# Patient Record
Sex: MALE | Race: WHITE | NOT HISPANIC OR LATINO | Employment: FULL TIME | URBAN - METROPOLITAN AREA
[De-identification: names, ages, dates, MRNs, and addresses within clinical notes are randomized per-mention and may not be internally consistent; named-entity substitution may affect disease eponyms.]

---

## 2017-08-03 ENCOUNTER — TRANSFERRED RECORDS (OUTPATIENT)
Dept: HEALTH INFORMATION MANAGEMENT | Facility: CLINIC | Age: 37
End: 2017-08-03

## 2017-08-16 ENCOUNTER — TRANSFERRED RECORDS (OUTPATIENT)
Dept: HEALTH INFORMATION MANAGEMENT | Facility: CLINIC | Age: 37
End: 2017-08-16

## 2017-12-27 ENCOUNTER — TRANSFERRED RECORDS (OUTPATIENT)
Dept: HEALTH INFORMATION MANAGEMENT | Facility: CLINIC | Age: 37
End: 2017-12-27

## 2018-08-15 ENCOUNTER — TRANSFERRED RECORDS (OUTPATIENT)
Dept: HEALTH INFORMATION MANAGEMENT | Facility: CLINIC | Age: 38
End: 2018-08-15

## 2018-08-15 LAB — HBA1C MFR BLD: 8.6 % (ref 0–5.7)

## 2018-08-16 ENCOUNTER — TRANSFERRED RECORDS (OUTPATIENT)
Dept: HEALTH INFORMATION MANAGEMENT | Facility: CLINIC | Age: 38
End: 2018-08-16

## 2018-08-16 LAB
ALT SERPL-CCNC: 18 U/L (ref 16–61)
AST SERPL-CCNC: 10 U/L (ref 15–37)
CREAT SERPL-MCNC: 5.97 MG/DL (ref 0.7–1.3)
GFR SERPL CREATININE-BSD FRML MDRD: 11 ML/MIN/1.73M2
GLUCOSE SERPL-MCNC: 50 MG/DL (ref 70–99)
POTASSIUM SERPL-SCNC: 3.7 MMOL/L (ref 3.5–5.1)

## 2018-08-20 ENCOUNTER — TRANSFERRED RECORDS (OUTPATIENT)
Dept: HEALTH INFORMATION MANAGEMENT | Facility: CLINIC | Age: 38
End: 2018-08-20

## 2018-08-21 ENCOUNTER — TRANSFERRED RECORDS (OUTPATIENT)
Dept: HEALTH INFORMATION MANAGEMENT | Facility: CLINIC | Age: 38
End: 2018-08-21

## 2018-10-12 ENCOUNTER — TRANSFERRED RECORDS (OUTPATIENT)
Dept: HEALTH INFORMATION MANAGEMENT | Facility: CLINIC | Age: 38
End: 2018-10-12

## 2018-11-12 LAB — HEP C HIM: NORMAL

## 2018-11-14 ENCOUNTER — TRANSFERRED RECORDS (OUTPATIENT)
Dept: HEALTH INFORMATION MANAGEMENT | Facility: CLINIC | Age: 38
End: 2018-11-14

## 2018-11-14 ENCOUNTER — REFERRAL (OUTPATIENT)
Dept: TRANSPLANT | Facility: CLINIC | Age: 38
End: 2018-11-14

## 2018-11-14 VITALS — BODY MASS INDEX: 33.11 KG/M2 | WEIGHT: 206 LBS | HEIGHT: 66 IN

## 2018-11-14 DIAGNOSIS — N18.6 ESRD (END STAGE RENAL DISEASE) ON DIALYSIS (H): ICD-10-CM

## 2018-11-14 DIAGNOSIS — N18.6 END STAGE RENAL DISEASE (H): ICD-10-CM

## 2018-11-14 DIAGNOSIS — E10.9 TYPE 1 DIABETES MELLITUS (H): ICD-10-CM

## 2018-11-14 DIAGNOSIS — Z76.82 ORGAN TRANSPLANT CANDIDATE: ICD-10-CM

## 2018-11-14 DIAGNOSIS — I10 ESSENTIAL HYPERTENSION: ICD-10-CM

## 2018-11-14 DIAGNOSIS — Z99.2 ESRD (END STAGE RENAL DISEASE) ON DIALYSIS (H): ICD-10-CM

## 2018-11-14 DIAGNOSIS — I10 HYPERTENSION: ICD-10-CM

## 2018-11-14 NOTE — LETTER
Request for Records from Referring Providers Office for Patients Referred to [facility] Solid Organ Transplant Program    December 7, 2018    Re: Michael Amin   [address]   [facility] [address]   1980    Requested Records       Due Responsible    Abdominal CT      Abdominal MRI      Abdominal Ultrasound      Bilateral iliac artery ultrasound      Carotid duplex      Chest CT      Chest XR      Colonoscopy      Dental      EKG      Echo stress test      Echocardiogram      Gastric emptying study      Heart Cath      PFT      Peripheral duplex      Renal Ultrasound      Vaccinations up-to-date     In addition to the above records, please include all lab results from the last 12 months.        Requested imaging may be electronically sent to the [facility] system via [id number] DICOM connection. When unable to send imaging electronically, an exported DICOM CD may be sent. Please indicate when imaging has been sent electronically on your return cover sheet.    Requested pathology slides should be accompanied by the appropriate report from your institution.    When the patient is hand carrying requested records or the requested records are not at your facility, please indicate this information on a return cover sheet.    Please fax requested paper records to [phone].     Please send all scans/slides to:   [facility]  Solid Organ Transplant Office  [address] [facility],  [address] [address]  Please call our office at [phone] if you have any questions or concerns.

## 2018-11-14 NOTE — Clinical Note
See smart set. On PD. Needs to see KP surgeon. Wait until next week to call him as I do not have financial approval yet. He was missed in the change over with the new PFR.

## 2018-11-14 NOTE — TELEPHONE ENCOUNTER
Insurrance information:  [facility]   Policy zurita:  Self  Subscriber/policy/ID number:  [id number]  Group Number:  [id number]    Is patient in a group home/assisted living?No  Does patient have a guardian? No    Referral intake process completed.  Patient is aware that after financial approval is received, medical records will be requested.   Patient confirmed for a callback from transplant coordinator on 12/4/2018.  Tentative evaluation date 12/19/18.  Confirmed coordinator will discuss evaluation process in more detail at the time of their call.   Patient is aware of the need to arrange age appropriate cancer screening, vaccinations, and dental care.  Reminded patient to complete questionnaire, complete medical records release, and review packet prior to evaluation visit .  Assessed patient for special needs (ie--wheelchair, assistance, guardian, and ):  No   Patient instructed to call [phone] with questions.     KAREN Styles, LPN   Solid Organ Transplant

## 2018-11-14 NOTE — LETTER
12/14/18    Michael Amin  [address]  [facility] [address]      Dear Michael,    Thank you for your interest in the Transplant Center at [facility], [facility]. We look forward to being a part of your care team and assisting you through the transplant process.    As we discussed, your transplant coordinator is Lilibeth Carmen (Pancreas, Kidney).  You may call your coordinator at any time with questions or concerns call [phone].    Please complete the following.    1. Fill out and return the enclosed forms    Authorization for Electronic Communication    Authorization to Discuss Protected Health Information    Authorization for Release of Protected Health Information    2. Sign up for:    [facility], access to your electronic medical record (see enclosed pamphlet)    [email or web address], a transplant education website    You can use these tools to learn more about your transplant, communicate with your care team, and track your medical details      Sincerely,  Solid Organ Transplant  [facility], [facility]    cc: Meera Fu MD; Gopi Borden

## 2018-11-26 ENCOUNTER — TRANSFERRED RECORDS (OUTPATIENT)
Dept: HEALTH INFORMATION MANAGEMENT | Facility: CLINIC | Age: 38
End: 2018-11-26

## 2018-12-03 ENCOUNTER — TRANSFERRED RECORDS (OUTPATIENT)
Dept: HEALTH INFORMATION MANAGEMENT | Facility: CLINIC | Age: 38
End: 2018-12-03

## 2018-12-03 LAB
CREAT SERPL-MCNC: 5.48 MG/DL (ref 0.6–1.3)
POTASSIUM SERPL-SCNC: 4.4 MEQ/L (ref 3.5–5.1)

## 2018-12-04 NOTE — TELEPHONE ENCOUNTER
Left message on voice mail introducing myself and the purpose of my call. Provided my contact information and asked for a return call.

## 2018-12-05 NOTE — TELEPHONE ENCOUNTER
Left message on voice mail asking for return call so we can start his referral for transplant. Provided my contact information.

## 2018-12-06 NOTE — TELEPHONE ENCOUNTER
Left message on cell phone and work phone asking for a return call. Provided my contact information.

## 2018-12-06 NOTE — TELEPHONE ENCOUNTER
Michael returned my call.     Contacted patient and introduced myself as their Transplant Coordinator, also introduced the role of the Transplant Coordinator in the transplant process.  Explained the purpose of this call including reviewing next steps and answering questions.    Confirmed Referring Provider, Dialysis Center, and Primary Care Physician. Notified patient of the importance of continued communication with referring providers and primary care physicians.    Reviewed components of transplant evaluation process including necessary appointments, tests, and procedures.    Answered questions for patient regarding evaluation, provided my name and contact information and requested they call with any additional questions.    Determined that patient would like additional information regarding transplant by:     Drop Down choices: Mail, Email, MyChart, Phone Call   Encourage MyChart   Notified patients that they will hear from a Transplant  to schedule evaluation.       Reviewed medical records and interviewed the patient. ESRD on PD since 9/4/2018.DM1 since age 16 and is using an insulin pump. He is having problems controlling his blood sugars while on PD but does not want to switch to HD as he is working full time and is afraid it may conflict with his employment. He does have potential living donors. He also has HTN and anemia. Non smoker, rare etoh, no recreational drugs and never received blood. BMI 33.25. Only surgery is PD catheter and hair implants. He has no diabetic issues with his feet. He lives with his wife and is completely independent. He still makes urine. Dental appointment is scheduled for January. His PCP manages his diabetes. He is currently in the process of finding another PCP as his has cut down to one day per week. Records have not been requested yet by intake because financial approval is still pending. We did talk about pancreas as his referral was kidney alone and he is most  "certainly interested in getting a pancreas as well. We did discuss the various ways the organs could be transplanted, spk,kidney alone or renata and he understood. Will change referral to KP.    We talked about the potential 2 day evaluation. The second day is dependent on the first. He will arrive at 0700 and was encouraged to bring someone with him. Instructed to eat breakfast and take his morning medications before arriving. We talked about the online group presentation of MTP. Reviewed the list of providers he will see and their roles. Reviewed the overall evaluation/approval process. He was a bit confused about the approval process as he stated he has been \"told by multiple doctors that he is a good candidate\". Discussed how the evaluation is structured to make sure he is as healthy as possible before a transplant and it is a committee decision regarding candidacy and this is the same in all transplant centers. He stated he understood. He is aware his next contact will be from scheduling. Provided him with my contact information and asked him to call me with any questions.    Smart set orders placed in [employer] [employer] and routed to scheduling.  "

## 2018-12-10 ENCOUNTER — TRANSFERRED RECORDS (OUTPATIENT)
Dept: HEALTH INFORMATION MANAGEMENT | Facility: CLINIC | Age: 38
End: 2018-12-10

## 2018-12-18 NOTE — TELEPHONE ENCOUNTER
Finally got financial approval for this patient & I can call him.    Just called pt nabila he still has STD on Mon, Jan 7; I'm going to be scheduling him for this date.

## 2019-01-07 ENCOUNTER — OFFICE VISIT (OUTPATIENT)
Dept: TRANSPLANT | Facility: CLINIC | Age: 39
End: 2019-01-07
Attending: PHYSICIAN ASSISTANT
Payer: COMMERCIAL

## 2019-01-07 ENCOUNTER — ALLIED HEALTH/NURSE VISIT (OUTPATIENT)
Dept: RESEARCH | Facility: CLINIC | Age: 39
End: 2019-01-07

## 2019-01-07 ENCOUNTER — APPOINTMENT (OUTPATIENT)
Dept: GENERAL RADIOLOGY | Facility: CLINIC | Age: 39
End: 2019-01-07
Attending: PHYSICIAN ASSISTANT
Payer: COMMERCIAL

## 2019-01-07 ENCOUNTER — DOCUMENTATION ONLY (OUTPATIENT)
Dept: TRANSPLANT | Facility: CLINIC | Age: 39
End: 2019-01-07

## 2019-01-07 ENCOUNTER — ANCILLARY PROCEDURE (OUTPATIENT)
Dept: CARDIOLOGY | Facility: CLINIC | Age: 39
End: 2019-01-07
Attending: PHYSICIAN ASSISTANT
Payer: COMMERCIAL

## 2019-01-07 VITALS
HEIGHT: 66 IN | WEIGHT: 215.2 LBS | SYSTOLIC BLOOD PRESSURE: 161 MMHG | BODY MASS INDEX: 34.58 KG/M2 | OXYGEN SATURATION: 99 % | DIASTOLIC BLOOD PRESSURE: 90 MMHG | HEART RATE: 73 BPM | TEMPERATURE: 98.2 F

## 2019-01-07 DIAGNOSIS — Z00.6 EXAMINATION OF PARTICIPANT IN CLINICAL TRIAL: Primary | ICD-10-CM

## 2019-01-07 DIAGNOSIS — Z99.2 ANEMIA IN CHRONIC KIDNEY DISEASE, ON CHRONIC DIALYSIS (H): ICD-10-CM

## 2019-01-07 DIAGNOSIS — E10.9 TYPE 1 DIABETES MELLITUS (H): ICD-10-CM

## 2019-01-07 DIAGNOSIS — I10 ESSENTIAL HYPERTENSION: ICD-10-CM

## 2019-01-07 DIAGNOSIS — N18.6 TYPE 1 DIABETES MELLITUS WITH CHRONIC KIDNEY DISEASE ON CHRONIC DIALYSIS (H): ICD-10-CM

## 2019-01-07 DIAGNOSIS — N18.6 END STAGE RENAL DISEASE (H): ICD-10-CM

## 2019-01-07 DIAGNOSIS — Z76.82 ORGAN TRANSPLANT CANDIDATE: ICD-10-CM

## 2019-01-07 DIAGNOSIS — E78.5 DYSLIPIDEMIA: ICD-10-CM

## 2019-01-07 DIAGNOSIS — Z99.2 TYPE 1 DIABETES MELLITUS WITH CHRONIC KIDNEY DISEASE ON CHRONIC DIALYSIS (H): ICD-10-CM

## 2019-01-07 DIAGNOSIS — Z99.2 ESRD (END STAGE RENAL DISEASE) ON DIALYSIS (H): Primary | ICD-10-CM

## 2019-01-07 DIAGNOSIS — E10.22 TYPE 1 DIABETES MELLITUS WITH CHRONIC KIDNEY DISEASE ON CHRONIC DIALYSIS (H): ICD-10-CM

## 2019-01-07 DIAGNOSIS — Z76.82 ORGAN TRANSPLANT CANDIDATE: Primary | ICD-10-CM

## 2019-01-07 DIAGNOSIS — D63.1 ANEMIA IN CHRONIC KIDNEY DISEASE, ON CHRONIC DIALYSIS (H): ICD-10-CM

## 2019-01-07 DIAGNOSIS — N18.6 ESRD (END STAGE RENAL DISEASE) ON DIALYSIS (H): Primary | ICD-10-CM

## 2019-01-07 DIAGNOSIS — N25.81 SECONDARY HYPERPARATHYROIDISM (H): ICD-10-CM

## 2019-01-07 DIAGNOSIS — N18.6 ANEMIA IN CHRONIC KIDNEY DISEASE, ON CHRONIC DIALYSIS (H): ICD-10-CM

## 2019-01-07 LAB
ABO + RH BLD: NORMAL
ALBUMIN SERPL-MCNC: 3.5 G/DL (ref 3.4–5)
ALBUMIN UR-MCNC: 100 MG/DL
ALP SERPL-CCNC: 157 U/L (ref 40–150)
ALT SERPL W P-5'-P-CCNC: 46 U/L (ref 0–70)
AMORPH CRY #/AREA URNS HPF: ABNORMAL /HPF
ANION GAP SERPL CALCULATED.3IONS-SCNC: 11 MMOL/L (ref 3–14)
APPEARANCE UR: CLEAR
APTT PPP: 30 SEC (ref 22–37)
AST SERPL W P-5'-P-CCNC: 15 U/L (ref 0–45)
BASOPHILS # BLD AUTO: 0.1 10E9/L (ref 0–0.2)
BASOPHILS NFR BLD AUTO: 0.6 %
BILIRUB SERPL-MCNC: 0.2 MG/DL (ref 0.2–1.3)
BILIRUB UR QL STRIP: NEGATIVE
BUN SERPL-MCNC: 59 MG/DL (ref 7–30)
CALCIUM SERPL-MCNC: 8.2 MG/DL (ref 8.5–10.1)
CHLORIDE SERPL-SCNC: 104 MMOL/L (ref 94–109)
CO2 SERPL-SCNC: 21 MMOL/L (ref 20–32)
COLOR UR AUTO: YELLOW
CREAT SERPL-MCNC: 6.6 MG/DL (ref 0.66–1.25)
DIFFERENTIAL METHOD BLD: ABNORMAL
EOSINOPHIL # BLD AUTO: 0.3 10E9/L (ref 0–0.7)
EOSINOPHIL NFR BLD AUTO: 3.2 %
ERYTHROCYTE [DISTWIDTH] IN BLOOD BY AUTOMATED COUNT: 14.5 % (ref 10–15)
GFR SERPL CREATININE-BSD FRML MDRD: 10 ML/MIN/{1.73_M2}
GLUCOSE SERPL-MCNC: 151 MG/DL (ref 70–99)
GLUCOSE UR STRIP-MCNC: 50 MG/DL
HBA1C MFR BLD: 10.8 % (ref 0–5.6)
HBV CORE AB SERPL QL IA: NONREACTIVE
HBV SURFACE AB SERPL IA-ACNC: 166.45 M[IU]/ML
HBV SURFACE AG SERPL QL IA: NONREACTIVE
HCT VFR BLD AUTO: 39.8 % (ref 40–53)
HCV AB SERPL QL IA: NONREACTIVE
HGB BLD-MCNC: 12.7 G/DL (ref 13.3–17.7)
HGB UR QL STRIP: NEGATIVE
HIV 1+2 AB+HIV1 P24 AG SERPL QL IA: NONREACTIVE
IMM GRANULOCYTES # BLD: 0.1 10E9/L (ref 0–0.4)
IMM GRANULOCYTES NFR BLD: 0.6 %
INR PPP: 0.99 (ref 0.86–1.14)
KETONES UR STRIP-MCNC: NEGATIVE MG/DL
LEUKOCYTE ESTERASE UR QL STRIP: NEGATIVE
LYMPHOCYTES # BLD AUTO: 1.8 10E9/L (ref 0.8–5.3)
LYMPHOCYTES NFR BLD AUTO: 21.2 %
MCH RBC QN AUTO: 27.5 PG (ref 26.5–33)
MCHC RBC AUTO-ENTMCNC: 31.9 G/DL (ref 31.5–36.5)
MCV RBC AUTO: 86 FL (ref 78–100)
MONOCYTES # BLD AUTO: 0.5 10E9/L (ref 0–1.3)
MONOCYTES NFR BLD AUTO: 5.9 %
NEUTROPHILS # BLD AUTO: 5.9 10E9/L (ref 1.6–8.3)
NEUTROPHILS NFR BLD AUTO: 68.5 %
NITRATE UR QL: NEGATIVE
NRBC # BLD AUTO: 0 10*3/UL
NRBC BLD AUTO-RTO: 0 /100
PH UR STRIP: 5 PH (ref 5–7)
PLATELET # BLD AUTO: 183 10E9/L (ref 150–450)
POTASSIUM SERPL-SCNC: 4.7 MMOL/L (ref 3.4–5.3)
PROT SERPL-MCNC: 7.9 G/DL (ref 6.8–8.8)
RBC # BLD AUTO: 4.62 10E12/L (ref 4.4–5.9)
RBC #/AREA URNS AUTO: <1 /HPF (ref 0–2)
SODIUM SERPL-SCNC: 136 MMOL/L (ref 133–144)
SOURCE: ABNORMAL
SP GR UR STRIP: 1.01 (ref 1–1.03)
SPECIMEN EXP DATE BLD: NORMAL
SPECIMEN EXP DATE BLD: NORMAL
UROBILINOGEN UR STRIP-MCNC: 0 MG/DL (ref 0–2)
WBC # BLD AUTO: 8.6 10E9/L (ref 4–11)
WBC #/AREA URNS AUTO: 2 /HPF (ref 0–5)

## 2019-01-07 PROCEDURE — 85613 RUSSELL VIPER VENOM DILUTED: CPT | Performed by: PHYSICIAN ASSISTANT

## 2019-01-07 PROCEDURE — 83036 HEMOGLOBIN GLYCOSYLATED A1C: CPT | Performed by: PHYSICIAN ASSISTANT

## 2019-01-07 PROCEDURE — 86886 COOMBS TEST INDIRECT TITER: CPT | Performed by: PHYSICIAN ASSISTANT

## 2019-01-07 PROCEDURE — 81240 F2 GENE: CPT | Performed by: PHYSICIAN ASSISTANT

## 2019-01-07 PROCEDURE — 86706 HEP B SURFACE ANTIBODY: CPT | Performed by: PHYSICIAN ASSISTANT

## 2019-01-07 PROCEDURE — 86644 CMV ANTIBODY: CPT | Performed by: PHYSICIAN ASSISTANT

## 2019-01-07 PROCEDURE — 85610 PROTHROMBIN TIME: CPT | Performed by: PHYSICIAN ASSISTANT

## 2019-01-07 PROCEDURE — 86787 VARICELLA-ZOSTER ANTIBODY: CPT | Performed by: PHYSICIAN ASSISTANT

## 2019-01-07 PROCEDURE — 86780 TREPONEMA PALLIDUM: CPT | Performed by: PHYSICIAN ASSISTANT

## 2019-01-07 PROCEDURE — 81001 URINALYSIS AUTO W/SCOPE: CPT | Performed by: PHYSICIAN ASSISTANT

## 2019-01-07 PROCEDURE — 86905 BLOOD TYPING RBC ANTIGENS: CPT | Performed by: PHYSICIAN ASSISTANT

## 2019-01-07 PROCEDURE — 87340 HEPATITIS B SURFACE AG IA: CPT | Performed by: PHYSICIAN ASSISTANT

## 2019-01-07 PROCEDURE — 80053 COMPREHEN METABOLIC PANEL: CPT | Performed by: PHYSICIAN ASSISTANT

## 2019-01-07 PROCEDURE — 86665 EPSTEIN-BARR CAPSID VCA: CPT | Performed by: PHYSICIAN ASSISTANT

## 2019-01-07 PROCEDURE — 36415 COLL VENOUS BLD VENIPUNCTURE: CPT | Performed by: PHYSICIAN ASSISTANT

## 2019-01-07 PROCEDURE — 86147 CARDIOLIPIN ANTIBODY EA IG: CPT | Performed by: PHYSICIAN ASSISTANT

## 2019-01-07 PROCEDURE — 40000866 ZZHCL STATISTIC HIV 1/2 ANTIGEN/ANTIBODY PRETRANSPLANT ONLY: Performed by: PHYSICIAN ASSISTANT

## 2019-01-07 PROCEDURE — 84681 ASSAY OF C-PEPTIDE: CPT | Performed by: PHYSICIAN ASSISTANT

## 2019-01-07 PROCEDURE — G0463 HOSPITAL OUTPT CLINIC VISIT: HCPCS | Mod: ZF

## 2019-01-07 PROCEDURE — 86480 TB TEST CELL IMMUN MEASURE: CPT | Performed by: PHYSICIAN ASSISTANT

## 2019-01-07 PROCEDURE — 86803 HEPATITIS C AB TEST: CPT | Performed by: PHYSICIAN ASSISTANT

## 2019-01-07 PROCEDURE — 85670 THROMBIN TIME PLASMA: CPT | Performed by: PHYSICIAN ASSISTANT

## 2019-01-07 PROCEDURE — 85025 COMPLETE CBC W/AUTO DIFF WBC: CPT | Performed by: PHYSICIAN ASSISTANT

## 2019-01-07 PROCEDURE — 86704 HEP B CORE ANTIBODY TOTAL: CPT | Performed by: PHYSICIAN ASSISTANT

## 2019-01-07 PROCEDURE — 81241 F5 GENE: CPT | Performed by: PHYSICIAN ASSISTANT

## 2019-01-07 PROCEDURE — 85730 THROMBOPLASTIN TIME PARTIAL: CPT | Performed by: PHYSICIAN ASSISTANT

## 2019-01-07 PROCEDURE — 86900 BLOOD TYPING SEROLOGIC ABO: CPT | Performed by: PHYSICIAN ASSISTANT

## 2019-01-07 PROCEDURE — 86850 RBC ANTIBODY SCREEN: CPT | Performed by: PHYSICIAN ASSISTANT

## 2019-01-07 RX ORDER — METOPROLOL TARTRATE 50 MG
TABLET ORAL
Refills: 3 | Status: ON HOLD | COMMUNITY
Start: 2018-08-21 | End: 2019-10-02

## 2019-01-07 RX ORDER — NEPHROCAP 1 MG
1 CAP ORAL DAILY
Refills: 3 | COMMUNITY
Start: 2018-11-28 | End: 2019-10-07

## 2019-01-07 RX ORDER — ERGOCALCIFEROL 1.25 MG/1
50000 CAPSULE, LIQUID FILLED ORAL WEEKLY
Refills: 0 | COMMUNITY
Start: 2018-12-12 | End: 2019-10-07

## 2019-01-07 RX ORDER — LISINOPRIL 40 MG/1
40 TABLET ORAL DAILY
Refills: 3 | Status: ON HOLD | COMMUNITY
Start: 2018-12-12 | End: 2019-10-05

## 2019-01-07 RX ORDER — LANCETS
EACH MISCELLANEOUS
Refills: 1 | COMMUNITY
Start: 2018-08-17 | End: 2024-06-20

## 2019-01-07 RX ORDER — CALCITRIOL 0.25 UG/1
0.25 CAPSULE, LIQUID FILLED ORAL DAILY
Refills: 3 | COMMUNITY
Start: 2018-12-29 | End: 2019-11-13

## 2019-01-07 RX ORDER — GENTAMICIN SULFATE 1 MG/G
CREAM TOPICAL
Refills: 3 | COMMUNITY
Start: 2018-08-31 | End: 2020-02-07

## 2019-01-07 RX ORDER — ATORVASTATIN CALCIUM 20 MG/1
20 TABLET, FILM COATED ORAL EVERY EVENING
Status: ON HOLD | COMMUNITY
Start: 2018-08-16 | End: 2019-10-05

## 2019-01-07 ASSESSMENT — MIFFLIN-ST. JEOR: SCORE: 1836.27

## 2019-01-07 ASSESSMENT — PAIN SCALES - GENERAL: PAINLEVEL: NO PAIN (0)

## 2019-01-07 NOTE — PROGRESS NOTES
Outpatient MNT: Kidney Pancreas Transplant Evaluation    Current BMI: 34.6 (HT 66 in,  lbs/98 kg)  BMI is within criteria of <35 for KP transplant  Ideal BMI Goal for pancreas transplant <32 or <198 lbs (17 lb loss)     Time Spent: 15 minutes  Visit Type: Initial   Referring Physician: Dr Liu   Pt accompanied by: his wife, Michelle     History of previous txp: none   Frequency of BG checks: 2-3x/day   Dialysis: yes    Dialysis Modality: PD  Dialysis Start: 9/2018   Pt receives protein supplement with dialysis: N    Nutrition Assessment  Pt does majority of cooking at home w/o salt.     Appetite: baseline/normal     Vitamins, Supplements, Pertinent Meds: B complex, vit D  Herbal Medicines/Supplements: none     Diet Recall  Breakfast Granola bar, yogurt within 1 hour of waking    Lunch Leftovers or eats at school cafe 1-2x/week (taco salad)   Dinner 6 oz pork roast with baked potato (2x/week has potato) or lean meat + small amount of carb + veggie   Snacks Fruit, nuts, crackers, chips and queso; pt reports snacking out of habit after he gets home (before dinner) and also after dinner; he does not report feeling hungry for these snacks    Beverages Water, 12 oz cranberry juice/day mixed with laxative, diet soda majority of the time but will have a regular soda a few times/week if BG low (clear), occasional regular Gatorade   Alcohol None    Dining out A few times/week      Physical Activity  No routine activity  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 He reports skipping a PD session last night d/t travel. Otherwise no major weight changes.    Adj/dosing BW: 160 lbs/73 kg      Labs  No results for input(s): CHOL, HDL, LDL,  TRIG, CHOLHDLRATIO in the last 82946 hours.  No results found for: A1C  Potassium   Date Value Ref Range Status   01/07/2019 4.7 3.4 - 5.3 mmol/L Final     PHOSPHORUS: pt reports wnl     Malnutrition  % Intake: No decreased intake noted  % Weight Loss: None noted  Subcutaneous Fat Loss: None  Muscle Loss: None  Fluid Accumulation/Edema: Mild   Malnutrition Diagnosis: Patient does not meet two of the above criteria necessary for diagnosing malnutrition    Estimated Nutrition Needs  Energy  9748-1778     (20-25 kcal/kg dosing BW for desired wt loss)   Protein      (1.2-1.4 g/kg for HD/PD needs)         Fluid  1 ml/kcal or per MD   Micronutrient   Na+: <2000 mg/day  K+: 0346-1456 mg/day  Phos: 800-1000 mg/day            Nutrition Diagnosis  Food and nutrition related knowledge deficit r/t pre transplant eval AEB pt verbalized not hearing pre/post transplant diet guidelines.    Obesity r/t excessive energy intake and inadequate physical activity AEB BMI >30.    Nutrition Intervention  Nutrition education provided:  Discussed strategies for weight loss. Discussed snacking and trying to figure out a hobby to distract self after work from snacking, taking dog on walk with wife (wife's idea), etc. Also reviewed sugary beverage intake. Pt wondering if there are any meal plans he could follow. Emailed pt kidney friendly recipes and snack ideas, however, not all are geared toward weight loss. Encouraged pt/wife to keep it simple (protein + sm portion carb + veggie), which they are already doing, but to make a list of rotating options for all 3 meals + snacks to have on hand. Provided ideas on how to increase protein. Also provided list of kidney friendly protein bars and supplements.     Reviewed post txp diet guidelines in brief (will review in further detail post txp):  (1) Review of proper food safety measures d/t immunosuppressant therapy post-op and increased risk for food-borne illness    (2) Avoid the following  post txp d/t risk for rejection, unknown effects on the organs, and/or potential interactions with immunosuppressants:  - Herbal, Chinese, holistic, chiropractic, natural, alternative medicines and supplements  - Detoxes and cleanses  - Weight loss pills  - Protein powders or other products with extracts or herbs (ie green tea extract)    (3) Med regimen and possible side effects    Patient Understanding: Pt verbalized understanding of education provided.  Expected Compliance: Good  Follow-Up Plans: PRN     Nutrition Goals  1. Limit Na+ <2000mg/day  2. Pt to verbalize understanding of 3 aspects of post txp education provided  3. Weight loss to goal wt <217 lbs (BMI <35) for kidney txp or wt <198 lbs (BMI <32 for DM I or per MD) for pancreas txp      Provided pt with contact info.   Karissa Knowles RD, LD  [facility] [phone]

## 2019-01-07 NOTE — PROGRESS NOTES
Transplant Surgery Consult Note    Medical record number: [id number]  YOB: 1980,   Consult requested by Dr. Fu for evaluation of kidney and pancreas transplant candidacy.    Assessment and Recommendations: Mr. Amin is a good candidate for transplantation and has a good understanding of the risks and benefits of this approach to management of renal failure and diabetes. The following issues should be addressed prior to transplant:       37 yo with type 1 DM and ESRD on PD since Sep 2018  On insulin about 90 units/d  A1C 9.7  No hypo unawareness  BMI 34.65 with central obesity  No other significant surgical history  Potential live donor in brother  Blood type B reported    Will need to lose central weight to decrease operative risk of KP tx  Weight target of 190 lbs with loss of abdominal fat as part of it  If unable to achieve, then consider LD kidney followed by weight loss and IVETTE.  In which kidney transplant should be on left side    List for KP on hold and kidney active until weight loss achieved    Risks of the surgical procedure including but not limited to the rare risk of mortality discussed in detail. Patient verbalized good understanding and had several pertinent questions which were answered satisfactorily.   Immunosuppressive regimen, management and long term risks discussed in detail.     The majority of our visit was spent in counselling, discussing the medical and surgical risks of living or  donor kidney and pancreas transplantation, either in a simultaneous or sequential fashion. I contrasted approximate wait time for SPK vs living vs  donor kidneys from normal (0-85%) or higher (%) kidney donor profile index (KDPI) donors and their associated outcomes. I would not recommend this individual to consider kidneys from high KDPI donors. The reason for this decision is best summarized as: multiorgan candidate.  Access to transplant will be impacted by living donor  availability and overall candidacy for SPK, as well as the influence of blood type and degree of sensitization. We discussed advantages of preemptive transplant as well as living donor kidney transplant, and graft and patient survival outcomes associated with these options. Potential surgical complications of kidney and pancreas transplantation include bleeding, clotting, infection, wound complications, anastomotic failure and other issues such as cardiac complications, pneumonia, deep venous thrombosis, pulmonary embolism, post transplant diabetes and death. We discussed the need for protocol biopsy of the kidney and the possible need for a ureteral stent (and subsequent removal). We discussed benefits and risks associated with different approaches to exocrine drainage of pancreatic secretions. We also discussed differences in the average length of stay, recovery process, and posttransplant lab and monitoring protocol. We discussed the risk of graft rejection and recurrent diabetic nephropathy in the setting of poor glycemic control. I emphasized the need for strict immunosuppression adherence and the potential for complications of immunosuppression such as skin cancer or lymphoma, as well as a very low but not zero risk of donor-derived disease transmission risks (infection, cancer). Mr. Amin asked good questions and the patient's candidacy will be reviewed at our Multidisciplinary Selection Committee. Thank you for the opportunity to participate in Mr. Amin's care.  Total time: 45 minutes  Counselling time: 40 minutes    .  ---------------------------------------------------------------------------------------------------    HPI: Mr. Amin has Type 1 Diabetes. The patient has had diabetes for 22 years. Management is by Humalog per insulin pump. The patient usually checks his blood sugar per pump times/day. Hypoglyemic unawareness is not an issue.  Complications of diabetes include:    Retinopathy:   No  Neuropathy: No  Gastroparesis:  No    The patient is on dialysis.    Has potential kidney donors:  Yes .  Interested in participation in paired exchange if a donor is willing: Doesn't know     The patient has the following pertinent history:       No    Yes  Dialysis:    []      [] via:       Blood Transfusion                  []      []  Number of units:   Most recently:  Pregnancy:    []      [] Number:       Previous Transplant:  []      [] Details:    Cancer    []      [] Comment:   Kidney stones   []      [] Comment:      Recurrent infections  []      []  Type:                  Bladder dysfunction  []      [] Cause:    Claudication   []      [] Distance:    Previous Amputation  []      [] Cause:     Chronic anticoagulation  []      [] Indication:  Gnosticism  []      []     Past Medical History:   Diagnosis Date     Diabetes (H)     Type 1 DM, Insulin Pump      ESRD (end stage renal disease) on dialysis (H)      Hypertension     Takes Meteprolol Daily      Past Surgical History:   Procedure Laterality Date     ABDOMEN SURGERY      Dialysis Catheter Placed Aug 2018     HEAD & NECK SURGERY      Hair Implants 2007     No family history on file.  Social History     Socioeconomic History     Marital status:      Spouse name: Not on file     Number of children: Not on file     Years of education: Not on file     Highest education level: Not on file   Social Needs     Financial resource strain: Not on file     Food insecurity - worry: Not on file     Food insecurity - inability: Not on file     Transportation needs - medical: Not on file     Transportation needs - non-medical: Not on file   Occupational History     Not on file   Tobacco Use     Smoking status: Never Smoker     Smokeless tobacco: Never Used   Substance and Sexual Activity     Alcohol use: Yes     Comment: Rarely 1-2 Drinks a month      Drug use: No     Sexual activity: Not on file   Other Topics Concern     Parent/sibling w/ CABG, MI  or angioplasty before 65F 55M? Not Asked   Social History Narrative     Not on file       ROS:   CONSTITUTIONAL:  No fevers or chills  EYES: negative for icterus  ENT:  negative for hearing loss, tinnitus and sore throat  RESPIRATORY:  negative for cough, sputum, dyspnea  CARDIOVASCULAR:  negative for chest pain Fatigue  GASTROINTESTINAL:  negative for nausea, vomiting, diarrhea or constipation  GENITOURINARY:  negative for incontinence, dysuria, bladder emptying problems  HEME:  No easy bruising  INTEGUMENT:  negative for rash and pruritus  NEURO:  Negative for headache, seizure disorder    Allergies:   No Known Allergies    Medications:  Prescription Medications as of 1/7/2019       Rx Number Disp Refills Start End Last Dispensed Date Next Fill Date Owning Pharmacy    aspirin (ASA) 81 MG EC tablet            Sig: Take 81 mg by mouth    Class: Historical    Route: Oral    atorvastatin (LIPITOR) 20 MG tablet    8/16/2018        Sig: Take 20 mg by mouth    Class: Historical    Route: Oral    B Complex-C-Folic Acid (VIRT-CAPS) 1 MG CAPS   3 11/28/2018        Sig: TK ONE C PO  D    Class: Historical    blood glucose monitoring (ACCU-CHEK FASTCLIX) lancets   1 8/17/2018        Sig: U QID OR MORE OFTEN PRN    Class: Historical    calcitRIOL (ROCALTROL) 0.25 MCG capsule   3 12/29/2018        Sig: TK ONE C PO D    Class: Historical    gentamicin (GARAMYCIN) 0.1 % external cream   3 8/31/2018        Sig: ROX TO EXIT SIGHT ONCE D    Class: Historical    HUMALOG 100 UNIT/ML injection   3 9/19/2018        Sig: INJ UP  UNI SC D PER PUMP    Class: Historical    lisinopril (PRINIVIL/ZESTRIL) 20 MG tablet   3 12/12/2018        Sig: TK 1 T PO QD    Class: Historical    metoprolol tartrate (LOPRESSOR) 50 MG tablet   3 8/21/2018        Class: Historical    UNABLE TO FIND            Sig: insulin lispro 100 UNIT/ML patient supplied PUMP    Class: Historical    Route: Subcutaneous    vitamin D2 (ERGOCALCIFEROL) 21077 units (1250  mcg) capsule   0 12/12/2018        Sig: TK 1 C PO WEEKLY    Class: Historical          Exam:   Temp:  [98.2  F (36.8  C)] 98.2  F (36.8  C)  Pulse:  [73] 73  BP: (161)/(90) 161/90  SpO2:  [99 %] 99 %  Appearance: in no apparent distress.   Skin: normal  Head and Neck: Normal, no rashes or jaundice  Respiratory: easy respirations, no audible wheezing.  Cardiovascular: RRR  Abdomen: obese, Surgical scars consistent with history, PD cath in place       Diagnostics:   No results found for this or any previous visit (from the past 672 hour(s)).  No results found for: CPRA

## 2019-01-07 NOTE — LETTER
2019       RE: Michael Amin  [address]  [facility] [address]     Dear Colleague,    Thank you for referring your patient, Michael Amin, to the [facility] SOLID ORGAN TRANSPLANT at [facility]. Please see a copy of my visit note below.    Transplant Surgery Consult Note    Medical record number: [id number]  YOB: 1980,   Consult requested by Dr. Fu for evaluation of kidney and pancreas transplant candidacy.    Assessment and Recommendations: Mr. Amin is a good candidate for transplantation and has a good understanding of the risks and benefits of this approach to management of renal failure and diabetes. The following issues should be addressed prior to transplant:       39 yo with type 1 DM and ESRD on PD since Sep 2018  On insulin about 90 units/d  A1C 9.7  No hypo unawareness  BMI 34.65 with central obesity  No other significant surgical history  Potential live donor in brother  Blood type B reported    Will need to lose central weight to decrease operative risk of KP tx  Weight target of 190 lbs with loss of abdominal fat as part of it  If unable to achieve, then consider LD kidney followed by weight loss and IVETTE.  In which kidney transplant should be on left side    List for KP on hold and kidney active until weight loss achieved    Risks of the surgical procedure including but not limited to the rare risk of mortality discussed in detail. Patient verbalized good understanding and had several pertinent questions which were answered satisfactorily.   Immunosuppressive regimen, management and long term risks discussed in detail.     The majority of our visit was spent in counselling, discussing the medical and surgical risks of living or  donor kidney and pancreas transplantation, either in a simultaneous or sequential fashion. I contrasted approximate wait time for SPK vs living vs  donor kidneys from normal (0-85%) or higher  (%) kidney donor profile index (KDPI) donors and their associated outcomes. I would not recommend this individual to consider kidneys from high KDPI donors. The reason for this decision is best summarized as: multiorgan candidate.  Access to transplant will be impacted by living donor availability and overall candidacy for SPK, as well as the influence of blood type and degree of sensitization. We discussed advantages of preemptive transplant as well as living donor kidney transplant, and graft and patient survival outcomes associated with these options. Potential surgical complications of kidney and pancreas transplantation include bleeding, clotting, infection, wound complications, anastomotic failure and other issues such as cardiac complications, pneumonia, deep venous thrombosis, pulmonary embolism, post transplant diabetes and death. We discussed the need for protocol biopsy of the kidney and the possible need for a ureteral stent (and subsequent removal). We discussed benefits and risks associated with different approaches to exocrine drainage of pancreatic secretions. We also discussed differences in the average length of stay, recovery process, and posttransplant lab and monitoring protocol. We discussed the risk of graft rejection and recurrent diabetic nephropathy in the setting of poor glycemic control. I emphasized the need for strict immunosuppression adherence and the potential for complications of immunosuppression such as skin cancer or lymphoma, as well as a very low but not zero risk of donor-derived disease transmission risks (infection, cancer). Mr. Amin asked good questions and the patient's candidacy will be reviewed at our Multidisciplinary Selection Committee. Thank you for the opportunity to participate in Mr. Amin's care.  Total time: 45 minutes  Counselling time: 40  minutes    .  ---------------------------------------------------------------------------------------------------    HPI: Mr. Amin has Type 1 Diabetes. The patient has had diabetes for 22 years. Management is by Humalog per insulin pump. The patient usually checks his blood sugar per pump times/day. Hypoglyemic unawareness is not an issue.  Complications of diabetes include:    Retinopathy:  No  Neuropathy: No  Gastroparesis:  No    The patient is on dialysis.    Has potential kidney donors:  Yes .  Interested in participation in paired exchange if a donor is willing: Doesn't know     The patient has the following pertinent history:       No    Yes  Dialysis:    []      [] via:       Blood Transfusion                  []      []  Number of units:   Most recently:  Pregnancy:    []      [] Number:       Previous Transplant:  []      [] Details:    Cancer    []      [] Comment:   Kidney stones   []      [] Comment:      Recurrent infections  []      []  Type:                  Bladder dysfunction  []      [] Cause:    Claudication   []      [] Distance:    Previous Amputation  []      [] Cause:     Chronic anticoagulation  []      [] Indication:  Pent[employer]alism  []      []     Past Medical History:   Diagnosis Date     Diabetes (H)     Type 1 DM, Insulin Pump      ESRD (end stage renal disease) on dialysis (H)      Hypertension     Takes Meteprolol Daily      Past Surgical History:   Procedure Laterality Date     ABDOMEN SURGERY      Dialysis Catheter Placed Aug 2018     HEAD & NECK SURGERY      Hair Implants 2007     No family history on file.  Social History     Socioeconomic History     Marital status:      Spouse name: Not on file     Number of children: Not on file     Years of education: Not on file     Highest education level: Not on file   Social Needs     Financial resource strain: Not on file     Food insecurity - worry: Not on file     Food insecurity - inability: Not on file     Transportation  needs - medical: Not on file     Transportation needs - non-medical: Not on file   Occupational History     Not on file   Tobacco Use     Smoking status: Never Smoker     Smokeless tobacco: Never Used   Substance and Sexual Activity     Alcohol use: Yes     Comment: Rarely 1-2 Drinks a month      Drug use: No     Sexual activity: Not on file   Other Topics Concern     Parent/sibling w/ CABG, MI or angioplasty before 65F 55M? Not Asked   Social History Narrative     Not on file       ROS:   CONSTITUTIONAL:  No fevers or chills  EYES: negative for icterus  ENT:  negative for hearing loss, tinnitus and sore throat  RESPIRATORY:  negative for cough, sputum, dyspnea  CARDIOVASCULAR:  negative for chest pain Fatigue  GASTROINTESTINAL:  negative for nausea, vomiting, diarrhea or constipation  GENITOURINARY:  negative for incontinence, dysuria, bladder emptying problems  HEME:  No easy bruising  INTEGUMENT:  negative for rash and pruritus  NEURO:  Negative for headache, seizure disorder    Allergies:   No Known Allergies    Medications:  Prescription Medications as of 1/7/2019       Rx Number Disp Refills Start End Last Dispensed Date Next Fill Date Owning Pharmacy    aspirin (ASA) 81 MG EC tablet            Sig: Take 81 mg by mouth    Class: Historical    Route: Oral    atorvastatin (LIPITOR) 20 MG tablet    8/16/2018        Sig: Take 20 mg by mouth    Class: Historical    Route: Oral    B Complex-C-Folic Acid (VIRT-CAPS) 1 MG CAPS   3 11/28/2018        Sig: TK ONE C PO  D    Class: Historical    blood glucose monitoring (ACCU-CHEK FASTCLIX) lancets   1 8/17/2018        Sig: U QID OR MORE OFTEN PRN    Class: Historical    calcitRIOL (ROCALTROL) 0.25 MCG capsule   3 12/29/2018        Sig: TK ONE C PO D    Class: Historical    gentamicin (GARAMYCIN) 0.1 % external cream   3 8/31/2018        Sig: ROX TO EXIT SIGHT ONCE D    Class: Historical    HUMALOG 100 UNIT/ML injection   3 9/19/2018        Sig: INJ UP  UNI SC D PER  PUMP    Class: Historical    lisinopril (PRINIVIL/ZESTRIL) 20 MG tablet   3 12/12/2018        Sig: TK 1 T PO QD    Class: Historical    metoprolol tartrate (LOPRESSOR) 50 MG tablet   3 8/21/2018        Class: Historical    UNABLE TO FIND            Sig: insulin lispro 100 UNIT/ML patient supplied PUMP    Class: Historical    Route: Subcutaneous    vitamin D2 (ERGOCALCIFEROL) 60192 units (1250 mcg) capsule   0 12/12/2018        Sig: TK 1 C PO WEEKLY    Class: Historical          Exam:   Temp:  [98.2  F (36.8  C)] 98.2  F (36.8  C)  Pulse:  [73] 73  BP: (161)/(90) 161/90  SpO2:  [99 %] 99 %  Appearance: in no apparent distress.   Skin: normal  Head and Neck: Normal, no rashes or jaundice  Respiratory: easy respirations, no audible wheezing.  Cardiovascular: RRR  Abdomen: obese, Surgical scars consistent with history, PD cath in place       Diagnostics:   No results found for this or any previous visit (from the past 672 hour(s)).  No results found for: CPRA    Again, thank you for allowing me to participate in the care of your patient.      Sincerely,    HANANE

## 2019-01-07 NOTE — PROGRESS NOTES
Surgery Clinical Trials Office Notification of Study Randomization  Study Name: Randomized Controlled Trial to Evaluate the Effect of Lost Wage Reimbursement to Potential Kidney  Donors On Living Donation Rates (Donor Lost Wages Study) (IRB #[id number])    ICF Version Date / IRB Approval Date: 11-AUG-2018 / 21-AUG-2018    Date of Approach/Consent: 07 JAN 2019  Patient consented to participate in the Donor Lost Wages study and has been randomized to the Control arm of the study (donors ARE NOT eligible for wage reimbursement).  Patient has been informed of the results.

## 2019-01-07 NOTE — PROGRESS NOTES
"Kidney, Pancreas Transplant Referral - 11/14/2018  Michael Amin attended the pre-transplant patient education class today. The [facility] website pre-transplant modules were viewed; class participants were educated on using the site.     Content reviewed:    Living Donation and how to access that program    Paired exchange    Kidney Donor Profile Index (KDPI)    Waiting list issues (right to decline without penalty, high PHS risk donors, what to expect when called with an offer)    Hospital experience,  length of stay , need to stay locally post-discharge (2-4 weeks)    Surgical options (with pictures)                             Post-surgery lifting and driving restrictions    Post-transplant routines, frequency of lab work and clinic visits    Need to stay locally post-discharge (2-4 weeks)    Role of Transplant Coordinator    Participants were informed of the benefits of transplant as well as potential risks such as infection, cancer, and death.  The need for total adherence with immunosuppression medications and following transplant regimens was stressed.  The overall evaluation/approval/listing process was reviewed.        The patient was provided with the following documents:  What You Need to Know About a Kidney Transplant  Adult Kidney Transplant - A Guide for Patients  SRTR Data Sheet - Kidney  Brochure - Kidney Allocation  What You Need to Know About a Pancreas Transplant  Adult Pancreas Transplant-A Guide for Patients  SRTR Data Sheet - Pancreas  Brochure - Pancreas  SRTR Data Sheet - Kidney/Pancreas  Brochure - SPK  Brochure - Islet Allocation  Brochure - Multiple Listing and Waiting Time Transfer  What Every Patient Needs to Know (UNOS)  UNOS Facts and Figures  Finding a Donor  [facility] - Quick Start Guide  KDPI Consent  Receipt of Information form    Michael Amin signed the  Receipt of Information for Organ Transplant Recipient.\" He was provided Lilibeth Carmen's business card and " instructed to call with additional questions.      Summary    Team s concerns/comments:   1) BMI  2) Needs vascular evaluation  3) Needs Cardiac Clearance    Candidacy category: Green for Kidney only    Action/Plan: Patient to continue with scheduled appointments and tests. Patient may be listed for Kidney with Pancreas on hold, pending weight lose.  1) Encourage weigh loss. BMI OK for listing for Kidney only. Needs further weight loss to 198# for Pancreas.  2) Abd CT and Iliac doppler ordered-patient would like scheduled at same time of any other future appointments.  3) Cardiology consult ordered-to be scheduled. Patient aware.    Expected Selection Meeting Discussion: 1/16/19

## 2019-01-07 NOTE — NURSING NOTE
"Chief Complaint   Patient presents with     Transplant Evaluation     Pre kidney /Panc eval     Blood pressure 161/90, pulse 73, temperature 98.2  F (36.8  C), temperature source Oral, height 1.68 m (5' 6.15\"), weight 97.6 kg (215 lb 3.2 oz), SpO2 99 %.    CHUNG EDEN CMA    "

## 2019-01-07 NOTE — PROGRESS NOTES
Assessment and Plan:  # Kidney/pancreas transplant evaluation - patient is a good candidate overall. Benefits of a living donor transplant were discussed.  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 He reports that his brother is interested in donating.   # Type 1 diabetes - currently poorly controlled with about 90 units of insulin daily via pump. Complicated by triopathy and may benefit from a pancreas transplant.  # Cardiac risk - he will need to have risk assessment given longstanding diabetes and hypertension.  # BMI 34.65 - we discussed weight loss for overall general health and to help reduce postoperative complications.  # Health maintenance - he is due for dental.     Discussed the risks and benefits of a transplant, including the risk of surgery and immunosuppression medications.  Patient's overall evaluation will be discussed in the Transplant Program's regular meeting with a final recommendation on the patient's suitability for transplant to be made at that time.  Patient was seen in conjunction with Dr. Jabier Hernández as part of a shared visit.    Patient was seen by myself, Dr. Jabier Hernández, in conjunction with Natalia Noland PA-C as part of a shared visit.    I personally reviewed past medical and surgical history, vital signs, medications and labs.  Present and past medical history, along with significant physical exam findings were all reviewed with ROX.    My mackenzie findings:  Michael Amin is a 38 year old year old male with ESKD from diabetes mellitus type 1, who presents for kidney/pancreas transplant evaluation.  Patient reports feeling good overall with some medical complaints.    Key management decisions made by me and discussed with ROX:  1. Kidney/pancreas transplant evaluation - patient is a good candidate overall. Benefits of a living donor transplant were discussed.  2. ESKD from  diabetes mellitus type 1 - patient is doing okay on dialysis, but would benefit from a kidney transplant.  3. DM type 1 - not well controlled and end-organ damage and patient may benefit from a pancreas transplant.  4. Cardiac risk - patient will require Cardiology evaluation prior to transplant.  5. Overweight - patient's BMI is just over 34 and he just meets criteria for a kidney transplant alone, but would need to get to BMI of 32 or less for a pancreas transplant.  Recommend weight loss to decrease risk of wound complications and for overall health.    Evaluation:  Michael Amin was seen in consultation at the request of Dr. Jean Liu for evaluation as a potential kidney/pancreas transplant recipient.    Reason for Visit:  Michael Amin is a 38-year-old male with ESKD from diabetes mellitus type 1, who presents for kidney/pancreas transplant evaluation.    HPI:  Mr. Amin is a 38-year-old male with type 1 diabetes since age 16 that has been complicated by triopathy and has historically been poorly controlled. He recalls being told he had abnormal serum creatinine about 8-10 years ago, and had seen a local nephrologist a few times, but had generally had intermittent follow up. His creatinine was 2.5 mg/dl in August 2015. He continued to have a progressive decline until he initiated peritoneal dialysis in August 2018 (creatinine around 6 mg/dl), which he continues on currently. He denies any episodes of peritonitis. Renal imaging at the time showed echogenic kidneys. He has not had a kidney biopsy. For diabetes management, he currently uses about 90 units of humalog via pump daily. He checks blood sugars 2-4 times per day. He reports higher blood sugars associated with peritoneal dialysis. Last A1c was around 9.5%. He says sugars tend to be difficult to control and predict with typical sugars running . He denies hypoglycemic unawareness. He is currently being managed by his PCP while he searches for  a new endocrinologist. He has no known history of cardiac disease or events. His father has CAD and is s/p CABG in his late 50's/early 60's. Mr. Amin doesn't do anything in particular for exercise, but can walk an unlimited distance and climb stairs without chest pain, SOB, or claudication symptoms. 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 He is a non-smoker. Overall, he is doing ok, but does note fatigue and feeling cold. His appetite is stable. No nausea, vomiting, or diarrhea. No changes in urination. No dysuria, hematuria, or trouble emptying his bladder. No fevers, sweats, chills, or recent illness.         Kidney Disease Hx:        Kidney Disease Dx: T1DM       Biopsy Proven: No         On Dialysis: Yes, Date initiated: September 2018 and Dialysis Type: PD;       Primary Nephrologist: Dr. Fu         Medical Hx:       h/o HTN: Yes         h/o DM:  Yes        h/o Protein in Urine: Yes        h/o Blood in Urine:  No       h/o Kidney Stones:  No       h/o UTI: No       h/o Chronic NSAID Use: No         Previous Transplant Hx:        No         Transplant Sensitization Hx:       Previous Tx: No       Blood Transfusion: No             Uremic Symptoms:       Fatigue: Yes; Cold: Yes; Nausea: No; Poor Appetite: No; Metallic Taste: No; Edema: No;         Cardiovascular Hx:       h/o Cardiac Issues: No       Exercise Tolerance: no chest pain or shortness of breath with exertion.         Health Maintenance:       Dental: scheduled         Potential Donor(s): Yes; brother    ROS:  A comprehensive review of systems was obtained and negative, except as noted in the HPI or PMH.    PMH:   Medical record was reviewed and PMH was discussed with patient and noted below.  Past Medical History:   Diagnosis Date     Anemia in chronic kidney disease      Dyslipidemia      ESRD (end stage renal disease) on dialysis (H)      Hypertension      Secondary hyperparathyroidism (H)      Type 1  diabetes (H)        PSH:   Past Surgical History:   Procedure Laterality Date     hair implant  2007     INSERT CATHETER PERITONEAL DIALYSIS  08/2018     Personal or family history of bleeding or anesthesia problems: No    Family Hx:  Family History   Problem Relation Age of Onset     Diabetes Father      Coronary Artery Disease Father        Personal Hx:   Social History     Socioeconomic History     Marital status:      Spouse name: Not on file     Number of children: Not on file     Years of education: Not on file     Highest education level: Not on file   Social Needs     Financial resource strain: Not on file     Food insecurity - worry: Not on file     Food insecurity - inability: Not on file     Transportation needs - medical: Not on file     Transportation needs - non-medical: Not on file   Occupational History     Not on file   Tobacco Use     Smoking status: Never Smoker     Smokeless tobacco: Never Used   Substance and Sexual Activity     Alcohol use: Yes     Comment: Rarely 1-2 Drinks a month      Drug use: No     Sexual activity: Not on file   Other Topics Concern     Parent/sibling w/ CABG, MI or angioplasty before 65F 55M? Not Asked   Social History Narrative     Not on file       Allergies:  No Known Allergies    Medications:  Prior to Admission medications    Medication Sig Start Date End Date Taking? Authorizing Provider   aspirin (ASA) 81 MG EC tablet Take 81 mg by mouth   Yes Reported, Patient   atorvastatin (LIPITOR) 20 MG tablet Take 20 mg by mouth 8/16/18  Yes Reported, Patient   B Complex-C-Folic Acid (VIRT-CAPS) 1 MG CAPS TK ONE C PO  D 11/28/18  Yes Reported, Patient   blood glucose monitoring (ACCU-CHEK FASTCLIX) lancets U QID OR MORE OFTEN PRN 8/17/18  Yes Reported, Patient   calcitRIOL (ROCALTROL) 0.25 MCG capsule TK ONE C PO D 12/29/18  Yes Reported, Patient   gentamicin (GARAMYCIN) 0.1 % external cream ROX TO EXIT SIGHT ONCE D 8/31/18  Yes Reported, Patient   HUMALOG 100 UNIT/ML  "injection INJ UP  UNI SC D PER PUMP 9/19/18  Yes Reported, Patient   lisinopril (PRINIVIL/ZESTRIL) 20 MG tablet TK 1 T PO QD 12/12/18  Yes Reported, Patient   metoprolol tartrate (LOPRESSOR) 50 MG tablet  8/21/18  Yes Reported, Patient   UNABLE TO FIND insulin lispro 100 UNIT/ML patient supplied PUMP   Yes Reported, Patient   vitamin D2 (ERGOCALCIFEROL) 44032 units (1250 mcg) capsule TK 1 C PO WEEKLY 12/12/18  Yes Reported, Patient       Vitals:  /90   Pulse 73   Temp 98.2  F (36.8  C) (Oral)   Ht 1.68 m (5' 6.15\")   Wt 97.6 kg (215 lb 3.2 oz)   SpO2 99%   BMI 34.58 kg/m      Exam:  GENERAL APPEARANCE: alert and no distress  HENT: mouth without ulcers or lesions  LYMPHATICS: no cervical or supraclavicular nodes  RESP: lungs clear to auscultation - no rales, rhonchi or wheezes  CV: regular rhythm, normal rate, no rub, no murmur  EDEMA: no LE edema bilaterally  ABDOMEN: soft, nondistended, nontender, bowel sounds normal  MS: extremities normal - no gross deformities noted, no evidence of inflammation in joints, no muscle tenderness  SKIN: no rash    Results:   Recent Results (from the past 336 hour(s))   EKG 12-lead, tracing only [EKG1]    Collection Time: 01/07/19  1:16 PM   Result Value Ref Range    Interpretation ECG Click View Image link to view waveform and result          "

## 2019-01-07 NOTE — LETTER
1/7/2019      RE: Michael Amin  [address]  [facility] [address]       Assessment and Plan:  # Kidney/pancreas transplant evaluation - patient is a good candidate overall. Benefits of a living donor transplant were discussed.  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 He reports that his brother is interested in donating.   # Type 1 diabetes - currently poorly controlled with about 90 units of insulin daily via pump. Complicated by triopathy and may benefit from a pancreas transplant.  # Cardiac risk - he will need to have risk assessment given longstanding diabetes and hypertension.  # BMI 34.65 - we discussed weight loss for overall general health and to help reduce postoperative complications.  # Health maintenance - he is due for dental.     Discussed the risks and benefits of a transplant, including the risk of surgery and immunosuppression medications.  Patient's overall evaluation will be discussed in the Transplant Program's regular meeting with a final recommendation on the patient's suitability for transplant to be made at that time.  Patient was seen in conjunction with Dr. Jabier Hernández as part of a shared visit.    Patient was seen by myself, Dr. Jabier Hernández, in conjunction with Natalia Noland PA-C as part of a shared visit.    I personally reviewed past medical and surgical history, vital signs, medications and labs.  Present and past medical history, along with significant physical exam findings were all reviewed with ROX.    My mackenzie findings:  Michael Amin is a 38 year old year old male with ESKD from diabetes mellitus type 1, who presents for kidney/pancreas transplant evaluation.  Patient reports feeling good overall with some medical complaints.    Key management decisions made by me and discussed with ROX:  1. Kidney/pancreas transplant evaluation - patient is a good candidate  overall. Benefits of a living donor transplant were discussed.  2. ESKD from diabetes mellitus type 1 - patient is doing okay on dialysis, but would benefit from a kidney transplant.  3. DM type 1 - not well controlled and end-organ damage and patient may benefit from a pancreas transplant.  4. Cardiac risk - patient will require Cardiology evaluation prior to transplant.  5. Overweight - patient's BMI is just over 34 and he just meets criteria for a kidney transplant alone, but would need to get to BMI of 32 or less for a pancreas transplant.  Recommend weight loss to decrease risk of wound complications and for overall health.    Evaluation:  Michael Amin was seen in consultation at the request of Dr. Jean Liu for evaluation as a potential kidney/pancreas transplant recipient.    Reason for Visit:  Michael Amin is a 38-year-old male with ESKD from diabetes mellitus type 1, who presents for kidney/pancreas transplant evaluation.    HPI:  Mr. Amin is a 38-year-old male with type 1 diabetes since age 16 that has been complicated by triopathy and has historically been poorly controlled. He recalls being told he had abnormal serum creatinine about 8-10 years ago, and had seen a local nephrologist a few times, but had generally had intermittent follow up. His creatinine was 2.5 mg/dl in August 2015. He continued to have a progressive decline until he initiated peritoneal dialysis in August 2018 (creatinine around 6 mg/dl), which he continues on currently. He denies any episodes of peritonitis. Renal imaging at the time showed echogenic kidneys. He has not had a kidney biopsy. For diabetes management, he currently uses about 90 units of humalog via pump daily. He checks blood sugars 2-4 times per day. He reports higher blood sugars associated with peritoneal dialysis. Last A1c was around 9.5%. He says sugars tend to be difficult to control and predict with typical sugars running . He denies hypoglycemic  unawareness. He is currently being managed by his PCP while he searches for a new endocrinologist. He has no known history of cardiac disease or events. His father has CAD and is s/p CABG in his late 50's/early 60's. Mr. Amin doesn't do anything in particular for exercise, but can walk an unlimited distance and climb stairs without chest pain, SOB, or claudication symptoms. 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 He is a non-smoker. Overall, he is doing ok, but does note fatigue and feeling cold. His appetite is stable. No nausea, vomiting, or diarrhea. No changes in urination. No dysuria, hematuria, or trouble emptying his bladder. No fevers, sweats, chills, or recent illness.         Kidney Disease Hx:        Kidney Disease Dx: T1DM       Biopsy Proven: No         On Dialysis: Yes, Date initiated: September 2018 and Dialysis Type: PD;       Primary Nephrologist: Dr. Fu         Medical Hx:       h/o HTN: Yes         h/o DM:  Yes        h/o Protein in Urine: Yes        h/o Blood in Urine:  No       h/o Kidney Stones:  No       h/o UTI: No       h/o Chronic NSAID Use: No         Previous Transplant Hx:        No         Transplant Sensitization Hx:       Previous Tx: No       Blood Transfusion: No             Uremic Symptoms:       Fatigue: Yes; Cold: Yes; Nausea: No; Poor Appetite: No; Metallic Taste: No; Edema: No;         Cardiovascular Hx:       h/o Cardiac Issues: No       Exercise Tolerance: no chest pain or shortness of breath with exertion.         Health Maintenance:       Dental: scheduled         Potential Donor(s): Yes; brother    ROS:  A comprehensive review of systems was obtained and negative, except as noted in the HPI or PMH.    PMH:   Medical record was reviewed and PMH was discussed with patient and noted below.  Past Medical History:   Diagnosis Date     Anemia in chronic kidney disease      Dyslipidemia      ESRD (end stage renal disease) on  dialysis (H)      Hypertension      Secondary hyperparathyroidism (H)      Type 1 diabetes (H)        PSH:   Past Surgical History:   Procedure Laterality Date     hair implant  2007     INSERT CATHETER PERITONEAL DIALYSIS  08/2018     Personal or family history of bleeding or anesthesia problems: No    Family Hx:  Family History   Problem Relation Age of Onset     Diabetes Father      Coronary Artery Disease Father        Personal Hx:   Social History     Socioeconomic History     Marital status:      Spouse name: Not on file     Number of children: Not on file     Years of education: Not on file     Highest education level: Not on file   Social Needs     Financial resource strain: Not on file     Food insecurity - worry: Not on file     Food insecurity - inability: Not on file     Transportation needs - medical: Not on file     Transportation needs - non-medical: Not on file   Occupational History     Not on file   Tobacco Use     Smoking status: Never Smoker     Smokeless tobacco: Never Used   Substance and Sexual Activity     Alcohol use: Yes     Comment: Rarely 1-2 Drinks a month      Drug use: No     Sexual activity: Not on file   Other Topics Concern     Parent/sibling w/ CABG, MI or angioplasty before 65F 55M? Not Asked   Social History Narrative     Not on file       Allergies:  No Known Allergies    Medications:  Prior to Admission medications    Medication Sig Start Date End Date Taking? Authorizing Provider   aspirin (ASA) 81 MG EC tablet Take 81 mg by mouth   Yes Reported, Patient   atorvastatin (LIPITOR) 20 MG tablet Take 20 mg by mouth 8/16/18  Yes Reported, Patient   B Complex-C-Folic Acid (VIRT-CAPS) 1 MG CAPS TK ONE C PO  D 11/28/18  Yes Reported, Patient   blood glucose monitoring (ACCU-CHEK FASTCLIX) lancets U QID OR MORE OFTEN PRN 8/17/18  Yes Reported, Patient   calcitRIOL (ROCALTROL) 0.25 MCG capsule TK ONE C PO D 12/29/18  Yes Reported, Patient   gentamicin (GARAMYCIN) 0.1 % external  "cream ROX TO EXIT SIGHT ONCE D 8/31/18  Yes Reported, Patient   HUMALOG 100 UNIT/ML injection INJ UP  UNI SC D PER PUMP 9/19/18  Yes Reported, Patient   lisinopril (PRINIVIL/ZESTRIL) 20 MG tablet TK 1 T PO QD 12/12/18  Yes Reported, Patient   metoprolol tartrate (LOPRESSOR) 50 MG tablet  8/21/18  Yes Reported, Patient   UNABLE TO FIND insulin lispro 100 UNIT/ML patient supplied PUMP   Yes Reported, Patient   vitamin D2 (ERGOCALCIFEROL) 27564 units (1250 mcg) capsule TK 1 C PO WEEKLY 12/12/18  Yes Reported, Patient       Vitals:  /90   Pulse 73   Temp 98.2  F (36.8  C) (Oral)   Ht 1.68 m (5' 6.15\")   Wt 97.6 kg (215 lb 3.2 oz)   SpO2 99%   BMI 34.58 kg/m       Exam:  GENERAL APPEARANCE: alert and no distress  HENT: mouth without ulcers or lesions  LYMPHATICS: no cervical or supraclavicular nodes  RESP: lungs clear to auscultation - no rales, rhonchi or wheezes  CV: regular rhythm, normal rate, no rub, no murmur  EDEMA: no LE edema bilaterally  ABDOMEN: soft, nondistended, nontender, bowel sounds normal  MS: extremities normal - no gross deformities noted, no evidence of inflammation in joints, no muscle tenderness  SKIN: no rash    Results:   Recent Results (from the past 336 hour(s))   EKG 12-lead, tracing only [EKG1]    Collection Time: 01/07/19  1:16 PM   Result Value Ref Range    Interpretation ECG Click View Image link to view waveform and result            PKE      "

## 2019-01-07 NOTE — LETTER
1/7/2019       RE: Michael Amin  [address] [address] [place] [id number]     Dear Colleague,    Thank you for referring your patient, Michael Amin, to the [facility]ID [facility]GAN TRANSPL[facility] at [facility]. Please see a copy of my visit note below.    Assessment and Plan:  # Kidney/pancreas transplant evaluation - patient is a good candidate overall. Benefits of a living donor transplant were discussed.  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 He reports that his brother is interested in donating.   # Type 1 diabetes - currently poorly controlled with about 90 units of insulin daily via pump. Complicated by triopathy and may benefit from a pancreas transplant.  # Cardiac risk - he will need to have risk assessment given longstanding diabetes and hypertension.  # BMI 34.65 - we discussed weight loss for overall general health and to help reduce postoperative complications.  # Health maintenance - he is due for dental.     Discussed the risks and benefits of a transplant, including the risk of surgery and immunosuppression medications.  Patient's overall evaluation will be discussed in the Transplant Program's regular meeting with a final recommendation on the patient's suitability for transplant to be made at that time.  Patient was seen in conjunction with Dr. Jabier Hernández as part of a shared visit.    Patient was seen by myself, Dr. Jabier Hernández, in conjunction with Natalia Noland PA-C as part of a shared visit.    I personally reviewed past medical and surgical history, vital signs, medications and labs.  Present and past medical history, along with significant physical exam findings were all reviewed with ROX.    My mackenzie findings:  Michael Amin is a 38 year old year old male with ESKD from diabetes mellitus type 1, who presents for kidney/pancreas transplant  evaluation.  Patient reports feeling good overall with some medical complaints.    Key management decisions made by me and discussed with ROX:  1. Kidney/pancreas transplant evaluation - patient is a good candidate overall. Benefits of a living donor transplant were discussed.  2. ESKD from diabetes mellitus type 1 - patient is doing okay on dialysis, but would benefit from a kidney transplant.  3. DM type 1 - not well controlled and end-organ damage and patient may benefit from a pancreas transplant.  4. Cardiac risk - patient will require Cardiology evaluation prior to transplant.  5. Overweight - patient's BMI is just over 34 and he just meets criteria for a kidney transplant alone, but would need to get to BMI of 32 or less for a pancreas transplant.  Recommend weight loss to decrease risk of wound complications and for overall health.    Evaluation:  Michael Amin was seen in consultation at the request of Dr. Jean Liu for evaluation as a potential kidney/pancreas transplant recipient.    Reason for Visit:  Michael Amin is a 38-year-old male with ESKD from diabetes mellitus type 1, who presents for kidney/pancreas transplant evaluation.    HPI:  Mr. Amin is a 38-year-old male with type 1 diabetes since age 16 that has been complicated by triopathy and has historically been poorly controlled. He recalls being told he had abnormal serum creatinine about 8-10 years ago, and had seen a local nephrologist a few times, but had generally had intermittent follow up. His creatinine was 2.5 mg/dl in August 2015. He continued to have a progressive decline until he initiated peritoneal dialysis in August 2018 (creatinine around 6 mg/dl), which he continues on currently. He denies any episodes of peritonitis. Renal imaging at the time showed echogenic kidneys. He has not had a kidney biopsy. For diabetes management, he currently uses about 90 units of humalog via pump daily. He checks blood sugars 2-4 times per  day. He reports higher blood sugars associated with peritoneal dialysis. Last A1c was around 9.5%. He says sugars tend to be difficult to control and predict with typical sugars running . He denies hypoglycemic unawareness. He is currently being managed by his PCP while he searches for a new endocrinologist. He has no known history of cardiac disease or events. His father has CAD and is s/p CABG in his late 50's/early 60's. Mr. Amin doesn't do anything in particular for exercise, but can walk an unlimited distance and climb stairs without chest pain, SOB, or claudication symptoms. 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 He is a non-smoker. Overall, he is doing ok, but does note fatigue and feeling cold. His appetite is stable. No nausea, vomiting, or diarrhea. No changes in urination. No dysuria, hematuria, or trouble emptying his bladder. No fevers, sweats, chills, or recent illness.         Kidney Disease Hx:        Kidney Disease Dx: T1DM       Biopsy Proven: No         On Dialysis: Yes, Date initiated: September 2018 and Dialysis Type: PD;       Primary Nephrologist: Dr. Fu         Medical Hx:       h/o HTN: Yes         h/o DM:  Yes        h/o Protein in Urine: Yes        h/o Blood in Urine:  No       h/o Kidney Stones:  No       h/o UTI: No       h/o Chronic NSAID Use: No         Previous Transplant Hx:        No         Transplant Sensitization Hx:       Previous Tx: No       Blood Transfusion: No             Uremic Symptoms:       Fatigue: Yes; Cold: Yes; Nausea: No; Poor Appetite: No; Metallic Taste: No; Edema: No;         Cardiovascular Hx:       h/o Cardiac Issues: No       Exercise Tolerance: no chest pain or shortness of breath with exertion.         Health Maintenance:       Dental: scheduled         Potential Donor(s): Yes; brother    ROS:  A comprehensive review of systems was obtained and negative, except as noted in the HPI or PMH.    PMH:    Medical record was reviewed and PMH was discussed with patient and noted below.  Past Medical History:   Diagnosis Date     Anemia in chronic kidney disease      Dyslipidemia      ESRD (end stage renal disease) on dialysis (H)      Hypertension      Secondary hyperparathyroidism (H)      Type 1 diabetes (H)        PSH:   Past Surgical History:   Procedure Laterality Date     hair implant  2007     INSERT CATHETER PERITONEAL DIALYSIS  08/2018     Personal or family history of bleeding or anesthesia problems: No    Family Hx:  Family History   Problem Relation Age of Onset     Diabetes Father      Coronary Artery Disease Father        Personal Hx:   Social History     Socioeconomic History     Marital status:      Spouse name: Not on file     Number of children: Not on file     Years of education: Not on file     Highest education level: Not on file   Social Needs     Financial resource strain: Not on file     Food insecurity - worry: Not on file     Food insecurity - inability: Not on file     Transportation needs - medical: Not on file     Transportation needs - non-medical: Not on file   Occupational History     Not on file   Tobacco Use     Smoking status: Never Smoker     Smokeless tobacco: Never Used   Substance and Sexual Activity     Alcohol use: Yes     Comment: Rarely 1-2 Drinks a month      Drug use: No     Sexual activity: Not on file   Other Topics Concern     Parent/sibling w/ CABG, MI or angioplasty before 65F 55M? Not Asked   Social History Narrative     Not on file       Allergies:  No Known Allergies    Medications:  Prior to Admission medications    Medication Sig Start Date End Date Taking? Authorizing Provider   aspirin (ASA) 81 MG EC tablet Take 81 mg by mouth   Yes Reported, Patient   atorvastatin (LIPITOR) 20 MG tablet Take 20 mg by mouth 8/16/18  Yes Reported, Patient   B Complex-C-Folic Acid (VIRT-CAPS) 1 MG CAPS TK ONE C PO  D 11/28/18  Yes Reported, Patient   blood glucose  "monitoring (ACCU-CHEK FASTCLIX) lancets U QID OR MORE OFTEN PRN 8/17/18  Yes Reported, Patient   calcitRIOL (ROCALTROL) 0.25 MCG capsule TK ONE C PO D 12/29/18  Yes Reported, Patient   gentamicin (GARAMYCIN) 0.1 % external cream RXO TO EXIT SIGHT ONCE D 8/31/18  Yes Reported, Patient   HUMALOG 100 UNIT/ML injection INJ UP  UNI SC D PER PUMP 9/19/18  Yes Reported, Patient   lisinopril (PRINIVIL/ZESTRIL) 20 MG tablet TK 1 T PO QD 12/12/18  Yes Reported, Patient   metoprolol tartrate (LOPRESSOR) 50 MG tablet  8/21/18  Yes Reported, Patient   UNABLE TO FIND insulin lispro 100 UNIT/ML patient supplied PUMP   Yes Reported, Patient   vitamin D2 (ERGOCALCIFEROL) 05773 units (1250 mcg) capsule TK 1 C PO WEEKLY 12/12/18  Yes Reported, Patient       Vitals:  /90   Pulse 73   Temp 98.2  F (36.8  C) (Oral)   Ht 1.68 m (5' 6.15\")   Wt 97.6 kg (215 lb 3.2 oz)   SpO2 99%   BMI 34.58 kg/m       Exam:  GENERAL APPEARANCE: alert and no distress  HENT: mouth without ulcers or lesions  LYMPHATICS: no cervical or supraclavicular nodes  RESP: lungs clear to auscultation - no rales, rhonchi or wheezes  CV: regular rhythm, normal rate, no rub, no murmur  EDEMA: no LE edema bilaterally  ABDOMEN: soft, nondistended, nontender, bowel sounds normal  MS: extremities normal - no gross deformities noted, no evidence of inflammation in joints, no muscle tenderness  SKIN: no rash    Results:   Recent Results (from the past 336 hour(s))   EKG 12-lead, tracing only [EKG1]    Collection Time: 01/07/19  1:16 PM   Result Value Ref Range    Interpretation ECG Click View Image link to view waveform and result            Again, thank you for allowing me to participate in the care of your patient.      Sincerely,    Jabier Hernández MD       "

## 2019-01-07 NOTE — PROGRESS NOTES
Surgery Clinical Trials Office Notification of Study Eligibility  Study Name: Randomized Controlled Trial to Evaluate the Effect of Lost Wage Reimbursement to Potential Kidney  Donors On Living Donation Rates (Donor Lost Wages Study) (IRB #[id number])    ICF Version Date / IRB Approval Date: 11-AUG-2018/ 21-AUG-2018    Date of Approach/Consent: 07 JAN 2019    The subject was screened and meets all of the inclusion criteria.   The subject was told:  -that the study involves research  -the purpose of the research study  -the expected duration of the study and the approximate number of subject sought  -of any benefits to the subject or others that may be expected from the research  -how the confidentiality of records would be maintained  -who to contact if they have questions related to the research study or questions regarding research subjects' rights  -that participation is completely voluntary and that their decision to or not to participate will have no impact on their relationships with the N and the staff    The use of historical information (lab or assessments) used for the purpose of the study was approved by subject.  The subject was fully aware that we would be reviewing their medical record for the study.  The subject demonstrated an understanding of what the study involved.  Specifically, how this study differed from standard of care at our center and what was required of the subject as part of the study.  The subject reviewed the consent form and was given the opportunity to ask questions before signing.  Questions and concerns were answered by the study staff and/or study physician.  A copy of the signed informed consent document was provided to the subject.  The consent require the use of a :   [] Yes [x]  No     A 'short form' consent was used:     [] Yes [x] No         If yes, provide name of witness:  N/A  The subject required a legally-authorized representative (LAR) to sign on their  behalf:                                                   [] Yes  [x] No   If yes, please record name of LAR:  N/A    Questions to Evaluate Subject Comprehension of Study  Adult or Legally Authorized Representative (LAR) for a Dependent:  Question: Adequate Response? If No, explain what actions were taken   What is the purpose of this study and how long will it take? [x] Yes  [] No   Will you be required to do anything extra during the course of the study? [x] Yes  [] No   What risks are involved in participating in this study? [x] Yes  [] No

## 2019-01-07 NOTE — PROGRESS NOTES
Psychosocial Assessment  Patient Name/ Age: Michael Amin 38 year old   Medical Record #: [id number]  Duration of Interview:     40  min  Process:   Face-to-Face Interview                (counseling < 50%)   Present at Appointment: Rudi and his wife Michelle        :DELFINO Penaloza, [facility] Date:  January 7, 2019        Type of transplant: Kidney/Pancreas    Donor type:   Rudi indicated his brother has expressed interest in being a kidney donor.   Cadaver and brother   Prior Transplants:    No Status of Transplant:       Current Living Situation    Location:   [id number] [place] [address] [address]  [facility] [address]  With Whom: Michelle       Family/ Social Support:    Rudi's parents live in [place], [place] and he has one brother ([place], [place]).   Available, helpful   Committed relationship:  Rudi and Michelle have been  for two years.   Stable/supportive   Other supports:   Friends Available, helpful       Activities/ Functional Ability    Current level: Ambulatory and independent with ADL's     Transportation Drives self       Vocational/Employment/Financial     Employment  [employer]   Full time   Job Description  Teacher      Income  Michelle is employed full time.   Salary/wages   Insurance  [employer] through his employer and Medicare Parts A and B.  Rudi indicated he pays his own Part B premiums.    At this time, patient can afford medication costs:  Yes  Private insurance and Medicare       Medical Status    Current mode of treatment for ESRD Dialysis   Complications - Diabetes controlled with an insulin pump. Neuropathy       Behavioral    Tobacco Use No Chemical Dependency No    Rudi indicated he may drink once a year.     Psychiatric Impairment No    Reading ability Good  Education Level: Bachelor Degree Recent Legal History No    Coping Style/Strategies: Rudi indicated when under stress he will try to relax, do yard work or talk to friends.   Ability to Adhere to Complex  Medical Regime: Yes     Adherence History:  Rudi indicated he will usually follow his physician's recommendations.        Education  _X_ Medicare  _X_ Rehabilitation  _X_ Donor issues  _X_ Community resources  _X_ Post discharge housing  _X_ Financial resources  _X_ Medical insurance options  _X_ Psych adjustment  _X_ Family adjustment  _X_ Health Care Directive - Provided a copy and explained the purpose of the Health Care Directive. Psychosocial Risks of Transplant Reviewed and Discussed:  _X_ Increased stress related to emotional,            family, social, employment or financial           situation  _X_ Affect on work and/or disability benefits  _X_ Affect on future life insurance  _X_ Transplant outcome expectations may           not be met  _X_ Mental Health Risks: anxiety,           depression, PTSD, guilt, grief and           chronic fatigue     Notable Items:   None noted.       Final Evaluation/Assessment   Patient seemed to process information well. Appeared well informed, motivated and able to follow post transplant requirements. Behavior was appropriate during interview. Has adequate income and insurance coverage. Adequate social support. No major contraindications noted for transplant.  At this time patient appears to understand the risks and benefits of transplant.      Recommendation  Acceptable    Selection Criteria Met:  Plan for support Yes   Chemical Dependence Yes   Smoking Yes   Mental Health Yes   Adequate Finances Yes    Signature: DELFINO Penaloza, [facility]   Title: Clinical

## 2019-01-08 LAB
BLD GP AB SCN SERPL QL: NORMAL
BLOOD BANK CMNT PATIENT-IMP: NORMAL
C PEPTIDE SERPL-MCNC: <0.1 NG/ML (ref 0.9–6.9)
CARDIOLIPIN ANTIBODY IGG: <1.6 GPL-U/ML (ref 0–19.9)
CARDIOLIPIN ANTIBODY IGM: 2.2 MPL-U/ML (ref 0–19.9)
CMV IGG SERPL QL IA: >8 AI (ref 0–0.8)
EBV VCA IGG SER QL IA: >8 AI (ref 0–0.8)
INTERPRETATION ECG - MUSE: NORMAL
T PALLIDUM AB SER QL: NONREACTIVE
THROMBIN TIME: 15.8 SEC (ref 13–19)
VZV IGG SER QL IA: 2.6 AI (ref 0–0.8)

## 2019-01-09 ENCOUNTER — COMMITTEE REVIEW (OUTPATIENT)
Dept: TRANSPLANT | Facility: CLINIC | Age: 39
End: 2019-01-09

## 2019-01-09 LAB
A* LOCUS: NORMAL
A*: NORMAL
ABTEST METHOD: NORMAL
B* LOCUS: NORMAL
BLD GP AB SCN TITR SERPL: NORMAL {TITER}
BW-1: NORMAL
C* LOCUS: NORMAL
C*: NORMAL
DPA1* NMDP: NORMAL
DPA1*: NORMAL
DPB1* LOCUS NMDP: NORMAL
DPB1* NMDP: NORMAL
DPB1*: NORMAL
DPB1*LOCUS: NORMAL
DQA1*: NORMAL
DQA1*LOCUS: NORMAL
DQB1* LOCUS: NORMAL
DQB1*: NORMAL
DRB1* LOCUS: NORMAL
DRB1*: NORMAL
DRB4* LOCUS: NORMAL
DRSSO TEST METHOD: NORMAL
GAMMA INTERFERON BACKGROUND BLD IA-ACNC: 0.03 IU/ML
LA PPP-IMP: NEGATIVE
M TB IFN-G BLD-IMP: NEGATIVE
M TB IFN-G CD4+ BCKGRND COR BLD-ACNC: 9.95 IU/ML
MITOGEN IGNF BCKGRD COR BLD-ACNC: 0 IU/ML
MITOGEN IGNF BCKGRD COR BLD-ACNC: 0.01 IU/ML
PROTOCOL CUTOFF: NORMAL
SA1 CELL: NORMAL
SA1 COMMENTS: NORMAL
SA1 HI RISK ABY: NORMAL
SA1 MOD RISK ABY: NORMAL
SA1 TEST METHOD: NORMAL
SA2 CELL: NORMAL
SA2 COMMENTS: NORMAL
SA2 HI RISK ABY UA: NORMAL
SA2 MOD RISK ABY: NORMAL
SA2 TEST METHOD: NORMAL
UNACCEPTABLE ANTIGEN: NORMAL
UNOS CPRA: 0

## 2019-01-09 NOTE — COMMITTEE REVIEW
Abdominal Committee Review Note     Evaluation Date:   Committee Review Date: 1/9/2019    Organ being evaluated for: Kidney/Pancreas    Transplant Phase: Referral  Transplant Status: Active    Transplant Coordinator: Lilibeth Carmen  Transplant Surgeon:       Referring Physician: Meera Fu    Primary Diagnosis: Diabetes Mellitus - Type I (Pancreas)  Secondary Diagnosis:     Committee Review Members:  Nephrology Kash Hoffman MD, Michael Corrigan, APRN CNP, Raffi Landon MD, Viral Jose L Camacho MD   Nutrition Karissa Knowles,    Pharmacy Mg Briceño, [facility]    - Clinical Melissaángel Rogers, [facility]   Transplant Jean Liu MD       Transplant Eligibility: Insulin-dependent diabetes mellitus, Irreversible chronic kidney disease treated w/dialysis or expected need for dialysis, Significant diabetic complications    Committee Review Decision: Approved    Relative Contraindications: None    Absolute Contraindications: BMI >30    Committee Chair Jean Liu MD verbally attested to the committee's decision.    Committee Discussion Details: Reviewed medical records and evaluation results to date. Committee approves patient as an SPK and kidney alone candidate. He is on dialysis and will not be listed until his evaluation is complete. He will need to see cardiology, have an abdominal CT and iliacs. He is due for dental. Will need to find out if he had the pneumovax. He does meet BMI criteria for kidney but goal weight for pancreas is 190 lbs per Dr Liu.  Patient will be called and transplant summary letter will be sent.

## 2019-01-09 NOTE — LETTER
2019    Michael Letcher  [address] [address]  [facility] [address]       Dear Michael,    It was a pleasure to see you here recently for consideration of a kidney/pancreas transplant. Your pre-transplant evaluation began on 2019. Your evaluation results were discussed at our Multidisciplinary Selection Committee on 2019. The Committee has approved you as a transplant candidate for simultaneous kidney/pancreas and kidney alone pending the successful completion of your evaluation. We are asking you to complete the following items:    1. We are asking you to see cardiology for clearance. This is important to make sure your heart function is able to undergo the challenge of a transplant surgery. Our  will call you with this appointment.  2. We are asking you to have an abdominal CT scan to look at surgical landscape and the degree of calcification you may have. Our  will call you with this appointment.  3. We are asking you to have an iliac ultrasound to look at the blood flow to your legs. Our  will call you with this appointment.  4. You have a dental appointment scheduled for 2019. Please have any recommended work done.   5. At this point you meet the BMI (body mass index) criteria for weight for kidney transplant. Per Dr Liu the transplant surgeon, he gave you a goal weight of 190 pounds for a pancreas transplant. This is a 25 pound weight loss. Please keep me up to date as you lose weight.    Once the above has been successfully completed we will place you on the ACTIVE list meaning you will be eligible to receive organ offers. Your wait time will start with the beginning date of your dialysis so you will not be disadvantaged. You will be notified by our office when you are placed on ACTIVE status.    Please have any potential living donors register now online with our program to initiate their evaluations at  [email or web address] or call [phone]. You will be notified by our office in the event of an approved living donor.    Please feel free to call me with any questions at [phone].    Sincerely,       Maura Carmen, RN, BSN Transplant Coordinator  Solid Organ Transplant [address] [facility], [place] [address]  Phone [phone]  Fax [phone]  [email or web address]    CC: Gopi Borden, Meera Fu, [facility]

## 2019-01-10 LAB — COPATH REPORT: NORMAL

## 2019-01-11 ENCOUNTER — TELEPHONE (OUTPATIENT)
Dept: TRANSPLANT | Facility: CLINIC | Age: 39
End: 2019-01-11

## 2019-01-11 NOTE — TELEPHONE ENCOUNTER
Called Michael to discuss the outcome of the Selection Committee meeting 1/9/2019. He is approved for spk and kidney alone. He is on dialysis so will not be added to [facility] until his evaluation is complete. Michael understands his wait time is retroactive to the start date of dialysis.  He meets BMI criteria for kidney but his BMI is too high for pancreas. He was given a target weight of 190 lbs by Dr Liu. That is a 25 lb weight loss. He does have a brother interested in donating. Brother is leaving on vacation to [place] end of January and would like to start the evaluation when he returns. Michael tells me his brother and wife are pregnant and due in July. 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 I told him we can accommodate his time frame. Michael needs to see cardiology for clearance. He needs an abdominal CT and iliacs. Dental is scheduled for 1/24/2019.Received immunization records from dialysis and he is UTD. Transplant summary letter will be sent.

## 2019-01-11 NOTE — TELEPHONE ENCOUNTER
Provider Call: General      Reason for call: Hugo LEE Coordinator wnts to review pt    Call back needed? Yes    Return Call Needed  Same as documented in contacts section  When to return call?: Greater than one day: Route standard priority

## 2019-04-08 ENCOUNTER — TELEPHONE (OUTPATIENT)
Dept: TRANSPLANT | Facility: CLINIC | Age: 39
End: 2019-04-08

## 2019-04-08 NOTE — TELEPHONE ENCOUNTER
Called patient to schedule US, CT & CVC appts, he confirmed for Mon 4/22 for all appts and mailing schedule

## 2019-04-16 NOTE — TELEPHONE ENCOUNTER
RECORDS RECEIVED FROM: Internal and External   DATE RECEIVED: 4-22-19   NOTES STATUS DETAILS   OFFICE NOTE from referring provider  Internal 1.7.19 [facility]/Natalia Noland   OFFICE NOTE from other cardiologist  N/A    DISCHARGE SUMMARY from hospital  External 8.15.18 [facility], report in Epic   DISCHARGE REPORT from the ER N/A    OPERATIVE REPORT  N/A    MEDICATION LIST Internal    LABS     BMP N/A    CBC     Internal, Care Everywhere 1.7.19, 8.21.18, 8.16.18   CMP Internal, Care Everywhere 1.7.19, 8.21.18, 8.15.18   TSH     N/A    Lipids N/A    DIAGNOSTIC PROCEDURES     EKG Internal, External 1.7.19 internal, 8.15.18 [facility], records in Epic   Monitor Reports N/A     N/A    IMAGING (DISC & REPORT)      ECHO  Internal 1.7.19   Stress Tests N/A    Cath N/A    MRI/MRA N/A    CT/CTA N/A     N/A

## 2019-04-21 NOTE — PROGRESS NOTES
CARDIOLOGY CLINIC NOTE  04/22/2019    HPI:  The patient is a 38-year-old male with a past medical history significant for type 1 diabetes mellitus, hypertension, and ESRD on PD since September 2018 who presents for cardiac risk assessment prior to planned kidney and pancreas transplantation.    The patient states that he was diagnosed with type 1 diabetes at the age of 16 however likely had symptoms well before this age.  He eventually progressed to chronic kidney disease and required dialysis by September of this past year.  He reports that dialysis has improved his lower extremity edema and overall he feels well.  He is able to walk up and down steps without chest pain or chest pressure.  He has no significant dyspnea on exertion.  He maintains an active lifestyle as a .  He denies orthopnea or PND.    He does take his blood pressure at home, which typically ranges in the 140s over 80s.  He has been hypertensive in his last 2 clinic appointments.    He is a never smoker and a rare drinker.  He reports a family history notable for coronary artery bypass grafting in his father and his 60s.  No relevant history in his mother.    ROS:  A complete 10-point ROS was negative except as above.    PAST MEDICAL HISTORY:  Past Medical History:   Diagnosis Date     Anemia in chronic kidney disease      Dyslipidemia      ESRD (end stage renal disease) on dialysis (H)      Hypertension      Secondary hyperparathyroidism (H)      Type 1 diabetes (H)        PAST SURGICAL HISTORY:  Past Surgical History:   Procedure Laterality Date     hair implant  2007     INSERT CATHETER PERITONEAL DIALYSIS  08/2018       FAMILY HISTORY:  Family History   Problem Relation Age of Onset     Diabetes Father      Coronary Artery Disease Father        SOCIAL HISTORY:  Social History     Socioeconomic History     Marital status:      Spouse name: Not on file     Number of children: Not on file     Years of education: Not on file      Highest education level: Not on file   Occupational History     Not on file   Social Needs     Financial resource strain: Not on file     Food insecurity:     Worry: Not on file     Inability: Not on file     Transportation needs:     Medical: Not on file     Non-medical: Not on file   Tobacco Use     Smoking status: Never Smoker     Smokeless tobacco: Never Used   Substance and Sexual Activity     Alcohol use: Yes     Comment: Rarely 1-2 Drinks a month      Drug use: No     Sexual activity: Not on file   Lifestyle     Physical activity:     Days per week: Not on file     Minutes per session: Not on file     Stress: Not on file   Relationships     Social connections:     Talks on phone: Not on file     Gets together: Not on file     Attends Sikhism service: Not on file     Active member of club or organization: Not on file     Attends meetings of clubs or organizations: Not on file     Relationship status: Not on file     Intimate partner violence:     Fear of current or ex partner: Not on file     Emotionally abused: Not on file     Physically abused: Not on file     Forced sexual activity: Not on file   Other Topics Concern     Parent/sibling w/ CABG, MI or angioplasty before 65F 55M? Not Asked   Social History Narrative     Not on file       MEDICATIONS:  Current Outpatient Medications   Medication     aspirin (ASA) 81 MG EC tablet     atorvastatin (LIPITOR) 20 MG tablet     B Complex-C-Folic Acid (VIRT-CAPS) 1 MG CAPS     blood glucose monitoring (ACCU-CHEK FASTCLIX) lancets     calcitRIOL (ROCALTROL) 0.25 MCG capsule     gentamicin (GARAMYCIN) 0.1 % external cream     HUMALOG 100 UNIT/ML injection     lisinopril (PRINIVIL/ZESTRIL) 20 MG tablet     metoprolol tartrate (LOPRESSOR) 50 MG tablet     UNABLE TO FIND     vitamin D2 (ERGOCALCIFEROL) 81583 units (1250 mcg) capsule     No current facility-administered medications for this visit.        ALLERGIES:   No Known Allergies    PHYSICAL EXAM:  BP (!) 165/95  "(BP Location: Right arm, Patient Position: Chair, Cuff Size: Adult Regular)   Pulse 73   Ht 1.676 m (5' 6\")   Wt 94.8 kg (208 lb 14.4 oz)   SpO2 100%   BMI 33.72 kg/m    Gen: alert, interactive, NAD  HEENT: atraumatic, EOMI, MMM  Neck: supple, no JVD  CV: RRR, no m/r/g  Chest: CTAB, no wheezes or crackles  Abd: soft, NT, ND, +BS, PD catheter noted  Ext: no LE edema, 2+ peripheral pulses  Skin: warm and dry, no rashes on exposed surfaces  Neuro: alert and oriented, no focal deficits  Endocrine: no thyromegaly  Musculoskeletal: no joint swelling or tenderness  Psych: pleasant and conversant      LABS:  Chemistry panel:   Recent Labs   Lab Test 01/07/19  1338 12/03/18 08/16/18     --   --    POTASSIUM 4.7 4.4 3.7   CHLORIDE 104  --   --    CO2 21  --   --    ANIONGAP 11  --   --    *  --  50*   BUN 59*  --   --    CR 6.60* 5.48* 5.97*   APRIL 8.2*  --   --    GFRESTIMATED 10*  --  11*   AST 15  --  10*   ALT 46  --  18       CBC:   Recent Labs   Lab Test 01/07/19  1338   WBC 8.6   RBC 4.62   HGB 12.7*   HCT 39.8*   MCV 86   MCH 27.5   MCHC 31.9   RDW 14.5          Lipid Panel:  No results for input(s): CHOL, HDL, LDL, TRIG, CHOLHDLRATIO in the last 44483 hours.    Thyroid:   No results found for: TSH  No results found for: T4  Hemoglobin A1C   Date Value Ref Range Status   01/07/2019 10.8 (H) 0 - 5.6 % Final     Comment:     Normal <5.7% Prediabetes 5.7-6.4%  Diabetes 6.5% or higher - adopted from ADA   consensus guidelines.       INR   Date Value Ref Range Status   01/07/2019 0.99 0.86 - 1.14 Final         CARDIAC DATA:    ECG 4/22/19: NSR, normal ECG          TTE 1/7/19:  Global and regional left ventricular function is normal with an EF of 60-65%.  The right ventricle is normal in function and size.  No significant valvular abnormalities.  No pericardial effusion is present.     No prior echo available for comparison.      ASSESSMENT/PLAN:  In summary, this is a 38-year-old male with past " medical history significant for type 1 diabetes mellitus complicated by ESRD now on hemodialysis since 2018, hypertension, and anemia of chronic disease who is presenting for preoperative risk assessment prior to planned kidney and pancreas transplant.    1.  Preoperative risk assessment/ASCVD risk stratification: The patient is at low to moderate risk of having obstructive coronary artery disease.  He is having no symptoms, however has risk factors including hypertension, diabetes, and ESRD on HD. Recommend observation for one year. If remains on HD will pursue DSE at that time.     2.  Hypertension, essential: Uncontrolled at home in the clinic appointments.     3.  Hyperlipidemia: Currently on atorvastatin 20 mg.  Plan to recheck lipids today.    Plan:  -Dobutamine stress echocardiogram in one year if remains on HD  -Recommend switching to carvedilol 25 mg twice per day from metoprolol 50 mg twice per day    RTC in one year    Pt seen and discussed with Dr. Kate.    Bridger Infante MD  Cardiology Fellow, PGY-4  [phone]    ATTENDING ATTESTATION:  This patient has been seen and examined by me April 22, 2019 with Dr. Infante. I have reviewed the vitals, laboratory and imaging data relevant to this patient's care. I have edited this note to reflect our joint assessment and plan, and discussed the plan with the patient.    Michael Amin is a 38 year old year old male with     Normal biventricular function  Renal hypertension  Mixed dyslipidemia  Type I Diabetes   Renal insufficiency, Stage V    Patient reports no anginal or heart failure symptoms. His cardiac risk factors are being treated. He is euvolemic on exam. His blood pressure could be further optimized and he may benefit from both alpha and beta blockade, so switching from metoprolol to carvedilol. He has no obvious arrhythmias or underlying valvular heart disease. He has preserved biventricular function and no significant valvular disease or arrhythmias. He  is  in sinus rhythm today.  His ECG today shows normal sinus rhythm.  His echocardiogram from January 2019 was normal for normal biventricular function with no significant valvular disease.  Patient reports an exercise tolerance of greater than 4 METS, states he can climb several flights of stairs without any angina or shortness of breath.  On level ground he can walk several blocks.  He is a .  He manages his volume status well with daily PD.      Based on the lack of symptoms and recent initiation of PD and well managed volume status, he is at an acceptable risk to proceed with the remainder of the transplant work-up without undergoing any cardiac risk stratification at this point. He agrees to follow up with his primary nephrologist with regards to the hypertension management.     Kirsty Kate MD, MS  Staff Cardiologist, [facility]  Pager: [phone]

## 2019-04-22 ENCOUNTER — PRE VISIT (OUTPATIENT)
Dept: CARDIOLOGY | Facility: CLINIC | Age: 39
End: 2019-04-22

## 2019-04-22 ENCOUNTER — OFFICE VISIT (OUTPATIENT)
Dept: CARDIOLOGY | Facility: CLINIC | Age: 39
End: 2019-04-22
Attending: PHYSICIAN ASSISTANT
Payer: COMMERCIAL

## 2019-04-22 ENCOUNTER — ANCILLARY PROCEDURE (OUTPATIENT)
Dept: CT IMAGING | Facility: CLINIC | Age: 39
End: 2019-04-22
Attending: PHYSICIAN ASSISTANT
Payer: COMMERCIAL

## 2019-04-22 ENCOUNTER — ANCILLARY PROCEDURE (OUTPATIENT)
Dept: ULTRASOUND IMAGING | Facility: CLINIC | Age: 39
End: 2019-04-22
Attending: PHYSICIAN ASSISTANT
Payer: COMMERCIAL

## 2019-04-22 VITALS
WEIGHT: 208.9 LBS | SYSTOLIC BLOOD PRESSURE: 165 MMHG | OXYGEN SATURATION: 100 % | HEART RATE: 73 BPM | HEIGHT: 66 IN | DIASTOLIC BLOOD PRESSURE: 95 MMHG | BODY MASS INDEX: 33.57 KG/M2

## 2019-04-22 DIAGNOSIS — N18.6 END STAGE RENAL DISEASE (H): ICD-10-CM

## 2019-04-22 DIAGNOSIS — E10.22 TYPE 1 DIABETES MELLITUS WITH STAGE 5 CHRONIC KIDNEY DISEASE NOT ON CHRONIC DIALYSIS (H): ICD-10-CM

## 2019-04-22 DIAGNOSIS — Z99.2 TYPE 1 DIABETES MELLITUS WITH CHRONIC KIDNEY DISEASE ON CHRONIC DIALYSIS (H): ICD-10-CM

## 2019-04-22 DIAGNOSIS — Z76.82 ORGAN TRANSPLANT CANDIDATE: ICD-10-CM

## 2019-04-22 DIAGNOSIS — N18.5 TYPE 1 DIABETES MELLITUS WITH STAGE 5 CHRONIC KIDNEY DISEASE NOT ON CHRONIC DIALYSIS (H): ICD-10-CM

## 2019-04-22 DIAGNOSIS — E10.22 TYPE 1 DIABETES MELLITUS WITH CHRONIC KIDNEY DISEASE ON CHRONIC DIALYSIS (H): ICD-10-CM

## 2019-04-22 DIAGNOSIS — I15.0 RENOVASCULAR HYPERTENSION: Primary | ICD-10-CM

## 2019-04-22 DIAGNOSIS — N18.6 TYPE 1 DIABETES MELLITUS WITH CHRONIC KIDNEY DISEASE ON CHRONIC DIALYSIS (H): ICD-10-CM

## 2019-04-22 DIAGNOSIS — I10 ESSENTIAL HYPERTENSION: ICD-10-CM

## 2019-04-22 LAB — INTERPRETATION ECG - MUSE: NORMAL

## 2019-04-22 PROCEDURE — G0463 HOSPITAL OUTPT CLINIC VISIT: HCPCS | Mod: 25,ZF

## 2019-04-22 PROCEDURE — 93010 ELECTROCARDIOGRAM REPORT: CPT | Mod: ZP | Performed by: INTERNAL MEDICINE

## 2019-04-22 PROCEDURE — 93005 ELECTROCARDIOGRAM TRACING: CPT | Mod: ZF

## 2019-04-22 PROCEDURE — 99205 OFFICE O/P NEW HI 60 MIN: CPT | Mod: GC | Performed by: INTERNAL MEDICINE

## 2019-04-22 ASSESSMENT — ENCOUNTER SYMPTOMS
JOINT SWELLING: 0
RECTAL PAIN: 0
MYALGIAS: 0
HYPERTENSION: 1
BLOOD IN STOOL: 0
PALPITATIONS: 0
STIFFNESS: 1
MUSCLE CRAMPS: 0
BLOATING: 1
DYSURIA: 0
JAUNDICE: 0
DIFFICULTY URINATING: 0
FLANK PAIN: 1
BOWEL INCONTINENCE: 0
CONSTIPATION: 1
ARTHRALGIAS: 0
DIARRHEA: 1
BACK PAIN: 1
HYPOTENSION: 0
HEARTBURN: 0
MUSCLE WEAKNESS: 0
LIGHT-HEADEDNESS: 0
EXERCISE INTOLERANCE: 0
NAUSEA: 0
SLEEP DISTURBANCES DUE TO BREATHING: 0
SYNCOPE: 0
ORTHOPNEA: 0
ABDOMINAL PAIN: 0
LEG PAIN: 0
NECK PAIN: 0
HEMATURIA: 0
VOMITING: 0

## 2019-04-22 ASSESSMENT — PAIN SCALES - GENERAL: PAINLEVEL: NO PAIN (0)

## 2019-04-22 ASSESSMENT — MIFFLIN-ST. JEOR: SCORE: 1805.31

## 2019-04-22 NOTE — NURSING NOTE
Chief Complaint   Patient presents with     New Patient     risk stratification for kidney/pancreas transplant     Vitals were taken and medications were reconciled. EKG was performed.    Carol Resendiz CMA    8:23 AM

## 2019-04-22 NOTE — NURSING NOTE
Cardiac Testing: Patient given instructions regarding  stress echocardiogram . Discussed purpose, preparation, procedure and when to expect results reported back to the patient. Patient demonstrated understanding of this information and agreed to call with further questions or concerns.  Med Reconcile: Reviewed and verified all current medications with the patient. The updated medication list was printed and given to the patient.  Return Appointment: Patient given instructions regarding scheduling next clinic visit. Patient demonstrated understanding of this information and agreed to call with further questions or concerns.  Patient stated he understood all health information given and agreed to call with further questions or concerns.

## 2019-04-22 NOTE — PATIENT INSTRUCTIONS
Patient Instructions:  It was a pleasure to see you in the cardiology clinic today.      If you have any questions, call  Mendy Post RN, at [phone].  Press Option #1 for the [facility], and then press Option #3 for nursing.  We are encouraging the use of [employer] to communicate with your HealthCare Provider    Note the new medications: none  Stop the following medications: none    The results from today include: none  Please follow up with Dr. Kirsty Kate in one year if not transplanted with a dobutamine ECHO      If you have an urgent need after hours (8:00 am to 4:30 pm) please call [phone] and ask for the cardiology fellow on call.

## 2019-04-22 NOTE — LETTER
4/22/2019      RE: Michael Amin  [address]  [facility] [address]       Dear Colleague,    Thank you for the opportunity to participate in the care of your patient, Michael Amin, at the [facility] at [facility]. Please see a copy of my visit note below.    CARDIOLOGY CLINIC NOTE  04/22/2019    HPI:  The patient is a 38-year-old male with a past medical history significant for type 1 diabetes mellitus, hypertension, and ESRD on PD since September 2018 who presents for cardiac risk assessment prior to planned kidney and pancreas transplantation.    The patient states that he was diagnosed with type 1 diabetes at the age of 16 however likely had symptoms well before this age.  He eventually progressed to chronic kidney disease and required dialysis by September of this past year.  He reports that dialysis has improved his lower extremity edema and overall he feels well.  He is able to walk up and down steps without chest pain or chest pressure.  He has no significant dyspnea on exertion.  He maintains an active lifestyle as a .  He denies orthopnea or PND.    He does take his blood pressure at home, which typically ranges in the 140s over 80s.  He has been hypertensive in his last 2 clinic appointments.    He is a never smoker and a rare drinker.  He reports a family history notable for coronary artery bypass grafting in his father and his 60s.  No relevant history in his mother.    ROS:  A complete 10-point ROS was negative except as above.    PAST MEDICAL HISTORY:  Past Medical History:   Diagnosis Date     Anemia in chronic kidney disease      Dyslipidemia      ESRD (end stage renal disease) on dialysis (H)      Hypertension      Secondary hyperparathyroidism (H)      Type 1 diabetes (H)        PAST SURGICAL HISTORY:  Past Surgical History:   Procedure Laterality Date     hair implant  2007     INSERT CATHETER PERITONEAL DIALYSIS  08/2018        FAMILY HISTORY:  Family History   Problem Relation Age of Onset     Diabetes Father      Coronary Artery Disease Father        SOCIAL HISTORY:  Social History     Socioeconomic History     Marital status:      Spouse name: Not on file     Number of children: Not on file     Years of education: Not on file     Highest education level: Not on file   Occupational History     Not on file   Social Needs     Financial resource strain: Not on file     Food insecurity:     Worry: Not on file     Inability: Not on file     Transportation needs:     Medical: Not on file     Non-medical: Not on file   Tobacco Use     Smoking status: Never Smoker     Smokeless tobacco: Never Used   Substance and Sexual Activity     Alcohol use: Yes     Comment: Rarely 1-2 Drinks a month      Drug use: No     Sexual activity: Not on file   Lifestyle     Physical activity:     Days per week: Not on file     Minutes per session: Not on file     Stress: Not on file   Relationships     Social connections:     Talks on phone: Not on file     Gets together: Not on file     Attends Taoist service: Not on file     Active member of club or organization: Not on file     Attends meetings of clubs or organizations: Not on file     Relationship status: Not on file     Intimate partner violence:     Fear of current or ex partner: Not on file     Emotionally abused: Not on file     Physically abused: Not on file     Forced sexual activity: Not on file   Other Topics Concern     Parent/sibling w/ CABG, MI or angioplasty before 65F 55M? Not Asked   Social History Narrative     Not on file       MEDICATIONS:  Current Outpatient Medications   Medication     aspirin (ASA) 81 MG EC tablet     atorvastatin (LIPITOR) 20 MG tablet     B Complex-C-Folic Acid (VIRT-CAPS) 1 MG CAPS     blood glucose monitoring (ACCU-CHEK FASTCLIX) lancets     calcitRIOL (ROCALTROL) 0.25 MCG capsule     gentamicin (GARAMYCIN) 0.1 % external cream     HUMALOG 100 UNIT/ML  "injection     lisinopril (PRINIVIL/ZESTRIL) 20 MG tablet     metoprolol tartrate (LOPRESSOR) 50 MG tablet     UNABLE TO FIND     vitamin D2 (ERGOCALCIFEROL) 91848 units (1250 mcg) capsule     No current facility-administered medications for this visit.        ALLERGIES:   No Known Allergies    PHYSICAL EXAM:  BP (!) 165/95 (BP Location: Right arm, Patient Position: Chair, Cuff Size: Adult Regular)   Pulse 73   Ht 1.676 m (5' 6\")   Wt 94.8 kg (208 lb 14.4 oz)   SpO2 100%   BMI 33.72 kg/m     Gen: alert, interactive, NAD  HEENT: atraumatic, EOMI, MMM  Neck: supple, no JVD  CV: RRR, no m/r/g  Chest: CTAB, no wheezes or crackles  Abd: soft, NT, ND, +BS, PD catheter noted  Ext: no LE edema, 2+ peripheral pulses  Skin: warm and dry, no rashes on exposed surfaces  Neuro: alert and oriented, no focal deficits  Endocrine: no thyromegaly  Musculoskeletal: no joint swelling or tenderness  Psych: pleasant and conversant      LABS:  Chemistry panel:   Recent Labs   Lab Test 01/07/19  1338 12/03/18 08/16/18     --   --    POTASSIUM 4.7 4.4 3.7   CHLORIDE 104  --   --    CO2 21  --   --    ANIONGAP 11  --   --    *  --  50*   BUN 59*  --   --    CR 6.60* 5.48* 5.97*   APRIL 8.2*  --   --    GFRESTIMATED 10*  --  11*   AST 15  --  10*   ALT 46  --  18       CBC:   Recent Labs   Lab Test 01/07/19  1338   WBC 8.6   RBC 4.62   HGB 12.7*   HCT 39.8*   MCV 86   MCH 27.5   MCHC 31.9   RDW 14.5          Lipid Panel:  No results for input(s): CHOL, HDL, LDL, TRIG, CHOLHDLRATIO in the last 60980 hours.    Thyroid:   No results found for: TSH  No results found for: T4  Hemoglobin A1C   Date Value Ref Range Status   01/07/2019 10.8 (H) 0 - 5.6 % Final     Comment:     Normal <5.7% Prediabetes 5.7-6.4%  Diabetes 6.5% or higher - adopted from ADA   consensus guidelines.       INR   Date Value Ref Range Status   01/07/2019 0.99 0.86 - 1.14 Final         CARDIAC DATA:    ECG 4/22/19: NSR, normal ECG          TTE " 1/7/19:  Global and regional left ventricular function is normal with an EF of 60-65%.  The right ventricle is normal in function and size.  No significant valvular abnormalities.  No pericardial effusion is present.     No prior echo available for comparison.      ASSESSMENT/PLAN:  In summary, this is a 38-year-old male with past medical history significant for type 1 diabetes mellitus complicated by ESRD now on hemodialysis since 2018, hypertension, and anemia of chronic disease who is presenting for preoperative risk assessment prior to planned kidney and pancreas transplant.    1.  Preoperative risk assessment/ASCVD risk stratification: The patient is at low to moderate risk of having obstructive coronary artery disease.  He is having no symptoms, however has risk factors including hypertension, diabetes, and ESRD on HD. Recommend observation for one year. If remains on HD will pursue DSE at that time.     2.  Hypertension, essential: Uncontrolled at home in the clinic appointments.     3.  Hyperlipidemia: Currently on atorvastatin 20 mg.  Plan to recheck lipids today.    Plan:  -Dobutamine stress echocardiogram in one year if remains on HD  -Recommend switching to carvedilol 25 mg twice per day from metoprolol 50 mg twice per day    RTC in one year    Pt seen and discussed with Dr. Kate.    Bridger Infante MD  Cardiology Fellow, PGY-4  [phone]    ATTENDING ATTESTATION:  This patient has been seen and examined by me April 22, 2019 with Dr. Infante. I have reviewed the vitals, laboratory and imaging data relevant to this patient's care. I have edited this note to reflect our joint assessment and plan, and discussed the plan with the patient.    Michael Amin is a 38 year old year old male with     Normal biventricular function  Renal hypertension  Mixed dyslipidemia  Type I Diabetes   Renal insufficiency, Stage V    Patient reports no anginal or heart failure symptoms. His cardiac risk factors are being treated. He  is euvolemic on exam. His blood pressure could be further optimized and he may benefit from both alpha and beta blockade, so switching from metoprolol to carvedilol. He has no obvious arrhythmias or underlying valvular heart disease. He has preserved biventricular function and no significant valvular disease or arrhythmias. He  is in sinus rhythm today.  His ECG today shows normal sinus rhythm.  His echocardiogram from January 2019 was normal for normal biventricular function with no significant valvular disease.  Patient reports an exercise tolerance of greater than 4 METS, states he can climb several flights of stairs without any angina or shortness of breath.  On level ground he can walk several blocks.  He is a .  He manages his volume status well with daily PD.      Based on the lack of symptoms and recent initiation of PD and well managed volume status, he is at an acceptable risk to proceed with the remainder of the transplant work-up without undergoing any cardiac risk stratification at this point. He agrees to follow up with his primary nephrologist with regards to the hypertension management.     Kirsty Kate MD, MS  Staff Cardiologist, [facility]  Pager: [phone]

## 2019-06-06 ENCOUNTER — TELEPHONE (OUTPATIENT)
Dept: TRANSPLANT | Facility: CLINIC | Age: 39
End: 2019-06-06

## 2019-06-06 NOTE — TELEPHONE ENCOUNTER
Patient Call: General  Route to LPN    Reason for call:  PT was callling about his statues on the list please connect.     Call back needed? Yes    Return Call Needed  Same as documented in contacts section  When to return call?: Same day: Route High Priority

## 2019-06-07 ENCOUNTER — TELEPHONE (OUTPATIENT)
Dept: TRANSPLANT | Facility: CLINIC | Age: 39
End: 2019-06-07

## 2019-06-07 NOTE — TELEPHONE ENCOUNTER
Called Michael back to re-discuss his status. Currently he meets criteria for kidney alone but BMI is still too high for pancreas. When seen by surgery, Dr Liu would like him to achieve a weight of 190 lbs to safely consider pancreas. Michael would need to lose 18 lbs. At this point, Michael would like to move forward with kidney alone considering his mother is agreeable to the PEP. We did discuss weight management and he will explore those options locally with his dialysis unit and his nephrologist. He said his goal is kidney right now and understands that a pancreas could be done later and that does mean another surgery. Will discuss with Selection Committee on 6/12/2019

## 2019-06-07 NOTE — TELEPHONE ENCOUNTER
Returned call to Michael this morning. He is wondering what his status is. I explained to him now that I have all the items needed from his evaluation he will be re-discussed at our Selection Committee on 6/12/2019. I told him I will call him by the end of that week with the decision about placing him on the active list. He stated he understood.

## 2019-06-12 ENCOUNTER — COMMITTEE REVIEW (OUTPATIENT)
Dept: TRANSPLANT | Facility: CLINIC | Age: 39
End: 2019-06-12

## 2019-06-12 NOTE — LETTER
PHYSICIAN ORDER   ALA/PRA BLOOD    DATE & TIME ISSUED: 2019 10:57 AM  PATIENT NAME: Michael Amin   : 1980     [facility] MR#  [id number]     DIAGNOSIS/ICD-10 CODE: Awaiting Organ transplant [Z76.82}   EXPIRES: (1 YEAR AFTER DATE ISSUED)  EVERY 12 weeks / 3 months  Please take this order and the blood tubes to your dialysis unit and have them draw these labs and send in asap  1. Please draw 20ml of blood in red top (plain) tube for Antileukocyte Antibody (ALA or PRA).   2. Label tubes with the patient s name, complete lab slip.         3. Mailers, lab slips with instructions are sent to patient separately.      4. Call the Outreach Lab at [phone] to reorder mailers.       5. Mail blood to (this address is also on the mailers):    IMMUNOLOGY LABORATORY  [facility]  [address] [place] [address]    .

## 2019-06-12 NOTE — LETTER
June 13, 2019    Michael Farrischie  [address] [address] [address]  [facility] [address]      Dear Michael,    This letter is sent to confirm that you have completed your transplant work-up and you are a candidate in the kidney transplant program at the [facility].  You were placed on the kidney active waitlist on June 13, 2019. This means you are now eligible to receive kidney organ offers. You were also added onto the simultaneous kidney and pancreas transplant wait list as inactive on June 13, 2019 due to needing to lose weight to qualify for pancreas. You will need to be at 190 pounds to qualify for pancreas and be changed to active status.Please call your coordinator when you reach 190 lbs so you can be changed to active status for pancreas.    When you are active on the waitlist and an organ becomes available, a coordinator will need to speak to you immediately.  You could be contacted at any time during the day or night as an organ could become available at any time.  Please make certain our office always has your current telephone numbers and address. Please keep your phone on and charged at all times. Answer all incoming calls even if you do not recognize the incoming call. If you miss a call you should return it as soon as possible. The on call coordinator will not leave a message and you have one hour to return the missed call before the organ is offered to the next recipient. You will be given all the information that is known about the donor and you may decline an offer for any reason. There is no consequence or penalty if you decline an offer- the organ is simply offered to the next person in line. You will be told when to report to the hospital, when to stop eating and drinking and what medications you should take.    Items we will need from you:      We have received approval from your insurance company for the transplant procedure.  It is critical that you notify us if there is any  change in your insurance.  It is also important that you familiarize yourself with the details of your specific insurance policy.  Our patient  is available to assist you if you should have any questions regarding your coverage.      An ALA or PRA blood sample will  need to be sent here every 3 months to match you with  donors or any potential living donors. If you need this testing, special mailing boxes (called mailers) will be sent to you directly from the Outreach Department. You should take the physician order form and the  to your home laboratory when it is time to for this testing to be done.  Additional mailers can be obtained by calling the Transplant Office and asking to speak to a kidney . I am sending you the orders and blood tubes in the mail. Please take to your dialysis unit and have them draw the blood and send in as soon as possible.      During this waiting period, we may request additional periodic laboratory tests with your primary physician.  It will be your responsibility to remind your physician to forward your results to the Transplant Office.      We need to be kept informed of any changes in your medical condition such as:    o changes in your medications,   o significant changes in your health  o significant infections (such as pneumonia or abscesses)  o blood transfusions  o any condition which requires hospitalization  o any surgery      Remember to complete any routine cancer screening tests required before your transplant.  This includes colonoscopy; prostate screening for men, and mammogram and gynecologic testing for women, as well as dental work.  Your primary care clinic can assist you with arranging for these exams.  Remind your caregivers to forward copies of the records and final reports.    We want you to know that our program has physician and surgeon coverage 24 hours a day, 365 days a year. If this coverage  changes or there are substantial program changes, you will be notified in writing by letter. You will now have a new coordinator while on the wait list. Her name is Lea Johnson [phone] and her LPN is Annita Diane [phone]. It has been a pleasure working with you.     Please have any potential living donors register now online with our program to initiate their [facility] at [email or web address] or [facility] [phone].    Attached is a letter from the [employer] Organ [employer]aring ([employer]). It describes the services and information offered to patients by [employer] and the Organ Procurement and Transplantation Network.    We appreciate having had the opportunity to participate in your care.  If you have questions, please feel free to call the Transplant Office at [phone] or [phone].      Sincerely,      Solid Organ Transplant  [facility]    Enclosures: ALA/PRA Physician Order, Telephone Contact List, Travel Resources, [employer] Letter, Waitlist Information Update and While You Are Waiting  cc: Gopi Borden (PCP). Meera Fu MD, [facility] (ESRD)

## 2019-06-12 NOTE — COMMITTEE REVIEW
Abdominal Committee Review Note     Evaluation Date: 1/7/2019  Committee Review Date: 6/12/2019    Organ being evaluated for: Kidney/Pancreas    Transplant Phase: Evaluation  Transplant Status: Active    Transplant Coordinator: Lilibeth Carmen  Transplant Surgeon:       Referring Physician: Meera Fu    Primary Diagnosis: Diabetes Mellitus - Type I (Pancreas)  Secondary Diagnosis:     Committee Review Members:  Nephrology Kash Hoffman MD, Michael Corrigan, APRN CNP, Raffi Landon MD   Nutrition Karissa Knowles, KYLE    - Clinical Chantla Wolf, [facility], Melissa Rogers, [facility]   Transplant Natalia Jing Noland PA-C, Lea Johnson, RN, Mera Barnhart, RN, Mac Wolfe MD, Estela Woodard, GUILLAUME, Yennifer Low, RN, Scarlett Duong, RN, Jean Liu MD, Lilibeth Carmen, RN       Transplant Eligibility: Insulin-dependent diabetes mellitus, Irreversible chronic kidney disease treated w/dialysis or expected need for dialysis, Insulin-dependent diabetes mellitus w/hypoglycemic unawareness    Committee Review Decision: Approved    Relative Contraindications: None    Absolute Contraindications: BMI >30    Committee Chair Jean Liu MD verbally attested to the committee's decision.    Committee Discussion Details: Reviewed medical records and evaluation results to date. Patient is asking to move forward with kidney transplant alone at this point. His mother is interested in donation and is willing to enter PEP if needed. He does meet all criteria for kidney alone but BMI is too high to qualify for pancreas. His plan is to continue to work on weight loss and come back later for pancreas.His weight goal for pancreas is 190 lbs per Dr Liu.  Committee approves patient for kidney alone at this time.He should also be listed inactive for spk. Patient will be called and active listing letter, PRA orders and mailers will be sent.

## 2019-06-13 ENCOUNTER — TELEPHONE (OUTPATIENT)
Dept: TRANSPLANT | Facility: CLINIC | Age: 39
End: 2019-06-13

## 2019-06-13 ENCOUNTER — DOCUMENTATION ONLY (OUTPATIENT)
Dept: TRANSPLANT | Facility: CLINIC | Age: 39
End: 2019-06-13

## 2019-06-13 DIAGNOSIS — Z76.82 AWAITING ORGAN TRANSPLANT: Primary | ICD-10-CM

## 2019-06-13 NOTE — TELEPHONE ENCOUNTER
Called Michael and got the voice mail. Left message that I have some good news to share and asked him to return my call. Provided my contact information.

## 2019-06-13 NOTE — TELEPHONE ENCOUNTER
Michael called back and is happy to hear he is now active on the kidney alone list and inactive on spk due to BMI. He was reminded to call us as soon as he hits the nelson of 190 lbs so we can change him to active for spk. His mother is in evaluation for living donor and most likely will be in the PEP. We did discuss the possibility he could be called for a  donor kidney and he would have to decide if he wanted to go that route or wait for the PEP. Reminded him to keep him phone on and charged at all times. He should answer all incoming calls even if he does not recognize the incoming call. The on call coordinator will not leave a message and he has one hour to return the missed call before the organ is offered to the next person. He will be given as much information that is known about the donor and he may decline for any reason without consequence or penalty. He will be instructed when to arrive at the hospital, when to stop eating and drinking and what medications to take. If he is to arrive after  he will need to enter the hospital via the ED as the front door is locked. Reviewed the admission process, periop, pacu, intermediate care, transplant floor routines. He is aware he will be in the hospital 3-4 days and will need to stay here locally for up to 2 weeks for daily visits at [facility]. Emphasized the importance of taking his medications and getting his labs done. Michael stated he understood and had no further questions. He will now have a new coordinator team and Carly's contact information has been included in his listing letter. He will also get his PRA drawn as soon as he receives the mailers.

## 2019-06-13 NOTE — TELEPHONE ENCOUNTER
Patient was added onto the  donor spk wait list today 2019 as inactive due to BMI but he active on the kidney alone wait list. He has been on dialysis since 2018. Patient will be called, inactive and active listing letter and PRA orders and mailers will be sent.

## 2019-06-26 ENCOUNTER — TELEPHONE (OUTPATIENT)
Dept: TRANSPLANT | Facility: CLINIC | Age: 39
End: 2019-06-26

## 2019-06-26 NOTE — TELEPHONE ENCOUNTER
Emailed Michael consent and ALONSO information for KPD Program.    Samantha Fu RN, BSN, CCTN  Living Donor Coordinator  [phone]

## 2019-06-27 DIAGNOSIS — N18.6 ESRD (END STAGE RENAL DISEASE) (H): Primary | ICD-10-CM

## 2019-07-05 ENCOUNTER — RESULTS ONLY (OUTPATIENT)
Dept: OTHER | Facility: CLINIC | Age: 39
End: 2019-07-05

## 2019-07-05 ENCOUNTER — DOCUMENTATION ONLY (OUTPATIENT)
Dept: TRANSPLANT | Facility: CLINIC | Age: 39
End: 2019-07-05

## 2019-07-05 DIAGNOSIS — Z76.82 AWAITING ORGAN TRANSPLANT: ICD-10-CM

## 2019-07-05 DIAGNOSIS — N18.6 ESRD (END STAGE RENAL DISEASE) (H): ICD-10-CM

## 2019-07-05 NOTE — PROGRESS NOTES
I met with Michael and his donor Amira today to provide information on the Kidney Paired Donation program.      Michael and his mother Amira are ready for active listing pending the completion of their NKR listing and donor's cryo kit.  I answered questions regarding typical time waiting for a match offer in [facility].  I told him that once the data is entered into the systems I will be able to have more information on their match power.  However the waiting time is unknown and it could be 3 to 6 months.       I reviewed details pertaining to the Paired Exchange programs:                                1.National Kidney Registry                              2.[employer] KPD Program                              3. Internal Exchange                                I discussed with patient the matching procedure and logistics of each program.  Including that the pair can not choose their match.  I reviewed our program's policy regarding communication between recipient and their matched donor.  I stated that only if both parties are willing to exchange identities, we can facilitate that, but must require a waiver.  I reviewed that shipping of the kidney is a process that is monitored closely but is not without risk.  In the event that the kidney is damaged or lost that the programs managing the exchange try to repair broken chains but there is no guarantee that he would receive a kidney.   I reviewed with the patient that at any time he can withdraw from the KPD program or refuse a match offer.      Michael signed consent forms and ALONSO.    Michael knows how to get a hold of me in the meantime if he has any additional questions/concerns.    Samantha Fu RN, BSN, CCTN  Living Donor Coordinator  [phone]

## 2019-07-08 ENCOUNTER — TRANSFERRED RECORDS (OUTPATIENT)
Dept: HEALTH INFORMATION MANAGEMENT | Facility: CLINIC | Age: 39
End: 2019-07-08

## 2019-07-08 LAB — HEP C HIM: NORMAL

## 2019-07-09 LAB
PROTOCOL CUTOFF: NORMAL
SA1 CELL: NORMAL
SA1 COMMENTS: NORMAL
SA1 HI RISK ABY: NORMAL
SA1 MOD RISK ABY: NORMAL
SA1 TEST METHOD: NORMAL
SA2 CELL: NORMAL
SA2 COMMENTS: NORMAL
SA2 HI RISK ABY UA: NORMAL
SA2 MOD RISK ABY: NORMAL
SA2 TEST METHOD: NORMAL
UNACCEPTABLE ANTIGEN: NORMAL
UNOS CPRA: 0

## 2019-07-12 LAB
COMMENT VXMB1: NORMAL
COMMENT VXMT1: NORMAL
CROSSMATCHDATEVXM: NORMAL
DONOR VXM: NORMAL
DSA VXM B1: NORMAL
DSA VXMT1: NORMAL
RESULT VXM B1: NORMAL
RESULT VXM T1: NORMAL
SERUM DATE VXM B1: NORMAL
SERUM DATE VXM T1: NORMAL

## 2019-08-01 ENCOUNTER — ORGAN (OUTPATIENT)
Dept: TRANSPLANT | Facility: CLINIC | Age: 39
End: 2019-08-01

## 2019-08-12 ENCOUNTER — TRANSFERRED RECORDS (OUTPATIENT)
Dept: HEALTH INFORMATION MANAGEMENT | Facility: CLINIC | Age: 39
End: 2019-08-12

## 2019-08-20 ENCOUNTER — TELEPHONE (OUTPATIENT)
Dept: TRANSPLANT | Facility: CLINIC | Age: 39
End: 2019-08-20

## 2019-08-20 ENCOUNTER — ORGAN (OUTPATIENT)
Dept: TRANSPLANT | Facility: CLINIC | Age: 39
End: 2019-08-20
Payer: MEDICARE

## 2019-08-20 NOTE — TELEPHONE ENCOUNTER
Updated patient that there is a paired exchange kidney offer for him and that the surgery would be October 1st.  Provided patient donor age, sex, and left kidney, but noted this is a potential offer and we will have more information in a week as to whether we will be proceeding.  Patient reports he has completed the Hep B vaccines and pneumonia vaccine, but unsure of exact dates, so I will have the information requested from his dialysis unit.  Reviewed with the current paired exchange offer, patient should not have any additional immunizations as this could affect the crossmatch test with the donor.  Explained I will plan to contact the patient next week with an update and encouraged patient to contact me should he have questions/concerns.  Patient verbalizes agreement with this plan.

## 2019-08-21 ENCOUNTER — DOCUMENTATION ONLY (OUTPATIENT)
Dept: TRANSPLANT | Facility: CLINIC | Age: 39
End: 2019-08-21

## 2019-08-21 ENCOUNTER — RESULTS ONLY (OUTPATIENT)
Dept: OTHER | Facility: CLINIC | Age: 39
End: 2019-08-21

## 2019-08-21 DIAGNOSIS — N18.6 ESRD (END STAGE RENAL DISEASE) (H): Primary | ICD-10-CM

## 2019-08-21 DIAGNOSIS — N18.6 ESRD (END STAGE RENAL DISEASE) (H): ICD-10-CM

## 2019-08-26 LAB
CELL TYPE ALLO: NORMAL
CHANNELSHIFTALLOB1: -85
CHANNELSHIFTALLOB2: -89
CHANNELSHIFTALLOT1: 3
CHANNELSHIFTALLOT2: -54
COMMENT ALLOB2: NORMAL
CROSSMATCHDATEALLO: NORMAL
DONOR ALLO: NORMAL
DONORCELLDATE ALLO: NORMAL
POS CUT OFF ALLO B: >101
POS CUT OFF ALLO T: >67
RESULT ALLO B1: NORMAL
RESULT ALLO B2: NORMAL
RESULT ALLO T1: NORMAL
RESULT ALLO T2: NORMAL
SERUM DATE ALLO B1: NORMAL
SERUM DATE ALLO B2: NORMAL
SERUM DATE ALLO T1: NORMAL
SERUM DATE ALLO T2: NORMAL
TESTMETHODALLO: NORMAL
TREATMENT ALLO B1: NORMAL
TREATMENT ALLO B2: NORMAL
TREATMENT ALLO T1: NORMAL
TREATMENT ALLO T2: NORMAL

## 2019-08-29 ENCOUNTER — TELEPHONE (OUTPATIENT)
Dept: TRANSPLANT | Facility: CLINIC | Age: 39
End: 2019-08-29

## 2019-08-29 NOTE — TELEPHONE ENCOUNTER
Reviewed planned paired exchange kidney transplant for October 1st.  Explained the donor does meet criteria for high public health risk as related to social risk.  Explained all donors are tested for HIV, hepatitis, and it is felt this donor is low risk, the risk is not zero, so I am required to disclose the the information.  Per patient request, provided examples of social risk, but noted I don't have specific donor information.  Explained the surgeon scheduled to perform the transplant will also review this when patient comes for pre-operative testing/assessment.  Confirmed with patient that he is okay with moving forward with this organ offer.

## 2019-09-05 ENCOUNTER — TELEPHONE (OUTPATIENT)
Dept: TRANSPLANT | Facility: CLINIC | Age: 39
End: 2019-09-05

## 2019-09-05 ENCOUNTER — DOCUMENTATION ONLY (OUTPATIENT)
Dept: TRANSPLANT | Facility: CLINIC | Age: 39
End: 2019-09-05

## 2019-09-05 DIAGNOSIS — N18.6 ESRD (END STAGE RENAL DISEASE) (H): Primary | ICD-10-CM

## 2019-09-05 DIAGNOSIS — Z76.82 AWAITING ORGAN TRANSPLANT: ICD-10-CM

## 2019-09-05 NOTE — TELEPHONE ENCOUNTER
Left voice message for patient he will be receiving a letter with date of kidney transplant surgery as 10/01/19. Pre-operative outpatient tests and clinic appointments with transplant surgeon and transplant nephrologist are scheduled for Thursday, September 26,2019 at 10:00 AM.  Requested return call from patient so I may review all details.

## 2019-09-10 ENCOUNTER — TELEPHONE (OUTPATIENT)
Dept: TRANSPLANT | Facility: CLINIC | Age: 39
End: 2019-09-10

## 2019-09-10 NOTE — TELEPHONE ENCOUNTER
Left voice message requesting return call to review pre-op for living donor paired exchange kidney transplant information.

## 2019-09-11 ENCOUNTER — TELEPHONE (OUTPATIENT)
Dept: TRANSPLANT | Facility: CLINIC | Age: 39
End: 2019-09-11

## 2019-09-11 NOTE — TELEPHONE ENCOUNTER
FRANCOIS will send a message to Dr. Fu that patient is scheduled to undergo living donor (paired exchange) kidney transplant on October 1, 2019.

## 2019-09-11 NOTE — TELEPHONE ENCOUNTER
Reviewed patient's living donor paired exchange kidney transplant scheduled October 1, 2019, pre-operative clinic day is September 26, 2019, Also reviewed pre-op checklist information, lifting and driving restrictions.  Patient reports his brother lives in the [facility] and will be available to help patient after discharge from the hospital.  He also identifies his father as a support person.  Patient reports he hasn't yet received the surgery/pre-op letter, so noted if he doesn't receive by tomorrow, please call Annita Diane LPN on Friday and she can re-send.  Patient also reports he has [facility] paperwork to complete, so he will scan and e-mail to me tomorrow morning.

## 2019-09-19 ENCOUNTER — TELEPHONE (OUTPATIENT)
Dept: TRANSPLANT | Facility: CLINIC | Age: 39
End: 2019-09-19

## 2019-09-19 ENCOUNTER — DOCUMENTATION ONLY (OUTPATIENT)
Dept: CARE COORDINATION | Facility: CLINIC | Age: 39
End: 2019-09-19

## 2019-09-19 NOTE — TELEPHONE ENCOUNTER
"Confirmed with patient I did receive his e-mail with the [facility] paperwork and will get this completed as soon as possible.  Since patient reports he had a \"cold,\" but is \"getting over it.  I have a cough, but no fever.\"  Patient confirms he's not taking any antibiotics.  Noted this should be fine and patient should plan to come to pre-operative appointments scheduled 09/26/19.  Patient verbalizes understanding of all information.   "

## 2019-09-26 ENCOUNTER — APPOINTMENT (OUTPATIENT)
Dept: TRANSPLANT | Facility: CLINIC | Age: 39
End: 2019-09-26
Attending: TRANSPLANT SURGERY
Payer: COMMERCIAL

## 2019-09-26 ENCOUNTER — ANCILLARY PROCEDURE (OUTPATIENT)
Dept: GENERAL RADIOLOGY | Facility: CLINIC | Age: 39
End: 2019-09-26
Attending: TRANSPLANT SURGERY
Payer: COMMERCIAL

## 2019-09-26 ENCOUNTER — RESULTS ONLY (OUTPATIENT)
Dept: OTHER | Facility: CLINIC | Age: 39
End: 2019-09-26

## 2019-09-26 ENCOUNTER — TELEPHONE (OUTPATIENT)
Dept: NEPHROLOGY | Facility: CLINIC | Age: 39
End: 2019-09-26

## 2019-09-26 ENCOUNTER — OFFICE VISIT (OUTPATIENT)
Dept: NEPHROLOGY | Facility: CLINIC | Age: 39
End: 2019-09-26
Attending: TRANSPLANT SURGERY
Payer: COMMERCIAL

## 2019-09-26 VITALS
DIASTOLIC BLOOD PRESSURE: 76 MMHG | HEIGHT: 66 IN | SYSTOLIC BLOOD PRESSURE: 129 MMHG | OXYGEN SATURATION: 100 % | HEART RATE: 74 BPM | WEIGHT: 197.1 LBS | BODY MASS INDEX: 31.68 KG/M2

## 2019-09-26 VITALS
WEIGHT: 197.2 LBS | DIASTOLIC BLOOD PRESSURE: 76 MMHG | SYSTOLIC BLOOD PRESSURE: 129 MMHG | HEART RATE: 74 BPM | OXYGEN SATURATION: 100 % | BODY MASS INDEX: 31.83 KG/M2

## 2019-09-26 DIAGNOSIS — Z99.2 ESRD (END STAGE RENAL DISEASE) ON DIALYSIS (H): Primary | ICD-10-CM

## 2019-09-26 DIAGNOSIS — N18.6 ESRD (END STAGE RENAL DISEASE) (H): ICD-10-CM

## 2019-09-26 DIAGNOSIS — Z76.82 AWAITING ORGAN TRANSPLANT: ICD-10-CM

## 2019-09-26 DIAGNOSIS — N18.6 ESRD (END STAGE RENAL DISEASE) ON DIALYSIS (H): Primary | ICD-10-CM

## 2019-09-26 DIAGNOSIS — Z01.818 PRE-OP EXAM: ICD-10-CM

## 2019-09-26 DIAGNOSIS — N18.6 END STAGE RENAL DISEASE (H): ICD-10-CM

## 2019-09-26 DIAGNOSIS — N18.6 ESRD (END STAGE RENAL DISEASE) (H): Primary | ICD-10-CM

## 2019-09-26 DIAGNOSIS — I10 HYPERTENSION, UNSPECIFIED TYPE: ICD-10-CM

## 2019-09-26 DIAGNOSIS — Z76.82 KIDNEY TRANSPLANT CANDIDATE: ICD-10-CM

## 2019-09-26 LAB
ABO + RH BLD: NORMAL
ABO + RH BLD: NORMAL
ALBUMIN SERPL-MCNC: 3.5 G/DL (ref 3.4–5)
ALBUMIN UR-MCNC: NEGATIVE MG/DL
ALP SERPL-CCNC: 100 U/L (ref 40–150)
ALT SERPL W P-5'-P-CCNC: 42 U/L (ref 0–70)
ANION GAP SERPL CALCULATED.3IONS-SCNC: 10 MMOL/L (ref 3–14)
APPEARANCE UR: CLEAR
AST SERPL W P-5'-P-CCNC: 16 U/L (ref 0–45)
BILIRUB SERPL-MCNC: 0.2 MG/DL (ref 0.2–1.3)
BILIRUB UR QL STRIP: NEGATIVE
BLD GP AB SCN SERPL QL: NORMAL
BLOOD BANK CMNT PATIENT-IMP: NORMAL
BUN SERPL-MCNC: 67 MG/DL (ref 7–30)
CALCIUM SERPL-MCNC: 8.6 MG/DL (ref 8.5–10.1)
CHLORIDE SERPL-SCNC: 103 MMOL/L (ref 94–109)
CO2 SERPL-SCNC: 21 MMOL/L (ref 20–32)
COLOR UR AUTO: ABNORMAL
CREAT SERPL-MCNC: 9.18 MG/DL (ref 0.66–1.25)
DEPRECATED CALCIDIOL+CALCIFEROL SERPL-MC: 22 UG/L (ref 20–75)
ERYTHROCYTE [DISTWIDTH] IN BLOOD BY AUTOMATED COUNT: 13.3 % (ref 10–15)
FERRITIN SERPL-MCNC: 753 NG/ML (ref 26–388)
GFR SERPL CREATININE-BSD FRML MDRD: 6 ML/MIN/{1.73_M2}
GLUCOSE SERPL-MCNC: 119 MG/DL (ref 70–99)
GLUCOSE UR STRIP-MCNC: 150 MG/DL
HBA1C MFR BLD: 8.9 % (ref 0–5.6)
HBV CORE AB SERPL QL IA: NONREACTIVE
HBV SURFACE AB SERPL IA-ACNC: >1000 M[IU]/ML
HBV SURFACE AG SERPL QL IA: NONREACTIVE
HCT VFR BLD AUTO: 33.1 % (ref 40–53)
HCV AB SERPL QL IA: NONREACTIVE
HGB BLD-MCNC: 10.5 G/DL (ref 13.3–17.7)
HGB UR QL STRIP: NEGATIVE
HIV 1+2 AB+HIV1 P24 AG SERPL QL IA: NONREACTIVE
INR PPP: 1 (ref 0.86–1.14)
IRON SATN MFR SERPL: 28 % (ref 15–46)
IRON SERPL-MCNC: 70 UG/DL (ref 35–180)
KETONES UR STRIP-MCNC: NEGATIVE MG/DL
LEUKOCYTE ESTERASE UR QL STRIP: NEGATIVE
MCH RBC QN AUTO: 30 PG (ref 26.5–33)
MCHC RBC AUTO-ENTMCNC: 31.7 G/DL (ref 31.5–36.5)
MCV RBC AUTO: 95 FL (ref 78–100)
NITRATE UR QL: NEGATIVE
PH UR STRIP: 6 PH (ref 5–7)
PLATELET # BLD AUTO: 156 10E9/L (ref 150–450)
POTASSIUM SERPL-SCNC: 4.8 MMOL/L (ref 3.4–5.3)
PROT SERPL-MCNC: 7.5 G/DL (ref 6.8–8.8)
PTH-INTACT SERPL-MCNC: 356 PG/ML (ref 18–80)
RBC # BLD AUTO: 3.5 10E12/L (ref 4.4–5.9)
RBC #/AREA URNS AUTO: 0 /HPF (ref 0–2)
SODIUM SERPL-SCNC: 134 MMOL/L (ref 133–144)
SOURCE: ABNORMAL
SP GR UR STRIP: 1.01 (ref 1–1.03)
SPECIMEN EXP DATE BLD: NORMAL
TIBC SERPL-MCNC: 253 UG/DL (ref 240–430)
UROBILINOGEN UR STRIP-MCNC: 0 MG/DL (ref 0–2)
WBC # BLD AUTO: 8.2 10E9/L (ref 4–11)
WBC #/AREA URNS AUTO: 1 /HPF (ref 0–5)

## 2019-09-26 PROCEDURE — 86706 HEP B SURFACE ANTIBODY: CPT | Performed by: TRANSPLANT SURGERY

## 2019-09-26 PROCEDURE — G0463 HOSPITAL OUTPT CLINIC VISIT: HCPCS | Mod: 27

## 2019-09-26 PROCEDURE — 87389 HIV-1 AG W/HIV-1&-2 AB AG IA: CPT | Performed by: TRANSPLANT SURGERY

## 2019-09-26 PROCEDURE — 85027 COMPLETE CBC AUTOMATED: CPT | Performed by: TRANSPLANT SURGERY

## 2019-09-26 PROCEDURE — 86704 HEP B CORE ANTIBODY TOTAL: CPT | Performed by: TRANSPLANT SURGERY

## 2019-09-26 PROCEDURE — 86665 EPSTEIN-BARR CAPSID VCA: CPT | Mod: 91 | Performed by: TRANSPLANT SURGERY

## 2019-09-26 PROCEDURE — 83540 ASSAY OF IRON: CPT | Performed by: TRANSPLANT SURGERY

## 2019-09-26 PROCEDURE — 85610 PROTHROMBIN TIME: CPT | Performed by: TRANSPLANT SURGERY

## 2019-09-26 PROCEDURE — 86900 BLOOD TYPING SEROLOGIC ABO: CPT | Performed by: TRANSPLANT SURGERY

## 2019-09-26 PROCEDURE — 86803 HEPATITIS C AB TEST: CPT | Performed by: TRANSPLANT SURGERY

## 2019-09-26 PROCEDURE — 86644 CMV ANTIBODY: CPT | Performed by: TRANSPLANT SURGERY

## 2019-09-26 PROCEDURE — 86901 BLOOD TYPING SEROLOGIC RH(D): CPT | Performed by: TRANSPLANT SURGERY

## 2019-09-26 PROCEDURE — G0463 HOSPITAL OUTPT CLINIC VISIT: HCPCS | Mod: ZF

## 2019-09-26 PROCEDURE — 80053 COMPREHEN METABOLIC PANEL: CPT | Performed by: TRANSPLANT SURGERY

## 2019-09-26 PROCEDURE — 86665 EPSTEIN-BARR CAPSID VCA: CPT | Performed by: TRANSPLANT SURGERY

## 2019-09-26 PROCEDURE — 82306 VITAMIN D 25 HYDROXY: CPT | Performed by: TRANSPLANT SURGERY

## 2019-09-26 PROCEDURE — 86645 CMV ANTIBODY IGM: CPT | Performed by: TRANSPLANT SURGERY

## 2019-09-26 PROCEDURE — 83970 ASSAY OF PARATHORMONE: CPT | Performed by: TRANSPLANT SURGERY

## 2019-09-26 PROCEDURE — 87340 HEPATITIS B SURFACE AG IA: CPT | Performed by: TRANSPLANT SURGERY

## 2019-09-26 PROCEDURE — 36415 COLL VENOUS BLD VENIPUNCTURE: CPT | Performed by: TRANSPLANT SURGERY

## 2019-09-26 PROCEDURE — 83550 IRON BINDING TEST: CPT | Performed by: TRANSPLANT SURGERY

## 2019-09-26 PROCEDURE — 83036 HEMOGLOBIN GLYCOSYLATED A1C: CPT | Performed by: TRANSPLANT SURGERY

## 2019-09-26 PROCEDURE — 87086 URINE CULTURE/COLONY COUNT: CPT | Performed by: TRANSPLANT SURGERY

## 2019-09-26 PROCEDURE — 82728 ASSAY OF FERRITIN: CPT | Performed by: TRANSPLANT SURGERY

## 2019-09-26 PROCEDURE — 86850 RBC ANTIBODY SCREEN: CPT | Performed by: TRANSPLANT SURGERY

## 2019-09-26 PROCEDURE — 81001 URINALYSIS AUTO W/SCOPE: CPT | Performed by: TRANSPLANT SURGERY

## 2019-09-26 RX ORDER — FUROSEMIDE 80 MG
80 TABLET ORAL
Refills: 3 | Status: ON HOLD | COMMUNITY
Start: 2019-08-05 | End: 2019-10-05

## 2019-09-26 RX ORDER — CARVEDILOL 12.5 MG/1
12.5 TABLET ORAL
Refills: 3 | Status: ON HOLD | COMMUNITY
Start: 2019-09-11 | End: 2019-10-05

## 2019-09-26 ASSESSMENT — PAIN SCALES - GENERAL: PAINLEVEL: NO PAIN (0)

## 2019-09-26 ASSESSMENT — MIFFLIN-ST. JEOR: SCORE: 1746.79

## 2019-09-26 NOTE — LETTER
"9/26/2019      RE: Michael Amin  [address]  [facility] [address]       Chief Complaint   Patient presents with     Transplant     Day -5 recip     Blood pressure 129/76, pulse 74, height 1.676 m (5' 6\"), weight 89.4 kg (197 lb 1.6 oz), SpO2 100 %.    Stephanie Foreman CMA      Transplant Surgery H&P:                           HPI:      Mr. Amin is a 39 year old male who comes to clinic today for preop prior to planned living donor kidney transplantation. The patient was previously reviewed by the multidisciplinary selection committee and found to be medically and psychosocially appropriate for kidney transplantation. Mr. Amin has End stage renal failure due to diabetes mellitus type 1.     The patient is on dialysis.      If on dialysis, modality: PD, via PD Cath   Dialysis days: Monday, Tuesday, Wednesday, Thursday, Friday, Saturday, Sunday                 YES  NO   Chronic anticoagulation  []      [x] Indication:   Recurrent infections  []      [x]  Type:                  Bladder dysfunction  []      [x] Cause:  Claudication   []      [x] Distance:    Previous Amputation  []      [x] Cause:       Health events since transplant evaluation: None    Special considerations:  PONV: no   [facility]: No    MEDICAL HISTORY:      Patient Active Problem List    Diagnosis Date Noted     Type 1 diabetes (H)      Priority: Medium     Dyslipidemia      Priority: Medium     Anemia in chronic kidney disease      Priority: Medium     Secondary hyperparathyroidism (H)      Priority: Medium     ESRD (end stage renal disease) on dialysis (H)      Priority: Medium     Hypertension      Priority: Medium     Takes Meteprolol Daily         Past Medical History:   Diagnosis Date     Anemia in chronic kidney disease      Dyslipidemia      ESRD (end stage renal disease) on dialysis (H)      Hypertension      Secondary hyperparathyroidism (H)      Type 1 diabetes (H)      Past Surgical History:   Procedure Laterality " Date     hair implant  2007     INSERT CATHETER PERITONEAL DIALYSIS  08/2018     Current Outpatient Medications   Medication Sig Dispense Refill     atorvastatin (LIPITOR) 20 MG tablet Take 20 mg by mouth       B Complex-C-Folic Acid (VIRT-CAPS) 1 MG CAPS TK ONE C PO  D  3     blood glucose monitoring (ACCU-CHEK FASTCLIX) lancets U QID OR MORE OFTEN PRN  1     calcitRIOL (ROCALTROL) 0.25 MCG capsule TK ONE C PO D  3     carvedilol (COREG) 12.5 MG tablet Take 12.5 mg by mouth 2 times daily  3     furosemide (LASIX) 80 MG tablet Take 80 mg by mouth 2 times daily  3     gentamicin (GARAMYCIN) 0.1 % external cream ROX TO EXIT SIGHT ONCE D  3     HUMALOG 100 UNIT/ML injection INJ UP  UNI SC D PER PUMP  3     lisinopril (PRINIVIL/ZESTRIL) 20 MG tablet TK 1 T PO QD  3     UNABLE TO FIND insulin lispro 100 UNIT/ML patient supplied PUMP       vitamin D2 (ERGOCALCIFEROL) 27913 units (1250 mcg) capsule TK 1 C PO WEEKLY  0     aspirin (ASA) 81 MG EC tablet Take 81 mg by mouth       metoprolol tartrate (LOPRESSOR) 50 MG tablet   3     OTC products: None, except as noted above  No Known Allergies   Social History     Tobacco Use     Smoking status: Never Smoker     Smokeless tobacco: Never Used   Substance Use Topics     Alcohol use: Yes     Comment: Rarely 1-2 Drinks a month      OR  Social History     Socioeconomic History     Marital status:      Spouse name: Not on file     Number of children: Not on file     Years of education: Not on file     Highest education level: Not on file   Occupational History     Not on file   Social Needs     Financial resource strain: Not on file     Food insecurity:     Worry: Not on file     Inability: Not on file     Transportation needs:     Medical: Not on file     Non-medical: Not on file   Tobacco Use     Smoking status: Never Smoker     Smokeless tobacco: Never Used   Substance and Sexual Activity     Alcohol use: Yes     Comment: Rarely 1-2 Drinks a month      Drug use: No      Sexual activity: Not on file   Lifestyle     Physical activity:     Days per week: Not on file     Minutes per session: Not on file     Stress: Not on file   Relationships     Social connections:     Talks on phone: Not on file     Gets together: Not on file     Attends Anglican service: Not on file     Active member of club or organization: Not on file     Attends meetings of clubs or organizations: Not on file     Relationship status: Not on file     Intimate partner violence:     Fear of current or ex partner: Not on file     Emotionally abused: Not on file     Physically abused: Not on file     Forced sexual activity: Not on file   Other Topics Concern     Parent/sibling w/ CABG, MI or angioplasty before 65F 55M? Not Asked   Social History Narrative     Not on file     History   Drug Use No     Family History   Problem Relation Age of Onset     Diabetes Father      Coronary Artery Disease Father        REVIEW OF SYSTEMS:        CONSTITUTIONAL: NEGATIVE for fever, chills, change in weight  INTEGUMENTARY/SKIN: NEGATIVE for worrisome rashes, moles or lesions  EYES: NEGATIVE for vision changes or irritation  ENT/MOUTH: NEGATIVE for ear, mouth and throat problems  RESP: NEGATIVE for significant cough or SOB  BREAST: NEGATIVE for masses, tenderness or discharge  CV: NEGATIVE for chest pain, palpitations or peripheral edema  GI: NEGATIVE for nausea, abdominal pain, heartburn, or change in bowel habits  : NEGATIVE for frequency, dysuria, or hematuria  MUSCULOSKELETAL: NEGATIVE for significant arthralgias or myalgia  NEURO: NEGATIVE for weakness, dizziness or paresthesias  ENDOCRINE: NEGATIVE for temperature intolerance, skin/hair changes  HEME: NEGATIVE for bleeding problems  PSYCHIATRIC: NEGATIVE for changes in mood or affect    EXAM:                       Pulse:  [74] 74  BP: (129)/(76) 129/76  SpO2:  [100 %] 100 %    GENERAL APPEARANCE: healthy, alert and no distress     EYES: EOMI,  PERRL     HENT: ear canals  and TM's normal and nose and mouth without ulcers or lesions     NECK: no adenopathy, no asymmetry, masses, or scars and thyroid normal to palpation     RESP: lungs clear to auscultation - no rales, rhonchi or wheezes     CV: regular rates and rhythm, normal S1 S2, no S3 or S4 and no murmur, click or rub     ABDOMEN:  soft, nontender, no HSM or masses and bowel sounds normal     MS: extremities normal- no gross deformities noted, no evidence of inflammation in joints, FROM in all extremities.     SKIN: no suspicious lesions or rashes     NEURO: Normal strength and tone, sensory exam grossly normal, mentation intact and speech normal     PSYCH: mentation appears normal. and affect normal/bright     LYMPHATICS: No cervical adenopathy      DIAGNOSTICS:                EKG: appears normal, NSR, normal axis, normal intervals, no acute ST/T changes c/w ischemia, no LVH by voltage criteria, unchanged from previous tracings  Recent Labs   Lab Test 09/26/19  0945 01/07/19  1338   HGB 10.5* 12.7*    183   INR 1.00 0.99    136   POTASSIUM 4.8 4.7   CR 9.18* 6.60*   A1C 8.9* 10.8*     UNOS cPRA   Date Value Ref Range Status   07/05/2019 0  Final     B    ASSESSMENT:                 Mr. Amin is a 39 year old male who is appropriate for elective living donor kidney transplantation with the following pertinent issues:    1. Labs reviewed. Findings requiring additional evaluation before surgery: No  2. EKG (9/26/2019): appears normal, NSR  3. ECHO (1/7/2019); Interpretation Summary  Global and regional left ventricular function is normal with an EF of 60-65%.  The right ventricle is normal in function and size.  No significant valvular abnormalities.  No pericardial effusion is present.     No prior echo available for comparison.  _____________________________________________________________________________    4. ABO= B  5. Paired Exchange case: Yes  6. Outstanding issues: Final ABO and cross match    PLAN:                  1. Consent: Done  2. Outstanding issues: Final ABO and cross match     Signed Electronically by: Estela Woodard NP    Mr. Amin was seen and evaluated today as part of a shared APRN/PA visit.     I personally reviewed past medical and surgical history, vital signs, medications and labs, present and past medical history,and significant physical exam findings with the advanced practice provider.    My key findings include:  tenckhoff exit R abd.  Left side ok for LDKT.  Empties bladder ok.    Key management decisions made by me and carried out under my direction include:  No contraindications for proceeding.  Consent obtained.  Will place kidney on L side to save R for a pancreas. Leave tenckhoff until sure kidney works ok.    ATTESTATION: I saw and examined the patient, obtained consent and discussed procedure with patient.  Ok to proceed. lucila Vela M.D.    Delroy Vela MD

## 2019-09-26 NOTE — PROGRESS NOTES
"Chief Complaint   Patient presents with     Transplant     Day -5 recip     Blood pressure 129/76, pulse 74, height 1.676 m (5' 6\"), weight 89.4 kg (197 lb 1.6 oz), SpO2 100 %.    Stephanie Foreman CMA    "

## 2019-09-26 NOTE — PROGRESS NOTES
CHRONIC TRANSPLANT NEPHROLOGY VISIT    Assessment & Plan   # {TX STATUS:282786422}: {trend:220369383}   - Baseline Cr ~ ***   - Proteinuria: { :178792805}   - Date DSA Last Checked: {FV RENAL TX DSA DATE:149745}       Latest DSA: { :625924730}   - BK Viremia: { :319306}   - Kidney Tx Biopsy: { :109640}    {# Pancreas Transplant (Optional)    :954748979}    # Immunosuppression: {medications :724317917}   - Changes: { :322046}    # Prophylaxis:   - PJP: { :289471302}    {# Blood Pressure    :526158}    {# Diabetes/Elevated BG (Optional)    :528416837}    {# Anemia/Erythrocytosis (Optional)    :963036867}    {# Mineral Bone Disorder (Optional)    :073876883}     {# Electrolytes (Optional)    :924579680}    {# Optional Problem    :447668}    {# Skin cancer (risk)   :185826718}    # Medical Compliance: { :649885522}    # Transplant History:  Etiology of kidney failure: { :731084}  Tx: { :889297838}  Transplant: N/A  Donor Type:  Donor Class:   Significant changes in immunosuppression: { :766800}  Significant transplant-related complications: { :096125660}    Transplant Office Phone Number: [phone]    Assessment and plan was discussed with the patient and he voiced his understanding and agreement.    Return visit: No follow-ups on file.    Kenna Nye    Chief Complaint   Mr. Amin is a 39 year old here for { :453324677}.    History of Present Illness    ***    Recent Hospitalizations:  [] No [] Yes    New Medical Issues: [] No [] Yes    Decreased energy: [] No [] Yes    Chest pain or SOB with exertion:  [] No [] Yes    Appetite change or weight change: [] No [] Yes    Nausea, vomiting or diarrhea:  [] No [] Yes    Fever, sweats or chills: [] No [] Yes    Leg swelling: [] No [] Yes      Home BP: { :41448505}    Review of Systems   {ROS:700470438}    Problem List   Patient Active Problem List   Diagnosis     ESRD (end stage renal disease) on dialysis (H)     Hypertension     Type 1 diabetes (H)     Dyslipidemia  "    Anemia in chronic kidney disease     Secondary hyperparathyroidism (H)       Social History   Social History     Tobacco Use     Smoking status: Never Smoker     Smokeless tobacco: Never Used   Substance Use Topics     Alcohol use: Yes     Comment: Rarely 1-2 Drinks a month      Drug use: No       Allergies   No Known Allergies    Medications   Current Outpatient Medications   Medication Sig     aspirin (ASA) 81 MG EC tablet Take 81 mg by mouth     atorvastatin (LIPITOR) 20 MG tablet Take 20 mg by mouth     B Complex-C-Folic Acid (VIRT-CAPS) 1 MG CAPS TK ONE C PO  D     blood glucose monitoring (ACCU-CHEK FASTCLIX) lancets U QID OR MORE OFTEN PRN     calcitRIOL (ROCALTROL) 0.25 MCG capsule TK ONE C PO D     carvedilol (COREG) 12.5 MG tablet Take 12.5 mg by mouth 2 times daily     furosemide (LASIX) 80 MG tablet Take 80 mg by mouth 2 times daily     gentamicin (GARAMYCIN) 0.1 % external cream ROX TO EXIT SIGHT ONCE D     HUMALOG 100 UNIT/ML injection INJ UP  UNI SC D PER PUMP     lisinopril (PRINIVIL/ZESTRIL) 20 MG tablet TK 1 T PO QD     metoprolol tartrate (LOPRESSOR) 50 MG tablet      UNABLE TO FIND insulin lispro 100 UNIT/ML patient supplied PUMP     vitamin D2 (ERGOCALCIFEROL) 61486 units (1250 mcg) capsule TK 1 C PO WEEKLY     No current facility-administered medications for this visit.      There are no discontinued medications.    Physical Exam   Vital Signs: There were no vitals taken for this visit.    {EXAM    :[id number]::\"GENERAL APPEARANCE: alert and no distress\",\"HENT: mouth without ulcers or lesions\",\"LYMPHATICS: no cervical or supraclavicular nodes\",\"RESP: lungs clear to auscultation - no rales, rhonchi or wheezes\",\"CV: regular rhythm, normal rate, no rub, no murmur\",\"EDEMA: no LE edema bilaterally\",\"ABDOMEN: soft, nondistended, nontender, bowel sounds normal\",\"MS: extremities normal - no gross deformities noted, no evidence of inflammation in joints, no muscle tenderness\",\"SKIN: no " "rash\"}      Data     Renal Latest Ref Rng & Units 9/26/2019 8/6/2019 1/7/2019   Na 133 - 144 mmol/L 134 - 136   Na (external) 132 - 146 mEq/L - 137 -   K 3.4 - 5.3 mmol/L 4.8 - 4.7   K (external) 3.5 - 5.5 mEq/L - 4.9 -   Cl 94 - 109 mmol/L 103 - 104   Cl (external) 99 - 109 mEq/L - 109 -   CO2 20 - 32 mmol/L 21 - 21   CO2 (external) 20 - 31 mEq/L - 20 -   BUN 7 - 30 mg/dL 67(H) - 59(H)   BUN (external) 9 - 23 mg/dL - 61(H) -   Cr 0.66 - 1.25 mg/dL 9.18(H) - 6.60(H)   Cr (external) 0.70 - 1.30 mg/dL - 8.12(H) -   Glucose 70 - 99 mg/dL 119(H) - 151(H)   Ca  8.5 - 10.1 mg/dL 8.6 - 8.2(L)   Ca (external) 8.7 - 10.4 mg/dL - 8.6(L) -   Mg (external) 1.3 - 2.7 mg/dL - 2.2 -     Bone Health Latest Ref Rng & Units 9/26/2019 8/6/2019   Phos (external) 2.4 - 5.1 mg/dL - 5.9(H)   PTHi 18 - 80 pg/mL 356(H) -     Heme Latest Ref Rng & Units 9/26/2019 8/6/2019 1/7/2019   WBC 4.0 - 11.0 10e9/L 8.2 - 8.6   WBC (external) 4.5 - 11.0 - 9.5 -   Hgb 13.3 - 17.7 g/dL 10.5(L) - 12.7(L)   Hgb (external) 14.0 - 18.0 g/dL - 11.3(L) -   Plt 150 - 450 10e9/L 156 - 183   Plt (external) 150 - 400 - 184 -     Liver Latest Ref Rng & Units 9/26/2019 8/6/2019 1/7/2019   AP 40 - 150 U/L 100 - 157(H)   TBili 0.2 - 1.3 mg/dL 0.2 - 0.2   ALT 0 - 70 U/L 42 - 46   AST 0 - 45 U/L 16 - 15   Tot Protein 6.8 - 8.8 g/dL 7.5 - 7.9   Tot Protein (external) 5.7 - 8.2 g/dL - 6.6 -   Albumin 3.4 - 5.0 g/dL 3.5 - 3.5   Albumin (external) 3.2 - 4.8 g/dL - 4.4 -     Pancreas Latest Ref Rng & Units 9/26/2019 1/7/2019 8/15/2018   A1C 0 - 5.6 % 8.9(H) 10.8(H) 8.6(H)     Iron studies Latest Ref Rng & Units 9/26/2019 8/6/2019   Iron 35 - 180 ug/dL 70 -   Iron sat 15 - 46 % 28 -   Ferritin 26 - 388 ng/mL 753(H) -   Ferritin (external) 22 - 322 ng/mL - 578(H)     [facility] Txp Virology Latest Ref Rng & Units 1/7/2019   Hep B Core NR:Nonreactive Nonreactive     Scribe Disclosure:  IKenna, am serving as a scribe to document services personally performed by Kidney/Pancreas " Recipient at this visit, based upon the provider's statements to me. All documentation has been reviewed by the aforementioned provider prior to being entered into the official medical record. ***    I, Kenna Nye, a scribe, prepared the chart for today's encounter.

## 2019-09-26 NOTE — PROGRESS NOTES
Transplant Surgery H&P:                           HPI:      Mr. Amin is a 39 year old male who comes to clinic today for preop prior to planned living donor kidney transplantation. The patient was previously reviewed by the multidisciplinary selection committee and found to be medically and psychosocially appropriate for kidney transplantation. Mr. Amin has End stage renal failure due to diabetes mellitus type 1.     The patient is on dialysis.      If on dialysis, modality: PD, via PD Cath   Dialysis days: Monday, Tuesday, Wednesday, Thursday, Friday, Saturday, Sunday                 YES  NO   Chronic anticoagulation  []      [x] Indication:   Recurrent infections  []      [x]  Type:                  Bladder dysfunction  []      [x] Cause:  Claudication   []      [x] Distance:    Previous Amputation  []      [x] Cause:       Health events since transplant evaluation: None    Special considerations:  PONV: no   Zoroastrian: No    MEDICAL HISTORY:      Patient Active Problem List    Diagnosis Date Noted     Type 1 diabetes (H)      Priority: Medium     Dyslipidemia      Priority: Medium     Anemia in chronic kidney disease      Priority: Medium     Secondary hyperparathyroidism (H)      Priority: Medium     ESRD (end stage renal disease) on dialysis (H)      Priority: Medium     Hypertension      Priority: Medium     Takes Meteprolol Daily         Past Medical History:   Diagnosis Date     Anemia in chronic kidney disease      Dyslipidemia      ESRD (end stage renal disease) on dialysis (H)      Hypertension      Secondary hyperparathyroidism (H)      Type 1 diabetes (H)      Past Surgical History:   Procedure Laterality Date     hair implant  2007     INSERT CATHETER PERITONEAL DIALYSIS  08/2018     Current Outpatient Medications   Medication Sig Dispense Refill     atorvastatin (LIPITOR) 20 MG tablet Take 20 mg by mouth       B Complex-C-Folic Acid (VIRT-CAPS) 1 MG CAPS TK ONE C PO  D  3     blood  glucose monitoring (ACCU-CHEK FASTCLIX) lancets U QID OR MORE OFTEN PRN  1     calcitRIOL (ROCALTROL) 0.25 MCG capsule TK ONE C PO D  3     carvedilol (COREG) 12.5 MG tablet Take 12.5 mg by mouth 2 times daily  3     furosemide (LASIX) 80 MG tablet Take 80 mg by mouth 2 times daily  3     gentamicin (GARAMYCIN) 0.1 % external cream ROX TO EXIT SIGHT ONCE D  3     HUMALOG 100 UNIT/ML injection INJ UP  UNI SC D PER PUMP  3     lisinopril (PRINIVIL/ZESTRIL) 20 MG tablet TK 1 T PO QD  3     UNABLE TO FIND insulin lispro 100 UNIT/ML patient supplied PUMP       vitamin D2 (ERGOCALCIFEROL) 35963 units (1250 mcg) capsule TK 1 C PO WEEKLY  0     aspirin (ASA) 81 MG EC tablet Take 81 mg by mouth       metoprolol tartrate (LOPRESSOR) 50 MG tablet   3     OTC products: None, except as noted above  No Known Allergies   Social History     Tobacco Use     Smoking status: Never Smoker     Smokeless tobacco: Never Used   Substance Use Topics     Alcohol use: Yes     Comment: Rarely 1-2 Drinks a month      OR  Social History     Socioeconomic History     Marital status:      Spouse name: Not on file     Number of children: Not on file     Years of education: Not on file     Highest education level: Not on file   Occupational History     Not on file   Social Needs     Financial resource strain: Not on file     Food insecurity:     Worry: Not on file     Inability: Not on file     Transportation needs:     Medical: Not on file     Non-medical: Not on file   Tobacco Use     Smoking status: Never Smoker     Smokeless tobacco: Never Used   Substance and Sexual Activity     Alcohol use: Yes     Comment: Rarely 1-2 Drinks a month      Drug use: No     Sexual activity: Not on file   Lifestyle     Physical activity:     Days per week: Not on file     Minutes per session: Not on file     Stress: Not on file   Relationships     Social connections:     Talks on phone: Not on file     Gets together: Not on file     Attends Advent  service: Not on file     Active member of club or organization: Not on file     Attends meetings of clubs or organizations: Not on file     Relationship status: Not on file     Intimate partner violence:     Fear of current or ex partner: Not on file     Emotionally abused: Not on file     Physically abused: Not on file     Forced sexual activity: Not on file   Other Topics Concern     Parent/sibling w/ CABG, MI or angioplasty before 65F 55M? Not Asked   Social History Narrative     Not on file     History   Drug Use No     Family History   Problem Relation Age of Onset     Diabetes Father      Coronary Artery Disease Father        REVIEW OF SYSTEMS:        CONSTITUTIONAL: NEGATIVE for fever, chills, change in weight  INTEGUMENTARY/SKIN: NEGATIVE for worrisome rashes, moles or lesions  EYES: NEGATIVE for vision changes or irritation  ENT/MOUTH: NEGATIVE for ear, mouth and throat problems  RESP: NEGATIVE for significant cough or SOB  BREAST: NEGATIVE for masses, tenderness or discharge  CV: NEGATIVE for chest pain, palpitations or peripheral edema  GI: NEGATIVE for nausea, abdominal pain, heartburn, or change in bowel habits  : NEGATIVE for frequency, dysuria, or hematuria  MUSCULOSKELETAL: NEGATIVE for significant arthralgias or myalgia  NEURO: NEGATIVE for weakness, dizziness or paresthesias  ENDOCRINE: NEGATIVE for temperature intolerance, skin/hair changes  HEME: NEGATIVE for bleeding problems  PSYCHIATRIC: NEGATIVE for changes in mood or affect    EXAM:                       Pulse:  [74] 74  BP: (129)/(76) 129/76  SpO2:  [100 %] 100 %    GENERAL APPEARANCE: healthy, alert and no distress     EYES: EOMI,  PERRL     HENT: ear canals and TM's normal and nose and mouth without ulcers or lesions     NECK: no adenopathy, no asymmetry, masses, or scars and thyroid normal to palpation     RESP: lungs clear to auscultation - no rales, rhonchi or wheezes     CV: regular rates and rhythm, normal S1 S2, no S3 or S4 and no  murmur, click or rub     ABDOMEN:  soft, nontender, no HSM or masses and bowel sounds normal     MS: extremities normal- no gross deformities noted, no evidence of inflammation in joints, FROM in all extremities.     SKIN: no suspicious lesions or rashes     NEURO: Normal strength and tone, sensory exam grossly normal, mentation intact and speech normal     PSYCH: mentation appears normal. and affect normal/bright     LYMPHATICS: No cervical adenopathy      DIAGNOSTICS:                EKG: appears normal, NSR, normal axis, normal intervals, no acute ST/T changes c/w ischemia, no LVH by voltage criteria, unchanged from previous tracings  Recent Labs   Lab Test 09/26/19  0945 01/07/19  1338   HGB 10.5* 12.7*    183   INR 1.00 0.99    136   POTASSIUM 4.8 4.7   CR 9.18* 6.60*   A1C 8.9* 10.8*     UNOS cPRA   Date Value Ref Range Status   07/05/2019 0  Final     B    ASSESSMENT:                 Mr. Amin is a 39 year old male who is appropriate for elective living donor kidney transplantation with the following pertinent issues:    1. Labs reviewed. Findings requiring additional evaluation before surgery: No  2. EKG (9/26/2019): appears normal, NSR  3. ECHO (1/7/2019); Interpretation Summary  Global and regional left ventricular function is normal with an EF of 60-65%.  The right ventricle is normal in function and size.  No significant valvular abnormalities.  No pericardial effusion is present.     No prior echo available for comparison.  _____________________________________________________________________________    4. ABO= B  5. Paired Exchange case: Yes  6. Outstanding issues: Final ABO and cross match    PLAN:                 1. Consent: Done  2. Outstanding issues: Final ABO and cross match     Signed Electronically by: Estela Woodard NP    Mr. Amin was seen and evaluated today as part of a shared APRN/PA visit.     I personally reviewed past medical and surgical history, vital signs,  medications and labs, present and past medical history,and significant physical exam findings with the advanced practice provider.    My key findings include:  tenckhoff exit R abd.  Left side ok for LDKT.  Empties bladder ok.    Key management decisions made by me and carried out under my direction include:  No contraindications for proceeding.  Consent obtained.  Will place kidney on L side to save R for a pancreas. Leave tenckhoff until sure kidney works ok.    ATTESTATION: I saw and examined the patient, obtained consent and discussed procedure with patient.  Ok to proceed. lcuila Vela M.D.

## 2019-09-26 NOTE — NURSING NOTE
"Chief Complaint   Patient presents with     RECHECK     follow up     Vital signs:      BP: 129/76 Pulse: 74     SpO2: 100 %       Weight: 89.4 kg (197 lb 3.2 oz)  Estimated body mass index is 31.83 kg/m  as calculated from the following:    Height as of an earlier encounter on 9/26/19: 1.676 m (5' 6\").    Weight as of this encounter: 89.4 kg (197 lb 3.2 oz).        Roz Coello, [facility]       "

## 2019-09-26 NOTE — PROGRESS NOTES
Assessment and Plan:  # Kidney Transplant Pre-op: I spoke with the patient regarding the immunosuppression plan with standard induction and chronic immunosuppression with tacrolimus and CellCept.  We discussed the increased risk of cancer and infection as well as intolerance of the medicines.  All questions were answered.  The patient is medically optimized to proceed with living donor kidney transplant as scheduled.    # ESRD on PD: The patient has minimal requirement for peritoneal dialysis with only one cycle at night.  He will drain it himself and come to surgery with a dry belly.    # Cardiac Risk: evaluated by cardiology. Good exercise tolerance with no symptoms. No prior stress testing.     # HTN: The patient has a history of hypertension.  I instructed him to hold his lisinopril the day prior to his transplant.  He should continue on his metoprolol.    # Health Maintenance: Colonoscopy: Up to date, Dermatology: Up to date and Dental: Up to date    Discussed the risks and benefits of a transplant, including the risk of surgery and immunosuppression medications.  Patient's overall evaluation will be discussed in the Transplant Program's regular meeting with a final recommendation on the patients suitability for transplant to be made at that time.    Evaluation:  Michael Amin was seen in consultation at the request of Dr. Delroy Vela for evaluation as a potential kidney transplant recipient.    Reason for Visit:  Michael Amin is a 39 year old male with ESKD from diabetes mellitus type 2, who presents for kidney transplant evaluation.    History of Present Illness:    The patient is a 39-year-old male with history of end-stage kidney disease due to type 2 diabetes who was evaluated for kidney transplantation in January 2019.  Please refer to the note for further details.    Since that evaluation, the patient has been feeling well.  He has had no hospitalizations.  He continues on very minimal  replacement of kidney function with 1 cycle of peritoneal dialysis at night.  He has excellent residual renal function and makes over 1.5 L of urine per day still.    He has had no changes to his medical history or surgical history.  He has had no changes to his allergies or medicines.  He has no changes to his family history or social histories.    The patient denies any headaches or vision changes.  He has no dysphagia.  He denies any chest pains or breathing difficulties.  He has no abdominal pain.  He has no dysuria or gross hematuria.  He denies any constipation or diarrhea.  He has no fever shakes or chills.  His weight has been volitionally going down as was recommended for his pancreas transplant candidacy.  The patient denies any numbness weakness or tingling.  He has no myalgias or arthralgias.    Currently on PD 2500 fill 1 cycle at night with 2.5% dextrose. No other exchanges during the day. Excellent residual kidney function. 1.5 L urine output / day.     No known CAD. Saw cardiology in April. No stress testing at that time. No prior stress testing. He has excellent exercise tolerance with no cp or sob.     DM - diagnosed at age 16. 80-90 units of insulin / day.     Dry weight around 199 lbs.     PD catheter placement         Kidney Disease Hx:               Kidney Disease Dx: Diabetes mellitus type 1       Biopsy Proven: No         On Dialysis: Yes, September 2018       Primary Nephrologist: Meera Fu       H/o Kidney Stones: No       H/o Recurrent/Frequent UTI: No         Diabetic Hx:        Diagnosis Date: age 16       Medication History: insulin       Diabetic Control: Borderline control (HbA1c 7-9%)   Last HbA1c: 9%       Hypoglycemic Unawareness: No       End-Organ Damage due to DM: Nephropathy         Cardiac/Vascular Disease Risk Factors:        Cardiac Risk Factors: Diabetes, Hypertension and CKD       Known CAD: No       Known PAD/Caludication Symptoms: No       Known Heart Failure: No        Arrhythmia: No       Pulmonary Hypertension: No       Valvular Disease: No       Other: None         Functional Capacity/Frailty:        good      Fatigue/Decreased Energy: [x] No [] Yes    Chest Pain or SOB with Exertion: [x] No [] Yes    Significant Weight Change: [x] No [] Yes    Nausea, Vomiting or Diarrhea: [x] No [] Yes    Fever, Sweats or Chills:  [x] No [] Yes    Leg Swelling [x] No [] Yes      History of Cancer: None    Other Significant Medical Issues: None    Review of Systems:  A comprehensive review of systems was obtained and negative, except as noted in the HPI or PMH.    Past Medical History:   Medical record was reviewed and PMH was discussed with patient and noted below.  Past Medical History:   Diagnosis Date     Anemia in chronic kidney disease      Dyslipidemia      ESRD (end stage renal disease) on dialysis (H)      Hypertension      Secondary hyperparathyroidism (H)      Type 1 diabetes (H)        Past Social History:   Past Surgical History:   Procedure Laterality Date     hair implant  2007     INSERT CATHETER PERITONEAL DIALYSIS  08/2018     Personal history of bleeding or anesthesia problems: No    Family History:  Family History   Problem Relation Age of Onset     Diabetes Father      Coronary Artery Disease Father        Personal History:   Social History     Socioeconomic History     Marital status:      Spouse name: Not on file     Number of children: Not on file     Years of education: Not on file     Highest education level: Not on file   Occupational History     Not on file   Social Needs     Financial resource strain: Not on file     Food insecurity:     Worry: Not on file     Inability: Not on file     Transportation needs:     Medical: Not on file     Non-medical: Not on file   Tobacco Use     Smoking status: Never Smoker     Smokeless tobacco: Never Used   Substance and Sexual Activity     Alcohol use: Yes     Comment: Rarely 1-2 Drinks a month      Drug use: No      Sexual activity: Not on file   Lifestyle     Physical activity:     Days per week: Not on file     Minutes per session: Not on file     Stress: Not on file   Relationships     Social connections:     Talks on phone: Not on file     Gets together: Not on file     Attends Jainism service: Not on file     Active member of club or organization: Not on file     Attends meetings of clubs or organizations: Not on file     Relationship status: Not on file     Intimate partner violence:     Fear of current or ex partner: Not on file     Emotionally abused: Not on file     Physically abused: Not on file     Forced sexual activity: Not on file   Other Topics Concern     Parent/sibling w/ CABG, MI or angioplasty before 65F 55M? Not Asked   Social History Narrative     Not on file       Allergies:  No Known Allergies    Medications:  Current Outpatient Medications   Medication Sig     aspirin (ASA) 81 MG EC tablet Take 81 mg by mouth     atorvastatin (LIPITOR) 20 MG tablet Take 20 mg by mouth     B Complex-C-Folic Acid (VIRT-CAPS) 1 MG CAPS TK ONE C PO  D     blood glucose monitoring (ACCU-CHEK FASTCLIX) lancets U QID OR MORE OFTEN PRN     calcitRIOL (ROCALTROL) 0.25 MCG capsule TK ONE C PO D     carvedilol (COREG) 12.5 MG tablet Take 12.5 mg by mouth 2 times daily     furosemide (LASIX) 80 MG tablet Take 80 mg by mouth 2 times daily     gentamicin (GARAMYCIN) 0.1 % external cream ROX TO EXIT SIGHT ONCE D     HUMALOG 100 UNIT/ML injection INJ UP  UNI SC D PER PUMP     lisinopril (PRINIVIL/ZESTRIL) 20 MG tablet TK 1 T PO QD     metoprolol tartrate (LOPRESSOR) 50 MG tablet      UNABLE TO FIND insulin lispro 100 UNIT/ML patient supplied PUMP     vitamin D2 (ERGOCALCIFEROL) 32856 units (1250 mcg) capsule TK 1 C PO WEEKLY     No current facility-administered medications for this visit.        Vitals:  /76   Pulse 74   Wt 89.4 kg (197 lb 3.2 oz)   SpO2 100%   BMI 31.83 kg/m      Exam:  GENERAL APPEARANCE: alert and  no distress  HENT: mouth without ulcers or lesions  LYMPHATICS: no cervical or supraclavicular nodes  RESP: lungs clear to auscultation - no rales, rhonchi or wheezes  CV: regular rhythm, normal rate, no rub, no murmur  EDEMA: no LE edema bilaterally  ABDOMEN: soft, nondistended, nontender, bowel sounds normal  MS: extremities normal - no gross deformities noted, no evidence of inflammation in joints, no muscle tenderness  SKIN: no rash    Results:   Recent Results (from the past 336 hour(s))   EKG 12-lead complete w/read - Clinics    Collection Time: 09/26/19  8:08 AM   Result Value Ref Range    Interpretation ECG Click View Image link to view waveform and result    Parathyroid Hormone Intact    Collection Time: 09/26/19  9:45 AM   Result Value Ref Range    Parathyroid Hormone Intact 356 (H) 18 - 80 pg/mL   Iron and iron binding capacity    Collection Time: 09/26/19  9:45 AM   Result Value Ref Range    Iron 70 35 - 180 ug/dL    Iron Binding Cap 253 240 - 430 ug/dL    Iron Saturation Index 28 15 - 46 %   INR    Collection Time: 09/26/19  9:45 AM   Result Value Ref Range    INR 1.00 0.86 - 1.14   Hemoglobin A1c    Collection Time: 09/26/19  9:45 AM   Result Value Ref Range    Hemoglobin A1C 8.9 (H) 0 - 5.6 %   Ferritin    Collection Time: 09/26/19  9:45 AM   Result Value Ref Range    Ferritin 753 (H) 26 - 388 ng/mL   Comprehensive metabolic panel    Collection Time: 09/26/19  9:45 AM   Result Value Ref Range    Sodium 134 133 - 144 mmol/L    Potassium 4.8 3.4 - 5.3 mmol/L    Chloride 103 94 - 109 mmol/L    Carbon Dioxide 21 20 - 32 mmol/L    Anion Gap 10 3 - 14 mmol/L    Glucose 119 (H) 70 - 99 mg/dL    Urea Nitrogen 67 (H) 7 - 30 mg/dL    Creatinine 9.18 (H) 0.66 - 1.25 mg/dL    GFR Estimate 6 (L) >60 mL/min/[1.73_m2]    GFR Estimate If Black 7 (L) >60 mL/min/[1.73_m2]    Calcium 8.6 8.5 - 10.1 mg/dL    Bilirubin Total 0.2 0.2 - 1.3 mg/dL    Albumin 3.5 3.4 - 5.0 g/dL    Protein Total 7.5 6.8 - 8.8 g/dL    Alkaline  Phosphatase 100 40 - 150 U/L    ALT 42 0 - 70 U/L    AST 16 0 - 45 U/L   CBC with platelets    Collection Time: 09/26/19  9:45 AM   Result Value Ref Range    WBC 8.2 4.0 - 11.0 10e9/L    RBC Count 3.50 (L) 4.4 - 5.9 10e12/L    Hemoglobin 10.5 (L) 13.3 - 17.7 g/dL    Hematocrit 33.1 (L) 40.0 - 53.0 %    MCV 95 78 - 100 fl    MCH 30.0 26.5 - 33.0 pg    MCHC 31.7 31.5 - 36.5 g/dL    RDW 13.3 10.0 - 15.0 %    Platelet Count 156 150 - 450 10e9/L   ABO/Rh type and screen    Collection Time: 09/26/19  9:47 AM   Result Value Ref Range    ABO B     RH(D) Pos     Antibody Screen Neg     Test Valid Only At          [facility]    Specimen Expires 09/29/2019    Urine Culture Aerobic Bacterial    Collection Time: 09/26/19  9:50 AM   Result Value Ref Range    Specimen Description Midstream Urine     Special Requests Specimen received in preservative     Culture Micro PENDING    Routine UA with microscopic    Collection Time: 09/26/19  9:50 AM   Result Value Ref Range    Color Urine Straw     Appearance Urine Clear     Glucose Urine 150 (A) NEG^Negative mg/dL    Bilirubin Urine Negative NEG^Negative    Ketones Urine Negative NEG^Negative mg/dL    Specific Gravity Urine 1.009 1.003 - 1.035    Blood Urine Negative NEG^Negative    pH Urine 6.0 5.0 - 7.0 pH    Protein Albumin Urine Negative NEG^Negative mg/dL    Urobilinogen mg/dL 0.0 0.0 - 2.0 mg/dL    Nitrite Urine Negative NEG^Negative    Leukocyte Esterase Urine Negative NEG^Negative    Source Midstream Urine     WBC Urine 1 0 - 5 /HPF    RBC Urine 0 0 - 2 /HPF

## 2019-09-26 NOTE — LETTER
9/26/2019      RE: Michael Amin  [id number] [address] [address]         Assessment and Plan:  # Kidney Transplant Pre-op: I spoke with the patient regarding the immunosuppression plan with standard induction and chronic immunosuppression with tacrolimus and CellCept.  We discussed the increased risk of cancer and infection as well as intolerance of the medicines.  All questions were answered.  The patient is medically optimized to proceed with living donor kidney transplant as scheduled.    # ESRD on PD: The patient has minimal requirement for peritoneal dialysis with only one cycle at night.  He will drain it himself and come to surgery with a dry belly.    # Cardiac Risk: evaluated by cardiology. Good exercise tolerance with no symptoms. No prior stress testing.     # HTN: The patient has a history of hypertension.  I instructed him to hold his lisinopril the day prior to his transplant.  He should continue on his metoprolol.    # Health Maintenance: Colonoscopy: Up to date, Dermatology: Up to date and Dental: Up to date    Discussed the risks and benefits of a transplant, including the risk of surgery and immunosuppression medications.  Patient's overall evaluation will be discussed in the Transplant Program's regular meeting with a final recommendation on the patients suitability for transplant to be made at that time.    Evaluation:  Michael Amin was seen in consultation at the request of Dr. Delroy Vela for evaluation as a potential kidney transplant recipient.    Reason for Visit:  Michael Amin is a 39 year old male with ESKD from diabetes mellitus type 2, who presents for kidney transplant evaluation.    History of Present Illness:    The patient is a 39-year-old male with history of end-stage kidney disease due to type 2 diabetes who was evaluated for kidney transplantation in January 2019.  Please refer to the note for further details.    Since that evaluation, the patient has been  feeling well.  He has had no hospitalizations.  He continues on very minimal replacement of kidney function with 1 cycle of peritoneal dialysis at night.  He has excellent residual renal function and makes over 1.5 L of urine per day still.    He has had no changes to his medical history or surgical history.  He has had no changes to his allergies or medicines.  He has no changes to his family history or social histories.    The patient denies any headaches or vision changes.  He has no dysphagia.  He denies any chest pains or breathing difficulties.  He has no abdominal pain.  He has no dysuria or gross hematuria.  He denies any constipation or diarrhea.  He has no fever shakes or chills.  His weight has been volitionally going down as was recommended for his pancreas transplant candidacy.  The patient denies any numbness weakness or tingling.  He has no myalgias or arthralgias.    Currently on PD 2500 fill 1 cycle at night with 2.5% dextrose. No other exchanges during the day. Excellent residual kidney function. 1.5 L urine output / day.     No known CAD. Saw cardiology in April. No stress testing at that time. No prior stress testing. He has excellent exercise tolerance with no cp or sob.     DM - diagnosed at age 16. 80-90 units of insulin / day.     Dry weight around 199 lbs.     PD catheter placement         Kidney Disease Hx:               Kidney Disease Dx: Diabetes mellitus type 1       Biopsy Proven: No         On Dialysis: Yes, September 2018       Primary Nephrologist: Meera Fu       H/o Kidney Stones: No       H/o Recurrent/Frequent UTI: No         Diabetic Hx:        Diagnosis Date: age 16       Medication History: insulin       Diabetic Control: Borderline control (HbA1c 7-9%)   Last HbA1c: 9%       Hypoglycemic Unawareness: No       End-Organ Damage due to DM: Nephropathy         Cardiac/Vascular Disease Risk Factors:        Cardiac Risk Factors: Diabetes, Hypertension and CKD       Known CAD:  No       Known PAD/Caludication Symptoms: No       Known Heart Failure: No       Arrhythmia: No       Pulmonary Hypertension: No       Valvular Disease: No       Other: None         Functional Capacity/Frailty:        good      Fatigue/Decreased Energy: [x] No [] Yes    Chest Pain or SOB with Exertion: [x] No [] Yes    Significant Weight Change: [x] No [] Yes    Nausea, Vomiting or Diarrhea: [x] No [] Yes    Fever, Sweats or Chills:  [x] No [] Yes    Leg Swelling [x] No [] Yes      History of Cancer: None    Other Significant Medical Issues: None    Review of Systems:  A comprehensive review of systems was obtained and negative, except as noted in the HPI or PMH.    Past Medical History:   Medical record was reviewed and PMH was discussed with patient and noted below.  Past Medical History:   Diagnosis Date     Anemia in chronic kidney disease      Dyslipidemia      ESRD (end stage renal disease) on dialysis (H)      Hypertension      Secondary hyperparathyroidism (H)      Type 1 diabetes (H)        Past Social History:   Past Surgical History:   Procedure Laterality Date     hair implant  2007     INSERT CATHETER PERITONEAL DIALYSIS  08/2018     Personal history of bleeding or anesthesia problems: No    Family History:  Family History   Problem Relation Age of Onset     Diabetes Father      Coronary Artery Disease Father        Personal History:   Social History     Socioeconomic History     Marital status:      Spouse name: Not on file     Number of children: Not on file     Years of education: Not on file     Highest education level: Not on file   Occupational History     Not on file   Social Needs     Financial resource strain: Not on file     Food insecurity:     Worry: Not on file     Inability: Not on file     Transportation needs:     Medical: Not on file     Non-medical: Not on file   Tobacco Use     Smoking status: Never Smoker     Smokeless tobacco: Never Used   Substance and Sexual Activity      Alcohol use: Yes     Comment: Rarely 1-2 Drinks a month      Drug use: No     Sexual activity: Not on file   Lifestyle     Physical activity:     Days per week: Not on file     Minutes per session: Not on file     Stress: Not on file   Relationships     Social connections:     Talks on phone: Not on file     Gets together: Not on file     Attends [employer]ism service: Not on file     Active member of club or organization: Not on file     Attends meetings of clubs or organizations: Not on file     Relationship status: Not on file     Intimate partner violence:     Fear of current or ex partner: Not on file     Emotionally abused: Not on file     Physically abused: Not on file     Forced sexual activity: Not on file   Other Topics Concern     Parent/sibling w/ CABG, MI or angioplasty before 65F 55M? Not Asked   Social History Narrative     Not on file       Allergies:  No Known Allergies    Medications:  Current Outpatient Medications   Medication Sig     aspirin (ASA) 81 MG EC tablet Take 81 mg by mouth     atorvastatin (LIPITOR) 20 MG tablet Take 20 mg by mouth     B Complex-C-Folic Acid (VIRT-CAPS) 1 MG CAPS TK ONE C PO  D     blood glucose monitoring (ACCU-CHEK FASTCLIX) lancets U QID OR MORE OFTEN PRN     calcitRIOL (ROCALTROL) 0.25 MCG capsule TK ONE C PO D     carvedilol (COREG) 12.5 MG tablet Take 12.5 mg by mouth 2 times daily     furosemide (LASIX) 80 MG tablet Take 80 mg by mouth 2 times daily     gentamicin (GARAMYCIN) 0.1 % external cream ROX TO EXIT SIGHT ONCE D     HUMALOG 100 UNIT/ML injection INJ UP  UNI SC D PER PUMP     lisinopril (PRINIVIL/ZESTRIL) 20 MG tablet TK 1 T PO QD     metoprolol tartrate (LOPRESSOR) 50 MG tablet      UNABLE TO FIND insulin lispro 100 UNIT/ML patient supplied PUMP     vitamin D2 (ERGOCALCIFEROL) 39120 units (1250 mcg) capsule TK 1 C PO WEEKLY     No current facility-administered medications for this visit.        Vitals:  /76   Pulse 74   Wt 89.4 kg (197 lb 3.2  oz)   SpO2 100%   BMI 31.83 kg/m       Exam:  GENERAL APPEARANCE: alert and no distress  HENT: mouth without ulcers or lesions  LYMPHATICS: no cervical or supraclavicular nodes  RESP: lungs clear to auscultation - no rales, rhonchi or wheezes  CV: regular rhythm, normal rate, no rub, no murmur  EDEMA: no LE edema bilaterally  ABDOMEN: soft, nondistended, nontender, bowel sounds normal  MS: extremities normal - no gross deformities noted, no evidence of inflammation in joints, no muscle tenderness  SKIN: no rash    Results:   Recent Results (from the past 336 hour(s))   EKG 12-lead complete w/read - Clinics    Collection Time: 09/26/19  8:08 AM   Result Value Ref Range    Interpretation ECG Click View Image link to view waveform and result    Parathyroid Hormone Intact    Collection Time: 09/26/19  9:45 AM   Result Value Ref Range    Parathyroid Hormone Intact 356 (H) 18 - 80 pg/mL   Iron and iron binding capacity    Collection Time: 09/26/19  9:45 AM   Result Value Ref Range    Iron 70 35 - 180 ug/dL    Iron Binding Cap 253 240 - 430 ug/dL    Iron Saturation Index 28 15 - 46 %   INR    Collection Time: 09/26/19  9:45 AM   Result Value Ref Range    INR 1.00 0.86 - 1.14   Hemoglobin A1c    Collection Time: 09/26/19  9:45 AM   Result Value Ref Range    Hemoglobin A1C 8.9 (H) 0 - 5.6 %   Ferritin    Collection Time: 09/26/19  9:45 AM   Result Value Ref Range    Ferritin 753 (H) 26 - 388 ng/mL   Comprehensive metabolic panel    Collection Time: 09/26/19  9:45 AM   Result Value Ref Range    Sodium 134 133 - 144 mmol/L    Potassium 4.8 3.4 - 5.3 mmol/L    Chloride 103 94 - 109 mmol/L    Carbon Dioxide 21 20 - 32 mmol/L    Anion Gap 10 3 - 14 mmol/L    Glucose 119 (H) 70 - 99 mg/dL    Urea Nitrogen 67 (H) 7 - 30 mg/dL    Creatinine 9.18 (H) 0.66 - 1.25 mg/dL    GFR Estimate 6 (L) >60 mL/min/[1.73_m2]    GFR Estimate If Black 7 (L) >60 mL/min/[1.73_m2]    Calcium 8.6 8.5 - 10.1 mg/dL    Bilirubin Total 0.2 0.2 - 1.3 mg/dL     Albumin 3.5 3.4 - 5.0 g/dL    Protein Total 7.5 6.8 - 8.8 g/dL    Alkaline Phosphatase 100 40 - 150 U/L    ALT 42 0 - 70 U/L    AST 16 0 - 45 U/L   CBC with platelets    Collection Time: 09/26/19  9:45 AM   Result Value Ref Range    WBC 8.2 4.0 - 11.0 10e9/L    RBC Count 3.50 (L) 4.4 - 5.9 10e12/L    Hemoglobin 10.5 (L) 13.3 - 17.7 g/dL    Hematocrit 33.1 (L) 40.0 - 53.0 %    MCV 95 78 - 100 fl    MCH 30.0 26.5 - 33.0 pg    MCHC 31.7 31.5 - 36.5 g/dL    RDW 13.3 10.0 - 15.0 %    Platelet Count 156 150 - 450 10e9/L   ABO/Rh type and screen    Collection Time: 09/26/19  9:47 AM   Result Value Ref Range    ABO B     RH(D) Pos     Antibody Screen Neg     Test Valid Only At          [facility]    Specimen Expires 09/29/2019    Urine Culture Aerobic Bacterial    Collection Time: 09/26/19  9:50 AM   Result Value Ref Range    Specimen Description Midstream Urine     Special Requests Specimen received in preservative     Culture Micro PENDING    Routine UA with microscopic    Collection Time: 09/26/19  9:50 AM   Result Value Ref Range    Color Urine Straw     Appearance Urine Clear     Glucose Urine 150 (A) NEG^Negative mg/dL    Bilirubin Urine Negative NEG^Negative    Ketones Urine Negative NEG^Negative mg/dL    Specific Gravity Urine 1.009 1.003 - 1.035    Blood Urine Negative NEG^Negative    pH Urine 6.0 5.0 - 7.0 pH    Protein Albumin Urine Negative NEG^Negative mg/dL    Urobilinogen mg/dL 0.0 0.0 - 2.0 mg/dL    Nitrite Urine Negative NEG^Negative    Leukocyte Esterase Urine Negative NEG^Negative    Source Midstream Urine     WBC Urine 1 0 - 5 /HPF    RBC Urine 0 0 - 2 /HPF               Kidney/Pancreas Recipient

## 2019-09-26 NOTE — PROGRESS NOTES
Nephrology Clinic    Kidney/Pancreas Recipient  2019     Name: Michael Amin  MRN: [id number]  Age: 39 year old  : 1980  Referring provider: Delroy Vela     Assessment and Plan:  There are no diagnoses linked to this encounter.     Follow-up: No follow-ups on file. ***    Reason For Visit:   ***    HPI:   Michael Amin is a 39 year old male with a history of ***      Review of Systems:   Pertinent items are noted in HPI or as below, remainder of complete ROS is negative.      Active Medications:   ***    Current Outpatient Medications:      aspirin (ASA) 81 MG EC tablet, Take 81 mg by mouth, Disp: , Rfl:      atorvastatin (LIPITOR) 20 MG tablet, Take 20 mg by mouth, Disp: , Rfl:      B Complex-C-Folic Acid (VIRT-CAPS) 1 MG CAPS, TK ONE C PO  D, Disp: , Rfl: 3     blood glucose monitoring (ACCU-CHEK FASTCLIX) lancets, U QID OR MORE OFTEN PRN, Disp: , Rfl: 1     calcitRIOL (ROCALTROL) 0.25 MCG capsule, TK ONE C PO D, Disp: , Rfl: 3     carvedilol (COREG) 12.5 MG tablet, Take 12.5 mg by mouth 2 times daily, Disp: , Rfl: 3     furosemide (LASIX) 80 MG tablet, Take 80 mg by mouth 2 times daily, Disp: , Rfl: 3     gentamicin (GARAMYCIN) 0.1 % external cream, ROX TO EXIT SIGHT ONCE D, Disp: , Rfl: 3     HUMALOG 100 UNIT/ML injection, INJ UP  UNI SC D PER PUMP, Disp: , Rfl: 3     lisinopril (PRINIVIL/ZESTRIL) 20 MG tablet, TK 1 T PO QD, Disp: , Rfl: 3     metoprolol tartrate (LOPRESSOR) 50 MG tablet, , Disp: , Rfl: 3     UNABLE TO FIND, insulin lispro 100 UNIT/ML patient supplied PUMP, Disp: , Rfl:      vitamin D2 (ERGOCALCIFEROL) 56036 units (1250 mcg) capsule, TK 1 C PO WEEKLY, Disp: , Rfl: 0     Allergies:   ***  Patient has no known allergies.      Past Medical History:  ***  Past Medical History:   Diagnosis Date     Anemia in chronic kidney disease      Dyslipidemia      ESRD (end stage renal disease) on dialysis (H)      Hypertension      Secondary hyperparathyroidism (H)      Type 1  diabetes (H)      Patient Active Problem List   Diagnosis     ESRD (end stage renal disease) on dialysis (H)     Hypertension     Type 1 diabetes (H)     Dyslipidemia     Anemia in chronic kidney disease     Secondary hyperparathyroidism (H)        Past Surgical History:  ***  Past Surgical History:   Procedure Laterality Date     hair implant  2007     INSERT CATHETER PERITONEAL DIALYSIS  08/2018       Family History:   ***  Family History   Problem Relation Age of Onset     Diabetes Father      Coronary Artery Disease Father          Social History:   ***  Social History     Socioeconomic History     Marital status:      Spouse name: Not on file     Number of children: Not on file     Years of education: Not on file     Highest education level: Not on file   Occupational History     Not on file   Social Needs     Financial resource strain: Not on file     Food insecurity:     Worry: Not on file     Inability: Not on file     Transportation needs:     Medical: Not on file     Non-medical: Not on file   Tobacco Use     Smoking status: Never Smoker     Smokeless tobacco: Never Used   Substance and Sexual Activity     Alcohol use: Yes     Comment: Rarely 1-2 Drinks a month      Drug use: No     Sexual activity: Not on file   Lifestyle     Physical activity:     Days per week: Not on file     Minutes per session: Not on file     Stress: Not on file   Relationships     Social connections:     Talks on phone: Not on file     Gets together: Not on file     Attends Qu[employer] service: Not on file     Active member of club or organization: Not on file     Attends meetings of clubs or organizations: Not on file     Relationship status: Not on file     Intimate partner violence:     Fear of current or ex partner: Not on file     Emotionally abused: Not on file     Physically abused: Not on file     Forced sexual activity: Not on file   Other Topics Concern     Parent/sibling w/ CABG, MI or angioplasty before 65F 55M?  Not Asked   Social History Narrative     Not on file       Physical Exam:  /76   Pulse 74   Wt 89.4 kg (197 lb 3.2 oz)   SpO2 100%   BMI 31.83 kg/m     ***    Laboratory:  CMP  Recent Labs   Lab Test 09/26/19  0945 01/07/19  1338 12/03/18 08/16/18    136  --   --    POTASSIUM 4.8 4.7 4.4 3.7   CHLORIDE 103 104  --   --    CO2 21 21  --   --    ANIONGAP 10 11  --   --    * 151*  --  50*   BUN 67* 59*  --   --    CR 9.18* 6.60* 5.48* 5.97*   GFRESTIMATED 6* 10*  --  11*   GFRESTBLACK 7* 11*  --  13*   APRIL 8.6 8.2*  --   --    PROTTOTAL 7.5 7.9  --   --    ALBUMIN 3.5 3.5  --   --    BILITOTAL 0.2 0.2  --   --    ALKPHOS 100 157*  --   --    AST 16 15  --  10*   ALT 42 46  --  18     CBC  Recent Labs   Lab Test 09/26/19  0945 01/07/19  1338   HGB 10.5* 12.7*   WBC 8.2 8.6   RBC 3.50* 4.62   HCT 33.1* 39.8*   MCV 95 86   MCH 30.0 27.5   MCHC 31.7 31.9   RDW 13.3 14.5    183     INR  Recent Labs   Lab Test 09/26/19  0945 01/07/19  1338   INR 1.00 0.99   PTT  --  30     ABG  No lab results found.   URINE STUDIES  Recent Labs   Lab Test 09/26/19  0950 01/07/19  1330   COLOR Straw Yellow   APPEARANCE Clear Clear   URINEGLC 150* 50*   URINEBILI Negative Negative   URINEKETONE Negative Negative   SG 1.009 1.012   UBLD Negative Negative   URINEPH 6.0 5.0   PROTEIN Negative 100*   NITRITE Negative Negative   LEUKEST Negative Negative   RBCU 0 <1   WBCU 1 2     No lab results found.  PTH  Recent Labs   Lab Test 09/26/19  0945   PTHI 356*     IRON STUDIES   Recent Labs   Lab Test 09/26/19  0945   IRON 70      IRONSAT 28   EDOUARD 753*       Imaging:   ***    Scribe Disclosure:   IGil, am serving as a scribe to document services personally performed by Kidney/Pancreas Recipient at this visit, based upon the provider's statements to me. All documentation has been reviewed by the aforementioned provider prior to being entered into the official medical record. ***    IGil, served as  a scribe preparing the chart for the clinic encounter through chart review for the provider team. ***

## 2019-09-27 LAB
BACTERIA SPEC CULT: NO GROWTH
CMV IGG SERPL QL IA: >8 AI (ref 0–0.8)
CMV IGM SERPL QL IA: 0.6 AI (ref 0–0.8)
EBV VCA IGG SER QL IA: >8 AI (ref 0–0.8)
EBV VCA IGM SER QL IA: 0.4 AI (ref 0–0.8)
INTERPRETATION ECG - MUSE: NORMAL
Lab: NORMAL
SPECIMEN SOURCE: NORMAL

## 2019-09-30 ENCOUNTER — TELEPHONE (OUTPATIENT)
Dept: TRANSPLANT | Facility: CLINIC | Age: 39
End: 2019-09-30

## 2019-09-30 LAB
CELL TYPE AUTO: NORMAL
CHANNELSHIFTAUTOB1: -62
CHANNELSHIFTAUTOT1: 41
CROSSMATCHDATEAUTO: NORMAL
DONOR AUTO: NORMAL
DONORCELLDATE AUTO: NORMAL
POS CUT OFF AUTO B: >101
POS CUT OFF AUTO T: >67
PROTOCOL CUTOFF: NORMAL
RESULT AUTO B1: NORMAL
RESULT AUTO T1: NORMAL
SA1 CELL: NORMAL
SA1 COMMENTS: NORMAL
SA1 HI RISK ABY: NORMAL
SA1 MOD RISK ABY: NORMAL
SA1 TEST METHOD: NORMAL
SA2 CELL: NORMAL
SA2 COMMENTS: NORMAL
SA2 HI RISK ABY UA: NORMAL
SA2 MOD RISK ABY: NORMAL
SA2 TEST METHOD: NORMAL
SERUM DATE AUTO B1: NORMAL
SERUM DATE AUTO T1: NORMAL
TESTMETHODAUTO: NORMAL
TREATMENT AUTO B1: NORMAL
TREATMENT AUTO T1: NORMAL
UNACCEPTABLE ANTIGEN: NORMAL
UNOS CPRA: 0

## 2019-09-30 NOTE — TELEPHONE ENCOUNTER
Left voice message for patient that Dr. Vela is unavailable to perform patient's surgery on 10/01/19, so Dr. Rocio Rutherford, who is another very qualified transplant surgeon is scheduled perform the surgery.

## 2019-09-30 NOTE — TELEPHONE ENCOUNTER
Patient reports he can't find [facility] paperwork that I sent, so I will re-send now.  Patient also reports his local endocrinologist just retired, so requests to establish care with [facility] and asks whether this can be added to already scheduled post-discharge kidney transplant appointments.  Noted best for patient to report this information while he is inpatient, so an internal referral for Endocrinology may be placed.

## 2019-09-30 NOTE — TELEPHONE ENCOUNTER
Left voice message for patient that I spoke with Dr. Rutherford and she requests the patient come to [facility] tomorrow one hour earlier than was communicated to him at last week's pre-op appointment.  Noted I will be in the office tomorrow morning and my contact information, should the patient have any questions.

## 2019-10-01 ENCOUNTER — APPOINTMENT (OUTPATIENT)
Dept: GENERAL RADIOLOGY | Facility: CLINIC | Age: 39
DRG: 652 | End: 2019-10-01
Attending: SURGERY
Payer: COMMERCIAL

## 2019-10-01 ENCOUNTER — APPOINTMENT (OUTPATIENT)
Dept: ULTRASOUND IMAGING | Facility: CLINIC | Age: 39
DRG: 652 | End: 2019-10-01
Attending: SURGERY
Payer: COMMERCIAL

## 2019-10-01 ENCOUNTER — ANESTHESIA EVENT (OUTPATIENT)
Dept: SURGERY | Facility: CLINIC | Age: 39
DRG: 652 | End: 2019-10-01
Payer: COMMERCIAL

## 2019-10-01 ENCOUNTER — DOCUMENTATION ONLY (OUTPATIENT)
Dept: TRANSPLANT | Facility: CLINIC | Age: 39
End: 2019-10-01

## 2019-10-01 ENCOUNTER — ANESTHESIA (OUTPATIENT)
Dept: SURGERY | Facility: CLINIC | Age: 39
DRG: 652 | End: 2019-10-01
Payer: COMMERCIAL

## 2019-10-01 ENCOUNTER — HOSPITAL ENCOUNTER (INPATIENT)
Facility: CLINIC | Age: 39
LOS: 5 days | Discharge: HOME HEALTH | DRG: 652 | End: 2019-10-06
Attending: SURGERY | Admitting: TRANSPLANT SURGERY
Payer: COMMERCIAL

## 2019-10-01 DIAGNOSIS — Z94.0 KIDNEY TRANSPLANTED: ICD-10-CM

## 2019-10-01 DIAGNOSIS — Z99.2 ESRD (END STAGE RENAL DISEASE) ON DIALYSIS (H): Primary | ICD-10-CM

## 2019-10-01 DIAGNOSIS — N18.6 ESRD (END STAGE RENAL DISEASE) ON DIALYSIS (H): Primary | ICD-10-CM

## 2019-10-01 DIAGNOSIS — I15.0 RENOVASCULAR HYPERTENSION: ICD-10-CM

## 2019-10-01 DIAGNOSIS — E10.9 TYPE 1 DIABETES MELLITUS WITHOUT COMPLICATION (H): ICD-10-CM

## 2019-10-01 LAB
ABO + RH BLD: NORMAL
ABO + RH BLD: NORMAL
ANION GAP SERPL CALCULATED.3IONS-SCNC: 11 MMOL/L (ref 3–14)
BLD GP AB SCN SERPL QL: NORMAL
BLD PROD TYP BPU: NORMAL
BLOOD BANK CMNT PATIENT-IMP: NORMAL
BUN SERPL-MCNC: 62 MG/DL (ref 7–30)
CALCIUM SERPL-MCNC: 7.2 MG/DL (ref 8.5–10.1)
CHLORIDE SERPL-SCNC: 107 MMOL/L (ref 94–109)
CO2 SERPL-SCNC: 18 MMOL/L (ref 20–32)
CREAT SERPL-MCNC: 7.97 MG/DL (ref 0.66–1.25)
CREAT SERPL-MCNC: 9.43 MG/DL (ref 0.66–1.25)
ERYTHROCYTE [DISTWIDTH] IN BLOOD BY AUTOMATED COUNT: 13.3 % (ref 10–15)
GFR SERPL CREATININE-BSD FRML MDRD: 6 ML/MIN/{1.73_M2}
GFR SERPL CREATININE-BSD FRML MDRD: 8 ML/MIN/{1.73_M2}
GLUCOSE BLDC GLUCOMTR-MCNC: 149 MG/DL (ref 70–99)
GLUCOSE BLDC GLUCOMTR-MCNC: 169 MG/DL (ref 70–99)
GLUCOSE BLDC GLUCOMTR-MCNC: 182 MG/DL (ref 70–99)
GLUCOSE BLDC GLUCOMTR-MCNC: 203 MG/DL (ref 70–99)
GLUCOSE BLDC GLUCOMTR-MCNC: 265 MG/DL (ref 70–99)
GLUCOSE BLDC GLUCOMTR-MCNC: 271 MG/DL (ref 70–99)
GLUCOSE BLDC GLUCOMTR-MCNC: 275 MG/DL (ref 70–99)
GLUCOSE BLDC GLUCOMTR-MCNC: 284 MG/DL (ref 70–99)
GLUCOSE BLDC GLUCOMTR-MCNC: 337 MG/DL (ref 70–99)
GLUCOSE BLDC GLUCOMTR-MCNC: 34 MG/DL (ref 70–99)
GLUCOSE BLDC GLUCOMTR-MCNC: 383 MG/DL (ref 70–99)
GLUCOSE BLDC GLUCOMTR-MCNC: 47 MG/DL (ref 70–99)
GLUCOSE SERPL-MCNC: 197 MG/DL (ref 70–99)
HCT VFR BLD AUTO: 26.1 % (ref 40–53)
HGB BLD-MCNC: 8.3 G/DL (ref 13.3–17.7)
HGB BLD-MCNC: 9.4 G/DL (ref 13.3–17.7)
MAGNESIUM SERPL-MCNC: 1.7 MG/DL (ref 1.6–2.3)
MCH RBC QN AUTO: 29.7 PG (ref 26.5–33)
MCHC RBC AUTO-ENTMCNC: 31.8 G/DL (ref 31.5–36.5)
MCV RBC AUTO: 94 FL (ref 78–100)
NUM BPU REQUESTED: 2
PHOSPHATE SERPL-MCNC: 3.1 MG/DL (ref 2.5–4.5)
PLATELET # BLD AUTO: 86 10E9/L (ref 150–450)
POTASSIUM SERPL-SCNC: 4.2 MMOL/L (ref 3.4–5.3)
POTASSIUM SERPL-SCNC: 4.6 MMOL/L (ref 3.4–5.3)
RBC # BLD AUTO: 2.79 10E12/L (ref 4.4–5.9)
SODIUM SERPL-SCNC: 137 MMOL/L (ref 133–144)
SPECIMEN EXP DATE BLD: NORMAL
WBC # BLD AUTO: 5.7 10E9/L (ref 4–11)

## 2019-10-01 PROCEDURE — 86900 BLOOD TYPING SEROLOGIC ABO: CPT | Performed by: NURSE PRACTITIONER

## 2019-10-01 PROCEDURE — 25000132 ZZH RX MED GY IP 250 OP 250 PS 637: Mod: GY | Performed by: SURGERY

## 2019-10-01 PROCEDURE — 25000125 ZZHC RX 250: Performed by: SURGERY

## 2019-10-01 PROCEDURE — 86923 COMPATIBILITY TEST ELECTRIC: CPT | Performed by: NURSE PRACTITIONER

## 2019-10-01 PROCEDURE — 86850 RBC ANTIBODY SCREEN: CPT | Performed by: NURSE PRACTITIONER

## 2019-10-01 PROCEDURE — 25800030 ZZH RX IP 258 OP 636: Performed by: NURSE PRACTITIONER

## 2019-10-01 PROCEDURE — 25800030 ZZH RX IP 258 OP 636: Performed by: STUDENT IN AN ORGANIZED HEALTH CARE EDUCATION/TRAINING PROGRAM

## 2019-10-01 PROCEDURE — 25000128 H RX IP 250 OP 636: Performed by: NURSE ANESTHETIST, CERTIFIED REGISTERED

## 2019-10-01 PROCEDURE — C2617 STENT, NON-COR, TEM W/O DEL: HCPCS | Performed by: SURGERY

## 2019-10-01 PROCEDURE — 25000131 ZZH RX MED GY IP 250 OP 636 PS 637: Mod: GY | Performed by: SURGERY

## 2019-10-01 PROCEDURE — 40000170 ZZH STATISTIC PRE-PROCEDURE ASSESSMENT II: Performed by: SURGERY

## 2019-10-01 PROCEDURE — 25800030 ZZH RX IP 258 OP 636: Performed by: SURGERY

## 2019-10-01 PROCEDURE — 25800025 ZZH RX 258: Performed by: ANESTHESIOLOGY

## 2019-10-01 PROCEDURE — 71000017 ZZH RECOVERY PHASE 1 LEVEL 3 EA ADDTL HR: Performed by: SURGERY

## 2019-10-01 PROCEDURE — 25000128 H RX IP 250 OP 636: Performed by: STUDENT IN AN ORGANIZED HEALTH CARE EDUCATION/TRAINING PROGRAM

## 2019-10-01 PROCEDURE — 40000196 ZZH STATISTIC RAPCV CVP MONITORING

## 2019-10-01 PROCEDURE — 85027 COMPLETE CBC AUTOMATED: CPT | Performed by: SURGERY

## 2019-10-01 PROCEDURE — 85018 HEMOGLOBIN: CPT | Performed by: SURGERY

## 2019-10-01 PROCEDURE — 25000128 H RX IP 250 OP 636: Performed by: ANESTHESIOLOGY

## 2019-10-01 PROCEDURE — 0TY10Z0 TRANSPLANTATION OF LEFT KIDNEY, ALLOGENEIC, OPEN APPROACH: ICD-10-PCS | Performed by: SURGERY

## 2019-10-01 PROCEDURE — 0WPG03Z REMOVAL OF INFUSION DEVICE FROM PERITONEAL CAVITY, OPEN APPROACH: ICD-10-PCS | Performed by: SURGERY

## 2019-10-01 PROCEDURE — 80048 BASIC METABOLIC PNL TOTAL CA: CPT | Performed by: SURGERY

## 2019-10-01 PROCEDURE — 27210794 ZZH OR GENERAL SUPPLY STERILE: Performed by: SURGERY

## 2019-10-01 PROCEDURE — C1758 CATHETER, URETERAL: HCPCS | Performed by: SURGERY

## 2019-10-01 PROCEDURE — 83735 ASSAY OF MAGNESIUM: CPT | Performed by: SURGERY

## 2019-10-01 PROCEDURE — 40000275 ZZH STATISTIC RCP TIME EA 10 MIN

## 2019-10-01 PROCEDURE — 25000566 ZZH SEVOFLURANE, EA 15 MIN: Performed by: SURGERY

## 2019-10-01 PROCEDURE — 84100 ASSAY OF PHOSPHORUS: CPT | Performed by: SURGERY

## 2019-10-01 PROCEDURE — 36000064 ZZH SURGERY LEVEL 4 EA 15 ADDTL MIN - UMMC: Performed by: SURGERY

## 2019-10-01 PROCEDURE — 36415 COLL VENOUS BLD VENIPUNCTURE: CPT | Performed by: NURSE PRACTITIONER

## 2019-10-01 PROCEDURE — 25000125 ZZHC RX 250: Performed by: NURSE ANESTHETIST, CERTIFIED REGISTERED

## 2019-10-01 PROCEDURE — 40000986 XR CHEST PORT 1 VW

## 2019-10-01 PROCEDURE — 76776 US EXAM K TRANSPL W/DOPPLER: CPT

## 2019-10-01 PROCEDURE — 37000009 ZZH ANESTHESIA TECHNICAL FEE, EACH ADDTL 15 MIN: Performed by: SURGERY

## 2019-10-01 PROCEDURE — 36000062 ZZH SURGERY LEVEL 4 1ST 30 MIN - UMMC: Performed by: SURGERY

## 2019-10-01 PROCEDURE — 71000016 ZZH RECOVERY PHASE 1 LEVEL 3 FIRST HR: Performed by: SURGERY

## 2019-10-01 PROCEDURE — 84132 ASSAY OF SERUM POTASSIUM: CPT | Performed by: SURGERY

## 2019-10-01 PROCEDURE — 25000125 ZZHC RX 250: Performed by: NURSE PRACTITIONER

## 2019-10-01 PROCEDURE — 25000128 H RX IP 250 OP 636: Performed by: NURSE PRACTITIONER

## 2019-10-01 PROCEDURE — 81100000 ZZH ACQUISITION KIDNEY LIVING DONOR

## 2019-10-01 PROCEDURE — 25000125 ZZHC RX 250: Performed by: STUDENT IN AN ORGANIZED HEALTH CARE EDUCATION/TRAINING PROGRAM

## 2019-10-01 PROCEDURE — 84132 ASSAY OF SERUM POTASSIUM: CPT | Performed by: ANESTHESIOLOGY

## 2019-10-01 PROCEDURE — 82565 ASSAY OF CREATININE: CPT | Performed by: ANESTHESIOLOGY

## 2019-10-01 PROCEDURE — 30233S1 TRANSFUSION OF NONAUTOLOGOUS GLOBULIN INTO PERIPHERAL VEIN, PERCUTANEOUS APPROACH: ICD-10-PCS | Performed by: SURGERY

## 2019-10-01 PROCEDURE — 25000128 H RX IP 250 OP 636: Performed by: SURGERY

## 2019-10-01 PROCEDURE — 37000008 ZZH ANESTHESIA TECHNICAL FEE, 1ST 30 MIN: Performed by: SURGERY

## 2019-10-01 PROCEDURE — 36592 COLLECT BLOOD FROM PICC: CPT | Performed by: SURGERY

## 2019-10-01 PROCEDURE — 85018 HEMOGLOBIN: CPT | Performed by: ANESTHESIOLOGY

## 2019-10-01 PROCEDURE — 40000014 ZZH STATISTIC ARTERIAL MONITORING DAILY

## 2019-10-01 PROCEDURE — 86901 BLOOD TYPING SEROLOGIC RH(D): CPT | Performed by: NURSE PRACTITIONER

## 2019-10-01 PROCEDURE — 25800030 ZZH RX IP 258 OP 636: Performed by: NURSE ANESTHETIST, CERTIFIED REGISTERED

## 2019-10-01 PROCEDURE — 12000004 ZZH R&B IMCU UMMC

## 2019-10-01 PROCEDURE — 00000146 ZZHCL STATISTIC GLUCOSE BY METER IP

## 2019-10-01 DEVICE — STENT URETERAL DBL PIGTAIL INLAY 6FRX16CM G14865
Type: IMPLANTABLE DEVICE | Site: URETER | Status: NON-FUNCTIONAL
Removed: 2019-10-31

## 2019-10-01 RX ORDER — DEXTROSE MONOHYDRATE 25 G/50ML
25-50 INJECTION, SOLUTION INTRAVENOUS
Status: DISCONTINUED | OUTPATIENT
Start: 2019-10-01 | End: 2019-10-01 | Stop reason: HOSPADM

## 2019-10-01 RX ORDER — DEXTROSE MONOHYDRATE 25 G/50ML
50 INJECTION, SOLUTION INTRAVENOUS ONCE
Status: COMPLETED | OUTPATIENT
Start: 2019-10-01 | End: 2019-10-01

## 2019-10-01 RX ORDER — NICOTINE POLACRILEX 4 MG
15-30 LOZENGE BUCCAL
Status: DISCONTINUED | OUTPATIENT
Start: 2019-10-01 | End: 2019-10-01 | Stop reason: HOSPADM

## 2019-10-01 RX ORDER — HYDROMORPHONE HYDROCHLORIDE 1 MG/ML
.3-.5 INJECTION, SOLUTION INTRAMUSCULAR; INTRAVENOUS; SUBCUTANEOUS EVERY 5 MIN PRN
Status: DISCONTINUED | OUTPATIENT
Start: 2019-10-01 | End: 2019-10-01 | Stop reason: HOSPADM

## 2019-10-01 RX ORDER — SODIUM CHLORIDE, SODIUM LACTATE, POTASSIUM CHLORIDE, CALCIUM CHLORIDE 600; 310; 30; 20 MG/100ML; MG/100ML; MG/100ML; MG/100ML
INJECTION, SOLUTION INTRAVENOUS CONTINUOUS
Status: DISCONTINUED | OUTPATIENT
Start: 2019-10-01 | End: 2019-10-06

## 2019-10-01 RX ORDER — ONDANSETRON 2 MG/ML
INJECTION INTRAMUSCULAR; INTRAVENOUS PRN
Status: DISCONTINUED | OUTPATIENT
Start: 2019-10-01 | End: 2019-10-01

## 2019-10-01 RX ORDER — FENTANYL CITRATE 50 UG/ML
INJECTION, SOLUTION INTRAMUSCULAR; INTRAVENOUS PRN
Status: DISCONTINUED | OUTPATIENT
Start: 2019-10-01 | End: 2019-10-01

## 2019-10-01 RX ORDER — FUROSEMIDE 10 MG/ML
INJECTION INTRAMUSCULAR; INTRAVENOUS PRN
Status: DISCONTINUED | OUTPATIENT
Start: 2019-10-01 | End: 2019-10-01

## 2019-10-01 RX ORDER — OXYCODONE HYDROCHLORIDE 5 MG/1
5-10 TABLET ORAL EVERY 4 HOURS PRN
Status: DISCONTINUED | OUTPATIENT
Start: 2019-10-01 | End: 2019-10-06 | Stop reason: HOSPADM

## 2019-10-01 RX ORDER — SULFAMETHOXAZOLE AND TRIMETHOPRIM 400; 80 MG/1; MG/1
1 TABLET ORAL DAILY
Status: DISCONTINUED | OUTPATIENT
Start: 2019-10-02 | End: 2019-10-02

## 2019-10-01 RX ORDER — TACROLIMUS 1 MG/1
3 CAPSULE ORAL
Status: DISCONTINUED | OUTPATIENT
Start: 2019-10-02 | End: 2019-10-06

## 2019-10-01 RX ORDER — HYDROMORPHONE HYDROCHLORIDE 1 MG/ML
.3-.5 INJECTION, SOLUTION INTRAMUSCULAR; INTRAVENOUS; SUBCUTANEOUS EVERY 10 MIN PRN
Status: DISCONTINUED | OUTPATIENT
Start: 2019-10-01 | End: 2019-10-01 | Stop reason: HOSPADM

## 2019-10-01 RX ORDER — NICOTINE POLACRILEX 4 MG
15-30 LOZENGE BUCCAL
Status: DISCONTINUED | OUTPATIENT
Start: 2019-10-01 | End: 2019-10-04

## 2019-10-01 RX ORDER — SODIUM CHLORIDE 9 MG/ML
INJECTION, SOLUTION INTRAVENOUS CONTINUOUS PRN
Status: DISCONTINUED | OUTPATIENT
Start: 2019-10-01 | End: 2019-10-01

## 2019-10-01 RX ORDER — HEPARIN SODIUM 1000 [USP'U]/ML
INJECTION, SOLUTION INTRAVENOUS; SUBCUTANEOUS PRN
Status: DISCONTINUED | OUTPATIENT
Start: 2019-10-01 | End: 2019-10-01

## 2019-10-01 RX ORDER — PAPAVERINE HYDROCHLORIDE 30 MG/ML
INJECTION INTRAMUSCULAR; INTRAVENOUS PRN
Status: DISCONTINUED | OUTPATIENT
Start: 2019-10-01 | End: 2019-10-01 | Stop reason: HOSPADM

## 2019-10-01 RX ORDER — NALOXONE HYDROCHLORIDE 0.4 MG/ML
.1-.4 INJECTION, SOLUTION INTRAMUSCULAR; INTRAVENOUS; SUBCUTANEOUS
Status: DISCONTINUED | OUTPATIENT
Start: 2019-10-01 | End: 2019-10-01

## 2019-10-01 RX ORDER — ACETAMINOPHEN 325 MG/1
650 TABLET ORAL EVERY 4 HOURS
Status: DISCONTINUED | OUTPATIENT
Start: 2019-10-01 | End: 2019-10-06

## 2019-10-01 RX ORDER — SODIUM CHLORIDE, SODIUM LACTATE, POTASSIUM CHLORIDE, CALCIUM CHLORIDE 600; 310; 30; 20 MG/100ML; MG/100ML; MG/100ML; MG/100ML
INJECTION, SOLUTION INTRAVENOUS CONTINUOUS
Status: DISCONTINUED | OUTPATIENT
Start: 2019-10-01 | End: 2019-10-01 | Stop reason: HOSPADM

## 2019-10-01 RX ORDER — NEOSTIGMINE METHYLSULFATE 1 MG/ML
VIAL (ML) INJECTION PRN
Status: DISCONTINUED | OUTPATIENT
Start: 2019-10-01 | End: 2019-10-01

## 2019-10-01 RX ORDER — ONDANSETRON 2 MG/ML
4 INJECTION INTRAMUSCULAR; INTRAVENOUS EVERY 30 MIN PRN
Status: DISCONTINUED | OUTPATIENT
Start: 2019-10-01 | End: 2019-10-01 | Stop reason: HOSPADM

## 2019-10-01 RX ORDER — NALOXONE HYDROCHLORIDE 0.4 MG/ML
.1-.4 INJECTION, SOLUTION INTRAMUSCULAR; INTRAVENOUS; SUBCUTANEOUS
Status: DISCONTINUED | OUTPATIENT
Start: 2019-10-01 | End: 2019-10-01 | Stop reason: HOSPADM

## 2019-10-01 RX ORDER — VALGANCICLOVIR 450 MG/1
450 TABLET, FILM COATED ORAL
Status: DISCONTINUED | OUTPATIENT
Start: 2019-10-03 | End: 2019-10-03

## 2019-10-01 RX ORDER — LIDOCAINE 40 MG/G
CREAM TOPICAL
Status: DISCONTINUED | OUTPATIENT
Start: 2019-10-01 | End: 2019-10-01 | Stop reason: HOSPADM

## 2019-10-01 RX ORDER — MAGNESIUM OXIDE 400 MG/1
400 TABLET ORAL
Status: DISCONTINUED | OUTPATIENT
Start: 2019-10-03 | End: 2019-10-06

## 2019-10-01 RX ORDER — LIDOCAINE HYDROCHLORIDE 20 MG/ML
INJECTION, SOLUTION INFILTRATION; PERINEURAL PRN
Status: DISCONTINUED | OUTPATIENT
Start: 2019-10-01 | End: 2019-10-01

## 2019-10-01 RX ORDER — MANNITOL 20 G/100ML
INJECTION, SOLUTION INTRAVENOUS PRN
Status: DISCONTINUED | OUTPATIENT
Start: 2019-10-01 | End: 2019-10-01

## 2019-10-01 RX ORDER — MEPERIDINE HYDROCHLORIDE 25 MG/ML
12.5 INJECTION INTRAMUSCULAR; INTRAVENOUS; SUBCUTANEOUS
Status: DISCONTINUED | OUTPATIENT
Start: 2019-10-01 | End: 2019-10-01 | Stop reason: HOSPADM

## 2019-10-01 RX ORDER — SODIUM CHLORIDE 450 MG/100ML
INJECTION, SOLUTION INTRAVENOUS CONTINUOUS PRN
Status: DISCONTINUED | OUTPATIENT
Start: 2019-10-01 | End: 2019-10-02

## 2019-10-01 RX ORDER — SODIUM CHLORIDE 9 MG/ML
1000 INJECTION, SOLUTION INTRAVENOUS CONTINUOUS PRN
Status: DISCONTINUED | OUTPATIENT
Start: 2019-10-01 | End: 2019-10-02

## 2019-10-01 RX ORDER — ATORVASTATIN CALCIUM 10 MG/1
10 TABLET, FILM COATED ORAL DAILY
Status: DISCONTINUED | OUTPATIENT
Start: 2019-10-02 | End: 2019-10-06 | Stop reason: HOSPADM

## 2019-10-01 RX ORDER — PROPOFOL 10 MG/ML
INJECTION, EMULSION INTRAVENOUS CONTINUOUS PRN
Status: DISCONTINUED | OUTPATIENT
Start: 2019-10-01 | End: 2019-10-01

## 2019-10-01 RX ORDER — CEFUROXIME SODIUM 1.5 G/16ML
1.5 INJECTION, POWDER, FOR SOLUTION INTRAVENOUS ONCE
Status: COMPLETED | OUTPATIENT
Start: 2019-10-01 | End: 2019-10-01

## 2019-10-01 RX ORDER — NICOTINE POLACRILEX 4 MG
15-30 LOZENGE BUCCAL
Status: DISCONTINUED | OUTPATIENT
Start: 2019-10-01 | End: 2019-10-01

## 2019-10-01 RX ORDER — DEXTROSE MONOHYDRATE 25 G/50ML
25-50 INJECTION, SOLUTION INTRAVENOUS
Status: DISCONTINUED | OUTPATIENT
Start: 2019-10-01 | End: 2019-10-04

## 2019-10-01 RX ORDER — PHENYLEPHRINE HCL IN 0.9% NACL 1 MG/10 ML
SYRINGE (ML) INTRAVENOUS PRN
Status: DISCONTINUED | OUTPATIENT
Start: 2019-10-01 | End: 2019-10-01

## 2019-10-01 RX ORDER — EPHEDRINE SULFATE 50 MG/ML
INJECTION, SOLUTION INTRAVENOUS PRN
Status: DISCONTINUED | OUTPATIENT
Start: 2019-10-01 | End: 2019-10-01

## 2019-10-01 RX ORDER — GLYCOPYRROLATE 0.2 MG/ML
INJECTION, SOLUTION INTRAMUSCULAR; INTRAVENOUS PRN
Status: DISCONTINUED | OUTPATIENT
Start: 2019-10-01 | End: 2019-10-01

## 2019-10-01 RX ORDER — ONDANSETRON 4 MG/1
4 TABLET, ORALLY DISINTEGRATING ORAL EVERY 30 MIN PRN
Status: DISCONTINUED | OUTPATIENT
Start: 2019-10-01 | End: 2019-10-01 | Stop reason: HOSPADM

## 2019-10-01 RX ORDER — FENTANYL CITRATE 50 UG/ML
25-50 INJECTION, SOLUTION INTRAMUSCULAR; INTRAVENOUS EVERY 5 MIN PRN
Status: DISCONTINUED | OUTPATIENT
Start: 2019-10-01 | End: 2019-10-01 | Stop reason: HOSPADM

## 2019-10-01 RX ORDER — FENTANYL CITRATE 50 UG/ML
25-50 INJECTION, SOLUTION INTRAMUSCULAR; INTRAVENOUS
Status: DISCONTINUED | OUTPATIENT
Start: 2019-10-01 | End: 2019-10-01 | Stop reason: HOSPADM

## 2019-10-01 RX ORDER — PROPOFOL 10 MG/ML
INJECTION, EMULSION INTRAVENOUS PRN
Status: DISCONTINUED | OUTPATIENT
Start: 2019-10-01 | End: 2019-10-01

## 2019-10-01 RX ORDER — DEXTROSE MONOHYDRATE 25 G/50ML
25-50 INJECTION, SOLUTION INTRAVENOUS
Status: DISCONTINUED | OUTPATIENT
Start: 2019-10-01 | End: 2019-10-01

## 2019-10-01 RX ORDER — NALOXONE HYDROCHLORIDE 0.4 MG/ML
.1-.4 INJECTION, SOLUTION INTRAMUSCULAR; INTRAVENOUS; SUBCUTANEOUS
Status: DISCONTINUED | OUTPATIENT
Start: 2019-10-01 | End: 2019-10-06 | Stop reason: HOSPADM

## 2019-10-01 RX ADMIN — GLYCOPYRROLATE 0.4 MG: 0.2 INJECTION, SOLUTION INTRAMUSCULAR; INTRAVENOUS at 20:06

## 2019-10-01 RX ADMIN — SODIUM CHLORIDE: 900 INJECTION INTRAVENOUS at 18:51

## 2019-10-01 RX ADMIN — SODIUM CHLORIDE 500 MG: 9 INJECTION, SOLUTION INTRAVENOUS at 14:44

## 2019-10-01 RX ADMIN — PROPOFOL 20 MG: 10 INJECTION, EMULSION INTRAVENOUS at 19:24

## 2019-10-01 RX ADMIN — HEPARIN SODIUM 3000 UNITS: 1000 INJECTION, SOLUTION INTRAVENOUS; SUBCUTANEOUS at 16:57

## 2019-10-01 RX ADMIN — FENTANYL CITRATE 250 MCG: 50 INJECTION, SOLUTION INTRAMUSCULAR; INTRAVENOUS at 14:35

## 2019-10-01 RX ADMIN — MANNITOL 25 G: 20 INJECTION, SOLUTION INTRAVENOUS at 17:51

## 2019-10-01 RX ADMIN — ACETAMINOPHEN 650 MG: 325 TABLET, FILM COATED ORAL at 23:59

## 2019-10-01 RX ADMIN — EPHEDRINE SULFATE 5 MG: 50 INJECTION, SOLUTION INTRAVENOUS at 15:19

## 2019-10-01 RX ADMIN — PROPOFOL 50 MG: 10 INJECTION, EMULSION INTRAVENOUS at 15:59

## 2019-10-01 RX ADMIN — PROPOFOL 30 MG: 10 INJECTION, EMULSION INTRAVENOUS at 19:40

## 2019-10-01 RX ADMIN — EPHEDRINE SULFATE 10 MG: 50 INJECTION, SOLUTION INTRAVENOUS at 20:06

## 2019-10-01 RX ADMIN — GLYCOPYRROLATE 0.2 MG: 0.2 INJECTION, SOLUTION INTRAMUSCULAR; INTRAVENOUS at 20:32

## 2019-10-01 RX ADMIN — NEOSTIGMINE METHYLSULFATE 1 MG: 1 INJECTION, SOLUTION INTRAVENOUS at 20:35

## 2019-10-01 RX ADMIN — CEFUROXIME 1.5 G: 1.5 INJECTION, POWDER, FOR SOLUTION INTRAVENOUS at 15:42

## 2019-10-01 RX ADMIN — PROPOFOL 50 MCG/KG/MIN: 10 INJECTION, EMULSION INTRAVENOUS at 19:40

## 2019-10-01 RX ADMIN — GLYCOPYRROLATE 0.6 MG: 0.2 INJECTION, SOLUTION INTRAMUSCULAR; INTRAVENOUS at 19:53

## 2019-10-01 RX ADMIN — ROCURONIUM BROMIDE 50 MG: 10 INJECTION INTRAVENOUS at 14:35

## 2019-10-01 RX ADMIN — PROPOFOL 30 MG: 10 INJECTION, EMULSION INTRAVENOUS at 19:55

## 2019-10-01 RX ADMIN — FUROSEMIDE 20 MG: 10 INJECTION, SOLUTION INTRAVENOUS at 18:26

## 2019-10-01 RX ADMIN — ROCURONIUM BROMIDE 20 MG: 10 INJECTION INTRAVENOUS at 15:59

## 2019-10-01 RX ADMIN — NEOSTIGMINE METHYLSULFATE 3 MG: 1 INJECTION, SOLUTION INTRAVENOUS at 19:53

## 2019-10-01 RX ADMIN — SODIUM CHLORIDE: 900 INJECTION INTRAVENOUS at 16:48

## 2019-10-01 RX ADMIN — FUROSEMIDE 100 MG: 10 INJECTION, SOLUTION INTRAVENOUS at 17:48

## 2019-10-01 RX ADMIN — DEXTROSE MONOHYDRATE 1000 MG: 50 INJECTION, SOLUTION INTRAVENOUS at 15:04

## 2019-10-01 RX ADMIN — HYDROMORPHONE HYDROCHLORIDE 0.5 MG: 1 INJECTION, SOLUTION INTRAMUSCULAR; INTRAVENOUS; SUBCUTANEOUS at 19:35

## 2019-10-01 RX ADMIN — PROPOFOL 80 MG: 10 INJECTION, EMULSION INTRAVENOUS at 14:35

## 2019-10-01 RX ADMIN — FENTANYL CITRATE 25 MCG: 50 INJECTION INTRAMUSCULAR; INTRAVENOUS at 21:18

## 2019-10-01 RX ADMIN — ROCURONIUM BROMIDE 30 MG: 10 INJECTION INTRAVENOUS at 17:07

## 2019-10-01 RX ADMIN — ONDANSETRON 4 MG: 2 INJECTION INTRAMUSCULAR; INTRAVENOUS at 19:10

## 2019-10-01 RX ADMIN — NEOSTIGMINE METHYLSULFATE 2 MG: 1 INJECTION, SOLUTION INTRAVENOUS at 20:06

## 2019-10-01 RX ADMIN — PROPOFOL 20 MG: 10 INJECTION, EMULSION INTRAVENOUS at 19:49

## 2019-10-01 RX ADMIN — Medication 100 MCG: at 19:06

## 2019-10-01 RX ADMIN — SODIUM CHLORIDE: 900 INJECTION INTRAVENOUS at 13:43

## 2019-10-01 RX ADMIN — ROCURONIUM BROMIDE 10 MG: 10 INJECTION INTRAVENOUS at 16:59

## 2019-10-01 RX ADMIN — SODIUM CHLORIDE, POTASSIUM CHLORIDE, SODIUM LACTATE AND CALCIUM CHLORIDE: 600; 310; 30; 20 INJECTION, SOLUTION INTRAVENOUS at 21:08

## 2019-10-01 RX ADMIN — SODIUM CHLORIDE, POTASSIUM CHLORIDE, SODIUM LACTATE AND CALCIUM CHLORIDE 1000 ML: 600; 310; 30; 20 INJECTION, SOLUTION INTRAVENOUS at 22:47

## 2019-10-01 RX ADMIN — LIDOCAINE HYDROCHLORIDE 100 MG: 20 INJECTION, SOLUTION INFILTRATION; PERINEURAL at 14:35

## 2019-10-01 RX ADMIN — HYDROMORPHONE HYDROCHLORIDE 0.25 MG: 1 INJECTION, SOLUTION INTRAMUSCULAR; INTRAVENOUS; SUBCUTANEOUS at 19:42

## 2019-10-01 RX ADMIN — FENTANYL CITRATE 50 MCG: 50 INJECTION INTRAMUSCULAR; INTRAVENOUS at 20:56

## 2019-10-01 RX ADMIN — FENTANYL CITRATE 25 MCG: 50 INJECTION INTRAMUSCULAR; INTRAVENOUS at 21:30

## 2019-10-01 RX ADMIN — DEXTROSE 50 % IN WATER (D50W) INTRAVENOUS SYRINGE 50 ML: at 12:57

## 2019-10-01 RX ADMIN — PROPOFOL 50 MG: 10 INJECTION, EMULSION INTRAVENOUS at 20:02

## 2019-10-01 RX ADMIN — ROCURONIUM BROMIDE 10 MG: 10 INJECTION INTRAVENOUS at 18:14

## 2019-10-01 RX ADMIN — ANTI-THYMOCYTE GLOBULIN (RABBIT) 175 MG: 5 INJECTION, POWDER, LYOPHILIZED, FOR SOLUTION INTRAVENOUS at 15:14

## 2019-10-01 RX ADMIN — MIDAZOLAM 1 MG: 1 INJECTION INTRAMUSCULAR; INTRAVENOUS at 14:21

## 2019-10-01 RX ADMIN — FENTANYL CITRATE 50 MCG: 50 INJECTION, SOLUTION INTRAMUSCULAR; INTRAVENOUS at 19:58

## 2019-10-01 RX ADMIN — SODIUM CHLORIDE: 900 INJECTION INTRAVENOUS at 19:54

## 2019-10-01 RX ADMIN — MIDAZOLAM 1 MG: 1 INJECTION INTRAMUSCULAR; INTRAVENOUS at 14:26

## 2019-10-01 RX ADMIN — SODIUM CHLORIDE 1000 ML: 9 INJECTION, SOLUTION INTRAVENOUS at 21:08

## 2019-10-01 RX ADMIN — HUMAN INSULIN 1 UNITS/HR: 100 INJECTION, SOLUTION SUBCUTANEOUS at 16:34

## 2019-10-01 RX ADMIN — ROCURONIUM BROMIDE 20 MG: 10 INJECTION INTRAVENOUS at 15:34

## 2019-10-01 RX ADMIN — FUROSEMIDE 80 MG: 10 INJECTION, SOLUTION INTRAVENOUS at 18:24

## 2019-10-01 RX ADMIN — CEFUROXIME 1.5 G: 1.5 INJECTION, POWDER, FOR SOLUTION INTRAVENOUS at 17:50

## 2019-10-01 ASSESSMENT — ACTIVITIES OF DAILY LIVING (ADL)
RETIRED_COMMUNICATION: 0-->UNDERSTANDS/COMMUNICATES WITHOUT DIFFICULTY
BATHING: 0-->INDEPENDENT
DRESS: 0-->INDEPENDENT
COGNITION: 0 - NO COGNITION ISSUES REPORTED
RETIRED_EATING: 0-->INDEPENDENT
TOILETING: 0-->INDEPENDENT
FALL_HISTORY_WITHIN_LAST_SIX_MONTHS: NO
TRANSFERRING: 0-->INDEPENDENT
SWALLOWING: 0-->SWALLOWS FOODS/LIQUIDS WITHOUT DIFFICULTY
AMBULATION: 0-->INDEPENDENT

## 2019-10-01 ASSESSMENT — PAIN DESCRIPTION - DESCRIPTORS
DESCRIPTORS: ACHING;SHARP;SORE
DESCRIPTORS: ACHING;SORE
DESCRIPTORS: ACHING;SORE
DESCRIPTORS: SORE;ACHING
DESCRIPTORS: ACHING;TENDER

## 2019-10-01 ASSESSMENT — MIFFLIN-ST. JEOR
SCORE: 1809.75
SCORE: 1766.75

## 2019-10-01 NOTE — CONSULTS
Diabetes CNS notified by PAN of patient's admission this am for a non-directed living donor transplant.  Michael Amin is a 39 year old male with history of type 1 DM diagnosed at age 16, not optimally controlled with A1C of 8.9%  And Hgb of 10.5 on 9/26/19, HTN, dyslipidemia, neuropathy, depression,  and ESRD secondary to diabetic nephropathy on peritoneal dialysis.  He has been losing weight intentionally to improve health status and perhaps be eligible for a pancreas transplant.    Michael is a  and resides in [place].    Michael's diabetes is currently managed with an Ambulatory Insulin Infusion Pump.    From transferred records:    Insulin Pump/CGMS:  [employer]tronic, 670 G  Insulin:  Lispro (Humalog)    Basal Rate:  2.1 units/hour    Prandial Bolus/Insulin:CHO Ratio:  1 unit for every 10 grams eaten, meals and snacks    Correction Bolus/Insulin Sensitivity Factor (ISF):  1 unit to lower BG by 25 mg/dL    Target Blood Glucose:   mg/dL     Recommend:  Integrated Pump and CGM be removed for surgery and patient placed on Adult Continuous Intravenous Insulin Infusion Order set.  When alert and tolerating PO, likely 24 hours postoperatively, can then transition back to pump.  Please order an Adult Endocrine Consult for glycemic management and a Diabetes CNS consult for pump assessment post-op.    Thank you.    Cinda Muhammad, MSN, APRN, ACNS-BC, BC-ADM, CDE  Diabetes Clinical Nurse Specialist  [phone]

## 2019-10-01 NOTE — ANESTHESIA PROCEDURE NOTES
Central Line Procedure Note  Staff:     Anesthesiologist:  Geo Negron MD  Location: In OR after induction  Procedure Start/Stop Times:     patient identified, IV checked, site marked, risks and benefits discussed, informed consent, monitors and equipment checked, pre-op evaluation and at physician/surgeon's request      Correct Patient: Yes      Correct Position: Yes      Correct Site: Yes      Correct Procedure: Yes      Correct Laterality:  Yes    Site Marked:  Yes  Line Placement:     Procedure:  Central Line    Insertion laterality:  Right    Insertion site:  Internal Jugular    Position:  Trendelenburg      Maximal Sterile Barriers: All elements of maximal sterile barrier technique followed      (Maximal sterile barriers include:   Sterile gown, Sterile Gloves, Mask, Cap, Whole body draped, hand hygiene and acceptable skin prep).       Injection Technique:  Ultrasound guided    Sterile Ultrasound Technique:  Sterile probe cover and Sterile gel    Vein evaluated via U/S for patency/adequacy of catheter insertion and is adequate.  Using realtime U/S imaging the vein was punctured, and needle was observed entering vein on U/S      A permanent image is NOT entered into the patient's record.      Local skin infiltration:  None    Catheter size:  7 Fr, 3 lumen, 20 cm    Cath secured with: suture      Dressing:  Tegaderm and Biopatch    Complications:  None obvious    Blood aspirated all lumens: Yes      All Lumens Flushed: Yes

## 2019-10-01 NOTE — ANESTHESIA PREPROCEDURE EVALUATION
Anesthesia Pre-Procedure Evaluation    Patient: Michael Amin   MRN:     [id number] Gender:   male   Age:    39 year old :      1980        Preoperative Diagnosis: Type 1 diabetes mellitus without complication (H) [E10.9]   Procedure(s):  Living Non Directed Kidney Transplant Recipient     Past Medical History:   Diagnosis Date     Anemia in chronic kidney disease      Dyslipidemia      ESRD (end stage renal disease) on dialysis (H)      Hypertension      Secondary hyperparathyroidism (H)      Type 1 diabetes (H)       Past Surgical History:   Procedure Laterality Date     hair implant       INSERT CATHETER PERITONEAL DIALYSIS  2018               J[facility] AN PHYSICAL EXAM    LABS:  CBC:   Lab Results   Component Value Date    WBC 8.2 2019    WBC 8.6 2019    HGB 9.4 (L) 10/01/2019    HGB 10.5 (L) 2019    HCT 33.1 (L) 2019    HCT 39.8 (L) 2019     2019     2019     BMP:   Lab Results   Component Value Date     2019     2019    POTASSIUM 4.6 10/01/2019    POTASSIUM 4.8 2019    CHLORIDE 103 2019    CHLORIDE 104 2019    CO2 21 2019    CO2 21 2019    BUN 67 (H) 2019    BUN 59 (H) 2019    CR 9.43 (H) 10/01/2019    CR 9.18 (H) 2019     (H) 2019     (H) 2019     COAGS:   Lab Results   Component Value Date    PTT 30 2019    INR 1.00 2019     POC:   Lab Results   Component Value Date     (H) 10/01/2019     OTHER:   Lab Results   Component Value Date    A1C 8.9 (H) 2019    APRIL 8.6 2019    ALBUMIN 3.5 2019    PROTTOTAL 7.5 2019    ALT 42 2019    AST 16 2019    ALKPHOS 100 2019    BILITOTAL 0.2 2019        Preop Vitals    BP Readings from Last 3 Encounters:   10/01/19 (!) 144/88   19 129/76   19 129/76    Pulse Readings from Last 3 Encounters:   10/01/19 75   19 74   19 74     "  Resp Readings from Last 3 Encounters:   10/01/19 16    SpO2 Readings from Last 3 Encounters:   10/01/19 100%   09/26/19 100%   09/26/19 100%      Temp Readings from Last 1 Encounters:   10/01/19 36.6  C (97.9  F) (Oral)    Ht Readings from Last 1 Encounters:   10/01/19 1.676 m (5' 6\")      Wt Readings from Last 1 Encounters:   10/01/19 91.4 kg (201 lb 8 oz)    Estimated body mass index is 32.52 kg/m  as calculated from the following:    Height as of this encounter: 1.676 m (5' 6\").    Weight as of this encounter: 91.4 kg (201 lb 8 oz).     LDA:  Peripheral IV 10/01/19 Right Hand (Active)   Site Assessment WDL 10/1/2019  1:00 PM   Line Status Saline locked 10/1/2019  1:00 PM   Phlebitis Scale 0-->no symptoms 10/1/2019  1:00 PM   Infiltration Scale 0 10/1/2019  1:00 PM   Number of days: 0       ETT (adult) 8 (Active)   Number of days: 0       NG/OG Tube Orogastric 18 fr Center mouth (Active)   Number of days: 0        Assessment:   ASA SCORE: 3      Smoking Status:  Non-Smoker/Unknown   NPO Status: NPO Appropriate     Plan:   Anes. Type:  General   Pre-Medication: None   Induction:  IV (Standard)   Airway: ETT; Oral   Access/Monitoring: PIV; CVL   Maintenance: Balanced     Postop Plan:   Postop Pain: Opioids  Postop Sedation/Airway: Not planned  Disposition: Inpatient/Admit     PONV Management:   Adult Risk Factors:, Non-Smoker, Postop Opioids     CONSENT: Direct conversation                         Geo Negron MD  "

## 2019-10-01 NOTE — OR NURSING
Received report from Guillermina PLEITEZ RN on low glucose of 47 at 1230. Patient assessed and feeling a little bit shaky. IV started and one amp of dextrose given per order at 1300. Recheck glucose at 1330 was up to 149. [facility] Jamil notified about recheck. No further interventions at this time. Keep PTA insulin pump disconnected from patient.

## 2019-10-01 NOTE — OR NURSING
Pre op FSBS = 47.  Pt has disconnected his own Insulin pump. Dr. ANTHONY Alva notified and telephone order for one amp Dextrose 50% obtained. Pau Viveros RN is assigned to Pt and she is aware of order

## 2019-10-01 NOTE — LETTER
Transition Communication Hand-off for Care Transitions to Next Level of Care Provider    Name: Michael Amin  : 1980  MRN #: [id number]  Primary Care Provider: STEPHAN BEAULIEU     Primary Clinic: [address] [address]     Reason for Hospitalization:  Type 1 diabetes mellitus without complication (H) [E10.9]  Admit Date/Time: 10/1/2019  9:22 AM  Discharge Date: 10.  Payor Source: No coverage found.               Reason for Communication Hand-off Referral: Other LDKT    Discharge Plan:  [facility]       Concern for non-adherence with plan of care:   Y/N N  Discharge Needs Assessment:        Follow-up specialty is recommended: Yes    Follow-up plan:    Future Appointments   Date Time Provider Department Center   10/8/2019  7:00 AM UC SPEC INFUSION INPR [facility]   10/8/2019  8:00 AM Kash Hoffman MD [facility]   10/9/2019  7:00 AM UC SPEC INFUSION INPR [facility]   10/9/2019  8:00 AM Kash Hoffman MD [facility]   10/24/2019  9:30 AM Delroy Vela MD [facility]   2019 10:30 AM UC LAB UCLAB [facility]   2019 11:00 AM Estela Woodard NP [facility]   2019 11:30 AM [facility]   2019  9:30 AM UC LAB UCLAB [facility]   2019 10:30 AM Transplant, [facility] Early Post [facility]   2020  9:00 AM [facility]   2020 10:00 AM Transplant, Uc Early Post [facility]   2020 11:00 AM Abdullahi Javier, [facility]   4/3/2020  9:30 AM UC LAB UCLAB [facility]   4/3/2020 10:30 AM Transplant, Uc Early Post [facility]   4/3/2020 11:00 AM Melissa Rogers MSW [facility]       Any outstanding tests or procedures:        Referrals     Future Labs/Procedures    Home care nursing referral     Comments:    [facility] #[phone]  Fax #[phone]  Home Care visits to start following completion of daily ATC Clinic visits       [facility] Clinic # [phone]  Skilled  nursing visits to monitor cardiac and resp status  Monitor hydration, nutrition, urinary status and bowel status  Monitor healing of incision  Instruct in medications and eval effects  Lab draws per orders, report results to Post Kidney Transplant Coordinator: Audelia Bryson #[phone]  Fax # [phone]    Your provider has ordered home care nursing services. If you have not been contacted within 2 days of your discharge please call the inpatient department phone number at [phone] .            May Recommendations:      Jyoti Koch RN    AVS/Discharge Summary is the source of truth; this is a helpful guide for improved communication of patient story

## 2019-10-02 ENCOUNTER — DOCUMENTATION ONLY (OUTPATIENT)
Dept: TRANSPLANT | Facility: CLINIC | Age: 39
End: 2019-10-02

## 2019-10-02 LAB
ANION GAP SERPL CALCULATED.3IONS-SCNC: 10 MMOL/L (ref 3–14)
BASOPHILS # BLD AUTO: 0 10E9/L (ref 0–0.2)
BASOPHILS NFR BLD AUTO: 0 %
BUN SERPL-MCNC: 49 MG/DL (ref 7–30)
CALCIUM SERPL-MCNC: 7.5 MG/DL (ref 8.5–10.1)
CHLORIDE SERPL-SCNC: 107 MMOL/L (ref 94–109)
CO2 SERPL-SCNC: 20 MMOL/L (ref 20–32)
CREAT SERPL-MCNC: 5.3 MG/DL (ref 0.66–1.25)
DIFFERENTIAL METHOD BLD: ABNORMAL
DONOR IDENTIFICATION: NORMAL
DSA COMMENTS: NORMAL
DSA PRESENT: NO
DSA TEST METHOD: NORMAL
EOSINOPHIL # BLD AUTO: 0 10E9/L (ref 0–0.7)
EOSINOPHIL NFR BLD AUTO: 0 %
ERYTHROCYTE [DISTWIDTH] IN BLOOD BY AUTOMATED COUNT: 13.2 % (ref 10–15)
GFR SERPL CREATININE-BSD FRML MDRD: 12 ML/MIN/{1.73_M2}
GLUCOSE BLDC GLUCOMTR-MCNC: 137 MG/DL (ref 70–99)
GLUCOSE BLDC GLUCOMTR-MCNC: 137 MG/DL (ref 70–99)
GLUCOSE BLDC GLUCOMTR-MCNC: 157 MG/DL (ref 70–99)
GLUCOSE BLDC GLUCOMTR-MCNC: 157 MG/DL (ref 70–99)
GLUCOSE BLDC GLUCOMTR-MCNC: 159 MG/DL (ref 70–99)
GLUCOSE BLDC GLUCOMTR-MCNC: 163 MG/DL (ref 70–99)
GLUCOSE BLDC GLUCOMTR-MCNC: 167 MG/DL (ref 70–99)
GLUCOSE BLDC GLUCOMTR-MCNC: 170 MG/DL (ref 70–99)
GLUCOSE BLDC GLUCOMTR-MCNC: 173 MG/DL (ref 70–99)
GLUCOSE BLDC GLUCOMTR-MCNC: 180 MG/DL (ref 70–99)
GLUCOSE BLDC GLUCOMTR-MCNC: 187 MG/DL (ref 70–99)
GLUCOSE BLDC GLUCOMTR-MCNC: 191 MG/DL (ref 70–99)
GLUCOSE BLDC GLUCOMTR-MCNC: 193 MG/DL (ref 70–99)
GLUCOSE BLDC GLUCOMTR-MCNC: 194 MG/DL (ref 70–99)
GLUCOSE BLDC GLUCOMTR-MCNC: 196 MG/DL (ref 70–99)
GLUCOSE BLDC GLUCOMTR-MCNC: 197 MG/DL (ref 70–99)
GLUCOSE BLDC GLUCOMTR-MCNC: 198 MG/DL (ref 70–99)
GLUCOSE BLDC GLUCOMTR-MCNC: 199 MG/DL (ref 70–99)
GLUCOSE BLDC GLUCOMTR-MCNC: 206 MG/DL (ref 70–99)
GLUCOSE BLDC GLUCOMTR-MCNC: 219 MG/DL (ref 70–99)
GLUCOSE BLDC GLUCOMTR-MCNC: 221 MG/DL (ref 70–99)
GLUCOSE BLDC GLUCOMTR-MCNC: 237 MG/DL (ref 70–99)
GLUCOSE BLDC GLUCOMTR-MCNC: 242 MG/DL (ref 70–99)
GLUCOSE BLDC GLUCOMTR-MCNC: 278 MG/DL (ref 70–99)
GLUCOSE BLDC GLUCOMTR-MCNC: 50 MG/DL (ref 70–99)
GLUCOSE SERPL-MCNC: 134 MG/DL (ref 70–99)
HCT VFR BLD AUTO: 27 % (ref 40–53)
HGB BLD-MCNC: 7.7 G/DL (ref 13.3–17.7)
HGB BLD-MCNC: 8 G/DL (ref 13.3–17.7)
HGB BLD-MCNC: 8.1 G/DL (ref 13.3–17.7)
HGB BLD-MCNC: 8.1 G/DL (ref 13.3–17.7)
HGB BLD-MCNC: 8.4 G/DL (ref 13.3–17.7)
IMM GRANULOCYTES # BLD: 0.1 10E9/L (ref 0–0.4)
IMM GRANULOCYTES NFR BLD: 0.6 %
LACTATE BLD-SCNC: 1.5 MMOL/L (ref 0.7–2)
LYMPHOCYTES # BLD AUTO: 0.1 10E9/L (ref 0.8–5.3)
LYMPHOCYTES NFR BLD AUTO: 0.5 %
MAGNESIUM SERPL-MCNC: 1.7 MG/DL (ref 1.6–2.3)
MCH RBC QN AUTO: 29.1 PG (ref 26.5–33)
MCHC RBC AUTO-ENTMCNC: 31.1 G/DL (ref 31.5–36.5)
MCV RBC AUTO: 93 FL (ref 78–100)
MONOCYTES # BLD AUTO: 0.3 10E9/L (ref 0–1.3)
MONOCYTES NFR BLD AUTO: 2.7 %
NEUTROPHILS # BLD AUTO: 10.5 10E9/L (ref 1.6–8.3)
NEUTROPHILS NFR BLD AUTO: 96.2 %
NRBC # BLD AUTO: 0 10*3/UL
NRBC BLD AUTO-RTO: 0 /100
ORGAN: NORMAL
PHOSPHATE SERPL-MCNC: 3.4 MG/DL (ref 2.5–4.5)
PLATELET # BLD AUTO: 116 10E9/L (ref 150–450)
POTASSIUM SERPL-SCNC: 3.6 MMOL/L (ref 3.4–5.3)
POTASSIUM SERPL-SCNC: 3.7 MMOL/L (ref 3.4–5.3)
POTASSIUM SERPL-SCNC: 3.9 MMOL/L (ref 3.4–5.3)
POTASSIUM SERPL-SCNC: 4 MMOL/L (ref 3.4–5.3)
POTASSIUM SERPL-SCNC: 4 MMOL/L (ref 3.4–5.3)
RBC # BLD AUTO: 2.89 10E12/L (ref 4.4–5.9)
SODIUM SERPL-SCNC: 138 MMOL/L (ref 133–144)
WBC # BLD AUTO: 10.9 10E9/L (ref 4–11)

## 2019-10-02 PROCEDURE — 84100 ASSAY OF PHOSPHORUS: CPT | Performed by: SURGERY

## 2019-10-02 PROCEDURE — 25000132 ZZH RX MED GY IP 250 OP 250 PS 637: Performed by: PHYSICIAN ASSISTANT

## 2019-10-02 PROCEDURE — 80048 BASIC METABOLIC PNL TOTAL CA: CPT | Performed by: SURGERY

## 2019-10-02 PROCEDURE — 12000026 ZZH R&B TRANSPLANT

## 2019-10-02 PROCEDURE — 25800030 ZZH RX IP 258 OP 636: Performed by: PHYSICIAN ASSISTANT

## 2019-10-02 PROCEDURE — 25000131 ZZH RX MED GY IP 250 OP 636 PS 637: Mod: GY | Performed by: SURGERY

## 2019-10-02 PROCEDURE — 36592 COLLECT BLOOD FROM PICC: CPT | Performed by: SURGERY

## 2019-10-02 PROCEDURE — 00000146 ZZHCL STATISTIC GLUCOSE BY METER IP

## 2019-10-02 PROCEDURE — 25000131 ZZH RX MED GY IP 250 OP 636 PS 637: Performed by: STUDENT IN AN ORGANIZED HEALTH CARE EDUCATION/TRAINING PROGRAM

## 2019-10-02 PROCEDURE — 25000131 ZZH RX MED GY IP 250 OP 636 PS 637: Performed by: PHYSICIAN ASSISTANT

## 2019-10-02 PROCEDURE — 85018 HEMOGLOBIN: CPT | Performed by: SURGERY

## 2019-10-02 PROCEDURE — 83735 ASSAY OF MAGNESIUM: CPT | Performed by: SURGERY

## 2019-10-02 PROCEDURE — 25800030 ZZH RX IP 258 OP 636: Performed by: SURGERY

## 2019-10-02 PROCEDURE — 85025 COMPLETE CBC W/AUTO DIFF WBC: CPT | Performed by: SURGERY

## 2019-10-02 PROCEDURE — 25000128 H RX IP 250 OP 636: Performed by: PHYSICIAN ASSISTANT

## 2019-10-02 PROCEDURE — 84132 ASSAY OF SERUM POTASSIUM: CPT | Performed by: SURGERY

## 2019-10-02 PROCEDURE — 25000132 ZZH RX MED GY IP 250 OP 250 PS 637: Performed by: SURGERY

## 2019-10-02 PROCEDURE — 25800029 ZZH RX IP 258 OP 250: Performed by: SURGERY

## 2019-10-02 PROCEDURE — 25000125 ZZHC RX 250: Performed by: SURGERY

## 2019-10-02 PROCEDURE — 83605 ASSAY OF LACTIC ACID: CPT

## 2019-10-02 PROCEDURE — 36415 COLL VENOUS BLD VENIPUNCTURE: CPT | Performed by: SURGERY

## 2019-10-02 RX ORDER — ACETAMINOPHEN 325 MG/1
650 TABLET ORAL ONCE
Status: COMPLETED | OUTPATIENT
Start: 2019-10-02 | End: 2019-10-02

## 2019-10-02 RX ORDER — SULFAMETHOXAZOLE AND TRIMETHOPRIM 400; 80 MG/1; MG/1
1 TABLET ORAL DAILY
Status: DISCONTINUED | OUTPATIENT
Start: 2019-10-06 | End: 2019-10-06 | Stop reason: HOSPADM

## 2019-10-02 RX ORDER — MYCOPHENOLATE MOFETIL 250 MG/1
750 CAPSULE ORAL
Status: DISCONTINUED | OUTPATIENT
Start: 2019-10-02 | End: 2019-10-06 | Stop reason: HOSPADM

## 2019-10-02 RX ORDER — DIPHENHYDRAMINE HCL 12.5MG/5ML
25-50 LIQUID (ML) ORAL ONCE
Status: COMPLETED | OUTPATIENT
Start: 2019-10-02 | End: 2019-10-02

## 2019-10-02 RX ORDER — DIPHENHYDRAMINE HCL 25 MG
25-50 CAPSULE ORAL ONCE
Status: COMPLETED | OUTPATIENT
Start: 2019-10-02 | End: 2019-10-02

## 2019-10-02 RX ADMIN — ACETAMINOPHEN 650 MG: 325 TABLET, FILM COATED ORAL at 15:48

## 2019-10-02 RX ADMIN — TACROLIMUS 3 MG: 1 CAPSULE ORAL at 07:56

## 2019-10-02 RX ADMIN — HUMAN INSULIN 10 UNITS/HR: 100 INJECTION, SOLUTION SUBCUTANEOUS at 19:04

## 2019-10-02 RX ADMIN — SULFAMETHOXAZOLE AND TRIMETHOPRIM 1 TABLET: 400; 80 TABLET ORAL at 07:56

## 2019-10-02 RX ADMIN — ANTI-THYMOCYTE GLOBULIN (RABBIT) 200 MG: 5 INJECTION, POWDER, LYOPHILIZED, FOR SOLUTION INTRAVENOUS at 12:38

## 2019-10-02 RX ADMIN — SODIUM CHLORIDE 1000 ML: 4.5 INJECTION, SOLUTION INTRAVENOUS at 07:05

## 2019-10-02 RX ADMIN — OXYCODONE HYDROCHLORIDE 5 MG: 5 TABLET ORAL at 09:25

## 2019-10-02 RX ADMIN — MYCOPHENOLATE MOFETIL 750 MG: 250 CAPSULE ORAL at 10:59

## 2019-10-02 RX ADMIN — ACETAMINOPHEN 650 MG: 325 TABLET, FILM COATED ORAL at 03:12

## 2019-10-02 RX ADMIN — SODIUM CHLORIDE 1000 ML: 4.5 INJECTION, SOLUTION INTRAVENOUS at 00:00

## 2019-10-02 RX ADMIN — SODIUM CHLORIDE, POTASSIUM CHLORIDE, SODIUM LACTATE AND CALCIUM CHLORIDE: 600; 310; 30; 20 INJECTION, SOLUTION INTRAVENOUS at 06:13

## 2019-10-02 RX ADMIN — METHYLPREDNISOLONE SODIUM SUCCINATE 250 MG: 125 INJECTION, POWDER, LYOPHILIZED, FOR SOLUTION INTRAMUSCULAR; INTRAVENOUS at 11:50

## 2019-10-02 RX ADMIN — ATORVASTATIN CALCIUM 10 MG: 10 TABLET, FILM COATED ORAL at 07:56

## 2019-10-02 RX ADMIN — ACETAMINOPHEN 650 MG: 325 TABLET, FILM COATED ORAL at 11:50

## 2019-10-02 RX ADMIN — MYCOPHENOLATE MOFETIL 750 MG: 250 CAPSULE ORAL at 17:53

## 2019-10-02 RX ADMIN — SODIUM CHLORIDE 1000 ML: 9 INJECTION, SOLUTION INTRAVENOUS at 02:59

## 2019-10-02 RX ADMIN — HUMAN INSULIN 6 UNITS/HR: 100 INJECTION, SOLUTION SUBCUTANEOUS at 04:04

## 2019-10-02 RX ADMIN — DIPHENHYDRAMINE HYDROCHLORIDE 50 MG: 25 CAPSULE ORAL at 11:50

## 2019-10-02 RX ADMIN — INSULIN ASPART 3 UNITS: 100 INJECTION, SOLUTION INTRAVENOUS; SUBCUTANEOUS at 13:39

## 2019-10-02 RX ADMIN — INSULIN ASPART 8 UNITS: 100 INJECTION, SOLUTION INTRAVENOUS; SUBCUTANEOUS at 17:43

## 2019-10-02 RX ADMIN — HUMAN INSULIN 8 UNITS/HR: 100 INJECTION, SOLUTION SUBCUTANEOUS at 15:20

## 2019-10-02 RX ADMIN — ACETAMINOPHEN 650 MG: 325 TABLET, FILM COATED ORAL at 07:56

## 2019-10-02 RX ADMIN — OXYCODONE HYDROCHLORIDE 5 MG: 5 TABLET ORAL at 05:19

## 2019-10-02 RX ADMIN — SODIUM CHLORIDE, POTASSIUM CHLORIDE, SODIUM LACTATE AND CALCIUM CHLORIDE: 600; 310; 30; 20 INJECTION, SOLUTION INTRAVENOUS at 15:52

## 2019-10-02 RX ADMIN — HUMAN INSULIN 6 UNITS/HR: 100 INJECTION, SOLUTION SUBCUTANEOUS at 23:37

## 2019-10-02 RX ADMIN — HUMAN INSULIN 7 UNITS/HR: 100 INJECTION, SOLUTION SUBCUTANEOUS at 10:00

## 2019-10-02 RX ADMIN — SODIUM CHLORIDE 1000 ML: 9 INJECTION, SOLUTION INTRAVENOUS at 07:10

## 2019-10-02 RX ADMIN — TACROLIMUS 3 MG: 1 CAPSULE ORAL at 17:53

## 2019-10-02 RX ADMIN — INSULIN ASPART 5 UNITS: 100 INJECTION, SOLUTION INTRAVENOUS; SUBCUTANEOUS at 09:25

## 2019-10-02 RX ADMIN — MELATONIN 50 MCG: at 15:48

## 2019-10-02 RX ADMIN — HUMAN INSULIN 12 UNITS/HR: 100 INJECTION, SOLUTION SUBCUTANEOUS at 01:05

## 2019-10-02 RX ADMIN — ACETAMINOPHEN 650 MG: 325 TABLET, FILM COATED ORAL at 20:01

## 2019-10-02 ASSESSMENT — PAIN DESCRIPTION - DESCRIPTORS
DESCRIPTORS: SORE
DESCRIPTORS: TENDER;SORE
DESCRIPTORS: SORE
DESCRIPTORS: ACHING
DESCRIPTORS: TENDER;SORE

## 2019-10-02 ASSESSMENT — ACTIVITIES OF DAILY LIVING (ADL)
ADLS_ACUITY_SCORE: 12
ADLS_ACUITY_SCORE: 12
ADLS_ACUITY_SCORE: 13
ADLS_ACUITY_SCORE: 12

## 2019-10-02 ASSESSMENT — MIFFLIN-ST. JEOR: SCORE: 1796.75

## 2019-10-02 NOTE — OP NOTE
Transplant Surgery  Operative Note     Procedure date:  10/01/19    Preoperative diagnosis:  End Stage renal failure due to diabetes mellitus type 1    Postoperative diagnosis:  Same    Procedure:  1. Left kidney transplant,  Living, Left iliac fossa, without vascular reconstruction. A J-J ureteral stent was placed.  2. Kidney allograft preparation on Back Table  3. Peritoneal dialysis catheter removal    Surgeon:  CRISTHIAN VENTURA    Fellow/Assistant:  Ancelmo Lynch MD Fellow   Prince Mcdaniels MD Fellow   Bruna Ward DO PGY-I      Anesthesia:  General    Specimen:  None    Drains:  none    Urine output:  900 mls    Estimated blood loss:  100 ml      Indication: The patient has End Stage renal failure due to diabetes mellitus type 1 and received an organ offer for a Living kidney allograft. After discussing the risks and benefits of proceeding, the patient agreed to proceed with surgery and provided informed consent.  Findings: Integrity of recipient artery: Normal, single artery, single vein, single ureter  Intraoperative Events: None    Final ABO/Crossmatch verification: After the donor organ arrived to the operating room and prior to anastomosis, I participated in the transplant pre-verification upon organ receipt timeout by visually verifying the donor ID, organ and laterality, donor blood type, recipient unique identifier, recipient blood type, and that the donor and recipient are blood type compatible. The crossmatch was done prospectively; the T cell flow crossmatch result was negative and B cell flow crossmatch result was negative prior to anastomosis.  The patient received Thymoglobulin on induction.    Donor Organ Information:   Donor UNOS ID:  [id number]    Donor arterial clamp on:  10/1/2019  9:15 AM    Total ischemic time:  476 min    Cold ischemic time:  476 min    Warm ischemic time:  n/a    Preservation fluid:  UW      Back Table Details:   Procedure:  Bench preparation of the kidney  allograft for transplantation without vascular reconstruction    Surgeon:  CRISTHIAN VENTURA    Faculty Co-Surgeon:      Fellow/Assistant:  Ancelmo Lynch MD Fellow   Prince Mcdaniels MD Fellow   Bruna Ward DO PGY-I      Donor arrival to recipient room:  10/1/2019  3:38 PM    Graft injury:  No    Graft biopsy:  no    Organ received on:  Ice    Pump resistance:  n/a    Pump flow:  n/a    Arterial anatomy:  Single    Donor arterial quality:  Normal    Venous anatomy:  Single    Ureteral anatomy:  Single    Any reconstruction:  No    Artery:  n/a    Vein:  n/a     Complications: None.    Findings: Normal    Back Table Preparation:  The donor kidney was received and inspected. It had been flushed with UW. The graft was prepared on the back table by removing perinephric fat and ligating venous tributaries and lymphatics. The ureter was also cleaned of excess tissue. If required, reconstruction was performed as detailed above. The kidney was stored in iced cold preservation solution until ready for transplantation. Faculty was present for the critical portions of the procedure.    Operative Procedure:   Arterial anastomosis start:  10/1/2019  5:11 PM    Arterial unclamp:   10/1/2019  6:01 PM    Extra vessels used:  no      The patient was brought to the operating room, placed in a supine position, and a time out was performed. Sequential compression devices were placed on both lower extremities and general endotracheal anesthesia was induced.  The patient was given IV antibiotics and a Méndez catheter. A central line was placed by Anesthesia service. The abdomen was then shaved, prepped, and draped in the usual sterile fashion.  An incision was made in the left lower quadrant and carried down through the subcutaneous tissue and the abdominal wall fascia. The left iliac artery and vein were exposed. The retractor system was placed and the lymphatics overlying the vessels were serially ligated and divided.      The patient was heparinized. We applied atraumatic vascular clamps and the donor kidney was brought to the operative field. We made a venotomy and the renal vein was anastomosed to the recipient left External Iliac in an end-to-side fashion. An arteriotomy was made and the donor renal artery was anastomosed to the recipient left external iliac artery  in an end to side fashion. The patient was simultaneously loaded with IV mannitol, Lasix and volume. The renal artery was protected and the clamps were removed. After several cardiac cycles, we opened the renal artery and the kidney had good reperfusion and was soft.    The transplant ureter was managed by creating a Liche (anterior multistitch) anastomosis with absorbable suture. A stent was placed across the anastomosis. The kidney made Yes urine prior to implantation.    Hemostasis was obtained, the anastomoses inspected, and the kidney placed in the iliac fossa. After placement, the vessel lay was inspected and found to be acceptable. The kidney position was Retroperitoneal. The field was irrigated with antibiotic solution. No drain was placed. The retractor was removed and the abdominal wall fascia reapproximated. At that time we turned our attention to the Peritoneal Dialysis catheter. We used electrocautery and blunt dissection to find where the deep cuff entered the fascia. We then released the subcutaneous tissues from the cuffs.  The catheter was then removed.  The catheter/cuffs were inspected and appeared intact. The fascia over the deep cuff exit site was reapproximated with 0 vicryl.  Hemostasis was obtained. The superficial cuff was circumferentially dissected and delivered via the entry point on the skin. Subcutaneous tissues were irrigated and hemostasis obtained.  The skin was reapproximated with staples and a dry dressing was applied. The Peritoneal Dialysis exit site was packed with 4x4 gauze and medipore tape.  All needle, sponge and instrument  counts were correct x 2. The patient was awakened, extubated, and transferred to PACU for post-op monitoring. Faculty was present for key portions of the procedure.    Physician Attestation   I was present for the key portions of the procedure and I was immediately available for the entire procedure between opening and closing.    Rocio Rutherford MD  Date of Service (when I saw the patient): 10/1/2019

## 2019-10-02 NOTE — PROGRESS NOTES
Care Coordinator Progress Note    Admission Date/Time:  10/1/2019  Attending MD:  Delroy Vela MD    Data  Chart reviewed, discussed with interdisciplinary team.   Patient was admitted for:    Type 1 diabetes mellitus without complication (H)  ESRD (end stage renal disease) on dialysis (H)  Kidney transplanted.    Concerns with insurance coverage for discharge needs:  None identified  Current Living Situation: Patient lives alone  Support System: Family involved and supportive  Services Involved: Peritoneal Dialysis PTA  Transportation at Discharge: Family  Transportation to Medical Appointments: Family/Self  Barriers to Discharge: Medical clearance    Coordination of Care and Referrals: Provided patient/family with options for Home Care     Assessment  Pt s/p Living donor Kidney Transplant 10/1/19. I have met with Pt to assist with discharge planning. Prior to admission Pt was independent in [facility]. I have informed Pt of daily ATC Clinic visits starting the morning after discharge.  Home Care follow up discussed which Pt is agreeable to.  Choice of Home Care agencies offered, [facility] is preferred. I have made a referral to [facility] #[phone]  Fax #[phone].  Pt and Family plan to stay at [facility] post discharge until daily ATC Clinic visits are completed.       Plan  Anticipated Discharge Date:  2-3 days  Anticipated Discharge Plan:  Pt will stay locally until completion of daily ATC Clinic visits.  [facility] will provide intermittent skilled Nsg visits when Pt returns home.    Alix Ibrahim RN   6B care coordinator #[phone]

## 2019-10-02 NOTE — PHARMACY-TRANSPLANT NOTE
Adult Kidney Transplant Post Operative Note    39 year old male s/p living donor kidney transplant on 10/1/19 for ESRD due to type 1 diabetes mellitus.    Planned immunosuppression regimen per kidney transplant protocol:  INDUCTION: thymoglobulin to total ~ 6mg/kg and methylprednisolone IV daily x 3 doses used as pre-med for thymoglobulin.  MAINTENANCE:  mycophenolate and tacrolimus with goal trough levels of 8-10 mcg/L for first 6 months post-transplant.    Opportunistic pathogen prophylaxis includes: trimethoprim/sulfamethoxazole and valganciclovir.    Patient is enrolled in medication study.    Pharmacy will monitor for medication interactions and immunosuppression levels in conjunction with the team. Medication therapy needs for discharge planning will continue to be addressed throughout the current admission via multidisciplinary rounds and order review.  Pharmacy will make recommendations as appropriate.    Chasity Henson PharmD

## 2019-10-02 NOTE — PROGRESS NOTES
SPIRITUAL HEALTH SERVICES  SPIRITUAL ASSESSMENT Progress Note  [facility] 6B     REFERRAL SOURCE: Hospital  Request    I met with Michael and explored with him his spirituality around his illness. Michael shared he attends a [employer] in [place]. He shared he feels really supported by many people in his life and feels thankful for that. He shared prayer would be meaningful and I shared a prayer with him.     PLAN: Michael is oriented to requesting  visits again in the future if interested.     Carolina Victor  Chaplain Resident  Pager [phone]

## 2019-10-02 NOTE — PLAN OF CARE
Transfer  Transferred to: 7A  Via: wheelchair  Reason for transfer:Pt no longer appropriate for 6B- improved/worsened patient condition  Family: Aware of transfer, dad with pt  Belongings: Packed and sent with pt  Chart: Delivered with pt to next unit  Medications: Meds sent to new unit with pt  Report given to: 7A RN  Pt status: pt VSS, on RA.  Pain well controlled, PRN Oxycodone given x 1, scheduled Tylenol.  Incision dressing CDI, R PD cath dressing with dried drainage.  Méndez in place with good UOP, Pink/yellow.  LR@ 100cc/hr, Insulin gtt on Alg #4, carb coverage given with meals.  Thymo infusing, pt tolerating well.  Ambulating in halls with SBA, several laps around unit.  +flatus, tolerating regular diet.  Continue with plan of care.

## 2019-10-02 NOTE — PROGRESS NOTES
Transplant Surgery  Inpatient Daily Progress Note  10/02/2019    Assessment & Plan: Mr. Amin is a 40 yo male with PMH of DM1, HTN, dyslipidemia, secondary hyperparathyroidism, and anemia in chronic kidney disease, with ESRD from diabetic nephropathy who underwent living donor kidney transplant with ureteral stent placement on 10/1/19. Total ischemic time 476 minutes.  Patient previously was on peritoneal dialysis. Catheter was removed intra-operatively.     Graft function:good, improving. Cr 5.30 today from 7.97. Good urinary output.   Immunosuppression management: Induction per protocol  - Thymo: 175/200/-  - Solumedrol: 500/250/-  -Mycophenolate: 750 BID  -Tacrolimus: 3 mg BID  Complexity of management:High. Contributing factors: induction of immunosuppression  Hematology: Hgb drop to 8.4 from 9.4 pre-operatively.   Cardiorespiratory: Stable   -HR from   -BP: 110s-130s.   GI/Nutrition: Diabetic diet. Tolerating well.   Endocrine: DM1. Insulin gtt. Endocrinology consultation to assist with transition back to home insulin pump.   Fluid/Electrolytes: MIVF: LR @ 100 /hr. Electrolytes wnl.  : Méndez to remain due to new surgical anastomosis, likely until POD#3  Infectious disease: no issues  Prophylaxis: GI, DVT, fall  Disposition: Transfer to      Medical Decision Making: High  Admit 48659 (high level decision making)    ROX: Aissatou Jarquin PA-C  Resident: Bruna Ward DO  Fellow: Travis Lynch MD  Faculty: Delroy Vela M.D.  _________________________________________________________________  Transplant History: Admitted 10/1/2019 for Living donor kidney transplant with stent placement and peritoneal dialysis catheter removal .  10/1/2019 (Kidney), Postoperative day: 1     Interval History:   No acute events overnight. Pain is well controlled with oral oxycodone. No nausea or vomiting. Tolerating a regular diet. Had one episode of hypoglycemia and had insulin drip held and was given apple juice  with resolution.     ROS:   A 10-point review of systems was negative except as noted above.    Meds:    acetaminophen  650 mg Oral Once     acetaminophen  650 mg Oral Q4H     anti-thymocyte globulin  2 mg/kg Intravenous Central line Once     atorvastatin  10 mg Oral Daily     diphenhydrAMINE  25-50 mg Oral Once    Or     diphenhydrAMINE  25-50 mg Per NG tube Once     insulin aspart   Subcutaneous TID w/meals     [START ON 10/3/2019] magnesium oxide  400 mg Oral Daily with lunch     methylPREDNISolone  250 mg Intravenous Once     mycophenolate  750 mg Oral BID IS     sodium chloride (PF)  10 mL Intracatheter Q8H     [START ON 10/6/2019] sulfamethoxazole-trimethoprim  1 tablet Oral Daily     tacrolimus  3 mg Oral BID IS     [START ON 10/3/2019] valGANciclovir  450 mg Oral Once per day on Mon Thu       Physical Exam:     Admit Weight: 91.4 kg (201 lb 8 oz)  Today's weight: 208 lbs 1.83 oz    Vital sign ranges:    Temp:  [97.9  F (36.6  C)-100.4  F (38  C)] 98.5  F (36.9  C)  Pulse:  [] 108  Heart Rate:  [] 112  Resp:  [12-26] 26  BP: ()/(42-88) 127/66  SpO2:  [80 %-100 %] 93 %    GENERAL: Alert and oriented times three. In no apparent distress.  SKIN: No jaundice. No rashes or lesions.   HEENT: Eyes symmetrical and free of discharge bilaterally.  CV: Regular rate and rhythm. WWP   RESPIRATORY: Respirations regular, even, and unlabored.   GI: Soft and non distended. Appropriately tender surrounding incision. Incision with dressing in place with minimal shadowing. Former PD catheter site with dressing intact with shadowing.   : Méndez in place with clear urine.  EXTREMITIES: No peripheral edema.  NEUROLOGIC: Alert and orientated x 3. No focal deficits.   MUSCULOSKELETAL: No joint swelling or tenderness.     Data:   CMP  Recent Labs   Lab 10/02/19  0502 10/01/19  2357 10/01/19  2052  09/26/19  0945     --  137  --  134   POTASSIUM 3.6 4.0 4.2   < > 4.8   CHLORIDE 107  --  107  --  103   CO2 20   --  18*  --  21   *  --  197*  --  119*   BUN 49*  --  62*  --  67*   CR 5.30*  --  7.97*   < > 9.18*   GFRESTIMATED 12*  --  8*   < > 6*   GFRESTBLACK 14*  --  9*   < > 7*   APRIL 7.5*  --  7.2*  --  8.6   MAG 1.7  --  1.7  --   --    PHOS 3.4  --  3.1  --   --    ALBUMIN  --   --   --   --  3.5   BILITOTAL  --   --   --   --  0.2   ALKPHOS  --   --   --   --  100   AST  --   --   --   --  16   ALT  --   --   --   --  42    < > = values in this interval not displayed.     CBC  Recent Labs   Lab 10/02/19  0502 10/01/19  2357 10/01/19  2052  09/26/19  0945   HGB 8.4* 7.7* 8.3*   < > 10.5*   WBC 10.9  --  5.7  --  8.2   *  --  86*  --  156   A1C  --   --   --   --  8.9*    < > = values in this interval not displayed.     COAGS  Recent Labs   Lab 09/26/19  0945   INR 1.00      Urinalysis  Recent Labs   Lab Test 09/26/19  0950 01/07/19  1330   COLOR Straw Yellow   APPEARANCE Clear Clear   URINEGLC 150* 50*   URINEBILI Negative Negative   URINEKETONE Negative Negative   SG 1.009 1.012   UBLD Negative Negative   URINEPH 6.0 5.0   PROTEIN Negative 100*   NITRITE Negative Negative   LEUKEST Negative Negative   RBCU 0 <1   WBCU 1 2     Virology:  Hepatitis C Antibody   Date Value Ref Range Status   09/26/2019 Nonreactive NR^Nonreactive Final     Comment:     Assay performance characteristics have not been established for newborns,   infants, and children       Attestation:    The patient has been seen and evaluated by me.   Vital signs, labs, medications and orders were reviewed.   When obtained, diagnostic images were reviewed by me and interpreted as above.    The care plan was discussed with the multidisciplinary team and I agree with the findings and plan in this note, with any differences recorded in blue.    Immunosuppressive medication management was reviewed and adjusted as reflected in the note and orders.     .

## 2019-10-02 NOTE — CONSULTS
[facility], [place]  Transplant Nephrology Consult  Date of Admission:  10/1/2019  Today's Date: 10/02/2019  Requesting physician: Delroy Vela MD    Assessment & Plan   # LDKT: Decreased with creatinine down to ~ 5.3.  Very good urine output.  No acute indications for dialysis.   - Baseline Cr ~ TBD;    - Proteinuria: Not checked post transplant   - Date DSA Last Checked: Sep/2019  Latest DSA: No DSA at time of transplant   - BK Viremia: Not checked post transplant   - Kidney Tx Biopsy: No    # Immunosuppression: Tacrolimus immediate release (goal 8-10) and Mycophenolate mofetil (goal 1-3.5)   - Changes: No    # Prophylaxis:   - PJP: Sulfa/TMP (Bactrim)   - CMV: Valcyte   - Thrush: None    # Blood Pressure:  Controlled; Goal BP: < 150/90   - Volume status: Mildly hypervolemic  EDW ~ 89.5 kg, now at ~ 94.4 kg   - Changes: No    # Diabetes: Borderline control (HbA1c 7-9%) On insulin drip   - Management as per primary team.    # Anemia in Chronic Renal Disease: Hgb: Stable after the initial decrease with surgery.     JOSE: No   - Iron studies: Replete    # Mineral Bone Disorder:   - Secondary renal hyperparathyroidism; PTH level: Moderately elevated (301-600 pg/ml)  - Vitamin D; level: Low and recommend starting cholecalciferol 2000 units daily.  - Calcium; level: Low and will start cholecalciferol.  - Phosphorus; level: Normal    # Electrolytes:   - Potassium; level: Normal  - Magnesium; level: Normal  - Bicarbonate; level: Low, but should increase with improving kidney function.    # Overweight: Once patient has recovered from kidney transplant surgery, he plans to lose more weight in order to get a pancreas transplant.    # Transplant History:  Etiology of kidney failure: diabetes mellitus type 1  Tx: LDKT  Transplant: 10/1/2019 (Kidney)  Donor Type: Living Donor Class:   Crossmatch at time of Tx: negative  DSA at time of Tx: No  Significant changes in immunosuppression:  None  Significant transplant-related complications: None    Recommendations were communicated to the primary team via this note.    Jabier Hernández MD  Pager: [phone]    REASON FOR CONSULT   LDKT    History of Present Illness   Michael Amin is a 39 year old male with ESKD secondary to DM type 1, previously on PD, now s/p LDKT 10/1/19.  Negative crossmatch and no DSA.  Other medical issues include hypertension and obesity.  He was feeling well going into the transplant.    Patient's kidney function is improving with creatinine ~ 5.3 today.  Very good urine output.  He reports feeling pretty good and is ambulating on the floor.  Denies any chest pain or shortness of breath.  No nausea or vomiting.  He did pass a little gas this morning.  No fever, sweats or chills.  Some puffiness, mostly of his left hand.  Also slight tingling in both hands.    Review of Systems    The 10 point Review of Systems is negative other than noted in the HPI or here.    Past Medical History    I have reviewed this patient's medical history and updated it with pertinent information if needed.   Past Medical History:   Diagnosis Date     Anemia in chronic kidney disease      Dyslipidemia      ESRD (end stage renal disease) on dialysis (H)      Hypertension      Secondary hyperparathyroidism (H)      Type 1 diabetes (H)        Past Surgical History   I have reviewed this patient's surgical history and updated it with pertinent information if needed.  Past Surgical History:   Procedure Laterality Date     hair implant  2007     INSERT CATHETER PERITONEAL DIALYSIS  08/2018       Social History   I have reviewed this patient's social history and updated it with pertinent information if needed. Michael Amin  reports that he has never smoked. He has never used smokeless tobacco. He reports previous alcohol use. He reports that he does not use drugs.    Family History   I have reviewed this patient's family history and updated it with  "pertinent information if needed.   Family History   Problem Relation Age of Onset     Diabetes Father      Coronary Artery Disease Father        Prior to Admission Medications   Prior to Admission Medications   Prescriptions Last Dose Informant Patient Reported? Taking?   B Complex-C-Folic Acid (VIRT-CAPS) 1 MG CAPS 2019 at 2000  Yes Yes   Sig: TK ONE C PO  D   HUMALOG 100 UNIT/ML injection   Yes No   Sig: INJ UP  UNI SC D PER PUMP   UNABLE TO FIND   Yes No   Sig: insulin lispro 100 UNIT/ML patient supplied PUMP   aspirin (ASA) 81 MG EC tablet Past Week at Unknown time  Yes Yes   Sig: Take 81 mg by mouth   atorvastatin (LIPITOR) 20 MG tablet Past Week at Unknown time  Yes Yes   Sig: Take 20 mg by mouth   blood glucose monitoring (ACCU-CHEK FASTCLIX) lancets   Yes No   Sig: U QID OR MORE OFTEN PRN   calcitRIOL (ROCALTROL) 0.25 MCG capsule 10/1/2019 at 0800  Yes Yes   Sig: TK ONE C PO D   carvedilol (COREG) 12.5 MG tablet 10/1/2019 at 0800  Yes Yes   Sig: Take 12.5 mg by mouth 2 times daily   furosemide (LASIX) 80 MG tablet 10/1/2019 at 0800  Yes Yes   Sig: Take 80 mg by mouth 2 times daily   gentamicin (GARAMYCIN) 0.1 % external cream   Yes No   Sig: ROX TO EXIT SIGHT ONCE D   lisinopril (PRINIVIL/ZESTRIL) 20 MG tablet Past Week at Unknown time  Yes Yes   Sig: TK 1 T PO QD   metoprolol tartrate (LOPRESSOR) 50 MG tablet Unknown at Unknown time  Yes No   vitamin D2 (ERGOCALCIFEROL) 91367 units (1250 mcg) capsule 2019 at 2000  Yes Yes   Sig: TK 1 C PO WEEKLY      Facility-Administered Medications: None     Allergies   No Known Allergies    Physical Exam   Temp  Av.1  F (37.3  C)  Min: 97.9  F (36.6  C)  Max: 100.4  F (38  C)      Pulse  Av  Min: 74  Max: 108 Resp  Av.1  Min: 12  Max: 26  SpO2  Av.5 %  Min: 80 %  Max: 100 %    CVP (mmHg): 9 mmHgBP 121/63   Pulse 101   Temp 98.5  F (36.9  C) (Oral)   Resp 26   Ht 1.676 m (5' 6\")   Wt 94.4 kg (208 lb 1.8 oz)   SpO2 93%   BMI 33.59 " kg/m     Date 10/02/19 0700 - 10/03/19 0659   Shift 5484-7939 2021-4108 3706-2711 24 Hour Total   INTAKE   P.O. 240   240   I.V. 1627.83   1627.83   Shift Total(mL/kg) 1867.83(19.79)   1867.83(19.79)   OUTPUT   Urine 875   875   Shift Total(mL/kg) 875(9.27)   875(9.27)   Weight (kg) 94.4 94.4 94.4 94.4      Admit Weight: 91.4 kg (201 lb 8 oz)     GENERAL APPEARANCE: alert and no distress  HENT: mouth without ulcers or lesions  LYMPHATICS: no cervical or supraclavicular nodes  RESP: lungs clear to auscultation - no rales, rhonchi or wheezes  CV: regular rhythm, normal rate, no rub, no murmur  EDEMA: trace LE edema bilaterally  ABDOMEN: soft, nondistended, nontender, bowel sounds normal, overweight  MS: extremities normal - no gross deformities noted, no evidence of inflammation in joints, no muscle tenderness  SKIN: no rash  TX KIDNEY: mild TTP  DIALYSIS ACCESS:  None    Data   CMP  Recent Labs   Lab 10/02/19  0926 10/02/19  0502 10/01/19  2357 10/01/19  2052 10/01/19  1309 09/26/19  0945   NA  --  138  --  137  --  134   POTASSIUM 3.7 3.6 4.0 4.2 4.6 4.8   CHLORIDE  --  107  --  107  --  103   CO2  --  20  --  18*  --  21   ANIONGAP  --  10  --  11  --  10   GLC  --  134*  --  197*  --  119*   BUN  --  49*  --  62*  --  67*   CR  --  5.30*  --  7.97* 9.43* 9.18*   GFRESTIMATED  --  12*  --  8* 6* 6*   GFRESTBLACK  --  14*  --  9* 7* 7*   APRIL  --  7.5*  --  7.2*  --  8.6   MAG  --  1.7  --  1.7  --   --    PHOS  --  3.4  --  3.1  --   --    PROTTOTAL  --   --   --   --   --  7.5   ALBUMIN  --   --   --   --   --  3.5   BILITOTAL  --   --   --   --   --  0.2   ALKPHOS  --   --   --   --   --  100   AST  --   --   --   --   --  16   ALT  --   --   --   --   --  42     CBC  Recent Labs   Lab 10/02/19  0926 10/02/19  0502 10/01/19  2357 10/01/19  2052  09/26/19  0945   HGB 8.1* 8.4* 7.7* 8.3*   < > 10.5*   WBC  --  10.9  --  5.7  --  8.2   RBC  --  2.89*  --  2.79*  --  3.50*   HCT  --  27.0*  --  26.1*  --  33.1*   MCV   --  93  --  94  --  95   MCH  --  29.1  --  29.7  --  30.0   MCHC  --  31.1*  --  31.8  --  31.7   RDW  --  13.2  --  13.3  --  13.3   PLT  --  116*  --  86*  --  156    < > = values in this interval not displayed.     INR  Recent Labs   Lab 09/26/19  0945   INR 1.00     ABGNo lab results found in last 7 days.   Urine Studies  Recent Labs   Lab Test 09/26/19  0950 01/07/19  1330   COLOR Straw Yellow   APPEARANCE Clear Clear   URINEGLC 150* 50*   URINEBILI Negative Negative   URINEKETONE Negative Negative   SG 1.009 1.012   UBLD Negative Negative   URINEPH 6.0 5.0   PROTEIN Negative 100*   NITRITE Negative Negative   LEUKEST Negative Negative   RBCU 0 <1   WBCU 1 2     No lab results found.  PTH  Recent Labs   Lab Test 09/26/19  0945   PTHI 356*     Iron Studies  Recent Labs   Lab Test 09/26/19  0945   IRON 70      IRONSAT 28   EDOUARD 753*       IMAGING:  All imaging studies reviewed by me.

## 2019-10-02 NOTE — PROGRESS NOTES
Patient removed from [facility] waitlist after living donor kidney transplant. [facility] ID [id number]. Patient relisted for pancreas alone as temporarily inactive.   Donor PHS increased risk: No.   If Yes, MD documentation N/A.

## 2019-10-02 NOTE — ANESTHESIA POSTPROCEDURE EVALUATION
Anesthesia POST Procedure Evaluation    Patient: Michael Amin   MRN:     [id number] Gender:   male   Age:    39 year old :      1980        Preoperative Diagnosis: Type 1 diabetes mellitus without complication (H) [E10.9]   Procedure(s):  Living Non Directed Kidney Transplant Recipient,ureteral stent placement and peritoneal dialysis catheter removal   Postop Comments: No value filed.       Anesthesia Type:  Not documented  General    Reportable Event: NO     PAIN: Uncomplicated   Sign Out status: Comfortable, Well controlled pain     PONV: No PONV   Sign Out status:  No Nausea or Vomiting     Neuro/Psych: Uneventful perioperative course   Sign Out Status: Preoperative baseline; Age appropriate mentation     Airway/Resp.: Uneventful perioperative course   Sign Out Status: Non labored breathing, age appropriate RR; Resp. Status within EXPECTED Parameters     CV: Uneventful perioperative course   Sign Out status: Appropriate BP and perfusion indices; Appropriate HR/Rhythm     Disposition:   Sign Out in:  PACU  Disposition:  Floor  Recovery Course: Uneventful  Follow-Up: Not required           Last Anesthesia Record Vitals:  CRNA VITALS  10/1/2019 1938 - 10/1/2019 2038      10/1/2019             ART BP:  (!) 0/0    ART Mean:  0      CRNA VITALS  10/1/2019 1948 - 10/1/2019 2048      10/1/2019             ART BP:  (!) 0/0    ART Mean:  0          Last PACU Vitals:  Vitals Value Taken Time   BP 90/50 10/1/2019  9:40 PM   Temp     Pulse 117 10/1/2019  9:40 PM   Resp 20 10/1/2019  9:30 PM   SpO2 94 % 10/1/2019  9:37 PM   Temp src     NIBP     Pulse     SpO2     Resp     Temp     Ht Rate 85 10/1/2019  8:54 PM   Temp 2     Vitals shown include unvalidated device data.      Electronically Signed By: Shonna Crenshaw MD, 2019, 9:52 PM

## 2019-10-02 NOTE — PROGRESS NOTES
"POST OP CHECK     S: Patient seen at the bedside awake, alert, and interactive. States he is beginning to feel incisional pain more, but tolerable with pain medication. No flatus, not UOOB. Discussed IS use.     O:   /61   Pulse 103   Temp 99.8  F (37.7  C) (Oral)   Resp 20   Ht 1.676 m (5' 6\")   Wt 95.7 kg (210 lb 15.7 oz)   SpO2 95%   BMI 34.05 kg/m      GEN: NAD  CV: Non-cyanotic, Peripheral pulses intact  RESP: Nonlabored breathing on 1LNC  ABD: soft, appropriately tender, without guarding or rebound tenderness, incisions c/d/i.   Ext: wwp. No edema. No calf tenderness  PSYCH: cooperative     A/P: Michael Amin is a 39 year old male with history of ESRD d/t DM1 now POD 0 from LDKT    Tylenol, PRN oxycodone, PRN dilaudid.   PRN zofran   Insulin gtt  AM BMP, CBC    Remainder of cares per primary team.     Mera Helm MD   General Surgery PGY1  [phone]      "

## 2019-10-02 NOTE — PROGRESS NOTES
Text page sent to Transplant resident at 0607 to notify of BG 50. Insulin gtt stopped, pt alert. Will give apple juice and recheck in 15 min.

## 2019-10-02 NOTE — PLAN OF CARE
Temp: 99.8  F (37.7  C) Temp src: Oral BP: 113/62 Pulse: 103 Heart Rate: 102 Resp: 26 SpO2: 97 % O2 Device: Nasal cannula Oxygen Delivery: 1 LPM     Neuro: A&Ox4. Slept between cares.  Cardiac: ST rates low 100s. BPs stable (improved after 1L fluid bolus). CVPs approx 10. Mild generalized edema.  Respiratory: 1L NC. Encourage IS  GI/: Adequate urine output via paredes 500-600ml/hr.  No gas yet.  Diet/appetite: Tolerating *clear liquid diet, requesting breakfast.  Activity:  Assist of 1 to stand at EOB this AM.  Pain: At acceptable level on current regimen. PRN oxy 1.  Skin: LLQ incision and R abd PD cath removal site.  LDA's: Paredes. PIV x1, TL I.J w/ insulin gtt, MIVF, replacement fluids.     Plan: Continue with POC. Notify primary team with changes.

## 2019-10-02 NOTE — PROGRESS NOTES
Admission          10/1/2019  9:22 AM  -----------------------------------------------------------  Reason for admission: LDKT  Primary team notified of pt arrival.  Admitted from: PACU  Via: stretcher  Accompanied by: family  Belongings: Placed in closet; valuables sent home with family  Admission Profile: complete  Teaching: orientation to unit and call light- call light within reach, call don't fall, use of console, meal times, when to call for the RN, and enforced importance of safety   Access: PIV  Telemetry:Placed on pt  Ht./Wt.: complete  2 RN Skin Assessment Completed By: Anneliese MATHUR  Pt status:    Temp:  [97.9  F (36.6  C)-100.4  F (38  C)] 99.8  F (37.7  C)  Pulse:  [] 103  Heart Rate:  [] 102  Resp:  [12-26] 26  BP: ()/(42-88) 113/62  SpO2:  [80 %-100 %] 97 %

## 2019-10-02 NOTE — PROGRESS NOTES
"CLINICAL NUTRITION SERVICES - ASSESSMENT NOTE     Nutrition Prescription    RECOMMENDATIONS FOR MDs/PROVIDERS TO ORDER:  Minimize restrictions to diet order to promote adequate protein/calorie intake     Malnutrition Status:    Patient does not meet two criteria for the diagnosis of malnutrition     Recommendations already ordered by Registered Dietitian (RD):  Discharge diet order written.     Future/Additional Recommendations:  If suspect eating poorly, recommend starting supplements and consider calorie count if intake is variable.         REASON FOR ASSESSMENT  Michael Amin is a 39 year old male seen by the dietitian for MD Order- Assess & Educate post-op SOT    CLINICAL HISTORY:   Type 1 DM diagnosed at age 16, not optimally controlled with A1C of 8.9%, HTN, dyslipidemia, neuropathy, depression,  and ESRD secondary to diabetic nephropathy on peritoneal dialysis.  He has been losing weight intentionally to improve health status and perhaps be eligible for a pancreas transplant.    NUTRITION HISTORY  Patient reported he was eating well at home prior to transplant, usual intake is 2-3 meals per day. He diligently tries to follow low sodium diet but has not needed to follow other restrictions after starting PD and taking Po4 binder (tums). He stated he has been working on weight loss, weight last year was 215-218 lbs and current weight is now ~196-200 lbs. He reported goal weight for pancreas transplant is 190 lbs and his long term goal weight would be around 165-170 lbs. No food allergies. No chewing/swallowing issues.     CURRENT NUTRITION ORDERS  Diet: Clear Liquid  Intake/Tolerance: No intakes documented     LABS  Hemoglobin A1c: 8.9 (H)  K+ 3.6 (WNL)  Urea Nitrogen 49 (H- down-trending post transplant)  Creatinine 5.3 (H- down-trending post transplant)  Phosphorus: 3.4 (WNL)    MEDICATIONS  Mycophenp  Tacrolimus     ANTHROPOMETRICS  Height: 167.6 cm (5' 6\")  Most Recent Weight: 94.4 kg (208 lb 1.8 oz)    IBW: " 64.5 kg  BMI: Obesity Grade I BMI 30-34.9    Weight History: ~6 kg weight loss from January 2019  Wt Readings from Last 15 Encounters:   10/02/19 94.4 kg (208 lb 1.8 oz)   10/01/19 91.4 kg  (201 lb 8 oz)   09/26/19 89.4 kg (197 lb 3.2 oz)   09/26/19 89.4 kg (197 lb 1.6 oz)   04/22/19 94.8 kg (208 lb 14.4 oz)   01/07/19 97.6 kg (215 lb 3.2 oz)   11/14/18 93.4 kg (206 lb)     Dosing Weight: 71 kg (adjusted, based on lowest weight of 91.4 kg)    ASSESSED NUTRITION NEEDS:  Estimated Energy Needs: 6337-7079 kcals (30-35 Kcal/Kg)  Justification: increased needs post-op SOT  Estimated Protein Needs:  grams protein (1.3-2 gm/Kg)  Justification: increased needs post op SOT  Estimated Fluid Needs: 2579-5746 mL (25-30 mL/Kg)  Justification: maintenance, or per MD pending fluid status and adequate UOP    PHYSICAL FINDINGS  No abnormal nutrition-related physical findings observed.     MALNUTRITION  % Intake: No decreased intake noted  % Weight Loss: Weight loss does not meet criteria  Subcutaneous Fat Loss: None observed  Muscle Loss: None observed  Fluid Accumulation/Edema: Does not meet criteria  Malnutrition Diagnosis: Patient does not meet two of the above criteria necessary for diagnosing malnutrition    NUTRITION DIAGNOSIS:  Food and nutrition-related knowledge deficit r/t length of time since previous post-transplant education AEB patient verbal report, review of chart record, and MD consult for nutrition education.     INTERVENTIONS  Implementation  Nutrition Education:  1. Provided instruction on post-transplant diet with discussion regarding protein sources and high protein needs in acute post-tx phase.     2. Reviewed recommendations to follow low fat/low sodium diet long term and discussed heart healthy diet tips.  Discussed that this may aid in patient's weight loss goal but that weight loss should not be focused on for at least 6-8 weeks post surgery    3. Discussed monitoring of K+/Phos lab values with  possible need for adjustment of these in the diet as necessary.     4. Reviewed need for food safety precautions to prevent food borne illness.    Provided & reviewed handout: Post-transplant diet guidelines and Food Safety booklet per USDA. Patient receptive to information provided. Expected diet compliance is good.     Goals  1. Patient will verbalize understanding of 3 important aspects of post-transplant diet guidelines.   2. PO intake >50% meals TID.    Monitoring/Evaluation  Progress toward goals will be monitored and evaluated per protocol.    Lyssa Bass RD, RM  6B pager: [phone]

## 2019-10-02 NOTE — PHARMACY-ADMISSION MEDICATION HISTORY
Admission medication history interview status for the 10/1/2019 admission is complete. See Epic admission navigator for allergy information, pharmacy, prior to admission medications and immunization status.     Medication history interview sources:  patient, outside prescription fill report    Changes made to PTA medication list (reason)  Added:   Coricidin Cough & Cold- taking 1 tablet Q6H PRN cold and cough symptoms  Deleted:   Metoprolol- patient switched to carvedilol in April  Humalog for pump duplicate entry   Changed:   Atorvastatin 20mg directions added: takes 1 tablet daily  Lisinopril 20mg changed to 40mg daily: lopez was increased in July 2019  Vitamin D2 50,000U: note added that patient takes once weekly on Fridays    Additional medication history information (including reliability of information, actions taken by pharmacist): Patient was a good historian. Was aware of medications, doses, and recent changes.     -Patient has not taken aspirin for the past 2 weeks as directed.  -Please see Endocrinology note from 10/01/19 for information regarding the patient's insulin pump settings    Prior to Admission medications    Medication Sig Last Dose Taking? Auth Provider   aspirin (ASA) 81 MG EC tablet Take 81 mg by mouth daily  9/17/2019 at Unknown time Yes Reported, Patient   atorvastatin (LIPITOR) 20 MG tablet Take 20 mg by mouth every evening  9/30/2019 at Unknown time Yes Reported, Patient   B Complex-C-Folic Acid (VIRT-CAPS) 1 MG CAPS Take 1 capsule by mouth daily  9/30/2019 at unknown  Yes Reported, Patient   calcitRIOL (ROCALTROL) 0.25 MCG capsule Take 0.25 mcg by mouth daily  10/1/2019 at 0800 Yes Reported, Patient   carvedilol (COREG) 12.5 MG tablet Take 12.5 mg by mouth 2 times daily 10/1/2019 at 0800 Yes Reported, Patient   Chlorpheniramine-DM (CORICIDIN HBP COUGH/COLD PO) Take 1 tablet by mouth every 6 hours as needed (couhg and cold symptoms) 10/1/2019 at Unknown time Yes Unknown, Entered By History    furosemide (LASIX) 80 MG tablet Take 80 mg by mouth 2 times daily 10/1/2019 at 0800 Yes Reported, Patient   gentamicin (GARAMYCIN) 0.1 % external cream Apply to port exit site once daily 10/1/2019 at Unknown time Yes Reported, Patient   HUMALOG 100 UNIT/ML injection Insulin used for filling patient's pump 10/1/2019 at Unknown time Yes Reported, Patient   lisinopril (PRINIVIL/ZESTRIL) 40 MG tablet Take 40 mg by mouth daily  9/30/2019 at Unknown time Yes Reported, Patient   vitamin D2 (ERGOCALCIFEROL) 81160 units (1250 mcg) capsule Take 50,000 Units by mouth once a week Patient takes on Friday 9/27/2019 at unknown time Yes Reported, Patient   blood glucose monitoring (ACCU-CHEK FASTCLIX) lancets U QID OR MORE OFTEN PRN   Reported, Patient         Medication history completed by: Chasity Henson PharmD

## 2019-10-02 NOTE — ADDENDUM NOTE
Addendum  created 10/01/19 2156 by Shonna Crenshaw MD    Attestation recorded in Intraprocedure, Intraprocedure Attestations filed

## 2019-10-03 ENCOUNTER — TELEPHONE (OUTPATIENT)
Dept: TRANSPLANT | Facility: CLINIC | Age: 39
End: 2019-10-03

## 2019-10-03 LAB
ANION GAP SERPL CALCULATED.3IONS-SCNC: 7 MMOL/L (ref 3–14)
BASOPHILS # BLD AUTO: 0 10E9/L (ref 0–0.2)
BASOPHILS NFR BLD AUTO: 0 %
BUN SERPL-MCNC: 28 MG/DL (ref 7–30)
CALCIUM SERPL-MCNC: 7.6 MG/DL (ref 8.5–10.1)
CHLORIDE SERPL-SCNC: 115 MMOL/L (ref 94–109)
CO2 SERPL-SCNC: 21 MMOL/L (ref 20–32)
CREAT SERPL-MCNC: 1.54 MG/DL (ref 0.66–1.25)
DIFFERENTIAL METHOD BLD: ABNORMAL
EOSINOPHIL # BLD AUTO: 0 10E9/L (ref 0–0.7)
EOSINOPHIL NFR BLD AUTO: 0 %
ERYTHROCYTE [DISTWIDTH] IN BLOOD BY AUTOMATED COUNT: 13.4 % (ref 10–15)
GFR SERPL CREATININE-BSD FRML MDRD: 56 ML/MIN/{1.73_M2}
GLUCOSE BLDC GLUCOMTR-MCNC: 129 MG/DL (ref 70–99)
GLUCOSE BLDC GLUCOMTR-MCNC: 134 MG/DL (ref 70–99)
GLUCOSE BLDC GLUCOMTR-MCNC: 144 MG/DL (ref 70–99)
GLUCOSE BLDC GLUCOMTR-MCNC: 145 MG/DL (ref 70–99)
GLUCOSE BLDC GLUCOMTR-MCNC: 148 MG/DL (ref 70–99)
GLUCOSE BLDC GLUCOMTR-MCNC: 156 MG/DL (ref 70–99)
GLUCOSE BLDC GLUCOMTR-MCNC: 157 MG/DL (ref 70–99)
GLUCOSE BLDC GLUCOMTR-MCNC: 161 MG/DL (ref 70–99)
GLUCOSE BLDC GLUCOMTR-MCNC: 171 MG/DL (ref 70–99)
GLUCOSE BLDC GLUCOMTR-MCNC: 173 MG/DL (ref 70–99)
GLUCOSE BLDC GLUCOMTR-MCNC: 177 MG/DL (ref 70–99)
GLUCOSE BLDC GLUCOMTR-MCNC: 183 MG/DL (ref 70–99)
GLUCOSE BLDC GLUCOMTR-MCNC: 183 MG/DL (ref 70–99)
GLUCOSE BLDC GLUCOMTR-MCNC: 189 MG/DL (ref 70–99)
GLUCOSE BLDC GLUCOMTR-MCNC: 190 MG/DL (ref 70–99)
GLUCOSE BLDC GLUCOMTR-MCNC: 191 MG/DL (ref 70–99)
GLUCOSE BLDC GLUCOMTR-MCNC: 193 MG/DL (ref 70–99)
GLUCOSE BLDC GLUCOMTR-MCNC: 194 MG/DL (ref 70–99)
GLUCOSE BLDC GLUCOMTR-MCNC: 196 MG/DL (ref 70–99)
GLUCOSE BLDC GLUCOMTR-MCNC: 198 MG/DL (ref 70–99)
GLUCOSE BLDC GLUCOMTR-MCNC: 205 MG/DL (ref 70–99)
GLUCOSE BLDC GLUCOMTR-MCNC: 211 MG/DL (ref 70–99)
GLUCOSE BLDC GLUCOMTR-MCNC: 217 MG/DL (ref 70–99)
GLUCOSE BLDC GLUCOMTR-MCNC: 240 MG/DL (ref 70–99)
GLUCOSE SERPL-MCNC: 138 MG/DL (ref 70–99)
HCT VFR BLD AUTO: 24.6 % (ref 40–53)
HGB BLD-MCNC: 7.7 G/DL (ref 13.3–17.7)
IMM GRANULOCYTES # BLD: 0 10E9/L (ref 0–0.4)
IMM GRANULOCYTES NFR BLD: 0.6 %
LYMPHOCYTES # BLD AUTO: 0.1 10E9/L (ref 0.8–5.3)
LYMPHOCYTES NFR BLD AUTO: 2.3 %
MAGNESIUM SERPL-MCNC: 2 MG/DL (ref 1.6–2.3)
MCH RBC QN AUTO: 29.6 PG (ref 26.5–33)
MCHC RBC AUTO-ENTMCNC: 31.3 G/DL (ref 31.5–36.5)
MCV RBC AUTO: 95 FL (ref 78–100)
MONOCYTES # BLD AUTO: 0.3 10E9/L (ref 0–1.3)
MONOCYTES NFR BLD AUTO: 6.5 %
NEUTROPHILS # BLD AUTO: 4.3 10E9/L (ref 1.6–8.3)
NEUTROPHILS NFR BLD AUTO: 90.6 %
NRBC # BLD AUTO: 0 10*3/UL
NRBC BLD AUTO-RTO: 0 /100
PHOSPHATE SERPL-MCNC: 2.8 MG/DL (ref 2.5–4.5)
PLATELET # BLD AUTO: 41 10E9/L (ref 150–450)
PLATELET # BLD EST: ABNORMAL 10*3/UL
POTASSIUM SERPL-SCNC: 3.9 MMOL/L (ref 3.4–5.3)
RBC # BLD AUTO: 2.6 10E12/L (ref 4.4–5.9)
SODIUM SERPL-SCNC: 143 MMOL/L (ref 133–144)
WBC # BLD AUTO: 4.8 10E9/L (ref 4–11)

## 2019-10-03 PROCEDURE — 12000026 ZZH R&B TRANSPLANT

## 2019-10-03 PROCEDURE — 83735 ASSAY OF MAGNESIUM: CPT | Performed by: SURGERY

## 2019-10-03 PROCEDURE — 25000128 H RX IP 250 OP 636: Performed by: PHYSICIAN ASSISTANT

## 2019-10-03 PROCEDURE — 84100 ASSAY OF PHOSPHORUS: CPT | Performed by: SURGERY

## 2019-10-03 PROCEDURE — 85025 COMPLETE CBC W/AUTO DIFF WBC: CPT | Performed by: SURGERY

## 2019-10-03 PROCEDURE — 25000131 ZZH RX MED GY IP 250 OP 636 PS 637: Performed by: SURGERY

## 2019-10-03 PROCEDURE — 36592 COLLECT BLOOD FROM PICC: CPT | Performed by: SURGERY

## 2019-10-03 PROCEDURE — 25000132 ZZH RX MED GY IP 250 OP 250 PS 637: Performed by: PHYSICIAN ASSISTANT

## 2019-10-03 PROCEDURE — 80048 BASIC METABOLIC PNL TOTAL CA: CPT | Performed by: SURGERY

## 2019-10-03 PROCEDURE — 00000146 ZZHCL STATISTIC GLUCOSE BY METER IP

## 2019-10-03 PROCEDURE — 25000132 ZZH RX MED GY IP 250 OP 250 PS 637: Performed by: SURGERY

## 2019-10-03 PROCEDURE — 25800030 ZZH RX IP 258 OP 636: Performed by: SURGERY

## 2019-10-03 PROCEDURE — 25000125 ZZHC RX 250: Performed by: SURGERY

## 2019-10-03 PROCEDURE — 25000131 ZZH RX MED GY IP 250 OP 636 PS 637: Performed by: STUDENT IN AN ORGANIZED HEALTH CARE EDUCATION/TRAINING PROGRAM

## 2019-10-03 RX ORDER — VALGANCICLOVIR 450 MG/1
900 TABLET, FILM COATED ORAL DAILY
Status: DISCONTINUED | OUTPATIENT
Start: 2019-10-04 | End: 2019-10-06 | Stop reason: HOSPADM

## 2019-10-03 RX ORDER — AMOXICILLIN 250 MG
1 CAPSULE ORAL 2 TIMES DAILY
Status: DISCONTINUED | OUTPATIENT
Start: 2019-10-03 | End: 2019-10-04

## 2019-10-03 RX ORDER — METHYLPREDNISOLONE SODIUM SUCCINATE 125 MG/2ML
100 INJECTION, POWDER, LYOPHILIZED, FOR SOLUTION INTRAMUSCULAR; INTRAVENOUS ONCE
Status: COMPLETED | OUTPATIENT
Start: 2019-10-03 | End: 2019-10-03

## 2019-10-03 RX ADMIN — ACETAMINOPHEN 650 MG: 325 TABLET, FILM COATED ORAL at 19:56

## 2019-10-03 RX ADMIN — HUMAN INSULIN 8 UNITS/HR: 100 INJECTION, SOLUTION SUBCUTANEOUS at 14:24

## 2019-10-03 RX ADMIN — MYCOPHENOLATE MOFETIL 750 MG: 250 CAPSULE ORAL at 07:30

## 2019-10-03 RX ADMIN — OXYCODONE HYDROCHLORIDE 5 MG: 5 TABLET ORAL at 02:10

## 2019-10-03 RX ADMIN — INSULIN ASPART 9 UNITS: 100 INJECTION, SOLUTION INTRAVENOUS; SUBCUTANEOUS at 11:53

## 2019-10-03 RX ADMIN — HUMAN INSULIN 6 UNITS/HR: 100 INJECTION, SOLUTION SUBCUTANEOUS at 03:01

## 2019-10-03 RX ADMIN — TACROLIMUS 3 MG: 1 CAPSULE ORAL at 18:03

## 2019-10-03 RX ADMIN — ACETAMINOPHEN 650 MG: 325 TABLET, FILM COATED ORAL at 11:53

## 2019-10-03 RX ADMIN — VALGANCICLOVIR 450 MG: 450 TABLET, FILM COATED ORAL at 07:31

## 2019-10-03 RX ADMIN — ATORVASTATIN CALCIUM 10 MG: 10 TABLET, FILM COATED ORAL at 19:56

## 2019-10-03 RX ADMIN — SENNOSIDES AND DOCUSATE SODIUM 1 TABLET: 8.6; 5 TABLET ORAL at 09:54

## 2019-10-03 RX ADMIN — METHYLPREDNISOLONE SODIUM SUCCINATE 100 MG: 125 INJECTION, POWDER, FOR SOLUTION INTRAMUSCULAR; INTRAVENOUS at 09:54

## 2019-10-03 RX ADMIN — MELATONIN 50 MCG: at 07:56

## 2019-10-03 RX ADMIN — SODIUM CHLORIDE, POTASSIUM CHLORIDE, SODIUM LACTATE AND CALCIUM CHLORIDE: 600; 310; 30; 20 INJECTION, SOLUTION INTRAVENOUS at 02:05

## 2019-10-03 RX ADMIN — MYCOPHENOLATE MOFETIL 750 MG: 250 CAPSULE ORAL at 19:56

## 2019-10-03 RX ADMIN — ACETAMINOPHEN 650 MG: 325 TABLET, FILM COATED ORAL at 07:31

## 2019-10-03 RX ADMIN — SENNOSIDES AND DOCUSATE SODIUM 1 TABLET: 8.6; 5 TABLET ORAL at 19:56

## 2019-10-03 RX ADMIN — INSULIN ASPART 7 UNITS: 100 INJECTION, SOLUTION INTRAVENOUS; SUBCUTANEOUS at 07:32

## 2019-10-03 RX ADMIN — HUMAN INSULIN 5 UNITS/HR: 100 INJECTION, SOLUTION SUBCUTANEOUS at 23:07

## 2019-10-03 RX ADMIN — INSULIN ASPART 8 UNITS: 100 INJECTION, SOLUTION INTRAVENOUS; SUBCUTANEOUS at 17:26

## 2019-10-03 RX ADMIN — ACETAMINOPHEN 650 MG: 325 TABLET, FILM COATED ORAL at 00:08

## 2019-10-03 RX ADMIN — HUMAN INSULIN 8 UNITS/HR: 100 INJECTION, SOLUTION SUBCUTANEOUS at 10:56

## 2019-10-03 RX ADMIN — MAGNESIUM OXIDE TAB 400 MG (241.3 MG ELEMENTAL MG) 400 MG: 400 (241.3 MG) TAB at 11:53

## 2019-10-03 RX ADMIN — ACETAMINOPHEN 650 MG: 325 TABLET, FILM COATED ORAL at 16:12

## 2019-10-03 RX ADMIN — ACETAMINOPHEN 650 MG: 325 TABLET, FILM COATED ORAL at 23:56

## 2019-10-03 RX ADMIN — HUMAN INSULIN 8 UNITS/HR: 100 INJECTION, SOLUTION SUBCUTANEOUS at 17:28

## 2019-10-03 RX ADMIN — HUMAN INSULIN 11 UNITS/HR: 100 INJECTION, SOLUTION SUBCUTANEOUS at 20:01

## 2019-10-03 RX ADMIN — ACETAMINOPHEN 650 MG: 325 TABLET, FILM COATED ORAL at 04:24

## 2019-10-03 RX ADMIN — TACROLIMUS 3 MG: 1 CAPSULE ORAL at 07:30

## 2019-10-03 ASSESSMENT — ACTIVITIES OF DAILY LIVING (ADL)
ADLS_ACUITY_SCORE: 12

## 2019-10-03 ASSESSMENT — PAIN DESCRIPTION - DESCRIPTORS: DESCRIPTORS: ACHING

## 2019-10-03 ASSESSMENT — MIFFLIN-ST. JEOR: SCORE: 1807.57

## 2019-10-03 NOTE — PHARMACY-MEDICATION TEACHING
Pharmacist provided medication teaching for discharge with a focus on new medications/dose changes.  The discharge medication list was reviewed with the patient/family and the following points were discussed, as applicable: Name, description, purpose, dose/strength, duration of medications, common side effects, food/medications to avoid, action to be taken if dose is missed, when to call MD, safe disposal of unused medications and how to obtain refills.  Patient's father was also present.     Clinical Pharmacy Consult:                                                      Transplant Specific:   Date of Transplant: 10/1/2019  Type of Transplant: kidney  First Transplant: yes  History of rejection: yes and no    Immunosuppression Regimen   TAC 3mg qAM & 3mg qPM and MMF 750mg qAM & 750mg qPM  Immunosuppressant Levels:  Subtherapeutic  Patient specific goal: 8-10 for tacro and 1-3.5 for mmf  Pt adherent to lab draws: yes  Scr:   Lab Results   Component Value Date    CR 1.54 10/03/2019     Side effects: no side effects - noted by patient.     Prophylactic Medications  Antibacterial:  Bactrim 400/80 once daily  Scheduled Discontinue Date: indefinitely    Antifungal: Not needed thus far  Scheduled Discontinue Date: N/A    Antiviral: CrCl >/=60 mL/minute: Valcyte 900mg daily   Scheduled Discontinue Date: 3 months    Acid Reducer: None at this time.   Scheduled Reviewed Date: N/A    Thrombosis Prevention: Aspirin contraindicated due to recent surgery. Will review again on follow up appt.   Scheduled Discontinue Date: N/A    Blood Pressure Management  Frequency of home Blood Pressure checks: twice daily  Most recent home BP: 154/83  Patient Blood pressure goal: 150/90  Patient blood pressure at goal:  yes and no  Hospitalizations/ER visits since last assessment: 0      Med rec/DUR performed: yes  Med Rec Discrepancies: no    Pharmacist gave thermometer and 7 day pill organizer.  Patient has a bp monitor.    Patient will use  local pharmacy or other mail order for outpatient medications.       No further questions for this pharmacist.       Mg Brice[facility]

## 2019-10-03 NOTE — PROGRESS NOTES
[facility], [place]   Transplant Nephrology Progress Note  Date of Admission:  10/1/2019  Today's Date: 10/03/2019    Assessment & Plan    # LDKT:  Decreased creatinine, down to ~ 1.5 today.  Very good urine output.  No acute indications for dialysis.   - Baseline Cr ~ TBD   - Proteinuria: Not checked post transplant   - Date DSA Last Checked: Sep/2019  Latest DSA: No DSA at time of transplant   - BK Viremia: Not checked post transplant   - Kidney Tx Biopsy: No    # Immunosuppression: Tacrolimus immediate release (goal 8-10) and Mycophenolate mofetil (goal 1-3.5); Induction with Thymo and Solumedrol per protocol.   - Changes: No    - Last dose of Thymo on hold due to thrombocytopenia    # Prophylaxis:   - PJP: Sulfa/TMP (Bactrim)   - CMV: Valcyte   - Thrush: None    # Blood Pressure:  Borderline control; Goal BP: < 150/90   - Volume status: Mildly hypervolemic  EDW ~ 89.5 kg, now 95.5 kg   - Changes: No    # Diabetes: Borderline control (HbA1c 7-9%)   - Management as per primary team.    # Anemia in Chronic Renal Disease: Hgb: Trend down      JOSE: No   - Iron studies: Replete    # Mineral Bone Disorder:   - Secondary renal hyperparathyroidism; PTH level: Moderately elevated (301-600 pg/ml)  - Vitamin D; level: Low and will continue on cholecalciferol.  - Calcium; level: Low and will continue on cholecalciferol.  - Phosphorus; level: Normal    # Electrolytes:   - Potassium; level: Normal  - Magnesium; level: Normal  - Bicarbonate; level: Low, but should increase with improving kidney function.    # Overweight: Once patient has recovered from kidney transplant surgery, he plans to lose more weight in order to get a pancreas transplant.    # Transplant History:  Etiology of kidney failure: diabetes mellitus type 1  Tx: LDKT  Transplant: 10/1/2019 (Kidney)  Donor Type: Living Donor Class:   Crossmatch at time of Tx: negative  DSA at time of Tx: No  Significant changes in immunosuppression:  "None  Significant transplant-related complications: None    Recommendations were communicated to the primary team via this note.    Jabier Hernández MD  Pager: [phone]    Interval History   Mr. Amin's kidney function is improved with creatinine ~ 1.5.  Very good urine output.  Patient reports feeling pretty good.  No chest pain or shortness of breath.  No nausea or vomiting.  Passing gas, but no bowel movement yet.  No fever or chills, but has had some mild night sweats.  Less puffiness and decreased tingling of right hand.    Review of Systems   4 point ROS was obtained and negative except as noted in the Interval History.    Physical Exam   Temp  Av.6  F (37  C)  Min: 97.9  F (36.6  C)  Max: 100.4  F (38  C)      Pulse  Av.1  Min: 74  Max: 110 Resp  Av.9  Min: 12  Max: 26  SpO2  Av.9 %  Min: 80 %  Max: 100 %    CVP (mmHg): 9 mmHgBP (!) 151/88 (BP Location: Right arm)   Pulse 95   Temp 98.4  F (36.9  C) (Oral)   Resp 16   Ht 1.676 m (5' 6\")   Wt 95.5 kg (210 lb 8 oz)   SpO2 96%   BMI 33.98 kg/m     Date 10/03/19 07 - 10/04/19 0659   Shift 2630-2851 5178-8553 6756-2680 24 Hour Total   INTAKE   P.O. 720   720   Shift Total(mL/kg) 720(7.54)   720(7.54)   OUTPUT   Urine 325   325   Shift Total(mL/kg) 325(3.4)   325(3.4)   Weight (kg) 95.48 95.48 95.48 95.48      Admit Weight: 91.4 kg (201 lb 8 oz)   GENERAL APPEARANCE: alert and no distress  HENT: mouth without ulcers or lesions  LYMPHATICS: no cervical or supraclavicular nodes  RESP: lungs clear to auscultation - no rales, rhonchi or wheezes  CV: regular rhythm, normal rate, no rub, no murmur  EDEMA: trace LE edema bilaterally  ABDOMEN: soft, nondistended, nontender, bowel sounds normal  MS: extremities normal - no gross deformities noted, no evidence of inflammation in joints, no muscle tenderness  SKIN: no rash  TX KIDNEY: mild TTP  DIALYSIS ACCESS:  None    Data   All labs reviewed by me.  CMP  Recent Labs   Lab 10/03/19  0649 " 10/02/19  2223 10/02/19  1348 10/02/19  0926 10/02/19  0502  10/01/19  2052 10/01/19  1309     --   --   --  138  --  137  --    POTASSIUM 3.9 4.0 3.9 3.7 3.6   < > 4.2 4.6   CHLORIDE 115*  --   --   --  107  --  107  --    CO2 21  --   --   --  20  --  18*  --    ANIONGAP 7  --   --   --  10  --  11  --    *  --   --   --  134*  --  197*  --    BUN 28  --   --   --  49*  --  62*  --    CR 1.54*  --   --   --  5.30*  --  7.97* 9.43*   GFRESTIMATED 56*  --   --   --  12*  --  8* 6*   GFRESTBLACK 64  --   --   --  14*  --  9* 7*   APRIL 7.6*  --   --   --  7.5*  --  7.2*  --    MAG 2.0  --   --   --  1.7  --  1.7  --    PHOS 2.8  --   --   --  3.4  --  3.1  --     < > = values in this interval not displayed.     CBC  Recent Labs   Lab 10/03/19  0649 10/02/19  2223 10/02/19  1348 10/02/19  0926 10/02/19  0502  10/01/19  2052   HGB 7.7* 8.1* 8.0* 8.1* 8.4*   < > 8.3*   WBC 4.8  --   --   --  10.9  --  5.7   RBC 2.60*  --   --   --  2.89*  --  2.79*   HCT 24.6*  --   --   --  27.0*  --  26.1*   MCV 95  --   --   --  93  --  94   MCH 29.6  --   --   --  29.1  --  29.7   MCHC 31.3*  --   --   --  31.1*  --  31.8   RDW 13.4  --   --   --  13.2  --  13.3   PLT 41*  --   --   --  116*  --  86*    < > = values in this interval not displayed.     INRNo lab results found in last 7 days.  ABGNo lab results found in last 7 days.   Urine Studies  Recent Labs   Lab Test 09/26/19  0950 01/07/19  1330   COLOR Straw Yellow   APPEARANCE Clear Clear   URINEGLC 150* 50*   URINEBILI Negative Negative   URINEKETONE Negative Negative   SG 1.009 1.012   UBLD Negative Negative   URINEPH 6.0 5.0   PROTEIN Negative 100*   NITRITE Negative Negative   LEUKEST Negative Negative   RBCU 0 <1   WBCU 1 2     No lab results found.  PTH  Recent Labs   Lab Test 09/26/19  0945   PTHI 356*     Iron Studies  Recent Labs   Lab Test 09/26/19  0945   IRON 70      IRONSAT 28   EDOUARD 753*       IMAGING:  All imaging studies reviewed by me.      MEDICATIONS:  Current Facility-Administered Medications   Medication     acetaminophen (TYLENOL) tablet 650 mg     atorvastatin (LIPITOR) tablet 10 mg     dextrose 10 % 1,000 mL infusion     glucose gel 15-30 g    Or     dextrose 50 % injection 25-50 mL    Or     glucagon injection 1 mg     insulin 1 unit/mL in saline (NovoLIN, HumuLIN Regular) drip - ADULT IV Infusion     insulin aspart (NovoLOG) inj (RAPID ACTING)     insulin aspart (NovoLOG) inj (RAPID ACTING)     lactated ringers infusion     magnesium oxide (MAG-OX) tablet 400 mg     mycophenolate (GENERIC EQUIVALENT) capsule 750 mg     naloxone (NARCAN) injection 0.1-0.4 mg     oxyCODONE (ROXICODONE) tablet 5-10 mg     senna-docusate (SENOKOT-S/PERICOLACE) 8.6-50 MG per tablet 1 tablet     sodium chloride (PF) 0.9% PF flush 10 mL     sodium chloride (PF) 0.9% PF flush 10-20 mL     [START ON 10/6/2019] sulfamethoxazole-trimethoprim (BACTRIM/SEPTRA) 400-80 MG per tablet 1 tablet     tacrolimus (GENERIC EQUIVALENT) capsule 3 mg     [START ON 10/4/2019] valGANciclovir (VALCYTE) tablet 900 mg     vitamin D3 (CHOLECALCIFEROL) 1000 units (25 mcg) tablet 50 mcg

## 2019-10-03 NOTE — PLAN OF CARE
"BP (!) 166/88 (BP Location: Right arm)   Pulse 93   Temp 97.9  F (36.6  C) (Oral)   Resp 16   Ht 1.676 m (5' 6\")   Wt 95.5 kg (210 lb 8 oz)   SpO2 96%   BMI 33.98 kg/m       6326-0253: Afebrile, HR 90-100s, slightly HTN, OVSS on RA. Patient reporting mild incisional pain that is well controlled with scheduled tylenol. Patient denies nausea. Passing flatus, no BM. Tolerating regular diet with good appetite. Méndez in place with adequate amounts red/pink catalina urine output. R internal jugular with TKO and insulin gtt. BG elevated (148-217) on alg 4 to 4+. Carb coverage administered with meals and snacks. Incision stapled and covered with gauze for patient comfort. PD cath site on R abdomen packed by surgeon (dressing change instructions in orders). UAL, walking frequently. Med card and lab book updated. Patient given instructions to sign up for MTP. Met with specialty pharmacy. Plan for endocrine to transition patient to personal insulin pump tomorrow. Possible discharge tomorrow or Saturday. Will continue with POC, notify team with changes/concerns.      "

## 2019-10-03 NOTE — PROGRESS NOTES
Transplant Admission Psychosocial Assessment    Patient Name: Michael Amin  : 1980  Age: 39 year old  MRN: [id number]  Date of Initial Social Work Evaluation: 19    Patient underwent a live donor kidney transplant on 10/1/19.  I met with Rudi to update psychosocial assessment and provide education about SW role while inpatient, and to begin discussion of expectations/requirements, caregiver needs and follow up needs post-transplant.  His mother Angela was present for portions of this interview.      Presenting Information   Living Situation: Rudi lives alone in [place], [place] (approximately three hours away from the transplant center).  If not local, plans for short term stay: [facility] in [place].  Patient will share a suite with his parents, Annita and Angela.  Previous Functional Status: independent, working full-time  Cultural/Language/Spiritual Considerations: Church, English is primary language    Support System  Primary Support Person: parents, Annita and Angela, live just thirty minutes away  Other support: brother Fransico lives in [place]    Health Care Directive  Decision Maker: patient  Alternate Decision Maker: wife Michelle in the absence of a directive, patient is in the process of a divorce  Health Care Directive: Patient considering completing and Provided education and [place] Health Care Directive    Mental Health/Coping:   History of Mental Health: no history  History of Chemical Health: no specific concerns  Current status: stable  Coping: appropriate to situation, satisfied with recovery post transplantation  Services Needed/Recommended: not at this time    Financial   Income: Rudi works full-time as a .  Impact of transplant on income: minimal  Insurance and medication coverage: [employer] (through wife) and Medicare A and B  Financial concerns: none identified today  Resources needed: none at this time    Education provided by SW: Social  Work role inpatient setting    Assessment and recommendations and plan:    Rudi is doing quite well just two days post live donor kidney transplantation.  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    Rudi was engaged in learning today, and asked appropriate questions about health insurance and recovery post transplantation.  He is motivated to follow medical directives.        DELFINO Smith, [facility]  Liver Transplant   Phone [phone]  Pager [phone]

## 2019-10-03 NOTE — PLAN OF CARE
AVSS. Pain has been controlled with scheduled Tylenol and 1 dose of prn Oxycodone 5 mg. Denies nausea. Dev has had out 700 ml yellow-pink-red. Ambulated in the [place] x 2. BS ranging 211-144 on Insulin drip, currently alg #4. Slept well in between cares for first half of the shift.

## 2019-10-03 NOTE — PLAN OF CARE
"BP (!) 157/71 (BP Location: Right arm)   Pulse 92   Temp 98.6  F (37  C) (Oral)   Resp 18   Ht 1.676 m (5' 6\")   Wt 94.4 kg (208 lb 1.8 oz)   SpO2 94%   BMI 33.59 kg/m       VS  Patient denies pain. Patient denies nausea. BG monitored hourly, insulin drip on Algorithm 4 with additional carb coverage. Urine Output - 100-200mL clear yellow. Bowel Function - no BM, passing gas. Nutrition - advanced to regular diet today, good appetite, tolerated well. Activity - up ad jose manuel, walked in [place] x2. Education - initiated education on immunosuppressant medications, no med card started. Plan of Care - Continue with plan of care.     Carol Patel RN on 10/2/2019 at 10:29 PM    "

## 2019-10-03 NOTE — CONSULTS
NEW INPATIENT DIABETES MANAGEMENT CONSULT  Michael Amin  Age: 39 year old  MRN # [id number]   YOB: 1980    Chief Complaint: kidney failure, here for LDKT  Reason for Consult: glycemic management recommendations   Consulting Provider: Aissatou Jarquin PA-C    History of Present Illness:   Michael is a 39 year old male with longstanding TIDM since the age of 16 who was admitted 10/1 in preparation for LDKT. ESRD on dialysis related to diabetic nephropathy.     Underwent kidney transplant 10/1/19 without immediate complication. Thymo/Methylpred course per protocol, last dose to be given this morning.    Diabetes is managed with the use of an insulin pump (MedtronEdupath  670G with Enlite sensor); pump settings listed below. Has pump and pump supplies here. He is not sure if he has sensors here, knows they cannot be used to make treatment decisions in the hospital, so he won't plan to wear one.      He endorses frequent lows PTA following breakfast/pre-lunch, as well as overnight. He was occasionally missing lunch bolus PTA.  He is open to pump setting changes this hospitalization, and wants to establish care with [facility] endocrinology on discharge, with interval follow up as needed with IM provider or CDE closer to his home near [facility].     Tolerating advancement to regular diet, no nausea or abd pain, no BM yet. Ambulating halls without difficulty.     On IV insulin with carb coverage (PTA 1:10g CHO ordered); due to steroid effect, prandial excursions are occurring.   Overnight on IV insulin, his requirements have been high, ~6.9 units per hour, related to steroid effect.    Other Active Medical Problems: ESRD s/p LDKT  Diabetes Mellitus Type: I  Duration: 23  Years (diagnosed at age 16)  Diabetic Complications: neuropathy, retinopathy, nephropathy.  Prior to Admission Diabetes Regimen:  Medtronic 670 G, with lispro insulin  Basal Rate:  2.1 units/hour  Prandial Bolus/Insulin:CHO Ratio:  1 unit for every 10  "grams eaten, meals and snacks  Correction Bolus/Insulin Sensitivity Factor (ISF):  1 unit to lower BG by 25 mg/dL  Target Blood Glucose:   mg/dL      Usual BG control PTA:  A1c 8.9% (with Hb 10.1, likely actually higher)  History of DKA: pt denies  Able to Detect Hypoglycemia: yes, if less than 50-60. Occurring quite frequently, mostly after breakfast, and overnight.   Usual Diabetes Care Provider: IM provider in [place]  Primary Care Provider: STEPHAN BEAULIEU  Factors Impacting IP Glucose Control: post operative stress, steroid course, advancing diet, renal function post transplant.   Current Diet: Orders Placed This Encounter      Advance Diet as Tolerated: Regular Diet Adult   Level of activity: doing well, walking halls frequently.     10 point ROS completed with pertinent positives and negatives noted in the HPI  Past medical, family and social histories are reviewed and updated.    Social History    Tobacco: no    Alcohol: no    Marital Status:     Place of Residence: [place], [place]    Occupation: schoolteacher    Family History   no family history of diabetes.     Physical Exam   BP (!) 151/88 (BP Location: Right arm)   Pulse 95   Temp 98.4  F (36.9  C) (Oral)   Resp 16   Ht 1.676 m (5' 6\")   Wt 95.5 kg (210 lb 8 oz)   SpO2 96%   BMI 33.98 kg/m    General: pleasant, in no distress.   HEENT: normocephalic, atraumatic. Oral mucous membranes moist.   Lungs: unlabored respiration, no cough  ABD: rounded, soft, no lipodystrophy noted  Skin: warm and dry, no obvious lesions  MSK:  moves all extremities  Lymp:  no LE edema   Mental status:  alert, oriented to self, place, time  Psych:  affect, calm and appropriate interaction     Most Recent Laboratory Tests:  Recent Labs   Lab 10/03/19  0649   HGB 7.7*     No results for input(s): A1C in the last 168 hours.  Recent Labs   Lab 10/03/19  0649   CR 1.54*     Recent Labs   Lab 10/03/19  1256 10/03/19  1158 10/03/19  1054 10/03/19  0957 " 10/03/19  0901 10/03/19  0758  10/03/19  0649  10/02/19  0502  10/01/19  2052   GLC  --   --   --   --   --   --   --  138*  --  134*  --  197*   * 148* 171* 196* 217* 198*   < >  --    < > 137*   < >  --     < > = values in this interval not displayed.       Assessment:   1) Type I diabetes, on ambulatory insulin pump PTA  2) ESRD 2/2 diabetic nephropathy, s/p LDKT on 10/1/19    Last dose of Methylpred due this AM; then completion of course. Steroid effect will persist ~24 hours.     Plan:    -continue IV insulin; will assess for transition back onto insulin pump in 1-2 more days.   -increase aspart CHO coverage from PTA of 1:10 to 1:8g while steroids are on board.    -BG monitoring q1-2 hours per IV insulin protocol.    -hypoglycemia protocol   -advancing diet with carb counting protocol   -diabetes educator consult for pump assessment 1-2 days.    -on discharge, will assist patient with establishing endocrinology care at [facility].     Discussed plan of care with patient and primary team   Thank you for this consult; Inpatient Diabetes will continue to follow.     Diabetes Management Team job code: [id number]    I spent a total of 80 minutes bedside and on the inpatient unit managing glycemic care. Over 50% of my time on the unit was spent counseling the patient and/or coordinating care regarding acute hyperglycemia management.  See note for details.    Archana Nguyễn PA-C  Inpatient Diabetes Management Service  Pager [phone]

## 2019-10-03 NOTE — PROGRESS NOTES
Transplant Surgery  Inpatient Daily Progress Note  10/03/2019    Assessment & Plan: Mr. Amin is a 38 yo male with PMH of DM1, HTN, dyslipidemia, secondary hyperparathyroidism, and anemia in chronic kidney disease, with ESRD from diabetic nephropathy who underwent living donor kidney transplant with ureteral stent placement on 10/1/19. Total ischemic time 476 minutes.  Patient previously was on peritoneal dialysis. Catheter was removed intra-operatively.     Graft function: Good, improving. Cr 1.54 today down from 5.30. Good urinary output.   Immunosuppression management: Induction per protocol  - Thymo: 175/200/0. Hold thymo today due to thrombocytopenia.   - Solumedrol: 500/250/100 today to complete taper.   -Mycophenolate: 750 BID  -Tacrolimus: 3 mg BID. Check level POD3.   Complexity of management:High. Contributing factors: induction of immunosuppression  Hematology: Hgb 7.7 down from 8.1. Pre op  9.4. +hematuria. Thrombocytopenia secondary to thymo. Repeat CBC tomorrow.    Cardiorespiratory: Hx HTN, HLD, DM. PTA on ASA 81 mg, atorvastatin, carvedilol, and lisinopril. Ordered atorvastatin 10 mg daily. Hold restart ASA today. BP acceptable. No antihypertensive meds restarted.   -Stable on RA   -HR improved, now 80s-90s  -BP: 100s-150s.   GI/Nutrition: Diabetic diet. Tolerating well. No BM. Start senna/colace BID.   Endocrine: DM1. Insulin gtt. Endocrinology consultation to assist with transition back to home insulin pump.   Fluid/Electrolytes: MIVF: TKO.  Electrolytes wnl.  : Méndez to remain due to new surgical anastomosis, remove POD#3. Has ureteral stent that will be removed in 4-6 weeks.   Infectious disease: Afebrile  -Prophylaxis: CMV/EBV IgG positive. Valcyte x 12 weeks and bactrim indefinitely.   Prophylaxis: DVT SCDs  Disposition: 7A. Possible discharge tomorrow.     Medical Decision Making: Medium  Subsequent visit 82031 (moderate level decision making)      ROX: Aissatou Jarqiun PA-C  Resident:  Bruna Ward DO  Fellow: Travis Lynch MD  Faculty: Delroy Vela M.D.  _________________________________________________________________  Transplant History: Admitted 10/1/2019 for Living donor kidney transplant with stent placement and peritoneal dialysis catheter removal .  10/1/2019 (Kidney), Postoperative day: 2     Interval History:   No acute events overnight. Adequate pain control. Tolerating diet. No BM. Ambulating frequently.     ROS:   A 10-point review of systems was negative except as noted above.    Meds:    acetaminophen  650 mg Oral Q4H     atorvastatin  10 mg Oral Daily     insulin aspart   Subcutaneous TID w/meals     magnesium oxide  400 mg Oral Daily with lunch     methylPREDNISolone  100 mg Intravenous Once     mycophenolate  750 mg Oral BID IS     sodium chloride (PF)  10 mL Intracatheter Q8H     [START ON 10/6/2019] sulfamethoxazole-trimethoprim  1 tablet Oral Daily     tacrolimus  3 mg Oral BID IS     [START ON 10/4/2019] valGANciclovir  900 mg Oral Daily     cholecalciferol  50 mcg Oral Daily       Physical Exam:     Admit Weight: 91.4 kg (201 lb 8 oz)  Today's weight: 210 lbs 8 oz    Vital sign ranges:    Temp:  [98  F (36.7  C)-98.6  F (37  C)] 98.4  F (36.9  C)  Pulse:  [] 95  Heart Rate:  [] 95  Resp:  [16-22] 16  BP: (106-157)/(57-88) 151/88  SpO2:  [91 %-98 %] 96 %    GENERAL: Alert and oriented times three. In no apparent distress.  SKIN: No jaundice. No rashes or lesions.   HEENT: Eyes symmetrical and free of discharge bilaterally.  CV: Well perfused   RESPIRATORY: Respirations regular, even, and unlabored.   GI: Soft and non distended. Appropriately tender surrounding incision. Incision intact, no drainage. Former PD catheter site clean, minimal serous drainage.   : Méndez in place now with cherry color hematuria, no clots.  EXTREMITIES: No peripheral edema.  NEUROLOGIC: Alert and orientated x 3. No focal deficits.   MUSCULOSKELETAL: No joint swelling or  tenderness.   CVC    Data:   CMP  Recent Labs   Lab 10/03/19  0649 10/02/19  2223  10/02/19  0502  09/26/19  0945     --   --  138   < > 134   POTASSIUM 3.9 4.0   < > 3.6   < > 4.8   CHLORIDE 115*  --   --  107   < > 103   CO2 21  --   --  20   < > 21   *  --   --  134*   < > 119*   BUN 28  --   --  49*   < > 67*   CR 1.54*  --   --  5.30*   < > 9.18*   GFRESTIMATED 56*  --   --  12*   < > 6*   GFRESTBLACK 64  --   --  14*   < > 7*   APRIL 7.6*  --   --  7.5*   < > 8.6   MAG 2.0  --   --  1.7   < >  --    PHOS 2.8  --   --  3.4   < >  --    ALBUMIN  --   --   --   --   --  3.5   BILITOTAL  --   --   --   --   --  0.2   ALKPHOS  --   --   --   --   --  100   AST  --   --   --   --   --  16   ALT  --   --   --   --   --  42    < > = values in this interval not displayed.     CBC  Recent Labs   Lab 10/03/19  0649 10/02/19  2223  10/02/19  0502  09/26/19  0945   HGB 7.7* 8.1*   < > 8.4*   < > 10.5*   WBC 4.8  --   --  10.9   < > 8.2   PLT 41*  --   --  116*   < > 156   A1C  --   --   --   --   --  8.9*    < > = values in this interval not displayed.     COAGS  Recent Labs   Lab 09/26/19  0945   INR 1.00      Urinalysis  Recent Labs   Lab Test 09/26/19  0950 01/07/19  1330   COLOR Straw Yellow   APPEARANCE Clear Clear   URINEGLC 150* 50*   URINEBILI Negative Negative   URINEKETONE Negative Negative   SG 1.009 1.012   UBLD Negative Negative   URINEPH 6.0 5.0   PROTEIN Negative 100*   NITRITE Negative Negative   LEUKEST Negative Negative   RBCU 0 <1   WBCU 1 2     Virology:  Hepatitis C Antibody   Date Value Ref Range Status   09/26/2019 Nonreactive NR^Nonreactive Final     Comment:     Assay performance characteristics have not been established for newborns,   infants, and children       Attestation:    The patient has been seen and evaluated by me.   Vital signs, labs, medications and orders were reviewed.   When obtained, diagnostic images were reviewed by me and interpreted as above.    The care plan was  discussed with the multidisciplinary team and I agree with the findings and plan in this note, with any differences recorded in blue.    Immunosuppressive medication management was reviewed and adjusted as reflected in the note and orders.     .

## 2019-10-03 NOTE — PROVIDER NOTIFICATION
Dr. Lynch notified of critical low platelet level 41. Provider acknowledged and states will hold thymo dose for today.

## 2019-10-04 LAB
ANION GAP SERPL CALCULATED.3IONS-SCNC: 8 MMOL/L (ref 3–14)
BASOPHILS # BLD AUTO: 0 10E9/L (ref 0–0.2)
BASOPHILS NFR BLD AUTO: 0 %
BUN SERPL-MCNC: 20 MG/DL (ref 7–30)
CALCIUM SERPL-MCNC: 8.3 MG/DL (ref 8.5–10.1)
CHLORIDE SERPL-SCNC: 113 MMOL/L (ref 94–109)
CO2 SERPL-SCNC: 21 MMOL/L (ref 20–32)
CREAT SERPL-MCNC: 0.93 MG/DL (ref 0.66–1.25)
DIFFERENTIAL METHOD BLD: ABNORMAL
EOSINOPHIL # BLD AUTO: 0 10E9/L (ref 0–0.7)
EOSINOPHIL NFR BLD AUTO: 0.5 %
ERYTHROCYTE [DISTWIDTH] IN BLOOD BY AUTOMATED COUNT: 13.3 % (ref 10–15)
GFR SERPL CREATININE-BSD FRML MDRD: >90 ML/MIN/{1.73_M2}
GLUCOSE BLDC GLUCOMTR-MCNC: 115 MG/DL (ref 70–99)
GLUCOSE BLDC GLUCOMTR-MCNC: 116 MG/DL (ref 70–99)
GLUCOSE BLDC GLUCOMTR-MCNC: 118 MG/DL (ref 70–99)
GLUCOSE BLDC GLUCOMTR-MCNC: 119 MG/DL (ref 70–99)
GLUCOSE BLDC GLUCOMTR-MCNC: 120 MG/DL (ref 70–99)
GLUCOSE BLDC GLUCOMTR-MCNC: 121 MG/DL (ref 70–99)
GLUCOSE BLDC GLUCOMTR-MCNC: 130 MG/DL (ref 70–99)
GLUCOSE BLDC GLUCOMTR-MCNC: 131 MG/DL (ref 70–99)
GLUCOSE BLDC GLUCOMTR-MCNC: 143 MG/DL (ref 70–99)
GLUCOSE BLDC GLUCOMTR-MCNC: 143 MG/DL (ref 70–99)
GLUCOSE BLDC GLUCOMTR-MCNC: 150 MG/DL (ref 70–99)
GLUCOSE BLDC GLUCOMTR-MCNC: 150 MG/DL (ref 70–99)
GLUCOSE BLDC GLUCOMTR-MCNC: 152 MG/DL (ref 70–99)
GLUCOSE BLDC GLUCOMTR-MCNC: 160 MG/DL (ref 70–99)
GLUCOSE BLDC GLUCOMTR-MCNC: 170 MG/DL (ref 70–99)
GLUCOSE BLDC GLUCOMTR-MCNC: 185 MG/DL (ref 70–99)
GLUCOSE SERPL-MCNC: 127 MG/DL (ref 70–99)
HCT VFR BLD AUTO: 27 % (ref 40–53)
HGB BLD-MCNC: 8.2 G/DL (ref 13.3–17.7)
IMM GRANULOCYTES # BLD: 0.1 10E9/L (ref 0–0.4)
IMM GRANULOCYTES NFR BLD: 3.2 %
LYMPHOCYTES # BLD AUTO: 0.3 10E9/L (ref 0.8–5.3)
LYMPHOCYTES NFR BLD AUTO: 6.5 %
MAGNESIUM SERPL-MCNC: 1.9 MG/DL (ref 1.6–2.3)
MCH RBC QN AUTO: 29 PG (ref 26.5–33)
MCHC RBC AUTO-ENTMCNC: 30.4 G/DL (ref 31.5–36.5)
MCV RBC AUTO: 95 FL (ref 78–100)
MONOCYTES # BLD AUTO: 0.3 10E9/L (ref 0–1.3)
MONOCYTES NFR BLD AUTO: 5.8 %
NEUTROPHILS # BLD AUTO: 3.6 10E9/L (ref 1.6–8.3)
NEUTROPHILS NFR BLD AUTO: 84 %
NRBC # BLD AUTO: 0 10*3/UL
NRBC BLD AUTO-RTO: 0 /100
PHOSPHATE SERPL-MCNC: 2.2 MG/DL (ref 2.5–4.5)
PLATELET # BLD AUTO: 44 10E9/L (ref 150–450)
POTASSIUM SERPL-SCNC: 3.7 MMOL/L (ref 3.4–5.3)
RBC # BLD AUTO: 2.83 10E12/L (ref 4.4–5.9)
SODIUM SERPL-SCNC: 142 MMOL/L (ref 133–144)
WBC # BLD AUTO: 4.3 10E9/L (ref 4–11)

## 2019-10-04 PROCEDURE — 80197 ASSAY OF TACROLIMUS: CPT | Performed by: SURGERY

## 2019-10-04 PROCEDURE — 00000146 ZZHCL STATISTIC GLUCOSE BY METER IP

## 2019-10-04 PROCEDURE — 25000132 ZZH RX MED GY IP 250 OP 250 PS 637: Performed by: NURSE PRACTITIONER

## 2019-10-04 PROCEDURE — 25000132 ZZH RX MED GY IP 250 OP 250 PS 637: Performed by: PHYSICIAN ASSISTANT

## 2019-10-04 PROCEDURE — 25000128 H RX IP 250 OP 636: Performed by: SURGERY

## 2019-10-04 PROCEDURE — 12000026 ZZH R&B TRANSPLANT

## 2019-10-04 PROCEDURE — 25000125 ZZHC RX 250: Performed by: SURGERY

## 2019-10-04 PROCEDURE — 25000132 ZZH RX MED GY IP 250 OP 250 PS 637: Performed by: SURGERY

## 2019-10-04 PROCEDURE — 36592 COLLECT BLOOD FROM PICC: CPT | Performed by: SURGERY

## 2019-10-04 PROCEDURE — 25000131 ZZH RX MED GY IP 250 OP 636 PS 637: Performed by: SURGERY

## 2019-10-04 PROCEDURE — 85025 COMPLETE CBC W/AUTO DIFF WBC: CPT | Performed by: SURGERY

## 2019-10-04 PROCEDURE — 83735 ASSAY OF MAGNESIUM: CPT | Performed by: SURGERY

## 2019-10-04 PROCEDURE — 80048 BASIC METABOLIC PNL TOTAL CA: CPT | Performed by: SURGERY

## 2019-10-04 PROCEDURE — 25000132 ZZH RX MED GY IP 250 OP 250 PS 637: Performed by: TRANSPLANT SURGERY

## 2019-10-04 PROCEDURE — 84100 ASSAY OF PHOSPHORUS: CPT | Performed by: SURGERY

## 2019-10-04 PROCEDURE — 25000131 ZZH RX MED GY IP 250 OP 636 PS 637: Performed by: STUDENT IN AN ORGANIZED HEALTH CARE EDUCATION/TRAINING PROGRAM

## 2019-10-04 PROCEDURE — 25000131 ZZH RX MED GY IP 250 OP 636 PS 637: Performed by: PHYSICIAN ASSISTANT

## 2019-10-04 RX ORDER — NICOTINE POLACRILEX 4 MG
15-30 LOZENGE BUCCAL
Status: DISCONTINUED | OUTPATIENT
Start: 2019-10-04 | End: 2019-10-06 | Stop reason: HOSPADM

## 2019-10-04 RX ORDER — DEXTROSE MONOHYDRATE 25 G/50ML
25-50 INJECTION, SOLUTION INTRAVENOUS
Status: DISCONTINUED | OUTPATIENT
Start: 2019-10-04 | End: 2019-10-06 | Stop reason: HOSPADM

## 2019-10-04 RX ORDER — BISACODYL 10 MG
10 SUPPOSITORY, RECTAL RECTAL ONCE
Status: COMPLETED | OUTPATIENT
Start: 2019-10-04 | End: 2019-10-04

## 2019-10-04 RX ORDER — FUROSEMIDE 10 MG/ML
40 INJECTION INTRAMUSCULAR; INTRAVENOUS ONCE
Status: COMPLETED | OUTPATIENT
Start: 2019-10-04 | End: 2019-10-04

## 2019-10-04 RX ORDER — AMOXICILLIN 250 MG
2 CAPSULE ORAL 2 TIMES DAILY
Status: DISCONTINUED | OUTPATIENT
Start: 2019-10-04 | End: 2019-10-06 | Stop reason: HOSPADM

## 2019-10-04 RX ADMIN — BISACODYL 10 MG: 10 SUPPOSITORY RECTAL at 14:18

## 2019-10-04 RX ADMIN — MYCOPHENOLATE MOFETIL 750 MG: 250 CAPSULE ORAL at 08:00

## 2019-10-04 RX ADMIN — MYCOPHENOLATE MOFETIL 750 MG: 250 CAPSULE ORAL at 20:15

## 2019-10-04 RX ADMIN — MELATONIN 50 MCG: at 08:00

## 2019-10-04 RX ADMIN — ACETAMINOPHEN 650 MG: 325 TABLET, FILM COATED ORAL at 20:15

## 2019-10-04 RX ADMIN — OXYCODONE HYDROCHLORIDE 5 MG: 5 TABLET ORAL at 06:11

## 2019-10-04 RX ADMIN — HUMAN INSULIN 4 UNITS/HR: 100 INJECTION, SOLUTION SUBCUTANEOUS at 06:11

## 2019-10-04 RX ADMIN — SENNOSIDES AND DOCUSATE SODIUM 2 TABLET: 8.6; 5 TABLET ORAL at 20:15

## 2019-10-04 RX ADMIN — ATORVASTATIN CALCIUM 10 MG: 10 TABLET, FILM COATED ORAL at 20:15

## 2019-10-04 RX ADMIN — ACETAMINOPHEN 650 MG: 325 TABLET, FILM COATED ORAL at 08:00

## 2019-10-04 RX ADMIN — TACROLIMUS 3 MG: 1 CAPSULE ORAL at 08:00

## 2019-10-04 RX ADMIN — INSULIN LISPRO 1 VIAL: 100 INJECTION, SOLUTION INTRAVENOUS; SUBCUTANEOUS at 11:56

## 2019-10-04 RX ADMIN — SENNOSIDES AND DOCUSATE SODIUM 1 TABLET: 8.6; 5 TABLET ORAL at 08:00

## 2019-10-04 RX ADMIN — MAGNESIUM OXIDE TAB 400 MG (241.3 MG ELEMENTAL MG) 400 MG: 400 (241.3 MG) TAB at 12:03

## 2019-10-04 RX ADMIN — FUROSEMIDE 40 MG: 10 INJECTION, SOLUTION INTRAVENOUS at 10:18

## 2019-10-04 RX ADMIN — ACETAMINOPHEN 650 MG: 325 TABLET, FILM COATED ORAL at 03:56

## 2019-10-04 RX ADMIN — INSULIN ASPART 8 UNITS: 100 INJECTION, SOLUTION INTRAVENOUS; SUBCUTANEOUS at 08:01

## 2019-10-04 RX ADMIN — TACROLIMUS 3 MG: 1 CAPSULE ORAL at 17:41

## 2019-10-04 RX ADMIN — VALGANCICLOVIR 900 MG: 450 TABLET, FILM COATED ORAL at 08:00

## 2019-10-04 RX ADMIN — ACETAMINOPHEN 650 MG: 325 TABLET, FILM COATED ORAL at 15:33

## 2019-10-04 RX ADMIN — ACETAMINOPHEN 650 MG: 325 TABLET, FILM COATED ORAL at 12:07

## 2019-10-04 ASSESSMENT — ACTIVITIES OF DAILY LIVING (ADL)
ADLS_ACUITY_SCORE: 12
ADLS_ACUITY_SCORE: 11

## 2019-10-04 ASSESSMENT — MIFFLIN-ST. JEOR: SCORE: 1820.27

## 2019-10-04 NOTE — PROGRESS NOTES
Transplant Surgery  Inpatient Daily Progress Note  10/04/2019    Assessment & Plan: Mr. Amin is a 40 yo male with PMH of DM1, HTN, dyslipidemia, secondary hyperparathyroidism, and anemia in chronic kidney disease, with ESRD from diabetic nephropathy who underwent living donor kidney transplant with ureteral stent placement on 10/1/19.  Patient previously was on peritoneal dialysis. Catheter was removed intra-operatively.     Graft function: Good, Cr 0.93 today down from 1.54. Good urinary output.   Immunosuppression management: Induction per protocol  - Thymo: 175/200/0. Hold thymo on 10/3 and 10/4 due to thrombocytopenia.   - Solumedrol: 500/250/100   -Mycophenolate: 750 BID  -Tacrolimus: 3 mg BID. Check level POD3.   Complexity of management:High. Contributing factors: induction of immunosuppression  Hematology: Hgb 8.2 stable. Pre op  9.4. Thrombocytopenia secondary to thymo. Repeat CBC tomorrow.    Cardiorespiratory: Hx HTN, HLD, DM. PTA on ASA 81 mg, atorvastatin, carvedilol, and lisinopril. Ordered atorvastatin 10 mg daily. Hold restart ASA today. BP acceptable. No antihypertensive meds restarted.   -Stable on RA   -HR improved, now 80s-90s  -SBP: 150s-160s.   GI/Nutrition: Diabetic diet. Tolerating well. No BM. Start senna/colace BID.   Endocrine: DM1. Insulin gtt. Endocrinology consultation to assist with transition back to home insulin pump.   Fluid/Electrolytes: MIVF: TKO.  Electrolytes wnl. Will diurese patient with 40 mg of Lasix today  : Removed paredes catheter today. Has ureteral stent that will be removed in 4-6 weeks.   Infectious disease: Afebrile  -Prophylaxis: CMV/EBV IgG positive. Valcyte x 12 weeks and bactrim indefinitely.   Prophylaxis: DVT SCDs  Disposition: 7A. Possible discharge tomorrow.     Medical Decision Making: Medium  Subsequent visit 50383 (moderate level decision making)      ROX: Aissatou Jarquin PA-C  Resident: Bruna Ward DO  Fellow: Travis Lynch MD  Faculty: Delroy HIRSCH  SANTOS Vela  _________________________________________________________________  Transplant History: Admitted 10/1/2019 for Living donor kidney transplant with stent placement and peritoneal dialysis catheter removal .  10/1/2019 (Kidney), Postoperative day: 3     Interval History:   No acute events overnight. Adequate pain control. Tolerating diet. No BM. Ambulating frequently.     ROS:   A 10-point review of systems was negative except as noted above.    Meds:    acetaminophen  650 mg Oral Q4H     atorvastatin  10 mg Oral Daily     furosemide  40 mg Intravenous Once     insulin bolus from AMBULATORY PUMP   Subcutaneous 4x Daily AC & HS     insulin bolus from AMBULATORY PUMP   Subcutaneous TID AC     insulin lispro   Device See Admin Instructions     magnesium oxide  400 mg Oral Daily with lunch     mycophenolate  750 mg Oral BID IS     senna-docusate  1 tablet Oral BID     sodium chloride (PF)  10 mL Intracatheter Q8H     [START ON 10/6/2019] sulfamethoxazole-trimethoprim  1 tablet Oral Daily     tacrolimus  3 mg Oral BID IS     valGANciclovir  900 mg Oral Daily     cholecalciferol  50 mcg Oral Daily       Physical Exam:     Admit Weight: 91.4 kg (201 lb 8 oz)  Today's weight: 213 lbs 4.8 oz    Vital sign ranges:    Temp:  [97.4  F (36.3  C)-98.2  F (36.8  C)] 98.2  F (36.8  C)  Pulse:  [] 88  Heart Rate:  [] 76  Resp:  [16-18] 18  BP: (152-168)/(83-91) 152/91  SpO2:  [96 %-99 %] 99 %    GENERAL: Alert and oriented times three. In no apparent distress.  SKIN: No jaundice. No rashes or lesions.   HEENT: Eyes symmetrical and free of discharge bilaterally.  CV: Well perfused   RESPIRATORY: Respirations regular, even, and unlabored.   GI: Soft and non distended. Appropriately tender surrounding incision. Incision intact, no drainage. Former PD catheter site clean, minimal serous drainage.   : Méndez in place now with cherry color hematuria, no clots.  EXTREMITIES: No peripheral edema.  NEUROLOGIC: Alert  and orientated x 3. No focal deficits.   MUSCULOSKELETAL: No joint swelling or tenderness.   CVC    Data:   CMP  Recent Labs   Lab 10/04/19  0631 10/03/19  0649    143   POTASSIUM 3.7 3.9   CHLORIDE 113* 115*   CO2 21 21   * 138*   BUN 20 28   CR 0.93 1.54*   GFRESTIMATED >90 56*   GFRESTBLACK >90 64   APRIL 8.3* 7.6*   MAG 1.9 2.0   PHOS 2.2* 2.8     CBC  Recent Labs   Lab 10/04/19  0631 10/03/19  0649   HGB 8.2* 7.7*   WBC 4.3 4.8   PLT 44* 41*     COAGS  No lab results found in last 7 days.    Invalid input(s): XA   Urinalysis  Recent Labs   Lab Test 09/26/19  0950 01/07/19  1330   COLOR Straw Yellow   APPEARANCE Clear Clear   URINEGLC 150* 50*   URINEBILI Negative Negative   URINEKETONE Negative Negative   SG 1.009 1.012   UBLD Negative Negative   URINEPH 6.0 5.0   PROTEIN Negative 100*   NITRITE Negative Negative   LEUKEST Negative Negative   RBCU 0 <1   WBCU 1 2     Virology:  Hepatitis C Antibody   Date Value Ref Range Status   09/26/2019 Nonreactive NR^Nonreactive Final     Comment:     Assay performance characteristics have not been established for newborns,   infants, and children       Attestation:    The patient has been seen and evaluated by me.   Vital signs, labs, medications and orders were reviewed.   When obtained, diagnostic images were reviewed by me and interpreted as above.    The care plan was discussed with the multidisciplinary team and I agree with the findings and plan in this note, with any differences recorded in blue.    Immunosuppressive medication management was reviewed and adjusted as reflected in the note and orders.     .

## 2019-10-04 NOTE — PLAN OF CARE
"/87 (BP Location: Right arm)   Pulse 88   Temp 97.4  F (36.3  C) (Oral)   Resp 16   Ht 1.676 m (5' 6\")   Wt 96.8 kg (213 lb 4.8 oz)   SpO2 99%   BMI 34.43 kg/m       VS: stable on room air.   BG: pt on ambulatory insulin pump and notifies staff of bolus amounts based on pre-meal BG and carb coverage.   LABS: lab book updated and pt on oral mag replacement.   Pain: on scheduled tylenol but not requesting PRN meds.   PRN: lasix x 1 and suppository x 1.  Nausea: denied.   Diet: regular with good appetite.   LDA: internal jugular saline locked.   GI/: voiding and pt reported having a BM after suppository. Post void residuals x 3 all low.  Skin: incision dressing changed this morning and PD cath site dressing changed this morning and due to be changed tonight.   Mobility: up independently.   Education: pt watched MTP videos and provided teach back on transplant medications during med passes.   Tests/Procedures: n/a.   Plan: continue POC.     All care explained and questions answered. Will continue to monitor and notify team of changes.     "

## 2019-10-04 NOTE — PROGRESS NOTES
IP Diabetes Management  Daily Note           Assessment and Plan:   HPI: Michael is a 39 year old male with longstanding TIDM since the age of 16 who was admitted 10/1 in preparation for LDKT. ESRD on dialysis related to diabetic nephropathy. Underwent kidney transplant 10/1/19 without immediate complication.     Assessment:   1)Type I Diabetes Mellitus, poorly controlled (A1c 8.9% with chronic anemia), with steroid induced hyperglycemia, resolved.     Plan:   -transitioning back onto insulin pump today with updated settings, in bold  -CDE consulted for pump assessment; once patient has been seen by CDE, and pump set up, may resume the use of pump and discontinue IV insulin 30 minutes after pump is resumed.     Medtronic 670 G, with lispro insulin  Basal rates: (from 2.1 units per hour all day PTA)  7651-5135 = 1.9 units/hr  8865-3130 = 2.4 units/hr  2555-8186 = 1.9 units/hr     Bolus settings:  Insulin:carb ratio of 1:8g (from 1:10 PTA)  Insulin sensitivity factor:  1 unit to lower blood glucose 25 mg/dL  Blood glucose target range  mg/dL  Active insulin time is 3 hours    Outpatient follow up: with [facility] endocrinology in 1-2 weeks; sent request to clinic 10/4.  Plan discussed with patient, CDE, bedside RN, and primary team      Interval History and Assessment: interval glucose trend reviewed:   BG remaining stable and within target on IV insulin. Marked reduction in IV insulin rates with steroid effect wearing off. From 0600 onward (nearly 24 hours following last MP dose), IV rates ~3 units per hour. Anticipating some further steroid effect washout over the next 12 hours, thus updated basal settings are slightly lower.     He endorsed frequent overnight lows at home, thus have elected to add an overnight basal rate, and started with 1.9 units per hour as an anticipated requirement; this may need further adjustment prior to discharge.     Did have increased IV insulin requirement following 64g CHO breakfast  with 1:8g CHO ordered; there may be some residual prandial steroid effect; if prandial hyperglycemia ensues into the evening, his ICR may require adjustment prior to discharge.     Primary team anticipating discharge tomorrow.Michael would like to continue to follow with [facility] on discharge for diabetes care.     Current nutritional intake and type: Orders Placed This Encounter      Advance Diet as Tolerated: Regular Diet Adult    PTA Diabetes Regimen:   Medtronic 670 G, with lispro insulin  Basal Rate:  2.1 units/hour  Prandial Bolus/Insulin:CHO Ratio:  1 unit for every 10 grams eaten, meals and snacks  Correction Bolus/Insulin Sensitivity Factor (ISF):  1 unit to lower BG by 25 mg/dL  Target Blood Glucose:   mg/dL     Discharge Planning: likely discharge tomorrow to local housing.           Diabetes History:   Type of Diabetes: Type 1 Diabetes Mellitus, with nephropathy, with retinopathy, with neuropathy  Lab Results   Component Value Date    A1C 8.9 09/26/2019    A1C 10.8 01/07/2019    A1C 8.6 08/15/2018              Review of Systems:   The Review of Systems is negative other than noted in the Interval History.           Medications:     Current Facility-Administered Medications   Medication     acetaminophen (TYLENOL) tablet 650 mg     atorvastatin (LIPITOR) tablet 10 mg     bisacodyl (DULCOLAX) Suppository 10 mg     glucose gel 15-30 g    Or     dextrose 50 % injection 25-50 mL    Or     glucagon injection 1 mg     insulin basal rate from AMBULATORY PUMP     insulin bolus from AMBULATORY PUMP     insulin bolus from AMBULATORY PUMP     insulin bolus from AMBULATORY PUMP     insulin lispro (HumaLOG) 100 UNIT/ML vial for filling pump reservoir     lactated ringers infusion     magnesium oxide (MAG-OX) tablet 400 mg     mycophenolate (GENERIC EQUIVALENT) capsule 750 mg     naloxone (NARCAN) injection 0.1-0.4 mg     oxyCODONE (ROXICODONE) tablet 5-10 mg     senna-docusate (SENOKOT-S/PERICOLACE) 8.6-50 MG per  "tablet 2 tablet     sodium chloride (PF) 0.9% PF flush 10 mL     sodium chloride (PF) 0.9% PF flush 10-20 mL     [START ON 10/6/2019] sulfamethoxazole-trimethoprim (BACTRIM/SEPTRA) 400-80 MG per tablet 1 tablet     tacrolimus (GENERIC EQUIVALENT) capsule 3 mg     valGANciclovir (VALCYTE) tablet 900 mg     vitamin D3 (CHOLECALCIFEROL) 1000 units (25 mcg) tablet 50 mcg            Physical Exam:    /84 (BP Location: Left arm)   Pulse 88   Temp 97.8  F (36.6  C) (Oral)   Resp 18   Ht 1.676 m (5' 6\")   Wt 96.8 kg (213 lb 4.8 oz)   SpO2 98%   BMI 34.43 kg/m    General: pleasant, in no distress. Father at bedside.   HEENT: normocephalic, atraumatic. Oral mucous membranes moist.   Lungs: unlabored respiration, no cough  ABD: rounded, soft, no lipodystrophy noted  Skin: warm and dry, no obvious lesions  MSK:  moves all extremities  Lymp:  no LE edema   Mental status:  alert, oriented to self, place, time  Psych:  affect, calm and appropriate interaction             Data:     Recent Labs   Lab 10/04/19  1309 10/04/19  1201 10/04/19  1055 10/04/19  1025 10/04/19  0903 10/04/19  0756  10/04/19  0631  10/03/19  0649  10/02/19  0502  10/01/19  2052   GLC  --   --   --   --   --   --   --  127*  --  138*  --  134*  --  197*   * 170* 143* 115* 160* 121*   < >  --    < >  --    < > 137*   < >  --     < > = values in this interval not displayed.     Lab Results   Component Value Date    WBC 4.3 10/04/2019    WBC 4.8 10/03/2019    WBC 10.9 10/02/2019    HGB 8.2 (L) 10/04/2019    HGB 7.7 (L) 10/03/2019    HGB 8.1 (L) 10/02/2019    HCT 27.0 (L) 10/04/2019    HCT 24.6 (L) 10/03/2019    HCT 27.0 (L) 10/02/2019    MCV 95 10/04/2019    MCV 95 10/03/2019    MCV 93 10/02/2019    PLT 44 (LL) 10/04/2019    PLT 41 (LL) 10/03/2019     (L) 10/02/2019     Lab Results   Component Value Date     10/04/2019     10/03/2019     10/02/2019    POTASSIUM 3.7 10/04/2019    POTASSIUM 3.9 10/03/2019    POTASSIUM " 4.0 10/02/2019    CHLORIDE 113 (H) 10/04/2019    CHLORIDE 115 (H) 10/03/2019    CHLORIDE 107 10/02/2019    CO2 21 10/04/2019    CO2 21 10/03/2019    CO2 20 10/02/2019     (H) 10/04/2019     (H) 10/03/2019     (H) 10/02/2019     Lab Results   Component Value Date    BUN 20 10/04/2019    BUN 28 10/03/2019    BUN 49 (H) 10/02/2019     No results found for: TSH  Lab Results   Component Value Date    AST 16 09/26/2019    AST 15 01/07/2019    AST 10 (L) 08/16/2018    ALT 42 09/26/2019    ALT 46 01/07/2019    ALT 18 08/16/2018    ALKPHOS 100 09/26/2019    ALKPHOS 157 (H) 01/07/2019       Diabetes Management Team job code: [id number]  I spent a total of 25 minutes bedside and on the inpatient unit managing glycemic care. Over 50% of my time on the unit was spent counseling the patient and/or coordinating care regarding acute hyperglycemia management.  See note for details.    Archana Nguyễn PA-C  Inpatient Diabetes Management Service  Pager [phone]

## 2019-10-04 NOTE — PLAN OF CARE
"2300 - 0730  BP (!) 168/90 (BP Location: Left arm)   Pulse 88   Temp 98  F (36.7  C) (Oral)   Resp 18   Ht 1.676 m (5' 6\")   Wt 95.5 kg (210 lb 8 oz)   SpO2 97%   BMI 33.98 kg/m      VS: hypertensive, notified on-call currently no interventions d/t nephrology consult in AM, OVSS on RA  B - 152  Pain/Nausea: scheduled tylenol adequately controlling pain. Denied nausea  Diet: Regular  LDA: I.J. infusing TKO and insulin gtt  GI: no BM but passing gas  : Paredes output 1300 mL- removed paredes at 0600  Skin: incision covered, old PD cath site packed - scant, legs +2 edema   Mobility: up ad jose manuel  Plan: continue to monitor    "

## 2019-10-04 NOTE — CONSULTS
Ambulatory Insulin Pump Assessment  Received consult request to see this 39 year old male for ambulatory insulin pump assessment and resumption.    Patient with history of type 1 diabetes, is s/p living donor kidney transplant on 10/1/19.      Type of pump:  Medtronic 670G, with Enlite sensor and Captual Contour USB blood glucose monitor.    Type of insulin:  Lispro    Supplies:  3 ml insulin reservoir, [place] infusion sets    Observed patient adjust insulin pump settings to:    Basal rates:  2409-3179 = 1.9 units/hr  2424-2826 = 2.4 units/hr  0162-9650 = 1.9 units/hr    Bolus settings:  Insulin:carb ratio of 1 unit/8 grams CHO  Insulin sensitivity factor:  1 unit to lower blood glucose 25 mg/dL  Blood glucose target range  mg/dL  Active insulin time is 3 hours    Observed patient fill reservoir and prime infusion set.  He inserted infusion set into right abdominal area.    Reviewed use of temporary basal feature; he was able to locate this feature in the menu, and identify how to set up.    Kathie Foreman MS, APRN, CNS, CDE, CDTC  [phone]

## 2019-10-04 NOTE — PROGRESS NOTES
CLINICAL NUTRITION SERVICES - BRIEF/DISCHARGE NOTE     Nutrition Prescription      Future/Additional Recommendations:  Minimize diet restrictions as able d/t high calorie/protein needs post-transplant.  Oral supplements as needed to help meet nutritional needs.     High protein food choices with meals to help meet high needs post-transplant over the next 6-8 weeks.     Heart-healthy diet (low saturated fat, low sodium, high fiber) and food safety precautions long term due to immunosuppression regimen post-transplant         EVALUATION OF THE PROGRESS TOWARD GOALS   Diet: Regular     Intake: 50-75% Ordered hamburger for lunch with baked chips, apple sauce and rice krispie bar- at ~50% of burger and chips.      NEW FINDINGS   No bowel movement yet- patient reported he is passing gas and has appetite that continues to improve    Labs: Po4 2.2 (L)    Patient s discharge needs assessed and discharge planning has been conducted with the multidisciplinary transplant care team including physicians, pharmacy, social work and transplant coordinator.     Monitoring/Evaluation  Progress toward goals will be monitored and evaluated per protocol.    Once discharged, place outpatient nutrition consult via the transplant team if nutrition concerns arise.    Lyssa Bass RD, RM  6B pager: [phone]

## 2019-10-04 NOTE — PROGRESS NOTES
[facility], [place]   Transplant Nephrology Progress Note  Date of Admission:  10/1/2019  Today's Date: 10/04/2019    Assessment & Plan    # LDKT:  Decreased creatinine, down to ~ 0.9 today.  Very good urine output.  No acute indications for dialysis.   - Baseline Cr ~ TBD   - Proteinuria: Not checked post transplant   - Date DSA Last Checked: Sep/2019  Latest DSA: No DSA at time of transplant   - BK Viremia: Not checked post transplant   - Kidney Tx Biopsy: No    # Immunosuppression: Tacrolimus immediate release (goal 8-10) and Mycophenolate mofetil (goal 1-3.5); Induction with Thymo and Solumedrol per protocol.   - Changes: No    - Last dose of Thymo on hold due to thrombocytopenia.    # Prophylaxis:   - PJP: Sulfa/TMP (Bactrim)   - CMV: Valcyte   - Thrush: None    # Blood Pressure:  Borderline control; Goal BP: < 150/90   - Volume status: Mildly hypervolemic  EDW ~ 89.5 kg, now 96.8 kg   - Changes: Yes - would recommend increasing diuresis.    # Diabetes: Borderline control (HbA1c 7-9%) On insulin drip.   - Management as per primary team.    # Anemia in Chronic Renal Disease: Hgb: Stable      JOSE: No   - Iron studies: Replete    # Mineral Bone Disorder:   - Secondary renal hyperparathyroidism; PTH level: Moderately elevated (301-600 pg/ml)  - Vitamin D; level: Low and will continue on cholecalciferol.  - Calcium; level: Low, improved and will continue on cholecalciferol.  - Phosphorus; level: Low and recommend increased dietary phosphorus.    # Electrolytes:   - Potassium; level: Normal  - Magnesium; level: Normal  - Bicarbonate; level: Low, stable, but should increase with improving kidney function.    # Overweight: Once patient has recovered from kidney transplant surgery, he plans to lose more weight in order to get a pancreas transplant.    # Transplant History:  Etiology of kidney failure: diabetes mellitus type 1  Tx: LDKT  Transplant: 10/1/2019 (Kidney)  Donor Type:  "Living Donor Class:   Crossmatch at time of Tx: negative  DSA at time of Tx: No  Significant changes in immunosuppression: None  Significant transplant-related complications: None    Recommendations were communicated to the primary team via this note.    Jabier Hernández MD  Pager: [phone]    Interval History   Mr. Michel kidney function is improved with creatinine ~ 0.9.  Very good urine output.  Patient reports feeling better every day.  Ambulating on the floor a lot.  No chest pain or shortness of breath.  No nausea or vomiting.  Passing gas, but no bowel movement yet.  No fever, sweats or chills.  Still some puffiness, but less.  Minimal tingling of left hand.    Review of Systems   4 point ROS was obtained and negative except as noted in the Interval History.    Physical Exam   Temp  Av.6  F (37  C)  Min: 97.9  F (36.6  C)  Max: 100.4  F (38  C)      Pulse  Av.1  Min: 74  Max: 110 Resp  Av.9  Min: 12  Max: 26  SpO2  Av.9 %  Min: 80 %  Max: 100 %    CVP (mmHg): 9 mmHgBP (!) 152/91 (BP Location: Left arm)   Pulse 88   Temp 98.2  F (36.8  C) (Oral)   Resp 18   Ht 1.676 m (5' 6\")   Wt 96.8 kg (213 lb 4.8 oz)   SpO2 99%   BMI 34.43 kg/m     Date 10/03/19 0700 - 10/04/19 0659   Shift 0837-2317 7470-1779 6217-1837 24 Hour Total   INTAKE   P.O. 720   720   Shift Total(mL/kg) 720(7.54)   720(7.54)   OUTPUT   Urine 325   325   Shift Total(mL/kg) 325(3.4)   325(3.4)   Weight (kg) 95.48 95.48 95.48 95.48      Admit Weight: 91.4 kg (201 lb 8 oz)   GENERAL APPEARANCE: alert and no distress  HENT: mouth without ulcers or lesions  RESP: lungs clear to auscultation - no rales, rhonchi or wheezes  CV: regular rhythm, normal rate, no rub, no murmur  EDEMA: trace LE edema bilaterally  ABDOMEN: soft, nondistended, nontender, bowel sounds normal  MS: extremities normal - no gross deformities noted, no evidence of inflammation in joints, no muscle tenderness  SKIN: no rash  TX KIDNEY: minimal " TTP  DIALYSIS ACCESS:  None    Data   All labs reviewed by me.  CMP  Recent Labs   Lab 10/04/19  0631 10/03/19  0649 10/02/19  2223 10/02/19  1348  10/02/19  0502  10/01/19  2052    143  --   --   --  138  --  137   POTASSIUM 3.7 3.9 4.0 3.9   < > 3.6   < > 4.2   CHLORIDE 113* 115*  --   --   --  107  --  107   CO2 21 21  --   --   --  20  --  18*   ANIONGAP 8 7  --   --   --  10  --  11   * 138*  --   --   --  134*  --  197*   BUN 20 28  --   --   --  49*  --  62*   CR 0.93 1.54*  --   --   --  5.30*  --  7.97*   GFRESTIMATED >90 56*  --   --   --  12*  --  8*   GFRESTBLACK >90 64  --   --   --  14*  --  9*   APRIL 8.3* 7.6*  --   --   --  7.5*  --  7.2*   MAG 1.9 2.0  --   --   --  1.7  --  1.7   PHOS 2.2* 2.8  --   --   --  3.4  --  3.1    < > = values in this interval not displayed.     CBC  Recent Labs   Lab 10/04/19  0631 10/03/19  0649 10/02/19  2223 10/02/19  1348  10/02/19  0502  10/01/19  2052   HGB 8.2* 7.7* 8.1* 8.0*   < > 8.4*   < > 8.3*   WBC 4.3 4.8  --   --   --  10.9  --  5.7   RBC 2.83* 2.60*  --   --   --  2.89*  --  2.79*   HCT 27.0* 24.6*  --   --   --  27.0*  --  26.1*   MCV 95 95  --   --   --  93  --  94   MCH 29.0 29.6  --   --   --  29.1  --  29.7   MCHC 30.4* 31.3*  --   --   --  31.1*  --  31.8   RDW 13.3 13.4  --   --   --  13.2  --  13.3   PLT 44* 41*  --   --   --  116*  --  86*    < > = values in this interval not displayed.     INRNo lab results found in last 7 days.  ABGNo lab results found in last 7 days.   Urine Studies  Recent Labs   Lab Test 09/26/19  0950 01/07/19  1330   COLOR Straw Yellow   APPEARANCE Clear Clear   URINEGLC 150* 50*   URINEBILI Negative Negative   URINEKETONE Negative Negative   SG 1.009 1.012   UBLD Negative Negative   URINEPH 6.0 5.0   PROTEIN Negative 100*   NITRITE Negative Negative   LEUKEST Negative Negative   RBCU 0 <1   WBCU 1 2     No lab results found.  PTH  Recent Labs   Lab Test 09/26/19  0945   PTHI 356*     Iron Studies  Recent Labs    Lab Test 09/26/19  0945   IRON 70      IRONSAT 28   EDOUARD 753*       IMAGING:  All imaging studies reviewed by me.     MEDICATIONS:  Current Facility-Administered Medications   Medication     acetaminophen (TYLENOL) tablet 650 mg     atorvastatin (LIPITOR) tablet 10 mg     dextrose 10 % 1,000 mL infusion     glucose gel 15-30 g    Or     dextrose 50 % injection 25-50 mL    Or     glucagon injection 1 mg     insulin 1 unit/mL in saline (NovoLIN, HumuLIN Regular) drip - ADULT IV Infusion     insulin aspart (NovoLOG) inj (RAPID ACTING)     insulin aspart (NovoLOG) inj (RAPID ACTING)     insulin lispro (HumaLOG) 100 UNIT/ML vial for filling pump reservoir     lactated ringers infusion     magnesium oxide (MAG-OX) tablet 400 mg     mycophenolate (GENERIC EQUIVALENT) capsule 750 mg     naloxone (NARCAN) injection 0.1-0.4 mg     oxyCODONE (ROXICODONE) tablet 5-10 mg     senna-docusate (SENOKOT-S/PERICOLACE) 8.6-50 MG per tablet 1 tablet     sodium chloride (PF) 0.9% PF flush 10 mL     sodium chloride (PF) 0.9% PF flush 10-20 mL     [START ON 10/6/2019] sulfamethoxazole-trimethoprim (BACTRIM/SEPTRA) 400-80 MG per tablet 1 tablet     tacrolimus (GENERIC EQUIVALENT) capsule 3 mg     valGANciclovir (VALCYTE) tablet 900 mg     vitamin D3 (CHOLECALCIFEROL) 1000 units (25 mcg) tablet 50 mcg

## 2019-10-04 NOTE — PLAN OF CARE
"  BP (!) 160/84 (BP Location: Left arm)   Pulse 88   Temp 97.9  F (36.6  C) (Oral)   Resp 16   Ht 1.676 m (5' 6\")   Wt 95.5 kg (210 lb 8 oz)   SpO2 97%   BMI 33.98 kg/m      [id number]  Slightly hypertensive, 160/84; OVSS on RA. A/Ox4, pleasant and cooperative with cares. Endorsed incisional pain--adequately managed with scheduled tylenol and ice packs; no prns given. Incision stapled, c/d/i; covered with gauze per pt request. Old PD cath site packed by MDs today (RLQ); Denied n/v--on regular diet with carb coverage for meals/snacks. RIJ patent with TKO and insulin gtt; -191 on algorithm 4/4+. Up ad jose manuel in room; walking in halls and tolerating activity well; passing gas, no BMs yet. Paredes patent with good UOP; paredes cares done with wipes; pt very eager for paredes to be pulled. Med card and lab book updated by day RN. Met with specialty pharmacy today. Table tent updated with videos taht pt needs to watch--pt verbalized understanding. Plan to transition pt from insulin gtt to ambulatory insulin pump; pt will likely discharge on the weekend. Pt spent most of shift watching television/movies or up walking; in good spirits. Pt sleeping now; call light and belongings within reach. Will continue to monitor and continue POC/notify team as needed.   "

## 2019-10-04 NOTE — PLAN OF CARE
"/84 (BP Location: Left arm)   Pulse 88   Temp 97.8  F (36.6  C) (Oral)   Resp 18   Ht 1.676 m (5' 6\")   Wt 96.8 kg (213 lb 4.8 oz)   SpO2 98%   BMI 34.43 kg/m      VS: stable on RA  Pain: Scheduled tylenol. Denies pain - no prn oxy given.  BG: Pt transitioned off insulin drip to ambulatory pump at 1230. BG checks at meals. Pt self administering CC.  LABS: Phosphorus 2.2 & Calcium 8.3. No active replacement orders.   Diet: Regular. CHO #  LDA: 3 lumen CVC - SL  : Lasix given, pt voiding  GI: No BM since before surgery. Was on senna, but changed to suppository. 1st dose given.  Skin: Peritoneal dialysis catheter site wound on L abd & incision site on R abd. Both dressings changed on shift, CDI.  Mobility: UAL  Education: Diabetes educator came to teach pt about ambulatory pump.    Pt A/O x4. Continue to monitor, will notify providers of any changes.      "

## 2019-10-05 LAB
ANION GAP SERPL CALCULATED.3IONS-SCNC: 4 MMOL/L (ref 3–14)
BASOPHILS # BLD AUTO: 0 10E9/L (ref 0–0.2)
BASOPHILS NFR BLD AUTO: 0 %
BLD PROD TYP BPU: NORMAL
BLD PROD TYP BPU: NORMAL
BLD UNIT ID BPU: 0
BLD UNIT ID BPU: 0
BLOOD PRODUCT CODE: NORMAL
BLOOD PRODUCT CODE: NORMAL
BPU ID: NORMAL
BPU ID: NORMAL
BUN SERPL-MCNC: 16 MG/DL (ref 7–30)
CALCIUM SERPL-MCNC: 7.9 MG/DL (ref 8.5–10.1)
CHLORIDE SERPL-SCNC: 110 MMOL/L (ref 94–109)
CO2 SERPL-SCNC: 25 MMOL/L (ref 20–32)
CREAT SERPL-MCNC: 0.79 MG/DL (ref 0.66–1.25)
DIFFERENTIAL METHOD BLD: ABNORMAL
EOSINOPHIL # BLD AUTO: 0 10E9/L (ref 0–0.7)
EOSINOPHIL NFR BLD AUTO: 0.9 %
ERYTHROCYTE [DISTWIDTH] IN BLOOD BY AUTOMATED COUNT: 13.2 % (ref 10–15)
GFR SERPL CREATININE-BSD FRML MDRD: >90 ML/MIN/{1.73_M2}
GLUCOSE BLDC GLUCOMTR-MCNC: 106 MG/DL (ref 70–99)
GLUCOSE BLDC GLUCOMTR-MCNC: 110 MG/DL (ref 70–99)
GLUCOSE BLDC GLUCOMTR-MCNC: 110 MG/DL (ref 70–99)
GLUCOSE BLDC GLUCOMTR-MCNC: 128 MG/DL (ref 70–99)
GLUCOSE BLDC GLUCOMTR-MCNC: 159 MG/DL (ref 70–99)
GLUCOSE BLDC GLUCOMTR-MCNC: 223 MG/DL (ref 70–99)
GLUCOSE BLDC GLUCOMTR-MCNC: 59 MG/DL (ref 70–99)
GLUCOSE BLDC GLUCOMTR-MCNC: 74 MG/DL (ref 70–99)
GLUCOSE SERPL-MCNC: 69 MG/DL (ref 70–99)
HCT VFR BLD AUTO: 26.3 % (ref 40–53)
HGB BLD-MCNC: 7.9 G/DL (ref 13.3–17.7)
LACTATE BLD-SCNC: 1.2 MMOL/L (ref 0.7–2)
LYMPHOCYTES # BLD AUTO: 0.1 10E9/L (ref 0.8–5.3)
LYMPHOCYTES NFR BLD AUTO: 2.7 %
MAGNESIUM SERPL-MCNC: 1.6 MG/DL (ref 1.6–2.3)
MCH RBC QN AUTO: 28.7 PG (ref 26.5–33)
MCHC RBC AUTO-ENTMCNC: 30 G/DL (ref 31.5–36.5)
MCV RBC AUTO: 96 FL (ref 78–100)
METAMYELOCYTES # BLD: 0.1 10E9/L
METAMYELOCYTES NFR BLD MANUAL: 1.8 %
MONOCYTES # BLD AUTO: 0.1 10E9/L (ref 0–1.3)
MONOCYTES NFR BLD AUTO: 3.6 %
NEUTROPHILS # BLD AUTO: 2.6 10E9/L (ref 1.6–8.3)
NEUTROPHILS NFR BLD AUTO: 91 %
PHOSPHATE SERPL-MCNC: 2 MG/DL (ref 2.5–4.5)
PLATELET # BLD AUTO: 51 10E9/L (ref 150–450)
PLATELET # BLD EST: ABNORMAL 10*3/UL
POTASSIUM SERPL-SCNC: 3.7 MMOL/L (ref 3.4–5.3)
RBC # BLD AUTO: 2.75 10E12/L (ref 4.4–5.9)
RBC MORPH BLD: NORMAL
SODIUM SERPL-SCNC: 139 MMOL/L (ref 133–144)
TACROLIMUS BLD-MCNC: 5.9 UG/L (ref 5–15)
TME LAST DOSE: NORMAL H
TRANSFUSION STATUS PATIENT QL: NORMAL
WBC # BLD AUTO: 2.9 10E9/L (ref 4–11)

## 2019-10-05 PROCEDURE — 83605 ASSAY OF LACTIC ACID: CPT

## 2019-10-05 PROCEDURE — 25000128 H RX IP 250 OP 636: Performed by: SURGERY

## 2019-10-05 PROCEDURE — 80048 BASIC METABOLIC PNL TOTAL CA: CPT | Performed by: SURGERY

## 2019-10-05 PROCEDURE — 25000132 ZZH RX MED GY IP 250 OP 250 PS 637: Performed by: PHYSICIAN ASSISTANT

## 2019-10-05 PROCEDURE — 25000132 ZZH RX MED GY IP 250 OP 250 PS 637: Performed by: INTERNAL MEDICINE

## 2019-10-05 PROCEDURE — 83605 ASSAY OF LACTIC ACID: CPT | Performed by: TRANSPLANT SURGERY

## 2019-10-05 PROCEDURE — 25000131 ZZH RX MED GY IP 250 OP 636 PS 637: Performed by: SURGERY

## 2019-10-05 PROCEDURE — 00000146 ZZHCL STATISTIC GLUCOSE BY METER IP

## 2019-10-05 PROCEDURE — 25800030 ZZH RX IP 258 OP 636: Performed by: SURGERY

## 2019-10-05 PROCEDURE — 25000132 ZZH RX MED GY IP 250 OP 250 PS 637: Performed by: TRANSPLANT SURGERY

## 2019-10-05 PROCEDURE — 25000131 ZZH RX MED GY IP 250 OP 636 PS 637: Performed by: STUDENT IN AN ORGANIZED HEALTH CARE EDUCATION/TRAINING PROGRAM

## 2019-10-05 PROCEDURE — 85025 COMPLETE CBC W/AUTO DIFF WBC: CPT | Performed by: SURGERY

## 2019-10-05 PROCEDURE — 25000132 ZZH RX MED GY IP 250 OP 250 PS 637: Performed by: SURGERY

## 2019-10-05 PROCEDURE — 36592 COLLECT BLOOD FROM PICC: CPT | Performed by: SURGERY

## 2019-10-05 PROCEDURE — 40000141 ZZH STATISTIC PERIPHERAL IV START W/O US GUIDANCE

## 2019-10-05 PROCEDURE — 84100 ASSAY OF PHOSPHORUS: CPT | Performed by: SURGERY

## 2019-10-05 PROCEDURE — 83735 ASSAY OF MAGNESIUM: CPT | Performed by: SURGERY

## 2019-10-05 PROCEDURE — 12000026 ZZH R&B TRANSPLANT

## 2019-10-05 PROCEDURE — 25000132 ZZH RX MED GY IP 250 OP 250 PS 637: Performed by: NURSE PRACTITIONER

## 2019-10-05 RX ORDER — VALGANCICLOVIR 450 MG/1
900 TABLET, FILM COATED ORAL DAILY
Qty: 30 TABLET | Refills: 3 | Status: SHIPPED | OUTPATIENT
Start: 2019-10-06 | End: 2020-02-07

## 2019-10-05 RX ORDER — DIPHENHYDRAMINE HCL 25 MG
25-50 CAPSULE ORAL ONCE
Status: COMPLETED | OUTPATIENT
Start: 2019-10-05 | End: 2019-10-05

## 2019-10-05 RX ORDER — AMOXICILLIN 250 MG
2 CAPSULE ORAL 2 TIMES DAILY
Qty: 20 TABLET | Refills: 0 | Status: SHIPPED | OUTPATIENT
Start: 2019-10-05

## 2019-10-05 RX ORDER — TACROLIMUS 1 MG/1
3 CAPSULE ORAL 2 TIMES DAILY
Qty: 180 CAPSULE | Refills: 11 | Status: SHIPPED | OUTPATIENT
Start: 2019-10-05 | End: 2019-10-06

## 2019-10-05 RX ORDER — ACETAMINOPHEN 325 MG/1
650 TABLET ORAL ONCE
Status: COMPLETED | OUTPATIENT
Start: 2019-10-05 | End: 2019-10-05

## 2019-10-05 RX ORDER — ACETAMINOPHEN 325 MG/1
650 TABLET ORAL ONCE
Status: DISCONTINUED | OUTPATIENT
Start: 2019-10-05 | End: 2019-10-05

## 2019-10-05 RX ORDER — CARVEDILOL 3.12 MG/1
3.12 TABLET ORAL 2 TIMES DAILY WITH MEALS
Qty: 60 TABLET | Refills: 3 | Status: ON HOLD | OUTPATIENT
Start: 2019-10-05 | End: 2019-11-25

## 2019-10-05 RX ORDER — SULFAMETHOXAZOLE AND TRIMETHOPRIM 400; 80 MG/1; MG/1
1 TABLET ORAL DAILY
Qty: 30 TABLET | Refills: 1 | Status: ON HOLD | OUTPATIENT
Start: 2019-10-06 | End: 2019-11-25

## 2019-10-05 RX ORDER — DIPHENHYDRAMINE HCL 12.5MG/5ML
25-50 LIQUID (ML) ORAL ONCE
Status: DISCONTINUED | OUTPATIENT
Start: 2019-10-05 | End: 2019-10-05

## 2019-10-05 RX ORDER — POLYETHYLENE GLYCOL 3350 17 G/17G
17 POWDER, FOR SOLUTION ORAL DAILY
Status: DISCONTINUED | OUTPATIENT
Start: 2019-10-05 | End: 2019-10-06 | Stop reason: HOSPADM

## 2019-10-05 RX ORDER — DIPHENHYDRAMINE HCL 12.5MG/5ML
25-50 LIQUID (ML) ORAL ONCE
Status: COMPLETED | OUTPATIENT
Start: 2019-10-05 | End: 2019-10-05

## 2019-10-05 RX ORDER — ACETAMINOPHEN 325 MG/1
650 TABLET ORAL ONCE
Status: CANCELLED
Start: 2019-10-06

## 2019-10-05 RX ORDER — POLYETHYLENE GLYCOL 3350 17 G/17G
17 POWDER, FOR SOLUTION ORAL DAILY
Qty: 5 PACKET | Refills: 0 | Status: SHIPPED | OUTPATIENT
Start: 2019-10-05 | End: 2019-12-09

## 2019-10-05 RX ORDER — DIPHENHYDRAMINE HCL 25 MG
25-50 CAPSULE ORAL ONCE
Status: DISCONTINUED | OUTPATIENT
Start: 2019-10-05 | End: 2019-10-05

## 2019-10-05 RX ORDER — ACETAMINOPHEN 325 MG/1
650 TABLET ORAL
Status: CANCELLED
Start: 2019-10-06

## 2019-10-05 RX ORDER — DIPHENHYDRAMINE HCL 25 MG
50 CAPSULE ORAL ONCE
Status: COMPLETED | OUTPATIENT
Start: 2019-10-05 | End: 2019-10-05

## 2019-10-05 RX ORDER — CARVEDILOL 3.12 MG/1
3.12 TABLET ORAL 2 TIMES DAILY WITH MEALS
Status: DISCONTINUED | OUTPATIENT
Start: 2019-10-05 | End: 2019-10-06 | Stop reason: HOSPADM

## 2019-10-05 RX ORDER — TACROLIMUS 1 MG/1
3 CAPSULE ORAL 2 TIMES DAILY
Qty: 60 CAPSULE | Refills: 11 | Status: SHIPPED | OUTPATIENT
Start: 2019-10-05 | End: 2019-10-05

## 2019-10-05 RX ORDER — ATORVASTATIN CALCIUM 20 MG/1
10 TABLET, FILM COATED ORAL EVERY EVENING
Start: 2019-10-05 | End: 2019-10-07

## 2019-10-05 RX ORDER — OXYCODONE HYDROCHLORIDE 5 MG/1
5-10 TABLET ORAL EVERY 4 HOURS PRN
Qty: 15 TABLET | Refills: 0 | Status: SHIPPED | OUTPATIENT
Start: 2019-10-05 | End: 2019-10-08

## 2019-10-05 RX ORDER — DIPHENHYDRAMINE HCL 25 MG
50 CAPSULE ORAL ONCE
Status: CANCELLED
Start: 2019-10-06

## 2019-10-05 RX ORDER — MAGNESIUM OXIDE 400 MG/1
400 TABLET ORAL
Qty: 30 TABLET | Refills: 5 | Status: SHIPPED | OUTPATIENT
Start: 2019-10-05

## 2019-10-05 RX ADMIN — SENNOSIDES AND DOCUSATE SODIUM 2 TABLET: 8.6; 5 TABLET ORAL at 20:37

## 2019-10-05 RX ADMIN — MAGNESIUM OXIDE TAB 400 MG (241.3 MG ELEMENTAL MG) 400 MG: 400 (241.3 MG) TAB at 13:21

## 2019-10-05 RX ADMIN — ACETAMINOPHEN 650 MG: 325 TABLET, FILM COATED ORAL at 01:28

## 2019-10-05 RX ADMIN — MELATONIN 50 MCG: at 07:54

## 2019-10-05 RX ADMIN — CARVEDILOL 3.12 MG: 3.12 TABLET, FILM COATED ORAL at 17:56

## 2019-10-05 RX ADMIN — OXYCODONE HYDROCHLORIDE 10 MG: 5 TABLET ORAL at 02:11

## 2019-10-05 RX ADMIN — DIPHENHYDRAMINE HYDROCHLORIDE 50 MG: 25 CAPSULE ORAL at 10:03

## 2019-10-05 RX ADMIN — DIPHENHYDRAMINE HYDROCHLORIDE 50 MG: 25 CAPSULE ORAL at 15:26

## 2019-10-05 RX ADMIN — ACETAMINOPHEN 650 MG: 325 TABLET, FILM COATED ORAL at 13:21

## 2019-10-05 RX ADMIN — ATORVASTATIN CALCIUM 10 MG: 10 TABLET, FILM COATED ORAL at 20:37

## 2019-10-05 RX ADMIN — MYCOPHENOLATE MOFETIL 750 MG: 250 CAPSULE ORAL at 07:53

## 2019-10-05 RX ADMIN — ACETAMINOPHEN 650 MG: 325 TABLET, FILM COATED ORAL at 15:26

## 2019-10-05 RX ADMIN — CARVEDILOL 3.12 MG: 3.12 TABLET, FILM COATED ORAL at 13:21

## 2019-10-05 RX ADMIN — MYCOPHENOLATE MOFETIL 750 MG: 250 CAPSULE ORAL at 20:36

## 2019-10-05 RX ADMIN — ACETAMINOPHEN 650 MG: 325 TABLET, FILM COATED ORAL at 10:03

## 2019-10-05 RX ADMIN — SENNOSIDES AND DOCUSATE SODIUM 2 TABLET: 8.6; 5 TABLET ORAL at 07:55

## 2019-10-05 RX ADMIN — TACROLIMUS 3 MG: 1 CAPSULE ORAL at 17:55

## 2019-10-05 RX ADMIN — TACROLIMUS 3 MG: 1 CAPSULE ORAL at 07:53

## 2019-10-05 RX ADMIN — ANTI-THYMOCYTE GLOBULIN (RABBIT) 100 MG: 5 INJECTION, POWDER, LYOPHILIZED, FOR SOLUTION INTRAVENOUS at 10:43

## 2019-10-05 RX ADMIN — VALGANCICLOVIR 900 MG: 450 TABLET, FILM COATED ORAL at 07:54

## 2019-10-05 ASSESSMENT — ACTIVITIES OF DAILY LIVING (ADL)
ADLS_ACUITY_SCORE: 12

## 2019-10-05 ASSESSMENT — MIFFLIN-ST. JEOR: SCORE: 1810.29

## 2019-10-05 NOTE — PROGRESS NOTES
Transplant Surgery  Inpatient Daily Progress Note  10/05/2019    Assessment & Plan: Mr. Amin is a 40 yo male with PMH of DM1, HTN, dyslipidemia, secondary hyperparathyroidism, and anemia in chronic kidney disease, with ESRD from diabetic nephropathy who underwent living donor kidney transplant with ureteral stent placement on 10/1/19.  Patient previously was on peritoneal dialysis. Catheter was removed intra-operatively.     Graft function: Good, Cr 0.79 today down from 0.93. Good urinary output.   Immunosuppression management: Induction per protocol  - Thymo: 175/200/0. Hold thymo on 10/3 and 10/4 due to thrombocytopenia. Will give 100 mg of thymo today. Patient still has 75 mg of thymoglobulin left that he will receive as an outpatient  - Solumedrol: 500/250/100   -Mycophenolate: 750 BID  -Tacrolimus: 3 mg BID. Check level POD3.   Complexity of management:High. Contributing factors: induction of immunosuppression  Hematology: Hgb 7.9 stable. Pre op  9.4. Thrombocytopenia secondary to thymo. Repeat CBC tomorrow.    Cardiorespiratory: Hx HTN, HLD, DM. PTA on ASA 81 mg, atorvastatin, carvedilol, and lisinopril. Ordered atorvastatin 10 mg daily. Continue ASA today. BP acceptable. No antihypertensive meds restarted.   -Stable on RA   -HR improved, now 80s-90s  -SBP: 150s-160s.   GI/Nutrition: Diabetic diet. Tolerating well. No BM. Start senna/colace BID.   Endocrine: DM1. Insulin gtt. Endocrinology consultation to assist with transition back to home insulin pump.   Fluid/Electrolytes: MIVF: TKO.  Electrolytes wnl.    : Removed paredes catheter today. Has ureteral stent that will be removed in 4-6 weeks.   Infectious disease: Afebrile  -Prophylaxis: CMV/EBV IgG positive. Valcyte x 12 weeks and bactrim indefinitely.   Prophylaxis: DVT SCDs  Disposition: 7A. Possible discharge today    Medical Decision Making: Medium  Subsequent visit [id number] (moderate level decision making)      ROX: Aissatou Jarquin PA-C  Resident:  Bruna Ward DO  Fellow: Travis Lynch MD  Faculty: Delroy Vela M.D.  _________________________________________________________________  Transplant History: Admitted 10/1/2019 for Living donor kidney transplant with stent placement and peritoneal dialysis catheter removal .  10/1/2019 (Kidney), Postoperative day: 4     Interval History:   No acute events overnight. Adequate pain control. Tolerating diet. Having BMs. Ambulating frequently.     ROS:   A 10-point review of systems was negative except as noted above.    Meds:    acetaminophen  650 mg Oral Q4H     atorvastatin  10 mg Oral Daily     insulin bolus from AMBULATORY PUMP   Subcutaneous 4x Daily AC & HS     insulin bolus from AMBULATORY PUMP   Subcutaneous TID AC     insulin lispro   Device See Admin Instructions     magnesium oxide  400 mg Oral Daily with lunch     mycophenolate  750 mg Oral BID IS     senna-docusate  2 tablet Oral BID     sodium chloride (PF)  10 mL Intracatheter Q8H     [START ON 10/6/2019] sulfamethoxazole-trimethoprim  1 tablet Oral Daily     tacrolimus  3 mg Oral BID IS     valGANciclovir  900 mg Oral Daily     cholecalciferol  50 mcg Oral Daily       Physical Exam:     Admit Weight: 91.4 kg (201 lb 8 oz)  Today's weight: 211 lbs 1.6 oz    Vital sign ranges:    Temp:  [96.8  F (36  C)-98.5  F (36.9  C)] 96.8  F (36  C)  Pulse:  [80] 80  Heart Rate:  [76-90] 90  Resp:  [14-18] 18  BP: (122-153)/(84-95) 146/95  SpO2:  [97 %-99 %] 97 %    GENERAL: Alert and oriented times three. In no apparent distress.  SKIN: No jaundice. No rashes or lesions.   HEENT: Eyes symmetrical and free of discharge bilaterally.  CV: Well perfused   RESPIRATORY: Respirations regular, even, and unlabored.   GI: Soft and non distended. Appropriately tender surrounding incision. Incision intact, no drainage. Former PD catheter site clean, minimal serous drainage.   : Méndez in place now with cherry color hematuria, no clots.  EXTREMITIES: No peripheral  edema.  NEUROLOGIC: Alert and orientated x 3. No focal deficits.   MUSCULOSKELETAL: No joint swelling or tenderness.   CVC    Data:   CMP  Recent Labs   Lab 10/05/19  0619 10/04/19  0631    142   POTASSIUM 3.7 3.7   CHLORIDE 110* 113*   CO2 25 21   GLC 69* 127*   BUN 16 20   CR 0.79 0.93   GFRESTIMATED >90 >90   GFRESTBLACK >90 >90   APRIL 7.9* 8.3*   MAG 1.6 1.9   PHOS 2.0* 2.2*     CBC  Recent Labs   Lab 10/05/19  0619 10/04/19  0631   HGB 7.9* 8.2*   WBC 2.9* 4.3   PLT 51* 44*     COAGS  No lab results found in last 7 days.    Invalid input(s): XA   Urinalysis  Recent Labs   Lab Test 09/26/19  0950 01/07/19  1330   COLOR Straw Yellow   APPEARANCE Clear Clear   URINEGLC 150* 50*   URINEBILI Negative Negative   URINEKETONE Negative Negative   SG 1.009 1.012   UBLD Negative Negative   URINEPH 6.0 5.0   PROTEIN Negative 100*   NITRITE Negative Negative   LEUKEST Negative Negative   RBCU 0 <1   WBCU 1 2     Virology:  Hepatitis C Antibody   Date Value Ref Range Status   09/26/2019 Nonreactive NR^Nonreactive Final     Comment:     Assay performance characteristics have not been established for newborns,   infants, and children     Attestation:    The patient has been seen and evaluated by me.   Vital signs, labs, medications and orders were reviewed.   When obtained, diagnostic images were reviewed by me and interpreted as above.    The care plan was discussed with the multidisciplinary team and I agree with the findings and plan in this note, with any differences recorded in blue.    Immunosuppressive medication management was reviewed and adjusted as reflected in the note and orders.     .

## 2019-10-05 NOTE — PROGRESS NOTES
VS:  Afebrile, 's/80's, HR 70-80's, O2 sat 98% on room air.  LDA: Right internal jugular TLC saline locked.  Neuro: A&Ox4.  GI/: Good urine output, no BM during the shift, passing gas.  Diet/appetite: Regular diet, fair PO intake, blood glucose 150 and 106, on ambulatory Insulin pump.  Activity: Encourage ambulation 4 times day.  Pain/Nausea/Vomiting: Oxycodone given x1 and scheduled Tylenol for incisional pain with relief.  Skin: Surgical incision with staples, c/d/i. Old PD catheter site dressing changed x1.  Mobility: Up ad jose manuel.  Test/Procedure: N/A.  Plan: Possible discharged today? Continue plan of cares.

## 2019-10-05 NOTE — PHARMACY-TRANSPLANT NOTE
Solid Organ Transplant Recipient Prior to Discharge Note    39 year old male s/p kidney transplant on 10/1/2019.    Pharmacy has monitored for medication interactions and immunosuppression levels in conjunction with the multidisciplinary team. In anticipation for discharge, medication therapy needs have been addressed daily throughout the current admission via multidisciplinary rounds and/or discussions, order verification, daily clinical pharmacy review, and communication with prescribers.

## 2019-10-05 NOTE — PROGRESS NOTES
IP Diabetes Management  Daily Note           Assessment and Plan:   HPI: Michael is a 39 year old male with longstanding TIDM since the age of 16 who was admitted 10/1 in preparation for LDKT. ESRD on dialysis related to diabetic nephropathy. Underwent kidney transplant 10/1/19 without immediate complication.     Assessment:   1)Type I Diabetes Mellitus, poorly controlled (A1c 8.9% with chronic anemia), with steroid induced hyperglycemia, resolved.     Plan:   - Request humalog vial to go home with   Medtronic 670 G, with lispro insulin  Basal rates: (from 2.1 units per hour all day PTA)  2529-1701 = 1.9 units/hr -- > 1.7 units/hr  2778-6962 = 2.4 units/hr    Bolus settings:  Insulin:carb ratio of go back to PTA dose from 1:8 here to 1:10 (1:10 PTA)  Insulin sensitivity factor:  1 unit to lower blood glucose 25 mg/dL  Blood glucose target range  mg/dL  Active insulin time is 3 hours      Outpatient follow up: with [facility] endocrinology in 1-2 weeks; sent request to clinic 10/4.  Plan discussed with patient, CDE, bedside RN, and primary team. Please call the clinic if haven't heard back about the follow up appointment.       Patient seen and discussed with .    Favio Martin MD  Endocrine fellow, PGY-5    I have seen and examined the patient and agree with the fellow's plan of care as noted.  October 5, 2019    Dr. Lara Tripp [phone]        Interval History and Assessment: interval glucose trend reviewed:   Insulin gtt off yesterday and back to his home pump. Did not eat much for dinner. BG dropped this morning. He is feeling well this morning and eating okay. Will go home this afternoon without steroids.     Current nutritional intake and type: Orders Placed This Encounter      Advance Diet as Tolerated: Regular Diet Adult    PTA Diabetes Regimen:   Medtronic 670 G, with lispro insulin  Basal Rate:  2.1 units/hour  Prandial Bolus/Insulin:CHO Ratio:  1 unit for every 10 grams eaten, meals and  "snacks  Correction Bolus/Insulin Sensitivity Factor (ISF):  1 unit to lower BG by 25 mg/dL  Target Blood Glucose:   mg/dL     Discharge Planning: likely discharge tomorrow to local housing.           Diabetes History:   Type of Diabetes: Type 1 Diabetes Mellitus, with nephropathy, with retinopathy, with neuropathy  Lab Results   Component Value Date    A1C 8.9 09/26/2019    A1C 10.8 01/07/2019    A1C 8.6 08/15/2018              Review of Systems:   The Review of Systems is negative other than noted in the Interval History.           Medications:     Current Facility-Administered Medications   Medication     acetaminophen (TYLENOL) tablet 650 mg     atorvastatin (LIPITOR) tablet 10 mg     glucose gel 15-30 g    Or     dextrose 50 % injection 25-50 mL    Or     glucagon injection 1 mg     insulin basal rate from AMBULATORY PUMP     insulin bolus from AMBULATORY PUMP     insulin bolus from AMBULATORY PUMP     insulin bolus from AMBULATORY PUMP     insulin lispro (HumaLOG) 100 UNIT/ML vial for filling pump reservoir     lactated ringers infusion     magnesium oxide (MAG-OX) tablet 400 mg     mycophenolate (GENERIC EQUIVALENT) capsule 750 mg     naloxone (NARCAN) injection 0.1-0.4 mg     oxyCODONE (ROXICODONE) tablet 5-10 mg     senna-docusate (SENOKOT-S/PERICOLACE) 8.6-50 MG per tablet 2 tablet     sodium chloride (PF) 0.9% PF flush 10 mL     sodium chloride (PF) 0.9% PF flush 10-20 mL     [START ON 10/6/2019] sulfamethoxazole-trimethoprim (BACTRIM/SEPTRA) 400-80 MG per tablet 1 tablet     tacrolimus (GENERIC EQUIVALENT) capsule 3 mg     valGANciclovir (VALCYTE) tablet 900 mg     vitamin D3 (CHOLECALCIFEROL) 1000 units (25 mcg) tablet 50 mcg            Physical Exam:    BP (!) 151/92 (BP Location: Left arm)   Pulse 80   Temp 98  F (36.7  C) (Oral)   Resp 18   Ht 1.676 m (5' 6\")   Wt 95.8 kg (211 lb 1.6 oz)   SpO2 98%   BMI 34.07 kg/m    General: pleasant, in no distress. Father at bedside.   HEENT: " normocephalic, atraumatic. Oral mucous membranes moist.   Lungs: unlabored respiration, no cough  ABD: rounded, soft, no lipodystrophy noted  Skin: warm and dry, no obvious lesions  MSK:  moves all extremities  Lymp:  no LE edema   Mental status:  alert, oriented to self, place, time  Psych:  affect, calm and appropriate interaction             Data:     Recent Labs   Lab 10/05/19  0821 10/05/19  0752 10/05/19  0619 10/05/19  0023 10/04/19  2105 10/04/19  1743 10/04/19  1309  10/04/19  0631  10/03/19  0649  10/02/19  0502  10/01/19  2052   GLC  --   --  69*  --   --   --   --   --  127*  --  138*  --  134*  --  197*   BGM 74 59*  --  106* 150* 185* 143*   < >  --    < >  --    < > 137*   < >  --     < > = values in this interval not displayed.     Lab Results   Component Value Date    WBC 2.9 (L) 10/05/2019    WBC 4.3 10/04/2019    WBC 4.8 10/03/2019    HGB 7.9 (L) 10/05/2019    HGB 8.2 (L) 10/04/2019    HGB 7.7 (L) 10/03/2019    HCT 26.3 (L) 10/05/2019    HCT 27.0 (L) 10/04/2019    HCT 24.6 (L) 10/03/2019    MCV 96 10/05/2019    MCV 95 10/04/2019    MCV 95 10/03/2019    PLT 51 (L) 10/05/2019    PLT 44 (LL) 10/04/2019    PLT 41 (LL) 10/03/2019     Lab Results   Component Value Date     10/05/2019     10/04/2019     10/03/2019    POTASSIUM 3.7 10/05/2019    POTASSIUM 3.7 10/04/2019    POTASSIUM 3.9 10/03/2019    CHLORIDE 110 (H) 10/05/2019    CHLORIDE 113 (H) 10/04/2019    CHLORIDE 115 (H) 10/03/2019    CO2 25 10/05/2019    CO2 21 10/04/2019    CO2 21 10/03/2019    GLC 69 (L) 10/05/2019     (H) 10/04/2019     (H) 10/03/2019     Lab Results   Component Value Date    BUN 16 10/05/2019    BUN 20 10/04/2019    BUN 28 10/03/2019     No results found for: TSH  Lab Results   Component Value Date    AST 16 09/26/2019    AST 15 01/07/2019    AST 10 (L) 08/16/2018    ALT 42 09/26/2019    ALT 46 01/07/2019    ALT 18 08/16/2018    ALKPHOS 100 09/26/2019    ALKPHOS 157 (H) 01/07/2019

## 2019-10-05 NOTE — PROGRESS NOTES
[facility], [place]   Transplant Nephrology Progress Note  Date of Admission:  10/1/2019  Today's Date: 10/05/2019    Assessment & Plan    # LDKT:  Decreased creatinine, down today.  Very good urine output.  No acute indications for dialysis.   - Baseline Cr ~ TBD   - Proteinuria: Not checked post transplant   - Date DSA Last Checked: Sep/2019  Latest DSA: No DSA at time of transplant   - BK Viremia: Not checked post transplant   - Kidney Tx Biopsy: No    # Immunosuppression: Tacrolimus immediate release (goal 8-10) and Mycophenolate mofetil (goal 1-3.5); Induction with Thymo and Solumedrol per protocol.   - Changes: No    - Last dose of Thymo on hold due to thrombocytopenia.    # Prophylaxis:   - PJP: Sulfa/TMP (Bactrim)   - CMV: Valcyte   - Thrush: None    # Blood Pressure:  Borderline control; Goal BP: < 150/90   - Volume status: Mildly hypervolemic  EDW ~ 89.5 kg, now 96.8 kg   - Changes: Yes - would start coreg 3.125 mg po bid     # Diabetes: Borderline control (HbA1c 7-9%) On insulin drip.   - Management as per primary team.    # Anemia in Chronic Renal Disease: Hgb: Stable      JOSE: No   - Iron studies: Replete    # Mineral Bone Disorder:   - Secondary renal hyperparathyroidism; PTH level: Moderately elevated (301-600 pg/ml)  - Vitamin D; level: Low and will continue on cholecalciferol.  - Calcium; level: Low, improved and will continue on cholecalciferol.  - Phosphorus; level: Low and recommend increased dietary phosphorus.    # Electrolytes:   - Potassium; level: Normal  - Magnesium; level: Normal  - Bicarbonate; level: Low, stable, but should increase with improving kidney function.    # Overweight: Once patient has recovered from kidney transplant surgery, he plans to lose more weight in order to get a pancreas transplant.    # Transplant History:  Etiology of kidney failure: diabetes mellitus type 1  Tx: LDKT  Transplant: 10/1/2019 (Kidney)  Donor Type: Living Donor Class:  "  Crossmatch at time of Tx: negative  DSA at time of Tx: No  Significant changes in immunosuppression: None  Significant transplant-related complications: None    Recommendations were communicated to the primary team via this note.    Raffi Landon MD  Pager: [phone]    Interval History   Mr. Amin's kidney function is improved.  Very good urine output.  Patient reports feeling better every day.  Ambulating on the floor a lot.  No chest pain or shortness of breath.  No nausea or vomiting.  Passing gas, but no bowel movement yet.  No fever, sweats or chills.  Still some puffiness, but less.      Review of Systems   4 point ROS was obtained and negative except as noted in the Interval History.    Physical Exam   Temp  Av.6  F (37  C)  Min: 97.9  F (36.6  C)  Max: 100.4  F (38  C)      Pulse  Av.1  Min: 74  Max: 110 Resp  Av.9  Min: 12  Max: 26  SpO2  Av.9 %  Min: 80 %  Max: 100 %    CVP (mmHg): 9 mmHgBP (!) 146/95   Pulse 80   Temp 96.8  F (36  C) (Oral)   Resp 18   Ht 1.676 m (5' 6\")   Wt 95.8 kg (211 lb 1.6 oz)   SpO2 97%   BMI 34.07 kg/m     Date 10/03/19 0700 - 10/04/19 0659   Shift 6595-0752 8527-5044 4316-7309 24 Hour Total   INTAKE   P.O. 720   720   Shift Total(mL/kg) 720(7.54)   720(7.54)   OUTPUT   Urine 325   325   Shift Total(mL/kg) 325(3.4)   325(3.4)   Weight (kg) 95.48 95.48 95.48 95.48      Admit Weight: 91.4 kg (201 lb 8 oz)   GENERAL APPEARANCE: alert and no distress  HENT: mouth without ulcers or lesions  RESP: lungs clear to auscultation - no rales, rhonchi or wheezes  CV: regular rhythm, normal rate, no rub, no murmur  EDEMA: trace LE edema bilaterally  ABDOMEN: soft, nondistended, nontender, bowel sounds normal  MS: extremities normal - no gross deformities noted, no evidence of inflammation in joints, no muscle tenderness  SKIN: no rash  TX KIDNEY: minimal TTP  DIALYSIS ACCESS:  None    Data   All labs reviewed by me.  CMP  Recent Labs   Lab 10/05/19  0619 " 10/04/19  0631 10/03/19  0649 10/02/19  2223  10/02/19  0502    142 143  --   --  138   POTASSIUM 3.7 3.7 3.9 4.0   < > 3.6   CHLORIDE 110* 113* 115*  --   --  107   CO2 25 21 21  --   --  20   ANIONGAP 4 8 7  --   --  10   GLC 69* 127* 138*  --   --  134*   BUN 16 20 28  --   --  49*   CR 0.79 0.93 1.54*  --   --  5.30*   GFRESTIMATED >90 >90 56*  --   --  12*   GFRESTBLACK >90 >90 64  --   --  14*   APRIL 7.9* 8.3* 7.6*  --   --  7.5*   MAG 1.6 1.9 2.0  --   --  1.7   PHOS 2.0* 2.2* 2.8  --   --  3.4    < > = values in this interval not displayed.     CBC  Recent Labs   Lab 10/05/19  0619 10/04/19  0631 10/03/19  0649 10/02/19  2223  10/02/19  0502   HGB 7.9* 8.2* 7.7* 8.1*   < > 8.4*   WBC 2.9* 4.3 4.8  --   --  10.9   RBC 2.75* 2.83* 2.60*  --   --  2.89*   HCT 26.3* 27.0* 24.6*  --   --  27.0*   MCV 96 95 95  --   --  93   MCH 28.7 29.0 29.6  --   --  29.1   MCHC 30.0* 30.4* 31.3*  --   --  31.1*   RDW 13.2 13.3 13.4  --   --  13.2   PLT 51* 44* 41*  --   --  116*    < > = values in this interval not displayed.     INRNo lab results found in last 7 days.  ABGNo lab results found in last 7 days.   Urine Studies  Recent Labs   Lab Test 09/26/19  0950 01/07/19  1330   COLOR Straw Yellow   APPEARANCE Clear Clear   URINEGLC 150* 50*   URINEBILI Negative Negative   URINEKETONE Negative Negative   SG 1.009 1.012   UBLD Negative Negative   URINEPH 6.0 5.0   PROTEIN Negative 100*   NITRITE Negative Negative   LEUKEST Negative Negative   RBCU 0 <1   WBCU 1 2     No lab results found.  PTH  Recent Labs   Lab Test 09/26/19 0945   PTHI 356*     Iron Studies  Recent Labs   Lab Test 09/26/19 0945   IRON 70      IRONSAT 28   EDOUARD 753*       IMAGING:  All imaging studies reviewed by me.     MEDICATIONS:  Current Facility-Administered Medications   Medication     acetaminophen (TYLENOL) tablet 650 mg     atorvastatin (LIPITOR) tablet 10 mg     carvedilol (COREG) tablet 3.125 mg     glucose gel 15-30 g    Or      dextrose 50 % injection 25-50 mL    Or     glucagon injection 1 mg     insulin basal rate from AMBULATORY PUMP     insulin bolus from AMBULATORY PUMP     insulin bolus from AMBULATORY PUMP     insulin bolus from AMBULATORY PUMP     insulin lispro (HumaLOG) 100 UNIT/ML vial for filling pump reservoir     lactated ringers infusion     magnesium oxide (MAG-OX) tablet 400 mg     mycophenolate (GENERIC EQUIVALENT) capsule 750 mg     naloxone (NARCAN) injection 0.1-0.4 mg     oxyCODONE (ROXICODONE) tablet 5-10 mg     polyethylene glycol (MIRALAX/GLYCOLAX) Packet 17 g     senna-docusate (SENOKOT-S/PERICOLACE) 8.6-50 MG per tablet 2 tablet     sodium chloride (PF) 0.9% PF flush 10 mL     sodium chloride (PF) 0.9% PF flush 10-20 mL     [START ON 10/6/2019] sulfamethoxazole-trimethoprim (BACTRIM/SEPTRA) 400-80 MG per tablet 1 tablet     tacrolimus (GENERIC EQUIVALENT) capsule 3 mg     valGANciclovir (VALCYTE) tablet 900 mg     vitamin D3 (CHOLECALCIFEROL) 1000 units (25 mcg) tablet 50 mcg

## 2019-10-06 ENCOUNTER — DOCUMENTATION ONLY (OUTPATIENT)
Dept: CARE COORDINATION | Facility: CLINIC | Age: 39
End: 2019-10-06

## 2019-10-06 VITALS
BODY MASS INDEX: 33.49 KG/M2 | OXYGEN SATURATION: 97 % | TEMPERATURE: 98.5 F | HEIGHT: 66 IN | SYSTOLIC BLOOD PRESSURE: 114 MMHG | WEIGHT: 208.4 LBS | RESPIRATION RATE: 16 BRPM | DIASTOLIC BLOOD PRESSURE: 72 MMHG | HEART RATE: 85 BPM

## 2019-10-06 LAB
ANION GAP SERPL CALCULATED.3IONS-SCNC: 5 MMOL/L (ref 3–14)
BASOPHILS # BLD AUTO: 0 10E9/L (ref 0–0.2)
BASOPHILS NFR BLD AUTO: 0 %
BUN SERPL-MCNC: 14 MG/DL (ref 7–30)
CALCIUM SERPL-MCNC: 7.9 MG/DL (ref 8.5–10.1)
CHLORIDE SERPL-SCNC: 107 MMOL/L (ref 94–109)
CO2 SERPL-SCNC: 23 MMOL/L (ref 20–32)
CREAT SERPL-MCNC: 0.7 MG/DL (ref 0.66–1.25)
DIFFERENTIAL METHOD BLD: ABNORMAL
EOSINOPHIL # BLD AUTO: 0 10E9/L (ref 0–0.7)
EOSINOPHIL NFR BLD AUTO: 0.9 %
ERYTHROCYTE [DISTWIDTH] IN BLOOD BY AUTOMATED COUNT: 13.1 % (ref 10–15)
GFR SERPL CREATININE-BSD FRML MDRD: >90 ML/MIN/{1.73_M2}
GLUCOSE SERPL-MCNC: 162 MG/DL (ref 70–99)
HCT VFR BLD AUTO: 24.2 % (ref 40–53)
HGB BLD-MCNC: 7.5 G/DL (ref 13.3–17.7)
LYMPHOCYTES # BLD AUTO: 0 10E9/L (ref 0.8–5.3)
LYMPHOCYTES NFR BLD AUTO: 0.9 %
MAGNESIUM SERPL-MCNC: 1.6 MG/DL (ref 1.6–2.3)
MCH RBC QN AUTO: 29.3 PG (ref 26.5–33)
MCHC RBC AUTO-ENTMCNC: 31 G/DL (ref 31.5–36.5)
MCV RBC AUTO: 95 FL (ref 78–100)
METAMYELOCYTES # BLD: 0.1 10E9/L
METAMYELOCYTES NFR BLD MANUAL: 2.6 %
MONOCYTES # BLD AUTO: 0.1 10E9/L (ref 0–1.3)
MONOCYTES NFR BLD AUTO: 1.8 %
MYELOCYTES # BLD: 0.1 10E9/L
MYELOCYTES NFR BLD MANUAL: 3.5 %
NEUTROPHILS # BLD AUTO: 3.1 10E9/L (ref 1.6–8.3)
NEUTROPHILS NFR BLD AUTO: 90.3 %
PHOSPHATE SERPL-MCNC: 1.2 MG/DL (ref 2.5–4.5)
PLATELET # BLD AUTO: 32 10E9/L (ref 150–450)
PLATELET # BLD EST: ABNORMAL 10*3/UL
POTASSIUM SERPL-SCNC: 4.3 MMOL/L (ref 3.4–5.3)
RBC # BLD AUTO: 2.56 10E12/L (ref 4.4–5.9)
RBC MORPH BLD: NORMAL
SODIUM SERPL-SCNC: 135 MMOL/L (ref 133–144)
WBC # BLD AUTO: 3.4 10E9/L (ref 4–11)

## 2019-10-06 PROCEDURE — 85025 COMPLETE CBC W/AUTO DIFF WBC: CPT | Performed by: TRANSPLANT SURGERY

## 2019-10-06 PROCEDURE — 25000132 ZZH RX MED GY IP 250 OP 250 PS 637: Performed by: PHYSICIAN ASSISTANT

## 2019-10-06 PROCEDURE — 36415 COLL VENOUS BLD VENIPUNCTURE: CPT | Performed by: TRANSPLANT SURGERY

## 2019-10-06 PROCEDURE — 80048 BASIC METABOLIC PNL TOTAL CA: CPT | Performed by: TRANSPLANT SURGERY

## 2019-10-06 PROCEDURE — 25000132 ZZH RX MED GY IP 250 OP 250 PS 637: Performed by: INTERNAL MEDICINE

## 2019-10-06 PROCEDURE — 25000132 ZZH RX MED GY IP 250 OP 250 PS 637: Performed by: NURSE PRACTITIONER

## 2019-10-06 PROCEDURE — 25000132 ZZH RX MED GY IP 250 OP 250 PS 637: Performed by: SURGERY

## 2019-10-06 PROCEDURE — 84100 ASSAY OF PHOSPHORUS: CPT | Performed by: TRANSPLANT SURGERY

## 2019-10-06 PROCEDURE — 25000131 ZZH RX MED GY IP 250 OP 636 PS 637: Performed by: STUDENT IN AN ORGANIZED HEALTH CARE EDUCATION/TRAINING PROGRAM

## 2019-10-06 PROCEDURE — 25000131 ZZH RX MED GY IP 250 OP 636 PS 637: Performed by: SURGERY

## 2019-10-06 PROCEDURE — 83735 ASSAY OF MAGNESIUM: CPT | Performed by: TRANSPLANT SURGERY

## 2019-10-06 PROCEDURE — 25000132 ZZH RX MED GY IP 250 OP 250 PS 637: Performed by: TRANSPLANT SURGERY

## 2019-10-06 RX ORDER — MAGNESIUM OXIDE 400 MG/1
400 TABLET ORAL 2 TIMES DAILY
Status: DISCONTINUED | OUTPATIENT
Start: 2019-10-06 | End: 2019-10-06 | Stop reason: HOSPADM

## 2019-10-06 RX ORDER — METHYLPREDNISOLONE SODIUM SUCCINATE 125 MG/2ML
100 INJECTION, POWDER, LYOPHILIZED, FOR SOLUTION INTRAMUSCULAR; INTRAVENOUS ONCE
Status: CANCELLED | OUTPATIENT
Start: 2019-10-06

## 2019-10-06 RX ORDER — MYCOPHENOLATE MOFETIL 250 MG/1
750 CAPSULE ORAL 2 TIMES DAILY
Qty: 180 CAPSULE | Refills: 11 | Status: SHIPPED | OUTPATIENT
Start: 2019-10-06 | End: 2019-10-08

## 2019-10-06 RX ORDER — DIPHENHYDRAMINE HCL 25 MG
50 CAPSULE ORAL ONCE
Status: CANCELLED
Start: 2019-10-06

## 2019-10-06 RX ORDER — TACROLIMUS 1 MG/1
4 CAPSULE ORAL
Status: DISCONTINUED | OUTPATIENT
Start: 2019-10-06 | End: 2019-10-06 | Stop reason: HOSPADM

## 2019-10-06 RX ORDER — ACETAMINOPHEN 325 MG/1
650 TABLET ORAL EVERY 4 HOURS PRN
Qty: 90 TABLET | Refills: 0 | Status: SHIPPED | OUTPATIENT
Start: 2019-10-06 | End: 2019-11-13

## 2019-10-06 RX ORDER — ACETAMINOPHEN 325 MG/1
650 TABLET ORAL EVERY 4 HOURS PRN
Status: DISCONTINUED | OUTPATIENT
Start: 2019-10-06 | End: 2019-10-06 | Stop reason: HOSPADM

## 2019-10-06 RX ORDER — ACETAMINOPHEN 325 MG/1
975 TABLET ORAL EVERY 8 HOURS
Status: DISCONTINUED | OUTPATIENT
Start: 2019-10-06 | End: 2019-10-06

## 2019-10-06 RX ORDER — TACROLIMUS 1 MG/1
4 CAPSULE ORAL 2 TIMES DAILY
Qty: 240 CAPSULE | Refills: 11 | Status: SHIPPED | OUTPATIENT
Start: 2019-10-06 | End: 2019-10-18

## 2019-10-06 RX ORDER — ACETAMINOPHEN 325 MG/1
650 TABLET ORAL ONCE
Status: CANCELLED
Start: 2019-10-06

## 2019-10-06 RX ORDER — ACETAMINOPHEN 325 MG/1
650 TABLET ORAL
Status: CANCELLED
Start: 2019-10-06

## 2019-10-06 RX ADMIN — MAGNESIUM OXIDE TAB 400 MG (241.3 MG ELEMENTAL MG) 400 MG: 400 (241.3 MG) TAB at 11:45

## 2019-10-06 RX ADMIN — OXYCODONE HYDROCHLORIDE 10 MG: 5 TABLET ORAL at 02:12

## 2019-10-06 RX ADMIN — ACETAMINOPHEN 650 MG: 325 TABLET, FILM COATED ORAL at 02:12

## 2019-10-06 RX ADMIN — SULFAMETHOXAZOLE AND TRIMETHOPRIM 1 TABLET: 400; 80 TABLET ORAL at 08:47

## 2019-10-06 RX ADMIN — CARVEDILOL 3.12 MG: 3.12 TABLET, FILM COATED ORAL at 08:47

## 2019-10-06 RX ADMIN — DIBASIC SODIUM PHOSPHATE, MONOBASIC POTASSIUM PHOSPHATE AND MONOBASIC SODIUM PHOSPHATE 500 MG: 852; 155; 130 TABLET ORAL at 08:47

## 2019-10-06 RX ADMIN — MYCOPHENOLATE MOFETIL 750 MG: 250 CAPSULE ORAL at 08:46

## 2019-10-06 RX ADMIN — SENNOSIDES AND DOCUSATE SODIUM 2 TABLET: 8.6; 5 TABLET ORAL at 08:48

## 2019-10-06 RX ADMIN — ACETAMINOPHEN 650 MG: 325 TABLET, FILM COATED ORAL at 08:59

## 2019-10-06 RX ADMIN — TACROLIMUS 4 MG: 1 CAPSULE ORAL at 08:59

## 2019-10-06 RX ADMIN — VALGANCICLOVIR 900 MG: 450 TABLET, FILM COATED ORAL at 08:47

## 2019-10-06 RX ADMIN — MELATONIN 50 MCG: at 08:47

## 2019-10-06 ASSESSMENT — MIFFLIN-ST. JEOR: SCORE: 1798.05

## 2019-10-06 ASSESSMENT — ACTIVITIES OF DAILY LIVING (ADL)
ADLS_ACUITY_SCORE: 12

## 2019-10-06 NOTE — PLAN OF CARE
"4602-1937:    /76 (BP Location: Left arm)   Pulse 85   Temp 98.3  F (36.8  C) (Oral)   Resp 20   Ht 1.676 m (5' 6\")   Wt 95.8 kg (211 lb 1.6 oz)   SpO2 96%   BMI 34.07 kg/m       VS: slightly hypertensive and slightly tachy. Pt developed a reaction after completing thymo. Lactic protocol triggered and lactic 1.2. Pt placed on 2L O2 for comfort but removed after symptoms improved. BP improved after symptoms also improved.   BG: AC/HS and on ambulatory pump which pt is providing self cares and insulin administration.   LABS: phos 2.0 and team aware no orders received.   Pain: tylenol for pain.   PRN: benadryl x 2 and tylenol x 2 for thymo.  Nausea: denied.   Diet: regular diet.   LDA: internal jugular removed for discharge but once discharge cancelled PIV placed.   GI/: voiding and pt reported having BMs.   Skin: incision open to air closed with staples and PD cath site no longer packed but covered.   Mobility: independent.   Education: all discharge steps in place/completed but pt developed thymo reaction at time of internal jugular removal.   Plan: continue to monitor throughout night.    [facility] Medications ready in discharge pharmacy. Discharge instructions were being reviewed and internal jugular removed when pt developed symptoms of thymo reactions. Pt's symptoms improved this evening. Will continue to monitor.     All care explained and questions answered. Will continue to monitor and notify team of changes.     "

## 2019-10-06 NOTE — DISCHARGE SUMMARY
[facility], [place]    Discharge Summary  Transplant Surgery    Date of Admission:  10/1/2019  Date of Discharge:  10/6/2019  Discharging Provider: Dr. Delroy Garces DO, MD    Discharge Diagnoses   Patient Active Problem List   Diagnosis     ESRD (end stage renal disease) on dialysis (H)     Hypertension     Type 1 diabetes (H)     Dyslipidemia     Anemia in chronic kidney disease     Secondary hyperparathyroidism (H)     Kidney transplanted       Procedure/Surgery Information   Procedure: Procedure(s):  Living Non Directed Kidney Transplant Recipient,ureteral stent placement and peritoneal dialysis catheter removal   Surgeon(s): Surgeon(s) and Role:     * Rocio Rutherford MD - Primary     * Prince Mcdaniels MD - Fellow - Assisting     * Ancelmo Lynch MD - Fellow - Assisting       Non-operative procedures None performed     History of Present Illness   Michael Amin is an 39 year old male with PMH of HTN, dyslipidemia, secondary hyperparathyroidism and end stage renal failure due to diabetes mellitus type 1. Patient was previously on daily peritoneal dialysis. Patient was admitted for living non-directed kidney transplant.     Hospital Course   Patient was admitted to the hospital and underwent living kidney transplant with ureteral stent placement and peritoneal dialysis catheter removal on 10/1. The kidney had 476 minutes of total ischemia time. There were no complications associated with the procedure and the patient was transferred to the floor.   Patient underwent induction of immunosuppression with thymoglobulin and solumedrol. On POD0, patient received 175 mg. POD1 patient received 200 mg of thymo. On POD2, the thymo had to be held per protocol due to thrombocytopenia. POD4, patient was expected to discharge following receiving half of the remaining dose of thymo but patient developed chills and rigors during the infusion. On day  of discharge, patient remained thrombocytopenic to 32 and thus will need to complete the thymoglobulin induction and steroids outpatient in clinic.   Endocrinology was consulted during the stay. Post-operatively the patient was placed on an insulin drip to control his blood sugars. Endocrinology worked with the patient and transitioned him back to his home insulin pump on POD#3.   Patient had improvement of his creatinine throughout his stay. Pre-operatively his creatinine was 7.97. On day of discharge it was 0.70. He had good urinary output throughout his stay.   On day of discharge, patient was tolerating a regular diet, ambulating the floor, controlling his blood glucose levels with his home insulin pump, and his pain was well controlled with oral pain medications.     Transplant coordinator: Lea [phone]  DSA at time of transplant: No  Ureteral stent: Yes  CMV: Donor Pos /Recipient Pos  EBV: Donor Pos/ Recipient Pos  Thymoglobulin: 475 mg (5.2 mg/kg) Needs to undergo 75 mg more of thymoglobulin induction that was unable to be completed during hospitalization due to thrombocytopenia. Will need dose of steroids due to reaction with previous dose.     Post-operative pain control: included Oxycodone and tylenol and will be Oxycodone on discharge.     Antibiotics prescribed at discharge: Bactrim and Valcyte   Consultations This Hospital Stay   SOT MEDICATION HISTORY IP PHARMACY CONSULT  SOCIAL WORK IP CONSULT  PHARMACY IP CONSULT  NUTRITION SERVICES ADULT IP CONSULT  NEPHROLOGY KIDNEY/PANCREAS TRANSPLANT ADULT IP CONSULT  PHARMACY IP CONSULT  MEDICATION HISTORY IP PHARMACY CONSULT  ENDOCRINE DIABETES ADULT IP CONSULT  DIABETES EDUCATION IP CONSULT  VASCULAR ACCESS CARE ADULT IP CONSULT    Pending Results   Unresulted Labs Ordered in the Past 30 Days of this Admission     No orders found from 9/1/2019 to 10/2/2019.        Physical Exam:  Blood pressure 114/72, pulse 85, temperature 98.5  F (36.9  C),  "temperature source Oral, resp. rate 16, height 1.676 m (5' 6\"), weight 94.5 kg (208 lb 6.4 oz), SpO2 97 %.  GENERAL: Alert and oriented times three.   HEENT: Eye symmetrical and free of discharge bilaterally. Mucous membranes moist and without lesions.  NECK: Supple and without lymphadenopathy.  CV: RRR, S1S2 present without murmur, rub, or gallop.   RESPIRATORY: Respirations regular, even, and unlabored. No dyspnea on room air. Lungs CTA throughout.   GI: Soft and non distended. No tenderness, rebound, guarding. No organomegaly. Incision with no redness or drainage. Staples intact. Previous PD catheter site with no redness or erythema.   EXTREMITIES: No peripheral edema. 2+ bilateral pedal pulses.   NEUROLOGIC: Alert and oriented x 3. CN II-XII grossly intact. No focal deficits.   MUSCULOSKELETAL: No joint swelling or tenderness.   SKIN: No jaundice. No rashes or lesions.     Data   Most Recent 3 CBC's:  Recent Labs   Lab Test 10/06/19  0600 10/05/19  0619 10/04/19  0631   WBC 3.4* 2.9* 4.3   HGB 7.5* 7.9* 8.2*   MCV 95 96 95   PLT 32* 51* 44*      Most Recent 3 BMP's:  Recent Labs   Lab Test 10/06/19  0600 10/05/19  0619 10/04/19  0631    139 142   POTASSIUM 4.3 3.7 3.7   CHLORIDE 107 110* 113*   CO2 23 25 21   BUN 14 16 20   CR 0.70 0.79 0.93   ANIONGAP 5 4 8   APRIL 7.9* 7.9* 8.3*   * 69* 127*     Most Recent 2 LFT's:  Recent Labs   Lab Test 09/26/19  0945 01/07/19  1338   AST 16 15   ALT 42 46   ALKPHOS 100 157*   BILITOTAL 0.2 0.2     Most Recent INR's and Anticoagulation Dosing History:  Anticoagulation Dose History     Recent Dosing and Labs Latest Ref Rng & Units 1/7/2019 9/26/2019    INR 0.86 - 1.14 0.99 1.00        Most Recent 3 Troponin's:No lab results found.  Most Recent Cholesterol Panel:No lab results found.  Most Recent 6 Bacteria Isolates From Any Culture (See EPIC Reports for Culture Details):  Recent Labs   Lab Test 09/26/19  0950   CULT No growth     Most Recent TSH, T4 and A1c " Labs:  Recent Labs   Lab Test 09/26/19  0945   A1C 8.9*       Code Status   Full    Primary Care Physician   STEPHAN BEAULIEU    Allergies   Allergies   Allergen Reactions     Thymoglobulin      Had reaction with rigors after steroid-free dose. No reaction with steroids.     Medications Prior to Admission:  Medications Prior to Admission   Medication Sig Dispense Refill Last Dose     aspirin (ASA) 81 MG EC tablet Take 81 mg by mouth daily    9/17/2019 at Unknown time     B Complex-C-Folic Acid (VIRT-CAPS) 1 MG CAPS Take 1 capsule by mouth daily   3 9/30/2019 at unknown      calcitRIOL (ROCALTROL) 0.25 MCG capsule Take 0.25 mcg by mouth daily   3 10/1/2019 at 0800     gentamicin (GARAMYCIN) 0.1 % external cream Apply to port exit site once daily  3 10/1/2019 at Unknown time     HUMALOG 100 UNIT/ML injection Insulin used for filling patient's pump  3 10/1/2019 at Unknown time     vitamin D2 (ERGOCALCIFEROL) 56271 units (1250 mcg) capsule Take 50,000 Units by mouth once a week Patient takes on Friday  0 9/27/2019 at unknown time     blood glucose monitoring (ACCU-CHEK FASTCLIX) lancets U QID OR MORE OFTEN PRN  1 Taking     [DISCONTINUED] atorvastatin (LIPITOR) 20 MG tablet Take 20 mg by mouth every evening    9/30/2019 at Unknown time     [DISCONTINUED] carvedilol (COREG) 12.5 MG tablet Take 12.5 mg by mouth 2 times daily  3 10/1/2019 at 0800     [DISCONTINUED] Chlorpheniramine-DM (CORICIDIN HBP COUGH/COLD PO) Take 1 tablet by mouth every 6 hours as needed (couhg and cold symptoms)   10/1/2019 at Unknown time     [DISCONTINUED] furosemide (LASIX) 80 MG tablet Take 80 mg by mouth 2 times daily  3 10/1/2019 at 0800     [DISCONTINUED] lisinopril (PRINIVIL/ZESTRIL) 40 MG tablet Take 40 mg by mouth daily   3 9/30/2019 at Unknown time     Discharge Medications   Current Discharge Medication List      START taking these medications    Details   acetaminophen (TYLENOL) 325 MG tablet Take 2 tablets (650 mg) by mouth every 4  hours as needed for mild pain or fever  Qty: 90 tablet, Refills: 0    Associated Diagnoses: ESRD (end stage renal disease) on dialysis (H)      magnesium oxide (MAG-OX) 400 MG tablet Take 1 tablet (400 mg) by mouth daily (with lunch)  Qty: 30 tablet, Refills: 5    Associated Diagnoses: ESRD (end stage renal disease) on dialysis (H)      mycophenolate (GENERIC EQUIVALENT) 250 MG capsule Take 3 capsules (750 mg) by mouth 2 times daily  Qty: 180 capsule, Refills: 11    Associated Diagnoses: Kidney transplanted      oxyCODONE (ROXICODONE) 5 MG tablet Take 1-2 tablets (5-10 mg) by mouth every 4 hours as needed for pain  Qty: 15 tablet, Refills: 0    Associated Diagnoses: ESRD (end stage renal disease) on dialysis (H)      phosphorus tablet 250 mg (PHOSPHA 250 NEUTRAL) 250 MG per tablet Take 2 tablets (500 mg) by mouth 2 times daily  Qty: 60 tablet, Refills: 11    Associated Diagnoses: Kidney transplanted      polyethylene glycol (MIRALAX/GLYCOLAX) packet Take 17 g by mouth daily  Qty: 5 packet, Refills: 0    Associated Diagnoses: Kidney transplanted      senna-docusate (SENOKOT-S/PERICOLACE) 8.6-50 MG tablet Take 2 tablets by mouth 2 times daily  Qty: 20 tablet, Refills: 0    Associated Diagnoses: Kidney transplanted      sulfamethoxazole-trimethoprim (BACTRIM/SEPTRA) 400-80 MG tablet Take 1 tablet by mouth daily  Qty: 30 tablet, Refills: 1    Associated Diagnoses: ESRD (end stage renal disease) on dialysis (H)      tacrolimus (GENERIC EQUIVALENT) 1 MG capsule Take 4 capsules (4 mg) by mouth 2 times daily  Qty: 240 capsule, Refills: 11    Associated Diagnoses: ESRD (end stage renal disease) on dialysis (H)      valGANciclovir (VALCYTE) 450 MG tablet Take 2 tablets (900 mg) by mouth daily  Qty: 30 tablet, Refills: 3    Associated Diagnoses: ESRD (end stage renal disease) on dialysis (H)         CONTINUE these medications which have CHANGED    Details   atorvastatin (LIPITOR) 20 MG tablet Take 0.5 tablets (10 mg) by mouth  every evening    Associated Diagnoses: Kidney transplanted      carvedilol (COREG) 3.125 MG tablet Take 1 tablet (3.125 mg) by mouth 2 times daily (with meals)  Qty: 60 tablet, Refills: 3    Associated Diagnoses: Renovascular hypertension         CONTINUE these medications which have NOT CHANGED    Details   aspirin (ASA) 81 MG EC tablet Take 81 mg by mouth daily       B Complex-C-Folic Acid (VIRT-CAPS) 1 MG CAPS Take 1 capsule by mouth daily   Refills: 3      calcitRIOL (ROCALTROL) 0.25 MCG capsule Take 0.25 mcg by mouth daily   Refills: 3      gentamicin (GARAMYCIN) 0.1 % external cream Apply to port exit site once daily  Refills: 3      HUMALOG 100 UNIT/ML injection Insulin used for filling patient's pump  Refills: 3      vitamin D2 (ERGOCALCIFEROL) 51723 units (1250 mcg) capsule Take 50,000 Units by mouth once a week Patient takes on Friday  Refills: 0      blood glucose monitoring (ACCU-CHEK FASTCLIX) lancets U QID OR MORE OFTEN PRN  Refills: 1         STOP taking these medications       Chlorpheniramine-DM (CORICIDIN HBP COUGH/COLD PO) Comments:   Reason for Stopping:         furosemide (LASIX) 80 MG tablet Comments:   Reason for Stopping:         lisinopril (PRINIVIL/ZESTRIL) 40 MG tablet Comments:   Reason for Stopping:             Discharge Orders      Home care nursing referral      MD face to face encounter    Documentation of Face to Face and Certification for Home Health Services    I certify that patient: Michael Amin is under my care and that I, or a nurse practitioner or physician's assistant working with me, had a face-to-face encounter that meets the physician face-to-face encounter requirements with this patient on: 10/4/19    This encounter with the patient was in whole, or in part, for the following medical condition, which is the primary reason for home health care: Kidney Tansplant    I certify that, based on my findings, the following services are medically necessary home health services:  Nursing    Further, I certify that my clinical findings support that this patient is homebound, absences from home require considerable and taxing effort and are for medical reasons or [employer]hov[employer]'s witness services or infrequently or of short duration when for other reasons.    Based on the above findings. I certify that this patient is confined to the home and needs intermittent skilled nursing care, physical therapy and/or speech therapy.  The patient is under my care, and I have initiated the establishment of the plan of care.  This patient will be followed by a physician who will periodically review the plan of care.  Physician/Provider to provide follow up care: Gopi Borden    Attending hospital physician (the Medicare certified [facility] provider):   Physician Signature: See electronic signature associated with these discharge orders.  Date: 10/4/2019     Reason for your hospital stay    S/p kidney transplant     Activity    No lifting greater than 10 pounds for at least 6 weeks, no driving while taking narcotic pain medications, avoid bath tubs, hot tubs, and swimming for at least 3 weeks.     When to contact your care team    Notify your coordinator if you have pain over your kidney, fever greater than 100.5F, redness or drainage at incision, or decreased urine output.    Notify your coordinator immediately if you are ever unable to take your immunosuppressive medications for any reason.    Transplant Coordinator at [phone]     Wound care and dressings    Keep your incision clean and dry. Wash daily with soap and water in the shower, but do not soak or scrub.     Follow Up and recommended labs and tests    1. [facility] ([address] [place] [address])  Starting on 10/7/2019.  Please bring all medications, lab book, and medication card with you.     2. Labs daily in ATC and then every Monday, Thursday: BMP, CBC, prograf level. Magnesium and phosphorus weekly.  3. Dr. Vela, Transplant Clinic, in 2  weeks  4. Ureteral stent removal in 4-6 weeks, to be scheduled by Transplant Coordinator     Full Code     Diet    Follow this diet upon discharge: Orders Placed This Encounter      Advance Diet as Tolerated: Regular Diet Adult     Discharge Disposition   Discharged to home  Condition at discharge: Stable    Attestation:  I have reviewed today's vital signs, notes, medications, labs and imaging. Amount of time performed on this discharge summary was > 30 minutes.    Attestation:    The patient has been seen and evaluated by me.   Vital signs, labs, medications and orders were reviewed.   When obtained, diagnostic images were reviewed by me and interpreted as above.    The care plan was discussed with the multidisciplinary team and I agree with the findings and plan in this note, with any differences recorded in blue.    Immunosuppressive medication management was reviewed and adjusted as reflected in the note and orders.     .

## 2019-10-06 NOTE — PROGRESS NOTES
Patient has had a transplant within 6 months. Transplant coordinator should follow up with patient for post-hospital call.

## 2019-10-06 NOTE — PLAN OF CARE
"/72 (BP Location: Right arm)   Pulse 85   Temp 98.5  F (36.9  C) (Oral)   Resp 16   Ht 1.676 m (5' 6\")   Wt 94.5 kg (208 lb 6.4 oz)   SpO2 97%   BMI 33.64 kg/m       VS: stable on room air.   B this morning and pt self administering insulin via ambulatory pump.   LABS: see chart.   Pain: tylenol x 1.  Nausea: denies.   Diet: regular with good appetite.   LDA: PIV removed for discharge.   GI/: voiding adequately and pt reported a BM early this morning.   Skin: incision open to air and right PD catheter site covered and pt declined dressing change prior to discharge.   Mobility: indpendent.   Education: discharge instructions reviewed as well as cares at home and medications. Pt assisted with filling daily medication box while discussing medications before discharge. Medication card and lab book updated.  Plan: pt to go to ATC tomorrow. Report given this afternoon. Pt did leave lab book and was called and notified. He will  tomorrow after clinic appointment.     All care explained and questions answered. Will continue to monitor and notify team of changes.     "

## 2019-10-06 NOTE — PROGRESS NOTES
VS: Tmax 99.1(O), possible reaction from Thymo. -150's/80's, HR 's, O2 sat 96-98% on room air.  LDA: Right PIV saline locked.  Neuro: A&Ox4.  GI/: Voiding adequate amount, medium soft BM x1, passing gas.  Diet/appetite: Regular diet, fair PO intake. Patient ambulatory Insulin pump. Blood glucose 223 and 165, bolus 3.6units and increased basal rate @1.9units/hr from 1.7units/hr. at 0300, patient checked BG using his BG meter, reported  and bolus of 3.85units.  Activity: Encourage ambulation 4 times a day.  Pain/Nausea/Vomiting: Scheduled Tylenol and Oxycodone x1 given for incisional pain with relief. Denies nausea.  Skin: Surgical incision with staples c/d/i, old PD catheter site dressing changed x1.  Mobility: Up ad jose manuel.  Test/Procedure: N/A.  Plan: Possible discharge today after Thymo? Continue POC.

## 2019-10-07 ENCOUNTER — DOCUMENTATION ONLY (OUTPATIENT)
Dept: TRANSPLANT | Facility: CLINIC | Age: 39
End: 2019-10-07

## 2019-10-07 ENCOUNTER — OFFICE VISIT (OUTPATIENT)
Dept: INFUSION THERAPY | Facility: CLINIC | Age: 39
End: 2019-10-07
Attending: INTERNAL MEDICINE
Payer: COMMERCIAL

## 2019-10-07 ENCOUNTER — OFFICE VISIT (OUTPATIENT)
Dept: PHARMACY | Facility: CLINIC | Age: 39
End: 2019-10-07
Payer: COMMERCIAL

## 2019-10-07 VITALS
RESPIRATION RATE: 16 BRPM | DIASTOLIC BLOOD PRESSURE: 78 MMHG | BODY MASS INDEX: 33.25 KG/M2 | SYSTOLIC BLOOD PRESSURE: 128 MMHG | OXYGEN SATURATION: 98 % | TEMPERATURE: 98.8 F | WEIGHT: 206 LBS

## 2019-10-07 DIAGNOSIS — Z94.0 HTN, KIDNEY TRANSPLANT RELATED: ICD-10-CM

## 2019-10-07 DIAGNOSIS — Z20.828 CONTACT WITH OR EXPOSURE TO VIRAL DISEASE: ICD-10-CM

## 2019-10-07 DIAGNOSIS — E83.42 HYPOMAGNESEMIA: ICD-10-CM

## 2019-10-07 DIAGNOSIS — Z48.298 AFTERCARE FOLLOWING ORGAN TRANSPLANT: ICD-10-CM

## 2019-10-07 DIAGNOSIS — Z79.899 LONG TERM USE OF DRUG: ICD-10-CM

## 2019-10-07 DIAGNOSIS — I15.1 HTN, KIDNEY TRANSPLANT RELATED: ICD-10-CM

## 2019-10-07 DIAGNOSIS — R19.7 DIARRHEA, UNSPECIFIED TYPE: ICD-10-CM

## 2019-10-07 DIAGNOSIS — Z94.0 KIDNEY TRANSPLANTED: Primary | ICD-10-CM

## 2019-10-07 DIAGNOSIS — R52 PAIN: ICD-10-CM

## 2019-10-07 DIAGNOSIS — D63.1 ANEMIA IN STAGE 3 CHRONIC KIDNEY DISEASE (H): ICD-10-CM

## 2019-10-07 DIAGNOSIS — E78.5 DYSLIPIDEMIA: ICD-10-CM

## 2019-10-07 DIAGNOSIS — N18.30 ANEMIA IN STAGE 3 CHRONIC KIDNEY DISEASE (H): ICD-10-CM

## 2019-10-07 DIAGNOSIS — E83.39 HYPOPHOSPHATEMIA: ICD-10-CM

## 2019-10-07 DIAGNOSIS — Z94.0 KIDNEY REPLACED BY TRANSPLANT: Primary | ICD-10-CM

## 2019-10-07 DIAGNOSIS — E55.9 VITAMIN D DEFICIENCY: Primary | ICD-10-CM

## 2019-10-07 DIAGNOSIS — Z94.0 KIDNEY REPLACED BY TRANSPLANT: ICD-10-CM

## 2019-10-07 DIAGNOSIS — N25.81 SECONDARY RENAL HYPERPARATHYROIDISM (H): ICD-10-CM

## 2019-10-07 LAB
ALBUMIN UR-MCNC: NEGATIVE MG/DL
ANION GAP SERPL CALCULATED.3IONS-SCNC: 5 MMOL/L (ref 3–14)
APPEARANCE UR: CLEAR
BASOPHILS # BLD AUTO: 0 10E9/L (ref 0–0.2)
BASOPHILS NFR BLD AUTO: 0 %
BILIRUB UR QL STRIP: NEGATIVE
BUN SERPL-MCNC: 12 MG/DL (ref 7–30)
CALCIUM SERPL-MCNC: 8.1 MG/DL (ref 8.5–10.1)
CHLORIDE SERPL-SCNC: 107 MMOL/L (ref 94–109)
CO2 SERPL-SCNC: 24 MMOL/L (ref 20–32)
COLOR UR AUTO: YELLOW
CREAT SERPL-MCNC: 0.8 MG/DL (ref 0.66–1.25)
DIFFERENTIAL METHOD BLD: ABNORMAL
EOSINOPHIL # BLD AUTO: 0 10E9/L (ref 0–0.7)
EOSINOPHIL NFR BLD AUTO: 0.9 %
ERYTHROCYTE [DISTWIDTH] IN BLOOD BY AUTOMATED COUNT: 13 % (ref 10–15)
GFR SERPL CREATININE-BSD FRML MDRD: >90 ML/MIN/{1.73_M2}
GLUCOSE SERPL-MCNC: 191 MG/DL (ref 70–99)
GLUCOSE UR STRIP-MCNC: >499 MG/DL
HCT VFR BLD AUTO: 24.2 % (ref 40–53)
HGB BLD-MCNC: 7.8 G/DL (ref 13.3–17.7)
HGB UR QL STRIP: ABNORMAL
KETONES UR STRIP-MCNC: NEGATIVE MG/DL
LEUKOCYTE ESTERASE UR QL STRIP: NEGATIVE
LYMPHOCYTES # BLD AUTO: 0 10E9/L (ref 0.8–5.3)
LYMPHOCYTES NFR BLD AUTO: 0.9 %
MAGNESIUM SERPL-MCNC: 1.7 MG/DL (ref 1.6–2.3)
MCH RBC QN AUTO: 30 PG (ref 26.5–33)
MCHC RBC AUTO-ENTMCNC: 32.2 G/DL (ref 31.5–36.5)
MCV RBC AUTO: 93 FL (ref 78–100)
METAMYELOCYTES # BLD: 0.2 10E9/L
METAMYELOCYTES NFR BLD MANUAL: 3.5 %
MONOCYTES # BLD AUTO: 0.2 10E9/L (ref 0–1.3)
MONOCYTES NFR BLD AUTO: 4.3 %
MYELOCYTES # BLD: 0.2 10E9/L
MYELOCYTES NFR BLD MANUAL: 3.5 %
NEUTROPHILS # BLD AUTO: 4.8 10E9/L (ref 1.6–8.3)
NEUTROPHILS NFR BLD AUTO: 86.9 %
NITRATE UR QL: NEGATIVE
PH UR STRIP: 7 PH (ref 5–7)
PHOSPHATE SERPL-MCNC: 1.3 MG/DL (ref 2.5–4.5)
PLATELET # BLD AUTO: 59 10E9/L (ref 150–450)
PLATELET # BLD EST: ABNORMAL 10*3/UL
POIKILOCYTOSIS BLD QL SMEAR: SLIGHT
POLYCHROMASIA BLD QL SMEAR: SLIGHT
POTASSIUM SERPL-SCNC: 4.2 MMOL/L (ref 3.4–5.3)
RBC # BLD AUTO: 2.6 10E12/L (ref 4.4–5.9)
RBC #/AREA URNS AUTO: 62 /HPF (ref 0–2)
SODIUM SERPL-SCNC: 136 MMOL/L (ref 133–144)
SOURCE: ABNORMAL
SP GR UR STRIP: 1.01 (ref 1–1.03)
TACROLIMUS BLD-MCNC: 6.6 UG/L (ref 5–15)
TME LAST DOSE: NORMAL H
UROBILINOGEN UR STRIP-MCNC: 0 MG/DL (ref 0–2)
WBC # BLD AUTO: 5.5 10E9/L (ref 4–11)
WBC #/AREA URNS AUTO: 2 /HPF (ref 0–5)

## 2019-10-07 PROCEDURE — 80197 ASSAY OF TACROLIMUS: CPT | Performed by: INTERNAL MEDICINE

## 2019-10-07 PROCEDURE — 96375 TX/PRO/DX INJ NEW DRUG ADDON: CPT

## 2019-10-07 PROCEDURE — 85025 COMPLETE CBC W/AUTO DIFF WBC: CPT | Performed by: INTERNAL MEDICINE

## 2019-10-07 PROCEDURE — 83735 ASSAY OF MAGNESIUM: CPT | Performed by: INTERNAL MEDICINE

## 2019-10-07 PROCEDURE — G0463 HOSPITAL OUTPT CLINIC VISIT: HCPCS

## 2019-10-07 PROCEDURE — 80180 DRUG SCRN QUAN MYCOPHENOLATE: CPT | Performed by: INTERNAL MEDICINE

## 2019-10-07 PROCEDURE — 96365 THER/PROPH/DIAG IV INF INIT: CPT

## 2019-10-07 PROCEDURE — 25000128 H RX IP 250 OP 636: Mod: ZF | Performed by: NURSE PRACTITIONER

## 2019-10-07 PROCEDURE — 99607 MTMS BY PHARM ADDL 15 MIN: CPT | Performed by: PHARMACIST

## 2019-10-07 PROCEDURE — 36415 COLL VENOUS BLD VENIPUNCTURE: CPT

## 2019-10-07 PROCEDURE — 81001 URINALYSIS AUTO W/SCOPE: CPT | Performed by: INTERNAL MEDICINE

## 2019-10-07 PROCEDURE — 84100 ASSAY OF PHOSPHORUS: CPT | Performed by: INTERNAL MEDICINE

## 2019-10-07 PROCEDURE — 25000132 ZZH RX MED GY IP 250 OP 250 PS 637: Mod: ZF | Performed by: NURSE PRACTITIONER

## 2019-10-07 PROCEDURE — 25800030 ZZH RX IP 258 OP 636: Mod: ZF | Performed by: NURSE PRACTITIONER

## 2019-10-07 PROCEDURE — 96366 THER/PROPH/DIAG IV INF ADDON: CPT

## 2019-10-07 PROCEDURE — 99605 MTMS BY PHARM NP 15 MIN: CPT | Performed by: PHARMACIST

## 2019-10-07 PROCEDURE — 80048 BASIC METABOLIC PNL TOTAL CA: CPT | Performed by: INTERNAL MEDICINE

## 2019-10-07 RX ORDER — ACETAMINOPHEN 325 MG/1
650 TABLET ORAL
Status: CANCELLED
Start: 2019-10-08

## 2019-10-07 RX ORDER — METHYLPREDNISOLONE SODIUM SUCCINATE 125 MG/2ML
100 INJECTION, POWDER, LYOPHILIZED, FOR SOLUTION INTRAMUSCULAR; INTRAVENOUS ONCE
Status: COMPLETED | OUTPATIENT
Start: 2019-10-07 | End: 2019-10-07

## 2019-10-07 RX ORDER — LACTOBACILLUS RHAMNOSUS GG 10B CELL
1 CAPSULE ORAL DAILY
COMMUNITY
End: 2019-12-09

## 2019-10-07 RX ORDER — DIPHENHYDRAMINE HCL 25 MG
50 CAPSULE ORAL ONCE
Status: COMPLETED | OUTPATIENT
Start: 2019-10-07 | End: 2019-10-07

## 2019-10-07 RX ORDER — ATORVASTATIN CALCIUM 10 MG/1
10 TABLET, FILM COATED ORAL DAILY
Qty: 90 TABLET | Refills: 3 | Status: SHIPPED | OUTPATIENT
Start: 2019-10-07 | End: 2020-10-15

## 2019-10-07 RX ORDER — DIPHENHYDRAMINE HCL 25 MG
50 CAPSULE ORAL ONCE
Status: CANCELLED
Start: 2019-10-08

## 2019-10-07 RX ORDER — TACROLIMUS 0.5 MG/1
0.5 CAPSULE ORAL 2 TIMES DAILY
Qty: 60 CAPSULE | Refills: 11 | Status: SHIPPED | OUTPATIENT
Start: 2019-10-07 | End: 2019-10-08

## 2019-10-07 RX ORDER — ACETAMINOPHEN 325 MG/1
650 TABLET ORAL ONCE
Status: CANCELLED
Start: 2019-10-08

## 2019-10-07 RX ORDER — ACETAMINOPHEN 325 MG/1
650 TABLET ORAL ONCE
Status: COMPLETED | OUTPATIENT
Start: 2019-10-07 | End: 2019-10-07

## 2019-10-07 RX ORDER — METHYLPREDNISOLONE SODIUM SUCCINATE 125 MG/2ML
100 INJECTION, POWDER, LYOPHILIZED, FOR SOLUTION INTRAMUSCULAR; INTRAVENOUS ONCE
Status: CANCELLED | OUTPATIENT
Start: 2019-10-08

## 2019-10-07 RX ADMIN — HEPARIN SODIUM 75 MG: 1000 INJECTION, SOLUTION INTRAVENOUS; SUBCUTANEOUS at 09:41

## 2019-10-07 RX ADMIN — ACETAMINOPHEN 650 MG: 325 TABLET ORAL at 09:22

## 2019-10-07 RX ADMIN — DIPHENHYDRAMINE HYDROCHLORIDE 50 MG: 25 CAPSULE ORAL at 09:22

## 2019-10-07 RX ADMIN — METHYLPREDNISOLONE SODIUM SUCCINATE 100 MG: 125 INJECTION, POWDER, FOR SOLUTION INTRAMUSCULAR; INTRAVENOUS at 09:23

## 2019-10-07 NOTE — PROGRESS NOTES
SUBJECTIVE/OBJECTIVE:                Michael Amin is a 39 year old male coming in for a transitions of care visit.  He was discharged from [facility] on 10/6 for kidney transplant.     Chief Complaint: initial post txp med review. Pt requesting Humalog refills, he uses about 100 units daily.     Allergies/ADRs: Reviewed in Epic  Tobacco: No tobacco use   Alcohol: not currently using  Caffeine: no caffeine, drinking  PMH: Reviewed in Epic    Medication Adherence/Access:  Patient uses pill box(es) and uses reminders/alarms.  Patient takes medications 3 time(s) per day. 8 and 8  Per patient, misses medication 0 times per week.   Medication barriers: none.   The patient fills medications at [facility]: NO, fills medications at [facility] mailorder.  Pt seen in [facility] today: yes  Patient seen in hospital by [facility]: yes  Patient receive supplies: yes  Reviewed Anti-rejection doses with bottles: no pill box    Renal Transplant:  Current immunosuppressants include Tacrolimus 4mg BID (0-6 months post tx, goal 8-10) and MMF 750mg BID (goal 0-6 month post tx: 1-3.5).  Pt reports no side effects  Transplant date: 10/1/19  Estimated Creatinine Clearance: 150 mL/min (based on SCr of 0.7 mg/dL).  CMV prophylaxis: Valcyte 900mg daily. Treat 3 months post tx.  PCP prophylaxis: Bactrim S S daily  Current supplements for electrolyte replacement: Mag Oxide 400mg daily ( 2 hours from MMF) .  Magnesium   Date Value Ref Range Status   10/07/2019 1.7 1.6 - 2.3 mg/dL Final   Tx Coordinator: Sabine Anders RN, Using Med Card: Yes  Home BG: Humalog pump about 100 units a day, requesting refill.   Immunizations: annual flu shot 2017; [id number]:  2007; Prevnar 13: 2018; TDaP:  2017; Shingrix: not on file  Stools: diarrhea all day yesterday, 2 stools this morning, watery.  He has had a constipation/ diarrhea cycle prior to transplant.     Pain: Pt is taking APAP 650mg four times daily. No complaints today.     Today's Vitals: There were no vitals taken  for this visit.    ASSESSMENT:                 Current medications were reviewed today.      Medication Adherence: good, no issues identified. Discussed med apps patient could use to keep track of meds. Ordered Humalog vials with Dr. Hernández's approval.     Renal Transplant:  Recommended patient start Metamucil and probiotic for his irregular bowel movements. We will try this prior to changing MMF or Magnesium formulations.     Pain: Stable.     PLAN:                Post Discharge Medication Reconciliation Status: discharge medications reconciled and changed, per note/orders (see AVS).    Pt to...  1. Start Metamucil and Probiotic daily.     I spent 40 minutes with this patient today. I offer these suggestions for consideration by alexsander edward. A copy of the visit note was provided to the patient's referring provider.    Will follow up in 2 months.    The patient was given a summary of these recommendations as an after visit summary.    Abdullahi Javier, PharmD  [facility] Pharmacist    Phone: [phone]

## 2019-10-07 NOTE — LETTER
10/7/2019      RE: Michael Amin  [address]  [facility] [address]       ACUTE TRANSPLANT NEPHROLOGY VISIT    Assessment & Plan   # LDKT: Stable   - Baseline Cr ~ 0.7-0.9, so far   - Proteinuria: Not checked post transplant   - Date DSA Last Checked: Sep/2019      Latest DSA: No DSA at time of transplant   - BK Viremia: Not checked post transplant   - Kidney Tx Biopsy: No    # Immunosuppression: Tacrolimus immediate release (goal 8-10) and Mycophenolate mofetil (goal 1-3.5)   - Changes: No; Last dose of Thymo and Solumedrol today (75 mg) for total of 6 mg/kg.    # Prophylaxis:   - PJP: Sulfa/TMP (Bactrim)   - CMV: Valcyte   - Thrush: None    # Hypertension: Controlled; Goal BP: < 150/90   - Volume status: Mildly hypervolemic  EDW ~ 89.5 kg and weight today ~ 93.4 kg, but decreasing   - Changes: No    # Diabetes: Borderline control (HbA1c 7-9%) HbA1c: 8.9% at time of transplant   - Management as per primary care.    # Anemia in Chronic Renal Disease: Hgb: Stable      JOSE: No   - Iron studies: Replete    # Mineral Bone Disorder:   - Secondary renal hyperparathyroidism; PTH level: Moderately elevated (301-600 pg/ml) and will continue on calcitriol.  - Vitamin D; level: Low and will continue on cholecalciferol.  - Calcium; level: Low, but increased and will continue on cholecalciferol and calcitriol.  - Phosphorus; level: Low, but increased and will continue on phosphorus supplement.  Discussed increased dietary phosphorus.    # Electrolytes:   - Potassium; level: Normal  - Magnesium; level: Normal and will continue on magnesium supplement.  - Bicarbonate; level: Normal    # Overweight: Once patient has recovered from kidney transplant surgery, he plans to lose more weight in order to get a pancreas transplant.    # Medical Compliance: Yes    # Transplant History:  Etiology of kidney failure: Diabetes mellitus type 1  Tx: LDKT  Transplant: 10/1/2019 (Kidney)  Donor Type: Living Donor Class:   Crossmatch  at time of Tx: negative  DSA at time of Tx: No  Significant changes in immunosuppression: None  CMV IgG Ab High Risk Discordance (D+/R-): No  EBV IgG Ab High Risk Discordance (D+/R-): No  Significant transplant-related complications: None    Transplant Office Phone Number: [phone]    Assessment and plan was discussed with the patient and he voiced his understanding and agreement.    Return visit: Return in about 1 day (around 10/8/2019).    Jabier Hernández MD    Chief Complaint   Mr. Amin is a 39 year old here for hospital follow up following kidney transplant.     History of Present Illness    Mr. Amin reports feeling pretty good overall with some medical complaints.  His energy level is slowly improving since transplant.  He is active and doing some walking.  Denies any chest pain or shortness of breath with exertion.  Appetite is low, but slowly improving.  He is trying to push fluids.  No nausea or vomiting.  Some loose stools, but he continues to take his stool softener.  He notes his normal bowel history is alternating diarrhea and constipation, about every 3 days.  Minimal incisional pain.  No fever, sweats or chills.  Some night sweats since transplant.  A little leg swelling, mostly in his feet.  His EDW ~ 89.5 kg and his weight today is ~ 93.4 kg.    Recent Hospitalizations:  [] No [x] Yes S/p LDKT   New Medical Issues: [x] No [] Yes    Decreased energy: [] No [x] Yes Improving   Chest pain or SOB with exertion:  [x] No [] Yes    Appetite change or weight change: [] No [x] Yes Low appetite.  Above dry weight, but closer   Nausea, vomiting or diarrhea:  [] No [x] Yes Some diarrhea.   Fever, sweats or chills: [x] No [] Yes Some night sweats   Leg swelling: [] No [x] Yes A little     Home BP: 130/80s    Review of Systems   A comprehensive review of systems was obtained and negative, except as noted in the HPI or PMH.    Problem List   Patient Active Problem List   Diagnosis     HTN, kidney  transplant related     Diabetes mellitus type 1 (H)     Dyslipidemia     Anemia in chronic kidney disease     Secondary renal hyperparathyroidism (H)     Kidney replaced by transplant     Aftercare following organ transplant     Vitamin D deficiency     Hypomagnesemia     Hypophosphatemia       Social History   Social History     Tobacco Use     Smoking status: Never Smoker     Smokeless tobacco: Never Used   Substance Use Topics     Alcohol use: Not Currently     Comment: Rarely 1-2 Drinks a month      Drug use: No       Allergies   Allergies   Allergen Reactions     Thymoglobulin      Had reaction with rigors after steroid-free dose. No reaction with steroids.       Medications   Current Outpatient Medications   Medication Sig     atorvastatin (LIPITOR) 10 MG tablet Take 1 tablet (10 mg) by mouth daily     vitamin D3 (CHOLECALCIFEROL) 1000 units (25 mcg) tablet Take 1 tablet (1,000 Units) by mouth daily     acetaminophen (TYLENOL) 325 MG tablet Take 2 tablets (650 mg) by mouth every 4 hours as needed for mild pain or fever     aspirin (ASA) 81 MG EC tablet Take 81 mg by mouth daily      blood glucose monitoring (ACCU-CHEK FASTCLIX) lancets U QID OR MORE OFTEN PRN     calcitRIOL (ROCALTROL) 0.25 MCG capsule Take 0.25 mcg by mouth daily      carvedilol (COREG) 3.125 MG tablet Take 1 tablet (3.125 mg) by mouth 2 times daily (with meals)     gentamicin (GARAMYCIN) 0.1 % external cream Apply to port exit site once daily     HUMALOG 100 UNIT/ML injection Insulin used for filling patient's pump     magnesium oxide (MAG-OX) 400 MG tablet Take 1 tablet (400 mg) by mouth daily (with lunch)     mycophenolate (GENERIC EQUIVALENT) 250 MG capsule Take 3 capsules (750 mg) by mouth 2 times daily     oxyCODONE (ROXICODONE) 5 MG tablet Take 1-2 tablets (5-10 mg) by mouth every 4 hours as needed for pain (Patient not taking: Reported on 10/7/2019)     phosphorus tablet 250 mg (PHOSPHA 250 NEUTRAL) 250 MG per tablet Take 2 tablets  (500 mg) by mouth 2 times daily     polyethylene glycol (MIRALAX/GLYCOLAX) packet Take 17 g by mouth daily (Patient not taking: Reported on 10/7/2019)     senna-docusate (SENOKOT-S/PERICOLACE) 8.6-50 MG tablet Take 2 tablets by mouth 2 times daily     sulfamethoxazole-trimethoprim (BACTRIM/SEPTRA) 400-80 MG tablet Take 1 tablet by mouth daily     tacrolimus (GENERIC EQUIVALENT) 0.5 MG capsule Take 1 capsule (0.5 mg) by mouth 2 times daily     tacrolimus (GENERIC EQUIVALENT) 1 MG capsule Take 4 capsules (4 mg) by mouth 2 times daily     valGANciclovir (VALCYTE) 450 MG tablet Take 2 tablets (900 mg) by mouth daily     No current facility-administered medications for this visit.      Facility-Administered Medications Ordered in Other Visits   Medication     acetaminophen (TYLENOL) tablet 650 mg     anti-thymocyte globulin (THYMOGLOBULIN - Rabbit) 75 mg, hydrocortisone sodium succinate PF (solu-CORTEF) 20 mg, heparin 1,000 Units in sodium chloride 0.9 % 500 mL infusion     diphenhydrAMINE (BENADRYL) capsule 50 mg     methylPREDNISolone sodium succinate (solu-MEDROL) injection 100 mg     Medications Discontinued During This Encounter   Medication Reason     vitamin D2 (ERGOCALCIFEROL) 81779 units (1250 mcg) capsule      atorvastatin (LIPITOR) 20 MG tablet      B Complex-C-Folic Acid (VIRT-CAPS) 1 MG CAPS        Physical Exam   Vital Signs: There were no vitals taken for this visit.    GENERAL APPEARANCE: alert and no distress  HENT: mouth without ulcers or lesions  LYMPHATICS: no cervical or supraclavicular nodes  RESP: lungs clear to auscultation - no rales, rhonchi or wheezes  CV: regular rhythm, normal rate, no rub, no murmur  EDEMA: no LE edema bilaterally  ABDOMEN: soft, nondistended, nontender, bowel sounds normal, overweight  MS: extremities normal - no gross deformities noted, no evidence of inflammation in joints, no muscle tenderness  SKIN: no rash  TX KIDNEY: minimal TTP  DIALYSIS ACCESS:  None    Data      Renal Latest Ref Rng & Units 10/7/2019 10/6/2019 10/5/2019   Na 133 - 144 mmol/L 136 135 139   Na (external) 132 - 146 mEq/L - - -   K 3.4 - 5.3 mmol/L 4.2 4.3 3.7   K (external) 3.5 - 5.5 mEq/L - - -   Cl 94 - 109 mmol/L 107 107 110(H)   Cl (external) 99 - 109 mEq/L - - -   CO2 20 - 32 mmol/L 24 23 25   CO2 (external) 20 - 31 mEq/L - - -   BUN 7 - 30 mg/dL 12 14 16   BUN (external) 9 - 23 mg/dL - - -   Cr 0.66 - 1.25 mg/dL 0.80 0.70 0.79   Cr (external) 0.70 - 1.30 mg/dL - - -   Glucose 70 - 99 mg/dL 191(H) 162(H) 69(L)   Ca  8.5 - 10.1 mg/dL 8.1(L) 7.9(L) 7.9(L)   Ca (external) 8.7 - 10.4 mg/dL - - -   Mg 1.6 - 2.3 mg/dL 1.7 1.6 1.6   Mg (external) 1.3 - 2.7 mg/dL - - -     Bone Health Latest Ref Rng & Units 10/7/2019 10/6/2019 10/5/2019   Phos 2.5 - 4.5 mg/dL 1.3(L) 1.2(L) 2.0(L)   Phos (external) 2.4 - 5.1 mg/dL - - -   PTHi 18 - 80 pg/mL - - -   Vit D Def 20 - 75 ug/L - - -     Heme Latest Ref Rng & Units 10/7/2019 10/6/2019 10/5/2019   WBC 4.0 - 11.0 10e9/L 5.5 3.4(L) 2.9(L)   WBC (external) 4.5 - 11.0 - - -   Hgb 13.3 - 17.7 g/dL 7.8(L) 7.5(L) 7.9(L)   Hgb (external) 14.0 - 18.0 g/dL - - -   Plt 150 - 450 10e9/L 59(L) 32(LL) 51(L)   Plt (external) 150 - 400 - - -     Liver Latest Ref Rng & Units 9/26/2019 8/6/2019 1/7/2019   AP 40 - 150 U/L 100 - 157(H)   TBili 0.2 - 1.3 mg/dL 0.2 - 0.2   ALT 0 - 70 U/L 42 - 46   AST 0 - 45 U/L 16 - 15   Tot Protein 6.8 - 8.8 g/dL 7.5 - 7.9   Tot Protein (external) 5.7 - 8.2 g/dL - 6.6 -   Albumin 3.4 - 5.0 g/dL 3.5 - 3.5   Albumin (external) 3.2 - 4.8 g/dL - 4.4 -     Pancreas Latest Ref Rng & Units 9/26/2019 1/7/2019 8/15/2018   A1C 0 - 5.6 % 8.9(H) 10.8(H) 8.6(H)     Iron studies Latest Ref Rng & Units 9/26/2019 8/6/2019   Iron 35 - 180 ug/dL 70 -   Iron sat 15 - 46 % 28 -   Ferritin 26 - 388 ng/mL 753(H) -   Ferritin (external) 22 - 322 ng/mL - 578(H)     [facility] Txp Virology Latest Ref Rng & Units 9/26/2019 1/7/2019   Hep B Core NR:Nonreactive Nonreactive Nonreactive         Recent Labs   Lab Test 10/04/19  2320   DOSTAC Not Provided   TACROL 5.9         Jabier Hernández MD

## 2019-10-07 NOTE — PATIENT INSTRUCTIONS
Recommendations from today's MTM visit:                                                      1. Consider taking Metamucil daily and Probiotic (Culturelle or another one with lactobacillus) to help with diarrhea.     2. Some apps you could use to keep track of medications are Med[employer]afe and Transplant hero    It was great to speak with you today.  I value your experience and would be very thankful for your time with providing feedback on our clinic survey. You may receive a survey via email or text message in the next few days.     Next MTM visit: 2 months post transplant    To schedule another MTM appointment, please call the clinic directly or you may call the MTM scheduling line at [phone] or toll-free at [phone].     My Clinical Pharmacist's contact information:                                                      It was a pleasure talking with you today!  Please feel free to contact me with any questions or concerns you have.      Abdullahi Javier, PharmD  MTM Pharmacist    Phone: [phone]

## 2019-10-07 NOTE — PROGRESS NOTES
Transplant Social Work Service Discharge Note      Patient Name:  Michael Amin     Anticipated Discharge Date:  10/6/19    Discharge Disposition:   [facility] with home care arranged by Care Coordinator     Plan for 24 hour care for immediate post transplant period: father will provide    If not local, plans for short term stay: [facility]    Additional Services/Equipment Arranged: none     Persons notified of above discharge plan: patient and his parents, transplant team    Patient / Family response to discharge plan: in agreement    Education and resources provided by  at discharge: role of transplant  in out patient setting and provided contact info for     Discussed anticipated pharmacy out of pocket costs: YES (on 10/3/19)      Plan: Transplant social work will remain available outpatient for the psychosocial needs of this patient.  [facility].  Mr. Amin was given her contact information.        DELFINO Smith, [facility]  Liver Transplant   Phone [phone]  Pager [phone]

## 2019-10-07 NOTE — PROGRESS NOTES
ACUTE TRANSPLANT NEPHROLOGY VISIT    Assessment & Plan   # LDKT: Stable   - Baseline Cr ~ 0.7-0.9, so far   - Proteinuria: Not checked post transplant   - Date DSA Last Checked: Sep/2019      Latest DSA: No DSA at time of transplant   - BK Viremia: Not checked post transplant   - Kidney Tx Biopsy: No    # Immunosuppression: Tacrolimus immediate release (goal 8-10) and Mycophenolate mofetil (goal 1-3.5)   - Changes: No; Last dose of Thymo and Solumedrol today (75 mg) for total of 6 mg/kg.    # Prophylaxis:   - PJP: Sulfa/TMP (Bactrim)   - CMV: Valcyte   - Thrush: None    # Hypertension: Controlled; Goal BP: < 150/90   - Volume status: Mildly hypervolemic  EDW ~ 89.5 kg and weight today ~ 93.4 kg, but decreasing   - Changes: No    # Diabetes: Borderline control (HbA1c 7-9%) HbA1c: 8.9% at time of transplant   - Management as per primary care.    # Anemia in Chronic Renal Disease: Hgb: Stable      JOSE: No   - Iron studies: Replete    # Mineral Bone Disorder:   - Secondary renal hyperparathyroidism; PTH level: Moderately elevated (301-600 pg/ml) and will continue on calcitriol.  - Vitamin D; level: Low and will continue on cholecalciferol.  - Calcium; level: Low, but increased and will continue on cholecalciferol and calcitriol.  - Phosphorus; level: Low, but increased and will continue on phosphorus supplement.  Discussed increased dietary phosphorus.    # Electrolytes:   - Potassium; level: Normal  - Magnesium; level: Normal and will continue on magnesium supplement.  - Bicarbonate; level: Normal    # Overweight: Once patient has recovered from kidney transplant surgery, he plans to lose more weight in order to get a pancreas transplant.    # Medical Compliance: Yes    # Transplant History:  Etiology of kidney failure: Diabetes mellitus type 1  Tx: LDKT  Transplant: 10/1/2019 (Kidney)  Donor Type: Living Donor Class:   Crossmatch at time of Tx: negative  DSA at time of Tx: No  Significant changes in immunosuppression:  None  CMV IgG Ab High Risk Discordance (D+/R-): No  EBV IgG Ab High Risk Discordance (D+/R-): No  Significant transplant-related complications: None    Transplant Office Phone Number: [phone]    Assessment and plan was discussed with the patient and he voiced his understanding and agreement.    Return visit: Return in about 1 day (around 10/8/2019).    Jabier Hernández MD    Chief Complaint   Mr. Amin is a 39 year old here for hospital follow up following kidney transplant.     History of Present Illness    Mr. Amin reports feeling pretty good overall with some medical complaints.  His energy level is slowly improving since transplant.  He is active and doing some walking.  Denies any chest pain or shortness of breath with exertion.  Appetite is low, but slowly improving.  He is trying to push fluids.  No nausea or vomiting.  Some loose stools, but he continues to take his stool softener.  He notes his normal bowel history is alternating diarrhea and constipation, about every 3 days.  Minimal incisional pain.  No fever, sweats or chills.  Some night sweats since transplant.  A little leg swelling, mostly in his feet.  His EDW ~ 89.5 kg and his weight today is ~ 93.4 kg.    Recent Hospitalizations:  [] No [x] Yes S/p LDKT   New Medical Issues: [x] No [] Yes    Decreased energy: [] No [x] Yes Improving   Chest pain or SOB with exertion:  [x] No [] Yes    Appetite change or weight change: [] No [x] Yes Low appetite.  Above dry weight, but closer   Nausea, vomiting or diarrhea:  [] No [x] Yes Some diarrhea.   Fever, sweats or chills: [x] No [] Yes Some night sweats   Leg swelling: [] No [x] Yes A little     Home BP: 130/80s    Review of Systems   A comprehensive review of systems was obtained and negative, except as noted in the HPI or PMH.    Problem List   Patient Active Problem List   Diagnosis     HTN, kidney transplant related     Diabetes mellitus type 1 (H)     Dyslipidemia     Anemia in chronic  kidney disease     Secondary renal hyperparathyroidism (H)     Kidney replaced by transplant     Aftercare following organ transplant     Vitamin D deficiency     Hypomagnesemia     Hypophosphatemia       Social History   Social History     Tobacco Use     Smoking status: Never Smoker     Smokeless tobacco: Never Used   Substance Use Topics     Alcohol use: Not Currently     Comment: Rarely 1-2 Drinks a month      Drug use: No       Allergies   Allergies   Allergen Reactions     Thymoglobulin      Had reaction with rigors after steroid-free dose. No reaction with steroids.       Medications   Current Outpatient Medications   Medication Sig     atorvastatin (LIPITOR) 10 MG tablet Take 1 tablet (10 mg) by mouth daily     vitamin D3 (CHOLECALCIFEROL) 1000 units (25 mcg) tablet Take 1 tablet (1,000 Units) by mouth daily     acetaminophen (TYLENOL) 325 MG tablet Take 2 tablets (650 mg) by mouth every 4 hours as needed for mild pain or fever     aspirin (ASA) 81 MG EC tablet Take 81 mg by mouth daily      blood glucose monitoring (ACCU-CHEK FASTCLIX) lancets U QID OR MORE OFTEN PRN     calcitRIOL (ROCALTROL) 0.25 MCG capsule Take 0.25 mcg by mouth daily      carvedilol (COREG) 3.125 MG tablet Take 1 tablet (3.125 mg) by mouth 2 times daily (with meals)     gentamicin (GARAMYCIN) 0.1 % external cream Apply to port exit site once daily     HUMALOG 100 UNIT/ML injection Insulin used for filling patient's pump     magnesium oxide (MAG-OX) 400 MG tablet Take 1 tablet (400 mg) by mouth daily (with lunch)     mycophenolate (GENERIC EQUIVALENT) 250 MG capsule Take 3 capsules (750 mg) by mouth 2 times daily     oxyCODONE (ROXICODONE) 5 MG tablet Take 1-2 tablets (5-10 mg) by mouth every 4 hours as needed for pain (Patient not taking: Reported on 10/7/2019)     phosphorus tablet 250 mg (PHOSPHA 250 NEUTRAL) 250 MG per tablet Take 2 tablets (500 mg) by mouth 2 times daily     polyethylene glycol (MIRALAX/GLYCOLAX) packet Take 17 g  by mouth daily (Patient not taking: Reported on 10/7/2019)     senna-docusate (SENOKOT-S/PERICOLACE) 8.6-50 MG tablet Take 2 tablets by mouth 2 times daily     sulfamethoxazole-trimethoprim (BACTRIM/SEPTRA) 400-80 MG tablet Take 1 tablet by mouth daily     tacrolimus (GENERIC EQUIVALENT) 0.5 MG capsule Take 1 capsule (0.5 mg) by mouth 2 times daily     tacrolimus (GENERIC EQUIVALENT) 1 MG capsule Take 4 capsules (4 mg) by mouth 2 times daily     valGANciclovir (VALCYTE) 450 MG tablet Take 2 tablets (900 mg) by mouth daily     No current facility-administered medications for this visit.      Facility-Administered Medications Ordered in Other Visits   Medication     acetaminophen (TYLENOL) tablet 650 mg     anti-thymocyte globulin (THYMOGLOBULIN - Rabbit) 75 mg, hydrocortisone sodium succinate PF (solu-CORTEF) 20 mg, heparin 1,000 Units in sodium chloride 0.9 % 500 mL infusion     diphenhydrAMINE (BENADRYL) capsule 50 mg     methylPREDNISolone sodium succinate (solu-MEDROL) injection 100 mg     Medications Discontinued During This Encounter   Medication Reason     vitamin D2 (ERGOCALCIFEROL) 85086 units (1250 mcg) capsule      atorvastatin (LIPITOR) 20 MG tablet      B Complex-C-Folic Acid (VIRT-CAPS) 1 MG CAPS        Physical Exam   Vital Signs: There were no vitals taken for this visit.    GENERAL APPEARANCE: alert and no distress  HENT: mouth without ulcers or lesions  LYMPHATICS: no cervical or supraclavicular nodes  RESP: lungs clear to auscultation - no rales, rhonchi or wheezes  CV: regular rhythm, normal rate, no rub, no murmur  EDEMA: no LE edema bilaterally  ABDOMEN: soft, nondistended, nontender, bowel sounds normal, overweight  MS: extremities normal - no gross deformities noted, no evidence of inflammation in joints, no muscle tenderness  SKIN: no rash  TX KIDNEY: minimal TTP  DIALYSIS ACCESS:  None    Data     Renal Latest Ref Rng & Units 10/7/2019 10/6/2019 10/5/2019   Na 133 - 144 mmol/L 136 135 139    Na (external) 132 - 146 mEq/L - - -   K 3.4 - 5.3 mmol/L 4.2 4.3 3.7   K (external) 3.5 - 5.5 mEq/L - - -   Cl 94 - 109 mmol/L 107 107 110(H)   Cl (external) 99 - 109 mEq/L - - -   CO2 20 - 32 mmol/L 24 23 25   CO2 (external) 20 - 31 mEq/L - - -   BUN 7 - 30 mg/dL 12 14 16   BUN (external) 9 - 23 mg/dL - - -   Cr 0.66 - 1.25 mg/dL 0.80 0.70 0.79   Cr (external) 0.70 - 1.30 mg/dL - - -   Glucose 70 - 99 mg/dL 191(H) 162(H) 69(L)   Ca  8.5 - 10.1 mg/dL 8.1(L) 7.9(L) 7.9(L)   Ca (external) 8.7 - 10.4 mg/dL - - -   Mg 1.6 - 2.3 mg/dL 1.7 1.6 1.6   Mg (external) 1.3 - 2.7 mg/dL - - -     Bone Health Latest Ref Rng & Units 10/7/2019 10/6/2019 10/5/2019   Phos 2.5 - 4.5 mg/dL 1.3(L) 1.2(L) 2.0(L)   Phos (external) 2.4 - 5.1 mg/dL - - -   PTHi 18 - 80 pg/mL - - -   Vit D Def 20 - 75 ug/L - - -     Heme Latest Ref Rng & Units 10/7/2019 10/6/2019 10/5/2019   WBC 4.0 - 11.0 10e9/L 5.5 3.4(L) 2.9(L)   WBC (external) 4.5 - 11.0 - - -   Hgb 13.3 - 17.7 g/dL 7.8(L) 7.5(L) 7.9(L)   Hgb (external) 14.0 - 18.0 g/dL - - -   Plt 150 - 450 10e9/L 59(L) 32(LL) 51(L)   Plt (external) 150 - 400 - - -     Liver Latest Ref Rng & Units 9/26/2019 8/6/2019 1/7/2019   AP 40 - 150 U/L 100 - 157(H)   TBili 0.2 - 1.3 mg/dL 0.2 - 0.2   ALT 0 - 70 U/L 42 - 46   AST 0 - 45 U/L 16 - 15   Tot Protein 6.8 - 8.8 g/dL 7.5 - 7.9   Tot Protein (external) 5.7 - 8.2 g/dL - 6.6 -   Albumin 3.4 - 5.0 g/dL 3.5 - 3.5   Albumin (external) 3.2 - 4.8 g/dL - 4.4 -     Pancreas Latest Ref Rng & Units 9/26/2019 1/7/2019 8/15/2018   A1C 0 - 5.6 % 8.9(H) 10.8(H) 8.6(H)     Iron studies Latest Ref Rng & Units 9/26/2019 8/6/2019   Iron 35 - 180 ug/dL 70 -   Iron sat 15 - 46 % 28 -   Ferritin 26 - 388 ng/mL 753(H) -   Ferritin (external) 22 - 322 ng/mL - 578(H)     [facility] Txp Virology Latest Ref Rng & Units 9/26/2019 1/7/2019   Hep B Core NR:Nonreactive Nonreactive Nonreactive        Recent Labs   Lab Test 10/04/19  2320   DOSTAC Not Provided   TACROL 5.9

## 2019-10-07 NOTE — PROGRESS NOTES
"Michael Amin came to [facility] today for a lab and assess following a Kidney transplant on 10/1/2019.      Discharge date: 10/06/2019  Transplant coordinator: Jonnathan OG  Phone number patient can be reached at: [phone]      Physical Assessment:  See physical assessment located under \"Document Flowsheets\".  Incision site: Abdominal incision to Abdomen intact with staples; no s/s of infection noted. Incisional care and s/s of infection reviewed w/ patient who verbalized understanding.  Lines: none  Méndez: none  Urine clarity: UA- clear   Hydration: Patient reports drinking 2 liters in the last 24 hours. Reviewed fluid intake requirements w/ patient who verbalized understanding.  Nutrition: Patient reports appetite imroving.   Last BM: loose stools- stop stool softener  Pain: Control with Tylenol     Labs drawn by [facility] staff: Yes    Plan of care for today:   1. Today's labs, vitals and assessment were reviewed w/ Dr Hernández.      Medications administered:  Administrations This Visit     acetaminophen (TYLENOL) tablet 650 mg     Admin Date  10/07/2019 Action  Given Dose  650 mg Route  Oral Administered By  Rocio Dai RN          anti-thymocyte globulin (THYMOGLOBULIN - Rabbit) 75 mg, hydrocortisone sodium succinate PF (solu-CORTEF) 20 mg, heparin 1,000 Units in sodium chloride 0.9 % 500 mL infusion     Admin Date  10/07/2019 Action  New Bag Dose  75 mg Rate  94.3 mL/hr Route  Intravenous Administered By  Rocio Dai RN          diphenhydrAMINE (BENADRYL) capsule 50 mg     Admin Date  10/07/2019 Action  Given Dose  50 mg Route  Oral Administered By  Rocio Dai RN          methylPREDNISolone sodium succinate (solu-MEDROL) injection 100 mg     Admin Date  10/07/2019 Action  Given Dose  100 mg Route  Intravenous Administered By  Rocio Dai RN                Patient education:    The following teaching topics were addressed: Importance of drinking 2L of non-caffeinated fluids daily, " Incisional care, Signs/symptoms of infection, Good handwashing, Medications (purposes, doses and times of administration), Phone numbers to call with concenrs (Transplant coordinator, Unit [facility] and [facility]), 7A discharge check list and Plan of care   Patient, Mother and Father verbalized understanding and all questions answered.    Drug level:  Prograf level today reviewed with Dr Hernández who gave orders to maintain current dose.  Patient was updated with this information and verbalized understanding.    Face to face time: 60  Discharge Plan    Pt will follow up with [facility] tomorrow.  Discharge instructions reviewed with patient: YES  Patient/Representative verbalized understanding, all questions answered: YES      Rocio Dai, RN, RN

## 2019-10-07 NOTE — PATIENT INSTRUCTIONS
Dear Michael Amin    Thank you for choosing [facility] ([facility]) for your transplant cares.  The following information is a summary of our appointment as well as important reminders.      Please make sure your phone is available today because I will call to update you with your anti-rejection drug levels and possibly make changes to your anti-rejection dosages., Please refer to your hospital discharge instructions for details on home care services, future appointments, phone numbers, and diet/activity levels.    Contact Information:    Transplant Coordinator: Sabine Chenroulaleyla [phone]  Transplant Office:  [phone]  [facility]:  [phone]  Ask for physician on call for kidney transplant.  Unit [address] (Transplant Unit):  [phone]  [facility]:  [phone]  [facility]:  [phone]        We look forward in seeing you on your next appointment here at [facility] ([facility]).  Please don t hesitate to call us at [phone] to reschedule any of your appointments or to speak with one of the [facility] registered nurses.  It was a pleasure taking care of you today.    Sincerely,    [facility]  [facility]  [address] [place] [address]  [address]  Phone:  [phone]  DISCHARGE INSTRUCTIONS FOLLOWING THYMOGLOBIN OR OKT3 ADMINISTRATION      After you go home:      No strenuous activity for 24 hours    Resume your usual dietary and fluid intake    Possible side effects: Headache, Fever / Chills, Nausea, Vomiting, Rash and Hives    It is recommended that you monitor for high temps and take Tylenol 650 mg orally every 4 hours as needed.  For temps greater that 101.5 degrees F call physician or go to the Emergency room    For itching, rash and hives take Benadryl 25 - 50 mg orally every 6 hours as needed    Report to the Emergency Department with the  following:      Temperature of 101.5 degrees F or greater    Shortness of breath (difficulty breathing)    Chest Heaviness / Tightness / Pressure    Nausea / Vomiting without relief    Sustained increase in heart rate from your normal pulse    Uncontrolled high blood pressure    [facility] [place] EMERGENCY DEPARTMENT:  [phone]    IF THIS IS A MEDICAL EMERGENCY, CALL [phone]    If you have issues or other concerns:    Contact the Nurse Coordinator at  Transplant Center:            [phone]  Or, after hours contact Patient Care Unit 6B:                              [phone]      I have received and understand my discharge instructions and I have all of my personal belongings.            ---------------------------------------------------                 ------------------------------------------------  Patient / Significant Other's Signature                 Relationship

## 2019-10-08 ENCOUNTER — TELEPHONE (OUTPATIENT)
Dept: TRANSPLANT | Facility: CLINIC | Age: 39
End: 2019-10-08

## 2019-10-08 ENCOUNTER — INFUSION THERAPY VISIT (OUTPATIENT)
Dept: INFUSION THERAPY | Facility: CLINIC | Age: 39
End: 2019-10-08
Attending: INTERNAL MEDICINE
Payer: COMMERCIAL

## 2019-10-08 VITALS
WEIGHT: 202.7 LBS | RESPIRATION RATE: 16 BRPM | BODY MASS INDEX: 32.72 KG/M2 | OXYGEN SATURATION: 98 % | TEMPERATURE: 98.4 F

## 2019-10-08 DIAGNOSIS — Z94.0 KIDNEY TRANSPLANTED: ICD-10-CM

## 2019-10-08 DIAGNOSIS — N25.81 SECONDARY RENAL HYPERPARATHYROIDISM (H): ICD-10-CM

## 2019-10-08 DIAGNOSIS — Z48.298 AFTERCARE FOLLOWING ORGAN TRANSPLANT: ICD-10-CM

## 2019-10-08 DIAGNOSIS — D63.1 ANEMIA IN STAGE 3 CHRONIC KIDNEY DISEASE (H): ICD-10-CM

## 2019-10-08 DIAGNOSIS — I15.1 HTN, KIDNEY TRANSPLANT RELATED: ICD-10-CM

## 2019-10-08 DIAGNOSIS — E83.42 HYPOMAGNESEMIA: ICD-10-CM

## 2019-10-08 DIAGNOSIS — N18.30 ANEMIA IN STAGE 3 CHRONIC KIDNEY DISEASE (H): ICD-10-CM

## 2019-10-08 DIAGNOSIS — E83.39 HYPOPHOSPHATEMIA: ICD-10-CM

## 2019-10-08 DIAGNOSIS — E55.9 VITAMIN D DEFICIENCY: ICD-10-CM

## 2019-10-08 DIAGNOSIS — Z94.0 HTN, KIDNEY TRANSPLANT RELATED: ICD-10-CM

## 2019-10-08 DIAGNOSIS — Z94.0 HTN, KIDNEY TRANSPLANT RELATED: Primary | ICD-10-CM

## 2019-10-08 DIAGNOSIS — Z94.0 KIDNEY REPLACED BY TRANSPLANT: Primary | ICD-10-CM

## 2019-10-08 DIAGNOSIS — I15.1 HTN, KIDNEY TRANSPLANT RELATED: Primary | ICD-10-CM

## 2019-10-08 LAB
ANION GAP SERPL CALCULATED.3IONS-SCNC: 7 MMOL/L (ref 3–14)
BASOPHILS # BLD AUTO: 0 10E9/L (ref 0–0.2)
BASOPHILS NFR BLD AUTO: 0 %
BUN SERPL-MCNC: 12 MG/DL (ref 7–30)
CALCIUM SERPL-MCNC: 8.2 MG/DL (ref 8.5–10.1)
CHLORIDE SERPL-SCNC: 107 MMOL/L (ref 94–109)
CO2 SERPL-SCNC: 24 MMOL/L (ref 20–32)
CREAT SERPL-MCNC: 0.78 MG/DL (ref 0.66–1.25)
DIFFERENTIAL METHOD BLD: ABNORMAL
EOSINOPHIL # BLD AUTO: 0 10E9/L (ref 0–0.7)
EOSINOPHIL NFR BLD AUTO: 0 %
ERYTHROCYTE [DISTWIDTH] IN BLOOD BY AUTOMATED COUNT: 13.1 % (ref 10–15)
GFR SERPL CREATININE-BSD FRML MDRD: >90 ML/MIN/{1.73_M2}
GLUCOSE SERPL-MCNC: 160 MG/DL (ref 70–99)
HCT VFR BLD AUTO: 26.4 % (ref 40–53)
HGB BLD-MCNC: 8.5 G/DL (ref 13.3–17.7)
LYMPHOCYTES # BLD AUTO: 0.3 10E9/L (ref 0.8–5.3)
LYMPHOCYTES NFR BLD AUTO: 3.5 %
MAGNESIUM SERPL-MCNC: 1.6 MG/DL (ref 1.6–2.3)
MCH RBC QN AUTO: 29.4 PG (ref 26.5–33)
MCHC RBC AUTO-ENTMCNC: 32.2 G/DL (ref 31.5–36.5)
MCV RBC AUTO: 91 FL (ref 78–100)
METAMYELOCYTES # BLD: 0.1 10E9/L
METAMYELOCYTES NFR BLD MANUAL: 0.9 %
MONOCYTES # BLD AUTO: 0.1 10E9/L (ref 0–1.3)
MONOCYTES NFR BLD AUTO: 0.8 %
MYCOPHENOLATE SERPL LC/MS/MS-MCNC: 0.82 MG/L (ref 1–3.5)
MYCOPHENOLATE-G SERPL LC/MS/MS-MCNC: 14.1 MG/L (ref 30–95)
MYELOCYTES # BLD: 0.3 10E9/L
MYELOCYTES NFR BLD MANUAL: 3.5 %
NEUTROPHILS # BLD AUTO: 6.6 10E9/L (ref 1.6–8.3)
NEUTROPHILS NFR BLD AUTO: 91.3 %
NRBC # BLD AUTO: 0.1 10*3/UL
NRBC BLD AUTO-RTO: 1 /100
PHOSPHATE SERPL-MCNC: 1.8 MG/DL (ref 2.5–4.5)
PLATELET # BLD AUTO: 87 10E9/L (ref 150–450)
PLATELET # BLD EST: ABNORMAL 10*3/UL
POIKILOCYTOSIS BLD QL SMEAR: SLIGHT
POLYCHROMASIA BLD QL SMEAR: SLIGHT
POTASSIUM SERPL-SCNC: 4 MMOL/L (ref 3.4–5.3)
RBC # BLD AUTO: 2.89 10E12/L (ref 4.4–5.9)
RBC INCLUSIONS BLD: SLIGHT
SODIUM SERPL-SCNC: 138 MMOL/L (ref 133–144)
TACROLIMUS BLD-MCNC: 7.2 UG/L (ref 5–15)
TME LAST DOSE: ABNORMAL H
TME LAST DOSE: NORMAL H
WBC # BLD AUTO: 7.2 10E9/L (ref 4–11)

## 2019-10-08 PROCEDURE — 85025 COMPLETE CBC W/AUTO DIFF WBC: CPT | Performed by: INTERNAL MEDICINE

## 2019-10-08 PROCEDURE — G0463 HOSPITAL OUTPT CLINIC VISIT: HCPCS

## 2019-10-08 PROCEDURE — 83735 ASSAY OF MAGNESIUM: CPT | Performed by: INTERNAL MEDICINE

## 2019-10-08 PROCEDURE — 80197 ASSAY OF TACROLIMUS: CPT | Performed by: INTERNAL MEDICINE

## 2019-10-08 PROCEDURE — 36415 COLL VENOUS BLD VENIPUNCTURE: CPT

## 2019-10-08 PROCEDURE — 80048 BASIC METABOLIC PNL TOTAL CA: CPT | Performed by: INTERNAL MEDICINE

## 2019-10-08 PROCEDURE — 84100 ASSAY OF PHOSPHORUS: CPT | Performed by: INTERNAL MEDICINE

## 2019-10-08 RX ORDER — MYCOPHENOLATE MOFETIL 250 MG/1
1000 CAPSULE ORAL 2 TIMES DAILY
Qty: 240 CAPSULE | Refills: 11 | Status: ON HOLD | OUTPATIENT
Start: 2019-10-08 | End: 2019-11-25

## 2019-10-08 RX ORDER — TACROLIMUS 0.5 MG/1
0.5 CAPSULE ORAL 2 TIMES DAILY
Qty: 60 CAPSULE | Refills: 11 | COMMUNITY
Start: 2019-10-08 | End: 2019-11-22

## 2019-10-08 NOTE — PROGRESS NOTES
ACUTE TRANSPLANT NEPHROLOGY VISIT    Assessment & Plan   # LDKT: Stable   - Baseline Cr ~ 0.7-0.9, so far   - Proteinuria: Not checked post transplant   - Date DSA Last Checked: Sep/2019      Latest DSA: No DSA at time of transplant   - BK Viremia: Not checked post transplant   - Kidney Tx Biopsy: No    # Immunosuppression: Tacrolimus immediate release (goal 8-10) and Mycophenolate mofetil (goal 1-3.5)   - Changes: Yes - with low MPA level, will increase mycophenolate mofetil to 1000 mg bid.  Last tacrolimus level was below goal, but trending up with recent increase in dose.  Today's drug level pending.    # Prophylaxis:   - PJP: Sulfa/TMP (Bactrim)   - CMV: Valcyte   - Thrush: None    # Hypertension: Controlled; Goal BP: < 150/90   - Volume status: Mildly hypervolemic  EDW ~ 89.5 kg and weight today ~ 91.9 kg, but decreasing   - Changes: No    # Diabetes: Borderline control (HbA1c 7-9%) HbA1c: 8.9% at time of transplant   - Management as per primary care.    - Discussed that patient will have increased insulin requirements with good functioning kidney.    # Anemia in Chronic Renal Disease: Hgb: Stable      JOSE: No   - Iron studies: Replete    # Mineral Bone Disorder:   - Secondary renal hyperparathyroidism; PTH level: Moderately elevated (301-600 pg/ml) and will continue on calcitriol.  - Vitamin D; level: Low and will continue on cholecalciferol.  - Calcium; level: Low, but increased and will continue on cholecalciferol and calcitriol.  - Phosphorus; level: Low, but increased and will decrease phosphorus supplement to 250 mg bid.  Discussed increased dietary phosphorus.    # Electrolytes:   - Potassium; level: Normal  - Magnesium; level: Normal and will continue on magnesium supplement.  - Bicarbonate; level: Normal    # Diarrhea: Likely secondary to previous use of stool softeners, which are now stopped.  However, if symptoms persist, would consider changing mycophenolate mofetil to mycophenolic acid.     #  Dysuria: Minimal symptoms and urinalysis only showed some hematuria, as expected, yesterday.  Symptoms likely residual from paredes catheter.  Will follow.     # Overweight: Once patient has recovered from kidney transplant surgery, he plans to lose more weight in order to get a pancreas transplant.    # Medical Compliance: Yes    # Transplant History:  Etiology of kidney failure: Diabetes mellitus type 1  Tx: LDKT  Transplant: 10/1/2019 (Kidney)  Donor Type: Living Donor Class:   Crossmatch at time of Tx: negative  DSA at time of Tx: No  Significant changes in immunosuppression: None  CMV IgG Ab High Risk Discordance (D+/R-): No  EBV IgG Ab High Risk Discordance (D+/R-): No  Significant transplant-related complications: None    Transplant Office Phone Number: [phone]    Assessment and plan was discussed with the patient and he voiced his understanding and agreement.    Return visit: Return for as previously scheduled.    Jabier Hernández MD    Chief Complaint   Mr. Amin is a 39 year old here for hospital follow up following kidney transplant.     History of Present Illness    Mr. Amin reports feeling pretty good overall with some medical complaints.  His energy level is pretty good and improved as he had a good day yesterday.  He has been active and doing some walking.  Denies any chest pain or shortness of breath with exertion.  Appetite is improved and he is trying to push fluids.  Generally no nausea, but had a little with taking his medications this morning.  Mostly the larger pills, such as phosphorus supplement, bother him.  No vomiting.  Still some loose stools, but better after stopping stool softeners.  No fever, sweats or chills, but still mild night sweats.  Leg swelling is about the same.  Still mild dysuria.    Recent Hospitalizations:  [] No [x] Yes S/p LDKT   New Medical Issues: [x] No [] Yes    Decreased energy: [] No [x] Yes Improving   Chest pain or SOB with exertion:  [x] No [] Yes     Appetite change or weight change: [] No [x] Yes Improved appetite.  Above dry weight, but closer   Nausea, vomiting or diarrhea:  [] No [x] Yes Some diarrhea.   Fever, sweats or chills: [x] No [] Yes Some night sweats   Leg swelling: [] No [x] Yes A little     Home BP: Not checked.    Review of Systems   A comprehensive review of systems was obtained and negative, except as noted in the HPI or PMH.    Problem List   Patient Active Problem List   Diagnosis     HTN, kidney transplant related     Diabetes mellitus type 1 (H)     Dyslipidemia     Anemia in chronic kidney disease     Secondary renal hyperparathyroidism (H)     Kidney replaced by transplant     Aftercare following organ transplant     Vitamin D deficiency     Hypomagnesemia     Hypophosphatemia       Social History   Social History     Tobacco Use     Smoking status: Never Smoker     Smokeless tobacco: Never Used   Substance Use Topics     Alcohol use: Not Currently     Comment: Rarely 1-2 Drinks a month      Drug use: No       Allergies   Allergies   Allergen Reactions     Thymoglobulin      Had reaction with rigors after steroid-free dose. No reaction with steroids.       Medications   Current Outpatient Medications   Medication Sig     mycophenolate (GENERIC EQUIVALENT) 250 MG capsule Take 4 capsules (1,000 mg) by mouth 2 times daily     phosphorus tablet 250 mg (PHOSPHA 250 NEUTRAL) 250 MG per tablet Take 1 tablet (250 mg) by mouth 2 times daily     acetaminophen (TYLENOL) 325 MG tablet Take 2 tablets (650 mg) by mouth every 4 hours as needed for mild pain or fever     aspirin (ASA) 81 MG EC tablet Take 81 mg by mouth daily      atorvastatin (LIPITOR) 10 MG tablet Take 1 tablet (10 mg) by mouth daily     blood glucose monitoring (ACCU-CHEK FASTCLIX) lancets U QID OR MORE OFTEN PRN     calcitRIOL (ROCALTROL) 0.25 MCG capsule Take 0.25 mcg by mouth daily      carvedilol (COREG) 3.125 MG tablet Take 1 tablet (3.125 mg) by mouth 2 times daily (with  meals)     gentamicin (GARAMYCIN) 0.1 % external cream Apply to port exit site once daily     HUMALOG 100 UNIT/ML injection Insulin used for filling patient's pump     insulin lispro (HUMALOG VIAL) 100 UNIT/ML vial Inject 100 Units Subcutaneous daily With Insulin pump. Up to 100 units     lactobacillus rhamnosus, GG, (CULTURELL) capsule Take 1 capsule by mouth daily     magnesium oxide (MAG-OX) 400 MG tablet Take 1 tablet (400 mg) by mouth daily (with lunch)     polyethylene glycol (MIRALAX/GLYCOLAX) packet Take 17 g by mouth daily (Patient not taking: Reported on 10/7/2019)     psyllium (METAMUCIL/KONSYL) 58.6 % powder Take by mouth daily     senna-docusate (SENOKOT-S/PERICOLACE) 8.6-50 MG tablet Take 2 tablets by mouth 2 times daily (Patient not taking: Reported on 10/7/2019)     sulfamethoxazole-trimethoprim (BACTRIM/SEPTRA) 400-80 MG tablet Take 1 tablet by mouth daily     tacrolimus (GENERIC EQUIVALENT) 0.5 MG capsule Take 1 capsule (0.5 mg) by mouth 2 times daily (Patient not taking: Reported on 10/7/2019)     tacrolimus (GENERIC EQUIVALENT) 1 MG capsule Take 4 capsules (4 mg) by mouth 2 times daily     valGANciclovir (VALCYTE) 450 MG tablet Take 2 tablets (900 mg) by mouth daily     vitamin D3 (CHOLECALCIFEROL) 1000 units (25 mcg) tablet Take 1 tablet (1,000 Units) by mouth daily     No current facility-administered medications for this visit.      Medications Discontinued During This Encounter   Medication Reason     mycophenolate (GENERIC EQUIVALENT) 250 MG capsule      phosphorus tablet 250 mg (PHOSPHA 250 NEUTRAL) 250 MG per tablet      oxyCODONE (ROXICODONE) 5 MG tablet        Physical Exam   Vital Signs: Reviewed.  GENERAL APPEARANCE: alert and no distress  HENT: mouth without ulcers or lesions  LYMPHATICS: no cervical or supraclavicular nodes  RESP: lungs clear to auscultation - no rales, rhonchi or wheezes  CV: regular rhythm, normal rate, no rub, no murmur  EDEMA: no LE edema bilaterally  ABDOMEN:  soft, nondistended, nontender, bowel sounds normal, overweight  MS: extremities normal - no gross deformities noted, no evidence of inflammation in joints, no muscle tenderness  SKIN: no rash  TX KIDNEY: minimal TTP  DIALYSIS ACCESS:  None    Data     Renal Latest Ref Rng & Units 10/8/2019 10/7/2019 10/6/2019   Na 133 - 144 mmol/L 138 136 135   Na (external) 132 - 146 mEq/L - - -   K 3.4 - 5.3 mmol/L 4.0 4.2 4.3   K (external) 3.5 - 5.5 mEq/L - - -   Cl 94 - 109 mmol/L 107 107 107   Cl (external) 99 - 109 mEq/L - - -   CO2 20 - 32 mmol/L 24 24 23   CO2 (external) 20 - 31 mEq/L - - -   BUN 7 - 30 mg/dL 12 12 14   BUN (external) 9 - 23 mg/dL - - -   Cr 0.66 - 1.25 mg/dL 0.78 0.80 0.70   Cr (external) 0.70 - 1.30 mg/dL - - -   Glucose 70 - 99 mg/dL 160(H) 191(H) 162(H)   Ca  8.5 - 10.1 mg/dL 8.2(L) 8.1(L) 7.9(L)   Ca (external) 8.7 - 10.4 mg/dL - - -   Mg 1.6 - 2.3 mg/dL 1.6 1.7 1.6   Mg (external) 1.3 - 2.7 mg/dL - - -     Bone Health Latest Ref Rng & Units 10/8/2019 10/7/2019 10/6/2019   Phos 2.5 - 4.5 mg/dL 1.8(L) 1.3(L) 1.2(L)   Phos (external) 2.4 - 5.1 mg/dL - - -   PTHi 18 - 80 pg/mL - - -   Vit D Def 20 - 75 ug/L - - -     Heme Latest Ref Rng & Units 10/8/2019 10/7/2019 10/6/2019   WBC 4.0 - 11.0 10e9/L 7.2 5.5 3.4(L)   WBC (external) 4.5 - 11.0 - - -   Hgb 13.3 - 17.7 g/dL 8.5(L) 7.8(L) 7.5(L)   Hgb (external) 14.0 - 18.0 g/dL - - -   Plt 150 - 450 10e9/L 87(L) 59(L) 32(LL)   Plt (external) 150 - 400 - - -     Liver Latest Ref Rng & Units 9/26/2019 8/6/2019 1/7/2019   AP 40 - 150 U/L 100 - 157(H)   TBili 0.2 - 1.3 mg/dL 0.2 - 0.2   ALT 0 - 70 U/L 42 - 46   AST 0 - 45 U/L 16 - 15   Tot Protein 6.8 - 8.8 g/dL 7.5 - 7.9   Tot Protein (external) 5.7 - 8.2 g/dL - 6.6 -   Albumin 3.4 - 5.0 g/dL 3.5 - 3.5   Albumin (external) 3.2 - 4.8 g/dL - 4.4 -     Pancreas Latest Ref Rng & Units 9/26/2019 1/7/2019 8/15/2018   A1C 0 - 5.6 % 8.9(H) 10.8(H) 8.6(H)     Iron studies Latest Ref Rng & Units 9/26/2019 8/6/2019   Iron 35  - 180 ug/dL 70 -   Iron sat 15 - 46 % 28 -   Ferritin 26 - 388 ng/mL 753(H) -   Ferritin (external) 22 - 322 ng/mL - 578(H)     [facility] Txp Virology Latest Ref Rng & Units 9/26/2019 1/7/2019   Hep B Core NR:Nonreactive Nonreactive Nonreactive        Recent Labs   Lab Test 10/04/19  2320 10/07/19  0742   DOSTAC Not Provided Not Provided   TACROL 5.9 6.6     Recent Labs   Lab Test 10/07/19  0742   DOSMPA Not Provided   MPACID 0.82*   MPAG 14.1*

## 2019-10-08 NOTE — PATIENT INSTRUCTIONS
Dear Michael Amin    Thank you for choosing [facility] ([facility]) for your transplant cares.  The following information is a summary of our appointment as well as important reminders.      Please make sure your phone is available today because I will call to update you with your anti-rejection drug levels and possibly make changes to your anti-rejection dosages., Please refer to your hospital discharge instructions for details on home care services, future appointments, phone numbers, and diet/activity levels.,     *Decrease Phosphorus to 1 tablet twice daily.  *Increase  Mycophenolic to 4 tablets every 12 hours.  *Drink 2-3 liters of fluid daily  *record BP in morning and evening before you take medications  *Record daily weights.  *Start home care      We look forward in seeing you on your next appointment here at [facility] ([facility]).  Please don t hesitate to call us at [phone] to reschedule any of your appointments or to speak with one of the [facility] registered nurses.  It was a pleasure taking care of you today.    Sincerely,    [facility]  [facility]  [address] [place] [address]  [address]  Phone:  [phone]

## 2019-10-08 NOTE — PROGRESS NOTES
Kidney  Transplant Coordinator Transition to Outpatient Discharge Note    Pt Name: Michael Amin  : 1980     Transplant Date: 10/1/2019      Surgeon: Dr Rocio Rutherford      ,  Living, Left iliac fossa, without vascular reconstruction. A J-J ureteral stent was placed.  2. Kidney allograft preparation on Back Table  3. Peritoneal dialysis catheter removal  Transplant Coordinator: Sabine Anders RN    DSA at time of transplant: No  Ureteral stent: Yes  CMV: Donor Pos /Recipient Pos  EBV: Donor Pos/ Recipient Pos  Thymoglobulin: 475 mg (5.2 mg/kg) Needs to undergo 75 mg more of thymoglobulin induction that was unable to be completed during hospitalization due to thrombocytopenia. Will need dose of steroids due to reaction with previous dose.    High Risk DSA:   PHS Increased Risk:yes      Immunosuppression:  tacrolimus  Mmf       Prophylaxis: Tmp Sulfa       Lines / drains in place at time of discharge:            Post-op course / additional monitoring:      Patient Active Problem List   Diagnosis     HTN, kidney transplant related     Diabetes mellitus type 1 (H)     Dyslipidemia     Anemia in chronic kidney disease     Secondary renal hyperparathyroidism (H)     Kidney replaced by transplant     Aftercare following organ transplant     Vitamin D deficiency     Hypomagnesemia     Hypophosphatemia     Kidney transplant rejection       Education:  Writer met with Michael Amin . We discussed immunosuppression medications, indications, side effects, dose adjustments, and lab monitoring. Lab draw schedule was given to pt and fully explained - Mailers not given; no questions. Further education was provided on typical post transplant course, labs examined with thresholds, biopsy, infection prevention, office contact numbers, and when to call.     Pt questions for follow up:  Good Knowledge        Discharge Transition Plan:   Pt will transition home, with assistance from  Family . Pt will have  home care .   Pt states having working BP monitor, scale, and thermometer at home.            Patient has viewed My Transplant Place, and has no additional questions at this time. RNCC encouraged on-going review.  Patient has voiced understanding of ability to utilize wiMANt for self-review of lab values.

## 2019-10-08 NOTE — PROGRESS NOTES
"  Medication Jessica Amin came to [facility] today for a lab and assess following a Kidney transplant on 10/1/2019.      Discharge date: 10/06/2019  Transplant coordinator: Sabine OG  Phone number patient can be reached at: [phone]      Physical Assessment:  See physical assessment located under \"Document Flowsheets\".  Incision site: Abdominal incision to Abdomen intact with staples; no s/s of infection noted. Incisional care and s/s of infection reviewed w/ patient who verbalized understanding.  Lines: none  Méndez: none  Urine clarity: clear   Hydration: Patient reports drinking 2 liters in the last 24 hours. Reviewed fluid intake requirements w/ patient who verbalized understanding.  Nutrition: Patient reports appetite imroving.   Last BM: loose stools.  Pain: Control with Tylenol     Labs drawn by [facility] staff: Yes    Plan of care for today:   1. Today's labs, vitals and assessment were reviewed w/ Dr Hernández.    Patient education:    The following teaching topics were addressed: Importance of drinking 2L of non-caffeinated fluids daily, Incisional care, Signs/symptoms of infection, Good handwashing, Medications (purposes, doses and times of administration), Phone numbers to call with concenrs (Transplant coordinator, [facility]D and [facility]), 7A discharge check list and Plan of care   Patient verbalized understanding and all questions answered.    Drug level:  Prograf level today reviewed with Dr Hernández who gave orders to increase to 4.5 every 12 hours.  Patient was updated with this information via voice mail.    Face to face time: 45  Discharge Plan    Pt will follow up with Home care- RN gave report to Home care RN  Discharge instructions reviewed with patient: YES  Patient/Representative verbalized understanding, all questions answered: YES        Rocio Dai, RN, RN     "

## 2019-10-08 NOTE — LETTER
10/8/2019      RE: Michael Amin  [address]  [facility] [address]       ACUTE TRANSPLANT NEPHROLOGY VISIT    Assessment & Plan   # LDKT: Stable   - Baseline Cr ~ 0.7-0.9, so far   - Proteinuria: Not checked post transplant   - Date DSA Last Checked: Sep/2019      Latest DSA: No DSA at time of transplant   - BK Viremia: Not checked post transplant   - Kidney Tx Biopsy: No    # Immunosuppression: Tacrolimus immediate release (goal 8-10) and Mycophenolate mofetil (goal 1-3.5)   - Changes: Yes - with low MPA level, will increase mycophenolate mofetil to 1000 mg bid.  Last tacrolimus level was below goal, but trending up with recent increase in dose.  Today's drug level pending.    # Prophylaxis:   - PJP: Sulfa/TMP (Bactrim)   - CMV: Valcyte   - Thrush: None    # Hypertension: Controlled; Goal BP: < 150/90   - Volume status: Mildly hypervolemic  EDW ~ 89.5 kg and weight today ~ 91.9 kg, but decreasing   - Changes: No    # Diabetes: Borderline control (HbA1c 7-9%) HbA1c: 8.9% at time of transplant   - Management as per primary care.    - Discussed that patient will have increased insulin requirements with good functioning kidney.    # Anemia in Chronic Renal Disease: Hgb: Stable      JOSE: No   - Iron studies: Replete    # Mineral Bone Disorder:   - Secondary renal hyperparathyroidism; PTH level: Moderately elevated (301-600 pg/ml) and will continue on calcitriol.  - Vitamin D; level: Low and will continue on cholecalciferol.  - Calcium; level: Low, but increased and will continue on cholecalciferol and calcitriol.  - Phosphorus; level: Low, but increased and will decrease phosphorus supplement to 250 mg bid.  Discussed increased dietary phosphorus.    # Electrolytes:   - Potassium; level: Normal  - Magnesium; level: Normal and will continue on magnesium supplement.  - Bicarbonate; level: Normal    # Diarrhea: Likely secondary to previous use of stool softeners, which are now stopped.  However, if  symptoms persist, would consider changing mycophenolate mofetil to mycophenolic acid.     # Dysuria: Minimal symptoms and urinalysis only showed some hematuria, as expected, yesterday.  Symptoms likely residual from paredes catheter.  Will follow.     # Overweight: Once patient has recovered from kidney transplant surgery, he plans to lose more weight in order to get a pancreas transplant.    # Medical Compliance: Yes    # Transplant History:  Etiology of kidney failure: Diabetes mellitus type 1  Tx: LDKT  Transplant: 10/1/2019 (Kidney)  Donor Type: Living Donor Class:   Crossmatch at time of Tx: negative  DSA at time of Tx: No  Significant changes in immunosuppression: None  CMV IgG Ab High Risk Discordance (D+/R-): No  EBV IgG Ab High Risk Discordance (D+/R-): No  Significant transplant-related complications: None    Transplant Office Phone Number: [phone]    Assessment and plan was discussed with the patient and he voiced his understanding and agreement.    Return visit: Return for as previously scheduled.    Jabier Hernández MD    Chief Complaint   Mr. Amin is a 39 year old here for hospital follow up following kidney transplant.     History of Present Illness    Mr. Amin reports feeling pretty good overall with some medical complaints.  His energy level is pretty good and improved as he had a good day yesterday.  He has been active and doing some walking.  Denies any chest pain or shortness of breath with exertion.  Appetite is improved and he is trying to push fluids.  Generally no nausea, but had a little with taking his medications this morning.  Mostly the larger pills, such as phosphorus supplement, bother him.  No vomiting.  Still some loose stools, but better after stopping stool softeners.  No fever, sweats or chills, but still mild night sweats.  Leg swelling is about the same.  Still mild dysuria.    Recent Hospitalizations:  [] No [x] Yes S/p LDKT   New Medical Issues: [x] No [] Yes     Decreased energy: [] No [x] Yes Improving   Chest pain or SOB with exertion:  [x] No [] Yes    Appetite change or weight change: [] No [x] Yes Improved appetite.  Above dry weight, but closer   Nausea, vomiting or diarrhea:  [] No [x] Yes Some diarrhea.   Fever, sweats or chills: [x] No [] Yes Some night sweats   Leg swelling: [] No [x] Yes A little     Home BP: Not checked.    Review of Systems   A comprehensive review of systems was obtained and negative, except as noted in the HPI or PMH.    Problem List   Patient Active Problem List   Diagnosis     HTN, kidney transplant related     Diabetes mellitus type 1 (H)     Dyslipidemia     Anemia in chronic kidney disease     Secondary renal hyperparathyroidism (H)     Kidney replaced by transplant     Aftercare following organ transplant     Vitamin D deficiency     Hypomagnesemia     Hypophosphatemia       Social History   Social History     Tobacco Use     Smoking status: Never Smoker     Smokeless tobacco: Never Used   Substance Use Topics     Alcohol use: Not Currently     Comment: Rarely 1-2 Drinks a month      Drug use: No       Allergies   Allergies   Allergen Reactions     Thymoglobulin      Had reaction with rigors after steroid-free dose. No reaction with steroids.       Medications   Current Outpatient Medications   Medication Sig     mycophenolate (GENERIC EQUIVALENT) 250 MG capsule Take 4 capsules (1,000 mg) by mouth 2 times daily     phosphorus tablet 250 mg (PHOSPHA 250 NEUTRAL) 250 MG per tablet Take 1 tablet (250 mg) by mouth 2 times daily     acetaminophen (TYLENOL) 325 MG tablet Take 2 tablets (650 mg) by mouth every 4 hours as needed for mild pain or fever     aspirin (ASA) 81 MG EC tablet Take 81 mg by mouth daily      atorvastatin (LIPITOR) 10 MG tablet Take 1 tablet (10 mg) by mouth daily     blood glucose monitoring (ACCU-CHEK FASTCLIX) lancets U QID OR MORE OFTEN PRN     calcitRIOL (ROCALTROL) 0.25 MCG capsule Take 0.25 mcg by mouth daily       carvedilol (COREG) 3.125 MG tablet Take 1 tablet (3.125 mg) by mouth 2 times daily (with meals)     gentamicin (GARAMYCIN) 0.1 % external cream Apply to port exit site once daily     HUMALOG 100 UNIT/ML injection Insulin used for filling patient's pump     insulin lispro (HUMALOG VIAL) 100 UNIT/ML vial Inject 100 Units Subcutaneous daily With Insulin pump. Up to 100 units     lactobacillus rhamnosus, GG, (CULTURELL) capsule Take 1 capsule by mouth daily     magnesium oxide (MAG-OX) 400 MG tablet Take 1 tablet (400 mg) by mouth daily (with lunch)     polyethylene glycol (MIRALAX/GLYCOLAX) packet Take 17 g by mouth daily (Patient not taking: Reported on 10/7/2019)     psyllium (METAMUCIL/KONSYL) 58.6 % powder Take by mouth daily     senna-docusate (SENOKOT-S/PERICOLACE) 8.6-50 MG tablet Take 2 tablets by mouth 2 times daily (Patient not taking: Reported on 10/7/2019)     sulfamethoxazole-trimethoprim (BACTRIM/SEPTRA) 400-80 MG tablet Take 1 tablet by mouth daily     tacrolimus (GENERIC EQUIVALENT) 0.5 MG capsule Take 1 capsule (0.5 mg) by mouth 2 times daily (Patient not taking: Reported on 10/7/2019)     tacrolimus (GENERIC EQUIVALENT) 1 MG capsule Take 4 capsules (4 mg) by mouth 2 times daily     valGANciclovir (VALCYTE) 450 MG tablet Take 2 tablets (900 mg) by mouth daily     vitamin D3 (CHOLECALCIFEROL) 1000 units (25 mcg) tablet Take 1 tablet (1,000 Units) by mouth daily     No current facility-administered medications for this visit.      Medications Discontinued During This Encounter   Medication Reason     mycophenolate (GENERIC EQUIVALENT) 250 MG capsule      phosphorus tablet 250 mg (PHOSPHA 250 NEUTRAL) 250 MG per tablet      oxyCODONE (ROXICODONE) 5 MG tablet        Physical Exam   Vital Signs: Reviewed.  GENERAL APPEARANCE: alert and no distress  HENT: mouth without ulcers or lesions  LYMPHATICS: no cervical or supraclavicular nodes  RESP: lungs clear to auscultation - no rales, rhonchi or  wheezes  CV: regular rhythm, normal rate, no rub, no murmur  EDEMA: no LE edema bilaterally  ABDOMEN: soft, nondistended, nontender, bowel sounds normal, overweight  MS: extremities normal - no gross deformities noted, no evidence of inflammation in joints, no muscle tenderness  SKIN: no rash  TX KIDNEY: minimal TTP  DIALYSIS ACCESS:  None    Data     Renal Latest Ref Rng & Units 10/8/2019 10/7/2019 10/6/2019   Na 133 - 144 mmol/L 138 136 135   Na (external) 132 - 146 mEq/L - - -   K 3.4 - 5.3 mmol/L 4.0 4.2 4.3   K (external) 3.5 - 5.5 mEq/L - - -   Cl 94 - 109 mmol/L 107 107 107   Cl (external) 99 - 109 mEq/L - - -   CO2 20 - 32 mmol/L 24 24 23   CO2 (external) 20 - 31 mEq/L - - -   BUN 7 - 30 mg/dL 12 12 14   BUN (external) 9 - 23 mg/dL - - -   Cr 0.66 - 1.25 mg/dL 0.78 0.80 0.70   Cr (external) 0.70 - 1.30 mg/dL - - -   Glucose 70 - 99 mg/dL 160(H) 191(H) 162(H)   Ca  8.5 - 10.1 mg/dL 8.2(L) 8.1(L) 7.9(L)   Ca (external) 8.7 - 10.4 mg/dL - - -   Mg 1.6 - 2.3 mg/dL 1.6 1.7 1.6   Mg (external) 1.3 - 2.7 mg/dL - - -     Bone Health Latest Ref Rng & Units 10/8/2019 10/7/2019 10/6/2019   Phos 2.5 - 4.5 mg/dL 1.8(L) 1.3(L) 1.2(L)   Phos (external) 2.4 - 5.1 mg/dL - - -   PTHi 18 - 80 pg/mL - - -   Vit D Def 20 - 75 ug/L - - -     Heme Latest Ref Rng & Units 10/8/2019 10/7/2019 10/6/2019   WBC 4.0 - 11.0 10e9/L 7.2 5.5 3.4(L)   WBC (external) 4.5 - 11.0 - - -   Hgb 13.3 - 17.7 g/dL 8.5(L) 7.8(L) 7.5(L)   Hgb (external) 14.0 - 18.0 g/dL - - -   Plt 150 - 450 10e9/L 87(L) 59(L) 32(LL)   Plt (external) 150 - 400 - - -     Liver Latest Ref Rng & Units 9/26/2019 8/6/2019 1/7/2019   AP 40 - 150 U/L 100 - 157(H)   TBili 0.2 - 1.3 mg/dL 0.2 - 0.2   ALT 0 - 70 U/L 42 - 46   AST 0 - 45 U/L 16 - 15   Tot Protein 6.8 - 8.8 g/dL 7.5 - 7.9   Tot Protein (external) 5.7 - 8.2 g/dL - 6.6 -   Albumin 3.4 - 5.0 g/dL 3.5 - 3.5   Albumin (external) 3.2 - 4.8 g/dL - 4.4 -     Pancreas Latest Ref Rng & Units 9/26/2019 1/7/2019 8/15/2018    A1C 0 - 5.6 % 8.9(H) 10.8(H) 8.6(H)     Iron studies Latest Ref Rng & Units 9/26/2019 8/6/2019   Iron 35 - 180 ug/dL 70 -   Iron sat 15 - 46 % 28 -   Ferritin 26 - 388 ng/mL 753(H) -   Ferritin (external) 22 - 322 ng/mL - 578(H)     [facility] Txp Virology Latest Ref Rng & Units 9/26/2019 1/7/2019   Hep B Core NR:Nonreactive Nonreactive Nonreactive        Recent Labs   Lab Test 10/04/19  2320 10/07/19  0742   DOSTAC Not Provided Not Provided   TACROL 5.9 6.6     Recent Labs   Lab Test 10/07/19  0742   DOSMPA Not Provided   MPACID 0.82*   MPAG 14.1*       Jabier Hernández MD

## 2019-10-09 ENCOUNTER — TELEPHONE (OUTPATIENT)
Dept: TRANSPLANT | Facility: CLINIC | Age: 39
End: 2019-10-09

## 2019-10-09 NOTE — PHARMACY
Patient will use local pharmacy or other mail order for outpatient medications.  Updated pharmacy call list for initial visit.      KAREEN Carroll.Ph.  [facility]  [phone] pager

## 2019-10-09 NOTE — TELEPHONE ENCOUNTER
Spoke to home care at [facility]   Due to the travel time to [place]    Will need to draw labs closer to 8 30 /0900   Called spoke to Rudi

## 2019-10-09 NOTE — TELEPHONE ENCOUNTER
Patient called via triage line. He was told he would be getting home care to draw his labs but has not heard anything back yet.     Reviewed notes and determined home care was planned to be ordered.    Informed patient to call back tomorrow morning at 8:30 when the office opens to discuss a plan with his coordinator. Will hold off on taking meds until 8:30-9AM in case he has lab draws due in the morning. Will forward note to Rigo

## 2019-10-09 NOTE — TELEPHONE ENCOUNTER
Patient Call: General  Route to LPN    Reason for call: Please connect with HC on which provider is going to be following his labs.        Call back needed? Yes    Return Call Needed  Same as documented in contacts section  When to return call?: Same day: Route High Priority

## 2019-10-10 ENCOUNTER — TELEPHONE (OUTPATIENT)
Dept: TRANSPLANT | Facility: CLINIC | Age: 39
End: 2019-10-10

## 2019-10-10 NOTE — TELEPHONE ENCOUNTER
Jabier Hernández MD Huepfel, Mary K, RN             I had increased his tacrolimus dose a bit even though his level was trending up , but now it decreased.  I do wonder if each lab gives us the same level.     Would probably increase his tacrolimus dose to 6 mg bid, if you think that is okay.     Ted

## 2019-10-14 ENCOUNTER — TELEPHONE (OUTPATIENT)
Dept: TRANSPLANT | Facility: CLINIC | Age: 39
End: 2019-10-14

## 2019-10-14 DIAGNOSIS — Z94.0 KIDNEY REPLACED BY TRANSPLANT: Primary | ICD-10-CM

## 2019-10-14 RX ORDER — ONDANSETRON 4 MG/1
4 TABLET, FILM COATED ORAL EVERY 8 HOURS PRN
Qty: 12 TABLET | Refills: 1 | Status: SHIPPED | OUTPATIENT
Start: 2019-10-14 | End: 2019-12-09

## 2019-10-14 NOTE — TELEPHONE ENCOUNTER
Confirmed  Taking 6 mg twice per day     Increased tacrolimus  7 mg twice per day     Complained of some nausea     Plan eat smaller bland meals     Overall denies any current fever ,drinking fluids

## 2019-10-14 NOTE — TELEPHONE ENCOUNTER
Discussed with Rudi to take medications at 0830 to 0900   Home care nurse coming from [place] 35 min [address] to [place]

## 2019-10-14 NOTE — LETTER
OUTPATIENT LABORATORY TEST ORDER    Patient: Michael Amin    Transplant Date: 10/1/2019  YOB: 1980   Ordered By: Dr. Rocio Rutherford  MRN: [id number]     Issued Date/Time: 10/07/19  8:57 AM         Expiration Date: 10/7/2020    Diagnosis: Kidney Transplant (ICD-10 Z94.0)    Aftercare following organ transplant (ICD-10 Z48.288)    Long term use of medications (ICD-10 Z79.899)    Lab results to be available on the same day drawn    Patient should release information to the [facility], [facility].     Please fax all results to [phone]      First 8 weeks post-transplant (10/1/2019 - 12/1/2019)  Labs 2x/week (Monday and Thursday)    CBC with platelets    Basic Metabolic Panel (Sodium, Potassium, Chloride, Creatinine, CO2, Urea Nitrogen, glucose, Calcium)    Tacrolimus/Prograf/ drug level  Labs weekly (Monday)    Phosphorus, magnesium  Monthly    BK PCR QT    Months 2-4 post-transplant (12/2/2019 - 2/2/2020)  Labs 1x weekly (Monday or Tuesday)    CBC with platelets    Basic Metabolic Panel (Sodium, Potassium, Chloride, Creatinine, CO2, Urea Nitrogen, glucose, Calcium)    Tacrolimus/Prograf/ drug level  Labs monthly     Phosphorus, magnesium    BK (Polyoma Virus) PCR Quantitative/Plasma  **At month 3 only (1/1/2020)    HIV, HCV, HBV        OUTPATIENT LABORATORY TEST ORDER    Patient: Michael Amin    Transplant Date: 10/1/2019  YOB: 1980   Ordered By: Dr. Rocio Rutherford  MRN: [id number]     Issued Date/Time: 10/07/19  9:02 AM         Expiration Date: 10/7/2020    Diagnosis: Kidney Transplant (ICD-10 Z94.0)    Aftercare following organ transplant (ICD-10 Z48.288)    Long term use of medications (ICD-10 Z79.899)    Months 4-7 post-transplant (2/3/2020 - 5/3/2020)  Labs every other week    CBC with platelets    Basic Metabolic Panel (Sodium, Potassium, Chloride, Creatinine, CO2, Urea Nitrogen, glucose,  Calcium)    Tacrolimus/Prograf/ drug level  Monthly    Phosphorus, magnesium    BK (Polyoma Virus) PCR Quantitative/Plasma  **At month 7 only (5/2020)     DSA PRA (mailers provided by patient)     Months 7-12 post-transplant (5/4/2020 - 10/1/2020)  Monthly    CBC with platelets    Basic Metabolic Panel (Sodium, Potassium, Chloride, Creatinine, CO2, Urea Nitrogen, glucose, Calcium)    Tacrolimus/Prograf/ drug level    BK (Polyoma Virus) PCR Quantitative/Plasma      Labs to be drawn with clinic visits at [facility]    1 month post-transplant clinic visit    DSA PRA    Mycophenolic Acid (MPA) drug level    Urine protein/creatinine    2 months post-transplant clinic visit    Mycophenolic Acid (MPA) drug level    Urine protein/creatinine    4 months post-transplant clinic visit    DSA PRA    PTH    Urine protein/creatinine        6 months post-transplant clinic visit    LFTs    Hemoglobin A1c    Uric Acid    Lipid panel    Urine protein/creatinine    9 months post-transplant clinic visit    Urine protein/creatinine    12 months post-transplant clinic visit (Fasting labs)    LFTs    Hemoglobin A1c    Uric Acid    Lipid panel    Urine protein/creatinine    DSA PRA    PTH    Vitamin D      If you have any questions please call the Transplant Center at [phone] option [phone]. All lab results should be faxed to [phone]        Rocio Rutherford MD FACS  Assistant Professor of Surgery  Director, Living Kidney Donor Program.

## 2019-10-14 NOTE — TELEPHONE ENCOUNTER
Current standing lab orders faxed to [facility] and sent copy to patient via [employer].

## 2019-10-14 NOTE — TELEPHONE ENCOUNTER
Provider Call: Transplant Lab/Orders    Post Transplant Days: 13  When patient is less than 60 days post-transplant, route high priority  Reason for Call: Clarification; which order? Needs Order for Tacrolimus 10/15/19      Facility Name: [facility]    Outside Facility Fax Number: [phone]  Callback needed? No     Is coming to [place] 10/15/19 for Tacrolimus. lab only    [place] was unable to draw Tacrolimus this AM.,

## 2019-10-16 ENCOUNTER — TELEPHONE (OUTPATIENT)
Dept: TRANSPLANT | Facility: CLINIC | Age: 39
End: 2019-10-16

## 2019-10-16 NOTE — TELEPHONE ENCOUNTER
Post Kidney and Pancreas Transplant Team Conference  Date: 10/16/2019  Transplant Coordinator: Lea Chanel     Attendees:  [x]  Dr. Hernández  [x] Candelaria Kramer LPN     [x]  Dr. Landon [x] Sabine Anders RN [] Luisa Landis LPN   [x]  Dr. Hoffman [] Jane Singer, RN     [] Nereida Addison RN [x] Abdullahi Javier, PharmD   [] Dr. Wolfe [] Chloe Bryson RN    [] Dr. Liu [] Vicente Ross RN    [] Dr. Alonzo [] Ana Paula Dong RN    [] Dr. Mccullough [] Annita Taylor RN     [] Irene Mccracken RN    [] Surgery Fellow [] Stephany Culver RN    [] Estela Woodard, NP [] Karissa Villanueva RN        Verbal Plan Read Back:   Biopsy needed due to increased creatinine.    Routed to RN Coordinator   Candelaria Kramer LPN

## 2019-10-17 ENCOUNTER — TELEPHONE (OUTPATIENT)
Dept: TRANSPLANT | Facility: CLINIC | Age: 39
End: 2019-10-17

## 2019-10-17 DIAGNOSIS — Z94.0 KIDNEY REPLACED BY TRANSPLANT: Primary | ICD-10-CM

## 2019-10-17 DIAGNOSIS — Z79.899 ENCOUNTER FOR LONG-TERM (CURRENT) USE OF OTHER MEDICATIONS: ICD-10-CM

## 2019-10-17 DIAGNOSIS — Z99.2 ESRD (END STAGE RENAL DISEASE) ON DIALYSIS (H): ICD-10-CM

## 2019-10-17 DIAGNOSIS — N18.6 ESRD (END STAGE RENAL DISEASE) ON DIALYSIS (H): ICD-10-CM

## 2019-10-17 RX ORDER — TACROLIMUS 1 MG/1
7 CAPSULE ORAL 2 TIMES DAILY
Qty: 420 CAPSULE | Refills: 11 | Status: CANCELLED | OUTPATIENT
Start: 2019-10-17

## 2019-10-17 NOTE — TELEPHONE ENCOUNTER
Transplant Coordinator Renal Biopsy Communication    Call placed to Michael Amin to discuss indication for kidney transplant biopsy per Dr. Hoffman       LEFT SIDE KIDNEY    Indication for transplant renal biopsy:           Patient location within 70 miles of [facility]: Yes.  . Pt verbalizes staying locally    Michael Amin's medication list was reviewed.   Anticoagulant: aspirin HOLDING 81 mg once per day   Ibuprofen: No.  Fish Oil:  No.  Medications held:      Recent blood pressure readings are WNL or not applicable. Instructed to take medication, especially blood pressure medications, before arriving to the [facility] and [facility] at 0600 day of procedure.     Procedure expectations and duration of stay discussed. Expressed pt can expect a phone call from LPN/MA to confirm biopsy date/time/location/directions/review of medications.   Patient verbalized understanding they will need to arrive by 6 a.m. on   and plan to stay 4-5 hours post biopsy for monitoring. Pt has no additional questions at this time. Transplant Office phone number given to pt for future questions.

## 2019-10-17 NOTE — TELEPHONE ENCOUNTER
LPN/MA  Renal Biopsy Communication    Call to pt to confirm renal biopsy procedure. Biopsy orders entered and patient aware of date 10-, in [facility] and to arrive at 6:00AM.     No need to be NPO.      Discussed anticoagulants (i.e. fish oil, ASA, Plavix, Coumadin, Ibuprofen). Pt confirms use of anticoagulants. Holding    Take all medicine before arrival for biopsy and bring all medicine bottles with.      Report to 1st floor lab, then 5th floor for biopsy.      Use [employer] and bring form of entertainment.      Discussed with patient need to stay overnight locally evening of procedure if live more than 70 miles away.    Patient verbalized understanding they will need to stay 4-5 hours post biopsy for monitoring.     Call placed to scheduling at [phone] to schedule and confirm biopsy date/time    Lab appointment scheduled for morning of biopsy.        Biopsy scheduled. Orders placed. Call placed to patient. Patient v\u of instructions listed above.

## 2019-10-18 ENCOUNTER — TELEPHONE (OUTPATIENT)
Dept: TRANSPLANT | Facility: CLINIC | Age: 39
End: 2019-10-18

## 2019-10-18 ENCOUNTER — HOSPITAL ENCOUNTER (OUTPATIENT)
Facility: AMBULATORY SURGERY CENTER | Age: 39
End: 2019-10-18
Attending: INTERNAL MEDICINE
Payer: COMMERCIAL

## 2019-10-18 ENCOUNTER — RESULTS ONLY (OUTPATIENT)
Dept: OTHER | Facility: CLINIC | Age: 39
End: 2019-10-18

## 2019-10-18 VITALS
OXYGEN SATURATION: 98 % | BODY MASS INDEX: 33.11 KG/M2 | RESPIRATION RATE: 16 BRPM | WEIGHT: 206 LBS | DIASTOLIC BLOOD PRESSURE: 72 MMHG | SYSTOLIC BLOOD PRESSURE: 107 MMHG | HEIGHT: 66 IN

## 2019-10-18 DIAGNOSIS — N30.01 ACUTE CYSTITIS WITH HEMATURIA: Primary | ICD-10-CM

## 2019-10-18 DIAGNOSIS — N18.6 ESRD (END STAGE RENAL DISEASE) ON DIALYSIS (H): ICD-10-CM

## 2019-10-18 DIAGNOSIS — Z79.899 LONG TERM USE OF DRUG: ICD-10-CM

## 2019-10-18 DIAGNOSIS — Z94.0 KIDNEY REPLACED BY TRANSPLANT: ICD-10-CM

## 2019-10-18 DIAGNOSIS — Z99.2 ESRD (END STAGE RENAL DISEASE) ON DIALYSIS (H): ICD-10-CM

## 2019-10-18 DIAGNOSIS — Z79.899 ENCOUNTER FOR LONG-TERM (CURRENT) USE OF OTHER MEDICATIONS: ICD-10-CM

## 2019-10-18 LAB
ABO + RH BLD: NORMAL
ABO + RH BLD: NORMAL
ALBUMIN UR-MCNC: NEGATIVE MG/DL
ANION GAP SERPL CALCULATED.3IONS-SCNC: 9 MMOL/L (ref 3–14)
APPEARANCE UR: CLEAR
BACTERIA #/AREA URNS HPF: ABNORMAL /HPF
BASOPHILS # BLD AUTO: 0.1 10E9/L (ref 0–0.2)
BASOPHILS NFR BLD AUTO: 2.2 %
BILIRUB UR QL STRIP: NEGATIVE
BLD GP AB SCN SERPL QL: NORMAL
BLOOD BANK CMNT PATIENT-IMP: NORMAL
BUN SERPL-MCNC: 19 MG/DL (ref 7–30)
CALCIUM SERPL-MCNC: 8.8 MG/DL (ref 8.5–10.1)
CHLORIDE SERPL-SCNC: 106 MMOL/L (ref 94–109)
CO2 SERPL-SCNC: 24 MMOL/L (ref 20–32)
COLOR UR AUTO: YELLOW
CREAT SERPL-MCNC: 1.35 MG/DL (ref 0.66–1.25)
CREAT UR-MCNC: 123 MG/DL
DIFFERENTIAL METHOD BLD: ABNORMAL
EOSINOPHIL # BLD AUTO: 0.2 10E9/L (ref 0–0.7)
EOSINOPHIL NFR BLD AUTO: 4.1 %
ERYTHROCYTE [DISTWIDTH] IN BLOOD BY AUTOMATED COUNT: 13.3 % (ref 10–15)
GFR SERPL CREATININE-BSD FRML MDRD: 65 ML/MIN/{1.73_M2}
GLUCOSE SERPL-MCNC: 106 MG/DL (ref 70–99)
GLUCOSE UR STRIP-MCNC: NEGATIVE MG/DL
HCT VFR BLD AUTO: 28.5 % (ref 40–53)
HGB BLD-MCNC: 8.9 G/DL (ref 13.3–17.7)
HGB UR QL STRIP: ABNORMAL
HYALINE CASTS #/AREA URNS LPF: 1 /LPF (ref 0–2)
IMM GRANULOCYTES # BLD: 0 10E9/L (ref 0–0.4)
IMM GRANULOCYTES NFR BLD: 0.7 %
INR PPP: 1.03 (ref 0.86–1.14)
KETONES UR STRIP-MCNC: NEGATIVE MG/DL
LEUKOCYTE ESTERASE UR QL STRIP: ABNORMAL
LYMPHOCYTES # BLD AUTO: 0.3 10E9/L (ref 0.8–5.3)
LYMPHOCYTES NFR BLD AUTO: 6.1 %
MAGNESIUM SERPL-MCNC: 1.7 MG/DL (ref 1.6–2.3)
MCH RBC QN AUTO: 29.4 PG (ref 26.5–33)
MCHC RBC AUTO-ENTMCNC: 31.2 G/DL (ref 31.5–36.5)
MCV RBC AUTO: 94 FL (ref 78–100)
MICROALBUMIN UR-MCNC: 96 MG/L
MICROALBUMIN/CREAT UR: 78.05 MG/G CR (ref 0–17)
MONOCYTES # BLD AUTO: 0.3 10E9/L (ref 0–1.3)
MONOCYTES NFR BLD AUTO: 5.6 %
MUCOUS THREADS #/AREA URNS LPF: PRESENT /LPF
NEUTROPHILS # BLD AUTO: 4.4 10E9/L (ref 1.6–8.3)
NEUTROPHILS NFR BLD AUTO: 81.3 %
NITRATE UR QL: NEGATIVE
NRBC # BLD AUTO: 0 10*3/UL
NRBC BLD AUTO-RTO: 0 /100
PH UR STRIP: 5 PH (ref 5–7)
PLATELET # BLD AUTO: 292 10E9/L (ref 150–450)
POTASSIUM SERPL-SCNC: 4.7 MMOL/L (ref 3.4–5.3)
PROT UR-MCNC: 0.27 G/L
PROT/CREAT 24H UR: 0.22 G/G CR (ref 0–0.2)
RBC # BLD AUTO: 3.03 10E12/L (ref 4.4–5.9)
RBC #/AREA URNS AUTO: 92 /HPF (ref 0–2)
SODIUM SERPL-SCNC: 139 MMOL/L (ref 133–144)
SOURCE: ABNORMAL
SP GR UR STRIP: 1.01 (ref 1–1.03)
SPECIMEN EXP DATE BLD: NORMAL
TACROLIMUS BLD-MCNC: 17.6 UG/L (ref 5–15)
TME LAST DOSE: ABNORMAL H
UROBILINOGEN UR STRIP-MCNC: 0 MG/DL (ref 0–2)
WBC # BLD AUTO: 5.4 10E9/L (ref 4–11)
WBC #/AREA URNS AUTO: 14 /HPF (ref 0–5)

## 2019-10-18 RX ORDER — CIPROFLOXACIN 500 MG/1
500 TABLET, FILM COATED ORAL 2 TIMES DAILY
Qty: 28 TABLET | Refills: 0 | Status: SHIPPED | OUTPATIENT
Start: 2019-10-18 | End: 2019-10-31

## 2019-10-18 RX ORDER — TACROLIMUS 1 MG/1
4 CAPSULE ORAL 2 TIMES DAILY
Qty: 240 CAPSULE | Refills: 11 | COMMUNITY
Start: 2019-10-18 | End: 2019-11-19

## 2019-10-18 ASSESSMENT — MIFFLIN-ST. JEOR: SCORE: 1787.16

## 2019-10-18 NOTE — OR NURSING
Kidney biopsy cancelled. Plan for antibiotics over the weekend, and recheck labs on Monday per Dr. Hoffman.

## 2019-10-18 NOTE — TELEPHONE ENCOUNTER
Call from  Dr Hoffman   Cancelling kidney biopsy  - wbc in urine   Decreased tacrolimus  6   mg twice per day

## 2019-10-18 NOTE — PROGRESS NOTES
Nephrology Procedure H&P  10/18/2019     Assessment & Recommendations:   1. LDKT - baseline creatinine ~ 0.8 mg / dl, which has worsened to 1.3 mg / dl. Minimal proteinuria.  The patient does have symptoms of urinary frequency as well as mild dysuria in the setting of leukocyturia and bacteriuria in the urine.  As such, we will treat with ciprofloxacin 500 mg p.o. twice daily and decrease the tacrolimus to goal trough and reevaluate the creatinine on Monday.  If his creatinine is still elevated on Monday we can plan on biopsy for Tuesday.    Transplant History:  Transplant: 10/1/2019 (Kidney)        Donor Class:   Crossmatch at time of Transplant:    DSA at time of Transplant:  No  Present Maintenance Immunosuppression:  Tacrolimus and Mycophenolate mofetil  Baseline creatinine:  0.8 mg / dl  Latest DSA lab date:  PRA:  Class I:   SA1 Comments   Date Value Ref Range Status   09/26/2019   Final     Test performed by modified procedure. Serum heat inactivated and tested   by a modified (Stickney) protocol including fetal calf serum addition.   High-risk, mfi >3,000. Mod-risk, mfi 500-3,000.         Class II:    SA2 Comments   Date Value Ref Range Status   09/26/2019   Final     Test performed by modified procedure. Serum heat inactivated and tested   by a modified (Stickney) protocol including fetal calf serum addition.   High-risk, mfi >3,000. Mod-risk, mfi 500-3,000.       Biopsy:  No  Rejection History:  No  Significant Complications: None  [facility] [place]   [facility] Phone Number:  [phone]     2. Hypertension - well controlled at target of less than < 130/80. No changes.    Reason for Visit:  Mr. Amin is here for kidney transplant and elevated creatinine and potential kidney transplant biopsy.    History of Present Illness:  Michael Amin is a 39 year old male with ESKD from diabetes mellitus type 1 and is status post LDKT on 10/01/19.    The patient is a 39-year-old  male with history of end-stage kidney disease due to diabetes who is status post living kidney transplant on 10/1/2019 here for elevated creatinine.    The patient has had subtherapeutic drug levels with the tacrolimus now supra therapeutic at 17 ng/mL.  He did have reaction with Thymoglobulin and received 550 mg, but last dose after discharge.     He currently has mild dysuria and urinary frequency.  He does also have mild tenderness over the allograft.  The urine does show white blood cells with leukocyte esterase.    The patient denies any chest pain or breathing difficulties.  Is no nausea or vomiting.  He has no fever shakes or chills.  Moving his bowels appropriately.    Pain Over Kidney Tx:  mild Pain or Burning with Urination:  Yes  Gross Hematuria:  No  Taking NSAIDs:  No    Home BP: 140's    Review of Systems:  A comprehensive review of systems was obtained and negative, except as noted in the History of Present Illness or Active Medical Problems.    Active Medical Problems:  Patient Active Problem List    Diagnosis     Aftercare following organ transplant     Vitamin D deficiency     Hypomagnesemia     Hypophosphatemia     Kidney replaced by transplant     Diabetes mellitus type 1 (H)     Dyslipidemia     Anemia in chronic kidney disease     Secondary renal hyperparathyroidism (H)     HTN, kidney transplant related     Current Medications:  Current Outpatient Medications   Medication Sig Dispense Refill     ciprofloxacin (CIPRO) 500 MG tablet Take 1 tablet (500 mg) by mouth 2 times daily 28 tablet 0     acetaminophen (TYLENOL) 325 MG tablet Take 2 tablets (650 mg) by mouth every 4 hours as needed for mild pain or fever 90 tablet 0     aspirin (ASA) 81 MG EC tablet Take 81 mg by mouth daily        atorvastatin (LIPITOR) 10 MG tablet Take 1 tablet (10 mg) by mouth daily 90 tablet 3     blood glucose monitoring (ACCU-CHEK FASTCLIX) lancets U QID OR MORE OFTEN PRN  1     calcitRIOL (ROCALTROL) 0.25 MCG  capsule Take 0.25 mcg by mouth daily   3     carvedilol (COREG) 3.125 MG tablet Take 1 tablet (3.125 mg) by mouth 2 times daily (with meals) 60 tablet 3     gentamicin (GARAMYCIN) 0.1 % external cream Apply to port exit site once daily  3     HUMALOG 100 UNIT/ML injection Insulin used for filling patient's pump  3     insulin lispro (HUMALOG VIAL) 100 UNIT/ML vial Inject 100 Units Subcutaneous daily With Insulin pump. Up to 100 units 30 mL 11     lactobacillus rhamnosus, GG, (CULTURELL) capsule Take 1 capsule by mouth daily       magnesium oxide (MAG-OX) 400 MG tablet Take 1 tablet (400 mg) by mouth daily (with lunch) 30 tablet 5     mycophenolate (GENERIC EQUIVALENT) 250 MG capsule Take 4 capsules (1,000 mg) by mouth 2 times daily 240 capsule 11     ondansetron (ZOFRAN) 4 MG tablet Take 1 tablet (4 mg) by mouth every 8 hours as needed for nausea (take for nausea) 12 tablet 1     phosphorus tablet 250 mg (PHOSPHA 250 NEUTRAL) 250 MG per tablet Take 1 tablet (250 mg) by mouth 2 times daily 60 tablet 11     polyethylene glycol (MIRALAX/GLYCOLAX) packet Take 17 g by mouth daily 5 packet 0     psyllium (METAMUCIL/KONSYL) 58.6 % powder Take by mouth daily       senna-docusate (SENOKOT-S/PERICOLACE) 8.6-50 MG tablet Take 2 tablets by mouth 2 times daily 20 tablet 0     sulfamethoxazole-trimethoprim (BACTRIM/SEPTRA) 400-80 MG tablet Take 1 tablet by mouth daily 30 tablet 1     tacrolimus (GENERIC EQUIVALENT) 0.5 MG capsule Take 1 capsule (0.5 mg) by mouth 2 times daily 60 capsule 11     tacrolimus (GENERIC EQUIVALENT) 1 MG capsule Take 6 capsules (6 mg) by mouth 2 times daily 240 capsule 11     valGANciclovir (VALCYTE) 450 MG tablet Take 2 tablets (900 mg) by mouth daily 30 tablet 3     vitamin D3 (CHOLECALCIFEROL) 1000 units (25 mcg) tablet Take 1 tablet (1,000 Units) by mouth daily 90 tablet 3     Vitals:    127/76    Physical Exam:   GENERAL APPEARANCE: alert and no distress  HENT: mouth without ulcers or lesions  PULM:  lungs clear to auscultation, equal air movement  CV: regular rhythm, normal rate     - no LE edema bilaterally  GI: soft, nontender, bowel sounds are normal  MS: no evidence of inflammation in joints, no muscle tenderness  TX KIDNEY: nontender    Labs:   All labs reviewed by me  Recent Results (from the past 8 hour(s))   Magnesium    Collection Time: 10/18/19  6:17 AM   Result Value Ref Range    Magnesium 1.7 1.6 - 2.3 mg/dL   ABO/Rh type and screen    Collection Time: 10/18/19  6:17 AM   Result Value Ref Range    ABO B     RH(D) Pos     Antibody Screen Neg     Test Valid Only At          [facility]    Specimen Expires 10/21/2019    INR    Collection Time: 10/18/19  6:17 AM   Result Value Ref Range    INR 1.03 0.86 - 1.14   CBC with platelets differential    Collection Time: 10/18/19  6:17 AM   Result Value Ref Range    WBC 5.4 4.0 - 11.0 10e9/L    RBC Count 3.03 (L) 4.4 - 5.9 10e12/L    Hemoglobin 8.9 (L) 13.3 - 17.7 g/dL    Hematocrit 28.5 (L) 40.0 - 53.0 %    MCV 94 78 - 100 fl    MCH 29.4 26.5 - 33.0 pg    MCHC 31.2 (L) 31.5 - 36.5 g/dL    RDW 13.3 10.0 - 15.0 %    Platelet Count 292 150 - 450 10e9/L    Diff Method Automated Method     % Neutrophils 81.3 %    % Lymphocytes 6.1 %    % Monocytes 5.6 %    % Eosinophils 4.1 %    % Basophils 2.2 %    % Immature Granulocytes 0.7 %    Nucleated RBCs 0 0 /100    Absolute Neutrophil 4.4 1.6 - 8.3 10e9/L    Absolute Lymphocytes 0.3 (L) 0.8 - 5.3 10e9/L    Absolute Monocytes 0.3 0.0 - 1.3 10e9/L    Absolute Eosinophils 0.2 0.0 - 0.7 10e9/L    Absolute Basophils 0.1 0.0 - 0.2 10e9/L    Abs Immature Granulocytes 0.0 0 - 0.4 10e9/L    Absolute Nucleated RBC 0.0    Basic metabolic panel    Collection Time: 10/18/19  6:17 AM   Result Value Ref Range    Sodium 139 133 - 144 mmol/L    Potassium 4.7 3.4 - 5.3 mmol/L    Chloride 106 94 - 109 mmol/L    Carbon Dioxide 24 20 - 32 mmol/L    Anion Gap 9 3 - 14 mmol/L    Glucose 106 (H) 70 -  99 mg/dL    Urea Nitrogen 19 7 - 30 mg/dL    Creatinine 1.35 (H) 0.66 - 1.25 mg/dL    GFR Estimate 65 >60 mL/min/[1.73_m2]    GFR Estimate If Black 75 >60 mL/min/[1.73_m2]    Calcium 8.8 8.5 - 10.1 mg/dL   Protein  random urine with Creat Ratio    Collection Time: 10/18/19  6:30 AM   Result Value Ref Range    Protein Random Urine 0.27 g/L    Protein Total Urine g/gr Creatinine 0.22 (H) 0 - 0.2 g/g Cr   Albumin Random Urine Quantitative with Creat Ratio    Collection Time: 10/18/19  6:30 AM   Result Value Ref Range    Creatinine Urine 123 mg/dL    Albumin Urine mg/L 96 mg/L    Albumin Urine mg/g Cr 78.05 (H) 0 - 17 mg/g Cr   Urine Culture Aerobic Bacterial    Collection Time: 10/18/19  6:30 AM   Result Value Ref Range    Specimen Description Midstream Urine     Special Requests Specimen received in preservative     Culture Micro PENDING    Routine UA with microscopic    Collection Time: 10/18/19  6:30 AM   Result Value Ref Range    Color Urine Yellow     Appearance Urine Clear     Glucose Urine Negative NEG^Negative mg/dL    Bilirubin Urine Negative NEG^Negative    Ketones Urine Negative NEG^Negative mg/dL    Specific Gravity Urine 1.013 1.003 - 1.035    Blood Urine Moderate (A) NEG^Negative    pH Urine 5.0 5.0 - 7.0 pH    Protein Albumin Urine Negative NEG^Negative mg/dL    Urobilinogen mg/dL 0.0 0.0 - 2.0 mg/dL    Nitrite Urine Negative NEG^Negative    Leukocyte Esterase Urine Trace (A) NEG^Negative    Source Midstream Urine     WBC Urine 14 (H) 0 - 5 /HPF    RBC Urine 92 (H) 0 - 2 /HPF    Bacteria Urine Few (A) NEG^Negative /HPF    Mucous Urine Present (A) NEG^Negative /LPF    Hyaline Casts 1 0 - 2 /LPF     Kash Hoffman MD

## 2019-10-19 LAB
BACTERIA SPEC CULT: NO GROWTH
Lab: NORMAL
SPECIMEN SOURCE: NORMAL

## 2019-10-21 ENCOUNTER — TELEPHONE (OUTPATIENT)
Dept: TRANSPLANT | Facility: CLINIC | Age: 39
End: 2019-10-21

## 2019-10-21 DIAGNOSIS — Z94.0 KIDNEY TRANSPLANTED: Primary | ICD-10-CM

## 2019-10-21 LAB
BKV DNA # SPEC NAA+PROBE: NORMAL COPIES/ML
BKV DNA SPEC NAA+PROBE-LOG#: NORMAL LOG COPIES/ML
DONOR IDENTIFICATION: NORMAL
DSA COMMENTS: NORMAL
DSA PRESENT: NO
DSA TEST METHOD: NORMAL
INTERFERING SUBST TEST METHOD: NORMAL
INTERFERING SUBSTANCE COMMENT: NORMAL
INTERFERING SUBSTANCE RESULT: NORMAL
INTERFERING SUBSTANCE: NORMAL
ORGAN: NORMAL
PROTOCOL CUTOFF: NORMAL
SA1 CELL: NORMAL
SA1 COMMENTS: NORMAL
SA1 HI RISK ABY: NORMAL
SA1 MOD RISK ABY: NORMAL
SA1 TEST METHOD: NORMAL
SA2 CELL: NORMAL
SA2 COMMENTS: NORMAL
SA2 HI RISK ABY UA: NORMAL
SA2 MOD RISK ABY: NORMAL
SA2 TEST METHOD: NORMAL
SPECIMEN SOURCE: NORMAL
UNACCEPTABLE ANTIGEN: NORMAL
UNOS CPRA: 0

## 2019-10-21 NOTE — TELEPHONE ENCOUNTER
Issue   Cr 1.59 increased from Friday   Plan   Return for kidney transplant  Biopsy  In AM   10/22/2019   Transplant Coordinator Renal Biopsy Communication    Call placed to Michael Amin to discuss indication for kidney transplant biopsy per Dr. Hoffman   Indication for transplant renal biopsy:  Increase Creatinine   Laterality: left  Date of biopsy:   On     Patient location within 70 miles of [facility] [place]: Yes.  If no, must stay overnight locally. Pt verbalizes staying  Understanding   Michael Amin's medication list was reviewed.    Holding ASA     Recent blood pressure readings are WNL or not applicable. Instructed to take medication, especially blood pressure medications, before [facility] 0600 day of procedure.     Procedure expectations and duration of stay discussed. Expressed pt can expect a phone call from LPN/MA to confirm biopsy date/time/location/directions/review of medications.   Patient verbalized understanding they will need to arrive by 6 a.m. on  10/22/2019  and plan to stay 4-5 hours post biopsy for monitoring. Pt has no additional questions at this time. Transplant Office phone number given to pt for future questions.

## 2019-10-21 NOTE — TELEPHONE ENCOUNTER
Pt states he is supposed to be on vitamin D3 2000 units, we only have 1000 unit on his profile, please send updated Rx    Thank you!  Jyoti Jean CPhT  [facility]

## 2019-10-22 ENCOUNTER — HOSPITAL ENCOUNTER (OUTPATIENT)
Facility: AMBULATORY SURGERY CENTER | Age: 39
End: 2019-10-22
Attending: INTERNAL MEDICINE
Payer: COMMERCIAL

## 2019-10-22 ENCOUNTER — TELEPHONE (OUTPATIENT)
Dept: TRANSPLANT | Facility: CLINIC | Age: 39
End: 2019-10-22

## 2019-10-22 ENCOUNTER — ANCILLARY PROCEDURE (OUTPATIENT)
Dept: RADIOLOGY | Facility: AMBULATORY SURGERY CENTER | Age: 39
End: 2019-10-22
Attending: INTERNAL MEDICINE
Payer: COMMERCIAL

## 2019-10-22 ENCOUNTER — INFUSION THERAPY VISIT (OUTPATIENT)
Dept: INFUSION THERAPY | Facility: CLINIC | Age: 39
End: 2019-10-22
Attending: INTERNAL MEDICINE
Payer: COMMERCIAL

## 2019-10-22 ENCOUNTER — RESULTS ONLY (OUTPATIENT)
Dept: OTHER | Facility: CLINIC | Age: 39
End: 2019-10-22

## 2019-10-22 VITALS
HEART RATE: 84 BPM | SYSTOLIC BLOOD PRESSURE: 117 MMHG | TEMPERATURE: 97.5 F | OXYGEN SATURATION: 99 % | DIASTOLIC BLOOD PRESSURE: 69 MMHG

## 2019-10-22 VITALS
HEART RATE: 83 BPM | BODY MASS INDEX: 31.2 KG/M2 | SYSTOLIC BLOOD PRESSURE: 124 MMHG | OXYGEN SATURATION: 97 % | DIASTOLIC BLOOD PRESSURE: 60 MMHG | RESPIRATION RATE: 16 BRPM | TEMPERATURE: 98 F | HEIGHT: 66 IN | WEIGHT: 194.13 LBS

## 2019-10-22 DIAGNOSIS — T86.11 KIDNEY TRANSPLANT REJECTION: Primary | ICD-10-CM

## 2019-10-22 DIAGNOSIS — Z48.298 AFTERCARE FOLLOWING ORGAN TRANSPLANT: ICD-10-CM

## 2019-10-22 DIAGNOSIS — Z94.0 KIDNEY TRANSPLANTED: ICD-10-CM

## 2019-10-22 DIAGNOSIS — Z94.0 STATUS POST KIDNEY TRANSPLANT: ICD-10-CM

## 2019-10-22 DIAGNOSIS — Z94.0 KIDNEY REPLACED BY TRANSPLANT: ICD-10-CM

## 2019-10-22 LAB
ABO + RH BLD: NORMAL
ABO + RH BLD: NORMAL
ALBUMIN UR-MCNC: NEGATIVE MG/DL
ANION GAP SERPL CALCULATED.3IONS-SCNC: 7 MMOL/L (ref 3–14)
APPEARANCE UR: CLEAR
BASOPHILS # BLD AUTO: 0.1 10E9/L (ref 0–0.2)
BASOPHILS NFR BLD AUTO: 2.5 %
BILIRUB UR QL STRIP: NEGATIVE
BLD GP AB SCN SERPL QL: NORMAL
BLOOD BANK CMNT PATIENT-IMP: NORMAL
BUN SERPL-MCNC: 19 MG/DL (ref 7–30)
CALCIUM SERPL-MCNC: 9.2 MG/DL (ref 8.5–10.1)
CHLORIDE SERPL-SCNC: 104 MMOL/L (ref 94–109)
CO2 SERPL-SCNC: 26 MMOL/L (ref 20–32)
COLOR UR AUTO: YELLOW
CREAT SERPL-MCNC: 1.46 MG/DL (ref 0.66–1.25)
CREAT UR-MCNC: 127 MG/DL
DIFFERENTIAL METHOD BLD: ABNORMAL
EOSINOPHIL # BLD AUTO: 0.2 10E9/L (ref 0–0.7)
EOSINOPHIL NFR BLD AUTO: 2.7 %
ERYTHROCYTE [DISTWIDTH] IN BLOOD BY AUTOMATED COUNT: 13.2 % (ref 10–15)
GFR SERPL CREATININE-BSD FRML MDRD: 59 ML/MIN/{1.73_M2}
GLUCOSE BLDC GLUCOMTR-MCNC: 120 MG/DL (ref 70–99)
GLUCOSE BLDC GLUCOMTR-MCNC: 34 MG/DL (ref 70–99)
GLUCOSE BLDC GLUCOMTR-MCNC: 53 MG/DL (ref 70–99)
GLUCOSE BLDC GLUCOMTR-MCNC: 73 MG/DL (ref 70–99)
GLUCOSE SERPL-MCNC: 214 MG/DL (ref 70–99)
GLUCOSE UR STRIP-MCNC: 50 MG/DL
GRAN CASTS #/AREA URNS LPF: 3 /LPF
HCT VFR BLD AUTO: 29.5 % (ref 40–53)
HGB BLD-MCNC: 8.4 G/DL (ref 13.3–17.7)
HGB BLD-MCNC: 9.3 G/DL (ref 13.3–17.7)
HGB UR QL STRIP: ABNORMAL
IMM GRANULOCYTES # BLD: 0.1 10E9/L (ref 0–0.4)
IMM GRANULOCYTES NFR BLD: 1.1 %
INR PPP: 1.02 (ref 0.86–1.14)
KETONES UR STRIP-MCNC: NEGATIVE MG/DL
LEUKOCYTE ESTERASE UR QL STRIP: ABNORMAL
LYMPHOCYTES # BLD AUTO: 0.4 10E9/L (ref 0.8–5.3)
LYMPHOCYTES NFR BLD AUTO: 7.5 %
MCH RBC QN AUTO: 29.3 PG (ref 26.5–33)
MCHC RBC AUTO-ENTMCNC: 31.5 G/DL (ref 31.5–36.5)
MCV RBC AUTO: 93 FL (ref 78–100)
MICROALBUMIN UR-MCNC: 113 MG/L
MICROALBUMIN/CREAT UR: 88.98 MG/G CR (ref 0–17)
MONOCYTES # BLD AUTO: 0.2 10E9/L (ref 0–1.3)
MONOCYTES NFR BLD AUTO: 3.7 %
MUCOUS THREADS #/AREA URNS LPF: PRESENT /LPF
NEUTROPHILS # BLD AUTO: 4.7 10E9/L (ref 1.6–8.3)
NEUTROPHILS NFR BLD AUTO: 82.5 %
NITRATE UR QL: NEGATIVE
NRBC # BLD AUTO: 0 10*3/UL
NRBC BLD AUTO-RTO: 0 /100
PH UR STRIP: 5 PH (ref 5–7)
PLATELET # BLD AUTO: 254 10E9/L (ref 150–450)
POTASSIUM SERPL-SCNC: 5 MMOL/L (ref 3.4–5.3)
PROT UR-MCNC: 0.31 G/L
PROT/CREAT 24H UR: 0.24 G/G CR (ref 0–0.2)
RBC # BLD AUTO: 3.17 10E12/L (ref 4.4–5.9)
RBC #/AREA URNS AUTO: 28 /HPF (ref 0–2)
SODIUM SERPL-SCNC: 136 MMOL/L (ref 133–144)
SOURCE: ABNORMAL
SP GR UR STRIP: 1.01 (ref 1–1.03)
SPECIMEN EXP DATE BLD: NORMAL
TACROLIMUS BLD-MCNC: 14 UG/L (ref 5–15)
TME LAST DOSE: NORMAL H
UROBILINOGEN UR STRIP-MCNC: 0 MG/DL (ref 0–2)
WBC # BLD AUTO: 5.6 10E9/L (ref 4–11)
WBC #/AREA URNS AUTO: 12 /HPF (ref 0–5)

## 2019-10-22 PROCEDURE — 82043 UR ALBUMIN QUANTITATIVE: CPT | Performed by: INTERNAL MEDICINE

## 2019-10-22 PROCEDURE — 80197 ASSAY OF TACROLIMUS: CPT | Performed by: INTERNAL MEDICINE

## 2019-10-22 PROCEDURE — 25800030 ZZH RX IP 258 OP 636: Mod: ZF | Performed by: INTERNAL MEDICINE

## 2019-10-22 PROCEDURE — 86900 BLOOD TYPING SEROLOGIC ABO: CPT | Performed by: INTERNAL MEDICINE

## 2019-10-22 PROCEDURE — 81001 URINALYSIS AUTO W/SCOPE: CPT | Performed by: INTERNAL MEDICINE

## 2019-10-22 PROCEDURE — 86828 HLA CLASS I&II ANTIBODY QUAL: CPT | Mod: XU | Performed by: INTERNAL MEDICINE

## 2019-10-22 PROCEDURE — 86833 HLA CLASS II HIGH DEFIN QUAL: CPT | Performed by: INTERNAL MEDICINE

## 2019-10-22 PROCEDURE — 36415 COLL VENOUS BLD VENIPUNCTURE: CPT | Performed by: INTERNAL MEDICINE

## 2019-10-22 PROCEDURE — 86850 RBC ANTIBODY SCREEN: CPT | Performed by: INTERNAL MEDICINE

## 2019-10-22 PROCEDURE — 85610 PROTHROMBIN TIME: CPT | Performed by: INTERNAL MEDICINE

## 2019-10-22 PROCEDURE — 36415 COLL VENOUS BLD VENIPUNCTURE: CPT

## 2019-10-22 PROCEDURE — 25000128 H RX IP 250 OP 636: Mod: ZF | Performed by: INTERNAL MEDICINE

## 2019-10-22 PROCEDURE — 86901 BLOOD TYPING SEROLOGIC RH(D): CPT | Performed by: INTERNAL MEDICINE

## 2019-10-22 PROCEDURE — 84156 ASSAY OF PROTEIN URINE: CPT | Performed by: INTERNAL MEDICINE

## 2019-10-22 PROCEDURE — 80048 BASIC METABOLIC PNL TOTAL CA: CPT | Performed by: INTERNAL MEDICINE

## 2019-10-22 PROCEDURE — 87799 DETECT AGENT NOS DNA QUANT: CPT | Performed by: INTERNAL MEDICINE

## 2019-10-22 PROCEDURE — 96365 THER/PROPH/DIAG IV INF INIT: CPT

## 2019-10-22 PROCEDURE — 86832 HLA CLASS I HIGH DEFIN QUAL: CPT | Performed by: INTERNAL MEDICINE

## 2019-10-22 PROCEDURE — 85025 COMPLETE CBC W/AUTO DIFF WBC: CPT | Performed by: INTERNAL MEDICINE

## 2019-10-22 RX ORDER — HEPARIN SODIUM,PORCINE 10 UNIT/ML
5 VIAL (ML) INTRAVENOUS
Status: CANCELLED | OUTPATIENT
Start: 2019-10-22

## 2019-10-22 RX ORDER — HEPARIN SODIUM (PORCINE) LOCK FLUSH IV SOLN 100 UNIT/ML 100 UNIT/ML
5 SOLUTION INTRAVENOUS
Status: CANCELLED | OUTPATIENT
Start: 2019-10-23

## 2019-10-22 RX ORDER — HEPARIN SODIUM,PORCINE 10 UNIT/ML
5 VIAL (ML) INTRAVENOUS
Status: CANCELLED | OUTPATIENT
Start: 2019-10-23

## 2019-10-22 RX ORDER — ACETAMINOPHEN 325 MG/1
650 TABLET ORAL ONCE
Status: COMPLETED | OUTPATIENT
Start: 2019-10-22 | End: 2019-10-22

## 2019-10-22 RX ORDER — HEPARIN SODIUM (PORCINE) LOCK FLUSH IV SOLN 100 UNIT/ML 100 UNIT/ML
5 SOLUTION INTRAVENOUS
Status: CANCELLED | OUTPATIENT
Start: 2019-10-22

## 2019-10-22 RX ADMIN — ACETAMINOPHEN 650 MG: 325 TABLET ORAL at 10:14

## 2019-10-22 RX ADMIN — SODIUM CHLORIDE 500 MG: 9 INJECTION, SOLUTION INTRAVENOUS at 18:13

## 2019-10-22 ASSESSMENT — MIFFLIN-ST. JEOR: SCORE: 1733.3

## 2019-10-22 NOTE — OR NURSING
Dr. Hoffman notified regarding post-procedure blood sugar.  Encourage oral intake and recheck in 15-30 minutes.

## 2019-10-22 NOTE — OR NURSING
Pt c/o discomfort at kidney biopsy site. States it is tolerable. C/o some slight dizziness while using the bathroom, resolved. Vital signs stable. Kidney biopsy site CDI. Clear urine. Blood sugar WNL. Hgb recheck complete, meets criteria for discharge. Pt discharged with parents.

## 2019-10-22 NOTE — PROCEDURES
Kidney Transplant Biopsy Procedure Note    Indication/Diagnosis:  Kidney transplant with kidney transplant and elevated creatinine    Procedure:  The indications and risks of the kidney biopsy, including bleeding severe enough to require hospitalization, transfusion, surgery, or even loss of the kidney or death, were explained, understood and agreed.  Consent was signed.  The kidney, located in the left lower quadrant, was visualized under ultrasound and biopsy site marked.  Patient was prepped and draped in the usual sterile manner.  Biopsy site was anesthetized with 1% lidocaine.  Biopsy consisted of 3 passes with a 18 ga needle under direct ultrasound guidance were made and tissue was sent to pathology.  There were no immediate complications.  Pressure was held over biopsy site and patient will be observed for signs of bleeding or other complications.  Patient remained hemodynamically stable during and early post procedure.

## 2019-10-22 NOTE — PROGRESS NOTES
Nursing Note  Michael Amin [facility]:   Chief Complaint   Patient presents with     Infusion     IV steroids     During today's [facility] appointment, orders from Kash Hoffman MD were completed.  Frequency: today is dose 1 of 3 total.    Progress note:  Patient identification verified by name and date of birth.  Assessment completed.  Vitals recorded in Doc Flowsheets.  Patient was provided with education regarding infusion and possible side effects.  Patient verbalized understanding.     present during visit today: Not Applicable.    Treatment Conditions: non-applicable.    Premedications: were not ordered.    Drug Waste Record: No    Infusion length and rate:  infusion given over approximately 30 minutes    Labs: were drawn prior to appointment on 10/22.    Vascular access: peripheral IV placed today.    Post Infusion Assessment:  Patient tolerated infusion without incident.     Discharge Plan:   Follow up plan of care with: ongoing infusions at [facility].  Discharge instructions were reviewed with patient.  Patient/representative verbalized understanding of discharge instructions and all questions answered.  Patient discharged [facility] in stable condition.    Kathie Bonilla RN  Administrations This Visit     methylPREDNISolone sodium succinate (solu-MEDROL) 500 mg in sodium chloride 0.9 % 250 mL intermittent infusion     Admin Date  10/22/2019 Action  New Bag Dose  500 mg Rate  566 mL/hr Route  Intravenous Administered By  Gianna Reyes RN                /69 (BP Location: Left arm, Patient Position: Sitting)   Pulse 84   Temp 97.5  F (36.4  C) (Oral)   SpO2 99%

## 2019-10-22 NOTE — PATIENT INSTRUCTIONS
Dear Michael Amin    Thank you for choosing [facility] ([facility]) for your transplant cares.  The following information is a summary of our appointment as well as important reminders.      Please refer to your hospital discharge instructions for details on home care services, future appointments, phone numbers, and diet/activity levels.    We look forward in seeing you on your next appointment here at [facility] ([facility]).  Please don t hesitate to call us at [phone] to reschedule any of your appointments or to speak with one of the [facility] registered nurses.  It was a pleasure taking care of you today.    Sincerely,    [facility]  [address] [place] [address]  [address]  Phone:  [phone]

## 2019-10-22 NOTE — PROGRESS NOTES
Pt c/o discomfort at kidney biopsy site. States it is tolerable. C/o some slight dizziness while using the bathroom, resolved. Vital signs stable. Kidney biopsy site CDI. Clear urine. Blood sugar WNL. Hgb recheck complete, meets criteria for discharge. Pt discharged with parents

## 2019-10-22 NOTE — TELEPHONE ENCOUNTER
Per Dr Hoffman, biopsy results from today show borderline rejection.   Pt to get Solu-Medrol 500 mg daily x 3    Call placed to patient. Explained biopsy results and plan for treatment.   Pt will get first dose of Solu-Medrol tonight in [facility] at 6 pm. Pt v/u of plan. Did voice concerns about blood sugars with the steroids, does have an appt tomorrow at noon with endo. Instructed him to monitor BG closely at home and discuss with endo tomorrow.      Message sent to [facility] scheduling to schedule dose 2 and 3.

## 2019-10-22 NOTE — TELEPHONE ENCOUNTER
Ot was here for an biopsy  And ws wondering if he could see Dr Vela while he is here tomorrow  Already has an appointment for Thurs  [place] if [place]

## 2019-10-23 ENCOUNTER — INFUSION THERAPY VISIT (OUTPATIENT)
Dept: INFUSION THERAPY | Facility: CLINIC | Age: 39
End: 2019-10-23
Attending: INTERNAL MEDICINE
Payer: COMMERCIAL

## 2019-10-23 ENCOUNTER — OFFICE VISIT (OUTPATIENT)
Dept: ENDOCRINOLOGY | Facility: CLINIC | Age: 39
End: 2019-10-23
Payer: COMMERCIAL

## 2019-10-23 VITALS
DIASTOLIC BLOOD PRESSURE: 68 MMHG | OXYGEN SATURATION: 100 % | HEART RATE: 93 BPM | RESPIRATION RATE: 16 BRPM | SYSTOLIC BLOOD PRESSURE: 118 MMHG | TEMPERATURE: 97.9 F

## 2019-10-23 VITALS
HEART RATE: 99 BPM | WEIGHT: 196.9 LBS | SYSTOLIC BLOOD PRESSURE: 127 MMHG | HEIGHT: 66 IN | BODY MASS INDEX: 31.64 KG/M2 | DIASTOLIC BLOOD PRESSURE: 74 MMHG

## 2019-10-23 DIAGNOSIS — N18.6 TYPE 1 DIABETES MELLITUS WITH CHRONIC KIDNEY DISEASE ON CHRONIC DIALYSIS (H): Primary | ICD-10-CM

## 2019-10-23 DIAGNOSIS — Z48.298 AFTERCARE FOLLOWING ORGAN TRANSPLANT: ICD-10-CM

## 2019-10-23 DIAGNOSIS — Z99.2 TYPE 1 DIABETES MELLITUS WITH CHRONIC KIDNEY DISEASE ON CHRONIC DIALYSIS (H): Primary | ICD-10-CM

## 2019-10-23 DIAGNOSIS — T86.11 KIDNEY TRANSPLANT REJECTION: Primary | ICD-10-CM

## 2019-10-23 DIAGNOSIS — Z94.0 KIDNEY REPLACED BY TRANSPLANT: ICD-10-CM

## 2019-10-23 DIAGNOSIS — E10.22 TYPE 1 DIABETES MELLITUS WITH CHRONIC KIDNEY DISEASE ON CHRONIC DIALYSIS (H): Primary | ICD-10-CM

## 2019-10-23 LAB
BKV DNA # SPEC NAA+PROBE: NORMAL COPIES/ML
BKV DNA SPEC NAA+PROBE-LOG#: NORMAL LOG COPIES/ML
DONOR IDENTIFICATION: NORMAL
DSA COMMENTS: NORMAL
DSA PRESENT: NO
DSA TEST METHOD: NORMAL
ORGAN: NORMAL
SPECIMEN SOURCE: NORMAL

## 2019-10-23 PROCEDURE — 25800030 ZZH RX IP 258 OP 636: Mod: ZF | Performed by: INTERNAL MEDICINE

## 2019-10-23 PROCEDURE — 25000128 H RX IP 250 OP 636: Mod: ZF | Performed by: INTERNAL MEDICINE

## 2019-10-23 PROCEDURE — 96365 THER/PROPH/DIAG IV INF INIT: CPT

## 2019-10-23 RX ORDER — HEPARIN SODIUM,PORCINE 10 UNIT/ML
5 VIAL (ML) INTRAVENOUS
Status: CANCELLED | OUTPATIENT
Start: 2019-10-24

## 2019-10-23 RX ORDER — GLUCOSAMINE HCL/CHONDROITIN SU 500-400 MG
CAPSULE ORAL
Qty: 100 EACH | Refills: 3 | Status: SHIPPED | OUTPATIENT
Start: 2019-10-23

## 2019-10-23 RX ORDER — HEPARIN SODIUM (PORCINE) LOCK FLUSH IV SOLN 100 UNIT/ML 100 UNIT/ML
5 SOLUTION INTRAVENOUS
Status: CANCELLED | OUTPATIENT
Start: 2019-10-24

## 2019-10-23 RX ADMIN — SODIUM CHLORIDE 500 MG: 9 INJECTION, SOLUTION INTRAVENOUS at 16:19

## 2019-10-23 ASSESSMENT — PAIN SCALES - GENERAL: PAINLEVEL: NO PAIN (0)

## 2019-10-23 ASSESSMENT — MIFFLIN-ST. JEOR: SCORE: 1745.88

## 2019-10-23 NOTE — PATIENT INSTRUCTIONS
Dear Michael Amin    Thank you for choosing [facility] ([facility]) for your infusion.  The following information is a summary of our appointment as well as important reminders.          We look forward in seeing you on your next appointment here at [facility] ([facility]).  Please don t hesitate to call us at [phone] to reschedule any of your appointments or to speak with one of the [facility] registered nurses.  It was a pleasure taking care of you today.    Sincerely,    [facility]  [address] [place] [address]  [address]  Phone:  [phone]  Patient Education     Methylprednisolone Solution for Injection  Brand Names: A-Methapred, Solu-Medrol  What is this medicine?  METHYLPREDNISOLONE (meth ill pred NISS oh lone) is a corticosteroid. It is commonly used to treat inflammation of the skin, joints, lungs, and other organs. Common conditions treated include asthma, allergies, and arthritis. It is also used for other conditions, such as blood disorders and diseases of the adrenal glands.  How should I use this medicine?  This medicine is for injection or infusion into a vein. It is also for injection into a muscle. It is given by a health care professional in a hospital or clinic setting.  Talk to your pediatrician regarding the use of this medicine in children. While this drug may be prescribed for selected conditions, precautions do apply.  What side effects may I notice from receiving this medicine?  Side effects that you should report to your doctor or health care professional as soon as possible:    allergic reactions like skin rash, itching or hives, swelling of the face, lips, or tongue    bloody or tarry stools    changes in vision    hallucination, loss of contact with reality    muscle cramps    muscle pain    palpitations    signs and symptoms of high  blood sugar such as dizziness; dry mouth; dry skin; fruity breath; nausea; stomach pain; increased hunger or thirst; increased urination    signs and symptoms of infection like fever or chills; cough; sore throat; pain or trouble passing urine    trouble passing urine or change in the amount of urine  Side effects that usually do not require medical attention (report to your doctor or health care professional if they continue or are bothersome):    changes in emotions or mood    constipation    diarrhea    excessive hair growth on the face or body    headache    nausea, vomiting    pain, redness, or irritation at site where injected    trouble sleeping    weight gain  What may interact with this medicine?  Do not take this medicine with any of the following medications:    alefacept    echinacea    iopamidol    live virus vaccines    metyrapone    mifepristone  This medicine may also interact with the following medications:    amphotericin B    aspirin and aspirin-like medicines    certain antibiotics like erythromycin, clarithromycin, troleandomycin    certain medicines for diabetes    certain medicines for fungal infection like ketoconazole    certain medicines for seizures like carbamazepine, phenobarbital, phenytoin    certain medicines that treat or prevent blood clots like warfarin    cyclosporine    digoxin    diuretics    female hormones, like estrogens and birth control pills    isoniazid    NSAIDS, medicines for pain and inflammation, like ibuprofen or naproxen    other medicines for myasthenia gravis    rifampin    vaccines  What if I miss a dose?  This does not apply.  Where should I keep my medicine?  This drug is given in a hospital or clinic and will not be stored at home.  What should I tell my health care provider before I take this medicine?  They need to know if you have any of these conditions:    Cushing's syndrome    eye disease, vision problems    diabetes    glaucoma    heart disease    high  blood pressure    infection (especially a virus infection such as chickenpox, cold sores, or herpes)    liver disease    mental illness    myasthenia gravis    osteoporosis    recently received or scheduled to receive a vaccine    seizures    stomach or intestine problems    thyroid disease    an unusual or allergic reaction to lactose, methylprednisolone, other medicines, foods, dyes, or preservatives    pregnant or trying to get pregnant    breast-feeding  What should I watch for while using this medicine?  Tell your doctor or healthcare professional if your symptoms do not start to get better or if they get worse. Do not stop taking except on your doctor's advice. You may develop a severe reaction. Your doctor will tell you how much medicine to take.  Your condition will be monitored carefully while you are receiving this medicine.  This medicine may increase your risk of getting an infection. Tell your doctor or health care professional if you are around anyone with measles or chickenpox, or if you develop sores or blisters that do not heal properly.  This medicine may affect blood sugar levels. If you have diabetes, check with your doctor or health care professional before you change your diet or the dose of your diabetic medicine.  Tell your doctor or health care professional right away if you have any change in your eyesight.  Using this medicine for a long time may increase your risk of low bone mass. Talk to your doctor about bone health.  NOTE:This sheet is a summary. It may not cover all possible information. If you have questions about this medicine, talk to your doctor, pharmacist, or health care provider. Copyright  2019 [employer]

## 2019-10-23 NOTE — TELEPHONE ENCOUNTER
[facility] Call Center    Phone Message    May a detailed message be left on voicemail: yes    Reason for Call: Medication Refill Request    Has the patient contacted the pharmacy for the refill? Yes   Name of medication being requested: Vitamin D3 2000 units  Provider who prescribed the medication: Jabier Hernández  Pharmacy:[facility]  Date medication is needed: ASAP     Action Taken: Message routed to:  [facility] ([facility] neph

## 2019-10-23 NOTE — LETTER
10/23/2019       RE: Michael Amin  [address] [place] [id number]     Dear Colleague,    Thank you for referring your patient, Michael Amin, to the [facility] ENDOCRINOLOGY at [facility]. Please see a copy of my visit note below.    [facility]  Endocrinology  Patrick Rascon MD  10/23/2019      Chief Complaint:   Diabetes    History of Present Illness:   Michael Amin is a 39 year old male with a history of end stage renal disease and kidney transplant on 10/01/2019, type 1 diabetes, secondary hyperparathyroidism, and hypertension who presents for hospital follow-up after kidney transplant. He was diagnosed with type 1 diabetes at 16 years old, and struggled with control causing the development of CKD. He was on the list for simultaneous pancreas and kidney transplant, however a live kidney donor presented itself quicker. He wanted to be off dialysis and felt this was the best option, however would still like to have a pancreas transplant in the future. He is understanding though that his blood sugars need to remain in good control for this.     Michael describes that in the hospital he was seen by Dr. Tripp. He had a few low blood sugars, making them more cautious with his basal rate. He does endorse feeling lows when his blood sugars starts to drop to about 70 mg/dL.  However, since he has been home he is often times waking up with blood sugars between 240-300 mg/dL. Starting yesterday, he began the first of 3 days of IV methyl prednisone and he woke up with a blood sugar of 400 mg//dL. They will be tapering down his IV steroids and will restart on oral prednisone 5 mg for a longer time. He is unsure how is should currently be adjusting basal rate.     He has a sensor, however does not use this unless his blood sugar is less than 200 mg/dL. He enjoyed using his sensor, however stopped when he had started dialysis. However, he was warned about restarting the  sensor in the hospital.     Blood Glucose Monitoring:  We reviewed glucometer data together. He has a sensor at home that he is not currently using.   Number of values: 90  Period average: 192 mg/dL  Highest value: 468 mg/dL  Lowest Value: 44 mg/dL    Current Insulin Pump Settings:   BASAL RATES and times:  12   AM (midnight): 1.9 units/hour    6     AM: 2.4 units/hour   Total of 55.1 units per day  Carb ratio: 9     Diabetes monitoring and complications:  CAD: No  Last eye exam results: small changes in 02/2019  Microalbuminuria: 113 10/22/2019  Neuropathy: No  HTN: Yes, carvedilol  On Statin: Yes atorvastatin  On Aspirin: Yes  Depression: No  Erectile dysfunction: No    Review of Systems:   Pertinent items are noted in HPI.  All other systems are negative.    Active Medications:      aspirin (ASA) 81 MG EC tablet, Take 81 mg by mouth daily , Disp: , Rfl:      atorvastatin (LIPITOR) 10 MG tablet, Take 1 tablet (10 mg) by mouth daily, Disp: 90 tablet, Rfl: 3     calcitRIOL (ROCALTROL) 0.25 MCG capsule, Take 0.25 mcg by mouth daily , Disp: , Rfl: 3     carvedilol (COREG) 3.125 MG tablet, Take 1 tablet (3.125 mg) by mouth 2 times daily (with meals), Disp: 60 tablet, Rfl: 3     ciprofloxacin (CIPRO) 500 MG tablet, Take 1 tablet (500 mg) by mouth 2 times daily, Disp: 28 tablet, Rfl: 0     gentamicin (GARAMYCIN) 0.1 % external cream, Apply to port exit site once daily, Disp: , Rfl: 3     HUMALOG 100 UNIT/ML injection, Insulin used for filling patient's pump, Disp: , Rfl: 3     insulin isophane human (HUMULIN N PEN) 100 UNIT/ML injection, Use 10 units with Methylprednisolone, Disp: 15 mL, Rfl: 3     insulin lispro (HUMALOG VIAL) 100 UNIT/ML vial, Inject 100 Units Subcutaneous daily With Insulin pump. Up to 100 units, Disp: 30 mL, Rfl: 11     lactobacillus rhamnosus, GG, (CULTURELL) capsule, Take 1 capsule by mouth daily, Disp: , Rfl:      magnesium oxide (MAG-OX) 400 MG tablet, Take 1 tablet (400 mg) by mouth daily (with  lunch), Disp: 30 tablet, Rfl: 5     mycophenolate (GENERIC EQUIVALENT) 250 MG capsule, Take 4 capsules (1,000 mg) by mouth 2 times daily, Disp: 240 capsule, Rfl: 11     ondansetron (ZOFRAN) 4 MG tablet, Take 1 tablet (4 mg) by mouth every 8 hours as needed for nausea (take for nausea), Disp: 12 tablet, Rfl: 1     phosphorus tablet 250 mg (PHOSPHA 250 NEUTRAL) 250 MG per tablet, Take 1 tablet (250 mg) by mouth 2 times daily, Disp: 60 tablet, Rfl: 11     polyethylene glycol (MIRALAX/GLYCOLAX) packet, Take 17 g by mouth daily, Disp: 5 packet, Rfl: 0     psyllium (METAMUCIL/KONSYL) 58.6 % powder, Take by mouth daily, Disp: , Rfl:      senna-docusate (SENOKOT-S/PERICOLACE) 8.6-50 MG tablet, Take 2 tablets by mouth 2 times daily, Disp: 20 tablet, Rfl: 0     sulfamethoxazole-trimethoprim (BACTRIM/SEPTRA) 400-80 MG tablet, Take 1 tablet by mouth daily, Disp: 30 tablet, Rfl: 1     tacrolimus (GENERIC EQUIVALENT) 0.5 MG capsule, Take 1 capsule (0.5 mg) by mouth 2 times daily, Disp: 60 capsule, Rfl: 11     tacrolimus (GENERIC EQUIVALENT) 1 MG capsule, Take 5 mg by mouth 2 times daily , Disp: 240 capsule, Rfl: 11     valGANciclovir (VALCYTE) 450 MG tablet, Take 2 tablets (900 mg) by mouth daily, Disp: 30 tablet, Rfl: 3     vitamin D3 (CHOLECALCIFEROL) 1000 units (25 mcg) tablet, Take 1 tablet (1,000 Units) by mouth daily, Disp: 90 tablet, Rfl: 3     0.9% sodium chloride BOLUS, 250 mL, Intravenous, Once, Kash Hoffman MD     INFUSION HYPERSENSITIVITY, , Does not apply, Continuous PRN, Kash Hoffman MD     methylPREDNISolone sodium succinate (solu-MEDROL) 500 mg in sodium chloride 0.9 % 250 mL intermittent infusion, 500 mg, Intravenous, Daily, Kash Hoffman MD, Stopped at 10/22/19 4785      Allergies:   Thymoglobulin      Past Medical History:  Anemia in chronic kidney disease  Dyslipidemia  End stage renal disease on dialysis  Hypomagnesemia  Secondary hyperparathyroidism  Type 1 diabetes  Hypertension  Vitamin  "D deficiency  Hypophosphatemia   Kidney transplant rejection      Past Surgical History:  Hair implant  Insert catheter peritoneal dialysis  Percutaneous biopsy kidney left   Kidney transplant    Family History:   Type 2 Diabetes - father  Coronary artery disease - father    Parathyroid issues - mother      Social History:   This patient presented alone.   Smoking status: never  Smokeless tobacco: never  Alcohol use: not currently  Drug use: no     Physical Exam:   /74   Pulse 99   Ht 1.676 m (5' 6\")   Wt 89.3 kg (196 lb 14.4 oz)   BMI 31.78 kg/m        Wt Readings from Last 10 Encounters:   10/23/19 89.3 kg (196 lb 14.4 oz)   10/22/19 88.1 kg (194 lb 2 oz)   10/18/19 93.4 kg (206 lb)   10/08/19 91.9 kg (202 lb 11.2 oz)   10/07/19 93.4 kg (206 lb)   10/06/19 94.5 kg (208 lb 6.4 oz)   09/26/19 89.4 kg (197 lb 3.2 oz)   09/26/19 89.4 kg (197 lb 1.6 oz)   04/22/19 94.8 kg (208 lb 14.4 oz)   01/07/19 97.6 kg (215 lb 3.2 oz)      Constitutional: healthy, alert, no distress and cooperative  Head: Normocephalic. No masses, lesions, tenderness or abnormalities  Neck: Neck supple. No adenopathy. Thyroid symmetric, normal size, Carotids without bruits.  ENT: No throat congestion, no neck nodes or sinus tenderness  Cardiovascular: regular rhythm, no tachycardia  Respiratory: Lungs clear  Gastrointestinal: Abdomen soft, non-tender. BS normal. No striae  Musculoskeletal: gait normal and normal muscle tone  Neurologic: Normal speech, reflexes normal.   Psychiatric: mentation appears normal      Data:  Lab Results   Component Value Date     10/22/2019    POTASSIUM 5.0 10/22/2019    CHLORIDE 104 10/22/2019    CO2 26 10/22/2019    ANIONGAP 7 10/22/2019     (H) 10/22/2019    BUN 19 10/22/2019    CR 1.46 (H) 10/22/2019    APRIL 9.2 10/22/2019     Lab Results   Component Value Date    GFRESTIMATED 59 (L) 10/22/2019    GFRESTIMATED 65 10/18/2019    GFRESTIMATED >90 10/08/2019    GFRESTBLACK 69 10/22/2019    " GFRESTBLACK 75 10/18/2019    GFRESTBLACK >90 10/08/2019      Lab Results   Component Value Date    MICROL 113 10/22/2019    UMALCR 88.98 (H) 10/22/2019        Lab Results   Component Value Date    A1C 8.9 (H) 09/26/2019    A1C 10.8 (H) 01/07/2019    A1C 8.6 (H) 08/15/2018     Lab Results   Component Value Date    CPEPT <0.1 (L) 01/07/2019       No results found for: CHOL, TRIG, HDL, LDL, NHDL    Assessment and Plan:    Type 1 diabetes mellitus with chronic kidney disease on chronic dialysis (H)  Patient Instructions   Pump download on Monday. Start using sensor today.   Call with any problems with BG on the new regimen.   NPH 10 units with Methylprednisolone.   Change the carb ratio to 1 per 5 gm of carb.   Use auto mode for basal.       -with the steroid taper, NPH should be adjusted based on CGM.   - I asked him to send me a list of blood sugars after 4 days so that we can adjust the NPH dose.   -Encouraged him to start using the sensor.        Follow-up: Return in about 2 weeks (around 11/6/2019).     Patrick Rascon MD  Endocrinology Service      >50% of 30 minute visit spent in face to face counseling, education and coordination of care related to options for better glycemic control as well as preventing, detecting, and treating hypoglycemia.         Scribe Disclosure:  I, Maite Devries, am serving as a scribe to document services personally performed by Patrick Rascon MD at this visit, based upon the provider's statements to me. All documentation has been reviewed by the aforementioned provider prior to being entered into the official medical record.     Portions of this medical record were completed by a scribe. UPON MY REVIEW AND AUTHENTICATION BY ELECTRONIC SIGNATURE, this confirms (a) I performed the applicable clinical services, and (b) the record is accurate.

## 2019-10-23 NOTE — PROGRESS NOTES
[facility]  Endocrinology  Patrick Rascon MD  10/23/2019      Chief Complaint:   Diabetes    History of Present Illness:   Michael Amin is a 39 year old male with a history of end stage renal disease and kidney transplant on 10/01/2019, type 1 diabetes, secondary hyperparathyroidism, and hypertension who presents for hospital follow-up after kidney transplant. He was diagnosed with type 1 diabetes at 16 years old, and struggled with control causing the development of CKD. He was on the list for simultaneous pancreas and kidney transplant, however a live kidney donor presented itself quicker. He wanted to be off dialysis and felt this was the best option, however would still like to have a pancreas transplant in the future. He is understanding though that his blood sugars need to remain in good control for this.     Michael describes that in the hospital he was seen by Dr. Tripp. He had a few low blood sugars, making them more cautious with his basal rate. He does endorse feeling lows when his blood sugars starts to drop to about 70 mg/dL.  However, since he has been home he is often times waking up with blood sugars between 240-300 mg/dL. Starting yesterday, he began the first of 3 days of IV methyl prednisone and he woke up with a blood sugar of 400 mg//dL. They will be tapering down his IV steroids and will restart on oral prednisone 5 mg for a longer time. He is unsure how is should currently be adjusting basal rate.     He has a sensor, however does not use this unless his blood sugar is less than 200 mg/dL. He enjoyed using his sensor, however stopped when he had started dialysis. However, he was warned about restarting the sensor in the hospital.     Blood Glucose Monitoring:  We reviewed glucometer data together. He has a sensor at home that he is not currently using.   Number of values: 90  Period average: 192 mg/dL  Highest value: 468 mg/dL  Lowest Value: 44 mg/dL    Current Insulin Pump Settings:    BASAL RATES and times:  12   AM (midnight): 1.9 units/hour    6     AM: 2.4 units/hour   Total of 55.1 units per day  Carb ratio: 9     Diabetes monitoring and complications:  CAD: No  Last eye exam results: small changes in 02/2019  Microalbuminuria: 113 10/22/2019  Neuropathy: No  HTN: Yes, carvedilol  On Statin: Yes atorvastatin  On Aspirin: Yes  Depression: No  Erectile dysfunction: No    Review of Systems:   Pertinent items are noted in HPI.  All other systems are negative.    Active Medications:      aspirin (ASA) 81 MG EC tablet, Take 81 mg by mouth daily , Disp: , Rfl:      atorvastatin (LIPITOR) 10 MG tablet, Take 1 tablet (10 mg) by mouth daily, Disp: 90 tablet, Rfl: 3     calcitRIOL (ROCALTROL) 0.25 MCG capsule, Take 0.25 mcg by mouth daily , Disp: , Rfl: 3     carvedilol (COREG) 3.125 MG tablet, Take 1 tablet (3.125 mg) by mouth 2 times daily (with meals), Disp: 60 tablet, Rfl: 3     ciprofloxacin (CIPRO) 500 MG tablet, Take 1 tablet (500 mg) by mouth 2 times daily, Disp: 28 tablet, Rfl: 0     gentamicin (GARAMYCIN) 0.1 % external cream, Apply to port exit site once daily, Disp: , Rfl: 3     HUMALOG 100 UNIT/ML injection, Insulin used for filling patient's pump, Disp: , Rfl: 3     insulin isophane human (HUMULIN N PEN) 100 UNIT/ML injection, Use 10 units with Methylprednisolone, Disp: 15 mL, Rfl: 3     insulin lispro (HUMALOG VIAL) 100 UNIT/ML vial, Inject 100 Units Subcutaneous daily With Insulin pump. Up to 100 units, Disp: 30 mL, Rfl: 11     lactobacillus rhamnosus, GG, (CULTURELL) capsule, Take 1 capsule by mouth daily, Disp: , Rfl:      magnesium oxide (MAG-OX) 400 MG tablet, Take 1 tablet (400 mg) by mouth daily (with lunch), Disp: 30 tablet, Rfl: 5     mycophenolate (GENERIC EQUIVALENT) 250 MG capsule, Take 4 capsules (1,000 mg) by mouth 2 times daily, Disp: 240 capsule, Rfl: 11     ondansetron (ZOFRAN) 4 MG tablet, Take 1 tablet (4 mg) by mouth every 8 hours as needed for nausea (take for  nausea), Disp: 12 tablet, Rfl: 1     phosphorus tablet 250 mg (PHOSPHA 250 NEUTRAL) 250 MG per tablet, Take 1 tablet (250 mg) by mouth 2 times daily, Disp: 60 tablet, Rfl: 11     polyethylene glycol (MIRALAX/GLYCOLAX) packet, Take 17 g by mouth daily, Disp: 5 packet, Rfl: 0     psyllium (METAMUCIL/KONSYL) 58.6 % powder, Take by mouth daily, Disp: , Rfl:      senna-docusate (SENOKOT-S/PERICOLACE) 8.6-50 MG tablet, Take 2 tablets by mouth 2 times daily, Disp: 20 tablet, Rfl: 0     sulfamethoxazole-trimethoprim (BACTRIM/SEPTRA) 400-80 MG tablet, Take 1 tablet by mouth daily, Disp: 30 tablet, Rfl: 1     tacrolimus (GENERIC EQUIVALENT) 0.5 MG capsule, Take 1 capsule (0.5 mg) by mouth 2 times daily, Disp: 60 capsule, Rfl: 11     tacrolimus (GENERIC EQUIVALENT) 1 MG capsule, Take 5 mg by mouth 2 times daily , Disp: 240 capsule, Rfl: 11     valGANciclovir (VALCYTE) 450 MG tablet, Take 2 tablets (900 mg) by mouth daily, Disp: 30 tablet, Rfl: 3     vitamin D3 (CHOLECALCIFEROL) 1000 units (25 mcg) tablet, Take 1 tablet (1,000 Units) by mouth daily, Disp: 90 tablet, Rfl: 3     0.9% sodium chloride BOLUS, 250 mL, Intravenous, Once, Kash Hoffman MD     INFUSION HYPERSENSITIVITY, , Does not apply, Continuous PRN, Kash Hoffman MD     methylPREDNISolone sodium succinate (solu-MEDROL) 500 mg in sodium chloride 0.9 % 250 mL intermittent infusion, 500 mg, Intravenous, Daily, Kash Hoffman MD, Stopped at 10/22/19 8814      Allergies:   Thymoglobulin      Past Medical History:  Anemia in chronic kidney disease  Dyslipidemia  End stage renal disease on dialysis  Hypomagnesemia  Secondary hyperparathyroidism  Type 1 diabetes  Hypertension  Vitamin D deficiency  Hypophosphatemia   Kidney transplant rejection      Past Surgical History:  Hair implant  Insert catheter peritoneal dialysis  Percutaneous biopsy kidney left   Kidney transplant    Family History:   Type 2 Diabetes - father  Coronary artery disease - father   "  Parathyroid issues - mother      Social History:   This patient presented alone.   Smoking status: never  Smokeless tobacco: never  Alcohol use: not currently  Drug use: no     Physical Exam:   /74   Pulse 99   Ht 1.676 m (5' 6\")   Wt 89.3 kg (196 lb 14.4 oz)   BMI 31.78 kg/m       Wt Readings from Last 10 Encounters:   10/23/19 89.3 kg (196 lb 14.4 oz)   10/22/19 88.1 kg (194 lb 2 oz)   10/18/19 93.4 kg (206 lb)   10/08/19 91.9 kg (202 lb 11.2 oz)   10/07/19 93.4 kg (206 lb)   10/06/19 94.5 kg (208 lb 6.4 oz)   09/26/19 89.4 kg (197 lb 3.2 oz)   09/26/19 89.4 kg (197 lb 1.6 oz)   04/22/19 94.8 kg (208 lb 14.4 oz)   01/07/19 97.6 kg (215 lb 3.2 oz)      Constitutional: healthy, alert, no distress and cooperative  Head: Normocephalic. No masses, lesions, tenderness or abnormalities  Neck: Neck supple. No adenopathy. Thyroid symmetric, normal size, Carotids without bruits.  ENT: No throat congestion, no neck nodes or sinus tenderness  Cardiovascular: regular rhythm, no tachycardia  Respiratory: Lungs clear  Gastrointestinal: Abdomen soft, non-tender. BS normal. No striae  Musculoskeletal: gait normal and normal muscle tone  Neurologic: Normal speech, reflexes normal.   Psychiatric: mentation appears normal      Data:  Lab Results   Component Value Date     10/22/2019    POTASSIUM 5.0 10/22/2019    CHLORIDE 104 10/22/2019    CO2 26 10/22/2019    ANIONGAP 7 10/22/2019     (H) 10/22/2019    BUN 19 10/22/2019    CR 1.46 (H) 10/22/2019    APRIL 9.2 10/22/2019     Lab Results   Component Value Date    GFRESTIMATED 59 (L) 10/22/2019    GFRESTIMATED 65 10/18/2019    GFRESTIMATED >90 10/08/2019    GFRESTBLACK 69 10/22/2019    GFRESTBLACK 75 10/18/2019    GFRESTBLACK >90 10/08/2019      Lab Results   Component Value Date    MICROL 113 10/22/2019    UMALCR 88.98 (H) 10/22/2019        Lab Results   Component Value Date    A1C 8.9 (H) 09/26/2019    A1C 10.8 (H) 01/07/2019    A1C 8.6 (H) 08/15/2018     Lab " Results   Component Value Date    CPEPT <0.1 (L) 01/07/2019       No results found for: CHOL, TRIG, HDL, LDL, NHDL    Assessment and Plan:    Type 1 diabetes mellitus with chronic kidney disease on chronic dialysis (H)  Patient Instructions   Pump download on Monday. Start using sensor today.   Call with any problems with BG on the new regimen.   NPH 10 units with Methylprednisolone.   Change the carb ratio to 1 per 5 gm of carb.   Use auto mode for basal.       -with the steroid taper, NPH should be adjusted based on CGM.   - I asked him to send me a list of blood sugars after 4 days so that we can adjust the NPH dose.   -Encouraged him to start using the sensor.        Follow-up: Return in about 2 weeks (around 11/6/2019).     Patrick Rascon MD  Endocrinology Service      >50% of 30 minute visit spent in face to face counseling, education and coordination of care related to options for better glycemic control as well as preventing, detecting, and treating hypoglycemia.         Scribe Disclosure:  I, Maite Devries, am serving as a scribe to document services personally performed by Patrick Rascon MD at this visit, based upon the provider's statements to me. All documentation has been reviewed by the aforementioned provider prior to being entered into the official medical record.     Portions of this medical record were completed by a scribe. UPON MY REVIEW AND AUTHENTICATION BY ELECTRONIC SIGNATURE, this confirms (a) I performed the applicable clinical services, and (b) the record is accurate.

## 2019-10-23 NOTE — PROGRESS NOTES
Nursing Note  Michael Amin presents today to [facility] for:   Chief Complaint   Patient presents with     Infusion     Solu-Medrol     During today's [facility] appointment, orders from Dr. Hoffman were completed.  Frequency: today is dose 2 of 3 total.    Progress note:  Patient identification verified by name and date of birth.  Assessment completed.  Vitals recorded in Doc Flowsheets.  Patient was provided with education regarding infusion and possible side effects.  Patient verbalized understanding.     present during visit today: Not Applicable.    Treatment Conditions: patient denies fever, chills, signs of infection, recent illness, on antibiotics, productive cough or elevated temperature.    Premedications: were not ordered.    Drug Waste Record: No    Infusion length and rate:  infusion given over approximately 30 minutes    Labs:  at start of infusion    Vascular access: peripheral IV placed today.    Post Infusion Assessment:  Patient tolerated infusion without incident.     Discharge Plan:   Follow up plan of care with: ongoing infusions at [facility]., primary medical doctor. and transplant coordinator.  Discharge instructions were reviewed with patient.  Patient/representative verbalized understanding of discharge instructions and all questions answered.  Patient discharged from [facility] in stable condition.    Rocio Dai RN    Administrations This Visit     methylPREDNISolone sodium succinate (solu-MEDROL) 500 mg in sodium chloride 0.9 % 250 mL intermittent infusion     Admin Date  10/23/2019 Action  New Bag Dose  500 mg Rate  558 mL/hr Route  Intravenous Administered By  Rocio Dai RN                /76   Pulse 78   Temp 97.9  F (36.6  C) (Oral)   Resp 16   SpO2 100%

## 2019-10-23 NOTE — PATIENT INSTRUCTIONS
Pump download on Monday. Start using sensor today.   Call with any problems with BG on the new regimen.   NPH 10 units with Methylprednisolone.   Change the carb ratio to 1 per 5 gm of carb.   Use auto mode for basal.

## 2019-10-24 ENCOUNTER — TELEPHONE (OUTPATIENT)
Dept: ENDOCRINOLOGY | Facility: CLINIC | Age: 39
End: 2019-10-24

## 2019-10-24 ENCOUNTER — ANCILLARY PROCEDURE (OUTPATIENT)
Dept: ULTRASOUND IMAGING | Facility: CLINIC | Age: 39
End: 2019-10-24
Attending: TRANSPLANT SURGERY
Payer: COMMERCIAL

## 2019-10-24 ENCOUNTER — OFFICE VISIT (OUTPATIENT)
Dept: TRANSPLANT | Facility: CLINIC | Age: 39
End: 2019-10-24
Attending: TRANSPLANT SURGERY
Payer: COMMERCIAL

## 2019-10-24 ENCOUNTER — INFUSION THERAPY VISIT (OUTPATIENT)
Dept: INFUSION THERAPY | Facility: CLINIC | Age: 39
End: 2019-10-24
Attending: INTERNAL MEDICINE
Payer: COMMERCIAL

## 2019-10-24 ENCOUNTER — TELEPHONE (OUTPATIENT)
Dept: TRANSPLANT | Facility: CLINIC | Age: 39
End: 2019-10-24

## 2019-10-24 VITALS
SYSTOLIC BLOOD PRESSURE: 157 MMHG | HEIGHT: 66 IN | BODY MASS INDEX: 31.98 KG/M2 | OXYGEN SATURATION: 98 % | HEART RATE: 86 BPM | WEIGHT: 199 LBS | DIASTOLIC BLOOD PRESSURE: 88 MMHG

## 2019-10-24 VITALS — OXYGEN SATURATION: 98 % | TEMPERATURE: 97.5 F | SYSTOLIC BLOOD PRESSURE: 127 MMHG | DIASTOLIC BLOOD PRESSURE: 97 MMHG

## 2019-10-24 DIAGNOSIS — D84.9 IMMUNOSUPPRESSION (H): ICD-10-CM

## 2019-10-24 DIAGNOSIS — Z79.899 LONG TERM USE OF DRUG: ICD-10-CM

## 2019-10-24 DIAGNOSIS — Z94.0 HTN, KIDNEY TRANSPLANT RELATED: Primary | ICD-10-CM

## 2019-10-24 DIAGNOSIS — T86.11 KIDNEY TRANSPLANT REJECTION: Primary | ICD-10-CM

## 2019-10-24 DIAGNOSIS — I15.1 HTN, KIDNEY TRANSPLANT RELATED: Primary | ICD-10-CM

## 2019-10-24 DIAGNOSIS — Z48.298 AFTERCARE FOLLOWING ORGAN TRANSPLANT: ICD-10-CM

## 2019-10-24 DIAGNOSIS — T86.11 KIDNEY TRANSPLANT REJECTION: ICD-10-CM

## 2019-10-24 DIAGNOSIS — Z94.0 KIDNEY REPLACED BY TRANSPLANT: ICD-10-CM

## 2019-10-24 LAB
ANION GAP SERPL CALCULATED.3IONS-SCNC: 8 MMOL/L (ref 3–14)
BUN SERPL-MCNC: 35 MG/DL (ref 7–30)
CALCIUM SERPL-MCNC: 8.9 MG/DL (ref 8.5–10.1)
CHLORIDE SERPL-SCNC: 104 MMOL/L (ref 94–109)
CO2 SERPL-SCNC: 25 MMOL/L (ref 20–32)
COPATH REPORT: NORMAL
CREAT SERPL-MCNC: 1.69 MG/DL (ref 0.66–1.25)
ERYTHROCYTE [DISTWIDTH] IN BLOOD BY AUTOMATED COUNT: 13.4 % (ref 10–15)
GFR SERPL CREATININE-BSD FRML MDRD: 50 ML/MIN/{1.73_M2}
GLUCOSE BLDC GLUCOMTR-MCNC: 284 MG/DL (ref 70–99)
GLUCOSE SERPL-MCNC: 176 MG/DL (ref 70–99)
HCT VFR BLD AUTO: 25.9 % (ref 40–53)
HGB BLD-MCNC: 8.2 G/DL (ref 13.3–17.7)
INTERFERING SUBST TEST METHOD: NORMAL
INTERFERING SUBSTANCE COMMENT: NORMAL
INTERFERING SUBSTANCE RESULT: NORMAL
INTERFERING SUBSTANCE: NORMAL
MAGNESIUM SERPL-MCNC: 2 MG/DL (ref 1.6–2.3)
MCH RBC QN AUTO: 29.4 PG (ref 26.5–33)
MCHC RBC AUTO-ENTMCNC: 31.7 G/DL (ref 31.5–36.5)
MCV RBC AUTO: 93 FL (ref 78–100)
PHOSPHATE SERPL-MCNC: 3.4 MG/DL (ref 2.5–4.5)
PLATELET # BLD AUTO: 233 10E9/L (ref 150–450)
POTASSIUM SERPL-SCNC: 4.8 MMOL/L (ref 3.4–5.3)
PROTOCOL CUTOFF: NORMAL
RBC # BLD AUTO: 2.79 10E12/L (ref 4.4–5.9)
SA1 CELL: NORMAL
SA1 COMMENTS: NORMAL
SA1 HI RISK ABY: NORMAL
SA1 MOD RISK ABY: NORMAL
SA1 TEST METHOD: NORMAL
SA2 CELL: NORMAL
SA2 COMMENTS: NORMAL
SA2 HI RISK ABY UA: NORMAL
SA2 MOD RISK ABY: NORMAL
SA2 TEST METHOD: NORMAL
SODIUM SERPL-SCNC: 136 MMOL/L (ref 133–144)
TACROLIMUS BLD-MCNC: 10.6 UG/L (ref 5–15)
TME LAST DOSE: NORMAL H
UNACCEPTABLE ANTIGEN: NORMAL
UNOS CPRA: 0
WBC # BLD AUTO: 13.4 10E9/L (ref 4–11)

## 2019-10-24 PROCEDURE — 36415 COLL VENOUS BLD VENIPUNCTURE: CPT | Performed by: INTERNAL MEDICINE

## 2019-10-24 PROCEDURE — 82962 GLUCOSE BLOOD TEST: CPT

## 2019-10-24 PROCEDURE — 83735 ASSAY OF MAGNESIUM: CPT | Performed by: INTERNAL MEDICINE

## 2019-10-24 PROCEDURE — 96365 THER/PROPH/DIAG IV INF INIT: CPT

## 2019-10-24 PROCEDURE — G0463 HOSPITAL OUTPT CLINIC VISIT: HCPCS | Mod: ZF

## 2019-10-24 PROCEDURE — 25000128 H RX IP 250 OP 636: Mod: ZF | Performed by: INTERNAL MEDICINE

## 2019-10-24 PROCEDURE — 25800030 ZZH RX IP 258 OP 636: Mod: ZF | Performed by: INTERNAL MEDICINE

## 2019-10-24 PROCEDURE — 80197 ASSAY OF TACROLIMUS: CPT | Performed by: INTERNAL MEDICINE

## 2019-10-24 PROCEDURE — 84100 ASSAY OF PHOSPHORUS: CPT | Performed by: INTERNAL MEDICINE

## 2019-10-24 PROCEDURE — 85027 COMPLETE CBC AUTOMATED: CPT | Performed by: INTERNAL MEDICINE

## 2019-10-24 PROCEDURE — 80048 BASIC METABOLIC PNL TOTAL CA: CPT | Performed by: INTERNAL MEDICINE

## 2019-10-24 RX ORDER — PREDNISONE 10 MG/1
TABLET ORAL
Qty: 28 TABLET | Refills: 0 | Status: SHIPPED | OUTPATIENT
Start: 2019-10-24 | End: 2019-11-13

## 2019-10-24 RX ORDER — HEPARIN SODIUM,PORCINE 10 UNIT/ML
5 VIAL (ML) INTRAVENOUS
Status: CANCELLED | OUTPATIENT
Start: 2019-10-25

## 2019-10-24 RX ORDER — HEPARIN SODIUM (PORCINE) LOCK FLUSH IV SOLN 100 UNIT/ML 100 UNIT/ML
5 SOLUTION INTRAVENOUS
Status: CANCELLED | OUTPATIENT
Start: 2019-10-25

## 2019-10-24 RX ADMIN — SODIUM CHLORIDE 500 MG: 9 INJECTION, SOLUTION INTRAVENOUS at 13:23

## 2019-10-24 ASSESSMENT — MIFFLIN-ST. JEOR: SCORE: 1755.41

## 2019-10-24 ASSESSMENT — PAIN SCALES - GENERAL: PAINLEVEL: NO PAIN (0)

## 2019-10-24 NOTE — TELEPHONE ENCOUNTER
NPH: Increase dose to 15 units with dose of Methylpred 500 mg .[ I am assuming that he did get 10 units of NPH last night]  Call with dose change for steroids.   Patrick Rascon MD  Endocrinology Service

## 2019-10-24 NOTE — TELEPHONE ENCOUNTER
"Pt came to [facility] today with overnight BS results that Dr Rascon had requested.     IV steroid at 4:30pm  Admin 5-7 units at 4:17pm BS: 144  6:24pm dinner 35G, admin to cover,   10:28pm HOS BS: 337, adjusted basal to 2.5u/hr  3:38AM BS: 224  4:38AM at waking BS: 193  7:55AM BS: 173    Pt states \"my sensor data on the first day is always garbage, sensor is installed and I will upload and call with password on MondayAM\"   Pt is asking to increase NPH to 20-15units with steroid?  Pt asks clinic to call, as he cannot access MyChart on his phone.   [phone].     Sent to provider for review and recommendation.   Princess Patel RN on 10/24/2019 at 10:54 AM     "

## 2019-10-24 NOTE — PATIENT INSTRUCTIONS
Dear Michael Amin    Thank you for choosing [facility] ([facility]) for your infusion.  The following information is a summary of our appointment as well as important reminders.      Please refer to your hospital discharge instructions for details on home care services, future appointments, phone numbers, and diet/activity levels.    We look forward in seeing you on your next appointment here at [facility] ([facility]).  Please don t hesitate to call us at [phone] to reschedule any of your appointments or to speak with one of the [facility] registered nurses.  It was a pleasure taking care of you today.    Sincerely,    [facility]  [facility]  [address] [place]  [address]  Phone:  [phone]

## 2019-10-24 NOTE — PROGRESS NOTES
3 weeks post KT  Returns for routine clinic visit.  Still has staples in place.    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  Treated with steroids and inc in tacro dose.    He feels fine. No drug side effects.  No kidney pain or dysuria.  Wound looks ok, staples removed and steri-stripped.    Crt today 1.69, tacro lvl 14.  WBC 13.4 (on 1/1 MMF)    Current Outpatient Medications   Medication     acetaminophen (TYLENOL) 325 MG tablet     alcohol swab prep pads     aspirin (ASA) 81 MG EC tablet     atorvastatin (LIPITOR) 10 MG tablet     blood glucose monitoring (ACCU-CHEK FASTCLIX) lancets     calcitRIOL (ROCALTROL) 0.25 MCG capsule     carvedilol (COREG) 3.125 MG tablet     ciprofloxacin (CIPRO) 500 MG tablet     gentamicin (GARAMYCIN) 0.1 % external cream     HUMALOG 100 UNIT/ML injection     insulin isophane human (HUMULIN N PEN) 100 UNIT/ML injection     insulin lispro (HUMALOG VIAL) 100 UNIT/ML vial     insulin pen needle (ULTICARE MICRO) 32G X 4 MM miscellaneous     lactobacillus rhamnosus, GG, (CULTURELL) capsule     magnesium oxide (MAG-OX) 400 MG tablet     mycophenolate (GENERIC EQUIVALENT) 250 MG capsule     ondansetron (ZOFRAN) 4 MG tablet     phosphorus tablet 250 mg (PHOSPHA 250 NEUTRAL) 250 MG per tablet     polyethylene glycol (MIRALAX/GLYCOLAX) packet     predniSONE (DELTASONE) 10 MG tablet     psyllium (METAMUCIL/KONSYL) 58.6 % powder     senna-docusate (SENOKOT-S/PERICOLACE) 8.6-50 MG tablet     sulfamethoxazole-trimethoprim (BACTRIM/SEPTRA) 400-80 MG tablet     tacrolimus (GENERIC EQUIVALENT) 0.5 MG capsule     tacrolimus (GENERIC EQUIVALENT) 1 MG capsule     valGANciclovir (VALCYTE) 450 MG tablet     vitamin D3 (CHOLECALCIFEROL) 1000 units (25 mcg) tablet     No current facility-administered medications for this visit.      Results for CRISTIANE ABERNATHY (MRN [id number]) as of 10/24/2019 14:43   Ref. Range 10/24/2019 09:01   Sodium Latest Ref Range: 133 - 144 mmol/L 136   Potassium  "Latest Ref Range: 3.4 - 5.3 mmol/L 4.8   Chloride Latest Ref Range: 94 - 109 mmol/L 104   Carbon Dioxide Latest Ref Range: 20 - 32 mmol/L 25   Urea Nitrogen Latest Ref Range: 7 - 30 mg/dL 35 (H)   Creatinine Latest Ref Range: 0.66 - 1.25 mg/dL 1.69 (H)   Results for CRISTIANE ABERNATHY (MRN [id number]) as of 10/24/2019 14:43   Ref. Range 10/24/2019 09:01   WBC Latest Ref Range: 4.0 - 11.0 10e9/L 13.4 (H)   Hemoglobin Latest Ref Range: 13.3 - 17.7 g/dL 8.2 (L)   Hematocrit Latest Ref Range: 40.0 - 53.0 % 25.9 (L)   Platelet Count Latest Ref Range: 150 - 450 10e9/L 233       BP (!) 157/88   Pulse 86   Ht 1.676 m (5' 6\")   Wt 90.3 kg (199 lb)   SpO2 98%   BMI 32.12 kg/m    No jaundice  Abd. Soft, nontender.  Incision: clean. Staples removed.  No edema    I/P  1. Kidney.  Anatomically ok. Still need stent removed (set up).  2. 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 DSA neg.  Still on steroids. Glucose elevated, bp elevated.  Crt up to 1.69 (baseline <1.0), but tacro lvl > 14.  Spoke with Dr Hoffman re: therapy, he agrees that slight tacro reduction is in order.  Will reduce from 5/5 to 4/4 starting with next dose.  3. DM: he is adjusting his pump and has plans for management on higher steroid  4. HTN: usually lower.  Suspect is still a function of rjn treatment.  Overall looks fine surgically.  Still in transition with immunotherapy and metabolic control.  He is to see nephrol on 13 Nov with stent removal.  "

## 2019-10-24 NOTE — TELEPHONE ENCOUNTER
Brennan stopped in clinic and I printed out the plan for him. He did inject  10 units lastnight and  10 units  Today but will  Inject another  5 units to equal  The  15 unit order of NPH. Chani Metz RN on 10/24/2019 at 3:58 PM

## 2019-10-24 NOTE — PROGRESS NOTES
Nursing Note  Michael Amin presents today to [facility] for:   Chief Complaint   Patient presents with     Infusion     IV steroids     During today's [facility] appointment, orders from Kash Hoffman MD were completed.  Frequency: today is dose 3 of 3 total.    Progress note:  Patient identification verified by name and date of birth.  Assessment completed.  Vitals recorded in Doc Flowsheets.  Patient was provided with education regarding infusion and possible side effects.  Patient verbalized understanding.      Treatment Conditions: Order to call provider if BG > 200. BG = 284. MD, Keys, returned page and ordered to proceed with infusion. MD notified that patient has own insulin pump and is controlling his blood sugars. Patient also has NPH 10 units to be given subcutaneous with steroid injection. Pt did this at appointment and RN witnessed. WCM.    Premedications: were not ordered.    Infusion length and rate:  infusion given over approximately 30 minutes    Labs: were drawn prior to appointment on 10/24.    Vascular access: peripheral IV placed today.    Post Infusion Assessment:  Patient tolerated infusion without incident.     Discharge Plan:   Follow up plan of care with: primary medical doctor.  Discharge instructions were reviewed with patient.  Patient/representative verbalized understanding of discharge instructions and all questions answered.  Patient discharged from [facility] in stable condition.    Kathie Bonilla RN    Administrations This Visit     methylPREDNISolone sodium succinate (solu-MEDROL) 500 mg in sodium chloride 0.9 % 250 mL intermittent infusion     Admin Date  10/24/2019 Action  New Bag Dose  500 mg Rate  558 mL/hr Route  Intravenous Administered By  Kathie Bonilla, RN                BP (!) 127/97   Temp 97.5  F (36.4  C) (Oral)   SpO2 98%

## 2019-10-24 NOTE — LETTER
10/24/2019      RE: Michael Amin  [address]  [facility] [address]           3 weeks post KT  Returns for routine clinic visit.  Still has staples in place.    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  Treated with steroids and inc in tacro dose.    He feels fine. No drug side effects.  No kidney pain or dysuria.  Wound looks ok, staples removed and steri-stripped.    Crt today 1.69, tacro lvl 14.  WBC 13.4 (on 1/1 MMF)    Current Outpatient Medications   Medication     acetaminophen (TYLENOL) 325 MG tablet     alcohol swab prep pads     aspirin (ASA) 81 MG EC tablet     atorvastatin (LIPITOR) 10 MG tablet     blood glucose monitoring (ACCU-CHEK FASTCLIX) lancets     calcitRIOL (ROCALTROL) 0.25 MCG capsule     carvedilol (COREG) 3.125 MG tablet     ciprofloxacin (CIPRO) 500 MG tablet     gentamicin (GARAMYCIN) 0.1 % external cream     HUMALOG 100 UNIT/ML injection     insulin isophane human (HUMULIN N PEN) 100 UNIT/ML injection     insulin lispro (HUMALOG VIAL) 100 UNIT/ML vial     insulin pen needle (ULTICARE MICRO) 32G X 4 MM miscellaneous     lactobacillus rhamnosus, GG, (CULTURELL) capsule     magnesium oxide (MAG-OX) 400 MG tablet     mycophenolate (GENERIC EQUIVALENT) 250 MG capsule     ondansetron (ZOFRAN) 4 MG tablet     phosphorus tablet 250 mg (PHOSPHA 250 NEUTRAL) 250 MG per tablet     polyethylene glycol (MIRALAX/GLYCOLAX) packet     predniSONE (DELTASONE) 10 MG tablet     psyllium (METAMUCIL/KONSYL) 58.6 % powder     senna-docusate (SENOKOT-S/PERICOLACE) 8.6-50 MG tablet     sulfamethoxazole-trimethoprim (BACTRIM/SEPTRA) 400-80 MG tablet     tacrolimus (GENERIC EQUIVALENT) 0.5 MG capsule     tacrolimus (GENERIC EQUIVALENT) 1 MG capsule     valGANciclovir (VALCYTE) 450 MG tablet     vitamin D3 (CHOLECALCIFEROL) 1000 units (25 mcg) tablet     No current facility-administered medications for this visit.      Results for MICHELACRISTIANE (MRN [id number]) as of 10/24/2019  "14:43   Ref. Range 10/24/2019 09:01   Sodium Latest Ref Range: 133 - 144 mmol/L 136   Potassium Latest Ref Range: 3.4 - 5.3 mmol/L 4.8   Chloride Latest Ref Range: 94 - 109 mmol/L 104   Carbon Dioxide Latest Ref Range: 20 - 32 mmol/L 25   Urea Nitrogen Latest Ref Range: 7 - 30 mg/dL 35 (H)   Creatinine Latest Ref Range: 0.66 - 1.25 mg/dL 1.69 (H)   Results for CRISTIANE ABERNATHY (MRN [id number]) as of 10/24/2019 14:43   Ref. Range 10/24/2019 09:01   WBC Latest Ref Range: 4.0 - 11.0 10e9/L 13.4 (H)   Hemoglobin Latest Ref Range: 13.3 - 17.7 g/dL 8.2 (L)   Hematocrit Latest Ref Range: 40.0 - 53.0 % 25.9 (L)   Platelet Count Latest Ref Range: 150 - 450 10e9/L 233       BP (!) 157/88   Pulse 86   Ht 1.676 m (5' 6\")   Wt 90.3 kg (199 lb)   SpO2 98%   BMI 32.12 kg/m     No jaundice  Abd. Soft, nontender.  Incision: clean. Staples removed.  No edema    I/P  1. Kidney.  Anatomically ok. Still need stent removed (set up).  2. 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 DSA neg.  Still on steroids. Glucose elevated, bp elevated.  Crt up to 1.69 (baseline <1.0), but tacro lvl > 14.  Spoke with Dr Hoffman re: therapy, he agrees that slight tacro reduction is in order.  Will reduce from 5/5 to 4/4 starting with next dose.  3. DM: he is adjusting his pump and has plans for management on higher steroid  4. HTN: usually lower.  Suspect is still a function of rjn treatment.  Overall looks fine surgically.  Still in transition with immunotherapy and metabolic control.  He is to see nephrol on 13 Nov with stent removal.    Delroy Vela MD      "

## 2019-10-24 NOTE — TELEPHONE ENCOUNTER
Spoke to Dr Hoffman  Regarding borderline acute rejection   Plan to give Solumedrol 500  Mg time 3 days   Then Prednisone taper 40mg BID X 2 days 40mg daily X 2 days 20mg daily X 2 days       Dr Hoffman - does NOT remain on prednisone maintance dose after taper borderline     Called Rudi reviewed plan for prednisone taper sent RX to [facility]

## 2019-10-25 ENCOUNTER — TELEPHONE (OUTPATIENT)
Dept: TRANSPLANT | Facility: CLINIC | Age: 39
End: 2019-10-25

## 2019-10-29 DIAGNOSIS — Z94.0 KIDNEY REPLACED BY TRANSPLANT: Primary | ICD-10-CM

## 2019-10-29 NOTE — TELEPHONE ENCOUNTER
----- Message from Sabine Anders RN sent at 10/29/2019  7:35 AM CDT -----  Regarding: RE: Estela agreed to take out JJ stent at 11 am on Thurs Oct 31  and needs a tx ultrasound( HIGH PRIORITY )  Kidney  transplant  ultrasound  then change cysto with estela to Oct 31 -   Ultrasound entered - thanks again             ----- Message -----  From: Christina Perrin  Sent: 10/29/2019   7:27 AM CDT  To: Sabine Anders RN  Subject: RE: Estela agreed to take out JJ stent at 11 #    There is no order for ultra sound  You have order for bx????  ----- Message -----  From: Sabine Anders RN  Sent: 10/28/2019   8:03 AM CDT  To: Sabine Anders RN, #  Subject: Estela agreed to take out JJ stent at 11 am o#    Requesting a kidney transplant  ultrasound on Oct 31   and   stent removal to OCT 31 ,2019  (estela woodard)     11 am per Estela          ________________________________________        Estela Woodard NP Huepfel, Sabine ALDANA RN         Sorry what time works for him?  Like 11?  He said he liked mornings when we met...

## 2019-10-30 ENCOUNTER — TELEPHONE (OUTPATIENT)
Dept: TRANSPLANT | Facility: CLINIC | Age: 39
End: 2019-10-30

## 2019-10-30 NOTE — TELEPHONE ENCOUNTER
Post Kidney and Pancreas Transplant Team Conference  Date: 10/30/2019  Transplant Coordinator: Lea Chanel     Attendees:  [x]  Dr. Hernández  [x] Candelaria Kramer LPN     []  Dr. Landon [x] Sabine Anders RN [] Luisa Landis LPN   [x]  Dr. Hoffman [] Jane Singer, RN     [] Nereida Addison RN [x] Abdullahi Javier, PharmD   [] Dr. Wolfe [x] Chloe Bryson, LILLI    [] Dr. Liu [] Vicente Ross RN    [x] Dr. Alonzo [] Ana Paula Dong, LILLI    [x] Dr. Mccullough [] Annita Taylor, LILLI     [] Irene Mccracken RN    [] Surgery Fellow [x] Stephany Culver RN    [] Estela Woodard, NP [] Karissa Villanueva RN        Verbal Plan Read Back:   Biopsy next week      Routed to RN Coordinator   Candelaria Kramer LPN

## 2019-10-31 ENCOUNTER — HOSPITAL ENCOUNTER (OUTPATIENT)
Dept: ULTRASOUND IMAGING | Facility: CLINIC | Age: 39
Discharge: HOME | End: 2019-10-31
Attending: INTERNAL MEDICINE | Admitting: INTERNAL MEDICINE
Payer: COMMERCIAL

## 2019-10-31 ENCOUNTER — OFFICE VISIT (OUTPATIENT)
Dept: TRANSPLANT | Facility: CLINIC | Age: 39
End: 2019-10-31
Attending: PHYSICIAN ASSISTANT
Payer: COMMERCIAL

## 2019-10-31 VITALS
DIASTOLIC BLOOD PRESSURE: 79 MMHG | SYSTOLIC BLOOD PRESSURE: 132 MMHG | HEART RATE: 98 BPM | HEIGHT: 66 IN | WEIGHT: 204.7 LBS | BODY MASS INDEX: 32.9 KG/M2 | OXYGEN SATURATION: 97 %

## 2019-10-31 DIAGNOSIS — Z96.0 URETERAL STENT RETAINED: ICD-10-CM

## 2019-10-31 DIAGNOSIS — Z79.899 LONG TERM USE OF DRUG: ICD-10-CM

## 2019-10-31 DIAGNOSIS — Z94.0 KIDNEY REPLACED BY TRANSPLANT: Primary | ICD-10-CM

## 2019-10-31 DIAGNOSIS — N30.01 ACUTE CYSTITIS WITH HEMATURIA: ICD-10-CM

## 2019-10-31 DIAGNOSIS — Z94.0 KIDNEY REPLACED BY TRANSPLANT: ICD-10-CM

## 2019-10-31 LAB
ANION GAP SERPL CALCULATED.3IONS-SCNC: 5 MMOL/L (ref 3–14)
BUN SERPL-MCNC: 23 MG/DL (ref 7–30)
CALCIUM SERPL-MCNC: 8.5 MG/DL (ref 8.5–10.1)
CHLORIDE SERPL-SCNC: 105 MMOL/L (ref 94–109)
CO2 SERPL-SCNC: 29 MMOL/L (ref 20–32)
CREAT SERPL-MCNC: 1.19 MG/DL (ref 0.66–1.25)
ERYTHROCYTE [DISTWIDTH] IN BLOOD BY AUTOMATED COUNT: 14.6 % (ref 10–15)
GFR SERPL CREATININE-BSD FRML MDRD: 76 ML/MIN/{1.73_M2}
GLUCOSE SERPL-MCNC: 137 MG/DL (ref 70–99)
HCT VFR BLD AUTO: 33.1 % (ref 40–53)
HGB BLD-MCNC: 10.2 G/DL (ref 13.3–17.7)
MCH RBC QN AUTO: 29.5 PG (ref 26.5–33)
MCHC RBC AUTO-ENTMCNC: 30.8 G/DL (ref 31.5–36.5)
MCV RBC AUTO: 96 FL (ref 78–100)
PLATELET # BLD AUTO: 214 10E9/L (ref 150–450)
POTASSIUM SERPL-SCNC: 4.1 MMOL/L (ref 3.4–5.3)
RBC # BLD AUTO: 3.46 10E12/L (ref 4.4–5.9)
SODIUM SERPL-SCNC: 139 MMOL/L (ref 133–144)
TACROLIMUS BLD-MCNC: 8.2 UG/L (ref 5–15)
TME LAST DOSE: NORMAL H
WBC # BLD AUTO: 10 10E9/L (ref 4–11)

## 2019-10-31 PROCEDURE — 80197 ASSAY OF TACROLIMUS: CPT | Performed by: INTERNAL MEDICINE

## 2019-10-31 PROCEDURE — 25000125 ZZHC RX 250: Mod: ZF | Performed by: NURSE PRACTITIONER

## 2019-10-31 PROCEDURE — 85027 COMPLETE CBC AUTOMATED: CPT | Performed by: INTERNAL MEDICINE

## 2019-10-31 PROCEDURE — 52310 CYSTOSCOPY AND TREATMENT: CPT | Mod: ZF | Performed by: NURSE PRACTITIONER

## 2019-10-31 PROCEDURE — 76776 US EXAM K TRANSPL W/DOPPLER: CPT

## 2019-10-31 PROCEDURE — 36415 COLL VENOUS BLD VENIPUNCTURE: CPT | Performed by: INTERNAL MEDICINE

## 2019-10-31 PROCEDURE — 80048 BASIC METABOLIC PNL TOTAL CA: CPT | Performed by: INTERNAL MEDICINE

## 2019-10-31 RX ORDER — CIPROFLOXACIN 500 MG/1
500 TABLET, FILM COATED ORAL 2 TIMES DAILY
Qty: 4 TABLET | Refills: 0 | Status: SHIPPED | OUTPATIENT
Start: 2019-10-31 | End: 2019-11-13

## 2019-10-31 RX ORDER — LIDOCAINE HYDROCHLORIDE 20 MG/ML
JELLY TOPICAL ONCE
Status: COMPLETED | OUTPATIENT
Start: 2019-10-31 | End: 2019-10-31

## 2019-10-31 RX ORDER — CIPROFLOXACIN 500 MG/1
500 TABLET, FILM COATED ORAL 2 TIMES DAILY
Qty: 4 TABLET | Refills: 0 | Status: SHIPPED | OUTPATIENT
Start: 2019-10-31 | End: 2019-10-31

## 2019-10-31 RX ADMIN — LIDOCAINE HYDROCHLORIDE: 20 JELLY TOPICAL at 12:01

## 2019-10-31 ASSESSMENT — PAIN SCALES - GENERAL: PAINLEVEL: NO PAIN (0)

## 2019-10-31 ASSESSMENT — MIFFLIN-ST. JEOR: SCORE: 1781.26

## 2019-10-31 NOTE — PROCEDURES
Transplant Surgery  Operative Note    Preop dx: Status post Living Unrelated Donor kidney  transplant.  Post op dx: same   Procedure: Flexible cystoscopy and ureteral stent removal   Surgeon: clint waller CNP  Assistant: starr inman MA  Anesthesia: local  EBL: 0   Specimens: none.  Findings: none abnormal. Stent inspected and noted to be intact.   Indication: The patient is status post kidney transplant and the ureteral stent is no longer needed.    Procedure: The patient was positioned supine on the table.  The groin was sterilely prepped and draped in the usual fashion. Time out was done. Urojet was applied to the urethra. A flexible cystoscope was inserted and advanced thru the urethra into the bladder. The stent was visualized and grasped. The cystoscope, grasper and stent were removed en-mass. The stent was visualized to be intact.  The bladder mucosa was normal in appearance. Antibiotics were administered. The patient tolerated the procedure well and was asked to void before leaving the clinic.

## 2019-10-31 NOTE — NURSING NOTE
"Chief Complaint   Patient presents with     Cystoscopy     Stent removal     Blood pressure 132/79, pulse 98, height 1.676 m (5' 6\"), weight 92.9 kg (204 lb 11.2 oz), SpO2 97 %.    Prepped patient for procedure with no complications.  Assisted with stent removal.  Patient was discharged in stable condition.    Stephanie Foreman CMA    "

## 2019-11-01 ENCOUNTER — TELEPHONE (OUTPATIENT)
Dept: TRANSPLANT | Facility: CLINIC | Age: 39
End: 2019-11-01

## 2019-11-01 ENCOUNTER — MYC MEDICAL ADVICE (OUTPATIENT)
Dept: TRANSPLANT | Facility: CLINIC | Age: 39
End: 2019-11-01

## 2019-11-04 DIAGNOSIS — Z79.899 LONG TERM USE OF DRUG: ICD-10-CM

## 2019-11-04 DIAGNOSIS — Z94.0 KIDNEY REPLACED BY TRANSPLANT: ICD-10-CM

## 2019-11-04 PROCEDURE — 87799 DETECT AGENT NOS DNA QUANT: CPT | Performed by: INTERNAL MEDICINE

## 2019-11-06 ENCOUNTER — TELEPHONE (OUTPATIENT)
Dept: TRANSPLANT | Facility: CLINIC | Age: 39
End: 2019-11-06

## 2019-11-06 NOTE — TELEPHONE ENCOUNTER
Post Kidney and Pancreas Transplant Team Conference  Date: 11/6/2019  Transplant Coordinator: Sabine Anders, Lea Johnson     Attendees:  []  Dr. Hernández  [x] Candelaria Kramer LPN     [x]  Dr. Landon [x] Sabine Anders RN [] Luisa Landis LPN   [x]  Dr. Hoffman [] Jane Singer, RN     [] Nereida Addison RN [x] Abdullahi Javier, PharmD   [] Dr. Wolfe [x] Chloe Bryson, LILLI    [x] Dr. Liu [] Vicente Ross RN    [] Dr. Alonzo [] Ana Paula Dong, LILLI    [] Dr. Mccullough [] Annita Taylor, LILLI     [] Irene Mccracken, LILLI    [] Surgery Fellow [x] Stephany Culver RN    [] Estela Woodard, NP [] Karissa Villanueva RN        Verbal Plan Read Back:   Drain fluid collection, keep drain in      Routed to RN Coordinator   Candelaria Krmaer LPN

## 2019-11-10 DIAGNOSIS — Z94.0 KIDNEY REPLACED BY TRANSPLANT: Primary | ICD-10-CM

## 2019-11-10 LAB
BKV DNA # SPEC NAA+PROBE: NORMAL COPIES/ML
BKV DNA SPEC NAA+PROBE-LOG#: NORMAL LOG COPIES/ML
SPECIMEN SOURCE: NORMAL

## 2019-11-13 ENCOUNTER — ANCILLARY PROCEDURE (OUTPATIENT)
Dept: ULTRASOUND IMAGING | Facility: CLINIC | Age: 39
End: 2019-11-13
Attending: NURSE PRACTITIONER
Payer: COMMERCIAL

## 2019-11-13 ENCOUNTER — OFFICE VISIT (OUTPATIENT)
Dept: NEPHROLOGY | Facility: CLINIC | Age: 39
End: 2019-11-13
Attending: INTERNAL MEDICINE
Payer: COMMERCIAL

## 2019-11-13 ENCOUNTER — RESULTS ONLY (OUTPATIENT)
Dept: OTHER | Facility: CLINIC | Age: 39
End: 2019-11-13

## 2019-11-13 ENCOUNTER — TELEPHONE (OUTPATIENT)
Dept: TRANSPLANT | Facility: CLINIC | Age: 39
End: 2019-11-13

## 2019-11-13 VITALS
WEIGHT: 204.8 LBS | DIASTOLIC BLOOD PRESSURE: 87 MMHG | BODY MASS INDEX: 33.06 KG/M2 | SYSTOLIC BLOOD PRESSURE: 135 MMHG | OXYGEN SATURATION: 97 % | TEMPERATURE: 98.2 F | HEART RATE: 84 BPM

## 2019-11-13 DIAGNOSIS — Z94.0 KIDNEY REPLACED BY TRANSPLANT: ICD-10-CM

## 2019-11-13 DIAGNOSIS — Z29.89 NEED FOR CMV IMMUNOTHERAPY: ICD-10-CM

## 2019-11-13 DIAGNOSIS — D84.9 IMMUNOSUPPRESSION (H): ICD-10-CM

## 2019-11-13 DIAGNOSIS — N18.2 ANEMIA OF CHRONIC RENAL FAILURE, STAGE 2 (MILD): ICD-10-CM

## 2019-11-13 DIAGNOSIS — D63.1 ANEMIA OF CHRONIC RENAL FAILURE, STAGE 2 (MILD): ICD-10-CM

## 2019-11-13 DIAGNOSIS — R79.89 ELEVATED SERUM CREATININE: ICD-10-CM

## 2019-11-13 DIAGNOSIS — Z94.0 STATUS POST KIDNEY TRANSPLANT: Primary | ICD-10-CM

## 2019-11-13 DIAGNOSIS — R80.1 PERSISTENT PROTEINURIA: ICD-10-CM

## 2019-11-13 DIAGNOSIS — Z29.89 NEED FOR PNEUMOCYSTIS PROPHYLAXIS: ICD-10-CM

## 2019-11-13 DIAGNOSIS — Z79.899 LONG TERM USE OF DRUG: ICD-10-CM

## 2019-11-13 LAB
ANION GAP SERPL CALCULATED.3IONS-SCNC: 6 MMOL/L (ref 3–14)
BUN SERPL-MCNC: 22 MG/DL (ref 7–30)
CALCIUM SERPL-MCNC: 8.9 MG/DL (ref 8.5–10.1)
CHLORIDE SERPL-SCNC: 106 MMOL/L (ref 94–109)
CO2 SERPL-SCNC: 26 MMOL/L (ref 20–32)
CREAT SERPL-MCNC: 1.24 MG/DL (ref 0.66–1.25)
CREAT UR-MCNC: 78 MG/DL
ERYTHROCYTE [DISTWIDTH] IN BLOOD BY AUTOMATED COUNT: 13.8 % (ref 10–15)
GFR SERPL CREATININE-BSD FRML MDRD: 72 ML/MIN/{1.73_M2}
GLUCOSE SERPL-MCNC: 211 MG/DL (ref 70–99)
HCT VFR BLD AUTO: 33.2 % (ref 40–53)
HGB BLD-MCNC: 10.2 G/DL (ref 13.3–17.7)
MAGNESIUM SERPL-MCNC: 2 MG/DL (ref 1.6–2.3)
MCH RBC QN AUTO: 28.7 PG (ref 26.5–33)
MCHC RBC AUTO-ENTMCNC: 30.7 G/DL (ref 31.5–36.5)
MCV RBC AUTO: 94 FL (ref 78–100)
PHOSPHATE SERPL-MCNC: 2.5 MG/DL (ref 2.5–4.5)
PLATELET # BLD AUTO: 241 10E9/L (ref 150–450)
POTASSIUM SERPL-SCNC: 5 MMOL/L (ref 3.4–5.3)
PROT UR-MCNC: 0.16 G/L
PROT/CREAT 24H UR: 0.21 G/G CR (ref 0–0.2)
RBC # BLD AUTO: 3.55 10E12/L (ref 4.4–5.9)
SODIUM SERPL-SCNC: 138 MMOL/L (ref 133–144)
WBC # BLD AUTO: 5 10E9/L (ref 4–11)

## 2019-11-13 PROCEDURE — 80048 BASIC METABOLIC PNL TOTAL CA: CPT | Performed by: INTERNAL MEDICINE

## 2019-11-13 PROCEDURE — 86828 HLA CLASS I&II ANTIBODY QUAL: CPT | Performed by: INTERNAL MEDICINE

## 2019-11-13 PROCEDURE — 83735 ASSAY OF MAGNESIUM: CPT | Performed by: INTERNAL MEDICINE

## 2019-11-13 PROCEDURE — 84156 ASSAY OF PROTEIN URINE: CPT | Performed by: INTERNAL MEDICINE

## 2019-11-13 PROCEDURE — 86833 HLA CLASS II HIGH DEFIN QUAL: CPT | Performed by: INTERNAL MEDICINE

## 2019-11-13 PROCEDURE — 84100 ASSAY OF PHOSPHORUS: CPT | Performed by: INTERNAL MEDICINE

## 2019-11-13 PROCEDURE — G0463 HOSPITAL OUTPT CLINIC VISIT: HCPCS | Mod: ZF

## 2019-11-13 PROCEDURE — 86832 HLA CLASS I HIGH DEFIN QUAL: CPT | Performed by: INTERNAL MEDICINE

## 2019-11-13 PROCEDURE — 85027 COMPLETE CBC AUTOMATED: CPT | Performed by: INTERNAL MEDICINE

## 2019-11-13 PROCEDURE — 36415 COLL VENOUS BLD VENIPUNCTURE: CPT | Performed by: INTERNAL MEDICINE

## 2019-11-13 ASSESSMENT — PAIN SCALES - GENERAL: PAINLEVEL: NO PAIN (0)

## 2019-11-13 NOTE — NURSING NOTE
"Chief Complaint   Patient presents with     RECHECK     Follow up Kidney TX     Vital signs:  Temp: 98.2  F (36.8  C) Temp src: Oral BP: 135/87 Pulse: 84     SpO2: 97 %       Weight: 92.9 kg (204 lb 12.8 oz)  Estimated body mass index is 33.06 kg/m  as calculated from the following:    Height as of 10/31/19: 1.676 m (5' 6\").    Weight as of this encounter: 92.9 kg (204 lb 12.8 oz).        Roz Coello, CMA    "

## 2019-11-13 NOTE — PROGRESS NOTES
ACUTE TRANSPLANT NEPHROLOGY VISIT    Assessment & Plan   # LDKT: 1.2 mg / dl after peak in the 1.6 mg / dl after treatment with solumedrol for borderline rejection   - Baseline Cr ~ 0.9 was the payam.   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    The patient had a payam creatinine of 0.9 mg/dL immediately postoperatively.  Once he was discharged from the hospital his creatinine had increased to 1.6 mg/dL and a kidney biopsy showed borderline changes for potential cell mediated rejection that was treated with Solu-Medrol 500 mg IV daily x3 days with a prednisone taper.  His creatinine did improve down to its current value of 1.2 mg/dL.    Given the fact that the patient has not yet returned to his baseline of 0.9 mg/dL I do think it is reasonable to pursue a repeat kidney biopsy to rule out any residual kidney inflammation.  At that time it would also be reasonable to drain the inferior fluid pocket that may be hematoma versus seroma versus lymphocele.  We will repeat the renal transplant ultrasound today.  If the fluid has completely resorbed and then we will go forward with a kidney transplant biopsy.  If not we will proceed with IR drainage of fluid pockets as well as kidney transplant biopsy.    # Immunosuppression: Tacrolimus immediate release (goal 8-10) and Mycophenolate mofetil (goal 1.0-3.5)   - Changes: No     Tac 4 mg po bid - last 11.2 ng / ml.  Mmf 1000 mg po bid - increased for low MMF level.     # Infection Prophylaxis:   - PJP: Sulfa/TMP (Bactrim)  - CMV: Valcyte R+ 900 mg po daily x 3 months.    # Hypertension: Controlled;  Goal BP: < 130/80   - Volume status: Euvolemic   - Changes: No    # Diabetes: improved control Last HbA1c:  8.9 g%   - Management as per Endocrinology.    # Anemia in Chronic Renal Disease: Hgb: Stable      JOSE: No   - Iron studies: Replete      Hemoglobin 10.2 g / dl on 11/13/19    # Mineral Bone Disorder:   - Secondary renal hyperparathyroidism; PTH level: Moderately elevated (301-600 pg/ml)  - Vitamin D; level: Low        On Supplement: Yes  - Calcium; level: Normal        Stop calcitriol.  - Phosphorus; level: Normal        On Supplement: stop phos.    # Electrolytes:   - Potassium; level: Normal        On Supplement: No  - Magnesium; level: Normal        On Supplement: Yes  - Bicarbonate; level: Normal        On Supplement: No  - Sodium; level: Normal          # Medical Compliance: Yes    # Transplant History:  Etiology of Kidney Failure: Diabetes mellitus type 1  Tx: LDKT  Transplant: 10/1/2019 (Kidney)  Donor Type: Living Donor Class:   Crossmatch at time of Tx: negative  DSA at time of Tx: No  Significant changes in immunosuppression: None  CMV IgG Ab High Risk Discordance (D+/R-): No  EBV IgG Ab High Risk Discordance (D+/R-): No  Significant transplant-related complications: None    Transplant Office Phone Number: [phone]    Assessment and plan was discussed with the patient and he voiced his understanding and agreement.    Return visit: No follow-ups on file.    Kash Hoffman MD    Chief Complaint   Mr. Amin is a 39 year old here for routine follow up and kidney transplant.     History of Present Illness     The patient is a 39-year-old male with history of end-stage kidney disease due to type 1 diabetes who is status post living kidney transplant here for follow-up of his kidney transplant.    The patient did have an elevated creatinine to 1.6 mg/dL for which he underwent a kidney transplant biopsy showing borderline cellular rejection that was treated with Solu-Medrol 500 mg daily for 3 doses with a Pred taper and he is currently not on prednisone.  The creatinine has improved down to 1.2 mg/dL which is still above his prior payam of 0.8 to 0.9 mg/dL.    Overall the patient states that he feels well.  He denies any chest pain or  breathing difficulties.  He has no nausea or vomiting.  He has no fever shakes or chills.  He is moving his bowels appropriately.    From an immunosuppression standpoint the patient is currently on immediate release tacrolimus with this most recent drug level mildly supratherapeutic in the 11 ng/mL range.  The patient is on increased dose CellCept due to a low drug level and is on 1000 mill grams p.o. twice daily.    He does have a fluid pocket at the inferior aspect of the kidney that was decreasing in size on most recent imaging at the end of October and we are repeating that here today.    Recent Hospitalizations:  [x] No [] Yes    New Medical Issues: [x] No [] Yes    Decreased energy: [x] No [] Yes    Chest pain or SOB with exertion:  [x] No [] Yes    Appetite change or weight change: [x] No [] Yes    Nausea, vomiting or diarrhea:  [x] No [] Yes    Fever, sweats or chills: [x] No [] Yes    Leg swelling: [x] No [] Yes      Home BP: at goal    Review of Systems   A comprehensive review of systems was obtained and negative, except as noted in the HPI or PMH.    Problem List   Patient Active Problem List   Diagnosis     HTN, kidney transplant related     Diabetes mellitus type 1 (H)     Dyslipidemia     Anemia in chronic kidney disease     Secondary renal hyperparathyroidism (H)     Kidney replaced by transplant     Aftercare following organ transplant     Vitamin D deficiency     Hypomagnesemia     Hypophosphatemia     Kidney transplant rejection       Social History   Social History     Tobacco Use     Smoking status: Never Smoker     Smokeless tobacco: Never Used   Substance Use Topics     Alcohol use: Not Currently     Comment: Rarely 1-2 Drinks a month      Drug use: No       Allergies   Allergies   Allergen Reactions     Thymoglobulin      Had reaction with rigors after steroid-free dose. No reaction with steroids.       Medications   Current Outpatient Medications   Medication Sig     alcohol swab prep pads  Use to swab area of injection/zak as directed.     aspirin (ASA) 81 MG EC tablet Take 81 mg by mouth daily      atorvastatin (LIPITOR) 10 MG tablet Take 1 tablet (10 mg) by mouth daily     blood glucose monitoring (ACCU-CHEK FASTCLIX) lancets U QID OR MORE OFTEN PRN     calcitRIOL (ROCALTROL) 0.25 MCG capsule Take 0.25 mcg by mouth daily      carvedilol (COREG) 3.125 MG tablet Take 1 tablet (3.125 mg) by mouth 2 times daily (with meals)     HUMALOG 100 UNIT/ML injection Insulin used for filling patient's pump     insulin lispro (HUMALOG VIAL) 100 UNIT/ML vial Inject 100 Units Subcutaneous daily With Insulin pump. Up to 100 units     insulin pen needle (ULTICARE MICRO) 32G X 4 MM miscellaneous Use pen needles daily or as directed.     magnesium oxide (MAG-OX) 400 MG tablet Take 1 tablet (400 mg) by mouth daily (with lunch)     mycophenolate (GENERIC EQUIVALENT) 250 MG capsule Take 4 capsules (1,000 mg) by mouth 2 times daily     ondansetron (ZOFRAN) 4 MG tablet Take 1 tablet (4 mg) by mouth every 8 hours as needed for nausea (take for nausea)     phosphorus tablet 250 mg (PHOSPHA 250 NEUTRAL) 250 MG per tablet Take 1 tablet (250 mg) by mouth 2 times daily     polyethylene glycol (MIRALAX/GLYCOLAX) packet Take 17 g by mouth daily     psyllium (METAMUCIL/KONSYL) 58.6 % powder Take by mouth daily     senna-docusate (SENOKOT-S/PERICOLACE) 8.6-50 MG tablet Take 2 tablets by mouth 2 times daily     sulfamethoxazole-trimethoprim (BACTRIM/SEPTRA) 400-80 MG tablet Take 1 tablet by mouth daily     tacrolimus (GENERIC EQUIVALENT) 0.5 MG capsule Take 1 capsule (0.5 mg) by mouth 2 times daily     tacrolimus (GENERIC EQUIVALENT) 1 MG capsule Take 4 mg by mouth 2 times daily      valGANciclovir (VALCYTE) 450 MG tablet Take 2 tablets (900 mg) by mouth daily     vitamin D3 (CHOLECALCIFEROL) 1000 units (25 mcg) tablet Take 1 tablet (1,000 Units) by mouth daily     acetaminophen (TYLENOL) 325 MG tablet Take 2 tablets (650 mg) by  mouth every 4 hours as needed for mild pain or fever (Patient not taking: Reported on 11/13/2019)     ciprofloxacin (CIPRO) 500 MG tablet Take 1 tablet (500 mg) by mouth 2 times daily (Patient not taking: Reported on 11/13/2019)     gentamicin (GARAMYCIN) 0.1 % external cream Apply to port exit site once daily     insulin isophane human (HUMULIN N PEN) 100 UNIT/ML injection Use 10 units with Methylprednisolone (Patient not taking: Reported on 11/13/2019)     lactobacillus rhamnosus, GG, (CULTURELL) capsule Take 1 capsule by mouth daily     predniSONE (DELTASONE) 10 MG tablet 40mg BID X 2 days 40mg daily X 2 days 20mg daily X 2 days (Patient not taking: Reported on 11/13/2019)     No current facility-administered medications for this visit.      There are no discontinued medications.    Physical Exam   Vital Signs: /87   Pulse 84   Temp 98.2  F (36.8  C) (Oral)   Wt 92.9 kg (204 lb 12.8 oz)   SpO2 97%   BMI 33.06 kg/m      GENERAL APPEARANCE: alert and no distress  HENT: mouth without ulcers or lesions  LYMPHATICS: no cervical or supraclavicular nodes  RESP: lungs clear to auscultation - no rales, rhonchi or wheezes  CV: regular rhythm, normal rate, no rub, no murmur  EDEMA: no LE edema bilaterally  ABDOMEN: soft, nondistended, nontender, bowel sounds normal  MS: extremities normal - no gross deformities noted, no evidence of inflammation in joints, no muscle tenderness  SKIN: no rash    Data     Renal Latest Ref Rng & Units 11/13/2019 11/11/2019 11/8/2019   Na 133 - 144 mmol/L 138 - -   Na (external) 134 - 143 mEq/L - 136 140   K 3.4 - 5.3 mmol/L 5.0 - -   K (external) 3.4 - 5.1 mEq/L - 5.2(H) 4.7   Cl 94 - 109 mmol/L 106 - -   Cl (external) 99 - 110 mEq/L - 104 106   CO2 20 - 32 mmol/L 26 - -   CO2 (external) 19 - 29 mEq/L - 25 26   BUN 7 - 30 mg/dL 22 - -   BUN (external) 5 - 24 mg/dL - 22 13   Cr 0.66 - 1.25 mg/dL 1.24 - -   Cr (external) 0.70 - 1.20 mg/dL - 1.55(H) 1.27(H)   Glucose 70 - 99 mg/dL  211(H) - -   Glucose (external) 70 - 99 mg/dL - 212(H) 141(H)   Ca  8.5 - 10.1 mg/dL 8.9 - -   Ca (external) 8.4 - 10.5 mg/dL - 9.1 9.3   Mg 1.6 - 2.3 mg/dL 2.0 - -   Mg (external) 1.8 - 2.7 mg/dL - 1.6(L) -     Bone Health Latest Ref Rng & Units 11/13/2019 11/11/2019 11/4/2019   Phos 2.5 - 4.5 mg/dL 2.5 - -   Phos (external) 2.5 - 4.6 mg/dL - 3.2 3.1   PTHi 18 - 80 pg/mL - - -   Vit D Def 20 - 75 ug/L - - -     Heme Latest Ref Rng & Units 11/13/2019 11/11/2019 11/8/2019   WBC 4.0 - 11.0 10e9/L 5.0 - -   WBC (external) 3.2 - 11.0 10*9/L - 5.1 5.6   Hgb 13.3 - 17.7 g/dL 10.2(L) - -   Hgb (external) 12.9 - 16.9 g/dL - 9.8(L) 9.5(L)   Plt 150 - 450 10e9/L 241 - -   Plt (external) 130 - 375 10*9/L - 239 241     Liver Latest Ref Rng & Units 9/26/2019 8/6/2019 1/7/2019   AP 40 - 150 U/L 100 - 157(H)   TBili 0.2 - 1.3 mg/dL 0.2 - 0.2   ALT 0 - 70 U/L 42 - 46   AST 0 - 45 U/L 16 - 15   Tot Protein 6.8 - 8.8 g/dL 7.5 - 7.9   Tot Protein (external) 5.7 - 8.2 g/dL - 6.6 -   Albumin 3.4 - 5.0 g/dL 3.5 - 3.5   Albumin (external) 3.2 - 4.8 g/dL - 4.4 -     Pancreas Latest Ref Rng & Units 9/26/2019 1/7/2019 8/15/2018   A1C 0 - 5.6 % 8.9(H) 10.8(H) 8.6(H)     Iron studies Latest Ref Rng & Units 9/26/2019 8/6/2019   Iron 35 - 180 ug/dL 70 -   Iron sat 15 - 46 % 28 -   Ferritin 26 - 388 ng/mL 753(H) -   Ferritin (external) 22 - 322 ng/mL - 578(H)     [facility] Txp Virology Latest Ref Rng & Units 11/4/2019 10/22/2019 10/18/2019   BK Spec - Plasma Plasma Plasma   BK Res BKNEG:BK Virus DNA Not Detected copies/mL BK Virus DNA Not Detected BK Virus DNA Not Detected BK Virus DNA Not Detected   BK Log <2.7 Log copies/mL Not Calculated Not Calculated Not Calculated   Hep B Core NR:Nonreactive - - -        Recent Labs   Lab Test 10/22/19  0608 10/24/19  0900 10/31/19  0842   DOSTAC 10/21/19  2100 10/23/19 2100 10/30/19 2100   TACROL 14.0 10.6 8.2     Recent Labs   Lab Test 10/07/19  0742   DOSMPA Not Provided   MPACID 0.82*   MPAG 14.1*

## 2019-11-13 NOTE — TELEPHONE ENCOUNTER
Post Kidney and Pancreas Transplant Team Conference  Date: 11/13/2019  Transplant Coordinator: Lea Chanel     Attendees:  [x]  Dr. Hernández  [x] Candelaria Kramer LPN     [x]  Dr. Landon [x] Sabine Anders RN [] Luisa Landis LPN   [x]  Dr. Hoffman [] Jane Singer RN     [] Nereida Addison RN [x] Abdullahi Javier, PharmD   [] Dr. Wolfe [x] Chloe Bryson, LILLI    [x] Dr. Liu [] Vicente Ross RN    [] Dr. Alonzo [] Ana Paula Dong RN    [] Dr. Mccullough [] Annita Taylor, LILLI     [] Irene Mccracken RN    [] Surgery Fellow [x] Stephany Culver RN    [] Estela Woodard, NP [] Karissa Villanueva RN        Verbal Plan Read Back:   Being seen in clinic today    Routed to RN Coordinator   Candelaria Kramer LPN

## 2019-11-13 NOTE — LETTER
11/13/2019      RE: Michael Amin  [address]  [facility] [address]       ACUTE TRANSPLANT NEPHROLOGY VISIT    Assessment & Plan   # LDKT: 1.2 mg / dl after peak in the 1.6 mg / dl after treatment with solumedrol for borderline rejection   - Baseline Cr ~ 0.9 was the payam.   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    The patient had a payam creatinine of 0.9 mg/dL immediately postoperatively.  Once he was discharged from the hospital his creatinine had increased to 1.6 mg/dL and a kidney biopsy showed borderline changes for potential cell mediated rejection that was treated with Solu-Medrol 500 mg IV daily x3 days with a prednisone taper.  His creatinine did improve down to its current value of 1.2 mg/dL.    Given the fact that the patient has not yet returned to his baseline of 0.9 mg/dL I do think it is reasonable to pursue a repeat kidney biopsy to rule out any residual kidney inflammation.  At that time it would also be reasonable to drain the inferior fluid pocket that may be hematoma versus seroma versus lymphocele.  We will repeat the renal transplant ultrasound today.  If the fluid has completely resorbed and then we will go forward with a kidney transplant biopsy.  If not we will proceed with IR drainage of fluid pockets as well as kidney transplant biopsy.    # Immunosuppression: Tacrolimus immediate release (goal 8-10) and Mycophenolate mofetil (goal 1.0-3.5)   - Changes: No     Tac 4 mg po bid - last 11.2 ng / ml.  Mmf 1000 mg po bid - increased for low MMF level.     # Infection Prophylaxis:   - PJP: Sulfa/TMP (Bactrim)  - CMV: Valcyte R+ 900 mg po daily x 3 months.    # Hypertension: Controlled;  Goal BP: < 130/80   - Volume status: Euvolemic   - Changes: No    # Diabetes: improved control Last HbA1c:  8.9 g%   - Management as per Endocrinology.    #  Anemia in Chronic Renal Disease: Hgb: Stable      JOSE: No   - Iron studies: Replete     Hemoglobin 10.2 g / dl on 11/13/19    # Mineral Bone Disorder:   - Secondary renal hyperparathyroidism; PTH level: Moderately elevated (301-600 pg/ml)  - Vitamin D; level: Low        On Supplement: Yes  - Calcium; level: Normal        Stop calcitriol.  - Phosphorus; level: Normal        On Supplement: stop phos.    # Electrolytes:   - Potassium; level: Normal        On Supplement: No  - Magnesium; level: Normal        On Supplement: Yes  - Bicarbonate; level: Normal        On Supplement: No  - Sodium; level: Normal          # Medical Compliance: Yes    # Transplant History:  Etiology of Kidney Failure: Diabetes mellitus type 1  Tx: LDKT  Transplant: 10/1/2019 (Kidney)  Donor Type: Living Donor Class:   Crossmatch at time of Tx: negative  DSA at time of Tx: No  Significant changes in immunosuppression: None  CMV IgG Ab High Risk Discordance (D+/R-): No  EBV IgG Ab High Risk Discordance (D+/R-): No  Significant transplant-related complications: None    Transplant Office Phone Number: [phone]    Assessment and plan was discussed with the patient and he voiced his understanding and agreement.    Return visit: No follow-ups on file.    Kash Hoffman MD    Chief Complaint   Mr. Amin is a 39 year old here for routine follow up and kidney transplant.     History of Present Illness     The patient is a 39-year-old male with history of end-stage kidney disease due to type 1 diabetes who is status post living kidney transplant here for follow-up of his kidney transplant.    The patient did have an elevated creatinine to 1.6 mg/dL for which he underwent a kidney transplant biopsy showing borderline cellular rejection that was treated with Solu-Medrol 500 mg daily for 3 doses with a Pred taper and he is currently not on prednisone.  The creatinine has improved down to 1.2 mg/dL which is still above his prior payam of 0.8 to 0.9  mg/dL.    Overall the patient states that he feels well.  He denies any chest pain or breathing difficulties.  He has no nausea or vomiting.  He has no fever shakes or chills.  He is moving his bowels appropriately.    From an immunosuppression standpoint the patient is currently on immediate release tacrolimus with this most recent drug level mildly supratherapeutic in the 11 ng/mL range.  The patient is on increased dose CellCept due to a low drug level and is on 1000 mill grams p.o. twice daily.    He does have a fluid pocket at the inferior aspect of the kidney that was decreasing in size on most recent imaging at the end of October and we are repeating that here today.    Recent Hospitalizations:  [x] No [] Yes    New Medical Issues: [x] No [] Yes    Decreased energy: [x] No [] Yes    Chest pain or SOB with exertion:  [x] No [] Yes    Appetite change or weight change: [x] No [] Yes    Nausea, vomiting or diarrhea:  [x] No [] Yes    Fever, sweats or chills: [x] No [] Yes    Leg swelling: [x] No [] Yes      Home BP: at goal    Review of Systems   A comprehensive review of systems was obtained and negative, except as noted in the HPI or PMH.    Problem List   Patient Active Problem List   Diagnosis     HTN, kidney transplant related     Diabetes mellitus type 1 (H)     Dyslipidemia     Anemia in chronic kidney disease     Secondary renal hyperparathyroidism (H)     Kidney replaced by transplant     Aftercare following organ transplant     Vitamin D deficiency     Hypomagnesemia     Hypophosphatemia     Kidney transplant rejection       Social History   Social History     Tobacco Use     Smoking status: Never Smoker     Smokeless tobacco: Never Used   Substance Use Topics     Alcohol use: Not Currently     Comment: Rarely 1-2 Drinks a month      Drug use: No       Allergies   Allergies   Allergen Reactions     Thymoglobulin      Had reaction with rigors after steroid-free dose. No reaction with steroids.        Medications   Current Outpatient Medications   Medication Sig     alcohol swab prep pads Use to swab area of injection/zak as directed.     aspirin (ASA) 81 MG EC tablet Take 81 mg by mouth daily      atorvastatin (LIPITOR) 10 MG tablet Take 1 tablet (10 mg) by mouth daily     blood glucose monitoring (ACCU-CHEK FASTCLIX) lancets U QID OR MORE OFTEN PRN     calcitRIOL (ROCALTROL) 0.25 MCG capsule Take 0.25 mcg by mouth daily      carvedilol (COREG) 3.125 MG tablet Take 1 tablet (3.125 mg) by mouth 2 times daily (with meals)     HUMALOG 100 UNIT/ML injection Insulin used for filling patient's pump     insulin lispro (HUMALOG VIAL) 100 UNIT/ML vial Inject 100 Units Subcutaneous daily With Insulin pump. Up to 100 units     insulin pen needle (ULTICARE MICRO) 32G X 4 MM miscellaneous Use pen needles daily or as directed.     magnesium oxide (MAG-OX) 400 MG tablet Take 1 tablet (400 mg) by mouth daily (with lunch)     mycophenolate (GENERIC EQUIVALENT) 250 MG capsule Take 4 capsules (1,000 mg) by mouth 2 times daily     ondansetron (ZOFRAN) 4 MG tablet Take 1 tablet (4 mg) by mouth every 8 hours as needed for nausea (take for nausea)     phosphorus tablet 250 mg (PHOSPHA 250 NEUTRAL) 250 MG per tablet Take 1 tablet (250 mg) by mouth 2 times daily     polyethylene glycol (MIRALAX/GLYCOLAX) packet Take 17 g by mouth daily     psyllium (METAMUCIL/KONSYL) 58.6 % powder Take by mouth daily     senna-docusate (SENOKOT-S/PERICOLACE) 8.6-50 MG tablet Take 2 tablets by mouth 2 times daily     sulfamethoxazole-trimethoprim (BACTRIM/SEPTRA) 400-80 MG tablet Take 1 tablet by mouth daily     tacrolimus (GENERIC EQUIVALENT) 0.5 MG capsule Take 1 capsule (0.5 mg) by mouth 2 times daily     tacrolimus (GENERIC EQUIVALENT) 1 MG capsule Take 4 mg by mouth 2 times daily      valGANciclovir (VALCYTE) 450 MG tablet Take 2 tablets (900 mg) by mouth daily     vitamin D3 (CHOLECALCIFEROL) 1000 units (25 mcg) tablet Take 1 tablet (1,000  Units) by mouth daily     acetaminophen (TYLENOL) 325 MG tablet Take 2 tablets (650 mg) by mouth every 4 hours as needed for mild pain or fever (Patient not taking: Reported on 11/13/2019)     ciprofloxacin (CIPRO) 500 MG tablet Take 1 tablet (500 mg) by mouth 2 times daily (Patient not taking: Reported on 11/13/2019)     gentamicin (GARAMYCIN) 0.1 % external cream Apply to port exit site once daily     insulin isophane human (HUMULIN N PEN) 100 UNIT/ML injection Use 10 units with Methylprednisolone (Patient not taking: Reported on 11/13/2019)     lactobacillus rhamnosus, GG, (CULTURELL) capsule Take 1 capsule by mouth daily     predniSONE (DELTASONE) 10 MG tablet 40mg BID X 2 days 40mg daily X 2 days 20mg daily X 2 days (Patient not taking: Reported on 11/13/2019)     No current facility-administered medications for this visit.      There are no discontinued medications.    Physical Exam   Vital Signs: /87   Pulse 84   Temp 98.2  F (36.8  C) (Oral)   Wt 92.9 kg (204 lb 12.8 oz)   SpO2 97%   BMI 33.06 kg/m       GENERAL APPEARANCE: alert and no distress  HENT: mouth without ulcers or lesions  LYMPHATICS: no cervical or supraclavicular nodes  RESP: lungs clear to auscultation - no rales, rhonchi or wheezes  CV: regular rhythm, normal rate, no rub, no murmur  EDEMA: no LE edema bilaterally  ABDOMEN: soft, nondistended, nontender, bowel sounds normal  MS: extremities normal - no gross deformities noted, no evidence of inflammation in joints, no muscle tenderness  SKIN: no rash    Data     Renal Latest Ref Rng & Units 11/13/2019 11/11/2019 11/8/2019   Na 133 - 144 mmol/L 138 - -   Na (external) 134 - 143 mEq/L - 136 140   K 3.4 - 5.3 mmol/L 5.0 - -   K (external) 3.4 - 5.1 mEq/L - 5.2(H) 4.7   Cl 94 - 109 mmol/L 106 - -   Cl (external) 99 - 110 mEq/L - 104 106   CO2 20 - 32 mmol/L 26 - -   CO2 (external) 19 - 29 mEq/L - 25 26   BUN 7 - 30 mg/dL 22 - -   BUN (external) 5 - 24 mg/dL - 22 13   Cr 0.66 - 1.25  mg/dL 1.24 - -   Cr (external) 0.70 - 1.20 mg/dL - 1.55(H) 1.27(H)   Glucose 70 - 99 mg/dL 211(H) - -   Glucose (external) 70 - 99 mg/dL - 212(H) 141(H)   Ca  8.5 - 10.1 mg/dL 8.9 - -   Ca (external) 8.4 - 10.5 mg/dL - 9.1 9.3   Mg 1.6 - 2.3 mg/dL 2.0 - -   Mg (external) 1.8 - 2.7 mg/dL - 1.6(L) -     Bone Health Latest Ref Rng & Units 11/13/2019 11/11/2019 11/4/2019   Phos 2.5 - 4.5 mg/dL 2.5 - -   Phos (external) 2.5 - 4.6 mg/dL - 3.2 3.1   PTHi 18 - 80 pg/mL - - -   Vit D Def 20 - 75 ug/L - - -     Heme Latest Ref Rng & Units 11/13/2019 11/11/2019 11/8/2019   WBC 4.0 - 11.0 10e9/L 5.0 - -   WBC (external) 3.2 - 11.0 10*9/L - 5.1 5.6   Hgb 13.3 - 17.7 g/dL 10.2(L) - -   Hgb (external) 12.9 - 16.9 g/dL - 9.8(L) 9.5(L)   Plt 150 - 450 10e9/L 241 - -   Plt (external) 130 - 375 10*9/L - 239 241     Liver Latest Ref Rng & Units 9/26/2019 8/6/2019 1/7/2019   AP 40 - 150 U/L 100 - 157(H)   TBili 0.2 - 1.3 mg/dL 0.2 - 0.2   ALT 0 - 70 U/L 42 - 46   AST 0 - 45 U/L 16 - 15   Tot Protein 6.8 - 8.8 g/dL 7.5 - 7.9   Tot Protein (external) 5.7 - 8.2 g/dL - 6.6 -   Albumin 3.4 - 5.0 g/dL 3.5 - 3.5   Albumin (external) 3.2 - 4.8 g/dL - 4.4 -     Pancreas Latest Ref Rng & Units 9/26/2019 1/7/2019 8/15/2018   A1C 0 - 5.6 % 8.9(H) 10.8(H) 8.6(H)     Iron studies Latest Ref Rng & Units 9/26/2019 8/6/2019   Iron 35 - 180 ug/dL 70 -   Iron sat 15 - 46 % 28 -   Ferritin 26 - 388 ng/mL 753(H) -   Ferritin (external) 22 - 322 ng/mL - 578(H)     [facility] Txp Virology Latest Ref Rng & Units 11/4/2019 10/22/2019 10/18/2019   BK Spec - Plasma Plasma Plasma   BK Res BKNEG:BK Virus DNA Not Detected copies/mL BK Virus DNA Not Detected BK Virus DNA Not Detected BK Virus DNA Not Detected   BK Log <2.7 Log copies/mL Not Calculated Not Calculated Not Calculated   Hep B Core NR:Nonreactive - - -        Recent Labs   Lab Test 10/22/19  0608 10/24/19  0900 10/31/19  0842   DOSTAC 10/21/19  2100 10/23/19 2100 10/30/19 2100   TACROL 14.0 10.6 8.2     Recent  Labs   Lab Test 10/07/19  0742   DOSMPA Not Provided   MPACID 0.82*   MPAG 14.1*       Early Post Transplant

## 2019-11-14 ENCOUNTER — EXTERNAL ORDER RESULTS (OUTPATIENT)
Dept: TRANSPLANT | Facility: CLINIC | Age: 39
End: 2019-11-14

## 2019-11-14 DIAGNOSIS — Z79.899 LONG TERM USE OF DRUG: ICD-10-CM

## 2019-11-14 DIAGNOSIS — Z94.0 KIDNEY REPLACED BY TRANSPLANT: ICD-10-CM

## 2019-11-14 LAB
DONOR IDENTIFICATION: NORMAL
DSA COMMENTS: NORMAL
DSA PRESENT: NO
DSA TEST METHOD: NORMAL
INTERFERING SUBST TEST METHOD: NORMAL
INTERFERING SUBSTANCE COMMENT: NORMAL
INTERFERING SUBSTANCE RESULT: NORMAL
INTERFERING SUBSTANCE: NORMAL
ORGAN: NORMAL
PROTOCOL CUTOFF: NORMAL
SA1 CELL: NORMAL
SA1 COMMENTS: NORMAL
SA1 HI RISK ABY: NORMAL
SA1 MOD RISK ABY: NORMAL
SA1 TEST METHOD: NORMAL
SA2 CELL: NORMAL
SA2 COMMENTS: NORMAL
SA2 HI RISK ABY UA: NORMAL
SA2 MOD RISK ABY: NORMAL
SA2 TEST METHOD: NORMAL
UNACCEPTABLE ANTIGEN: NORMAL
UNOS CPRA: 0

## 2019-11-15 ENCOUNTER — TELEPHONE (OUTPATIENT)
Dept: TRANSPLANT | Facility: CLINIC | Age: 39
End: 2019-11-15

## 2019-11-15 DIAGNOSIS — Z94.0 KIDNEY REPLACED BY TRANSPLANT: Primary | ICD-10-CM

## 2019-11-15 LAB
ANION GAP SERPL CALC-SCNC: 9 MEQ/L (ref 3–15)
BUN SERPL-MCNC: 19 MG/DL (ref 5–24)
CALCIUM (EXTERNAL): 9.9 MG/DL (ref 8.4–10.5)
CHLORIDE (EXTERNAL): 107 MEQ/L (ref 99–110)
CO2 (EXTERNAL): 26 MEQ/L (ref 19–29)
CREATININE (EXTERNAL): 1.46 MG/DL (ref 0.7–1.2)
ERYTHROCYTE [DISTWIDTH] IN BLOOD BY AUTOMATED COUNT: 13.9 % (ref 11.3–14.6)
GFR ESTIMATED (EXTERNAL): 53 ML/MIN/1.73M2
GLUCOSE (EXTERNAL): 141 MG/DL (ref 70–99)
HEMATOCRIT (EXTERNAL): 34.1 % (ref 38.4–49.7)
HEMOGLOBIN (EXTERNAL): 10.7 G/DL (ref 12.9–16.9)
MCH RBC QN AUTO: 29.1 PG (ref 26.7–33.1)
MCHC RBC AUTO-ENTMCNC: 31.4 G/DL (ref 31.6–35.5)
MCV RBC AUTO: 92.7 FL (ref 81.4–99)
PLATELET COUNT (EXTERNAL): 266 10*9/L (ref 130–375)
POTASSIUM (EXTERNAL): 5.3 MEQ/L (ref 3.4–5.1)
RBC # BLD AUTO: 3.68 10*12/L (ref 4.14–5.76)
SODIUM (EXTERNAL): 142 MEQ/L (ref 134–143)
WBC COUNT (EXTERNAL): 5.8 10*9/L (ref 3.2–11)

## 2019-11-15 NOTE — TELEPHONE ENCOUNTER
Call from Dr Hoffman  Reviewed with  Dr Vela  No interventional radiology at this time    Plan to repeat ultrasound next week to determine fluid collection     Following week consider aspiration  And or kidney biopsy        Spoke to Michael Amin who verbalized understanding to return to for ultrasound

## 2019-11-17 ENCOUNTER — TELEPHONE (OUTPATIENT)
Dept: TRANSPLANT | Facility: CLINIC | Age: 39
End: 2019-11-17

## 2019-11-18 LAB — TACROLIMUS(FK-506) (EXTERNAL): 12 NG/ML (ref 5–15)

## 2019-11-18 NOTE — TELEPHONE ENCOUNTER
Pt scheduled for an  11/20/19 at 7:00am- pt was sent a [employer] message to notify him of his appt.

## 2019-11-19 DIAGNOSIS — N18.6 ESRD (END STAGE RENAL DISEASE) ON DIALYSIS (H): ICD-10-CM

## 2019-11-19 DIAGNOSIS — Z99.2 ESRD (END STAGE RENAL DISEASE) ON DIALYSIS (H): ICD-10-CM

## 2019-11-19 DIAGNOSIS — Z94.0 KIDNEY TRANSPLANTED: Primary | ICD-10-CM

## 2019-11-19 NOTE — TELEPHONE ENCOUNTER
Left message for patient regarding:  Creatinine 1.64 tacrolimus  12.0 (above goal level      Please call Michael Amin confirm taking 4 mg twice per day  If the above is correct dose   Lower tacrolimus  3 mg twice per day   Repeat transplant  Labs in the am  11/20/2019 or 11/21/2019

## 2019-11-19 NOTE — TELEPHONE ENCOUNTER
Issue      Creatinine 1.64 tacrolimus  12.0 (above goal level     Please call Michael Amin confirm taking 4 mg twice per day  If the above is correct dose   Lower tacrolimus  3 mg twice per day   Repeat transplant  Labs in the am  11/20/2019 or 11/21/2019

## 2019-11-19 NOTE — TELEPHONE ENCOUNTER
Spoke to patient who confirms current Tacrolimus dose of 4 mg BID and verbalizes understanding to lower Tacrolimus dose to 3 mg BID.  Patient agrees to have txp labs repeat on Thursday @ .

## 2019-11-19 NOTE — NURSING NOTE
"Chief Complaint   Patient presents with     RECHECK     3 week post f/u post kidney tx     Blood pressure (!) 157/88, pulse 86, height 1.676 m (5' 6\"), weight 90.3 kg (199 lb), SpO2 98 %.    Stephanie Foreman, [facility]    "

## 2019-11-20 RX ORDER — TACROLIMUS 1 MG/1
3 CAPSULE ORAL 2 TIMES DAILY
Qty: 180 CAPSULE | Refills: 11 | Status: SHIPPED | OUTPATIENT
Start: 2019-11-20 | End: 2019-11-22

## 2019-11-21 ENCOUNTER — ANCILLARY PROCEDURE (OUTPATIENT)
Dept: ULTRASOUND IMAGING | Facility: CLINIC | Age: 39
End: 2019-11-21
Attending: INTERNAL MEDICINE
Payer: COMMERCIAL

## 2019-11-21 DIAGNOSIS — R79.89 ELEVATED SERUM CREATININE: ICD-10-CM

## 2019-11-21 DIAGNOSIS — Z94.0 KIDNEY TRANSPLANTED: Primary | ICD-10-CM

## 2019-11-21 DIAGNOSIS — Z94.0 KIDNEY TRANSPLANTED: ICD-10-CM

## 2019-11-21 DIAGNOSIS — N18.6 ESRD (END STAGE RENAL DISEASE) ON DIALYSIS (H): ICD-10-CM

## 2019-11-21 DIAGNOSIS — Z94.0 KIDNEY REPLACED BY TRANSPLANT: ICD-10-CM

## 2019-11-21 DIAGNOSIS — T88.8XXA FLUID COLLECTION AT SURGICAL SITE: ICD-10-CM

## 2019-11-21 DIAGNOSIS — R80.1 PERSISTENT PROTEINURIA: ICD-10-CM

## 2019-11-21 DIAGNOSIS — Z79.899 LONG TERM USE OF DRUG: ICD-10-CM

## 2019-11-21 DIAGNOSIS — Z99.2 ESRD (END STAGE RENAL DISEASE) ON DIALYSIS (H): ICD-10-CM

## 2019-11-21 DIAGNOSIS — Z94.0 STATUS POST KIDNEY TRANSPLANT: ICD-10-CM

## 2019-11-21 LAB
ALBUMIN UR-MCNC: NEGATIVE MG/DL
ANION GAP SERPL CALCULATED.3IONS-SCNC: 4 MMOL/L (ref 3–14)
APPEARANCE UR: CLEAR
BILIRUB UR QL STRIP: NEGATIVE
BUN SERPL-MCNC: 22 MG/DL (ref 7–30)
CALCIUM SERPL-MCNC: 9.5 MG/DL (ref 8.5–10.1)
CHLORIDE SERPL-SCNC: 108 MMOL/L (ref 94–109)
CO2 SERPL-SCNC: 25 MMOL/L (ref 20–32)
COLOR UR AUTO: YELLOW
CREAT SERPL-MCNC: 1.27 MG/DL (ref 0.66–1.25)
ERYTHROCYTE [DISTWIDTH] IN BLOOD BY AUTOMATED COUNT: 13.4 % (ref 10–15)
GFR SERPL CREATININE-BSD FRML MDRD: 70 ML/MIN/{1.73_M2}
GLUCOSE SERPL-MCNC: 96 MG/DL (ref 70–99)
GLUCOSE UR STRIP-MCNC: NEGATIVE MG/DL
HCT VFR BLD AUTO: 35.7 % (ref 40–53)
HGB BLD-MCNC: 10.9 G/DL (ref 13.3–17.7)
HGB UR QL STRIP: NEGATIVE
KETONES UR STRIP-MCNC: NEGATIVE MG/DL
LEUKOCYTE ESTERASE UR QL STRIP: NEGATIVE
MCH RBC QN AUTO: 28.1 PG (ref 26.5–33)
MCHC RBC AUTO-ENTMCNC: 30.5 G/DL (ref 31.5–36.5)
MCV RBC AUTO: 92 FL (ref 78–100)
NITRATE UR QL: NEGATIVE
PH UR STRIP: 5 PH (ref 5–7)
PLATELET # BLD AUTO: 282 10E9/L (ref 150–450)
POTASSIUM SERPL-SCNC: 5.1 MMOL/L (ref 3.4–5.3)
RBC # BLD AUTO: 3.88 10E12/L (ref 4.4–5.9)
RBC #/AREA URNS AUTO: <1 /HPF (ref 0–2)
SODIUM SERPL-SCNC: 138 MMOL/L (ref 133–144)
SOURCE: NORMAL
SP GR UR STRIP: 1.02 (ref 1–1.03)
SQUAMOUS #/AREA URNS AUTO: <1 /HPF (ref 0–1)
TACROLIMUS BLD-MCNC: 12 UG/L (ref 5–15)
TME LAST DOSE: NORMAL H
UROBILINOGEN UR STRIP-MCNC: 0 MG/DL (ref 0–2)
WBC # BLD AUTO: 5.2 10E9/L (ref 4–11)
WBC #/AREA URNS AUTO: <1 /HPF (ref 0–5)

## 2019-11-21 NOTE — TELEPHONE ENCOUNTER
Issue  Tacrolimus 12.0   Above goal level     Spoke to Rudi  Who confirm 12 hour level   Confirmed lowered his tacrolimus  To 3 mg twice per day this week  On 11/19/2019   This lab repeated on 11/21/2019       Plan    Discussed with Michael Amin who confirmed the above information   Lowered tacrolimus  To Tacrolimus  2.5 twice per day   Rudi verbalized understanding       2nd Issue    Discussed in previous discussions with Rudi /providers    the need for  a closure biopsy  And drain fluid collection   Reviewed with Rudi fluid collection  On repeat ultrasound 11/21/2019  Is same amount   Rudi verbalized understanding     Sent orders to IR  - waiting for time     Kash Hoffman MD Huepfel, Sabine ALDANA RN              Ultrasound looks the same to me. Let's proceed with plan for IR drainage of fluid collection with closure biopsy.     Thanks,     d

## 2019-11-22 ENCOUNTER — TELEPHONE (OUTPATIENT)
Dept: INTERVENTIONAL RADIOLOGY/VASCULAR | Facility: CLINIC | Age: 39
End: 2019-11-22

## 2019-11-22 ENCOUNTER — DOCUMENTATION ONLY (OUTPATIENT)
Dept: RADIOLOGY | Facility: CLINIC | Age: 39
End: 2019-11-22

## 2019-11-22 LAB
BACTERIA SPEC CULT: NO GROWTH
Lab: NORMAL
SPECIMEN SOURCE: NORMAL

## 2019-11-22 RX ORDER — TACROLIMUS 1 MG/1
2 CAPSULE ORAL 2 TIMES DAILY
Qty: 120 CAPSULE | Refills: 11 | Status: SHIPPED | OUTPATIENT
Start: 2019-11-22 | End: 2020-01-06

## 2019-11-22 RX ORDER — TACROLIMUS 0.5 MG/1
0.5 CAPSULE ORAL 2 TIMES DAILY
Qty: 60 CAPSULE | Refills: 11 | Status: SHIPPED | OUTPATIENT
Start: 2019-11-22 | End: 2020-01-06

## 2019-11-22 NOTE — PROGRESS NOTES
Patient to be placed on Interventional  Radiology schedule for a US guided left transplant renal biopsy.  Second request is for a drain placement in to perinephric fluid collection. Newly transplanted  Left renal 10/1/2019. 11/21/2019 US confirmed fluid collection decreasing in size, with the appearance of a hematoma.  Placement of a drain into the perinephric fluid can contaminant /infect the collection. TPA  infusions into a drain  is not endorsed for new transplant. IR will review for aspiration.  Discussed with Dr. Tony Camargo IR [facility]  [phone] Call pager  [phone] pager

## 2019-11-23 LAB
MYCOPHENOLATE SERPL LC/MS/MS-MCNC: 0.91 MG/L (ref 1–3.5)
MYCOPHENOLATE-G SERPL LC/MS/MS-MCNC: 39.5 MG/L (ref 30–95)
TME LAST DOSE: ABNORMAL H

## 2019-11-25 ENCOUNTER — APPOINTMENT (OUTPATIENT)
Dept: INTERVENTIONAL RADIOLOGY/VASCULAR | Facility: CLINIC | Age: 39
End: 2019-11-25
Attending: INTERNAL MEDICINE
Payer: COMMERCIAL

## 2019-11-25 ENCOUNTER — APPOINTMENT (OUTPATIENT)
Dept: MEDSURG UNIT | Facility: CLINIC | Age: 39
End: 2019-11-25
Attending: INTERNAL MEDICINE
Payer: COMMERCIAL

## 2019-11-25 ENCOUNTER — HOSPITAL ENCOUNTER (OUTPATIENT)
Facility: CLINIC | Age: 39
Discharge: HOME | End: 2019-11-25
Attending: INTERNAL MEDICINE | Admitting: INTERNAL MEDICINE
Payer: COMMERCIAL

## 2019-11-25 VITALS
HEIGHT: 66 IN | RESPIRATION RATE: 20 BRPM | HEART RATE: 80 BPM | DIASTOLIC BLOOD PRESSURE: 84 MMHG | OXYGEN SATURATION: 98 % | WEIGHT: 196 LBS | TEMPERATURE: 97.8 F | SYSTOLIC BLOOD PRESSURE: 155 MMHG | BODY MASS INDEX: 31.5 KG/M2

## 2019-11-25 DIAGNOSIS — T88.8XXA FLUID COLLECTION AT SURGICAL SITE: ICD-10-CM

## 2019-11-25 DIAGNOSIS — Z99.2 ESRD (END STAGE RENAL DISEASE) ON DIALYSIS (H): ICD-10-CM

## 2019-11-25 DIAGNOSIS — N18.6 ESRD (END STAGE RENAL DISEASE) ON DIALYSIS (H): ICD-10-CM

## 2019-11-25 DIAGNOSIS — Z94.0 KIDNEY REPLACED BY TRANSPLANT: ICD-10-CM

## 2019-11-25 DIAGNOSIS — Z94.0 KIDNEY REPLACED BY TRANSPLANT: Primary | ICD-10-CM

## 2019-11-25 DIAGNOSIS — Z94.0 KIDNEY TRANSPLANTED: ICD-10-CM

## 2019-11-25 DIAGNOSIS — I15.0 RENOVASCULAR HYPERTENSION: ICD-10-CM

## 2019-11-25 DIAGNOSIS — Z79.899 LONG TERM USE OF DRUG: ICD-10-CM

## 2019-11-25 LAB
ANION GAP SERPL CALCULATED.3IONS-SCNC: 4 MMOL/L (ref 3–14)
APPEARANCE FLD: NORMAL
B-HCG FREE SERPL-ACNC: 1.1 [IU]/L (ref 0.86–1.14)
BUN SERPL-MCNC: 19 MG/DL (ref 7–30)
CALCIUM SERPL-MCNC: 9.4 MG/DL (ref 8.5–10.1)
CHLORIDE SERPL-SCNC: 110 MMOL/L (ref 94–109)
CO2 SERPL-SCNC: 26 MMOL/L (ref 20–32)
COLOR FLD: NORMAL
CREAT SERPL-MCNC: 1.2 MG/DL (ref 0.66–1.25)
ERYTHROCYTE [DISTWIDTH] IN BLOOD BY AUTOMATED COUNT: 13.4 % (ref 10–15)
GFR SERPL CREATININE-BSD FRML MDRD: 75 ML/MIN/{1.73_M2}
GLUCOSE BLDC GLUCOMTR-MCNC: 134 MG/DL (ref 70–99)
GLUCOSE BLDC GLUCOMTR-MCNC: 143 MG/DL (ref 70–99)
GLUCOSE SERPL-MCNC: 52 MG/DL (ref 70–99)
HCT VFR BLD AUTO: 37.9 % (ref 40–53)
HGB BLD-MCNC: 11.8 G/DL (ref 13.3–17.7)
MCH RBC QN AUTO: 28.5 PG (ref 26.5–33)
MCHC RBC AUTO-ENTMCNC: 31.1 G/DL (ref 31.5–36.5)
MCV RBC AUTO: 92 FL (ref 78–100)
MISCELLANEOUS TEST: NORMAL
MONOS+MACROS NFR FLD MANUAL: 2 %
NEUTS BAND NFR FLD MANUAL: 98 %
PLATELET # BLD AUTO: 322 10E9/L (ref 150–450)
POTASSIUM SERPL-SCNC: 4.8 MMOL/L (ref 3.4–5.3)
RBC # BLD AUTO: 4.14 10E12/L (ref 4.4–5.9)
SODIUM SERPL-SCNC: 139 MMOL/L (ref 133–144)
SPECIMEN SOURCE FLD: NORMAL
TACROLIMUS BLD-MCNC: 10.5 UG/L (ref 5–15)
TME LAST DOSE: 2020 H
WBC # BLD AUTO: 7.2 10E9/L (ref 4–11)
WBC # FLD AUTO: NORMAL /UL

## 2019-11-25 PROCEDURE — 76942 ECHO GUIDE FOR BIOPSY: CPT

## 2019-11-25 PROCEDURE — 83615 LACTATE (LD) (LDH) ENZYME: CPT | Performed by: INTERNAL MEDICINE

## 2019-11-25 PROCEDURE — 40000428 ZZHCL STATISTIC R-RUSH PROCESSING: Performed by: INTERNAL MEDICINE

## 2019-11-25 PROCEDURE — 88305 TISSUE EXAM BY PATHOLOGIST: CPT | Performed by: INTERNAL MEDICINE

## 2019-11-25 PROCEDURE — 25800030 ZZH RX IP 258 OP 636: Performed by: STUDENT IN AN ORGANIZED HEALTH CARE EDUCATION/TRAINING PROGRAM

## 2019-11-25 PROCEDURE — 85610 PROTHROMBIN TIME: CPT

## 2019-11-25 PROCEDURE — 88350 IMFLUOR EA ADDL 1ANTB STN PX: CPT | Performed by: INTERNAL MEDICINE

## 2019-11-25 PROCEDURE — 88313 SPECIAL STAINS GROUP 2: CPT | Performed by: INTERNAL MEDICINE

## 2019-11-25 PROCEDURE — 88348 ELECTRON MICROSCOPY DX: CPT | Performed by: INTERNAL MEDICINE

## 2019-11-25 PROCEDURE — 25000125 ZZHC RX 250: Performed by: PHYSICIAN ASSISTANT

## 2019-11-25 PROCEDURE — 82962 GLUCOSE BLOOD TEST: CPT

## 2019-11-25 PROCEDURE — 27211039 IR FINE NEEDLE ASPIRATION W ULTRASOUND

## 2019-11-25 PROCEDURE — 40000168 ZZH STATISTIC PP CARE STAGE 3

## 2019-11-25 PROCEDURE — 89051 BODY FLUID CELL COUNT: CPT | Performed by: INTERNAL MEDICINE

## 2019-11-25 PROCEDURE — 88346 IMFLUOR 1ST 1ANTB STAIN PX: CPT | Performed by: INTERNAL MEDICINE

## 2019-11-25 RX ORDER — MYCOPHENOLATE MOFETIL 250 MG/1
1250 CAPSULE ORAL 2 TIMES DAILY
Qty: 300 CAPSULE | Refills: 11 | Status: SHIPPED | OUTPATIENT
Start: 2019-11-25 | End: 2020-10-02

## 2019-11-25 RX ORDER — NICOTINE POLACRILEX 4 MG
15-30 LOZENGE BUCCAL
Status: DISCONTINUED | OUTPATIENT
Start: 2019-11-25 | End: 2019-11-25 | Stop reason: HOSPADM

## 2019-11-25 RX ORDER — SULFAMETHOXAZOLE AND TRIMETHOPRIM 400; 80 MG/1; MG/1
1 TABLET ORAL DAILY
Qty: 30 TABLET | Refills: 1 | Status: SHIPPED | OUTPATIENT
Start: 2019-11-25 | End: 2020-02-03

## 2019-11-25 RX ORDER — DEXTROSE MONOHYDRATE 25 G/50ML
25-50 INJECTION, SOLUTION INTRAVENOUS
Status: DISCONTINUED | OUTPATIENT
Start: 2019-11-25 | End: 2019-11-25 | Stop reason: HOSPADM

## 2019-11-25 RX ORDER — SODIUM CHLORIDE 9 MG/ML
INJECTION, SOLUTION INTRAVENOUS CONTINUOUS
Status: DISCONTINUED | OUTPATIENT
Start: 2019-11-25 | End: 2019-11-25 | Stop reason: HOSPADM

## 2019-11-25 RX ORDER — LIDOCAINE 40 MG/G
CREAM TOPICAL
Status: DISCONTINUED | OUTPATIENT
Start: 2019-11-25 | End: 2019-11-25 | Stop reason: HOSPADM

## 2019-11-25 RX ORDER — CARVEDILOL 3.12 MG/1
3.12 TABLET ORAL 2 TIMES DAILY WITH MEALS
Qty: 60 TABLET | Refills: 3 | Status: SHIPPED | OUTPATIENT
Start: 2019-11-25 | End: 2019-12-09

## 2019-11-25 RX ORDER — LIDOCAINE HYDROCHLORIDE 10 MG/ML
1-30 INJECTION, SOLUTION EPIDURAL; INFILTRATION; INTRACAUDAL; PERINEURAL
Status: COMPLETED | OUTPATIENT
Start: 2019-11-25 | End: 2019-11-25

## 2019-11-25 RX ADMIN — SODIUM CHLORIDE: 9 INJECTION, SOLUTION INTRAVENOUS at 10:45

## 2019-11-25 RX ADMIN — LIDOCAINE HYDROCHLORIDE 12 ML: 10 INJECTION, SOLUTION EPIDURAL; INFILTRATION; INTRACAUDAL; PERINEURAL at 11:41

## 2019-11-25 ASSESSMENT — MIFFLIN-ST. JEOR: SCORE: 1741.8

## 2019-11-25 NOTE — DISCHARGE INSTRUCTIONS
[facility]    Interventional Radiology  Patient Instructions Following Renal Biopsy    AFTER YOU GO HOME  ? If you were given sedation DO NOT drive or operate machinery at home or at work for at least 24 hours  ? DO relax and take it easy for 48 hours, no strenuous activity for 24 hours  ? DO drink plenty of fluids  ? DO resume your regular diet, unless otherwise instructed by your Primary Physician  ? Keep the dressing dry and in place for 24 hours.  ? DO NOT SMOKE FOR AT LEAST 24 HOURS, if you have been given any medications that were to help you relax or sedate you during your procedure  ? DO NOT drink alcoholic beverages the day of your procedure  ? DO NOT do any strenuous exercise or lifting (> 10 lbs) for at least 7 days following your procedure  ? DO NOT take a bath or shower for at least 12 hours following your procedure  ? Remove dressing after shower the next day. Replace with Band aid for 2 days.  Never leave a wet dressing in place.  ? DO NOT make any important or legal decisions for 24 hours following your procedure  ? There should be minimum drainage from the biopsy site    CALL THE PHYSICIAN IF:  ? You start bleeding from the procedure site.  If you do start to bleed from that site, lie down flat and hold pressure on the site for a minimum of 10 minutes.  Your physician will tell you if you need to return to the hospital  ? You develop nausea or vomiting  ? You have excessive swelling, redness, or tenderness at the site  ? You have drainage that looks like it is infected.  ? You experience severe pain  ? You develop hives or a rash or unexplained itching  ? You develop shortness of breath  ? You develop a temperature of 101 degrees F or greater  ? You develop bloody clots or red urine after you are discharged  ? You develop chest pain or cough up blood, lightheadedness or fainting    [facility] INTERVENTIONAL RADIOLOGY DEPARTMENT  Procedure Physician:   Jj Laguna PA-C                                     Date of procedure:  November 25, 2019  Telephone Numbers: [phone] Monday-Friday 8:00 am to 4:30 pm  [phone] After 4:30 pm Monday-Friday, Weekends & Holidays.   Ask for the Interventional Radiologist on call.  Someone is on call 24 hrs/day  [facility] toll free number: [phone] Monday-Friday 8:00 am to 4:30 pm  [facility] Emergency Dept: [phone]

## 2019-11-25 NOTE — PROGRESS NOTES
Patient Name: Michael Amin  Medical Record Number: [id number]  Today's Date: 11/25/2019    Procedure: 1)Transplant Kidney biopsy 2) Fluid collection aspiration  Proceduralist: Jj Laguna PA-C    Sedation Notes: no sedation    Procedure start time: 1120  Puncture time: 1121  Procedure end time: 1140    Report given to: LILLI Medina 2A      Other Notes: Pt arrived to IR room 6 from . Consent reviewed and verified. Pt denies any questions or concerns regarding procedure.   Pt positioned supine and monitored per protocol.   Fluid aspirated, collected, labeled and sent as ordered.  Pathology staff called to verify and collect specimens. Specimens to lab per pathology staff.  Pt tolerated procedure without any noted complications.  Pt to be on bed rest 4 hours, until 1540.  Pt transferred back to .

## 2019-11-25 NOTE — TELEPHONE ENCOUNTER
Spoke to patient who verbalizes understanding to increase CellCept dose to 1250 mg BID.  Updated current standing lab orders to include Mycophenolic acid levels weekly through 6 months post txp.  Faxed lab orders to patients local lab.

## 2019-11-25 NOTE — TELEPHONE ENCOUNTER
Task: Please call Michael Amin to increase  Cellcept mycophenolate mofetil 1250 mg twice per day per DR Hoffman recommendations     Confirm monitoring MPA levels locally  Weekly until 6 months post transplant          Kash Hoffman MD Huepfel, Mary K, RN             Yes, increase to 1250 mg po bid.     Thanks,     d    Previous Messages      ----- Message -----   From: Sabine Anders, RN   Sent: 11/25/2019   To: Kash Hoffman MD   Subject: Low MPA level  - taking  1000 mg twice per d*     MPA level still  low 0.91   Taking  Cellcept mycophenolate mofetil 1000 mg twice per day   Should he increase his  Cellcept mycophenolate mofetil   IR biopsy  today Monday

## 2019-11-25 NOTE — IP AVS SNAPSHOT
[address] [address]                                    After Visit Summary   11/25/2019    Michael Amin    MRN: [id number]           After Visit Summary Signature Page    I have received my discharge instructions, and my questions have been answered. I have discussed any challenges I see with this plan with the nurse or doctor.    ..........................................................................................................................................  Patient/Patient Representative Signature      ..........................................................................................................................................  Patient Representative Print Name and Relationship to Patient    ..................................................               ................................................  Date                                   Time    ..........................................................................................................................................  Reviewed by Signature/Title    ...................................................              ..............................................  Date                                               Time          22EPIC Rev 08/18

## 2019-11-25 NOTE — PROGRESS NOTES
Prep and teaching complete for transplanted kidney biopsy; Angela El will drive pt home, ph [phone]. No sedation planned.  Pt ate at 0830; no sedation planned. Consent, H and P and labs are current. IV in place. Pt ready to go.

## 2019-11-25 NOTE — PROGRESS NOTES
Pt up walking, steady on his feet; sites  remain dry and intact. Denies pain. Tolerated PO, both food and drink. Voided, clear yellow urine. Verbal okay from Jj Laguna pt could leave 30 minutes short of  4 hours. IV discontinued, catheter intact. Discharge instructions reviewed with copy to pt, stated understanding. 1500--discharged to lobby with family.

## 2019-11-25 NOTE — IP AVS SNAPSHOT
MRN:[id number]                      After Visit Summary   11/25/2019    Michael Amin    MRN: [id number]           Visit Information        Department      11/25/2019  9:17 AM [address] [facility]          Review of your medicines      UNREVIEWED medicines. Ask your doctor about these medicines       Dose / Directions   aspirin 81 MG EC tablet  Commonly known as:  ASA      Dose:  81 mg  Take 81 mg by mouth daily  Refills:  0     atorvastatin 10 MG tablet  Commonly known as:  LIPITOR  Used for:  Dyslipidemia      Dose:  10 mg  Take 1 tablet (10 mg) by mouth daily  Quantity:  90 tablet  Refills:  3     carvedilol 3.125 MG tablet  Commonly known as:  COREG  Used for:  Renovascular hypertension  Ask about: Which instructions should I use?      Dose:  3.125 mg  Take 1 tablet (3.125 mg) by mouth 2 times daily (with meals)  Quantity:  60 tablet  Refills:  3     gentamicin 0.1 % external cream  Commonly known as:  GARAMYCIN      Apply to port exit site once daily  Refills:  3     * humaLOG 100 UNIT/ML vial  Generic drug:  insulin lispro      Insulin used for filling patient's pump  Refills:  3     * insulin lispro 100 UNIT/ML vial  Commonly known as:  HumaLOG VIAL  Used for:  Kidney replaced by transplant      Dose:  100 Units  Inject 100 Units Subcutaneous daily With Insulin pump. 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)      Use 10 units with Methylprednisolone  Quantity:  15 mL  Refills:  3     lactobacillus rhamnosus (GG) capsule      Dose:  1 capsule  Take 1 capsule by mouth daily  Refills:  0     magnesium oxide 400 MG tablet  Commonly known as:  MAG-OX  Used for:  ESRD (end stage renal disease) on dialysis (H)      Dose:  400 mg  Take 1 tablet (400 mg) by mouth daily (with lunch)  Quantity:  30 tablet  Refills:  5      mycophenolate 250 MG capsule  Commonly known as:  GENERIC EQUIVALENT  Used for:  Kidney replaced by transplant      Dose:  1,000 mg  Take 4 capsules (1,000 mg) by mouth 2 times daily  Quantity:  240 capsule  Refills:  11     ondansetron 4 MG tablet  Commonly known as:  ZOFRAN  Used for:  Kidney replaced by transplant      Dose:  4 mg  Take 1 tablet (4 mg) by mouth every 8 hours as needed for nausea (take for nausea)  Quantity:  12 tablet  Refills:  1     polyethylene glycol packet  Commonly known as:  MIRALAX/GLYCOLAX  Used for:  Kidney transplanted      Dose:  17 g  Take 17 g by mouth daily  Quantity:  5 packet  Refills:  0     psyllium 58.6 % powder  Commonly known as:  METAMUCIL/KONSYL      Take by mouth daily  Refills:  0     senna-docusate 8.6-50 MG tablet  Commonly known as:  SENOKOT-S/PERICOLACE  Used for:  Kidney transplanted      Dose:  2 tablet  Take 2 tablets by mouth 2 times daily  Quantity:  20 tablet  Refills:  0     sulfamethoxazole-trimethoprim 400-80 MG tablet  Commonly known as:  BACTRIM/SEPTRA  Indication:  PCP prophylaxis  Used for:  ESRD (end stage renal disease) on dialysis (H)  Ask about: Which instructions should I use?      Dose:  1 tablet  Take 1 tablet by mouth daily  Quantity:  30 tablet  Refills:  1     * tacrolimus 0.5 MG capsule  Commonly known as:  GENERIC EQUIVALENT  Used for:  Kidney transplanted      Dose:  0.5 mg  Take 1 capsule (0.5 mg) by mouth 2 times daily Total dose = 2.5 mg twice a day  Quantity:  60 capsule  Refills:  11     * tacrolimus 1 MG capsule  Commonly known as:  GENERIC EQUIVALENT  Used for:  ESRD (end stage renal disease) on dialysis (H), Kidney transplanted      Dose:  2 mg  Take 2 capsules (2 mg) by mouth 2 times daily Total dose = 2.5 mg twice a day  Quantity:  120 capsule  Refills:  11     valGANciclovir 450 MG tablet  Commonly known as:  VALCYTE  Indication:  Medication Treatment to Prevent Cytomegalovirus Disease  Used for:  ESRD (end stage renal disease)  on dialysis (H)      Dose:  900 mg  Take 2 tablets (900 mg) by mouth daily  Quantity:  30 tablet  Refills:  3     vitamin D3 1000 units (25 mcg) tablet  Commonly known as:  CHOLECALCIFEROL  Used for:  Vitamin D deficiency      Dose:  1,000 Units  Take 1 tablet (1,000 Units) by mouth daily  Quantity:  90 tablet  Refills:  3         * This list has 4 medication(s) that are the same as other medications prescribed for you. Read the directions carefully, and ask your doctor or other care provider to review them with you.            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  Quantity:  100 each  Refills:  3           Where to get your medicines      These medications will be mailed to you     From:  [facility] - [place], [place] - [address]  Phone:  [phone]   carvedilol 3.125 MG tablet  sulfamethoxazole-trimethoprim 400-80 MG tablet           Protect others around you: Learn how to safely use, store and throw away your medicines at www.disposemymeds.org.       Follow-ups after your visit       Your next 10 appointments already scheduled    Dec 09, 2019  9:30 AM CST  Lab with  LAB  [facility] ([facility]) [address] [place] [id number] [phone]      Dec 09, 2019 10:30 AM CST  (Arrive by 10:00 AM)  Return Kidney Transplant with  Early Post Transplant  [facility] Nephrology ([facility])  [address] [place] [address] [address]  [facility] [address]  [phone]      Dec 09, 2019 11:30 AM CST  (Arrive by 11:15 AM)  Office Visit with Abdullahi Javier [facility] ([facility]) [address] [place] [address]  [phone]   Bring a current list of meds and any records pertaining to this visit.  For Physicals, please bring immunization records and any forms needing to be filled out. Please arrive 10 minutes early to complete paperwork.     Dec 09, 2019 12:30 PM CST  (Arrive by 12:15 PM)  RETURN DIABETES with Archana Nguyễn PA-C  [facility] Endocrinology ([facility]) [address] [place] [address]  [phone]      Feb 07, 2020  9:00 AM CST  Lab with  LAB  [facility] ([facility]) [address] [place] [address]  [phone]      Feb 07, 2020 10:00 AM CST  (Arrive by 9:30 AM)  Return Kidney Transplant with  Early Post Transplant  [facility] Nephrology ([facility]) [address] [place] [address]  [phone]      Feb 07, 2020 11:00 AM CST  (Arrive by 10:45 AM)  Office Visit with Abdullahi Javier [facility] ([facility]) [address] [place] [address]  [phone]   Bring a current list of meds and any records pertaining to this visit.  For Physicals, please bring immunization records and any forms needing to be filled out. Please arrive 10 minutes early to complete paperwork.     Apr 03, 2020  9:30 AM CDT  Lab with [facility]   [facility] ([facility]) [address] [place] [address]  [phone]      Apr 03, 2020 10:30 AM CDT  (Arrive by 10:00 AM)  Return Kidney Transplant with Uc Early Post Transplant  [facility]  Nephrology ([facility]) [address]  [address]  [facility] [address]  [phone]      Apr 03, 2020 11:00 AM CDT  (Arrive by 10:45 AM)  SOT SOCIAL WORK EVAL with DELFINO Rinaldi  [facility] Solid Organ Transplant ([facility]) [address]  [address]  [facility] [address]  [phone]         Care Instructions       Further instructions from your care team       [facility]    Interventional Radiology  Patient Instructions Following Renal Biopsy    AFTER YOU GO HOME  ? If you were given sedation DO NOT drive or operate machinery at home or at work for at least 24 hours  ? DO relax and take it easy for 48 hours, no strenuous activity for 24 hours  ? DO drink plenty of fluids  ? DO resume your regular diet, unless otherwise instructed by your Primary Physician  ? Keep the dressing dry and in place for 24 hours.  ? DO NOT SMOKE FOR AT LEAST 24 HOURS, if you have been given any medications that were to help you relax or sedate you during your procedure  ? DO NOT drink alcoholic beverages the day of your procedure  ? DO NOT do any strenuous exercise or lifting (> 10 lbs) for at least 7 days following your procedure  ? DO NOT take a bath or shower for at least 12 hours following your procedure  ? Remove dressing after shower the next day. Replace with Band aid for 2 days.  Never leave a wet dressing in place.  ? DO NOT make any important or legal decisions for 24 hours following your procedure  ? There should be minimum drainage from the biopsy site    CALL THE PHYSICIAN IF:  ? You start bleeding from the procedure site.  If you do start to bleed from that site, lie down flat and hold pressure on the site for a minimum of 10 minutes.  Your physician will tell you if you need to return to the hospital  ? You develop nausea or vomiting  ? You have excessive swelling, redness, or tenderness at the site  ? You have  "drainage that looks like it is infected.  ? You experience severe pain  ? You develop hives or a rash or unexplained itching  ? You develop shortness of breath  ? You develop a temperature of 101 degrees F or greater  ? You develop bloody clots or red urine after you are discharged  ? You develop chest pain or cough up blood, lightheadedness or fainting    [facility] INTERVENTIONAL RADIOLOGY DEPARTMENT  Procedure Physician:   Jj Laguna PA-C                                    Date of procedure:  November 25, 2019  Telephone Numbers: [phone] Monday-Friday 8:00 am to 4:30 pm  [phone] After 4:30 pm Monday-Friday, Weekends & Holidays.   Ask for the Interventional Radiologist on call.  Someone is on call 24 hrs/day  [facility] toll free number: [phone] Monday-Friday 8:00 am to 4:30 pm  [facility] Emergency Dept: [phone]          Additional Information About Your Visit       [employer] Information    [employer] gives you secure access to your electronic health record. If you see a primary care provider, you can also send messages to your care team and make appointments. If you have questions, please call your primary care clinic.  If you do not have a primary care provider, please call [phone] and they will assist you.       Care EveryWhere ID    This is your Care EveryWhere ID. This could be used by other organizations to access your [facility] medical records  [id number]       Your Vitals Were  Most recent update: 11/25/2019  2:04 PM    Blood Pressure   156/86   (BP Location: Left arm)          Pulse   80          Temperature   97.8  F (36.6  C) (Oral)          Respirations   16          Height   1.676 m (5' 6\")             Weight   88.9 kg (196 lb)    Pulse Oximetry   98%    BMI (Body Mass Index)   31.64 kg/m           Primary Care Provider Office Phone # Fax #    Gopi Borden [phone]      Equal Access to Services    ALLEN BRADLEY AH: Jennifer Perez, peter warner, qaybta " vinod guzmánjh mckeon ah. Alisia [facility] [phone].    ATENCIÓN: Si sulaiman carcamo, tiene a mixon disposición servicios gratuitos de asistencia lingüística. Maria Elena al [phone].    We comply with applicable federal civil rights laws and [place] laws. We do not discriminate on the basis of race, color, national origin, age, disability, sex, sexual orientation, or gender identity.           Thank you!    Thank you for choosing [facility] for your care. Our goal is always to provide you with excellent care. Hearing back from our patients is one way we can continue to improve our services. Please take a few minutes to complete the written survey that you may receive in the mail after you visit with us. Thank you!            Medication List      Medications          Morning Afternoon Evening Bedtime As Needed    alcohol swab prep pads  INSTRUCTIONS:  Use to swab area of injection/zak as directed.                     blood glucose monitoring lancets  INSTRUCTIONS:  U QID OR MORE OFTEN PRN                     insulin pen needle 32G X 4 MM miscellaneous  Also known as:  ULTICARE MICRO  INSTRUCTIONS:  Use pen needles daily or as directed.                       ASK your doctor about these medications          Morning Afternoon Evening Bedtime As Needed    aspirin 81 MG EC tablet  Also known as:  ASA  INSTRUCTIONS:  Take 81 mg by mouth daily                     atorvastatin 10 MG tablet  Also known as:  LIPITOR  INSTRUCTIONS:  Take 1 tablet (10 mg) by mouth daily                     carvedilol 3.125 MG tablet  Also known as:  COREG  INSTRUCTIONS:  Take 1 tablet (3.125 mg) by mouth 2 times daily (with meals)  Ask about: Which instructions should I use?                     gentamicin 0.1 % external cream  Also known as:  GARAMYCIN  INSTRUCTIONS:  Apply to port exit site once daily                     * humaLOG 100 UNIT/ML vial  INSTRUCTIONS:  Insulin used for filling patient's pump  Generic drug:   insulin lispro                     * insulin lispro 100 UNIT/ML vial  Also known as:  HumaLOG VIAL  INSTRUCTIONS:  Inject 100 Units Subcutaneous daily With Insulin pump. Up to 100 units                     insulin isophane human 100 UNIT/ML injection  Also known as:  HumuLIN N PEN  INSTRUCTIONS:  Use 10 units with Methylprednisolone                     lactobacillus rhamnosus (GG) capsule  INSTRUCTIONS:  Take 1 capsule by mouth daily                     magnesium oxide 400 MG tablet  Also known as:  MAG-OX  INSTRUCTIONS:  Take 1 tablet (400 mg) by mouth daily (with lunch)                     mycophenolate 250 MG capsule  Also known as:  GENERIC EQUIVALENT  INSTRUCTIONS:  Take 4 capsules (1,000 mg) by mouth 2 times daily                     ondansetron 4 MG tablet  Also known as:  ZOFRAN  INSTRUCTIONS:  Take 1 tablet (4 mg) by mouth every 8 hours as needed for nausea (take for nausea)                     polyethylene glycol packet  Also known as:  MIRALAX/GLYCOLAX  INSTRUCTIONS:  Take 17 g by mouth daily                     psyllium 58.6 % powder  Also known as:  METAMUCIL/KONSYL  INSTRUCTIONS:  Take by mouth daily                     senna-docusate 8.6-50 MG tablet  Also known as:  SENOKOT-S/PERICOLACE  INSTRUCTIONS:  Take 2 tablets by mouth 2 times daily                     sulfamethoxazole-trimethoprim 400-80 MG tablet  Also known as:  BACTRIM/SEPTRA  INSTRUCTIONS:  Take 1 tablet by mouth daily  Reason for med:  PCP prophylaxis  Ask about: Which instructions should I use?                     * tacrolimus 0.5 MG capsule  Also known as:  GENERIC EQUIVALENT  INSTRUCTIONS:  Take 1 capsule (0.5 mg) by mouth 2 times daily Total dose = 2.5 mg twice a day  Doctor's comments:  TXP DT 10/1/2019 (Kidney) TXP Dischg DT  DX Kidney replaced by transplant Z94.0 [facility] ([place], [place])                     * tacrolimus 1 MG capsule  Also known as:  GENERIC  EQUIVALENT  INSTRUCTIONS:  Take 2 capsules (2 mg) by mouth 2 times daily Total dose = 2.5 mg twice a day  Doctor's comments:  TXP DT 10/1/2019 (Kidney) TXP Dischg DT  DX Kidney replaced by transplant Z94.0 TX [facility] ([place], [place])                     valGANciclovir 450 MG tablet  Also known as:  VALCYTE  INSTRUCTIONS:  Take 2 tablets (900 mg) by mouth daily  Reason for med:  Medication Treatment to Prevent Cytomegalovirus Disease                     vitamin D3 1000 units (25 mcg) tablet  Also known as:  CHOLECALCIFEROL  INSTRUCTIONS:  Take 1 tablet (1,000 Units) by mouth daily                        * This list has 4 medication(s) that are the same as other medications prescribed for you. Read the directions carefully, and ask your doctor or other care provider to review them with you.

## 2019-11-25 NOTE — LETTER
OUTPATIENT LABORATORY TEST ORDER    Patient: Michael Amin    Transplant Date: 10/1/2019  YOB: 1980    Ordered By: Dr. Rocio Rutherford  MRN: [id number]     Issued Date/Time: 11/25/2019  2:48 PM         Expiration Date: 10/7/2020    Diagnosis: Kidney Transplant (ICD-10 Z94.0)    Aftercare following organ transplant (ICD-10 Z48.288)    Long term use of medications (ICD-10 Z79.899)    Lab results to be available on the same day drawn    Patient should release information to the [facility], [facility].     Please fax all results to [phone]      First 8 weeks post-transplant (10/1/2019 - 12/1/2019)  Labs 2x/week (Monday and Thursday)    CBC with platelets    Basic Metabolic Panel (Sodium, Potassium, Chloride, Creatinine, CO2, Urea Nitrogen, glucose, Calcium)    Tacrolimus/Prograf/ drug level  Labs weekly (Monday)    Phosphorus, magnesium    Mycophenolic Acid  Monthly    BK PCR QT    Months 2-4 post-transplant (12/2/2019 - 2/2/2020)  Labs 1x weekly (Monday or Tuesday)    CBC with platelets    Basic Metabolic Panel (Sodium, Potassium, Chloride, Creatinine, CO2, Urea Nitrogen, glucose, Calcium)    Tacrolimus/Prograf/ drug level    Mycophenolic Acid  Labs monthly     Phosphorus, magnesium    BK (Polyoma Virus) PCR Quantitative/Plasma  **At month 3 only (1/1/2020)    HIV, HCV, HBV        OUTPATIENT LABORATORY TEST ORDER    Patient: Michael Amin    Transplant Date: 10/1/2019  YOB: 1980    Ordered By: Dr. Rocio Rutherford  MRN: [id number]     Issued Date/Time: 11/25/2019  2:48 PM         Expiration Date: 10/7/2020    Diagnosis: Kidney Transplant (ICD-10 Z94.0)    Aftercare following organ transplant (ICD-10 Z48.288)    Long term use of medications (ICD-10 Z79.899)    Months 4-7 post-transplant (2/3/2020 - 5/3/2020)  Labs every other week    CBC with platelets    Basic Metabolic Panel (Sodium, Potassium, Chloride, Creatinine, CO2,  Urea Nitrogen, glucose, Calcium)    Tacrolimus/Prograf/ drug level    Mycophenolic Acid  Monthly    Phosphorus, magnesium    BK (Polyoma Virus) PCR Quantitative/Plasma  **At month 7 only (5/2020)     DSA PRA (mailers provided by patient)     Months 7-12 post-transplant (5/4/2020 - 10/1/2020)  Monthly    CBC with platelets    Basic Metabolic Panel (Sodium, Potassium, Chloride, Creatinine, CO2, Urea Nitrogen, glucose, Calcium)    Tacrolimus/Prograf/ drug level    BK (Polyoma Virus) PCR Quantitative/Plasma      Labs to be drawn with clinic visits at [facility]    2 months post-transplant clinic visit    Mycophenolic Acid (MPA) drug level    Urine protein/creatinine    4 months post-transplant clinic visit    DSA PRA    PTH    Urine protein/creatinine            6 months post-transplant clinic visit    LFTs    Hemoglobin A1c    Uric Acid    Lipid panel    Urine protein/creatinine    9 months post-transplant clinic visit    Urine protein/creatinine    12 months post-transplant clinic visit (Fasting labs)    LFTs    Hemoglobin A1c    Uric Acid    Lipid panel    Urine protein/creatinine    DSA PRA    PTH    Vitamin D      If you have any questions please call the Transplant Center at [phone] option [phone]. All lab results should be faxed to [phone]        Rocio Rutherford MD FACS  Assistant Professor of Surgery  Director, Living Kid[facility]or [facility].

## 2019-11-26 LAB — COPATH REPORT: NORMAL

## 2019-11-27 ENCOUNTER — TELEPHONE (OUTPATIENT)
Dept: TRANSPLANT | Facility: CLINIC | Age: 39
End: 2019-11-27

## 2019-11-27 NOTE — TELEPHONE ENCOUNTER
"Raffi Lnadon MD Poucher, Jessica, RN             Please update the patient of the results.   Most recent creatinine is down to 1.2 we can monitor for now   Lets connect tomorrow    Previous Messages      ----- Message -----   From: Stephany Culver RN   Sent: 11/26/2019   4:44 PM CST   To: Jabier Hernández MD, Raffi Landon MD     Unsure who is covering biopsy results from IR.   \"Material insufficient for evaluation\"         This was a closure biopsy. Plan to repeat labs on Friday and continue to monitor. Plan communicated to patient and he verbalized understanding.    "

## 2019-11-27 NOTE — TELEPHONE ENCOUNTER
Post Kidney and Pancreas Transplant Team Conference  Date: 11/27/2019  Transplant Coordinator: Lea Chanel     Attendees:  [x]  Dr. Hernández  [x] Candelaria Kramer LPN     []  Dr. Landon [] Sabine Anders RN [] Luisa Landis LPN   []  Dr. Hoffman [] Jane Singer, RN     [] Nereida Addison RN [] Abdullahi Javier, PharmD   [] Dr. Wolfe [x] Chloe Bryson RN    [] Dr. Liu [] Vicente Ross RN    [] Dr. Alonzo [] Ana Paula Dong RN    [] Dr. Mccullough [] Annita Taylor RN     [] Irene Mccracken RN    [] Surgery Fellow [x] Stephany Culver RN    [] Estela Woodard NP [] Karissa Villanueva RN        Verbal Plan Read Back:   No changes at this time    Routed to RN Coordinator   Candelaria Kramer LPN

## 2019-11-29 DIAGNOSIS — Z94.0 KIDNEY REPLACED BY TRANSPLANT: ICD-10-CM

## 2019-11-29 DIAGNOSIS — Z79.899 LONG TERM USE OF DRUG: ICD-10-CM

## 2019-11-29 PROCEDURE — 87799 DETECT AGENT NOS DNA QUANT: CPT | Performed by: INTERNAL MEDICINE

## 2019-12-02 ENCOUNTER — RESULTS ONLY (OUTPATIENT)
Dept: OTHER | Facility: CLINIC | Age: 39
End: 2019-12-02

## 2019-12-02 DIAGNOSIS — Z94.0 KIDNEY REPLACED BY TRANSPLANT: ICD-10-CM

## 2019-12-02 DIAGNOSIS — Z79.899 LONG TERM USE OF DRUG: ICD-10-CM

## 2019-12-02 PROCEDURE — 86832 HLA CLASS I HIGH DEFIN QUAL: CPT | Performed by: TRANSPLANT SURGERY

## 2019-12-02 PROCEDURE — 86833 HLA CLASS II HIGH DEFIN QUAL: CPT | Performed by: TRANSPLANT SURGERY

## 2019-12-02 PROCEDURE — 86828 HLA CLASS I&II ANTIBODY QUAL: CPT | Performed by: TRANSPLANT SURGERY

## 2019-12-06 ENCOUNTER — TELEPHONE (OUTPATIENT)
Dept: TRANSPLANT | Facility: CLINIC | Age: 39
End: 2019-12-06

## 2019-12-06 DIAGNOSIS — Z94.0 KIDNEY REPLACED BY TRANSPLANT: Primary | ICD-10-CM

## 2019-12-09 ENCOUNTER — ANCILLARY PROCEDURE (OUTPATIENT)
Dept: ULTRASOUND IMAGING | Facility: CLINIC | Age: 39
End: 2019-12-09
Attending: INTERNAL MEDICINE
Payer: COMMERCIAL

## 2019-12-09 ENCOUNTER — OFFICE VISIT (OUTPATIENT)
Dept: PHARMACY | Facility: CLINIC | Age: 39
End: 2019-12-09
Payer: COMMERCIAL

## 2019-12-09 ENCOUNTER — OFFICE VISIT (OUTPATIENT)
Dept: ENDOCRINOLOGY | Facility: CLINIC | Age: 39
End: 2019-12-09
Payer: COMMERCIAL

## 2019-12-09 ENCOUNTER — OFFICE VISIT (OUTPATIENT)
Dept: NEPHROLOGY | Facility: CLINIC | Age: 39
End: 2019-12-09
Attending: INTERNAL MEDICINE
Payer: COMMERCIAL

## 2019-12-09 ENCOUNTER — TELEPHONE (OUTPATIENT)
Dept: TRANSPLANT | Facility: CLINIC | Age: 39
End: 2019-12-09

## 2019-12-09 VITALS
SYSTOLIC BLOOD PRESSURE: 114 MMHG | BODY MASS INDEX: 33.41 KG/M2 | WEIGHT: 207 LBS | HEART RATE: 96 BPM | DIASTOLIC BLOOD PRESSURE: 76 MMHG

## 2019-12-09 VITALS
DIASTOLIC BLOOD PRESSURE: 94 MMHG | TEMPERATURE: 97.9 F | HEIGHT: 66 IN | BODY MASS INDEX: 33.3 KG/M2 | HEART RATE: 89 BPM | OXYGEN SATURATION: 98 % | SYSTOLIC BLOOD PRESSURE: 154 MMHG | WEIGHT: 207.2 LBS

## 2019-12-09 DIAGNOSIS — Z94.0 KIDNEY REPLACED BY TRANSPLANT: ICD-10-CM

## 2019-12-09 DIAGNOSIS — E83.42 HYPOMAGNESEMIA: ICD-10-CM

## 2019-12-09 DIAGNOSIS — E78.5 DYSLIPIDEMIA: ICD-10-CM

## 2019-12-09 DIAGNOSIS — D63.1 ANEMIA IN STAGE 3 CHRONIC KIDNEY DISEASE (H): ICD-10-CM

## 2019-12-09 DIAGNOSIS — N18.6 TYPE 1 DIABETES MELLITUS WITH CHRONIC KIDNEY DISEASE ON CHRONIC DIALYSIS (H): ICD-10-CM

## 2019-12-09 DIAGNOSIS — Z94.0 KIDNEY REPLACED BY TRANSPLANT: Primary | ICD-10-CM

## 2019-12-09 DIAGNOSIS — E55.9 VITAMIN D DEFICIENCY: ICD-10-CM

## 2019-12-09 DIAGNOSIS — Z99.2 TYPE 1 DIABETES MELLITUS WITH CHRONIC KIDNEY DISEASE ON CHRONIC DIALYSIS (H): ICD-10-CM

## 2019-12-09 DIAGNOSIS — I15.1 HTN, KIDNEY TRANSPLANT RELATED: Primary | ICD-10-CM

## 2019-12-09 DIAGNOSIS — Z48.298 AFTERCARE FOLLOWING ORGAN TRANSPLANT: ICD-10-CM

## 2019-12-09 DIAGNOSIS — E10.65 UNCONTROLLED TYPE 1 DIABETES MELLITUS WITH HYPERGLYCEMIA (H): Primary | ICD-10-CM

## 2019-12-09 DIAGNOSIS — N25.81 SECONDARY RENAL HYPERPARATHYROIDISM (H): ICD-10-CM

## 2019-12-09 DIAGNOSIS — Z94.0 HTN, KIDNEY TRANSPLANT RELATED: Primary | ICD-10-CM

## 2019-12-09 DIAGNOSIS — Z94.0 HTN, KIDNEY TRANSPLANT RELATED: ICD-10-CM

## 2019-12-09 DIAGNOSIS — E10.22 TYPE 1 DIABETES MELLITUS WITH CHRONIC KIDNEY DISEASE ON CHRONIC DIALYSIS (H): ICD-10-CM

## 2019-12-09 DIAGNOSIS — Z79.899 LONG TERM USE OF DRUG: ICD-10-CM

## 2019-12-09 DIAGNOSIS — I15.1 HTN, KIDNEY TRANSPLANT RELATED: ICD-10-CM

## 2019-12-09 DIAGNOSIS — N18.30 ANEMIA IN STAGE 3 CHRONIC KIDNEY DISEASE (H): ICD-10-CM

## 2019-12-09 PROBLEM — E83.39 HYPOPHOSPHATEMIA: Status: RESOLVED | Noted: 2019-10-07 | Resolved: 2019-12-09

## 2019-12-09 LAB
ANION GAP SERPL CALCULATED.3IONS-SCNC: 2 MMOL/L (ref 3–14)
BUN SERPL-MCNC: 18 MG/DL (ref 7–30)
CALCIUM SERPL-MCNC: 9 MG/DL (ref 8.5–10.1)
CHLORIDE SERPL-SCNC: 107 MMOL/L (ref 94–109)
CO2 SERPL-SCNC: 27 MMOL/L (ref 20–32)
CREAT SERPL-MCNC: 1.05 MG/DL (ref 0.66–1.25)
DONOR IDENTIFICATION: NORMAL
DSA COMMENTS: NORMAL
DSA PRESENT: NO
DSA TEST METHOD: NORMAL
ERYTHROCYTE [DISTWIDTH] IN BLOOD BY AUTOMATED COUNT: 13.4 % (ref 10–15)
GFR SERPL CREATININE-BSD FRML MDRD: 88 ML/MIN/{1.73_M2}
GLUCOSE SERPL-MCNC: 77 MG/DL (ref 70–99)
HCT VFR BLD AUTO: 38 % (ref 40–53)
HGB BLD-MCNC: 11.8 G/DL (ref 13.3–17.7)
INTERFERING SUBST TEST METHOD: NORMAL
INTERFERING SUBSTANCE COMMENT: NORMAL
INTERFERING SUBSTANCE RESULT: NORMAL
INTERFERING SUBSTANCE: NORMAL
MCH RBC QN AUTO: 27.7 PG (ref 26.5–33)
MCHC RBC AUTO-ENTMCNC: 31.1 G/DL (ref 31.5–36.5)
MCV RBC AUTO: 89 FL (ref 78–100)
ORGAN: NORMAL
PLATELET # BLD AUTO: 206 10E9/L (ref 150–450)
POTASSIUM SERPL-SCNC: 4.2 MMOL/L (ref 3.4–5.3)
PROTOCOL CUTOFF: NORMAL
RBC # BLD AUTO: 4.26 10E12/L (ref 4.4–5.9)
SA1 CELL: NORMAL
SA1 COMMENTS: NORMAL
SA1 HI RISK ABY: NORMAL
SA1 MOD RISK ABY: NORMAL
SA1 TEST METHOD: NORMAL
SA2 CELL: NORMAL
SA2 COMMENTS: NORMAL
SA2 HI RISK ABY UA: NORMAL
SA2 MOD RISK ABY: NORMAL
SA2 TEST METHOD: NORMAL
SODIUM SERPL-SCNC: 136 MMOL/L (ref 133–144)
TACROLIMUS BLD-MCNC: 10.4 UG/L (ref 5–15)
TME LAST DOSE: NORMAL H
UNACCEPTABLE ANTIGEN: NORMAL
UNOS CPRA: 0
WBC # BLD AUTO: 5.5 10E9/L (ref 4–11)

## 2019-12-09 PROCEDURE — 36415 COLL VENOUS BLD VENIPUNCTURE: CPT | Performed by: INTERNAL MEDICINE

## 2019-12-09 PROCEDURE — 80197 ASSAY OF TACROLIMUS: CPT | Performed by: INTERNAL MEDICINE

## 2019-12-09 PROCEDURE — 99606 MTMS BY PHARM EST 15 MIN: CPT | Performed by: PHARMACIST

## 2019-12-09 PROCEDURE — G0463 HOSPITAL OUTPT CLINIC VISIT: HCPCS | Mod: ZF

## 2019-12-09 PROCEDURE — 80048 BASIC METABOLIC PNL TOTAL CA: CPT | Performed by: INTERNAL MEDICINE

## 2019-12-09 PROCEDURE — 99607 MTMS BY PHARM ADDL 15 MIN: CPT | Performed by: PHARMACIST

## 2019-12-09 PROCEDURE — 85027 COMPLETE CBC AUTOMATED: CPT | Performed by: INTERNAL MEDICINE

## 2019-12-09 RX ORDER — CARVEDILOL 12.5 MG/1
12.5 TABLET ORAL 2 TIMES DAILY WITH MEALS
Qty: 180 TABLET | Refills: 3 | Status: SHIPPED | OUTPATIENT
Start: 2019-12-09 | End: 2020-12-14

## 2019-12-09 ASSESSMENT — PAIN SCALES - GENERAL
PAINLEVEL: NO PAIN (0)
PAINLEVEL: NO PAIN (0)

## 2019-12-09 ASSESSMENT — MIFFLIN-ST. JEOR: SCORE: 1792.6

## 2019-12-09 NOTE — PROGRESS NOTES
"[facility] Endocrinology- Outpatient Diabetes Follow up    Rudi is a 39 year old male with a history of longstanding TIDM with diabetic nephropathy (managed with the use of an insulin pump) , who is recently s/p renal transplant 10/1/19.    He is following up today post hospitalization. He was evaluated by the inpatient diabetes service during his hospital stay.     He followed up in clinic with Dr. Rascon 10/23/19. He was having frequent overnight hyperglycemia at home; this was in the setting of resumption of IV Methylprednisolone for borderline rejection. He was started on NPH insulin for the Methylprednisolone, and was instructed to change his IC ratio to 1:5g CHO while on steroids. He was also instructed to run his pump in auto mode. NPH was subsequently increased to 15 units with more data provided by Rudi over the phone.    He is following closely with transplant team, no longer on steroids; underwent repeat kidney biopsy 11/25, with insufficient sampling for assessment. He also had a small perinephric fluid collection, aspirated, felt to be resolving hematoma.  Has a follow up renal ultrasound today. Another biopsy has been put on hold as renal function improving, with Cr 1.0 today.     Rudi reports overall stable BG since he completed his course of steroids. He was having too much AM hypoglycemia with a basal rate of 2.2 overnight (had increased basal settings since the NPH \"didn't even touch the steroids\", but forgot to reduce it back when steroids stopped), and was having elevated -180's on 1.9, so he self-increased to 2.05. He has noticed a pattern of post-lunch hyperglycemia that resolves by dinner. If he tries to administer a correction bolus 1-2 h after lunch, he has developed hypoglycemia. He is only having about 1 episode of hypoglycemia weekly, typically if he gives a bolus plus correction at bedtime. He misses bolusing for carb intake a couple times per week; reminders on his pump have not " "been helpful. He reports \"it seems like when I behave it all goes downhill\" (? Basal is covering some prandial requirements).     He endorses an issue with running in auto mode, as his sensors take a while to begin reporting accurate BG (12-24 hours). Says the first day of data are \"always garbage\". He is careful to calibrate the sensor as needed, and will perform fingerstick checks the first day of sensor placement.     He is hoping to have pancreas transplant. He needs documentation sent to [facility] to get supplies. He intends to continue to follow with [facility] endocrinology for diabetes management.     Pt denies headaches, visual changes, n/v, SOB at rest, chest pain, abd pain, nausea/vomiting, diarrhea, dysuria, hematuria or foot ulcers.      Current Diabetes Regimen:   Medtronic 670 G, with lispro insulin  Basal rates: (from 2.1 units per hour all day PTA)  6366-5750 = 1.9 units/hr  5474-7596 = 2.4 units/hr  8226-8689 = 1.9 units/hr     Bolus settings:  Insulin: carb ratio of 1:5g (was increased to 1:5 while on steroids and he continued this)  Insulin sensitivity factor:  1 unit to lower blood glucose 25 mg/dL  Blood glucose target range  mg/dL  Active insulin time is 3 hours    Prior to renal transplant:   Basal Rate:  2.1 units/hour  Prandial Bolus/Insulin:CHO Ratio:  1 unit for every 10 grams eaten, meals and snacks  Correction Bolus/Insulin Sensitivity Factor (ISF):  1 unit to lower BG by 25 mg/dL  Target Blood Glucose:   mg/dL   -Was having frequent lows overnight and following breakfast  -Was occasionally missing lunch bolus    Glucometer Settings  Glucometer type: Dexcom CGM  Running in auto mode 22%.   Sensor wear 20%  Average glucose: 149  SD: 45  Fasting glucose average: 130-160  Prandial glucose average: 100-190  Documented hypoglycemia: once weekly, usually early AM.     Weight: 207, compared to recent of 196 lbs.   Physical Activity: good, more energy. Walking dog. Going to start " exercising more frequently.   Nutrition: appetite good. Sometimes doesn't eat, if there isn't healthy food available (works at school, is a teacher)    Diabetes Care  Retinopathy: possible early retinopathy; last eye exam 2/2019    Nephropathy: BP well  controlled. + Hx microalbuminuria pre-tsplt. Creatinine 1.65 (GFR 46). Taking ACEi/ARB: no (on beta blocker)    Neuropathy: no    Foot Exam: no wounds or ulcers    Lipids: Taking ASA: yes, statin: yes  No results found for: LDL     ROS: 10 point review of systems completed; pertinent positives and negatives documented in HPI    Allergies  Allergies   Allergen Reactions     Thymoglobulin      Had reaction with rigors after steroid-free dose. No reaction with steroids.     Medications  Current Outpatient Medications   Medication Sig Dispense Refill     alcohol swab prep pads Use to swab area of injection/zak as directed. 100 each 3     aspirin (ASA) 81 MG EC tablet Take 81 mg by mouth daily        atorvastatin (LIPITOR) 10 MG tablet Take 1 tablet (10 mg) by mouth daily 90 tablet 3     blood glucose monitoring (ACCU-CHEK FASTCLIX) lancets U QID OR MORE OFTEN PRN  1     carvedilol (COREG) 12.5 MG tablet Take 1 tablet (12.5 mg) by mouth 2 times daily (with meals) 180 tablet 3     gentamicin (GARAMYCIN) 0.1 % external cream Apply to port exit site once daily  3     HUMALOG 100 UNIT/ML injection Insulin used for filling patient's pump  3     insulin lispro (HUMALOG VIAL) 100 UNIT/ML vial Inject 100 Units Subcutaneous daily With Insulin pump. Up to 100 units 30 mL 11     insulin pen needle (ULTICARE MICRO) 32G X 4 MM miscellaneous Use pen needles daily or as directed. 100 each 3     magnesium oxide (MAG-OX) 400 MG tablet Take 1 tablet (400 mg) by mouth daily (with lunch) 30 tablet 5     mycophenolate (GENERIC EQUIVALENT) 250 MG capsule Take 5 capsules (1,250 mg) by mouth 2 times daily 300 capsule 11     psyllium (METAMUCIL/KONSYL) 58.6 % powder Take by mouth daily        senna-docusate (SENOKOT-S/PERICOLACE) 8.6-50 MG tablet Take 2 tablets by mouth 2 times daily 20 tablet 0     sulfamethoxazole-trimethoprim (BACTRIM/SEPTRA) 400-80 MG tablet Take 1 tablet by mouth daily 30 tablet 1     tacrolimus (GENERIC EQUIVALENT) 0.5 MG capsule Take 1 capsule (0.5 mg) by mouth 2 times daily Total dose = 2.5 mg twice a day 60 capsule 11     tacrolimus (GENERIC EQUIVALENT) 1 MG capsule Take 2 capsules (2 mg) by mouth 2 times daily Total dose = 2.5 mg twice a day 120 capsule 11     valGANciclovir (VALCYTE) 450 MG tablet Take 2 tablets (900 mg) by mouth daily 30 tablet 3     vitamin D3 (CHOLECALCIFEROL) 1000 units (25 mcg) tablet Take 1 tablet (1,000 Units) by mouth daily 90 tablet 3       Family History  family history includes Coronary Artery Disease in his father; Diabetes in his father.    Social History   reports that he has never smoked. He has never used smokeless tobacco. He reports previous alcohol use. He reports that he does not use drugs.     Past Medical History  Past Medical History:   Diagnosis Date     Anemia in chronic kidney disease      Dyslipidemia      ESRD (end stage renal disease) on dialysis (H)      Hypertension      Hypomagnesemia 10/7/2019     Secondary hyperparathyroidism (H)      Type 1 diabetes (H)        Past Surgical History:   Procedure Laterality Date     hair implant  2007     INSERT CATHETER PERITONEAL DIALYSIS  08/2018     IR FINE NEEDLE ASPIRATION W ULTRASOUND  11/25/2019     IR RENAL BIOPSY LEFT  11/25/2019     PERCUTANEOUS BIOPSY KIDNEY Left 10/22/2019    Procedure: Left Kidney Biopsy;  Surgeon: Kash Hoffman MD;  Location:  OR       Physical Exam  /76   Pulse 96   Wt 93.9 kg (207 lb)   BMI 33.41 kg/m    Body mass index is 33.41 kg/m .    GENERAL : In no apparent distress  SKIN: Normal color, normal temperature, texture.  No  suspicious lesions or rashes  EYES: PERRLA.  No proptosis.  MOUTH: Moist, pink; pharynx clear  NECK: No visible masses.  No palpable adenopathy, or masses. No goiter.  RESP: normal respiratory effort.  cough   ABDOMEN: soft, nontender to palpation   NEURO: awake, alert, responds appropriately to questions.  Moves all extremities; Gait normal.     EXTREMITIES: No ulcers or open wounds.     RESULTS    Lab Results   Component Value Date    A1C 8.9 09/26/2019    A1C 10.8 01/07/2019    A1C 8.6 08/15/2018       Creatinine   Date Value Ref Range Status   12/09/2019 1.05 0.66 - 1.25 mg/dL Final     GFR Estimate   Date Value Ref Range Status   12/09/2019 88 >60 mL/min/[1.73_m2] Final     Comment:     Non  GFR Calc  Starting 12/18/2018, serum creatinine based estimated GFR (eGFR) will be   calculated using the Chronic Kidney Disease Epidemiology Collaboration   (CKD-EPI) equation.       Hemoglobin A1C   Date Value Ref Range Status   09/26/2019 8.9 (H) 0 - 5.6 % Final     Comment:     Normal <5.7% Prediabetes 5.7-6.4%  Diabetes 6.5% or higher - adopted from ADA   consensus guidelines.       Potassium   Date Value Ref Range Status   12/09/2019 4.2 3.4 - 5.3 mmol/L Final     ALT   Date Value Ref Range Status   09/26/2019 42 0 - 70 U/L Final     AST   Date Value Ref Range Status   09/26/2019 16 0 - 45 U/L Final     No results found for: TSH, T4    Creatinine   Date Value Ref Range Status   12/09/2019 1.05 0.66 - 1.25 mg/dL Final     No results for input(s): CHOL, HDL, LDL, TRIG, CHOLHDLRATIO in the last 02392 hours.  No results found for: [id number], [id number], [id number]      ASSESSMENT/PLAN:    1. Diabetes type I,  uncontrolled, with renal failure s/p renal transplant. Requires CGM for frequent fingerstick checks on insulin pump, having intermittent hypoglycemia, and ideally to have patient running in auto mode.   -increased IC ratio from noon to 5PM from 1:5 to 1:4.5g CHO   -basal rates to remain the same for now.    -will discuss sensor issues with CDE; will likely just need to calibrate more frequently in the first 24  hours.   -will discuss pump supply refill documentation with coordinator/CDE   -he will wear sensors more often and resume running pump in auto mode.         Follow up:  1. With [facility] endocrine in 2 months.   2. Diabetes Educator follow up: PRN    The patient is  enrolled in [place] [employer]    This patient has met [place] community measures for diabetes control: no (D5: Control blood pressure ,Lower bad cholesterol Maintain blood sugar, Be tobacco-free, Take aspirin as recommended)    This patient is eligible for graduation from [facility] Endocrinology clinic: no    I spent 25 minutes with this patient face to face and explained the conditions and plans (more than 50% of time was counseling/coordination of care, diabetes management, follow up plan for worsening hyper and hypoglycemia) . The patient understood and is satisfied with today's visit.     EDITH Villanueva, PA-C  [facility]   Pager [phone]

## 2019-12-09 NOTE — LETTER
12/9/2019      RE: Michael Amin  [address]  [facility] [address]       ACUTE TRANSPLANT NEPHROLOGY VISIT    Assessment & Plan   # LDKT: Variable creatinine with initial baseline ~ 0.8-1.0, then increased creatinine to ~ 1.6 and kidney transplant biopsy 10/22 showing borderline acute cellular-mediated rejection.  Patient was treated with Solumedrol and creatinine improved to ~ 1.2 -1.4 range.  More recently, creatinine increased to ~ 1.6-1.7 range and repeat biopsy was done, but unfortunately the sample was inadequate for assessment.  Patient reports he was a bit dehydrated at that time.  Discussed a repeat kidney transplant biopsy, but creatinine came back at ~ 1.0 today and feel biopsy can be on hold for now.   - Baseline Cr ~ TBD, but 1.1-1.3 would reasonable range   - Proteinuria: Minimal (0.2-0.5 grams)   - Date DSA Last Checked: Nov/2019      Latest DSA: No   - BK Viremia: No   - Kidney Tx Biopsy: Nov 25, 2019; Result: Material insufficient for assessment with no glomeruli.             Oct 22, 2019; Result: Borderline acute cellular-mediated rejection.    # Immunosuppression: Tacrolimus immediate release (goal 8-10) and Mycophenolate mofetil (goal 1.0-3.5)   - Changes: No    # Infection Prophylaxis:   - PJP: Sulfa/TMP (Bactrim)  - CMV: Valcyte  - Thrush: None    # Hypertension: Borderline control;  Goal BP: < 140/90   - Volume status: Euvolemic   - Changes: Yes - Will increase carvedilol 12.5 mg bid.    # Diabetes: Borderline control (HbA1c 7-9%) with some brittleness still Last HbA1c: 8.9%   - Management as per Endocrinology.    # Anemia in Chronic Renal Disease: Hgb: Stable      JOSE: No   - Iron studies: Replete    # Mineral Bone Disorder:   - Secondary renal hyperparathyroidism; PTH level: Moderately elevated (301-600 pg/ml)  - Vitamin D; level: Low        On Supplement: Yes  - Calcium; level: High normal        On Supplement: No  - Phosphorus; level: Normal        On Supplement: No    #  Electrolytes:   - Potassium; level: Normal        On Supplement: No  - Magnesium; level: Low        On Supplement: Yes  - Bicarbonate; level: Low        On Supplement: No    # Overweight: Stable weight.   - Recommend increased exercise and watch caloric intake.    # Medical Compliance: Yes    # Transplant History:  Etiology of Kidney Failure: Diabetes mellitus type 1  Tx: LDKT  Transplant: 10/1/2019 (Kidney)  Donor Type: Living Donor Class:   Crossmatch at time of Tx: negative  DSA at time of Tx: No  Significant changes in immunosuppression: None  CMV IgG Ab High Risk Discordance (D+/R-): No  EBV IgG Ab High Risk Discordance (D+/R-): No  Significant transplant-related complications: None    Transplant Office Phone Number: [phone]    Assessment and plan was discussed with the patient and he voiced his understanding and agreement.    Return visit: Return for as previously scheduled.    Jabier Hernández MD    Chief Complaint   Mr. Amin is a 39 year old here for routine follow up.     History of Present Illness    Mr. Amin reports feeling good overall with some medical complaints.  Since last clinic visit, patient reports no hospitalizations or new medical complaints.  His energy level is good and pretty close to normal.  He is active and does get some exercise, although limited some by the cold weather and getting outside.  Denies any chest pain or shortness of breath with exertion.  No leg swelling.  Appetite is good and weight is stable.  No nausea, vomiting or diarrhea.  No fever, sweats or chills.  He continues to have some night sweats, but this has been ongoing since prior to his transplant with no change.    Recent Hospitalizations:  [x] No [] Yes    New Medical Issues: [x] No [] Yes    Decreased energy: [x] No [] Yes    Chest pain or SOB with exertion:  [x] No [] Yes    Appetite change or weight change: [x] No [] Yes    Nausea, vomiting or diarrhea:  [x] No [] Yes    Fever, sweats or chills: [x]  No [] Yes Stable night sweats   Leg swelling: [x] No [] Yes      Home BP: 140-150/90s    Review of Systems   A comprehensive review of systems was obtained and negative, except as noted in the HPI or PMH.    Problem List   Patient Active Problem List   Diagnosis     HTN, kidney transplant related     Diabetes mellitus type 1 (H)     Dyslipidemia     Anemia in chronic kidney disease     Secondary renal hyperparathyroidism (H)     Kidney replaced by transplant     Aftercare following organ transplant     Vitamin D deficiency     Hypomagnesemia     Kidney transplant rejection       Social History   Social History     Tobacco Use     Smoking status: Never Smoker     Smokeless tobacco: Never Used   Substance Use Topics     Alcohol use: Not Currently     Comment: Rarely 1-2 Drinks a month      Drug use: No       Allergies   Allergies   Allergen Reactions     Thymoglobulin      Had reaction with rigors after steroid-free dose. No reaction with steroids.       Medications   Current Outpatient Medications   Medication Sig     alcohol swab prep pads Use to swab area of injection/zak as directed.     aspirin (ASA) 81 MG EC tablet Take 81 mg by mouth daily      atorvastatin (LIPITOR) 10 MG tablet Take 1 tablet (10 mg) by mouth daily     blood glucose monitoring (ACCU-CHEK FASTCLIX) lancets U QID OR MORE OFTEN PRN     carvedilol (COREG) 12.5 MG tablet Take 1 tablet (12.5 mg) by mouth 2 times daily (with meals)     gentamicin (GARAMYCIN) 0.1 % external cream Apply to port exit site once daily     HUMALOG 100 UNIT/ML injection Insulin used for filling patient's pump     insulin isophane human (HUMULIN N PEN) 100 UNIT/ML injection Use 10 units with Methylprednisolone     insulin lispro (HUMALOG VIAL) 100 UNIT/ML vial Inject 100 Units Subcutaneous daily With Insulin pump. Up to 100 units     insulin pen needle (ULTICARE MICRO) 32G X 4 MM miscellaneous Use pen needles daily or as directed.     lactobacillus rhamnosus, GG,  "(CULTURELL) capsule Take 1 capsule by mouth daily     magnesium oxide (MAG-OX) 400 MG tablet Take 1 tablet (400 mg) by mouth daily (with lunch)     mycophenolate (GENERIC EQUIVALENT) 250 MG capsule Take 5 capsules (1,250 mg) by mouth 2 times daily     ondansetron (ZOFRAN) 4 MG tablet Take 1 tablet (4 mg) by mouth every 8 hours as needed for nausea (take for nausea)     polyethylene glycol (MIRALAX/GLYCOLAX) packet Take 17 g by mouth daily     psyllium (METAMUCIL/KONSYL) 58.6 % powder Take by mouth daily     senna-docusate (SENOKOT-S/PERICOLACE) 8.6-50 MG tablet Take 2 tablets by mouth 2 times daily     sulfamethoxazole-trimethoprim (BACTRIM/SEPTRA) 400-80 MG tablet Take 1 tablet by mouth daily     tacrolimus (GENERIC EQUIVALENT) 0.5 MG capsule Take 1 capsule (0.5 mg) by mouth 2 times daily Total dose = 2.5 mg twice a day     tacrolimus (GENERIC EQUIVALENT) 1 MG capsule Take 2 capsules (2 mg) by mouth 2 times daily Total dose = 2.5 mg twice a day     valGANciclovir (VALCYTE) 450 MG tablet Take 2 tablets (900 mg) by mouth daily     vitamin D3 (CHOLECALCIFEROL) 1000 units (25 mcg) tablet Take 1 tablet (1,000 Units) by mouth daily     No current facility-administered medications for this visit.      Medications Discontinued During This Encounter   Medication Reason     carvedilol (COREG) 3.125 MG tablet        Physical Exam   Vital Signs: BP (!) 154/94   Pulse 89   Temp 97.9  F (36.6  C) (Oral)   Ht 1.676 m (5' 6\")   Wt 94 kg (207 lb 3.2 oz)   SpO2 98%   BMI 33.44 kg/m       GENERAL APPEARANCE: alert and no distress  HENT: mouth without ulcers or lesions  LYMPHATICS: no cervical or supraclavicular nodes  RESP: lungs clear to auscultation - no rales, rhonchi or wheezes  CV: regular rhythm, normal rate, no rub, no murmur  EDEMA: no LE edema bilaterally  ABDOMEN: soft, nondistended, nontender, bowel sounds normal, overweight  MS: extremities normal - no gross deformities noted, no evidence of inflammation in joints, " no muscle tenderness  SKIN: no rash  TX KIDNEY: normal  DIALYSIS ACCESS:  None    Data     Renal Latest Ref Rng & Units 12/9/2019 12/2/2019 11/29/2019   Na 133 - 144 mmol/L 136 - -   Na (external) 134 - 143 mEq/L - 140 140   K 3.4 - 5.3 mmol/L 4.2 - -   K (external) 3.4 - 5.1 mEq/L - 4.8 4.9   Cl 94 - 109 mmol/L 107 - -   Cl (external) 99 - 110 mEq/L - 106 109   CO2 20 - 32 mmol/L 27 - -   CO2 (external) 19 - 29 mEq/L - 26 23   BUN 7 - 30 mg/dL 18 - -   BUN (external) 5 - 24 mg/dL - 25(H) 18   Cr 0.66 - 1.25 mg/dL 1.05 - -   Cr (external) 0.70 - 1.20 mg/dL - 1.65(H) 1.47(H)   Glucose 70 - 99 mg/dL 77 - -   Glucose (external) 70 - 99 mg/dL - 81 171(H)   Ca  8.5 - 10.1 mg/dL 9.0 - -   Ca (external) 8.4 - 10.5 mg/dL - 10.0 9.1   Mg 1.6 - 2.3 mg/dL - - -   Mg (external) 1.8 - 2.7 mg/dL - 1.6(L) 1.5(L)     Bone Health Latest Ref Rng & Units 12/2/2019 11/29/2019 11/18/2019   Phos 2.5 - 4.5 mg/dL - - -   Phos (external) 2.5 - 4.6 mg/dL 3.2 2.8 2.8   PTHi 18 - 80 pg/mL - - -   Vit D Def 20 - 75 ug/L - - -     Heme Latest Ref Rng & Units 12/9/2019 12/2/2019 11/29/2019   WBC 4.0 - 11.0 10e9/L 5.5 - -   WBC (external) 3.2 - 11.0 10*9/L - 8.0 6.2   Hgb 13.3 - 17.7 g/dL 11.8(L) - -   Hgb (external) 12.9 - 16.9 g/dL - 11.8(L) 11.0(L)   Plt 150 - 450 10e9/L 206 - -   Plt (external) 130 - 375 10*9/L - 264 250     Liver Latest Ref Rng & Units 9/26/2019 8/6/2019 1/7/2019   AP 40 - 150 U/L 100 - 157(H)   TBili 0.2 - 1.3 mg/dL 0.2 - 0.2   ALT 0 - 70 U/L 42 - 46   AST 0 - 45 U/L 16 - 15   Tot Protein 6.8 - 8.8 g/dL 7.5 - 7.9   Tot Protein (external) 5.7 - 8.2 g/dL - 6.6 -   Albumin 3.4 - 5.0 g/dL 3.5 - 3.5   Albumin (external) 3.2 - 4.8 g/dL - 4.4 -     Pancreas Latest Ref Rng & Units 9/26/2019 1/7/2019 8/15/2018   A1C 0 - 5.6 % 8.9(H) 10.8(H) 8.6(H)     Iron studies Latest Ref Rng & Units 9/26/2019 8/6/2019   Iron 35 - 180 ug/dL 70 -   Iron sat 15 - 46 % 28 -   Ferritin 26 - 388 ng/mL 753(H) -   Ferritin (external) 22 - 322 ng/mL -  578(H)     [facility] Txp Virology Latest Ref Rng & Units 11/29/2019 11/4/2019 10/22/2019   BK Spec - Plasma Plasma Plasma   BK Res BKNEG:BK Virus DNA Not Detected copies/mL BK Virus DNA Not Detected BK Virus DNA Not Detected BK Virus DNA Not Detected   BK Log <2.7 Log copies/mL Not Calculated Not Calculated Not Calculated   Hep B Core NR:Nonreactive - - -        Recent Labs   Lab Test 10/31/19  0842 11/21/19  0813 11/25/19  0843   DOSTAC 10/30/19 2100 11/20/19 1800 2,020   TACROL 8.2 12.0 10.5     Recent Labs   Lab Test 10/07/19  0742 11/21/19  0813   DOSMPA Not Provided 11/20/19 1800   MPACID 0.82* 0.91*   MPAG 14.1* 39.5     Jabier Hernández MD

## 2019-12-09 NOTE — TELEPHONE ENCOUNTER
Acute Transplant Nephrology Clinic Visit with Care Coordinator : 2 month follow up    Overdue for labs: NO   If yes, which lab: NA   Patient informed of need for draw: NA  Current lab schedule: weekly   Patient voiced understanding: YES    Medication compliant: YES  Medication refill questions / concerns: No    PJP prophylaxis up to date: Yes, bactrim daily      Follow up for coordinator:  1. NO BIOPSY per Dr. Hernández.        Will repeat labs on Thursday, orders sent to preferred          lab.        Sofiya Bryson RN, BSN, CCTC

## 2019-12-09 NOTE — LETTER
PHYSICIAN ORDERS      DATE & TIME ISSUED: 2019 10:48 AM  PATIENT NAME: Michael Amin   : 1980     [facility] MR# [if applicable]: [id number]     DIAGNOSIS:  Kidney replaced by transplant  ICD-10 CODE: Z94.0     Please complete the following lab work, in addition to current standing orders. Due 19:  CBC with platelets and differential  BMP  Tacrolimus drug level    Any questions please call: [facility], [facility], [place]                                             [phone] ext [phone]    Fax all results, same day available to:  [phone].    .

## 2019-12-09 NOTE — PROGRESS NOTES
ACUTE TRANSPLANT NEPHROLOGY VISIT    Assessment & Plan   # LDKT: Variable creatinine with initial baseline ~ 0.8-1.0, then increased creatinine to ~ 1.6 and kidney transplant biopsy 10/22 showing borderline acute cellular-mediated rejection.  Patient was treated with Solumedrol and creatinine improved to ~ 1.2 -1.4 range.  More recently, creatinine increased to ~ 1.6-1.7 range and repeat biopsy was done, but unfortunately the sample was inadequate for assessment.  Patient reports he was a bit dehydrated at that time.  Discussed a repeat kidney transplant biopsy, but creatinine came back at ~ 1.0 today and feel biopsy can be on hold for now.   - Baseline Cr ~ TBD, but 1.1-1.3 would reasonable range   - Proteinuria: Minimal (0.2-0.5 grams)   - Date DSA Last Checked: Nov/2019      Latest DSA: No   - BK Viremia: No   - Kidney Tx Biopsy: Nov 25, 2019; Result: Material insufficient for assessment with no glomeruli.             Oct 22, 2019; Result: Borderline acute cellular-mediated rejection.    # Immunosuppression: Tacrolimus immediate release (goal 8-10) and Mycophenolate mofetil (goal 1.0-3.5)   - Changes: No    # Infection Prophylaxis:   - PJP: Sulfa/TMP (Bactrim)  - CMV: Valcyte  - Thrush: None    # Hypertension: Borderline control;  Goal BP: < 140/90   - Volume status: Euvolemic   - Changes: Yes - Will increase carvedilol 12.5 mg bid.    # Diabetes: Borderline control (HbA1c 7-9%) with some brittleness still Last HbA1c: 8.9%   - Management as per Endocrinology.    # Anemia in Chronic Renal Disease: Hgb: Stable      JOSE: No   - Iron studies: Replete    # Mineral Bone Disorder:   - Secondary renal hyperparathyroidism; PTH level: Moderately elevated (301-600 pg/ml)  - Vitamin D; level: Low        On Supplement: Yes  - Calcium; level: High normal        On Supplement: No  - Phosphorus; level: Normal        On Supplement: No    # Electrolytes:   - Potassium; level: Normal        On Supplement: No  - Magnesium; level:  Low        On Supplement: Yes  - Bicarbonate; level: Low        On Supplement: No    # Overweight: Stable weight.   - Recommend increased exercise and watch caloric intake.    # Medical Compliance: Yes    # Transplant History:  Etiology of Kidney Failure: Diabetes mellitus type 1  Tx: LDKT  Transplant: 10/1/2019 (Kidney)  Donor Type: Living Donor Class:   Crossmatch at time of Tx: negative  DSA at time of Tx: No  Significant changes in immunosuppression: None  CMV IgG Ab High Risk Discordance (D+/R-): No  EBV IgG Ab High Risk Discordance (D+/R-): No  Significant transplant-related complications: None    Transplant Office Phone Number: [phone]    Assessment and plan was discussed with the patient and he voiced his understanding and agreement.    Return visit: Return for as previously scheduled.    Jbaier Hernández MD    Chief Complaint   Mr. Amin is a 39 year old here for routine follow up.     History of Present Illness    Mr. Amin reports feeling good overall with some medical complaints.  Since last clinic visit, patient reports no hospitalizations or new medical complaints.  His energy level is good and pretty close to normal.  He is active and does get some exercise, although limited some by the cold weather and getting outside.  Denies any chest pain or shortness of breath with exertion.  No leg swelling.  Appetite is good and weight is stable.  No nausea, vomiting or diarrhea.  No fever, sweats or chills.  He continues to have some night sweats, but this has been ongoing since prior to his transplant with no change.    Recent Hospitalizations:  [x] No [] Yes    New Medical Issues: [x] No [] Yes    Decreased energy: [x] No [] Yes    Chest pain or SOB with exertion:  [x] No [] Yes    Appetite change or weight change: [x] No [] Yes    Nausea, vomiting or diarrhea:  [x] No [] Yes    Fever, sweats or chills: [x] No [] Yes Stable night sweats   Leg swelling: [x] No [] Yes      Home BP:  140-150/90s    Review of Systems   A comprehensive review of systems was obtained and negative, except as noted in the HPI or PMH.    Problem List   Patient Active Problem List   Diagnosis     HTN, kidney transplant related     Diabetes mellitus type 1 (H)     Dyslipidemia     Anemia in chronic kidney disease     Secondary renal hyperparathyroidism (H)     Kidney replaced by transplant     Aftercare following organ transplant     Vitamin D deficiency     Hypomagnesemia     Kidney transplant rejection       Social History   Social History     Tobacco Use     Smoking status: Never Smoker     Smokeless tobacco: Never Used   Substance Use Topics     Alcohol use: Not Currently     Comment: Rarely 1-2 Drinks a month      Drug use: No       Allergies   Allergies   Allergen Reactions     Thymoglobulin      Had reaction with rigors after steroid-free dose. No reaction with steroids.       Medications   Current Outpatient Medications   Medication Sig     alcohol swab prep pads Use to swab area of injection/zak as directed.     aspirin (ASA) 81 MG EC tablet Take 81 mg by mouth daily      atorvastatin (LIPITOR) 10 MG tablet Take 1 tablet (10 mg) by mouth daily     blood glucose monitoring (ACCU-CHEK FASTCLIX) lancets U QID OR MORE OFTEN PRN     carvedilol (COREG) 12.5 MG tablet Take 1 tablet (12.5 mg) by mouth 2 times daily (with meals)     gentamicin (GARAMYCIN) 0.1 % external cream Apply to port exit site once daily     HUMALOG 100 UNIT/ML injection Insulin used for filling patient's pump     insulin isophane human (HUMULIN N PEN) 100 UNIT/ML injection Use 10 units with Methylprednisolone     insulin lispro (HUMALOG VIAL) 100 UNIT/ML vial Inject 100 Units Subcutaneous daily With Insulin pump. Up to 100 units     insulin pen needle (ULTICARE MICRO) 32G X 4 MM miscellaneous Use pen needles daily or as directed.     lactobacillus rhamnosus, GG, (CULTURELL) capsule Take 1 capsule by mouth daily     magnesium oxide (MAG-OX) 400  "MG tablet Take 1 tablet (400 mg) by mouth daily (with lunch)     mycophenolate (GENERIC EQUIVALENT) 250 MG capsule Take 5 capsules (1,250 mg) by mouth 2 times daily     ondansetron (ZOFRAN) 4 MG tablet Take 1 tablet (4 mg) by mouth every 8 hours as needed for nausea (take for nausea)     polyethylene glycol (MIRALAX/GLYCOLAX) packet Take 17 g by mouth daily     psyllium (METAMUCIL/KONSYL) 58.6 % powder Take by mouth daily     senna-docusate (SENOKOT-S/PERICOLACE) 8.6-50 MG tablet Take 2 tablets by mouth 2 times daily     sulfamethoxazole-trimethoprim (BACTRIM/SEPTRA) 400-80 MG tablet Take 1 tablet by mouth daily     tacrolimus (GENERIC EQUIVALENT) 0.5 MG capsule Take 1 capsule (0.5 mg) by mouth 2 times daily Total dose = 2.5 mg twice a day     tacrolimus (GENERIC EQUIVALENT) 1 MG capsule Take 2 capsules (2 mg) by mouth 2 times daily Total dose = 2.5 mg twice a day     valGANciclovir (VALCYTE) 450 MG tablet Take 2 tablets (900 mg) by mouth daily     vitamin D3 (CHOLECALCIFEROL) 1000 units (25 mcg) tablet Take 1 tablet (1,000 Units) by mouth daily     No current facility-administered medications for this visit.      Medications Discontinued During This Encounter   Medication Reason     carvedilol (COREG) 3.125 MG tablet        Physical Exam   Vital Signs: BP (!) 154/94   Pulse 89   Temp 97.9  F (36.6  C) (Oral)   Ht 1.676 m (5' 6\")   Wt 94 kg (207 lb 3.2 oz)   SpO2 98%   BMI 33.44 kg/m      GENERAL APPEARANCE: alert and no distress  HENT: mouth without ulcers or lesions  LYMPHATICS: no cervical or supraclavicular nodes  RESP: lungs clear to auscultation - no rales, rhonchi or wheezes  CV: regular rhythm, normal rate, no rub, no murmur  EDEMA: no LE edema bilaterally  ABDOMEN: soft, nondistended, nontender, bowel sounds normal, overweight  MS: extremities normal - no gross deformities noted, no evidence of inflammation in joints, no muscle tenderness  SKIN: no rash  TX KIDNEY: normal  DIALYSIS ACCESS:  " None    Data     Renal Latest Ref Rng & Units 12/9/2019 12/2/2019 11/29/2019   Na 133 - 144 mmol/L 136 - -   Na (external) 134 - 143 mEq/L - 140 140   K 3.4 - 5.3 mmol/L 4.2 - -   K (external) 3.4 - 5.1 mEq/L - 4.8 4.9   Cl 94 - 109 mmol/L 107 - -   Cl (external) 99 - 110 mEq/L - 106 109   CO2 20 - 32 mmol/L 27 - -   CO2 (external) 19 - 29 mEq/L - 26 23   BUN 7 - 30 mg/dL 18 - -   BUN (external) 5 - 24 mg/dL - 25(H) 18   Cr 0.66 - 1.25 mg/dL 1.05 - -   Cr (external) 0.70 - 1.20 mg/dL - 1.65(H) 1.47(H)   Glucose 70 - 99 mg/dL 77 - -   Glucose (external) 70 - 99 mg/dL - 81 171(H)   Ca  8.5 - 10.1 mg/dL 9.0 - -   Ca (external) 8.4 - 10.5 mg/dL - 10.0 9.1   Mg 1.6 - 2.3 mg/dL - - -   Mg (external) 1.8 - 2.7 mg/dL - 1.6(L) 1.5(L)     Bone Health Latest Ref Rng & Units 12/2/2019 11/29/2019 11/18/2019   Phos 2.5 - 4.5 mg/dL - - -   Phos (external) 2.5 - 4.6 mg/dL 3.2 2.8 2.8   PTHi 18 - 80 pg/mL - - -   Vit D Def 20 - 75 ug/L - - -     Heme Latest Ref Rng & Units 12/9/2019 12/2/2019 11/29/2019   WBC 4.0 - 11.0 10e9/L 5.5 - -   WBC (external) 3.2 - 11.0 10*9/L - 8.0 6.2   Hgb 13.3 - 17.7 g/dL 11.8(L) - -   Hgb (external) 12.9 - 16.9 g/dL - 11.8(L) 11.0(L)   Plt 150 - 450 10e9/L 206 - -   Plt (external) 130 - 375 10*9/L - 264 250     Liver Latest Ref Rng & Units 9/26/2019 8/6/2019 1/7/2019   AP 40 - 150 U/L 100 - 157(H)   TBili 0.2 - 1.3 mg/dL 0.2 - 0.2   ALT 0 - 70 U/L 42 - 46   AST 0 - 45 U/L 16 - 15   Tot Protein 6.8 - 8.8 g/dL 7.5 - 7.9   Tot Protein (external) 5.7 - 8.2 g/dL - 6.6 -   Albumin 3.4 - 5.0 g/dL 3.5 - 3.5   Albumin (external) 3.2 - 4.8 g/dL - 4.4 -     Pancreas Latest Ref Rng & Units 9/26/2019 1/7/2019 8/15/2018   A1C 0 - 5.6 % 8.9(H) 10.8(H) 8.6(H)     Iron studies Latest Ref Rng & Units 9/26/2019 8/6/2019   Iron 35 - 180 ug/dL 70 -   Iron sat 15 - 46 % 28 -   Ferritin 26 - 388 ng/mL 753(H) -   Ferritin (external) 22 - 322 ng/mL - 578(H)     [facility] Txp Virology Latest Ref Rng & Units 11/29/2019 11/4/2019  10/22/2019   BK Spec - Plasma Plasma Plasma   BK Res BKNEG:BK Virus DNA Not Detected copies/mL BK Virus DNA Not Detected BK Virus DNA Not Detected BK Virus DNA Not Detected   BK Log <2.7 Log copies/mL Not Calculated Not Calculated Not Calculated   Hep B Core NR:Nonreactive - - -        Recent Labs   Lab Test 10/31/19  0842 11/21/19  0813 11/25/19  0843   DOSTAC 10/30/19 2100 11/20/19 1800 2,020   TACROL 8.2 12.0 10.5     Recent Labs   Lab Test 10/07/19  0742 11/21/19  0813   DOSMPA Not Provided 11/20/19 1800   MPACID 0.82* 0.91*   MPAG 14.1* 39.5

## 2019-12-09 NOTE — LETTER
"12/9/2019      RE: Michael Amin  [address]  [facility] [address]       [facility]- Outpatient Diabetes Follow up    Rudi is a 39 year old male with a history of longstanding TIDM with diabetic nephropathy (managed with the use of an insulin pump) , who is recently s/p renal transplant 10/1/19.    He is following up today post hospitalization. He was evaluated by the inpatient diabetes service during his hospital stay.     He followed up in clinic with Dr. Rascon 10/23/19. He was having frequent overnight hyperglycemia at home; this was in the setting of resumption of IV Methylprednisolone for borderline rejection. He was started on NPH insulin for the Methylprednisolone, and was instructed to change his IC ratio to 1:5g CHO while on steroids. He was also instructed to run his pump in auto mode. NPH was subsequently increased to 15 units with more data provided by Rudi over the phone.    He is following closely with transplant team, no longer on steroids; underwent repeat kidney biopsy 11/25, with insufficient sampling for assessment. He also had a small perinephric fluid collection, aspirated, felt to be resolving hematoma.  Has a follow up renal ultrasound today. Another biopsy has been put on hold as renal function improving, with Cr 1.0 today.     Rudi reports overall stable BG since he completed his course of steroids. He was having too much AM hypoglycemia with a basal rate of 2.2 overnight (had increased basal settings since the NPH \"didn't even touch the steroids\", but forgot to reduce it back when steroids stopped), and was having elevated -180's on 1.9, so he self-increased to 2.05. He has noticed a pattern of post-lunch hyperglycemia that resolves by dinner. If he tries to administer a correction bolus 1-2 h after lunch, he has developed hypoglycemia. He is only having about 1 episode of hypoglycemia weekly, typically if he gives a bolus plus correction at bedtime. He " "misses bolusing for carb intake a couple times per week; reminders on his pump have not been helpful. He reports \"it seems like when I behave it all goes downhill\" (? Basal is covering some prandial requirements).     He endorses an issue with running in auto mode, as his sensors take a while to begin reporting accurate BG (12-24 hours). Says the first day of data are \"always garbage\". He is careful to calibrate the sensor as needed, and will perform fingerstick checks the first day of sensor placement.     He is hoping to have pancreas transplant. He needs documentation sent to [facility] to get supplies. He intends to continue to follow with [facility] for diabetes management.     Pt denies headaches, visual changes, n/v, SOB at rest, chest pain, abd pain, nausea/vomiting, diarrhea, dysuria, hematuria or foot ulcers.      Current Diabetes Regimen:   Medtronic 670 G, with lispro insulin  Basal rates: (from 2.1 units per hour all day PTA)  6141-6562 = 1.9 units/hr  5716-3021 = 2.4 units/hr  5615-2464 = 1.9 units/hr     Bolus settings:  Insulin: carb ratio of 1: 5g (was increased to 1:5 while on steroids and he continued this)  Insulin sensitivity factor:  1 unit to lower blood glucose 25 mg/dL  Blood glucose target range  mg/dL  Active insulin time is 3 hours    Prior to renal transplant:   Basal Rate:  2.1 units/hour  Prandial Bolus/Insulin:CHO Ratio:  1 unit for every 10 grams eaten, meals and snacks  Correction Bolus/Insulin Sensitivity Factor (ISF):  1 unit to lower BG by 25 mg/dL  Target Blood Glucose:   mg/dL   -Was having frequent lows overnight and following breakfast  -Was occasionally missing lunch bolus    Glucometer Settings  Glucometer type: Dexcom CGM  Running in auto mode 22%.   Sensor wear 20%  Average glucose: 149  SD: 45  Fasting glucose average: 130-160  Prandial glucose average: 100-190  Documented hypoglycemia: once weekly, usually early AM.     Weight: 207, compared to " recent of 196 lbs.   Physical Activity: good, more energy. Walking dog. Going to start exercising more frequently.   Nutrition: appetite good. Sometimes doesn't eat, if there isn't healthy food available (works at school, is a teacher)    Diabetes Care  Retinopathy: possible early retinopathy; last eye exam 2/2019    Nephropathy: BP well  controlled. + Hx microalbuminuria pre-tsplt. Creatinine 1.65 (GFR 46). Taking ACEi/ARB: no (on beta blocker)    Neuropathy: no    Foot Exam: no wounds or ulcers    Lipids: Taking ASA: yes, statin: yes  No results found for: LDL     ROS: 10 point review of systems completed; pertinent positives and negatives documented in HPI    Allergies  Allergies   Allergen Reactions     Thymoglobulin      Had reaction with rigors after steroid-free dose. No reaction with steroids.     Medications  Current Outpatient Medications   Medication Sig Dispense Refill     alcohol swab prep pads Use to swab area of injection/zak as directed. 100 each 3     aspirin (ASA) 81 MG EC tablet Take 81 mg by mouth daily        atorvastatin (LIPITOR) 10 MG tablet Take 1 tablet (10 mg) by mouth daily 90 tablet 3     blood glucose monitoring (ACCU-CHEK FASTCLIX) lancets U QID OR MORE OFTEN PRN  1     carvedilol (COREG) 12.5 MG tablet Take 1 tablet (12.5 mg) by mouth 2 times daily (with meals) 180 tablet 3     gentamicin (GARAMYCIN) 0.1 % external cream Apply to port exit site once daily  3     HUMALOG 100 UNIT/ML injection Insulin used for filling patient's pump  3     insulin lispro (HUMALOG VIAL) 100 UNIT/ML vial Inject 100 Units Subcutaneous daily With Insulin pump. Up to 100 units 30 mL 11     insulin pen needle (ULTICARE MICRO) 32G X 4 MM miscellaneous Use pen needles daily or as directed. 100 each 3     magnesium oxide (MAG-OX) 400 MG tablet Take 1 tablet (400 mg) by mouth daily (with lunch) 30 tablet 5     mycophenolate (GENERIC EQUIVALENT) 250 MG capsule Take 5 capsules (1,250 mg) by mouth 2 times daily  300 capsule 11     psyllium (METAMUCIL/KONSYL) 58.6 % powder Take by mouth daily       senna-docusate (SENOKOT-S/PERICOLACE) 8.6-50 MG tablet Take 2 tablets by mouth 2 times daily 20 tablet 0     sulfamethoxazole-trimethoprim (BACTRIM/SEPTRA) 400-80 MG tablet Take 1 tablet by mouth daily 30 tablet 1     tacrolimus (GENERIC EQUIVALENT) 0.5 MG capsule Take 1 capsule (0.5 mg) by mouth 2 times daily Total dose = 2.5 mg twice a day 60 capsule 11     tacrolimus (GENERIC EQUIVALENT) 1 MG capsule Take 2 capsules (2 mg) by mouth 2 times daily Total dose = 2.5 mg twice a day 120 capsule 11     valGANciclovir (VALCYTE) 450 MG tablet Take 2 tablets (900 mg) by mouth daily 30 tablet 3     vitamin D3 (CHOLECALCIFEROL) 1000 units (25 mcg) tablet Take 1 tablet (1,000 Units) by mouth daily 90 tablet 3       Family History  family history includes Coronary Artery Disease in his father; Diabetes in his father.    Social History   reports that he has never smoked. He has never used smokeless tobacco. He reports previous alcohol use. He reports that he does not use drugs.     Past Medical History  Past Medical History:   Diagnosis Date     Anemia in chronic kidney disease      Dyslipidemia      ESRD (end stage renal disease) on dialysis (H)      Hypertension      Hypomagnesemia 10/7/2019     Secondary hyperparathyroidism (H)      Type 1 diabetes (H)        Past Surgical History:   Procedure Laterality Date     hair implant  2007     INSERT CATHETER PERITONEAL DIALYSIS  08/2018     IR FINE NEEDLE ASPIRATION W ULTRASOUND  11/25/2019     IR RENAL BIOPSY LEFT  11/25/2019     PERCUTANEOUS BIOPSY KIDNEY Left 10/22/2019    Procedure: Left Kidney Biopsy;  Surgeon: Kash Hoffman MD;  Location:  OR       Physical Exam  /76   Pulse 96   Wt 93.9 kg (207 lb)   BMI 33.41 kg/m     Body mass index is 33.41 kg/m .    GENERAL : In no apparent distress  SKIN: Normal color, normal temperature, texture.  No  suspicious lesions or  rashes  EYES: PERRLA.  No proptosis.  MOUTH: Moist, pink; pharynx clear  NECK: No visible masses. No palpable adenopathy, or masses. No goiter.  RESP: normal respiratory effort.  cough   ABDOMEN: soft, nontender to palpation   NEURO: awake, alert, responds appropriately to questions.  Moves all extremities; Gait normal.     EXTREMITIES: No ulcers or open wounds.     RESULTS    Lab Results   Component Value Date    A1C 8.9 09/26/2019    A1C 10.8 01/07/2019    A1C 8.6 08/15/2018       Creatinine   Date Value Ref Range Status   12/09/2019 1.05 0.66 - 1.25 mg/dL Final     GFR Estimate   Date Value Ref Range Status   12/09/2019 88 >60 mL/min/[1.73_m2] Final     Comment:     Non  GFR Calc  Starting 12/18/2018, serum creatinine based estimated GFR (eGFR) will be   calculated using the Chronic Kidney Disease Epidemiology Collaboration   (CKD-EPI) equation.       Hemoglobin A1C   Date Value Ref Range Status   09/26/2019 8.9 (H) 0 - 5.6 % Final     Comment:     Normal <5.7% Prediabetes 5.7-6.4%  Diabetes 6.5% or higher - adopted from ADA   consensus guidelines.       Potassium   Date Value Ref Range Status   12/09/2019 4.2 3.4 - 5.3 mmol/L Final     ALT   Date Value Ref Range Status   09/26/2019 42 0 - 70 U/L Final     AST   Date Value Ref Range Status   09/26/2019 16 0 - 45 U/L Final     No results found for: TSH, T4    Creatinine   Date Value Ref Range Status   12/09/2019 1.05 0.66 - 1.25 mg/dL Final     No results for input(s): CHOL, HDL, LDL, TRIG, CHOLHDLRATIO in the last 19685 hours.  No results found for: [id number], [id number], [id number]    ASSESSMENT/PLAN:  1. Diabetes type I,  uncontrolled, with renal failure s/p renal transplant. Requires CGM for frequent fingerstick checks on insulin pump, having intermittent hypoglycemia, and ideally to have patient running in auto mode.   -increased IC ratio from noon to 5PM from 1:5 to 1:4.5g CHO   -basal rates to remain the same for now.    -will discuss  sensor issues with CDE; will likely just need to calibrate more frequently in the first 24 hours.   -will discuss pump supply refill documentation with coordinator/CDE   -he will wear sensors more often and resume running pump in auto mode.     Follow up:  1. With [facility] in 2 months.   2. Diabetes Educator follow up: PRN    The patient is  enrolled in [employer] services    This patient has met [place] community measures for diabetes control: no (D5: Control blood pressure ,Lower bad cholesterol Maintain blood sugar, Be tobacco-free, Take aspirin as recommended)    This patient is eligible for graduation from [facility] clinic: no    I spent 25 minutes with this patient face to face and explained the conditions and plans (more than 50% of time was counseling/coordination of care, diabetes management, follow up plan for worsening hyper and hypoglycemia) . The patient understood and is satisfied with today's visit.     EDITH Villanueva, PA-C  [facility]   Pager [phone]

## 2019-12-09 NOTE — NURSING NOTE
"Chief Complaint   Patient presents with     RECHECK     kidney tx     BP (!) 154/94   Pulse 89   Temp 97.9  F (36.6  C) (Oral)   Ht 1.676 m (5' 6\")   Wt 94 kg (207 lb 3.2 oz)   SpO2 98%   BMI 33.44 kg/m    Zully Cohen CMA    "

## 2019-12-09 NOTE — LETTER
"12/9/2019       RE: Michael Amin  [address] [place] [id number]     Dear Colleague,    Thank you for referring your patient, Mcihael Amin, to the [facility] ENDOCRINOLOGY at [facility]. Please see a copy of my visit note below.    [facility]- Outpatient Diabetes Follow up    Rudi is a 39 year old male with a history of longstanding TIDM with diabetic nephropathy (managed with the use of an insulin pump) , who is recently s/p renal transplant 10/1/19.    He is following up today post hospitalization. He was evaluated by the inpatient diabetes service during his hospital stay.     He followed up in clinic with Dr. Rascon 10/23/19. He was having frequent overnight hyperglycemia at home; this was in the setting of resumption of IV Methylprednisolone for borderline rejection. He was started on NPH insulin for the Methylprednisolone, and was instructed to change his IC ratio to 1:5g CHO while on steroids. He was also instructed to run his pump in auto mode. NPH was subsequently increased to 15 units with more data provided by Rudi over the phone.    He is following closely with transplant team, no longer on steroids; underwent repeat kidney biopsy 11/25, with insufficient sampling for assessment. He also had a small perinephric fluid collection, aspirated, felt to be resolving hematoma.  Has a follow up renal ultrasound today. Another biopsy has been put on hold as renal function improving, with Cr 1.0 today.     Rudi reports overall stable BG since he completed his course of steroids. He was having too much AM hypoglycemia with a basal rate of 2.2 overnight (had increased basal settings since the NPH \"didn't even touch the steroids\", but forgot to reduce it back when steroids stopped), and was having elevated -180's on 1.9, so he self-increased to 2.05. He has noticed a pattern of post-lunch hyperglycemia that resolves by dinner. If he tries to " "administer a correction bolus 1-2 h after lunch, he has developed hypoglycemia. He is only having about 1 episode of hypoglycemia weekly, typically if he gives a bolus plus correction at bedtime. He misses bolusing for carb intake a couple times per week; reminders on his pump have not been helpful. He reports \"it seems like when I behave it all goes downhill\" (? Basal is covering some prandial requirements).     He endorses an issue with running in auto mode, as his sensors take a while to begin reporting accurate BG (12-24 hours). Says the first day of data are \"always garbage\". He is careful to calibrate the sensor as needed, and will perform fingerstick checks the first day of sensor placement.     He is hoping to have pancreas transplant. He needs documentation sent to [employer] to get supplies. He intends to continue to follow with [facility] endocrinology for diabetes management.     Pt denies headaches, visual changes, n/v, SOB at rest, chest pain, abd pain, nausea/vomiting, diarrhea, dysuria, hematuria or foot ulcers.      Current Diabetes Regimen:   Medtronic 670 G, with lispro insulin  Basal rates: (from 2.1 units per hour all day PTA)  7397-0167 = 1.9 units/hr  0534-6124 = 2.4 units/hr  8278-9045 = 1.9 units/hr     Bolus settings:  Insulin: carb ratio of 1: 5g (was increased to 1:5 while on steroids and he continued this)  Insulin sensitivity factor:  1 unit to lower blood glucose 25 mg/dL  Blood glucose target range  mg/dL  Active insulin time is 3 hours    Prior to renal transplant:   Basal Rate:  2.1 units/hour  Prandial Bolus/Insulin:CHO Ratio:  1 unit for every 10 grams eaten, meals and snacks  Correction Bolus/Insulin Sensitivity Factor (ISF):  1 unit to lower BG by 25 mg/dL  Target Blood Glucose:   mg/dL   -Was having frequent lows overnight and following breakfast  -Was occasionally missing lunch bolus    Glucometer Settings  Glucometer type: Dexcom CGM  Running in auto mode 22%. "   Sensor wear 20%  Average glucose: 149  SD: 45  Fasting glucose average: 130-160  Prandial glucose average: 100-190  Documented hypoglycemia: once weekly, usually early AM.     Weight: 207, compared to recent of 196 lbs.   Physical Activity: good, more energy. Walking dog. Going to start exercising more frequently.   Nutrition: appetite good. Sometimes doesn't eat, if there isn't healthy food available (works at school, is a teacher)    Diabetes Care  Retinopathy: possible early retinopathy; last eye exam 2/2019    Nephropathy: BP well  controlled. + Hx microalbuminuria pre-tsplt. Creatinine 1.65 (GFR 46). Taking ACEi/ARB: no (on beta blocker)    Neuropathy: no    Foot Exam: no wounds or ulcers    Lipids: Taking ASA: yes, statin: yes  No results found for: LDL     ROS: 10 point review of systems completed; pertinent positives and negatives documented in HPI    Allergies  Allergies   Allergen Reactions     Thymoglobulin      Had reaction with rigors after steroid-free dose. No reaction with steroids.     Medications  Current Outpatient Medications   Medication Sig Dispense Refill     alcohol swab prep pads Use to swab area of injection/zak as directed. 100 each 3     aspirin (ASA) 81 MG EC tablet Take 81 mg by mouth daily        atorvastatin (LIPITOR) 10 MG tablet Take 1 tablet (10 mg) by mouth daily 90 tablet 3     blood glucose monitoring (ACCU-CHEK FASTCLIX) lancets U QID OR MORE OFTEN PRN  1     carvedilol (COREG) 12.5 MG tablet Take 1 tablet (12.5 mg) by mouth 2 times daily (with meals) 180 tablet 3     gentamicin (GARAMYCIN) 0.1 % external cream Apply to port exit site once daily  3     HUMALOG 100 UNIT/ML injection Insulin used for filling patient's pump  3     insulin lispro (HUMALOG VIAL) 100 UNIT/ML vial Inject 100 Units Subcutaneous daily With Insulin pump. Up to 100 units 30 mL 11     insulin pen needle (ULTICARE MICRO) 32G X 4 MM miscellaneous Use pen needles daily or as directed. 100 each 3      magnesium oxide (MAG-OX) 400 MG tablet Take 1 tablet (400 mg) by mouth daily (with lunch) 30 tablet 5     mycophenolate (GENERIC EQUIVALENT) 250 MG capsule Take 5 capsules (1,250 mg) by mouth 2 times daily 300 capsule 11     psyllium (METAMUCIL/KONSYL) 58.6 % powder Take by mouth daily       senna-docusate (SENOKOT-S/PERICOLACE) 8.6-50 MG tablet Take 2 tablets by mouth 2 times daily 20 tablet 0     sulfamethoxazole-trimethoprim (BACTRIM/SEPTRA) 400-80 MG tablet Take 1 tablet by mouth daily 30 tablet 1     tacrolimus (GENERIC EQUIVALENT) 0.5 MG capsule Take 1 capsule (0.5 mg) by mouth 2 times daily Total dose = 2.5 mg twice a day 60 capsule 11     tacrolimus (GENERIC EQUIVALENT) 1 MG capsule Take 2 capsules (2 mg) by mouth 2 times daily Total dose = 2.5 mg twice a day 120 capsule 11     valGANciclovir (VALCYTE) 450 MG tablet Take 2 tablets (900 mg) by mouth daily 30 tablet 3     vitamin D3 (CHOLECALCIFEROL) 1000 units (25 mcg) tablet Take 1 tablet (1,000 Units) by mouth daily 90 tablet 3       Family History  family history includes Coronary Artery Disease in his father; Diabetes in his father.    Social History   reports that he has never smoked. He has never used smokeless tobacco. He reports previous alcohol use. He reports that he does not use drugs.     Past Medical History  Past Medical History:   Diagnosis Date     Anemia in chronic kidney disease      Dyslipidemia      ESRD (end stage renal disease) on dialysis (H)      Hypertension      Hypomagnesemia 10/7/2019     Secondary hyperparathyroidism (H)      Type 1 diabetes (H)        Past Surgical History:   Procedure Laterality Date     hair implant  2007     INSERT CATHETER PERITONEAL DIALYSIS  08/2018     IR FINE NEEDLE ASPIRATION W ULTRASOUND  11/25/2019     IR RENAL BIOPSY LEFT  11/25/2019     PERCUTANEOUS BIOPSY KIDNEY Left 10/22/2019    Procedure: Left Kidney Biopsy;  Surgeon: Kash Hoffman MD;  Location:  OR       Physical Exam  /76    Pulse 96   Wt 93.9 kg (207 lb)   BMI 33.41 kg/m     Body mass index is 33.41 kg/m .    GENERAL : In no apparent distress  SKIN: Normal color, normal temperature, texture.  No  suspicious lesions or rashes  EYES: PERRLA.  No proptosis.  MOUTH: Moist, pink; pharynx clear  NECK: No visible masses. No palpable adenopathy, or masses. No goiter.  RESP: normal respiratory effort.  cough   ABDOMEN: soft, nontender to palpation   NEURO: awake, alert, responds appropriately to questions.  Moves all extremities; Gait normal.     EXTREMITIES: No ulcers or open wounds.     RESULTS    Lab Results   Component Value Date    A1C 8.9 09/26/2019    A1C 10.8 01/07/2019    A1C 8.6 08/15/2018       Creatinine   Date Value Ref Range Status   12/09/2019 1.05 0.66 - 1.25 mg/dL Final     GFR Estimate   Date Value Ref Range Status   12/09/2019 88 >60 mL/min/[1.73_m2] Final     Comment:     Non  GFR Calc  Starting 12/18/2018, serum creatinine based estimated GFR (eGFR) will be   calculated using the Chronic Kidney Disease Epidemiology Collaboration   (CKD-EPI) equation.       Hemoglobin A1C   Date Value Ref Range Status   09/26/2019 8.9 (H) 0 - 5.6 % Final     Comment:     Normal <5.7% Prediabetes 5.7-6.4%  Diabetes 6.5% or higher - adopted from ADA   consensus guidelines.       Potassium   Date Value Ref Range Status   12/09/2019 4.2 3.4 - 5.3 mmol/L Final     ALT   Date Value Ref Range Status   09/26/2019 42 0 - 70 U/L Final     AST   Date Value Ref Range Status   09/26/2019 16 0 - 45 U/L Final     No results found for: TSH, T4    Creatinine   Date Value Ref Range Status   12/09/2019 1.05 0.66 - 1.25 mg/dL Final     No results for input(s): CHOL, HDL, LDL, TRIG, CHOLHDLRATIO in the last 76417 hours.  No results found for: [id number], [id number], [id number]      ASSESSMENT/PLAN:    1. Diabetes type I,  uncontrolled, with renal failure s/p renal transplant. Requires CGM for frequent fingerstick checks on insulin pump, having  intermittent hypoglycemia, and ideally to have patient running in auto mode.   -increased IC ratio from noon to 5PM from 1:5 to 1:4.5g CHO   -basal rates to remain the same for now.    -will discuss sensor issues with CDE; will likely just need to calibrate more frequently in the first 24 hours.   -will discuss pump supply refill documentation with coordinator/CDE   -he will wear sensors more often and resume running pump in auto mode.         Follow up:  1. With [facility] endocrine in 2 months.   2. Diabetes Educator follow up: PRN    The patient is  enrolled in zoidu services    This patient has met [place] community measures for diabetes control: no (D5: Control blood pressure ,Lower bad cholesterol Maintain blood sugar, Be tobacco-free, Take aspirin as recommended)    This patient is eligible for graduation from [facility] Endocrinology clinic: no    I spent 25 minutes with this patient face to face and explained the conditions and plans (more than 50% of time was counseling/coordination of care, diabetes management, follow up plan for worsening hyper and hypoglycemia) . The patient understood and is satisfied with today's visit.     EDITH Villanueva PA-C  [facility]   Pager [phone]      Again, thank you for allowing me to participate in the care of your patient.      Sincerely,    Archana Nguyễn PA-C

## 2019-12-09 NOTE — PROGRESS NOTES
SUBJECTIVE/OBJECTIVE:                Michael Amin is a 39 year old male coming in for a follow up MTM visit.  He was referred post txp .    Chief Complaint: 2 months post txp    Allergies/ADRs: Reviewed in Epic  Tobacco: No tobacco use   Alcohol: not currently using  Caffeine: no caffeine, drinking  PMH: Reviewed in Epic    Medication Adherence/Access:  Patient uses pill box(es) and uses reminders/alarms.  Patient takes medications 3 time(s) per day. 6am, noon, and 6pm  Per patient, misses medication 0 times per week. No missed doses but has had late doses.   Medication barriers: none.   The patient fills medications at [facility]    Renal Transplant:  Current immunosuppressants include Tacrolimus 4mg BID (0-6 months post tx, goal 8-10) and MMF 1250mg BID (goal 0-6 month post tx: 1-3.5).  Pt reports no side effects  Transplant date: 10/1/19  Estimated Creatinine Clearance: 100.4 mL/min (based on SCr of 1.05 mg/dL).  CMV prophylaxis: Valcyte 900mg daily. Treat 3 months post tx.  PCP prophylaxis: Bactrim S S daily  Current supplements for electrolyte replacement: Mag Oxide 400mg daily ( 2 hours from MMF) .  Magnesium   Date Value Ref Range Status   11/13/2019 2.0 1.6 - 2.3 mg/dL Final   Tx Coordinator: Sabine Anders RN, Using Med Card: Yes  Home BG: Humalog pump about 100 units a day, requesting refill.   Immunizations: annual flu shot 2017; [id number]:  2007; Prevnar 13: 2018; TDaP:  2017; Shingrix: not on file  Stools: Senna 1-2 weekly prn, constipation. Hard stools.  Senna prn is effective for him    Hypertension: Current medications include Carvedilol 12.5mg BID (increased today).  Patient does self-monitor BP. 140-150/90s.  Patient reports no current medication side effects.    Hyperlipidemia: Current therapy includes Atorvastatin 10mg once daily.  Pt reports no recent myalgias  The ASCVD Risk score ([place] CAMPBELL Lee, et al., 2013) failed to calculate for the following reasons:    Cannot  find a previous HDL lab    Cannot find a previous total cholesterol lab    Diabetes:  Pt currently taking insulin pump ~100 units daily, seeing endocrine today  SMBG: four times daily.      Today's Vitals: There were no vitals taken for this visit.    ASSESSMENT:                 Current medications were reviewed today.      Medication Adherence: good, no issues identified.    Renal Transplant:  Valcyte discontinue date is 1/1/19.     Hypertension: Carvedilol appropriately increased by txp team today.     Hyperlipidemia: Stable.     Diabetes:  Stable. Follow-up with endocrine today.    PLAN:                Pt to...  1. valcyte quit date 1/1/20    I spent 30 minutes with this patient today. I offer these suggestions for consideration by txp etam. A copy of the visit note was provided to the patient's referring provider.    Will follow up in 2 months.    The patient was given a summary of these recommendations as an after visit summary.    Abdullahi Javier, PharmD  [facility] Pharmacist    Phone: [phone]

## 2019-12-09 NOTE — PATIENT INSTRUCTIONS
1. Your insulin to carb ratio was increased to 1 per 4.5 g between noon and 5PM. Keep working to bolus with all meals and snacks  2. I will send a message to our clinic coordinator and diabetes educator regarding your sensor issues, as well as faxing issues to [employer] for pump supply refills.

## 2019-12-11 ENCOUNTER — TELEPHONE (OUTPATIENT)
Dept: ENDOCRINOLOGY | Facility: CLINIC | Age: 39
End: 2019-12-11

## 2019-12-11 NOTE — TELEPHONE ENCOUNTER
Forms received from: [employer]     Forms were prescription for pump supplies and diabetic testing supplies     Faxed Date:12/16/19

## 2019-12-11 NOTE — TELEPHONE ENCOUNTER
Rudi needs to  Contact [employer] with provider change  In order for paperwork to be faxed to us . Chani Metz RN on 12/11/2019 at 1:28 PM

## 2019-12-11 NOTE — TELEPHONE ENCOUNTER
----- Message from Zandra Johnson sent at 12/10/2019  5:19 PM CST -----  I spoke to both [employer] and Rudi, he will need to call [employer] and verify the provider information. Once he verify's the information they will fax the form over.   ----- Message -----  From: Chani Metz RN  Sent: 12/9/2019   5:16 PM CST  To: Zandra Johnson    Can you contact [email or web address] to have them sned the form to us a new clinic.   ----- Message -----  From: Archana Nguyễn PA-C  Sent: 12/9/2019   1:31 PM CST  To: Chani Metz, LILLI, #    Rudi saw me in clinic today and he needs more Medtronic pump supplies. Can we initiate faxing paperwork to [employer] for him? He has previously done this through another provider at another system, but is transferring care to [facility] for diabetes management  Thank you  Archana Nguyễn

## 2019-12-13 ENCOUNTER — TELEPHONE (OUTPATIENT)
Dept: ENDOCRINOLOGY | Facility: CLINIC | Age: 39
End: 2019-12-13

## 2019-12-13 NOTE — TELEPHONE ENCOUNTER
[facility] Call Center    Phone Message    May a detailed message be left on voicemail: no    Reason for Call: Other: . So from [employer] called following up on the forms regarding prescription for pump supplies and diabetic testing supplies. So phone number  opt 2/Fax #[phone]    Action Taken: Message routed to:  [facility] ([facility]): matthew

## 2019-12-16 ENCOUNTER — MEDICAL CORRESPONDENCE (OUTPATIENT)
Dept: HEALTH INFORMATION MANAGEMENT | Facility: CLINIC | Age: 39
End: 2019-12-16

## 2019-12-16 ENCOUNTER — TELEPHONE (OUTPATIENT)
Dept: TRANSPLANT | Facility: CLINIC | Age: 39
End: 2019-12-16

## 2019-12-16 NOTE — TELEPHONE ENCOUNTER
Call from Kera at  regarding critical glucose of 49 drawn at 1630.  Attempted to call patient, no answer, LM requesting return call back.  Also called patient's mother to try to get ahold of patient.

## 2019-12-16 NOTE — TELEPHONE ENCOUNTER
In previous note dated 12/11/19 form was received and placed for signature. Provider in clinic to complete 12/16/19

## 2019-12-17 ENCOUNTER — TELEPHONE (OUTPATIENT)
Dept: EDUCATION SERVICES | Facility: CLINIC | Age: 39
End: 2019-12-17

## 2019-12-17 NOTE — TELEPHONE ENCOUNTER
Received call back from patient at 1750.  Patient reports he could tell his blood sugar was low, so he has already ate and rechecked blood glucose.  He reports it's now WNL.

## 2019-12-17 NOTE — TELEPHONE ENCOUNTER
"----- Message from Archana Nguyễn PA-C sent at 12/9/2019  6:53 PM CST -----  Regarding: RE:  Absolutely, I m sure he would be happy to talk with someone. I have heard from a couple of patients who are not happy with the medtronic sensors, but did not know it was a large scale issue. Thanks for the insight!  Archana  ----- Message -----  From: Delmy Mercado RN  Sent: 12/9/2019   6:33 PM CST  To: Archana Nguyễn PA-C    As you probably know, there are lots of complaints about the Guardian 3 sensor.  I had Ivonne Stahl, our [employer] Clinical Nurse Specialist, come out and meet with some patients who were struggling with the sensor.  I've gotten very good feedback from patients about this.  Is it okay if I see if Rudi would agree to this?  Ivonne and I would do a joint visit.  It is good for [employer] to get the patient feedback directly too.  ----- Message -----  From: Archana Nguyễn PA-C  Sent: 12/9/2019   1:29 PM CST  To: LILLI Quan is a TIDM on Medtronic pump with sensor to run in auto mode. He is describing that his sensor data \"is garbage\" for the first 24 hours, reports that he calibrates, and then the sensor is beeping continuously after that, saying his BG are in 30-40's. He will fingerstick and it shows glucoses in 140's. He feels this is a barrier to using the sensors and running his pump in auto mode. He says even if he calibrates a few extra times, this doesn't help.     Besides calibrating more frequently, is there anything else he can do about this?   Archana"

## 2019-12-23 DIAGNOSIS — Z94.0 KIDNEY REPLACED BY TRANSPLANT: ICD-10-CM

## 2019-12-23 DIAGNOSIS — Z79.899 LONG TERM USE OF DRUG: ICD-10-CM

## 2019-12-23 PROCEDURE — 87799 DETECT AGENT NOS DNA QUANT: CPT | Performed by: INTERNAL MEDICINE

## 2020-01-02 ENCOUNTER — TELEPHONE (OUTPATIENT)
Dept: TRANSPLANT | Facility: CLINIC | Age: 40
End: 2020-01-02

## 2020-01-02 NOTE — TELEPHONE ENCOUNTER
Post Kidney and Pancreas Transplant Team Conference  Date: 1/2/2020  Transplant Coordinator: Lea Chanel     Attendees:  [x]  Dr. Hernández  [x] Candelaria Kramer LPN     []  Dr. Landon [x] Sabine Anders RN [] Luisa Landis LPN   []  Dr. Hoffman [] Jane Singer, RN     [] Nereida Addison RN [] Abdullahi Javier, LilianaD   [] Dr. Wolfe [] Chloe Bryson RN    [] Dr. Liu [] Vicente Ross RN    [] Dr. Alonzo [] Abbi Carrasco, LILLI    [x] Dr. Mccullough [] Annita Taylor, RN     [] Irene Mccracken, LILLI    [] Surgery Fellow [x] Stephany Culver RN    [] Estela Woodard NP [] Karissa Villanueva RN        Verbal Plan Read Back:   No changes at this time    Routed to RN Coordinator   Candelaria Kramer LPN

## 2020-01-05 ENCOUNTER — TELEPHONE (OUTPATIENT)
Dept: TRANSPLANT | Facility: CLINIC | Age: 40
End: 2020-01-05

## 2020-01-05 DIAGNOSIS — N18.6 ESRD (END STAGE RENAL DISEASE) ON DIALYSIS (H): ICD-10-CM

## 2020-01-05 DIAGNOSIS — Z99.2 ESRD (END STAGE RENAL DISEASE) ON DIALYSIS (H): ICD-10-CM

## 2020-01-05 DIAGNOSIS — Z94.0 KIDNEY TRANSPLANTED: Primary | ICD-10-CM

## 2020-01-05 NOTE — TELEPHONE ENCOUNTER
ISSUE tacrolimus  Level 6.8 below goal level Labs drawn 12/30             PLAN:   Please call patient and confirm this was an accurate 12-hour trough. Verify Tacrolimus IR dose 2.5   mg BID. Confirm no new medications or illness. Confirm no missed doses. If accurate trough and accurate dose, increase Tacrolimus IR dose to  3.0  mg BID and repeat labs in  In one week     Task 2   Please review most recent BP ,Temp and Weight    Any low grade fevers ,   Patient denies any recent illness, dose changes and states he may have missed 1 dose 3-4 days prior to last lab draw.    Please review lab schedule   Reviewed current lab schedule  Please review and confirm follow up appointments  Reviewed upcoming appt.s   Review if any missed medications in the past week or past month     Up to date with BK PCR and Donor Specific Antibodies  surveillance  labs     TASK   OUTCOME:   Spoke with patient, they confirm accurate trough level and current dose 2.5 mg BID. Patient confirmed dose change to 3 mg BID and to repeat labs in 1 weeks. Orders sent to preferred pharmacy for dose change and lab for repeat labs. Patient voiced understanding of plan.

## 2020-01-06 DIAGNOSIS — Z79.899 LONG TERM USE OF DRUG: ICD-10-CM

## 2020-01-06 DIAGNOSIS — Z94.0 KIDNEY REPLACED BY TRANSPLANT: ICD-10-CM

## 2020-01-06 PROCEDURE — 87799 DETECT AGENT NOS DNA QUANT: CPT | Performed by: INTERNAL MEDICINE

## 2020-01-06 RX ORDER — TACROLIMUS 1 MG/1
3 CAPSULE ORAL 2 TIMES DAILY
Qty: 180 CAPSULE | Refills: 11 | Status: SHIPPED | OUTPATIENT
Start: 2020-01-06 | End: 2020-01-24

## 2020-01-06 RX ORDER — TACROLIMUS 0.5 MG/1
CAPSULE ORAL
Qty: 60 CAPSULE | Refills: 11
Start: 2020-01-06 | End: 2020-11-13

## 2020-01-14 ENCOUNTER — TELEPHONE (OUTPATIENT)
Dept: TRANSPLANT | Facility: CLINIC | Age: 40
End: 2020-01-14

## 2020-01-14 NOTE — TELEPHONE ENCOUNTER
ISSUE:  Creatinine 1.63    Notes recorded by Jabier Hernández MD on 1/14/2020 at 11:15 AM CST  Elevated serum creatinine and recommend pushing fluids and would repeat labs.  If still elevated, would consider kidney transplant biopsy.    PLAN:  Please call pt. Ask if he's been dehydrated? Inquire if any recent illness?  -advise him to increase hydration.   -repeat labs before next week's appt    LPN TASK:  Please call Michael Amin with the above instructions and plan lab orders if needed.

## 2020-01-15 NOTE — TELEPHONE ENCOUNTER
Left message for patient regarding:  Creatinine 1.63  Elevated serum creatinine and recommend pushing fluids and would repeat labs.  If still elevated, would consider kidney transplant biopsy.

## 2020-01-21 ENCOUNTER — OFFICE VISIT (OUTPATIENT)
Dept: TRANSPLANT | Facility: CLINIC | Age: 40
End: 2020-01-21
Attending: SURGERY
Payer: COMMERCIAL

## 2020-01-21 ENCOUNTER — TELEPHONE (OUTPATIENT)
Dept: TRANSPLANT | Facility: CLINIC | Age: 40
End: 2020-01-21

## 2020-01-21 ENCOUNTER — ALLIED HEALTH/NURSE VISIT (OUTPATIENT)
Dept: TRANSPLANT | Facility: CLINIC | Age: 40
End: 2020-01-21
Payer: MEDICARE

## 2020-01-21 ENCOUNTER — ALLIED HEALTH/NURSE VISIT (OUTPATIENT)
Dept: EDUCATION SERVICES | Facility: CLINIC | Age: 40
End: 2020-01-21
Payer: COMMERCIAL

## 2020-01-21 VITALS
HEART RATE: 75 BPM | DIASTOLIC BLOOD PRESSURE: 92 MMHG | OXYGEN SATURATION: 95 % | BODY MASS INDEX: 33.62 KG/M2 | HEIGHT: 66 IN | SYSTOLIC BLOOD PRESSURE: 148 MMHG | WEIGHT: 209.2 LBS

## 2020-01-21 DIAGNOSIS — E10.9 TYPE 1 DIABETES MELLITUS (H): Primary | ICD-10-CM

## 2020-01-21 DIAGNOSIS — Z99.2 TYPE 1 DIABETES MELLITUS WITH CHRONIC KIDNEY DISEASE ON CHRONIC DIALYSIS (H): Primary | ICD-10-CM

## 2020-01-21 DIAGNOSIS — E10.22 TYPE 1 DIABETES MELLITUS WITH CHRONIC KIDNEY DISEASE ON CHRONIC DIALYSIS (H): Primary | ICD-10-CM

## 2020-01-21 DIAGNOSIS — E10.65 TYPE 1 DIABETES MELLITUS WITH HYPERGLYCEMIA (H): ICD-10-CM

## 2020-01-21 DIAGNOSIS — Z01.818 PRE-TRANSPLANT EVALUATION FOR PANCREAS TRANSPLANT: Primary | ICD-10-CM

## 2020-01-21 DIAGNOSIS — N18.6 TYPE 1 DIABETES MELLITUS WITH CHRONIC KIDNEY DISEASE ON CHRONIC DIALYSIS (H): Primary | ICD-10-CM

## 2020-01-21 PROCEDURE — 97803 MED NUTRITION INDIV SUBSEQ: CPT | Mod: ZF

## 2020-01-21 PROCEDURE — G0463 HOSPITAL OUTPT CLINIC VISIT: HCPCS | Mod: ZF

## 2020-01-21 ASSESSMENT — MIFFLIN-ST. JEOR: SCORE: 1801.67

## 2020-01-21 ASSESSMENT — PAIN SCALES - GENERAL: PAINLEVEL: NO PAIN (0)

## 2020-01-21 NOTE — PROGRESS NOTES
Transplant Surgery Consult Note    Medical record number: [id number]  YOB: 1980,   Consult requested by Dr. Borden for evaluation of pancreas transplant candidacy.    Assessment and Recommendations: Mr. Amin is a fair candidate for pancreas transplantation and has a good understanding of the risks and benefits of this approach to management of renal failure and diabetes. The following issues should be addressed prior to transplant:     I spoke with him extensively about his diet and glycemic control.  Historically, he has not had the best glycemic control and he admits that he does not follow the dietary recommendations for a diabetic.  He is currently requiring about 80 units a day of insulin.  While he does have type 1 diabetes with a negative C-peptide, I do think he likely has some insulin resistance on top of his type 1 diabetes.  I spoke with him extensively about this and the need to lose weight in order to improve his insulin resistance.  His BMI is 33.7 today.  I also spoke with him extensively about the fact that given the amount of insulin he is currently requiring I think it is unlikely that he would become insulin independent should he get a pancreas transplant.  I also asked the dietitian to see him today to discuss diabetic diet and weight loss.  He did seem extremely motivated to lose weight and move towards pancreas transplant.  He is going to proceed with weight loss and follow-up with us in 3 months to assess his progress and evaluate him for activation.    Would get up to date HgbA1c.    Follow up with endocrine today    The majority of our visit was spent in counselling, discussing the medical and surgical risks of living or  donor pancreas transplantation.  Access to transplant will be impacted by overall candidacy for pancreas, as well as the influence of blood type and degree of sensitization.  Potential surgical complications of pancreas transplantation include  bleeding, clotting, infection, wound complications, anastomotic failure and other issues such as cardiac complications, pneumonia, deep venous thrombosis, pulmonary embolism, post transplant diabetes and death.  We discussed benefits and risks associated with different approaches to exocrine drainage of pancreatic secretions. We also discussed differences in the average length of stay, recovery process, and posttransplant lab and monitoring protocol. We discussed the risk of graft rejection and recurrent diabetic nephropathy in the setting of poor glycemic control. I emphasized the need for strict immunosuppression adherence and the potential for complications of immunosuppression such as skin cancer or lymphoma, as well as a very low but not zero risk of donor-derived disease transmission risks (infection, cancer). Mr. Amin asked good questions and the patient's candidacy will be reviewed at our Multidisciplinary Selection Committee. Thank you for the opportunity to participate in Mr. Amin's care.    Total time: 50 minutes  Counselling time: 40 minutes      Rocio Rutherford MD FACS  Assistant Professor of Surgery  Director, Living Kidney Donor Program.  ---------------------------------------------------------------------------------------------------    HPI: Mr. Amin has Type 1 Diabetes and is s/p LDKT now being evaluated for IVETTE. The patient has had diabetes for 23 years. Management is by Humalog ~80 units over the course of a day. The patient usually checks his blood sugar 3 times/day.  Daily blood glucoses range typically from 30 to 480.  Hypoglyemic unawareness is not an issue.  The diabetes is not controlled.  He admits that he does not follow a strict diabetic diet and that he could do a better job with this.  Complications of diabetes include:    Retinopathy:  No  Neuropathy: Yes   Gastroparesis:  No    The patient is not on dialysis.    Has potential kidney donors:  N/A  Interested in  participation in paired exchange if a donor is willing: N/A  PSHx:  PD catheter  Kidney transplant    The patient has the following pertinent history:       No    Yes  Dialysis:    [x]      [] via:       Blood Transfusion                  [x]      []  Number of units:   Most recently:  Pregnancy:    [x]      [] Number:       Previous Transplant:  []      [x] Details:    Cancer    [x]      [] Comment:   Kidney stones   [x]      [] Comment:      Recurrent infections  [x]      []  Type:                  Bladder dysfunction  [x]      [] Cause:    Claudication   [x]      [] Distance:    Previous Amputation  [x]      [] Cause:     Chronic anticoagulation  [x]      [] Indication:  Hindu  [x]      []     Past Medical History:   Diagnosis Date     Anemia in chronic kidney disease      Dyslipidemia      ESRD (end stage renal disease) on dialysis (H)      Hypertension      Hypomagnesemia 10/7/2019     Secondary hyperparathyroidism (H)      Type 1 diabetes (H)      Past Surgical History:   Procedure Laterality Date     hair implant  2007     INSERT CATHETER PERITONEAL DIALYSIS  08/2018     IR FINE NEEDLE ASPIRATION W ULTRASOUND  11/25/2019     IR RENAL BIOPSY LEFT  11/25/2019     PERCUTANEOUS BIOPSY KIDNEY Left 10/22/2019    Procedure: Left Kidney Biopsy;  Surgeon: Kash Hoffman MD;  Location:  OR     Family History   Problem Relation Age of Onset     Diabetes Father      Coronary Artery Disease Father      Social History     Socioeconomic History     Marital status:      Spouse name: Not on file     Number of children: Not on file     Years of education: Not on file     Highest education level: Not on file   Occupational History     Not on file   Social Needs     Financial resource strain: Not on file     Food insecurity:     Worry: Not on file     Inability: Not on file     Transportation needs:     Medical: Not on file     Non-medical: Not on file   Tobacco Use     Smoking status: Never Smoker      Smokeless tobacco: Never Used   Substance and Sexual Activity     Alcohol use: Not Currently     Comment: Rarely 1-2 Drinks a month      Drug use: No     Sexual activity: Not on file   Lifestyle     Physical activity:     Days per week: Not on file     Minutes per session: Not on file     Stress: Not on file   Relationships     Social connections:     Talks on phone: Not on file     Gets together: Not on file     Attends Church service: Not on file     Active member of club or organization: Not on file     Attends meetings of clubs or organizations: Not on file     Relationship status: Not on file     Intimate partner violence:     Fear of current or ex partner: Not on file     Emotionally abused: Not on file     Physically abused: Not on file     Forced sexual activity: Not on file   Other Topics Concern     Parent/sibling w/ CABG, MI or angioplasty before 65F 55M? Not Asked   Social History Narrative     Not on file       ROS:   CONSTITUTIONAL:  No fevers or chills  EYES: negative for icterus  ENT:  negative for hearing loss, tinnitus and sore throat  RESPIRATORY:  negative for cough, sputum, dyspnea  CARDIOVASCULAR:  negative for chest pain None  GASTROINTESTINAL:  negative for nausea, vomiting, diarrhea or constipation  GENITOURINARY:  negative for incontinence, dysuria, bladder emptying problems  HEME:  No easy bruising  INTEGUMENT:  negative for rash and pruritus  NEURO:  Negative for headache, seizure disorder    Allergies:   Allergies   Allergen Reactions     Thymoglobulin      Had reaction with rigors after steroid-free dose. No reaction with steroids.       Medications:  Prescription Medications as of 1/21/2020       Rx Number Disp Refills Start End Last Dispensed Date Next Fill Date Owning Pharmacy    alcohol swab prep pads  100 each 3 10/23/2019    [facility] [place], [place] - [address] [place] [address] [phone]    Sig: Use to swab area of injection/zak as directed.    Class:  E-Prescribe    aspirin (ASA) 81 MG EC tablet            Sig: Take 81 mg by mouth daily     Class: Historical    Route: Oral    atorvastatin (LIPITOR) 10 MG tablet  90 tablet 3 10/7/2019    [address]    Sig: Take 1 tablet (10 mg) by mouth daily    Class: E-Prescribe    Route: Oral    blood glucose monitoring (ACCU-CHEK FASTCLIX) lancets   1 8/17/2018        Sig: U QID OR MORE OFTEN PRN    Class: Historical    carvedilol (COREG) 12.5 MG tablet  180 tablet 3 12/9/2019    [address]    Sig: Take 1 tablet (12.5 mg) by mouth 2 times daily (with meals)    Class: E-Prescribe    Route: Oral    gentamicin (GARAMYCIN) 0.1 % external cream   3 8/31/2018        Sig: Apply to port exit site once daily    Class: Historical    HUMALOG 100 UNIT/ML injection   3 9/19/2018        Sig: Insulin used for filling patient's pump    Class: Historical    insulin lispro (HUMALOG VIAL) 100 UNIT/ML vial  30 mL 11 10/7/2019    [address]365    Sig: Inject 100 Units Subcutaneous daily With Insulin pump. Up to 100 units    Class: E-Prescribe    Route: Subcutaneous    insulin pen needle (ULTICARE MICRO) 32G X 4 MM miscellaneous  100 each 3 10/23/2019    [address] 1[address]052    Sig: Use pen needles daily or as directed.    Class: E-Prescribe    magnesium oxide (MAG-OX) 400 MG tablet  30 tablet 5 10/5/2019    [facility] [place], [place] - [address]    Sig: Take 1 tablet (400 mg) by mouth daily (with lunch)    Class: E-Prescribe    Route: Oral    mycophenolate (GENERIC EQUIVALENT) 250 MG capsule  300 capsule 11 11/25/2019    [facility] - [place], [place] - [address]    Sig: Take 5 capsules (1,250 mg) by mouth 2 times  daily    Class: E-Prescribe    Notes to Pharmacy: TXP DT 10/1/2019 (Kidney) TXP Dischg DT  [id number] TX [facility]    Route: Oral    psyllium (METAMUCIL/KONSYL) 58.6 % powder            Sig: Take by mouth daily    Class: Historical    Route: Oral    senna-docusate (SENOKOT-S/PERICOLACE) 8.6-50 MG tablet  20 tablet 0 10/5/2019    [facility] [address]    Sig: Take 2 tablets by mouth 2 times daily    Class: E-Prescribe    Route: Oral    sulfamethoxazole-trimethoprim (BACTRIM/SEPTRA) 400-80 MG tablet  30 tablet 1 11/25/2019    [facility] [address]    Sig: Take 1 tablet by mouth daily    Class: E-Prescribe    Route: Oral    tacrolimus (GENERIC EQUIVALENT) 0.5 MG capsule  60 capsule 11 1/6/2020        Sig: HOLD    Class: No Print Out    Notes to Pharmacy: TXP DT 10/1/2019 (Kidney) TXP Dischg DT  DX Kidney replaced by transplant Z94.0 [facility] ([place], [place])    tacrolimus (GENERIC EQUIVALENT) 1 MG capsule  180 capsule 11 1/6/2020    [facility] [place], [place] - [address] [place] [address]    Sig: Take 3 capsules (3 mg) by mouth 2 times daily    Class: E-Prescribe    Notes to Pharmacy: TXP DT 10/1/2019 (Kidney) TXP Dischg DT  [id number] [facility]    Route: Oral    valGANciclovir (VALCYTE) 450 MG tablet  30 tablet 3 10/6/2019    [place], [place] - [address] [place] [address]    Sig: Take 2 tablets (900 mg) by mouth daily    Class: E-Prescribe    Route: Oral    vitamin D3 (CHOLECALCIFEROL) 1000 units (25 mcg) tablet  90 tablet 3 10/7/2019    [place], [place] - [address] [place] [address] [address]    Sig: Take 1 tablet (1,000 Units) by mouth daily    Class: E-Prescribe    Route: Oral          Exam:   Pulse:  [75] 75  BP: (148)/(92) 148/92  SpO2:   [95 %] 95 %  Appearance: in no apparent distress.   Skin: normal  Head and Neck: Normal, no rashes or jaundice  Respiratory: easy respirations, no audible wheezing.  Cardiovascular: RRR  Abdomen: rounded and obese, No distention and Surgical scars consistent with history.  ASIS difficult to palpate.  Goel incision on left well healed   Extremeties: femoral 2+/2+, Edema, none  Neuro: without deficit     Diagnostics:   Recent Results (from the past 672 hour(s))   Basic metabolic panel    Collection Time: 12/30/19  8:46 AM   Result Value Ref Range    Sodium (External) 139 134 - 143 mEq/L    Potassium (External) 4.6 3.4 - 5.1 mEq/L    Chloride (External) 104 99 - 110 mEq/L    CO2 (External) 27 19 - 29 mEq/L    Anion Gap (External) 8.0 3.0 - 15.0 mEq/L    Urea Nitrogen (External) 17 5 - 24 mg/dL    Creatinine (External) 1.18 0.70 - 1.20 mg/dL    GFR Estimated (External) >60 >60 ml/min/1.73m2    Calcium (External) 10.0 8.4 - 10.5 mg/dL    Glucose (External) 95 70 - 99 mg/dL   CBC with platelets    Collection Time: 12/30/19  8:46 AM   Result Value Ref Range    WBC Count (External) 4.1 3.2 - 11.0 10*9/L    RBC Count (External) 4.63 4.14 - 5.76 10*12/L    Hemoglobin (External) 12.7 (L) 12.9 - 16.9 g/dL    Hematocrit (External) 39.3 38.4 - 49.7 %    MCV (External) 84.9 81.4 - 99.0 fL    MCH (External) 27.4 26.7 - 33.1 pg    MCHC (External) 32.3 31.6 - 35.5 g/dL    RDW (External) 13.1 11.3 - 14.6 %    Platelet Count (External) 198 130 - 375 10*9/L   Mycophenolic acid    Collection Time: 12/30/19  8:46 AM   Result Value Ref Range    Mycophenolic Acid  (External) 1.1 1.0 - 3.5 mcg/mL    MPA Glucuronide (External) 49 35 - 100 mcg/mL   Tacrolimus level    Collection Time: 12/30/19  8:46 AM   Result Value Ref Range    Tacrolimus(FK-506) (External) 6.8 5.0 - 15.0 ng/mL   Basic metabolic panel    Collection Time: 01/06/20  4:28 PM   Result Value Ref Range    Sodium (External) 141 134 - 143 mEq/L    Potassium (External) 5.0 3.4 -  5.1 mEq/L    Chloride (External) 107 99 - 110 mEq/L    CO2 (External) 25 19 - 29 mEq/L    Anion Gap (External) 9.0 3.0 - 15.0 mEq/L    Urea Nitrogen (External) 22 5 - 24 mg/dL    Creatinine (External) 1.34 (H) 0.70 - 1.20 mg/dL    GFR Estimated (External) 59 (L) >60 ml/min/1.73m2    Calcium (External) 9.4 8.4 - 10.5 mg/dL    Glucose (External) 132 (H) 70 - 99 mg/dL   CBC with platelets    Collection Time: 01/06/20  4:28 PM   Result Value Ref Range    WBC Count (External) 5.0 3.2 - 11.0 10*9/L    RBC Count (External) 4.36 4.14 - 5.76 10*12/L    Hemoglobin (External) 11.9 (L) 12.9 - 16.9 g/dL    Hematocrit (External) 37.0 (L) 38.4 - 49.7 %    MCV (External) 84.9 81.4 - 99.0 fL    MCH (External) 27.3 26.7 - 33.1 pg    MCHC (External) 32.2 31.6 - 35.5 g/dL    RDW (External) 13.2 11.3 - 14.6 %    Platelet Count (External) 210 130 - 375 10*9/L   Magnesium    Collection Time: 01/06/20  4:28 PM   Result Value Ref Range    Magnesium (External) 1.5 (L) 1.8 - 2.7 mg/dL   Phosphorus    Collection Time: 01/06/20  4:28 PM   Result Value Ref Range    Phosphorus (External) 3.3 2.5 - 4.6 mg/dL   Hepatitis C antibody    Collection Time: 01/06/20  4:28 PM   Result Value Ref Range    Hepatitis C Antibody (External) Nonreactive Nonreactive   HIV Antigen Antibody Combo    Collection Time: 01/06/20  4:28 PM   Result Value Ref Range    HIV 1&2 Antibody (External) Nonreactive Nonreactive   BK virus PCR quantitative    Collection Time: 01/06/20  4:28 PM   Result Value Ref Range    BK Virus Specimen Plasma     BK Virus Result BK Virus DNA Not Detected BKNEG^BK Virus DNA Not Detected copies/mL    BK Virus Log Not Calculated <2.7 Log copies/mL   Mycophenolic acid    Collection Time: 01/06/20  4:28 PM   Result Value Ref Range    Mycophenolic Acid  (External) 2.5 1.0 - 3.5 mcg/mL    MPA Glucuronide (External) 87 35 - 100 mcg/mL   Tacrolimus level    Collection Time: 01/06/20  4:28 PM   Result Value Ref Range    Tacrolimus(FK-506) (External) 9.3  5.0 - 15.0 ng/mL   Tacrolimus level    Collection Time: 01/07/20  4:09 PM   Result Value Ref Range    Tacrolimus(FK-506) (External) 9.0 5.0 - 15.0 ng/mL   CBC with platelets    Collection Time: 01/13/20  4:21 PM   Result Value Ref Range    WBC Count (External) 4.4 3.2 - 11.0 10*9/L    RBC Count (External) 4.32 4.14 - 5.76 10*12/L    Hemoglobin (External) 12.0 (L) 12.9 - 16.9 g/dL    Hematocrit (External) 36.4 (L) 38.4 - 49.7 %    MCV (External) 84.3 81.4 - 99.0 fL    MCH (External) 27.8 26.7 - 33.1 pg    MCHC (External) 33.0 31.6 - 35.5 g/dL    RDW (External) 13.3 11.3 - 14.6 %    Platelet Count (External) 213 130 - 375 10*9/L   Basic metabolic panel    Collection Time: 01/13/20  4:21 PM   Result Value Ref Range    Sodium (External) 138 134 - 143 mEq/L    Potassium (External) 4.5 3.4 - 5.1 mEq/L    Chloride (External) 106 99 - 110 mEq/L    CO2 (External) 25 19 - 29 mEq/L    Anion Gap (External) 7.0 3.0 - 15.0 mEq/L    Urea Nitrogen (External) 18 5 - 24 mg/dL    Creatinine (External) 1.63 (H) 0.70 - 1.20 mg/dL    GFR Estimated (External) 47 (L) >60 mL/min/1.73m2    Calcium (External) 10.1 8.4 - 10.5 mg/dL    Glucose (External) 281 (H) 70 - 99 mg/dL   Tacrolimus level    Collection Time: 01/13/20  4:21 PM   Result Value Ref Range    Tacrolimus(FK-506) (External) 9.8 5.0 - 15.0 ng/mL   Mycophenolic acid    Collection Time: 01/13/20  4:21 PM   Result Value Ref Range    Mycophenolic Acid  (External) 2.2 1.0 - 3.5 mcg/mL    MPA Glucuronide (External) 79 35 - 100 mcg/mL   CBC with platelets    Collection Time: 01/20/20  4:24 PM   Result Value Ref Range    WBC Count (External) 2.4 (L) 3.2 - 11.0 10*9/L    RBC Count (External) 4.42 4.14 - 5.76 10*12/L    Hemoglobin (External) 11.9 (L) 12.9 - 16.9 g/dL    Hematocrit (External) 37.0 (L) 38.4 - 49.7 %    MCV (External) 83.7 81.4 - 99.0 fl    MCH (External) 26.9 26.7 - 33.1 pg    MCHC (External) 32.2 31.6 - 35.5 g/dL    RDW (External) 13.1 11.3 - 14.6 %    Platelet Count  (External) 188 130 - 375 10*9/L   Basic metabolic panel    Collection Time: 01/20/20  4:24 PM   Result Value Ref Range    Sodium (External) 132 (L) 134 - 143 mEq/L    Potassium (External) 5.4 (H) 3.4 - 5.1 mEq/L    Chloride (External) 99 99 - 110 mEq/L    CO2 (External) 24 19 - 29 mEq/L    Anion Gap (External) 9.0 3.0 - 15.0 mEq/L    Urea Nitrogen (External) 22 5 - 24 mg/dL    Creatinine (External) 1.86 (H) 0.70 - 1.20 mg/dL    GFR Estimated (External) 40 (L) >60 mL/min    Calcium (External) 9.1 8.4 - 10.5 mg/dL    Glucose (External) 473 (H) 70 - 99 mg/dL     OS cPRA   Date Value Ref Range Status   12/02/2019 0  Final

## 2020-01-21 NOTE — PROGRESS NOTES
Outpatient MNT     Time Spent: 30 minutes  Visit Type: Follow up  Referring Physician: Dr. Rutherford  Reason for RD Visit: Weight management and review of CHO counting   Pt presented: by himself     Medical dx associated with RD referral  DM1, pancreas txp waitlist     Nutrition Assessment  Appetite: Pt reports that his appetite fluctuates. He eats BID to TID meals, skipping breakfast most days, and eats several snacks throughout the day. He does read nutrition labels to determine the grams of CHO in different packaged foods but sometimes forgets to take his insulin after eating. Pt also reports that when his BG gets low he usually drinks a pop and his sugars overcorrect and go high.      Vitamins, Supplements, Pertinent Meds: None   Herbal Medicines/Supplements: None     Diet Recall  Breakfast Skips most days but may have a granola bar   Lunch Leftovers like chili or spaghetti   Dinner Chili, spaghetti   Snacks Bite size chocolate bars, chips   Beverages Water, 5 cans of diet pop per week, 1 can of regular pop if BG gets low   Alcohol <1 drink per month    Dining out 2-3 meals per week     Physical Activity  Pt goes on 1 walk per week with his dog in the winter     Procedures with Nutritional Implications  Kidney txp 10/1/19, Pancreas txp waitlist     Anthropometrics  Height:   66 in   BMI:    34 kg/m2    Weight Status:Obesity Grade I BMI 30-34.9   Weight:  209 lbs (95 kg)             IBW (lb): 142#  % IBW: 147%    Wt Hx: Pt reports slowly gaining wt over the last 3-4 months since the weather has decreased his dog walking time outside.      Adj/dosing BW: 159 lbs/72 kg       Malnutrition  % Intake: No decreased intake noted  % Weight Loss: None noted  Subcutaneous Fat Loss: None noted  Muscle Loss: None noted  Fluid Accumulation/Edema: None noted  Malnutrition Diagnosis: Patient does not meet two of the above criteria necessary for diagnosing malnutrition    Estimated Nutrition Needs  Energy  2437-9919 kcal/day       (20-25 kcal/kg dosing BW for desired wt loss)     Protein  72-86 g/day     (1-1.2 g/kg per obesity guidelines)         Fluid  1 ml/kcal or per MD     Nutrition Diagnosis  Unintended weight gain related to excessive energy intake as evidenced by BMI of 34 kg/m2.     Nutrition Intervention  Nutrition education:  (1) Discussed strategies for weight loss including increased physical activity, decreased portions, consuming more protein and fiber, following the principle of My Plate, etc. Encouraged pt to follow up with weight management clinic if further guidance desired.     (2) Reviewed basic DM education and principle of CHO counting. Reviewed and provided the following handout: CHO Counting For People With DM. Encouraged the pt to portion out a smaller amount of CHOs to consume during BG low so his doesn't overcompensate.      Patient Understanding: Pt verbalized understanding of education provided.  Expected Compliance: Good  Follow-Up Plans: PRN     Nutrition Goals  1. Patient to consume % of estimated energy and protein needs between TID meals and snacks.  2. Wt loss vs wt maintenance       Raquel Jaffe RD, LD  [facility] [phone]

## 2020-01-21 NOTE — PROGRESS NOTES
"Chief Complaint   Patient presents with     Pre-Op Exam     Pancreas waitlist     Blood pressure (!) 148/92, pulse 75, height 1.676 m (5' 6\"), weight 94.9 kg (209 lb 3.2 oz), SpO2 95 %.    Stephanie Foreman, EMERALD    "

## 2020-01-21 NOTE — TELEPHONE ENCOUNTER
Jabier Hernández MD Huepfel, Mary K, RN             Elevated serum creatinine and recommend good hydration, possibly IV fluids.  If creatinine remains elevated, would recommend a kidney transplant biopsy.      Please call Rudi

## 2020-01-21 NOTE — LETTER
"1/21/2020      RE: Michael Amin  [address]  [facility] [address]       Chief Complaint   Patient presents with     Pre-Op Exam     Pancreas waitlist     Blood pressure (!) 148/92, pulse 75, height 1.676 m (5' 6\"), weight 94.9 kg (209 lb 3.2 oz), SpO2 95 %.    Stephanie Foreman [facility]      Transplant Surgery Consult Note    Medical record number: [id number]  YOB: 1980,   Consult requested by Dr. Borden for evaluation of pancreas transplant candidacy.    Assessment and Recommendations: Mr. Amin is a fair candidate for pancreas transplantation and has a good understanding of the risks and benefits of this approach to management of renal failure and diabetes. The following issues should be addressed prior to transplant:     I spoke with him extensively about his diet and glycemic control.  Historically, he has not had the best glycemic control and he admits that he does not follow the dietary recommendations for a diabetic.  He is currently requiring about 80 units a day of insulin.  While he does have type 1 diabetes with a negative C-peptide, I do think he likely has some insulin resistance on top of his type 1 diabetes.  I spoke with him extensively about this and the need to lose weight in order to improve his insulin resistance.  His BMI is 33.7 today.  I also spoke with him extensively about the fact that given the amount of insulin he is currently requiring I think it is unlikely that he would become insulin independent should he get a pancreas transplant.  I also asked the dietitian to see him today to discuss diabetic diet and weight loss.  He did seem extremely motivated to lose weight and move towards pancreas transplant.  He is going to proceed with weight loss and follow-up with us in 3 months to assess his progress and evaluate him for activation.    Would get up to date HgbA1c.    Follow up with endocrine today    The majority of our visit was spent in counselling, discussing " the medical and surgical risks of living or  donor pancreas transplantation.  Access to transplant will be impacted by overall candidacy for pancreas, as well as the influence of blood type and degree of sensitization.  Potential surgical complications of pancreas transplantation include bleeding, clotting, infection, wound complications, anastomotic failure and other issues such as cardiac complications, pneumonia, deep venous thrombosis, pulmonary embolism, post transplant diabetes and death.  We discussed benefits and risks associated with different approaches to exocrine drainage of pancreatic secretions. We also discussed differences in the average length of stay, recovery process, and posttransplant lab and monitoring protocol. We discussed the risk of graft rejection and recurrent diabetic nephropathy in the setting of poor glycemic control. I emphasized the need for strict immunosuppression adherence and the potential for complications of immunosuppression such as skin cancer or lymphoma, as well as a very low but not zero risk of donor-derived disease transmission risks (infection, cancer). Mr. Amin asked good questions and the patient's candidacy will be reviewed at our Multidisciplinary Selection Committee. Thank you for the opportunity to participate in Mr. Amin's care.    Total time: 50 minutes  Counselling time: 40 minutes      Rocio Rutherford MD FACS  Assistant Professor of Surgery  Director, Living Kidney Donor Program.  ---------------------------------------------------------------------------------------------------    HPI: Mr. Amin has Type 1 Diabetes and is s/p LDKT now being evaluated for IVETTE. The patient has had diabetes for 23 years. Management is by Humalog ~80 units over the course of a day. The patient usually checks his blood sugar 3 times/day.  Daily blood glucoses range typically from 30 to 480.  Hypoglyemic unawareness is not an issue.  The diabetes is not  controlled.  He admits that he does not follow a strict diabetic diet and that he could do a better job with this.  Complications of diabetes include:    Retinopathy:  No  Neuropathy: Yes   Gastroparesis:  No    The patient is not on dialysis.    Has potential kidney donors:  N/A  Interested in participation in paired exchange if a donor is willing: N/A  PSHx:  PD catheter  Kidney transplant    The patient has the following pertinent history:       No    Yes  Dialysis:    [x]      [] via:       Blood Transfusion                  [x]      []  Number of units:   Most recently:  Pregnancy:    [x]      [] Number:       Previous Transplant:  []      [x] Details:    Cancer    [x]      [] Comment:   Kidney stones   [x]      [] Comment:      Recurrent infections  [x]      []  Type:                  Bladder dysfunction  [x]      [] Cause:    Claudication   [x]      [] Distance:    Previous Amputation  [x]      [] Cause:     Chronic anticoagulation  [x]      [] Indication:  Quaker  [x]      []     Past Medical History:   Diagnosis Date     Anemia in chronic kidney disease      Dyslipidemia      ESRD (end stage renal disease) on dialysis (H)      Hypertension      Hypomagnesemia 10/7/2019     Secondary hyperparathyroidism (H)      Type 1 diabetes (H)      Past Surgical History:   Procedure Laterality Date     hair implant  2007     INSERT CATHETER PERITONEAL DIALYSIS  08/2018     IR FINE NEEDLE ASPIRATION W ULTRASOUND  11/25/2019     IR RENAL BIOPSY LEFT  11/25/2019     PERCUTANEOUS BIOPSY KIDNEY Left 10/22/2019    Procedure: Left Kidney Biopsy;  Surgeon: Kash Hoffman MD;  Location:  OR     Family History   Problem Relation Age of Onset     Diabetes Father      Coronary Artery Disease Father      Social History     Socioeconomic History     Marital status:      Spouse name: Not on file     Number of children: Not on file     Years of education: Not on file     Highest education level: Not on file    Occupational History     Not on file   Social Needs     Financial resource strain: Not on file     Food insecurity:     Worry: Not on file     Inability: Not on file     Transportation needs:     Medical: Not on file     Non-medical: Not on file   Tobacco Use     Smoking status: Never Smoker     Smokeless tobacco: Never Used   Substance and Sexual Activity     Alcohol use: Not Currently     Comment: Rarely 1-2 Drinks a month      Drug use: No     Sexual activity: Not on file   Lifestyle     Physical activity:     Days per week: Not on file     Minutes per session: Not on file     Stress: Not on file   Relationships     Social connections:     Talks on phone: Not on file     Gets together: Not on file     Attends Holiness service: Not on file     Active member of club or organization: Not on file     Attends meetings of clubs or organizations: Not on file     Relationship status: Not on file     Intimate partner violence:     Fear of current or ex partner: Not on file     Emotionally abused: Not on file     Physically abused: Not on file     Forced sexual activity: Not on file   Other Topics Concern     Parent/sibling w/ CABG, MI or angioplasty before 65F 55M? Not Asked   Social History Narrative     Not on file       ROS:   CONSTITUTIONAL:  No fevers or chills  EYES: negative for icterus  ENT:  negative for hearing loss, tinnitus and sore throat  RESPIRATORY:  negative for cough, sputum, dyspnea  CARDIOVASCULAR:  negative for chest pain None  GASTROINTESTINAL:  negative for nausea, vomiting, diarrhea or constipation  GENITOURINARY:  negative for incontinence, dysuria, bladder emptying problems  HEME:  No easy bruising  INTEGUMENT:  negative for rash and pruritus  NEURO:  Negative for headache, seizure disorder    Allergies:   Allergies   Allergen Reactions     Thymoglobulin      Had reaction with rigors after steroid-free dose. No reaction with steroids.       Medications:  Prescription Medications as of  1/21/2020       Rx Number Disp Refills Start End Last Dispensed Date Next Fill Date Owning Pharmacy    alcohol swab prep pads  100 each 3 10/23/2019    [address]    Sig: Use to swab area of injection/zak as directed.    Class: E-Prescribe    aspirin (ASA) 81 MG EC tablet            Sig: Take 81 mg by mouth daily     Class: Historical    Route: Oral    atorvastatin (LIPITOR) 10 MG tablet  90 tablet 3 10/7/2019    [address]    Sig: Take 1 tablet (10 mg) by mouth daily    Class: E-Prescribe    Route: Oral    blood glucose monitoring (ACCU-CHEK FASTCLIX) lancets   1 8/17/2018        Sig: U QID OR MORE OFTEN PRN    Class: Historical    carvedilol (COREG) 12.5 MG tablet  180 tablet 3 12/9/2019    [address]    Sig: Take 1 tablet (12.5 mg) by mouth 2 times daily (with meals)    Class: E-Prescribe    Route: Oral    gentamicin (GARAMYCIN) 0.1 % external cream   3 8/31/2018        Sig: Apply to port exit site once daily    Class: Historical    HUMALOG 100 UNIT/ML injection   3 9/19/2018        Sig: Insulin used for filling patient's pump    Class: Historical    insulin lispro (HUMALOG VIAL) 100 UNIT/ML vial  30 mL 11 10/7/2019    [address]    Sig: Inject 100 Units Subcutaneous daily With Insulin pump. Up to 100 units    Class: E-Prescribe    Route: Subcutaneous    insulin pen needle (ULTICARE MICRO) 32G X 4 MM miscellaneous  100 each 3 10/23/2019    [address]    Sig: Use pen needles daily or as directed.    Class: E-Prescribe    magnesium oxide (MAG-OX) 400 MG tablet  30 tablet 5 10/5/2019    [place], [place] - [address] [facility] [address]     Sig: Take 1 tablet (400 mg) by mouth daily (with lunch)    Class: E-Prescribe    Route: Oral    mycophenolate (GENERIC EQUIVALENT) 250 MG capsule  300 capsule 11 11/25/2019    [facility] [place] [address]     Sig: Take 5 capsules (1,250 mg) by mouth 2 times daily    Class: E-Prescribe    Notes to Pharmacy: TXP DT 10/1/2019 (Kidney) TXP Dischg DT  [id number] [facility]    Route: Oral    psyllium (METAMUCIL/KONSYL) 58.6 % powder            Sig: Take by mouth daily    Class: Historical    Route: Oral    senna-docusate (SENOKOT-S/PERICOLACE) 8.6-50 MG tablet  20 tablet 0 10/5/2019    [facility] [address]    Sig: Take 2 tablets by mouth 2 times daily    Class: E-Prescribe    Route: Oral    sulfamethoxazole-trimethoprim (BACTRIM/SEPTRA) 400-80 MG tablet  30 tablet 1 11/25/2019    [facility] [place] [address] [facility]    Sig: Take 1 tablet by mouth daily    Class: E-Prescribe    Route: Oral    tacrolimus (GENERIC EQUIVALENT) 0.5 MG capsule  60 capsule 11 1/6/2020        Sig: HOLD    Class: No Print Out    Notes to Pharmacy: TXP DT 10/1/2019 (Kidney) TXP Dischg DT  DX Kidney replaced by transplant Z94.0 [facility] ([place], [place])    tacrolimus (GENERIC EQUIVALENT) 1 MG capsule  180 capsule 11 1/6/2020    [facility] [address]     Sig: Take 3 capsules (3 mg) by mouth 2 times daily    Class: E-Prescribe    Notes to Pharmacy: TXP DT 10/1/2019 (Kidney) TXP Dischg DT  [id number] [facility]    Route: Oral    valGANciclovir (VALCYTE) 450 MG tablet  30 tablet 3 10/6/2019    [facility] [place], [place] - [address] [place] [address]    Sig: Take 2 tablets (900 mg) by mouth daily    Class: E-Prescribe    Route: Oral    vitamin D3  (CHOLECALCIFEROL) 1000 units (25 mcg) tablet  90 tablet 3 10/7/2019    [place], [place] - [address] [place] [address] [address]    Sig: Take 1 tablet (1,000 Units) by mouth daily    Class: E-Prescribe    Route: Oral          Exam:   Pulse:  [75] 75  BP: (148)/(92) 148/92  SpO2:  [95 %] 95 %  Appearance: in no apparent distress.   Skin: normal  Head and Neck: Normal, no rashes or jaundice  Respiratory: easy respirations, no audible wheezing.  Cardiovascular: RRR  Abdomen: rounded and obese, No distention and Surgical scars consistent with history.  ASIS difficult to palpate.  Goel incision on left well healed   Extremeties: femoral 2+/2+, Edema, none  Neuro: without deficit     Diagnostics:   Recent Results (from the past 672 hour(s))   Basic metabolic panel    Collection Time: 12/30/19  8:46 AM   Result Value Ref Range    Sodium (External) 139 134 - 143 mEq/L    Potassium (External) 4.6 3.4 - 5.1 mEq/L    Chloride (External) 104 99 - 110 mEq/L    CO2 (External) 27 19 - 29 mEq/L    Anion Gap (External) 8.0 3.0 - 15.0 mEq/L    Urea Nitrogen (External) 17 5 - 24 mg/dL    Creatinine (External) 1.18 0.70 - 1.20 mg/dL    GFR Estimated (External) >60 >60 ml/min/1.73m2    Calcium (External) 10.0 8.4 - 10.5 mg/dL    Glucose (External) 95 70 - 99 mg/dL   CBC with platelets    Collection Time: 12/30/19  8:46 AM   Result Value Ref Range    WBC Count (External) 4.1 3.2 - 11.0 10*9/L    RBC Count (External) 4.63 4.14 - 5.76 10*12/L    Hemoglobin (External) 12.7 (L) 12.9 - 16.9 g/dL    Hematocrit (External) 39.3 38.4 - 49.7 %    MCV (External) 84.9 81.4 - 99.0 fL    MCH (External) 27.4 26.7 - 33.1 pg    MCHC (External) 32.3 31.6 - 35.5 g/dL    RDW (External) 13.1 11.3 - 14.6 %    Platelet Count (External) 198 130 - 375 10*9/L   Mycophenolic acid    Collection Time: 12/30/19  8:46 AM   Result Value Ref Range    Mycophenolic Acid  (External) 1.1 1.0 - 3.5 mcg/mL    MPA Glucuronide (External) 49 35 - 100  mcg/mL   Tacrolimus level    Collection Time: 12/30/19  8:46 AM   Result Value Ref Range    Tacrolimus(FK-506) (External) 6.8 5.0 - 15.0 ng/mL   Basic metabolic panel    Collection Time: 01/06/20  4:28 PM   Result Value Ref Range    Sodium (External) 141 134 - 143 mEq/L    Potassium (External) 5.0 3.4 - 5.1 mEq/L    Chloride (External) 107 99 - 110 mEq/L    CO2 (External) 25 19 - 29 mEq/L    Anion Gap (External) 9.0 3.0 - 15.0 mEq/L    Urea Nitrogen (External) 22 5 - 24 mg/dL    Creatinine (External) 1.34 (H) 0.70 - 1.20 mg/dL    GFR Estimated (External) 59 (L) >60 ml/min/1.73m2    Calcium (External) 9.4 8.4 - 10.5 mg/dL    Glucose (External) 132 (H) 70 - 99 mg/dL   CBC with platelets    Collection Time: 01/06/20  4:28 PM   Result Value Ref Range    WBC Count (External) 5.0 3.2 - 11.0 10*9/L    RBC Count (External) 4.36 4.14 - 5.76 10*12/L    Hemoglobin (External) 11.9 (L) 12.9 - 16.9 g/dL    Hematocrit (External) 37.0 (L) 38.4 - 49.7 %    MCV (External) 84.9 81.4 - 99.0 fL    MCH (External) 27.3 26.7 - 33.1 pg    MCHC (External) 32.2 31.6 - 35.5 g/dL    RDW (External) 13.2 11.3 - 14.6 %    Platelet Count (External) 210 130 - 375 10*9/L   Magnesium    Collection Time: 01/06/20  4:28 PM   Result Value Ref Range    Magnesium (External) 1.5 (L) 1.8 - 2.7 mg/dL   Phosphorus    Collection Time: 01/06/20  4:28 PM   Result Value Ref Range    Phosphorus (External) 3.3 2.5 - 4.6 mg/dL   Hepatitis C antibody    Collection Time: 01/06/20  4:28 PM   Result Value Ref Range    Hepatitis C Antibody (External) Nonreactive Nonreactive   HIV Antigen Antibody Combo    Collection Time: 01/06/20  4:28 PM   Result Value Ref Range    HIV 1&2 Antibody (External) Nonreactive Nonreactive   BK virus PCR quantitative    Collection Time: 01/06/20  4:28 PM   Result Value Ref Range    BK Virus Specimen Plasma     BK Virus Result BK Virus DNA Not Detected BKNEG^BK Virus DNA Not Detected copies/mL    BK Virus Log Not Calculated <2.7 Log copies/mL    Mycophenolic acid    Collection Time: 01/06/20  4:28 PM   Result Value Ref Range    Mycophenolic Acid  (External) 2.5 1.0 - 3.5 mcg/mL    MPA Glucuronide (External) 87 35 - 100 mcg/mL   Tacrolimus level    Collection Time: 01/06/20  4:28 PM   Result Value Ref Range    Tacrolimus(FK-506) (External) 9.3 5.0 - 15.0 ng/mL   Tacrolimus level    Collection Time: 01/07/20  4:09 PM   Result Value Ref Range    Tacrolimus(FK-506) (External) 9.0 5.0 - 15.0 ng/mL   CBC with platelets    Collection Time: 01/13/20  4:21 PM   Result Value Ref Range    WBC Count (External) 4.4 3.2 - 11.0 10*9/L    RBC Count (External) 4.32 4.14 - 5.76 10*12/L    Hemoglobin (External) 12.0 (L) 12.9 - 16.9 g/dL    Hematocrit (External) 36.4 (L) 38.4 - 49.7 %    MCV (External) 84.3 81.4 - 99.0 fL    MCH (External) 27.8 26.7 - 33.1 pg    MCHC (External) 33.0 31.6 - 35.5 g/dL    RDW (External) 13.3 11.3 - 14.6 %    Platelet Count (External) 213 130 - 375 10*9/L   Basic metabolic panel    Collection Time: 01/13/20  4:21 PM   Result Value Ref Range    Sodium (External) 138 134 - 143 mEq/L    Potassium (External) 4.5 3.4 - 5.1 mEq/L    Chloride (External) 106 99 - 110 mEq/L    CO2 (External) 25 19 - 29 mEq/L    Anion Gap (External) 7.0 3.0 - 15.0 mEq/L    Urea Nitrogen (External) 18 5 - 24 mg/dL    Creatinine (External) 1.63 (H) 0.70 - 1.20 mg/dL    GFR Estimated (External) 47 (L) >60 mL/min/1.73m2    Calcium (External) 10.1 8.4 - 10.5 mg/dL    Glucose (External) 281 (H) 70 - 99 mg/dL   Tacrolimus level    Collection Time: 01/13/20  4:21 PM   Result Value Ref Range    Tacrolimus(FK-506) (External) 9.8 5.0 - 15.0 ng/mL   Mycophenolic acid    Collection Time: 01/13/20  4:21 PM   Result Value Ref Range    Mycophenolic Acid  (External) 2.2 1.0 - 3.5 mcg/mL    MPA Glucuronide (External) 79 35 - 100 mcg/mL   CBC with platelets    Collection Time: 01/20/20  4:24 PM   Result Value Ref Range    WBC Count (External) 2.4 (L) 3.2 - 11.0 10*9/L    RBC Count  (External) 4.42 4.14 - 5.76 10*12/L    Hemoglobin (External) 11.9 (L) 12.9 - 16.9 g/dL    Hematocrit (External) 37.0 (L) 38.4 - 49.7 %    MCV (External) 83.7 81.4 - 99.0 fl    MCH (External) 26.9 26.7 - 33.1 pg    MCHC (External) 32.2 31.6 - 35.5 g/dL    RDW (External) 13.1 11.3 - 14.6 %    Platelet Count (External) 188 130 - 375 10*9/L   Basic metabolic panel    Collection Time: 01/20/20  4:24 PM   Result Value Ref Range    Sodium (External) 132 (L) 134 - 143 mEq/L    Potassium (External) 5.4 (H) 3.4 - 5.1 mEq/L    Chloride (External) 99 99 - 110 mEq/L    CO2 (External) 24 19 - 29 mEq/L    Anion Gap (External) 9.0 3.0 - 15.0 mEq/L    Urea Nitrogen (External) 22 5 - 24 mg/dL    Creatinine (External) 1.86 (H) 0.70 - 1.20 mg/dL    GFR Estimated (External) 40 (L) >60 mL/min    Calcium (External) 9.1 8.4 - 10.5 mg/dL    Glucose (External) 473 (H) 70 - 99 mg/dL     UNOS cPRA   Date Value Ref Range Status   12/02/2019 0  Final       Rocio Rutherford MD, MD

## 2020-01-21 NOTE — PROGRESS NOTES
"Diabetes Self-Management Education & Support    Diabetes Education Self Management & Training    SUBJECTIVE/OBJECTIVE:  Presents for: Individual review with writer and Ivonne Ramirez from [employer]  Accompanied by: Self  Diabetes education in the past 24mo: Yes  Focus of Visit: CGM  Diabetes type: Type 1  Diabetes management related comments/concerns: having trouble with his sensors, he cannot get half of them to work, inaccuracies the first day of a new sensor  Transportation concerns: No  Cultural Influences/Ethnic Background:  American    Diabetes Symptoms & Complications   Patient Problem List and Family Medical History reviewed for relevant medical history, current medical status, and diabetes risk factors.    Vitals:    Estimated body mass index is 33.77 kg/m  as calculated from the following:    Height as of an earlier encounter on 1/21/20: 1.676 m (5' 6\").    Weight as of an earlier encounter on 1/21/20: 94.9 kg (209 lb 3.2 oz).   Last 3 BP:   BP Readings from Last 3 Encounters:   01/21/20 (!) 148/92   12/09/19 114/76   12/09/19 (!) 154/94       History   Smoking Status     Never Smoker   Smokeless Tobacco     Never Used       Labs:  Lab Results   Component Value Date    A1C 8.9 09/26/2019     Lab Results   Component Value Date    GLC 77 12/09/2019     No results found for: LDL  No results found for: HDL]  GFR Estimate   Date Value Ref Range Status   12/09/2019 88 >60 mL/min/[1.73_m2] Final     Comment:     Non  GFR Calc  Starting 12/18/2018, serum creatinine based estimated GFR (eGFR) will be   calculated using the Chronic Kidney Disease Epidemiology Collaboration   (CKD-EPI) equation.       GFR Estimate If Black   Date Value Ref Range Status   12/09/2019 >90 >60 mL/min/[1.73_m2] Final     Comment:      GFR Calc  Starting 12/18/2018, serum creatinine based estimated GFR (eGFR) will be   calculated using the Chronic Kidney Disease Epidemiology Collaboration   (CKD-EPI) " equation.       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 Up to 100 units        Healthy Coping     Patient Activation Measure Survey Score:  No flowsheet data found.    ASSESSMENT:  Type 1 diabetes, a1c above target    Patient's most recent   Lab Results   Component Value Date    A1C 8.9 09/26/2019    is not meeting goal of <7.0    INTERVENTION:     Education provided today on:  Rudi's pump was uploaded and the following issues were discussed:    1.) Multiple sensor errors:  Some trouble-shooting was done to show him how to salvage a sensor when he gets a change sensor message on a new sensor. (Disconnect transmitter for 15 minutes, put it on , and restart sensor)    2.) Sensor placement: He was shown how to put it in his arm by himself.  New tape recommended. Samples given: Rock tape and Grif   (The sensor gets too much movement in the abdomen and this can lead to sensor errors.  The arm has been more successful but can be a little harder to insert alone.)      3. ) Pump rates:  The sensor data we do have shows him low 19% of the time.  It seems likely that his basal insulin got turned up for Prednisone following his kidney transplant and just did not get turned back down.  Will change his basal rate from 6am-11pm from 2.5 to 2.0.  He reports that he has to eat to keep his blood sugar up during the day, more evidence that his basal rate is too high.  His basal/bolus split  is  68% basal and to get him in automode we want to get it down to 40% basal.    4.) Behavior change: Rudi agree's to work on bolusing before the meal and remembering to cover all food with insulin.  He will upload to [facility] in 3 days.    Opportunities for ongoing education and support in diabetes-self management were discussed.    Pt verbalized understanding of concepts discussed and recommendations provided today.       PLAN:  See Patient Instructions for co-developed, patient-stated behavior change goals.  AVS printed and provided to patient today. See Follow-Up section for recommended follow-up.    Time Spent: 60 minutes  Encounter Type: Individual    Any diabetes medication dose changes were made via the CDE Protocol and Collaborative Practice Agreement with the patient's referring provider. A copy of this encounter was shared with the provider.

## 2020-01-21 NOTE — Clinical Note
Carson Magaña,I met with Rudi with Ivonne from [employer].  We will work with him to troubleshoot his sensor issues.  He was happier by the time he left today.Delmy

## 2020-01-22 NOTE — TELEPHONE ENCOUNTER
Attempted to call Michael [place] after school hours (although phone did not ring )     Task /Plan   Please attempt to call  again (urgent)   Please confirm if any new medications any recent illness ,missed medications ,confirm if he has blood sugars are not elevated     Confirm he is up to date with Donor Specific Antibodies  And BK   Repeat transplant  Labs on Friday    If creatinine still increased after hydration may need a biopsy next week

## 2020-01-23 NOTE — TELEPHONE ENCOUNTER
Left message and sent mychart message regarding:  Elevated serum creatinine and recommend good hydration,

## 2020-01-24 ENCOUNTER — TELEPHONE (OUTPATIENT)
Dept: TRANSPLANT | Facility: CLINIC | Age: 40
End: 2020-01-24

## 2020-01-24 DIAGNOSIS — N18.6 ESRD (END STAGE RENAL DISEASE) ON DIALYSIS (H): ICD-10-CM

## 2020-01-24 DIAGNOSIS — Z94.0 KIDNEY TRANSPLANTED: ICD-10-CM

## 2020-01-24 DIAGNOSIS — Z99.2 ESRD (END STAGE RENAL DISEASE) ON DIALYSIS (H): ICD-10-CM

## 2020-01-24 RX ORDER — TACROLIMUS 1 MG/1
3 CAPSULE ORAL 2 TIMES DAILY
Qty: 120 CAPSULE | Refills: 11 | COMMUNITY
Start: 2020-01-24 | End: 2020-03-17

## 2020-01-24 NOTE — TELEPHONE ENCOUNTER
Spoke to Michael Amin   He reports   To 39  - Working directly with [employer] to adjust is insulin pump - currently improved bg control   Reviewed recent labs -   Tacrolimus    Level 11.8   Confirmed 12 hour trough level   Confirmed taking tacrolimus  3mg twice per day   Lowered tacrolimus to 2 mg twice per day   Rudi verbalized understanding increase hydration  And lowering tacrolimus    Repeating Labs on Jan 27 ,20

## 2020-01-24 NOTE — TELEPHONE ENCOUNTER
Called this am  - Attempted to contact him last evening   Concern increase creatinine  And increase tacrolimus

## 2020-01-27 DIAGNOSIS — Z94.0 KIDNEY REPLACED BY TRANSPLANT: ICD-10-CM

## 2020-01-27 DIAGNOSIS — Z79.899 LONG TERM USE OF DRUG: ICD-10-CM

## 2020-02-01 ENCOUNTER — HOSPITAL ENCOUNTER (OUTPATIENT)
Facility: CLINIC | Age: 40
Setting detail: SPECIMEN
Discharge: HOME | End: 2020-02-01
Admitting: INTERNAL MEDICINE
Payer: COMMERCIAL

## 2020-02-01 PROCEDURE — 87799 DETECT AGENT NOS DNA QUANT: CPT | Performed by: INTERNAL MEDICINE

## 2020-02-03 DIAGNOSIS — Z94.0 KIDNEY REPLACED BY TRANSPLANT: ICD-10-CM

## 2020-02-03 DIAGNOSIS — Z79.899 LONG TERM USE OF DRUG: ICD-10-CM

## 2020-02-03 DIAGNOSIS — N18.6 ESRD (END STAGE RENAL DISEASE) ON DIALYSIS (H): ICD-10-CM

## 2020-02-03 DIAGNOSIS — Z94.0 KIDNEY TRANSPLANTED: Primary | ICD-10-CM

## 2020-02-03 DIAGNOSIS — Z99.2 ESRD (END STAGE RENAL DISEASE) ON DIALYSIS (H): ICD-10-CM

## 2020-02-03 PROCEDURE — 87799 DETECT AGENT NOS DNA QUANT: CPT | Performed by: INTERNAL MEDICINE

## 2020-02-03 RX ORDER — SULFAMETHOXAZOLE AND TRIMETHOPRIM 400; 80 MG/1; MG/1
1 TABLET ORAL DAILY
Qty: 30 TABLET | Refills: 11 | Status: SHIPPED | OUTPATIENT
Start: 2020-02-03 | End: 2021-02-16

## 2020-02-07 ENCOUNTER — OFFICE VISIT (OUTPATIENT)
Dept: PHARMACY | Facility: CLINIC | Age: 40
End: 2020-02-07
Payer: COMMERCIAL

## 2020-02-07 ENCOUNTER — ALLIED HEALTH/NURSE VISIT (OUTPATIENT)
Dept: EDUCATION SERVICES | Facility: CLINIC | Age: 40
End: 2020-02-07
Payer: COMMERCIAL

## 2020-02-07 ENCOUNTER — OFFICE VISIT (OUTPATIENT)
Dept: ENDOCRINOLOGY | Facility: CLINIC | Age: 40
End: 2020-02-07
Payer: COMMERCIAL

## 2020-02-07 ENCOUNTER — TELEPHONE (OUTPATIENT)
Dept: TRANSPLANT | Facility: CLINIC | Age: 40
End: 2020-02-07

## 2020-02-07 ENCOUNTER — OFFICE VISIT (OUTPATIENT)
Dept: NEPHROLOGY | Facility: CLINIC | Age: 40
End: 2020-02-07
Attending: INTERNAL MEDICINE
Payer: COMMERCIAL

## 2020-02-07 VITALS
DIASTOLIC BLOOD PRESSURE: 78 MMHG | HEART RATE: 77 BPM | WEIGHT: 206 LBS | HEIGHT: 66 IN | BODY MASS INDEX: 33.11 KG/M2 | SYSTOLIC BLOOD PRESSURE: 115 MMHG

## 2020-02-07 VITALS — SYSTOLIC BLOOD PRESSURE: 115 MMHG | HEART RATE: 77 BPM | DIASTOLIC BLOOD PRESSURE: 78 MMHG | OXYGEN SATURATION: 98 %

## 2020-02-07 VITALS
HEART RATE: 83 BPM | DIASTOLIC BLOOD PRESSURE: 89 MMHG | WEIGHT: 206.3 LBS | OXYGEN SATURATION: 98 % | SYSTOLIC BLOOD PRESSURE: 151 MMHG | BODY MASS INDEX: 33.3 KG/M2

## 2020-02-07 DIAGNOSIS — N18.30 ANEMIA IN STAGE 3 CHRONIC KIDNEY DISEASE (H): ICD-10-CM

## 2020-02-07 DIAGNOSIS — K59.00 CONSTIPATION, UNSPECIFIED CONSTIPATION TYPE: ICD-10-CM

## 2020-02-07 DIAGNOSIS — N18.6 TYPE 1 DIABETES MELLITUS WITH CHRONIC KIDNEY DISEASE ON CHRONIC DIALYSIS (H): ICD-10-CM

## 2020-02-07 DIAGNOSIS — Z99.2 TYPE 1 DIABETES MELLITUS WITH CHRONIC KIDNEY DISEASE ON CHRONIC DIALYSIS (H): ICD-10-CM

## 2020-02-07 DIAGNOSIS — I15.1 HTN, KIDNEY TRANSPLANT RELATED: Primary | ICD-10-CM

## 2020-02-07 DIAGNOSIS — E78.5 DYSLIPIDEMIA: ICD-10-CM

## 2020-02-07 DIAGNOSIS — E83.42 HYPOMAGNESEMIA: ICD-10-CM

## 2020-02-07 DIAGNOSIS — E10.9 TYPE 1 DIABETES MELLITUS (H): Primary | ICD-10-CM

## 2020-02-07 DIAGNOSIS — Z94.0 HTN, KIDNEY TRANSPLANT RELATED: Primary | ICD-10-CM

## 2020-02-07 DIAGNOSIS — Z79.899 LONG TERM USE OF DRUG: ICD-10-CM

## 2020-02-07 DIAGNOSIS — N25.81 SECONDARY RENAL HYPERPARATHYROIDISM (H): ICD-10-CM

## 2020-02-07 DIAGNOSIS — E55.9 VITAMIN D DEFICIENCY: ICD-10-CM

## 2020-02-07 DIAGNOSIS — Z94.0 KIDNEY REPLACED BY TRANSPLANT: ICD-10-CM

## 2020-02-07 DIAGNOSIS — I15.1 HTN, KIDNEY TRANSPLANT RELATED: ICD-10-CM

## 2020-02-07 DIAGNOSIS — Z94.0 HTN, KIDNEY TRANSPLANT RELATED: ICD-10-CM

## 2020-02-07 DIAGNOSIS — Z48.298 AFTERCARE FOLLOWING ORGAN TRANSPLANT: ICD-10-CM

## 2020-02-07 DIAGNOSIS — D63.1 ANEMIA IN STAGE 3 CHRONIC KIDNEY DISEASE (H): ICD-10-CM

## 2020-02-07 DIAGNOSIS — E10.22 TYPE 1 DIABETES MELLITUS WITH CHRONIC KIDNEY DISEASE ON CHRONIC DIALYSIS (H): ICD-10-CM

## 2020-02-07 DIAGNOSIS — E10.65 TYPE 1 DIABETES MELLITUS WITH HYPERGLYCEMIA (H): Primary | ICD-10-CM

## 2020-02-07 DIAGNOSIS — E10.65 TYPE 1 DIABETES MELLITUS WITH HYPERGLYCEMIA (H): ICD-10-CM

## 2020-02-07 DIAGNOSIS — Z94.0 KIDNEY REPLACED BY TRANSPLANT: Primary | ICD-10-CM

## 2020-02-07 LAB
BKV DNA # SPEC NAA+PROBE: NORMAL COPIES/ML
BKV DNA SPEC NAA+PROBE-LOG#: NORMAL LOG COPIES/ML
GLUCOSE SERPL-MCNC: 200 MG/DL (ref 70–99)
HBA1C MFR BLD: 8.2 % (ref 0–5.6)
SPECIMEN SOURCE: NORMAL
TACROLIMUS BLD-MCNC: 9.2 UG/L (ref 5–15)
TME LAST DOSE: NORMAL H

## 2020-02-07 PROCEDURE — 99605 MTMS BY PHARM NP 15 MIN: CPT | Performed by: PHARMACIST

## 2020-02-07 PROCEDURE — 36415 COLL VENOUS BLD VENIPUNCTURE: CPT | Performed by: INTERNAL MEDICINE

## 2020-02-07 PROCEDURE — 80197 ASSAY OF TACROLIMUS: CPT | Performed by: INTERNAL MEDICINE

## 2020-02-07 PROCEDURE — 83036 HEMOGLOBIN GLYCOSYLATED A1C: CPT | Performed by: SURGERY

## 2020-02-07 PROCEDURE — 99607 MTMS BY PHARM ADDL 15 MIN: CPT | Performed by: PHARMACIST

## 2020-02-07 PROCEDURE — 82947 ASSAY GLUCOSE BLOOD QUANT: CPT | Performed by: SURGERY

## 2020-02-07 RX ORDER — NIFEDIPINE 30 MG
30 TABLET, EXTENDED RELEASE ORAL AT BEDTIME
Qty: 90 TABLET | Refills: 3 | Status: SHIPPED | OUTPATIENT
Start: 2020-02-07 | End: 2020-02-07

## 2020-02-07 ASSESSMENT — MIFFLIN-ST. JEOR: SCORE: 1787.16

## 2020-02-07 ASSESSMENT — PAIN SCALES - GENERAL
PAINLEVEL: NO PAIN (0)
PAINLEVEL: NO PAIN (0)

## 2020-02-07 NOTE — NURSING NOTE
Chief Complaint   Patient presents with     RECHECK     4 months post kidney tx     Blood pressure (!) 151/89, pulse 83, weight 93.6 kg (206 lb 4.8 oz), SpO2 98 %.    Rober Stewart/EMERALD  February 7, 2020 9:49 AM

## 2020-02-07 NOTE — LETTER
2/7/2020       RE: Michael Amin  [address] [place] [id number]     Dear Colleague,    Thank you for referring your patient, Michael Amin, to the [facility] ENDOCRINOLOGY at [facility]. Please see a copy of my visit note below.    ELIZABETH Amin ( Rudi ) Michael is a 39 year old male with type 1 diabetes mellitus here today for a follow up visit.  He was last seen in our clinic by Maria Teresa Nguyễn PA-C on 12/9/2019.  Pt has hx of type 1 diabetes mellitus dx at age 16.  His diabetes is complicated by nephropathy and underwent kidney transplant on 10/1/2019.  His last Oph exam showed possible early retinopathy.  He has no peripheral neuroapthy.  His hx is also significant for HTN, anemia- CKD,dyslipidemia and obesity.  For his diabetes, he is currently using a Medtronic G670G insulin pump and Guardian sensor.  He reports less hypoglycemia overall, but still has hypoglycemia from 9 am to noon most days.  His sensor wear currently is 47 % and auto mode use is 0 %.  He has been working on eliminating frequent hypoglycemia prior to auto mode use on his insulin pump.  His average SG was 159 with SD 56.  His basal insulin rates are set at:  Midnight= 2.0 units/hr.  4 am = 1.8 units/hr.  20:00 = 2.0 units/hr.  His 24 hr basal insulin dose is 44.8 units/24 hrs.  I/C ratio is 1:6 and sensitivity is 25 and active insulin time is 3 hrs.  Pt's A1C is 8.2 % today.  His previous A1C was 8.9 % .  We downloaded his glucose meter today and he has been having low blood sugars around 9 am - noon.  Less hypoglycemia overall; 79 % of his blood sugar values were within target.  On ROS today, he reports doing well.  Pt denies frequent headaches, blurred vision, n/v, SOB at rest, chest pain, abd pain, diarrhea, dysuria, hematuria or foot ulcers.  Rudi denies numbness, tingling or pain in his feet or hands.    Diabetes Care  Retinopathy: he was seen by Oph in Feb 2019- possible early  retinopathy. I asked him to schedule his annual eye exam.  Nephropathy:hx of ESRD s/p kidney transplant on 10/99494.   Neuropathy: none.  Foot Exam:no ulcers.  Taking aspirin: yes  Lipids: need fasting lipid panel- ordered. Pt is taking low dose Lipitor daily.    ROS  Please see under HPI.    Allergies  Allergies   Allergen Reactions     Thymoglobulin      Had reaction with rigors after steroid-free dose. No reaction with steroids.       Medications  Current Outpatient Medications   Medication Sig Dispense Refill     alcohol swab prep pads Use to swab area of injection/zak as directed. 100 each 3     aspirin (ASA) 81 MG EC tablet Take 81 mg by mouth daily        atorvastatin (LIPITOR) 10 MG tablet Take 1 tablet (10 mg) by mouth daily 90 tablet 3     blood glucose monitoring (ACCU-CHEK FASTCLIX) lancets U QID OR MORE OFTEN PRN  1     carvedilol (COREG) 12.5 MG tablet Take 1 tablet (12.5 mg) by mouth 2 times daily (with meals) 180 tablet 3     HUMALOG 100 UNIT/ML injection Insulin used for filling patient's pump  3     insulin lispro (HUMALOG VIAL) 100 UNIT/ML vial Inject 100 Units Subcutaneous daily With Insulin pump. Up to 100 units 30 mL 11     insulin pen needle (ULTICARE MICRO) 32G X 4 MM miscellaneous Use pen needles daily or as directed. 100 each 3     magnesium oxide (MAG-OX) 400 MG tablet Take 1 tablet (400 mg) by mouth daily (with lunch) 30 tablet 5     mycophenolate (GENERIC EQUIVALENT) 250 MG capsule Take 5 capsules (1,250 mg) by mouth 2 times daily 300 capsule 11     senna-docusate (SENOKOT-S/PERICOLACE) 8.6-50 MG tablet Take 2 tablets by mouth 2 times daily 20 tablet 0     sulfamethoxazole-trimethoprim (BACTRIM/SEPTRA) 400-80 MG tablet Take 1 tablet by mouth daily 30 tablet 11     tacrolimus (GENERIC EQUIVALENT) 1 MG capsule Take 3 mg by mouth 2 times daily  120 capsule 11     vitamin D3 (CHOLECALCIFEROL) 1000 units (25 mcg) tablet Take 1 tablet (1,000 Units) by mouth daily 90 tablet 3     tacrolimus  "(GENERIC EQUIVALENT) 0.5 MG capsule HOLD (Patient not taking: Reported on 2/7/2020) 60 capsule 11       Family History  family history includes Coronary Artery Disease in his father; Diabetes in his father.    Social History   reports that he has never smoked. He has never used smokeless tobacco. He reports previous alcohol use. He reports that he does not use drugs.   He is a teacher in [place], [place].    Past Medical History  Past Medical History:   Diagnosis Date     Anemia in chronic kidney disease      Dyslipidemia      ESRD (end stage renal disease) on dialysis (H)      Hypertension      Hypomagnesemia 10/7/2019     Secondary hyperparathyroidism (H)      Type 1 diabetes (H)        Past Surgical History:   Procedure Laterality Date     hair implant  2007     INSERT CATHETER PERITONEAL DIALYSIS  08/2018     IR FINE NEEDLE ASPIRATION W ULTRASOUND  11/25/2019     IR RENAL BIOPSY LEFT  11/25/2019     PERCUTANEOUS BIOPSY KIDNEY Left 10/22/2019    Procedure: Left Kidney Biopsy;  Surgeon: Kash Hoffman MD;  Location:  OR       Physical Exam  /78   Pulse 77   Ht 1.676 m (5' 6\")   Wt 93.4 kg (206 lb)   BMI 33.25 kg/m     Body mass index is 33.25 kg/m .    GENERAL : In no apparent distress  FEET:  No ulcers.    RESULTS  Creatinine   Date Value Ref Range Status   12/09/2019 1.05 0.66 - 1.25 mg/dL Final     GFR Estimate   Date Value Ref Range Status   12/09/2019 88 >60 mL/min/[1.73_m2] Final     Comment:     Non  GFR Calc  Starting 12/18/2018, serum creatinine based estimated GFR (eGFR) will be   calculated using the Chronic Kidney Disease Epidemiology Collaboration   (CKD-EPI) equation.       Hemoglobin A1C   Date Value Ref Range Status   02/07/2020 8.2 (H) 0 - 5.6 % Final     Comment:     Normal <5.7% Prediabetes 5.7-6.4%  Diabetes 6.5% or higher - adopted from ADA   consensus guidelines.       Potassium   Date Value Ref Range Status   12/09/2019 4.2 3.4 - 5.3 mmol/L Final     ALT   Date " Value Ref Range Status   09/26/2019 42 0 - 70 U/L Final     AST   Date Value Ref Range Status   09/26/2019 16 0 - 45 U/L Final       A1C   8.2 % today.  A1C   8.9      9/26/2019    ASSESSMENT/PLAN:    1.  TYPE 1 DIABETES MELLITUS: Pt's glycemic control has improved with an A1C of 8.2 % today.  Today I added a lower basal insulin rate at 9 am = 1.7 units/hr and 1 pm basal insulin rate = 1.8 units/hr.  New basal insulin rates below:  Midnight = 2.0 units/hr.  4 am = 1.8 units/hr.  9 am = 1.7 units/hr.  1 pm = 1.8 units/hr.  2000= 2.0 units/hr.  His I/C is to remain 1:6.  Sensitivity was changed to 30 today ( was 25 ).  If his hypoglycemia resolves, he will try using auto mode next week.  Pt instructed to schedule a annual Oph exam.  His feet are ok today.  I placed an order for a fasting lipid panel and TSH.  I have also referred pt to meet with our dietitian for weight loss support.  Pt had the flu vaccine this season.    2.  HX OF ESRD: S/P kidney transplant on 10/1/019 doing well.  Most recent creat was 1.10 with GFR > 60 mL/min on 2/3/2020.    3. OBESITY: Will schedule pt to meet with our dietitian.    4.  HTN: Stable on current RX.    5.  HYPERLIPIDEMIA: Fasting lipid panel ordered.  Pt is taking Lipitor daily.    6.  Return to Endocrine Clinic to see me in April 2020.    Kymberly Bosewll PA-C

## 2020-02-07 NOTE — LETTER
2/7/2020      RE: Michael Amin  [address]  [facility] [address]       ACUTE TRANSPLANT NEPHROLOGY VISIT    Assessment & Plan   # LDKT: Stable, although did have a recent RICHARD episode felt secondary to dehydration and higher tacrolimus level.  Now resolved at lower end of baseline.   - Baseline Cr ~ TBD, but 1.1-1.3 would reasonable range   - Proteinuria: Minimal (0.2-0.5 grams)   - Date DSA Last Checked: Dec/2019      Latest DSA: No   - BK Viremia: No   - Kidney Tx Biopsy: Nov 25, 2019; Result: Material insufficient for assessment with no glomeruli.             Oct 22, 2019; Result: Borderline acute cellular-mediated rejection.    # Immunosuppression: Tacrolimus immediate release (goal 8-10) and Mycophenolate mofetil (goal 1.0-3.5)   - Changes: No    # Infection Prophylaxis:   - PJP: Sulfa/TMP (Bactrim)  - CMV: None, prophylaxis completed  - Thrush: None    # Hypertension: Controlled;  Goal BP: < 140/90   - Volume status: Euvolemic   - Changes: No    # Diabetes: Borderline control (HbA1c 7-9%) with some brittleness still Last HbA1c: 8.2%   - Management as per Endocrinology.    # Anemia in Chronic Renal Disease: Hgb: Stable      JOSE: No   - Iron studies: Replete    # Mineral Bone Disorder:   - Secondary renal hyperparathyroidism; PTH level: Moderately elevated (301-600 pg/ml)  - Vitamin D; level: Low        On Supplement: Yes  - Calcium; level: Normal        On Supplement: No  - Phosphorus; level: Normal        On Supplement: No    # Electrolytes:   - Potassium; level: Normal        On Supplement: No  - Magnesium; level: Normal        On Supplement: Yes  - Bicarbonate; level: Normal        On Supplement: No    # Overweight: Stable to slightly decreased weight.   - Recommend increased exercise and watch caloric intake.    # Medical Compliance: Yes    # Transplant History:  Etiology of Kidney Failure: Diabetes mellitus type 1  Tx: LDKT  Transplant: 10/1/2019 (Kidney)  Donor Type: Living Donor Class:    Crossmatch at time of Tx: negative  DSA at time of Tx: No  Significant changes in immunosuppression: None  CMV IgG Ab High Risk Discordance (D+/R-): No  EBV IgG Ab High Risk Discordance (D+/R-): No  Significant transplant-related complications: None    Transplant Office Phone Number: [phone]    Assessment and plan was discussed with the patient and he voiced his understanding and agreement.    Return visit: Return for 6 month post transplant visit.    Jabier Hernández MD    Chief Complaint   Mr. Amin is a 39 year old here for routine follow up.     History of Present Illness    Mr. Amin reports feeling good overall with some medical complaints.  Since last clinic visit, patient reports no hospitalizations or new medical complaints and has been doing well overall.  However, he did have an RICHARD episode with his creatinine up to ~ 1.9.  The underlying etiology wasn't clear, but patient felt he might have been a bit dehydrated, as well as higher tacrolimus level.  With good hydration and tacrolimus adjustment, his creatinine is now back at lower end of his baseline.    His energy level is good and now near normal.  He is active and does get some exercise.  Denies any chest pain or shortness of breath with exertion.  Appetite is good and he is frequently hungry, but he is trying to lose weight.  His weight is down ~ 3 lbs.  No nausea, vomiting or diarrhea.  No fever, sweats or chills.  No leg swelling.  Endocrine is following him for his diabetic management.    Recent Hospitalizations:  [x] No [] Yes    New Medical Issues: [x] No [] Yes    Decreased energy: [x] No [] Yes    Chest pain or SOB with exertion:  [x] No [] Yes    Appetite change or weight change: [x] No [] Yes    Nausea, vomiting or diarrhea:  [x] No [] Yes    Fever, sweats or chills: [x] No [] Yes    Leg swelling: [x] No [] Yes      Home BP: 130/80s, but lower at times    Review of Systems   A comprehensive review of systems was obtained and  negative, except as noted in the HPI or PMH.    Problem List   Patient Active Problem List   Diagnosis     HTN, kidney transplant related     Diabetes mellitus type 1 (H)     Dyslipidemia     Anemia in chronic kidney disease     Secondary renal hyperparathyroidism (H)     Kidney replaced by transplant     Aftercare following organ transplant     Vitamin D deficiency     Hypomagnesemia     Kidney transplant rejection       Social History   Social History     Tobacco Use     Smoking status: Never Smoker     Smokeless tobacco: Never Used   Substance Use Topics     Alcohol use: Not Currently     Comment: Rarely 1-2 Drinks a month      Drug use: No       Allergies   Allergies   Allergen Reactions     Thymoglobulin      Had reaction with rigors after steroid-free dose. No reaction with steroids.       Medications   Current Outpatient Medications   Medication Sig     alcohol swab prep pads Use to swab area of injection/zak as directed.     aspirin (ASA) 81 MG EC tablet Take 81 mg by mouth daily      atorvastatin (LIPITOR) 10 MG tablet Take 1 tablet (10 mg) by mouth daily     blood glucose monitoring (ACCU-CHEK FASTCLIX) lancets U QID OR MORE OFTEN PRN     carvedilol (COREG) 12.5 MG tablet Take 1 tablet (12.5 mg) by mouth 2 times daily (with meals)     HUMALOG 100 UNIT/ML injection Insulin used for filling patient's pump     insulin lispro (HUMALOG VIAL) 100 UNIT/ML vial Inject 100 Units Subcutaneous daily With Insulin pump. Up to 100 units     insulin pen needle (ULTICARE MICRO) 32G X 4 MM miscellaneous Use pen needles daily or as directed.     magnesium oxide (MAG-OX) 400 MG tablet Take 1 tablet (400 mg) by mouth daily (with lunch)     mycophenolate (GENERIC EQUIVALENT) 250 MG capsule Take 5 capsules (1,250 mg) by mouth 2 times daily     senna-docusate (SENOKOT-S/PERICOLACE) 8.6-50 MG tablet Take 2 tablets by mouth 2 times daily     sulfamethoxazole-trimethoprim (BACTRIM/SEPTRA) 400-80 MG tablet Take 1 tablet by mouth  daily     tacrolimus (GENERIC EQUIVALENT) 0.5 MG capsule HOLD (Patient not taking: Reported on 2/7/2020)     tacrolimus (GENERIC EQUIVALENT) 1 MG capsule Take 3 mg by mouth 2 times daily      vitamin D3 (CHOLECALCIFEROL) 1000 units (25 mcg) tablet Take 1 tablet (1,000 Units) by mouth daily     No current facility-administered medications for this visit.      There are no discontinued medications.    Physical Exam   Vital Signs: BP (!) 151/89   Pulse 83   Wt 93.6 kg (206 lb 4.8 oz)   SpO2 98%   BMI 33.30 kg/m       GENERAL APPEARANCE: alert and no distress  HENT: mouth without ulcers or lesions  LYMPHATICS: no cervical or supraclavicular nodes  RESP: lungs clear to auscultation - no rales, rhonchi or wheezes  CV: regular rhythm, normal rate, no rub, no murmur  EDEMA: no LE edema bilaterally  ABDOMEN: soft, nondistended, nontender, bowel sounds normal, overweight  MS: extremities normal - no gross deformities noted, no evidence of inflammation in joints, no muscle tenderness  SKIN: no rash  TX KIDNEY: normal  DIALYSIS ACCESS:  None    Data     Renal Latest Ref Rng & Units 2/7/2020 2/3/2020 1/27/2020   Na 133 - 144 mmol/L - - -   Na (external) 134 - 143 mEq/L - 138 138   K 3.4 - 5.3 mmol/L - - -   K (external) 3.4 - 5.1 mEq/L - 4.3 4.2   Cl 94 - 109 mmol/L - - -   Cl (external) 99 - 110 mEq/L - 104 104   CO2 20 - 32 mmol/L - - -   CO2 (external) 19 - 29 mEq/L - 25 24   BUN 7 - 30 mg/dL - - -   BUN (external) 5 - 24 mg/dL - 21 23   Cr 0.66 - 1.25 mg/dL - - -   Cr (external) 0.70 - 1.20 mg/dL - 1.10 1.30(H)   Glucose 70 - 99 mg/dL 200(H) - -   Glucose (external) 70 - 99 mg/dL - 116(H) 187(H)   Ca  8.5 - 10.1 mg/dL - - -   Ca (external) 8.4 - 10.5 mg/dL - 9.3 9.3   Mg 1.6 - 2.3 mg/dL - - -   Mg (external) 1.8 - 2.7 mg/dL - 1.7(L) 1.5(L)     Bone Health Latest Ref Rng & Units 2/3/2020 1/27/2020 1/6/2020   Phos 2.5 - 4.5 mg/dL - - -   Phos (external) 2.5 - 4.6 mg/dL 3.5 3.3 3.3   PTHi 18 - 80 pg/mL - - -   Vit D Def 20  - 75 ug/L - - -     Heme Latest Ref Rng & Units 2/3/2020 1/27/2020 1/20/2020   WBC 4.0 - 11.0 10e9/L - - -   WBC (external) 3.2 - 11.0 10*9/L 3.0(L) 3.3 2.4(L)   Hgb 13.3 - 17.7 g/dL - - -   Hgb (external) 12.9 - 16.9 g/dL 12.1(L) 12.2(L) 11.9(L)   Plt 150 - 450 10e9/L - - -   Plt (external) 130 - 375 10*9/L 210 209 188     Liver Latest Ref Rng & Units 9/26/2019 8/6/2019 1/7/2019   AP 40 - 150 U/L 100 - 157(H)   TBili 0.2 - 1.3 mg/dL 0.2 - 0.2   ALT 0 - 70 U/L 42 - 46   AST 0 - 45 U/L 16 - 15   Tot Protein 6.8 - 8.8 g/dL 7.5 - 7.9   Tot Protein (external) 5.7 - 8.2 g/dL - 6.6 -   Albumin 3.4 - 5.0 g/dL 3.5 - 3.5   Albumin (external) 3.2 - 4.8 g/dL - 4.4 -     Pancreas Latest Ref Rng & Units 2/7/2020 9/26/2019 1/7/2019   A1C 0 - 5.6 % 8.2(H) 8.9(H) 10.8(H)     Iron studies Latest Ref Rng & Units 9/26/2019 8/6/2019   Iron 35 - 180 ug/dL 70 -   Iron sat 15 - 46 % 28 -   Ferritin 26 - 388 ng/mL 753(H) -   Ferritin (external) 22 - 322 ng/mL - 578(H)     [facility] Txp Virology Latest Ref Rng & Units 2/3/2020 2/1/2020 1/6/2020   BK Spec - Plasma Plasma Plasma   BK Res BKNEG:BK Virus DNA Not Detected copies/mL BK Virus DNA Not Detected BK Virus DNA Not Detected BK Virus DNA Not Detected   BK Log <2.7 Log copies/mL Not Calculated Not Calculated Not Calculated   Hep B Core NR:Nonreactive - - -   Hep B Surf Ext  Multiple values view image mIU/mL - - -   HIV 1&2 Ext Nonreactive - - Nonreactive        Recent Labs   Lab Test 11/25/19  0843 12/09/19  1002 02/07/20  0858   DOSTAC 2,020 2130,12/8/19 2/6/20 2105   TACROL 10.5 10.4 9.2     Recent Labs   Lab Test 10/07/19  0742 11/21/19  0813   DOSMPA Not Provided 11/20/19 1800   MPACID 0.82* 0.91*   MPAG 14.1* 39.5       Jabier Hernández MD

## 2020-02-07 NOTE — PATIENT INSTRUCTIONS
Recommendations from today's MTM visit:                                                      1. At 6 months post transplant get Pneumovax 23 vaccination.     It was great to speak with you today.  I value your experience and would be very thankful for your time with providing feedback on our clinic survey. You may receive a survey via email or text message in the next few days.     Next MTM visit: as needed    To schedule another MTM appointment, please call the clinic directly or you may call the MTM scheduling line at [phone] or toll-free at [phone].     My Clinical Pharmacist's contact information:                                                      It was a pleasure talking with you today!  Please feel free to contact me with any questions or concerns you have.      Abdullahi Javier, PharmD  MTM Pharmacist    Phone: [phone]

## 2020-02-07 NOTE — LETTER
2/7/2020       RE: Michael Amin  [address]  [facility] [address]     Dear Colleague,    Thank you for referring your patient, Michael Amin, to the [facility] NEPHROLOGY at [facility]. Please see a copy of my visit note below.    ACUTE TRANSPLANT NEPHROLOGY VISIT    Assessment & Plan   # LDKT: Stable, although did have a recent RICHARD episode felt secondary to dehydration and higher tacrolimus level.  Now resolved at lower end of baseline.   - Baseline Cr ~ TBD, but 1.1-1.3 would reasonable range   - Proteinuria: Minimal (0.2-0.5 grams)   - Date DSA Last Checked: Dec/2019      Latest DSA: No   - BK Viremia: No   - Kidney Tx Biopsy: Nov 25, 2019; Result: Material insufficient for assessment with no glomeruli.             Oct 22, 2019; Result: Borderline acute cellular-mediated rejection.    # Immunosuppression: Tacrolimus immediate release (goal 8-10) and Mycophenolate mofetil (goal 1.0-3.5)   - Changes: No    # Infection Prophylaxis:   - PJP: Sulfa/TMP (Bactrim)  - CMV: None, prophylaxis completed  - Thrush: None    # Hypertension: Controlled;  Goal BP: < 140/90   - Volume status: Euvolemic   - Changes: No    # Diabetes: Borderline control (HbA1c 7-9%) with some brittleness still Last HbA1c: 8.2%   - Management as per Endocrinology.    # Anemia in Chronic Renal Disease: Hgb: Stable      JOSE: No   - Iron studies: Replete    # Mineral Bone Disorder:   - Secondary renal hyperparathyroidism; PTH level: Moderately elevated (301-600 pg/ml)  - Vitamin D; level: Low        On Supplement: Yes  - Calcium; level: Normal        On Supplement: No  - Phosphorus; level: Normal        On Supplement: No    # Electrolytes:   - Potassium; level: Normal        On Supplement: No  - Magnesium; level: Normal        On Supplement: Yes  - Bicarbonate; level: Normal        On Supplement: No    # Overweight: Stable to slightly decreased weight.   - Recommend increased exercise and watch  caloric intake.    # Medical Compliance: Yes    # Transplant History:  Etiology of Kidney Failure: Diabetes mellitus type 1  Tx: LDKT  Transplant: 10/1/2019 (Kidney)  Donor Type: Living Donor Class:   Crossmatch at time of Tx: negative  DSA at time of Tx: No  Significant changes in immunosuppression: None  CMV IgG Ab High Risk Discordance (D+/R-): No  EBV IgG Ab High Risk Discordance (D+/R-): No  Significant transplant-related complications: None    Transplant Office Phone Number: [phone]    Assessment and plan was discussed with the patient and he voiced his understanding and agreement.    Return visit: Return for 6 month post transplant visit.    Jabier Hernández MD    Chief Complaint   Mr. Amin is a 39 year old here for routine follow up.     History of Present Illness    Mr. Amin reports feeling good overall with some medical complaints.  Since last clinic visit, patient reports no hospitalizations or new medical complaints and has been doing well overall.  However, he did have an RICHARD episode with his creatinine up to ~ 1.9.  The underlying etiology wasn't clear, but patient felt he might have been a bit dehydrated, as well as higher tacrolimus level.  With good hydration and tacrolimus adjustment, his creatinine is now back at lower end of his baseline.    His energy level is good and now near normal.  He is active and does get some exercise.  Denies any chest pain or shortness of breath with exertion.  Appetite is good and he is frequently hungry, but he is trying to lose weight.  His weight is down ~ 3 lbs.  No nausea, vomiting or diarrhea.  No fever, sweats or chills.  No leg swelling.  Endocrine is following him for his diabetic management.    Recent Hospitalizations:  [x] No [] Yes    New Medical Issues: [x] No [] Yes    Decreased energy: [x] No [] Yes    Chest pain or SOB with exertion:  [x] No [] Yes    Appetite change or weight change: [x] No [] Yes    Nausea, vomiting or diarrhea:  [x]  No [] Yes    Fever, sweats or chills: [x] No [] Yes    Leg swelling: [x] No [] Yes      Home BP: 130/80s, but lower at times    Review of Systems   A comprehensive review of systems was obtained and negative, except as noted in the HPI or PMH.    Problem List   Patient Active Problem List   Diagnosis     HTN, kidney transplant related     Diabetes mellitus type 1 (H)     Dyslipidemia     Anemia in chronic kidney disease     Secondary renal hyperparathyroidism (H)     Kidney replaced by transplant     Aftercare following organ transplant     Vitamin D deficiency     Hypomagnesemia     Kidney transplant rejection       Social History   Social History     Tobacco Use     Smoking status: Never Smoker     Smokeless tobacco: Never Used   Substance Use Topics     Alcohol use: Not Currently     Comment: Rarely 1-2 Drinks a month      Drug use: No       Allergies   Allergies   Allergen Reactions     Thymoglobulin      Had reaction with rigors after steroid-free dose. No reaction with steroids.       Medications   Current Outpatient Medications   Medication Sig     alcohol swab prep pads Use to swab area of injection/zak as directed.     aspirin (ASA) 81 MG EC tablet Take 81 mg by mouth daily      atorvastatin (LIPITOR) 10 MG tablet Take 1 tablet (10 mg) by mouth daily     blood glucose monitoring (ACCU-CHEK FASTCLIX) lancets U QID OR MORE OFTEN PRN     carvedilol (COREG) 12.5 MG tablet Take 1 tablet (12.5 mg) by mouth 2 times daily (with meals)     HUMALOG 100 UNIT/ML injection Insulin used for filling patient's pump     insulin lispro (HUMALOG VIAL) 100 UNIT/ML vial Inject 100 Units Subcutaneous daily With Insulin pump. Up to 100 units     insulin pen needle (ULTICARE MICRO) 32G X 4 MM miscellaneous Use pen needles daily or as directed.     magnesium oxide (MAG-OX) 400 MG tablet Take 1 tablet (400 mg) by mouth daily (with lunch)     mycophenolate (GENERIC EQUIVALENT) 250 MG capsule Take 5 capsules (1,250 mg) by mouth 2  times daily     senna-docusate (SENOKOT-S/PERICOLACE) 8.6-50 MG tablet Take 2 tablets by mouth 2 times daily     sulfamethoxazole-trimethoprim (BACTRIM/SEPTRA) 400-80 MG tablet Take 1 tablet by mouth daily     tacrolimus (GENERIC EQUIVALENT) 0.5 MG capsule HOLD (Patient not taking: Reported on 2/7/2020)     tacrolimus (GENERIC EQUIVALENT) 1 MG capsule Take 3 mg by mouth 2 times daily      vitamin D3 (CHOLECALCIFEROL) 1000 units (25 mcg) tablet Take 1 tablet (1,000 Units) by mouth daily     No current facility-administered medications for this visit.      There are no discontinued medications.    Physical Exam   Vital Signs: BP (!) 151/89   Pulse 83   Wt 93.6 kg (206 lb 4.8 oz)   SpO2 98%   BMI 33.30 kg/m       GENERAL APPEARANCE: alert and no distress  HENT: mouth without ulcers or lesions  LYMPHATICS: no cervical or supraclavicular nodes  RESP: lungs clear to auscultation - no rales, rhonchi or wheezes  CV: regular rhythm, normal rate, no rub, no murmur  EDEMA: no LE edema bilaterally  ABDOMEN: soft, nondistended, nontender, bowel sounds normal, overweight  MS: extremities normal - no gross deformities noted, no evidence of inflammation in joints, no muscle tenderness  SKIN: no rash  TX KIDNEY: normal  DIALYSIS ACCESS:  None    Data     Renal Latest Ref Rng & Units 2/7/2020 2/3/2020 1/27/2020   Na 133 - 144 mmol/L - - -   Na (external) 134 - 143 mEq/L - 138 138   K 3.4 - 5.3 mmol/L - - -   K (external) 3.4 - 5.1 mEq/L - 4.3 4.2   Cl 94 - 109 mmol/L - - -   Cl (external) 99 - 110 mEq/L - 104 104   CO2 20 - 32 mmol/L - - -   CO2 (external) 19 - 29 mEq/L - 25 24   BUN 7 - 30 mg/dL - - -   BUN (external) 5 - 24 mg/dL - 21 23   Cr 0.66 - 1.25 mg/dL - - -   Cr (external) 0.70 - 1.20 mg/dL - 1.10 1.30(H)   Glucose 70 - 99 mg/dL 200(H) - -   Glucose (external) 70 - 99 mg/dL - 116(H) 187(H)   Ca  8.5 - 10.1 mg/dL - - -   Ca (external) 8.4 - 10.5 mg/dL - 9.3 9.3   Mg 1.6 - 2.3 mg/dL - - -   Mg (external) 1.8 - 2.7 mg/dL  - 1.7(L) 1.5(L)     Bone Health Latest Ref Rng & Units 2/3/2020 1/27/2020 1/6/2020   Phos 2.5 - 4.5 mg/dL - - -   Phos (external) 2.5 - 4.6 mg/dL 3.5 3.3 3.3   PTHi 18 - 80 pg/mL - - -   Vit D Def 20 - 75 ug/L - - -     Heme Latest Ref Rng & Units 2/3/2020 1/27/2020 1/20/2020   WBC 4.0 - 11.0 10e9/L - - -   WBC (external) 3.2 - 11.0 10*9/L 3.0(L) 3.3 2.4(L)   Hgb 13.3 - 17.7 g/dL - - -   Hgb (external) 12.9 - 16.9 g/dL 12.1(L) 12.2(L) 11.9(L)   Plt 150 - 450 10e9/L - - -   Plt (external) 130 - 375 10*9/L 210 209 188     Liver Latest Ref Rng & Units 9/26/2019 8/6/2019 1/7/2019   AP 40 - 150 U/L 100 - 157(H)   TBili 0.2 - 1.3 mg/dL 0.2 - 0.2   ALT 0 - 70 U/L 42 - 46   AST 0 - 45 U/L 16 - 15   Tot Protein 6.8 - 8.8 g/dL 7.5 - 7.9   Tot Protein (external) 5.7 - 8.2 g/dL - 6.6 -   Albumin 3.4 - 5.0 g/dL 3.5 - 3.5   Albumin (external) 3.2 - 4.8 g/dL - 4.4 -     Pancreas Latest Ref Rng & Units 2/7/2020 9/26/2019 1/7/2019   A1C 0 - 5.6 % 8.2(H) 8.9(H) 10.8(H)     Iron studies Latest Ref Rng & Units 9/26/2019 8/6/2019   Iron 35 - 180 ug/dL 70 -   Iron sat 15 - 46 % 28 -   Ferritin 26 - 388 ng/mL 753(H) -   Ferritin (external) 22 - 322 ng/mL - 578(H)     [facility] Txp Virology Latest Ref Rng & Units 2/3/2020 2/1/2020 1/6/2020   BK Spec - Plasma Plasma Plasma   BK Res BKNEG:BK Virus DNA Not Detected copies/mL BK Virus DNA Not Detected BK Virus DNA Not Detected BK Virus DNA Not Detected   BK Log <2.7 Log copies/mL Not Calculated Not Calculated Not Calculated   Hep B Core NR:Nonreactive - - -   Hep B Surf Ext  Multiple values view image mIU/mL - - -   HIV 1&2 Ext Nonreactive - - Nonreactive        Recent Labs   Lab Test 11/25/19  0843 12/09/19  1002 02/07/20  0858   DOSTAC 2,020 2130,12/8/19 2/6/20 2105   TACROL 10.5 10.4 9.2     Recent Labs   Lab Test 10/07/19  0742 11/21/19  0813   DOSMPA Not Provided 11/20/19 1800   MPACID 0.82* 0.91*   MPAG 14.1* 39.5       Again, thank you for allowing me to participate in the care of your  patient.      Sincerely,    Early Post Transplant

## 2020-02-07 NOTE — PROGRESS NOTES
SUBJECTIVE/OBJECTIVE:                Michael Amin is a 39 year old male coming in for a follow up MTM visit.  He was referred post txp .    Chief Complaint: 4 months post txp.     Allergies/ADRs: Reviewed in Epic  Tobacco: No tobacco use   Alcohol: not currently using  Caffeine: no caffeine, drinking  PMH: Reviewed in Epic    Medication Adherence/Access:  Patient uses pill box(es) and uses reminders/alarms.  Patient takes medications 3 time(s) per day. 7am, noon, and 7pm  Per patient, misses medication 0 times per week.   Medication barriers: none.   The patient fills medications at [place]    Renal Transplant:  Current immunosuppressants include Tacrolimus 3mg BID (Been taking this dose x 2 weeks) and MMF 1250mg BID (goal 0-6 month post tx: 1-3.5).  Pt reports no side effects  Transplant date: 10/1/19  Estimated Creatinine Clearance: 100.1 mL/min (based on SCr of 1.05 mg/dL).  CMV prophylaxis: Completed 3 months post tx.  PCP prophylaxis: Bactrim S S daily  Current supplements for electrolyte replacement: Mag Oxide 400mg daily ( 2 hours from MMF) .  2/3 external Mag 1.7  Tx Coordinator: Sabine Anders RN, Using Med Card: Yes  Immunizations: annual flu shot 2019; [id number]:  2007; Prevnar 13: 2018; TDaP:  2017; Shingrix: not on file  Stools: Senna 1-2 weekly prn, constipation. Hard stools.  Senna prn is effective for him    Constipation: Pt is taking Senna 2 tablets daily. Having daily BMs on this regimen.     Hypertension: Current medications include Nifedipine ER 30mg at bedtime (started today due to elevated BP), Carvedilol 12.5mg BID.  Patient does self-monitor BP. 130/80s.  Patient reports no current medication side effects.  BP Readings from Last 3 Encounters:   02/07/20 115/78   02/07/20 (!) 151/89   01/21/20 (!) 148/92     Hyperlipidemia: Current therapy includes Atorvastatin 10mg once daily, Aspirin 81mg daily (denies bruising bleeding sx).  Pt reports no recent  myalgias  The ASCVD Risk score ([place] CAMPBELL Jr., et al., 2013) failed to calculate for the following reasons:    Cannot find a previous HDL lab    Cannot find a previous total cholesterol lab    Diabetes:  Pt currently taking insulin pump ~100 units daily, seeing endocrine today  SMBG: four times daily.  Been working on this with his provider and [employer]. He feels he is over treated, then has to compensate by eating more carbs to deal with the lows. Since reducing his insulin he states his blood sugars have been better controlled.     Today's Vitals: /78   Pulse 77   SpO2 98%     ASSESSMENT:                 Current medications were reviewed today.      Medication Adherence: good, no issues identified.    Renal Transplant:  Due for Pneumovax 23 6 months post txp.     Constipation: Stable.     Hypertension: Nifedipine started today, but upon recheck at end of visit patient's BP was well below 130/80. Discussed with Dr. Hernández, who approved holding Nifedipine for now. Pt instructed to sit for 5 minutes at home before checking BP as they have been >130/80.    Hyperlipidemia: Stable.    Diabetes:  Seeing Endocrine today for management.     PLAN:                Pt to...  1. Get Pneumovax 23 at 6 months post transplant.  2. Hold Nifedipine.     I spent 40 minutes with this patient today. I offer these suggestions for consideration by txp wagner. A copy of the visit note was provided to the patient's referring provider.    Will follow up as needed.     The patient was given a summary of these recommendations as an after visit summary.    Abdullahi Javier, PharmD  [facility] Pharmacist    Phone: [phone]

## 2020-02-07 NOTE — PATIENT INSTRUCTIONS
Today we made the following basal insulin rate change:  Midnight= 2.0 units/hr.  4 am = 1.8 units/hr.  9 am = 1.7 units/hr.  1 pm = 1.8 units/hr.  8 pm = 2.0 units/hr.  Sensitivity was changed to 30.  See our dietitian for weight loss support.  Schedule your annual eye exam.  Have fasting cholesterol level and thyroid blood test done.  See me in April 2020.  Kymberly Boswell PA-C

## 2020-02-09 NOTE — PROGRESS NOTES
ACUTE TRANSPLANT NEPHROLOGY VISIT    Assessment & Plan   # LDKT: Stable, although did have a recent RICHARD episode felt secondary to dehydration and higher tacrolimus level.  Now resolved at lower end of baseline.   - Baseline Cr ~ TBD, but 1.1-1.3 would reasonable range   - Proteinuria: Minimal (0.2-0.5 grams)   - Date DSA Last Checked: Dec/2019      Latest DSA: No   - BK Viremia: No   - Kidney Tx Biopsy: Nov 25, 2019; Result: Material insufficient for assessment with no glomeruli.             Oct 22, 2019; Result: Borderline acute cellular-mediated rejection.    # Immunosuppression: Tacrolimus immediate release (goal 8-10) and Mycophenolate mofetil (goal 1.0-3.5)   - Changes: No    # Infection Prophylaxis:   - PJP: Sulfa/TMP (Bactrim)  - CMV: None, prophylaxis completed  - Thrush: None    # Hypertension: Controlled;  Goal BP: < 140/90   - Volume status: Euvolemic   - Changes: No    # Diabetes: Borderline control (HbA1c 7-9%) with some brittleness still Last HbA1c: 8.2%   - Management as per Endocrinology.    # Anemia in Chronic Renal Disease: Hgb: Stable      JOSE: No   - Iron studies: Replete    # Mineral Bone Disorder:   - Secondary renal hyperparathyroidism; PTH level: Moderately elevated (301-600 pg/ml)  - Vitamin D; level: Low        On Supplement: Yes  - Calcium; level: Normal        On Supplement: No  - Phosphorus; level: Normal        On Supplement: No    # Electrolytes:   - Potassium; level: Normal        On Supplement: No  - Magnesium; level: Normal        On Supplement: Yes  - Bicarbonate; level: Normal        On Supplement: No    # Overweight: Stable to slightly decreased weight.   - Recommend increased exercise and watch caloric intake.    # Medical Compliance: Yes    # Transplant History:  Etiology of Kidney Failure: Diabetes mellitus type 1  Tx: LDKT  Transplant: 10/1/2019 (Kidney)  Donor Type: Living Donor Class:   Crossmatch at time of Tx: negative  DSA at time of Tx: No  Significant changes in  immunosuppression: None  CMV IgG Ab High Risk Discordance (D+/R-): No  EBV IgG Ab High Risk Discordance (D+/R-): No  Significant transplant-related complications: None    Transplant Office Phone Number: [phone]    Assessment and plan was discussed with the patient and he voiced his understanding and agreement.    Return visit: Return for 6 month post transplant visit.    Jabier Hernández MD    Chief Complaint   Mr. Amin is a 39 year old here for routine follow up.     History of Present Illness    Mr. Amin reports feeling good overall with some medical complaints.  Since last clinic visit, patient reports no hospitalizations or new medical complaints and has been doing well overall.  However, he did have an RICHARD episode with his creatinine up to ~ 1.9.  The underlying etiology wasn't clear, but patient felt he might have been a bit dehydrated, as well as higher tacrolimus level.  With good hydration and tacrolimus adjustment, his creatinine is now back at lower end of his baseline.    His energy level is good and now near normal.  He is active and does get some exercise.  Denies any chest pain or shortness of breath with exertion.  Appetite is good and he is frequently hungry, but he is trying to lose weight.  His weight is down ~ 3 lbs.  No nausea, vomiting or diarrhea.  No fever, sweats or chills.  No leg swelling.  Endocrine is following him for his diabetic management.    Recent Hospitalizations:  [x] No [] Yes    New Medical Issues: [x] No [] Yes    Decreased energy: [x] No [] Yes    Chest pain or SOB with exertion:  [x] No [] Yes    Appetite change or weight change: [x] No [] Yes    Nausea, vomiting or diarrhea:  [x] No [] Yes    Fever, sweats or chills: [x] No [] Yes    Leg swelling: [x] No [] Yes      Home BP: 130/80s, but lower at times    Review of Systems   A comprehensive review of systems was obtained and negative, except as noted in the HPI or PMH.    Problem List   Patient Active  Problem List   Diagnosis     HTN, kidney transplant related     Diabetes mellitus type 1 (H)     Dyslipidemia     Anemia in chronic kidney disease     Secondary renal hyperparathyroidism (H)     Kidney replaced by transplant     Aftercare following organ transplant     Vitamin D deficiency     Hypomagnesemia     Kidney transplant rejection       Social History   Social History     Tobacco Use     Smoking status: Never Smoker     Smokeless tobacco: Never Used   Substance Use Topics     Alcohol use: Not Currently     Comment: Rarely 1-2 Drinks a month      Drug use: No       Allergies   Allergies   Allergen Reactions     Thymoglobulin      Had reaction with rigors after steroid-free dose. No reaction with steroids.       Medications   Current Outpatient Medications   Medication Sig     alcohol swab prep pads Use to swab area of injection/zak as directed.     aspirin (ASA) 81 MG EC tablet Take 81 mg by mouth daily      atorvastatin (LIPITOR) 10 MG tablet Take 1 tablet (10 mg) by mouth daily     blood glucose monitoring (ACCU-CHEK FASTCLIX) lancets U QID OR MORE OFTEN PRN     carvedilol (COREG) 12.5 MG tablet Take 1 tablet (12.5 mg) by mouth 2 times daily (with meals)     HUMALOG 100 UNIT/ML injection Insulin used for filling patient's pump     insulin lispro (HUMALOG VIAL) 100 UNIT/ML vial Inject 100 Units Subcutaneous daily With Insulin pump. Up to 100 units     insulin pen needle (ULTICARE MICRO) 32G X 4 MM miscellaneous Use pen needles daily or as directed.     magnesium oxide (MAG-OX) 400 MG tablet Take 1 tablet (400 mg) by mouth daily (with lunch)     mycophenolate (GENERIC EQUIVALENT) 250 MG capsule Take 5 capsules (1,250 mg) by mouth 2 times daily     senna-docusate (SENOKOT-S/PERICOLACE) 8.6-50 MG tablet Take 2 tablets by mouth 2 times daily     sulfamethoxazole-trimethoprim (BACTRIM/SEPTRA) 400-80 MG tablet Take 1 tablet by mouth daily     tacrolimus (GENERIC EQUIVALENT) 0.5 MG capsule HOLD (Patient not  taking: Reported on 2/7/2020)     tacrolimus (GENERIC EQUIVALENT) 1 MG capsule Take 3 mg by mouth 2 times daily      vitamin D3 (CHOLECALCIFEROL) 1000 units (25 mcg) tablet Take 1 tablet (1,000 Units) by mouth daily     No current facility-administered medications for this visit.      There are no discontinued medications.    Physical Exam   Vital Signs: BP (!) 151/89   Pulse 83   Wt 93.6 kg (206 lb 4.8 oz)   SpO2 98%   BMI 33.30 kg/m      GENERAL APPEARANCE: alert and no distress  HENT: mouth without ulcers or lesions  LYMPHATICS: no cervical or supraclavicular nodes  RESP: lungs clear to auscultation - no rales, rhonchi or wheezes  CV: regular rhythm, normal rate, no rub, no murmur  EDEMA: no LE edema bilaterally  ABDOMEN: soft, nondistended, nontender, bowel sounds normal, overweight  MS: extremities normal - no gross deformities noted, no evidence of inflammation in joints, no muscle tenderness  SKIN: no rash  TX KIDNEY: normal  DIALYSIS ACCESS:  None    Data     Renal Latest Ref Rng & Units 2/7/2020 2/3/2020 1/27/2020   Na 133 - 144 mmol/L - - -   Na (external) 134 - 143 mEq/L - 138 138   K 3.4 - 5.3 mmol/L - - -   K (external) 3.4 - 5.1 mEq/L - 4.3 4.2   Cl 94 - 109 mmol/L - - -   Cl (external) 99 - 110 mEq/L - 104 104   CO2 20 - 32 mmol/L - - -   CO2 (external) 19 - 29 mEq/L - 25 24   BUN 7 - 30 mg/dL - - -   BUN (external) 5 - 24 mg/dL - 21 23   Cr 0.66 - 1.25 mg/dL - - -   Cr (external) 0.70 - 1.20 mg/dL - 1.10 1.30(H)   Glucose 70 - 99 mg/dL 200(H) - -   Glucose (external) 70 - 99 mg/dL - 116(H) 187(H)   Ca  8.5 - 10.1 mg/dL - - -   Ca (external) 8.4 - 10.5 mg/dL - 9.3 9.3   Mg 1.6 - 2.3 mg/dL - - -   Mg (external) 1.8 - 2.7 mg/dL - 1.7(L) 1.5(L)     Bone Health Latest Ref Rng & Units 2/3/2020 1/27/2020 1/6/2020   Phos 2.5 - 4.5 mg/dL - - -   Phos (external) 2.5 - 4.6 mg/dL 3.5 3.3 3.3   PTHi 18 - 80 pg/mL - - -   Vit D Def 20 - 75 ug/L - - -     Heme Latest Ref Rng & Units 2/3/2020 1/27/2020 1/20/2020    WBC 4.0 - 11.0 10e9/L - - -   WBC (external) 3.2 - 11.0 10*9/L 3.0(L) 3.3 2.4(L)   Hgb 13.3 - 17.7 g/dL - - -   Hgb (external) 12.9 - 16.9 g/dL 12.1(L) 12.2(L) 11.9(L)   Plt 150 - 450 10e9/L - - -   Plt (external) 130 - 375 10*9/L 210 209 188     Liver Latest Ref Rng & Units 9/26/2019 8/6/2019 1/7/2019   AP 40 - 150 U/L 100 - 157(H)   TBili 0.2 - 1.3 mg/dL 0.2 - 0.2   ALT 0 - 70 U/L 42 - 46   AST 0 - 45 U/L 16 - 15   Tot Protein 6.8 - 8.8 g/dL 7.5 - 7.9   Tot Protein (external) 5.7 - 8.2 g/dL - 6.6 -   Albumin 3.4 - 5.0 g/dL 3.5 - 3.5   Albumin (external) 3.2 - 4.8 g/dL - 4.4 -     Pancreas Latest Ref Rng & Units 2/7/2020 9/26/2019 1/7/2019   A1C 0 - 5.6 % 8.2(H) 8.9(H) 10.8(H)     Iron studies Latest Ref Rng & Units 9/26/2019 8/6/2019   Iron 35 - 180 ug/dL 70 -   Iron sat 15 - 46 % 28 -   Ferritin 26 - 388 ng/mL 753(H) -   Ferritin (external) 22 - 322 ng/mL - 578(H)     [facility] Txp Virology Latest Ref Rng & Units 2/3/2020 2/1/2020 1/6/2020   BK Spec - Plasma Plasma Plasma   BK Res BKNEG:BK Virus DNA Not Detected copies/mL BK Virus DNA Not Detected BK Virus DNA Not Detected BK Virus DNA Not Detected   BK Log <2.7 Log copies/mL Not Calculated Not Calculated Not Calculated   Hep B Core NR:Nonreactive - - -   Hep B Surf Ext  Multiple values view image mIU/mL - - -   HIV 1&2 Ext Nonreactive - - Nonreactive        Recent Labs   Lab Test 11/25/19  0843 12/09/19  1002 02/07/20  0858   DOSTAC 2,020 2130,12/8/19 2/6/20 2105   TACROL 10.5 10.4 9.2     Recent Labs   Lab Test 10/07/19  0742 11/21/19  0813   DOSMPA Not Provided 11/20/19 1800   MPACID 0.82* 0.91*   MPAG 14.1* 39.5

## 2020-02-09 NOTE — PROGRESS NOTES
ELIZABETH  Brennan Rodriguez ( Jake ) is a 39 year old male with type 1 diabetes mellitus here today for a follow up visit.  He was last seen in our clinic by Maria Teresa Nguyễn PA-C on 12/9/2019.  Pt has hx of type 1 diabetes mellitus dx at age 16.  His diabetes is complicated by nephropathy and underwent kidney transplant on 10/1/2019.  His last Oph exam showed possible early retinopathy.  He has no peripheral neuroapthy.  His hx is also significant for HTN, anemia- CKD,dyslipidemia and obesity.  For his diabetes, he is currently using a Medtronic G670G insulin pump and Guardian sensor.  He reports less hypoglycemia overall, but still has hypoglycemia from 9 am to noon most days.  His sensor wear currently is 47 % and auto mode use is 0 %.  He has been working on eliminating frequent hypoglycemia prior to auto mode use on his insulin pump.  His average SG was 159 with SD 56.  His basal insulin rates are set at:  Midnight= 2.0 units/hr.  4 am = 1.8 units/hr.  20:00 = 2.0 units/hr.  His 24 hr basal insulin dose is 44.8 units/24 hrs.  I/C ratio is 1:6 and sensitivity is 25 and active insulin time is 3 hrs.  Pt's A1C is 8.2 % today.  His previous A1C was 8.9 % .  We downloaded his glucose meter today and he has been having low blood sugars around 9 am - noon.  Less hypoglycemia overall; 79 % of his blood sugar values were within target.  On ROS today, he reports doing well.  Pt denies frequent headaches, blurred vision, n/v, SOB at rest, chest pain, abd pain, diarrhea, dysuria, hematuria or foot ulcers.  Rudi denies numbness, tingling or pain in his feet or hands.    Diabetes Care  Retinopathy: he was seen by Oph in Feb 2019- possible early retinopathy. I asked him to schedule his annual eye exam.  Nephropathy:hx of ESRD s/p kidney transplant on 10/10843.   Neuropathy: none.  Foot Exam:no ulcers.  Taking aspirin: yes  Lipids: need fasting lipid panel- ordered. Pt is taking low dose Lipitor daily.    ROS  Please see under  HPI.    Allergies  Allergies   Allergen Reactions     Thymoglobulin      Had reaction with rigors after steroid-free dose. No reaction with steroids.       Medications  Current Outpatient Medications   Medication Sig Dispense Refill     alcohol swab prep pads Use to swab area of injection/zak as directed. 100 each 3     aspirin (ASA) 81 MG EC tablet Take 81 mg by mouth daily        atorvastatin (LIPITOR) 10 MG tablet Take 1 tablet (10 mg) by mouth daily 90 tablet 3     blood glucose monitoring (ACCU-CHEK FASTCLIX) lancets U QID OR MORE OFTEN PRN  1     carvedilol (COREG) 12.5 MG tablet Take 1 tablet (12.5 mg) by mouth 2 times daily (with meals) 180 tablet 3     HUMALOG 100 UNIT/ML injection Insulin used for filling patient's pump  3     insulin lispro (HUMALOG VIAL) 100 UNIT/ML vial Inject 100 Units Subcutaneous daily With Insulin pump. Up to 100 units 30 mL 11     insulin pen needle (ULTICARE MICRO) 32G X 4 MM miscellaneous Use pen needles daily or as directed. 100 each 3     magnesium oxide (MAG-OX) 400 MG tablet Take 1 tablet (400 mg) by mouth daily (with lunch) 30 tablet 5     mycophenolate (GENERIC EQUIVALENT) 250 MG capsule Take 5 capsules (1,250 mg) by mouth 2 times daily 300 capsule 11     senna-docusate (SENOKOT-S/PERICOLACE) 8.6-50 MG tablet Take 2 tablets by mouth 2 times daily 20 tablet 0     sulfamethoxazole-trimethoprim (BACTRIM/SEPTRA) 400-80 MG tablet Take 1 tablet by mouth daily 30 tablet 11     tacrolimus (GENERIC EQUIVALENT) 1 MG capsule Take 3 mg by mouth 2 times daily  120 capsule 11     vitamin D3 (CHOLECALCIFEROL) 1000 units (25 mcg) tablet Take 1 tablet (1,000 Units) by mouth daily 90 tablet 3     tacrolimus (GENERIC EQUIVALENT) 0.5 MG capsule HOLD (Patient not taking: Reported on 2/7/2020) 60 capsule 11       Family History  family history includes Coronary Artery Disease in his father; Diabetes in his father.    Social History   reports that he has never smoked. He has never used  "smokeless tobacco. He reports previous alcohol use. He reports that he does not use drugs.   He is a teacher in [place], [place].    Past Medical History  Past Medical History:   Diagnosis Date     Anemia in chronic kidney disease      Dyslipidemia      ESRD (end stage renal disease) on dialysis (H)      Hypertension      Hypomagnesemia 10/7/2019     Secondary hyperparathyroidism (H)      Type 1 diabetes (H)        Past Surgical History:   Procedure Laterality Date     hair implant  2007     INSERT CATHETER PERITONEAL DIALYSIS  08/2018     IR FINE NEEDLE ASPIRATION W ULTRASOUND  11/25/2019     IR RENAL BIOPSY LEFT  11/25/2019     PERCUTANEOUS BIOPSY KIDNEY Left 10/22/2019    Procedure: Left Kidney Biopsy;  Surgeon: Kash Hoffman MD;  Location:  OR       Physical Exam  /78   Pulse 77   Ht 1.676 m (5' 6\")   Wt 93.4 kg (206 lb)   BMI 33.25 kg/m    Body mass index is 33.25 kg/m .    GENERAL : In no apparent distress  FEET:  No ulcers.    RESULTS  Creatinine   Date Value Ref Range Status   12/09/2019 1.05 0.66 - 1.25 mg/dL Final     GFR Estimate   Date Value Ref Range Status   12/09/2019 88 >60 mL/min/[1.73_m2] Final     Comment:     Non  GFR Calc  Starting 12/18/2018, serum creatinine based estimated GFR (eGFR) will be   calculated using the Chronic Kidney Disease Epidemiology Collaboration   (CKD-EPI) equation.       Hemoglobin A1C   Date Value Ref Range Status   02/07/2020 8.2 (H) 0 - 5.6 % Final     Comment:     Normal <5.7% Prediabetes 5.7-6.4%  Diabetes 6.5% or higher - adopted from ADA   consensus guidelines.       Potassium   Date Value Ref Range Status   12/09/2019 4.2 3.4 - 5.3 mmol/L Final     ALT   Date Value Ref Range Status   09/26/2019 42 0 - 70 U/L Final     AST   Date Value Ref Range Status   09/26/2019 16 0 - 45 U/L Final       A1C   8.2 % today.  A1C   8.9      9/26/2019    ASSESSMENT/PLAN:    1.  TYPE 1 DIABETES MELLITUS: Pt's glycemic control has improved with an A1C of " 8.2 % today.  Today I added a lower basal insulin rate at 9 am = 1.7 units/hr and 1 pm basal insulin rate = 1.8 units/hr.  New basal insulin rates below:  Midnight = 2.0 units/hr.  4 am = 1.8 units/hr.  9 am = 1.7 units/hr.  1 pm = 1.8 units/hr.  2000= 2.0 units/hr.  His I/C is to remain 1:6.  Sensitivity was changed to 30 today ( was 25 ).  If his hypoglycemia resolves, he will try using auto mode next week.  Pt instructed to schedule a annual Oph exam.  His feet are ok today.  I placed an order for a fasting lipid panel and TSH.  I have also referred pt to meet with our dietitian for weight loss support.  Pt had the flu vaccine this season.    2.  HX OF ESRD: S/P kidney transplant on 10/1/019 doing well.  Most recent creat was 1.10 with GFR > 60 mL/min on 2/3/2020.    3. OBESITY: Will schedule pt to meet with our dietitian.    4.  HTN: Stable on current RX.    5.  HYPERLIPIDEMIA: Fasting lipid panel ordered.  Pt is taking Lipitor daily.    6.  Return to Endocrine Clinic to see me in April 2020.

## 2020-02-11 VITALS — BODY MASS INDEX: 33.09 KG/M2 | WEIGHT: 205 LBS

## 2020-02-11 NOTE — PROGRESS NOTES
Medical Nutrition Therapy for Diabetes  Visit Type:Initial assessment and intervention    Michael Amin presents today for MNT and education related to type 1 diabetes and weight management.   He is accompanied by self.     ASSESSMENT:   Patient comments/concerns relating to nutrition: States he saw a RD a couple weeks ago for general healthy eating. He is wondering if I have some resources for recipes and any suggestions about an online tracker. We will also do a carbohydrate counting review today.   Patient was concerned about weight gain, but relates this to having to treat low blood glucose regularly and sometimes over-treating. He feels he is eating to keep his glucose up. He was seen by Kymberly UGARTE prior to this appointment and his pump rates were reduced.     NUTRITION HISTORY:    Breakfast: greek yogurt or granola bar [place] or english muffin with peanut butter  Lunch: leftovers, was eating more lean cuisine before   Dinner: chicken with BBQ sauce, low carb tortilla, lettuce, cheese or chili  Snacks: trying to choose more vegetables instead of chips  Beverages:water  Misses meals? no  Eats out:  occassionally     Previous diet education:  Yes     Food allergies/intolerances: None noted    EXERCISE: He is thinking about [employer] and working with a     BLOOD GLUCOSE MONITORING:  Reviewed with Kymberly UGARTE, see her note for details  Patient's most recent   Lab Results   Component Value Date    A1C 8.2 02/07/2020    is not meeting goal of <7.0    MEDICATIONS:  Current Outpatient Medications   Medication     alcohol swab prep pads     aspirin (ASA) 81 MG EC tablet     atorvastatin (LIPITOR) 10 MG tablet     blood glucose monitoring (ACCU-CHEK FASTCLIX) lancets     carvedilol (COREG) 12.5 MG tablet     HUMALOG 100 UNIT/ML injection     insulin lispro (HUMALOG VIAL) 100 UNIT/ML vial     insulin pen needle (ULTICARE MICRO) 32G X 4 MM miscellaneous     magnesium oxide (MAG-OX) 400 MG  tablet     mycophenolate (GENERIC EQUIVALENT) 250 MG capsule     senna-docusate (SENOKOT-S/PERICOLACE) 8.6-50 MG tablet     sulfamethoxazole-trimethoprim (BACTRIM/SEPTRA) 400-80 MG tablet     tacrolimus (GENERIC EQUIVALENT) 0.5 MG capsule     tacrolimus (GENERIC EQUIVALENT) 1 MG capsule     vitamin D3 (CHOLECALCIFEROL) 1000 units (25 mcg) tablet     No current facility-administered medications for this visit.        LABS:  Lab Results   Component Value Date    A1C 8.2 02/07/2020     Lab Results   Component Value Date     02/07/2020     No results found for: LDL  No results found for: HDL]  GFR Estimate   Date Value Ref Range Status   12/09/2019 88 >60 mL/min/[1.73_m2] Final     Comment:     Non  GFR Calc  Starting 12/18/2018, serum creatinine based estimated GFR (eGFR) will be   calculated using the Chronic Kidney Disease Epidemiology Collaboration   (CKD-EPI) equation.       GFR Estimate If Black   Date Value Ref Range Status   12/09/2019 >90 >60 mL/min/[1.73_m2] Final     Comment:      GFR Calc  Starting 12/18/2018, serum creatinine based estimated GFR (eGFR) will be   calculated using the Chronic Kidney Disease Epidemiology Collaboration   (CKD-EPI) equation.       Lab Results   Component Value Date    CR 1.05 12/09/2019     No results found for: MICROALBUMIN    ANTHROPOMETRICS:  Vitals: Wt 93 kg (205 lb)   BMI 33.09 kg/m    Body mass index is 33.09 kg/m .      Wt Readings from Last 5 Encounters:   02/11/20 93 kg (205 lb)   02/07/20 93.4 kg (206 lb)   02/07/20 93.6 kg (206 lb 4.8 oz)   01/21/20 94.9 kg (209 lb 3.2 oz)   12/09/19 93.9 kg (207 lb)       NUTRITION INTERVENTION:  Education given to support: general nutrition guidelines, weight reduction, carb counting, labeling, fat modification, exercise, dining out/special occasions, fiber, behavior modification, portion control and heart healthy diet, meal planning, meal ideas, online resources for recipes.  The relationship  between carbohydrate and glucose, identifying carbohydrate containing foods, how to use a nutrition facts label, reviewed written and online or terri based resources, how to estimate carbs and how to count carbs when eating out, measuring foods and estimating portions.  Macronutrients in food and function. Emphasizing nutrient dense sources of carbohydrate and minimizing carb containing foods that are refined and high in added sugar. Weight management - calorie intake versus output and portion control. Lean versus high fat sources of protein. Different types of fats, emphasizing mono and polyunsaturated fats and omega three fatty acids and limiting saturated fat. Avoidance of trans fat. Plate method for a simple method to balancing meals.     Education Materials Provided: Carbohydrate Counting and Snack Ideas for Meal Plan    PATIENT'S BEHAVIOR CHANGE GOALS:   See Patient Instructions for patient stated behavior change goals. AVS was printed and given to patient at today's appointment.    MONITOR / EVALUATE:  RD will monitor/evaluate:  Beliefs and attitudes related to food  Blood Glucose / A1c  Food and nutrition knowledge / skills  Food / Beverage / Nutrient intake   Pertinent Labs  Progress toward meeting stated nutrition-related goals  Readiness to change nutrition-related behaviors  Weight change    FOLLOW-UP:  Follow up with RD as needed.  Patient will call to schedule follow up.   Time spent in minutes: 60  Encounter: Individual

## 2020-03-11 ENCOUNTER — HEALTH MAINTENANCE LETTER (OUTPATIENT)
Age: 40
End: 2020-03-11

## 2020-03-17 DIAGNOSIS — Z99.2 ESRD (END STAGE RENAL DISEASE) ON DIALYSIS (H): ICD-10-CM

## 2020-03-17 DIAGNOSIS — Z94.0 KIDNEY TRANSPLANTED: ICD-10-CM

## 2020-03-17 DIAGNOSIS — N18.6 ESRD (END STAGE RENAL DISEASE) ON DIALYSIS (H): ICD-10-CM

## 2020-03-17 RX ORDER — TACROLIMUS 1 MG/1
2 CAPSULE ORAL 2 TIMES DAILY
Qty: 120 CAPSULE | Refills: 11 | Status: SHIPPED | OUTPATIENT
Start: 2020-03-17 | End: 2020-11-16

## 2020-03-26 ENCOUNTER — TELEPHONE (OUTPATIENT)
Dept: EDUCATION SERVICES | Facility: CLINIC | Age: 40
End: 2020-03-26

## 2020-03-26 NOTE — TELEPHONE ENCOUNTER
Phone call to Rudi:  He states he has been happy with his pump and sensor since meeting with Ivonne.  He has been in [employer] for a month and feels that the system is slow but it does work.  His issues during the first day of sensor use have resolved.  He feels his blood sugar has been controlled.  He has an appointment with Guillermina Boswell soon and will upload the data for her. Delmy Mercado RN,CDE

## 2020-03-30 ENCOUNTER — TELEPHONE (OUTPATIENT)
Dept: TRANSPLANT | Facility: CLINIC | Age: 40
End: 2020-03-30

## 2020-03-30 NOTE — TELEPHONE ENCOUNTER
Patient Call: Is scheduled for Friday, April 3rd, 2020 Appts is wondering if he should come CSC?      Call back needed? Yes    Return Call Needed  Same as documented in contacts section  When to return call?: Same day: [facility] [employer]

## 2020-03-31 ENCOUNTER — TRANSFERRED RECORDS (OUTPATIENT)
Dept: HEALTH INFORMATION MANAGEMENT | Facility: CLINIC | Age: 40
End: 2020-03-31

## 2020-03-31 DIAGNOSIS — Z94.0 KIDNEY REPLACED BY TRANSPLANT: ICD-10-CM

## 2020-03-31 DIAGNOSIS — Z79.899 LONG TERM USE OF DRUG: ICD-10-CM

## 2020-03-31 PROCEDURE — 87799 DETECT AGENT NOS DNA QUANT: CPT | Performed by: INTERNAL MEDICINE

## 2020-04-01 ENCOUNTER — TELEPHONE (OUTPATIENT)
Dept: TRANSPLANT | Facility: CLINIC | Age: 40
End: 2020-04-01

## 2020-04-01 NOTE — TELEPHONE ENCOUNTER
April 1, 2020 10:47 AM -  AIVERSE1:   Called pt about video /pt aware /sent link via [employer]/yes video /[email or web address]

## 2020-04-02 NOTE — NURSING NOTE
"Michael Amin is a 39 year old male who is being evaluated via a billable telephone visit.      The patient has been notified of following:     \"This telephone visit will be conducted via a call between you and your physician/provider. We have found that certain health care needs can be provided without the need for a physical exam.  This service lets us provide the care you need with a short phone conversation.  If a prescription is necessary we can send it directly to your pharmacy.  If lab work is needed we can place an order for that and you can then stop by our lab to have the test done at a later time.    If during the course of the call the physician/provider feels a telephone visit is not appropriate, you will not be charged for this service.\"     Patient has given verbal consent for Telephone visit?  Yes    Michael Amin complains of    Chief Complaint   Patient presents with     Diabetes     Type 1       I have reviewed and updated the patient's Past Medical History, Social History, Family History and Medication List.    ALLERGIES  Thymoglobulin    Stephany Ruiz MA      "

## 2020-04-03 ENCOUNTER — TELEPHONE (OUTPATIENT)
Dept: ENDOCRINOLOGY | Facility: CLINIC | Age: 40
End: 2020-04-03

## 2020-04-03 ENCOUNTER — VIRTUAL VISIT (OUTPATIENT)
Dept: NEPHROLOGY | Facility: CLINIC | Age: 40
End: 2020-04-03
Attending: INTERNAL MEDICINE
Payer: MEDICARE

## 2020-04-03 ENCOUNTER — MYC MEDICAL ADVICE (OUTPATIENT)
Dept: ENDOCRINOLOGY | Facility: CLINIC | Age: 40
End: 2020-04-03

## 2020-04-03 ENCOUNTER — VIRTUAL VISIT (OUTPATIENT)
Dept: TRANSPLANT | Facility: CLINIC | Age: 40
End: 2020-04-03
Attending: TRANSPLANT SURGERY
Payer: MEDICARE

## 2020-04-03 ENCOUNTER — VIRTUAL VISIT (OUTPATIENT)
Dept: ENDOCRINOLOGY | Facility: CLINIC | Age: 40
End: 2020-04-03
Payer: COMMERCIAL

## 2020-04-03 VITALS
HEART RATE: 89 BPM | DIASTOLIC BLOOD PRESSURE: 77 MMHG | SYSTOLIC BLOOD PRESSURE: 119 MMHG | BODY MASS INDEX: 32.3 KG/M2 | WEIGHT: 200.1 LBS

## 2020-04-03 DIAGNOSIS — E10.65 TYPE 1 DIABETES MELLITUS WITH HYPERGLYCEMIA (H): Primary | ICD-10-CM

## 2020-04-03 DIAGNOSIS — E10.22 TYPE 1 DIABETES MELLITUS WITH CHRONIC KIDNEY DISEASE ON CHRONIC DIALYSIS (H): Primary | ICD-10-CM

## 2020-04-03 DIAGNOSIS — Z48.298 AFTERCARE FOLLOWING ORGAN TRANSPLANT: Primary | ICD-10-CM

## 2020-04-03 DIAGNOSIS — Z99.2 TYPE 1 DIABETES MELLITUS WITH CHRONIC KIDNEY DISEASE ON CHRONIC DIALYSIS (H): Primary | ICD-10-CM

## 2020-04-03 DIAGNOSIS — Z94.0 KIDNEY REPLACED BY TRANSPLANT: Primary | ICD-10-CM

## 2020-04-03 DIAGNOSIS — N18.6 TYPE 1 DIABETES MELLITUS WITH CHRONIC KIDNEY DISEASE ON CHRONIC DIALYSIS (H): Primary | ICD-10-CM

## 2020-04-03 ASSESSMENT — PAIN SCALES - GENERAL: PAINLEVEL: NO PAIN (0)

## 2020-04-03 NOTE — PROGRESS NOTES
"Michael Amin is a 39 year old male who is being evaluated via a billable video visit.      The patient has been notified of following:     \"This video visit will be conducted via a call between you and your physician/provider. We have found that certain health care needs can be provided without the need for an in-person physical exam.  This service lets us provide the care you need with a video conversation.  If a prescription is necessary we can send it directly to your pharmacy.  If lab work is needed we can place an order for that and you can then stop by our lab to have the test done at a later time.    If during the course of the call the physician/provider feels a video visit is not appropriate, you will not be charged for this service.\"     Patient has given verbal consent for Video visit? YesYES    Patient would like the video invitation sent by: Send to e-mail at: [email or web address]      Michael Amin complains of    Chief Complaint   Patient presents with     RECHECK     follow up with kidney transplant       I have reviewed and updated the patient's Past Medical History, Social History, Family History and Medication List.    ALLERGIES  Thymoglobulin    Additional provider notes: as above       Video-Visit Details    Type of service:  Video Visit    Originating Location (pt. Location): Home    Distant Location (provider location):  [facility] NEPHR[facility]     Mode of Communication:  Video Conference via [employer]    "

## 2020-04-03 NOTE — TELEPHONE ENCOUNTER
[facility]  ADDRESS  [address] [address] [address]    PHONE  General: [phone]  Fax: [phone]  Lab fax     Lab order faxed per Kymberly Boswell.  Princess Patel RN on 4/3/2020 at 12:53 PM

## 2020-04-03 NOTE — PROGRESS NOTES
Post Transplant Patient Social Work Assessment -Outpatient    Patient Name: Michael Amin  : 1980  Age: 39 year old  MRN: [id number]  Date of transplant: 10/1/2019    Patient known to this writer from follow up in the kidney transplant program. Telephone visit completed today with patient to update assessment.      Presenting Information   Living Situation: Pt lives alone in [place], [place], which is about 3 hours away. Pt is currently going through a divorce from his wife Michelle.  Functional Status: Independent with ADL's, drives  Cultural/Language/Spiritual Considerations: English speaking male    Support System  Primary Support Person Parents Annita and Angela  Other support:  Brother Fransico, friends    Health Care Directive  Decision Maker: Self  Alternate Decision Maker: wife Michelle in the absence of a directive even though pt is currently doing through a divorce  Health Care Directive: Provided education    Mental Health/Coping:   History of Mental Health: Denied  History of Chemical Health: Denied  Current status: Pt reported with COVID-19 he has a little anxiety, but it's more general around how things are going in our society. He reported other than that, he does not have any mental health concerns and feels the little anxiety he has is well managed.   Coping: Pt appears to be coping well  Services Needed/Recommended: None identified at this time    Financial   Income: Pt is employed full-time as a .  Impact of transplant on income: None identified at this time  Insurance and medication coverage: [employer] (through employer) and Medicare A and B  Financial concerns: None identified at this time  Resources needed: None identified at this time      Education provided by SW: Social Work role outpatient setting and post-transplant expectations    Assessment and recommendations and plan:  Pt was on dialysis prior to transplant. He is also on the waitlist for a pancreas however  he reported he is inactive due to BMI. Reviewed post-transplant expectations and psychosocial risks of transplant. Pt reported things are going very well. He reported he is compliant with medications and labs. Patient seemed to process information well. Behavior was appropriate during interview. Pt denied any questions or concerns for this writer. SW to continue to follow as needed.     Chantal Wolf, [facility]    Kidney/Pancreas/Auto Islet Transplant Programs

## 2020-04-03 NOTE — TELEPHONE ENCOUNTER
----- Message from Kymberly Boswell PA-C sent at 4/3/2020 12:24 PM CDT -----  Please send A1C lab order for pt to have an A1C done at [facility] lab [facility] clinic in [facility].  A1C can be done anytime after May 1, 2020.  Thanks debora boswell

## 2020-04-06 NOTE — PROGRESS NOTES
Due to the COVID 19 pandemic this visit was converted to a telephone visit in order to help prevent spread of infection in this patient and the general population.    Time of start: 12:00 pm  Time of end: 12:26 pm   Total duration of telephone visit: 26 minutes    This visit would have been billed as a 05404 visit today.    ELIZABETH  Brennan Rodriguez ( Jake ) is a 39 year old male with type 1 diabetes mellitus.  Telephone visit today for diabetes follow up.  Pt has hx of type 1 diabetes mellitus dx at age 16.  His diabetes is complicated by nephropathy and underwent kidney transplant on 10/1/2019.  His last Oph exam showed possible early retinopathy.  He has no peripheral neuroapthy.  His hx is also significant for HTN, anemia- CKD,dyslipidemia and obesity.  For his diabetes, he is currently using a Medtronic G670G insulin pump and Guardian sensor.  He reports less hypoglycemia since his last visit.  He tells me his sensor fell off and he had difficulty with calibrating his sensor so he has been without a sensor for a few days.  He plans to resume auto mode on his insulin pump today.  His basal insulin rates are set at:  Midnight= 2.0 units/hr.  4 am = 1.8 units/hr.  9 am = 1.7 units/hr.  1 pm = 1.8 units/hr.  20:00 = 2.0 units/hr.  I/C ratio is 1:6 and sensitivity is 30 and active insulin time is 3 hrs.  Pt's A1C was 8.2 % on 2/7/2020.  His previous A1C was 8.9 % .  His insulin pump download showed an average glucose 163 with SD 42. Auto mode use was low since he had issues with his sensors and was not wearing a sensor.  On ROS today, he reports doing well.  Pt denies frequent headaches, blurred vision, n/v, SOB at rest, chest pain, abd pain, diarrhea, dysuria, hematuria or foot ulcers.  Rudi denies numbness, tingling or pain in his feet or hands.    Diabetes Care  Retinopathy: he was seen by Oph in Feb 2019- possible early retinopathy. I asked him to schedule his annual eye exam.  Nephropathy:hx of ESRD s/p kidney  transplant on 10/65895.   Neuropathy: none.  Foot Exam:not done- telephone visit.  Taking aspirin: yes  Lipids: LDL 85 in 3/2020. Pt is taking Lipitor.  ROS  Please see under HPI.    Allergies  Allergies   Allergen Reactions     Thymoglobulin      Had reaction with rigors after steroid-free dose. No reaction with steroids.       Medications  Current Outpatient Medications   Medication Sig Dispense Refill     alcohol swab prep pads Use to swab area of injection/zak as directed. 100 each 3     aspirin (ASA) 81 MG EC tablet Take 81 mg by mouth daily        atorvastatin (LIPITOR) 10 MG tablet Take 1 tablet (10 mg) by mouth daily 90 tablet 3     blood glucose monitoring (ACCU-CHEK FASTCLIX) lancets U QID OR MORE OFTEN PRN  1     carvedilol (COREG) 12.5 MG tablet Take 1 tablet (12.5 mg) by mouth 2 times daily (with meals) 180 tablet 3     HUMALOG 100 UNIT/ML injection Insulin used for filling patient's pump  3     insulin lispro (HUMALOG VIAL) 100 UNIT/ML vial Inject 100 Units Subcutaneous daily With Insulin pump. Up to 100 units 30 mL 11     insulin pen needle (ULTICARE MICRO) 32G X 4 MM miscellaneous Use pen needles daily or as directed. 100 each 3     magnesium oxide (MAG-OX) 400 MG tablet Take 1 tablet (400 mg) by mouth daily (with lunch) 30 tablet 5     mycophenolate (GENERIC EQUIVALENT) 250 MG capsule Take 5 capsules (1,250 mg) by mouth 2 times daily 300 capsule 11     senna-docusate (SENOKOT-S/PERICOLACE) 8.6-50 MG tablet Take 2 tablets by mouth 2 times daily 20 tablet 0     sulfamethoxazole-trimethoprim (BACTRIM/SEPTRA) 400-80 MG tablet Take 1 tablet by mouth daily 30 tablet 11     tacrolimus (GENERIC EQUIVALENT) 0.5 MG capsule HOLD 60 capsule 11     tacrolimus (GENERIC EQUIVALENT) 1 MG capsule Take 2 capsules (2 mg) by mouth 2 times daily 120 capsule 11       Family History  family history includes Coronary Artery Disease in his father; Diabetes in his father.    Social History   reports that he has never  smoked. He has never used smokeless tobacco. He reports previous alcohol use. He reports that he does not use drugs.   He is a teacher in [place], [place].    Past Medical History  Past Medical History:   Diagnosis Date     Anemia in chronic kidney disease      Dyslipidemia      ESRD (end stage renal disease) on dialysis (H)      Hypertension      Hypomagnesemia 10/7/2019     Secondary hyperparathyroidism (H)      Type 1 diabetes (H)        Past Surgical History:   Procedure Laterality Date     hair implant  2007     INSERT CATHETER PERITONEAL DIALYSIS  08/2018     IR FINE NEEDLE ASPIRATION W ULTRASOUND  11/25/2019     IR RENAL BIOPSY LEFT  11/25/2019     PERCUTANEOUS BIOPSY KIDNEY Left 10/22/2019    Procedure: Left Kidney Biopsy;  Surgeon: Kash Hoffman MD;  Location: [facility] OR       Physical Exam    No exam- telephone visit today.    RESULTS  Creatinine   Date Value Ref Range Status   12/09/2019 1.05 0.66 - 1.25 mg/dL Final     GFR Estimate   Date Value Ref Range Status   12/09/2019 88 >60 mL/min/[1.73_m2] Final     Comment:     Non  GFR Calc  Starting 12/18/2018, serum creatinine based estimated GFR (eGFR) will be   calculated using the Chronic Kidney Disease Epidemiology Collaboration   (CKD-EPI) equation.       Hemoglobin A1C   Date Value Ref Range Status   02/07/2020 8.2 (H) 0 - 5.6 % Final     Comment:     Normal <5.7% Prediabetes 5.7-6.4%  Diabetes 6.5% or higher - adopted from ADA   consensus guidelines.       Potassium   Date Value Ref Range Status   12/09/2019 4.2 3.4 - 5.3 mmol/L Final     ALT   Date Value Ref Range Status   09/26/2019 42 0 - 70 U/L Final     AST   Date Value Ref Range Status   09/26/2019 16 0 - 45 U/L Final       A1C   8.2      2/7/2020   A1C   8.9      9/26/2019    ASSESSMENT/PLAN:    1.  TYPE 1 DIABETES MELLITUS: Rudi placed a new Guardian sensor and will start using auto mode on his 670G insulin pump.  No change in insulin rates today.  He has less hypoglycemia since his  last visit.  Pt instructed to schedule a annual Oph exam.  He denies any foot ulcers or sores at this time.  Pt's TSH was normal in March 2020.  Pt had the flu vaccine this season.    2.  HX OF ESRD: S/P kidney transplant on 10/1/019 doing well.  Most recent creat was 1.15  with GFR > 60 mL/min on 3/31/2020.    3. OBESITY: He has met with our dietitian for weight loss support.    4.  HTN: No vital today- telephone visit. Continue current RX.    5.  HYPERLIPIDEMIA: LDL 85 in 3/2020.  Pt is taking Lipitor daily.    6.  Return to [facility] Clinic to see me in 3 months.  I placed an order for an A1C to be done at his local clinic in early May 2020.  Pt instructed to call me or send a MyChart note if he has any questions regarding his diabetes care.

## 2020-04-13 ENCOUNTER — TELEPHONE (OUTPATIENT)
Dept: TRANSPLANT | Facility: CLINIC | Age: 40
End: 2020-04-13

## 2020-04-13 NOTE — TELEPHONE ENCOUNTER
Issue overdue for Donor Specific Antibodies   Sent the following orders           DATE & TIME ISSUED: 2020 7:05 AM  PATIENT NAME: Michael Amin   : 1980     [facility] MR# [if applicable]: [id number]     DIAGNOSIS:  kidney transplant    ICD-10 CODE: z94.0     Please obtain a Donor Specific Antibodies  With your next transplant  Labs       Any questions please call: [phone]  Option 5    Rudi please call the transplant  Office if you need a  ALA kit - The kit should have 2 redtop tubes/ kit to send         .

## 2020-04-15 ENCOUNTER — TELEPHONE (OUTPATIENT)
Dept: TRANSPLANT | Facility: CLINIC | Age: 40
End: 2020-04-15

## 2020-04-15 NOTE — TELEPHONE ENCOUNTER
Raffi Landon MD Huepfel, Mary K, RN               Lets repeat MPA levels it times 2     Please send updated lab letter include MPA levels  With routine labs

## 2020-04-15 NOTE — LETTER
OUTPATIENT LABORATORY TEST ORDER    Patient: Michael Amin    Transplant Date: 10/1/2019  YOB: 1980    Ordered By: Dr. Rocio Rutherford  MRN: [id number]     Issued Date/Time: 4/15/2020  4:40 PM         Expiration Date: 10/7/2020    Diagnosis: Kidney Transplant (ICD-10 Z94.0)    Aftercare following organ transplant (ICD-10 Z48.288)    Long term use of medications (ICD-10 Z79.899)      Lab results to be available on the same day drawn    Patient should release information to the [facility].     Please fax all results to [phone]    Diagnosis: Kidney Transplant (ICD-10 Z94.0)    Aftercare following organ transplant (ICD-10 Z48.288)    Long term use of medications (ICD-10 Z79.899)    Months 4-7 post-transplant (2/3/2020 - 5/3/2020)  Labs every other week    CBC with platelets    Basic Metabolic Panel (Sodium, Potassium, Chloride, Creatinine, CO2, Urea Nitrogen, glucose, Calcium)    Tacrolimus/Prograf/ drug level    Mycophenolic Acid  Monthly    Phosphorus, magnesium    BK (Polyoma Virus) PCR Quantitative/Plasma  **At 6 months post-transplant  Due: 4/2020    LFTs    Hemoglobin A1c    Uric Acid    Lipid panel    Urine protein/creatinine  **At month 7 only (5/2020)     DSA PRA (mailers provided by patient)         Months 7-12 post-transplant (5/4/2020 - 10/1/2020)  Monthly    CBC with platelets    Basic Metabolic Panel (Sodium, Potassium, Chloride, Creatinine, CO2, Urea Nitrogen, glucose, Calcium)    Tacrolimus/Prograf/ drug level    BK (Polyoma Virus) PCR Quantitative/Plasma    At 9 months post-transplant  Due: 7/2020    Urine protein/creatinine    At 12 months post-transplant  Due: 10/2020 (Fasting labs)    LFTs    Hemoglobin A1c    Uric Acid    Lipid panel    Urine protein/creatinine    DSA PRA    PTH    Vitamin D    If you have any questions please call the Transplant Center at [phone] option 5. All lab results should be faxed  to [phone]        Rocio Rutherford MD FACS  Assistant Professor of Surgery  Director, [facility].

## 2020-04-20 ENCOUNTER — TELEPHONE (OUTPATIENT)
Dept: TRANSPLANT | Facility: CLINIC | Age: 40
End: 2020-04-20

## 2020-04-20 NOTE — TELEPHONE ENCOUNTER
Patient Call: Voicemail  Date/Time: 4/20/20 @ 3:43 PM  Reason for call: Patient called to touch base with coordinator and to make sure some mailers were mailed out.

## 2020-04-21 NOTE — TELEPHONE ENCOUNTER
Send message to admin  \team to mail out dsa   mailers   Overdue for Donor Specific Antibodies      Requesting mailers

## 2020-04-23 NOTE — TELEPHONE ENCOUNTER
Patient Call: Voicemail  Date/Time: 4/23/2020-12:54pm  Reason for call: Please connect with pt, did send out a DSA kit for pt

## 2020-05-05 ENCOUNTER — RESULTS ONLY (OUTPATIENT)
Dept: OTHER | Facility: CLINIC | Age: 40
End: 2020-05-05

## 2020-05-05 DIAGNOSIS — Z79.899 LONG TERM USE OF DRUG: ICD-10-CM

## 2020-05-05 DIAGNOSIS — Z94.0 KIDNEY REPLACED BY TRANSPLANT: ICD-10-CM

## 2020-05-05 PROCEDURE — 86832 HLA CLASS I HIGH DEFIN QUAL: CPT | Performed by: TRANSPLANT SURGERY

## 2020-05-05 PROCEDURE — 86833 HLA CLASS II HIGH DEFIN QUAL: CPT | Performed by: TRANSPLANT SURGERY

## 2020-05-11 ENCOUNTER — TELEPHONE (OUTPATIENT)
Dept: TRANSPLANT | Facility: CLINIC | Age: 40
End: 2020-05-11

## 2020-05-12 LAB
DONOR IDENTIFICATION: NORMAL
DSA COMMENTS: NORMAL
DSA PRESENT: NO
DSA TEST METHOD: NORMAL
ORGAN: NORMAL
PROTOCOL CUTOFF: NORMAL
SA1 CELL: NORMAL
SA1 COMMENTS: NORMAL
SA1 HI RISK ABY: NORMAL
SA1 MOD RISK ABY: NORMAL
SA1 TEST METHOD: NORMAL
SA2 CELL: NORMAL
SA2 COMMENTS: NORMAL
SA2 HI RISK ABY UA: NORMAL
SA2 MOD RISK ABY: NORMAL
SA2 TEST METHOD: NORMAL
UNACCEPTABLE ANTIGEN: NORMAL
UNOS CPRA: 0

## 2020-06-01 ENCOUNTER — TELEPHONE (OUTPATIENT)
Dept: TRANSPLANT | Facility: CLINIC | Age: 40
End: 2020-06-01

## 2020-06-02 ENCOUNTER — TELEPHONE (OUTPATIENT)
Dept: TRANSPLANT | Facility: CLINIC | Age: 40
End: 2020-06-02

## 2020-06-02 NOTE — TELEPHONE ENCOUNTER
Patient weighed himself this morning and weight was 203 pounds.  Patient reports he is usually more active during the summer and prior to COVID19, he was working with a , but hasn't been able to meet as related to COVID19.  Patient also report insulin use is 60 units per day, he is working with [facility], which he reports has been very helpful.  Noted I will plan to contact patient in a few months as patient reports he may want to wait to have the pancreas transplant until 2021.  Patient verbalizes agreement with this plan.

## 2020-06-18 ENCOUNTER — TELEPHONE (OUTPATIENT)
Dept: TRANSPLANT | Facility: CLINIC | Age: 40
End: 2020-06-18

## 2020-06-18 NOTE — TELEPHONE ENCOUNTER
Less than one year post transplant  -   Overdue for transplant  Labs   Overdue for BK PCR QT  (urgent )     Please update lab letter highlight need for BK PCR QT with next of labs  Please fax orders -   Please call /[email or web address] to Michael Amin to obtain labs

## 2020-06-18 NOTE — LETTER
OUTPATIENT LABORATORY TEST ORDER    Patient: Michael Amin    Transplant Date: 10/1/2019  YOB: 1980    Ordered By: Dr. Rocio Rutherford  MRN: [id number]     Issued Date/Time: 6/18/2020  8:46 AM         Expiration Date: 10/7/2020    Diagnosis: Kidney Transplant (ICD-10 Z94.0)    Aftercare following organ transplant (ICD-10 Z48.288)    Long term use of medications (ICD-10 Z79.899)      Lab results to be available on the same day drawn    Patient should release information to the [facility].     Please fax all results to [phone]    Diagnosis: Kidney Transplant (ICD-10 Z94.0)    Aftercare following organ transplant (ICD-10 Z48.288)    Long term use of medications (ICD-10 Z79.899)    Months 7-12 post-transplant (5/4/2020 - 10/1/2020)  Monthly    CBC with platelets    Basic Metabolic Panel (Sodium, Potassium, Chloride, Creatinine, CO2, Urea Nitrogen, glucose, Calcium)    Tacrolimus/Prograf/ drug level    BK (Polyoma Virus) PCR Quantitative/Plasma    At 9 months post-transplant  Due: 7/2020    Urine protein/creatinine    At 12 months post-transplant  Due: 10/2020 (Fasting labs)    LFTs    Hemoglobin A1c    Uric Acid    Lipid panel    Urine protein/creatinine    DSA PRA    PTH    Vitamin D    If you have any questions please call the Transplant Center at [phone] option 5. All lab results should be faxed to [phone]        Rocio Rutherford MD FACS  Assistant Professor of Surgery  Director, [facility].

## 2020-06-18 NOTE — TELEPHONE ENCOUNTER
Current standing lab orders mailed and sent to patient via [employer].  Faxed orders to both labs on file.

## 2020-06-18 NOTE — LETTER
OUTPATIENT LABORATORY TEST ORDER    Patient: Michael Amin    Transplant Date: 10/1/2019  YOB: 1980    Ordered By: Dr. Rocio Rutherford  MRN: [id number]     Issued Date/Time: 4/15/2020  4:40 PM         Expiration Date: 10/7/2020    Diagnosis: Kidney Transplant (ICD-10 Z94.0)    Aftercare following organ transplant (ICD-10 Z48.288)    Long term use of medications (ICD-10 Z79.899)      Lab results to be available on the same day drawn    Patient should release information to the [facility].     Please fax all results to [phone]    Diagnosis: Kidney Transplant (ICD-10 Z94.0)    Aftercare following organ transplant (ICD-10 Z48.288)    Long term use of medications (ICD-10 Z79.899)    Months 4-7 post-transplant (2/3/2020 - 5/3/2020)  Labs every other week    CBC with platelets    Basic Metabolic Panel (Sodium, Potassium, Chloride, Creatinine, CO2, Urea Nitrogen, glucose, Calcium)    Tacrolimus/Prograf/ drug level    Mycophenolic Acid  Monthly    Phosphorus, magnesium    BK (Polyoma Virus) PCR Quantitative/Plasma  **At 6 months post-transplant  Due: 4/2020    LFTs    Hemoglobin A1c    Uric Acid    Lipid panel    Urine protein/creatinine  **At month 7 only (5/2020)     DSA PRA (mailers provided by patient)         Months 7-12 post-transplant (5/4/2020 - 10/1/2020)  Monthly    CBC with platelets    Basic Metabolic Panel (Sodium, Potassium, Chloride, Creatinine, CO2, Urea Nitrogen, glucose, Calcium)    Tacrolimus/Prograf/ drug level    BK (Polyoma Virus) PCR Quantitative/Plasma    At 9 months post-transplant  Due: 7/2020    Urine protein/creatinine    At 12 months post-transplant  Due: 10/2020 (Fasting labs)    LFTs    Hemoglobin A1c    Uric Acid    Lipid panel    Urine protein/creatinine    DSA PRA    PTH    Vitamin D    If you have any questions please call the Transplant Center at [phone] option 5. All lab results should be faxed  to [phone]        Rocio Rutherford MD FACS  Assistant Professor of Surgery  Director, [facility].

## 2020-07-06 ENCOUNTER — TELEPHONE (OUTPATIENT)
Dept: TRANSPLANT | Facility: CLINIC | Age: 40
End: 2020-07-06

## 2020-07-06 NOTE — TELEPHONE ENCOUNTER
Current standing lab orders sent to patient via [employer], faxed both labs on file in patient chart.

## 2020-07-06 NOTE — LETTER
OUTPATIENT LABORATORY TEST ORDER    Patient: Michael Amin    Transplant Date: 10/1/2019  YOB: 1980    Ordered By: Dr. Rocio Rutherford  MRN: [id number]     Issued Date/Time: 7/6/2020  4:55 PM         Expiration Date: 10/7/2020    Diagnosis: Kidney Transplant (ICD-10 Z94.0)    Aftercare following organ transplant (ICD-10 Z48.288)    Long term use of medications (ICD-10 Z79.899)      Lab results to be available on the same day drawn    Patient should release information to the [facility].     Please fax all results to [phone]    Diagnosis: Kidney Transplant (ICD-10 Z94.0)    Aftercare following organ transplant (ICD-10 Z48.288)    Long term use of medications (ICD-10 Z79.899)    Months 7-12 post-transplant (5/4/2020 - 10/1/2020)  Monthly    CBC with platelets    Basic Metabolic Panel (Sodium, Potassium, Chloride, Creatinine, CO2, Urea Nitrogen, glucose, Calcium)    Tacrolimus/Prograf/ drug level    BK (Polyoma Virus) PCR Quantitative/Plasma    At 9 months post-transplant  Due: 7/2020    Urine protein/creatinine    At 12 months post-transplant  Due: 10/2020 (Fasting labs)    LFTs    Hemoglobin A1c    Uric Acid    Lipid panel    Urine protein/creatinine    DSA PRA    PTH    Vitamin D    If you have any questions please call the Transplant Center at [phone] option 5. All lab results should be faxed to [phone]        Rocio Rutherford MD FACS  Assistant Professor of Surgery  Director, [facility].

## 2020-07-06 NOTE — TELEPHONE ENCOUNTER
Issue    Overdue for BK PCR QT virus (urgent )   Overdue for transplant labs    Please call review the need to have transplant  Labs with BK PCR QT

## 2020-07-08 DIAGNOSIS — Z94.0 KIDNEY REPLACED BY TRANSPLANT: ICD-10-CM

## 2020-07-08 DIAGNOSIS — Z79.899 LONG TERM USE OF DRUG: ICD-10-CM

## 2020-07-08 PROCEDURE — 87799 DETECT AGENT NOS DNA QUANT: CPT | Performed by: INTERNAL MEDICINE

## 2020-07-09 ENCOUNTER — TELEPHONE (OUTPATIENT)
Dept: TRANSPLANT | Facility: CLINIC | Age: 40
End: 2020-07-09

## 2020-07-09 NOTE — TELEPHONE ENCOUNTER
Increase creatinine   1.45    Task /Plan    Review if any of the following symptoms     Dehydration  Missed medication?  Changes in medication, (sylvester diuretics)?  Most recent BP ,weight temp and blood sugars       If not on fluid restriction, instruct to improve hydration and recheck   Repeat transplant  labs in one week         Confirm he obtained Donor Specific Antibodies  And BK PCR QT   Tacrolimus  Level pending

## 2020-07-09 NOTE — TELEPHONE ENCOUNTER
Spoke to patient regarding:  Increase creatinine   1.45    Dehydration - pt states that he has been working a lot and probably needs to increase hydration  Pt denies any missed medications  Pt denies any changes in medication, (sylvester diuretics)?  Pt states that recent BP has been low 100-110/60's ,weight temp and blood sugars remain within normal limits        Instructed pt to improve hydration and recheck   Repeat transplant  labs in one week, patient agrees

## 2020-07-13 ENCOUNTER — TELEPHONE (OUTPATIENT)
Dept: TRANSPLANT | Facility: CLINIC | Age: 40
End: 2020-07-13

## 2020-07-13 NOTE — TELEPHONE ENCOUNTER
PHYSICIAN ORDERS      DATE & TIME ISSUED: 2020 10:04 AM  PATIENT NAME: Michael Amin   : 1980     [facility] MR# [if applicable]: [id number]     DIAGNOSIS:  kidney transplant   ICD-10 CODE: z 94.0        Please obtain BK PCR QT  And Donor Specific Antibodies  (kit ) with you next set of lab this week      CBC with platelets    Basic Metabolic Panel (Sodium, Potassium, Chloride, Creatinine, CO2, Urea Nitrogen, glucose, Calcium)    Tacrolimus/Prograf/ drug level          Any questions please call:  [phone].    .

## 2020-07-13 NOTE — LETTER
PHYSICIAN ORDERS      DATE & TIME ISSUED: 2020 10:04 AM  PATIENT NAME: Michael Amin   : 1980     [facility] MR# [if applicable]: [id number]     DIAGNOSIS:  kidney transplant   ICD-10 CODE: z 94.0        Please obtain BK PCR QT  And Donor Specific Antibodies  (kit ) with you next set of lab this week          Any questions please call:  [phone].    .

## 2020-07-16 ENCOUNTER — TELEPHONE (OUTPATIENT)
Dept: NEPHROLOGY | Facility: CLINIC | Age: 40
End: 2020-07-16

## 2020-07-16 ENCOUNTER — OFFICE VISIT (OUTPATIENT)
Dept: NEPHROLOGY | Facility: CLINIC | Age: 40
End: 2020-07-16
Attending: INTERNAL MEDICINE
Payer: COMMERCIAL

## 2020-07-16 VITALS
TEMPERATURE: 98.5 F | SYSTOLIC BLOOD PRESSURE: 131 MMHG | HEART RATE: 77 BPM | WEIGHT: 206.6 LBS | BODY MASS INDEX: 33.35 KG/M2 | DIASTOLIC BLOOD PRESSURE: 86 MMHG | OXYGEN SATURATION: 100 %

## 2020-07-16 DIAGNOSIS — Z94.0 KIDNEY TRANSPLANTED: Primary | ICD-10-CM

## 2020-07-16 DIAGNOSIS — I15.1 HTN, KIDNEY TRANSPLANT RELATED: ICD-10-CM

## 2020-07-16 DIAGNOSIS — N18.6 TYPE 1 DIABETES MELLITUS WITH CHRONIC KIDNEY DISEASE ON CHRONIC DIALYSIS (H): ICD-10-CM

## 2020-07-16 DIAGNOSIS — E66.811 CLASS 1 OBESITY WITH BODY MASS INDEX (BMI) OF 33.0 TO 33.9 IN ADULT, UNSPECIFIED OBESITY TYPE, UNSPECIFIED WHETHER SERIOUS COMORBIDITY PRESENT: ICD-10-CM

## 2020-07-16 DIAGNOSIS — Z48.298 AFTERCARE FOLLOWING ORGAN TRANSPLANT: ICD-10-CM

## 2020-07-16 DIAGNOSIS — E10.22 TYPE 1 DIABETES MELLITUS WITH CHRONIC KIDNEY DISEASE ON CHRONIC DIALYSIS (H): ICD-10-CM

## 2020-07-16 DIAGNOSIS — Z99.2 TYPE 1 DIABETES MELLITUS WITH CHRONIC KIDNEY DISEASE ON CHRONIC DIALYSIS (H): ICD-10-CM

## 2020-07-16 DIAGNOSIS — Z12.83 SKIN CANCER SCREENING: ICD-10-CM

## 2020-07-16 DIAGNOSIS — Z94.0 KIDNEY REPLACED BY TRANSPLANT: ICD-10-CM

## 2020-07-16 DIAGNOSIS — D84.9 IMMUNOSUPPRESSION (H): ICD-10-CM

## 2020-07-16 DIAGNOSIS — Z29.89 NEED FOR PNEUMOCYSTIS PROPHYLAXIS: ICD-10-CM

## 2020-07-16 DIAGNOSIS — Z20.828 CONTACT WITH OR EXPOSURE TO VIRAL DISEASE: ICD-10-CM

## 2020-07-16 DIAGNOSIS — Z79.899 LONG TERM USE OF DRUG: ICD-10-CM

## 2020-07-16 DIAGNOSIS — Z94.0 HTN, KIDNEY TRANSPLANT RELATED: ICD-10-CM

## 2020-07-16 LAB
ANION GAP SERPL CALCULATED.3IONS-SCNC: 4 MMOL/L (ref 3–14)
BUN SERPL-MCNC: 19 MG/DL (ref 7–30)
CALCIUM SERPL-MCNC: 8.8 MG/DL (ref 8.5–10.1)
CHLORIDE SERPL-SCNC: 104 MMOL/L (ref 94–109)
CO2 SERPL-SCNC: 27 MMOL/L (ref 20–32)
CREAT SERPL-MCNC: 1 MG/DL (ref 0.66–1.25)
CREAT UR-MCNC: 16 MG/DL
ERYTHROCYTE [DISTWIDTH] IN BLOOD BY AUTOMATED COUNT: 13.2 % (ref 10–15)
GFR SERPL CREATININE-BSD FRML MDRD: >90 ML/MIN/{1.73_M2}
GLUCOSE SERPL-MCNC: 80 MG/DL (ref 70–99)
HBA1C MFR BLD: 8.1 % (ref 0–5.6)
HCT VFR BLD AUTO: 41.8 % (ref 40–53)
HGB BLD-MCNC: 13.4 G/DL (ref 13.3–17.7)
MCH RBC QN AUTO: 27 PG (ref 26.5–33)
MCHC RBC AUTO-ENTMCNC: 32.1 G/DL (ref 31.5–36.5)
MCV RBC AUTO: 84 FL (ref 78–100)
PLATELET # BLD AUTO: 202 10E9/L (ref 150–450)
POTASSIUM SERPL-SCNC: 3.9 MMOL/L (ref 3.4–5.3)
PROT UR-MCNC: <0.05 G/L
PROT/CREAT 24H UR: NORMAL G/G CR (ref 0–0.2)
RBC # BLD AUTO: 4.97 10E12/L (ref 4.4–5.9)
SODIUM SERPL-SCNC: 135 MMOL/L (ref 133–144)
WBC # BLD AUTO: 7.6 10E9/L (ref 4–11)

## 2020-07-16 PROCEDURE — 80048 BASIC METABOLIC PNL TOTAL CA: CPT

## 2020-07-16 PROCEDURE — 87535 HIV-1 PROBE&REVERSE TRNSCRPJ: CPT

## 2020-07-16 PROCEDURE — 84156 ASSAY OF PROTEIN URINE: CPT

## 2020-07-16 PROCEDURE — 87798 DETECT AGENT NOS DNA AMP: CPT

## 2020-07-16 PROCEDURE — 87521 HEPATITIS C PROBE&RVRS TRNSC: CPT

## 2020-07-16 PROCEDURE — 87516 HEPATITIS B DNA AMP PROBE: CPT

## 2020-07-16 PROCEDURE — 85027 COMPLETE CBC AUTOMATED: CPT

## 2020-07-16 ASSESSMENT — PAIN SCALES - GENERAL: PAINLEVEL: NO PAIN (0)

## 2020-07-16 NOTE — NURSING NOTE
"Chief Complaint   Patient presents with     RECHECK     Follow Up Kidney TX     Vital signs:  Temp: 98.5  F (36.9  C)   BP: 131/86 Pulse: 77     SpO2: 100 %       Weight: 93.7 kg (206 lb 9.6 oz)  Estimated body mass index is 33.35 kg/m  as calculated from the following:    Height as of 2/7/20: 1.676 m (5' 6\").    Weight as of this encounter: 93.7 kg (206 lb 9.6 oz).        Roz Coello, CMA    "

## 2020-07-16 NOTE — LETTER
7/16/2020       RE: Michael Amin  [address]  [facility] [address]     Dear Colleague,    Thank you for referring your patient, Michael Amin, to the [facility] at [facility]. Please see a copy of my visit note below.    ACUTE TRANSPLANT NEPHROLOGY VISIT    Assessment & Plan   # LDKT: Stable, to improved better baseline               - Baseline Cr ~ 1.1-1.3 mg/dL               - Proteinuria: Minimal (0.2-0.5 grams)              - Date DSA Last Checked: 5/5/2020      Latest DSA: No               - BK Viremia: No              - Kidney Tx Biopsy: Nov 25, 2019; Result: Material insufficient for assessment with no glomeruli.                                              Oct 22, 2019; Result: Borderline acute cellular-mediated rejection.    The patient has a baseline creatinine of approximately 1.1 to 1.3 mg/dL.  His most recent creatinine had increased to 1.4 mg/dL.  Now repeat is 1.0.      # Immunosuppression: Tacrolimus immediate release (goal 6-8) and Mycophenolate mofetil (goal 1.0-3.5)              - Changes: No.    Last at goal.      # Infection Prophylaxis:   - PJP: Sulfa/TMP (Bactrim)  - CMV: None, prophylaxis completed  - Thrush: None     # Hypertension: Controlled;   Goal BP: < 140/90              - Volume status: Euvolemic              - Changes: No    The patient states that his blood pressure if anything at home is running lower.  We instructed that if the blood pressure is less than 110 systolic that he can reduce the carvedilol to 6.25 mg p.o. twice daily.     # Diabetes: Borderline control (HbA1c 7-9%) with some brittleness still     Last HbA1c: 8.1%              - Management as per Endocrinology.    We did discuss continued weight loss and activation on the pancreas transplant list.  We will have the patient brought forward to our pancreas selection committee to give weight loss recommendations.  If needed he can have a surgery visit made at  the same time as his transplant nephrology visit in October which would be his 12-month post kidney transplant visit to physically assess his abdominal obesity.     # Anemia in Chronic Renal Disease: Hgb: Stable      JOSE: No              - Iron studies: Replete    Last 13.4 g / dl     # Mineral Bone Disorder:   - Secondary renal hyperparathyroidism; PTH level: Moderately elevated (301-600 pg/ml) after transplant but not checked recently   - Vitamin D; level: Low        On Supplement: Yes  - Calcium; level: Normal        On Supplement: No  - Phosphorus; level: Normal        On Supplement: No     # Electrolytes:   - Potassium; level: Normal        On Supplement: No  - Magnesium; level: Normal        On Supplement: Yes  - Bicarbonate; level: Normal        On Supplement: No     # Overweight: Stable to slightly decreased weight.              - Recommend increased exercise and watch caloric intake.    Weight continues to decrease now. BMI 33.3.      # Medical Compliance: Yes     # COVID-19 Virus Review: Discussed COVID-19 virus and the potential medical risks.  Reviewed preventative health recommendations, which includes washing hands for 20 seconds, avoid touching your face, and social distancing.  Asked patient to inform the transplant center if they are exposed or diagnosed with this virus      # Transplant History:  Etiology of Kidney Failure: Diabetes mellitus type 1  Tx: LDKT  Transplant: 10/1/2019 (Kidney)  Donor Type: Living      Donor Class:   Crossmatch at time of Tx: negative  DSA at time of Tx: No  Significant changes in immunosuppression: None  CMV IgG Ab High Risk Discordance (D+/R-): No  EBV IgG Ab High Risk Discordance (D+/R-): No  Significant transplant-related complications: None    80 to 130, 30 day insluin average 60.95 units   BP : < 110, goal < 130    Transplant Office Phone Number: [phone]    Assessment and plan was discussed with the patient and he voiced his understanding and  agreement.    Return visit: 3 months    Chief Complaint   Mr. Amin is a 39 year old here for kidney transplant and immunosuppression management.     History of Present Illness      The patient is a 39-year-old male with history of end-stage kidney disease due to diabetes who is status post living kidney transplant in October 2019 who is here for follow-up of his kidney transplant and immunosuppression management.    Since the patient was last seen he has been doing reasonably well.  He denies any chest pain or breathing difficulties.  He has no nausea or vomiting.  Is no fever shakes or chills.  Does have intermittent constipation but uses stool softeners occasionally.    From an allograft perspective, his creatinine baseline is 1.1 to 1.3 mg/dL.  His most recent value last week was 1.4 mg/dL.  We will follow-up on today's labs.    His immunosuppression is with tacrolimus and CellCept.  His most recent tacrolimus drug level was therapeutic at 6.7 ng/mL.  He is on increased MMF due to a low drug level in March.  On repeat it was appropriate.    The patient continues on insulin pump for management of his diabetes.  His most recent A1c was 8.1 g% today.  He is using on average 60 units of insulin per day.  We will discuss with transplant surgery the weight needed to be attained to be active on the pancreas transplant list.  His current BMI is 33.3.    Recent Hospitalizations:  [x] No [] Yes    New Medical Issues: [x] No [] Yes    Decreased energy: [x] No [] Yes    Chest pain or SOB with exertion:  [x] No [] Yes    Appetite change or weight change: [x] No [] Yes    Nausea, vomiting or diarrhea:  [x] No [] Yes    Fever, sweats or chills: [x] No [] Yes    Leg swelling: [x] No [] Yes      Home BP: at goal    Review of Systems   A comprehensive review of systems was obtained and negative, except as noted in the HPI or PMH.    Problem List   Patient Active Problem List   Diagnosis     HTN, kidney transplant related      Diabetes mellitus type 1 (H)     Dyslipidemia     Anemia in chronic kidney disease     Secondary renal hyperparathyroidism (H)     Kidney replaced by transplant     Aftercare following organ transplant     Vitamin D deficiency     Hypomagnesemia     Kidney transplant rejection       Social History   Social History     Tobacco Use     Smoking status: Never Smoker     Smokeless tobacco: Never Used   Substance Use Topics     Alcohol use: Not Currently     Comment: Rarely 1-2 Drinks a month      Drug use: No       Allergies   Allergies   Allergen Reactions     Thymoglobulin      Had reaction with rigors after steroid-free dose. No reaction with steroids.       Medications   Current Outpatient Medications   Medication Sig     alcohol swab prep pads Use to swab area of injection/zak as directed.     aspirin (ASA) 81 MG EC tablet Take 81 mg by mouth daily      atorvastatin (LIPITOR) 10 MG tablet Take 1 tablet (10 mg) by mouth daily     blood glucose monitoring (ACCU-CHEK FASTCLIX) lancets U QID OR MORE OFTEN PRN     carvedilol (COREG) 12.5 MG tablet Take 1 tablet (12.5 mg) by mouth 2 times daily (with meals)     HUMALOG 100 UNIT/ML injection Insulin used for filling patient's pump     insulin lispro (HUMALOG VIAL) 100 UNIT/ML vial Inject 100 Units Subcutaneous daily With Insulin pump. Up to 100 units     insulin pen needle (ULTICARE MICRO) 32G X 4 MM miscellaneous Use pen needles daily or as directed.     magnesium oxide (MAG-OX) 400 MG tablet Take 1 tablet (400 mg) by mouth daily (with lunch)     mycophenolate (GENERIC EQUIVALENT) 250 MG capsule Take 5 capsules (1,250 mg) by mouth 2 times daily     senna-docusate (SENOKOT-S/PERICOLACE) 8.6-50 MG tablet Take 2 tablets by mouth 2 times daily     sulfamethoxazole-trimethoprim (BACTRIM/SEPTRA) 400-80 MG tablet Take 1 tablet by mouth daily     tacrolimus (GENERIC EQUIVALENT) 0.5 MG capsule HOLD     tacrolimus (GENERIC EQUIVALENT) 1 MG capsule Take 2 capsules (2 mg) by mouth  2 times daily     No current facility-administered medications for this visit.      There are no discontinued medications.    Physical Exam   Vital Signs: /86   Pulse 77   Temp 98.5  F (36.9  C)   Wt 93.7 kg (206 lb 9.6 oz)   SpO2 100%   BMI 33.35 kg/m      GENERAL APPEARANCE: alert and no distress  HENT: mouth without ulcers or lesions  LYMPHATICS: no cervical or supraclavicular nodes  RESP: lungs clear to auscultation - no rales, rhonchi or wheezes  CV: regular rhythm, normal rate, no rub, no murmur  EDEMA: no LE edema bilaterally  ABDOMEN: soft, nondistended, nontender, bowel sounds normal  MS: extremities normal - no gross deformities noted, no evidence of inflammation in joints, no muscle tenderness  SKIN: no rash    Data     Renal Latest Ref Rng & Units 7/8/2020 5/5/2020 3/31/2020   Na 133 - 144 mmol/L - - -   Na (external) 134 - 143 mEq/L 135 138 140   K 3.4 - 5.3 mmol/L - - -   K (external) 3.4 - 5.1 mEq/L 4.5 4.8 4.6   Cl 94 - 109 mmol/L - - -   Cl (external) 99 - 110 mEq/L 104 105 106   CO2 20 - 32 mmol/L - - -   CO2 (external) 19 - 29 mEq/L 21 23 24   BUN 7 - 30 mg/dL - - -   BUN (external) 5 - 24 mg/dL 16 21 17   Cr 0.66 - 1.25 mg/dL - - -   Cr (external) 0.70 - 1.20 mg/dL 1.45(H) 1.18 1.15   Glucose 70 - 99 mg/dL - - -   Glucose (external) 70 - 99 mg/dL 150(H) 101(H) 158(H)   Ca  8.5 - 10.1 mg/dL - - -   Ca (external) 8.4 - 10.5 mg/dL 8.9 9.4 9.2   Mg 1.6 - 2.3 mg/dL - - -   Mg (external) 1.8 - 2.7 mg/dL - - 1.4(L)     Bone Health Latest Ref Rng & Units 3/31/2020 2/3/2020 1/27/2020   Phos 2.5 - 4.5 mg/dL - - -   Phos (external) 2.5 - 4.6 mg/dL 3.3 3.5 3.3   PTHi 18 - 80 pg/mL - - -   Vit D Def 20 - 75 ug/L - - -     Heme Latest Ref Rng & Units 7/16/2020 7/8/2020 5/5/2020   WBC 4.0 - 11.0 10e9/L 7.6 - -   WBC (external) 3.2 - 11.0 10*9/L - 6.5 5.6   Hgb 13.3 - 17.7 g/dL 13.4 - -   Hgb (external) 12.9 - 16.9 g/dL - 12.7(L) 12.6(L)   Plt 150 - 450 10e9/L 202 - -   Plt (external) 130 - 375 10*9/L  - 192 203   ABSOLUTE NEUTROPHIL 1.6 - 8.3 10e9/L - - -   ABSOLUTE NEUTROPHILS (EXTERNAL) 1.9 - 8.1 x10:3/uL - - -   ABSOLUTE LYMPHOCYTES 0.8 - 5.3 10e9/L - - -   ABSOLUTE LYMPHOCYTES (EXTERNAL) 1.0 - 3.9 x10:3/uL - - -   ABSOLUTE MONOCYTES 0.0 - 1.3 10e9/L - - -   ABSOLUTE MONOCYTES (EXTERNAL) 0.1 - 0.9 x10:3/uL - - -   ABSOLUTE EOSINOPHILS 0.0 - 0.7 10e9/L - - -   ABSOLUTE EOSINOPHILS (EXTERNAL) 0.0 - 0.5 x10:3/uL - - -   ABSOLUTE BASOPHILS 0.0 - 0.2 10e9/L - - -   ABSOLUTE BASOPHILS (EXTERNAL) 0.0 - 0.2 x10:3/uL - - -   ABS IMMATURE GRANULOCYTES 0 - 0.4 10e9/L - - -   ABSOLUTE NUCLEATED RBC - - - -     Liver Latest Ref Rng & Units 9/26/2019 8/6/2019 1/7/2019   AP 40 - 150 U/L 100 - 157(H)   TBili 0.2 - 1.3 mg/dL 0.2 - 0.2   ALT 0 - 70 U/L 42 - 46   AST 0 - 45 U/L 16 - 15   Tot Protein 6.8 - 8.8 g/dL 7.5 - 7.9   Tot Protein (external) 5.7 - 8.2 g/dL - 6.6 -   Albumin 3.4 - 5.0 g/dL 3.5 - 3.5   Albumin (external) 3.2 - 4.8 g/dL - 4.4 -     Pancreas Latest Ref Rng & Units 2/7/2020 9/26/2019 1/7/2019   A1C 0 - 5.6 % 8.2(H) 8.9(H) 10.8(H)     Iron studies Latest Ref Rng & Units 9/26/2019 8/6/2019   Iron 35 - 180 ug/dL 70 -   Iron sat 15 - 46 % 28 -   Ferritin 26 - 388 ng/mL 753(H) -   Ferritin (external) 22 - 322 ng/mL - 578(H)     [facility] Txp Virology Latest Ref Rng & Units 7/8/2020 3/31/2020 2/3/2020   BK Spec - Plasma Plasma Plasma   BK Res BKNEG:BK Virus DNA Not Detected copies/mL BK Virus DNA Not Detected BK Virus DNA Not Detected BK Virus DNA Not Detected   BK Log <2.7 Log copies/mL Not Calculated Not Calculated Not Calculated   EBV CAPSID ANTIBODY IGG 0.0 - 0.8 AI - - -   Hep B Core NR:Nonreactive - - -   Hep B Surf Ext  Multiple values view image mIU/mL - - -   HIV 1&2 Ext Nonreactive - - -        Recent Labs   Lab Test 11/25/19  0843 12/09/19  1002 02/07/20  0858   DOSTAC 2,020 2130,12/8/19 2/6/20 2105   TACROL 10.5 10.4 9.2     Recent Labs   Lab Test 10/07/19  0742 11/21/19  0813   DOSMPA Not Provided 11/20/19  1800   MPACID 0.82* 0.91*   MPAG 14.1* 39.5       Again, thank you for allowing me to participate in the care of your patient.      Sincerely,    Kidney/Pancreas Recipient

## 2020-07-16 NOTE — PROGRESS NOTES
ACUTE TRANSPLANT NEPHROLOGY VISIT    Assessment & Plan   # LDKT: Stable, to improved better baseline               - Baseline Cr ~ 1.1-1.3 mg/dL               - Proteinuria: Minimal (0.2-0.5 grams)              - Date DSA Last Checked: 5/5/2020      Latest DSA: No               - BK Viremia: No              - Kidney Tx Biopsy: Nov 25, 2019; Result: Material insufficient for assessment with no glomeruli.                                              Oct 22, 2019; Result: Borderline acute cellular-mediated rejection.    The patient has a baseline creatinine of approximately 1.1 to 1.3 mg/dL.  His most recent creatinine had increased to 1.4 mg/dL.  Now repeat is 1.0.      # Immunosuppression: Tacrolimus immediate release (goal 6-8) and Mycophenolate mofetil (goal 1.0-3.5)              - Changes: No.    Last at goal.      # Infection Prophylaxis:   - PJP: Sulfa/TMP (Bactrim)  - CMV: None, prophylaxis completed  - Thrush: None     # Hypertension: Controlled;   Goal BP: < 140/90              - Volume status: Euvolemic              - Changes: No    The patient states that his blood pressure if anything at home is running lower.  We instructed that if the blood pressure is less than 110 systolic that he can reduce the carvedilol to 6.25 mg p.o. twice daily.     # Diabetes: Borderline control (HbA1c 7-9%) with some brittleness still     Last HbA1c: 8.1%              - Management as per Endocrinology.    We did discuss continued weight loss and activation on the pancreas transplant list.  We will have the patient brought forward to our pancreas selection committee to give weight loss recommendations.  If needed he can have a surgery visit made at the same time as his transplant nephrology visit in October which would be his 12-month post kidney transplant visit to physically assess his abdominal obesity.     # Anemia in Chronic Renal Disease: Hgb: Stable      JOSE: No              - Iron studies: Replete    Last 13.4 g /  dl     # Mineral Bone Disorder:   - Secondary renal hyperparathyroidism; PTH level: Moderately elevated (301-600 pg/ml) after transplant but not checked recently   - Vitamin D; level: Low        On Supplement: Yes  - Calcium; level: Normal        On Supplement: No  - Phosphorus; level: Normal        On Supplement: No     # Electrolytes:   - Potassium; level: Normal        On Supplement: No  - Magnesium; level: Normal        On Supplement: Yes  - Bicarbonate; level: Normal        On Supplement: No     # Overweight: Stable to slightly decreased weight.              - Recommend increased exercise and watch caloric intake.    Weight continues to decrease now. BMI 33.3.      # Medical Compliance: Yes     # COVID-19 Virus Review: Discussed COVID-19 virus and the potential medical risks.  Reviewed preventative health recommendations, which includes washing hands for 20 seconds, avoid touching your face, and social distancing.  Asked patient to inform the transplant center if they are exposed or diagnosed with this virus      # Transplant History:  Etiology of Kidney Failure: Diabetes mellitus type 1  Tx: LDKT  Transplant: 10/1/2019 (Kidney)  Donor Type: Living      Donor Class:   Crossmatch at time of Tx: negative  DSA at time of Tx: No  Significant changes in immunosuppression: None  CMV IgG Ab High Risk Discordance (D+/R-): No  EBV IgG Ab High Risk Discordance (D+/R-): No  Significant transplant-related complications: None    80 to 130, 30 day insluin average 60.95 units   BP : < 110, goal < 130    Transplant Office Phone Number: [phone]    Assessment and plan was discussed with the patient and he voiced his understanding and agreement.    Return visit: 3 months    Chief Complaint   Mr. Amin is a 39 year old here for kidney transplant and immunosuppression management.     History of Present Illness      The patient is a 39-year-old male with history of end-stage kidney disease due to diabetes who is status post  living kidney transplant in October 2019 who is here for follow-up of his kidney transplant and immunosuppression management.    Since the patient was last seen he has been doing reasonably well.  He denies any chest pain or breathing difficulties.  He has no nausea or vomiting.  Is no fever shakes or chills.  Does have intermittent constipation but uses stool softeners occasionally.    From an allograft perspective, his creatinine baseline is 1.1 to 1.3 mg/dL.  His most recent value last week was 1.4 mg/dL.  We will follow-up on today's labs.    His immunosuppression is with tacrolimus and CellCept.  His most recent tacrolimus drug level was therapeutic at 6.7 ng/mL.  He is on increased MMF due to a low drug level in March.  On repeat it was appropriate.    The patient continues on insulin pump for management of his diabetes.  His most recent A1c was 8.1 g% today.  He is using on average 60 units of insulin per day.  We will discuss with transplant surgery the weight needed to be attained to be active on the pancreas transplant list.  His current BMI is 33.3.    Recent Hospitalizations:  [x] No [] Yes    New Medical Issues: [x] No [] Yes    Decreased energy: [x] No [] Yes    Chest pain or SOB with exertion:  [x] No [] Yes    Appetite change or weight change: [x] No [] Yes    Nausea, vomiting or diarrhea:  [x] No [] Yes    Fever, sweats or chills: [x] No [] Yes    Leg swelling: [x] No [] Yes      Home BP: at goal    Review of Systems   A comprehensive review of systems was obtained and negative, except as noted in the HPI or PMH.    Problem List   Patient Active Problem List   Diagnosis     HTN, kidney transplant related     Diabetes mellitus type 1 (H)     Dyslipidemia     Anemia in chronic kidney disease     Secondary renal hyperparathyroidism (H)     Kidney replaced by transplant     Aftercare following organ transplant     Vitamin D deficiency     Hypomagnesemia     Kidney transplant rejection       Social  History   Social History     Tobacco Use     Smoking status: Never Smoker     Smokeless tobacco: Never Used   Substance Use Topics     Alcohol use: Not Currently     Comment: Rarely 1-2 Drinks a month      Drug use: No       Allergies   Allergies   Allergen Reactions     Thymoglobulin      Had reaction with rigors after steroid-free dose. No reaction with steroids.       Medications   Current Outpatient Medications   Medication Sig     alcohol swab prep pads Use to swab area of injection/zak as directed.     aspirin (ASA) 81 MG EC tablet Take 81 mg by mouth daily      atorvastatin (LIPITOR) 10 MG tablet Take 1 tablet (10 mg) by mouth daily     blood glucose monitoring (ACCU-CHEK FASTCLIX) lancets U QID OR MORE OFTEN PRN     carvedilol (COREG) 12.5 MG tablet Take 1 tablet (12.5 mg) by mouth 2 times daily (with meals)     HUMALOG 100 UNIT/ML injection Insulin used for filling patient's pump     insulin lispro (HUMALOG VIAL) 100 UNIT/ML vial Inject 100 Units Subcutaneous daily With Insulin pump. Up to 100 units     insulin pen needle (ULTICARE MICRO) 32G X 4 MM miscellaneous Use pen needles daily or as directed.     magnesium oxide (MAG-OX) 400 MG tablet Take 1 tablet (400 mg) by mouth daily (with lunch)     mycophenolate (GENERIC EQUIVALENT) 250 MG capsule Take 5 capsules (1,250 mg) by mouth 2 times daily     senna-docusate (SENOKOT-S/PERICOLACE) 8.6-50 MG tablet Take 2 tablets by mouth 2 times daily     sulfamethoxazole-trimethoprim (BACTRIM/SEPTRA) 400-80 MG tablet Take 1 tablet by mouth daily     tacrolimus (GENERIC EQUIVALENT) 0.5 MG capsule HOLD     tacrolimus (GENERIC EQUIVALENT) 1 MG capsule Take 2 capsules (2 mg) by mouth 2 times daily     No current facility-administered medications for this visit.      There are no discontinued medications.    Physical Exam   Vital Signs: /86   Pulse 77   Temp 98.5  F (36.9  C)   Wt 93.7 kg (206 lb 9.6 oz)   SpO2 100%   BMI 33.35 kg/m      GENERAL APPEARANCE:  alert and no distress  HENT: mouth without ulcers or lesions  LYMPHATICS: no cervical or supraclavicular nodes  RESP: lungs clear to auscultation - no rales, rhonchi or wheezes  CV: regular rhythm, normal rate, no rub, no murmur  EDEMA: no LE edema bilaterally  ABDOMEN: soft, nondistended, nontender, bowel sounds normal  MS: extremities normal - no gross deformities noted, no evidence of inflammation in joints, no muscle tenderness  SKIN: no rash    Data     Renal Latest Ref Rng & Units 7/8/2020 5/5/2020 3/31/2020   Na 133 - 144 mmol/L - - -   Na (external) 134 - 143 mEq/L 135 138 140   K 3.4 - 5.3 mmol/L - - -   K (external) 3.4 - 5.1 mEq/L 4.5 4.8 4.6   Cl 94 - 109 mmol/L - - -   Cl (external) 99 - 110 mEq/L 104 105 106   CO2 20 - 32 mmol/L - - -   CO2 (external) 19 - 29 mEq/L 21 23 24   BUN 7 - 30 mg/dL - - -   BUN (external) 5 - 24 mg/dL 16 21 17   Cr 0.66 - 1.25 mg/dL - - -   Cr (external) 0.70 - 1.20 mg/dL 1.45(H) 1.18 1.15   Glucose 70 - 99 mg/dL - - -   Glucose (external) 70 - 99 mg/dL 150(H) 101(H) 158(H)   Ca  8.5 - 10.1 mg/dL - - -   Ca (external) 8.4 - 10.5 mg/dL 8.9 9.4 9.2   Mg 1.6 - 2.3 mg/dL - - -   Mg (external) 1.8 - 2.7 mg/dL - - 1.4(L)     Bone Health Latest Ref Rng & Units 3/31/2020 2/3/2020 1/27/2020   Phos 2.5 - 4.5 mg/dL - - -   Phos (external) 2.5 - 4.6 mg/dL 3.3 3.5 3.3   PTHi 18 - 80 pg/mL - - -   Vit D Def 20 - 75 ug/L - - -     Heme Latest Ref Rng & Units 7/16/2020 7/8/2020 5/5/2020   WBC 4.0 - 11.0 10e9/L 7.6 - -   WBC (external) 3.2 - 11.0 10*9/L - 6.5 5.6   Hgb 13.3 - 17.7 g/dL 13.4 - -   Hgb (external) 12.9 - 16.9 g/dL - 12.7(L) 12.6(L)   Plt 150 - 450 10e9/L 202 - -   Plt (external) 130 - 375 10*9/L - 192 203   ABSOLUTE NEUTROPHIL 1.6 - 8.3 10e9/L - - -   ABSOLUTE NEUTROPHILS (EXTERNAL) 1.9 - 8.1 x10:3/uL - - -   ABSOLUTE LYMPHOCYTES 0.8 - 5.3 10e9/L - - -   ABSOLUTE LYMPHOCYTES (EXTERNAL) 1.0 - 3.9 x10:3/uL - - -   ABSOLUTE MONOCYTES 0.0 - 1.3 10e9/L - - -   ABSOLUTE MONOCYTES  (EXTERNAL) 0.1 - 0.9 x10:3/uL - - -   ABSOLUTE EOSINOPHILS 0.0 - 0.7 10e9/L - - -   ABSOLUTE EOSINOPHILS (EXTERNAL) 0.0 - 0.5 x10:3/uL - - -   ABSOLUTE BASOPHILS 0.0 - 0.2 10e9/L - - -   ABSOLUTE BASOPHILS (EXTERNAL) 0.0 - 0.2 x10:3/uL - - -   ABS IMMATURE GRANULOCYTES 0 - 0.4 10e9/L - - -   ABSOLUTE NUCLEATED RBC - - - -     Liver Latest Ref Rng & Units 9/26/2019 8/6/2019 1/7/2019   AP 40 - 150 U/L 100 - 157(H)   TBili 0.2 - 1.3 mg/dL 0.2 - 0.2   ALT 0 - 70 U/L 42 - 46   AST 0 - 45 U/L 16 - 15   Tot Protein 6.8 - 8.8 g/dL 7.5 - 7.9   Tot Protein (external) 5.7 - 8.2 g/dL - 6.6 -   Albumin 3.4 - 5.0 g/dL 3.5 - 3.5   Albumin (external) 3.2 - 4.8 g/dL - 4.4 -     Pancreas Latest Ref Rng & Units 2/7/2020 9/26/2019 1/7/2019   A1C 0 - 5.6 % 8.2(H) 8.9(H) 10.8(H)     Iron studies Latest Ref Rng & Units 9/26/2019 8/6/2019   Iron 35 - 180 ug/dL 70 -   Iron sat 15 - 46 % 28 -   Ferritin 26 - 388 ng/mL 753(H) -   Ferritin (external) 22 - 322 ng/mL - 578(H)     [facility] Txp Virology Latest Ref Rng & Units 7/8/2020 3/31/2020 2/3/2020   BK Spec - Plasma Plasma Plasma   BK Res BKNEG:BK Virus DNA Not Detected copies/mL BK Virus DNA Not Detected BK Virus DNA Not Detected BK Virus DNA Not Detected   BK Log <2.7 Log copies/mL Not Calculated Not Calculated Not Calculated   EBV CAPSID ANTIBODY IGG 0.0 - 0.8 AI - - -   Hep B Core NR:Nonreactive - - -   Hep B Surf Ext  Multiple values view image mIU/mL - - -   HIV 1&2 Ext Nonreactive - - -        Recent Labs   Lab Test 11/25/19  0843 12/09/19  1002 02/07/20  0858   DOSTAC 2,020 2130,12/8/19 2/6/20 2105   TACROL 10.5 10.4 9.2     Recent Labs   Lab Test 10/07/19  0742 11/21/19  0813   DOSMPA Not Provided 11/20/19 1800   MPACID 0.82* 0.91*   MPAG 14.1* 39.5

## 2020-07-20 ENCOUNTER — TELEPHONE (OUTPATIENT)
Dept: ENDOCRINOLOGY | Facility: CLINIC | Age: 40
End: 2020-07-20

## 2020-07-20 DIAGNOSIS — E10.65 UNCONTROLLED TYPE 1 DIABETES MELLITUS WITH HYPERGLYCEMIA (H): Primary | ICD-10-CM

## 2020-07-20 DIAGNOSIS — E10.65 TYPE 1 DIABETES MELLITUS WITH HYPERGLYCEMIA (H): ICD-10-CM

## 2020-07-20 LAB
MPX SERIES: NONREACTIVE
WNV RNA SPEC QL NAA+PROBE: NONREACTIVE

## 2020-07-20 RX ORDER — SYRING-NEEDL,DISP,INSUL,0.3 ML 31 G X1/4"
1 SYRINGE, EMPTY DISPOSABLE MISCELLANEOUS PRN
Qty: 50 EACH | Refills: 1 | Status: SHIPPED | OUTPATIENT
Start: 2020-07-20

## 2020-07-20 NOTE — TELEPHONE ENCOUNTER
[facility] Call Center    Phone Message    May a detailed message be left on voicemail: yes     Reason for Call: Medication Question or concern regarding medication   Prescription Clarification  Name of Medication: syringes  Prescribing Provider: Kymberly UGARTE   Pharmacy:      [id number] - [facility] [address] AT [facility] [address]     What on the order needs clarification? Pt needs syringes today 7/20/2020. Patient's insulin pump went out this morning and he will be getting a new one overnight and needs syringes to get him thru. Please call pt discuss.          Action Taken: Message routed to:  [facility] ([facility]): Med refill team    Travel Screening: NA

## 2020-07-21 ENCOUNTER — TELEPHONE (OUTPATIENT)
Dept: TRANSPLANT | Facility: CLINIC | Age: 40
End: 2020-07-21

## 2020-07-21 NOTE — TELEPHONE ENCOUNTER
Rudi   I received a message from Dr Hoffman and Dr Yoan Rutherford  regarding the need for an appointment ?  (hernia)     Do you have date or time period that works for you -      Thank you     Kash Thurman MD Huepleyla, Sabine ALDANA, RN               Sabine, see below.     Can you make sure we have yoan see him when he comes back for his next appt?     Thanks,     grace    Previous Messages     ----- Message -----   From: Yoan Rutherford MD   Sent: 7/20/2020   1:20 PM CDT   To: Kash Hoffman MD     Sure!  Who would make the appt?  Coordinator?  I think I also need to talk to him about hernia repair too.  I think I saw him last year for it but he wanted to lose more weight.     V   ----- Message -----   From: Kash Hoffman MD   Sent: 7/16/2020   3:19 PM CDT   To: MD Yoan Sood,     Can you see this tello in person when he comes back in October?  He wants to be active on pancreas, but is still pretty big. BMI 33.3. He is going to work on weight loss, but would like a target to achieve to be pancreas active.     His insulin use is 60 units / day and a1c 8.1.       Thanks,     d

## 2020-07-23 NOTE — TELEPHONE ENCOUNTER
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 It would be great if I could meet with them all at the same day early in October. What Dr BLAKELY said before is that they would take care of the hernia when they do the pancreas transplant.     I'm working hard at losing weight even though I slipped a bit at the start of corona. Really feeling like not waiting until summer. Whenever I get the green light I wanna go.     Rudi

## 2020-07-31 ENCOUNTER — TELEPHONE (OUTPATIENT)
Dept: ENDOCRINOLOGY | Facility: CLINIC | Age: 40
End: 2020-07-31

## 2020-08-03 ENCOUNTER — TELEPHONE (OUTPATIENT)
Dept: ENDOCRINOLOGY | Facility: CLINIC | Age: 40
End: 2020-08-03

## 2020-08-03 DIAGNOSIS — E10.22 TYPE 1 DIABETES MELLITUS WITH CHRONIC KIDNEY DISEASE ON CHRONIC DIALYSIS (H): Primary | ICD-10-CM

## 2020-08-03 DIAGNOSIS — Z99.2 TYPE 1 DIABETES MELLITUS WITH CHRONIC KIDNEY DISEASE ON CHRONIC DIALYSIS (H): Primary | ICD-10-CM

## 2020-08-03 DIAGNOSIS — N18.6 TYPE 1 DIABETES MELLITUS WITH CHRONIC KIDNEY DISEASE ON CHRONIC DIALYSIS (H): Primary | ICD-10-CM

## 2020-08-03 NOTE — TELEPHONE ENCOUNTER
----- Message from Kymberly Boswell PA-C sent at 7/31/2020  3:27 PM CDT -----  Please send pt labslip to have an A1C done in AUg 2020 at your local lab and schedule virtual visit with me the first week in Sept 2020.  Guillermina

## 2020-08-03 NOTE — LETTER
"         [facility]  [address] [address]  [phone]  Fax   FACSIMILE TRANSMITTAL SHEET   03 AUG 2020  09:17AM    TO:  LAB  COMPANY:   FAX NUMBER:   PHONE NUMBER:      FROM:   PHONE:   DATE: 08/03/20  NUMBER OF PAGES:     _____URGENT _____REVIEW ONLY _____PLEASE COMMENT____PLEASE REPLY    NOTES/COMMENTS: Please forward results via CareEverywhere/FAx  Attn: Michael Kilpatrick \"Rudi\"  Male, 40 year old, 1980            IF YOU DID NOT RECEIVE THE CORRECT NUMBER OF PAGES OR THE FAX DID NOT COME THROUGH CLEARLY, PLEASE CALL THE SENDER     CONFIDENTIALITY STATEMENT: Confidential information that may accompany this transmission contains protected health information under state and federal law and is legally privileged. This information is intended only for the use of the individual or entity named above and may be used only for carrying out treatment, payment or other healthcare operations. The recipient or person responsible for delivering this information is prohibited by law from disclosing this information without proper authorization to any other party, unless required to do so by law or regulation. If you are not the intended recipient, you are hereby notified that any review, dissemination, distribution, or copying of this message is strictly prohibited. If you have received this communication in error, please destroy the materials and contact us immediately by calling the number listed above. No response indicates that the information was received by the appropriate authorized party   "

## 2020-09-02 ENCOUNTER — TELEPHONE (OUTPATIENT)
Dept: TRANSPLANT | Facility: CLINIC | Age: 40
End: 2020-09-02

## 2020-09-02 NOTE — TELEPHONE ENCOUNTER
"Patient reports he has been working out and has lost \"a bunch of weight; my weight is down to 195 and my pants are loose/falling off.\" Patient also reports \"I'm using 40-55 units of insulin per day which is much less than when I saw Dr. Rutherford.\"  Patient reports he wants to move forward with pancreas transplant.  Noted I will send a message to post-transplant coordinator before I submit a scheduling order for return with Dr. Rutherford as patient would like to try to schedule that appointment same day as post-transplant nephrology appointment and would like to schedule in October.  Noted will contact patient with an update after have response from post-coordinator.  "

## 2020-09-02 NOTE — TELEPHONE ENCOUNTER
Patient Call: Jocelyne  Date/Time: 9/2/20 / 4:34 pm  Reason for call: Patient would like to know if he can have an in-person visit for his follow up appointment with his transplant team.    Call back from Lea stevenson.

## 2020-09-15 ENCOUNTER — OFFICE VISIT (OUTPATIENT)
Dept: DERMATOLOGY | Facility: CLINIC | Age: 40
End: 2020-09-15
Payer: COMMERCIAL

## 2020-09-15 DIAGNOSIS — D48.9 NEOPLASM OF UNCERTAIN BEHAVIOR: Primary | ICD-10-CM

## 2020-09-15 DIAGNOSIS — Z94.0 KIDNEY REPLACED BY TRANSPLANT: ICD-10-CM

## 2020-09-15 DIAGNOSIS — Z80.8 FAMILY HISTORY OF MELANOMA: ICD-10-CM

## 2020-09-15 DIAGNOSIS — B35.4 TINEA CORPORIS: ICD-10-CM

## 2020-09-15 DIAGNOSIS — D22.9 MULTIPLE BENIGN NEVI: ICD-10-CM

## 2020-09-15 RX ORDER — PRENATAL VIT 91/IRON/FOLIC/DHA 28-975-200
COMBINATION PACKAGE (EA) ORAL 2 TIMES DAILY
Qty: 42 G | Refills: 11 | Status: SHIPPED | OUTPATIENT
Start: 2020-09-15 | End: 2023-01-01

## 2020-09-15 ASSESSMENT — PAIN SCALES - GENERAL: PAINLEVEL: NO PAIN (0)

## 2020-09-15 NOTE — NURSING NOTE
Lidocaine-epinephrine 1-1:414491 % injection   1.5mL once for one use, starting 9/15/2020 ending 9/15/2020,  2mL disp, R-0, injection  Injected by Laure Phillip CMA

## 2020-09-15 NOTE — LETTER
9/15/2020       RE: Michael Amin  [address]  [facility] [address]     Dear Colleague,    Thank you for referring your patient, Michael Amin, to the [facility] at [facility]. Please see a copy of my visit note below.    [facility] Dermatology Note      Dermatology Problem List:  # Pertinent PMH: Kidney transplant 10/2019 due to type 1 diabetes.  1. Family history of melanoma (uncle, ).  2. Multiple benign nevi.  - Photos 9/15/2020  - Monitor larger nevi on abdomen, 1.7 cm x 1.5 cm  3. Tinea corporis, left hand/forearm.  - KOH positive 9/15/2020  - Terbinafine cream  - May need systemics given some follicular papules and question of Majocchi's but would consider biopsy to confirm and discussion with transplant team prior to initiation   # Neoplasm of uncertain behavior, left armpit, s/p shave bx 9/15/2020.    CC:   Chief Complaint   Patient presents with     Skin Check     Rudi is here today for a skin check. He is concerned about a spot on his stomach and a skin tag under his left armpit         Encounter Date: Sep 15, 2020    History of Present Illness:  Mr. Michael Amin is a 40 year old male who presents for evaluation of transplant skin check and lesion of concern in armpit and abdomen.    Patient recently had kidney transplant in 2019. He notes many moles, including one on his abdomen which is larger than his other moles. He has had it previously looked at and was told to keep an eye on it. It is not changing or symptomatic. His uncle  of melanoma. He has had several moles removed in past but these were reported to be benign.    He has a an itchy rash on his left hand. It improved with moisturizer and washing hand with soap/water.    Has skin tag in left armpit which is irritating and rubs on clothing/deoderant.    No personal history of skin cancer. No other painful, bleeding, non-healing, or otherwise  symptomatic lesions.     Otherwise feeling well, no additional skin concerns.        Past Medical History:   Patient Active Problem List   Diagnosis     HTN, kidney transplant related     Diabetes mellitus type 1 (H)     Dyslipidemia     Anemia in chronic kidney disease     Secondary renal hyperparathyroidism (H)     Kidney replaced by transplant     Aftercare following organ transplant     Vitamin D deficiency     Hypomagnesemia     Kidney transplant rejection     Past Medical History:   Diagnosis Date     Anemia in chronic kidney disease      Dyslipidemia      ESRD (end stage renal disease) on dialysis (H)      Hypertension      Hypomagnesemia 10/7/2019     Secondary hyperparathyroidism (H)      Type 1 diabetes (H)      Past Surgical History:   Procedure Laterality Date     hair implant  2007     INSERT CATHETER PERITONEAL DIALYSIS  08/2018     IR FINE NEEDLE ASPIRATION W ULTRASOUND  11/25/2019     IR RENAL BIOPSY LEFT  11/25/2019     PERCUTANEOUS BIOPSY KIDNEY Left 10/22/2019    Procedure: Left Kidney Biopsy;  Surgeon: Kash Hoffman MD;  Location:  OR       Social History:  Patient reports that he has never smoked. He has never used smokeless tobacco. He reports previous alcohol use. He reports that he does not use drugs.    Family History:  Family History   Problem Relation Age of Onset     Diabetes Father      Coronary Artery Disease Father      Skin Cancer Paternal Uncle      Melanoma No family hx of        Medications:  Current Outpatient Medications   Medication Sig Dispense Refill     alcohol swab prep pads Use to swab area of injection/zak as directed. 100 each 3     aspirin (ASA) 81 MG EC tablet Take 81 mg by mouth daily        atorvastatin (LIPITOR) 10 MG tablet Take 1 tablet (10 mg) by mouth daily 90 tablet 3     blood glucose monitoring (ACCU-CHEK FASTCLIX) lancets U QID OR MORE OFTEN PRN  1     carvedilol (COREG) 12.5 MG tablet Take 1 tablet (12.5 mg) by mouth 2 times daily (with meals) 180  "tablet 3     HUMALOG 100 UNIT/ML injection Insulin used for filling patient's pump  3     insulin lispro (HUMALOG VIAL) 100 UNIT/ML vial Inject 100 Units Subcutaneous daily With Insulin pump. Up to 100 units 30 mL 11     insulin pen needle (ULTICARE MICRO) 32G X 4 MM miscellaneous Use pen needles daily or as directed. 100 each 3     Insulin Syringe-Needle U-100 31G X 1/4\" 1 ML MISC 1 Syringe as needed (until new insuline pump arrives) 50 each 1     magnesium oxide (MAG-OX) 400 MG tablet Take 1 tablet (400 mg) by mouth daily (with lunch) 30 tablet 5     mycophenolate (GENERIC EQUIVALENT) 250 MG capsule Take 5 capsules (1,250 mg) by mouth 2 times daily 300 capsule 11     senna-docusate (SENOKOT-S/PERICOLACE) 8.6-50 MG tablet Take 2 tablets by mouth 2 times daily 20 tablet 0     sulfamethoxazole-trimethoprim (BACTRIM/SEPTRA) 400-80 MG tablet Take 1 tablet by mouth daily 30 tablet 11     tacrolimus (GENERIC EQUIVALENT) 0.5 MG capsule HOLD 60 capsule 11     tacrolimus (GENERIC EQUIVALENT) 1 MG capsule Take 2 capsules (2 mg) by mouth 2 times daily 120 capsule 11     terbinafine (LAMISIL) 1 % external cream Apply topically 2 times daily To rash on left hand 42 g 11     Allergies   Allergen Reactions     Thymoglobulin      Had reaction with rigors after steroid-free dose. No reaction with steroids.         Review of Systems:  -Constitutional: Otherwise feeling well today, in usual state of health.  -Skin: As above in HPI. No additional skin concerns.    Physical exam:  GEN: This is a well developed, well-nourished male in no acute distress, in a pleasant mood.    SKIN: Total skin excluding the undergarment areas was performed. The exam included the head/face, neck, both arms, chest, back, abdomen, both legs, digits and/or nails.   -Fajardo skin type: II  -Many light to medium brown macules and patches, ranging in size from 2 mm to 17 mm. Largest nevus is on central lower abdomen and is 1.7 x 1.5 cm. Dermoscopy fairly " uniform and reassuring, often reticulated network.  -Left thenar eminence and left forearm with thin pink patch with rim of scale and some scattered pink papules.  -Toenails with dystrophy and thickening.  -Left armpit with ~1 cm pedunculated flesh-colored papule.  -No other lesions of concern on areas examined.     Impression/Plan:  1. Neoplasm of uncertain behavior, left armpit. Ddx symptomatic skin tag.  - Shave biopsy:  After discussion of benefits and risks including but not limited to bleeding/bruising, pain/swelling, infection, scar, incomplete removal, nerve damage/numbness, recurrence, and non-diagnostic biopsy, written consent, verbal consent and photographs were obtained. Time-out was performed. The area was cleaned with isopropyl alcohol. 0.5ml of 1% lidocaine with 1:100,000 epinephrine was injected to obtain adequate anesthesia. A shave biopsy was performed. Hemostasis was achieved with aluminium chloride. Vaseline and a sterile dressing were applied. The patient tolerated the procedure and no complications were noted. The patient was provided with verbal and written post care instructions.    2. Multiple benign nevi and family history of melanoma.  - Counseled on ABCDEs of melanoma and sun protection. Asked patient to return sooner if noticing changing or symptomatic lesions.  - He has larger more nevi which are somewhat atypical in appearance but overall reassuring. Photos taken today, particularly of larger nevus on abdomen which reassuringly has been stable.  - Continue yearly skin excams    3. Tinea corporis, left hand/forearm, in setting of onychomycosis and transplant status.  - KOH positive 9/15/2020  - Start terbinafine cream BID  - He will let us know if does not improve - may need systemics given some follicular papules and question of Majocchi's but would consider biopsy to confirm and discussion with transplant team prior to initiation     4. History of kidney transplant 2019. Counseled  patient that he is at higher risk for cutaneous malignancy given history of transplant. Monitor for changing or symptomatic lesions. Continue annual skin checks and photoprotection.    CC Gopi [id number] [place] [address] on close of this encounter.    Follow-up in 1 year, earlier for new or changing lesions.       Staff Involved:  Staff Only    Paola Garcia MD    Department of Dermatology  [facility]: Phone: [phone], Fax: [phone]  9/16/2020

## 2020-09-15 NOTE — PATIENT INSTRUCTIONS
We will get rid of armpit spot for you today.    Your rash on the left hand looks like it may be fungus.  Start terbinafine cream twice daily.  Let me know if doesn't get better, sometimes it needs pills if it goes deep into the skin but would want to talk to your transplant team first.    Watch your moles and let me know if any changes.    Return in 1 year, sooner if concerns.    The ABCDEs of Melanoma  Asymmetry, Border (irregularity), Color (not uniform, changes in color), Diameter (greater than 6 mm which is about the size of a pencil eraser), and Evolving (any changes in preexisting moles)    Skin cancer can develop anywhere on the skin. Ask someone for help when checking your skin, especially in hard to see places. If you notice a mole different from others, or that changes, enlarges, itches, or bleeds (even if it is small), you should see a dermatologist.      Wound Care After a Biopsy    What is a skin biopsy?  A skin biopsy allows the doctor to examine a very small piece of tissue under the microscope to determine the diagnosis and the best treatment for the skin condition. A local anesthetic (numbing medicine)  is injected with a very small needle into the skin area to be tested. A small piece of skin is taken from the area. Sometimes a suture (stitch) is used.     What are the risks of a skin biopsy?  I will experience scar, bleeding, swelling, pain, crusting and redness. I may experience incomplete removal or recurrence. Risks of this procedure are excessive bleeding, bruising, infection, nerve damage, numbness, thick (hypertrophic or keloidal) scar and non-diagnostic biopsy.    How should I care for my wound for the first 24 hours?    Keep the wound dry and covered for 24 hours    If it bleeds, hold direct pressure on the area for 15 minutes. If bleeding does not stop then go to the emergency room    Avoid strenuous exercise the first 1-2 days or as your doctor instructs you    How should I care for the  wound after 24 hours?    After 24 hours, remove the bandage    You may bathe or shower as normal    If you had a scalp biopsy, you can shampoo as usual and can use shower water to clean the biopsy site daily    Clean the wound twice a day with gentle soap and water    Do not scrub, be gentle    Apply white petroleum/Vaseline after cleaning the wound with a cotton swab or a clean finger, and keep the site covered with a Bandaid /bandage. Bandages are not necessary with a scalp biopsy    If you are unable to cover the site with a Bandaid /bandage, re-apply ointment 2-3 times a day to keep the site moist. Moisture will help with healing    Avoid strenuous activity for first 1-2 days    Avoid lakes, rivers, pools, and oceans until the stitches are removed or the site is healed    How do I clean my wound?    Wash hands thoroughly with soap or use hand  before all wound care    Clean the wound with gentle soap and water    Apply white petroleum/Vaseline  to wound after it is clean    Replace the Bandaid /bandage to keep the wound covered for the first few days or as instructed by your doctor    If you had a scalp biopsy, warm shower water to the area on a daily basis should suffice    What should I use to clean my wound?     Cotton-tipped applicators (Qtips )    White petroleum jelly (Vaseline ). Use a clean new container and use Q-tips to apply.    Bandaids   as needed    Gentle soap     How should I care for my wound long term?    Do not get your wound dirty    Keep up with wound care for one week or until the area is healed.    A small scab will form and fall off by itself when the area is completely healed. The area will be red and will become pink in color as it heals. Sun protection is very important for how your scar will turn out. Sunscreen with an SPF 30 or greater is recommended once the area is healed.    If you have stitches, stitches need to be removed in 14 days. You may return to our clinic for  this or you may have it done locally at your doctor s office.    You should have some soreness but it should be mild and slowly go away over several days. Talk to your doctor about using tylenol for pain,    When should I call my doctor?  If you have increased:     Pain or swelling    Pus or drainage (clear or slightly yellow drainage is ok)    Temperature over 100F    Spreading redness or warmth around wound    When will I hear about my results?  The biopsy results can take 2-3 weeks to come back. The clinic will call you with the results, send you a [facility] message, or have you schedule a follow-up clinic or phone time to discuss the results. Contact our clinics if you do not hear from us in 3 weeks.     Who should I call with questions?    [facility]: [phone]     [facility]: [phone]    For urgent needs outside of business hours call the [facility] at [phone] and ask for the dermatology resident on call

## 2020-09-15 NOTE — NURSING NOTE
Dermatology Rooming Note    Michael Amin's goals for this visit include:   Chief Complaint   Patient presents with     Skin Check     Rudi is here today for a skin check. He is concerned about a spot on his stomach and a skin tag under his left armpit     Laure Phillip CMA

## 2020-09-16 LAB — COPATH REPORT: NORMAL

## 2020-09-16 NOTE — PROGRESS NOTES
[facility] Dermatology Note      Dermatology Problem List:  # Pertinent PMH: Kidney transplant 10/2019 due to type 1 diabetes.  1. Family history of melanoma (uncle, ).  2. Multiple benign nevi.  - Photos 9/15/2020  - Monitor larger nevi on abdomen, 1.7 cm x 1.5 cm  3. Tinea corporis, left hand/forearm.  - KOH positive 9/15/2020  - Terbinafine cream  - May need systemics given some follicular papules and question of Majocchi's but would consider biopsy to confirm and discussion with transplant team prior to initiation   # Neoplasm of uncertain behavior, left armpit, s/p shave bx 9/15/2020.    CC:   Chief Complaint   Patient presents with     Skin Check     Rudi is here today for a skin check. He is concerned about a spot on his stomach and a skin tag under his left armpit         Encounter Date: Sep 15, 2020    History of Present Illness:  Mr. Michael Amin is a 40 year old male who presents for evaluation of transplant skin check and lesion of concern in armpit and abdomen.    Patient recently had kidney transplant in 2019. He notes many moles, including one on his abdomen which is larger than his other moles. He has had it previously looked at and was told to keep an eye on it. It is not changing or symptomatic. His uncle  of melanoma. He has had several moles removed in past but these were reported to be benign.    He has a an itchy rash on his left hand. It improved with moisturizer and washing hand with soap/water.    Has skin tag in left armpit which is irritating and rubs on clothing/deoderant.    No personal history of skin cancer. No other painful, bleeding, non-healing, or otherwise symptomatic lesions.     Otherwise feeling well, no additional skin concerns.        Past Medical History:   Patient Active Problem List   Diagnosis     HTN, kidney transplant related     Diabetes mellitus type 1 (H)     Dyslipidemia     Anemia in chronic kidney disease     Secondary  renal hyperparathyroidism (H)     Kidney replaced by transplant     Aftercare following organ transplant     Vitamin D deficiency     Hypomagnesemia     Kidney transplant rejection     Past Medical History:   Diagnosis Date     Anemia in chronic kidney disease      Dyslipidemia      ESRD (end stage renal disease) on dialysis (H)      Hypertension      Hypomagnesemia 10/7/2019     Secondary hyperparathyroidism (H)      Type 1 diabetes (H)      Past Surgical History:   Procedure Laterality Date     hair implant  2007     INSERT CATHETER PERITONEAL DIALYSIS  08/2018     IR FINE NEEDLE ASPIRATION W ULTRASOUND  11/25/2019     IR RENAL BIOPSY LEFT  11/25/2019     PERCUTANEOUS BIOPSY KIDNEY Left 10/22/2019    Procedure: Left Kidney Biopsy;  Surgeon: Kash Hoffman MD;  Location:  OR       Social History:  Patient reports that he has never smoked. He has never used smokeless tobacco. He reports previous alcohol use. He reports that he does not use drugs.    Family History:  Family History   Problem Relation Age of Onset     Diabetes Father      Coronary Artery Disease Father      Skin Cancer Paternal Uncle      Melanoma No family hx of        Medications:  Current Outpatient Medications   Medication Sig Dispense Refill     alcohol swab prep pads Use to swab area of injection/azk as directed. 100 each 3     aspirin (ASA) 81 MG EC tablet Take 81 mg by mouth daily        atorvastatin (LIPITOR) 10 MG tablet Take 1 tablet (10 mg) by mouth daily 90 tablet 3     blood glucose monitoring (ACCU-CHEK FASTCLIX) lancets U QID OR MORE OFTEN PRN  1     carvedilol (COREG) 12.5 MG tablet Take 1 tablet (12.5 mg) by mouth 2 times daily (with meals) 180 tablet 3     HUMALOG 100 UNIT/ML injection Insulin used for filling patient's pump  3     insulin lispro (HUMALOG VIAL) 100 UNIT/ML vial Inject 100 Units Subcutaneous daily With Insulin pump. Up to 100 units 30 mL 11     insulin pen needle (ULTICARE MICRO) 32G X 4 MM miscellaneous Use  "pen needles daily or as directed. 100 each 3     Insulin Syringe-Needle U-100 31G X 1/4\" 1 ML MISC 1 Syringe as needed (until new insuline pump arrives) 50 each 1     magnesium oxide (MAG-OX) 400 MG tablet Take 1 tablet (400 mg) by mouth daily (with lunch) 30 tablet 5     mycophenolate (GENERIC EQUIVALENT) 250 MG capsule Take 5 capsules (1,250 mg) by mouth 2 times daily 300 capsule 11     senna-docusate (SENOKOT-S/PERICOLACE) 8.6-50 MG tablet Take 2 tablets by mouth 2 times daily 20 tablet 0     sulfamethoxazole-trimethoprim (BACTRIM/SEPTRA) 400-80 MG tablet Take 1 tablet by mouth daily 30 tablet 11     tacrolimus (GENERIC EQUIVALENT) 0.5 MG capsule HOLD 60 capsule 11     tacrolimus (GENERIC EQUIVALENT) 1 MG capsule Take 2 capsules (2 mg) by mouth 2 times daily 120 capsule 11     terbinafine (LAMISIL) 1 % external cream Apply topically 2 times daily To rash on left hand 42 g 11     Allergies   Allergen Reactions     Thymoglobulin      Had reaction with rigors after steroid-free dose. No reaction with steroids.         Review of Systems:  -Constitutional: Otherwise feeling well today, in usual state of health.  -Skin: As above in HPI. No additional skin concerns.    Physical exam:  GEN: This is a well developed, well-nourished male in no acute distress, in a pleasant mood.    SKIN: Total skin excluding the undergarment areas was performed. The exam included the head/face, neck, both arms, chest, back, abdomen, both legs, digits and/or nails.   -Fajardo skin type: II  -Many light to medium brown macules and patches, ranging in size from 2 mm to 17 mm. Largest nevus is on central lower abdomen and is 1.7 x 1.5 cm. Dermoscopy fairly uniform and reassuring, often reticulated network.  -Left thenar eminence and left forearm with thin pink patch with rim of scale and some scattered pink papules.  -Toenails with dystrophy and thickening.  -Left armpit with ~1 cm pedunculated flesh-colored papule.  -No other lesions of " concern on areas examined.     Impression/Plan:  1. Neoplasm of uncertain behavior, left armpit. Ddx symptomatic skin tag.  - Shave biopsy:  After discussion of benefits and risks including but not limited to bleeding/bruising, pain/swelling, infection, scar, incomplete removal, nerve damage/numbness, recurrence, and non-diagnostic biopsy, written consent, verbal consent and photographs were obtained. Time-out was performed. The area was cleaned with isopropyl alcohol. 0.5ml of 1% lidocaine with 1:100,000 epinephrine was injected to obtain adequate anesthesia. A shave biopsy was performed. Hemostasis was achieved with aluminium chloride. Vaseline and a sterile dressing were applied. The patient tolerated the procedure and no complications were noted. The patient was provided with verbal and written post care instructions.    2. Multiple benign nevi and family history of melanoma.  - Counseled on ABCDEs of melanoma and sun protection. Asked patient to return sooner if noticing changing or symptomatic lesions.  - He has larger more nevi which are somewhat atypical in appearance but overall reassuring. Photos taken today, particularly of larger nevus on abdomen which reassuringly has been stable.  - Continue yearly skin excams    3. Tinea corporis, left hand/forearm, in setting of onychomycosis and transplant status.  - KOH positive 9/15/2020  - Start terbinafine cream BID  - He will let us know if does not improve - may need systemics given some follicular papules and question of Majocchi's but would consider biopsy to confirm and discussion with transplant team prior to initiation     4. History of kidney transplant 2019. Counseled patient that he is at higher risk for cutaneous malignancy given history of transplant. Monitor for changing or symptomatic lesions. Continue annual skin checks and photoprotection.    CC Gopi Borden  [id number] [place] [address] on close of this  encounter.    Follow-up in 1 year, earlier for new or changing lesions.       Staff Involved:  Staff Only    Paola Garcia MD    Department of Dermatology  [facility]: Phone: [phone], Fax: [phone]  9/16/2020

## 2020-09-30 ENCOUNTER — TELEPHONE (OUTPATIENT)
Dept: TRANSPLANT | Facility: CLINIC | Age: 40
End: 2020-09-30

## 2020-10-01 ENCOUNTER — MYC MEDICAL ADVICE (OUTPATIENT)
Dept: DERMATOLOGY | Facility: CLINIC | Age: 40
End: 2020-10-01

## 2020-10-01 NOTE — TELEPHONE ENCOUNTER
Rudi paged the ivan RNCC regarding some new sores that started appearing on his back last week and started to develop on his chest today. He says they are on his chest at the top of his abdomen. Recommended some ice packs and avoiding touching, picking or rubbing and following up tomorrow with dermatology or possibly urgent care if symptoms get worse and he needs to be seen sooner. Primary RNCC will follow up with any advice/input as necessary.

## 2020-10-02 ENCOUNTER — TELEPHONE (OUTPATIENT)
Dept: TRANSPLANT | Facility: CLINIC | Age: 40
End: 2020-10-02

## 2020-10-02 DIAGNOSIS — Z48.298 AFTERCARE FOLLOWING ORGAN TRANSPLANT: ICD-10-CM

## 2020-10-02 DIAGNOSIS — Z94.0 KIDNEY REPLACED BY TRANSPLANT: Primary | ICD-10-CM

## 2020-10-02 DIAGNOSIS — Z79.899 ENCOUNTER FOR LONG-TERM CURRENT USE OF MEDICATION: ICD-10-CM

## 2020-10-02 RX ORDER — MYCOPHENOLATE MOFETIL 250 MG/1
750 CAPSULE ORAL 2 TIMES DAILY
Qty: 300 CAPSULE | Refills: 11 | COMMUNITY
Start: 2020-10-02 | End: 2020-12-19

## 2020-10-02 NOTE — LETTER
OUTPATIENT LABORATORY TEST ORDER    Patient Name: Rudi Amin  Transplant Date: 10/1/2019   YOB: 1980  Issue Date & Time: 10/5/2020  7:57 AM  [facility] MR: [id number] Exp. Date (1 year after date issued)      Diagnoses: Kidney Transplant (ICD-10  Z94.0)   Long term use of medications (ICD-10  Z79.899)     Lab results to be available on the same day drawn.   Patient should release information to the [facility].  Please fax to the Transplant Center at [phone].    Monthly   ?Hemogram and Platelet  ?Basic Metabolic Panel (Sodium, Potassium, Chloride, CO2, Creatinine, Urea Nitrogen,     Glucose,   Calcium)         ?/Tacrolimus/Prograf drug level          ?BK PCR QT                  Every 6 Months                  ?Urine for protein/creatinine    Yearly  Due: 10/2020   ?PRA/DSA level (mailers provided by the patient)     If you have any questions, please call The Transplant Center at [phone] or [phone].    Please fax labs to [phone]  .

## 2020-10-02 NOTE — TELEPHONE ENCOUNTER
Spoke to Rudi who reports that he has been diagnosis  With shingles    Shingles around his chest area   Local provider Rx ValAcyclovir  1000 mg three times per day for 7 days      Reviewed with Dr Hernández   Agreed to extend valacyclovir for a  Total 14 days   Lowered  Cellcept mycophenolate mofetil from 1250 mg twice per day   Lowered to  Cellcept mycophenolate mofetil 1000 mg twice per day

## 2020-10-02 NOTE — TELEPHONE ENCOUNTER
One year post kidney transplant on 10/1/2020   Task   Please update epic lab orders   Please fax one year lab orders to local clinic  Please mail copy of lab orders and one year patient education letter     Task 2   Overdue for Donor Specific Antibodies    Overdue for BK PCR   Please mail and mychart message to have the labs drawn in the next week   Confirm he has Donor Specific Antibodies  Kits

## 2020-10-04 DIAGNOSIS — Z94.0 KIDNEY REPLACED BY TRANSPLANT: Primary | ICD-10-CM

## 2020-10-04 RX ORDER — VALACYCLOVIR HYDROCHLORIDE 1 G/1
1000 TABLET, FILM COATED ORAL 3 TIMES DAILY
Qty: 21 TABLET | Refills: 0 | Status: SHIPPED | OUTPATIENT
Start: 2020-10-04 | End: 2020-11-13

## 2020-10-08 ENCOUNTER — TELEPHONE (OUTPATIENT)
Dept: TRANSPLANT | Facility: CLINIC | Age: 40
End: 2020-10-08

## 2020-10-08 NOTE — TELEPHONE ENCOUNTER
Please see previous phone call regarding shinrosie Hernández lowered  Cellcept mycophenolate mofetil from 1250 mg twice per day to 1000 mg twice per day   Extended valaciclovir for 14 days      Task   Please call Rudi confirm that angelica are resolving   Please send updated lab order that included MPA level every 2 weeks times 2 then monthly   One year post kidney transplant    Please fax copy of lab order to local  Lab   Please mail one year post kidney transplant  And lab letter to Rudi

## 2020-10-08 NOTE — TELEPHONE ENCOUNTER
Left message for patient regarding update on Shingles.  Updated lab orders completed and faxed to local lab and sent via [employer] to patient.

## 2020-10-09 NOTE — TELEPHONE ENCOUNTER
Patient Call: General  Route to LPN    Reason for call: Pt said he ws offered a med that would help with the neve pain re the shingles  He said it started with a G  Could not remember what they said  Also wonders why his lab appointment was cancelled on 11/3 before he sees Dr Rutherford     Call back needed? Yes    Return Call Needed  Same as documented in contacts section  When to return call?: Greater than one day: Route standard priority

## 2020-10-09 NOTE — TELEPHONE ENCOUNTER
Selina Grijalva states that shingles are improving    Reviewed should follow up with local PCP in [place] [facility]

## 2020-10-12 DIAGNOSIS — E78.5 DYSLIPIDEMIA: ICD-10-CM

## 2020-10-14 ENCOUNTER — TELEPHONE (OUTPATIENT)
Dept: TRANSPLANT | Facility: CLINIC | Age: 40
End: 2020-10-14

## 2020-10-14 NOTE — TELEPHONE ENCOUNTER
Patient Call: General  Route to LPN    Reason for call: pt thought his lab had drawn his 12 month labs in Oct, but they were missed  He is getting his Nov labs done here with his clinic appoitnment  Can you put them in Epic  to be drawn here    Call back needed? No

## 2020-10-15 RX ORDER — ATORVASTATIN CALCIUM 10 MG/1
10 TABLET, FILM COATED ORAL DAILY
Qty: 90 TABLET | Refills: 3 | Status: SHIPPED | OUTPATIENT
Start: 2020-10-15 | End: 2021-10-05

## 2020-11-10 ENCOUNTER — TELEPHONE (OUTPATIENT)
Dept: TRANSPLANT | Facility: CLINIC | Age: 40
End: 2020-11-10

## 2020-11-10 NOTE — TELEPHONE ENCOUNTER
Called Rudi to take his medications per transplant guidelines         2nd Issue    Appointment on Nov 13 ,2020   Plan to follow up with Dr Rocio Rutherford for possible hernia  Repair post transplant  ( lost weight )   And to discuss activated on Panc list     Rudi concern   very high #s  Of COVID-19  In his area so may not be good time for elective surgery    Discussed that will cancel the Dr Rocio Min  Appointment  And Lab will be done locally   Plan to change the  Transplant  Nephrology  Appointment  To telemedicine  -His understanding that he will need to be in the state of [place] at the time of appointment      3rd Issue    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

## 2020-11-10 NOTE — LETTER
November 12, 2020      From: [facility]  Solid Organ Transplant Services      To Whom It May Concern:     Michael Amin underwent a kidney transplant and is required to take immunosuppressive medication.  As such he currently fits into a  high risk  category for COVID-19.     For patients that work outside the home, we recommend that all reasonable accommodations be made to allow working remotely or modifying work responsibilities to protect your health.     If you have any questions or concerns about the above request, please contact the Solid Organ Transplant Office at [phone].      Thank you for your partnership.    Sincerely,   [facility]   [facility] Transplant [facility]

## 2020-11-10 NOTE — TELEPHONE ENCOUNTER
Patient Call: General  Route to LPN    Reason for call: connect with pt who missed his morning  dose of tacro and mycophenolate     Pt took the morning meds at 12:40pm    Call back needed? Yes    Return Call Needed  Same as documented in contacts section  When to return call?: Greater than one day: Route standard priority

## 2020-11-10 NOTE — LETTER
November 12, 2020      From: [facility]  Solid Organ Transplant Services      To Whom It May Concern:     Michael Amin underwent a kidney transplant and is required to take immunosuppressive medication.  As such he currently fits into a  high risk  category for COVID-19.     For patients that work outside the home, we recommend that all reasonable accommodations be made to allow working remotely or modifying work responsibilities to protect your health.     If you have any questions or concerns about the above request, please contact the Solid Organ Transplant Office at [phone].      Thank you for your partnership.    Sincerely,   [facility]   Solid Organ Transplant Services

## 2020-11-13 ENCOUNTER — VIRTUAL VISIT (OUTPATIENT)
Dept: NEPHROLOGY | Facility: CLINIC | Age: 40
End: 2020-11-13
Attending: INTERNAL MEDICINE
Payer: COMMERCIAL

## 2020-11-13 VITALS — BODY MASS INDEX: 31.6 KG/M2 | SYSTOLIC BLOOD PRESSURE: 123 MMHG | WEIGHT: 195.8 LBS | DIASTOLIC BLOOD PRESSURE: 81 MMHG

## 2020-11-13 DIAGNOSIS — Z94.0 KIDNEY REPLACED BY TRANSPLANT: ICD-10-CM

## 2020-11-13 DIAGNOSIS — Z48.298 AFTERCARE FOLLOWING ORGAN TRANSPLANT: ICD-10-CM

## 2020-11-13 DIAGNOSIS — E55.9 VITAMIN D DEFICIENCY: ICD-10-CM

## 2020-11-13 DIAGNOSIS — I15.1 HTN, KIDNEY TRANSPLANT RELATED: ICD-10-CM

## 2020-11-13 DIAGNOSIS — B02.9 HERPES ZOSTER WITHOUT COMPLICATION: ICD-10-CM

## 2020-11-13 DIAGNOSIS — D84.9 IMMUNOSUPPRESSION (H): Primary | ICD-10-CM

## 2020-11-13 DIAGNOSIS — Z94.0 HTN, KIDNEY TRANSPLANT RELATED: ICD-10-CM

## 2020-11-13 DIAGNOSIS — E83.42 HYPOMAGNESEMIA: ICD-10-CM

## 2020-11-13 PROCEDURE — 99214 OFFICE O/P EST MOD 30 MIN: CPT | Mod: GT

## 2020-11-13 RX ORDER — GABAPENTIN 100 MG/1
100 CAPSULE ORAL 3 TIMES DAILY
Qty: 90 CAPSULE | Refills: 0 | Status: SHIPPED | OUTPATIENT
Start: 2020-11-13 | End: 2021-07-28

## 2020-11-13 NOTE — PROGRESS NOTES
"Michael Amin is a 40 year old male who is being evaluated via a billable video visit.      The patient has been notified of following:     \"This video visit will be conducted via a call between you and your physician/provider. We have found that certain health care needs can be provided without the need for an in-person physical exam.  This service lets us provide the care you need with a video conversation.  If a prescription is necessary we can send it directly to your pharmacy.  If lab work is needed we can place an order for that and you can then stop by our lab to have the test done at a later time.    Video visits are billed at different rates depending on your insurance coverage.  Please reach out to your insurance provider with any questions.    If during the course of the call the physician/provider feels a video visit is not appropriate, you will not be charged for this service.\"    Patient has given verbal consent for Video visit? Yes  How would you like to obtain your AVS? MyChart  If you are dropped from the video visit, the video invite should be resent to: Text to cell phone: [phone]  Will anyone else be joining your video visit? No  {If patient encounters technical issues they should call [phone] :[id number]}      Video-Visit Details    Type of service:  Video Visit    Video Start Time: {video visit start/end time:152948}  Video End Time: {video visit start/end time:152948}    Originating Location (pt. Location): {video visit patient location:865881::\"Home\"}    Distant Location (provider location):  [facility] [place]     Platform used for Video Visit: {Virtual Visit Platforms:138125::\"Freedom[employer]\"}    {signature options:816893}        "

## 2020-11-13 NOTE — PROGRESS NOTES
"Michael Amin is a 40 year old male who is being evaluated via a billable video visit.       The patient has been notified of following:      \"This video visit will be conducted via a call between you and your physician/provider. We have found that certain health care needs can be provided without the need for an in-person physical exam.  This service lets us provide the care you need with a video conversation.  If a prescription is necessary we can send it directly to your pharmacy.  If lab work is needed we can place an order for that and you can then stop by our lab to have the test done at a later time.     Video visits are billed at different rates depending on your insurance coverage.  Please reach out to your insurance provider with any questions.     If during the course of the call the physician/provider feels a video visit is not appropriate, you will not be charged for this service.\"     Patient has given verbal consent for Video visit? Yes  How would you like to obtain your AVS? MyChart  If you are dropped from the video visit, the video invite should be resent to: Text to cell phone: [phone]  Will anyone else be joining your video visit? No     Video-Visit Details     Type of service:  Video Visit     Video Start Time: 9:21 AM  Video End Time: 10:05 AM    Originating Location (pt. Location): Home     Distant Location (provider location):  [facility] [place]      Platform used for Video Visit: MD Jabier Matamoros MD       CHRONIC TRANSPLANT NEPHROLOGY VISIT    Assessment & Plan    # LDKT: Stable  most recently 1.2 in 9/2020   - Baseline Cr ~ 1.1-1.3 mg/dL    - Proteinuria: Minimal (0.2-0.5 grams)   - Date DSA Last Checked: May/2020      Latest DSA: No   - BK Viremia: No 7/2020   - Kidney Tx Biopsy: Nov 25, 2019; Result: Material insufficient for assessment with no glomeruli.                                              Oct 22, 2019; Result: " Borderline acute cellular-mediated rejection     # Immunosuppression: Tacrolimus immediate release (goal 4-6) and Mycophenolate mofetil (dose 750 mg every 12 hours)   - Changes: Yes - Given his recent shingles , will reduce his MMF to 750 mg BID and check MPA level. Will check tacrolimus level and adjust his dose now since he is after 1 year nelson.     # Infection Prophylaxis:   - PJP: Sulfa/TMP (Bactrim)  - CMV: None, prophylaxis completed   - Thrush: None     # Diabetes: Borderline control (HbA1c 7-9%) Last HbA1c: 7.5%   - Management as per Endocrinology.   - On insulin pump, he is working on weight management to be listed for pancreas transplant.      # Anemia in Chronic Renal Disease: Hgb: Stable      JOSE: No   - Iron studies: Replete    # Mineral Bone Disorder:   - Secondary renal hyperparathyroidism; PTH level: Not checked recently        On treatment: None  - Vitamin D; level: Low        On supplement: No instructed to start taking cholecalciferol 2,000 international unit(s) daily  - Calcium; level: Normal        On supplement: No  - Phosphorus; level: Not checked recently, but was normal last check        On supplement: No     # Electrolytes:   - Potassium; level: Normal        On supplement: No  - Bicarbonate; level: Normal        On supplement: No  - Sodium; level: Normal    # Overweight: Stable to slightly decreased weight.              - Recommend increased exercise and watch caloric intake.    # Medical Compliance: Yes     # COVID-19 Virus Review: Discussed COVID-19 virus and the potential medical risks.  Reviewed preventative health recommendations, which includes washing hands for 20 seconds, avoid touching your face, and social distancing.  Asked patient to inform the transplant center if they are exposed or diagnosed with this virus      # Transplant History:  Etiology of Kidney Failure: Diabetes mellitus type 1  Tx: LDKT  Transplant: 10/1/2019 (Kidney)  Donor Type: Living      Donor Class:    Crossmatch at time of Tx: negative  DSA at time of Tx: No  Significant changes in immunosuppression: None  CMV IgG Ab High Risk Discordance (D+/R-): No  EBV IgG Ab High Risk Discordance (D+/R-): No  Significant transplant-related complications: None    Transplant Office Phone Number: [phone]    Assessment and plan was discussed with the patient and he voiced his understanding and agreement.    Return visit: 3 months    Attestation:  This patient has been seen and evaluated by me, Jabier Hernández MD.  I have reviewed the note and agree with plan of care as documented by the fellow.       Chief Complaint   Mr. Amin is a 40 year old here for kidney transplant and immunosuppression management.     History of Present Illness      The patient is a 39-year-old male with history of end-stage kidney disease due to diabetes who is status post living kidney transplant in October 2019 who is here for follow-up of his kidney transplant and immunosuppression management.    Since the patient was last seen he has been doing reasonably well. He was diagnosed with shingles about a month ago. He completed valtrex course and he states the lesions are crusted. He c.o of some pain around the area.   He denies any chest pain or breathing difficulties.  He has no nausea or vomiting.  He denies any fever shakes or chills.  His BP he says always around 120/80. He states he has been working out and his insulin requirements are going down.    Recent Hospitalizations:  [x] No [] Yes    New Medical Issues: [x] No [] Yes    Decreased energy: [x] No [] Yes    Chest pain or SOB with exertion:  [x] No [] Yes    Appetite change or weight change: [x] No [] Yes    Nausea, vomiting or diarrhea:  [x] No [] Yes    Fever, sweats or chills: [x] No [] Yes    Leg swelling: [x] No [] Yes      Home BP: 120/80    Review of Systems   A comprehensive review of systems was obtained and negative, except as noted in the HPI or PMH.    Problem List  "  Patient Active Problem List   Diagnosis     HTN, kidney transplant related     Diabetes mellitus type 1 (H)     Dyslipidemia     Anemia in chronic kidney disease     Secondary renal hyperparathyroidism (H)     Kidney replaced by transplant     Aftercare following organ transplant     Vitamin D deficiency     Hypomagnesemia     Kidney transplant rejection       Social History   Social History     Tobacco Use     Smoking status: Never Smoker     Smokeless tobacco: Never Used   Substance Use Topics     Alcohol use: Not Currently     Comment: Rarely 1-2 Drinks a month      Drug use: No       Allergies   Allergies   Allergen Reactions     Thymoglobulin      Had reaction with rigors after steroid-free dose. No reaction with steroids.       Medications   Current Outpatient Medications   Medication Sig     alcohol swab prep pads Use to swab area of injection/zak as directed.     aspirin (ASA) 81 MG EC tablet Take 81 mg by mouth daily      atorvastatin (LIPITOR) 10 MG tablet Take 1 tablet (10 mg) by mouth daily     blood glucose monitoring (ACCU-CHEK FASTCLIX) lancets U QID OR MORE OFTEN PRN     carvedilol (COREG) 12.5 MG tablet Take 1 tablet (12.5 mg) by mouth 2 times daily (with meals)     gabapentin (NEURONTIN) 100 MG capsule Take 1 capsule (100 mg) by mouth 3 times daily     HUMALOG 100 UNIT/ML injection Insulin used for filling patient's pump     insulin lispro (HUMALOG VIAL) 100 UNIT/ML vial Inject 100 Units Subcutaneous daily With Insulin pump. Up to 100 units     insulin pen needle (ULTICARE MICRO) 32G X 4 MM miscellaneous Use pen needles daily or as directed.     Insulin Syringe-Needle U-100 31G X 1/4\" 1 ML MISC 1 Syringe as needed (until new insuline pump arrives)     magnesium oxide (MAG-OX) 400 MG tablet Take 1 tablet (400 mg) by mouth daily (with lunch)     mycophenolate (GENERIC EQUIVALENT) 250 MG capsule Take 750 mg by mouth 2 times daily     senna-docusate (SENOKOT-S/PERICOLACE) 8.6-50 MG tablet Take 2 " tablets by mouth 2 times daily     sulfamethoxazole-trimethoprim (BACTRIM/SEPTRA) 400-80 MG tablet Take 1 tablet by mouth daily     tacrolimus (GENERIC EQUIVALENT) 1 MG capsule Take 2 capsules (2 mg) by mouth 2 times daily     terbinafine (LAMISIL) 1 % external cream Apply topically 2 times daily To rash on left hand     No current facility-administered medications for this visit.      Medications Discontinued During This Encounter   Medication Reason     tacrolimus (GENERIC EQUIVALENT) 0.5 MG capsule      valACYclovir (VALTREX) 1000 mg tablet        Physical Exam   GENERAL APPEARANCE: alert and no distress  HENT: no obvious abnormalities on appearance  RESP: breathing appears unremarkable with normal rate, no audible wheezing or cough and no apparent shortness of breath with conversation  MS: extremities normal - no gross deformities noted  SKIN: no apparent rash and normal skin tone  NEURO: speech is clear with no obvious neurological deficits  PSYCH: mentation appears normal and affect normal      Data     Renal Latest Ref Rng & Units 10/2/2020 9/3/2020 8/7/2020   Na 133 - 144 mmol/L - - -   Na (external) 134 - 143 mEq/L 136 138 138   K 3.4 - 5.3 mmol/L - - -   K (external) 3.4 - 5.1 mEq/L 4.9 5.1 4.8   Cl 94 - 109 mmol/L - - -   Cl (external) 99 - 110 mEq/L 100 102 106   CO2 20 - 32 mmol/L - - -   CO2 (external) 19 - 29 mEq/L 24 27 22   BUN 7 - 30 mg/dL - - -   BUN (external) 5 - 24 mg/dL 17 15 14   Cr 0.66 - 1.25 mg/dL - - -   Cr (external) 0.70 - 1.20 mg/dL 1.34(H) 1.24(H) 1.11   Glucose 70 - 99 mg/dL - - -   Glucose (external) 70 - 99 mg/dL 151(H) 214(H) 107(H)   Ca  8.5 - 10.1 mg/dL - - -   Ca (external) 8.4 - 10.5 mg/dL 9.3 9.5 9.2   Mg 1.6 - 2.3 mg/dL - - -   Mg (external) 1.8 - 2.7 mg/dL - - -     Bone Health Latest Ref Rng & Units 3/31/2020 2/3/2020 1/27/2020   Phos 2.5 - 4.5 mg/dL - - -   Phos (external) 2.5 - 4.6 mg/dL 3.3 3.5 3.3   PTHi 18 - 80 pg/mL - - -   Vit D Def 20 - 75 ug/L - - -     Heme  Latest Ref Rng & Units 10/2/2020 9/3/2020 8/7/2020   WBC 4.0 - 11.0 10e9/L - - -   WBC (external) 3.2 - 11.0 10*9/L 5.9 6.4 6.6   Hgb 13.3 - 17.7 g/dL - - -   Hgb (external) 12.9 - 16.9 g/dL 13.1 13.2 13.1   Plt 150 - 450 10e9/L - - -   Plt (external) 130 - 375 10*9/L 193 194 208   ABSOLUTE NEUTROPHIL 1.6 - 8.3 10e9/L - - -   ABSOLUTE NEUTROPHILS (EXTERNAL) 1.9 - 8.1 x10:3/uL - - -   ABSOLUTE LYMPHOCYTES 0.8 - 5.3 10e9/L - - -   ABSOLUTE LYMPHOCYTES (EXTERNAL) 1.0 - 3.9 x10:3/uL - - -   ABSOLUTE MONOCYTES 0.0 - 1.3 10e9/L - - -   ABSOLUTE MONOCYTES (EXTERNAL) 0.1 - 0.9 x10:3/uL - - -   ABSOLUTE EOSINOPHILS 0.0 - 0.7 10e9/L - - -   ABSOLUTE EOSINOPHILS (EXTERNAL) 0.0 - 0.5 x10:3/uL - - -   ABSOLUTE BASOPHILS 0.0 - 0.2 10e9/L - - -   ABSOLUTE BASOPHILS (EXTERNAL) 0.0 - 0.2 x10:3/uL - - -   ABS IMMATURE GRANULOCYTES 0 - 0.4 10e9/L - - -   ABSOLUTE NUCLEATED RBC - - - -     Liver Latest Ref Rng & Units 9/26/2019 8/6/2019 1/7/2019   AP 40 - 150 U/L 100 - 157(H)   TBili 0.2 - 1.3 mg/dL 0.2 - 0.2   ALT 0 - 70 U/L 42 - 46   AST 0 - 45 U/L 16 - 15   Tot Protein 6.8 - 8.8 g/dL 7.5 - 7.9   Tot Protein (external) 5.7 - 8.2 g/dL - 6.6 -   Albumin 3.4 - 5.0 g/dL 3.5 - 3.5   Albumin (external) 3.2 - 4.8 g/dL - 4.4 -     Pancreas Latest Ref Rng & Units 7/16/202016/2020 2/7/2020 9/26/2019   A1C 0 - 5.6 % 8.1(H) 8.2(H) 8.9(H)     Iron studies Latest Ref Rng & Units 9/26/2019 8/6/2019   Iron 35 - 180 ug/dL 70 -   Iron sat 15 - 46 % 28 -   Ferritin 26 - 388 ng/mL 753(H) -   Ferritin (external) 22 - 322 ng/mL - 578(H)     [facility] Txp Virology Latest Ref Rng & Units 7/8/2020 3/31/2020 2/3/2020   BK Spec - Plasma Plasma Plasma   BK Res BKNEG:BK Virus DNA Not Detected copies/mL BK Virus DNA Not Detected BK Virus DNA Not Detected BK Virus DNA Not Detected   BK Log <2.7 Log copies/mL Not Calculated Not Calculated Not Calculated   EBV CAPSID ANTIBODY IGG 0.0 - 0.8 AI - - -   Hep B Core NR:Nonreactive - - -   Hep B Surf Ext  Multiple values view image  mIU/mL - - -   HIV 1&2 Ext Nonreactive - - -        Recent Labs   Lab Test 11/25/19  0843 12/09/19  1002 02/07/20  0858   DOSTAC 2,020 2130,12/8/19 2/6/20 2105   TACROL 10.5 10.4 9.2     Recent Labs   Lab Test 10/07/19  0742 11/21/19  0813   DOSMPA Not Provided 11/20/19 1800   MPACID 0.82* 0.91*   MPAG 14.1* 39.5     Debi Bravo MD   Transplant Nephrology Fellow  11/13/2020 at 10:28 AM

## 2020-11-13 NOTE — LETTER
"11/13/2020       RE: Michael Amin  [address]  [place] [address]     Dear Colleague,    Thank you for referring your patient, Michael Amin, to the [facility] [place] at [facility]. Please see a copy of my visit note below.    Michael Amin is a 40 year old male who is being evaluated via a billable video visit.       The patient has been notified of following:      \"This video visit will be conducted via a call between you and your physician/provider. We have found that certain health care needs can be provided without the need for an in-person physical exam.  This service lets us provide the care you need with a video conversation.  If a prescription is necessary we can send it directly to your pharmacy.  If lab work is needed we can place an order for that and you can then stop by our lab to have the test done at a later time.     Video visits are billed at different rates depending on your insurance coverage.  Please reach out to your insurance provider with any questions.     If during the course of the call the physician/provider feels a video visit is not appropriate, you will not be charged for this service.\"     Patient has given verbal consent for Video visit? Yes  How would you like to obtain your AVS? MyChart  If you are dropped from the video visit, the video invite should be resent to: Text to cell phone: [phone]  Will anyone else be joining your video visit? No     Video-Visit Details     Type of service:  Video Visit     Video Start Time: 9:21 AM  Video End Time: 10:05 AM    Originating Location (pt. Location): Home     Distant Location (provider location):  [facility] [place]      Platform used for Video Visit: MD Jabier Matamoros MD       CHRONIC TRANSPLANT NEPHROLOGY VISIT    Assessment & Plan    # LDKT: Stable  most recently 1.2 in 9/2020   - " Baseline Cr ~ 1.1-1.3 mg/dL    - Proteinuria: Minimal (0.2-0.5 grams)   - Date DSA Last Checked: May/2020      Latest DSA: No   - BK Viremia: No 7/2020   - Kidney Tx Biopsy: Nov 25, 2019; Result: Material insufficient for assessment with no glomeruli.                                              Oct 22, 2019; Result: Borderline acute cellular-mediated rejection     # Immunosuppression: Tacrolimus immediate release (goal 4-6) and Mycophenolate mofetil (dose 750 mg every 12 hours)   - Changes: Yes - Given his recent shingles , will reduce his MMF to 750 mg BID and check MPA level. Will check tacrolimus level and adjust his dose now since he is after 1 year nelson.     # Infection Prophylaxis:   - PJP: Sulfa/TMP (Bactrim)  - CMV: None, prophylaxis completed   - Thrush: None     # Diabetes: Borderline control (HbA1c 7-9%) Last HbA1c: 7.5%   - Management as per Endocrinology.   - On insulin pump, he is working on weight management to be listed for pancreas transplant.      # Anemia in Chronic Renal Disease: Hgb: Stable      JOSE: No   - Iron studies: Replete    # Mineral Bone Disorder:   - Secondary renal hyperparathyroidism; PTH level: Not checked recently        On treatment: None  - Vitamin D; level: Low        On supplement: No instructed to start taking cholecalciferol 2,000 international unit(s) daily  - Calcium; level: Normal        On supplement: No  - Phosphorus; level: Not checked recently, but was normal last check        On supplement: No     # Electrolytes:   - Potassium; level: Normal        On supplement: No  - Bicarbonate; level: Normal        On supplement: No  - Sodium; level: Normal    # Overweight: Stable to slightly decreased weight.              - Recommend increased exercise and watch caloric intake.    # Medical Compliance: Yes     # COVID-19 Virus Review: Discussed COVID-19 virus and the potential medical risks.  Reviewed preventative health recommendations, which includes washing hands for 20  seconds, avoid touching your face, and social distancing.  Asked patient to inform the transplant center if they are exposed or diagnosed with this virus      # Transplant History:  Etiology of Kidney Failure: Diabetes mellitus type 1  Tx: LDKT  Transplant: 10/1/2019 (Kidney)  Donor Type: Living      Donor Class:   Crossmatch at time of Tx: negative  DSA at time of Tx: No  Significant changes in immunosuppression: None  CMV IgG Ab High Risk Discordance (D+/R-): No  EBV IgG Ab High Risk Discordance (D+/R-): No  Significant transplant-related complications: None    Transplant Office Phone Number: [phone]    Assessment and plan was discussed with the patient and he voiced his understanding and agreement.    Return visit: 3 months    Attestation:  This patient has been seen and evaluated by me, Jabier Hernández MD.  I have reviewed the note and agree with plan of care as documented by the fellow.       Chief Complaint   Mr. Amin is a 40 year old here for kidney transplant and immunosuppression management.     History of Present Illness      The patient is a 39-year-old male with history of end-stage kidney disease due to diabetes who is status post living kidney transplant in October 2019 who is here for follow-up of his kidney transplant and immunosuppression management.    Since the patient was last seen he has been doing reasonably well. He was diagnosed with shingles about a month ago. He completed valtrex course and he states the lesions are crusted. He c.o of some pain around the area.   He denies any chest pain or breathing difficulties.  He has no nausea or vomiting.  He denies any fever shakes or chills.  His BP he says always around 120/80. He states he has been working out and his insulin requirements are going down.    Recent Hospitalizations:  [x] No [] Yes    New Medical Issues: [x] No [] Yes    Decreased energy: [x] No [] Yes    Chest pain or SOB with exertion:  [x] No [] Yes    Appetite  "change or weight change: [x] No [] Yes    Nausea, vomiting or diarrhea:  [x] No [] Yes    Fever, sweats or chills: [x] No [] Yes    Leg swelling: [x] No [] Yes      Home BP: 120/80    Review of Systems   A comprehensive review of systems was obtained and negative, except as noted in the HPI or PMH.    Problem List   Patient Active Problem List   Diagnosis     HTN, kidney transplant related     Diabetes mellitus type 1 (H)     Dyslipidemia     Anemia in chronic kidney disease     Secondary renal hyperparathyroidism (H)     Kidney replaced by transplant     Aftercare following organ transplant     Vitamin D deficiency     Hypomagnesemia     Kidney transplant rejection       Social History   Social History     Tobacco Use     Smoking status: Never Smoker     Smokeless tobacco: Never Used   Substance Use Topics     Alcohol use: Not Currently     Comment: Rarely 1-2 Drinks a month      Drug use: No       Allergies   Allergies   Allergen Reactions     Thymoglobulin      Had reaction with rigors after steroid-free dose. No reaction with steroids.       Medications   Current Outpatient Medications   Medication Sig     alcohol swab prep pads Use to swab area of injection/zak as directed.     aspirin (ASA) 81 MG EC tablet Take 81 mg by mouth daily      atorvastatin (LIPITOR) 10 MG tablet Take 1 tablet (10 mg) by mouth daily     blood glucose monitoring (ACCU-CHEK FASTCLIX) lancets U QID OR MORE OFTEN PRN     carvedilol (COREG) 12.5 MG tablet Take 1 tablet (12.5 mg) by mouth 2 times daily (with meals)     gabapentin (NEURONTIN) 100 MG capsule Take 1 capsule (100 mg) by mouth 3 times daily     HUMALOG 100 UNIT/ML injection Insulin used for filling patient's pump     insulin lispro (HUMALOG VIAL) 100 UNIT/ML vial Inject 100 Units Subcutaneous daily With Insulin pump. Up to 100 units     insulin pen needle (ULTICARE MICRO) 32G X 4 MM miscellaneous Use pen needles daily or as directed.     Insulin Syringe-Needle U-100 31G X 1/4\" " 1 ML MISC 1 Syringe as needed (until new insuline pump arrives)     magnesium oxide (MAG-OX) 400 MG tablet Take 1 tablet (400 mg) by mouth daily (with lunch)     mycophenolate (GENERIC EQUIVALENT) 250 MG capsule Take 750 mg by mouth 2 times daily     senna-docusate (SENOKOT-S/PERICOLACE) 8.6-50 MG tablet Take 2 tablets by mouth 2 times daily     sulfamethoxazole-trimethoprim (BACTRIM/SEPTRA) 400-80 MG tablet Take 1 tablet by mouth daily     tacrolimus (GENERIC EQUIVALENT) 1 MG capsule Take 2 capsules (2 mg) by mouth 2 times daily     terbinafine (LAMISIL) 1 % external cream Apply topically 2 times daily To rash on left hand     No current facility-administered medications for this visit.      Medications Discontinued During This Encounter   Medication Reason     tacrolimus (GENERIC EQUIVALENT) 0.5 MG capsule      valACYclovir (VALTREX) 1000 mg tablet        Physical Exam   GENERAL APPEARANCE: alert and no distress  HENT: no obvious abnormalities on appearance  RESP: breathing appears unremarkable with normal rate, no audible wheezing or cough and no apparent shortness of breath with conversation  MS: extremities normal - no gross deformities noted  SKIN: no apparent rash and normal skin tone  NEURO: speech is clear with no obvious neurological deficits  PSYCH: mentation appears normal and affect normal      Data     Renal Latest Ref Rng & Units 10/2/2020 9/3/2020 8/7/2020   Na 133 - 144 mmol/L - - -   Na (external) 134 - 143 mEq/L 136 138 138   K 3.4 - 5.3 mmol/L - - -   K (external) 3.4 - 5.1 mEq/L 4.9 5.1 4.8   Cl 94 - 109 mmol/L - - -   Cl (external) 99 - 110 mEq/L 100 102 106   CO2 20 - 32 mmol/L - - -   CO2 (external) 19 - 29 mEq/L 24 27 22   BUN 7 - 30 mg/dL - - -   BUN (external) 5 - 24 mg/dL 17 15 14   Cr 0.66 - 1.25 mg/dL - - -   Cr (external) 0.70 - 1.20 mg/dL 1.34(H) 1.24(H) 1.11   Glucose 70 - 99 mg/dL - - -   Glucose (external) 70 - 99 mg/dL 151(H) 214(H) 107(H)   Ca  8.5 - 10.1 mg/dL - - -   Ca  (external) 8.4 - 10.5 mg/dL 9.3 9.5 9.2   Mg 1.6 - 2.3 mg/dL - - -   Mg (external) 1.8 - 2.7 mg/dL - - -     Bone Health Latest Ref Rng & Units 3/31/2020 2/3/2020 1/27/2020   Phos 2.5 - 4.5 mg/dL - - -   Phos (external) 2.5 - 4.6 mg/dL 3.3 3.5 3.3   PTHi 18 - 80 pg/mL - - -   Vit D Def 20 - 75 ug/L - - -     Heme Latest Ref Rng & Units 10/2/2020 9/3/2020 8/7/2020   WBC 4.0 - 11.0 10e9/L - - -   WBC (external) 3.2 - 11.0 10*9/L 5.9 6.4 6.6   Hgb 13.3 - 17.7 g/dL - - -   Hgb (external) 12.9 - 16.9 g/dL 13.1 13.2 13.1   Plt 150 - 450 10e9/L - - -   Plt (external) 130 - 375 10*9/L 193 194 208   ABSOLUTE NEUTROPHIL 1.6 - 8.3 10e9/L - - -   ABSOLUTE NEUTROPHILS (EXTERNAL) 1.9 - 8.1 x10:3/uL - - -   ABSOLUTE LYMPHOCYTES 0.8 - 5.3 10e9/L - - -   ABSOLUTE LYMPHOCYTES (EXTERNAL) 1.0 - 3.9 x10:3/uL - - -   ABSOLUTE MONOCYTES 0.0 - 1.3 10e9/L - - -   ABSOLUTE MONOCYTES (EXTERNAL) 0.1 - 0.9 x10:3/uL - - -   ABSOLUTE EOSINOPHILS 0.0 - 0.7 10e9/L - - -   ABSOLUTE EOSINOPHILS (EXTERNAL) 0.0 - 0.5 x10:3/uL - - -   ABSOLUTE BASOPHILS 0.0 - 0.2 10e9/L - - -   ABSOLUTE BASOPHILS (EXTERNAL) 0.0 - 0.2 x10:3/uL - - -   ABS IMMATURE GRANULOCYTES 0 - 0.4 10e9/L - - -   ABSOLUTE NUCLEATED RBC - - - -     Liver Latest Ref Rng & Units 9/26/2019 8/6/2019 1/7/2019   AP 40 - 150 U/L 100 - 157(H)   TBili 0.2 - 1.3 mg/dL 0.2 - 0.2   ALT 0 - 70 U/L 42 - 46   AST 0 - 45 U/L 16 - 15   Tot Protein 6.8 - 8.8 g/dL 7.5 - 7.9   Tot Protein (external) 5.7 - 8.2 g/dL - 6.6 -   Albumin 3.4 - 5.0 g/dL 3.5 - 3.5   Albumin (external) 3.2 - 4.8 g/dL - 4.4 -     Pancreas Latest Ref Rng & Units 7/16/2020 2/7/2020 9/26/2019   A1C 0 - 5.6 % 8.1(H) 8.2(H) 8.9(H)     Iron studies Latest Ref Rng & Units 9/26/2019 8/6/2019   Iron 35 - 180 ug/dL 70 -   Iron sat 15 - 46 % 28 -   Ferritin 26 - 388 ng/mL 753(H) -   Ferritin (external) 22 - 322 ng/mL - 578(H)     [facility] Txp Virology Latest Ref Rng & Units 7/8/2020 3/31/2020 2/3/2020   BK Spec - Plasma Plasma Plasma   BK Res  BKNEG:BK Virus DNA Not Detected copies/mL BK Virus DNA Not Detected BK Virus DNA Not Detected BK Virus DNA Not Detected   BK Log <2.7 Log copies/mL Not Calculated Not Calculated Not Calculated   EBV CAPSID ANTIBODY IGG 0.0 - 0.8 AI - - -   Hep B Core NR:Nonreactive - - -   Hep B Surf Ext  Multiple values view image mIU/mL - - -   HIV 1&2 Ext Nonreactive - - -        Recent Labs   Lab Test 11/25/19  0843 12/09/19  1002 02/07/20  0858   DOSTAC 2,020 2130,12/8/19 2/6/20 2105   TACROL 10.5 10.4 9.2     Recent Labs   Lab Test 10/07/19  0742 11/21/19  0813   DOSMPA Not Provided 11/20/19 1800   MPACID 0.82* 0.91*   MPAG 14.1* 39.5     Debi Bravo MD   Transplant Nephrology Fellow  11/13/2020 at 10:28 AM        Again, thank you for allowing me to participate in the care of your patient.      Sincerely,    [facility] Transplant

## 2020-11-13 NOTE — LETTER
"11/13/2020      RE: Michael Amin  [id number]  [facility] [address]       Michael Amin is a 40 year old male who is being evaluated via a billable video visit.       The patient has been notified of following:      \"This video visit will be conducted via a call between you and your physician/provider. We have found that certain health care needs can be provided without the need for an in-person physical exam.  This service lets us provide the care you need with a video conversation.  If a prescription is necessary we can send it directly to your pharmacy.  If lab work is needed we can place an order for that and you can then stop by our lab to have the test done at a later time.     Video visits are billed at different rates depending on your insurance coverage.  Please reach out to your insurance provider with any questions.     If during the course of the call the physician/provider feels a video visit is not appropriate, you will not be charged for this service.\"     Patient has given verbal consent for Video visit? Yes  How would you like to obtain your AVS? MyChart  If you are dropped from the video visit, the video invite should be resent to: Text to cell phone: [phone]  Will anyone else be joining your video visit? No     Video-Visit Details     Type of service:  Video Visit     Video Start Time: 9:21 AM  Video End Time: 10:05 AM    Originating Location (pt. Location): Home     Distant Location (provider location):  [facility]      Platform used for Video Visit: MD Jabier Matamoros MD       CHRONIC TRANSPLANT NEPHROLOGY VISIT    Assessment & Plan    # LDKT: Stable  most recently 1.2 in 9/2020   - Baseline Cr ~ 1.1-1.3 mg/dL    - Proteinuria: Minimal (0.2-0.5 grams)   - Date DSA Last Checked: May/2020      Latest DSA: No   - BK Viremia: No 7/2020   - Kidney Tx Biopsy: Nov 25, 2019; Result: Material insufficient for " assessment with no glomeruli.                                              Oct 22, 2019; Result: Borderline acute cellular-mediated rejection     # Immunosuppression: Tacrolimus immediate release (goal 4-6) and Mycophenolate mofetil (dose 750 mg every 12 hours)   - Changes: Yes - Given his recent shingles , will reduce his MMF to 750 mg BID and check MPA level. Will check tacrolimus level and adjust his dose now since he is after 1 year nelson.     # Infection Prophylaxis:   - PJP: Sulfa/TMP (Bactrim)  - CMV: None, prophylaxis completed   - Thrush: None     # Diabetes: Borderline control (HbA1c 7-9%) Last HbA1c: 7.5%   - Management as per Endocrinology.   - On insulin pump, he is working on weight management to be listed for pancreas transplant.      # Anemia in Chronic Renal Disease: Hgb: Stable      JOSE: No   - Iron studies: Replete    # Mineral Bone Disorder:   - Secondary renal hyperparathyroidism; PTH level: Not checked recently        On treatment: None  - Vitamin D; level: Low        On supplement: No instructed to start taking cholecalciferol 2,000 international unit(s) daily  - Calcium; level: Normal        On supplement: No  - Phosphorus; level: Not checked recently, but was normal last check        On supplement: No     # Electrolytes:   - Potassium; level: Normal        On supplement: No  - Bicarbonate; level: Normal        On supplement: No  - Sodium; level: Normal    # Overweight: Stable to slightly decreased weight.              - Recommend increased exercise and watch caloric intake.    # Medical Compliance: Yes     # COVID-19 Virus Review: Discussed COVID-19 virus and the potential medical risks.  Reviewed preventative health recommendations, which includes washing hands for 20 seconds, avoid touching your face, and social distancing.  Asked patient to inform the transplant center if they are exposed or diagnosed with this virus      # Transplant History:  Etiology of Kidney Failure: Diabetes mellitus  type 1  Tx: LDKT  Transplant: 10/1/2019 (Kidney)  Donor Type: Living      Donor Class:   Crossmatch at time of Tx: negative  DSA at time of Tx: No  Significant changes in immunosuppression: None  CMV IgG Ab High Risk Discordance (D+/R-): No  EBV IgG Ab High Risk Discordance (D+/R-): No  Significant transplant-related complications: None    Transplant Office Phone Number: [phone]    Assessment and plan was discussed with the patient and he voiced his understanding and agreement.    Return visit: 3 months    Attestation:  This patient has been seen and evaluated by me, Jabier Hernández MD.  I have reviewed the note and agree with plan of care as documented by the fellow.       Chief Complaint   Mr. Amin is a 40 year old here for kidney transplant and immunosuppression management.     History of Present Illness      The patient is a 39-year-old male with history of end-stage kidney disease due to diabetes who is status post living kidney transplant in October 2019 who is here for follow-up of his kidney transplant and immunosuppression management.    Since the patient was last seen he has been doing reasonably well. He was diagnosed with shingles about a month ago. He completed valtrex course and he states the lesions are crusted. He c.o of some pain around the area.   He denies any chest pain or breathing difficulties.  He has no nausea or vomiting.  He denies any fever shakes or chills.  His BP he says always around 120/80. He states he has been working out and his insulin requirements are going down.    Recent Hospitalizations:  [x] No [] Yes    New Medical Issues: [x] No [] Yes    Decreased energy: [x] No [] Yes    Chest pain or SOB with exertion:  [x] No [] Yes    Appetite change or weight change: [x] No [] Yes    Nausea, vomiting or diarrhea:  [x] No [] Yes    Fever, sweats or chills: [x] No [] Yes    Leg swelling: [x] No [] Yes      Home BP: 120/80    Review of Systems   A comprehensive review of  "systems was obtained and negative, except as noted in the HPI or PMH.    Problem List   Patient Active Problem List   Diagnosis     HTN, kidney transplant related     Diabetes mellitus type 1 (H)     Dyslipidemia     Anemia in chronic kidney disease     Secondary renal hyperparathyroidism (H)     Kidney replaced by transplant     Aftercare following organ transplant     Vitamin D deficiency     Hypomagnesemia     Kidney transplant rejection       Social History   Social History     Tobacco Use     Smoking status: Never Smoker     Smokeless tobacco: Never Used   Substance Use Topics     Alcohol use: Not Currently     Comment: Rarely 1-2 Drinks a month      Drug use: No       Allergies   Allergies   Allergen Reactions     Thymoglobulin      Had reaction with rigors after steroid-free dose. No reaction with steroids.       Medications   Current Outpatient Medications   Medication Sig     alcohol swab prep pads Use to swab area of injection/zak as directed.     aspirin (ASA) 81 MG EC tablet Take 81 mg by mouth daily      atorvastatin (LIPITOR) 10 MG tablet Take 1 tablet (10 mg) by mouth daily     blood glucose monitoring (ACCU-CHEK FASTCLIX) lancets U QID OR MORE OFTEN PRN     carvedilol (COREG) 12.5 MG tablet Take 1 tablet (12.5 mg) by mouth 2 times daily (with meals)     gabapentin (NEURONTIN) 100 MG capsule Take 1 capsule (100 mg) by mouth 3 times daily     HUMALOG 100 UNIT/ML injection Insulin used for filling patient's pump     insulin lispro (HUMALOG VIAL) 100 UNIT/ML vial Inject 100 Units Subcutaneous daily With Insulin pump. Up to 100 units     insulin pen needle (ULTICARE MICRO) 32G X 4 MM miscellaneous Use pen needles daily or as directed.     Insulin Syringe-Needle U-100 31G X 1/4\" 1 ML MISC 1 Syringe as needed (until new insuline pump arrives)     magnesium oxide (MAG-OX) 400 MG tablet Take 1 tablet (400 mg) by mouth daily (with lunch)     mycophenolate (GENERIC EQUIVALENT) 250 MG capsule Take 750 mg by " mouth 2 times daily     senna-docusate (SENOKOT-S/PERICOLACE) 8.6-50 MG tablet Take 2 tablets by mouth 2 times daily     sulfamethoxazole-trimethoprim (BACTRIM/SEPTRA) 400-80 MG tablet Take 1 tablet by mouth daily     tacrolimus (GENERIC EQUIVALENT) 1 MG capsule Take 2 capsules (2 mg) by mouth 2 times daily     terbinafine (LAMISIL) 1 % external cream Apply topically 2 times daily To rash on left hand     No current facility-administered medications for this visit.      Medications Discontinued During This Encounter   Medication Reason     tacrolimus (GENERIC EQUIVALENT) 0.5 MG capsule      valACYclovir (VALTREX) 1000 mg tablet        Physical Exam   GENERAL APPEARANCE: alert and no distress  HENT: no obvious abnormalities on appearance  RESP: breathing appears unremarkable with normal rate, no audible wheezing or cough and no apparent shortness of breath with conversation  MS: extremities normal - no gross deformities noted  SKIN: no apparent rash and normal skin tone  NEURO: speech is clear with no obvious neurological deficits  PSYCH: mentation appears normal and affect normal      Data     Renal Latest Ref Rng & Units 10/2/2020 9/3/2020 8/7/2020   Na 133 - 144 mmol/L - - -   Na (external) 134 - 143 mEq/L 136 138 138   K 3.4 - 5.3 mmol/L - - -   K (external) 3.4 - 5.1 mEq/L 4.9 5.1 4.8   Cl 94 - 109 mmol/L - - -   Cl (external) 99 - 110 mEq/L 100 102 106   CO2 20 - 32 mmol/L - - -   CO2 (external) 19 - 29 mEq/L 24 27 22   BUN 7 - 30 mg/dL - - -   BUN (external) 5 - 24 mg/dL 17 15 14   Cr 0.66 - 1.25 mg/dL - - -   Cr (external) 0.70 - 1.20 mg/dL 1.34(H) 1.24(H) 1.11   Glucose 70 - 99 mg/dL - - -   Glucose (external) 70 - 99 mg/dL 151(H) 214(H) 107(H)   Ca  8.5 - 10.1 mg/dL - - -   Ca (external) 8.4 - 10.5 mg/dL 9.3 9.5 9.2   Mg 1.6 - 2.3 mg/dL - - -   Mg (external) 1.8 - 2.7 mg/dL - - -     Bone Health Latest Ref Rng & Units 3/31/2020 2/3/2020 1/27/2020   Phos 2.5 - 4.5 mg/dL - - -   Phos (external) 2.5 - 4.6  mg/dL 3.3 3.5 3.3   PTHi 18 - 80 pg/mL - - -   Vit D Def 20 - 75 ug/L - - -     Heme Latest Ref Rng & Units 10/2/2020 9/3/2020 8/7/2020   WBC 4.0 - 11.0 10e9/L - - -   WBC (external) 3.2 - 11.0 10*9/L 5.9 6.4 6.6   Hgb 13.3 - 17.7 g/dL - - -   Hgb (external) 12.9 - 16.9 g/dL 13.1 13.2 13.1   Plt 150 - 450 10e9/L - - -   Plt (external) 130 - 375 10*9/L 193 194 208   ABSOLUTE NEUTROPHIL 1.6 - 8.3 10e9/L - - -   ABSOLUTE NEUTROPHILS (EXTERNAL) 1.9 - 8.1 x10:3/uL - - -   ABSOLUTE LYMPHOCYTES 0.8 - 5.3 10e9/L - - -   ABSOLUTE LYMPHOCYTES (EXTERNAL) 1.0 - 3.9 x10:3/uL - - -   ABSOLUTE MONOCYTES 0.0 - 1.3 10e9/L - - -   ABSOLUTE MONOCYTES (EXTERNAL) 0.1 - 0.9 x10:3/uL - - -   ABSOLUTE EOSINOPHILS 0.0 - 0.7 10e9/L - - -   ABSOLUTE EOSINOPHILS (EXTERNAL) 0.0 - 0.5 x10:3/uL - - -   ABSOLUTE BASOPHILS 0.0 - 0.2 10e9/L - - -   ABSOLUTE BASOPHILS (EXTERNAL) 0.0 - 0.2 x10:3/uL - - -   ABS IMMATURE GRANULOCYTES 0 - 0.4 10e9/L - - -   ABSOLUTE NUCLEATED RBC - - - -     Liver Latest Ref Rng & Units 9/26/2019 8/6/2019 1/7/2019   AP 40 - 150 U/L 100 - 157(H)   TBili 0.2 - 1.3 mg/dL 0.2 - 0.2   ALT 0 - 70 U/L 42 - 46   AST 0 - 45 U/L 16 - 15   Tot Protein 6.8 - 8.8 g/dL 7.5 - 7.9   Tot Protein (external) 5.7 - 8.2 g/dL - 6.6 -   Albumin 3.4 - 5.0 g/dL 3.5 - 3.5   Albumin (external) 3.2 - 4.8 g/dL - 4.4 -     Pancreas Latest Ref Rng & Units 7/16/2020 2/7/2020 9/26/2019   A1C 0 - 5.6 % 8.1(H) 8.2(H) 8.9(H)     Iron studies Latest Ref Rng & Units 9/26/2019 8/6/2019   Iron 35 - 180 ug/dL 70 -   Iron sat 15 - 46 % 28 -   Ferritin 26 - 388 ng/mL 753(H) -   Ferritin (external) 22 - 322 ng/mL - 578(H)     [facility] Txp Virology Latest Ref Rng & Units 7/8/2020 3/31/2020 2/3/2020   BK Spec - Plasma Plasma Plasma   BK Res BKNEG:BK Virus DNA Not Detected copies/mL BK Virus DNA Not Detected BK Virus DNA Not Detected BK Virus DNA Not Detected   BK Log <2.7 Log copies/mL Not Calculated Not Calculated Not Calculated   EBV CAPSID ANTIBODY IGG 0.0 - 0.8 AI -  - -   Hep B Core NR:Nonreactive - - -   Hep B Surf Ext  Multiple values view image mIU/mL - - -   HIV 1&2 Ext Nonreactive - - -        Recent Labs   Lab Test 11/25/19  0843 12/09/19  1002 02/07/20  0858   DOSTAC 2,020 2130,12/8/19 2/6/20 2105   TACROL 10.5 10.4 9.2     Recent Labs   Lab Test 10/07/19  0742 11/21/19  0813   DOSMPA Not Provided 11/20/19 1800   MPACID 0.82* 0.91*   MPAG 14.1* 39.5     Debi Bravo MD   Transplant Nephrology Fellow  11/13/2020 at 10:28 AM      Jabier Hernández MD

## 2020-11-15 DIAGNOSIS — N18.6 ESRD (END STAGE RENAL DISEASE) ON DIALYSIS (H): ICD-10-CM

## 2020-11-15 DIAGNOSIS — Z94.0 KIDNEY TRANSPLANTED: Primary | ICD-10-CM

## 2020-11-15 DIAGNOSIS — Z99.2 ESRD (END STAGE RENAL DISEASE) ON DIALYSIS (H): ICD-10-CM

## 2020-11-15 NOTE — TELEPHONE ENCOUNTER
Jabier Hernández MD Huepfel, Sabine ALDANA RN             Patient needs his 12 month labs.     He recently had shingles, so I am decreasing mycophenolate mofetil to 750 mg every 12 hours.  He should have tacrolimus levels at 4-6 now.     We will prescribe a month of gabapentin, but he needs to follow up with PCP for that and can taper off over time.        Task    Please review current tacrolimus  Level  2.0 mg twice per day   Confirm accurate 12 hour trough level   If the above is accurate   Lower tacrolimus  1.5 mg twice per day   Repeat transplant  Labs in 2 weeks     Task 2    IN 2 weeks obtain one year lab per Dr Hernández request    Including  Donor Specific Antibodies  (confirm Kit )     Task 3   Send COVID-19 letter (  Employer letter to accomodate  )

## 2020-11-16 RX ORDER — TACROLIMUS 0.5 MG/1
0.5 CAPSULE ORAL 2 TIMES DAILY
Qty: 60 CAPSULE | Refills: 11 | Status: SHIPPED | OUTPATIENT
Start: 2020-11-16 | End: 2021-06-22

## 2020-11-16 RX ORDER — TACROLIMUS 1 MG/1
1 CAPSULE ORAL 2 TIMES DAILY
Qty: 60 CAPSULE | Refills: 11 | Status: SHIPPED | OUTPATIENT
Start: 2020-11-16 | End: 2021-06-22

## 2020-11-16 NOTE — TELEPHONE ENCOUNTER
Spoke to patient regarding:  Current tacrolimus dose = 2.0 mg twice per day   Confirmed accurate 12 hour trough level  Patient verbalizes understanding to lower tacrolimus dose to 1.5 mg twice per day and repeat transplant labs in 2 weeks including Donor Specific Antibodies Confirmed that patient has ALA Kit.   Sent COVID-19 letter (  Employer letter to accomodate  )

## 2020-11-24 ENCOUNTER — TELEPHONE (OUTPATIENT)
Dept: TRANSPLANT | Facility: CLINIC | Age: 40
End: 2020-11-24

## 2020-11-24 NOTE — TELEPHONE ENCOUNTER
Patient Call: has gained over 5 pounds since his virtual visit a week ago   Also stated he is constipated    Had some med changes and had to get another brand of laxative  Call back needed? Yes    Return Call Needed  Same as documented in contacts section  When to return call?: Same day: [facility] [employer]

## 2020-11-25 NOTE — TELEPHONE ENCOUNTER
Spoke to Rudi    Followed up with Dr Hernández  Last week    Asked if gabapentin may cause weight gain _ reviewed no   Reviewed  To   salty foods   Plus  constipation

## 2020-12-01 DIAGNOSIS — Z94.0 KIDNEY REPLACED BY TRANSPLANT: ICD-10-CM

## 2020-12-01 DIAGNOSIS — E10.22 TYPE 1 DIABETES MELLITUS WITH CHRONIC KIDNEY DISEASE ON CHRONIC DIALYSIS (H): Primary | ICD-10-CM

## 2020-12-01 DIAGNOSIS — Z99.2 TYPE 1 DIABETES MELLITUS WITH CHRONIC KIDNEY DISEASE ON CHRONIC DIALYSIS (H): Primary | ICD-10-CM

## 2020-12-01 DIAGNOSIS — N18.6 TYPE 1 DIABETES MELLITUS WITH CHRONIC KIDNEY DISEASE ON CHRONIC DIALYSIS (H): Primary | ICD-10-CM

## 2020-12-02 ENCOUNTER — TELEPHONE (OUTPATIENT)
Dept: ENDOCRINOLOGY | Facility: CLINIC | Age: 40
End: 2020-12-02

## 2020-12-02 NOTE — TELEPHONE ENCOUNTER
----- Message from Kymberly Boswell PA-C sent at 12/1/2020 11:47 AM CST -----  Pt requesting that we change his pharmacy to [facility] online for all RXs.  Thanks debora

## 2020-12-02 NOTE — PROGRESS NOTES
"dm 1  Jacqueline Frazier, [facility]    Outcome for 12/02/20 11:49 AM :Glucose sent via Email    Michael Amin is a 40 year old male who is being evaluated via a billable video visit.      The patient has been notified of following:     \"This video visit will be conducted via a call between you and your physician/provider. We have found that certain health care needs can be provided without the need for an in-person physical exam.  This service lets us provide the care you need with a video conversation.  If a prescription is necessary we can send it directly to your pharmacy.  If lab work is needed we can place an order for that and you can then stop by our lab to have the test done at a later time.    Video visits are billed at different rates depending on your insurance coverage.  Please reach out to your insurance provider with any questions.    If during the course of the call the physician/provider feels a video visit is not appropriate, you will not be charged for this service.\"    Patient has given verbal consent for Video visit? Yes  How would you like to obtain your AVS? MyChart  If you are dropped from the video visit, the video invite should be resent to: Send to e-mail at: [email or web address]  Will anyone else be joining your video visit? No    Video-Visit Details    Type of service:  Video Visit    Due to the COVID 19 pandemic this visit was converted to a video visit in order to help prevent spread of infection in this patient and the general population.    Time of start: 12:00 pm  Time of end: 12:29 pm  Total duration of video visit: 29 minutes.    ELIZABETH Amin ( Rudi ) Michael is a 40 year old male with type 1 diabetes mellitus.  Video visit today for diabetes follow up.  Pt has hx of type 1 diabetes mellitus dx at age 16.  His diabetes is complicated by nephropathy and he underwent a kidney transplant on 10/1/2019.  His last Oph exam showed possible early retinopathy.  He has no peripheral neuroapthy.  His hx " is also significant for HTN, dyslipidemia and obesity.  For his diabetes, he is currently using a Medtronic 670G insulin pump and Guardian3 sensor.  His basal insulin rates are set at:  Midnight= 2.0 units/hr.  4 am = 1.8 units/hr.  9 am = 1.7 units/hr.  1 pm = 1.8 units/hr.  20:00 = 2.0 units/hr.  I/C ratio is 1:8 with meals.   Pt's A1C was 7.5 % on 10/2/2020. His  A1C was 8.1 % in 7/2020.  I reviewed his insulin pump download data today.  His average glucose is 170 with SD 56.    His glucose is in target 81 % of the time, above target 18 % of the time and below target 1 % of the time.  Rudi eats breakfast around 7:45 am and often does not eat lunch until 12:20 pm.  He states he feels his blood sugar is low around 12:15 pm.  He has also had higher postprandial bs following lunch and dinner.  On ROS today, he was dx having shingles in early Oct 2020.  He has gained some weight and has not been going to the gym due to COVID19 pandemic.  Pt denies frequent headaches, blurred vision, n/v, SOB at rest, cough, fever, chills, chest pain, abd pain, diarrhea, dysuria, hematuria or foot ulcers.  Rudi denies numbness, tingling or pain in his feet or hands.    Diabetes Care  Retinopathy: he was seen by Oph in Feb 2019- possible early retinopathy. Reminded him to schedule his annual eye exam.  Nephropathy:hx of ESRD s/p kidney transplant on 10/23392.   Neuropathy: none.  Foot Exam:no exam today.  Taking aspirin: yes  Lipids: LDL 59 in 5/2020. Pt is taking Lipitor.  CAD: no.  Mental health:denies depression.  Insulin:Medtronic 670G insulin pump with Guardian3 sensor.    ROS  Please see under HPI.    Allergies  Allergies   Allergen Reactions     Thymoglobulin      Had reaction with rigors after steroid-free dose. No reaction with steroids.       Medications  Current Outpatient Medications   Medication Sig Dispense Refill     alcohol swab prep pads Use to swab area of injection/zak as directed. 100 each 3     aspirin (ASA) 81 MG  "EC tablet Take 81 mg by mouth daily        atorvastatin (LIPITOR) 10 MG tablet Take 1 tablet (10 mg) by mouth daily 90 tablet 3     blood glucose monitoring (ACCU-CHEK FASTCLIX) lancets U QID OR MORE OFTEN PRN  1     carvedilol (COREG) 12.5 MG tablet Take 1 tablet (12.5 mg) by mouth 2 times daily (with meals) 180 tablet 3     gabapentin (NEURONTIN) 100 MG capsule Take 1 capsule (100 mg) by mouth 3 times daily 90 capsule 0     HUMALOG 100 UNIT/ML injection Insulin used for filling patient's pump  3     insulin lispro (HUMALOG VIAL) 100 UNIT/ML vial Inject 100 Units Subcutaneous daily With Insulin pump. Up to 100 units 30 mL 11     insulin pen needle (ULTICARE MICRO) 32G X 4 MM miscellaneous Use pen needles daily or as directed. 100 each 3     Insulin Syringe-Needle U-100 31G X 1/4\" 1 ML MISC 1 Syringe as needed (until new insuline pump arrives) 50 each 1     magnesium oxide (MAG-OX) 400 MG tablet Take 1 tablet (400 mg) by mouth daily (with lunch) 30 tablet 5     mycophenolate (GENERIC EQUIVALENT) 250 MG capsule Take 750 mg by mouth 2 times daily 300 capsule 11     senna-docusate (SENOKOT-S/PERICOLACE) 8.6-50 MG tablet Take 2 tablets by mouth 2 times daily 20 tablet 0     sulfamethoxazole-trimethoprim (BACTRIM/SEPTRA) 400-80 MG tablet Take 1 tablet by mouth daily 30 tablet 11     tacrolimus (GENERIC EQUIVALENT) 0.5 MG capsule Take 1 capsule (0.5 mg) by mouth 2 times daily Total dose = 1.5 mg BID 60 capsule 11     tacrolimus (GENERIC EQUIVALENT) 1 MG capsule Take 1 capsule (1 mg) by mouth 2 times daily Total dose = 1.5 mg BID 60 capsule 11     terbinafine (LAMISIL) 1 % external cream Apply topically 2 times daily To rash on left hand 42 g 11       Family History  family history includes Coronary Artery Disease in his father; Diabetes in his father; Skin Cancer in his paternal uncle.    Social History   reports that he has never smoked. He has never used smokeless tobacco. He reports previous alcohol use. He reports " that he does not use drugs.   He is a teacher in [place], [place].    Past Medical History  Past Medical History:   Diagnosis Date     Anemia in chronic kidney disease      Dyslipidemia      ESRD (end stage renal disease) on dialysis (H)      Hypertension      Hypomagnesemia 10/7/2019     Secondary hyperparathyroidism (H)      Type 1 diabetes (H)        Past Surgical History:   Procedure Laterality Date     hair implant  2007     INSERT CATHETER PERITONEAL DIALYSIS  08/2018     IR FINE NEEDLE ASPIRATION W ULTRASOUND  11/25/2019     IR RENAL BIOPSY LEFT  11/25/2019     PERCUTANEOUS BIOPSY KIDNEY Left 10/22/2019    Procedure: Left Kidney Biopsy;  Surgeon: Kash Hoffman MD;  Location: [facility] OR       Physical Exam    No exam today.    RESULTS  Creatinine   Date Value Ref Range Status   07/16/2020 1.00 0.66 - 1.25 mg/dL Final     GFR Estimate   Date Value Ref Range Status   07/16/2020 >90 >60 mL/min/[1.73_m2] Final     Comment:     Non  GFR Calc  Starting 12/18/2018, serum creatinine based estimated GFR (eGFR) will be   calculated using the Chronic Kidney Disease Epidemiology Collaboration   (CKD-EPI) equation.       Hemoglobin A1C   Date Value Ref Range Status   07/16/2020 8.1 (H) 0 - 5.6 % Final     Comment:     Normal <5.7% Prediabetes 5.7-6.4%  Diabetes 6.5% or higher - adopted from ADA   consensus guidelines.       Potassium   Date Value Ref Range Status   07/16/2020 3.9 3.4 - 5.3 mmol/L Final     ALT   Date Value Ref Range Status   09/26/2019 42 0 - 70 U/L Final     AST   Date Value Ref Range Status   09/26/2019 16 0 - 45 U/L Final     A1C   7.5     10/2/2020-outside lab  A1C    8.1     7/16/2020  A1C   8.2      2/7/2020   A1C   8.9      9/26/2019    ASSESSMENT/PLAN:    1.  TYPE 1 DIABETES MELLITUS: Rudi has been having low blood sugars around noon - 12:20 in the afternoon. His last bolus is at breakfast time around 7:45 -8:00 am.  I asked him to decrease his 9 am basal insulin rate 1.65 units/hr( was  1.70 units/hr ).  I also had him change his I/C ratio at  11:30 am to  1:6.5 ( 1:8 )  and 6 pm I:C 1:6.5 ( was 1:8 ).  He will notify me if these insulin pump changes are not helpful.  Pt instructed to schedule an annual Oph exam.  He denies any sx of neuropathy or foot ulcers or sores at this time.  Pt's TSH was normal in March 2020.  Reminded him to get the flu vaccine this season.    2.  HX OF ESRD: S/P kidney transplant on 10/1/019 doing well.  Most recent creat was 1.34  with GFR 59 mL/min on 10/2/2020.    3. OBESITY: He has met with our dietitian for weight loss support.    4.  HTN: No vital today.Continue current RX.    5.  HYPERLIPIDEMIA: LDL 59 in 5/2020.  Pt is taking Lipitor daily.    6.  FOLLOW UP : with me in 3 months.  Pt instructed to call me or send a MyChart note if he has any questions regarding his diabetes care.

## 2020-12-02 NOTE — PATIENT INSTRUCTIONS
We appreciate your assistance in coordinating your healthcare.     Please upload your insulin pump, blood sugar meter and/or continuous glucose monitor at home 1-2 days before your next diabetes-related appointment.   This will allow your provider to review your  data before your scheduled virtual visit.    To ask a question to your Endocrine care team, please send them a [employer] message, or reach them by phone at [phone]     To expedite your medication refill(s), please contact your pharmacy and have them   fax a refill request to: [phone].  *Please allow 3 business days for routine medication refills.  *Please allow 5 business days for controlled substance medication refills.    For after-hours urgent Endocrine issues, that do not require 141, please dial [phone], and ask to speak with the Endocrinologist On-Call

## 2020-12-02 NOTE — TELEPHONE ENCOUNTER
Rudi needed to find out which [facility] or what Insurance states he needs to use for pharmacy . HE is to call back with information. Chani Metz RN on 12/2/2020 at 3:50 PM

## 2020-12-03 ENCOUNTER — VIRTUAL VISIT (OUTPATIENT)
Dept: ENDOCRINOLOGY | Facility: CLINIC | Age: 40
End: 2020-12-03
Payer: COMMERCIAL

## 2020-12-03 DIAGNOSIS — E10.22 TYPE 1 DIABETES MELLITUS WITH CHRONIC KIDNEY DISEASE ON CHRONIC DIALYSIS (H): Primary | ICD-10-CM

## 2020-12-03 DIAGNOSIS — N18.6 TYPE 1 DIABETES MELLITUS WITH CHRONIC KIDNEY DISEASE ON CHRONIC DIALYSIS (H): Primary | ICD-10-CM

## 2020-12-03 DIAGNOSIS — Z99.2 TYPE 1 DIABETES MELLITUS WITH CHRONIC KIDNEY DISEASE ON CHRONIC DIALYSIS (H): Primary | ICD-10-CM

## 2020-12-03 PROCEDURE — 99214 OFFICE O/P EST MOD 30 MIN: CPT | Mod: 95 | Performed by: PHYSICIAN ASSISTANT

## 2020-12-03 NOTE — LETTER
"12/3/2020       RE: Michael Amin  [address]  [facility] [address]     Dear Colleague,    Thank you for referring your patient, Michael Amin, to the [facility] ENDOCRINOLOGY CLINIC [place] [facility]. Please see a copy of my visit note below.    dm 1  Jacqueline Frazier, EMERALD    Outcome for 12/02/20 11:49 AM :Glucose sent via Email    Michael Amin is a 40 year old male who is being evaluated via a billable video visit.      The patient has been notified of following:     \"This video visit will be conducted via a call between you and your physician/provider. We have found that certain health care needs can be provided without the need for an in-person physical exam.  This service lets us provide the care you need with a video conversation.  If a prescription is necessary we can send it directly to your pharmacy.  If lab work is needed we can place an order for that and you can then stop by our lab to have the test done at a later time.    Video visits are billed at different rates depending on your insurance coverage.  Please reach out to your insurance provider with any questions.    If during the course of the call the physician/provider feels a video visit is not appropriate, you will not be charged for this service.\"    Patient has given verbal consent for Video visit? Yes  How would you like to obtain your AVS? MyChart  If you are dropped from the video visit, the video invite should be resent to: Send to e-mail at: [email or web address]  Will anyone else be joining your video visit? No    Video-Visit Details    Type of service:  Video Visit    Due to the COVID 19 pandemic this visit was converted to a video visit in order to help prevent spread of infection in this patient and the general population.    Time of start: 12:00 pm  Time of end: 12:29 pm  Total duration of video visit: 29 minutes.    ELIZABETH Farrischie  Michael is a 40 year old " male with type 1 diabetes mellitus.  Video visit today for diabetes follow up.  Pt has hx of type 1 diabetes mellitus dx at age 16.  His diabetes is complicated by nephropathy and he underwent a kidney transplant on 10/1/2019.  His last Oph exam showed possible early retinopathy.  He has no peripheral neuroapthy.  His hx is also significant for HTN, dyslipidemia and obesity.  For his diabetes, he is currently using a Medtronic 670G insulin pump and Guardian3 sensor.  His basal insulin rates are set at:  Midnight= 2.0 units/hr.  4 am = 1.8 units/hr.  9 am = 1.7 units/hr.  1 pm = 1.8 units/hr.  20:00 = 2.0 units/hr.  I/C ratio is 1:8 with meals.   Pt's A1C was 7.5 % on 10/2/2020. His  A1C was 8.1 % in 7/2020.  I reviewed his insulin pump download data today.  His average glucose is 170 with SD 56.    His glucose is in target 81 % of the time, above target 18 % of the time and below target 1 % of the time.  Rudi eats breakfast around 7:45 am and often does not eat lunch until 12:20 pm.  He states he feels his blood sugar is low around 12:15 pm.  He has also had higher postprandial bs following lunch and dinner.  On ROS today, he was dx having shingles in early Oct 2020.  He has gained some weight and has not been going to the gym due to COVID19 pandemic.  Pt denies frequent headaches, blurred vision, n/v, SOB at rest, cough, fever, chills, chest pain, abd pain, diarrhea, dysuria, hematuria or foot ulcers.  Rudi denies numbness, tingling or pain in his feet or hands.    Diabetes Care  Retinopathy: he was seen by Oph in Feb 2019- possible early retinopathy. Reminded him to schedule his annual eye exam.  Nephropathy:hx of ESRD s/p kidney transplant on 10/18941.   Neuropathy: none.  Foot Exam:no exam today.  Taking aspirin: yes  Lipids: LDL 59 in 5/2020. Pt is taking Lipitor.  CAD: no.  Mental health:denies depression.  Insulin:Medtronic 670G insulin pump with Guardian3 sensor.    ROS  Please see under  "HPI.    Allergies  Allergies   Allergen Reactions     Thymoglobulin      Had reaction with rigors after steroid-free dose. No reaction with steroids.       Medications  Current Outpatient Medications   Medication Sig Dispense Refill     alcohol swab prep pads Use to swab area of injection/zak as directed. 100 each 3     aspirin (ASA) 81 MG EC tablet Take 81 mg by mouth daily        atorvastatin (LIPITOR) 10 MG tablet Take 1 tablet (10 mg) by mouth daily 90 tablet 3     blood glucose monitoring (ACCU-CHEK FASTCLIX) lancets U QID OR MORE OFTEN PRN  1     carvedilol (COREG) 12.5 MG tablet Take 1 tablet (12.5 mg) by mouth 2 times daily (with meals) 180 tablet 3     gabapentin (NEURONTIN) 100 MG capsule Take 1 capsule (100 mg) by mouth 3 times daily 90 capsule 0     HUMALOG 100 UNIT/ML injection Insulin used for filling patient's pump  3     insulin lispro (HUMALOG VIAL) 100 UNIT/ML vial Inject 100 Units Subcutaneous daily With Insulin pump. Up to 100 units 30 mL 11     insulin pen needle (ULTICARE MICRO) 32G X 4 MM miscellaneous Use pen needles daily or as directed. 100 each 3     Insulin Syringe-Needle U-100 31G X 1/4\" 1 ML MISC 1 Syringe as needed (until new insuline pump arrives) 50 each 1     magnesium oxide (MAG-OX) 400 MG tablet Take 1 tablet (400 mg) by mouth daily (with lunch) 30 tablet 5     mycophenolate (GENERIC EQUIVALENT) 250 MG capsule Take 750 mg by mouth 2 times daily 300 capsule 11     senna-docusate (SENOKOT-S/PERICOLACE) 8.6-50 MG tablet Take 2 tablets by mouth 2 times daily 20 tablet 0     sulfamethoxazole-trimethoprim (BACTRIM/SEPTRA) 400-80 MG tablet Take 1 tablet by mouth daily 30 tablet 11     tacrolimus (GENERIC EQUIVALENT) 0.5 MG capsule Take 1 capsule (0.5 mg) by mouth 2 times daily Total dose = 1.5 mg BID 60 capsule 11     tacrolimus (GENERIC EQUIVALENT) 1 MG capsule Take 1 capsule (1 mg) by mouth 2 times daily Total dose = 1.5 mg BID 60 capsule 11     terbinafine (LAMISIL) 1 % external " cream Apply topically 2 times daily To rash on left hand 42 g 11       Family History  family history includes Coronary Artery Disease in his father; Diabetes in his father; Skin Cancer in his paternal uncle.    Social History   reports that he has never smoked. He has never used smokeless tobacco. He reports previous alcohol use. He reports that he does not use drugs.   He is a teacher in [place], [place].    Past Medical History  Past Medical History:   Diagnosis Date     Anemia in chronic kidney disease      Dyslipidemia      ESRD (end stage renal disease) on dialysis (H)      Hypertension      Hypomagnesemia 10/7/2019     Secondary hyperparathyroidism (H)      Type 1 diabetes (H)        Past Surgical History:   Procedure Laterality Date     hair implant  2007     INSERT CATHETER PERITONEAL DIALYSIS  08/2018     IR FINE NEEDLE ASPIRATION W ULTRASOUND  11/25/2019     IR RENAL BIOPSY LEFT  11/25/2019     PERCUTANEOUS BIOPSY KIDNEY Left 10/22/2019    Procedure: Left Kidney Biopsy;  Surgeon: Kash Hoffman MD;  Location: [facility] OR       Physical Exam    No exam today.    RESULTS  Creatinine   Date Value Ref Range Status   07/16/2020 1.00 0.66 - 1.25 mg/dL Final     GFR Estimate   Date Value Ref Range Status   07/16/2020 >90 >60 mL/min/[1.73_m2] Final     Comment:     Non  GFR Calc  Starting 12/18/2018, serum creatinine based estimated GFR (eGFR) will be   calculated using the Chronic Kidney Disease Epidemiology Collaboration   (CKD-EPI) equation.       Hemoglobin A1C   Date Value Ref Range Status   07/16/2020 8.1 (H) 0 - 5.6 % Final     Comment:     Normal <5.7% Prediabetes 5.7-6.4%  Diabetes 6.5% or higher - adopted from ADA   consensus guidelines.       Potassium   Date Value Ref Range Status   07/16/2020 3.9 3.4 - 5.3 mmol/L Final     ALT   Date Value Ref Range Status   09/26/2019 42 0 - 70 U/L Final     AST   Date Value Ref Range Status   09/26/2019 16 0 - 45 U/L Final     A1C   7.5      10/2/2020-outside lab  A1C    8.1     7/16/2020  A1C   8.2      2/7/2020   A1C   8.9      9/26/2019    ASSESSMENT/PLAN:    1.  TYPE 1 DIABETES MELLITUS: Rudi has been having low blood sugars around noon - 12:20 in the afternoon. His last bolus is at breakfast time around 7:45 -8:00 am.  I asked him to decrease his 9 am basal insulin rate 1.65 units/hr( was 1.70 units/hr ).  I also had him change his I/C ratio at  11:30 am to  1:6.5 ( 1:8 )  and 6 pm I:C 1:6.5 ( was 1:8 ).  He will notify me if these insulin pump changes are not helpful.  Pt instructed to schedule an annual Oph exam.  He denies any sx of neuropathy or foot ulcers or sores at this time.  Pt's TSH was normal in March 2020.  Reminded him to get the flu vaccine this season.    2.  HX OF ESRD: S/P kidney transplant on 10/1/019 doing well.  Most recent creat was 1.34  with GFR 59 mL/min on 10/2/2020.    3. OBESITY: He has met with our dietitian for weight loss support.    4.  HTN: No vital today.Continue current RX.    5.  HYPERLIPIDEMIA: LDL 59 in 5/2020.  Pt is taking Lipitor daily.    6.  FOLLOW UP : with me in 3 months.  Pt instructed to call me or send a MyChart note if he has any questions regarding his diabetes care.    Again, thank you for allowing me to participate in the care of your patient.      Sincerely,    Kymberly Boswell PA-C

## 2020-12-04 ENCOUNTER — RESULTS ONLY (OUTPATIENT)
Dept: OTHER | Facility: CLINIC | Age: 40
End: 2020-12-04

## 2020-12-04 ENCOUNTER — TELEPHONE (OUTPATIENT)
Dept: ENDOCRINOLOGY | Facility: CLINIC | Age: 40
End: 2020-12-04

## 2020-12-04 DIAGNOSIS — Z48.298 AFTERCARE FOLLOWING ORGAN TRANSPLANT: ICD-10-CM

## 2020-12-04 DIAGNOSIS — Z94.0 KIDNEY REPLACED BY TRANSPLANT: ICD-10-CM

## 2020-12-04 DIAGNOSIS — Z79.899 ENCOUNTER FOR LONG-TERM CURRENT USE OF MEDICATION: ICD-10-CM

## 2020-12-04 PROCEDURE — 86832 HLA CLASS I HIGH DEFIN QUAL: CPT | Performed by: TRANSPLANT SURGERY

## 2020-12-04 PROCEDURE — 86833 HLA CLASS II HIGH DEFIN QUAL: CPT | Performed by: TRANSPLANT SURGERY

## 2020-12-04 NOTE — TELEPHONE ENCOUNTER
[facility] Call Center    Phone Message    May a detailed message be left on voicemail: yes     Reason for Call:     Phong from [employer] requesting chart notes for 12/03/20 appt along with c peptide and fasting glucose lab results. Phong will be faxing over request to the clinic today.     Action Taken: Message routed to:  [facility] endo    Travel Screening: Not Applicable

## 2020-12-07 NOTE — TELEPHONE ENCOUNTER
[facility] Call Center    Phone Message    May a detailed message be left on voicemail: yes     Reason for Call: Other: Pt is following up on the status of his clinic notes from his last visit getting faxed to [employer] for his infusion sets for his pump.  Pt states he has a low supply.  Pt provided a fax of [phone] to fax clinic notes.  Pt would like a follow up call with the status.  Thank you.      Action Taken: Message routed to:  [facility] ENDO    Travel Screening: Not Applicable

## 2020-12-09 NOTE — TELEPHONE ENCOUNTER
[employer] calling stating that the results for c peptide and fasting glucose was not included in recent notes faxed, Please fax requested information to [phone] thank you.

## 2020-12-10 ENCOUNTER — DOCUMENTATION ONLY (OUTPATIENT)
Dept: ENDOCRINOLOGY | Facility: CLINIC | Age: 40
End: 2020-12-10

## 2020-12-11 ENCOUNTER — TELEPHONE (OUTPATIENT)
Dept: ENDOCRINOLOGY | Facility: CLINIC | Age: 40
End: 2020-12-11

## 2020-12-11 NOTE — TELEPHONE ENCOUNTER
[facility] Call Center    Phone Message    May a detailed message be left on voicemail: no     Reason for Call: Other: .  diabetes supplies only received the cpeptide for this pt but they need glucose or blood sugar which must be drawn fasting and concurrently with cpeptide.    Action Taken: Message routed to:  [facility] endocrin    Travel Screening: Not Applicable

## 2020-12-12 DIAGNOSIS — I15.1 HTN, KIDNEY TRANSPLANT RELATED: ICD-10-CM

## 2020-12-12 DIAGNOSIS — Z94.0 HTN, KIDNEY TRANSPLANT RELATED: ICD-10-CM

## 2020-12-14 RX ORDER — CARVEDILOL 12.5 MG/1
TABLET ORAL
Qty: 180 TABLET | Refills: 1 | Status: SHIPPED | OUTPATIENT
Start: 2020-12-14 | End: 2021-06-21

## 2020-12-17 DIAGNOSIS — E10.65 TYPE 1 DIABETES MELLITUS WITH HYPERGLYCEMIA (H): Primary | ICD-10-CM

## 2020-12-17 DIAGNOSIS — Z94.0 KIDNEY REPLACED BY TRANSPLANT: ICD-10-CM

## 2020-12-19 DIAGNOSIS — Z94.0 KIDNEY REPLACED BY TRANSPLANT: Primary | ICD-10-CM

## 2020-12-19 NOTE — TELEPHONE ENCOUNTER
"Please send mycophenolate Rx asap- Rx from 10/20 was marked \"historical\" and was never sent to pharmacy    Thank you!  [facility]    "

## 2020-12-21 RX ORDER — MYCOPHENOLATE MOFETIL 250 MG/1
750 CAPSULE ORAL 2 TIMES DAILY
Qty: 180 CAPSULE | Refills: 11 | Status: SHIPPED | OUTPATIENT
Start: 2020-12-21 | End: 2021-12-20

## 2021-01-03 ENCOUNTER — HEALTH MAINTENANCE LETTER (OUTPATIENT)
Age: 41
End: 2021-01-03

## 2021-01-19 ENCOUNTER — TELEPHONE (OUTPATIENT)
Dept: TRANSPLANT | Facility: CLINIC | Age: 41
End: 2021-01-19

## 2021-01-19 ENCOUNTER — MYC MEDICAL ADVICE (OUTPATIENT)
Dept: TRANSPLANT | Facility: CLINIC | Age: 41
End: 2021-01-19

## 2021-01-19 NOTE — TELEPHONE ENCOUNTER
Rudi     You  are slightly overdue  for your monthly transplant  labs         If you  have completed your labs  please have your local lab fax   Fax  [phone]      If you are need of a new lab order please indicate        Please respond to this Epic message  -   Or call transplant office [phone] with further  questions ask for Sabine Yeea      This is a non urgent message - if you  have not obtained labs  Please try to obtain transplant  Labs in the next week        Thanks   Sabine

## 2021-02-01 ENCOUNTER — TELEPHONE (OUTPATIENT)
Dept: TRANSPLANT | Facility: CLINIC | Age: 41
End: 2021-02-01

## 2021-02-01 NOTE — TELEPHONE ENCOUNTER
Spoke to patient regarding:  Elevated serum creatinine and recommend good hydration and repeat labs      Patient agrees to rehydrate and repeat BMP  In one week

## 2021-02-01 NOTE — LETTER
PHYSICIAN ORDERS      DATE & TIME ISSUED: 2021 4:12 PM  PATIENT NAME: Michael Amin   : 1980     [facility] MR# [if applicable]: [id number]     DIAGNOSIS:  Kidney Transplant  ICD-10 CODE: Z94.0     Please repeat the following lab in 1 week:  BMP    Any questions please call: [phone]  Please fax lab results to [phone].    .

## 2021-02-01 NOTE — TELEPHONE ENCOUNTER
Jabier Hernández MD Huepfel, Mary K, RN             Elevated serum creatinine and recommend good hydration and repeat labs     Please call Rudi  To rehydrate and repeat BMP  In one week

## 2021-02-08 ENCOUNTER — TELEPHONE (OUTPATIENT)
Dept: TRANSPLANT | Facility: CLINIC | Age: 41
End: 2021-02-08

## 2021-02-08 NOTE — TELEPHONE ENCOUNTER
Issue slight increase creatinine     Please review next phone call  Regarding repeating kidney transplant  Labs

## 2021-02-15 ENCOUNTER — TELEPHONE (OUTPATIENT)
Dept: TRANSPLANT | Facility: CLINIC | Age: 41
End: 2021-02-15

## 2021-02-15 NOTE — TELEPHONE ENCOUNTER
Patient Call: General  Route to LPN    Reason for call: connect with pt regarding next lab draw    Call back needed? Yes    Return Call Needed  Same as documented in contacts section  When to return call?: Greater than one day: Route standard priority

## 2021-02-16 DIAGNOSIS — Z94.0 KIDNEY TRANSPLANTED: Primary | ICD-10-CM

## 2021-02-16 DIAGNOSIS — Z99.2 ESRD (END STAGE RENAL DISEASE) ON DIALYSIS (H): ICD-10-CM

## 2021-02-16 DIAGNOSIS — N18.6 ESRD (END STAGE RENAL DISEASE) ON DIALYSIS (H): ICD-10-CM

## 2021-02-16 RX ORDER — SULFAMETHOXAZOLE AND TRIMETHOPRIM 400; 80 MG/1; MG/1
1 TABLET ORAL DAILY
Qty: 30 TABLET | Refills: 11 | Status: SHIPPED | OUTPATIENT
Start: 2021-02-16 | End: 2022-04-05

## 2021-02-18 NOTE — TELEPHONE ENCOUNTER
Spoke to patient and informed him that monthly labs are recommended at this time.  Patient has lab appointment scheduled for the end of Feb.  Patient also has question, can he have the COVID antibody test completed and can we order it for him?  Patient states that he was ill about 3 weeks ago with a slight cough and sore throat and would like to know if he possibly had COVID.  Please advise.

## 2021-03-07 ENCOUNTER — HEALTH MAINTENANCE LETTER (OUTPATIENT)
Age: 41
End: 2021-03-07

## 2021-04-20 LAB
CELL TYPE ALLO: NORMAL
CHANNELSHIFTALLOB1: -61
CHANNELSHIFTALLOT1: 30
COMMENT ALLOB1: NORMAL
CROSSMATCHDATEALLO: NORMAL
DONOR ALLO: NORMAL
DONORCELLDATE ALLO: NORMAL
POS CUT OFF ALLO B: >101
POS CUT OFF ALLO T: >67
RESULT ALLO B1: NORMAL
RESULT ALLO T1: NORMAL
SERUM DATE ALLO B1: NORMAL
SERUM DATE ALLO T1: NORMAL
TESTMETHODALLO: NORMAL
TREATMENT ALLO B1: NORMAL
TREATMENT ALLO T1: NORMAL

## 2021-04-25 ENCOUNTER — HEALTH MAINTENANCE LETTER (OUTPATIENT)
Age: 41
End: 2021-04-25

## 2021-05-04 ENCOUNTER — TELEPHONE (OUTPATIENT)
Dept: TRANSPLANT | Facility: CLINIC | Age: 41
End: 2021-05-04

## 2021-05-04 NOTE — TELEPHONE ENCOUNTER
Patient Call: General  Route to LPN    Reason for call: connect with pt regarding questions for coordinator     Call back needed? Yes    Return Call Needed  Same as documented in contacts section  When to return call?: Greater than one day: Route standard priority

## 2021-05-05 ENCOUNTER — TELEPHONE (OUTPATIENT)
Dept: ENDOCRINOLOGY | Facility: CLINIC | Age: 41
End: 2021-05-05

## 2021-05-05 NOTE — TELEPHONE ENCOUNTER
[facility] Call Center    Phone Message    May a detailed message be left on voicemail: no     Reason for Call: Other: Lloyd from [employer] requesting call back to get the most recnt c peptide and glucose test results for the pt     Action Taken: Message routed to:  [facility] endo

## 2021-05-10 DIAGNOSIS — Z94.0 KIDNEY TRANSPLANTED: Primary | ICD-10-CM

## 2021-05-11 ENCOUNTER — OFFICE VISIT (OUTPATIENT)
Dept: NEPHROLOGY | Facility: CLINIC | Age: 41
End: 2021-05-11
Attending: INTERNAL MEDICINE
Payer: COMMERCIAL

## 2021-05-11 ENCOUNTER — RESULTS ONLY (OUTPATIENT)
Dept: OTHER | Facility: CLINIC | Age: 41
End: 2021-05-11

## 2021-05-11 VITALS
BODY MASS INDEX: 36.06 KG/M2 | SYSTOLIC BLOOD PRESSURE: 141 MMHG | OXYGEN SATURATION: 97 % | HEART RATE: 84 BPM | TEMPERATURE: 98.7 F | DIASTOLIC BLOOD PRESSURE: 81 MMHG | WEIGHT: 223.4 LBS

## 2021-05-11 DIAGNOSIS — Z48.298 AFTERCARE FOLLOWING ORGAN TRANSPLANT: Primary | ICD-10-CM

## 2021-05-11 DIAGNOSIS — Z12.83 SKIN CANCER SCREENING: ICD-10-CM

## 2021-05-11 DIAGNOSIS — D84.9 IMMUNOSUPPRESSION (H): ICD-10-CM

## 2021-05-11 DIAGNOSIS — Z94.0 HTN, KIDNEY TRANSPLANT RELATED: ICD-10-CM

## 2021-05-11 DIAGNOSIS — Z29.89 NEED FOR PNEUMOCYSTIS PROPHYLAXIS: ICD-10-CM

## 2021-05-11 DIAGNOSIS — Z94.0 KIDNEY TRANSPLANTED: ICD-10-CM

## 2021-05-11 DIAGNOSIS — I15.1 HTN, KIDNEY TRANSPLANT RELATED: ICD-10-CM

## 2021-05-11 LAB
ANION GAP SERPL CALCULATED.3IONS-SCNC: 7 MMOL/L (ref 3–14)
BUN SERPL-MCNC: 18 MG/DL (ref 7–30)
CALCIUM SERPL-MCNC: 9.4 MG/DL (ref 8.5–10.1)
CHLORIDE SERPL-SCNC: 104 MMOL/L (ref 94–109)
CO2 SERPL-SCNC: 28 MMOL/L (ref 20–32)
CREAT SERPL-MCNC: 1.13 MG/DL (ref 0.66–1.25)
CREAT UR-MCNC: 65 MG/DL
ERYTHROCYTE [DISTWIDTH] IN BLOOD BY AUTOMATED COUNT: 12.8 % (ref 10–15)
GFR SERPL CREATININE-BSD FRML MDRD: 80 ML/MIN/{1.73_M2}
GLUCOSE SERPL-MCNC: 146 MG/DL (ref 70–99)
HCT VFR BLD AUTO: 41.6 % (ref 40–53)
HGB BLD-MCNC: 13.5 G/DL (ref 13.3–17.7)
MCH RBC QN AUTO: 27 PG (ref 26.5–33)
MCHC RBC AUTO-ENTMCNC: 32.5 G/DL (ref 31.5–36.5)
MCV RBC AUTO: 83 FL (ref 78–100)
PLATELET # BLD AUTO: 162 10E9/L (ref 150–450)
POTASSIUM SERPL-SCNC: 4.1 MMOL/L (ref 3.4–5.3)
PROT UR-MCNC: 0.07 G/L
PROT/CREAT 24H UR: 0.1 G/G CR (ref 0–0.2)
RBC # BLD AUTO: 5 10E12/L (ref 4.4–5.9)
SODIUM SERPL-SCNC: 139 MMOL/L (ref 133–144)
WBC # BLD AUTO: 6.7 10E9/L (ref 4–11)

## 2021-05-11 PROCEDURE — 86833 HLA CLASS II HIGH DEFIN QUAL: CPT | Mod: 90 | Performed by: PATHOLOGY

## 2021-05-11 PROCEDURE — 86832 HLA CLASS I HIGH DEFIN QUAL: CPT | Mod: 90 | Performed by: PATHOLOGY

## 2021-05-11 PROCEDURE — 99214 OFFICE O/P EST MOD 30 MIN: CPT | Mod: GC | Performed by: INTERNAL MEDICINE

## 2021-05-11 PROCEDURE — 85027 COMPLETE CBC AUTOMATED: CPT | Performed by: PATHOLOGY

## 2021-05-11 PROCEDURE — 80048 BASIC METABOLIC PNL TOTAL CA: CPT | Performed by: PATHOLOGY

## 2021-05-11 PROCEDURE — 84156 ASSAY OF PROTEIN URINE: CPT | Performed by: PATHOLOGY

## 2021-05-11 PROCEDURE — G0463 HOSPITAL OUTPT CLINIC VISIT: HCPCS

## 2021-05-11 PROCEDURE — 80197 ASSAY OF TACROLIMUS: CPT | Mod: 90 | Performed by: PATHOLOGY

## 2021-05-11 PROCEDURE — 87799 DETECT AGENT NOS DNA QUANT: CPT | Mod: 90 | Performed by: PATHOLOGY

## 2021-05-11 PROCEDURE — 36415 COLL VENOUS BLD VENIPUNCTURE: CPT | Performed by: PATHOLOGY

## 2021-05-11 NOTE — LETTER
5/11/2021       RE: Michael Amin  [address]  [facility] [address]     Dear Colleague,    Thank you for referring your patient, Michael Amin, to the [facility] [place] at [facility]. Please see a copy of my visit note below.    CHRONIC TRANSPLANT NEPHROLOGY VISIT    Assessment & Plan      # LDKT: Stable  most recently 1.2 in 9/2020              - Baseline Cr ~ 1.1-1.3 mg/dL               - Proteinuria: Minimal (0.2-0.5 grams)  - unable to calculate due to being low ( Mar/2021)              - Date DSA Last Checked: Dec/2020      Latest DSA: No              - BK Viremia: Mar/2021 - No             - Kidney Tx Biopsy: Nov 25, 2019; Result: Material insufficient for assessment with no glomeruli.                                              Oct 22, 2019; Result: Borderline acute cellular-mediated rejection      # Immunosuppression: Tacrolimus immediate release (goal 4-6) and Mycophenolate mofetil (dose 750 mg every 12 hours)              - Changes: No     # Infection Prophylaxis:   - PJP: Sulfa/TMP (Bactrim)  - CMV: None, prophylaxis completed              - Thrush: None     # HTN   On Coreg 12.5 mg BID . Target /80  Current Home BP :   Weight :   Wt Readings from Last 3 Encounters:   11/13/20 88.8 kg (195 lb 12.8 oz)   07/16/20 93.7 kg (206 lb 9.6 oz)   04/03/20 90.8 kg (200 lb 1.6 oz)     Volume status - euvolemic   Changes - No       # Diabetes: Borderline control (HbA1c 7-9%)           Last HbA1c: 7.5%              - Management as per Endocrinology.              - On insulin pump, he is working on weight management to be listed for pancreas transplant.       # Anemia in Chronic Renal Disease: Hgb: Stable      JOSE: No              - Iron studies: not checked recently     # Mineral Bone Disorder:   - Secondary renal hyperparathyroidism; PTH level: Not checked recently        On treatment: None  - Vitamin D; level: Low         On supplement: Yes cholecalciferol 2,000 international unit(s) daily  - Calcium; level: Normal        On supplement: No  - Phosphorus; level: Not checked recently, but was normal last check        On supplement: No      # Electrolytes:   - Potassium; level: Normal        On supplement: No  - Bicarbonate; level: Normal        On supplement: No  - Sodium; level: Normal     # Overweight: Stable to slightly decreased weight.              - Recommend increased exercise and watch caloric intake.     # Medical Compliance: Yes     # COVID-19 Virus Review: Discussed COVID-19 virus and the potential medical risks.  Reviewed preventative health recommendations, which includes washing hands for 20 seconds, avoid touching your face, and social distancing.  Asked patient to inform the transplant center if they are exposed or diagnosed with this virus   COVID19 vaccine - completed 2 doses of Moderna  - last dose in April 2021    # Skin Cancer Risk:               -  Counseled patient about increased risk of skin cancers while on immunosuppressants. Discussed sun protection and recommend regular follow up with Dermatology. He has established himself with dermatologist  He has a small furuncle which is healing in his back , findings not consistent with shingles. Advised him to monitor . If lesion does not subside in 1- 2 months ,he was advised to follow up with his dermatologist       # Transplant History:  Etiology of Kidney Failure: Diabetes mellitus type 1  Tx: LDKT  Transplant: 10/1/2019 (Kidney)  Donor Type: Living      Donor Class:   Crossmatch at time of Tx: negative  DSA at time of Tx: No  Significant changes in immunosuppression: None  CMV IgG Ab High Risk Discordance (D+/R-): No  EBV IgG Ab High Risk Discordance (D+/R-): No  Significant transplant-related complications: None      Transplant Office Phone Number: [phone]    Assessment and plan was discussed with the patient and he voiced his understanding and  agreement.    Return visit: Return in about 6 months (around 11/11/2021).     Patient seen and discussed with Dr Riki Wilder MD, FACP  Nephrology Fellow   [facility]   Pager [phone]      Tina Howard MD     This patient has been seen and evaluated by me, Indra Roman MD.  I have reviewed the note and agree with plan of care as documented by the fellow.    Indra Roman MD       Chief Complaint   Mr. Amin is a 40 year old here for routine follow up, kidney transplant and immunosuppression management.      History of Present Illness      Patient c/o itchy rash on the back , right where he had shingles   Follows with dermatologist - last visit sept 2020    Patient denies any chest pain or shortness of breath, PND, orthopnea.  No leg swelling.   No wheezing or cough.   No dizziness, lightheadedness.  No focal weakness, altered sensation or confusion  No fever, chills  No abdominal pain nausea vomiting.  No black stools or rectal bleeding.    He does get constipated - so he takes OTC stool softeners helps him   No voiding difficulty.  No hematuria.  No unintentional weight loss.      Home BP : not checked   Weight is going up   Wt Readings from Last 3 Encounters:   05/11/21 101.3 kg (223 lb 6.4 oz)   11/13/20 88.8 kg (195 lb 12.8 oz)   07/16/20 93.7 kg (206 lb 9.6 oz)         Problem List   Patient Active Problem List   Diagnosis     HTN, kidney transplant related     Diabetes mellitus type 1 (H)     Dyslipidemia     Anemia in chronic kidney disease     Secondary renal hyperparathyroidism (H)     Kidney replaced by transplant     Aftercare following organ transplant     Vitamin D deficiency     Hypomagnesemia     Kidney transplant rejection       Allergies   Allergies   Allergen Reactions     Thymoglobulin      Had reaction with rigors after steroid-free dose. No reaction with steroids.       Medications   Current Outpatient Medications   Medication Sig     alcohol swab prep pads  "Use to swab area of injection/zak as directed.     aspirin (ASA) 81 MG EC tablet Take 81 mg by mouth daily      atorvastatin (LIPITOR) 10 MG tablet Take 1 tablet (10 mg) by mouth daily     blood glucose (CONTOUR NEXT TEST) test strip Use to test blood sugar 4 times daily.     blood glucose monitoring (ACCU-CHEK FASTCLIX) lancets U QID OR MORE OFTEN PRN     carvedilol (COREG) 12.5 MG tablet TAKE ONE TABLET BY MOUTH TWICE A DAY WITH MEALS     gabapentin (NEURONTIN) 100 MG capsule Take 1 capsule (100 mg) by mouth 3 times daily     insulin lispro (HUMALOG VIAL) 100 UNIT/ML vial Inject 100 Units Subcutaneous daily With Insulin pump. Up to 100 units     insulin pen needle (ULTICARE MICRO) 32G X 4 MM miscellaneous Use pen needles daily or as directed.     Insulin Syringe-Needle U-100 31G X 1/4\" 1 ML MISC 1 Syringe as needed (until new insuline pump arrives)     magnesium oxide (MAG-OX) 400 MG tablet Take 1 tablet (400 mg) by mouth daily (with lunch)     mycophenolate (GENERIC EQUIVALENT) 250 MG capsule Take 3 capsules (750 mg) by mouth 2 times daily     senna-docusate (SENOKOT-S/PERICOLACE) 8.6-50 MG tablet Take 2 tablets by mouth 2 times daily     sulfamethoxazole-trimethoprim (BACTRIM) 400-80 MG tablet Take 1 tablet by mouth daily     tacrolimus (GENERIC EQUIVALENT) 0.5 MG capsule Take 1 capsule (0.5 mg) by mouth 2 times daily Total dose = 1.5 mg BID     tacrolimus (GENERIC EQUIVALENT) 1 MG capsule Take 1 capsule (1 mg) by mouth 2 times daily Total dose = 1.5 mg BID     terbinafine (LAMISIL) 1 % external cream Apply topically 2 times daily To rash on left hand     No current facility-administered medications for this visit.      There are no discontinued medications.    Physical Exam   Vital Signs: There were no vitals taken for this visit.    GENERAL APPEARANCE: alert and no distress  HENT: mouth without ulcers or lesions  LYMPHATICS: no cervical or supraclavicular nodes  RESP: lungs clear to auscultation - no rales, " rhonchi or wheezes  CV: regular rhythm, normal rate, no rub, no murmur  EDEMA: no LE edema bilaterally  ABDOMEN: soft, nondistended, nontender, bowel sounds normal  MS: extremities normal - no gross deformities noted, no evidence of inflammation in joints, no muscle tenderness  SKIN: no rash. Small furuncle in the mid thoracic back       Data     Renal Latest Ref Rng & Units 3/26/2021 2/26/2021 2/10/2021   Na 133 - 144 mmol/L - - -   Na (external) 134 - 143 mEq/L 137 137 140   K 3.4 - 5.3 mmol/L - - -   K (external) 3.4 - 5.1 mEq/L 4.3 4.4 4.4   Cl 94 - 109 mmol/L - - -   Cl (external) 99 - 110 mEq/L 102 101 100   CO2 20 - 32 mmol/L - - -   CO2 (external) 19 - 29 mEq/L 26 27 29   BUN 7 - 30 mg/dL - - -   BUN (external) 5 - 24 mg/dL 17 21 22   Cr 0.66 - 1.25 mg/dL - - -   Cr (external) 0.70 - 1.20 mg/dL 1.01 1.12 1.16   Glucose 70 - 99 mg/dL - - -   Glucose (external) 70 - 99 mg/dL 125(H) 186(H) 147(H)   Ca  8.5 - 10.1 mg/dL - - -   Ca (external) 8.4 - 10.5 mg/dL 9.1 9.2 9.3   Mg 1.6 - 2.3 mg/dL - - -   Mg (external) 1.8 - 2.7 mg/dL - - -     Bone Health Latest Ref Rng & Units 3/31/2020 2/3/2020 1/27/2020   Phos 2.5 - 4.5 mg/dL - - -   Phos (external) 2.5 - 4.6 mg/dL 3.3 3.5 3.3   PTHi 18 - 80 pg/mL - - -   Vit D Def 20 - 75 ug/L - - -     Heme Latest Ref Rng & Units 3/26/2021 2/26/2021 1/29/2021   WBC 4.0 - 11.0 10e9/L - - -   WBC (external) 3.2 - 11.0 10*9/L 4.8 5.9 6.0   Hgb 13.3 - 17.7 g/dL - - -   Hgb (external) 12.9 - 16.9 g/dL 13.0 13.8 13.9   Plt 150 - 450 10e9/L - - -   Plt (external) 130 - 375 10*9/L 150 150 174   ABSOLUTE NEUTROPHIL 1.6 - 8.3 10e9/L - - -   ABSOLUTE NEUTROPHILS (EXTERNAL) 1.9 - 8.1 x10:3/uL - - -   ABSOLUTE LYMPHOCYTES 0.8 - 5.3 10e9/L - - -   ABSOLUTE LYMPHOCYTES (EXTERNAL) 1.0 - 3.9 x10:3/uL - - -   ABSOLUTE MONOCYTES 0.0 - 1.3 10e9/L - - -   ABSOLUTE MONOCYTES (EXTERNAL) 0.1 - 0.9 x10:3/uL - - -   ABSOLUTE EOSINOPHILS 0.0 - 0.7 10e9/L - - -   ABSOLUTE EOSINOPHILS (EXTERNAL) 0.0 - 0.5  x10:3/uL - - -   ABSOLUTE BASOPHILS 0.0 - 0.2 10e9/L - - -   ABSOLUTE BASOPHILS (EXTERNAL) 0.0 - 0.2 x10:3/uL - - -   ABS IMMATURE GRANULOCYTES 0 - 0.4 10e9/L - - -   ABSOLUTE NUCLEATED RBC - - - -     Liver Latest Ref Rng & Units 9/26/2019 8/6/2019 1/7/2019   AP 40 - 150 U/L 100 - 157(H)   TBili 0.2 - 1.3 mg/dL 0.2 - 0.2   ALT 0 - 70 U/L 42 - 46   AST 0 - 45 U/L 16 - 15   Tot Protein 6.8 - 8.8 g/dL 7.5 - 7.9   Tot Protein (external) 5.7 - 8.2 g/dL - 6.6 -   Albumin 3.4 - 5.0 g/dL 3.5 - 3.5   Albumin (external) 3.2 - 4.8 g/dL - 4.4 -     Pancreas Latest Ref Rng & Units 7/16/2020 2/7/2020 9/26/2019   A1C 0 - 5.6 % 8.1(H) 8.2(H) 8.9(H)     Iron studies Latest Ref Rng & Units 9/26/2019 8/6/2019   Iron 35 - 180 ug/dL 70 -   Iron sat 15 - 46 % 28 -   Ferritin 26 - 388 ng/mL 753(H) -   Ferritin (external) 22 - 322 ng/mL - 578(H)     [facility] Txp Virology Latest Ref Rng & Units 3/26/2021 2/26/2021 1/29/2021   BK Spec - - - -   BK Res BKNEG:BK Virus DNA Not Detected copies/mL - - -   BK Log <2.7 Log copies/mL - - -   BK Quant Log Ext - - - DNR   BK Quant Result Ext IU/mL DNR None Detected None Detected   BK Quant Spec Ext - - - Blood   EBV CAPSID ANTIBODY IGG 0.0 - 0.8 AI - - -   Hep B Core NR:Nonreactive - - -   Hep B Surf Ext  Multiple values view image mIU/mL - - -   HIV 1&2 Ext Nonreactive - - -        Recent Labs   Lab Test 11/25/19  0843 12/09/19  1002 02/07/20  0858   DOSTAC 2,020 2130,12/8/19 2/6/20 2105   TACROL 10.5 10.4 9.2     Recent Labs   Lab Test 10/07/19  0742 11/21/19  0813   DOSMPA Not Provided 11/20/19 1800   MPACID 0.82* 0.91*   MPAG 14.1* 39.5           Again, thank you for allowing me to participate in the care of your patient.      Sincerely,    Indra Roman MD

## 2021-05-11 NOTE — PROGRESS NOTES
CHRONIC TRANSPLANT NEPHROLOGY VISIT    Assessment & Plan      # LDKT: Stable  most recently 1.2 in 9/2020              - Baseline Cr ~ 1.1-1.3 mg/dL               - Proteinuria: Minimal (0.2-0.5 grams)  - unable to calculate due to being low ( Mar/2021)              - Date DSA Last Checked: Dec/2020      Latest DSA: No              - BK Viremia: Mar/2021 - No             - Kidney Tx Biopsy: Nov 25, 2019; Result: Material insufficient for assessment with no glomeruli.                                              Oct 22, 2019; Result: Borderline acute cellular-mediated rejection      # Immunosuppression: Tacrolimus immediate release (goal 4-6) and Mycophenolate mofetil (dose 750 mg every 12 hours)              - Changes: No     # Infection Prophylaxis:   - PJP: Sulfa/TMP (Bactrim)  - CMV: None, prophylaxis completed              - Thrush: None     # HTN   On Coreg 12.5 mg BID . Target /80  Current Home BP :   Weight :   Wt Readings from Last 3 Encounters:   11/13/20 88.8 kg (195 lb 12.8 oz)   07/16/20 93.7 kg (206 lb 9.6 oz)   04/03/20 90.8 kg (200 lb 1.6 oz)     Volume status - euvolemic   Changes - No       # Diabetes: Borderline control (HbA1c 7-9%)           Last HbA1c: 7.5%              - Management as per Endocrinology.              - On insulin pump, he is working on weight management to be listed for pancreas transplant.       # Anemia in Chronic Renal Disease: Hgb: Stable      JOSE: No              - Iron studies: not checked recently     # Mineral Bone Disorder:   - Secondary renal hyperparathyroidism; PTH level: Not checked recently        On treatment: None  - Vitamin D; level: Low        On supplement: Yes cholecalciferol 2,000 international unit(s) daily  - Calcium; level: Normal        On supplement: No  - Phosphorus; level: Not checked recently, but was normal last check        On supplement: No      # Electrolytes:   - Potassium; level: Normal        On supplement: No  - Bicarbonate; level:  Normal        On supplement: No  - Sodium; level: Normal     # Overweight: Stable to slightly decreased weight.              - Recommend increased exercise and watch caloric intake.     # Medical Compliance: Yes     # COVID-19 Virus Review: Discussed COVID-19 virus and the potential medical risks.  Reviewed preventative health recommendations, which includes washing hands for 20 seconds, avoid touching your face, and social distancing.  Asked patient to inform the transplant center if they are exposed or diagnosed with this virus   COVID19 vaccine - completed 2 doses of Moderna  - last dose in April 2021    # Skin Cancer Risk:               -  Counseled patient about increased risk of skin cancers while on immunosuppressants. Discussed sun protection and recommend regular follow up with Dermatology. He has established himself with dermatologist  He has a small furuncle which is healing in his back , findings not consistent with shingles. Advised him to monitor . If lesion does not subside in 1- 2 months ,he was advised to follow up with his dermatologist       # Transplant History:  Etiology of Kidney Failure: Diabetes mellitus type 1  Tx: LDKT  Transplant: 10/1/2019 (Kidney)  Donor Type: Living      Donor Class:   Crossmatch at time of Tx: negative  DSA at time of Tx: No  Significant changes in immunosuppression: None  CMV IgG Ab High Risk Discordance (D+/R-): No  EBV IgG Ab High Risk Discordance (D+/R-): No  Significant transplant-related complications: None      Transplant Office Phone Number: [phone]    Assessment and plan was discussed with the patient and he voiced his understanding and agreement.    Return visit: Return in about 6 months (around 11/11/2021).     Patient seen and discussed with Dr Riki Wilder MD, FACP  Nephrology Fellow   [facility]   Pager [phone]      Tina Howard MD     This patient has been seen and evaluated by me, Indra Roman MD.  I have reviewed  the note and agree with plan of care as documented by the fellow.    Indra Roman MD       Chief Complaint   Mr. Amin is a 40 year old here for routine follow up, kidney transplant and immunosuppression management.      History of Present Illness      Patient c/o itchy rash on the back , right where he had shingles   Follows with dermatologist - last visit sept 2020    Patient denies any chest pain or shortness of breath, PND, orthopnea.  No leg swelling.   No wheezing or cough.   No dizziness, lightheadedness.  No focal weakness, altered sensation or confusion  No fever, chills  No abdominal pain nausea vomiting.  No black stools or rectal bleeding.    He does get constipated - so he takes OTC stool softeners helps him   No voiding difficulty.  No hematuria.  No unintentional weight loss.      Home BP : not checked   Weight is going up   Wt Readings from Last 3 Encounters:   05/11/21 101.3 kg (223 lb 6.4 oz)   11/13/20 88.8 kg (195 lb 12.8 oz)   07/16/20 93.7 kg (206 lb 9.6 oz)         Problem List   Patient Active Problem List   Diagnosis     HTN, kidney transplant related     Diabetes mellitus type 1 (H)     Dyslipidemia     Anemia in chronic kidney disease     Secondary renal hyperparathyroidism (H)     Kidney replaced by transplant     Aftercare following organ transplant     Vitamin D deficiency     Hypomagnesemia     Kidney transplant rejection       Allergies   Allergies   Allergen Reactions     Thymoglobulin      Had reaction with rigors after steroid-free dose. No reaction with steroids.       Medications   Current Outpatient Medications   Medication Sig     alcohol swab prep pads Use to swab area of injection/zak as directed.     aspirin (ASA) 81 MG EC tablet Take 81 mg by mouth daily      atorvastatin (LIPITOR) 10 MG tablet Take 1 tablet (10 mg) by mouth daily     blood glucose (CONTOUR NEXT TEST) test strip Use to test blood sugar 4 times daily.     blood glucose monitoring (ACCU-CHEK FASTCLIX)  "lancets U QID OR MORE OFTEN PRN     carvedilol (COREG) 12.5 MG tablet TAKE ONE TABLET BY MOUTH TWICE A DAY WITH MEALS     gabapentin (NEURONTIN) 100 MG capsule Take 1 capsule (100 mg) by mouth 3 times daily     insulin lispro (HUMALOG VIAL) 100 UNIT/ML vial Inject 100 Units Subcutaneous daily With Insulin pump. Up to 100 units     insulin pen needle (ULTICARE MICRO) 32G X 4 MM miscellaneous Use pen needles daily or as directed.     Insulin Syringe-Needle U-100 31G X 1/4\" 1 ML MISC 1 Syringe as needed (until new insuline pump arrives)     magnesium oxide (MAG-OX) 400 MG tablet Take 1 tablet (400 mg) by mouth daily (with lunch)     mycophenolate (GENERIC EQUIVALENT) 250 MG capsule Take 3 capsules (750 mg) by mouth 2 times daily     senna-docusate (SENOKOT-S/PERICOLACE) 8.6-50 MG tablet Take 2 tablets by mouth 2 times daily     sulfamethoxazole-trimethoprim (BACTRIM) 400-80 MG tablet Take 1 tablet by mouth daily     tacrolimus (GENERIC EQUIVALENT) 0.5 MG capsule Take 1 capsule (0.5 mg) by mouth 2 times daily Total dose = 1.5 mg BID     tacrolimus (GENERIC EQUIVALENT) 1 MG capsule Take 1 capsule (1 mg) by mouth 2 times daily Total dose = 1.5 mg BID     terbinafine (LAMISIL) 1 % external cream Apply topically 2 times daily To rash on left hand     No current facility-administered medications for this visit.      There are no discontinued medications.    Physical Exam   Vital Signs: There were no vitals taken for this visit.    GENERAL APPEARANCE: alert and no distress  HENT: mouth without ulcers or lesions  LYMPHATICS: no cervical or supraclavicular nodes  RESP: lungs clear to auscultation - no rales, rhonchi or wheezes  CV: regular rhythm, normal rate, no rub, no murmur  EDEMA: no LE edema bilaterally  ABDOMEN: soft, nondistended, nontender, bowel sounds normal  MS: extremities normal - no gross deformities noted, no evidence of inflammation in joints, no muscle tenderness  SKIN: no rash. Small furuncle in the mid " thoracic back       Data     Renal Latest Ref Rng & Units 3/26/2021 2/26/2021 2/10/2021   Na 133 - 144 mmol/L - - -   Na (external) 134 - 143 mEq/L 137 137 140   K 3.4 - 5.3 mmol/L - - -   K (external) 3.4 - 5.1 mEq/L 4.3 4.4 4.4   Cl 94 - 109 mmol/L - - -   Cl (external) 99 - 110 mEq/L 102 101 100   CO2 20 - 32 mmol/L - - -   CO2 (external) 19 - 29 mEq/L 26 27 29   BUN 7 - 30 mg/dL - - -   BUN (external) 5 - 24 mg/dL 17 21 22   Cr 0.66 - 1.25 mg/dL - - -   Cr (external) 0.70 - 1.20 mg/dL 1.01 1.12 1.16   Glucose 70 - 99 mg/dL - - -   Glucose (external) 70 - 99 mg/dL 125(H) 186(H) 147(H)   Ca  8.5 - 10.1 mg/dL - - -   Ca (external) 8.4 - 10.5 mg/dL 9.1 9.2 9.3   Mg 1.6 - 2.3 mg/dL - - -   Mg (external) 1.8 - 2.7 mg/dL - - -     Bone Health Latest Ref Rng & Units 3/31/2020 2/3/2020 1/27/2020   Phos 2.5 - 4.5 mg/dL - - -   Phos (external) 2.5 - 4.6 mg/dL 3.3 3.5 3.3   PTHi 18 - 80 pg/mL - - -   Vit D Def 20 - 75 ug/L - - -     Heme Latest Ref Rng & Units 3/26/2021 2/26/2021 1/29/2021   WBC 4.0 - 11.0 10e9/L - - -   WBC (external) 3.2 - 11.0 10*9/L 4.8 5.9 6.0   Hgb 13.3 - 17.7 g/dL - - -   Hgb (external) 12.9 - 16.9 g/dL 13.0 13.8 13.9   Plt 150 - 450 10e9/L - - -   Plt (external) 130 - 375 10*9/L 150 150 174   ABSOLUTE NEUTROPHIL 1.6 - 8.3 10e9/L - - -   ABSOLUTE NEUTROPHILS (EXTERNAL) 1.9 - 8.1 x10:3/uL - - -   ABSOLUTE LYMPHOCYTES 0.8 - 5.3 10e9/L - - -   ABSOLUTE LYMPHOCYTES (EXTERNAL) 1.0 - 3.9 x10:3/uL - - -   ABSOLUTE MONOCYTES 0.0 - 1.3 10e9/L - - -   ABSOLUTE MONOCYTES (EXTERNAL) 0.1 - 0.9 x10:3/uL - - -   ABSOLUTE EOSINOPHILS 0.0 - 0.7 10e9/L - - -   ABSOLUTE EOSINOPHILS (EXTERNAL) 0.0 - 0.5 x10:3/uL - - -   ABSOLUTE BASOPHILS 0.0 - 0.2 10e9/L - - -   ABSOLUTE BASOPHILS (EXTERNAL) 0.0 - 0.2 x10:3/uL - - -   ABS IMMATURE GRANULOCYTES 0 - 0.4 10e9/L - - -   ABSOLUTE NUCLEATED RBC - - - -     Liver Latest Ref Rng & Units 9/26/2019 8/6/2019 1/7/2019   AP 40 - 150 U/L 100 - 157(H)   TBili 0.2 - 1.3 mg/dL 0.2 -  0.2   ALT 0 - 70 U/L 42 - 46   AST 0 - 45 U/L 16 - 15   Tot Protein 6.8 - 8.8 g/dL 7.5 - 7.9   Tot Protein (external) 5.7 - 8.2 g/dL - 6.6 -   Albumin 3.4 - 5.0 g/dL 3.5 - 3.5   Albumin (external) 3.2 - 4.8 g/dL - 4.4 -     Pancreas Latest Ref Rng & Units 7/16/2020 2/7/2020 9/26/2019   A1C 0 - 5.6 % 8.1(H) 8.2(H) 8.9(H)     Iron studies Latest Ref Rng & Units 9/26/2019 8/6/2019   Iron 35 - 180 ug/dL 70 -   Iron sat 15 - 46 % 28 -   Ferritin 26 - 388 ng/mL 753(H) -   Ferritin (external) 22 - 322 ng/mL - 578(H)     [facility] Txp Virology Latest Ref Rng & Units 3/26/2021 2/26/2021 1/29/2021   BK Spec - - - -   BK Res BKNEG:BK Virus DNA Not Detected copies/mL - - -   BK Log <2.7 Log copies/mL - - -   BK Quant Log Ext - - - DNR   BK Quant Result Ext IU/mL DNR None Detected None Detected   BK Quant Spec Ext - - - Blood   EBV CAPSID ANTIBODY IGG 0.0 - 0.8 AI - - -   Hep B Core NR:Nonreactive - - -   Hep B Surf Ext  Multiple values view image mIU/mL - - -   HIV 1&2 Ext Nonreactive - - -        Recent Labs   Lab Test 11/25/19  0843 12/09/19  1002 02/07/20  0858   DOSTAC 2,020 2130,12/8/19 2/6/20 2105   TACROL 10.5 10.4 9.2     Recent Labs   Lab Test 10/07/19  0742 11/21/19  0813   DOSMPA Not Provided 11/20/19 1800   MPACID 0.82* 0.91*   MPAG 14.1* 39.5

## 2021-05-11 NOTE — NURSING NOTE
Chief Complaint   Patient presents with     RECHECK     6 month follow up kidney tx       BP (!) 141/81   Pulse 84   Temp 98.7  F (37.1  C)   Wt 101.3 kg (223 lb 6.4 oz)   SpO2 97%   BMI 36.06 kg/m        Raquel HIRSCH CMA

## 2021-05-12 ENCOUNTER — TELEPHONE (OUTPATIENT)
Dept: TRANSPLANT | Facility: CLINIC | Age: 41
End: 2021-05-12

## 2021-05-12 LAB
BKV DNA # SPEC NAA+PROBE: NORMAL COPIES/ML
BKV DNA SPEC NAA+PROBE-LOG#: NORMAL LOG COPIES/ML
DONOR IDENTIFICATION: NORMAL
DSA COMMENTS: NORMAL
DSA PRESENT: NO
DSA TEST METHOD: NORMAL
ORGAN: NORMAL
PROTOCOL CUTOFF: NORMAL
SA1 CELL: NORMAL
SA1 COMMENTS: NORMAL
SA1 HI RISK ABY: NORMAL
SA1 MOD RISK ABY: NORMAL
SA1 TEST METHOD: NORMAL
SA2 CELL: NORMAL
SA2 COMMENTS: NORMAL
SA2 HI RISK ABY UA: NORMAL
SA2 MOD RISK ABY: NORMAL
SA2 TEST METHOD: NORMAL
SPECIMEN SOURCE: NORMAL
TACROLIMUS BLD-MCNC: 6.3 UG/L (ref 5–15)
TME LAST DOSE: 2000 H
UNACCEPTABLE ANTIGEN: NORMAL
UNOS CPRA: 0

## 2021-05-12 NOTE — TELEPHONE ENCOUNTER
Lee Wilder MD Huepfel, Mary K, RN; Stephany Culver, LILLI             Rudi has a request     He wants printed orders to take to lab as everytime he goes to the lab, they have a lot of questions for him     Stephany I know you are covering  Sabine,   Can you please touch base with Rudi and help him with his concern .     Thank you     lee      OUTCOME  Updated lab orders sent to patient's My Chart and lab.

## 2021-05-12 NOTE — LETTER
OUTPATIENT LABORATORY TEST ORDER    Patient Name: Rudi Amin  Transplant Date: 10/1/2019   YOB: 1980  Issue Date & Time: 5/12/2021  7:37 AM  [facility] MR: [id number] Exp. Date (1 year after date issued)      Diagnoses: Kidney Transplant (ICD-10  Z94.0)   Long term use of medications (ICD-10  Z79.899)     Lab results to be available on the same day drawn.   Patient should release information to the [facility].  Please fax to the Transplant Center at [phone].    Monthly   ?Hemogram and Platelet  ?Basic Metabolic Panel (Sodium, Potassium, Chloride, CO2, Creatinine, Urea Nitrogen,     Glucose,   Calcium)         ?/Tacrolimus/Prograf drug level          ?BK PCR QT                  Every 6 Months (due November and May)                  ?Urine for protein/creatinine    Yearly : (due December 2021)   ?PRA/DSA level (mailers provided by the patient)     If you have any questions, please call [facility] at [phone] or [phone].    Please fax labs to [phone]  .

## 2021-06-21 ENCOUNTER — TELEPHONE (OUTPATIENT)
Dept: TRANSPLANT | Facility: CLINIC | Age: 41
End: 2021-06-21

## 2021-06-21 DIAGNOSIS — I15.1 HTN, KIDNEY TRANSPLANT RELATED: ICD-10-CM

## 2021-06-21 DIAGNOSIS — Z94.0 HTN, KIDNEY TRANSPLANT RELATED: ICD-10-CM

## 2021-06-21 RX ORDER — CARVEDILOL 12.5 MG/1
12.5 TABLET ORAL 2 TIMES DAILY WITH MEALS
Qty: 180 TABLET | Refills: 1 | Status: SHIPPED | OUTPATIENT
Start: 2021-06-21 | End: 2021-12-13

## 2021-06-21 NOTE — TELEPHONE ENCOUNTER
Patient Call: Medication Refill  Route to LPN  Instruct the patient to first contact their pharmacy. If they have called their pharmacy and require further assistance, route to LPN.    Pharm[facility] Name: [facility]  Pharmacy Location: [facility]  Name of Medication: Carvedilol Dose: 12.5  Pharmacy has =requested numerous times  Pt tokk last dose this AM   When will the patient be out of this medication?: Less than 24 hours (Caren RODRIGUES, then page if no answer)

## 2021-06-22 DIAGNOSIS — Z99.2 ESRD (END STAGE RENAL DISEASE) ON DIALYSIS (H): ICD-10-CM

## 2021-06-22 DIAGNOSIS — N18.6 ESRD (END STAGE RENAL DISEASE) ON DIALYSIS (H): ICD-10-CM

## 2021-06-22 DIAGNOSIS — Z94.0 KIDNEY TRANSPLANTED: Primary | ICD-10-CM

## 2021-06-22 RX ORDER — TACROLIMUS 0.5 MG/1
CAPSULE ORAL
Qty: 60 CAPSULE | Refills: 11
Start: 2021-06-22 | End: 2021-07-27

## 2021-06-22 RX ORDER — TACROLIMUS 1 MG/1
1 CAPSULE ORAL 2 TIMES DAILY
Qty: 60 CAPSULE | Refills: 11 | Status: SHIPPED | OUTPATIENT
Start: 2021-06-22 | End: 2021-07-27

## 2021-06-22 NOTE — TELEPHONE ENCOUNTER
ISSUE:   Tacrolimus  Level 7.1  Above goal level 4 to 6     PLAN:   Please call patient and confirm this was an accurate 12-hour trough. Verify Tacrolimus IR dose 1.5  mg BID. Confirm no new medications or illness. Confirm no missed doses. If accurate trough and accurate dose, decrease Tacrolimus IR dose to 1 mg BID and repeat labs in 7 days     Please confirm Rudi receives copy of lab letter to repeat transplant  Labs  -

## 2021-06-22 NOTE — LETTER
PHYSICIAN ORDERS      DATE & TIME ISSUED: 2021 9:49 AM  PATIENT NAME: Michael Amin   : 1980     [facility] MR# [if applicable]: [id number]     DIAGNOSIS:  Kidney Transplant  ICD-10 CODE: Z94.0     Please repeat the following labs next week:  Tacrolimus drug level  CBC   BMP    Any questions please call: [phone]  Please fax lab results to [phone].    .

## 2021-06-22 NOTE — TELEPHONE ENCOUNTER
Left message and sent [employer] message to patient regarding:  ISSUE:   Tacrolimus  Level 7.1  Above goal level 4 to 6      PLAN:   Please call patient and confirm this was an accurate 12-hour trough. Verify Tacrolimus IR dose 1.5  mg BID. Confirm no new medications or illness. Confirm no missed doses. If accurate trough and accurate dose, decrease Tacrolimus IR dose to 1 mg BID and repeat labs in 7 days

## 2021-06-22 NOTE — TELEPHONE ENCOUNTER
Patient confirmed this was an accurate 12-hour trough. Verified Tacrolimus IR dose 1.5  mg BID. Confirmed no new medications or illness. Confirmed no missed doses. Patient verbalizes understanding to decrease Tacrolimus IR dose to 1 mg BID and repeat labs in 7 days

## 2021-07-06 ENCOUNTER — TELEPHONE (OUTPATIENT)
Dept: ENDOCRINOLOGY | Facility: CLINIC | Age: 41
End: 2021-07-06

## 2021-07-06 NOTE — TELEPHONE ENCOUNTER
[facility] Call Center    Phone Message    May a detailed message be left on voicemail: yes     Reason for Call: Order(s): Other:   Reason for requested: labs for upcoming appt  Date needed: whenever possible  Provider name: Andreia    Pt stated he'd like to get labs completed prior to appt on 7/28/21.  Please send Preactt message when pt can schedule.       Action Taken: Message routed to:  [facility] ([facility] endo    Travel Screening: Not Applicable

## 2021-07-12 DIAGNOSIS — E10.65 TYPE 1 DIABETES MELLITUS WITH HYPERGLYCEMIA (H): Primary | ICD-10-CM

## 2021-07-26 DIAGNOSIS — N18.6 ESRD (END STAGE RENAL DISEASE) ON DIALYSIS (H): ICD-10-CM

## 2021-07-26 DIAGNOSIS — Z99.2 ESRD (END STAGE RENAL DISEASE) ON DIALYSIS (H): ICD-10-CM

## 2021-07-26 DIAGNOSIS — Z94.0 KIDNEY TRANSPLANTED: Primary | ICD-10-CM

## 2021-07-26 NOTE — LETTER
PHYSICIAN ORDERS      DATE & TIME ISSUED: 2021 9:27 AM  PATIENT NAME: Michael Amin   : 1980     [facility] MR# [if applicable]: [id number]     DIAGNOSIS:  Kidney Transplant  ICD-10 CODE: Z94.0     Please repeat the following labs in 2 weeks:  Tacrolimus drug level  CBC  BMP    Any questions please call: [phone]  Please fax lab results to [phone].      Donnie Brandon MD

## 2021-07-27 ENCOUNTER — TELEPHONE (OUTPATIENT)
Dept: TRANSPLANT | Facility: CLINIC | Age: 41
End: 2021-07-27

## 2021-07-27 RX ORDER — TACROLIMUS 1 MG/1
1 CAPSULE ORAL 2 TIMES DAILY
Qty: 60 CAPSULE | Refills: 11 | Status: SHIPPED | OUTPATIENT
Start: 2021-07-27 | End: 2022-01-03

## 2021-07-27 RX ORDER — TACROLIMUS 0.5 MG/1
0.5 CAPSULE ORAL 2 TIMES DAILY
Qty: 60 CAPSULE | Refills: 11 | Status: SHIPPED | OUTPATIENT
Start: 2021-07-27 | End: 2022-01-03

## 2021-07-27 NOTE — TELEPHONE ENCOUNTER
Spoke to patient regarding:  Tacrolimus  Level 3.3 below goal level  4 to 6      Confirmed this was an accurate 12-hour trough. Verified Tacrolimus IR dose 1  mg BID. Confirmed no new medications or illness. Confirmed no missed doses. Patient verbalizes understanding to increase Tacrolimus IR dose to 1.5  mg BID and repeat labs in  14  days

## 2021-07-27 NOTE — PROGRESS NOTES
Michael Amin  is being evaluated via a billable video visit.      How would you like to obtain your AVS? MyChart     Will anyone else be joining your video visit? No    Stephany OLMEDO MA    Outcome for 07/27/21 4:13 PM :Glucose sent via Email

## 2021-07-27 NOTE — TELEPHONE ENCOUNTER
Patient reports he was told his hernia may be fixed at time of pancreas transplant; that he has been exercising more, reports current weight of 211 pounds.  Reviewed guideline BMI 30 or less for pancreas transplant, so patient will need to be a weight of 186 pounds or less, but also dependent upon exam by transplant surgeon. Patient reports he will continue to work on weight loss progress and contact me by Mychart or phone in 1 mths time.

## 2021-07-27 NOTE — TELEPHONE ENCOUNTER
ISSUE:    Tacrolimus  Level 3.3 below goal level  4 to 6     PLAN:   Please call patient and confirm this was an accurate 12-hour trough. Verify Tacrolimus IR dose 1  mg BID. Confirm no new medications or illness. Confirm no missed doses. If accurate trough and accurate dose, increase Tacrolimus IR dose to 1.5  mg BID and repeat labs in  14  days  days

## 2021-07-27 NOTE — PATIENT INSTRUCTIONS
We appreciate your assistance in coordinating your healthcare.     Please upload your insulin pump, blood sugar meter and/or continuous glucose monitor at home 1-2 days before your next diabetes-related appointment.   This will allow your provider to review your  data before your scheduled virtual visit.    To ask a question to your Endocrine care team, please send them a [employer] message, or reach them by phone at [phone]     To expedite your medication refill(s), please contact your pharmacy and have them   fax a refill request to: [phone].  *Please allow 3 business days for routine medication refills.  *Please allow 5 business days for controlled substance medication refills.    For after-hours urgent Endocrine issues, that do not require 561, please dial [phone], and ask to speak with the Endocrinologist On-Call

## 2021-07-28 ENCOUNTER — VIRTUAL VISIT (OUTPATIENT)
Dept: ENDOCRINOLOGY | Facility: CLINIC | Age: 41
End: 2021-07-28
Payer: MEDICARE

## 2021-07-28 DIAGNOSIS — E10.65 TYPE 1 DIABETES MELLITUS WITH HYPERGLYCEMIA (H): Primary | ICD-10-CM

## 2021-07-28 PROCEDURE — 99215 OFFICE O/P EST HI 40 MIN: CPT | Mod: 95 | Performed by: PHYSICIAN ASSISTANT

## 2021-07-28 NOTE — PROGRESS NOTES
Due to the COVID 19 pandemic this visit was converted to a video visit in order to help prevent spread of infection in this patient and the general population.    Time of start: 7:30 am   Time of end: 7:55 am  Total duration of video visit: 25 minutes.    ELIZABETH  Brennan Rodriguez ( Jake ) is a 40 year old male with type 1 diabetes mellitus.  Video visit today for diabetes follow up.  Pt has hx of type 1 diabetes mellitus dx at age 16.  His diabetes is complicated by nephropathy and he underwent a kidney transplant on 10/1/2019.  His last Oph exam showed possible early retinopathy.  He has no peripheral neuropathy.  His hx is also significant for HTN, ED, dyslipidemia and obesity.  For his diabetes, he is currently using a Medtronic 670G insulin pump and Guardian3 sensor.  His basal insulin rates are set at:  Midnight= 2.0 units/hr.  4 am = 1.8 units/hr.  9 am = 1.65 units/hr.  1 pm = 1.8 units/hr.  20:00 = 2.0 units/hr.  I/C ratio settings:  Midnight= 1:7.5  11:30 am = 1:5.7  1800= 1:5.7  Sensitivity is set at 29.  Pt's A1C was 8.7 % on 7/22/2021. Previous A1C was 7.5 % on 10/2/2020.   I reviewed his insulin pump download data today.  His average glucose is 182 with SD 58.   His glucose is in target 56 % of the time, above target 44 % of the time and below target 0 % of the time.  Auto mode use: 48 %  Manual mode use: 52 %  Sensor wear: 54 %.  His blood sugars are high from 9:30 am to 1 pm.  He also reports low blood sugars with exercise and activity.  He is not using a temporary basal rate reduction while he exercises or is active.  On ROS today,some weight gain.  He is back at the gym.  Rudi received the COVID vaccines (Moderna).  Pt denies frequent headaches, blurred vision, n/v, SOB at rest, cough, fever, chills, chest pain, abd pain, diarrhea, dysuria, hematuria or foot ulcers.  Rudi denies numbness, tingling or pain in his feet or hands.    Diabetes Care  Retinopathy: he was seen by Oph in Feb 2019- possible early  "retinopathy. Reminded him to schedule his annual eye exam.  Nephropathy:hx of ESRD s/p kidney transplant on 10/46286.   Neuropathy: none.  Foot Exam:no exam today.  Taking aspirin: yes  Lipids: LDL 69 in Oct 2020. Pt is taking Lipitor.  CAD: no.  Mental health:denies depression.  Insulin:Medtronic 670G insulin pump with Guardian3 sensor.    ROS  Please see under HPI.    Allergies  Allergies   Allergen Reactions     Thymoglobulin      Had reaction with rigors after steroid-free dose. No reaction with steroids.       Medications  Current Outpatient Medications   Medication Sig Dispense Refill     alcohol swab prep pads Use to swab area of injection/zak as directed. 100 each 3     aspirin (ASA) 81 MG EC tablet Take 81 mg by mouth daily        atorvastatin (LIPITOR) 10 MG tablet Take 1 tablet (10 mg) by mouth daily 90 tablet 3     blood glucose (CONTOUR NEXT TEST) test strip Use to test blood sugar 4 times daily. 400 strip 11     blood glucose monitoring (ACCU-CHEK FASTCLIX) lancets U QID OR MORE OFTEN PRN  1     carvedilol (COREG) 12.5 MG tablet Take 1 tablet (12.5 mg) by mouth 2 times daily (with meals) 180 tablet 1     insulin lispro (HUMALOG VIAL) 100 UNIT/ML vial Inject 100 Units Subcutaneous daily With Insulin pump. Up to 100 units 30 mL 11     insulin pen needle (ULTICARE MICRO) 32G X 4 MM miscellaneous Use pen needles daily or as directed. 100 each 3     Insulin Syringe-Needle U-100 31G X 1/4\" 1 ML MISC 1 Syringe as needed (until new insuline pump arrives) 50 each 1     magnesium oxide (MAG-OX) 400 MG tablet Take 1 tablet (400 mg) by mouth daily (with lunch) 30 tablet 5     mycophenolate (GENERIC EQUIVALENT) 250 MG capsule Take 3 capsules (750 mg) by mouth 2 times daily 180 capsule 11     senna-docusate (SENOKOT-S/PERICOLACE) 8.6-50 MG tablet Take 2 tablets by mouth 2 times daily 20 tablet 0     sulfamethoxazole-trimethoprim (BACTRIM) 400-80 MG tablet Take 1 tablet by mouth daily 30 tablet 11     tacrolimus " (GENERIC EQUIVALENT) 0.5 MG capsule Take 1 capsule (0.5 mg) by mouth 2 times daily Total dose = 1.5 mg BID 60 capsule 11     tacrolimus (GENERIC EQUIVALENT) 1 MG capsule Take 1 capsule (1 mg) by mouth 2 times daily Total dose = 1.5 mg BID 60 capsule 11     terbinafine (LAMISIL) 1 % external cream Apply topically 2 times daily To rash on left hand 42 g 11       Family History  family history includes Coronary Artery Disease in his father; Diabetes in his father; Skin Cancer in his paternal uncle.    Social History   reports that he has never smoked. He has never used smokeless tobacco. He reports previous alcohol use. He reports that he does not use drugs.   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    Past Medical History  Past Medical History:   Diagnosis Date     Anemia in chronic kidney disease      Dyslipidemia      ESRD (end stage renal disease) on dialysis (H)      Hypertension      Hypomagnesemia 10/7/2019     Secondary hyperparathyroidism (H)      Type 1 diabetes (H)        Past Surgical History:   Procedure Laterality Date     hair implant  2007     INSERT CATHETER PERITONEAL DIALYSIS  08/2018     IR FINE NEEDLE ASPIRATION W ULTRASOUND  11/25/2019     IR RENAL BIOPSY LEFT  11/25/2019     PERCUTANEOUS BIOPSY KIDNEY Left 10/22/2019    Procedure: Left Kidney Biopsy;  Surgeon: Kash Hoffman MD;  Location: [facility] OR       Physical Exam    No exam today.    RESULTS  Creatinine   Date Value Ref Range Status   05/11/2021 1.13 0.66 - 1.25 mg/dL Final     GFR Estimate   Date Value Ref Range Status   05/11/2021 80 >60 mL/min/[1.73_m2] Final     Comment:     Non  GFR Calc  Starting 12/18/2018, serum creatinine based estimated GFR (eGFR) will be   calculated using the Chronic Kidney Disease Epidemiology Collaboration   (CKD-EPI) equation.       Hemoglobin A1C   Date Value Ref Range Status   07/16/2020 8.1 (H) 0 - 5.6 % Final     Comment:     Normal <5.7%  Prediabetes 5.7-6.4%  Diabetes 6.5% or higher - adopted from ADA   consensus guidelines.       Potassium   Date Value Ref Range Status   05/11/2021 4.1 3.4 - 5.3 mmol/L Final     ALT   Date Value Ref Range Status   09/26/2019 42 0 - 70 U/L Final     AST   Date Value Ref Range Status   09/26/2019 16 0 - 45 U/L Final       A1C   8.7     7/22/2021  A1C   7.5     10/2/2020-outside lab  A1C    8.1     7/16/2020  A1C   8.2      2/7/2020   A1C   8.9      9/26/2019    ASSESSMENT/PLAN:    1.  TYPE 1 DIABETES MELLITUS: Uncontrolled type 1 diabetes mellitus with a recent A1C of 8.7 %.  Rudi has been having high blood sugars from 9:30 am to 1 pm.  I made the following changes in his basal insulin rates below:  Midnight = 2.0 units/hr.  4 am = 1.8 units/hr.  9 am = 1.75 units/ hr ( was 1.65 ).  1300= 1.85 units/hr ( was 1.8 ).  2000= 2.0 units/hr.  No change in I:C ratio schedule.  I asked Rudi to use a temporary basal rate reduction 1 hr prior to, during and 1 hr after exercise and increase activity to help prevent hypoglycemia.  Pt instructed to schedule an annual Oph exam.  He denies any sx of neuropathy or foot ulcers or sores at this time.  Pt's TSH was normal on 7/22/2021.  Rudi received the COVID vaccines (Moderna).    2.  HX OF ESRD: S/P kidney transplant on 10/1/019 doing well.  Most recent creat was 1.13  with GFR > 60 mL/min on 7/22/2021.  Urine protein negative in May 2021.    3. OBESITY: He has met with our dietitian for weight loss support.    4.  HTN: No vital today.Continue current RX.  I asked him to check his BP at home twice a week and provide BP readings for his transplant team.    5.  HYPERLIPIDEMIA: LDL 69 in Oct 2020.  Pt is taking Lipitor daily.    6.  FOLLOW UP : with me in 3 months.  Pt instructed to call me or send a MyChart note if he has any questions regarding his diabetes care.    Time spent reviewing chart, labs and Medtronic 670G insulin pump data today = 10 minutes.  Time for video visit today =  25 minutes.  Time for documentation today = 15 minutes.    TOTAL TIME FOR VISIT TODAY = 50 minutes.    Kymberly Boswell PA-C

## 2021-07-28 NOTE — LETTER
7/28/2021       RE: Michael Amin  [address]  [place] [address]     Dear Colleague,    Thank you for referring your patient, Michael Amin, to the [facility] [place] [facility]. Please see a copy of my visit note below.    Michael Amin  is being evaluated via a billable video visit.      How would you like to obtain your AVS? MyChart     Will anyone else be joining your video visit? No    Stephany OLMEDO MA    Outcome for 07/27/21 4:13 PM :Glucose sent via Email        Due to the COVID 19 pandemic this visit was converted to a video visit in order to help prevent spread of infection in this patient and the general population.    Time of start: 7:30 am   Time of end: 7:55 am  Total duration of video visit: 25 minutes.    ELIZABETH Rodriguez ( Jake ) is a 40 year old male with type 1 diabetes mellitus.  Video visit today for diabetes follow up.  Pt has hx of type 1 diabetes mellitus dx at age 16.  His diabetes is complicated by nephropathy and he underwent a kidney transplant on 10/1/2019.  His last Oph exam showed possible early retinopathy.  He has no peripheral neuropathy.  His hx is also significant for HTN, ED, dyslipidemia and obesity.  For his diabetes, he is currently using a Medtronic 670G insulin pump and Guardian3 sensor.  His basal insulin rates are set at:  Midnight= 2.0 units/hr.  4 am = 1.8 units/hr.  9 am = 1.65 units/hr.  1 pm = 1.8 units/hr.  20:00 = 2.0 units/hr.  I/C ratio settings:  Midnight= 1:7.5  11:30 am = 1:5.7  1800= 1:5.7  Sensitivity is set at 29.  Pt's A1C was 8.7 % on 7/22/2021. Previous A1C was 7.5 % on 10/2/2020.   I reviewed his insulin pump download data today.  His average glucose is 182 with SD 58.   His glucose is in target 56 % of the time, above target 44 % of the time and below target 0 % of the time.  Auto mode use: 48 %  Manual mode use: 52 %  Sensor wear: 54 %.  His blood  sugars are high from 9:30 am to 1 pm.  He also reports low blood sugars with exercise and activity.  He is not using a temporary basal rate reduction while he exercises or is active.  On ROS today,some weight gain.  He is back at the gym.  Rudi received the COVID vaccines (Moderna).  Pt denies frequent headaches, blurred vision, n/v, SOB at rest, cough, fever, chills, chest pain, abd pain, diarrhea, dysuria, hematuria or foot ulcers.  Rudi denies numbness, tingling or pain in his feet or hands.    Diabetes Care  Retinopathy: he was seen by Oph in Feb 2019- possible early retinopathy. Reminded him to schedule his annual eye exam.  Nephropathy:hx of ESRD s/p kidney transplant on 10/17470.   Neuropathy: none.  Foot Exam:no exam today.  Taking aspirin: yes  Lipids: LDL 69 in Oct 2020. Pt is taking Lipitor.  CAD: no.  Mental health:denies depression.  Insulin:Medtronic 670G insulin pump with Guardian3 sensor.    ROS  Please see under HPI.    Allergies  Allergies   Allergen Reactions     Thymoglobulin      Had reaction with rigors after steroid-free dose. No reaction with steroids.       Medications  Current Outpatient Medications   Medication Sig Dispense Refill     alcohol swab prep pads Use to swab area of injection/zak as directed. 100 each 3     aspirin (ASA) 81 MG EC tablet Take 81 mg by mouth daily        atorvastatin (LIPITOR) 10 MG tablet Take 1 tablet (10 mg) by mouth daily 90 tablet 3     blood glucose (CONTOUR NEXT TEST) test strip Use to test blood sugar 4 times daily. 400 strip 11     blood glucose monitoring (ACCU-CHEK FASTCLIX) lancets U QID OR MORE OFTEN PRN  1     carvedilol (COREG) 12.5 MG tablet Take 1 tablet (12.5 mg) by mouth 2 times daily (with meals) 180 tablet 1     insulin lispro (HUMALOG VIAL) 100 UNIT/ML vial Inject 100 Units Subcutaneous daily With Insulin pump. Up to 100 units 30 mL 11     insulin pen needle (ULTICARE MICRO) 32G X 4 MM miscellaneous Use pen needles daily or as directed. 100  "each 3     Insulin Syringe-Needle U-100 31G X 1/4\" 1 ML MISC 1 Syringe as needed (until new insuline pump arrives) 50 each 1     magnesium oxide (MAG-OX) 400 MG tablet Take 1 tablet (400 mg) by mouth daily (with lunch) 30 tablet 5     mycophenolate (GENERIC EQUIVALENT) 250 MG capsule Take 3 capsules (750 mg) by mouth 2 times daily 180 capsule 11     senna-docusate (SENOKOT-S/PERICOLACE) 8.6-50 MG tablet Take 2 tablets by mouth 2 times daily 20 tablet 0     sulfamethoxazole-trimethoprim (BACTRIM) 400-80 MG tablet Take 1 tablet by mouth daily 30 tablet 11     tacrolimus (GENERIC EQUIVALENT) 0.5 MG capsule Take 1 capsule (0.5 mg) by mouth 2 times daily Total dose = 1.5 mg BID 60 capsule 11     tacrolimus (GENERIC EQUIVALENT) 1 MG capsule Take 1 capsule (1 mg) by mouth 2 times daily Total dose = 1.5 mg BID 60 capsule 11     terbinafine (LAMISIL) 1 % external cream Apply topically 2 times daily To rash on left hand 42 g 11       Family History  family history includes Coronary Artery Disease in his father; Diabetes in his father; Skin Cancer in his paternal uncle.    Social History   reports that he has never smoked. He has never used smokeless tobacco. He reports previous alcohol use. He reports that he does not use drugs.   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    Past Medical History  Past Medical History:   Diagnosis Date     Anemia in chronic kidney disease      Dyslipidemia      ESRD (end stage renal disease) on dialysis (H)      Hypertension      Hypomagnesemia 10/7/2019     Secondary hyperparathyroidism (H)      Type 1 diabetes (H)        Past Surgical History:   Procedure Laterality Date     hair implant  2007     INSERT CATHETER PERITONEAL DIALYSIS  08/2018     IR FINE NEEDLE ASPIRATION W ULTRASOUND  11/25/2019     IR RENAL BIOPSY LEFT  11/25/2019     PERCUTANEOUS BIOPSY KIDNEY Left 10/22/2019    Procedure: Left Kidney Biopsy;  Surgeon: Kash Hoffman MD;  " [facility]       Physical Exam    No exam today.    RESULTS  Creatinine   Date Value Ref Range Status   05/11/2021 1.13 0.66 - 1.25 mg/dL Final     GFR Estimate   Date Value Ref Range Status   05/11/2021 80 >60 mL/min/[1.73_m2] Final     Comment:     Non  GFR Calc  Starting 12/18/2018, serum creatinine based estimated GFR (eGFR) will be   calculated using the Chronic Kidney Disease Epidemiology Collaboration   (CKD-EPI) equation.       Hemoglobin A1C   Date Value Ref Range Status   07/16/2020 8.1 (H) 0 - 5.6 % Final     Comment:     Normal <5.7% Prediabetes 5.7-6.4%  Diabetes 6.5% or higher - adopted from ADA   consensus guidelines.       Potassium   Date Value Ref Range Status   05/11/2021 4.1 3.4 - 5.3 mmol/L Final     ALT   Date Value Ref Range Status   09/26/2019 42 0 - 70 U/L Final     AST   Date Value Ref Range Status   09/26/2019 16 0 - 45 U/L Final       A1C   8.7     7/22/2021  A1C   7.5     10/2/2020-outside lab  A1C    8.1     7/16/2020  A1C   8.2      2/7/2020   A1C   8.9      9/26/2019    ASSESSMENT/PLAN:    1.  TYPE 1 DIABETES MELLITUS: Uncontrolled type 1 diabetes mellitus with a recent A1C of 8.7 %.  Rudi has been having high blood sugars from 9:30 am to 1 pm.  I made the following changes in his basal insulin rates below:  Midnight = 2.0 units/hr.  4 am = 1.8 units/hr.  9 am = 1.75 units/ hr ( was 1.65 ).  1300= 1.85 units/hr ( was 1.8 ).  2000= 2.0 units/hr.  No change in I:C ratio schedule.  I asked Rudi to use a temporary basal rate reduction 1 hr prior to, during and 1 hr after exercise and increase activity to help prevent hypoglycemia.  Pt instructed to schedule an annual Oph exam.  He denies any sx of neuropathy or foot ulcers or sores at this time.  Pt's TSH was normal on 7/22/2021.  Rudi received the COVID vaccines (Moderna).    2.  HX OF ESRD: S/P kidney transplant on 10/1/019 doing well.  Most recent creat was 1.13  with GFR > 60 mL/min on 7/22/2021.  Urine protein  negative in May 2021.    3. OBESITY: He has met with our dietitian for weight loss support.    4.  HTN: No vital today.Continue current RX.  I asked him to check his BP at home twice a week and provide BP readings for his transplant team.    5.  HYPERLIPIDEMIA: LDL 69 in Oct 2020.  Pt is taking Lipitor daily.    6.  FOLLOW UP : with me in 3 months.  Pt instructed to call me or send a MyChart note if he has any questions regarding his diabetes care.    Time spent reviewing chart, labs and Medtronic 670G insulin pump data today = 10 minutes.  Time for video visit today = 25 minutes.  Time for documentation today = 15 minutes.    TOTAL TIME FOR VISIT TODAY = 50 minutes.    Kymberly Boswell PA-C

## 2021-09-07 ENCOUNTER — MEDICAL CORRESPONDENCE (OUTPATIENT)
Dept: HEALTH INFORMATION MANAGEMENT | Facility: CLINIC | Age: 41
End: 2021-09-07

## 2021-09-08 ENCOUNTER — DOCUMENTATION ONLY (OUTPATIENT)
Dept: ENDOCRINOLOGY | Facility: CLINIC | Age: 41
End: 2021-09-08

## 2021-10-05 DIAGNOSIS — E78.5 DYSLIPIDEMIA: ICD-10-CM

## 2021-10-05 RX ORDER — ATORVASTATIN CALCIUM 10 MG/1
TABLET, FILM COATED ORAL
Qty: 90 TABLET | Refills: 3 | Status: SHIPPED | OUTPATIENT
Start: 2021-10-05 | End: 2022-09-30

## 2021-10-10 ENCOUNTER — HEALTH MAINTENANCE LETTER (OUTPATIENT)
Age: 41
End: 2021-10-10

## 2021-10-15 ENCOUNTER — VIRTUAL VISIT (OUTPATIENT)
Dept: ENDOCRINOLOGY | Facility: CLINIC | Age: 41
End: 2021-10-15
Payer: MEDICARE

## 2021-10-15 DIAGNOSIS — E10.65 TYPE 1 DIABETES MELLITUS WITH HYPERGLYCEMIA (H): Primary | ICD-10-CM

## 2021-10-15 PROCEDURE — 99215 OFFICE O/P EST HI 40 MIN: CPT | Mod: 95 | Performed by: PHYSICIAN ASSISTANT

## 2021-10-15 NOTE — PROGRESS NOTES
Rudi is a 41 year old who is being evaluated via a billable video visit.      How would you like to obtain your AVS? [employer]  If the video visit is dropped, the invitation should be resent by: Text to cell phone: [phone]  Will anyone else be joining your video visit? No     Outcome for 10/15/21 2:35 PM :Reached patient but they declined to share any data because patient will try to upload info prior to appointment today    Erika Dejesus MA  We appreciate your assistance in coordinating your healthcare.     Please upload your insulin pump, blood sugar meter and/or continuous glucose monitor at home 1-2 days before your next diabetes-related appointment.   This will allow your provider to review your  data before your scheduled virtual visit.    To ask a question to your Endocrine care team, please send them a [employer]Star message, or reach them by phone at [phone]     To expedite your medication refill(s), please contact your pharmacy and have them   fax a refill request to: [phone].  *Please allow 3 business days for routine medication refills.  *Please allow 5 business days for controlled substance medication refills.    For after-hours urgent Endocrine issues, that do not require 521, please dial [phone], and ask to speak with the Endocrinologist On-Call

## 2021-10-15 NOTE — LETTER
10/15/2021       RE: Michael Amin  [address]  [facility] [address]     Dear Colleague,    Thank you for referring your patient, Michael Amin, to the [facility] [place] [facility]. Please see a copy of my visit note below.    Rudi is a 41 year old who is being evaluated via a billable video visit.      How would you like to obtain your AVS? [employer]  If the video visit is dropped, the invitation should be resent by: Text to cell phone: [phone]  Will anyone else be joining your video visit? No     Outcome for 10/15/21 2:35 PM :Reached patient but they declined to share any data because patient will try to upload info prior to appointment today    Erika Dejesus MA  We appreciate your assistance in coordinating your healthcare.     Please upload your insulin pump, blood sugar meter and/or continuous glucose monitor at home 1-2 days before your next diabetes-related appointment.   This will allow your provider to review your  data before your scheduled virtual visit.    To ask a question to your Endocrine care team, please send them a BATS message, or reach them by phone at [phone]     To expedite your medication refill(s), please contact your pharmacy and have them   fax a refill request to: [phone].  *Please allow 3 business days for routine medication refills.  *Please allow 5 business days for controlled substance medication refills.    For after-hours urgent Endocrine issues, that do not require 911, please dial [phone], and ask to speak with the Endocrinologist On-Call          Due to the COVID 19 pandemic this visit was converted to a video visit in order to help prevent spread of infection in this patient and the general population.    Time of start: 4:00 pm  Time of end: 4:27 pm  Total duration of video visit: 27 minutes.    ELIZABETH Grijalva ) Michael is a 40 year old male with type  1 diabetes mellitus.  Video visit today for diabetes follow up.  Pt has hx of type 1 diabetes mellitus dx at age 16.  His diabetes is complicated by nephropathy and he underwent a kidney transplant on 10/1/2019.  His last Oph exam showed possible early retinopathy.  He has no peripheral neuropathy.  His hx is also significant for HTN, ED, dyslipidemia and obesity.  For his diabetes, he is currently using a Medtronic 670G insulin pump and Guardian3 sensor.  His basal insulin rates are set at:  Midnight= 2.0 units/hr.  4 am = 1.8 units/hr.  9 am = 1.75 units/hr.  1 pm = 1.85 units/hr.  20:00 = 2.0 units/hr.  I/C ratio settings:  Midnight= 1:7.5  11:30 am = 1:5.7  1800= 1:5.7  Sensitivity is set at 29.  Pt's A1C was 8.7 % on 7/22/2021. Previous A1C was 7.5 % on 10/2/2020.   I do not have his insulin pump download at this time.   Once I receive his insulin pump download and review the data, I will send him a [employer] message if there needs to be any changes in his insulin pump settings.  On ROS today,he reports doing well.  He is back at the gym.  Pt denies frequent headaches, blurred vision, n/v, SOB at rest, cough, fever, chills, chest pain, abd pain, diarrhea, dysuria, hematuria or foot ulcers.  Rudi denies numbness, tingling or pain in his feet or hands.  He received the COVID19 vaccines (Moderna).    Diabetes Care  Retinopathy: he was seen by Oph in Feb 2019- possible early retinopathy. He states he was seen by Oph a few months ago with stable eye exam.  Nephropathy:hx of ESRD s/p kidney transplant on 10/16776.   Neuropathy: none.  Foot Exam:no exam today.  Taking aspirin: yes  Lipids: LDL 69 in Oct 2020. Pt is taking Lipitor.  CAD: no.  Mental health:denies depression.  Insulin:Medtronic 670G insulin pump with Guardian3 sensor.    ROS  Please see under HPI.    Allergies  Allergies   Allergen Reactions     Thymoglobulin      Had reaction with rigors after steroid-free dose. No reaction with steroids.  "      Medications  Current Outpatient Medications   Medication Sig Dispense Refill     alcohol swab prep pads Use to swab area of injection/zak as directed. 100 each 3     aspirin (ASA) 81 MG EC tablet Take 81 mg by mouth daily        atorvastatin (LIPITOR) 10 MG tablet TAKE 1 TABLET(10 MG) BY MOUTH DAILY 90 tablet 3     blood glucose (CONTOUR NEXT TEST) test strip Use to test blood sugar 4 times daily. 400 strip 11     blood glucose monitoring (ACCU-CHEK FASTCLIX) lancets U QID OR MORE OFTEN PRN  1     carvedilol (COREG) 12.5 MG tablet Take 1 tablet (12.5 mg) by mouth 2 times daily (with meals) 180 tablet 1     insulin lispro (HUMALOG VIAL) 100 UNIT/ML vial Inject 100 Units Subcutaneous daily With Insulin pump. Up to 100 units 30 mL 11     insulin pen needle (ULTICARE MICRO) 32G X 4 MM miscellaneous Use pen needles daily or as directed. 100 each 3     Insulin Syringe-Needle U-100 31G X 1/4\" 1 ML MISC 1 Syringe as needed (until new insuline pump arrives) 50 each 1     magnesium oxide (MAG-OX) 400 MG tablet Take 1 tablet (400 mg) by mouth daily (with lunch) 30 tablet 5     mycophenolate (GENERIC EQUIVALENT) 250 MG capsule Take 3 capsules (750 mg) by mouth 2 times daily 180 capsule 11     senna-docusate (SENOKOT-S/PERICOLACE) 8.6-50 MG tablet Take 2 tablets by mouth 2 times daily 20 tablet 0     sulfamethoxazole-trimethoprim (BACTRIM) 400-80 MG tablet Take 1 tablet by mouth daily 30 tablet 11     tacrolimus (GENERIC EQUIVALENT) 0.5 MG capsule Take 1 capsule (0.5 mg) by mouth 2 times daily Total dose = 1.5 mg BID 60 capsule 11     tacrolimus (GENERIC EQUIVALENT) 1 MG capsule Take 1 capsule (1 mg) by mouth 2 times daily Total dose = 1.5 mg BID 60 capsule 11     terbinafine (LAMISIL) 1 % external cream Apply topically 2 times daily To rash on left hand 42 g 11       Family History  family history includes Coronary Artery Disease in his father; Diabetes in his father; Skin Cancer in his paternal uncle.    Social " History   reports that he has never smoked. He has never used smokeless tobacco. He reports previous alcohol use. He reports that he does not use drugs.   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    Past Medical History  Past Medical History:   Diagnosis Date     Anemia in chronic kidney disease      Dyslipidemia      ESRD (end stage renal disease) on dialysis (H)      Hypertension      Hypomagnesemia 10/7/2019     Secondary hyperparathyroidism (H)      Type 1 diabetes (H)        Past Surgical History:   Procedure Laterality Date     hair implant  2007     INSERT CATHETER PERITONEAL DIALYSIS  08/2018     IR FINE NEEDLE ASPIRATION W ULTRASOUND  11/25/2019     IR RENAL BIOPSY LEFT  11/25/2019     PERCUTANEOUS BIOPSY KIDNEY Left 10/22/2019    Procedure: Left Kidney Biopsy;  Surgeon: Kash Hoffman MD;  Location:  OR       Physical Exam    No exam today.    RESULTS  Creatinine   Date Value Ref Range Status   05/11/2021 1.13 0.66 - 1.25 mg/dL Final     GFR Estimate   Date Value Ref Range Status   05/11/2021 80 >60 mL/min/[1.73_m2] Final     Comment:     Non  GFR Calc  Starting 12/18/2018, serum creatinine based estimated GFR (eGFR) will be   calculated using the Chronic Kidney Disease Epidemiology Collaboration   (CKD-EPI) equation.       Hemoglobin A1C   Date Value Ref Range Status   07/16/2020 8.1 (H) 0 - 5.6 % Final     Comment:     Normal <5.7% Prediabetes 5.7-6.4%  Diabetes 6.5% or higher - adopted from ADA   consensus guidelines.       Potassium   Date Value Ref Range Status   05/11/2021 4.1 3.4 - 5.3 mmol/L Final     ALT   Date Value Ref Range Status   09/26/2019 42 0 - 70 U/L Final     AST   Date Value Ref Range Status   09/26/2019 16 0 - 45 U/L Final       A1C   8.7     7/22/2021  A1C   7.5     10/2/2020-outside lab  A1C    8.1     7/16/2020  A1C   8.2      2/7/2020   A1C   8.9      9/26/2019    ASSESSMENT/PLAN:    1.  TYPE 1 DIABETES MELLITUS:  I will work on obtaining Rudi's Medtronic 670G insulin pump download and review the data and send him a [employer] message if changes in his pump settings need to be made.  Pt tells me he is interested in obtaining a Tandem insulin pump-control IQ. Will ask our CDE to contact patient.  I asked Rudi to use a temporary basal rate reduction 1 hr prior to, during and 1 hr after exercise and increase activity to help prevent hypoglycemia.  Pt was seen by Oph a few months ago and he states the he was told his eye exam was stable.  He denies any sx of neuropathy or foot ulcers or sores at this time.  Pt's TSH was normal on 7/22/2021.  Rudi received the COVID vaccines (Moderna). He plans to get a booster soon.  Pt plans to get a flu vaccine this week.    2.  HX OF ESRD: S/P kidney transplant on 10/1/019 doing well.  Most recent creat was 1.12  with GFR > 60 mL/min on 8/13/2021.  Urine protein negative in May 2021.    3. OBESITY: He has met with our dietitian for weight loss support.    4.  HTN: He tells me his BP has been in the 120/80 range.  Continue current RX.    5.  HYPERLIPIDEMIA: LDL 69 in Oct 2020.  Pt is taking Lipitor daily.    6.  FOLLOW UP : with me in 3 months.  A1C ordered today.    Time spent reviewing chart and labs and Medtronic 670G insulin pump data today = 6 minutes.  Time for video visit today = 27 minutes.  Time for documentation today = 15 minutes.    TOTAL TIME FOR VISIT TODAY = 48 minutes.    Kymberly Boswell PA-C

## 2021-10-17 NOTE — PROGRESS NOTES
Due to the COVID 19 pandemic this visit was converted to a video visit in order to help prevent spread of infection in this patient and the general population.    Time of start: 4:00 pm  Time of end: 4:27 pm  Total duration of video visit: 27 minutes.    ELIZABETH  Blumedward Rodriguez ( Jake ) is a 40 year old male with type 1 diabetes mellitus.  Video visit today for diabetes follow up.  Pt has hx of type 1 diabetes mellitus dx at age 16.  His diabetes is complicated by nephropathy and he underwent a kidney transplant on 10/1/2019.  His last Oph exam showed possible early retinopathy.  He has no peripheral neuropathy.  His hx is also significant for HTN, ED, dyslipidemia and obesity.  For his diabetes, he is currently using a Medtronic 670G insulin pump and Guardian3 sensor.  His basal insulin rates are set at:  Midnight= 2.0 units/hr.  4 am = 1.8 units/hr.  9 am = 1.75 units/hr.  1 pm = 1.85 units/hr.  20:00 = 2.0 units/hr.  I/C ratio settings:  Midnight= 1:7.5  11:30 am = 1:5.7  1800= 1:5.7  Sensitivity is set at 29.  Pt's A1C was 8.7 % on 7/22/2021. Previous A1C was 7.5 % on 10/2/2020.   I do not have his insulin pump download at this time.   Once I receive his insulin pump download and review the data, I will send him a [employer] message if there needs to be any changes in his insulin pump settings.  On ROS today,he reports doing well.  He is back at the gym.  Pt denies frequent headaches, blurred vision, n/v, SOB at rest, cough, fever, chills, chest pain, abd pain, diarrhea, dysuria, hematuria or foot ulcers.  Rudi denies numbness, tingling or pain in his feet or hands.  He received the COVID19 vaccines (Moderna).    Diabetes Care  Retinopathy: he was seen by Oph in Feb 2019- possible early retinopathy. He states he was seen by Oph a few months ago with stable eye exam.  Nephropathy:hx of ESRD s/p kidney transplant on 10/46117.   Neuropathy: none.  Foot Exam:no exam today.  Taking aspirin: yes  Lipids: LDL 69 in Oct 2020.  "Pt is taking Lipitor.  CAD: no.  Mental health:denies depression.  Insulin:Medtronic 670G insulin pump with Guardian3 sensor.    ROS  Please see under HPI.    Allergies  Allergies   Allergen Reactions     Thymoglobulin      Had reaction with rigors after steroid-free dose. No reaction with steroids.       Medications  Current Outpatient Medications   Medication Sig Dispense Refill     alcohol swab prep pads Use to swab area of injection/zak as directed. 100 each 3     aspirin (ASA) 81 MG EC tablet Take 81 mg by mouth daily        atorvastatin (LIPITOR) 10 MG tablet TAKE 1 TABLET(10 MG) BY MOUTH DAILY 90 tablet 3     blood glucose (CONTOUR NEXT TEST) test strip Use to test blood sugar 4 times daily. 400 strip 11     blood glucose monitoring (ACCU-CHEK FASTCLIX) lancets U QID OR MORE OFTEN PRN  1     carvedilol (COREG) 12.5 MG tablet Take 1 tablet (12.5 mg) by mouth 2 times daily (with meals) 180 tablet 1     insulin lispro (HUMALOG VIAL) 100 UNIT/ML vial Inject 100 Units Subcutaneous daily With Insulin pump. Up to 100 units 30 mL 11     insulin pen needle (ULTICARE MICRO) 32G X 4 MM miscellaneous Use pen needles daily or as directed. 100 each 3     Insulin Syringe-Needle U-100 31G X 1/4\" 1 ML MISC 1 Syringe as needed (until new insuline pump arrives) 50 each 1     magnesium oxide (MAG-OX) 400 MG tablet Take 1 tablet (400 mg) by mouth daily (with lunch) 30 tablet 5     mycophenolate (GENERIC EQUIVALENT) 250 MG capsule Take 3 capsules (750 mg) by mouth 2 times daily 180 capsule 11     senna-docusate (SENOKOT-S/PERICOLACE) 8.6-50 MG tablet Take 2 tablets by mouth 2 times daily 20 tablet 0     sulfamethoxazole-trimethoprim (BACTRIM) 400-80 MG tablet Take 1 tablet by mouth daily 30 tablet 11     tacrolimus (GENERIC EQUIVALENT) 0.5 MG capsule Take 1 capsule (0.5 mg) by mouth 2 times daily Total dose = 1.5 mg BID 60 capsule 11     tacrolimus (GENERIC EQUIVALENT) 1 MG capsule Take 1 capsule (1 mg) by mouth 2 times daily " Total dose = 1.5 mg BID 60 capsule 11     terbinafine (LAMISIL) 1 % external cream Apply topically 2 times daily To rash on left hand 42 g 11       Family History  family history includes Coronary Artery Disease in his father; Diabetes in his father; Skin Cancer in his paternal uncle.    Social History   reports that he has never smoked. He has never used smokeless tobacco. He reports previous alcohol use. He reports that he does not use drugs.   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    Past Medical History  Past Medical History:   Diagnosis Date     Anemia in chronic kidney disease      Dyslipidemia      ESRD (end stage renal disease) on dialysis (H)      Hypertension      Hypomagnesemia 10/7/2019     Secondary hyperparathyroidism (H)      Type 1 diabetes (H)        Past Surgical History:   Procedure Laterality Date     hair implant  2007     INSERT CATHETER PERITONEAL DIALYSIS  08/2018     IR FINE NEEDLE ASPIRATION W ULTRASOUND  11/25/2019     IR RENAL BIOPSY LEFT  11/25/2019     PERCUTANEOUS BIOPSY KIDNEY Left 10/22/2019    Procedure: Left Kidney Biopsy;  Surgeon: Kash Hoffman MD;  Location:  OR       Physical Exam    No exam today.    RESULTS  Creatinine   Date Value Ref Range Status   05/11/2021 1.13 0.66 - 1.25 mg/dL Final     GFR Estimate   Date Value Ref Range Status   05/11/2021 80 >60 mL/min/[1.73_m2] Final     Comment:     Non  GFR Calc  Starting 12/18/2018, serum creatinine based estimated GFR (eGFR) will be   calculated using the Chronic Kidney Disease Epidemiology Collaboration   (CKD-EPI) equation.       Hemoglobin A1C   Date Value Ref Range Status   07/16/2020 8.1 (H) 0 - 5.6 % Final     Comment:     Normal <5.7% Prediabetes 5.7-6.4%  Diabetes 6.5% or higher - adopted from ADA   consensus guidelines.       Potassium   Date Value Ref Range Status   05/11/2021 4.1 3.4 - 5.3 mmol/L Final     ALT   Date Value Ref Range Status    09/26/2019 42 0 - 70 U/L Final     AST   Date Value Ref Range Status   09/26/2019 16 0 - 45 U/L Final       A1C   8.7     7/22/2021  A1C   7.5     10/2/2020-outside lab  A1C    8.1     7/16/2020  A1C   8.2      2/7/2020   A1C   8.9      9/26/2019    ASSESSMENT/PLAN:    1.  TYPE 1 DIABETES MELLITUS: I will work on obtaining Rudi's Medtronic 670G insulin pump download and review the data and send him a [employer] message if changes in his pump settings need to be made.  Pt tells me he is interested in obtaining a Tandem insulin pump-control IQ. Will ask our CDE to contact patient.  I asked Rudi to use a temporary basal rate reduction 1 hr prior to, during and 1 hr after exercise and increase activity to help prevent hypoglycemia.  Pt was seen by Oph a few months ago and he states the he was told his eye exam was stable.  He denies any sx of neuropathy or foot ulcers or sores at this time.  Pt's TSH was normal on 7/22/2021.  Rudi received the COVID vaccines (Moderna). He plans to get a booster soon.  Pt plans to get a flu vaccine this week.    2.  HX OF ESRD: S/P kidney transplant on 10/1/019 doing well.  Most recent creat was 1.12  with GFR > 60 mL/min on 8/13/2021.  Urine protein negative in May 2021.    3. OBESITY: He has met with our dietitian for weight loss support.    4.  HTN: He tells me his BP has been in the 120/80 range.  Continue current RX.    5.  HYPERLIPIDEMIA: LDL 69 in Oct 2020.  Pt is taking Lipitor daily.    6.  FOLLOW UP : with me in 3 months.  A1C ordered today.    Time spent reviewing chart and labs and Medtronic 670G insulin pump data today = 6 minutes.  Time for video visit today = 27 minutes.  Time for documentation today = 15 minutes.    TOTAL TIME FOR VISIT TODAY = 48 minutes.    Kymberly Boswell PA-C

## 2021-10-18 ENCOUNTER — TELEPHONE (OUTPATIENT)
Dept: ENDOCRINOLOGY | Facility: CLINIC | Age: 41
End: 2021-10-18

## 2021-10-18 NOTE — TELEPHONE ENCOUNTER
Lab order mailed to Pt via [employer].   Princess Patel RN on 10/18/2021 at 7:30 AM       ----- Message from Kymberly Boswell PA-C sent at 10/15/2021  4:27 PM CDT -----  Please send lab slip to pt's home to have an A1C done.  Thanks debora boswell

## 2021-10-20 ENCOUNTER — TELEPHONE (OUTPATIENT)
Dept: EDUCATION SERVICES | Facility: CLINIC | Age: 41
End: 2021-10-20

## 2021-10-20 NOTE — TELEPHONE ENCOUNTER
Called patient but voicemail box is full. Sent patient a MyC message with scheduling instructions for Diabetic Ed with Delmy Mercado.

## 2021-11-03 ENCOUNTER — TELEPHONE (OUTPATIENT)
Dept: TRANSPLANT | Facility: CLINIC | Age: 41
End: 2021-11-03

## 2021-11-03 DIAGNOSIS — Z94.0 KIDNEY REPLACED BY TRANSPLANT: Primary | ICD-10-CM

## 2021-11-03 DIAGNOSIS — Z48.298 AFTERCARE FOLLOWING ORGAN TRANSPLANT: ICD-10-CM

## 2021-11-03 DIAGNOSIS — Z79.899 ENCOUNTER FOR LONG-TERM CURRENT USE OF MEDICATION: ICD-10-CM

## 2021-11-03 NOTE — TELEPHONE ENCOUNTER
Overdue for transplant  Labs        Please send message to repeat transplant labs  In one week     Please update lab letter  2 years post kidney transplant  Fax and send copy  To patient    Prior to the appointment  With Dr Indra Krueger

## 2021-11-03 NOTE — LETTER
OUTPATIENT LABORATORY TEST ORDER    Patient Name: Rudi Amin  Transplant Date: 10/1/2019   YOB: 1980  Issue Date & Time: 11/3/2021  8:28 AM  [facility] MR: [id number] Exp. Date (1 year after date issued)      Diagnoses: Kidney Transplant (ICD-10  Z94.0)   Long term use of medications (ICD-10  Z79.899)     Lab results to be available on the same day drawn.   Patient should release information to the [facility].  Please fax to the Transplant Center at [phone].    Every other month   ?Hemogram and Platelet  ?Basic Metabolic Panel (Sodium, Potassium, Chloride, CO2, Creatinine, Urea Nitrogen,     Glucose,   Calcium)         ?/Tacrolimus/Prograf drug level                         Every 6 Months                 ?Urine for protein/creatinine    Yearly   ?PRA/DSA level (mailers provided by the patient)     If you have any questions, please call The Transplant Center at [phone] or [phone].    Please fax labs to [phone]  .

## 2021-11-03 NOTE — TELEPHONE ENCOUNTER
Updated current standing lab orders.  Faxed copy to patients local labs and mailed/mychart lab letter to patient.

## 2021-11-03 NOTE — LETTER
OUTPATIENT LABORATORY TEST ORDER    Patient Name: Rudi Amin  Transplant Date: 10/1/2019   YOB: 1980  Issue Date & Time: 11/3/2021  8:28 AM  [facility] MR: [id number] Exp. Date (1 year after date issued)      Diagnoses: Kidney Transplant (ICD-10  Z94.0)   Long term use of medications (ICD-10  Z79.899)     Lab results to be available on the same day drawn.   Patient should release information to the [facility].  Please fax to the Transplant Center at [phone].    Every other month   ?Hemogram and Platelet  ?Basic Metabolic Panel (Sodium, Potassium, Chloride, CO2, Creatinine, Urea Nitrogen,     Glucose,   Calcium)         ?/Tacrolimus/Prograf drug level                         Every 6 Months                 ?Urine for protein/creatinine    Yearly   ?PRA/DSA level (mailers provided by the patient)     If you have any questions, please call The Transplant Center at [phone] or [phone].    Please fax labs to [phone]  .

## 2021-11-16 ENCOUNTER — OFFICE VISIT (OUTPATIENT)
Dept: NEPHROLOGY | Facility: CLINIC | Age: 41
End: 2021-11-16
Attending: INTERNAL MEDICINE
Payer: MEDICARE

## 2021-11-16 ENCOUNTER — LAB (OUTPATIENT)
Dept: LAB | Facility: CLINIC | Age: 41
End: 2021-11-16
Payer: MEDICARE

## 2021-11-16 VITALS
DIASTOLIC BLOOD PRESSURE: 84 MMHG | OXYGEN SATURATION: 98 % | SYSTOLIC BLOOD PRESSURE: 149 MMHG | WEIGHT: 221.5 LBS | BODY MASS INDEX: 35.75 KG/M2 | HEART RATE: 83 BPM

## 2021-11-16 DIAGNOSIS — Z48.298 AFTERCARE FOLLOWING ORGAN TRANSPLANT: ICD-10-CM

## 2021-11-16 DIAGNOSIS — I15.1 HTN, KIDNEY TRANSPLANT RELATED: ICD-10-CM

## 2021-11-16 DIAGNOSIS — D84.9 IMMUNOSUPPRESSION (H): ICD-10-CM

## 2021-11-16 DIAGNOSIS — E78.5 DYSLIPIDEMIA: ICD-10-CM

## 2021-11-16 DIAGNOSIS — Z94.0 KIDNEY REPLACED BY TRANSPLANT: ICD-10-CM

## 2021-11-16 DIAGNOSIS — E10.65 TYPE 1 DIABETES MELLITUS WITH HYPERGLYCEMIA (H): ICD-10-CM

## 2021-11-16 DIAGNOSIS — Z94.0 HTN, KIDNEY TRANSPLANT RELATED: ICD-10-CM

## 2021-11-16 DIAGNOSIS — Z48.298 AFTERCARE FOLLOWING ORGAN TRANSPLANT: Primary | ICD-10-CM

## 2021-11-16 DIAGNOSIS — Z79.899 ENCOUNTER FOR LONG-TERM CURRENT USE OF MEDICATION: ICD-10-CM

## 2021-11-16 DIAGNOSIS — Z12.83 SKIN CANCER SCREENING: ICD-10-CM

## 2021-11-16 LAB
ANION GAP SERPL CALCULATED.3IONS-SCNC: 8 MMOL/L (ref 3–14)
BUN SERPL-MCNC: 13 MG/DL (ref 7–30)
CALCIUM SERPL-MCNC: 9.1 MG/DL (ref 8.5–10.1)
CHLORIDE BLD-SCNC: 107 MMOL/L (ref 94–109)
CO2 SERPL-SCNC: 25 MMOL/L (ref 20–32)
CREAT SERPL-MCNC: 0.97 MG/DL (ref 0.66–1.25)
CREAT UR-MCNC: 53 MG/DL
ERYTHROCYTE [DISTWIDTH] IN BLOOD BY AUTOMATED COUNT: 13.2 % (ref 10–15)
GFR SERPL CREATININE-BSD FRML MDRD: >90 ML/MIN/1.73M2
GLUCOSE BLD-MCNC: 93 MG/DL (ref 70–99)
HBA1C MFR BLD: 8.4 % (ref 0–5.6)
HCT VFR BLD AUTO: 41.6 % (ref 40–53)
HGB BLD-MCNC: 13.3 G/DL (ref 13.3–17.7)
MCH RBC QN AUTO: 26.8 PG (ref 26.5–33)
MCHC RBC AUTO-ENTMCNC: 32 G/DL (ref 31.5–36.5)
MCV RBC AUTO: 84 FL (ref 78–100)
PLATELET # BLD AUTO: 194 10E3/UL (ref 150–450)
POTASSIUM BLD-SCNC: 4 MMOL/L (ref 3.4–5.3)
PROT UR-MCNC: 0.06 G/L
PROT/CREAT 24H UR: 0.11 G/G CR (ref 0–0.2)
RBC # BLD AUTO: 4.96 10E6/UL (ref 4.4–5.9)
SODIUM SERPL-SCNC: 140 MMOL/L (ref 133–144)
WBC # BLD AUTO: 6.9 10E3/UL (ref 4–11)

## 2021-11-16 PROCEDURE — 83036 HEMOGLOBIN GLYCOSYLATED A1C: CPT | Performed by: PATHOLOGY

## 2021-11-16 PROCEDURE — 86833 HLA CLASS II HIGH DEFIN QUAL: CPT | Performed by: INTERNAL MEDICINE

## 2021-11-16 PROCEDURE — 80048 BASIC METABOLIC PNL TOTAL CA: CPT | Performed by: PATHOLOGY

## 2021-11-16 PROCEDURE — 85027 COMPLETE CBC AUTOMATED: CPT | Performed by: PATHOLOGY

## 2021-11-16 PROCEDURE — 84156 ASSAY OF PROTEIN URINE: CPT | Performed by: PATHOLOGY

## 2021-11-16 PROCEDURE — 86832 HLA CLASS I HIGH DEFIN QUAL: CPT | Performed by: INTERNAL MEDICINE

## 2021-11-16 PROCEDURE — 99214 OFFICE O/P EST MOD 30 MIN: CPT | Performed by: INTERNAL MEDICINE

## 2021-11-16 PROCEDURE — 36415 COLL VENOUS BLD VENIPUNCTURE: CPT | Performed by: PATHOLOGY

## 2021-11-16 ASSESSMENT — PAIN SCALES - GENERAL: PAINLEVEL: NO PAIN (0)

## 2021-11-16 NOTE — LETTER
11/16/2021     RE: Michael Amin  [address]  [facility] [address]     Dear Colleague,    Thank you for referring your patient, Michael Amin, to the [facility] [place] [facility]. Please see a copy of my visit note below.    CHRONIC TRANSPLANT NEPHROLOGY VISIT    Assessment & Plan      # LDKT: Stable               - Baseline Cr ~ 1.1-1.3 mg/dL               - Proteinuria: Minimal (0.2-0.5 grams)  - unable to calculate due to being low ( Mar/2021)              - Date DSA Last Checked: Dec/2020      Latest DSA: No              - BK Viremia: Mar/2021 - No             - Kidney Tx Biopsy: Nov 25, 2019; Result: Material insufficient for assessment with no glomeruli.                                              Oct 22, 2019; Result: Borderline acute cellular-mediated rejection      # Immunosuppression: Tacrolimus immediate release (goal 4-6) and Mycophenolate mofetil (dose 750 mg every 12 hours)              - Changes: No   - Continue with intensive monitoring of immunosuppression for efficacy and toxicity.     # Infection Prophylaxis:   - PJP: Sulfa/TMP (Bactrim)     # HTN : high BP readings in office but well controlled per home readings, cuff has been validated in the past, advised to continue to monitor  On Coreg 12.5 mg BID . Target BP< 130/80  Current Home BP : 120s/75-85  Weight :   Wt Readings from Last 3 Encounters:   11/16/21 100.5 kg (221 lb 8 oz)   05/11/21 101.3 kg (223 lb 6.4 oz)   11/13/20 88.8 kg (195 lb 12.8 oz)     Volume status - euvolemic   Changes - No       # Diabetes: Borderline control (HbA1c 7-9%)           Last HbA1c: 8.4%              - Management as per Endocrinology.              - On insulin pump, he is working on weight management to be listed for pancreas transplant.       # Anemia in Chronic Renal Disease: Hgb: Stable      JOSE: No              - Iron studies: not checked recently     #  Overweight: Stable to slightly decreased weight.              - continues to work on exercise and watching caloric intake.     # Medical Compliance: Yes     # COVID-19 Virus Review: Discussed COVID-19 virus and the potential medical risks.  Reviewed preventative health recommendations, which includes washing hands for 20 seconds, avoid touching your face, and social distancing.  Asked patient to inform the transplant center if they are exposed or diagnosed with this virus   COVID19 vaccine - completed 2 doses of Moderna  - last dose in April 2021    # Skin Cancer Risk:               -  Counseled patient about increased risk of skin cancers while on immunosuppressants. Discussed sun protection and recommend regular follow up with Dermatology. He has established himself with dermatologist     # Transplant History:  Etiology of Kidney Failure: Diabetes mellitus type 1  Tx: LDKT  Transplant: 10/1/2019 (Kidney)  Donor Type: Living      Donor Class:   Crossmatch at time of Tx: negative  DSA at time of Tx: No  Significant changes in immunosuppression: None  CMV IgG Ab High Risk Discordance (D+/R-): No  EBV IgG Ab High Risk Discordance (D+/R-): No  Significant transplant-related complications: None      Transplant Office Phone Number: [phone]    Assessment and plan was discussed with the patient and he voiced his understanding and agreement.    Return visit: Return in about 6 months (around 5/16/2022).       Chief Complaint   Mr. Amin is a 41 year old here for routine follow up, kidney transplant and immunosuppression management.      History of Present Illness      Feels ok overall though expressed few concerns. A bit frustrated about not being able to achieve weight loss goal needed to be active on the pancreas transplant list. Though he has been exercising at home (treadmill) and watching his diet but he has not been able to go to the gym and do more weight lifting which has helped in the past with weight loss.  He has received covid vaccine x 2 doses but has not been able to receive the booster dose; he scheduled but pfizer was the only available formulation. 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    - COVID vaccine: 2 doses of moderna March-not yet booster due to different formulation   - due for dermatology f/up canceled-needed to be rescheduled per physician office    Home BP: 120s/75-85      Weight trend  Wt Readings from Last 3 Encounters:   11/16/21 100.5 kg (221 lb 8 oz)   05/11/21 101.3 kg (223 lb 6.4 oz)   11/13/20 88.8 kg (195 lb 12.8 oz)         Problem List   Patient Active Problem List   Diagnosis     HTN, kidney transplant related     Diabetes mellitus type 1 (H)     Dyslipidemia     Anemia in chronic kidney disease     Secondary renal hyperparathyroidism (H)     Kidney replaced by transplant     Aftercare following organ transplant     Vitamin D deficiency     Hypomagnesemia     Kidney transplant rejection       Allergies   Allergies   Allergen Reactions     Thymoglobulin      Had reaction with rigors after steroid-free dose. No reaction with steroids.       Medications   Current Outpatient Medications   Medication Sig     alcohol swab prep pads Use to swab area of injection/zak as directed.     aspirin (ASA) 81 MG EC tablet Take 81 mg by mouth daily      atorvastatin (LIPITOR) 10 MG tablet TAKE 1 TABLET(10 MG) BY MOUTH DAILY     blood glucose (CONTOUR NEXT TEST) test strip Use to test blood sugar 4 times daily.     blood glucose monitoring (ACCU-CHEK FASTCLIX) lancets U QID OR MORE OFTEN PRN     carvedilol (COREG) 12.5 MG tablet Take 1 tablet (12.5 mg) by mouth 2 times daily (with meals)     insulin lispro (HUMALOG VIAL) 100 UNIT/ML vial Inject 100 Units Subcutaneous daily With Insulin pump. Up to 100 units     insulin pen needle (ULTICARE  "MICRO) 32G X 4 MM miscellaneous Use pen needles daily or as directed.     Insulin Syringe-Needle U-100 31G X 1/4\" 1 ML MISC 1 Syringe as needed (until new insuline pump arrives)     magnesium oxide (MAG-OX) 400 MG tablet Take 1 tablet (400 mg) by mouth daily (with lunch)     mycophenolate (GENERIC EQUIVALENT) 250 MG capsule Take 3 capsules (750 mg) by mouth 2 times daily     senna-docusate (SENOKOT-S/PERICOLACE) 8.6-50 MG tablet Take 2 tablets by mouth 2 times daily     sulfamethoxazole-trimethoprim (BACTRIM) 400-80 MG tablet Take 1 tablet by mouth daily     tacrolimus (GENERIC EQUIVALENT) 0.5 MG capsule Take 1 capsule (0.5 mg) by mouth 2 times daily Total dose = 1.5 mg BID     tacrolimus (GENERIC EQUIVALENT) 1 MG capsule Take 1 capsule (1 mg) by mouth 2 times daily Total dose = 1.5 mg BID     terbinafine (LAMISIL) 1 % external cream Apply topically 2 times daily To rash on left hand     No current facility-administered medications for this visit.     There are no discontinued medications.    Physical Exam   Vital Signs: BP (!) 149/84   Pulse 83   Wt 100.5 kg (221 lb 8 oz)   SpO2 98%   BMI 35.75 kg/m      GENERAL APPEARANCE: alert and no distress  HENT: mouth without ulcers or lesions  LYMPHATICS: no cervical or supraclavicular nodes  RESP: lungs clear to auscultation - no rales, rhonchi or wheezes  CV: regular rhythm, normal rate, no rub, no murmur  EDEMA: no LE edema bilaterally  ABDOMEN: soft, nondistended, nontender, bowel sounds normal  MS: extremities normal - no gross deformities noted, no evidence of inflammation in joints, no muscle tenderness  SKIN: no rash. Small furuncle in the mid thoracic back       Data     Renal Latest Ref Rng & Units 11/16/2021 8/13/2021 7/22/2021   Na 133 - 144 mmol/L 140 - -   Na (external) 134 - 143 mEq/L - 135 140   K 3.4 - 5.3 mmol/L 4.0 - -   K (external) 3.4 - 5.1 mEq/L - 4.6 4.1   Cl 94 - 109 mmol/L 107 - -   Cl (external) 99 - 110 mEq/L - 102 104   CO2 20 - 32 mmol/L 25 " - -   CO2 (external) 19 - 29 mEq/L - 24 26   BUN 7 - 30 mg/dL 13 - -   BUN (external) 5 - 24 mg/dL - 14 14   Cr 0.66 - 1.25 mg/dL 0.97 - -   Cr (external) 0.70 - 1.20 mg/dL - 1.12 1.13   Glucose 70 - 99 mg/dL 93 - -   Glucose (external) 70 - 99 mg/dL - 283(H) 75   Ca  8.5 - 10.1 mg/dL 9.1 - -   Ca (external) 8.4 - 10.5 mg/dL - 9.1 9.5   Mg 1.6 - 2.3 mg/dL - - -   Mg (external) 1.8 - 2.7 mg/dL - - -     Bone Health Latest Ref Rng & Units 3/31/2020 2/3/2020 1/27/2020   Phos 2.5 - 4.5 mg/dL - - -   Phos (external) 2.5 - 4.6 mg/dL 3.3 3.5 3.3   PTHi 18 - 80 pg/mL - - -   Vit D Def 20 - 75 ug/L - - -     Heme Latest Ref Rng & Units 11/16/2021 8/13/2021 7/22/2021   WBC 4.0 - 11.0 10e3/uL 6.9 - -   WBC (external) 3.2 - 11.0 10*9/L - 7.2 7.3   Hgb 13.3 - 17.7 g/dL 13.3 - -   Hgb (external) 12.9 - 16.9 g/dL - 13.2 13.6   Plt 150 - 450 10e3/uL 194 - -   Plt (external) 130 - 375 10*9/L - 164 186   ABSOLUTE NEUTROPHIL 1.6 - 8.3 10e9/L - - -   ABSOLUTE NEUTROPHILS (EXTERNAL) 1.9 - 8.1 x10:3/uL - - -   ABSOLUTE LYMPHOCYTES 0.8 - 5.3 10e9/L - - -   ABSOLUTE LYMPHOCYTES (EXTERNAL) 1.0 - 3.9 x10:3/uL - - -   ABSOLUTE MONOCYTES 0.0 - 1.3 10e9/L - - -   ABSOLUTE MONOCYTES (EXTERNAL) 0.1 - 0.9 x10:3/uL - - -   ABSOLUTE EOSINOPHILS 0.0 - 0.7 10e9/L - - -   ABSOLUTE EOSINOPHILS (EXTERNAL) 0.0 - 0.5 x10:3/uL - - -   ABSOLUTE BASOPHILS 0.0 - 0.2 10e9/L - - -   ABSOLUTE BASOPHILS (EXTERNAL) 0.0 - 0.2 x10:3/uL - - -   ABS IMMATURE GRANULOCYTES 0 - 0.4 10e9/L - - -   ABSOLUTE NUCLEATED RBC - - - -     Liver Latest Ref Rng & Units 9/26/2019 8/6/2019 1/7/2019   AP 40 - 150 U/L 100 - 157(H)   TBili 0.2 - 1.3 mg/dL 0.2 - 0.2   ALT 0 - 70 U/L 42 - 46   AST 0 - 45 U/L 16 - 15   Tot Protein 6.8 - 8.8 g/dL 7.5 - 7.9   Tot Protein (external) 5.7 - 8.2 g/dL - 6.6 -   Albumin 3.4 - 5.0 g/dL 3.5 - 3.5   Albumin (external) 3.2 - 4.8 g/dL - 4.4 -     Pancreas Latest Ref Rng & Units 11/16/2021 7/16/2020 2/7/2020   A1C 0.0 - 5.6 % 8.4(H) 8.1(H) 8.2(H)      Iron studies Latest Ref Rng & Units 9/26/2019 8/6/2019   Iron 35 - 180 ug/dL 70 -   Iron sat 15 - 46 % 28 -   Ferritin 26 - 388 ng/mL 753(H) -   Ferritin (external) 22 - 322 ng/mL - 578(H)     [facility] Txp Virology Latest Ref Rng & Units 8/13/2021 7/22/2021 6/18/2021   BK Spec - - - -   BK Res BKNEG:BK Virus DNA Not Detected copies/mL - - -   BK Log <2.7 Log copies/mL - - -   BK Quant Log Ext log IU/mL Undetected - -   BK Quant Result Ext Undetected IU/mL Undetected None Detected None Detected   BK Quant Spec Ext - - Blood Blood   EBV CAPSID ANTIBODY IGG 0.0 - 0.8 AI - - -   Hep B Core NR:Nonreactive - - -   Hep B Surf Ext  Multiple values view image mIU/mL - - -   HIV 1&2 Ext Nonreactive - - -        Recent Labs   Lab Test 12/09/19  1002 02/07/20  0858 05/11/21  1531   DOSTAC 2130,12/8/19 2/6/20 2105 2,000   TACROL 10.4 9.2 6.3     Recent Labs   Lab Test 10/07/19  0742 11/21/19  0813   DOSMPA Not Provided 11/20/19 1800   MPACID 0.82* 0.91*   MPAG 14.1* 39.5     Again, thank you for allowing me to participate in the care of your patient.      Sincerely,    Indra Roman MD

## 2021-11-16 NOTE — PROGRESS NOTES
CHRONIC TRANSPLANT NEPHROLOGY VISIT    Assessment & Plan      # LDKT: Stable               - Baseline Cr ~ 1.1-1.3 mg/dL               - Proteinuria: Minimal (0.2-0.5 grams)  - unable to calculate due to being low ( Mar/2021)              - Date DSA Last Checked: Dec/2020      Latest DSA: No              - BK Viremia: Mar/2021 - No             - Kidney Tx Biopsy: Nov 25, 2019; Result: Material insufficient for assessment with no glomeruli.                                              Oct 22, 2019; Result: Borderline acute cellular-mediated rejection      # Immunosuppression: Tacrolimus immediate release (goal 4-6) and Mycophenolate mofetil (dose 750 mg every 12 hours)              - Changes: No   - Continue with intensive monitoring of immunosuppression for efficacy and toxicity.     # Infection Prophylaxis:   - PJP: Sulfa/TMP (Bactrim)     # HTN : high BP readings in office but well controlled per home readings, cuff has been validated in the past, advised to continue to monitor  On Coreg 12.5 mg BID . Target BP< 130/80  Current Home BP : 120s/75-85  Weight :   Wt Readings from Last 3 Encounters:   11/16/21 100.5 kg (221 lb 8 oz)   05/11/21 101.3 kg (223 lb 6.4 oz)   11/13/20 88.8 kg (195 lb 12.8 oz)     Volume status - euvolemic   Changes - No       # Diabetes: Borderline control (HbA1c 7-9%)           Last HbA1c: 8.4%              - Management as per Endocrinology.              - On insulin pump, he is working on weight management to be listed for pancreas transplant.       # Anemia in Chronic Renal Disease: Hgb: Stable      JOSE: No              - Iron studies: not checked recently     # Overweight: Stable to slightly decreased weight.              - continues to work on exercise and watching caloric intake.     # Medical Compliance: Yes     # COVID-19 Virus Review: Discussed COVID-19 virus and the potential medical risks.  Reviewed preventative health recommendations, which includes washing hands for 20  seconds, avoid touching your face, and social distancing.  Asked patient to inform the transplant center if they are exposed or diagnosed with this virus   COVID19 vaccine - completed 2 doses of Moderna  - last dose in April 2021    # Skin Cancer Risk:               -  Counseled patient about increased risk of skin cancers while on immunosuppressants. Discussed sun protection and recommend regular follow up with Dermatology. He has established himself with dermatologist     # Transplant History:  Etiology of Kidney Failure: Diabetes mellitus type 1  Tx: LDKT  Transplant: 10/1/2019 (Kidney)  Donor Type: Living      Donor Class:   Crossmatch at time of Tx: negative  DSA at time of Tx: No  Significant changes in immunosuppression: None  CMV IgG Ab High Risk Discordance (D+/R-): No  EBV IgG Ab High Risk Discordance (D+/R-): No  Significant transplant-related complications: None      Transplant Office Phone Number: [phone]    Assessment and plan was discussed with the patient and he voiced his understanding and agreement.    Return visit: Return in about 6 months (around 5/16/2022).       Chief Complaint   Mr. Amin is a 41 year old here for routine follow up, kidney transplant and immunosuppression management.      History of Present Illness      Feels ok overall though expressed few concerns. A bit frustrated about not being able to achieve weight loss goal needed to be active on the pancreas transplant list. Though he has been exercising at home (treadmill) and watching his diet but he has not been able to go to the gym and do more weight lifting which has helped in the past with weight loss. He has received covid vaccine x 2 doses but has not been able to receive the booster dose; he scheduled but pfizer was the only available formulation. 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    - COVID vaccine: 2 doses of moderna March-not yet booster due to different formulation   - due for dermatology f/up canceled-needed to be rescheduled per physician office    Home BP: 120s/75-85      Weight trend  Wt Readings from Last 3 Encounters:   11/16/21 100.5 kg (221 lb 8 oz)   05/11/21 101.3 kg (223 lb 6.4 oz)   11/13/20 88.8 kg (195 lb 12.8 oz)         Problem List   Patient Active Problem List   Diagnosis     HTN, kidney transplant related     Diabetes mellitus type 1 (H)     Dyslipidemia     Anemia in chronic kidney disease     Secondary renal hyperparathyroidism (H)     Kidney replaced by transplant     Aftercare following organ transplant     Vitamin D deficiency     Hypomagnesemia     Kidney transplant rejection       Allergies   Allergies   Allergen Reactions     Thymoglobulin      Had reaction with rigors after steroid-free dose. No reaction with steroids.       Medications   Current Outpatient Medications   Medication Sig     alcohol swab prep pads Use to swab area of injection/zak as directed.     aspirin (ASA) 81 MG EC tablet Take 81 mg by mouth daily      atorvastatin (LIPITOR) 10 MG tablet TAKE 1 TABLET(10 MG) BY MOUTH DAILY     blood glucose (CONTOUR NEXT TEST) test strip Use to test blood sugar 4 times daily.     blood glucose monitoring (ACCU-CHEK FASTCLIX) lancets U QID OR MORE OFTEN PRN     carvedilol (COREG) 12.5 MG tablet Take 1 tablet (12.5 mg) by mouth 2 times daily (with meals)     insulin lispro (HUMALOG VIAL) 100 UNIT/ML vial Inject 100 Units Subcutaneous daily With Insulin pump. Up to 100 units     insulin pen needle (ULTICARE MICRO) 32G X 4 MM miscellaneous Use pen needles daily or as directed.     Insulin Syringe-Needle U-100 31G X 1/4\" 1 ML MISC 1 Syringe as needed (until new insuline pump arrives)     magnesium oxide (MAG-OX) 400 MG tablet Take 1 tablet (400 mg) by mouth daily (with lunch)     mycophenolate (GENERIC EQUIVALENT) 250 MG " capsule Take 3 capsules (750 mg) by mouth 2 times daily     senna-docusate (SENOKOT-S/PERICOLACE) 8.6-50 MG tablet Take 2 tablets by mouth 2 times daily     sulfamethoxazole-trimethoprim (BACTRIM) 400-80 MG tablet Take 1 tablet by mouth daily     tacrolimus (GENERIC EQUIVALENT) 0.5 MG capsule Take 1 capsule (0.5 mg) by mouth 2 times daily Total dose = 1.5 mg BID     tacrolimus (GENERIC EQUIVALENT) 1 MG capsule Take 1 capsule (1 mg) by mouth 2 times daily Total dose = 1.5 mg BID     terbinafine (LAMISIL) 1 % external cream Apply topically 2 times daily To rash on left hand     No current facility-administered medications for this visit.     There are no discontinued medications.    Physical Exam   Vital Signs: BP (!) 149/84   Pulse 83   Wt 100.5 kg (221 lb 8 oz)   SpO2 98%   BMI 35.75 kg/m      GENERAL APPEARANCE: alert and no distress  HENT: mouth without ulcers or lesions  LYMPHATICS: no cervical or supraclavicular nodes  RESP: lungs clear to auscultation - no rales, rhonchi or wheezes  CV: regular rhythm, normal rate, no rub, no murmur  EDEMA: no LE edema bilaterally  ABDOMEN: soft, nondistended, nontender, bowel sounds normal  MS: extremities normal - no gross deformities noted, no evidence of inflammation in joints, no muscle tenderness  SKIN: no rash. Small furuncle in the mid thoracic back       Data     Renal Latest Ref Rng & Units 11/16/2021 8/13/2021 7/22/2021   Na 133 - 144 mmol/L 140 - -   Na (external) 134 - 143 mEq/L - 135 140   K 3.4 - 5.3 mmol/L 4.0 - -   K (external) 3.4 - 5.1 mEq/L - 4.6 4.1   Cl 94 - 109 mmol/L 107 - -   Cl (external) 99 - 110 mEq/L - 102 104   CO2 20 - 32 mmol/L 25 - -   CO2 (external) 19 - 29 mEq/L - 24 26   BUN 7 - 30 mg/dL 13 - -   BUN (external) 5 - 24 mg/dL - 14 14   Cr 0.66 - 1.25 mg/dL 0.97 - -   Cr (external) 0.70 - 1.20 mg/dL - 1.12 1.13   Glucose 70 - 99 mg/dL 93 - -   Glucose (external) 70 - 99 mg/dL - 283(H) 75   Ca  8.5 - 10.1 mg/dL 9.1 - -   Ca (external) 8.4 -  10.5 mg/dL - 9.1 9.5   Mg 1.6 - 2.3 mg/dL - - -   Mg (external) 1.8 - 2.7 mg/dL - - -     Bone Health Latest Ref Rng & Units 3/31/2020 2/3/2020 1/27/2020   Phos 2.5 - 4.5 mg/dL - - -   Phos (external) 2.5 - 4.6 mg/dL 3.3 3.5 3.3   PTHi 18 - 80 pg/mL - - -   Vit D Def 20 - 75 ug/L - - -     Heme Latest Ref Rng & Units 11/16/2021 8/13/2021 7/22/2021   WBC 4.0 - 11.0 10e3/uL 6.9 - -   WBC (external) 3.2 - 11.0 10*9/L - 7.2 7.3   Hgb 13.3 - 17.7 g/dL 13.3 - -   Hgb (external) 12.9 - 16.9 g/dL - 13.2 13.6   Plt 150 - 450 10e3/uL 194 - -   Plt (external) 130 - 375 10*9/L - 164 186   ABSOLUTE NEUTROPHIL 1.6 - 8.3 10e9/L - - -   ABSOLUTE NEUTROPHILS (EXTERNAL) 1.9 - 8.1 x10:3/uL - - -   ABSOLUTE LYMPHOCYTES 0.8 - 5.3 10e9/L - - -   ABSOLUTE LYMPHOCYTES (EXTERNAL) 1.0 - 3.9 x10:3/uL - - -   ABSOLUTE MONOCYTES 0.0 - 1.3 10e9/L - - -   ABSOLUTE MONOCYTES (EXTERNAL) 0.1 - 0.9 x10:3/uL - - -   ABSOLUTE EOSINOPHILS 0.0 - 0.7 10e9/L - - -   ABSOLUTE EOSINOPHILS (EXTERNAL) 0.0 - 0.5 x10:3/uL - - -   ABSOLUTE BASOPHILS 0.0 - 0.2 10e9/L - - -   ABSOLUTE BASOPHILS (EXTERNAL) 0.0 - 0.2 x10:3/uL - - -   ABS IMMATURE GRANULOCYTES 0 - 0.4 10e9/L - - -   ABSOLUTE NUCLEATED RBC - - - -     Liver Latest Ref Rng & Units 9/26/2019 8/6/2019 1/7/2019   AP 40 - 150 U/L 100 - 157(H)   TBili 0.2 - 1.3 mg/dL 0.2 - 0.2   ALT 0 - 70 U/L 42 - 46   AST 0 - 45 U/L 16 - 15   Tot Protein 6.8 - 8.8 g/dL 7.5 - 7.9   Tot Protein (external) 5.7 - 8.2 g/dL - 6.6 -   Albumin 3.4 - 5.0 g/dL 3.5 - 3.5   Albumin (external) 3.2 - 4.8 g/dL - 4.4 -     Pancreas Latest Ref Rng & Units 11/16/2021 7/16/2020 2/7/2020   A1C 0.0 - 5.6 % 8.4(H) 8.1(H) 8.2(H)     Iron studies Latest Ref Rng & Units 9/26/2019 8/6/2019   Iron 35 - 180 ug/dL 70 -   Iron sat 15 - 46 % 28 -   Ferritin 26 - 388 ng/mL 753(H) -   Ferritin (external) 22 - 322 ng/mL - 578(H)     [facility] Txp Virology Latest Ref Rng & Units 8/13/2021 7/22/2021 6/18/2021   BK Spec - - - -   BK Res BKNEG:BK Virus DNA Not  Detected copies/mL - - -   BK Log <2.7 Log copies/mL - - -   BK Quant Log Ext log IU/mL Undetected - -   BK Quant Result Ext Undetected IU/mL Undetected None Detected None Detected   BK Quant Spec Ext - - Blood Blood   EBV CAPSID ANTIBODY IGG 0.0 - 0.8 AI - - -   Hep B Core NR:Nonreactive - - -   Hep B Surf Ext  Multiple values view image mIU/mL - - -   HIV 1&2 Ext Nonreactive - - -        Recent Labs   Lab Test 12/09/19  1002 02/07/20  0858 05/11/21  1531   DOSTAC 2130,12/8/19 2/6/20 2105 2,000   TACROL 10.4 9.2 6.3     Recent Labs   Lab Test 10/07/19  0742 11/21/19  0813   DOSMPA Not Provided 11/20/19 1800   MPACID 0.82* 0.91*   MPAG 14.1* 39.5

## 2021-11-17 LAB
DONOR IDENTIFICATION: NORMAL
DSA COMMENTS: NORMAL
DSA PRESENT: NO
DSA TEST METHOD: NORMAL
ORGAN: NORMAL
PROTOCOL CUTOFF: NORMAL
SA 1 CELL: NORMAL
SA 1 TEST METHOD: NORMAL
SA 2 CELL: NORMAL
SA 2 TEST METHOD: NORMAL
SA1 HI RISK ABY: NORMAL
SA1 MOD RISK ABY: NORMAL
SA2 HI RISK ABY: NORMAL
SA2 MOD RISK ABY: NORMAL
UNACCEPTABLE ANTIGENS: NORMAL
UNOS CPRA: 0
ZZZSA 1  COMMENTS: NORMAL
ZZZSA 2 COMMENTS: NORMAL

## 2021-11-17 NOTE — PATIENT INSTRUCTIONS
Monitor home blood pressure readings over the next 2 weeks, goal BP<130/80, call office if readings are higher than goal

## 2021-11-23 ENCOUNTER — ALLIED HEALTH/NURSE VISIT (OUTPATIENT)
Dept: EDUCATION SERVICES | Facility: CLINIC | Age: 41
End: 2021-11-23
Payer: MEDICARE

## 2021-11-23 DIAGNOSIS — E10.22 TYPE 1 DIABETES MELLITUS WITH CHRONIC KIDNEY DISEASE ON CHRONIC DIALYSIS (H): Primary | ICD-10-CM

## 2021-11-23 DIAGNOSIS — N18.6 TYPE 1 DIABETES MELLITUS WITH CHRONIC KIDNEY DISEASE ON CHRONIC DIALYSIS (H): Primary | ICD-10-CM

## 2021-11-23 DIAGNOSIS — Z99.2 TYPE 1 DIABETES MELLITUS WITH CHRONIC KIDNEY DISEASE ON CHRONIC DIALYSIS (H): Primary | ICD-10-CM

## 2021-11-23 PROCEDURE — G0108 DIAB MANAGE TRN  PER INDIV: HCPCS

## 2021-11-23 NOTE — PATIENT INSTRUCTIONS
"1. Expect contact from [employer] over the next 7-10 days.  They will let you know if the pump is covered and how much you would owe.  2. Start using the DexCom sensor.  Here are some tips to using it.     DexCom Instructions      1. There is a two hour warm-up period each time you insert a new Dexcom sensor.  During that time the part of the sensor that sits under your skin is getting wet with your interstitial fluid.  The sensor cannot provide glucose readings until that happens.    2. After two hours the glucose will stream to the  or an terri on your cell phone every 5 minutes.  This will provide 288 glucose readings for every days you wear the sensor.    3. If your glucose goes above or below the thresholds you set you will get an alarm.  Acknowledge the alarm on your device to stop it.    4. Try to keep your device within 20 feet of you.  If you are using the terri on your cell phone try to keep the terri open in the background of your phone.  If you close it out it will lose connection with the phone.  If this happens turn your phone off and then turn on to restart it.    5. To remove the sensor, get a hold of the tape and peel it off like a Band-Aid. If you use liquid adhesive under your sensor you may need to loosen the tape with Uni-solve or baby oil.    3. The Dexcom sensor is reading the glucose in the interstitial fluid.  Your blood glucose meter is reading glucose in your blood stream.  Most of the time these two values are very close and that is why you can dose your insulin off your Dexcom value.  However, after eating, glucose gets to your blood stream first and then it takes another 20-30 minutes for it to get out to the sensor.  During this time your blood glucose may be significantly higher than your sensor reading.  The difference is called \"lag time\".    4. Treat low blood sugar as recommended by your healthcare team (15 grams for blood sugar 55-69 mg/dl, 30 grams for blood sugar <55 " "mg/dl) and do a test with a blood sugar meter to check your recovery.  Due to \"lag time\" it will take the sensor an extra 20-30 to recognize that you have recovered from a low.  If you eat until the sensor value comes back up you will rebound quite high.    5. You can calibrate a Dexcom sensor if you feel the accuracy is off.  Go to the calibrate screen and input a blood glucose value.  Be sure that you have not eaten in the last two hours before calibrating.  Do not calibrate multiple times a day this can throw the accuracy of your sensor off.  Be sure that you wash your hands prior to any blood glucose readings taken for calibration as skipping handwashing is a common reason blood glucose readings are not accurate.    6. Sensors are sensitive to movement and excessive movement can cause sensor errors.  Pay attention to your sites if you are having a lot of sensor errors.  Some sites on your body get more movement than others.    7. If you have a sensor that does not last 10 days go to [email or web address].[email or web address] and report the sensor error.  The company will replace the sensor free of charge.  They will ask you to provide the circumstances (when the sensor was inserted and when it failed) and the lot number off the box of sensors.  The tech support number for dexcom is [phone] or you can go this route [email or web address]-->support-->contact us-->request sensor or live chat.       8. There are many tips and products to improve adhesion of your DexCom sensor.  You can find the entire list at [email or web address].com.  Type adhesion into the search bar.    9. Keep yourself hydrated.  The sensor is reading the glucose in the interstitial fluid.  You need to have adequate fluid to read.    11.  Your transmitter will need to be replaced every three months.  Contact your supplier to get a new on when your device gives you the message that your battery is low.  You have about 10 days from the first message to get a new one before the battery stops " "working.    12. There are multiple strategies to combat skin reactions to Dexcom tape.  Go to [email or web address].com and type skin irritation into the search.    13. You will need two apps on your phone to stream data to our clinic portal.  The Dexcom G 6 terri and Dexcom Clarity.  You can generate a sharing code to give to your healthcare team or our preferred way, to accept our invitation to share data.  To add other follower such as family members they would get the Dexcom Follow terri.    14. Most of the time Dexcom is covered through your pharmacy benefit and you can get it at the same pharmacy you use for your other prescriptions. Medicare covers it as durable medical equipment and orders for it need to be sent to a company that can bill it that way.  This may mean that you need to use a mail-order company such as [employer] or [employer].   15. If you are laying on the sensor at night you can get what we call a \"compression low\".  If you do not have symptoms do a fingerstick.    Delmy Mercado RN,DENITAES  [address] [address]  Phone: [phone]  [email or web address]    "

## 2021-11-23 NOTE — PROGRESS NOTES
Diabetes Self-Management Education & Support    Michael Amin presents today for education related to Type 1 diabetes.    Patient is being treated with:  diet, exercise and insulin pump and Guardian 3 sensor.  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   yes:     Diabetes Medication(s)     Insulin       insulin lispro (HUMALOG VIAL) 100 UNIT/ML vial    Inject 100 Units Subcutaneous daily With Insulin pump. Up to 100 units        Monitoring  Patient glucose self monitoring as follows: continuously using a continuous glucose monitor (CGM)  BG meter: Contour Next USB Link (with Medtronic Pump)  BG results:    Patient's most recent   Lab Results   Component Value Date    A1C 8.4 2021    A1C 8.1 2020      Patient's A1C goal: <7.0    Problem Solving:    Patient is at risk of hypoglycemia?: Frequency is daily, reports he over-treats his lows  Hospitalizations for hyper or hypoglycemia: No    EDUCATION and INSTRUCTION PROVIDED AT THIS VISIT:    Reviewed and demonstrated operation of the following pumps:  The Omnipod Dash, Medtronic 770G with Guardian 3 sensor, and Tandem X2 with Dexcom G6 sensor.     Discussed unique features of each pump and what qualities are most important to patient.  Explained the upgrade process, which includes making sure that warranty has  on the current pump, completing the CMN, processing through the pump company and approval by insurance company.  Also reviewed training that would be necessary to ensure safe operation of the pump.      We discussed pump options and he would like to go with a Tandem pump.  He reports he is out of warranty.  His  info was sent to Rocio Zaid today.    He is going to try a DexCom sensor.  A Sample was provided.  The DexCom sensor was placed in his left abdomen without difficulty.  The lot number is [id number] and the expiration date is 10/31/22.    Patient-stated goal written and given to Michael Amin.  Verbalized and demonstrated understanding of instructions.     PLAN:  See patient instructions  AVS printed and given to patient    FOLLOW-UP:    Time spent with patient at today's visit was 60 minutes.      Any diabetes medication dose changes were made via the CDE Protocol and Collaborative Practice Agreement with [facility].  A copy of this encounter was provided to patient's referring provider.

## 2021-11-29 ENCOUNTER — TELEPHONE (OUTPATIENT)
Dept: ENDOCRINOLOGY | Facility: CLINIC | Age: 41
End: 2021-11-29
Payer: MEDICARE

## 2021-11-29 NOTE — TELEPHONE ENCOUNTER
[facility] Call Center    Phone Message    May a detailed message be left on voicemail: yes     Reason for Call: Other: Requesting call back to follow up on fax sent requesting Office notes from most recent date of service , chart notes,  and lab notes. Fax back to [phone] asap per [employer] patient refills are pending until  receiving these documents.     Action Taken: Other: Endo    Travel Screening: Not Applicable

## 2021-11-30 ENCOUNTER — TELEPHONE (OUTPATIENT)
Dept: ENDOCRINOLOGY | Facility: CLINIC | Age: 41
End: 2021-11-30
Payer: MEDICARE

## 2021-11-30 DIAGNOSIS — Z99.2 TYPE 1 DIABETES MELLITUS WITH CHRONIC KIDNEY DISEASE ON CHRONIC DIALYSIS (H): Primary | ICD-10-CM

## 2021-11-30 DIAGNOSIS — N18.6 TYPE 1 DIABETES MELLITUS WITH CHRONIC KIDNEY DISEASE ON CHRONIC DIALYSIS (H): Primary | ICD-10-CM

## 2021-11-30 DIAGNOSIS — E10.22 TYPE 1 DIABETES MELLITUS WITH CHRONIC KIDNEY DISEASE ON CHRONIC DIALYSIS (H): Primary | ICD-10-CM

## 2021-11-30 NOTE — TELEPHONE ENCOUNTER
Lab orders placed for c-peptide and fasting glucose.   Pt notified.   Fax to [facility] when results are available.

## 2021-11-30 NOTE — TELEPHONE ENCOUNTER
[facility] Call Center    Phone Message    May a detailed message be left on voicemail: no     Reason for Call: Other: Lloyd with [employer] stated that they did receive the pt's most recent chart notes and also pt's most recent A1C results. However, for Medicare requirements they are needing to have recent labs for c-peptide with concurrent fasting glucose. Caller will be sending over a request to the clinic for the pt to have these labs done. Per caller it is the only thing missing for Medicare requirements so that the pt's pump supplies will be covered. For any questions call: [phone] opt 2     Action Taken: Message routed to:  [facility] endocrine    Travel Screening: Not Applicable

## 2021-12-11 DIAGNOSIS — I15.1 HTN, KIDNEY TRANSPLANT RELATED: ICD-10-CM

## 2021-12-11 DIAGNOSIS — Z94.0 HTN, KIDNEY TRANSPLANT RELATED: ICD-10-CM

## 2021-12-13 RX ORDER — CARVEDILOL 12.5 MG/1
TABLET ORAL
Qty: 180 TABLET | Refills: 1 | Status: SHIPPED | OUTPATIENT
Start: 2021-12-13 | End: 2022-06-20

## 2021-12-20 DIAGNOSIS — Z94.0 KIDNEY REPLACED BY TRANSPLANT: Primary | ICD-10-CM

## 2021-12-20 DIAGNOSIS — Z94.0 KIDNEY REPLACED BY TRANSPLANT: ICD-10-CM

## 2021-12-20 RX ORDER — MYCOPHENOLATE MOFETIL 250 MG/1
750 CAPSULE ORAL 2 TIMES DAILY
Qty: 180 CAPSULE | Refills: 11 | Status: SHIPPED | OUTPATIENT
Start: 2021-12-20 | End: 2022-12-13

## 2021-12-28 ENCOUNTER — TRANSFERRED RECORDS (OUTPATIENT)
Dept: HEALTH INFORMATION MANAGEMENT | Facility: CLINIC | Age: 41
End: 2021-12-28
Payer: MEDICARE

## 2021-12-28 DIAGNOSIS — E10.22 TYPE 1 DIABETES MELLITUS WITH CHRONIC KIDNEY DISEASE ON CHRONIC DIALYSIS (H): ICD-10-CM

## 2021-12-28 DIAGNOSIS — N18.6 TYPE 1 DIABETES MELLITUS WITH CHRONIC KIDNEY DISEASE ON CHRONIC DIALYSIS (H): ICD-10-CM

## 2021-12-28 DIAGNOSIS — Z99.2 TYPE 1 DIABETES MELLITUS WITH CHRONIC KIDNEY DISEASE ON CHRONIC DIALYSIS (H): ICD-10-CM

## 2021-12-30 DIAGNOSIS — Z99.2 TYPE 1 DIABETES MELLITUS WITH CHRONIC KIDNEY DISEASE ON CHRONIC DIALYSIS (H): ICD-10-CM

## 2021-12-30 DIAGNOSIS — E10.22 TYPE 1 DIABETES MELLITUS WITH CHRONIC KIDNEY DISEASE ON CHRONIC DIALYSIS (H): ICD-10-CM

## 2021-12-30 DIAGNOSIS — N18.6 TYPE 1 DIABETES MELLITUS WITH CHRONIC KIDNEY DISEASE ON CHRONIC DIALYSIS (H): ICD-10-CM

## 2022-01-03 ENCOUNTER — MEDICAL CORRESPONDENCE (OUTPATIENT)
Dept: HEALTH INFORMATION MANAGEMENT | Facility: CLINIC | Age: 42
End: 2022-01-03
Payer: MEDICARE

## 2022-01-03 DIAGNOSIS — Z99.2 ESRD (END STAGE RENAL DISEASE) ON DIALYSIS (H): ICD-10-CM

## 2022-01-03 DIAGNOSIS — N18.6 ESRD (END STAGE RENAL DISEASE) ON DIALYSIS (H): ICD-10-CM

## 2022-01-03 DIAGNOSIS — Z94.0 KIDNEY TRANSPLANTED: Primary | ICD-10-CM

## 2022-01-03 NOTE — TELEPHONE ENCOUNTER
Confirmed this was an accurate 12-hour trough. Verified Tacrolimus IR dose 1.5 mg  mg BID. Confirmed no new medications or illness.  Confirmed no missed doses. Patient confirms decrease Tacrolimus IR dose to 1.5  mg in am and 1.0 mg in pm   and repeat labs in 14 days

## 2022-01-03 NOTE — TELEPHONE ENCOUNTER
Issue  Tacrolimus  Level  7.2 above goal level        PLAN:   Please call patient and confirm this was an accurate 12-hour trough. Verify Tacrolimus IR dose 1.5 mg  mg BID. Confirm no new medications or illness Confirm no missed doses. If accurate trough and accurate dose, decrease Tacrolimus IR dose to 1.5  mg in am and 1.0 mg in pm   and repeat labs in 14 days

## 2022-01-03 NOTE — TELEPHONE ENCOUNTER
Left message and sent Pansieve message to patient regarding:  Tacrolimus  Level  7.2 above goal level          PLAN:   Please call patient and confirm this was an accurate 12-hour trough. Verify Tacrolimus IR dose 1.5 mg  mg BID. Confirm no new medications or illness Confirm no missed doses. If accurate trough and accurate dose, decrease Tacrolimus IR dose to 1.5  mg in am and 1.0 mg in pm   and repeat labs in 14 days

## 2022-01-03 NOTE — LETTER
PHYSICIAN ORDERS      DATE & TIME ISSUED: 1/3/2022  11:29 AM  PATIENT NAME: Michael Amin   : 1980     [facility] MR# [if applicable]: [id number]     DIAGNOSIS:  Kidney Transplant  ICD-10 CODE: Z94.0     Please repeat the following labs in 2 weeks:  Tacrolimus drug level  CBC  BMP    Any questions please call: [phone]  Please fax lab results to [phone].      Donnie Brandon MD

## 2022-01-04 ENCOUNTER — MEDICAL CORRESPONDENCE (OUTPATIENT)
Dept: HEALTH INFORMATION MANAGEMENT | Facility: CLINIC | Age: 42
End: 2022-01-04
Payer: MEDICARE

## 2022-01-05 RX ORDER — TACROLIMUS 1 MG/1
CAPSULE ORAL
Qty: 60 CAPSULE | Refills: 11 | Status: SHIPPED | OUTPATIENT
Start: 2022-01-05 | End: 2023-01-13

## 2022-01-05 RX ORDER — TACROLIMUS 0.5 MG/1
0.5 CAPSULE ORAL EVERY MORNING
Qty: 30 CAPSULE | Refills: 11 | Status: SHIPPED | OUTPATIENT
Start: 2022-01-05 | End: 2023-01-13

## 2022-01-24 ENCOUNTER — MEDICAL CORRESPONDENCE (OUTPATIENT)
Dept: HEALTH INFORMATION MANAGEMENT | Facility: CLINIC | Age: 42
End: 2022-01-24
Payer: MEDICARE

## 2022-01-31 NOTE — PROGRESS NOTES
Outcome for 01/31/22 4:03 PM: blabfeedt message sent  Outcome for 02/02/22 10:52 AM: pt will try to upload pump this afternoon.     Outcome for 02/03/22 2:03 PM: Data emailed to provider   Skye Soriano CMA

## 2022-02-03 ENCOUNTER — VIRTUAL VISIT (OUTPATIENT)
Dept: ENDOCRINOLOGY | Facility: CLINIC | Age: 42
End: 2022-02-03
Payer: MEDICARE

## 2022-02-03 DIAGNOSIS — Z99.2 TYPE 1 DIABETES MELLITUS WITH CHRONIC KIDNEY DISEASE ON CHRONIC DIALYSIS (H): Primary | ICD-10-CM

## 2022-02-03 DIAGNOSIS — E10.22 TYPE 1 DIABETES MELLITUS WITH CHRONIC KIDNEY DISEASE ON CHRONIC DIALYSIS (H): Primary | ICD-10-CM

## 2022-02-03 DIAGNOSIS — N18.6 TYPE 1 DIABETES MELLITUS WITH CHRONIC KIDNEY DISEASE ON CHRONIC DIALYSIS (H): Primary | ICD-10-CM

## 2022-02-03 PROCEDURE — 99215 OFFICE O/P EST HI 40 MIN: CPT | Mod: 95 | Performed by: PHYSICIAN ASSISTANT

## 2022-02-03 NOTE — LETTER
2/3/2022       RE: Michael Amin  [address]  [facility] [address]     Dear Colleague,    Thank you for referring your patient, Michael Amin, to the [facility] [place] [facility]. Please see a copy of my visit note below.    Outcome for 01/31/22 4:03 PM: [email or web address] message sent  Outcome for 02/02/22 10:52 AM: pt will try to upload pump this afternoon.     Outcome for 02/03/22 2:03 PM: Data emailed to provider   Skye Soriano CMA        Michael Amin  is being evaluated via a billable video visit.      How would you like to obtain your AVS? [email or web address]  For the video visit, send the invitation by: Text to cell phone: [phone]  Will anyone else be joining your video visit? No          Due to the COVID 19 pandemic this visit was converted to a video visit in order to help prevent spread of infection in this patient and the general population.    Time of start: 4:00 pm  Time of end: 4:18 pm  Total duration of video visit: 18 minutes.    ELIZABETH Rodriguez ( Jake ) is a 41 year old male with type 1 diabetes mellitus.  Video visit today for diabetes follow up.  Pt has hx of type 1 diabetes mellitus dx at age 16.  His diabetes is complicated by nephropathy and he underwent a kidney transplant on 10/1/2019.   Oph exam showed possible early retinopathy. Most recent Oph exam reported stable exam.  He has no peripheral neuropathy.  His hx is also significant for HTN, ED, dyslipidemia and obesity.  For his diabetes, he is currently using a Medtronic 670G insulin pump. Not using the Guardian3 sensor.  His basal insulin rates are set at:  Midnight= 2.0 units/hr.  4 am = 1.8 units/hr.  9 am = 1.75 units/hr.  1 pm = 1.85 units/hr.  20:00 = 2.0 units/hr.  I/C ratio settings:  Midnight= 1:7.5  11:30 am = 1:5.7  1800= 1:5.7  Sensitivity is set at 29.  Most recent A1C was 8.4 % on 11/16/2021. His A1C was 8.7 % on 7/22/2021.    I  reviewed his Medtronic 670G insulin pump data today and scanned the data in his note below.  Manual use is 100 %. No wearing the sensor.  Average glucose higher at 293 with .  He just received his a new insulin pump in the mail- Tandem insulin pump and DexcomG6 sensor.  Will arrange for pt to meet with Delmy ALONSO to get started on both the Tandem insulin pump-control IQ and DexcomG6 sensor.  On ROS today,he was ill in Dec 2021 with viral illness. He tested negative for COVID.  He reports feeling better at this time.  He hopes to return to the gym soon.  Pt denies frequent headaches, blurred vision, n/v, SOB at rest, cough, fever, chills, chest pain, abd pain, diarrhea, dysuria, hematuria or foot ulcers.  Rudi denies numbness, tingling or pain in his feet or hands.  He received the COVID19 vaccines (Moderna) and COVID booster.    Diabetes Care  Retinopathy: he was seen by Oph in Feb 2019- possible early retinopathy. He states he was seen by Oph again in Fall 2021 with stable eye exam.  Nephropathy:hx of ESRD s/p kidney transplant on 10/96455.   Neuropathy: none.  Foot Exam:no exam today.  Taking aspirin: yes  Lipids: LDL 69 in Oct 2020. Pt is taking Lipitor.  CAD: no.  Mental health:denies depression.  Insulin:Medtronic 670G insulin pump with Guardian3 sensor. He just received a Tandem insulin pump and DexcomG6 sensor which he will start using soon.                  ROS  Please see under HPI.    Allergies  Allergies   Allergen Reactions     Thymoglobulin      Had reaction with rigors after steroid-free dose. No reaction with steroids.       Medications  Current Outpatient Medications   Medication Sig Dispense Refill     alcohol swab prep pads Use to swab area of injection/zak as directed. 100 each 3     aspirin (ASA) 81 MG EC tablet Take 81 mg by mouth daily        atorvastatin (LIPITOR) 10 MG tablet TAKE 1 TABLET(10 MG) BY MOUTH DAILY 90 tablet 3     blood glucose (CONTOUR NEXT TEST) test strip Use to  "test blood sugar 4 times daily. 400 strip 11     blood glucose monitoring (ACCU-CHEK FASTCLIX) lancets U QID OR MORE OFTEN PRN  1     carvedilol (COREG) 12.5 MG tablet TAKE 1 TABLET(12.5 MG) BY MOUTH TWICE DAILY WITH MEALS 180 tablet 1     insulin lispro (HUMALOG VIAL) 100 UNIT/ML vial Inject 100 Units Subcutaneous daily With Insulin pump. Up to 100 units 30 mL 11     insulin pen needle (ULTICARE MICRO) 32G X 4 MM miscellaneous Use pen needles daily or as directed. 100 each 3     Insulin Syringe-Needle U-100 31G X 1/4\" 1 ML MISC 1 Syringe as needed (until new insuline pump arrives) 50 each 1     magnesium oxide (MAG-OX) 400 MG tablet Take 1 tablet (400 mg) by mouth daily (with lunch) 30 tablet 5     mycophenolate (GENERIC EQUIVALENT) 250 MG capsule Take 3 capsules (750 mg) by mouth 2 times daily 180 capsule 11     senna-docusate (SENOKOT-S/PERICOLACE) 8.6-50 MG tablet Take 2 tablets by mouth 2 times daily 20 tablet 0     sulfamethoxazole-trimethoprim (BACTRIM) 400-80 MG tablet Take 1 tablet by mouth daily 30 tablet 11     tacrolimus (GENERIC EQUIVALENT) 0.5 MG capsule Take 1 capsule (0.5 mg) by mouth every morning Total dose = 1.5 mg in the AM and 1 mg in the PM 30 capsule 11     tacrolimus (GENERIC EQUIVALENT) 1 MG capsule Total dose = 1.5 mg in the AM and 1 mg in the PM 60 capsule 11     terbinafine (LAMISIL) 1 % external cream Apply topically 2 times daily To rash on left hand 42 g 11       Family History  family history includes Coronary Artery Disease in his father; Diabetes in his father; Skin Cancer in his paternal uncle.    Social History   reports that he has never smoked. He has never used smokeless tobacco. He reports previous alcohol use. He reports that he does not use drugs.   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    Past Medical History  Past Medical History:   Diagnosis Date     Anemia in chronic kidney disease      Dyslipidemia      ESRD (end stage " renal disease) on dialysis (H)      Hypertension      Hypomagnesemia 10/7/2019     Secondary hyperparathyroidism (H)      Type 1 diabetes (H)        Past Surgical History:   Procedure Laterality Date     hair implant  2007     INSERT CATHETER PERITONEAL DIALYSIS  08/2018     IR FINE NEEDLE ASPIRATION W ULTRASOUND  11/25/2019     IR RENAL BIOPSY LEFT  11/25/2019     PERCUTANEOUS BIOPSY KIDNEY Left 10/22/2019    Procedure: Left Kidney Biopsy;  Surgeon: Kash Hoffman MD;  Location: [facility]       Physical Exam    No exam today.    RESULTS  Creatinine   Date Value Ref Range Status   11/16/2021 0.97 0.66 - 1.25 mg/dL Final   05/11/2021 1.13 0.66 - 1.25 mg/dL Final     GFR Estimate   Date Value Ref Range Status   11/16/2021 >90 >60 mL/min/1.73m2 Final     Comment:     As of July 11, 2021, eGFR is calculated by the CKD-EPI creatinine equation, without race adjustment. eGFR can be influenced by muscle mass, exercise, and diet. The reported eGFR is an estimation only and is only applicable if the renal function is stable.   05/11/2021 80 >60 mL/min/[1.73_m2] Final     Comment:     Non  GFR Calc  Starting 12/18/2018, serum creatinine based estimated GFR (eGFR) will be   calculated using the Chronic Kidney Disease Epidemiology Collaboration   (CKD-EPI) equation.       Hemoglobin A1C POCT   Date Value Ref Range Status   07/16/2020 8.1 (H) 0 - 5.6 % Final     Comment:     Normal <5.7% Prediabetes 5.7-6.4%  Diabetes 6.5% or higher - adopted from ADA   consensus guidelines.       Hemoglobin A1C   Date Value Ref Range Status   11/16/2021 8.4 (H) 0.0 - 5.6 % Final     Comment:     Normal <5.7%   Prediabetes 5.7-6.4%    Diabetes 6.5% or higher     Note: Adopted from ADA consensus guidelines.     Potassium   Date Value Ref Range Status   11/16/2021 4.0 3.4 - 5.3 mmol/L Final   05/11/2021 4.1 3.4 - 5.3 mmol/L Final     ALT   Date Value Ref Range Status   09/26/2019 42 0 - 70 U/L Final     AST   Date Value Ref Range  Status   09/26/2019 16 0 - 45 U/L Final     A1C   8.4     11/16/2021  A1C   8.7     7/22/2021  A1C   7.5     10/2/2020-outside lab  A1C    8.1     7/16/2020  A1C   8.2      2/7/2020   A1C   8.9      9/26/2019    ASSESSMENT/PLAN:    1.  TYPE 1 DIABETES MELLITUS: Will arrange for Rudi to meet with Delmy ALONSO to start using his Tandem insulin pump-control IQ and DexcomG6 sensor.  No change in insulin pump settings today.  Discussed the importance of healthy eating and he hopes to return to the gym soon.  Pt was seen by Oph in Fall 2021 and he states the he was told his eye exam was stable.  He denies any sx of neuropathy or foot ulcers or sores at this time.  Pt's TSH was normal on 7/22/2021.  Rudi received the COVID vaccines (Moderna) and COVID booster.  He also received the flu vaccine this season.    2.  HX OF ESRD: S/P kidney transplant on 10/1/019 doing well.  Most recent creat was 1.12  with GFR > 60 mL/min in 12/2021.  Urine protein negative in Nov 2021.    3. OBESITY: He has met with our dietitian for weight loss support.    4.  HTN: He tells me his BP has been in the 127-130/80 range at home.  Continue current RX.    5.  HYPERLIPIDEMIA: LDL 69 in Oct 2020.  Pt is taking Lipitor daily.    6.  FOLLOW UP : with me in early May 2022.  Pt to see Delmy ALONSO next week.  A1C ordered and to be done late April 2022.    Time spent reviewing chart and labs and Medtronic 670G insulin pump data today = 8 minutes.  Time for video visit today = 18 minutes.  Time for documentation today = 15 minutes.    TOTAL TIME FOR VISIT TODAY = 41 minutes.    Kymberly Boswell PA-C

## 2022-02-03 NOTE — NURSING NOTE
Pt states there are no changes to their allergy and medication list since last reviewed on 11/16/21. Audrey Goel on 2/3/2022 at 3:49 PM

## 2022-02-03 NOTE — PROGRESS NOTES
Michael Amin  is being evaluated via a billable video visit.      How would you like to obtain your AVS? [employer]  For the video visit, send the invitation by: Text to cell phone: [phone]  Will anyone else be joining your video visit? No

## 2022-02-04 NOTE — PROGRESS NOTES
Due to the COVID 19 pandemic this visit was converted to a video visit in order to help prevent spread of infection in this patient and the general population.    Time of start: 4:00 pm  Time of end: 4:18 pm  Total duration of video visit: 18 minutes.    ELIZABETH Rodriguez ( Jake ) is a 41 year old male with type 1 diabetes mellitus.  Video visit today for diabetes follow up.  Pt has hx of type 1 diabetes mellitus dx at age 16.  His diabetes is complicated by nephropathy and he underwent a kidney transplant on 10/1/2019.   Oph exam showed possible early retinopathy. Most recent Oph exam reported stable exam.  He has no peripheral neuropathy.  His hx is also significant for HTN, ED, dyslipidemia and obesity.  For his diabetes, he is currently using a Medtronic 670G insulin pump. Not using the Guardian3 sensor.  His basal insulin rates are set at:  Midnight= 2.0 units/hr.  4 am = 1.8 units/hr.  9 am = 1.75 units/hr.  1 pm = 1.85 units/hr.  20:00 = 2.0 units/hr.  I/C ratio settings:  Midnight= 1:7.5  11:30 am = 1:5.7  1800= 1:5.7  Sensitivity is set at 29.  Most recent A1C was 8.4 % on 11/16/2021. His A1C was 8.7 % on 7/22/2021.    I reviewed his Medtronic 670G insulin pump data today and scanned the data in his note below.  Manual use is 100 %. No wearing the sensor.  Average glucose higher at 293 with .  He just received his a new insulin pump in the mail- Tandem insulin pump and DexcomG6 sensor.  Will arrange for pt to meet with Delmy ALONSO to get started on both the Tandem insulin pump-control IQ and DexcomG6 sensor.  On ROS today,he was ill in Dec 2021 with viral illness. He tested negative for COVID.  He reports feeling better at this time.  He hopes to return to the gym soon.  Pt denies frequent headaches, blurred vision, n/v, SOB at rest, cough, fever, chills, chest pain, abd pain, diarrhea, dysuria, hematuria or foot ulcers.  Rudi denies numbness, tingling or pain in his feet or hands.  He received  "the COVID19 vaccines (Moderna) and COVID booster.    Diabetes Care  Retinopathy: he was seen by Oph in Feb 2019- possible early retinopathy. He states he was seen by Oph again in Fall 2021 with stable eye exam.  Nephropathy:hx of ESRD s/p kidney transplant on 10/55106.   Neuropathy: none.  Foot Exam:no exam today.  Taking aspirin: yes  Lipids: LDL 69 in Oct 2020. Pt is taking Lipitor.  CAD: no.  Mental health:denies depression.  Insulin:Medtronic 670G insulin pump with Guardian3 sensor. He just received a Tandem insulin pump and DexcomG6 sensor which he will start using soon.                  ROS  Please see under HPI.    Allergies  Allergies   Allergen Reactions     Thymoglobulin      Had reaction with rigors after steroid-free dose. No reaction with steroids.       Medications  Current Outpatient Medications   Medication Sig Dispense Refill     alcohol swab prep pads Use to swab area of injection/zak as directed. 100 each 3     aspirin (ASA) 81 MG EC tablet Take 81 mg by mouth daily        atorvastatin (LIPITOR) 10 MG tablet TAKE 1 TABLET(10 MG) BY MOUTH DAILY 90 tablet 3     blood glucose (CONTOUR NEXT TEST) test strip Use to test blood sugar 4 times daily. 400 strip 11     blood glucose monitoring (ACCU-CHEK FASTCLIX) lancets U QID OR MORE OFTEN PRN  1     carvedilol (COREG) 12.5 MG tablet TAKE 1 TABLET(12.5 MG) BY MOUTH TWICE DAILY WITH MEALS 180 tablet 1     insulin lispro (HUMALOG VIAL) 100 UNIT/ML vial Inject 100 Units Subcutaneous daily With Insulin pump. Up to 100 units 30 mL 11     insulin pen needle (ULTICARE MICRO) 32G X 4 MM miscellaneous Use pen needles daily or as directed. 100 each 3     Insulin Syringe-Needle U-100 31G X 1/4\" 1 ML MISC 1 Syringe as needed (until new insuline pump arrives) 50 each 1     magnesium oxide (MAG-OX) 400 MG tablet Take 1 tablet (400 mg) by mouth daily (with lunch) 30 tablet 5     mycophenolate (GENERIC EQUIVALENT) 250 MG capsule Take 3 capsules (750 mg) by mouth 2 times " daily 180 capsule 11     senna-docusate (SENOKOT-S/PERICOLACE) 8.6-50 MG tablet Take 2 tablets by mouth 2 times daily 20 tablet 0     sulfamethoxazole-trimethoprim (BACTRIM) 400-80 MG tablet Take 1 tablet by mouth daily 30 tablet 11     tacrolimus (GENERIC EQUIVALENT) 0.5 MG capsule Take 1 capsule (0.5 mg) by mouth every morning Total dose = 1.5 mg in the AM and 1 mg in the PM 30 capsule 11     tacrolimus (GENERIC EQUIVALENT) 1 MG capsule Total dose = 1.5 mg in the AM and 1 mg in the PM 60 capsule 11     terbinafine (LAMISIL) 1 % external cream Apply topically 2 times daily To rash on left hand 42 g 11       Family History  family history includes Coronary Artery Disease in his father; Diabetes in his father; Skin Cancer in his paternal uncle.    Social History   reports that he has never smoked. He has never used smokeless tobacco. He reports previous alcohol use. He reports that he does not use drugs.   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    Past Medical History  Past Medical History:   Diagnosis Date     Anemia in chronic kidney disease      Dyslipidemia      ESRD (end stage renal disease) on dialysis (H)      Hypertension      Hypomagnesemia 10/7/2019     Secondary hyperparathyroidism (H)      Type 1 diabetes (H)        Past Surgical History:   Procedure Laterality Date     hair implant  2007     INSERT CATHETER PERITONEAL DIALYSIS  08/2018     IR FINE NEEDLE ASPIRATION W ULTRASOUND  11/25/2019     IR RENAL BIOPSY LEFT  11/25/2019     PERCUTANEOUS BIOPSY KIDNEY Left 10/22/2019    Procedure: Left Kidney Biopsy;  Surgeon: Kash Hoffman MD;  Location:  OR       Physical Exam    No exam today.    RESULTS  Creatinine   Date Value Ref Range Status   11/16/2021 0.97 0.66 - 1.25 mg/dL Final   05/11/2021 1.13 0.66 - 1.25 mg/dL Final     GFR Estimate   Date Value Ref Range Status   11/16/2021 >90 >60 mL/min/1.73m2 Final     Comment:     As of July 11, 2021, eGFR is  calculated by the CKD-EPI creatinine equation, without race adjustment. eGFR can be influenced by muscle mass, exercise, and diet. The reported eGFR is an estimation only and is only applicable if the renal function is stable.   05/11/2021 80 >60 mL/min/[1.73_m2] Final     Comment:     Non  GFR Calc  Starting 12/18/2018, serum creatinine based estimated GFR (eGFR) will be   calculated using the Chronic Kidney Disease Epidemiology Collaboration   (CKD-EPI) equation.       Hemoglobin A1C POCT   Date Value Ref Range Status   07/16/2020 8.1 (H) 0 - 5.6 % Final     Comment:     Normal <5.7% Prediabetes 5.7-6.4%  Diabetes 6.5% or higher - adopted from ADA   consensus guidelines.       Hemoglobin A1C   Date Value Ref Range Status   11/16/2021 8.4 (H) 0.0 - 5.6 % Final     Comment:     Normal <5.7%   Prediabetes 5.7-6.4%    Diabetes 6.5% or higher     Note: Adopted from ADA consensus guidelines.     Potassium   Date Value Ref Range Status   11/16/2021 4.0 3.4 - 5.3 mmol/L Final   05/11/2021 4.1 3.4 - 5.3 mmol/L Final     ALT   Date Value Ref Range Status   09/26/2019 42 0 - 70 U/L Final     AST   Date Value Ref Range Status   09/26/2019 16 0 - 45 U/L Final     A1C   8.4     11/16/2021  A1C   8.7     7/22/2021  A1C   7.5     10/2/2020-outside lab  A1C    8.1     7/16/2020  A1C   8.2      2/7/2020   A1C   8.9      9/26/2019    ASSESSMENT/PLAN:    1.  TYPE 1 DIABETES MELLITUS: Will arrange for Rudi to meet with Delmy ALONSO to start using his Tandem insulin pump-control IQ and DexcomG6 sensor.  No change in insulin pump settings today.  Discussed the importance of healthy eating and he hopes to return to the gym soon.  Pt was seen by Oph in Fall 2021 and he states the he was told his eye exam was stable.  He denies any sx of neuropathy or foot ulcers or sores at this time.  Pt's TSH was normal on 7/22/2021.  Rudi received the COVID vaccines (Moderna) and COVID booster.  He also received the flu vaccine this  season.    2.  HX OF ESRD: S/P kidney transplant on 10/1/019 doing well.  Most recent creat was 1.12  with GFR > 60 mL/min in 12/2021.  Urine protein negative in Nov 2021.    3. OBESITY: He has met with our dietitian for weight loss support.    4.  HTN: He tells me his BP has been in the 127-130/80 range at home.  Continue current RX.    5.  HYPERLIPIDEMIA: LDL 69 in Oct 2020.  Pt is taking Lipitor daily.    6.  FOLLOW UP : with me in early May 2022.  Pt to see Delmy ALONSO next week.  A1C ordered and to be done late April 2022.    Time spent reviewing chart and labs and Medtronic 670G insulin pump data today = 8 minutes.  Time for video visit today = 18 minutes.  Time for documentation today = 15 minutes.    TOTAL TIME FOR VISIT TODAY = 41 minutes.    Kymberly Boswell PA-C

## 2022-02-06 ENCOUNTER — TELEPHONE (OUTPATIENT)
Dept: TRANSPLANT | Facility: CLINIC | Age: 42
End: 2022-02-06
Payer: MEDICARE

## 2022-02-06 ENCOUNTER — MYC MEDICAL ADVICE (OUTPATIENT)
Dept: TRANSPLANT | Facility: CLINIC | Age: 42
End: 2022-02-06
Payer: MEDICARE

## 2022-02-06 NOTE — TELEPHONE ENCOUNTER
Tacrolimus dose lowered on  1 st week  Of Jan 2022    Requested  To have repeat transplant  Labs 2 weeks after dose change     Confirmed follow up  With transplant  Labs

## 2022-02-07 ENCOUNTER — TELEPHONE (OUTPATIENT)
Dept: ENDOCRINOLOGY | Facility: CLINIC | Age: 42
End: 2022-02-07
Payer: MEDICARE

## 2022-02-07 NOTE — TELEPHONE ENCOUNTER
----- Message from Princess Patel RN sent at 2/4/2022  8:01 AM CST -----    ----- Message -----  From: Kymberly Boswell PA-C  Sent: 2/3/2022   4:21 PM CST  To: [facility] Triage-    Please send lab slip to pt's home for an A1C to be done last week in April 2022.  Thank you.  Guillermina Boswell

## 2022-02-17 ENCOUNTER — MYC MEDICAL ADVICE (OUTPATIENT)
Dept: EDUCATION SERVICES | Facility: CLINIC | Age: 42
End: 2022-02-17
Payer: MEDICARE

## 2022-02-17 DIAGNOSIS — N18.6 TYPE 1 DIABETES MELLITUS WITH CHRONIC KIDNEY DISEASE ON CHRONIC DIALYSIS (H): Primary | ICD-10-CM

## 2022-02-17 DIAGNOSIS — Z99.2 TYPE 1 DIABETES MELLITUS WITH CHRONIC KIDNEY DISEASE ON CHRONIC DIALYSIS (H): Primary | ICD-10-CM

## 2022-02-17 DIAGNOSIS — E10.22 TYPE 1 DIABETES MELLITUS WITH CHRONIC KIDNEY DISEASE ON CHRONIC DIALYSIS (H): Primary | ICD-10-CM

## 2022-02-17 RX ORDER — GLUCAGON 3 MG/1
1 POWDER NASAL
Qty: 1 EACH | Refills: 1 | Status: SHIPPED | OUTPATIENT
Start: 2022-02-17 | End: 2022-05-06

## 2022-04-01 DIAGNOSIS — Z99.2 TYPE 1 DIABETES MELLITUS WITH CHRONIC KIDNEY DISEASE ON CHRONIC DIALYSIS (H): Primary | ICD-10-CM

## 2022-04-01 DIAGNOSIS — N18.6 TYPE 1 DIABETES MELLITUS WITH CHRONIC KIDNEY DISEASE ON CHRONIC DIALYSIS (H): Primary | ICD-10-CM

## 2022-04-01 DIAGNOSIS — N18.6 TYPE 1 DIABETES MELLITUS WITH CHRONIC KIDNEY DISEASE ON CHRONIC DIALYSIS (H): ICD-10-CM

## 2022-04-01 DIAGNOSIS — Z99.2 TYPE 1 DIABETES MELLITUS WITH CHRONIC KIDNEY DISEASE ON CHRONIC DIALYSIS (H): ICD-10-CM

## 2022-04-01 DIAGNOSIS — E10.22 TYPE 1 DIABETES MELLITUS WITH CHRONIC KIDNEY DISEASE ON CHRONIC DIALYSIS (H): ICD-10-CM

## 2022-04-01 DIAGNOSIS — E10.22 TYPE 1 DIABETES MELLITUS WITH CHRONIC KIDNEY DISEASE ON CHRONIC DIALYSIS (H): Primary | ICD-10-CM

## 2022-04-01 RX ORDER — INSULIN INFUSION SET/CARTRIDGE
1 COMBINATION PACKAGE (EA) MISCELLANEOUS
Qty: 30 EACH | Refills: 3 | Status: SHIPPED | OUTPATIENT
Start: 2022-04-01 | End: 2022-04-04

## 2022-04-01 RX ORDER — INSULIN INFUSION SET/CARTRIDGE
1 COMBINATION PACKAGE (EA) MISCELLANEOUS
Qty: 30 EACH | Refills: 3 | Status: SHIPPED | OUTPATIENT
Start: 2022-04-01 | End: 2022-04-01

## 2022-04-01 NOTE — TELEPHONE ENCOUNTER
Insulin Infusion Pump Supplies (AUTOSOFT XC INFUSION SET) MISC      Historical entry  Last Office Visit : 2/3/22  Future Office visit:  5/6/22    Routing refill request to provider for review/approval because:  Medication is reported/historical  He is asking for an adjustment with prescription as having difficulty getting supply when needed from pharmacy      Call to Rudi, message left needing more information regarding [facility] medical supply, address, state so can send to correct place.  Asked he either call clinic or send message to endocrinology team

## 2022-04-01 NOTE — TELEPHONE ENCOUNTER
[facility] Call Center    Phone Message    May a detailed message be left on voicemail: yes     Reason for Call: Medication Refill Request    Has the patient contacted the pharmacy for the refill? Yes   Name of medication being requested: Insulin Infusion Pump Supplies (AUTOSOFT XC INFUSION SET) MISC  Per patient they continue to keep running out of supplies before the next refill is due which is causing problems with the pharmacy not wanting to give him supplies that he urgently needs. Per patient can this be looked at and adjusted for his use.   Provider who prescribed the medication: Kymberly Boswell.   Pharmacy: [facility]   Date medication is needed: asap          Action Taken: Other: ENDO    Travel Screening: Not Applicable

## 2022-04-04 ENCOUNTER — TELEPHONE (OUTPATIENT)
Dept: EDUCATION SERVICES | Facility: CLINIC | Age: 42
End: 2022-04-04
Payer: MEDICARE

## 2022-04-04 NOTE — TELEPHONE ENCOUNTER
I spoke to [place] pharmacy. They need chart and additional supply letter that they will fax to [phone]. The E'Rx will not work that were sent earlier today.    Carol AARON, RN, [facility]  Certified Diabetes Care and   [facility]   [facility]  [address] [place] [address]  Phone [phone]

## 2022-04-04 NOTE — TELEPHONE ENCOUNTER
[facility] Call Center    Phone Message    May a detailed message be left on voicemail: yes     Reason for Call: Other: Per caller [facility] [employer] [place] did not locate fax sent regarding patient medical supply request, and are requesting it be re-sent asap. Thank  you.      Action Taken: Other: ENDO    Travel Screening: Not Applicable

## 2022-04-05 DIAGNOSIS — Z94.0 KIDNEY TRANSPLANTED: Primary | ICD-10-CM

## 2022-04-05 DIAGNOSIS — N18.6 ESRD (END STAGE RENAL DISEASE) ON DIALYSIS (H): ICD-10-CM

## 2022-04-05 DIAGNOSIS — Z99.2 ESRD (END STAGE RENAL DISEASE) ON DIALYSIS (H): ICD-10-CM

## 2022-04-05 RX ORDER — SULFAMETHOXAZOLE AND TRIMETHOPRIM 400; 80 MG/1; MG/1
1 TABLET ORAL DAILY
Qty: 30 TABLET | Refills: 11 | Status: SHIPPED | OUTPATIENT
Start: 2022-04-05 | End: 2024-09-13

## 2022-04-05 NOTE — TELEPHONE ENCOUNTER
Diabetes Self-Management Education & Support Telephone Visit    Michael Amin contacted today for education related to Type 1 diabetes    Patient is being treated with:  diet, exercise and insulin pump    Purpose of today's visit:  Assess pump rates, discuss what is needed to get Rudi's pump supplies through [facility] with Medicare      Subjective/Objective:      Assessment/Plan:  1. Change ICR at 11:30 am from 4 to 3.   2. Be sure to use sleep mode on pump.  4. Look at T:Connect reports two weeks  5. 1 month supply T:Slim Cartridges mailed out    Any diabetes medication dose changes were made via the CDE Protocol and Collaborative Practice Agreement. A copy of this encounter was provided to the patient's referring provider.      Time spent in this visit:  20 minutes

## 2022-04-12 ENCOUNTER — DOCUMENTATION ONLY (OUTPATIENT)
Dept: ENDOCRINOLOGY | Facility: CLINIC | Age: 42
End: 2022-04-12
Payer: MEDICARE

## 2022-04-12 ENCOUNTER — MEDICAL CORRESPONDENCE (OUTPATIENT)
Dept: HEALTH INFORMATION MANAGEMENT | Facility: CLINIC | Age: 42
End: 2022-04-12
Payer: MEDICARE

## 2022-04-18 NOTE — PROGRESS NOTES
[facility] Call Center    Phone Message    May a detailed message be left on voicemail: yes     Reason for Call: Other: Pau from [facility] called and stated all the questions for the additional supply letter were not filled out.  Requesting they be filled out and fax the form back in. Requested call back at #[phone] option 4 with any questions.     Action Taken: Other: Endo    Travel Screening: N/A

## 2022-04-19 ENCOUNTER — TELEPHONE (OUTPATIENT)
Dept: TRANSPLANT | Facility: CLINIC | Age: 42
End: 2022-04-19
Payer: MEDICARE

## 2022-04-28 NOTE — PROGRESS NOTES
Rudi is a 41 year old who is being evaluated via a billable video visit.      How would you like to obtain your AVS? TaxJar  If the video visit is dropped, the invitation should be resent by: Text to cell phone: [phone]   Will anyone else be joining your video visit? No      Outcome for 04/28/22 3:59 PM: Total [employer] message sent  PRO Christian  Outcome for 04/29/22 7:54 AM: Data uploaded on Tandem  PRO Christian   Outcome for 05/02/22 3:08 PM: Tandem emailed to provider  PRO Crum

## 2022-05-06 ENCOUNTER — VIRTUAL VISIT (OUTPATIENT)
Dept: ENDOCRINOLOGY | Facility: CLINIC | Age: 42
End: 2022-05-06
Payer: MEDICARE

## 2022-05-06 DIAGNOSIS — Z99.2 TYPE 1 DIABETES MELLITUS WITH CHRONIC KIDNEY DISEASE ON CHRONIC DIALYSIS (H): ICD-10-CM

## 2022-05-06 DIAGNOSIS — E10.22 TYPE 1 DIABETES MELLITUS WITH CHRONIC KIDNEY DISEASE ON CHRONIC DIALYSIS (H): ICD-10-CM

## 2022-05-06 DIAGNOSIS — N18.6 TYPE 1 DIABETES MELLITUS WITH CHRONIC KIDNEY DISEASE ON CHRONIC DIALYSIS (H): ICD-10-CM

## 2022-05-06 PROCEDURE — 99215 OFFICE O/P EST HI 40 MIN: CPT | Mod: 95 | Performed by: PHYSICIAN ASSISTANT

## 2022-05-06 RX ORDER — GLUCAGON 3 MG/1
3 POWDER NASAL SEE ADMIN INSTRUCTIONS
Qty: 1 EACH | Refills: 3 | Status: SHIPPED | OUTPATIENT
Start: 2022-05-06 | End: 2024-05-01

## 2022-05-06 NOTE — LETTER
5/6/2022       RE: Michael Amin  [address]  [facility] [address]     Dear Colleague,    Thank you for referring your patient, Michael Amin, to the [facility] [place] [facility]. Please see a copy of my visit note below.    Rudi is a 41 year old who is being evaluated via a billable video visit.      How would you like to obtain your AVS? OccipitalharHypereight  If the video visit is dropped, the invitation should be resent by: Text to cell phone: [phone]   Will anyone else be joining your video visit? No      Outcome for 04/28/22 3:59 PM: [employer] message sent  PRO Christian  Outcome for 04/29/22 7:54 AM: Data uploaded on Tandem  PRO Christian   Outcome for 05/02/22 3:08 PM: Tandem emailed to provider  PRO Crum              Due to the COVID 19 pandemic this visit was converted to a video visit in order to help prevent spread of infection in this patient and the general population.    Time of start: 4:00 pm  Time of end: 4:21 pm  Total duration of video visit: 21 minutes.    ELIZABETH Amin ( Rudi Rodriguez is a 41 year old male with type 1 diabetes mellitus.  Video visit today for diabetes follow up.  Pt has hx of type 1 diabetes mellitus dx at age 16.  His diabetes is complicated by nephropathy and he underwent a kidney transplant on 10/1/2019.   Oph exam showed possible early retinopathy. Most recent Oph exam reported stable exam.  He has no peripheral neuropathy.  His hx is also significant for HTN, ED, dyslipidemia and obesity.  Since his last visit, he purchased a Tandem insulin pump and DexcomG6 sensor.  He is using control IQ on his Tandem pump and his basal insulin rates are:  Midnight= 2.0 units/hr.  1 am = 1.85  units/hr.  4 am = 1.8 units/hr.  9 am = 1.75 units/hr.  11:30 am = 1.75 units/hr.  I/C ratio settings:  Midnight= 1:7.5 and 1:3 at 11:30 am.  Sensitivity is set at 29.  Most recent A1C was  8.4 % on 11/16/2021. His A1C was 8.7 % on 7/22/2021.    I reviewed and scanned his Tandem insulin pump download data in his note below.  His blood sugars are high overnight and from 4 pm -midnight.  No frequent hypoglycemia.  On ROS today, denies frequent headaches, blurred vision, n/v, SOB at rest, cough, fever, chills, chest pain, abd pain, diarrhea, dysuria, hematuria or foot ulcers.  Rudi denies numbness, tingling or pain in his feet or hands.  He received the COVID19 vaccines (Moderna) and COVID booster.    Diabetes Care  Retinopathy: he was seen by Oph in Feb 2019- possible early retinopathy. He states he was seen by Oph again in Fall 2021 with stable eye exam.  Nephropathy:hx of ESRD s/p kidney transplant on 10/96084. Urine microalbuminuria negative in 11/2021.  Neuropathy: none.  Foot Exam:no exam today.  Taking aspirin: yes  Lipids: LDL 69 in Oct 2020. Pt is taking Lipitor.  CAD: no.  Mental health:denies depression.  Insulin: Tandem insulin pump- control IQ and DexcomG6 sensor.  Hypoglycemia tx: RX for Baqsimi sent to pharmacy to use in case of severe hypoglycemia.                  ROS  Please see under HPI.    Allergies  Allergies   Allergen Reactions     Thymoglobulin      Had reaction with rigors after steroid-free dose. No reaction with steroids.       Medications  Current Outpatient Medications   Medication Sig Dispense Refill     alcohol swab prep pads Use to swab area of injection/zak as directed. 100 each 3     aspirin (ASA) 81 MG EC tablet Take 81 mg by mouth daily        atorvastatin (LIPITOR) 10 MG tablet TAKE 1 TABLET(10 MG) BY MOUTH DAILY 90 tablet 3     blood glucose (CONTOUR NEXT TEST) test strip Use to test blood sugar 4 times daily. 400 strip 11     blood glucose monitoring (ACCU-CHEK FASTCLIX) lancets U QID OR MORE OFTEN PRN  1     carvedilol (COREG) 12.5 MG tablet TAKE 1 TABLET(12.5 MG) BY MOUTH TWICE DAILY WITH MEALS 180 tablet 1     insulin cartridge (T:SLIM 3ML) misc pump supply  "Insulin cartridge to be used with pump as directed.  Change every 2 days or as directed. 45 each 3     Insulin Infusion Pump Supplies (AUTOSOFT XC INFUSION SET) MISC 1 each every 48 hours Change every 2 days  9 mm cannula, 23 inch tubing 45 each 3     insulin lispro (HUMALOG VIAL) 100 UNIT/ML vial Inject 100 Units Subcutaneous daily With Insulin pump. Up to 100 units 30 mL 11     insulin pen needle (ULTICARE MICRO) 32G X 4 MM miscellaneous Use pen needles daily or as directed. 100 each 3     Insulin Syringe-Needle U-100 31G X 1/4\" 1 ML MISC 1 Syringe as needed (until new insuline pump arrives) 50 each 1     magnesium oxide (MAG-OX) 400 MG tablet Take 1 tablet (400 mg) by mouth daily (with lunch) 30 tablet 5     mycophenolate (GENERIC EQUIVALENT) 250 MG capsule Take 3 capsules (750 mg) by mouth 2 times daily 180 capsule 11     senna-docusate (SENOKOT-S/PERICOLACE) 8.6-50 MG tablet Take 2 tablets by mouth 2 times daily 20 tablet 0     sulfamethoxazole-trimethoprim (BACTRIM) 400-80 MG tablet Take 1 tablet by mouth daily 30 tablet 11     tacrolimus (GENERIC EQUIVALENT) 0.5 MG capsule Take 1 capsule (0.5 mg) by mouth every morning Total dose = 1.5 mg in the AM and 1 mg in the PM 30 capsule 11     tacrolimus (GENERIC EQUIVALENT) 1 MG capsule Total dose = 1.5 mg in the AM and 1 mg in the PM 60 capsule 11     Glucagon (BAQSIMI ONE PACK) 3 MG/DOSE POWD Spray 1 each in nostril once as needed (for severe low blood sugar) (Patient not taking: Reported on 5/6/2022) 1 each 1     terbinafine (LAMISIL) 1 % external cream Apply topically 2 times daily To rash on left hand (Patient not taking: Reported on 5/6/2022) 42 g 11       Family History  family history includes Coronary Artery Disease in his father; Diabetes in his father; Skin Cancer in his paternal uncle.    Social History   reports that he has never smoked. He has never used smokeless tobacco. He reports previous alcohol use. He reports that he does not use drugs.   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    Past Medical History  Past Medical History:   Diagnosis Date     Anemia in chronic kidney disease      Dyslipidemia      ESRD (end stage renal disease) on dialysis (H)      Hypertension      Hypomagnesemia 10/7/2019     Secondary hyperparathyroidism (H)      Type 1 diabetes (H)        Past Surgical History:   Procedure Laterality Date     hair implant  2007     INSERT CATHETER PERITONEAL DIALYSIS  08/2018     IR FINE NEEDLE ASPIRATION W ULTRASOUND  11/25/2019     IR RENAL BIOPSY LEFT  11/25/2019     PERCUTANEOUS BIOPSY KIDNEY Left 10/22/2019    Procedure: Left Kidney Biopsy;  Surgeon: Kash Hoffman MD;  Location: [facility]       Physical Exam    No exam today.    RESULTS  Creatinine   Date Value Ref Range Status   11/16/2021 0.97 0.66 - 1.25 mg/dL Final   05/11/2021 1.13 0.66 - 1.25 mg/dL Final     GFR Estimate   Date Value Ref Range Status   11/16/2021 >90 >60 mL/min/1.73m2 Final     Comment:     As of July 11, 2021, eGFR is calculated by the CKD-EPI creatinine equation, without race adjustment. eGFR can be influenced by muscle mass, exercise, and diet. The reported eGFR is an estimation only and is only applicable if the renal function is stable.   05/11/2021 80 >60 mL/min/[1.73_m2] Final     Comment:     Non  GFR Calc  Starting 12/18/2018, serum creatinine based estimated GFR (eGFR) will be   calculated using the Chronic Kidney Disease Epidemiology Collaboration   (CKD-EPI) equation.       Hemoglobin A1C POCT   Date Value Ref Range Status   07/16/2020 8.1 (H) 0 - 5.6 % Final     Comment:     Normal <5.7% Prediabetes 5.7-6.4%  Diabetes 6.5% or higher - adopted from ADA   consensus guidelines.       Hemoglobin A1C   Date Value Ref Range Status   11/16/2021 8.4 (H) 0.0 - 5.6 % Final     Comment:     Normal <5.7%   Prediabetes 5.7-6.4%    Diabetes 6.5% or higher     Note: Adopted from ADA consensus guidelines.      Potassium   Date Value Ref Range Status   11/16/2021 4.0 3.4 - 5.3 mmol/L Final   05/11/2021 4.1 3.4 - 5.3 mmol/L Final     ALT   Date Value Ref Range Status   09/26/2019 42 0 - 70 U/L Final     AST   Date Value Ref Range Status   09/26/2019 16 0 - 45 U/L Final     A1C   8.4     11/16/2021  A1C   8.7     7/22/2021  A1C   7.5     10/2/2020-outside lab  A1C    8.1     7/16/2020  A1C   8.2      2/7/2020   A1C   8.9      9/26/2019    ASSESSMENT/PLAN:    1.  TYPE 1 DIABETES MELLITUS: I had Rudi increase his midnight, 1 am and 4 pm basal insulin rates today.  New basal insulin rates below:  Midnight = 2.1 units/hr.  1 am = 2.1 units/hr.  4 am = 1.8 units/hr.  9 am = 1.75 units/hr.  11:30 am = 1.75 units/hr.  4 pm = 2.0 units/hr.  No change in I/C ratio or CF.  Discussed the importance of healthy eating and he hopes to return to the gym soon.  Pt was seen by Oph in Fall 2021 and he states the he was told his eye exam was stable.  He denies any sx of neuropathy or foot ulcers or sores at this time.  Pt's TSH was normal on 7/22/2021.  Rudi received the COVID vaccines (Moderna) and COVID booster.  He also received the flu vaccine this season.    2.  HX OF ESRD: S/P kidney transplant on 10/1/019 doing well.  Most recent creat was 1.17  with GFR > 60 mL/min in 2/2022.  Urine protein negative in Nov 2021.    3. OBESITY: He has met with our dietitian for weight loss support.    4.  HTN: He tells me his BP has been in the 130/80 or less range at home.  Continue current RX.    5.  HYPERLIPIDEMIA: LDL 69 in Oct 2020.  Pt is taking Lipitor daily.    6.  FOLLOW UP : with me in 1 months.    Time spent reviewing chart and labs and insulin pump data today = 8 minutes.  Time for video visit today = 21 minutes.  Time for documentation today = 15 minutes.    TOTAL TIME FOR VISIT TODAY = 44 minutes.    Kymberly Boswell PA-C

## 2022-05-06 NOTE — PROGRESS NOTES
Due to the COVID 19 pandemic this visit was converted to a video visit in order to help prevent spread of infection in this patient and the general population.    Time of start: 4:00 pm  Time of end: 4:21 pm  Total duration of video visit: 21 minutes.    ELIZABETH  [place] Rodriguez ( Jake ) is a 41 year old male with type 1 diabetes mellitus.  Video visit today for diabetes follow up.  Pt has hx of type 1 diabetes mellitus dx at age 16.  His diabetes is complicated by nephropathy and he underwent a kidney transplant on 10/1/2019.   Oph exam showed possible early retinopathy. Most recent Oph exam reported stable exam.  He has no peripheral neuropathy.  His hx is also significant for HTN, ED, dyslipidemia and obesity.  Since his last visit, he purchased a Tandem insulin pump and DexcomG6 sensor.  He is using control IQ on his Tandem pump and his basal insulin rates are:  Midnight= 2.0 units/hr.  1 am = 1.85  units/hr.  4 am = 1.8 units/hr.  9 am = 1.75 units/hr.  11:30 am = 1.75 units/hr.  I/C ratio settings:  Midnight= 1:7.5 and 1:3 at 11:30 am.  Sensitivity is set at 29.  Most recent A1C was 8.4 % on 11/16/2021. His A1C was 8.7 % on 7/22/2021.    I reviewed and scanned his Tandem insulin pump download data in his note below.  His blood sugars are high overnight and from 4 pm -midnight.  No frequent hypoglycemia.  On ROS today, denies frequent headaches, blurred vision, n/v, SOB at rest, cough, fever, chills, chest pain, abd pain, diarrhea, dysuria, hematuria or foot ulcers.  Rudi denies numbness, tingling or pain in his feet or hands.  He received the COVID19 vaccines (Moderna) and COVID booster.    Diabetes Care  Retinopathy: he was seen by Oph in Feb 2019- possible early retinopathy. He states he was seen by Oph again in Fall 2021 with stable eye exam.  Nephropathy:hx of ESRD s/p kidney transplant on 10/34290. Urine microalbuminuria negative in 11/2021.  Neuropathy: none.  Foot Exam:no exam today.  Taking aspirin:  "yes  Lipids: LDL 69 in Oct 2020. Pt is taking Lipitor.  CAD: no.  Mental health:denies depression.  Insulin: Tandem insulin pump- control IQ and DexcomG6 sensor.  Hypoglycemia tx: RX for Baqsimi sent to pharmacy to use in case of severe hypoglycemia.                  ROS  Please see under HPI.    Allergies  Allergies   Allergen Reactions     Thymoglobulin      Had reaction with rigors after steroid-free dose. No reaction with steroids.       Medications  Current Outpatient Medications   Medication Sig Dispense Refill     alcohol swab prep pads Use to swab area of injection/zak as directed. 100 each 3     aspirin (ASA) 81 MG EC tablet Take 81 mg by mouth daily        atorvastatin (LIPITOR) 10 MG tablet TAKE 1 TABLET(10 MG) BY MOUTH DAILY 90 tablet 3     blood glucose (CONTOUR NEXT TEST) test strip Use to test blood sugar 4 times daily. 400 strip 11     blood glucose monitoring (ACCU-CHEK FASTCLIX) lancets U QID OR MORE OFTEN PRN  1     carvedilol (COREG) 12.5 MG tablet TAKE 1 TABLET(12.5 MG) BY MOUTH TWICE DAILY WITH MEALS 180 tablet 1     insulin cartridge (T:SLIM 3ML) misc pump supply Insulin cartridge to be used with pump as directed.  Change every 2 days or as directed. 45 each 3     Insulin Infusion Pump Supplies (AUTOSOFT XC INFUSION SET) MISC 1 each every 48 hours Change every 2 days  9 mm cannula, 23 inch tubing 45 each 3     insulin lispro (HUMALOG VIAL) 100 UNIT/ML vial Inject 100 Units Subcutaneous daily With Insulin pump. Up to 100 units 30 mL 11     insulin pen needle (ULTICARE MICRO) 32G X 4 MM miscellaneous Use pen needles daily or as directed. 100 each 3     Insulin Syringe-Needle U-100 31G X 1/4\" 1 ML MISC 1 Syringe as needed (until new insuline pump arrives) 50 each 1     magnesium oxide (MAG-OX) 400 MG tablet Take 1 tablet (400 mg) by mouth daily (with lunch) 30 tablet 5     mycophenolate (GENERIC EQUIVALENT) 250 MG capsule Take 3 capsules (750 mg) by mouth 2 times daily 180 capsule 11     " senna-docusate (SENOKOT-S/PERICOLACE) 8.6-50 MG tablet Take 2 tablets by mouth 2 times daily 20 tablet 0     sulfamethoxazole-trimethoprim (BACTRIM) 400-80 MG tablet Take 1 tablet by mouth daily 30 tablet 11     tacrolimus (GENERIC EQUIVALENT) 0.5 MG capsule Take 1 capsule (0.5 mg) by mouth every morning Total dose = 1.5 mg in the AM and 1 mg in the PM 30 capsule 11     tacrolimus (GENERIC EQUIVALENT) 1 MG capsule Total dose = 1.5 mg in the AM and 1 mg in the PM 60 capsule 11     Glucagon (BAQSIMI ONE PACK) 3 MG/DOSE POWD Spray 1 each in nostril once as needed (for severe low blood sugar) (Patient not taking: Reported on 5/6/2022) 1 each 1     terbinafine (LAMISIL) 1 % external cream Apply topically 2 times daily To rash on left hand (Patient not taking: Reported on 5/6/2022) 42 g 11       Family History  family history includes Coronary Artery Disease in his father; Diabetes in his father; Skin Cancer in his paternal uncle.    Social History   reports that he has never smoked. He has never used smokeless tobacco. He reports previous alcohol use. He reports that he does not use drugs.   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    Past Medical History  Past Medical History:   Diagnosis Date     Anemia in chronic kidney disease      Dyslipidemia      ESRD (end stage renal disease) on dialysis (H)      Hypertension      Hypomagnesemia 10/7/2019     Secondary hyperparathyroidism (H)      Type 1 diabetes (H)        Past Surgical History:   Procedure Laterality Date     hair implant  2007     INSERT CATHETER PERITONEAL DIALYSIS  08/2018     IR FINE NEEDLE ASPIRATION W ULTRASOUND  11/25/2019     IR RENAL BIOPSY LEFT  11/25/2019     PERCUTANEOUS BIOPSY KIDNEY Left 10/22/2019    Procedure: Left Kidney Biopsy;  Surgeon: Kash Hoffman MD;  Location: [facility]       Physical Exam    No exam today.    RESULTS  Creatinine   Date Value Ref Range Status   11/16/2021 0.97 0.66 - 1.25  mg/dL Final   05/11/2021 1.13 0.66 - 1.25 mg/dL Final     GFR Estimate   Date Value Ref Range Status   11/16/2021 >90 >60 mL/min/1.73m2 Final     Comment:     As of July 11, 2021, eGFR is calculated by the CKD-EPI creatinine equation, without race adjustment. eGFR can be influenced by muscle mass, exercise, and diet. The reported eGFR is an estimation only and is only applicable if the renal function is stable.   05/11/2021 80 >60 mL/min/[1.73_m2] Final     Comment:     Non  GFR Calc  Starting 12/18/2018, serum creatinine based estimated GFR (eGFR) will be   calculated using the Chronic Kidney Disease Epidemiology Collaboration   (CKD-EPI) equation.       Hemoglobin A1C POCT   Date Value Ref Range Status   07/16/2020 8.1 (H) 0 - 5.6 % Final     Comment:     Normal <5.7% Prediabetes 5.7-6.4%  Diabetes 6.5% or higher - adopted from ADA   consensus guidelines.       Hemoglobin A1C   Date Value Ref Range Status   11/16/2021 8.4 (H) 0.0 - 5.6 % Final     Comment:     Normal <5.7%   Prediabetes 5.7-6.4%    Diabetes 6.5% or higher     Note: Adopted from ADA consensus guidelines.     Potassium   Date Value Ref Range Status   11/16/2021 4.0 3.4 - 5.3 mmol/L Final   05/11/2021 4.1 3.4 - 5.3 mmol/L Final     ALT   Date Value Ref Range Status   09/26/2019 42 0 - 70 U/L Final     AST   Date Value Ref Range Status   09/26/2019 16 0 - 45 U/L Final     A1C   8.4     11/16/2021  A1C   8.7     7/22/2021  A1C   7.5     10/2/2020-outside lab  A1C    8.1     7/16/2020  A1C   8.2      2/7/2020   A1C   8.9      9/26/2019    ASSESSMENT/PLAN:    1.  TYPE 1 DIABETES MELLITUS: I had Rudi increase his midnight, 1 am and 4 pm basal insulin rates today.  New basal insulin rates below:  Midnight = 2.1 units/hr.  1 am = 2.1 units/hr.  4 am = 1.8 units/hr.  9 am = 1.75 units/hr.  11:30 am = 1.75 units/hr.  4 pm = 2.0 units/hr.  No change in I/C ratio or CF.  Discussed the importance of healthy eating and he hopes to return to the  gym soon.  Pt was seen by Oph in Fall 2021 and he states the he was told his eye exam was stable.  He denies any sx of neuropathy or foot ulcers or sores at this time.  Pt's TSH was normal on 7/22/2021.  Rudi received the COVID vaccines (Moderna) and COVID booster.  He also received the flu vaccine this season.    2.  HX OF ESRD: S/P kidney transplant on 10/1/019 doing well.  Most recent creat was 1.17  with GFR > 60 mL/min in 2/2022.  Urine protein negative in Nov 2021.    3. OBESITY: He has met with our dietitian for weight loss support.    4.  HTN: He tells me his BP has been in the 130/80 or less range at home.  Continue current RX.    5.  HYPERLIPIDEMIA: LDL 69 in Oct 2020.  Pt is taking Lipitor daily.    6.  FOLLOW UP : with me in 1 months.    Time spent reviewing chart and labs and insulin pump data today = 8 minutes.  Time for video visit today = 21 minutes.  Time for documentation today = 15 minutes.    TOTAL TIME FOR VISIT TODAY = 44 minutes.    Kymberly Boswell PA-C

## 2022-05-08 ENCOUNTER — TELEPHONE (OUTPATIENT)
Dept: TRANSPLANT | Facility: CLINIC | Age: 42
End: 2022-05-08
Payer: MEDICARE

## 2022-05-08 NOTE — TELEPHONE ENCOUNTER
Overdue for transplant labs     Please send letter due for lab surveillance  Transplant  Labs  Please include lab letter

## 2022-05-08 NOTE — LETTER
OUTPATIENT LABORATORY TEST ORDER    Patient Name: Rudi Amin  Transplant Date: 10/1/2019   YOB: 1980  Issue Date & Time: 5/9/2022  7:43 AM  [facility] MR: [id number] Exp. Date (1 year after date issued)      Diagnoses: Kidney Transplant (ICD-10  Z94.0)   Long term use of medications (ICD-10  Z79.899)     Lab results to be available on the same day drawn.   Patient should release information to the [facility].  Please fax to the Transplant Center at [phone].    Every other month   ?Hemogram and Platelet  ?Basic Metabolic Panel (Sodium, Potassium, Chloride, CO2, Creatinine, Urea Nitrogen,     Glucose,   Calcium)         ?/Tacrolimus/Prograf drug level                         Every 6 Months                 ?Urine for protein/creatinine    Yearly   ?PRA/DSA level (mailers provided by the patient)     If you have any questions, please call The [facility] at [phone] or [phone].    Please fax labs to [phone]  .

## 2022-05-09 NOTE — TELEPHONE ENCOUNTER
Current standing lab orders faxed to patients local lab and sent copy of lab letter to patient via [employer].

## 2022-05-21 ENCOUNTER — HEALTH MAINTENANCE LETTER (OUTPATIENT)
Age: 42
End: 2022-05-21

## 2022-06-01 NOTE — PROGRESS NOTES
Michael Amin  is being evaluated via a billable video visit.      How would you like to obtain your AVS? [employer]  For the video visit, send the invitation by: Text to cell phone: [phone]  Will anyone else be joining your video visit? No    Outcome for 06/01/22 1:36 PM: MOG message sent  [facility] [employer]  Outcome for 06/03/22 4:16 PM: Left Voicemail   Janki Parr VF  Outcome for 06/06/22 10:16 AM: Left Voicemail   Janki Parr VF  Outcome for 06/06/22 4:27 PM: Left Voicemail   Janki Parr, VF

## 2022-06-07 ENCOUNTER — MYC MEDICAL ADVICE (OUTPATIENT)
Dept: ENDOCRINOLOGY | Facility: CLINIC | Age: 42
End: 2022-06-07

## 2022-06-07 ENCOUNTER — VIRTUAL VISIT (OUTPATIENT)
Dept: ENDOCRINOLOGY | Facility: CLINIC | Age: 42
End: 2022-06-07
Payer: MEDICARE

## 2022-06-07 DIAGNOSIS — E10.65 UNCONTROLLED TYPE 1 DIABETES MELLITUS WITH HYPERGLYCEMIA (H): Primary | ICD-10-CM

## 2022-06-07 PROCEDURE — 99215 OFFICE O/P EST HI 40 MIN: CPT | Mod: 95 | Performed by: PHYSICIAN ASSISTANT

## 2022-06-07 NOTE — LETTER
6/7/2022       RE: Michael Amin  [address]  [facility] [address]     Dear Colleague,    Thank you for referring your patient, Michael Amin, to the [facility] [place] [facility]. Please see a copy of my visit note below.    Michael Amin  is being evaluated via a billable video visit.      How would you like to obtain your AVS? [employer]  For the video visit, send the invitation by: Text to cell phone: [phone]  Will anyone else be joining your video visit? No    Outcome for 06/01/22 1:36 PM: [employer] message sent  Janki Charlotte, VF  Outcome for 06/03/22 4:16 PM: Left Voicemail   Janki Charlotte, VF  Outcome for 06/06/22 10:16 AM: Left Voicemail   Janki Charlotte, VF  Outcome for 06/06/22 4:27 PM: Left Voicemail   Janki Charlotte, VF            Due to the COVID 19 pandemic this visit was converted to a video visit in order to help prevent spread of infection in this patient and the general population.    Time of start: 2:30 pm  Time of end: 2:48 pm  Total duration of video visit: 18 minutes.    ELIZABETH Rodriguez ( Jake ) is a 41 year old male with type 1 diabetes mellitus.  Video visit today for diabetes follow up.  Pt has hx of type 1 diabetes mellitus dx at age 16.  His diabetes is complicated by nephropathy and he underwent a kidney transplant on 10/1/2019.   Oph exam showed possible early retinopathy. Most recent Oph exam reported stable exam.  He has no peripheral neuropathy.  His hx is also significant for HTN, ED, dyslipidemia and obesity.  Rudi is using a Tandem insulin pump- control IQ and his basal insulin rates are:  Midnight= 2.1 units/hr.  1 am = 2.1  units/hr.  4 am = 1.8 units/hr.  9 am = 1.75 units/hr.  11:30 am = 1.75 units/hr.  4 pm = 2.0 units/hr.  I/C ratio settings:  Midnight= 1:7.5 and 1:3 at 11:30 am.  Sensitivity is set at 29 at midnight and 25 at 11:30 am.  Most recent A1C was 8.4 % on  11/16/2021. His A1C was 8.7 % on 7/22/2021.    I reviewed and scanned his Tandem insulin pump download data in his note below.  Pt's blood sugar is high from midnight to 4 am.  Control IQ time in use is 78 %.   No frequent hypoglycemia.  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  Black spot in left eye vision that moves- probably a floater. I asked him to call his Oph staff.  No eye pain or headaches or blurred vision.  No bright lights.  Pt denies n/v, SOB at rest, cough, fever, chills, chest pain, abd pain, diarrhea, dysuria, hematuria or foot ulcers.  Rudi denies numbness, tingling or pain in his feet or hands.  He received the COVID19 vaccines (Moderna) and COVID booster.    Diabetes Care  Retinopathy: he was seen by Oph in Feb 2019- possible early retinopathy. He states he was seen by Oph again in Fall 2021 with stable eye exam.  Nephropathy:hx of ESRD s/p kidney transplant on 10/19354. Urine microalbuminuria negative in 11/2021.  Neuropathy: none.  Foot Exam:no exam today.  Taking aspirin: yes  Lipids: LDL 69 in Oct 2020. Pt is taking Lipitor.  CAD: no.  Mental health:denies depression.  Insulin: Tandem insulin pump- control IQ and DexcomG6 sensor.  Hypoglycemia tx: RX for Baqsimi sent to pharmacy to use in case of severe hypoglycemia.                    ROS  Please see under HPI.    Allergies  Allergies   Allergen Reactions     Thymoglobulin      Had reaction with rigors after steroid-free dose. No reaction with steroids.       Medications  Current Outpatient Medications   Medication Sig Dispense Refill     alcohol swab prep pads Use to swab area of injection/zak as directed. 100 each 3     aspirin (ASA) 81 MG EC tablet Take 81 mg by mouth daily        atorvastatin (LIPITOR) 10 MG tablet TAKE 1 TABLET(10 MG) BY MOUTH DAILY 90 tablet 3     blood glucose (CONTOUR NEXT TEST) test strip Use to test blood sugar 4 times daily. 400 strip 11     blood glucose monitoring (ACCU-CHEK FASTCLIX)  "lancets U QID OR MORE OFTEN PRN  1     carvedilol (COREG) 12.5 MG tablet TAKE 1 TABLET(12.5 MG) BY MOUTH TWICE DAILY WITH MEALS 180 tablet 1     Glucagon (BAQSIMI ONE PACK) 3 MG/DOSE POWD Spray 3 mg in nostril See Admin Instructions USE ONLY FOR SEVERE HYPOGLYCEMIA. 1 each 3     insulin cartridge (T:SLIM 3ML) misc pump supply Insulin cartridge to be used with pump as directed.  Change every 2 days or as directed. 45 each 3     Insulin Infusion Pump Supplies (AUTOSOFT XC INFUSION SET) MISC 1 each every 48 hours Change every 2 days  9 mm cannula, 23 inch tubing 45 each 3     insulin lispro (HUMALOG VIAL) 100 UNIT/ML vial Inject 100 Units Subcutaneous daily With Insulin pump. Up to 100 units 30 mL 11     insulin pen needle (ULTICARE MICRO) 32G X 4 MM miscellaneous Use pen needles daily or as directed. 100 each 3     Insulin Syringe-Needle U-100 31G X 1/4\" 1 ML MISC 1 Syringe as needed (until new insuline pump arrives) 50 each 1     magnesium oxide (MAG-OX) 400 MG tablet Take 1 tablet (400 mg) by mouth daily (with lunch) 30 tablet 5     mycophenolate (GENERIC EQUIVALENT) 250 MG capsule Take 3 capsules (750 mg) by mouth 2 times daily 180 capsule 11     senna-docusate (SENOKOT-S/PERICOLACE) 8.6-50 MG tablet Take 2 tablets by mouth 2 times daily 20 tablet 0     sulfamethoxazole-trimethoprim (BACTRIM) 400-80 MG tablet Take 1 tablet by mouth daily 30 tablet 11     tacrolimus (GENERIC EQUIVALENT) 0.5 MG capsule Take 1 capsule (0.5 mg) by mouth every morning Total dose = 1.5 mg in the AM and 1 mg in the PM 30 capsule 11     tacrolimus (GENERIC EQUIVALENT) 1 MG capsule Total dose = 1.5 mg in the AM and 1 mg in the PM 60 capsule 11     terbinafine (LAMISIL) 1 % external cream Apply topically 2 times daily To rash on left hand 42 g 11       Family History  family history includes Coronary Artery Disease in his father; Diabetes in his father; Skin Cancer in his paternal uncle.    Social History   reports that he has never smoked. " He has never used smokeless tobacco. He reports previous alcohol use. He reports that he does not use drugs.   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    Past Medical History  Past Medical History:   Diagnosis Date     Anemia in chronic kidney disease      Dyslipidemia      ESRD (end stage renal disease) on dialysis (H)      Hypertension      Hypomagnesemia 10/7/2019     Secondary hyperparathyroidism (H)      Type 1 diabetes (H)        Past Surgical History:   Procedure Laterality Date     hair implant  2007     INSERT CATHETER PERITONEAL DIALYSIS  08/2018     IR FINE NEEDLE ASPIRATION W ULTRASOUND  11/25/2019     IR RENAL BIOPSY LEFT  11/25/2019     PERCUTANEOUS BIOPSY KIDNEY Left 10/22/2019    Procedure: Left Kidney Biopsy;  Surgeon: Kash Hoffman MD;  Location: [facility]       Physical Exam    No exam today.    RESULTS  Creatinine   Date Value Ref Range Status   11/16/2021 0.97 0.66 - 1.25 mg/dL Final   05/11/2021 1.13 0.66 - 1.25 mg/dL Final     GFR Estimate   Date Value Ref Range Status   11/16/2021 >90 >60 mL/min/1.73m2 Final     Comment:     As of July 11, 2021, eGFR is calculated by the CKD-EPI creatinine equation, without race adjustment. eGFR can be influenced by muscle mass, exercise, and diet. The reported eGFR is an estimation only and is only applicable if the renal function is stable.   05/11/2021 80 >60 mL/min/[1.73_m2] Final     Comment:     Non  GFR Calc  Starting 12/18/2018, serum creatinine based estimated GFR (eGFR) will be   calculated using the Chronic Kidney Disease Epidemiology Collaboration   (CKD-EPI) equation.       Hemoglobin A1C POCT   Date Value Ref Range Status   07/16/2020 8.1 (H) 0 - 5.6 % Final     Comment:     Normal <5.7% Prediabetes 5.7-6.4%  Diabetes 6.5% or higher - adopted from ADA   consensus guidelines.       Hemoglobin A1C   Date Value Ref Range Status   11/16/2021 8.4 (H) 0.0 - 5.6 % Final     Comment:      Normal <5.7%   Prediabetes 5.7-6.4%    Diabetes 6.5% or higher     Note: Adopted from ADA consensus guidelines.     Potassium   Date Value Ref Range Status   11/16/2021 4.0 3.4 - 5.3 mmol/L Final   05/11/2021 4.1 3.4 - 5.3 mmol/L Final     ALT   Date Value Ref Range Status   09/26/2019 42 0 - 70 U/L Final     AST   Date Value Ref Range Status   09/26/2019 16 0 - 45 U/L Final     A1C   8.4     11/16/2021  A1C   8.7     7/22/2021  A1C   7.5     10/2/2020-outside lab  A1C    8.1     7/16/2020  A1C   8.2      2/7/2020   A1C   8.9      9/26/2019    ASSESSMENT/PLAN:    1.  TYPE 1 DIABETES MELLITUS: I had Rudi increase his midnight and 1 am basal insulin rates today.  New basal insulin rates below:  Midnight = 2.15 units/hr.  1 am = 2.15 units/hr.  4 am = 1.8 units/hr.  9 am = 1.75 units/hr.  11:30 am = 1.75 units/hr.  4 pm = 2.0 units/hr.  No change in I/C ratio or CF.  Discussed the importance of healthy eating and he hopes to return to the gym soon.  Reviewed use of exercise mode. He may need to use the exercise mode for the physical [employer] work he will be doing this summer.  Meir was seen by Oph in Fall 2021 and he states the he was told his eye exam was stable.  He denies any sx of neuropathy or foot ulcers or sores at this time.  Pt's TSH was normal on 7/22/2021.  Rudi received the COVID vaccines (Moderna) and COVID booster.  He also received the flu vaccine this season.    2.  HX OF ESRD: S/P kidney transplant on 10/1/019 doing well.  Most recent creat was 1.17  with GFR > 60 mL/min in 2/2022.  Urine protein negative in Nov 2021.    3. OBESITY: He has met with our dietitian for weight loss support.    4.  HTN: He tells me his BP has been in the 130/80 or less range at home.  Continue current RX.    5.  HYPERLIPIDEMIA: LDL 69 in Oct 2020.  Pt is taking Lipitor daily.    6.  FOLLOW UP : with me in 1 month.  A1C ordered.    Time spent reviewing chart, labs and insulin pump data today = 8 minutes.  Time for video visit  today = 18 minutes.  Time for documentation today = 15 minutes.    TOTAL TIME FOR VISIT TODAY = 43 minutes.    Kymberly Boswell PA-C                        Answers for HPI/ROS submitted by the patient on 6/7/2022  General Symptoms: No  Skin Symptoms: No  HENT Symptoms: No  EYE SYMPTOMS: No  HEART SYMPTOMS: No  LUNG SYMPTOMS: No  INTESTINAL SYMPTOMS: No  URINARY SYMPTOMS: No  REPRODUCTIVE SYMPTOMS: No  SKELETAL SYMPTOMS: No  BLOOD SYMPTOMS: No  NERVOUS SYSTEM SYMPTOMS: No  MENTAL HEALTH SYMPTOMS: No

## 2022-06-08 NOTE — PROGRESS NOTES
Due to the COVID 19 pandemic this visit was converted to a video visit in order to help prevent spread of infection in this patient and the general population.    Time of start: 2:30 pm  Time of end: 2:48 pm  Total duration of video visit: 18 minutes.    ELIZABETH  Brennan Rodriguez ( Jake ) is a 41 year old male with type 1 diabetes mellitus.  Video visit today for diabetes follow up.  Pt has hx of type 1 diabetes mellitus dx at age 16.  His diabetes is complicated by nephropathy and he underwent a kidney transplant on 10/1/2019.   Oph exam showed possible early retinopathy. Most recent Oph exam reported stable exam.  He has no peripheral neuropathy.  His hx is also significant for HTN, ED, dyslipidemia and obesity.  Rudi is using a Tandem insulin pump- control IQ and his basal insulin rates are:  Midnight= 2.1 units/hr.  1 am = 2.1  units/hr.  4 am = 1.8 units/hr.  9 am = 1.75 units/hr.  11:30 am = 1.75 units/hr.  4 pm = 2.0 units/hr.  I/C ratio settings:  Midnight= 1:7.5 and 1:3 at 11:30 am.  Sensitivity is set at 29 at midnight and 25 at 11:30 am.  Most recent A1C was 8.4 % on 11/16/2021. His A1C was 8.7 % on 7/22/2021.    I reviewed and scanned his Tandem insulin pump download data in his note below.  Pt's blood sugar is high from midnight to 4 am.  Control IQ time in use is 78 %.   No frequent hypoglycemia.  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  Black spot in left eye vision that moves- probably a floater. I asked him to call his Oph staff.  No eye pain or headaches or blurred vision.  No bright lights.  Pt denies n/v, SOB at rest, cough, fever, chills, chest pain, abd pain, diarrhea, dysuria, hematuria or foot ulcers.  Rudi denies numbness, tingling or pain in his feet or hands.  He received the COVID19 vaccines (Moderna) and COVID booster.    Diabetes Care  Retinopathy: he was seen by Oph in Feb 2019- possible early retinopathy. He states he was seen by Oph again in Fall 2021 with stable eye  "exam.  Nephropathy:hx of ESRD s/p kidney transplant on [id number]. Urine microalbuminuria negative in 11/2021.  Neuropathy: none.  Foot Exam:no exam today.  Taking aspirin: yes  Lipids: LDL 69 in Oct 2020. Pt is taking Lipitor.  CAD: no.  Mental health:denies depression.  Insulin: Tandem insulin pump- control IQ and DexcomG6 sensor.  Hypoglycemia tx: RX for Baqsimi sent to pharmacy to use in case of severe hypoglycemia.                    ROS  Please see under HPI.    Allergies  Allergies   Allergen Reactions     Thymoglobulin      Had reaction with rigors after steroid-free dose. No reaction with steroids.       Medications  Current Outpatient Medications   Medication Sig Dispense Refill     alcohol swab prep pads Use to swab area of injection/zak as directed. 100 each 3     aspirin (ASA) 81 MG EC tablet Take 81 mg by mouth daily        atorvastatin (LIPITOR) 10 MG tablet TAKE 1 TABLET(10 MG) BY MOUTH DAILY 90 tablet 3     blood glucose (CONTOUR NEXT TEST) test strip Use to test blood sugar 4 times daily. 400 strip 11     blood glucose monitoring (ACCU-CHEK FASTCLIX) lancets U QID OR MORE OFTEN PRN  1     carvedilol (COREG) 12.5 MG tablet TAKE 1 TABLET(12.5 MG) BY MOUTH TWICE DAILY WITH MEALS 180 tablet 1     Glucagon (BAQSIMI ONE PACK) 3 MG/DOSE POWD Spray 3 mg in nostril See Admin Instructions USE ONLY FOR SEVERE HYPOGLYCEMIA. 1 each 3     insulin cartridge (T:SLIM 3ML) misc pump supply Insulin cartridge to be used with pump as directed.  Change every 2 days or as directed. 45 each 3     Insulin Infusion Pump Supplies (AUTOSOFT XC INFUSION SET) MISC 1 each every 48 hours Change every 2 days  9 mm cannula, 23 inch tubing 45 each 3     insulin lispro (HUMALOG VIAL) 100 UNIT/ML vial Inject 100 Units Subcutaneous daily With Insulin pump. Up to 100 units 30 mL 11     insulin pen needle (ULTICARE MICRO) 32G X 4 MM miscellaneous Use pen needles daily or as directed. 100 each 3     Insulin Syringe-Needle U-100 31G X 1/4\" " 1 ML MISC 1 Syringe as needed (until new insuline pump arrives) 50 each 1     magnesium oxide (MAG-OX) 400 MG tablet Take 1 tablet (400 mg) by mouth daily (with lunch) 30 tablet 5     mycophenolate (GENERIC EQUIVALENT) 250 MG capsule Take 3 capsules (750 mg) by mouth 2 times daily 180 capsule 11     senna-docusate (SENOKOT-S/PERICOLACE) 8.6-50 MG tablet Take 2 tablets by mouth 2 times daily 20 tablet 0     sulfamethoxazole-trimethoprim (BACTRIM) 400-80 MG tablet Take 1 tablet by mouth daily 30 tablet 11     tacrolimus (GENERIC EQUIVALENT) 0.5 MG capsule Take 1 capsule (0.5 mg) by mouth every morning Total dose = 1.5 mg in the AM and 1 mg in the PM 30 capsule 11     tacrolimus (GENERIC EQUIVALENT) 1 MG capsule Total dose = 1.5 mg in the AM and 1 mg in the PM 60 capsule 11     terbinafine (LAMISIL) 1 % external cream Apply topically 2 times daily To rash on left hand 42 g 11       Family History  family history includes Coronary Artery Disease in his father; Diabetes in his father; Skin Cancer in his paternal uncle.    Social History   reports that he has never smoked. He has never used smokeless tobacco. He reports previous alcohol use. He reports that he does not use drugs.   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    Past Medical History  Past Medical History:   Diagnosis Date     Anemia in chronic kidney disease      Dyslipidemia      ESRD (end stage renal disease) on dialysis (H)      Hypertension      Hypomagnesemia 10/7/2019     Secondary hyperparathyroidism (H)      Type 1 diabetes (H)        Past Surgical History:   Procedure Laterality Date     hair implant  2007     INSERT CATHETER PERITONEAL DIALYSIS  08/2018     IR FINE NEEDLE ASPIRATION W ULTRASOUND  11/25/2019     IR RENAL BIOPSY LEFT  11/25/2019     PERCUTANEOUS BIOPSY KIDNEY Left 10/22/2019    Procedure: Left Kidney Biopsy;  Surgeon: Kash Hoffman MD;  Location:  OR       Physical Exam    No exam  today.    RESULTS  Creatinine   Date Value Ref Range Status   11/16/2021 0.97 0.66 - 1.25 mg/dL Final   05/11/2021 1.13 0.66 - 1.25 mg/dL Final     GFR Estimate   Date Value Ref Range Status   11/16/2021 >90 >60 mL/min/1.73m2 Final     Comment:     As of July 11, 2021, eGFR is calculated by the CKD-EPI creatinine equation, without race adjustment. eGFR can be influenced by muscle mass, exercise, and diet. The reported eGFR is an estimation only and is only applicable if the renal function is stable.   05/11/2021 80 >60 mL/min/[1.73_m2] Final     Comment:     Non  GFR Calc  Starting 12/18/2018, serum creatinine based estimated GFR (eGFR) will be   calculated using the Chronic Kidney Disease Epidemiology Collaboration   (CKD-EPI) equation.       Hemoglobin A1C POCT   Date Value Ref Range Status   07/16/2020 8.1 (H) 0 - 5.6 % Final     Comment:     Normal <5.7% Prediabetes 5.7-6.4%  Diabetes 6.5% or higher - adopted from ADA   consensus guidelines.       Hemoglobin A1C   Date Value Ref Range Status   11/16/2021 8.4 (H) 0.0 - 5.6 % Final     Comment:     Normal <5.7%   Prediabetes 5.7-6.4%    Diabetes 6.5% or higher     Note: Adopted from ADA consensus guidelines.     Potassium   Date Value Ref Range Status   11/16/2021 4.0 3.4 - 5.3 mmol/L Final   05/11/2021 4.1 3.4 - 5.3 mmol/L Final     ALT   Date Value Ref Range Status   09/26/2019 42 0 - 70 U/L Final     AST   Date Value Ref Range Status   09/26/2019 16 0 - 45 U/L Final     A1C   8.4     11/16/2021  A1C   8.7     7/22/2021  A1C   7.5     10/2/2020-outside lab  A1C    8.1     7/16/2020  A1C   8.2      2/7/2020   A1C   8.9      9/26/2019    ASSESSMENT/PLAN:    1.  TYPE 1 DIABETES MELLITUS: I had Rudi increase his midnight and 1 am basal insulin rates today.  New basal insulin rates below:  Midnight = 2.15 units/hr.  1 am = 2.15 units/hr.  4 am = 1.8 units/hr.  9 am = 1.75 units/hr.  11:30 am = 1.75 units/hr.  4 pm = 2.0 units/hr.  No change in I/C  ratio or CF.  Discussed the importance of healthy eating and he hopes to return to the gym soon.  Reviewed use of exercise mode. 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  Pt was seen by Oph in Fall 2021 and he states the he was told his eye exam was stable.  He denies any sx of neuropathy or foot ulcers or sores at this time.  Pt's TSH was normal on 7/22/2021.  Rudi received the COVID vaccines (Moderna) and COVID booster.  He also received the flu vaccine this season.    2.  HX OF ESRD: S/P kidney transplant on 10/1/019 doing well.  Most recent creat was 1.17  with GFR > 60 mL/min in 2/2022.  Urine protein negative in Nov 2021.    3. OBESITY: He has met with our dietitian for weight loss support.    4.  HTN: He tells me his BP has been in the 130/80 or less range at home.  Continue current RX.    5.  HYPERLIPIDEMIA: LDL 69 in Oct 2020.  Pt is taking Lipitor daily.    6.  FOLLOW UP : with me in 1 month.  A1C ordered.    Time spent reviewing chart, labs and insulin pump data today = 8 minutes.  Time for video visit today = 18 minutes.  Time for documentation today = 15 minutes.    TOTAL TIME FOR VISIT TODAY = 43 minutes.    Kymberly Boswell PA-C                        Answers for HPI/ROS submitted by the patient on 6/7/2022  General Symptoms: No  Skin Symptoms: No  HENT Symptoms: No  EYE SYMPTOMS: No  HEART SYMPTOMS: No  LUNG SYMPTOMS: No  INTESTINAL SYMPTOMS: No  URINARY SYMPTOMS: No  REPRODUCTIVE SYMPTOMS: No  SKELETAL SYMPTOMS: No  BLOOD SYMPTOMS: No  NERVOUS SYSTEM SYMPTOMS: No  MENTAL HEALTH SYMPTOMS: No

## 2022-06-20 ENCOUNTER — TELEPHONE (OUTPATIENT)
Dept: TRANSPLANT | Facility: CLINIC | Age: 42
End: 2022-06-20

## 2022-06-20 DIAGNOSIS — Z94.0 HTN, KIDNEY TRANSPLANT RELATED: Primary | ICD-10-CM

## 2022-06-20 DIAGNOSIS — I15.1 HTN, KIDNEY TRANSPLANT RELATED: Primary | ICD-10-CM

## 2022-06-20 RX ORDER — CARVEDILOL 12.5 MG/1
12.5 TABLET ORAL 2 TIMES DAILY WITH MEALS
Qty: 180 TABLET | Refills: 0 | Status: SHIPPED | OUTPATIENT
Start: 2022-06-20 | End: 2022-09-15

## 2022-06-20 NOTE — TELEPHONE ENCOUNTER
Patient Call: Medication Refill  Route to LPN  Instruct the patient to first contact their pharmacy. If they have called their pharmacy and require further assistance, route to LPN.    Pharmacy Name: [facility]  Pharmacy Location: [facility]   Name of Medication: Carvidol  Dose: 12.5 mg bid   When will the patient be out of this medication?: Less than 3 days (Route high priority)

## 2022-08-01 DIAGNOSIS — E10.22 TYPE 1 DIABETES MELLITUS WITH CHRONIC KIDNEY DISEASE ON CHRONIC DIALYSIS (H): Primary | ICD-10-CM

## 2022-08-01 DIAGNOSIS — N18.6 TYPE 1 DIABETES MELLITUS WITH CHRONIC KIDNEY DISEASE ON CHRONIC DIALYSIS (H): Primary | ICD-10-CM

## 2022-08-01 DIAGNOSIS — Z99.2 TYPE 1 DIABETES MELLITUS WITH CHRONIC KIDNEY DISEASE ON CHRONIC DIALYSIS (H): Primary | ICD-10-CM

## 2022-08-01 RX ORDER — PROCHLORPERAZINE 25 MG/1
SUPPOSITORY RECTAL
Qty: 3 EACH | Refills: 3 | Status: SHIPPED | OUTPATIENT
Start: 2022-08-01 | End: 2022-12-26

## 2022-08-01 RX ORDER — PROCHLORPERAZINE 25 MG/1
SUPPOSITORY RECTAL
Qty: 1 EACH | Refills: 3 | Status: SHIPPED | OUTPATIENT
Start: 2022-08-01 | End: 2023-11-13

## 2022-08-01 NOTE — TELEPHONE ENCOUNTER
DEXCOM G6 SENSORS AND TRANSMITTERS   Last Written Prescription Date:  ?  Last Fill Quantity: ?,   # refills: ?  Last Office Visit : 6/7/22  Future Office visit:  9/15/22    Routing refill request to provider for review/approval because:  Drug not active on patient's medication list

## 2022-08-16 NOTE — PROGRESS NOTES
"TRANSPLANT NEPHROLOGY TELEMEDICINE VISIT    Michael Amin is a 39 year old male who is being evaluated via a billable telemedicine (video/telephone) visit on April 3, 2020.     The patient has been notified of following:     \"This telemedicine (video/telephone) visit will be conducted via a video/phone call between you and your physician. We have found that certain health care needs can be provided without the need for a physical exam.  This service lets us provide the care you need with a short video/phone conversation.  If a prescription is necessary, we can send it directly to your pharmacy.  If lab work is needed, we can place an order for that and you can then stop by our lab to have the test done at a later time.    If during the course of this video/phone call the physician feels a telemedicine (video/telephone) visit is not appropriate, you will not be charged for this service and an in clinic visit will be arranged at a later date.\"     Assessment & Plan   # LDKT: Stable, to improved better baseline               - Baseline Cr ~ 1.1-1.3 mg/dL               - Proteinuria: Minimal (0.2-0.5 grams)              - Date DSA Last Checked: Dec/2019      Latest DSA: No               - BK Viremia: No              - Kidney Tx Biopsy: Nov 25, 2019; Result: Material insufficient for assessment with no glomeruli.                                              Oct 22, 2019; Result: Borderline acute cellular-mediated rejection.     # Immunosuppression: Tacrolimus immediate release (goal 6-8) and Mycophenolate mofetil (goal 1.0-3.5)              - Changes: No, per coordinator and pending level      # Infection Prophylaxis:   - PJP: Sulfa/TMP (Bactrim)  - CMV: None, prophylaxis completed  - Thrush: None     # Hypertension: Controlled;   Goal BP: < 140/90              - Volume status: Euvolemic              - Changes: No     # Diabetes: Borderline control (HbA1c 7-9%) with some brittleness still     Last HbA1c: 8.2%             "  - Management as per Endocrinology.     # Anemia in Chronic Renal Disease: Hgb: Stable      JOSE: No              - Iron studies: Replete     # Mineral Bone Disorder:   - Secondary renal hyperparathyroidism; PTH level: Moderately elevated (301-600 pg/ml) after transplant but not checked recently   - Vitamin D; level: Low        On Supplement: Yes  - Calcium; level: Normal        On Supplement: No  - Phosphorus; level: Normal        On Supplement: No     # Electrolytes:   - Potassium; level: Normal        On Supplement: No  - Magnesium; level: Normal        On Supplement: Yes  - Bicarbonate; level: Normal        On Supplement: No     # Overweight: Stable to slightly decreased weight.              - Recommend increased exercise and watch caloric intake.     # Medical Compliance: Yes    # COVID-19 Virus Review: Discussed COVID-19 virus and the potential medical risks.  Reviewed preventative health recommendations, which includes washing hands for 20 seconds, avoid touching your face, and social distancing.  Asked patient to inform the transplant center if they are exposed or diagnosed with this virus     # Transplant History:  Etiology of Kidney Failure: Diabetes mellitus type 1  Tx: LDKT  Transplant: 10/1/2019 (Kidney)  Donor Type: Living      Donor Class:   Crossmatch at time of Tx: negative  DSA at time of Tx: No  Significant changes in immunosuppression: None  CMV IgG Ab High Risk Discordance (D+/R-): No  EBV IgG Ab High Risk Discordance (D+/R-): No  Significant transplant-related complications: None    Assessment and plan was discussed with the patient and he voiced his understanding and agreement.    Return Visit: 3 months       Michael Amin has a clinical telephone visit for routine follow up and immunosuppression management.    History of Present Illness      Michael feels well. No new complaints. He is hydrating himself well and following the insulin recommendations per his diabetes provider.    Recent  Hospitalizations:  [x] No [] Yes    New Medical Issues: [x] No [] Yes    Decreased energy: [x] No [] Yes    Chest pain or SOB with exertion:  [x] No [] Yes    Appetite change or weight change: [x] No [] Yes    Nausea, vomiting or diarrhea:  [x] No [] Yes    Fever, sweats or chills: [x] No [] Yes    Leg swelling: [x] No [] Yes      Home BP: controlled    Review of Systems   A comprehensive review of systems was obtained and negative, except as noted in the HPI or PMH.    I have reviewed and updated the patient's Past Medical History, Social History, and Medication List.    Active Medical Problems  Patient Active Problem List   Diagnosis     HTN, kidney transplant related     Diabetes mellitus type 1 (H)     Dyslipidemia     Anemia in chronic kidney disease     Secondary renal hyperparathyroidism (H)     Kidney replaced by transplant     Aftercare following organ transplant     Vitamin D deficiency     Hypomagnesemia     Kidney transplant rejection     Allergies  Thymoglobulin    Medications  Current Outpatient Medications   Medication Sig     alcohol swab prep pads Use to swab area of injection/zak as directed.     aspirin (ASA) 81 MG EC tablet Take 81 mg by mouth daily      atorvastatin (LIPITOR) 10 MG tablet Take 1 tablet (10 mg) by mouth daily     blood glucose monitoring (ACCU-CHEK FASTCLIX) lancets U QID OR MORE OFTEN PRN     carvedilol (COREG) 12.5 MG tablet Take 1 tablet (12.5 mg) by mouth 2 times daily (with meals)     HUMALOG 100 UNIT/ML injection Insulin used for filling patient's pump     insulin lispro (HUMALOG VIAL) 100 UNIT/ML vial Inject 100 Units Subcutaneous daily With Insulin pump. Up to 100 units     insulin pen needle (ULTICARE MICRO) 32G X 4 MM miscellaneous Use pen needles daily or as directed.     magnesium oxide (MAG-OX) 400 MG tablet Take 1 tablet (400 mg) by mouth daily (with lunch)     mycophenolate (GENERIC EQUIVALENT) 250 MG capsule Take 5 capsules (1,250 mg) by mouth 2 times daily      senna-docusate (SENOKOT-S/PERICOLACE) 8.6-50 MG tablet Take 2 tablets by mouth 2 times daily     sulfamethoxazole-trimethoprim (BACTRIM/SEPTRA) 400-80 MG tablet Take 1 tablet by mouth daily     tacrolimus (GENERIC EQUIVALENT) 0.5 MG capsule HOLD     tacrolimus (GENERIC EQUIVALENT) 1 MG capsule Take 2 capsules (2 mg) by mouth 2 times daily     vitamin D3 (CHOLECALCIFEROL) 1000 units (25 mcg) tablet Take 1 tablet (1,000 Units) by mouth daily     No current facility-administered medications for this visit.        Phone call contact time:  Total time spent 30 min     Raffi Landon MD      CT A/P- no acute abd pathology, pt urinated a large amt of urine after CT done.  CXR- no infiltrate seen.  Pt with no vomiting, will d/c to adult with care giver.  Advised to f/u with PMD for GI referral, also to get bone scan to r/o bony metastasis

## 2022-08-23 DIAGNOSIS — Z94.0 KIDNEY REPLACED BY TRANSPLANT: ICD-10-CM

## 2022-09-01 ENCOUNTER — TRANSFERRED RECORDS (OUTPATIENT)
Dept: HEALTH INFORMATION MANAGEMENT | Facility: CLINIC | Age: 42
End: 2022-09-01

## 2022-09-01 LAB — RETINOPATHY: POSITIVE

## 2022-09-08 NOTE — PROGRESS NOTES
Outcome for 09/08/22 3:06 PM: [employer] message sent  Audrey Goel, VF  Outcome for 09/13/22 3:04 PM: Left Voicemail   Stephany Ruiz, VF  Outcome for 09/14/22 10:17 AM: Per patient, will upload device before appointment  So Jenkins, VF   Outcome for 09/15/22 3:17 PM: Tandem emailed to provider  Kathie Chaudhry, VF

## 2022-09-12 ENCOUNTER — TELEPHONE (OUTPATIENT)
Dept: ENDOCRINOLOGY | Facility: CLINIC | Age: 42
End: 2022-09-12

## 2022-09-14 DIAGNOSIS — Z94.0 HTN, KIDNEY TRANSPLANT RELATED: Primary | ICD-10-CM

## 2022-09-14 DIAGNOSIS — I15.1 HTN, KIDNEY TRANSPLANT RELATED: Primary | ICD-10-CM

## 2022-09-15 ENCOUNTER — VIRTUAL VISIT (OUTPATIENT)
Dept: ENDOCRINOLOGY | Facility: CLINIC | Age: 42
End: 2022-09-15
Payer: MEDICARE

## 2022-09-15 DIAGNOSIS — Z99.2 TYPE 1 DIABETES MELLITUS WITH CHRONIC KIDNEY DISEASE ON CHRONIC DIALYSIS (H): Primary | ICD-10-CM

## 2022-09-15 DIAGNOSIS — N18.6 TYPE 1 DIABETES MELLITUS WITH CHRONIC KIDNEY DISEASE ON CHRONIC DIALYSIS (H): Primary | ICD-10-CM

## 2022-09-15 DIAGNOSIS — E10.22 TYPE 1 DIABETES MELLITUS WITH CHRONIC KIDNEY DISEASE ON CHRONIC DIALYSIS (H): Primary | ICD-10-CM

## 2022-09-15 PROCEDURE — 99215 OFFICE O/P EST HI 40 MIN: CPT | Mod: 95 | Performed by: PHYSICIAN ASSISTANT

## 2022-09-15 RX ORDER — CARVEDILOL 12.5 MG/1
12.5 TABLET ORAL 2 TIMES DAILY WITH MEALS
Qty: 180 TABLET | Refills: 0 | Status: SHIPPED | OUTPATIENT
Start: 2022-09-15 | End: 2022-12-13

## 2022-09-15 NOTE — PROGRESS NOTES
Due to the COVID 19 pandemic this visit was converted to a video visit in order to help prevent spread of infection in this patient and the general population.    Time of start: 3:30 pm  Time of end: 3:53 pm  Total duration of video visit: 23 minutes.    ELIZABETH Rodriguez ( Jake ) is a 42 year old male with type 1 diabetes mellitus.  Video visit today for diabetes follow up.  Pt has hx of type 1 diabetes mellitus dx at age 16.  His diabetes is complicated by nephropathy and he underwent a kidney transplant on 10/1/2019.   Oph exam showed possible early retinopathy. Most recent Oph exam reported stable exam.  He has no peripheral neuropathy.  His hx is also significant for HTN, ED, dyslipidemia and obesity.  Rudi is using a Tandem insulin pump- control IQ and his basal insulin rates are:  Midnight= 2.1 units/hr.  1 am = 2.1  units/hr.  4 am = 1.8 units/hr.  9 am = 1.75 units/hr.  11:30 am = 1.75 units/hr.  4 pm = 2.0 units/hr.  I/C ratio settings:  Midnight= 1:7.5 and 1:3 at 11:30 am.  Sensitivity is set at 29 at midnight and 25 at 11:30 am.  Most recent A1C was 8.4 % on 11/16/2021. His A1C was 8.7 % on 7/22/2021.    I reviewed and scanned his Tandem insulin pump download data in his note below.  Pt's blood sugar is highest in the evening.  No frequent hypoglycemia.  On ROS today, Rudi reports feeling well.  No blurred vision. Floater left eye and he has been seen by his Oph staff.  Pt denies n/v, SOB at rest, cough, fever, chills, chest pain, abd pain, diarrhea, dysuria, hematuria or foot ulcers.  Rudi denies numbness, tingling or pain in his feet or hands.    Diabetes Care  Retinopathy: he was seen by Oph in Feb 2019- possible early retinopathy. He was seen by Oph in Sept 2022.  Nephropathy:hx of ESRD s/p kidney transplant on 10/70116. Urine microalbuminuria negative in 8/2022.  Neuropathy: none.  Foot Exam:no exam today.  Taking aspirin: yes  Lipids: LDL 69 in Oct 2020. Pt is taking Lipitor.  CAD: no.  Mental  "health:denies depression.  Insulin: Tandem insulin pump- control IQ and DexcomG6 sensor.  Hypoglycemia tx: Pt has Baqsimi to use in case of severe hypoglycemia.                    ROS  Please see under HPI.    Allergies  Allergies   Allergen Reactions     Thymoglobulin      Had reaction with rigors after steroid-free dose. No reaction with steroids.       Medications  Current Outpatient Medications   Medication Sig Dispense Refill     alcohol swab prep pads Use to swab area of injection/zak as directed. 100 each 3     aspirin (ASA) 81 MG EC tablet Take 81 mg by mouth daily        atorvastatin (LIPITOR) 10 MG tablet TAKE 1 TABLET(10 MG) BY MOUTH DAILY 90 tablet 3     blood glucose (CONTOUR NEXT TEST) test strip Use to test blood sugar 4 times daily. 400 strip 11     blood glucose monitoring (ACCU-CHEK FASTCLIX) lancets U QID OR MORE OFTEN PRN  1     carvedilol (COREG) 12.5 MG tablet Take 1 tablet (12.5 mg) by mouth 2 times daily (with meals) 180 tablet 0     Continuous Blood Gluc Sensor (DEXCOM G6 SENSOR) MISC Change every 10 days 3 each 3     Continuous Blood Gluc Transmit (DEXCOM G6 TRANSMITTER) MISC Change every 3 months 1 each 3     Glucagon (BAQSIMI ONE PACK) 3 MG/DOSE POWD Spray 3 mg in nostril See Admin Instructions USE ONLY FOR SEVERE HYPOGLYCEMIA. 1 each 3     insulin cartridge (T:SLIM 3ML) misc pump supply Insulin cartridge to be used with pump as directed.  Change every 2 days or as directed. 45 each 3     Insulin Infusion Pump Supplies (AUTOSOFT XC INFUSION SET) MISC 1 each every 48 hours Change every 2 days  9 mm cannula, 23 inch tubing 45 each 3     insulin lispro (HUMALOG VIAL) 100 UNIT/ML vial With Insulin pump. Up to 130 units per day. 30 mL 11     insulin pen needle (ULTICARE MICRO) 32G X 4 MM miscellaneous Use pen needles daily or as directed. 100 each 3     Insulin Syringe-Needle U-100 31G X 1/4\" 1 ML MISC 1 Syringe as needed (until new insuline pump arrives) 50 each 1     magnesium oxide " (MAG-OX) 400 MG tablet Take 1 tablet (400 mg) by mouth daily (with lunch) 30 tablet 5     mycophenolate (GENERIC EQUIVALENT) 250 MG capsule Take 3 capsules (750 mg) by mouth 2 times daily 180 capsule 11     senna-docusate (SENOKOT-S/PERICOLACE) 8.6-50 MG tablet Take 2 tablets by mouth 2 times daily 20 tablet 0     sulfamethoxazole-trimethoprim (BACTRIM) 400-80 MG tablet Take 1 tablet by mouth daily 30 tablet 11     tacrolimus (GENERIC EQUIVALENT) 0.5 MG capsule Take 1 capsule (0.5 mg) by mouth every morning Total dose = 1.5 mg in the AM and 1 mg in the PM 30 capsule 11     tacrolimus (GENERIC EQUIVALENT) 1 MG capsule Total dose = 1.5 mg in the AM and 1 mg in the PM 60 capsule 11     terbinafine (LAMISIL) 1 % external cream Apply topically 2 times daily To rash on left hand 42 g 11       Family History  family history includes Coronary Artery Disease in his father; Diabetes in his father; Skin Cancer in his paternal uncle.    Social History   reports that he has never smoked. He has never used smokeless tobacco. He reports previous alcohol use. He reports that he does not use drugs.   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    Past Medical History  Past Medical History:   Diagnosis Date     Anemia in chronic kidney disease      Dyslipidemia      ESRD (end stage renal disease) on dialysis (H)      Hypertension      Hypomagnesemia 10/7/2019     Secondary hyperparathyroidism (H)      Type 1 diabetes (H)        Past Surgical History:   Procedure Laterality Date     hair implant  2007     INSERT CATHETER PERITONEAL DIALYSIS  08/2018     IR FINE NEEDLE ASPIRATION W ULTRASOUND  11/25/2019     IR RENAL BIOPSY LEFT  11/25/2019     PERCUTANEOUS BIOPSY KIDNEY Left 10/22/2019    Procedure: Left Kidney Biopsy;  Surgeon: Kash Hoffman MD;  Location: [facility]       Physical Exam    No exam today.    RESULTS  Creatinine   Date Value Ref Range Status   11/16/2021 0.97 0.66 - 1.25 mg/dL  Final   05/11/2021 1.13 0.66 - 1.25 mg/dL Final     GFR Estimate   Date Value Ref Range Status   11/16/2021 >90 >60 mL/min/1.73m2 Final     Comment:     As of July 11, 2021, eGFR is calculated by the CKD-EPI creatinine equation, without race adjustment. eGFR can be influenced by muscle mass, exercise, and diet. The reported eGFR is an estimation only and is only applicable if the renal function is stable.   05/11/2021 80 >60 mL/min/[1.73_m2] Final     Comment:     Non  GFR Calc  Starting 12/18/2018, serum creatinine based estimated GFR (eGFR) will be   calculated using the Chronic Kidney Disease Epidemiology Collaboration   (CKD-EPI) equation.       Hemoglobin A1C   Date Value Ref Range Status   11/16/2021 8.4 (H) 0.0 - 5.6 % Final     Comment:     Normal <5.7%   Prediabetes 5.7-6.4%    Diabetes 6.5% or higher     Note: Adopted from ADA consensus guidelines.   07/16/2020 8.1 (H) 0 - 5.6 % Final     Comment:     Normal <5.7% Prediabetes 5.7-6.4%  Diabetes 6.5% or higher - adopted from ADA   consensus guidelines.       Potassium   Date Value Ref Range Status   11/16/2021 4.0 3.4 - 5.3 mmol/L Final   05/11/2021 4.1 3.4 - 5.3 mmol/L Final     ALT   Date Value Ref Range Status   09/26/2019 42 0 - 70 U/L Final     AST   Date Value Ref Range Status   09/26/2019 16 0 - 45 U/L Final     A1C   8.4     11/16/2021  A1C   8.7     7/22/2021  A1C   7.5     10/2/2020-outside lab  A1C    8.1     7/16/2020  A1C   8.2      2/7/2020   A1C   8.9      9/26/2019    ASSESSMENT/PLAN:    1.  TYPE 1 DIABETES MELLITUS: Blood sugars are higher in the evening.  I had him increase his 4 pm basal rate to 2.1 units/hr.  If his 2 hr postmeal bloods are consistently > 180, he was instructed to change his I/C ratio to 1:2 ( currently 1:3 ).  No other pump setting changes today.  Discussed the importance of healthy eating and he plans to try and get to the gym more frequent.  Pt was seen by Oph in Sept 2022.  He denies any sx of  neuropathy or foot ulcers or sores at this time.  Pt's TSH was normal on 7/22/2021.    2.  HX OF ESRD: S/P kidney transplant on 10/1/019 doing well.  Most recent creat was 1.06  with GFR > 60 mL/min in 8/2022.  Urine protein negative in 8/2022.    3. OBESITY: He has met with our dietitian for weight loss support.    4.  HTN: He tells me his BP has   Continue current RX.    5.  HYPERLIPIDEMIA: LDL 69 in Oct 2020.  Pt is taking Lipitor daily.    6.  FOLLOW UP : with me in Dec 2022.  I placed an order for an A1C today which he will have done with his other labs in late Oct 2022.    Time spent reviewing chart, labs and insulin pump data today = 8 minutes.  Time for video visit today = 23 minutes.  Time for documentation today = 15 minutes.    TOTAL TIME FOR VISIT TODAY =  46 minutes.    Kymberly Boswell PA-C    Answers for HPI/ROS submitted by the patient on 9/15/2022  General Symptoms: No  Skin Symptoms: No  HENT Symptoms: No  EYE SYMPTOMS: No  HEART SYMPTOMS: No  LUNG SYMPTOMS: No  INTESTINAL SYMPTOMS: No  URINARY SYMPTOMS: No  REPRODUCTIVE SYMPTOMS: No  SKELETAL SYMPTOMS: No  BLOOD SYMPTOMS: No  NERVOUS SYSTEM SYMPTOMS: No  MENTAL HEALTH SYMPTOMS: No

## 2022-09-15 NOTE — PROGRESS NOTES
Michael Amin is being evaluated via a billable video visit.        How would you like to obtain your AVS? [employer]  For the video visit, send the invitation by: Text to cell phone: [phone]  Will anyone else be joining your video visit? No

## 2022-09-16 ENCOUNTER — TELEPHONE (OUTPATIENT)
Dept: ENDOCRINOLOGY | Facility: CLINIC | Age: 42
End: 2022-09-16

## 2022-09-16 NOTE — TELEPHONE ENCOUNTER
[facility].   Princess Patel RN on 9/16/2022 at 8:21 AM     ----- Message from Chani Metz RN sent at 9/16/2022  7:00 AM CDT -----    ----- Message -----  From: Kymberly Boswell PA-C  Sent: 9/15/2022   3:55 PM CDT  To: [facility]    Please send lab slip for A1C to be done end of Oct 2022 at local lab.  Thanks debora

## 2022-09-18 ENCOUNTER — HEALTH MAINTENANCE LETTER (OUTPATIENT)
Age: 42
End: 2022-09-18

## 2022-09-30 DIAGNOSIS — E78.5 DYSLIPIDEMIA: ICD-10-CM

## 2022-09-30 RX ORDER — ATORVASTATIN CALCIUM 10 MG/1
TABLET, FILM COATED ORAL
Qty: 90 TABLET | Refills: 3 | Status: SHIPPED | OUTPATIENT
Start: 2022-09-30 | End: 2023-11-13

## 2022-11-07 ENCOUNTER — VIRTUAL VISIT (OUTPATIENT)
Dept: NEPHROLOGY | Facility: CLINIC | Age: 42
End: 2022-11-07
Attending: INTERNAL MEDICINE
Payer: COMMERCIAL

## 2022-11-07 DIAGNOSIS — Z48.298 AFTERCARE FOLLOWING ORGAN TRANSPLANT: Primary | ICD-10-CM

## 2022-11-07 NOTE — LETTER
11/7/2022       RE: Michael Amin  [id number]  [facility] [address]     Dear Colleague,    Thank you for referring your patient, Michael Amin, to the [facility] [place] [facility]. Please see a copy of my visit note below.    Rudi is a 42 year old who is being evaluated via a billable video visit.      PT is currently in [place].    Needs to be scheduled with [place] licensed physician for a future virtual visit     CHRONIC TRANSPLANT NEPHROLOGY VISIT    Assessment & Plan      # LDKT: Stable               - Baseline Cr ~ 1.1-1.3 mg/dL               - Proteinuria: Minimal (0.2-0.5 grams)              - Date DSA Last Checked: Dec/2020      Latest DSA: No              - BK Viremia: Mar/2021 - No                - Kidney Tx Biopsy: Nov 25, 2019; Result: Material insufficient for assessment with no glomeruli.                                              Oct 22, 2019; Result: Borderline acute cellular-mediated rejection      # Immunosuppression: Tacrolimus immediate release (goal 4-6) and Mycophenolate mofetil (dose 750 mg every 12 hours)              - Changes: No   - Continue with intensive monitoring of immunosuppression for efficacy and toxicity.     # Infection Prophylaxis:   - PJP: Sulfa/TMP (Bactrim), add CD4 to next labs     # HTN : acceptable control, home BP readings ~130s/80s   cuff has been validated in the past, advised to continue to monitor  On Coreg 12.5 mg BID . Target BP< 130/80  Current Home BP : 120s/75-85  Weight :   Wt Readings from Last 3 Encounters:   11/16/21 100.5 kg (221 lb 8 oz)   05/11/21 101.3 kg (223 lb 6.4 oz)   11/13/20 88.8 kg (195 lb 12.8 oz)       # Diabetes: Borderline control (HbA1c 7-9%)           Last HbA1c: 8% labile sugar readings with frequent hypoglycemia as well              - Management as per Endocrinology.              - On insulin pump, he is working on weight management to be listed  for pancreas transplant, initially lost weight down to 190 lbs, gained over the past few months.     - continues to work on exercise and watching caloric intake.     # Anemia in Chronic Renal Disease: Hgb: Stable      JOSE: No              - Iron studies: not checked recently                # Medical Compliance: Yes     # COVID-19   # Skin Cancer Risk:               -  Counseled patient about increased risk of skin cancers while on immunosuppressants. Discussed sun protection and recommend regular follow up with Dermatology. He has established himself with dermatologist     # Transplant History:  Etiology of Kidney Failure: Diabetes mellitus type 1  Tx: LDKT  Transplant: 10/1/2019 (Kidney)  Donor Type: Living      Donor Class:   Crossmatch at time of Tx: negative  DSA at time of Tx: No  Significant changes in immunosuppression: None  CMV IgG Ab High Risk Discordance (D+/R-): No  EBV IgG Ab High Risk Discordance (D+/R-): No  Significant transplant-related complications: None      Transplant Office Phone Number: [phone]    Assessment and plan was discussed with the patient and he voiced his understanding and agreement.    Return visit: No follow-ups on file.       Chief Complaint   Mr. Amin is a 42 year old here for routine follow up, kidney transplant and immunosuppression management.      History of Present Illness      Feels ok overall but has some concerns about inability to lose weight to achieve goal BMI needed for activation on the pancreas transplant waitlist  DM remains uncontrolled DaB1M-3%, -follows with endocrine uses insulin pump; insulin needs: ~100 units/day. +episodes of hypoglycemia every 2-3 days, no unawareness. He has still been unable to weight loss goal needed to be active on the pancreas transplant list. 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 No reaction with steroids.       Medications   Current Outpatient Medications   Medication Sig     alcohol swab prep pads Use to swab area of injection/zak as directed.     aspirin (ASA) 81 MG EC tablet Take 81 mg by mouth daily      atorvastatin (LIPITOR) 10 MG tablet TAKE 1 TABLET(10 MG) BY MOUTH DAILY     blood glucose (CONTOUR NEXT TEST) test strip Use to test blood sugar 4 times daily.     blood glucose monitoring (ACCU-CHEK FASTCLIX) lancets U QID OR MORE OFTEN PRN     carvedilol (COREG) 12.5 MG tablet Take 1 tablet (12.5 mg) by mouth 2 times daily (with meals)     Continuous Blood Gluc Sensor (DEXCOM G6 SENSOR) MISC Change every 10 days     Continuous Blood Gluc Transmit (DEXCOM G6 TRANSMITTER) MISC Change every 3 months     Glucagon (BAQSIMI ONE PACK) 3 MG/DOSE POWD Spray 3 mg in nostril See Admin Instructions USE ONLY FOR SEVERE HYPOGLYCEMIA.     insulin cartridge (T:SLIM 3ML) misc pump supply Insulin cartridge to be used with pump as directed.  Change every 2 days or as directed.     Insulin Infusion Pump Supplies (AUTOSOFT XC INFUSION SET) MISC 1 each every 48 hours Change every 2 days  9 mm cannula, 23 inch tubing     insulin lispro (HUMALOG VIAL) 100 UNIT/ML vial With Insulin pump. Up to 130 units per day.     insulin pen needle (ULTICARE MICRO) 32G X 4 MM miscellaneous Use pen needles daily or as directed.     Insulin Syringe-Needle U-100 31G X 1/4\" 1 ML MISC 1 Syringe as " needed (until new insuline pump arrives)     magnesium oxide (MAG-OX) 400 MG tablet Take 1 tablet (400 mg) by mouth daily (with lunch)     mycophenolate (GENERIC EQUIVALENT) 250 MG capsule Take 3 capsules (750 mg) by mouth 2 times daily     senna-docusate (SENOKOT-S/PERICOLACE) 8.6-50 MG tablet Take 2 tablets by mouth 2 times daily     sulfamethoxazole-trimethoprim (BACTRIM) 400-80 MG tablet Take 1 tablet by mouth daily     tacrolimus (GENERIC EQUIVALENT) 0.5 MG capsule Take 1 capsule (0.5 mg) by mouth every morning Total dose = 1.5 mg in the AM and 1 mg in the PM     tacrolimus (GENERIC EQUIVALENT) 1 MG capsule Total dose = 1.5 mg in the AM and 1 mg in the PM     terbinafine (LAMISIL) 1 % external cream Apply topically 2 times daily To rash on left hand     No current facility-administered medications for this visit.     There are no discontinued medications.    Physical Exam   Vital Signs: There were no vitals taken for this visit.    GENERAL: Healthy, alert and no distress  EYES: Eyes grossly normal to inspection.  No discharge or erythema, or obvious scleral/conjunctival abnormalities.  RESP: No audible wheeze, cough, or visible cyanosis.  No visible retractions or increased work of breathing.    SKIN: Visible skin clear. No significant rash, abnormal pigmentation or lesions.  NEURO: Cranial nerves grossly intact.  Mentation and speech appropriate for age.  PSYCH: Mentation appears normal, affect normal/bright, judgement and insight intact, normal speech and appearance well-groomed.      D sonny     Renal Latest Ref Rng & Units 8/17/2022 6/15/2022 2/17/2022   Na 133 - 144 mmol/L - - -   Na (external) 134 - 143 mEq/L 138 137 137   K 3.4 - 5.3 mmol/L - - -   K (external) 3.4 - 5.1 mEq/L 4.1 4.4 4.8   Cl 99 - 110 mEq/L 103 104 102   CO2 20 - 32 mmol/L - - -   CO2 (external) 19 - 29 mEq/L 25 25 25   BUN 7 - 30 mg/dL - - -   BUN (external) 5 - 24 mg/dL 17 16 22   Cr 0.66 - 1.25 mg/dL - - -   Cr (external) 0.70 - 1.20  mg/dL 1.06 1.03 1.17   Glucose 70 - 99 mg/dL - - -   Glucose (external) 70 - 99 mg/dL 144(H) 155(H) 136(H)   Ca  8.5 - 10.1 mg/dL - - -   Ca (external) 8.4 - 10.5 mg/dL 9.1 9.1 9.3   Mg 1.6 - 2.3 mg/dL - - -   Mg (external) 1.8 - 2.7 mg/dL - - -     Bone Health Latest Ref Rng & Units 3/31/2020 2/3/2020 1/27/2020   Phos 2.5 - 4.5 mg/dL - - -   Phos (external) 2.5 - 4.6 mg/dL 3.3 3.5 3.3   PTHi 18 - 80 pg/mL - - -   Vit D Def 20 - 75 ug/L - - -     Heme Latest Ref Rng & Units 8/17/2022 6/15/2022 2/17/2022   WBC 4.0 - 11.0 10e3/uL - - -   WBC (external) 3.2 - 11.0 10(9)L 7.3 6.4 8.0   Hgb 13.3 - 17.7 g/dL - - -   Hgb (external) 12.9 - 16.9 g/dl 12.6(L) 12.6(L) 13.0   Plt 150 - 450 10e3/uL - - -   Plt (external) 130 - 375 10(9)/L 167 146 160   ABSOLUTE NEUTROPHIL 1.6 - 8.3 10e9/L - - -   ABSOLUTE NEUTROPHILS (EXTERNAL) 1.9 - 8.1 x10:3/uL - - -   ABSOLUTE LYMPHOCYTES 0.8 - 5.3 10e9/L - - -   ABSOLUTE LYMPHOCYTES (EXTERNAL) 1.0 - 3.9 x10:3/uL - - -   ABSOLUTE MONOCYTES 0.0 - 1.3 10e9/L - - -   ABSOLUTE MONOCYTES (EXTERNAL) 0.1 - 0.9 x10:3/uL - - -   ABSOLUTE EOSINOPHILS 0.0 - 0.7 10e9/L - - -   ABSOLUTE EOSINOPHILS (EXTERNAL) 0.0 - 0.5 x10:3/uL - - -   ABSOLUTE BASOPHILS 0.0 - 0.2 10e9/L - - -   ABSOLUTE BASOPHILS (EXTERNAL) 0.0 - 0.2 x10:3/uL - - -   ABS IMMATURE GRANULOCYTES 0 - 0.4 10e9/L - - -   ABSOLUTE NUCLEATED RBC - - - -     Liver Latest Ref Rng & Units 9/26/2019 8/6/2019 1/7/2019   AP 40 - 150 U/L 100 - 157(H)   TBili 0.2 - 1.3 mg/dL 0.2 - 0.2   ALT 0 - 70 U/L 42 - 46   AST 0 - 45 U/L 16 - 15   Tot Protein 6.8 - 8.8 g/dL 7.5 - 7.9   Tot Protein (external) 5.7 - 8.2 g/dL - 6.6 -   Albumin 3.4 - 5.0 g/dL 3.5 - 3.5   Albumin (external) 3.2 - 4.8 g/dL - 4.4 -     Pancreas Latest Ref Rng & Units 11/16/2021 7/16/2020 2/7/2020   A1C 0.0 - 5.6 % 8.4(H) 8.1(H) 8.2(H)     Iron studies Latest Ref Rng & Units 9/26/2019 8/6/2019   Iron 35 - 180 ug/dL 70 -   Iron sat 15 - 46 % 28 -   Ferritin 26 - 388 ng/mL 753(H) -    Ferritin (external) 22 - 322 ng/mL - 578(H)     [facility] Txp Virology Latest Ref Rng & Units 8/13/2021 7/22/2021 6/18/2021   BK Spec - - - -   BK Res BKNEG:BK Virus DNA Not Detected copies/mL - - -   BK Log <2.7 Log copies/mL - - -   BK Quant Log Ext log IU/mL Undetected - -   BK Quant Result Ext Undetected IU/mL Undetected None Detected None Detected   BK Quant Spec Ext - - Blood Blood   EBV CAPSID ANTIBODY IGG 0.0 - 0.8 AI - - -   Hep B Core NR:Nonreactive - - -   Hep B Surf Ext  Multiple values view image mIU/mL - - -   HIV 1&2 Ext Nonreactive - - -        Recent Labs   Lab Test 12/09/19  1002 02/07/20  0858 05/11/21  1531   DOSTAC 2130,12/8/19 2/6/20 2105 2,000   TACROL 10.4 9.2 6.3     Recent Labs   Lab Test 10/07/19  0742 11/21/19  0813   DOSMPA Not Provided 11/20/19 1800   MPACID 0.82* 0.91*   MPAG 14.1* 39.5       Again, thank you for allowing me to participate in the care of your patient.      Sincerely,    Indra Roman MD

## 2022-11-07 NOTE — PROGRESS NOTES
Rudi is a 42 year old who is being evaluated via a billable video visit.      PT is currently in [place].    Needs to be scheduled with [place] licensed physician for a future virtual visit

## 2022-11-07 NOTE — PATIENT INSTRUCTIONS
Patient Recommendations:   added CD4 count to decide if you still need bactrim    Schedule COVID & Flu booster    Transplant Patient Information  Your Post Transplant Coordinator is: Pau Nichols  You and your care team can contact your transplant coordinator Monday - Friday, 8am - 5pm at [phone] (Option 2 to reach the coordinator or Option 4 to schedule an appointment).  You can also reach your care team online via [employer].  After hours for urgent matters, please call [facility] at [phone].

## 2022-12-11 NOTE — TELEPHONE ENCOUNTER
One year post kidney transplant on 10/1/2020   Task --   Please repeat MPA level with next  Blood draw   Please update epic lab orders   Please fax one year lab orders to local clinic  Please mail copy of lab orders and one year patient education letter      Task 2   Overdue for Donor Specific Antibodies    Overdue for BK PCR   Please mail and mychart message to have the labs drawn in the next week   Confirm he has Donor Specific Antibodies  Kits       3 = A little assistance

## 2022-12-13 ENCOUNTER — TELEPHONE (OUTPATIENT)
Dept: TRANSPLANT | Facility: CLINIC | Age: 42
End: 2022-12-13

## 2022-12-13 DIAGNOSIS — I15.1 HTN, KIDNEY TRANSPLANT RELATED: ICD-10-CM

## 2022-12-13 DIAGNOSIS — Z94.0 HTN, KIDNEY TRANSPLANT RELATED: ICD-10-CM

## 2022-12-13 DIAGNOSIS — Z94.0 KIDNEY REPLACED BY TRANSPLANT: ICD-10-CM

## 2022-12-13 RX ORDER — MYCOPHENOLATE MOFETIL 250 MG/1
750 CAPSULE ORAL 2 TIMES DAILY
Qty: 180 CAPSULE | Refills: 11 | Status: SHIPPED | OUTPATIENT
Start: 2022-12-13 | End: 2023-12-08

## 2022-12-13 RX ORDER — CARVEDILOL 12.5 MG/1
12.5 TABLET ORAL 2 TIMES DAILY WITH MEALS
Qty: 180 TABLET | Refills: 3 | Status: SHIPPED | OUTPATIENT
Start: 2022-12-13 | End: 2023-12-08

## 2022-12-21 ENCOUNTER — MEDICAL CORRESPONDENCE (OUTPATIENT)
Dept: HEALTH INFORMATION MANAGEMENT | Facility: CLINIC | Age: 42
End: 2022-12-21

## 2022-12-21 NOTE — PROGRESS NOTES
Outcome for 12/21/22 11:19 AM: 8aweekhart message sent  PRO Zepeda  Outcome for 12/26/22 12:31 PM: Left Voicemail   PRO [place]  Outcome for 12/26/22 5:35 PM: Per patient, will upload device before appointment  Stephany Ruiz, VF

## 2022-12-22 DIAGNOSIS — E10.22 TYPE 1 DIABETES MELLITUS WITH CHRONIC KIDNEY DISEASE ON CHRONIC DIALYSIS (H): ICD-10-CM

## 2022-12-22 DIAGNOSIS — Z99.2 TYPE 1 DIABETES MELLITUS WITH CHRONIC KIDNEY DISEASE ON CHRONIC DIALYSIS (H): ICD-10-CM

## 2022-12-22 DIAGNOSIS — N18.6 TYPE 1 DIABETES MELLITUS WITH CHRONIC KIDNEY DISEASE ON CHRONIC DIALYSIS (H): ICD-10-CM

## 2022-12-26 ENCOUNTER — TELEPHONE (OUTPATIENT)
Dept: ENDOCRINOLOGY | Facility: CLINIC | Age: 42
End: 2022-12-26

## 2022-12-26 ENCOUNTER — TELEPHONE (OUTPATIENT)
Dept: TRANSPLANT | Facility: CLINIC | Age: 42
End: 2022-12-26

## 2022-12-26 DIAGNOSIS — E10.9 DIABETES MELLITUS TYPE 1 (H): ICD-10-CM

## 2022-12-26 DIAGNOSIS — E66.9 OBESITY: Primary | ICD-10-CM

## 2022-12-26 DIAGNOSIS — Z76.82 ORGAN TRANSPLANT CANDIDATE: ICD-10-CM

## 2022-12-26 DIAGNOSIS — Z94.0 KIDNEY REPLACED BY TRANSPLANT: ICD-10-CM

## 2022-12-26 NOTE — TELEPHONE ENCOUNTER
Patient reports he is interested in scheduling appointment with [facility] provider to assist with further weight loss for pancreas transplant.  As provider's next available appointment in the transplant clinic is in March 2023, sending scheduling order to schedule directly with [facility].  Patient also asks whether we have a specialist who can evaluate for ADHD as patient reports he feels this may be a component of his challenge with trying to lose weight.  Reviewed I will ask our social work team and contact patient after I receive a response.  Patient verbalizes agreement with this plan.

## 2022-12-26 NOTE — TELEPHONE ENCOUNTER
Continuous Blood Gluc Sensor (DEXCOM G6 SENSOR) MISC       Last Written Prescription Date:  08-  Last Fill Quantity: 3 each,   # refills: 1  Last Office Visit : 09-  Future Office visit:  12-    Routing refill request to provider for review/approval because:  Drug not on the [facility], [facility] or [facility] refill protocol

## 2022-12-26 NOTE — TELEPHONE ENCOUNTER
[facility] Call Center    Phone Message    May a detailed message be left on voicemail: yes     Reason for Call: Other: Patient called wanting to confirm whether they received his glucose levels he uploaded today. Patient also needing refills on his Dexcom Sensors as soon as possible. Please follow up. Thank you     Action Taken: Message routed to:  Other: endo    Travel Screening: Not Applicable

## 2022-12-26 NOTE — TELEPHONE ENCOUNTER
Spoke with pt regarding tandem upload. Pt will reconnect to [id number] for upload tonight. We will check tandem tomorrow for current upload and will reach back out to pt if need be.    Endo RNs: please sign off on Dexcom Sensor refills.    Thanks!    Stephany Ruiz MA

## 2022-12-27 ENCOUNTER — TELEPHONE (OUTPATIENT)
Dept: TRANSPLANT | Facility: CLINIC | Age: 42
End: 2022-12-27

## 2022-12-27 NOTE — TELEPHONE ENCOUNTER
Update to patient that one of our social workers recommends patient review with his primary care provider for recommendation regarding provider to assess for ADHD.   Patient thanked me for the update.

## 2022-12-28 ENCOUNTER — VIRTUAL VISIT (OUTPATIENT)
Dept: ENDOCRINOLOGY | Facility: CLINIC | Age: 42
End: 2022-12-28
Payer: COMMERCIAL

## 2022-12-28 ENCOUNTER — TELEPHONE (OUTPATIENT)
Dept: ENDOCRINOLOGY | Facility: CLINIC | Age: 42
End: 2022-12-28

## 2022-12-28 DIAGNOSIS — N18.6 TYPE 1 DIABETES MELLITUS WITH CHRONIC KIDNEY DISEASE ON CHRONIC DIALYSIS (H): Primary | ICD-10-CM

## 2022-12-28 DIAGNOSIS — Z99.2 TYPE 1 DIABETES MELLITUS WITH CHRONIC KIDNEY DISEASE ON CHRONIC DIALYSIS (H): Primary | ICD-10-CM

## 2022-12-28 DIAGNOSIS — E10.22 TYPE 1 DIABETES MELLITUS WITH CHRONIC KIDNEY DISEASE ON CHRONIC DIALYSIS (H): Primary | ICD-10-CM

## 2022-12-28 PROCEDURE — 99215 OFFICE O/P EST HI 40 MIN: CPT | Mod: GT | Performed by: PHYSICIAN ASSISTANT

## 2022-12-28 RX ORDER — PROCHLORPERAZINE 25 MG/1
SUPPOSITORY RECTAL
Qty: 9 EACH | Refills: 4 | Status: SHIPPED | OUTPATIENT
Start: 2022-12-28 | End: 2024-01-14

## 2022-12-28 NOTE — LETTER
12/28/2022       RE: Michael Amin  [address]  [facility] [address]     Dear Colleague,    Thank you for referring your patient, Michael Amin, to the [facility] [place] at [facility]. Please see a copy of my visit note below.    Outcome for 12/21/22 11:19 AM: [employer] message sent  So Jenkins, VF  Outcome for 12/26/22 12:31 PM: Left Voicemail   Stephany Ruiz, VF  Outcome for 12/26/22 5:35 PM: Per patient, will upload device before appointment  [employer] [place]          Michael Amin is being evaluated via a billable video visit.        How would you like to obtain your AVS? [employer]  For the video visit, send the invitation by: Text to cell phone: [phone]  Will anyone else be joining your video visit? No          Due to the COVID 19 pandemic this visit was converted to a video visit in order to help prevent spread of infection in this patient and the general population.    Time of start: 9:59 am  Time of end: 10:20 am   Total duration of video visit: 21 minutes.  Provider's location: offsite.  Patient's location: home.    ELIZABETH Rodriguez ( Jake ) is a 42 year old male with type 1 diabetes mellitus.  Video visit today for diabetes follow up.  Pt has hx of type 1 diabetes mellitus dx at age 16.  His diabetes is complicated by nephropathy and he underwent a kidney transplant on 10/1/2019.   Oph exam showed possible early retinopathy. Most recent Oph exam reported stable exam.  He has no peripheral neuropathy.  His hx is also significant for HTN, ED, dyslipidemia and obesity.  Rudi is using a Tandem insulin pump- control IQ and his basal insulin rates are:  Midnight= 2.1 units/hr.  1 am = 2.1  units/hr.  4 am = 1.8 units/hr.  9 am = 1.75 units/hr.  11:30 am = 1.75 units/hr.  4 pm = 2.1 units/hr.  I/C ratio settings:  Midnight= 1:7.5 and 1:3 at 11:30 am.  Sensitivity is set at 29 at midnight and 25 at  11:30 am.  Most recent A1C was 8.0 % on 6/15/2022. Previous A1C was 8.4 % on 11/16/2021 and A1C was 8.7 % on 7/22/2021.    I do not have his insulin pump or sensor data at this time. I have asked the diabetes educator to assist Rudi in uploading his pump data for us to review.  Her reports his blood sugar is high after lunch.  Denies frequent hypoglycemia.  On ROS today, Rudi reports feeling well.  No blurred vision. Floater left eye and was seen by Oph in Nov 2022.  Pt denies n/v, SOB at rest, cough, fever, chills, chest pain, abd pain, diarrhea, dysuria, hematuria or foot ulcers.  Rudi denies numbness, tingling or pain in his feet or hands.    Diabetes Care  Retinopathy: he was seen by Oph in Feb 2019- possible early retinopathy. He was seen by Oph in Nov 2022 with stable exam per patient.  Nephropathy:hx of ESRD s/p kidney transplant on 10/21249. Urine microalbuminuria negative in 8/2022.  Neuropathy: none.  Foot Exam:no exam today.  Taking aspirin: yes  Lipids: LDL 69 in Oct 2020. Pt is taking Lipitor.  CAD: no.  Mental health:denies depression.  Insulin: Tandem insulin pump- control IQ and DexcomG6 sensor.  Hypoglycemia tx: Pt has Baqsimi to use in case of severe hypoglycemia.    ROS  Please see under HPI.    Allergies  Allergies   Allergen Reactions     Thymoglobulin      Had reaction with rigors after steroid-free dose. No reaction with steroids.       Medications  Current Outpatient Medications   Medication Sig Dispense Refill     alcohol swab prep pads Use to swab area of injection/zak as directed. 100 each 3     aspirin (ASA) 81 MG EC tablet Take 81 mg by mouth daily        atorvastatin (LIPITOR) 10 MG tablet TAKE 1 TABLET(10 MG) BY MOUTH DAILY 90 tablet 3     blood glucose (CONTOUR NEXT TEST) test strip Use to test blood sugar 4 times daily. 400 strip 11     blood glucose monitoring (ACCU-CHEK FASTCLIX) lancets U QID OR MORE OFTEN PRN  1     carvedilol (COREG) 12.5 MG tablet Take 1 tablet (12.5 mg) by  "mouth 2 times daily (with meals) 180 tablet 3     Continuous Blood Gluc Transmit (DEXCOM G6 TRANSMITTER) MISC Change every 3 months 1 each 3     Glucagon (BAQSIMI ONE PACK) 3 MG/DOSE POWD Spray 3 mg in nostril See Admin Instructions USE ONLY FOR SEVERE HYPOGLYCEMIA. 1 each 3     insulin cartridge (T:SLIM 3ML) misc pump supply Insulin cartridge to be used with pump as directed.  Change every 2 days or as directed. 45 each 3     Insulin Infusion Pump Supplies (AUTOSOFT XC INFUSION SET) MISC 1 each every 48 hours Change every 2 days  9 mm cannula, 23 inch tubing 45 each 3     insulin lispro (HUMALOG VIAL) 100 UNIT/ML vial With Insulin pump. Up to 130 units per day. 30 mL 11     insulin pen needle (ULTICARE MICRO) 32G X 4 MM miscellaneous Use pen needles daily or as directed. 100 each 3     Insulin Syringe-Needle U-100 31G X 1/4\" 1 ML MISC 1 Syringe as needed (until new insuline pump arrives) 50 each 1     magnesium oxide (MAG-OX) 400 MG tablet Take 1 tablet (400 mg) by mouth daily (with lunch) 30 tablet 5     mycophenolate (GENERIC EQUIVALENT) 250 MG capsule Take 3 capsules (750 mg) by mouth 2 times daily 180 capsule 11     senna-docusate (SENOKOT-S/PERICOLACE) 8.6-50 MG tablet Take 2 tablets by mouth 2 times daily 20 tablet 0     sulfamethoxazole-trimethoprim (BACTRIM) 400-80 MG tablet Take 1 tablet by mouth daily 30 tablet 11     tacrolimus (GENERIC EQUIVALENT) 0.5 MG capsule Take 1 capsule (0.5 mg) by mouth every morning Total dose = 1.5 mg in the AM and 1 mg in the PM 30 capsule 11     tacrolimus (GENERIC EQUIVALENT) 1 MG capsule Total dose = 1.5 mg in the AM and 1 mg in the PM 60 capsule 11     Continuous Blood Gluc Sensor (DEXCOM G6 SENSOR) MISC Change every 10 days. Please dispense 90 day supply- dispense 9 sensors. 9 each 4     terbinafine (LAMISIL) 1 % external cream Apply topically 2 times daily To rash on left hand (Patient not taking: Reported on 12/28/2022) 42 g 11       Family History  family history " includes Coronary Artery Disease in his father; Diabetes in his father; Skin Cancer in his paternal uncle.    Social History   reports that he has never smoked. He has never used smokeless tobacco. He reports that he does not currently use alcohol. He reports that he does not use drugs.   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    Past Medical History  Past Medical History:   Diagnosis Date     Anemia in chronic kidney disease      Dyslipidemia      ESRD (end stage renal disease) on dialysis (H)      Hypertension      Hypomagnesemia 10/7/2019     Secondary hyperparathyroidism (H)      Type 1 diabetes (H)        Past Surgical History:   Procedure Laterality Date     hair implant  2007     INSERT CATHETER PERITONEAL DIALYSIS  08/2018     IR FINE NEEDLE ASPIRATION W ULTRASOUND  11/25/2019     IR RENAL BIOPSY LEFT  11/25/2019     PERCUTANEOUS BIOPSY KIDNEY Left 10/22/2019    Procedure: Left Kidney Biopsy;  Surgeon: Kash Hoffman MD;  [facility]       Physical Exam    No exam today.    RESULTS  Creatinine   Date Value Ref Range Status   11/16/2021 0.97 0.66 - 1.25 mg/dL Final   05/11/2021 1.13 0.66 - 1.25 mg/dL Final     GFR Estimate   Date Value Ref Range Status   11/16/2021 >90 >60 mL/min/1.73m2 Final     Comment:     As of July 11, 2021, eGFR is calculated by the CKD-EPI creatinine equation, without race adjustment. eGFR can be influenced by muscle mass, exercise, and diet. The reported eGFR is an estimation only and is only applicable if the renal function is stable.   05/11/2021 80 >60 mL/min/[1.73_m2] Final     Comment:     Non  GFR Calc  Starting 12/18/2018, serum creatinine based estimated GFR (eGFR) will be   calculated using the Chronic Kidney Disease Epidemiology Collaboration   (CKD-EPI) equation.       Hemoglobin A1C   Date Value Ref Range Status   11/16/2021 8.4 (H) 0.0 - 5.6 % Final     Comment:     Normal <5.7%   Prediabetes 5.7-6.4%     Diabetes 6.5% or higher     Note: Adopted from ADA consensus guidelines.   07/16/2020 8.1 (H) 0 - 5.6 % Final     Comment:     Normal <5.7% Prediabetes 5.7-6.4%  Diabetes 6.5% or higher - adopted from ADA   consensus guidelines.       Potassium   Date Value Ref Range Status   11/16/2021 4.0 3.4 - 5.3 mmol/L Final   05/11/2021 4.1 3.4 - 5.3 mmol/L Final     ALT   Date Value Ref Range Status   09/26/2019 42 0 - 70 U/L Final     AST   Date Value Ref Range Status   09/26/2019 16 0 - 45 U/L Final     A1C  8.0       6/15/2022  A1C   8.4     11/16/2021  A1C   8.7     7/22/2021  A1C   7.5     10/2/2020-outside lab  A1C    8.1     7/16/2020  A1C   8.2      2/7/2020   A1C   8.9      9/26/2019    ASSESSMENT/PLAN:    1.  TYPE 1 DIABETES MELLITUS: I have no insulin pump or sensor data to review today.  Delmy Mercado CDE will reach out to Rudi and provide him with instructions on how to upload his pump data for us to review.  He does reports his blood sugar is high after lunch daily.  Denies frequent hypoglycemia.  I had him increase his 11:30 am basal insulin rate to 1.85 units/hr ( was 1.75 units/hr ).  No other insulin pump changes today.  Discussed the importance of healthy eating and exercise.  Pt was seen by Oph in Nov 2022.  He denies any sx of neuropathy or foot ulcers or sores at this time.  Pt's TSH was normal on 7/22/2021.    2.  HX OF ESRD: S/P kidney transplant on 10/1/019 doing well.  Most recent creat was 1.06  with GFR > 60 mL/min in 8/2022.  Urine protein negative in 8/2022.    3. OBESITY: He has met with our dietitian for weight loss support.    4.  HTN: No vitals today. Continue current RX.    5.  HYPERLIPIDEMIA: LDL 69 in Oct 2020.  Pt is taking Lipitor daily.    6.  FOLLOW UP : with me in 3 months.  Will call or send pt [employer] message once I review his updated Tandem insulin pump and DexcomG6 sensor data.  I placed an order for an A1C today.    Time spent reviewing chart, labs and insulin pump data today =  5 minutes.  Time for video visit today = 21 minutes.  Time for documentation today = 15 minutes.    TOTAL TIME FOR VISIT TODAY =  41 minutes.    Kymberly Boswell PA-C

## 2022-12-28 NOTE — PROGRESS NOTES
Michael Amin is being evaluated via a billable video visit.        How would you like to obtain your AVS? [employer]  For the video visit, send the invitation by: Text to cell phone: [phone]  Will anyone else be joining your video visit? No

## 2022-12-28 NOTE — TELEPHONE ENCOUNTER
LVM with following info-  Appt with Kymberly Boswell PA-C in 3 months.   Future lab ordered.   Please schedule follow up visit with Delmy ALONSO.  Needs to upload tandem further so we can send data.  Also need to gather fax number for labs  Taylor Myhre, RMA

## 2022-12-29 ENCOUNTER — TELEPHONE (OUTPATIENT)
Dept: EDUCATION SERVICES | Facility: CLINIC | Age: 42
End: 2022-12-29

## 2022-12-29 NOTE — TELEPHONE ENCOUNTER
Rudi is having some connection issues between his pump and sensor.  It tends to happen at night and when he has his winter coat on.  He is advised to make sure he has the sensor on the front of his body and the pump on the front of the body and same alignment if using backside.  The pump and sensor cannot communicate through the body.  Delmy Mercado RN,CDE

## 2023-01-01 NOTE — PROGRESS NOTES
Due to the COVID 19 pandemic this visit was converted to a video visit in order to help prevent spread of infection in this patient and the general population.    Time of start: 9:59 am  Time of end: 10:20 am   Total duration of video visit: 21 minutes.  Provider's location: offsite.  Patient's location: home.    ELIZABETH  El Moroedward Rodriguez ( Jake ) is a 42 year old male with type 1 diabetes mellitus.  Video visit today for diabetes follow up.  Pt has hx of type 1 diabetes mellitus dx at age 16.  His diabetes is complicated by nephropathy and he underwent a kidney transplant on 10/1/2019.   Oph exam showed possible early retinopathy. Most recent Oph exam reported stable exam.  He has no peripheral neuropathy.  His hx is also significant for HTN, ED, dyslipidemia and obesity.  Rudi is using a Tandem insulin pump- control IQ and his basal insulin rates are:  Midnight= 2.1 units/hr.  1 am = 2.1  units/hr.  4 am = 1.8 units/hr.  9 am = 1.75 units/hr.  11:30 am = 1.75 units/hr.  4 pm = 2.1 units/hr.  I/C ratio settings:  Midnight= 1:7.5 and 1:3 at 11:30 am.  Sensitivity is set at 29 at midnight and 25 at 11:30 am.  Most recent A1C was 8.0 % on 6/15/2022. Previous A1C was 8.4 % on 11/16/2021 and A1C was 8.7 % on 7/22/2021.    I do not have his insulin pump or sensor data at this time. I have asked the diabetes educator to assist Rudi in uploading his pump data for us to review.  Her reports his blood sugar is high after lunch.  Denies frequent hypoglycemia.  On ROS today, Rudi reports feeling well.  No blurred vision. Floater left eye and was seen by Oph in Nov 2022.  Pt denies n/v, SOB at rest, cough, fever, chills, chest pain, abd pain, diarrhea, dysuria, hematuria or foot ulcers.  Rudi denies numbness, tingling or pain in his feet or hands.    Diabetes Care  Retinopathy: he was seen by Oph in Feb 2019- possible early retinopathy. He was seen by Oph in Nov 2022 with stable exam per patient.  Nephropathy:hx of ESRD s/p kidney  "transplant on 10/68474. Urine microalbuminuria negative in 8/2022.  Neuropathy: none.  Foot Exam:no exam today.  Taking aspirin: yes  Lipids: LDL 69 in Oct 2020. Pt is taking Lipitor.  CAD: no.  Mental health:denies depression.  Insulin: Tandem insulin pump- control IQ and DexcomG6 sensor.  Hypoglycemia tx: Pt has Baqsimi to use in case of severe hypoglycemia.    ROS  Please see under HPI.    Allergies  Allergies   Allergen Reactions     Thymoglobulin      Had reaction with rigors after steroid-free dose. No reaction with steroids.       Medications  Current Outpatient Medications   Medication Sig Dispense Refill     alcohol swab prep pads Use to swab area of injection/zak as directed. 100 each 3     aspirin (ASA) 81 MG EC tablet Take 81 mg by mouth daily        atorvastatin (LIPITOR) 10 MG tablet TAKE 1 TABLET(10 MG) BY MOUTH DAILY 90 tablet 3     blood glucose (CONTOUR NEXT TEST) test strip Use to test blood sugar 4 times daily. 400 strip 11     blood glucose monitoring (ACCU-CHEK FASTCLIX) lancets U QID OR MORE OFTEN PRN  1     carvedilol (COREG) 12.5 MG tablet Take 1 tablet (12.5 mg) by mouth 2 times daily (with meals) 180 tablet 3     Continuous Blood Gluc Transmit (DEXCOM G6 TRANSMITTER) MISC Change every 3 months 1 each 3     Glucagon (BAQSIMI ONE PACK) 3 MG/DOSE POWD Spray 3 mg in nostril See Admin Instructions USE ONLY FOR SEVERE HYPOGLYCEMIA. 1 each 3     insulin cartridge (T:SLIM 3ML) misc pump supply Insulin cartridge to be used with pump as directed.  Change every 2 days or as directed. 45 each 3     Insulin Infusion Pump Supplies (AUTOSOFT XC INFUSION SET) MISC 1 each every 48 hours Change every 2 days  9 mm cannula, 23 inch tubing 45 each 3     insulin lispro (HUMALOG VIAL) 100 UNIT/ML vial With Insulin pump. Up to 130 units per day. 30 mL 11     insulin pen needle (ULTICARE MICRO) 32G X 4 MM miscellaneous Use pen needles daily or as directed. 100 each 3     Insulin Syringe-Needle U-100 31G X 1/4\" 1 " ML MISC 1 Syringe as needed (until new insuline pump arrives) 50 each 1     magnesium oxide (MAG-OX) 400 MG tablet Take 1 tablet (400 mg) by mouth daily (with lunch) 30 tablet 5     mycophenolate (GENERIC EQUIVALENT) 250 MG capsule Take 3 capsules (750 mg) by mouth 2 times daily 180 capsule 11     senna-docusate (SENOKOT-S/PERICOLACE) 8.6-50 MG tablet Take 2 tablets by mouth 2 times daily 20 tablet 0     sulfamethoxazole-trimethoprim (BACTRIM) 400-80 MG tablet Take 1 tablet by mouth daily 30 tablet 11     tacrolimus (GENERIC EQUIVALENT) 0.5 MG capsule Take 1 capsule (0.5 mg) by mouth every morning Total dose = 1.5 mg in the AM and 1 mg in the PM 30 capsule 11     tacrolimus (GENERIC EQUIVALENT) 1 MG capsule Total dose = 1.5 mg in the AM and 1 mg in the PM 60 capsule 11     Continuous Blood Gluc Sensor (DEXCOM G6 SENSOR) MISC Change every 10 days. Please dispense 90 day supply- dispense 9 sensors. 9 each 4     terbinafine (LAMISIL) 1 % external cream Apply topically 2 times daily To rash on left hand (Patient not taking: Reported on 12/28/2022) 42 g 11       Family History  family history includes Coronary Artery Disease in his father; Diabetes in his father; Skin Cancer in his paternal uncle.    Social History   reports that he has never smoked. He has never used smokeless tobacco. He reports that he does not currently use alcohol. He reports that he does not use drugs.   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    Past Medical History  Past Medical History:   Diagnosis Date     Anemia in chronic kidney disease      Dyslipidemia      ESRD (end stage renal disease) on dialysis (H)      Hypertension      Hypomagnesemia 10/7/2019     Secondary hyperparathyroidism (H)      Type 1 diabetes (H)        Past Surgical History:   Procedure Laterality Date     hair implant  2007     INSERT CATHETER PERITONEAL DIALYSIS  08/2018     IR FINE NEEDLE ASPIRATION W ULTRASOUND  11/25/2019      IR RENAL BIOPSY LEFT  11/25/2019     PERCUTANEOUS BIOPSY KIDNEY Left 10/22/2019    Procedure: Left Kidney Biopsy;  Surgeon: Kash Hoffman MD;  [facility]       Physical Exam    No exam today.    RESULTS  Creatinine   Date Value Ref Range Status   11/16/2021 0.97 0.66 - 1.25 mg/dL Final   05/11/2021 1.13 0.66 - 1.25 mg/dL Final     GFR Estimate   Date Value Ref Range Status   11/16/2021 >90 >60 mL/min/1.73m2 Final     Comment:     As of July 11, 2021, eGFR is calculated by the CKD-EPI creatinine equation, without race adjustment. eGFR can be influenced by muscle mass, exercise, and diet. The reported eGFR is an estimation only and is only applicable if the renal function is stable.   05/11/2021 80 >60 mL/min/[1.73_m2] Final     Comment:     Non  GFR Calc  Starting 12/18/2018, serum creatinine based estimated GFR (eGFR) will be   calculated using the Chronic Kidney Disease Epidemiology Collaboration   (CKD-EPI) equation.       Hemoglobin A1C   Date Value Ref Range Status   11/16/2021 8.4 (H) 0.0 - 5.6 % Final     Comment:     Normal <5.7%   Prediabetes 5.7-6.4%    Diabetes 6.5% or higher     Note: Adopted from ADA consensus guidelines.   07/16/2020 8.1 (H) 0 - 5.6 % Final     Comment:     Normal <5.7% Prediabetes 5.7-6.4%  Diabetes 6.5% or higher - adopted from ADA   consensus guidelines.       Potassium   Date Value Ref Range Status   11/16/2021 4.0 3.4 - 5.3 mmol/L Final   05/11/2021 4.1 3.4 - 5.3 mmol/L Final     ALT   Date Value Ref Range Status   09/26/2019 42 0 - 70 U/L Final     AST   Date Value Ref Range Status   09/26/2019 16 0 - 45 U/L Final     A1C  8.0       6/15/2022  A1C   8.4     11/16/2021  A1C   8.7     7/22/2021  A1C   7.5     10/2/2020-outside lab  A1C    8.1     7/16/2020  A1C   8.2      2/7/2020   A1C   8.9      9/26/2019    ASSESSMENT/PLAN:    1.  TYPE 1 DIABETES MELLITUS: I have no insulin pump or sensor data to review today.  Delmy ALONSO will reach out to  Rudi and provide him with instructions on how to upload his pump data for us to review.  He does reports his blood sugar is high after lunch daily.  Denies frequent hypoglycemia.  I had him increase his 11:30 am basal insulin rate to 1.85 units/hr ( was 1.75 units/hr ).  No other insulin pump changes today.  Discussed the importance of healthy eating and exercise.  Pt was seen by Oph in Nov 2022.  He denies any sx of neuropathy or foot ulcers or sores at this time.  Pt's TSH was normal on 7/22/2021.    2.  HX OF ESRD: S/P kidney transplant on 10/1/019 doing well.  Most recent creat was 1.06  with GFR > 60 mL/min in 8/2022.  Urine protein negative in 8/2022.    3. OBESITY: He has met with our dietitian for weight loss support.    4.  HTN: No vitals today. Continue current RX.    5.  HYPERLIPIDEMIA: LDL 69 in Oct 2020.  Pt is taking Lipitor daily.    6.  FOLLOW UP : with me in 3 months.  Will call or send pt [employer] message once I review his updated Tandem insulin pump and DexcomG6 sensor data.  I placed an order for an A1C today.    Time spent reviewing chart, labs and insulin pump data today = 5 minutes.  Time for video visit today = 21 minutes.  Time for documentation today = 15 minutes.    TOTAL TIME FOR VISIT TODAY =  41 minutes.    Kymberly Boswell PA-C

## 2023-01-03 ENCOUNTER — TELEPHONE (OUTPATIENT)
Dept: ENDOCRINOLOGY | Facility: CLINIC | Age: 43
End: 2023-01-03

## 2023-01-03 NOTE — TELEPHONE ENCOUNTER
Spoke to this patient and suggested that he call Tandem to get help uploading his data to either his computer or by using the terri.  Awa Llanes

## 2023-01-03 NOTE — TELEPHONE ENCOUNTER
RENÉ and sent MyChart 1/3/2023 for pt to c/back to schedule a 3 month f/up with Kymberly Boswell, labs, and a f/up visit with Delmy ALONSO per Kymberly Boswell checkout orders from visit on 12/28/2022. MAX NUMBER OF ATTEMPTS REACHED.    [Dear  ___] : Dear  [unfilled], [Please see my note below.] : Please see my note below. [Sincerely,] : Sincerely, [Consult Closing:] : Thank you very much for allowing me to participate in the care of this patient.  If you have any questions, please do not hesitate to contact me. [FreeTextEntry3] : Zhen Monsivais MD, FACS\par

## 2023-01-12 ENCOUNTER — TELEPHONE (OUTPATIENT)
Dept: ENDOCRINOLOGY | Facility: CLINIC | Age: 43
End: 2023-01-12

## 2023-01-12 NOTE — LETTER
"         [facility] [address] [address]  [phone]    FACSIMILE TRANSMITTAL sent 12 JAN 2023 at 2:52PM    TO: LAB    COMPANY: [facility]   FAX NUMBER: [phone]  PHONE NUMBER:      FROM:   PHONE:   DATE: 01/12/23  NUMBER OF PAGES:     _____URGENT _____REVIEW ONLY _____PLEASE COMMENT____PLEASE REPLY    NOTES/COMMENTS: Please forward results via CareEverywhere/Fax  attn: Kymberly Boswell.   RE: AtokaMichael \"Rudi\"  Male, 42 year old, 1980  MRN: [id number]  Phone: [phone]              IF YOU DID NOT RECEIVE THE CORRECT NUMBER OF PAGES OR THE FAX DID NOT COME THROUGH CLEARLY, PLEASE CALL THE SENDER     CONFIDENTIALITY STATEMENT: Confidential information that may accompany this transmission contains protected health information under state and federal law and is legally privileged. This information is intended only for the use of the individual or entity named above and may be used only for carrying out treatment, payment or other healthcare operations. The recipient or person responsible for delivering this information is prohibited by law from disclosing this information without proper authorization to any other party, unless required to do so by law or regulation. If you are not the intended recipient, you are hereby notified that any review, dissemination, distribution, or copying of this message is strictly prohibited. If you have received this communication in error, please destroy the materials and contact us immediately by calling the number listed above. No response indicates that the information was received by the appropriate authorized party   "

## 2023-01-12 NOTE — TELEPHONE ENCOUNTER
Lab order faxed.   Princess Patel, RN on 1/12/2023 at 2:53 PM      [facility]    Phone Message    May a detailed message be left on voicemail: yes     Reason for Call: Other: Patient called stating [facility] did not receive the A1C order but did draw the blood for it. [facility] clinic needing order faxed them at #[phone]. Please follow up. Thank you     Action Taken: Message routed to:  Other: endo    Travel Screening: Not Applicable

## 2023-01-13 ENCOUNTER — TELEPHONE (OUTPATIENT)
Dept: TRANSPLANT | Facility: CLINIC | Age: 43
End: 2023-01-13

## 2023-01-13 DIAGNOSIS — Z99.2 ESRD (END STAGE RENAL DISEASE) ON DIALYSIS (H): ICD-10-CM

## 2023-01-13 DIAGNOSIS — Z94.0 KIDNEY TRANSPLANTED: ICD-10-CM

## 2023-01-13 DIAGNOSIS — N18.6 ESRD (END STAGE RENAL DISEASE) ON DIALYSIS (H): ICD-10-CM

## 2023-01-13 RX ORDER — TACROLIMUS 1 MG/1
CAPSULE ORAL
Qty: 180 CAPSULE | Refills: 3 | Status: SHIPPED | OUTPATIENT
Start: 2023-01-13 | End: 2024-01-19

## 2023-01-13 RX ORDER — TACROLIMUS 0.5 MG/1
0.5 CAPSULE ORAL EVERY MORNING
Qty: 90 CAPSULE | Refills: 3 | Status: SHIPPED | OUTPATIENT
Start: 2023-01-13 | End: 2024-01-19

## 2023-01-13 NOTE — TELEPHONE ENCOUNTER
Will be out of Tacrolimus 0.5mg & 1.0mg this weekend  01/14/2023 May need update [facility], [place] - [address] [address]   Phone:  [phone]  Fax:  [phone]    (Rudi but in a request early this week)

## 2023-01-28 ENCOUNTER — HEALTH MAINTENANCE LETTER (OUTPATIENT)
Age: 43
End: 2023-01-28

## 2023-03-17 ENCOUNTER — TELEPHONE (OUTPATIENT)
Dept: EDUCATION SERVICES | Facility: CLINIC | Age: 43
End: 2023-03-17
Payer: COMMERCIAL

## 2023-03-17 NOTE — TELEPHONE ENCOUNTER
Rudi states he cannot leave the TConnect terri open in the back ground of the phone because he gets too many alarms.  We turned off the out of range alarm.  His alarms have to do with loss of connection between pump and sensor.  Advised that pump and sensor cannot communicate through the body.  So keep both on either front side or back side.  Wear pump with screen facing outward.  He state he has done these things.    Advised to stop by for pump upload on Monday when he is coming in for anther appointment.  Reports will be sent to TORITO Nicholson. Delmy Mercado RN,[facility]

## 2023-03-27 ENCOUNTER — OFFICE VISIT (OUTPATIENT)
Dept: TRANSPLANT | Facility: CLINIC | Age: 43
End: 2023-03-27
Attending: SURGERY
Payer: COMMERCIAL

## 2023-03-27 VITALS
SYSTOLIC BLOOD PRESSURE: 168 MMHG | OXYGEN SATURATION: 99 % | DIASTOLIC BLOOD PRESSURE: 91 MMHG | BODY MASS INDEX: 37.77 KG/M2 | WEIGHT: 234 LBS | HEART RATE: 80 BPM

## 2023-03-27 DIAGNOSIS — Z76.82 ORGAN TRANSPLANT CANDIDATE: ICD-10-CM

## 2023-03-27 DIAGNOSIS — E66.812 CLASS 2 SEVERE OBESITY WITH SERIOUS COMORBIDITY AND BODY MASS INDEX (BMI) OF 37.0 TO 37.9 IN ADULT, UNSPECIFIED OBESITY TYPE (H): ICD-10-CM

## 2023-03-27 DIAGNOSIS — E66.01 CLASS 2 SEVERE OBESITY WITH SERIOUS COMORBIDITY AND BODY MASS INDEX (BMI) OF 37.0 TO 37.9 IN ADULT, UNSPECIFIED OBESITY TYPE (H): ICD-10-CM

## 2023-03-27 DIAGNOSIS — E10.29 TYPE 1 DIABETES MELLITUS WITH OTHER KIDNEY COMPLICATION (H): ICD-10-CM

## 2023-03-27 DIAGNOSIS — Z94.0 KIDNEY REPLACED BY TRANSPLANT: ICD-10-CM

## 2023-03-27 PROCEDURE — G0463 HOSPITAL OUTPT CLINIC VISIT: HCPCS | Performed by: PHYSICIAN ASSISTANT

## 2023-03-27 PROCEDURE — 99203 OFFICE O/P NEW LOW 30 MIN: CPT | Performed by: PHYSICIAN ASSISTANT

## 2023-03-27 NOTE — PROGRESS NOTES
PATIENT:  Michael Amin  :          1980  SULTANA:          3/27/2023    42 y.o. male with hx of kidney transplant . Type 1 DM and needs BMI <30-32 for pancreas transplant.    Transplant nephrologist: Dr Riki Krueger  Endocrinologist: Kymberly Caldera DM educator. Needs follow up to look at pump    Type 1 DM: A1C 8.2  Insulin pump  100 units per day  Has not tried GLP1  No hx of pancreatitis. No hx of MTC or MEN2      Today's weight: 234 lbs 0 oz  Current Body Mass Index:  Body mass index is 37.77 kg/m .  BP (!) 168/91   Pulse 80   Wt 106.1 kg (234 lb)   SpO2 99%   BMI 37.77 kg/m      Most weight loss:  30 lbs.  Lowest weight since an adult:  190 lbs.  Highest weight:  234 lbs.    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 Resides in an independent environment. His marital status is . He has no overweight/obese child or spouse. He reports that he has never smoked. He has never used smokeless tobacco. He reports that he does not currently use alcohol. He reports that he does not use drugs.    Food Day: Breakfast: skips or light english muffin with PB and yogurt    Lunch:  Leftovers or taco salad    Dinner:  Chicken    Snacks: cucumber and hummus or chips/salsa    Alcoholic Beverages:  0 drinks/day.    Caloric Beverages: Milk: 0 glasses/day. Juice: 0 glasses/day. Pop/sugar sweetened drink: 0-1 cans/day. Occasional soda with low BS    Diet Drinks:  0-1 cans/day.    Past Medical History:   Diagnosis Date     Anemia in chronic kidney disease      Dyslipidemia      ESRD (end stage renal disease) on dialysis (H)      Hypertension      Hypomagnesemia 10/7/2019     Secondary hyperparathyroidism (H)      Type 1 diabetes (H)        Past Surgical History:   Procedure Laterality Date     hair implant       INSERT CATHETER PERITONEAL DIALYSIS  2018     IR FINE NEEDLE ASPIRATION W ULTRASOUND  2019     IR RENAL BIOPSY LEFT  2019     PERCUTANEOUS  BIOPSY KIDNEY Left 10/22/2019    Procedure: Left Kidney Biopsy;  Surgeon: Kash Hoffman MD;  Location:  OR       Family History   Problem Relation Age of Onset     Diabetes Father      Coronary Artery Disease Father      Skin Cancer Paternal Uncle      Melanoma No family hx of        Social History     Tobacco Use     Smoking status: Never     Smokeless tobacco: Never   Substance Use Topics     Alcohol use: Not Currently     Comment: Rarely 1-2 Drinks a month        Michael has an activity pattern that is moderately active but has no structured exercise program. He reports spending 2 hours/day on TV/recreational computer time.    ASSESSMENT/PLAN:    42 y.o. male with Type 1 DM, s/p kidney transplant 2019 working on weight loss for possible pancreas transplant.  He did well with weight loss to 190 lbs 2 years ago but has regained.    Consider GLP1 for weight loss and insulin resistance with Type 1 DM  Will discuss if GLP1 is an option with Kymberly Boswell PA-C    Could also consider topiramate if GLP1 is not an option. Would need to discuss with nephrologist first.     Follow up weight mgmt RD first available video visit  Follow up MTM 6 weeks  Follow up Tasha Zepeda PA-C

## 2023-03-27 NOTE — LETTER
3/27/2023         RE: Michael Amin  [address]  [facility] [address]        Dear Colleague,    Thank you for referring your patient, Michael Amin, to the [facility]. Please see a copy of my visit note below.    PATIENT:  Michael Amin  :          1980  SULTANA:          3/27/2023    42 y.o. male with hx of kidney transplant 2019. Type 1 DM and needs BMI <30-32 for pancreas transplant.    Transplant nephrologist: Dr Riki Krueger  Endocrinologist: Kymberly Caldera DM educator. Needs follow up to look at pump    Type 1 DM: A1C 8.2  Insulin pump  100 units per day  Has not tried GLP1  No hx of pancreatitis. No hx of MTC or MEN2      Today's weight: 234 lbs 0 oz  Current Body Mass Index:  Body mass index is 37.77 kg/m .  BP (!) 168/91   Pulse 80   Wt 106.1 kg (234 lb)   SpO2 99%   BMI 37.77 kg/m      Most weight loss:  30 lbs.  Lowest weight since an adult:  190 lbs.  Highest weight:  234 lbs.    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 Resides in an independent environment. His marital status is . He has no overweight/obese child or spouse. He reports that he has never smoked. He has never used smokeless tobacco. He reports that he does not currently use alcohol. He reports that he does not use drugs.    Food Day: Breakfast: skips or light english muffin with PB and yogurt    Lunch:  Leftovers or taco salad    Dinner:  Chicken    Snacks: cucumber and hummus or chips/salsa    Alcoholic Beverages:  0 drinks/day.    Caloric Beverages: Milk: 0 glasses/day. Juice: 0 glasses/day. Pop/sugar sweetened drink: 0-1 cans/day. Occasional soda with low BS    Diet Drinks:  0-1 cans/day.    Past Medical History:   Diagnosis Date     Anemia in chronic kidney disease      Dyslipidemia      ESRD (end stage renal disease) on dialysis (H)      Hypertension      Hypomagnesemia 10/7/2019     Secondary hyperparathyroidism (H)       Type 1 diabetes (H)        Past Surgical History:   Procedure Laterality Date     hair implant  2007     INSERT CATHETER PERITONEAL DIALYSIS  08/2018     IR FINE NEEDLE ASPIRATION W ULTRASOUND  11/25/2019     IR RENAL BIOPSY LEFT  11/25/2019     PERCUTANEOUS BIOPSY KIDNEY Left 10/22/2019    Procedure: Left Kidney Biopsy;  Surgeon: Kash Hoffman MD;  Location: [facility] OR       Family History   Problem Relation Age of Onset     Diabetes Father      Coronary Artery Disease Father      Skin Cancer Paternal Uncle      Melanoma No family hx of        Social History     Tobacco Use     Smoking status: Never     Smokeless tobacco: Never   Substance Use Topics     Alcohol use: Not Currently     Comment: Rarely 1-2 Drinks a month        Michael has an activity pattern that is moderately active but has no structured exercise program. He reports spending 2 hours/day on TV/recreational computer time.    ASSESSMENT/PLAN:    42 y.o. male with Type 1 DM, s/p kidney transplant 2019 working on weight loss for possible pancreas transplant.  He did well with weight loss to 190 lbs 2 years ago but has regained.    Consider GLP1 for weight loss and insulin resistance with Type 1 DM  Will discuss if GLP1 is an option with Kymberly Boswell PA-C    Could also consider topiramate if GLP1 is not an option. Would need to discuss with nephrologist first.     Follow up weight mgmt RD first available video visit  Follow up MTM 6 weeks  Follow up Tasha Zepeda PA-C

## 2023-03-28 ENCOUNTER — TELEPHONE (OUTPATIENT)
Dept: ENDOCRINOLOGY | Facility: CLINIC | Age: 43
End: 2023-03-28
Payer: COMMERCIAL

## 2023-03-28 NOTE — TELEPHONE ENCOUNTER
3/28 Left  for staff msg Zepeda, Tasha Lopez, STEFF  P Ump Bariatric Scheduling Registration Pool  Hi, there was RD and appt with me scheduled but pt is going to start a medication that requires follow up with MTM Lauren Bloch in 6 weeks by phone call.  Can you also reach to pt to schedule this?       Thanks Tasha

## 2023-05-30 DIAGNOSIS — E10.29 TYPE 1 DIABETES MELLITUS WITH OTHER KIDNEY COMPLICATION (H): Primary | ICD-10-CM

## 2023-05-30 RX ORDER — INSULIN LISPRO 100 [IU]/ML
INJECTION, SOLUTION INTRAVENOUS; SUBCUTANEOUS
Qty: 130 ML | Refills: 1 | Status: SHIPPED | OUTPATIENT
Start: 2023-05-30 | End: 2024-01-19

## 2023-06-06 DIAGNOSIS — E10.29 TYPE 1 DIABETES MELLITUS WITH OTHER KIDNEY COMPLICATION (H): ICD-10-CM

## 2023-06-06 DIAGNOSIS — E66.812 CLASS 2 SEVERE OBESITY WITH SERIOUS COMORBIDITY AND BODY MASS INDEX (BMI) OF 37.0 TO 37.9 IN ADULT (H): ICD-10-CM

## 2023-06-06 DIAGNOSIS — E66.01 CLASS 2 SEVERE OBESITY WITH SERIOUS COMORBIDITY AND BODY MASS INDEX (BMI) OF 37.0 TO 37.9 IN ADULT (H): ICD-10-CM

## 2023-06-08 NOTE — TELEPHONE ENCOUNTER
Ozempic (0.25 or 0.5 MG/DOSE) 2 MG/3ML Subcutaneous Solution Pen-injector      Last Written Prescription Date:  3/28/23  Last Fill Quantity: 1.5ml,   # refills: 1  Last Office Visit : 3/27/23  Future Office visit:  6/28/23    Routing refill request to provider for review/approval because:  Current order has ramp dosing, unable to refill per protocol

## 2023-06-09 DIAGNOSIS — E66.812 CLASS 2 SEVERE OBESITY WITH SERIOUS COMORBIDITY AND BODY MASS INDEX (BMI) OF 37.0 TO 37.9 IN ADULT (H): ICD-10-CM

## 2023-06-09 DIAGNOSIS — E66.01 CLASS 2 SEVERE OBESITY WITH SERIOUS COMORBIDITY AND BODY MASS INDEX (BMI) OF 37.0 TO 37.9 IN ADULT (H): ICD-10-CM

## 2023-06-09 DIAGNOSIS — E10.9 DIABETES MELLITUS TYPE 1 (H): ICD-10-CM

## 2023-06-09 NOTE — TELEPHONE ENCOUNTER
General Call    Contacts       Type Contact Phone/Fax    06/09/2023 11:06 AM CDT Phone (Incoming) Rudi Amin (Self) [phone] (M)        Reason for Call:  Ozempic    What are your questions or concerns:  Pt is checking on Ozempic refill status and wants to know if it can be refilled for 90 days    Could we send this information to you in [facility] or would you prefer to receive a phone call?:   Patient would prefer a phone call   Okay to leave a detailed message?: Yes at Cell number on file:    Telephone Information:   Mobile [phone]

## 2023-06-12 RX ORDER — SEMAGLUTIDE 0.68 MG/ML
0.5 INJECTION, SOLUTION SUBCUTANEOUS
Qty: 9 ML | Refills: 0 | Status: SHIPPED | OUTPATIENT
Start: 2023-06-12 | End: 2023-06-28

## 2023-06-12 RX ORDER — SEMAGLUTIDE 0.68 MG/ML
0.5 INJECTION, SOLUTION SUBCUTANEOUS
Qty: 3 ML | Refills: 0 | Status: SHIPPED | OUTPATIENT
Start: 2023-06-12 | End: 2023-06-28

## 2023-06-28 ENCOUNTER — VIRTUAL VISIT (OUTPATIENT)
Dept: ENDOCRINOLOGY | Facility: CLINIC | Age: 43
End: 2023-06-28
Payer: COMMERCIAL

## 2023-06-28 VITALS — HEIGHT: 66 IN | WEIGHT: 210.2 LBS | BODY MASS INDEX: 33.78 KG/M2

## 2023-06-28 DIAGNOSIS — Z94.0 KIDNEY REPLACED BY TRANSPLANT: ICD-10-CM

## 2023-06-28 DIAGNOSIS — E10.29 TYPE 1 DIABETES MELLITUS WITH OTHER KIDNEY COMPLICATION (H): Primary | ICD-10-CM

## 2023-06-28 DIAGNOSIS — E66.811 CLASS 1 OBESITY WITH SERIOUS COMORBIDITY AND BODY MASS INDEX (BMI) OF 33.0 TO 33.9 IN ADULT, UNSPECIFIED OBESITY TYPE: ICD-10-CM

## 2023-06-28 PROCEDURE — 99213 OFFICE O/P EST LOW 20 MIN: CPT | Mod: 95 | Performed by: PHYSICIAN ASSISTANT

## 2023-06-28 ASSESSMENT — PAIN SCALES - GENERAL: PAINLEVEL: NO PAIN (0)

## 2023-06-28 NOTE — LETTER
"2023       RE: Michael Amin  [id number]  [facility] [address]     Dear Colleague,    Thank you for referring your patient, Michael Amin, to the [facility] [place] [facility]. Please see a copy of my visit note below.    Return Medical Weight Management Note     Michael Amin  MRN:  [id number]  :  1980  SULTANA:  2023    Dear STEPHAN BEAULIEU,    I had the pleasure of seeing your patient Michael Amin. He is a 42 year old male who I am continuing to see for treatment of obesity related to:      Assessment & Plan   Problem List Items Addressed This Visit          Endocrine Diagnoses    Diabetes mellitus type 1 (H) - Primary    Relevant Medications    Semaglutide, 1 MG/DOSE, (OZEMPIC) 4 MG/3ML pen    Class 1 obesity with serious comorbidity and body mass index (BMI) of 33.0 to 33.9 in adult, unspecified obesity type    Relevant Medications    Semaglutide, 1 MG/DOSE, (OZEMPIC) 4 MG/3ML pen       Other    Kidney replaced by transplant      42 y.o. male with hx of kidney transplant 2019. Type 1 DM and needs BMI <30-32 for pancreas transplant.    Doing well on ozempic 0.5mg weekly.    Increase ozempic to 1mg weekly. Monitor insulin and BS closely    Follow up 3 months Tasha return MW    INTERVAL HISTORY:  42 y.o. male with hx of kidney transplant 2019. Type 1 DM and needs BMI <30-32 for pancreas transplant.    Doing well on ozempic 0.5mg weekly. \"for first time in my life I feel hunger controlled\"  Has lost from 234 to 210 lbs     BS improved and more stable, fewer ups and downs.120-130s fasting  Insulin pump and less insulin need since starting ozempic. Daily insulin down from 100-120 units per day to 70's.      Anti-obesity medications:     Current:   ozempic 0.5mg weekly    CURRENT WEIGHT:   210 lbs 3.2 oz    Initial Weight (lbs): 234 lbs  Last Visits Weight: 106.1 kg (234 lb)  Cumulative " weight loss (lbs): 23.8  Weight Loss Percentage: 10.17%        6/28/2023     9:13 AM   Changes and Difficulties   I have made the following changes to my diet since my last visit: Eating less, not as hungry with ozempic   With regards to my diet, I am still struggling with: No   I have made the following changes to my activity/exercise since my last visit: Walking more   With regards to my activity/exercise, I am still struggling with: Work in progress         MEDICATIONS:   Current Outpatient Medications   Medication Sig Dispense Refill    alcohol swab prep pads Use to swab area of injection/zak as directed. 100 each 3    aspirin (ASA) 81 MG EC tablet Take 81 mg by mouth daily       atorvastatin (LIPITOR) 10 MG tablet TAKE 1 TABLET(10 MG) BY MOUTH DAILY 90 tablet 3    blood glucose (CONTOUR NEXT TEST) test strip Use to test blood sugar 4 times daily. 400 strip 11    carvedilol (COREG) 12.5 MG tablet Take 1 tablet (12.5 mg) by mouth 2 times daily (with meals) 180 tablet 3    Continuous Blood Gluc Sensor (DEXCOM G6 SENSOR) MISC Change every 10 days. Please dispense 90 day supply- dispense 9 sensors. 9 each 4    Continuous Blood Gluc Transmit (DEXCOM G6 TRANSMITTER) MISC Change every 3 months 1 each 3    Glucagon (BAQSIMI ONE PACK) 3 MG/DOSE POWD Spray 3 mg in nostril See Admin Instructions USE ONLY FOR SEVERE HYPOGLYCEMIA. 1 each 3    insulin cartridge (T:SLIM 3ML) misc pump supply Insulin cartridge to be used with pump as directed.  Change every 2 days or as directed. 45 each 3    Insulin Infusion Pump Supplies (AUTOSOFT XC INFUSION SET) MISC 1 each every 48 hours Change every 2 days  9 mm cannula, 23 inch tubing 45 each 3    insulin lispro (HUMALOG VIAL) 100 UNIT/ML vial Via insulin pump. Approx 130 units daily. Follow up visit needed. Call  to schedule. 130 mL 1    insulin pen needle (ULTICARE MICRO) 32G X 4 MM miscellaneous Use pen needles daily or as directed. 100 each 3    Insulin Syringe-Needle  "U-100 31G X 1/4\" 1 ML MISC 1 Syringe as needed (until new insuline pump arrives) 50 each 1    magnesium oxide (MAG-OX) 400 MG tablet Take 1 tablet (400 mg) by mouth daily (with lunch) 30 tablet 5    mycophenolate (GENERIC EQUIVALENT) 250 MG capsule Take 3 capsules (750 mg) by mouth 2 times daily 180 capsule 11    Semaglutide, 1 MG/DOSE, (OZEMPIC) 4 MG/3ML pen Inject 1 mg Subcutaneous every 7 days 3 mL 3    senna-docusate (SENOKOT-S/PERICOLACE) 8.6-50 MG tablet Take 2 tablets by mouth 2 times daily 20 tablet 0    tacrolimus (GENERIC EQUIVALENT) 0.5 MG capsule Take 1 capsule (0.5 mg) by mouth every morning Total dose = 1.5 mg in the AM and 1 mg in the PM 90 capsule 3    tacrolimus (GENERIC EQUIVALENT) 1 MG capsule Total dose = 1.5 mg in the AM and 1 mg in the  capsule 3    blood glucose monitoring (ACCU-CHEK FASTCLIX) lancets U QID OR MORE OFTEN PRN (Patient not taking: Reported on 6/28/2023)  1    insulin lispro (HUMALOG VIAL) 100 UNIT/ML vial With Insulin pump. Up to 130 units per day. (Patient not taking: Reported on 6/28/2023) 30 mL 11    sulfamethoxazole-trimethoprim (BACTRIM) 400-80 MG tablet Take 1 tablet by mouth daily (Patient not taking: Reported on 3/27/2023) 30 tablet 11           6/28/2023     9:13 AM   Weight Loss Medication History Reviewed With Patient   Which weight loss medications are you currently taking on a regular basis? Ozempic   Are you having any side effects from the weight loss medication that we have prescribed you? No       External Order Results on 01/12/2023   Component Date Value Ref Range Status    WBC Count (External) 01/12/2023 8.0  3.2 - 11.0 10*9/L Final    RBC Count (External) 01/12/2023 4.85  4.14 - 5.76 10*12/L Final    Hemoglobin (External) 01/12/2023 12.5 (L)  12.9 - 16.9 g/dL Final    Hematocrit (External) 01/12/2023 38.4  38.4 - 49.7 % Final    MCV (External) 01/12/2023 79.2 (L)  81.4 - 99.0 fL Final    MCH (External) 01/12/2023 25.8 (L)  26.7 - 33.1 pg Final    MCHC " "(External) 01/12/2023 32.6  31.6 - 35.5 g/dL Final    RDW (External) 01/12/2023 13.2  11.3 - 14.6 % Final    Platelet Count (External) 01/12/2023 170  130 - 375 10*9/L Final    Sodium (External) 01/12/2023 137  134 - 143 mEq/L Final    Potassium (External) 01/12/2023 4.4  3.4 - 5.1 mEq/L Final    Chloride (External) 01/12/2023 102  99 - 110 mEq/L Final    CO2 (External) 01/12/2023 24  19 - 29 mEq/L Final    Anion Gap (External) 01/12/2023 11.0  3.0 - 15.0 mEq/L Final    Urea Nitrogen (External) 01/12/2023 17  5 - 24 mg/dL Final    Creatinine (External) 01/12/2023 1.01  0.70 - 1.20 mg/dL Final    GFR Estimated (External) 01/12/2023 95  >60 ml/min/1.73m2 Final    Calcium (External) 01/12/2023 9.2  8.4 - 10.5 mg/dL Final    Glucose (External) 01/12/2023 156 (H)  70 - 99 mg/dL Final    Tacrolimus(FK-506) (External) 01/12/2023 5.7  5.0 - 15.0 ng/mL Final    Hemoglobin A1C (External) 01/12/2023 8.2 (H)  4.0 - 5.6 % Final       PHYSICAL EXAM:  Objective    Ht 1.676 m (5' 6\")   Wt 95.3 kg (210 lb 3.2 oz)   BMI 33.93 kg/m      Vitals - Patient Reported  Pain Score: No Pain (0)        GENERAL: Healthy, alert and no distress  EYES: Eyes grossly normal to inspection.  No discharge or erythema, or obvious scleral/conjunctival abnormalities.  RESP: No audible wheeze, cough, or visible cyanosis.  No visible retractions or increased work of breathing.    SKIN: Visible skin clear. No significant rash, abnormal pigmentation or lesions.  NEURO: Cranial nerves grossly intact.  Mentation and speech appropriate for age.  PSYCH: Mentation appears normal, affect normal/bright, judgement and insight intact, normal speech and appearance well-groomed.        Sincerely,    Tasha Zepeda PA-C      20 minutes spent by me on the date of the encounter doing chart review, history and exam, documentation and further activities per the noteVirtual Visit Details    Type of service:  Video Visit     Originating Location (pt. Location): " Home  Distant Location (provider location):  Off-site  Platform used for Video Visit: Caesar

## 2023-06-28 NOTE — NURSING NOTE
Is the patient currently in the state of [place]? YES    Visit mode:VIDEO    If the visit is dropped, the patient can be reconnected by: VIDEO VISIT: Text to cell phone: [phone]    Will anyone else be joining the visit? NO      How would you like to obtain your AVS? MyChart    Are changes needed to the allergy or medication list? NO    Reason for visit: RECHECK (JENNIFER)        Marisela Mcknight, VF

## 2023-06-28 NOTE — PROGRESS NOTES
"Michael Amin is a 42 year old male who is being evaluated via a billable video visit.      The patient has been notified of following:     \"This video visit will be conducted via a call between you and your physician/provider. We have found that certain health care needs can be provided without the need for an in-person physical exam.  This service lets us provide the care you need with a video conversation.  If a prescription is necessary we can send it directly to your pharmacy.  If lab work is needed we can place an order for that and you can then stop by our lab to have the test done at a later time.    Video visits are billed at different rates depending on your insurance coverage.  Please reach out to your insurance provider with any questions.    If during the course of the call the physician/provider feels a video visit is not appropriate, you will not be charged for this service.\"    Patient has given verbal consent for Video visit? Yes  How would you like to obtain your AVS? MyChart  If you are dropped from the video visit, the video invite should be resent to: Text to cell phone: [phone]  Will anyone else be joining your video visit? No  {If patient encounters technical issues they should call [phone]    Video-Visit Details    Type of service:  Video Visit    Video Start Time: 11:00 AM   Video End Time: 11:31 AM     Originating Location (pt. Location): Home    Distant Location (provider location): Offsite (providers home) [facility] [place]     Platform used for Video Visit: Prezto    During this virtual visit the patient is located in [place], patient verifies this as the location during the entirety of this visit.     New Weight Management Nutrition Consultation    Michael Amin is a 42 year old male presents today for new weight management nutrition consultation.  Patient referred by Tasha Zepeda on June 29, 2023. Pt needs BMI <30-32 for pancreas " "transplant     PMH  DM Type 1, kidney transplant     Anthropometrics:  Prior to starting AOM was 234 lb  Estimated body mass index is 33.93 kg/m  as calculated from the following:    Height as of 6/28/23: 1.676 m (5' 6\").    Weight as of 6/28/23: 95.3 kg (210 lb 3.2 oz).  Current weight: 210 lb on 6/28/23, -24 lbs since starting AOM.    Medications for Weight Loss:  Ozempic - not as hungry as often, not feeling those symptoms of craving food    NUTRITION HISTORY  Follows a low sodium diet     Food allergies: NKFA   Food intolerances: Limits milk - notices some GI upset with high amounts of dairy, bloated   Vitamin/Mineral Supplements: Magnesium  Previous methods of diet modification for weight loss: cutting portions, watching calories   RD before: [facility] RD when used to be on dialysis, back when he was diagnosed with type 1 DM.    Eating less, not as hungry with Ozempic. 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 Doesn't eat right after he gets up. Since starting Ozempic this has helped with his cravings for snacks. Lives alone. [place] is his favorite kind of food. Wants to be evaluated for ADHD. Impulsivity and always in a rush. Has been using my fitness.     Diet recall:   Breakfast - some days will have a light breakfast such as Greek yogurt + english muffin with peanut butter. Portion has cut down to 1/2 English Muffin more recently. Sometimes will have strawberries/blueberries.   Lunch - Usually on the go, 6 inch sub sandwich, 1-2 days a week something from fast food restaurant. During school has school lunch, bean burrito, quesodilla.   Snacks: salty snacks, chips, cucumbers and hummus   Dinner - chicken breast, steaks - makes taco salad or Venison taco meat, will incorporate in other meals. [place] chicken wrap.   Hydration: Water, can of 7 up in afternoon when he feels like his blood sugar is dropping.     Physical Activity:  Has a dog that he likes to go on walks with for about 2 " miles in distance. 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 In the winter time activity starts to slow down.     Nutrition Prescription  Recommended energy/nutrient modification.    Nutrition Diagnosis  Obesity r/t long history of positive energy balance aeb BMI >30.    Nutrition Intervention  Materials/education provided on hypocaloric diet for weight loss. Discussed volumetric eating to help satiety level on fewer calories; portion control and healthy food choices (Plate Method and Volumetrics handouts), 100 calorie snack choices, meal and snack planning and websites, sample meal plans. Patient demonstrates understanding. Co-developed goals to work towards.   Provided pt with list of goals and resources below via [employer].     Expected Engagement: good  Follow-Up Plans: Meal and snack planning       Nutrition Goals   1. Incorporate plate method for balanced meals. Aim for at least 20-30 grams at each meal.  2. Increase physical activity as able, incorporate more strength and resistance type exercises.   3. Avoid snacking as able. If snack is needed use lean protein and/or fruit/vegetable. Examples:   - 2 cup popcorn   - 1 cup mixed berries   - 15 almonds, walnuts, cashews   - carrot/celery sticks and 2 tbsp low-fat ranch   - 1 hard boiled egg   - Part-skim mozzarella cheese stick   - Low-fat, low-sugar greek yogurt with 1/2 cup berries   - Med apple or pear   - sliced bell peppers with 1/2 cup salsa   - 1/2 cup roasted chickpeas   - sliced cucumbers with vinegar    100 calorie sweets: Smart Sweets, Dr. Lira's Xylitol candy, Fiber One desserts, Fit and Active 100 calorie snack sweets at [facility]; Nabisco 100 calorie pre-portioned cookies, sugar-free pudding, sugar-free jello.    Snack Recipes:  - Roasted chickpeas: https://www.diabetesfoodhub.org/recipes/roasted-and-spiced-chickpeas.html?home-category_id=23  - Black bean hummus with carrot and celery sticks:  https://www.diabetesfoodhub.org/recipes/black-bean-hummus.html?home-category_id=23  - Chicken Satay with peanut sauce:  https://www.diabetesfoodhub.org/recipes/blueberry-almond-chicken-salad-lettuce-wraps.html?home-category_id=20  - Hummus Deviled Eggs:  https://www.diabetesfoodhub.org/recipes/hummus-deviled-eggs.html?home-category_id=1  - Lemon Raspberry Ace Seed Pudding:  https://www.diabetesfoodhub.org/recipes/lemon-raspberry-ace-seed-pudding.html?home-category_id=20  - Greek yogurt chocolate mouse: https://www.diabetesfoodhub.org/recipes/greek-yogurt-chocolate-mousse.html?home-category_id=23  - Broccoli Cheese Bites: https://www.diabetesfoodhub.org/recipes/broccoli-cheese-bites.html?home-category_id=20  - Cheese dip with sliced veggies: https://www.diabetesfoodhub.org/recipes/quick-easy-cheese-dip.html?home-category_id=20    Protein options for on the go  Cottage Cheese  Hard Boiled Eggs  PB3 Portable Protein Snacks   Mixed Nuts Pack  Mozzarella stick wrapped in deli meat.   Tuna/salmon packets  Greek yogurt parfait  Jerky  Protein bars/Protein shakes   Apple with peanut butter  No bake energy bites    Free YouTube Workout ideas:   Fitness   Nourish Move Love   Tone it up  POPChaordix fitness   Livestrong women     Free Fitness Apps:  Fit on  [employer] Training Club  J&J 7 Minute Workout     Fitness Apps with monthly fee  Peloton   Stronger by the day  Cardiocast     The Plate Method  [email or web address]://[email or web address]    Protein Sources   [email or web address]://Titan [email or web address]     Carbohydrates  [email or web address]     Mindful Eating  [email or web address]://T[email or web address]     Summary of Volumetrics Eating Plan  [email or web address]     Follow-Up:  August 10     Time spent with patient: 31 minutes.  Stephany Tobar RD, LD

## 2023-06-29 ENCOUNTER — MEDICAL CORRESPONDENCE (OUTPATIENT)
Dept: HEALTH INFORMATION MANAGEMENT | Facility: CLINIC | Age: 43
End: 2023-06-29

## 2023-06-29 ENCOUNTER — VIRTUAL VISIT (OUTPATIENT)
Dept: ENDOCRINOLOGY | Facility: CLINIC | Age: 43
End: 2023-06-29
Payer: COMMERCIAL

## 2023-06-29 VITALS — BODY MASS INDEX: 33.85 KG/M2 | HEIGHT: 66 IN | WEIGHT: 210.6 LBS

## 2023-06-29 DIAGNOSIS — E66.811 CLASS 1 OBESITY WITH SERIOUS COMORBIDITY AND BODY MASS INDEX (BMI) OF 33.0 TO 33.9 IN ADULT, UNSPECIFIED OBESITY TYPE: ICD-10-CM

## 2023-06-29 DIAGNOSIS — E10.65 TYPE 1 DIABETES MELLITUS WITH HYPERGLYCEMIA (H): ICD-10-CM

## 2023-06-29 DIAGNOSIS — Z71.3 NUTRITIONAL COUNSELING: Primary | ICD-10-CM

## 2023-06-29 PROCEDURE — 97802 MEDICAL NUTRITION INDIV IN: CPT | Mod: VID

## 2023-06-29 PROCEDURE — 99207 PR NO CHARGE LOS: CPT | Mod: VID

## 2023-06-29 ASSESSMENT — PAIN SCALES - GENERAL: PAINLEVEL: NO PAIN (0)

## 2023-06-29 NOTE — LETTER
"6/29/2023       RE: Michael Amin  [address] [place] [id number]     Dear Colleague,    Thank you for referring your patient, Michael Amin, to the [facility] [place] [facility]. Please see a copy of my visit note below.    Michael Amin is a 42 year old male who is being evaluated via a billable video visit.      The patient has been notified of following:     \"This video visit will be conducted via a call between you and your physician/provider. We have found that certain health care needs can be provided without the need for an in-person physical exam.  This service lets us provide the care you need with a video conversation.  If a prescription is necessary we can send it directly to your pharmacy.  If lab work is needed we can place an order for that and you can then stop by our lab to have the test done at a later time.    Video visits are billed at different rates depending on your insurance coverage.  Please reach out to your insurance provider with any questions.    If during the course of the call the physician/provider feels a video visit is not appropriate, you will not be charged for this service.\"    Patient has given verbal consent for Video visit? Yes  How would you like to obtain your AVS? MyChart  If you are dropped from the video visit, the video invite should be resent to: Text to cell phone: [phone]  Will anyone else be joining your video visit? No  {If patient encounters technical issues they should call [phone]    Video-Visit Details    Type of service:  Video Visit    Video Start Time: 11:00 AM   Video End Time: 11:31 AM     Originating Location (pt. Location): Home    Distant Location (provider location): Offsite (providers home) [facility] [place]     Platform used for Video Visit: Clear Books    During this virtual visit the patient is " "located in [place], patient verifies this as the location during the entirety of this visit.     New Weight Management Nutrition Consultation    Michael Amin is a 42 year old male presents today for new weight management nutrition consultation.  Patient referred by Tasha Zepeda on June 29, 2023. Pt needs BMI <30-32 for pancreas transplant     PMH  DM Type 1, kidney transplant     Anthropometrics:  Prior to starting AOM was 234 lb  Estimated body mass index is 33.93 kg/m  as calculated from the following:    Height as of 6/28/23: 1.676 m (5' 6\").    Weight as of 6/28/23: 95.3 kg (210 lb 3.2 oz).  Current weight: 210 lb on 6/28/23, -24 lbs since starting AOM.    Medications for Weight Loss:  Ozempic - not as hungry as often, not feeling those symptoms of craving food    NUTRITION HISTORY  Follows a low sodium diet     Food allergies: NKFA   Food intolerances: Limits milk - notices some GI upset with high amounts of dairy, bloated   Vitamin/Mineral Supplements: Magnesium  Previous methods of diet modification for weight loss: cutting portions, watching calories   RD before: [facility] RD when used to be on dialysis, back when he was diagnosed with type 1 DM.    Eating less, not as hungry with Ozempic. 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 Doesn't eat right after he gets up. Since starting Ozempic this has helped with his cravings for snacks. Lives alone. [place] is his favorite kind of food. Wants to be evaluated for ADHD. Impulsivity and always in a rush. Has been using my fitness.     Diet recall:   Breakfast - some days will have a light breakfast such as Greek yogurt + english muffin with peanut butter. Portion has cut down to 1/2 English Muffin more recently. Sometimes will have strawberries/blueberries.   Lunch - Usually on the go, 6 inch sub sandwich, 1-2 days a week something from fast food restaurant. During school has school lunch, bean burrito, quesodilla.   Snacks: salty snacks, " chips, cucumbers and hummus   Dinner - chicken breast, steaks - makes taco salad or Venison taco meat, will incorporate in other meals. [place] chicken wrap.   Hydration: Water, can of 7 up in afternoon when he feels like his blood sugar is dropping.     Physical Activity:  Has a dog that he likes to go on walks with for about 2 miles in distance. 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 In the winter time activity starts to slow down.     Nutrition Prescription  Recommended energy/nutrient modification.    Nutrition Diagnosis  Obesity r/t long history of positive energy balance aeb BMI >30.    Nutrition Intervention  Materials/education provided on hypocaloric diet for weight loss. Discussed volumetric eating to help satiety level on fewer calories; portion control and healthy food choices (Plate Method and Volumetrics handouts), 100 calorie snack choices, meal and snack planning and websites, sample meal plans. Patient demonstrates understanding. Co-developed goals to work towards.   Provided pt with list of goals and resources below via [employer].     Expected Engagement: good  Follow-Up Plans: Meal and snack planning       Nutrition Goals   1. Incorporate plate method for balanced meals. Aim for at least 20-30 grams at each meal.  2. Increase physical activity as able, incorporate more strength and resistance type exercises.   3. Avoid snacking as able. If snack is needed use lean protein and/or fruit/vegetable. Examples:   - 2 cup popcorn   - 1 cup mixed berries   - 15 almonds, walnuts, cashews   - carrot/celery sticks and 2 tbsp low-fat ranch   - 1 hard boiled egg   - Part-skim mozzarella cheese stick   - Low-fat, low-sugar greek yogurt with 1/2 cup berries   - Med apple or pear   - sliced bell peppers with 1/2 cup salsa   - 1/2 cup roasted chickpeas   - sliced cucumbers with vinegar    100 calorie sweets: Smart Sweets, Dr. Lira's Xylitol candy, Fiber One desserts, Fit and Active 100 calorie snack sweets  at [employer]; Nabisco 100 calorie pre-portioned cookies, sugar-free pudding, sugar-free jello.    Snack Recipes:  - Roasted chickpeas: https://www.diabetesfoodhub.org/recipes/roasted-and-spiced-chickpeas.html?home-category_id=23  - Black bean hummus with carrot and celery sticks: https://www.diabetesfoodhub.org/recipes/black-bean-hummus.html?home-category_id=23  - Chicken Satay with peanut sauce:  https://www.diabetesfoodhub.org/recipes/blueberry-almond-chicken-salad-lettuce-wraps.html?home-category_id=20  - Hummus Deviled Eggs:  https://www.diabetesfoodhub.org/recipes/hummus-deviled-eggs.html?home-category_id=1  - Lemon Raspberry Ace Seed Pudding:  https://www.diabetesfoodhub.org/recipes/lemon-raspberry-ace-seed-pudding.html?home-category_id=20  - Greek yogurt chocolate mouse: https://www.diabetesfoodhub.org/recipes/greek-yogurt-chocolate-mousse.html?home-category_id=23  - Broccoli Cheese Bites: https://www.diabetesfoodhub.org/recipes/broccoli-cheese-bites.html?home-category_id=20  - Cheese dip with sliced veggies: https://www.diabetesfoodhub.org/recipes/quick-easy-cheese-dip.html?home-category_id=20    Protein options for on the go  Cottage Cheese  Hard Boiled Eggs  PB3 Portable Protein Snacks   Mixed Nuts Pack  Mozzarella stick wrapped in deli meat.   Tuna/salmon packets  Greek yogurt parfait  Jerky  Protein bars/Protein shakes   Apple with peanut butter  No bake energy bites    Free YouTube Workout ideas:   Fitness   Nourish Move Love   Tone it up  P[employer]endo [employer] fitness   Livestrong women     Free Fitness Apps:  Fit on  [employer] Training Club  J&J 7 Minute Workout     Fitness Apps with monthly fee  Peloton   Stronger by the day  Cardiocast     The Plate Method  Http://[email or web address]    Protein Sources   [email or web address]://Class [email or web address]     Carbohydrates  [email or web address]://[email or web address]     Mindful Eating  [email or web address]://Class [email or web address]     Summary of Volumetrics Eating Plan  [email or web address]      Follow-Up:  August 10     Time spent with patient: 31 minutes.  Stephany Tobar RD, LD

## 2023-06-29 NOTE — NURSING NOTE
Is the patient currently in the state of [place]? YES    Visit mode:VIDEO    If the visit is dropped, the patient can be reconnected by: VIDEO VISIT: Text to cell phone: [phone]    Will anyone else be joining the visit? NO      How would you like to obtain your AVS? MyChart    Are changes needed to the allergy or medication list? NO    Reason for visit: Follow Up

## 2023-06-29 NOTE — PATIENT INSTRUCTIONS
Carson Grijalva,     Follow-up with RD on August 10     Thank you,    Stephany Tobar, RD, LD  If you would like to schedule or reschedule an appointment with the RD, please call [phone]    Nutrition Goals  1. Incorporate plate method for balanced meals. Aim for at least 20-30 grams at each meal.  2. Increase physical activity as able, incorporate more strength and resistance type exercises.   3. Avoid snacking as able. If snack is needed use lean protein and/or fruit/vegetable. Examples:   - 2 cup popcorn   - 1 cup mixed berries   - 15 almonds, walnuts, cashews   - carrot/celery sticks and 2 tbsp low-fat ranch   - 1 hard boiled egg   - Part-skim mozzarella cheese stick   - Low-fat, low-sugar greek yogurt with 1/2 cup berries   - Med apple or pear   - sliced bell peppers with 1/2 cup salsa   - 1/2 cup roasted chickpeas   - sliced cucumbers with vinegar    100 calorie sweets: Smart Sweets, Dr. Lira's Xylitol candy, Fiber One desserts, Fit and Active 100 calorie snack sweets at [facility]; Nabisco 100 calorie pre-portioned cookies, sugar-free pudding, sugar-free jello.    Snack Recipes:  - Roasted chickpeas: https://www.diabetesfoodhub.org/recipes/roasted-and-spiced-chickpeas.html?home-category_id=23  - Black bean hummus with carrot and celery sticks: https://www.diabetesfoodhub.org/recipes/black-bean-hummus.html?home-category_id=23  - Chicken Satay with peanut sauce:  https://www.diabetesfoodhub.org/recipes/blueberry-almond-chicken-salad-lettuce-wraps.html?home-category_id=20  - Hummus Deviled Eggs:  https://www.diabetesfoodhub.org/recipes/hummus-deviled-eggs.html?home-category_id=1  - Lemon Raspberry Ace Seed Pudding:  https://www.diabetesfoodhub.org/recipes/lemon-raspberry-ace-seed-pudding.html?home-category_id=20  - Greek yogurt chocolate mouse: https://www.diabetesfoodhub.org/recipes/greek-yogurt-chocolate-mousse.html?home-category_id=23  - Broccoli Cheese Bites:  https://www.diabetesfoodhub.org/recipes/broccoli-cheese-bites.html?home-category_id=20  - Cheese dip with sliced veggies: https://www.diabetesfoodhub.org/recipes/quick-easy-cheese-dip.html?home-category_id=20    Protein options for on the go  Cottage Cheese  Hard Boiled Eggs  PB3 Portable Protein Snacks   Mixed Nuts Pack  Mozzarella stick wrapped in deli meat.   Tuna/salmon packets  Greek yogurt parfait  Jerky  Protein bars/Protein shakes   Apple with peanut butter  No bake energy bites    Free YouTube Workout ideas:   Fitness   Nourish Move Love   Tone it up  [employer] fitness   Livestrong women     Free Fitness Apps:  Fit on  [employer] Training Club  J&J 7 Minute Workout     Fitness Apps with monthly fee  Peloton   Stronger by the day  Cardiocast     The Plate Method  Http://[email or web address]    Protein Sources   http://En[employer] [email or web address]     Carbohydrates  [email or web address]://[email or web address]     Mindful Eating  http://[email or web address]     Summary of Volumetrics Eating Plan  [email or web address]://[email or web address]       COMPREHENSIVE WEIGHT MANAGEMENT PROGRAM  VIRTUAL SUPPORT GROUPS    For Support Group Information:      We offer support groups for patients who are working on weight loss and considering, preparing for or have had weight loss surgery.   There is no cost for this opportunity.  You are invited to attend the?Virtual Support Groups?provided by any of the following locations:    [facility] via Vascular Closure Teams with Yudith Alfred RN  2.   [place] via Vascular Closure Teams with Kade Mckenna, PhD, LP  3.   [place] via Vascular Closure Teams with Mera Ricks RN  4.   [facility] via Vascular Closure Teams with ASHER [facility]    The following Support Group information can also be found on our website:  https://[email or web address]/treatments/weight-loss-surgery-support-groups    https://www.[email or web address]/treatments/weight-loss-and-weight-loss-surgery-support-groups    [facility]  "[facility] Weight Loss Surgery Support Group    [facility] Weight Loss Surgery Support Group  The support group is a patient-lead forum that meets monthly to share experiences, encouragement and education. It is open to those who have had weight loss surgery, are scheduled for surgery, or are considering surgery.   WHEN: This group meets on the 3rd Wednesday of each month from 5:00PM - 6:00PM virtually using Microsoft Teams.   FACILITATOR: Led by Yudith Elder RD, RM, RN, the program's Clinical Coordinator.   TO REGISTER: Please contact the clinic via [employer] or call the nurse line directly at [phone] to inform our staff that you would like an invite sent to you and to let us know the email you would like the invite sent to. Prior to the meeting, a link with directions on how to join the meeting will be sent to you.    2023 Meetings   April 19: Guest Speaker, Jone Esqueda RD, RM, \"Maintaining Weight Loss after Bariatric Surgery\".  May 17: \"Let's Talk\" a time for the group to share.  June 21: \"Let's Talk\" a time for the group to share.  July 19  August 16  September 20  October 18  November 15  December 20    [facility] Support Groups    Connections Bariatric Care Support Group?  This is open to all pre- and post- operative bariatric surgery patients as well as their support system.   WHEN: This group meets the 2nd Tuesday of each month from 6:30 PM - 8:00 PM virtually using Microsoft Teams.   FACILITATOR: Led by Kade Mckenna, Ph.D who is a Licensed Psychologist with the [facility] Management [facility].   TO REGISTER: Please send an email to Kade Mckenna, Ph.D., LP at?[email or web address]?if you would like an invitation to the group and to learn about using Microsoft Teams.    2023 Meetings April 11: Guest speaker, Annalee Bauer MD, Bariatrician, [facility] Comprehensive Weight Management Program, \"Injectable Weight Loss " "Medications\".  May 9  Jenni 13  July 11  August 8  September 12  October 10  November 14  December 12    Connections Post-Operative Bariatric Surgery Support Group  This is a support group for [facility] bariatric patients (and those external to [facility]) who have had bariatric surgery and are at least 3 months post-surgery.  WHEN: This support group meets the 4th Wednesday of the month from 11:00 AM - 12:00 PM virtually using Microsoft Teams.   FACILITATOR: Led by Certified Bariatric Nurse, Mera Ricks RN.   TO REGISTER: Please send an email to Mera at [email or web address] if you would like an invitation to the group and to learn about using Microsoft Teams.    2023 Meetings  April 26  May 24  Jenni 28  July 26  August 23  September 27  October 25  November 22  December 27    [facility]   Healthy Lifestyle Virtual Support Group    Healthy Lifestyle Virtual Support Group?  This is 60 minutes of small group guided discussion, support and resources. All are welcome who want a healthy lifestyle.  WHEN: This group meets monthly on a Friday from 12:30 PM - 1:30 PM virtually using Microsoft Teams.  This group will meet the first Friday of the month beginning In July  FACILITATOR: Led by National Board Certified Health and , Mera Adamson [facility].   TO REGISTER: Please send an email to Mera [email or web address] to receive monthly invites to the group or if you have any questions about having a health .  Prior to the meeting, a link with directions on how to join the meeting will be sent to you.    2023 Meetings  May 19: Let's Talk  June 9: Create Your Coaching Toolkit: Learn How to  Yourself  July 7: Let's Talk  August 4: Benefits of Fiber with KYLE Galindo  September 1: Show and Tell (share your aps, podcasts, recipes, hacks, books)  October 6 :Let's Talk  November 3: Introduction to Mindfulness   December 1: Let's Talk           "

## 2023-07-03 ENCOUNTER — TELEPHONE (OUTPATIENT)
Dept: ENDOCRINOLOGY | Facility: CLINIC | Age: 43
End: 2023-07-03
Payer: COMMERCIAL

## 2023-07-03 NOTE — TELEPHONE ENCOUNTER
[facility] Call Center    Phone Message    May a detailed message be left on voicemail: yes     Reason for Call: Other: Per care facility they sent over some paperwork for additional pump supplies. Please call with an update.       States the papers were faxed over on 06/26/2023.     Action Taken: Other: Endo    Travel Screening: Not Applicable

## 2023-07-11 ENCOUNTER — TELEPHONE (OUTPATIENT)
Dept: ENDOCRINOLOGY | Facility: CLINIC | Age: 43
End: 2023-07-11
Payer: COMMERCIAL

## 2023-07-11 NOTE — TELEPHONE ENCOUNTER
CMN refaxed to [facility]. Copy on file.   Princess Patel RN on 7/11/2023 at 12:39 PM

## 2023-07-30 ENCOUNTER — HEALTH MAINTENANCE LETTER (OUTPATIENT)
Age: 43
End: 2023-07-30

## 2023-08-09 NOTE — PROGRESS NOTES
"Video-Visit Details    Type of service:  Video Visit    Video Start Time: 8:33 AM   Video End Time: 8:58 AM    Originating Location (pt. Location): Home    Distant Location (provider location): Offsite (providers home) [facility] [place]     Platform used for Video Visit: [employer]    Return Weight Management Nutrition Consultation    Michael Amin is a 42 year old male presents today for return weight management nutrition consultation.  Patient referred by Tasha Zepeda on June 29, 2023. Pt needs BMI <30-32 for pancreas transplant     PMH  DM Type 1, kidney transplant     Anthropometrics:  Prior to starting AOM was 234 lb  Estimated body mass index is 32.44 kg/m  as calculated from the following:    Height as of this encounter: 1.676 m (5' 6\").    Weight as of this encounter: 91.2 kg (201 lb).  Current weight: 201 lb, -9 lbs since last RD visit.     Medications for Weight Loss:  Ozempic - not as hungry as often, not feeling those symptoms of craving food    NUTRITION HISTORY  Follows a low sodium diet     Food allergies: NKFA   Food intolerances: Limits milk - notices some GI upset with high amounts of dairy, bloated   Vitamin/Mineral Supplements: Magnesium  Previous methods of diet modification for weight loss: cutting portions, watching calories   RD before: [facility] RD when used to be on dialysis, back when he was diagnosed with type 1 DM.    Eating less, not as hungry with Ozempic. 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 Doesn't eat right after he gets up. Since starting Ozempic this has helped with his cravings for snacks. Lives alone. Andorran is his favorite kind of food. Wants to be evaluated for ADHD. Impulsivity and always in a rush. Has been using my fitness.     Diet recall:   Breakfast - some days will have a light breakfast such as Greek yogurt + english muffin with peanut butter. Portion has cut down to 1/2 English Muffin more recently. " Sometimes will have strawberries/blueberries.   Lunch - Usually on the go, 6 inch sub sandwich, 1-2 days a week something from fast food restaurant. During school has school lunch, bean burrito, quesodilla.   Snacks: salty snacks, chips, cucumbers and hummus   Dinner - chicken breast, steaks - makes taco salad or Venison taco meat, will incorporate in other meals. [place] chicken wrap.   Hydration: Water, can of 7 up in afternoon when he feels like his blood sugar is dropping.     August 2023: Thinks he may have a food sensitivity to eggs or pork or maybe gluten. Has thrown up after eating these foods a few times. Trying to focus on protein. Portion sizes have gone down while on Ozempic. Instead of buying chips has been getting containers of cottage cheese, beef jerky, isabela covered almonds. Doing some remodeling in the house which has been main physical activity. Has been trying to work on meal planning especially with thinking ahead at going back to work for the school year. Breakfast has been harder since being on Ozempic. Hydration is something he needs to focus on more, has been setting out 6 16oz bottles of water  that he tries to drink each day.     Physical Activity:  Has a dog that he likes to go on walks with for about 2 miles in distance. 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 In the winter time activity starts to slow down.     Progress to Previous Goals:  1. Incorporate plate method for balanced meals. Aim for at least 20-30 grams at each meal. - met, continues   2. Increase physical activity as able, incorporate more strength and resistance type exercises. - met, continues   3. Avoid snacking as able. If snack is needed use lean protein and/or fruit/vegetable. - met, continues     Nutrition Prescription  Recommended energy/nutrient modification.    Nutrition Diagnosis  Obesity r/t long history of positive energy balance aeb BMI >30 - improving.    Nutrition Intervention  Reviewed current  dietary habits and pts history   Answered pt questions  Coordination of care   Nutrition education   AVS and handouts via [employer]t    Expected Engagement: good  Follow-Up Plans: Meal and snack planning       Nutrition Goals   Continue to incorporate plate method for balanced meal aiming for at least 20-30 grams at each meal.  2. Try to incorporate some exercise a couple of days a week on home treadmill.   3. Meal plan ahead especially for lunch meals when going back to work.   4. Avoid snacking as able. If snack is needed use lean protein and/or fruit/vegetable.   Examples:   - 2 cup popcorn   - 1 cup mixed berries   - 15 almonds, walnuts, cashews   - carrot/celery sticks and 2 tbsp low-fat ranch   - 1 hard boiled egg   - Part-skim mozzarella cheese stick   - Low-fat, low-sugar greek yogurt with 1/2 cup berries   - Med apple or pear   - sliced bell peppers with 1/2 cup salsa   - 1/2 cup roasted chickpeas   - sliced cucumbers with vinegar    Protein options for on the go  Cottage Cheese  Hard Boiled Eggs  PB3 Portable Protein Snacks   Mixed Nuts Pack  Mozzarella stick wrapped in deli meat.   Tuna/salmon packets  Greek yogurt parfait  Jerky  Protein bars/Protein shakes   Apple with peanut butter  No bake energy bites    Free YouTube Workout ideas:   Fitness   Nourish Move Love   Tone it up  [employer] fitness   Livestrong women     Free Fitness Apps:  Fit on  [employer] Training MedServe  J&J 7 Minute Workout     Fitness Apps with monthly fee  Yuliya   Stronger by the day  Kumbuya     Protein Sources   [email or web address]     Follow-Up:  October 18.     Time spent with patient: 25 minutes.  Stephany Tobar RD, LD

## 2023-08-10 ENCOUNTER — VIRTUAL VISIT (OUTPATIENT)
Dept: ENDOCRINOLOGY | Facility: CLINIC | Age: 43
End: 2023-08-10
Payer: COMMERCIAL

## 2023-08-10 VITALS — BODY MASS INDEX: 32.3 KG/M2 | WEIGHT: 201 LBS | HEIGHT: 66 IN

## 2023-08-10 DIAGNOSIS — Z71.3 NUTRITIONAL COUNSELING: Primary | ICD-10-CM

## 2023-08-10 DIAGNOSIS — E66.9 OBESITY: ICD-10-CM

## 2023-08-10 DIAGNOSIS — E10.65 TYPE 1 DIABETES MELLITUS WITH HYPERGLYCEMIA (H): ICD-10-CM

## 2023-08-10 PROCEDURE — 97803 MED NUTRITION INDIV SUBSEQ: CPT | Mod: VID

## 2023-08-10 PROCEDURE — 99207 PR NO CHARGE LOS: CPT | Mod: VID

## 2023-08-10 ASSESSMENT — PAIN SCALES - GENERAL: PAINLEVEL: NO PAIN (0)

## 2023-08-10 NOTE — LETTER
"8/10/2023       RE: Michael Amin  [address]  [place] [address]     Dear Colleague,    Thank you for referring your patient, Michael Amin, to the [facility] [place] at [facility]. Please see a copy of my visit note below.    Video-Visit Details    Type of service:  Video Visit    Video Start Time: 8:33 AM   Video End Time: 8:58 AM    Originating Location (pt. Location): Home    Distant Location (provider location): Offsite (providers home) [facility] [place]     Platform used for Video Visit: [employer]    Return Weight Management Nutrition Consultation    Michael Amin is a 42 year old male presents today for return weight management nutrition consultation.  Patient referred by Tasha Zepeda on June 29, 2023. Pt needs BMI <30-32 for pancreas transplant     PMH  DM Type 1, kidney transplant     Anthropometrics:  Prior to starting AOM was 234 lb  Estimated body mass index is 32.44 kg/m  as calculated from the following:    Height as of this encounter: 1.676 m (5' 6\").    Weight as of this encounter: 91.2 kg (201 lb).  Current weight: 201 lb, -9 lbs since last RD visit.     Medications for Weight Loss:  Ozempic - not as hungry as often, not feeling those symptoms of craving food    NUTRITION HISTORY  Follows a low sodium diet     Food allergies: NKFA   Food intolerances: Limits milk - notices some GI upset with high amounts of dairy, bloated   Vitamin/Mineral Supplements: Magnesium  Previous methods of diet modification for weight loss: cutting portions, watching calories   [facility] when [facility] to be on [facility], back when he was diagnosed with type 1 DM.    Eating less, not as hungry with Ozempic. 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 Doesn't eat right after he gets up. Since starting Ozempic this has helped with his cravings for snacks. " Lives alone. Mosotho is his favorite kind of food. Wants to be evaluated for ADHD. Impulsivity and always in a rush. Has been using my fitness.     Diet recall:   Breakfast - some days will have a light breakfast such as Greek yogurt + english muffin with peanut butter. Portion has cut down to 1/2 English Muffin more recently. Sometimes will have strawberries/blueberries.   Lunch - Usually on the go, 6 inch sub sandwich, 1-2 days a week something from fast food restaurant. During school has school lunch, bean burrito, quesodilla.   Snacks: salty snacks, chips, cucumbers and hummus   Dinner - chicken breast, steaks - makes taco salad or Venison taco meat, will incorporate in other meals. [place] chicken wrap.   Hydration: Water, can of 7 up in afternoon when he feels like his blood sugar is dropping.     August 2023: Thinks he may have a food sensitivity to eggs or pork or maybe gluten. Has thrown up after eating these foods a few times. Trying to focus on protein. Portion sizes have gone down while on Ozempic. Instead of buying chips has been getting containers of cottage cheese, beef jerky, isabela covered almonds. Doing some remodeling in the house which has been main physical activity. Has been trying to work on meal planning especially with thinking ahead at going back to work for the school year. Breakfast has been harder since being on Ozempic. Hydration is something he needs to focus on more, has been setting out 6 16oz bottles of water  that he tries to drink each day.     Physical Activity:  Has a dog that he likes to go on walks with for about 2 miles in distance. 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 In the winter time activity starts to slow down.     Progress to Previous Goals:  1. Incorporate plate method for balanced meals. Aim for at least 20-30 grams at each meal. - met, continues   2. Increase physical activity as able, incorporate more strength and resistance type exercises. - met,  continues   3. Avoid snacking as able. If snack is needed use lean protein and/or fruit/vegetable. - met, continues     Nutrition Prescription  Recommended energy/nutrient modification.    Nutrition Diagnosis  Obesity r/t long history of positive energy balance aeb BMI >30 - improving.    Nutrition Intervention  Reviewed current dietary habits and pts history   Answered pt questions  Coordination of care   Nutrition education   AVS and handouts via [employer]    Expected Engagement: good  Follow-Up Plans: Meal and snack planning       Nutrition Goals   Continue to incorporate plate method for balanced meal aiming for at least 20-30 grams at each meal.  2. Try to incorporate some exercise a couple of days a week on home treadmill.   3. Meal plan ahead especially for lunch meals when going back to work.   4. Avoid snacking as able. If snack is needed use lean protein and/or fruit/vegetable.   Examples:   - 2 cup popcorn   - 1 cup mixed berries   - 15 almonds, walnuts, cashews   - carrot/celery sticks and 2 tbsp low-fat ranch   - 1 hard boiled egg   - Part-skim mozzarella cheese stick   - Low-fat, low-sugar greek yogurt with 1/2 cup berries   - Med apple or pear   - sliced bell peppers with 1/2 cup salsa   - 1/2 cup roasted chickpeas   - sliced cucumbers with vinegar    Protein options for on the go  Cottage Cheese  Hard Boiled Eggs  PB3 Portable Protein Snacks   Mixed Nuts Pack  Mozzarella stick wrapped in deli meat.   Tuna/salmon packets  Greek yogurt parfait  Jerky  Protein bars/Protein shakes   Apple with peanut butter  No bake energy bites    Free YouTube Workout ideas:   Fitness   Nourish Move Love   Tone it up  [employer] fitness   Livestrong women     Free Fitness Apps:  Fit on  [employer] Training FRM Study Course  J&J 7 Minute Workout     Fitness Apps with monthly fee  Yuliya   Stronger by the day  8th Story     Protein Sources   [email or web address]     Follow-Up:  October 18.     Time spent with patient: 25  minutes.  Stephany Tobar RD, LD

## 2023-08-10 NOTE — NURSING NOTE
Is the patient currently in the state of [place]? YES    Visit mode:VIDEO    If the visit is dropped, the patient can be reconnected by: VIDEO VISIT: Text to cell phone: [phone]    Will anyone else be joining the visit? NO      How would you like to obtain your AVS? MyChart    Are changes needed to the allergy or medication list? NO    Reason for visit: PRO Cabrera on 8/10/2023 at 8:21 AM

## 2023-08-11 ENCOUNTER — TELEPHONE (OUTPATIENT)
Dept: TRANSPLANT | Facility: CLINIC | Age: 43
End: 2023-08-11
Payer: COMMERCIAL

## 2023-08-11 DIAGNOSIS — Z48.298 AFTERCARE FOLLOWING ORGAN TRANSPLANT: Primary | ICD-10-CM

## 2023-08-11 NOTE — TELEPHONE ENCOUNTER
Rudi called and requested mailers.  I'm not sure if he needs pre-mailers or post.?   May need updated  lab order.  Please send admin task for mailers if he needs them

## 2023-08-11 NOTE — LETTER
OUTPATIENT LABORATORY TEST ORDER    Patient Name: Rudi Amin  Transplant Date: 10/1/2019   YOB: 1980  Issue Date & Time: 08/15/23 10:34 AM   [facility] MR: [id number] Exp. Date (1 year after date issued)      Diagnoses: Kidney Transplant (ICD-10  Z94.0)   Long term use of medications (ICD-10  Z79.899)     Lab results to be available on the same day drawn.   Patient should release information to the [facility].  Please fax to the Transplant Center at [phone].    Every other month   ?Hemogram and Platelet  ?Basic Metabolic Panel (Sodium, Potassium, Chloride, CO2, Creatinine, Urea Nitrogen,     Glucose,   Calcium)         ?/Tacrolimus/Prograf drug level                         Every 6 Months                 ?Urine for protein/creatinine    Yearly (due now 8/2023): ?PRA/DSA level (mailers provided by the patient)       If you have any questions, please call The Transplant Center at [phone] or [phone].    Please fax labs to [phone]  .

## 2023-08-15 NOTE — TELEPHONE ENCOUNTER
Spoke to pt. Updated lab letter and sent DSA kits to him. Due now for DSA (overdue).     Annual follow up order placed. Pt requesting 11/13-11/17.

## 2023-10-18 ENCOUNTER — VIRTUAL VISIT (OUTPATIENT)
Dept: ENDOCRINOLOGY | Facility: CLINIC | Age: 43
End: 2023-10-18
Payer: COMMERCIAL

## 2023-10-18 VITALS — HEIGHT: 66 IN | WEIGHT: 194 LBS | BODY MASS INDEX: 31.18 KG/M2

## 2023-10-18 DIAGNOSIS — E66.9 OBESITY: ICD-10-CM

## 2023-10-18 DIAGNOSIS — E10.65 TYPE 1 DIABETES MELLITUS WITH HYPERGLYCEMIA (H): ICD-10-CM

## 2023-10-18 DIAGNOSIS — Z71.3 NUTRITIONAL COUNSELING: Primary | ICD-10-CM

## 2023-10-18 PROCEDURE — 99207 PR NO CHARGE LOS: CPT | Mod: VID

## 2023-10-18 PROCEDURE — 97803 MED NUTRITION INDIV SUBSEQ: CPT | Mod: VID

## 2023-10-18 NOTE — PROGRESS NOTES
"Video-Visit Details    Type of service:  Video Visit    Video Start Time: 1:00 PM  Video End Time: 1:10 PM    Originating Location (pt. Location): Home    Distant Location (provider location): Offsite (providers home) [facility] [place]     Platform used for Video Visit: [employer]    Return Weight Management Nutrition Consultation    Michael Amin is a 43 year old male presents today for return weight management nutrition consultation.  Patient referred by Tasha Zepeda on June 29, 2023. Pt needs BMI <30-32 for pancreas transplant     PMH  DM Type 1, kidney transplant     Anthropometrics:  Prior to starting AOM was 234 lb  Estimated body mass index is 31.31 kg/m  as calculated from the following:    Height as of 8/10/23: 1.676 m (5' 6\").    Weight as of this encounter: 88 kg (194 lb).  Current weight: 194 lb per pt report     Medications for Weight Loss:  Ozempic - not as hungry as often, not feeling those symptoms of craving food    NUTRITION HISTORY  Follows a low sodium diet     Food allergies: NKFA   Food intolerances: Limits milk - notices some GI upset with high amounts of dairy, bloated   Vitamin/Mineral Supplements: Magnesium  Previous methods of diet modification for weight loss: cutting portions, watching calories   RD before: [facility] RD when used to be on dialysis, back when he was diagnosed with type 1 DM.    Eating less, not as hungry with Ozempic. 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 Doesn't eat right after he gets up. Since starting Ozempic this has helped with his cravings for snacks. Lives alone. Kyrgyz is his favorite kind of food. Wants to be evaluated for ADHD. Impulsivity and always in a rush. Has been using my fitness.     Diet recall:   Breakfast - some days will have a light breakfast such as Greek yogurt + english muffin with peanut butter. Portion has cut down to 1/2 English Muffin more recently. Sometimes will have " strawberries/blueberries.   Lunch - Usually on the go, 6 inch sub sandwich, 1-2 days a week something from fast food restaurant. During school has school lunch, bean burrito, quesodilla.   Snacks: salty snacks, chips, cucumbers and hummus   Dinner - chicken breast, steaks - makes taco salad or Venison taco meat, will incorporate in other meals. [place] chicken wrap.   Hydration: Water, can of 7 up in afternoon when he feels like his blood sugar is dropping.     August 2023: Thinks he may have a food sensitivity to eggs or pork or maybe gluten. Has thrown up after eating these foods a few times. Trying to focus on protein. Portion sizes have gone down while on Ozempic. Instead of buying chips has been getting containers of cottage cheese, beef jerky, isabela covered almonds. Doing some remodeling in the house which has been main physical activity. Has been trying to work on meal planning especially with thinking ahead at going back to work for the school year. Breakfast has been harder since being on Ozempic. Hydration is something he needs to focus on more, has been setting out 6 16oz bottles of water  that he tries to drink each day.     October 2023: Eating has been going well. Now that school has started   First will eat at 10:15 Greek yogurt with berries/granola, eats lunch around 1 pm has been eating leftovers more of recent (chili, chicken breast). Apple and peanut butter on occasion for go to snack if needed. Feels like he has a sensitivity to gluten. Doesn't eat much pasta. Has been active by working on his house after work and on the weekends. Hitting 25963 steps each day. Exercise about 45 minutes.     Physical Activity:  Has a dog that he likes to go on walks with for about 2 miles in distance. 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 In the winter time activity starts to slow down.     Progress to Previous Goals:  Continue to incorporate plate method for balanced meal aiming for at least 20-30 grams  at each meal. - met, continues  2. Try to incorporate some exercise a couple of days a week on home treadmill - continues   3. Meal plan ahead especially for lunch meals when going back to work. - met, continues   4. Avoid snacking as able. If snack is needed use lean protein and/or fruit/vegetable. - met, continues     Nutrition Prescription  Recommended energy/nutrient modification.    Nutrition Diagnosis  Obesity r/t long history of positive energy balance aeb BMI >30 - improving.    Nutrition Intervention  Reviewed current dietary habits and pts history   Answered pt questions  Coordination of care   Nutrition education   AVS and handouts via [employer]    Expected Engagement: good  Follow-Up Plans: TBD    Nutrition Goals   1) Continue to incorporate plate method for balanced meal aiming for at least 20-30 grams at each meal.  2) Try to incorporate some exercise a couple of days a week on home treadmill.   3) Meal plan ahead especially for lunch meals when going back to work.   4) Avoid snacking as able. If snack is needed use lean protein and/or fruit/vegetable.   Examples:   - 2 cup popcorn   - 1 cup mixed berries   - 15 almonds, walnuts, cashews   - carrot/celery sticks and 2 tbsp low-fat ranch   - 1 hard boiled egg   - Part-skim mozzarella cheese stick   - Low-fat, low-sugar greek yogurt with 1/2 cup berries   - Med apple or pear   - sliced bell peppers with 1/2 cup salsa   - 1/2 cup roasted chickpeas   - sliced cucumbers with vinegar    Protein options for on the go  Cottage Cheese  Hard Boiled Eggs  PB3 Portable Protein Snacks   Mixed Nuts Pack  Mozzarella stick wrapped in deli meat.   Tuna/salmon packets  Greek yogurt parfait  Jerky  Protein bars/Protein shakes   Apple with peanut butter  No bake energy bites    Protein Sources   [email or web address]     Follow-Up: as needed.     Time spent with patient: 10 minutes.  Stephany Tobar RD, LD

## 2023-10-18 NOTE — PATIENT INSTRUCTIONS
Carson Grijalva,     Follow-up with RD as needed.    Thank you,    Stephany Tobar, RD, LD  If you would like to schedule or reschedule an appointment with the RD, please call [phone]    Nutrition Goals  1) Continue to incorporate plate method for balanced meal aiming for at least 20-30 grams at each meal.  2) Try to incorporate some exercise a couple of days a week on home treadmill.   3) Meal plan ahead especially for lunch meals when going back to work.   4) Avoid snacking as able. If snack is needed use lean protein and/or fruit/vegetable.   Examples:   - 2 cup popcorn   - 1 cup mixed berries   - 15 almonds, walnuts, cashews   - carrot/celery sticks and 2 tbsp low-fat ranch   - 1 hard boiled egg   - Part-skim mozzarella cheese stick   - Low-fat, low-sugar greek yogurt with 1/2 cup berries   - Med apple or pear   - sliced bell peppers with 1/2 cup salsa   - 1/2 cup roasted chickpeas   - sliced cucumbers with vinegar    Protein options for on the go  Cottage Cheese  Hard Boiled Eggs  PB3 Portable Protein Snacks   Mixed Nuts Pack  Mozzarella stick wrapped in deli meat.   Tuna/salmon packets  Greek yogurt parfait  Jerky  Protein bars/Protein shakes   Apple with peanut butter  No bake energy bites    Protein Sources   [email or web address]       COMPREHENSIVE WEIGHT MANAGEMENT PROGRAM  VIRTUAL SUPPORT GROUPS    For Support Group Information:      We offer support groups for patients who are working on weight loss and considering, preparing for, or have had weight loss surgery.     There is no cost for this opportunity.  You are invited to attend the?Virtual Support Groups?provided by any of the following locations:    [facility] via [employer] Software Teams with Yudith Alfred RN  2.   [place] via [employer] with Kade Mckenna, PhD, LP  3.   [place] via Synedgen with Mera Ricks RN  4.   [facility] via Prol[employer] Software Teams with Mera Adamson, [facility]    The following Support Group information can  "also be found on our website:  [email or web address]      [facility] Weight Loss Surgery Support Group    [facility] Weight Loss Surgery Support Group  The support group is a patient-lead forum that meets monthly to share experiences, encouragement and education. It is open to those who have had weight loss surgery, are scheduled for surgery, or are considering surgery.   WHEN: This group meets on the 3rd Wednesday of each month from 5:00PM - 6:00PM virtually using Microsoft Teams.   FACILITATOR: Led by Yudith Elder RD, LD, RN, the program's Clinical Coordinator.   TO REGISTER: Please contact the clinic via Wize or call the nurse line directly at [phone] to inform our staff that you would like an invite sent to you and to let us know the email you would like the invite sent to. Prior to the meeting, a link with directions on how to join the meeting will be sent to you.    2023 Meetings   September 20  October 18  November 15  December 20    [facility] Support Groups    Connections Bariatric Care Support Group?  This is open to all pre- and post- operative bariatric surgery patients as well as their support system.   WHEN: Starting June 2023, this group meets the 3rd Tuesday of each month from 6:30 PM - 8:00 PM virtually using Microsoft Teams.   FACILITATOR: Led by Kade Mckenna, Ph.D who is a Licensed Psychologist with the [facility] Comprehensive Weight Management Program.   TO REGISTER: Please send an email to Kade Mckenna, Ph.D., LP [email or web address]?if you would like an invitation to the group and to learn about using Microsoft Teams.    2023 Meetings  September 19 Gregory Beaulieu MD, FACS, [facility],   [facility], \"Body Contouring and the Bariatric Surgery " "Patient\".  October 17: Mera Ricks RN, [facility],  Hospital Stay and Compliance   November 21: Gregoria Mandujano RD, LD, [facility],  Holiday Eating   December 19    Connections Post-Operative Bariatric Surgery Support Group  This is a support group for [facility] bariatric patients (and those external to [facility]) who have had bariatric surgery and are at least 3 months post-surgery.  WHEN: This support group meets the 4th Wednesday of the month from 11:00 AM - 12:00 PM virtually using Microsoft Teams.   FACILITATOR: Led by Certified Bariatric Nurse, Mera Ricks RN.   TO REGISTER: Please send an email to Mera at [email or web address] if you would like an invitation to the group and to learn about using Microsoft Teams.    2023 Meetings  September 27 October 25 November 22 December 27    [facility]   Healthy Lifestyle Virtual Support Group      Healthy Lifestyle Group  This is a 60 minute virtual coaching group for those who want to lead a healthier lifestyle. Come together to set goals and overcome barriers in a supportive group environment. We will address the four pillars of health--nutrition, exercise, sleep and emotional well-being.  This group is highly recommended for those who are participating in the 24 week Healthy Lifestyle Plan and our Health Coaching sessions.  WHEN: This group meets the first Friday of the month, 12:30 PM - 1:30 PM online, via a zoom meeting.    FACILITATOR: Led by National Board Certified Health and , Mera Adamson, [facility].  TO REGISTER: Please call the Call Center at [phone] to register. You will get an appointment to attend in [employer]. Fifteen minutes prior to the meeting, complete the e-check in and you will get the link to join the meeting.  There is no charge to attend this group and space is limited.    2023 and 2024 Meeting Topics and Dates:    November 3: Introduction to " "Mindfulness (Learn simple and effective mindfulness practices and how it can benefit you)  December 8: Let's Talk (guided discussion on our wins and challenges)  January 5: New Years Vision: Manifest your Best 2024! (Guided imagery,  journaling and discussion)  February 2: Let's Talk  March 1: 10 Percent Happier by Fransisco Villanueva (Book Bites; a guided discussion on the nuggets of wisdom from favorite wellness books; no need to read the book but highly encouraged)  April 5: Let's Talk  May 3: \"Essentialism; The Disciplined Pursuit of Less by Travis Del Valle (book bites discussion)  June 7: Let's Talk  July 5: NO MEETING, off for the 4th of July Holiday  August 2: The Blue Zones, Secrets for Living a Longer Life by Fransisco Serrano (book bites discussion)          "

## 2023-10-18 NOTE — NURSING NOTE
Is the patient currently in the state of [place]? YES    Visit mode:VIDEO    If the visit is dropped, the patient can be reconnected by: VIDEO VISIT: Text to cell phone:   Telephone Information:   Mobile [phone]    and VIDEO VISIT: Send to e-mail at: [email or web address]    Will anyone else be joining the visit? NO  (If patient encounters technical issues they should call [phone]    How would you like to obtain your AVS? MyChart    Are changes needed to the allergy or medication list? No    Reason for visit: CORA VEGA

## 2023-10-18 NOTE — LETTER
"10/18/2023       RE: Michael Amin  [address]  [place] [address]     Dear Colleague,    Thank you for referring your patient, Michael Amin, to the [facility] [place] at [facility]. Please see a copy of my visit note below.    Video-Visit Details    Type of service:  Video Visit    Video Start Time: 1:00 PM  Video End Time: 1:10 PM    Originating Location (pt. Location): Home    Distant Location (provider location): Offsite (providers home) [facility] [place]     Platform used for Video Visit: [employer]    Return Weight Management Nutrition Consultation    Michael Amin is a 43 year old male presents today for return weight management nutrition consultation.  Patient referred by Tasha Zepeda on June 29, 2023. Pt needs BMI <30-32 for pancreas transplant     PMH  DM Type 1, kidney transplant     Anthropometrics:  Prior to starting AOM was 234 lb  Estimated body mass index is 31.31 kg/m  as calculated from the following:    Height as of 8/10/23: 1.676 m (5' 6\").    Weight as of this encounter: 88 kg (194 lb).  Current weight: 194 lb per pt report     Medications for Weight Loss:  Ozempic - not as hungry as often, not feeling those symptoms of craving food    NUTRITION HISTORY  Follows a low sodium diet     Food allergies: NKFA   Food intolerances: Limits milk - notices some GI upset with high amounts of dairy, bloated   Vitamin/Mineral Supplements: Magnesium  Previous methods of diet modification for weight loss: cutting portions, watching calories   RD before: [facility] RD when used to be on dialysis, back when he was diagnosed with type 1 DM.    Eating less, not as hungry with Ozempic. 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 Doesn't eat right after he gets up. Since starting Ozempic this has helped with his cravings for snacks. Lives alone. Mexican is " his favorite kind of food. Wants to be evaluated for ADHD. Impulsivity and always in a rush. Has been using my fitness.     Diet recall:   Breakfast - some days will have a light breakfast such as Greek yogurt + english muffin with peanut butter. Portion has cut down to 1/2 English Muffin more recently. Sometimes will have strawberries/blueberries.   Lunch - Usually on the go, 6 inch sub sandwich, 1-2 days a week something from fast food restaurant. During school has school lunch, bean burrito, quesodilla.   Snacks: salty snacks, chips, cucumbers and hummus   Dinner - chicken breast, steaks - makes taco salad or Venison taco meat, will incorporate in other meals. [place] chicken wrap.   Hydration: Water, can of 7 up in afternoon when he feels like his blood sugar is dropping.     August 2023: Thinks he may have a food sensitivity to eggs or pork or maybe gluten. Has thrown up after eating these foods a few times. Trying to focus on protein. Portion sizes have gone down while on Ozempic. Instead of buying chips has been getting containers of cottage cheese, beef jerky, isabela covered almonds. Doing some remodeling in the house which has been main physical activity. Has been trying to work on meal planning especially with thinking ahead at going back to work for the school year. Breakfast has been harder since being on Ozempic. Hydration is something he needs to focus on more, has been setting out 6 16oz bottles of water  that he tries to drink each day.     October 2023: Eating has been going well. Now that school has started   First will eat at 10:15 Greek yogurt with berries/granola, eats lunch around 1 pm has been eating leftovers more of recent (chili, chicken breast). Apple and peanut butter on occasion for go to snack if needed. Feels like he has a sensitivity to gluten. Doesn't eat much pasta. Has been active by working on his house after work and on the weekends. Hitting 04774 steps each day. Exercise about 45  minutes.     Physical Activity:  Has a dog that he likes to go on walks with for about 2 miles in distance. 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 In the winter time activity starts to slow down.     Progress to Previous Goals:  Continue to incorporate plate method for balanced meal aiming for at least 20-30 grams at each meal. - met, continues  2. Try to incorporate some exercise a couple of days a week on home treadmill - continues   3. Meal plan ahead especially for lunch meals when going back to work. - met, continues   4. Avoid snacking as able. If snack is needed use lean protein and/or fruit/vegetable. - met, continues     Nutrition Prescription  Recommended energy/nutrient modification.    Nutrition Diagnosis  Obesity r/t long history of positive energy balance aeb BMI >30 - improving.    Nutrition Intervention  Reviewed current dietary habits and pts history   Answered pt questions  Coordination of care   Nutrition education   AVS and handouts via app2you    Expected Engagement: good  Follow-Up Plans: TBD    Nutrition Goals   1) Continue to incorporate plate method for balanced meal aiming for at least 20-30 grams at each meal.  2) Try to incorporate some exercise a couple of days a week on home treadmill.   3) Meal plan ahead especially for lunch meals when going back to work.   4) Avoid snacking as able. If snack is needed use lean protein and/or fruit/vegetable.   Examples:   - 2 cup popcorn   - 1 cup mixed berries   - 15 almonds, walnuts, cashews   - carrot/celery sticks and 2 tbsp low-fat ranch   - 1 hard boiled egg   - Part-skim mozzarella cheese stick   - Low-fat, low-sugar greek yogurt with 1/2 cup berries   - Med apple or pear   - sliced bell peppers with 1/2 cup salsa   - 1/2 cup roasted chickpeas   - sliced cucumbers with vinegar    Protein options for on the go  Cottage Cheese  Hard Boiled Eggs  PB3 Portable Protein Snacks   Mixed Nuts Pack  Mozzarella stick wrapped in deli meat.    Tuna/salmon packets  Greek yogurt parfait  Jerky  Protein bars/Protein shakes   Apple with peanut butter  No bake energy bites    Protein Sources   [email or web address]     Follow-Up: as needed.     Time spent with patient: 10 minutes.  Stephany Tobar RD, LD      Again, thank you for allowing me to participate in the care of your patient.      Sincerely,    Stephany Tobar RD

## 2023-11-08 DIAGNOSIS — Z99.2 TYPE 1 DIABETES MELLITUS WITH CHRONIC KIDNEY DISEASE ON CHRONIC DIALYSIS (H): ICD-10-CM

## 2023-11-08 DIAGNOSIS — N18.6 TYPE 1 DIABETES MELLITUS WITH CHRONIC KIDNEY DISEASE ON CHRONIC DIALYSIS (H): ICD-10-CM

## 2023-11-08 DIAGNOSIS — E10.22 TYPE 1 DIABETES MELLITUS WITH CHRONIC KIDNEY DISEASE ON CHRONIC DIALYSIS (H): ICD-10-CM

## 2023-11-09 ENCOUNTER — VIRTUAL VISIT (OUTPATIENT)
Dept: ENDOCRINOLOGY | Facility: CLINIC | Age: 43
End: 2023-11-09
Payer: COMMERCIAL

## 2023-11-09 VITALS — HEIGHT: 66 IN | BODY MASS INDEX: 31.57 KG/M2 | WEIGHT: 196.4 LBS

## 2023-11-09 DIAGNOSIS — E10.29 TYPE 1 DIABETES MELLITUS WITH OTHER KIDNEY COMPLICATION (H): ICD-10-CM

## 2023-11-09 DIAGNOSIS — I15.1 HTN, KIDNEY TRANSPLANT RELATED: ICD-10-CM

## 2023-11-09 DIAGNOSIS — E66.811 CLASS 1 OBESITY WITH SERIOUS COMORBIDITY AND BODY MASS INDEX (BMI) OF 33.0 TO 33.9 IN ADULT, UNSPECIFIED OBESITY TYPE: Primary | ICD-10-CM

## 2023-11-09 DIAGNOSIS — Z94.0 HTN, KIDNEY TRANSPLANT RELATED: ICD-10-CM

## 2023-11-09 PROCEDURE — 99214 OFFICE O/P EST MOD 30 MIN: CPT | Mod: VID | Performed by: NURSE PRACTITIONER

## 2023-11-09 NOTE — ASSESSMENT & PLAN NOTE
Finding ozempic really helpful with cravings and food noise. Able to drive home without stopping for fast food for the first time in years. No adverse side effects at 1mg. Continues to adjust bolus insulin as needed. Having some hypoglycemia in the late afternoon, plans to follow up with endocrinology to adjust pump settings.     Increased hunger and blood sugars the day before injections. Discussed option of higher doses but would not recommend at this time given recent lows. Could consider in the future, would like to partial dose to start (1.5mg). for now, discussed strategies to manage blood sugars and hunger on those days.     Continue ozempic 1mg   Follow up with endocrine to adjust pump- goal to avoid hypoglycemia  Under goal weight for transplant- great work!   Consider preplanned meals - high protein on those hungry days  Could consider dose increase in the future if hypoglycemia managed and hunger increases   Follow up with Tasha Zepeda PA-C or myself in 3 months

## 2023-11-09 NOTE — PROGRESS NOTES
Return Medical Weight Management Note     Michael Amin  MRN:  [id number]  :  1980  SULTANA:  2023    Dear Anurag Byrd,    I had the pleasure of seeing your patient Michael Amin. He is a 43 year old male who I am continuing to see for treatment of obesity related to:      Assessment & Plan   Problem List Items Addressed This Visit        Digestive    Class 1 obesity with serious comorbidity and body mass index (BMI) of 33.0 to 33.9 in adult, unspecified obesity type - Primary     Finding ozempic really helpful with cravings and food noise. Able to drive home without stopping for fast food for the first time in years. No adverse side effects at 1mg. Continues to adjust bolus insulin as needed. Having some hypoglycemia in the late afternoon, plans to follow up with endocrinology to adjust pump settings.     Increased hunger and blood sugars the day before injections. Discussed option of higher doses but would not recommend at this time given recent lows. Could consider in the future, would like to partial dose to start (1.5mg). for now, discussed strategies to manage blood sugars and hunger on those days.     Continue ozempic 1mg   Follow up with endocrine to adjust pump- goal to avoid hypoglycemia  Under goal weight for transplant- great work!   Consider preplanned meals - high protein on those hungry days  Could consider dose increase in the future if hypoglycemia managed and hunger increases   Follow up with Tasha Zepeda PA-C or myself in 3 months          Relevant Medications    Semaglutide, 1 MG/DOSE, (OZEMPIC) 4 MG/3ML pen       Endocrine    Diabetes mellitus type 1 (H)    Relevant Medications    Semaglutide, 1 MG/DOSE, (OZEMPIC) 4 MG/3ML pen       Circulatory    HTN, kidney transplant related          INTERVAL HISTORY:    New Transplant consult for weight management with Tasha Zepeda PA-C 3/2023. DMI with hx of kidney transplant in 2019; needing pancreas transplant which will need BMI  "<30-32. Started on GLP1. Last seen 6/2023 - was tolerating 0.5mg ozempic weekly well. Plan was to increase to 1mg. New to me.     endorcrinologist Kymberly UGARTE  DM educator Verenice Felton RD    Anti-obesity medication history    Current:   ozempic 1mg - helpful with not having to think about food \"all the time\"   -less hunger   -easier to avoid fast food   -occasional low blood sugars in the afternoons - even a leisure walk will drop his sugar at 5pm   -insulin requirements on average have reduced     From day 6 to 7 seems to \"wear off\" - blood sugars climb, more hunger, more cravings     Recent diet changes:   Less cravings and hunger   Had been trying to do gluten free for last couple months (brother has issues with gluten)  -seems to be helpful  Trying to eat naturally gluten free foods - lots of apples with peanut butter, yogurt with granola   Working on increasing protein   Working on avoiding eating when not hungry- difficult to do if getting low blood sugars     Recent exercise/activity changes:   Some walking after work   Some house/ yard projects   Usually goes back to gym in the winter     Recent stressors: improving   -3 years into current job- getting more of a rhythm     Recent sleep changes: improving   -not wanting to sleep in as much recently     Vitamins/Labs:   A1C 1/2023 8.2     CURRENT WEIGHT:   196 lbs 6.4 oz    Initial Weight (lbs): 234 lbs  Last Visits Weight: 88 kg (194 lb)  Cumulative weight loss (lbs): 37.6  Weight Loss Percentage: 16.07%     Wt Readings from Last 5 Encounters:   11/09/23 89.1 kg (196 lb 6.4 oz)   10/18/23 88 kg (194 lb)   08/10/23 91.2 kg (201 lb)   06/29/23 95.5 kg (210 lb 9.6 oz)   06/28/23 95.3 kg (210 lb 3.2 oz)            11/9/2023    12:50 PM   Changes and Difficulties   I have made the following changes to my diet since my last visit: Attempting gluten free         MEDICATIONS:   Current Outpatient Medications   Medication Sig Dispense Refill     " "Semaglutide, 1 MG/DOSE, (OZEMPIC) 4 MG/3ML pen Inject 1 mg Subcutaneous every 7 days 3 mL 3     alcohol swab prep pads Use to swab area of injection/zak as directed. 100 each 3     aspirin (ASA) 81 MG EC tablet Take 81 mg by mouth daily        atorvastatin (LIPITOR) 10 MG tablet TAKE 1 TABLET(10 MG) BY MOUTH DAILY 90 tablet 3     blood glucose (CONTOUR NEXT TEST) test strip Use to test blood sugar 4 times daily. 400 strip 11     blood glucose monitoring (ACCU-CHEK FASTCLIX) lancets   1     carvedilol (COREG) 12.5 MG tablet Take 1 tablet (12.5 mg) by mouth 2 times daily (with meals) 180 tablet 3     Continuous Blood Gluc Sensor (DEXCOM G6 SENSOR) MISC Change every 10 days. Please dispense 90 day supply- dispense 9 sensors. 9 each 4     Continuous Blood Gluc Transmit (DEXCOM G6 TRANSMITTER) MISC Change every 3 months 1 each 3     Glucagon (BAQSIMI ONE PACK) 3 MG/DOSE POWD Spray 3 mg in nostril See Admin Instructions USE ONLY FOR SEVERE HYPOGLYCEMIA. 1 each 3     insulin cartridge (T:SLIM 3ML) misc pump supply Insulin cartridge to be used with pump as directed.  Change every 2 days or as directed. 45 each 3     Insulin Infusion Pump Supplies (AUTOSOFT XC INFUSION SET) MISC 1 each every 48 hours Change every 2 days  9 mm cannula, 23 inch tubing 45 each 3     insulin lispro (HUMALOG VIAL) 100 UNIT/ML vial Via insulin pump. Approx 130 units daily. Follow up visit needed. Call  to schedule. 130 mL 1     insulin lispro (HUMALOG VIAL) 100 UNIT/ML vial With Insulin pump. Up to 130 units per day. 30 mL 11     insulin pen needle (ULTICARE MICRO) 32G X 4 MM miscellaneous Use pen needles daily or as directed. 100 each 3     Insulin Syringe-Needle U-100 31G X 1/4\" 1 ML MISC 1 Syringe as needed (until new insuline pump arrives) 50 each 1     magnesium oxide (MAG-OX) 400 MG tablet Take 1 tablet (400 mg) by mouth daily (with lunch) 30 tablet 5     mycophenolate (GENERIC EQUIVALENT) 250 MG capsule Take 3 capsules (750 " mg) by mouth 2 times daily 180 capsule 11     senna-docusate (SENOKOT-S/PERICOLACE) 8.6-50 MG tablet Take 2 tablets by mouth 2 times daily 20 tablet 0     sulfamethoxazole-trimethoprim (BACTRIM) 400-80 MG tablet Take 1 tablet by mouth daily 30 tablet 11     tacrolimus (GENERIC EQUIVALENT) 0.5 MG capsule Take 1 capsule (0.5 mg) by mouth every morning Total dose = 1.5 mg in the AM and 1 mg in the PM 90 capsule 3     tacrolimus (GENERIC EQUIVALENT) 1 MG capsule Total dose = 1.5 mg in the AM and 1 mg in the  capsule 3           11/9/2023    12:50 PM   Weight Loss Medication History Reviewed With Patient   Which weight loss medications are you currently taking on a regular basis? Ozempic   Are you having any side effects from the weight loss medication that we have prescribed you? No       External Order Results on 01/12/2023   Component Date Value Ref Range Status     WBC Count (External) 01/12/2023 8.0  3.2 - 11.0 10*9/L Final     RBC Count (External) 01/12/2023 4.85  4.14 - 5.76 10*12/L Final     Hemoglobin (External) 01/12/2023 12.5 (L)  12.9 - 16.9 g/dL Final     Hematocrit (External) 01/12/2023 38.4  38.4 - 49.7 % Final     MCV (External) 01/12/2023 79.2 (L)  81.4 - 99.0 fL Final     MCH (External) 01/12/2023 25.8 (L)  26.7 - 33.1 pg Final     MCHC (External) 01/12/2023 32.6  31.6 - 35.5 g/dL Final     RDW (External) 01/12/2023 13.2  11.3 - 14.6 % Final     Platelet Count (External) 01/12/2023 170  130 - 375 10*9/L Final     Sodium (External) 01/12/2023 137  134 - 143 mEq/L Final     Potassium (External) 01/12/2023 4.4  3.4 - 5.1 mEq/L Final     Chloride (External) 01/12/2023 102  99 - 110 mEq/L Final     CO2 (External) 01/12/2023 24  19 - 29 mEq/L Final     Anion Gap (External) 01/12/2023 11.0  3.0 - 15.0 mEq/L Final     Urea Nitrogen (External) 01/12/2023 17  5 - 24 mg/dL Final     Creatinine (External) 01/12/2023 1.01  0.70 - 1.20 mg/dL Final     GFR Estimated (External) 01/12/2023 95  >60 ml/min/1.73m2  "Final     Calcium (External) 01/12/2023 9.2  8.4 - 10.5 mg/dL Final     Glucose (External) 01/12/2023 156 (H)  70 - 99 mg/dL Final     Tacrolimus(FK-506) (External) 01/12/2023 5.7  5.0 - 15.0 ng/mL Final     Hemoglobin A1C (External) 01/12/2023 8.2 (H)  4.0 - 5.6 % Final           11/7/2022     4:09 PM   ALIRIO Score (Last Two)   ALIRIO Raw Score 40   Activation Score 100   ALIRIO Level 4         PHYSICAL EXAM:  Objective    Ht 1.676 m (5' 5.98\")   Wt 89.1 kg (196 lb 6.4 oz)   BMI 31.72 kg/m             Vitals:  No vitals were obtained today due to virtual visit.    GENERAL: Healthy, alert and no distress  EYES: Eyes grossly normal to inspection.  No discharge or erythema, or obvious scleral/conjunctival abnormalities.  RESP: No audible wheeze, cough, or visible cyanosis.  No visible retractions or increased work of breathing.    SKIN: Visible skin clear. No significant rash, abnormal pigmentation or lesions.  NEURO: Cranial nerves grossly intact.  Mentation and speech appropriate for age.  PSYCH: Mentation appears normal, affect normal/bright, judgement and insight intact, normal speech and appearance well-groomed.        Sincerely,    Ene Garner NP      32 minutes spent by me on the date of the encounter doing chart review, history and exam, documentation and further activities per the note  Virtual Visit Details    Type of service:  Video Visit     Originating Location (pt. Location): Home    Distant Location (provider location):  Off-site  Platform used for Video Visit: [employer]"

## 2023-11-09 NOTE — TELEPHONE ENCOUNTER
[facility] Call Center    Phone Message    May a detailed message be left on voicemail: yes     Reason for Call: Other: Patient states he went to change his sensor last night even though just a few days ago it told him he had like 21 days left of battery, then when he changed sensor and transmitter it said there's not enough battery and it shut everything off. I told him we just received the request yesterday, but he states the dex com is off now. Please call and advise.

## 2023-11-09 NOTE — NURSING NOTE
Is the patient currently in the state of [place]? YES    Visit mode:VIDEO    If the visit is dropped, the patient can be reconnected by: VIDEO VISIT: Text to cell phone:   Telephone Information:   Mobile [phone]    and VIDEO VISIT: Send to e-mail at: [email or web address]    Will anyone else be joining the visit? NO  (If patient encounters technical issues they should call [phone]    How would you like to obtain your AVS? MyChart    Are changes needed to the allergy or medication list? No    Reason for visit: CORA VEGA

## 2023-11-09 NOTE — LETTER
2023       RE: Michael Amin  [address]  [facility] [address]     Dear Colleague,    Thank you for referring your patient, Michael Amin, to the [facility] [place] at [facility]. Please see a copy of my visit note below.        Return Medical Weight Management Note     Michael Amin  MRN:  [id number]  :  1980  SULTANA:  2023    Dear Anurag Byrd,    I had the pleasure of seeing your patient Michael Amin. He is a 43 year old male who I am continuing to see for treatment of obesity related to:      Assessment & Plan   Problem List Items Addressed This Visit        Digestive    Class 1 obesity with serious comorbidity and body mass index (BMI) of 33.0 to 33.9 in adult, unspecified obesity type - Primary     Finding ozempic really helpful with cravings and food noise. Able to drive home without stopping for fast food for the first time in years. No adverse side effects at 1mg. Continues to adjust bolus insulin as needed. Having some hypoglycemia in the late afternoon, plans to follow up with endocrinology to adjust pump settings.     Increased hunger and blood sugars the day before injections. Discussed option of higher doses but would not recommend at this time given recent lows. Could consider in the future, would like to partial dose to start (1.5mg). for now, discussed strategies to manage blood sugars and hunger on those days.     Continue ozempic 1mg   Follow up with endocrine to adjust pump- goal to avoid hypoglycemia  Under goal weight for transplant- great work!   Consider preplanned meals - high protein on those hungry days  Could consider dose increase in the future if hypoglycemia managed and hunger increases   Follow up with Tasha Zepeda PA-C or myself in 3 months          Relevant Medications    Semaglutide, 1 MG/DOSE, (OZEMPIC) 4 MG/3ML pen       Endocrine    Diabetes mellitus  "type 1 (H)    Relevant Medications    Semaglutide, 1 MG/DOSE, (OZEMPIC) 4 MG/3ML pen       Circulatory    HTN, kidney transplant related          INTERVAL HISTORY:    New Transplant consult for weight management with Tasha Zepeda PA-C 3/2023. DMI with hx of kidney transplant in 2019; needing pancreas transplant which will need BMI <30-32. Started on GLP1. Last seen 6/2023 - was tolerating 0.5mg ozempic weekly well. Plan was to increase to 1mg. New to me.     endorcrinologist Kymberly UGARTE  DM educator Verenice Felton RD    Anti-obesity medication history    Current:   ozempic 1mg - helpful with not having to think about food \"all the time\"   -less hunger   -easier to avoid fast food   -occasional low blood sugars in the afternoons - even a leisure walk will drop his sugar at 5pm   -insulin requirements on average have reduced     From day 6 to 7 seems to \"wear off\" - blood sugars climb, more hunger, more cravings     Recent diet changes:   Less cravings and hunger   Had been trying to do gluten free for last couple months (brother has issues with gluten)  -seems to be helpful  Trying to eat naturally gluten free foods - lots of apples with peanut butter, yogurt with granola   Working on increasing protein   Working on avoiding eating when not hungry- difficult to do if getting low blood sugars     Recent exercise/activity changes:   Some walking after work   Some house/ yard projects   Usually goes back to gym in the winter     Recent stressors: improving   -3 years into current job- getting more of a rhythm     Recent sleep changes: improving   -not wanting to sleep in as much recently     Vitamins/Labs:   A1C 1/2023 8.2     CURRENT WEIGHT:   196 lbs 6.4 oz    Initial Weight (lbs): 234 lbs  Last Visits Weight: 88 kg (194 lb)  Cumulative weight loss (lbs): 37.6  Weight Loss Percentage: 16.07%     Wt Readings from Last 5 Encounters:   11/09/23 89.1 kg (196 lb 6.4 oz)   10/18/23 88 kg (194 lb)   08/10/23 " 91.2 kg (201 lb)   06/29/23 95.5 kg (210 lb 9.6 oz)   06/28/23 95.3 kg (210 lb 3.2 oz)            11/9/2023    12:50 PM   Changes and Difficulties   I have made the following changes to my diet since my last visit: Attempting gluten free         MEDICATIONS:   Current Outpatient Medications   Medication Sig Dispense Refill     Semaglutide, 1 MG/DOSE, (OZEMPIC) 4 MG/3ML pen Inject 1 mg Subcutaneous every 7 days 3 mL 3     alcohol swab prep pads Use to swab area of injection/zak as directed. 100 each 3     aspirin (ASA) 81 MG EC tablet Take 81 mg by mouth daily        atorvastatin (LIPITOR) 10 MG tablet TAKE 1 TABLET(10 MG) BY MOUTH DAILY 90 tablet 3     blood glucose (CONTOUR NEXT TEST) test strip Use to test blood sugar 4 times daily. 400 strip 11     blood glucose monitoring (ACCU-CHEK FASTCLIX) lancets   1     carvedilol (COREG) 12.5 MG tablet Take 1 tablet (12.5 mg) by mouth 2 times daily (with meals) 180 tablet 3     Continuous Blood Gluc Sensor (DEXCOM G6 SENSOR) MISC Change every 10 days. Please dispense 90 day supply- dispense 9 sensors. 9 each 4     Continuous Blood Gluc Transmit (DEXCOM G6 TRANSMITTER) MISC Change every 3 months 1 each 3     Glucagon (BAQSIMI ONE PACK) 3 MG/DOSE POWD Spray 3 mg in nostril See Admin Instructions USE ONLY FOR SEVERE HYPOGLYCEMIA. 1 each 3     insulin cartridge (T:SLIM 3ML) misc pump supply Insulin cartridge to be used with pump as directed.  Change every 2 days or as directed. 45 each 3     Insulin Infusion Pump Supplies (AUTOSOFT XC INFUSION SET) MISC 1 each every 48 hours Change every 2 days  9 mm cannula, 23 inch tubing 45 each 3     insulin lispro (HUMALOG VIAL) 100 UNIT/ML vial Via insulin pump. Approx 130 units daily. Follow up visit needed. Call  to schedule. 130 mL 1     insulin lispro (HUMALOG VIAL) 100 UNIT/ML vial With Insulin pump. Up to 130 units per day. 30 mL 11     insulin pen needle (ULTICARE MICRO) 32G X 4 MM miscellaneous Use pen needles daily  "or as directed. 100 each 3     Insulin Syringe-Needle U-100 31G X 1/4\" 1 ML MISC 1 Syringe as needed (until new insuline pump arrives) 50 each 1     magnesium oxide (MAG-OX) 400 MG tablet Take 1 tablet (400 mg) by mouth daily (with lunch) 30 tablet 5     mycophenolate (GENERIC EQUIVALENT) 250 MG capsule Take 3 capsules (750 mg) by mouth 2 times daily 180 capsule 11     senna-docusate (SENOKOT-S/PERICOLACE) 8.6-50 MG tablet Take 2 tablets by mouth 2 times daily 20 tablet 0     sulfamethoxazole-trimethoprim (BACTRIM) 400-80 MG tablet Take 1 tablet by mouth daily 30 tablet 11     tacrolimus (GENERIC EQUIVALENT) 0.5 MG capsule Take 1 capsule (0.5 mg) by mouth every morning Total dose = 1.5 mg in the AM and 1 mg in the PM 90 capsule 3     tacrolimus (GENERIC EQUIVALENT) 1 MG capsule Total dose = 1.5 mg in the AM and 1 mg in the  capsule 3           11/9/2023    12:50 PM   Weight Loss Medication History Reviewed With Patient   Which weight loss medications are you currently taking on a regular basis? Ozempic   Are you having any side effects from the weight loss medication that we have prescribed you? No       External Order Results on 01/12/2023   Component Date Value Ref Range Status     WBC Count (External) 01/12/2023 8.0  3.2 - 11.0 10*9/L Final     RBC Count (External) 01/12/2023 4.85  4.14 - 5.76 10*12/L Final     Hemoglobin (External) 01/12/2023 12.5 (L)  12.9 - 16.9 g/dL Final     Hematocrit (External) 01/12/2023 38.4  38.4 - 49.7 % Final     MCV (External) 01/12/2023 79.2 (L)  81.4 - 99.0 fL Final     MCH (External) 01/12/2023 25.8 (L)  26.7 - 33.1 pg Final     MCHC (External) 01/12/2023 32.6  31.6 - 35.5 g/dL Final     RDW (External) 01/12/2023 13.2  11.3 - 14.6 % Final     Platelet Count (External) 01/12/2023 170  130 - 375 10*9/L Final     Sodium (External) 01/12/2023 137  134 - 143 mEq/L Final     Potassium (External) 01/12/2023 4.4  3.4 - 5.1 mEq/L Final     Chloride (External) 01/12/2023 102  99 - 110 " "mEq/L Final     CO2 (External) 01/12/2023 24  19 - 29 mEq/L Final     Anion Gap (External) 01/12/2023 11.0  3.0 - 15.0 mEq/L Final     Urea Nitrogen (External) 01/12/2023 17  5 - 24 mg/dL Final     Creatinine (External) 01/12/2023 1.01  0.70 - 1.20 mg/dL Final     GFR Estimated (External) 01/12/2023 95  >60 ml/min/1.73m2 Final     Calcium (External) 01/12/2023 9.2  8.4 - 10.5 mg/dL Final     Glucose (External) 01/12/2023 156 (H)  70 - 99 mg/dL Final     Tacrolimus(FK-506) (External) 01/12/2023 5.7  5.0 - 15.0 ng/mL Final     Hemoglobin A1C (External) 01/12/2023 8.2 (H)  4.0 - 5.6 % Final           11/7/2022     4:09 PM   ALIRIO Score (Last Two)   ALIRIO Raw Score 40   Activation Score 100   ALIRIO Level 4         PHYSICAL EXAM:  Objective    Ht 1.676 m (5' 5.98\")   Wt 89.1 kg (196 lb 6.4 oz)   BMI 31.72 kg/m             Vitals:  No vitals were obtained today due to virtual visit.    GENERAL: Healthy, alert and no distress  EYES: Eyes grossly normal to inspection.  No discharge or erythema, or obvious scleral/conjunctival abnormalities.  RESP: No audible wheeze, cough, or visible cyanosis.  No visible retractions or increased work of breathing.    SKIN: Visible skin clear. No significant rash, abnormal pigmentation or lesions.  NEURO: Cranial nerves grossly intact.  Mentation and speech appropriate for age.  PSYCH: Mentation appears normal, affect normal/bright, judgement and insight intact, normal speech and appearance well-groomed.        Sincerely,    Ene Garner NP      32 minutes spent by me on the date of the encounter doing chart review, history and exam, documentation and further activities per the note  Virtual Visit Details    Type of service:  Video Visit     Originating Location (pt. Location): Home    Distant Location (provider location):  Off-site  Platform used for Video Visit: Caesar      Again, thank you for allowing me to participate in the care of your patient.      Sincerely,    Ene Garner NP      "

## 2023-11-09 NOTE — PATIENT INSTRUCTIONS
"Thank you for allowing us the privilege of caring for you. We hope we provided you with the excellent service you deserve.   Please let us know if there is anything else we can do for you so that we can be sure you are completely satisfied with your care experience.    To ensure the quality of our services you may be receiving a patient satisfaction survey from an independent patient satisfaction monitoring company.    The greatest compliment you can give is a \"Likely to Recommend\"    Your visit was with Ene Garner NP today.    Instructions per today's visit:     Carson Amin, it was great to visit with you today.  Here is a review of our visit.  If our clinic scheduler is not able to reach you please call [phone] to schedule your next appointments.    Continue ozempic 1mg   Follow up with endocrine to adjust pump- goal to avoid hypoglycemia  Under goal weight for transplant- great work!   Consider preplanned meals - high protein on those hungry days  Could consider dose increase in the future if hypoglycemia managed and hunger increases   Follow up with Tasha Zepeda PA-C or myself in 3 months       Information about Video Visits with [facility]: video visit information  _________________________________________________________________________________________________________________________________________________________  If you are asked by your clinic team to have your blood pressure checked:  [facility] do offer several locations for blood pressure checks. Please follow the below link to schedule an appointment. Scheduling an appointment at the pharmacy for a blood pressure check is now preferred.    Appointment Plus ([email or web address])  _________________________________________________________________________________________________________________________________________________________  Important contact and scheduling information:  Please call our contact center at [phone] " to schedule your next appointments.  To find a lab location near you, please call [phone].  For any nursing questions or concerns call Selena Nunez LPN at [phone] or Kathie Horner RN at [phone]  Please call during clinic hours Monday through Friday 8:00a - 4:00p if you have questions or you can contact us via ripplrr inchart at anytime and we will reply during clinic hours.    Lab results will be communicated through My Chart or letter (if My Chart not used). Please call the clinic if you have not received communication after 1 week or if you have any questions.?  Clinic Fax: [phone]    _________________________________________________________________________________________________________________________________________________________  Meal Replacement Products:    Here is the link to our new e-store where you can purchase our meal replacement products    [facility] E-Store  [email or web address]    The one week starter kit is a great way to sample a variety of products and see what works for you.    If you want more information about the product go to: [employer]    If you are an employee or [facility] or [facility] please contact your care team for a 10% estore discount    Free Shipping for orders over $75     Benefits of meal replacements products:    Portion and calorie control  Improved nutrition  Structured eating  Simplified food choices  Avoid contact with trigger foods  _________________________________________________________________________________________________________________________________________________________  Interested in working with a health ?  Health coaches work with you to improve your overall health and wellbeing.  They look at the whole person, and may involve discussion of different areas of life, including, but not limited to the four pillars of health (sleep, exercise, nutrition, and stress  management). Discuss with your care team if you would like to start working a health .  Health Coaching-3 Pack: Schedule by calling [phone]    $99 for three health coaching visits    Visits may be done in person or via phone    Coaching is a partnership between the  and the client; Coaches do not prescribe or diagnose    Coaching helps inspire the client to reach his/her personal goals   _________________________________________________________________________________________________________________________________________________________  24 Week Healthy Lifestyle Plan:    Our mission in the 24-week Healthy Lifestyle Plan is to provide you with individualized care by giving you the tools, education and support you need to lose weight and maintain a healthy lifestyle. In your 24-week journey, you ll be supported by a dedicated weight loss team that includes registered dietitians, medical weight management providers, health coaches, and nurses -- all with special expertise in weight loss -- to help you every step of the way.     Monthly meetings with your registered dietician or medical weight management provider help to review your progress, update your care plan, and make any adjustments needed to ensure success. Between these visits, weekly and bi-weekly health  visits will help you focus on the four pillars of weight loss -- stress, sleep, nutrition, and exercise -- and how you can best adapt each to achieve sustainable weight loss results.    In addition, you will be given exclusive access to online wellbeing classes through [employer].  Your initial visit will be with a medical weight management provider who will help to understand your weight loss goals and ensure this program is the right fit for you. Please let our team know if you are interested in the 24 week plan by sending a message to your care team or calling [phone] to  schedule.  _________________________________________________________________________________________________________________________________________________________  __________  [place] [facility] Get Moving Program  Our team of physical therapists is trained to help you understand and take control of your condition. They will perform a thorough evaluation to determine your ability for activity and develop a customized plan to fit your goals and physical ability.  Scheduling: Unsure if the Get Moving program is right for you? Discuss the program with your medical provider or diabetes educator. You can also call us at [phone] to ask questions or schedule an appointment.   [facility] Get Moving Program  ____________________________________________________________________________________________________________________________________________________________________________   [facility] Diabetes Prevention Program (DPP)  If you have prediabetes and Medicare please contact us via [employer] to learn more about the Diabetes Prevention Program (DPP)  Program Details:   [facility] offers the year-long Diabetes Prevention Program (DPP). The program helps you to make lifestyle changes that prevent or delay type 2 diabetes by supporting healthy eating, increased physical activity, stress reduction and use of coping skills.   On average, previous [facility] DPP cohorts have lost and maintained at least 5% of their starting weight throughout the program and averaged more than 150 minutes of physical activity per week.  Participants meet weekly for one-hour group sessions over sixteen weeks, every other week for the next 8 weeks, and monthly for the last six months.   A year-long maintenance program is also available for participants who complete the first year.   Location & Cost:   During the COVID-19 Public Health Emergency, the program is offered virtually. When in-person classes can resume, they  will be held at [facility].  For people with Medicare, the program is covered in full. A self-pay option will also be available for those with non-Medicare insurance plans.   ______________________________________________________________________________________________________________________________________________________________________________________________________________________________    To work with a Behavioral Health Psychologist:    Call to schedule:    Uvaldo Beltran - [phone]  Veto Husain - [phone]  Pau Rubio - [phone]  Carmen Schulz - [phone]   Lyssa Sánchez PhD (cannot accept Medicare) [phone]        Thank you,   [facility] Comprehensive Weight Management Team

## 2023-11-10 DIAGNOSIS — Z99.2 TYPE 1 DIABETES MELLITUS WITH CHRONIC KIDNEY DISEASE ON CHRONIC DIALYSIS (H): Primary | ICD-10-CM

## 2023-11-10 DIAGNOSIS — E78.5 DYSLIPIDEMIA: Primary | ICD-10-CM

## 2023-11-10 DIAGNOSIS — N18.6 TYPE 1 DIABETES MELLITUS WITH CHRONIC KIDNEY DISEASE ON CHRONIC DIALYSIS (H): Primary | ICD-10-CM

## 2023-11-10 DIAGNOSIS — E10.22 TYPE 1 DIABETES MELLITUS WITH CHRONIC KIDNEY DISEASE ON CHRONIC DIALYSIS (H): Primary | ICD-10-CM

## 2023-11-10 RX ORDER — PROCHLORPERAZINE 25 MG/1
1 SUPPOSITORY RECTAL
Qty: 1 EACH | Refills: 3 | Status: SHIPPED | OUTPATIENT
Start: 2023-11-10 | End: 2024-06-04

## 2023-11-10 NOTE — TELEPHONE ENCOUNTER
November 10, 2023  Ana Paula Self, RN  to [facility] End[facility] T[facility]    AT      11/10/23  8:50 AM   Other: Patient states he went to change his sensor last night even though just a few days ago it told him he had like 21 days left of battery, then when he changed sensor and transmitter it said there's not enough battery and it shut everything off. I told him we just received the request yesterday, but he states the dex com is off now. Please call and advise.       Diabetic Supplies Protocol Failed 11/10/2023 08:50 AM   Protocol Details  Recent (6 mo) or future (30 days) visit within the authorizing provider's specialty     LCV DEC 2022 with Kymberly Boswell   RTC JAN 2024 with Kymberly Boswell   CDE team notified  Princess Patel RN on 11/10/2023 at 8:53 AM

## 2023-11-13 RX ORDER — ATORVASTATIN CALCIUM 10 MG/1
10 TABLET, FILM COATED ORAL DAILY
Qty: 90 TABLET | Refills: 0 | Status: SHIPPED | OUTPATIENT
Start: 2023-11-13 | End: 2024-02-09

## 2023-11-13 RX ORDER — PROCHLORPERAZINE 25 MG/1
SUPPOSITORY RECTAL
Qty: 1 EACH | Refills: 3 | Status: SHIPPED | OUTPATIENT
Start: 2023-11-13 | End: 2024-06-04

## 2023-11-13 NOTE — TELEPHONE ENCOUNTER
Left voicemail to check if patient was able to get his transmitter. Script was entered on Friday. Asked him to call if he needs any more assistance.   Sabine Pandya RD, LD, CDE  11/13/2023   10:15 AM

## 2023-11-16 ENCOUNTER — TELEPHONE (OUTPATIENT)
Dept: TRANSPLANT | Facility: CLINIC | Age: 43
End: 2023-11-16
Payer: COMMERCIAL

## 2023-11-16 DIAGNOSIS — Z76.82 ORGAN TRANSPLANT CANDIDATE: ICD-10-CM

## 2023-11-16 DIAGNOSIS — N18.6 ESRD (END STAGE RENAL DISEASE) (H): Primary | ICD-10-CM

## 2023-11-16 NOTE — PROGRESS NOTES
Called pt for update, BMI is now 31 will get pt in to see a panc transplant surgeon. Schedulers to call and set up. Will follow up after appts. Pt verbalized understanding of information and has no further questions. Encouraged to reach out if questions arise.

## 2023-11-16 NOTE — TELEPHONE ENCOUNTER
[facility] Call Center    Phone Message    May a detailed message be left on voicemail: yes     Reason for Call: Appointment Intake:  Patient is wondering if 11/21/2023 appointments could be done virtually - patient is coming in on 12/05/2023 to see surgeon and would rather not come in twice.      Action Taken: Message routed to:  [facility] FEI NUNEZ    Travel Screening: Not Applicable

## 2023-11-21 ENCOUNTER — OFFICE VISIT (OUTPATIENT)
Dept: TRANSPLANT | Facility: CLINIC | Age: 43
End: 2023-11-21
Attending: INTERNAL MEDICINE
Payer: COMMERCIAL

## 2023-11-21 ENCOUNTER — OFFICE VISIT (OUTPATIENT)
Dept: PHARMACY | Facility: CLINIC | Age: 43
End: 2023-11-21
Payer: COMMERCIAL

## 2023-11-21 ENCOUNTER — LAB (OUTPATIENT)
Dept: LAB | Facility: CLINIC | Age: 43
End: 2023-11-21
Attending: INTERNAL MEDICINE
Payer: COMMERCIAL

## 2023-11-21 VITALS
HEART RATE: 84 BPM | BODY MASS INDEX: 32.46 KG/M2 | WEIGHT: 201 LBS | DIASTOLIC BLOOD PRESSURE: 105 MMHG | OXYGEN SATURATION: 98 % | SYSTOLIC BLOOD PRESSURE: 163 MMHG

## 2023-11-21 DIAGNOSIS — E55.9 VITAMIN D DEFICIENCY: ICD-10-CM

## 2023-11-21 DIAGNOSIS — I10 ESSENTIAL HYPERTENSION: ICD-10-CM

## 2023-11-21 DIAGNOSIS — I15.1 HTN, KIDNEY TRANSPLANT RELATED: ICD-10-CM

## 2023-11-21 DIAGNOSIS — E10.29 TYPE 1 DIABETES MELLITUS WITH OTHER KIDNEY COMPLICATION (H): Primary | ICD-10-CM

## 2023-11-21 DIAGNOSIS — E10.29 TYPE 1 DIABETES MELLITUS WITH OTHER KIDNEY COMPLICATION (H): ICD-10-CM

## 2023-11-21 DIAGNOSIS — T86.11 KIDNEY TRANSPLANT REJECTION: ICD-10-CM

## 2023-11-21 DIAGNOSIS — N18.2 ANEMIA IN STAGE 2 CHRONIC KIDNEY DISEASE: ICD-10-CM

## 2023-11-21 DIAGNOSIS — E78.5 DYSLIPIDEMIA: ICD-10-CM

## 2023-11-21 DIAGNOSIS — Z76.82 ORGAN TRANSPLANT CANDIDATE: ICD-10-CM

## 2023-11-21 DIAGNOSIS — N18.6 ESRD (END STAGE RENAL DISEASE) (H): ICD-10-CM

## 2023-11-21 DIAGNOSIS — Z48.298 AFTERCARE FOLLOWING ORGAN TRANSPLANT: ICD-10-CM

## 2023-11-21 DIAGNOSIS — Z94.0 HTN, KIDNEY TRANSPLANT RELATED: ICD-10-CM

## 2023-11-21 DIAGNOSIS — Z94.0 KIDNEY TRANSPLANT RECIPIENT: Primary | ICD-10-CM

## 2023-11-21 DIAGNOSIS — D63.1 ANEMIA IN STAGE 2 CHRONIC KIDNEY DISEASE: ICD-10-CM

## 2023-11-21 DIAGNOSIS — Z94.0 KIDNEY REPLACED BY TRANSPLANT: ICD-10-CM

## 2023-11-21 DIAGNOSIS — N25.81 SECONDARY RENAL HYPERPARATHYROIDISM (H): ICD-10-CM

## 2023-11-21 LAB
ALBUMIN UR-MCNC: NEGATIVE MG/DL
ANION GAP SERPL CALCULATED.3IONS-SCNC: 7 MMOL/L (ref 7–15)
APPEARANCE UR: CLEAR
BILIRUB UR QL STRIP: NEGATIVE
BUN SERPL-MCNC: 18.7 MG/DL (ref 6–20)
CALCIUM SERPL-MCNC: 9.4 MG/DL (ref 8.6–10)
CHLORIDE SERPL-SCNC: 100 MMOL/L (ref 98–107)
COLOR UR AUTO: ABNORMAL
CREAT SERPL-MCNC: 1.09 MG/DL (ref 0.67–1.17)
DEPRECATED HCO3 PLAS-SCNC: 28 MMOL/L (ref 22–29)
EGFRCR SERPLBLD CKD-EPI 2021: 86 ML/MIN/1.73M2
ERYTHROCYTE [DISTWIDTH] IN BLOOD BY AUTOMATED COUNT: 13.7 % (ref 10–15)
GLUCOSE SERPL-MCNC: 162 MG/DL (ref 70–99)
GLUCOSE UR STRIP-MCNC: NEGATIVE MG/DL
HBA1C MFR BLD: 7.1 %
HCT VFR BLD AUTO: 43.6 % (ref 40–53)
HGB BLD-MCNC: 13.7 G/DL (ref 13.3–17.7)
HGB UR QL STRIP: NEGATIVE
KETONES UR STRIP-MCNC: NEGATIVE MG/DL
LEUKOCYTE ESTERASE UR QL STRIP: NEGATIVE
MCH RBC QN AUTO: 25.7 PG (ref 26.5–33)
MCHC RBC AUTO-ENTMCNC: 31.4 G/DL (ref 31.5–36.5)
MCV RBC AUTO: 82 FL (ref 78–100)
MUCOUS THREADS #/AREA URNS LPF: PRESENT /LPF
NITRATE UR QL: NEGATIVE
PH UR STRIP: 6 [PH] (ref 5–7)
PLATELET # BLD AUTO: 187 10E3/UL (ref 150–450)
POTASSIUM SERPL-SCNC: 4.4 MMOL/L (ref 3.4–5.3)
RBC # BLD AUTO: 5.34 10E6/UL (ref 4.4–5.9)
RBC URINE: <1 /HPF
SODIUM SERPL-SCNC: 135 MMOL/L (ref 135–145)
SP GR UR STRIP: 1.01 (ref 1–1.03)
TACROLIMUS BLD-MCNC: 7.1 UG/L (ref 5–15)
TME LAST DOSE: NORMAL H
TME LAST DOSE: NORMAL H
UROBILINOGEN UR STRIP-MCNC: NORMAL MG/DL
VIT D+METAB SERPL-MCNC: 14 NG/ML (ref 20–50)
WBC # BLD AUTO: 7.9 10E3/UL (ref 4–11)
WBC URINE: 0 /HPF

## 2023-11-21 PROCEDURE — 99607 MTMS BY PHARM ADDL 15 MIN: CPT | Performed by: PHARMACIST

## 2023-11-21 PROCEDURE — 99213 OFFICE O/P EST LOW 20 MIN: CPT | Performed by: INTERNAL MEDICINE

## 2023-11-21 PROCEDURE — 85027 COMPLETE CBC AUTOMATED: CPT | Performed by: PATHOLOGY

## 2023-11-21 PROCEDURE — 99605 MTMS BY PHARM NP 15 MIN: CPT | Performed by: PHARMACIST

## 2023-11-21 PROCEDURE — 83036 HEMOGLOBIN GLYCOSYLATED A1C: CPT | Performed by: INTERNAL MEDICINE

## 2023-11-21 PROCEDURE — 99000 SPECIMEN HANDLING OFFICE-LAB: CPT | Performed by: PATHOLOGY

## 2023-11-21 PROCEDURE — 36415 COLL VENOUS BLD VENIPUNCTURE: CPT | Performed by: PATHOLOGY

## 2023-11-21 PROCEDURE — 82306 VITAMIN D 25 HYDROXY: CPT | Performed by: INTERNAL MEDICINE

## 2023-11-21 PROCEDURE — 81001 URINALYSIS AUTO W/SCOPE: CPT | Performed by: INTERNAL MEDICINE

## 2023-11-21 PROCEDURE — 80048 BASIC METABOLIC PNL TOTAL CA: CPT | Performed by: PATHOLOGY

## 2023-11-21 PROCEDURE — 86833 HLA CLASS II HIGH DEFIN QUAL: CPT | Performed by: NURSE PRACTITIONER

## 2023-11-21 PROCEDURE — 80197 ASSAY OF TACROLIMUS: CPT | Performed by: INTERNAL MEDICINE

## 2023-11-21 PROCEDURE — 99215 OFFICE O/P EST HI 40 MIN: CPT | Performed by: INTERNAL MEDICINE

## 2023-11-21 PROCEDURE — 86832 HLA CLASS I HIGH DEFIN QUAL: CPT | Performed by: NURSE PRACTITIONER

## 2023-11-21 ASSESSMENT — PAIN SCALES - GENERAL: PAINLEVEL: NO PAIN (0)

## 2023-11-21 NOTE — PATIENT INSTRUCTIONS
"Recommendations from today's MTM visit:                                                       Draw Vitamin D level todaty, if needed we will start a Vitamin D supplement.   Avoid giving 2 bolus of Novolog within 4 hours of each other if not eating another meal.   Accept invitation to share Dexcom with me.   Prevnar 20, Shingrix, COVID booster, Flu shot    Follow-up: 12/4 at 1 PM    It was great speaking with you today.  I value your experience and would be very thankful for your time in providing feedback in our clinic survey. In the next few days, you may receive an email or text message from [employer] with a link to a survey related to your  clinical pharmacist.\"     To schedule another MTM appointment, please call the clinic directly or you may call the MTM scheduling line at [phone] or toll-free at [phone].     My Clinical Pharmacist's contact information:                                                      Please feel free to contact me with any questions or concerns you have.      Abdullahi Javier, PharmD  [facility] Pharmacist    Phone: [phone]     "

## 2023-11-21 NOTE — LETTER
11/21/2023         RE: Michael Amin  [address]  [facility] [address]        Dear Colleague,    Thank you for referring your patient, Michael Amin, to the [facility] TRANSPL[facility]. Please see a copy of my visit note below.    CHRONIC TRANSPLANT NEPHROLOGY VISIT    Assessment & Plan     # LDKT: Stable               - Baseline Cr ~ 1.1-1.3 mg/dL               - Proteinuria: Minimal (0.2-0.5 grams)              - Date DSA Last Checked: Dec/2020      Latest DSA: No              - BK Viremia: Mar/2021 - No                - Kidney Tx Biopsy: Nov 25, 2019; Result: Material insufficient for assessment with no glomeruli.                                              Oct 22, 2019; Result: Borderline acute cellular-mediated rejection      # Immunosuppression: Tacrolimus immediate release (goal 4-6) and Mycophenolate mofetil (dose 750 mg every 12 hours)              - Changes: No   - Continue with intensive monitoring of immunosuppression for efficacy and toxicity.     # Infection Prophylaxis:   - PJP: Sulfa/TMP (Bactrim)     # HTN : acceptable control, home BP readings ~130s/80s. Clinic BP were high as he did not take his meds for labs.   cuff has been validated in the past, advised to continue to monitor  On Coreg 12.5 mg BID . Target BP< 130/80  Current Home BP : 120s/75-85, asked him to send the BP readings from home for further eval.  Weight :   Wt Readings from Last 3 Encounters:   11/21/23 91.2 kg (201 lb)   11/09/23 89.1 kg (196 lb 6.4 oz)   10/18/23 88 kg (194 lb)     # Diabetes: Borderline control (HbA1c 7-9%)           Last HbA1c: 8.2% in jan. Reordered Hemoglobin A1c today. labile sugar readings with frequent hypoglycemia as well              - Management as per Endocrinology.              - On insulin pump and ozempic with improved weights     - continues to work on exercise and watching caloric intake.     # Anemia in Chronic Renal Disease: Hgb: Stable      JOSE: No               - Iron studies: not checked recently                # Medical Compliance: Yes    # Skin Cancer Risk:               -  Counseled patient about increased risk of skin cancers while on immunosuppressants. Discussed sun protection and recommend regular follow up with Dermatology. Referral placed for dermatology as pt did not had appointment with them yet.    # Transplant History:  Etiology of Kidney Failure: Diabetes mellitus type 1  Tx: LDKT  Transplant: 10/1/2019 (Kidney)  Donor Type: Living      Donor Class:   Crossmatch at time of Tx: negative  DSA at time of Tx: No  Significant changes in immunosuppression: None  CMV IgG Ab High Risk Discordance (D+/R-): No  EBV IgG Ab High Risk Discordance (D+/R-): No  Significant transplant-related complications: None    Transplant Office Phone Number: [phone]    Assessment and plan was discussed with the patient and he voiced his understanding and agreement.    Return visit: Return in about 1 year (around 11/21/2024).     Total time spent on the day of clinic visit was 40 min including 22 min of face to face time with pt, labs and image review, ordering labs, reviewing previous nephrology note and documentation.    Connie Membreno MD    Chief Complaint  Mr. Amin is a 43 year old here for routine follow up, kidney transplant and immunosuppression management.      History of Present Illness    Pt presented to nephrology clinic from [place]. Endorsed feeling fairly stable. Still teaching middle school. No hospitalizations since his last visit. No  new symptoms endorsed today. Mentioned now that he is on Ozempic, he lost weight from 235 lbs to about ~200 lbs now. He also endorsed that his insulin use is getting down. Still able to feel if his BG levels are low, most of the time. Endorsed compliant with his medications. Did not take his meds this morning as he wanted to take them after his labs.  Endorsed that he have up coming appointment with surgeon for pancreas  transplant.  Denied other systemic symptoms including recurrent fevers/chills, nausea/vomiting, diarrhea, abdominal pain, chest pain or SOB. No appetite changes noted, no night sweats noted.     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    Problem List  Patient Active Problem List   Diagnosis    HTN, kidney transplant related    Diabetes mellitus type 1 (H)    Dyslipidemia    Anemia in chronic kidney disease    Secondary renal hyperparathyroidism (H24)    Kidney replaced by transplant    Aftercare following organ transplant    Vitamin D deficiency    Hypomagnesemia    Kidney transplant rejection    Class 1 obesity with serious comorbidity and body mass index (BMI) of 33.0 to 33.9 in adult, unspecified obesity type       Allergies  Allergies   Allergen Reactions    Anti-Thymocyte Glob (Rabbit)      Had reaction with rigors after steroid-free dose. No reaction with steroids.     Medications  Current Outpatient Medications   Medication Sig    alcohol swab prep pads Use to swab area of injection/zak as directed.    aspirin (ASA) 81 MG EC tablet Take 81 mg by mouth daily     atorvastatin (LIPITOR) 10 MG tablet Take 1 tablet (10 mg) by mouth daily    blood glucose (CONTOUR NEXT TEST) test strip Use to test blood sugar 4 times daily.    blood glucose monitoring (ACCU-CHEK FASTCLIX) lancets     carvedilol (COREG) 12.5 MG tablet Take 1 tablet (12.5 mg) by mouth 2 times daily (with meals)    Continuous Blood Gluc Sensor (DEXCOM G6 SENSOR) MISC Change every 10 days. Please dispense 90 day supply- dispense 9 sensors.    Continuous Blood Gluc Transmit (DEXCOM G6 TRANSMITTER) MISC Change every 3 months    Continuous Blood Gluc Transmit (DEXCOM G6 TRANSMITTER) MISC 1 each every 3 months    Continuous Blood Gluc Transmit (DEXCOM G6 TRANSMITTER) MISC 1 each every 3 months    Glucagon (BAQSIMI ONE PACK) 3 MG/DOSE POWD Spray 3 mg in nostril See Admin Instructions USE ONLY FOR  "SEVERE HYPOGLYCEMIA.    insulin cartridge (T:SLIM 3ML) misc pump supply Insulin cartridge to be used with pump as directed.  Change every 2 days or as directed.    Insulin Infusion Pump Supplies (AUTOSOFT XC INFUSION SET) MISC 1 each every 48 hours Change every 2 days  9 mm cannula, 23 inch tubing    insulin lispro (HUMALOG VIAL) 100 UNIT/ML vial Via insulin pump. Approx 130 units daily. Follow up visit needed. Call  to schedule.    insulin lispro (HUMALOG VIAL) 100 UNIT/ML vial With Insulin pump. Up to 130 units per day.    insulin pen needle (ULTICARE MICRO) 32G X 4 MM miscellaneous Use pen needles daily or as directed.    Insulin Syringe-Needle U-100 31G X 1/4\" 1 ML MISC 1 Syringe as needed (until new insuline pump arrives)    magnesium oxide (MAG-OX) 400 MG tablet Take 1 tablet (400 mg) by mouth daily (with lunch)    mycophenolate (GENERIC EQUIVALENT) 250 MG capsule Take 3 capsules (750 mg) by mouth 2 times daily    Semaglutide, 1 MG/DOSE, (OZEMPIC) 4 MG/3ML pen Inject 1 mg Subcutaneous every 7 days    senna-docusate (SENOKOT-S/PERICOLACE) 8.6-50 MG tablet Take 2 tablets by mouth 2 times daily    sulfamethoxazole-trimethoprim (BACTRIM) 400-80 MG tablet Take 1 tablet by mouth daily    tacrolimus (GENERIC EQUIVALENT) 0.5 MG capsule Take 1 capsule (0.5 mg) by mouth every morning Total dose = 1.5 mg in the AM and 1 mg in the PM    tacrolimus (GENERIC EQUIVALENT) 1 MG capsule Total dose = 1.5 mg in the AM and 1 mg in the PM     No current facility-administered medications for this visit.     There are no discontinued medications.    Physical Exam  Vital Signs: BP (!) 163/105 (BP Location: Left arm, Patient Position: Sitting, Cuff Size: Adult Large)   Pulse 84   Wt 91.2 kg (201 lb)   SpO2 98%   BMI 32.46 kg/m      GENERAL: Healthy, alert and no distress  EYES: Eyes grossly normal to inspection.  No discharge or erythema, or obvious scleral/conjunctival abnormalities.\: no gross abnormalities noted "   RESP: lungs clear for auscultation   CARD : S1, S2 present, no murmurs appreciated  Abd : soft, non distended and non tender  SKIN: Visible skin clear. No significant rash, abnormal pigmentation or lesions.  NEURO: mentation and speech were normal  PSYCH: Mentation appears normal, affect normal/bright, judgement and insight intact, normal speech and appearance well-groomed.      Data        Latest Ref Rng & Units 1/12/2023     8:11 AM 8/17/2022     8:21 AM 6/15/2022     8:52 AM   Renal   Na (external) 134 - 143 mEq/L 137     138  137    K (external) 3.4 - 5.1 mEq/L 4.4     4.1  4.4    Cl 99 - 110 mEq/L 102     103  104    Cl (external) 99 - 110 mEq/L 102     103  104    CO2 (external) 19 - 29 mEq/L 24     25  25    BUN (external) 5 - 24 mg/dL 17     17  16    Cr (external) 0.70 - 1.20 mg/dL 1.01     1.06  1.03    Glucose (external) 70 - 99 mg/dL 156     144  155    Ca (external) 8.4 - 10.5 mg/dL 9.2     9.1  9.1        This result is from an external source.         Latest Ref Rng & Units 3/31/2020    10:39 AM 2/3/2020     4:14 PM 1/27/2020     4:24 PM   Bone Health   Phos (external) 2.5 - 4.6 mg/dL 3.3     3.5  3.3        This result is from an external source.         Latest Ref Rng & Units 11/21/2023     8:48 AM 1/12/2023     8:11 AM 8/17/2022     8:21 AM   Heme   WBC 4.0 - 11.0 10e3/uL 7.9      WBC (external) 3.2 - 11.0 10*9/L  8.0     7.3       Hgb 13.3 - 17.7 g/dL 13.7      Hgb (external) 12.9 - 16.9 g/dL  12.5     12.6       Plt 150 - 450 10e3/uL 187      Plt (external) 130 - 375 10*9/L  170     167           This result is from an external source.         Latest Ref Rng & Units 9/26/2019     9:45 AM 8/6/2019     5:45 PM 1/7/2019     1:38 PM   Liver   AP 40 - 150 U/L 100   157    TBili 0.2 - 1.3 mg/dL 0.2   0.2    ALT 0 - 70 U/L 42   46    AST 0 - 45 U/L 16   15    Tot Protein 6.8 - 8.8 g/dL 7.5   7.9    Tot Protein (external) 5.7 - 8.2 g/dL  6.6        Albumin 3.4 - 5.0 g/dL 3.5   3.5    Albumin (external)  3.2 - 4.8 g/dL  4.4            This result is from an external source.         Latest Ref Rng & Units 1/12/2023     8:12 AM 11/16/2021     1:27 PM 7/16/2020     2:20 PM   Pancreas   A1C 0.0 - 5.6 %  8.4  8.1    A1C (external) 4.0 - 5.6 % 8.2             This result is from an external source.         Latest Ref Rng & Units 9/26/2019     9:45 AM 8/6/2019     5:45 PM   Iron studies   Iron 35 - 180 ug/dL 70     Iron Saturation Index 15 - 46 % 28     Ferritin 26 - 388 ng/mL 753     Ferritin (external) 22 - 322 ng/mL  578           This result is from an external source.         Latest Ref Rng & Units 8/13/2021     8:39 AM 7/22/2021     8:22 AM 6/18/2021     8:30 AM   [facility] Txp Virology   BK Quant Log Ext log IU/mL Undetected      BK Quant Result Ext Undetected IU/mL Undetected  None Detected     None Detected       BK Quant Spec Ext   Blood     Blood           This result is from an external source.       Recent Labs   Lab Test 12/09/19  1002 02/07/20  0858 05/11/21  1531   DOSTAC 2130,12/8/19 2/6/20 2105 2,000   TACROL 10.4 9.2 6.3     Recent Labs   Lab Test 10/07/19  0742 11/21/19  0813   DOSMPA Not Provided 11/20/19 1800   MPACID 0.82* 0.91*   MPAG 14.1* 39.5         Again, thank you for allowing me to participate in the care of your patient.        Sincerely,        Connie Membreno MD

## 2023-11-21 NOTE — PATIENT INSTRUCTIONS
Continue good hydration   No change in your immunosuppression today   Check BP at home and your goal BP (top no) is 110-120's systolic.

## 2023-11-21 NOTE — PROGRESS NOTES
Medication Therapy Management (MTM) Encounter    ASSESSMENT:                            Medication Adherence/Access: No issues identified    Renal Transplant:    Patient says he was told to stop bactrim, but still on EMR, will confirm with txp team.     Supplements:   Wants to know if he should take Vitamin D for his seasonal mood disorder, we will check a level today.     Diabetes   Ozempic has greatly reduced his need for insulin (was taking ~130 units a day, now down to 55-75 units per day), has had a lot of lows recently which is leading to rebound highs as well. Unfortunately was unable to review blood sugars in depth due to difficulty using the terri. Will follow-up and have patient share his dexcom data with me. Told him to avoid double bolusing and over correcting sugars after meals.     Hypertension   Home BPs near goal <130/80. Recommended he start checking more frequently again.     Hyperlipidemia   Stable.     PLAN:                            Draw Vitamin D level today, if needed we will start a Vitamin D supplement.   Avoid giving 2 bolus of Novolog within 4 hours of each other if not eating another meal.   Accept invitation to share Dexcom with me.   Due for Prevnar 20, Shingrix, COVID booster, Flu shot    Vitamin D level came back low ,recommended patient start Vitamin D3 5000 units once daily for this. May help his seasonal symptoms as well.     Txp team.  Should patient continue bactrim? He has stopped this on.     Follow-up: 12/1 at 1 PM    SUBJECTIVE/OBJECTIVE:                          Rudi Amin is a 43 year old male coming in for an initial visit. He was referred to me from P.      Reason for visit: General med review  Pt questions:  Vitamin D3, states he has a tough time over winter. Wondering if can take Vitamin D.    Allergies/ADRs: Reviewed in chart  Past Medical History: Reviewed in chart  Tobacco: He reports that he has never smoked. He has never used smokeless tobacco.  Alcohol:  rarely  THC\CBD: none    Medication Adherence/Access: Patient no issues. .    Renal Transplant:    Tacrolimus 1.5mg every morning and 1mg every evening   Mycophenolate Mofetil 750mg twice daily.    Pt reports constipation, taking Senna 2 tablets daily for this.   Transplant date: 10/1/19  Estimated Creatinine Clearance: 91.5 mL/min (based on SCr of 1.09 mg/dL).  CMV prophylaxis: Completed 3 months post tx.  PCP prophylaxis: Stopped Bactrim S S daily  Tx Coordinator: Sabine Anders RN, Using Med Card: Yes  Immunizations: annual flu shot 2019; [id number]:  2007; Prevnar 13: 2018; TDaP:  2017; Shingrix: due    Supplements:   Magnesium 400mg daily.  Lab Results   Component Value Date    MAG 2.0 11/13/2019   Vitamin D Deficiency Screening Results:  Lab Results   Component Value Date    VITDT 14 (L) 11/21/2023    VITDT 22 09/26/2019     Diabetes   Type 1 diabetes  Ozempic 1mg weeklys for weight loss and insulin resistant.   Humalog 55- 75 units  Has an appointment with Kymberly Boswell in January. Earliest he can get in  Blood sugar monitoring: Continuous Glucose Monitor unable to review in depth today.  Current diabetes symptoms: Low blood sugars: has a 7up 7.5 oz, follows up with a granola bar. Had 4 low blood sugars over the weekend, dropped 40s, feel sweaty, shaky, angry. Dexcom goes off at 70, which is sometimes too low.    Lab Results   Component Value Date    A1C 7.1 (H) 11/21/2023     Hypertension   Carvedilol 12.5mg twice daily   Patient reports no current medication side effects  Patient self monitors blood pressure.  Home BP monitoring 130/80s, before BPs.  .       BP Readings from Last 3 Encounters:   11/21/23 (!) 163/105   03/27/23 (!) 168/91   11/16/21 (!) 149/84     Pulse Readings from Last 3 Encounters:   11/21/23 84   03/27/23 80   11/16/21 83     Hyperlipidemia   Atorvastatin 10mg daily  Aspirin 81mg daily. Denies gastrointestinal bleed sx.   Patient reports no significant myalgias or other side effects.      Today's Vitals: There were no vitals taken for this visit.  ----------------    I spent 40 minutes with this patient today. All changes were made via collaborative practice agreement with Dr. Hernández. A copy of the visit note was provided to the patient's provider(s).    A summary of these recommendations was given to the patient.    Abdullahi Javier, PharmD  [facility] Pharmacist    Phone: [phone]      Medication Therapy Recommendations  Aftercare following organ transplant    Current Medication: sulfamethoxazole-trimethoprim (BACTRIM) 400-80 MG tablet   Rationale: Does not understand instructions - Adherence - Adherence   Recommendation: Discontinue Medication   Status: Contact Provider - Awaiting Response          Rationale: Preventive therapy - Needs additional medication therapy - Indication   Recommendation: Order Vaccine - Prevnar 20 0.5 ML Rena   Status: Accepted - no CPA Needed         Diabetes mellitus type 1 (H)    Current Medication: insulin lispro (HUMALOG VIAL) 100 UNIT/ML vial   Rationale: Dose too high - Dosage too high - Safety   Recommendation: Decrease Frequency   Status: Accepted - no CPA Needed         Vitamin D deficiency    Rationale: Untreated condition - Needs additional medication therapy - Indication   Recommendation: Start Medication - Vitamin D-3 125 MCG (5000 UT) Tabs   Status: Accepted - no CPA Needed

## 2023-11-21 NOTE — NURSING NOTE
Chief Complaint   Patient presents with    RECHECK     BP (!) 163/105 (BP Location: Left arm, Patient Position: Sitting, Cuff Size: Adult Large)   Pulse 84   Wt 91.2 kg (201 lb)   SpO2 98%   BMI 32.46 kg/m    Dayana BALDERAS

## 2023-11-22 ENCOUNTER — TELEPHONE (OUTPATIENT)
Dept: TRANSPLANT | Facility: CLINIC | Age: 43
End: 2023-11-22

## 2023-11-22 DIAGNOSIS — E55.9 VITAMIN D DEFICIENCY: ICD-10-CM

## 2023-11-22 DIAGNOSIS — Z48.298 AFTERCARE FOLLOWING ORGAN TRANSPLANT: Primary | ICD-10-CM

## 2023-11-22 NOTE — LETTER
PHYSICIAN ORDERS      DATE & TIME ISSUED: 2023  11:34 AM   PATIENT NAME: Michael Amin   : 1980     [facility] MR# [if applicable]: [id number]     DIAGNOSIS:  Kidney Transplant  ICD-10 CODE: Z94.0     Please repeat the following labs in 2 weeks:  Tacrolimus drug level  CBC  BMP    Any questions please call: [phone]  Please fax lab results to [phone].      Donnie Brandon MD

## 2023-11-22 NOTE — TELEPHONE ENCOUNTER
ISSUE:   Tacrolimus IR level 7.1 on 11/21, goal 4-6, dose 1.5 mg in AM / 1.0 in PM. Low vit D at 14.    PLAN:   Please call patient and confirm this was an accurate 12-hour trough. Verify Tacrolimus IR dose 1.5 mg in AM / 1.0 in PM. Confirm no new medications or illness. Confirm no missed doses. If accurate trough and accurate dose, stay on the same dose Tacrolimus IR  and repeat labs in 2 weeks. Start cholecalciferol 2000 units.    OUTCOME:   Spoke with patient, they confirm accurate trough level and current dose 1.5 mg in AM / 1.0 in PM . Patient confirmed plan to repeat labs in 2 weeks. Orders sent to preferred pharmacy for dose change and script sent for cholecalciferol, and lab for repeat labs. Patient voiced understanding of plan.

## 2023-11-24 RX ORDER — CHOLECALCIFEROL (VITAMIN D3) 50 MCG
1 TABLET ORAL DAILY
Start: 2023-11-24

## 2023-12-04 ENCOUNTER — VIRTUAL VISIT (OUTPATIENT)
Dept: PHARMACY | Facility: CLINIC | Age: 43
End: 2023-12-04
Payer: COMMERCIAL

## 2023-12-04 DIAGNOSIS — E10.29 TYPE 1 DIABETES MELLITUS WITH OTHER KIDNEY COMPLICATION (H): Primary | ICD-10-CM

## 2023-12-04 PROCEDURE — 99607 MTMS BY PHARM ADDL 15 MIN: CPT | Performed by: PHARMACIST

## 2023-12-04 PROCEDURE — 99606 MTMS BY PHARM EST 15 MIN: CPT | Performed by: PHARMACIST

## 2023-12-04 NOTE — Clinical Note
Patient is going low frequently during the daytime, likely carb ratio is off. We adjusted this today. You are seeing him mid January.

## 2023-12-04 NOTE — PROGRESS NOTES
Medication Therapy Management (MTM) Encounter    ASSESSMENT:                            Medication Adherence/Access: No issues identified    Diabetes Type 1 diabetes  Patient is already entering half the carbs he is eating into his glucose pump to avoid lows, we will make this change official and reduce 11:30am to midnight carb counting to 1 unit per 6 grams of carbs. Follow-up on Friday.     PLAN:                            Change Insulin to 1 unit for 6 grams of carbs 11:30 AM - midnight     Follow-up: Friday at 1 PM.     SUBJECTIVE/OBJECTIVE:                          Rudi Amin is a 43 year old male called for a follow-up visit from 11/21.  Pt states he is in [place] for the phone call.     Reason for visit: diabetes follow-up.  Vitamin D3 start.   Allergies/ADRs: Reviewed in chart  Past Medical History: Reviewed in chart  Tobacco: He reports that he has never smoked. He has never used smokeless tobacco.  Alcohol: not currently using    Medication Adherence/Access: no issues reported    Diabetes Type 1 diabetes  Ozempic 1mg weeklys for weight loss and insulin resistant.   Humalog Carb counting has had to enter a lower number of carbs than he has been actually eating to avoid going low.   12-11:30 am 1 unit for 7.5 g carbs  11:30-4pm 1 unit for 3 g carbs  4-12am 1 unit for 3 g carbs  55- 75 units daily  Basal overnight 4pm-4am: 2.1 units/hr  Basal during the day 4am-9am: 1.8 units/hr    9am-11:30am: 1.75 units / hr  11:30am-4 pm 1.85 units/hr  Has an appointment with Kymberly Boswell in January. Earliest he can get in  Blood sugar monitoring: Continuous Glucose Monitor unable to review in depth today.  Current diabetes symptoms: Low blood sugars: has a 7up 7.5 oz, follows up with a granola bar. Had 4 low blood sugars over the weekend, dropped 40s, feel sweaty, shaky, angry. Dexcom goes off at 70, which is sometimes too low.   Urine Albumin:   Lab Results   Component Value Date    UMALCR 88.98 (H) 10/22/2019      Lab  Results   Component Value Date    A1C 7.1 (H) 11/21/2023         Today's Vitals: There were no vitals taken for this visit.  ----------------    I spent 16 minutes with this patient today. All changes were made via collaborative practice agreement with Kymberly Boswell. A copy of the visit note was provided to the patient's provider(s).    A summary of these recommendations was given to the patient.    Abdullahi Javier, PharmD  [facility] Pharmacist    Phone: [phone]     Telemedicine Visit Details  Type of service:  Telephone visit  Start Time: 12:48 PM  End Time: 1:04 PM     Medication Therapy Recommendations  Diabetes mellitus type 1 (H)    Current Medication: insulin lispro (HUMALOG VIAL) 100 UNIT/ML vial   Rationale: Dose too high - Dosage too high - Safety   Recommendation: Decrease Dose   Status: Accepted per CPA

## 2023-12-05 ENCOUNTER — ANCILLARY PROCEDURE (OUTPATIENT)
Dept: CT IMAGING | Facility: CLINIC | Age: 43
End: 2023-12-05
Attending: SURGERY
Payer: COMMERCIAL

## 2023-12-05 ENCOUNTER — OFFICE VISIT (OUTPATIENT)
Dept: TRANSPLANT | Facility: CLINIC | Age: 43
End: 2023-12-05
Attending: NURSE PRACTITIONER
Payer: COMMERCIAL

## 2023-12-05 VITALS
OXYGEN SATURATION: 100 % | SYSTOLIC BLOOD PRESSURE: 151 MMHG | DIASTOLIC BLOOD PRESSURE: 91 MMHG | WEIGHT: 205.2 LBS | BODY MASS INDEX: 33.14 KG/M2 | TEMPERATURE: 97.4 F | HEART RATE: 76 BPM

## 2023-12-05 DIAGNOSIS — Z76.82 ORGAN TRANSPLANT CANDIDATE: ICD-10-CM

## 2023-12-05 DIAGNOSIS — N18.6 ESRD (END STAGE RENAL DISEASE) (H): ICD-10-CM

## 2023-12-05 PROCEDURE — 74176 CT ABD & PELVIS W/O CONTRAST: CPT | Mod: GC | Performed by: RADIOLOGY

## 2023-12-05 PROCEDURE — 99204 OFFICE O/P NEW MOD 45 MIN: CPT | Performed by: SURGERY

## 2023-12-05 PROCEDURE — 99213 OFFICE O/P EST LOW 20 MIN: CPT | Performed by: SURGERY

## 2023-12-05 NOTE — NURSING NOTE
"Chief Complaint   Patient presents with    Transplant Waitlist Maintenance     Pancreas waitlist       Vital signs:  Temp: 97.4  F (36.3  C) Temp src: Oral BP: (!) 151/91 Pulse: 76     SpO2: 100 %       Weight: 93.1 kg (205 lb 3.2 oz)  Estimated body mass index is 33.14 kg/m  as calculated from the following:    Height as of 11/9/23: 1.676 m (5' 5.98\").    Weight as of this encounter: 93.1 kg (205 lb 3.2 oz).      Dion Verdugo RN on 12/5/2023 at 9:30 AM    "

## 2023-12-05 NOTE — PATIENT INSTRUCTIONS
"Recommendations from today's MTM visit:                                                       Change Insulin to 1 unit for 6 grams of carbs 11:30 AM - midnight     Follow-up: Friday at 1 PM.     It was great speaking with you today.  I value your experience and would be very thankful for your time in providing feedback in our clinic survey. In the next few days, you may receive an email or text message from [employer] with a link to a survey related to your  clinical pharmacist.\"     To schedule another MTM appointment, please call the clinic directly or you may call the MTM scheduling line at [phone] or toll-free at [phone].     My Clinical Pharmacist's contact information:                                                      Please feel free to contact me with any questions or concerns you have.      Abdullahi Javier, PharmD  MTM Pharmacist    Phone: [phone]     "
negative...

## 2023-12-05 NOTE — PROGRESS NOTES
Transplant Surgery Consult Note    Medical record number: [id number]  YOB: 1980,   Consult requested by Kymberly Boswell PA-C for evaluation of pancreas transplant candidacy.    Assessment and Recommendations: Mr. Amin is a good candidate for transplantation and has a good understanding of the risks and benefits of this approach to management of renal failure and diabetes. The following issues should be addressed prior to transplant:     Mr. Amin has Type 1 Diabetes whose condition is not expected to resolve, is expected to progress, and is expected to continue to develop related comorbid conditions.  Cardiology consult for cardiac risk stratification to be ordered: Yes  CT abdomen and pelvis without contrast to be ordered for assessment of vascular targets: Yes  Transplant listing labs ordered to include HLA, ABOx2, Cr, etc.  Dietician consult ordered: Yes  Social work consult ordered: Yes  Imaging reports reviewed:  CT from 2019  IMPRESSION:   1.  Atrophic kidneys and pancreas with nonspecific perinephric  stranding.  2.  No acute abnormality in the abdomen or pelvis on this noncontrast  exam.  3. Peritoneal dialysis catheter with midline approach, tip in the  pelvis.   Small free fluid adjacent to liver and in the paracolic  gutters and pelvis.   Radiology images reviewed: no new images since his transplant  Recipient suitable to move forward with work up of living donors:  No  Needs derm update  Colonoscopy this summer  I think his basal insulin is too high at this point He is eating to keep from going low. He is working with Abdullahi Javier to get things dialed in. He just changed his correction on carb counting yesterday. I do think his insulin requirements are likely lower than currently reported making insulin independence more likely. At current insulin use he is under 1u/kg/day.  Duplex aorta/iliacs  Suitable to move forward with listing after the remainder of his work up is  completed.    The majority of our visit was spent in counselling, discussing the medical and surgical risks of living or  donor kidney and pancreas transplantation, either in a simultaneous or sequential fashion. I contrasted approximate wait time for SPK vs living vs  donor kidneys from normal (0-85%) or higher (%) kidney donor profile index (KDPI) donors and their associated outcomes. I would not recommend this individual to consider kidneys from high KDPI donors. The reason for this decision is best summarized as: not on dialysis yet.  Access to transplant will be impacted by living donor availability and overall candidacy for SPK, as well as the influence of blood type and degree of sensitization. We discussed advantages of preemptive transplant as well as living donor kidney transplant, and graft and patient survival outcomes associated with these options. Potential surgical complications of kidney and pancreas transplantation include bleeding, clotting, infection, wound complications, anastomotic failure and other issues such as cardiac complications, pneumonia, deep venous thrombosis, pulmonary embolism, post transplant diabetes and death. We discussed the need for protocol biopsy of the kidney and the possible need for a ureteral stent (and subsequent removal). We discussed benefits and risks associated with different approaches to exocrine drainage of pancreatic secretions. We also discussed differences in the average length of stay, recovery process, and posttransplant lab and monitoring protocol. We discussed the risk of graft rejection and recurrent diabetic nephropathy in the setting of poor glycemic control. I emphasized the need for strict immunosuppression adherence and the potential for complications of immunosuppression such as skin cancer or lymphoma, as well as a very low but not zero risk of donor-derived disease transmission risks (infection, cancer). Mr. Amin asked  good questions and the patient's candidacy will be reviewed at our Multidisciplinary Selection Committee. Thank you for the opportunity to participate in Mr. Amin's care.    Total time: 60 minutes        Rocio Rutherford MD FACS  Associate Professor of Surgery  Director, Living Kidney Donor Program.  ---------------------------------------------------------------------------------------------------    HPI: Mr. Aimn has Type 1 Diabetes. The patient has had diabetes for 25 years. Management is by Humalog 60-80 units per day via pump. The patient usually checks his blood sugar numerous times/day via CGM.  Daily blood glucoses range typically from 110 to 180.  Hypoglyemic unawareness is not an issue.  The diabetes is controlled.  Also on Ozempic  Complications of diabetes include:    Retinopathy:  Yes   Neuropathy: No  Gastroparesis:  No    The patient is not on dialysis.    Has potential kidney donors:  No.  Interested in participation in paired exchange if a donor is willing: No    The patient has the following pertinent history:       No    Yes  Dialysis:    [x]      [] via:       Blood Transfusion                  [x]      []  Number of units:   Most recently:  Pregnancy:    [x]      [] Number:       Previous Transplant:  []      [x] Details:  kidney transplant 2019  Cancer    [x]      [] Comment:   Kidney stones   [x]      [] Comment:      Recurrent infections  [x]      []  Type:                  Bladder dysfunction  [x]      [] Cause:    Claudication   [x]      [] Distance:    Previous Amputation  [x]      [] Cause:     Chronic anticoagulation  [x]      [] Indication:  Amish  [x]      []     Past Medical History:   Diagnosis Date    Anemia in chronic kidney disease     Dyslipidemia     ESRD (end stage renal disease) on dialysis (H)     Hypertension     Hypomagnesemia 10/7/2019    Secondary hyperparathyroidism (H24)     Type 1 diabetes (H)      Past Surgical History:   Procedure Laterality  Date    hair implant  2007    INSERT CATHETER PERITONEAL DIALYSIS  08/2018    IR FINE NEEDLE ASPIRATION W ULTRASOUND  11/25/2019    IR RENAL BIOPSY LEFT  11/25/2019    PERCUTANEOUS BIOPSY KIDNEY Left 10/22/2019    Procedure: Left Kidney Biopsy;  Surgeon: Kash Hoffman MD;  Location: [facility] OR     Family History   Problem Relation Age of Onset    Diabetes Father     Coronary Artery Disease Father     Skin Cancer Paternal Uncle     Melanoma No family hx of      Social History     Socioeconomic History    Marital status:      Spouse name: Not on file    Number of children: Not on file    Years of education: Not on file    Highest education level: Not on file   Occupational History    Not on file   Tobacco Use    Smoking status: Never    Smokeless tobacco: Never   Vaping Use    Vaping Use: Never used   Substance and Sexual Activity    Alcohol use: Not Currently     Comment: Rarely    Drug use: No    Sexual activity: Not on file   Other Topics Concern    Parent/sibling w/ CABG, MI or angioplasty before 65F 55M? Not Asked   Social History Narrative    Not on file     Social Determinants of Health     Financial Resource Strain: Not on file   Food Insecurity: Not on file   Transportation Needs: Not on file   Physical Activity: Not on file   Stress: Not on file   Social Connections: Not on file   Interpersonal Safety: Not on file   Housing Stability: Not on file       ROS:   CONSTITUTIONAL:  No fevers or chills  EYES: negative for icterus  ENT:  negative for hearing loss, tinnitus and sore throat  RESPIRATORY:  negative for cough, sputum, dyspnea  CARDIOVASCULAR:  negative for chest pain Retinopathy  GASTROINTESTINAL:  negative for nausea, vomiting, diarrhea or constipation  GENITOURINARY:  negative for incontinence, dysuria, bladder emptying problems  HEME:  No easy bruising  INTEGUMENT:  negative for rash and pruritus  NEURO:  Negative for headache, seizure disorder    Allergies:   Allergies   Allergen Reactions     Anti-Thymocyte Glob (Rabbit)      Had reaction with rigors after steroid-free dose. No reaction with steroids.       Medications:  Prescription Medications as of 12/5/2023         Rx Number Disp Refills Start End Last Dispensed Date Next Fill Date Owning Pharmacy    alcohol swab prep pads  100 each 3 10/23/2019    [place], [place] - [address] [facility] [phone]    Sig: Use to swab area of injection/zak as directed.    Class: E-Prescribe    aspirin (ASA) 81 MG EC tablet            Sig: Take 81 mg by mouth daily     Class: Historical    Route: Oral    atorvastatin (LIPITOR) 10 MG tablet  90 tablet 0 11/13/2023    [place] [address]    Sig: Take 1 tablet (10 mg) by mouth daily    Class: E-Prescribe    Notes to Pharmacy: Please request future refills from your primary care provider    Route: Oral    blood glucose (CONTOUR NEXT TEST) test strip  400 strip 11 12/28/2021    [id number] [address] [address] AT [facility] [address]    Sig: Use to test blood sugar 4 times daily.    Class: E-Prescribe    blood glucose monitoring (ACCU-CHEK FASTCLIX) lancets   1 8/17/2018        Class: Historical    carvedilol (COREG) 12.5 MG tablet  180 tablet 3 12/13/2022    [place] [address]    Sig: Take 1 tablet (12.5 mg) by mouth 2 times daily (with meals)    Class: E-Prescribe    Route: Oral    Continuous Blood Gluc Sensor (DEXCOM G6 SENSOR) MISC  9 each 4 12/28/2022    [place] [address]    Sig: Change every 10 days. Please dispense 90 day supply- dispense 9 sensors.    Class: E-Prescribe    Continuous Blood Gluc Transmit (DEXCOM G6 TRANSMITTER) MISC  1 each 3 11/13/2023    [place] [address]    Sig: Change every 3 months    Class: E-Prescribe    Earliest Fill Date: 11/13/2023    Continuous Blood  Gluc Transmit (DEXCOM G6 TRANSMITTER) MISC  1 each 3 11/10/2023    [facility] - [place], [place] - [address]    Si each every 3 months    Class: E-Prescribe    Route: Does not apply    Continuous Blood Gluc Transmit (DEXCOM G6 TRANSMITTER) MISC  1 each 3 11/10/2023    [facility] [address] [place] [id number] [place] [address]    Si each every 3 months    Class: E-Prescribe    Notes to Pharmacy: Please disregard previous order through mail-order.  He wants it filled at [facility]    Route: Does not apply    Glucagon (BAQSIMI ONE PACK) 3 MG/DOSE POWD  1 each 3 2022    [facility] #[id number] - [facility] [address] [facility] [address] AT [facility] OF [facility] [address]    Sig: Spray 3 mg in nostril See Admin Instructions USE ONLY FOR SEVERE HYPOGLYCEMIA.    Class: E-Prescribe    Route: Nasal    insulin cartridge (T:SLIM 3ML) misc pump supply  45 each 3 2022    [facility] [place], [place] - [address]    Sig: Insulin cartridge to be used with pump as directed.  Change every 2 days or as directed.    Class: E-Prescribe    Notes to Pharmacy: Quanity changed.  Please disregard previous prescription    Insulin Infusion Pump Supplies (AUTOSOFT XC INFUSION SET) MISC  45 each 3 2022    [place], [place] - [address]    Si each every 48 hours Change every 2 days  9 mm cannula, 23 inch tubing    Class: E-Prescribe    Route: Does not apply    insulin lispro (HUMALOG VIAL) 100 UNIT/ML vial  130 mL 1 2023    [facility] [address] [place] [id number] [place] [address]    Sig: Via insulin pump. Approx 130 units daily. Follow up visit needed. Call  to schedule.    Class: E-Prescribe    insulin lispro (HUMALOG VIAL) 100 UNIT/ML vial  30 mL 11 2022    [facility] #[id number] - [place], [place] - [address] AT [facility] OF [facility] [address]    Sig: With Insulin pump. Up to 130 units per day.    Class: E-Prescribe     "insulin pen needle (ULTICARE MICRO) 32G X 4 MM miscellaneous  100 each 3 10/23/2019    [place], [place] - [address] [facility] [address]    Sig: Use pen needles daily or as directed.    Class: E-Prescribe    Insulin Syringe-Needle U-100 31G X 1/4\" 1 ML MISC  50 each 1 2020    [facility] #[id number] [facility] [address] AT [facility] OF [facility] [address]    Si Syringe as needed (until new insuline pump arrives)    Class: E-Prescribe    Route: Does not apply    magnesium oxide (MAG-OX) 400 MG tablet  30 tablet 5 10/5/2019    [facility] [address]    Sig: Take 1 tablet (400 mg) by mouth daily (with lunch)    Class: E-Prescribe    Route: Oral    mycophenolate (GENERIC EQUIVALENT) 250 MG capsule  180 capsule 11 2022    [facility] [address] [place] [address]    Sig: Take 3 capsules (750 mg) by mouth 2 times daily    Class: E-Prescribe    Notes to Pharmacy: TXP DT 10/1/2019 (Kidney) TXP Dischg DT  DX Kidney replaced by transplant Z94.0 TX [facility] ([place], [place])    Route: Oral    Semaglutide, 1 MG/DOSE, (OZEMPIC) 4 MG/3ML pen  3 mL 3 2023    [facility] [address] [place] [address]    Sig: Inject 1 mg Subcutaneous every 7 days    Class: E-Prescribe    Route: Subcutaneous    senna-docusate (SENOKOT-S/PERICOLACE) 8.6-50 MG tablet  20 tablet 0 10/5/2019    [address]    Sig: Take 2 tablets by mouth 2 times daily    Class: E-Prescribe    Route: Oral    tacrolimus (GENERIC EQUIVALENT) 0.5 MG capsule  90 capsule 3 2023    [facility] [address] [place] [address]    Sig: Take 1 capsule (0.5 mg) by mouth every morning Total dose = 1.5 mg in the AM and 1 mg in the PM    Class: E-Prescribe    Notes to Pharmacy: TXP DT 10/1/2019 (Kidney) TXP " Dischg DT  DX Kidney replaced by transplant Z94.0 TX Center [facility] ([place], [place])    Route: Oral    tacrolimus (GENERIC EQUIVALENT) 1 MG capsule  180 capsule 3 1/13/2023    [facility] [address] [place], [place] - [address] [place] [address]    Sig: Total dose = 1.5 mg in the AM and 1 mg in the PM    Class: E-Prescribe    Notes to Pharmacy: TXP DT 10/1/2019 (Kidney) TXP Dischg DT  DX Kidney replaced by transplant Z94.0 TX [facility] ([place], [place])    vitamin D3 (CHOLECALCIFEROL) 50 mcg (2000 units) tablet    11/24/2023        Sig: Take 1 tablet (50 mcg) by mouth daily    Class: No Print Out    Notes to Pharmacy: Profile Rx: patient will contact pharmacy when needed    Route: Oral    sulfamethoxazole-trimethoprim (BACTRIM) 400-80 MG tablet  30 tablet 11 4/5/2022    [facility] #[id number] - [place], [place] - [address] [address] AT [facility] OF [facility] [address]    Sig: Take 1 tablet by mouth daily    Class: E-Prescribe    Route: Oral            Exam:   Temp:  [97.4  F (36.3  C)] 97.4  F (36.3  C)  Pulse:  [76] 76  BP: (151)/(91) 151/91  SpO2:  [100 %] 100 %  Appearance: in no apparent distress.   Skin: normal  Head and Neck: Normal, no rashes or jaundice  Respiratory: easy respirations, no audible wheezing.  Cardiovascular: RRR  Abdomen: rounded, obese, and protuberant, No distention, Surgical scars consistent with history, and LLQ tillman scar. Umbilical hernia.   Extremities: femoral 3+/3+, Edema, none  Neuro: without deficit     Diagnostics:   Recent Results (from the past 672 hour(s))   Tacrolimus by Tandem Mass Spectrometry    Collection Time: 11/21/23  8:48 AM   Result Value Ref Range    Tacrolimus by Tandem Mass Spectrometry 7.1 5.0 - 15.0 ug/L    Tacrolimus Last Dose Date 11/20/2023     Tacrolimus Last Dose Time  7:15 PM    CBC with platelets    Collection Time: 11/21/23  8:48 AM   Result Value Ref Range    WBC Count 7.9 4.0 - 11.0  10e3/uL    RBC Count 5.34 4.40 - 5.90 10e6/uL    Hemoglobin 13.7 13.3 - 17.7 g/dL    Hematocrit 43.6 40.0 - 53.0 %    MCV 82 78 - 100 fL    MCH 25.7 (L) 26.5 - 33.0 pg    MCHC 31.4 (L) 31.5 - 36.5 g/dL    RDW 13.7 10.0 - 15.0 %    Platelet Count 187 150 - 450 10e3/uL   Basic metabolic panel    Collection Time: 11/21/23  8:48 AM   Result Value Ref Range    Sodium 135 135 - 145 mmol/L    Potassium 4.4 3.4 - 5.3 mmol/L    Chloride 100 98 - 107 mmol/L    Carbon Dioxide (CO2) 28 22 - 29 mmol/L    Anion Gap 7 7 - 15 mmol/L    Urea Nitrogen 18.7 6.0 - 20.0 mg/dL    Creatinine 1.09 0.67 - 1.17 mg/dL    GFR Estimate 86 >60 mL/min/1.73m2    Calcium 9.4 8.6 - 10.0 mg/dL    Glucose 162 (H) 70 - 99 mg/dL   Hemoglobin A1c    Collection Time: 11/21/23  8:48 AM   Result Value Ref Range    Hemoglobin A1C 7.1 (H) <5.7 %   Vitamin D Deficiency    Collection Time: 11/21/23  8:48 AM   Result Value Ref Range    Vitamin D, Total (25-Hydroxy) 14 (L) 20 - 50 ng/mL   HLA Donor Specific Antibody    Collection Time: 11/21/23  8:48 AM   Result Value Ref Range    Donor Identification 10/01/2019     Organ Left Kidney     DSA Present NO     DSA Comments        Flow Single Antigen Beads assays are intended for detection/identification of IgG anti-HLA antibodies. Mfi values may not accurately quantify donor-specific antibody levels in all instances.    DSA Test Method SA EDTA FCS    HLA Jeni Class I, Single Antigen    Collection Time: 11/21/23  8:48 AM   Result Value Ref Range    SA 1 TEST METHOD SA EDTA FCS     SA 1 CELL Class I     SA1 HI RISK JENI None     SA1 MOD RISK JENI None     SA 1  COMMENTS        HLA PRA Test performed by modified testing procedure that may also include pretreatment of serum. Pretreatment may be the addition of fetal calf serum, EDTA, and/or adsorption.  High-risk, MFI > 3,000.  Mod-risk, -3,000.   HLA Jeni Class II, Single Antigen    Collection Time: 11/21/23  8:48 AM   Result Value Ref Range    SA 2 TEST METHOD SA  EDTA FCS     SA 2 CELL Class II     SA2 HI RISK JENI None     SA2 MOD RISK JENI None     SA 2 COMMENTS        HLA PRA Test performed by modified testing procedure that may also include pretreatment of serum. Pretreatment may be the addition of fetal calf serum, EDTA, and/or adsorption.  High-risk, MFI > 3,000.  Mod-risk, -3,000.   HLA Jeni, CPRA    Collection Time: 11/21/23  8:48 AM   Result Value Ref Range    PROTOCOL CUTOFF Plan A, 500 mfi cumulative     UNOS CPRA 0     UNACCEPTABLE ANTIGENS None    UA with Microscopic reflex to Culture    Collection Time: 11/21/23 11:54 AM    Specimen: Urine, Midstream   Result Value Ref Range    Color Urine Straw Colorless, Straw, Light Yellow, Yellow    Appearance Urine Clear Clear    Glucose Urine Negative Negative mg/dL    Bilirubin Urine Negative Negative    Ketones Urine Negative Negative mg/dL    Specific Gravity Urine 1.006 1.003 - 1.035    Blood Urine Negative Negative    pH Urine 6.0 5.0 - 7.0    Protein Albumin Urine Negative Negative mg/dL    Urobilinogen Urine Normal Normal, 2.0 mg/dL    Nitrite Urine Negative Negative    Leukocyte Esterase Urine Negative Negative    Mucus Urine Present (A) None Seen /LPF    RBC Urine <1 <=2 /HPF    WBC Urine 0 <=5 /HPF     UNOS cPRA   Date Value Ref Range Status   05/11/2021 0  Final     UNOS CPRA   Date Value Ref Range Status   11/21/2023 0  Final

## 2023-12-05 NOTE — LETTER
12/5/2023         RE: Michael Amin  [address]  [place] [address]        Dear Colleague,    Thank you for referring your patient, Michael Amin, to the [facility]PL[facility]. Please see a copy of my visit note below.    Transplant Surgery Consult Note    Medical record number: [id number]  YOB: 1980,   Consult requested by Kymberly Boswell PA-C for evaluation of pancreas transplant candidacy.    Assessment and Recommendations: Mr. Amin is a good candidate for transplantation and has a good understanding of the risks and benefits of this approach to management of renal failure and diabetes. The following issues should be addressed prior to transplant:     Mr. Amin has Type 1 Diabetes whose condition is not expected to resolve, is expected to progress, and is expected to continue to develop related comorbid conditions.  Cardiology consult for cardiac risk stratification to be ordered: Yes  CT abdomen and pelvis without contrast to be ordered for assessment of vascular targets: Yes  Transplant listing labs ordered to include HLA, ABOx2, Cr, etc.  Dietician consult ordered: Yes  Social work consult ordered: Yes  Imaging reports reviewed:  CT from 2019  IMPRESSION:   1.  Atrophic kidneys and pancreas with nonspecific perinephric  stranding.  2.  No acute abnormality in the abdomen or pelvis on this noncontrast  exam.  3. Peritoneal dialysis catheter with midline approach, tip in the  pelvis.   Small free fluid adjacent to liver and in the paracolic  gutters and pelvis.   Radiology images reviewed: no new images since his transplant  Recipient suitable to move forward with work up of living donors:  No  Needs derm update  Colonoscopy this summer  I think his basal insulin is too high at this point He is eating to keep from going low. He is working with Abdullahi Javier to get things dialed in. He just changed his correction on carb counting yesterday. I do think his  insulin requirements are likely lower than currently reported making insulin independence more likely. At current insulin use he is under 1u/kg/day.  Duplex aorta/iliacs  Suitable to move forward with listing after the remainder of his work up is completed.    The majority of our visit was spent in counselling, discussing the medical and surgical risks of living or  donor kidney and pancreas transplantation, either in a simultaneous or sequential fashion. I contrasted approximate wait time for SPK vs living vs  donor kidneys from normal (0-85%) or higher (%) kidney donor profile index (KDPI) donors and their associated outcomes. I would not recommend this individual to consider kidneys from high KDPI donors. The reason for this decision is best summarized as: not on dialysis yet.  Access to transplant will be impacted by living donor availability and overall candidacy for SPK, as well as the influence of blood type and degree of sensitization. We discussed advantages of preemptive transplant as well as living donor kidney transplant, and graft and patient survival outcomes associated with these options. Potential surgical complications of kidney and pancreas transplantation include bleeding, clotting, infection, wound complications, anastomotic failure and other issues such as cardiac complications, pneumonia, deep venous thrombosis, pulmonary embolism, post transplant diabetes and death. We discussed the need for protocol biopsy of the kidney and the possible need for a ureteral stent (and subsequent removal). We discussed benefits and risks associated with different approaches to exocrine drainage of pancreatic secretions. We also discussed differences in the average length of stay, recovery process, and posttransplant lab and monitoring protocol. We discussed the risk of graft rejection and recurrent diabetic nephropathy in the setting of poor glycemic control. I emphasized the need for  strict immunosuppression adherence and the potential for complications of immunosuppression such as skin cancer or lymphoma, as well as a very low but not zero risk of donor-derived disease transmission risks (infection, cancer). Mr. Amin asked good questions and the patient's candidacy will be reviewed at our Multidisciplinary Selection Committee. Thank you for the opportunity to participate in Mr. Amin's care.    Total time: 60 minutes        Rocio Rutherford MD FACS  Associate Professor of Surgery  Director, Living Kidney Donor Program.  ---------------------------------------------------------------------------------------------------    HPI: Mr. Amin has Type 1 Diabetes. The patient has had diabetes for 25 years. Management is by Humalog 60-80 units per day via pump. The patient usually checks his blood sugar numerous times/day via CGM.  Daily blood glucoses range typically from 110 to 180.  Hypoglyemic unawareness is not an issue.  The diabetes is controlled.  Also on Ozempic  Complications of diabetes include:    Retinopathy:  Yes   Neuropathy: No  Gastroparesis:  No    The patient is not on dialysis.    Has potential kidney donors:  No.  Interested in participation in paired exchange if a donor is willing: No    The patient has the following pertinent history:       No    Yes  Dialysis:    [x]      [] via:       Blood Transfusion                  [x]      []  Number of units:   Most recently:  Pregnancy:    [x]      [] Number:       Previous Transplant:  []      [x] Details:  kidney transplant 2019  Cancer    [x]      [] Comment:   Kidney stones   [x]      [] Comment:      Recurrent infections  [x]      []  Type:                  Bladder dysfunction  [x]      [] Cause:    Claudication   [x]      [] Distance:    Previous Amputation  [x]      [] Cause:     Chronic anticoagulation  [x]      [] Indication:  Lutheran  [x]      []     Past Medical History:   Diagnosis Date    Anemia in  chronic kidney disease     Dyslipidemia     ESRD (end stage renal disease) on dialysis (H)     Hypertension     Hypomagnesemia 10/7/2019    Secondary hyperparathyroidism (H24)     Type 1 diabetes (H)      Past Surgical History:   Procedure Laterality Date    hair implant  2007    INSERT CATHETER PERITONEAL DIALYSIS  08/2018    IR FINE NEEDLE ASPIRATION W ULTRASOUND  11/25/2019    IR RENAL BIOPSY LEFT  11/25/2019    PERCUTANEOUS BIOPSY KIDNEY Left 10/22/2019    Procedure: Left Kidney Biopsy;  Surgeon: Kash Hoffman MD;  Location: [facility] OR     Family History   Problem Relation Age of Onset    Diabetes Father     Coronary Artery Disease Father     Skin Cancer Paternal Uncle     Melanoma No family hx of      Social History     Socioeconomic History    Marital status:      Spouse name: Not on file    Number of children: Not on file    Years of education: Not on file    Highest education level: Not on file   Occupational History    Not on file   Tobacco Use    Smoking status: Never    Smokeless tobacco: Never   Vaping Use    Vaping Use: Never used   Substance and Sexual Activity    Alcohol use: Not Currently     Comment: Rarely    Drug use: No    Sexual activity: Not on file   Other Topics Concern    Parent/sibling w/ CABG, MI or angioplasty before 65F 55M? Not Asked   Social History Narrative    Not on file     Social Determinants of Health     Financial Resource Strain: Not on file   Food Insecurity: Not on file   Transportation Needs: Not on file   Physical Activity: Not on file   Stress: Not on file   Social Connections: Not on file   Interpersonal Safety: Not on file   Housing Stability: Not on file       ROS:   CONSTITUTIONAL:  No fevers or chills  EYES: negative for icterus  ENT:  negative for hearing loss, tinnitus and sore throat  RESPIRATORY:  negative for cough, sputum, dyspnea  CARDIOVASCULAR:  negative for chest pain Retinopathy  GASTROINTESTINAL:  negative for nausea, vomiting, diarrhea or  constipation  GENITOURINARY:  negative for incontinence, dysuria, bladder emptying problems  HEME:  No easy bruising  INTEGUMENT:  negative for rash and pruritus  NEURO:  Negative for headache, seizure disorder    Allergies:   Allergies   Allergen Reactions    Anti-Thymocyte Glob (Rabbit)      Had reaction with rigors after steroid-free dose. No reaction with steroids.       Medications:  Prescription Medications as of 12/5/2023         Rx Number Disp Refills Start End Last Dispensed Date Next Fill Date Owning Pharmacy    alcohol swab prep pads  100 each 3 10/23/2019    [place], [place] - [address] [place] [address] [address]    Sig: Use to swab area of injection/zak as directed.    Class: E-Prescribe    aspirin (ASA) 81 MG EC tablet            Sig: Take 81 mg by mouth daily     Class: Historical    Route: Oral    atorvastatin (LIPITOR) 10 MG tablet  90 tablet 0 11/13/2023    [place] [address]    Sig: Take 1 tablet (10 mg) by mouth daily    Class: E-Prescribe    Notes to Pharmacy: Please request future refills from your primary care provider    Route: Oral    blood glucose (CONTOUR NEXT TEST) test strip  400 strip 11 12/28/2021    [facility] #[id number] [facility] [address] [address] [address] AT [address] OF [facility] [address]    Sig: Use to test blood sugar 4 times daily.    Class: E-Prescribe    blood glucose monitoring (ACCU-CHEK FASTCLIX) lancets   1 8/17/2018        Class: Historical    carvedilol (COREG) 12.5 MG tablet  180 tablet 3 12/13/2022    [place] [address]    Sig: Take 1 tablet (12.5 mg) by mouth 2 times daily (with meals)    Class: E-Prescribe    Route: Oral    Continuous Blood Gluc Sensor (DEXCOM G6 SENSOR) MISC  9 each 4 12/28/2022    [place] [address]    Sig: Change every 10 days. Please dispense 90 day supply- dispense 9 sensors.    Class:  E-Prescribe    Continuous Blood Gluc Transmit (DEXCOM G6 TRANSMITTER) MISC  1 each 3 2023    [facility] [address] [place] [address] [place] [address]    Sig: Change every 3 months    Class: E-Prescribe    Earliest Fill Date: 2023    Continuous Blood Gluc Transmit (DEXCOM G6 TRANSMITTER) MISC  1 each 3 11/10/2023    [facility] [place], [place] - [address]    Si each every 3 months    Class: E-Prescribe    Route: Does not apply    Continuous Blood Gluc Transmit (DEXCOM G6 TRANSMITTER) MISC  1 each 3 11/10/2023    [facility] [address] [place] [address] [place] [address]    Si each every 3 months    Class: E-Prescribe    Notes to Pharmacy: Please disregard previous order through mail-order.  He wants it filled at [facility]    Route: Does not apply    Glucagon (BAQSIMI ONE PACK) 3 MG/DOSE POWD  1 each 3 2022    [facility] #[id number] [facility] [address] [address] [facility] [address]    Sig: Spray 3 mg in nostril See Admin Instructions USE ONLY FOR SEVERE HYPOGLYCEMIA.    Class: E-Prescribe    Route: Nasal    insulin cartridge (T:SLIM 3ML) misc pump supply  45 each 3 2022    [facility] [address]    Sig: Insulin cartridge to be used with pump as directed.  Change every 2 days or as directed.    Class: E-Prescribe    Notes to Pharmacy: Quanity changed.  Please disregard previous prescription    Insulin Infusion Pump Supplies (AUTOSOFT XC INFUSION SET) MISC  45 each 3 2022    [facility] [place], [place] - [address]    Si each every 48 hours Change every 2 days  9 mm cannula, 23 inch tubing    Class: E-Prescribe    Route: Does not apply    insulin lispro (HUMALOG VIAL) 100 UNIT/ML vial  130 mL 1 2023    [facility] [address] [place] [address] [place] [address]    Sig: Via insulin pump. Approx 130 units daily. Follow up visit needed. Call  to schedule.     "Class: E-Prescribe    insulin lispro (HUMALOG VIAL) 100 UNIT/ML vial  30 mL 11 2022    [facility] #[id number] [facility] [address] [place] [address] AT [place] [address] [address]    Sig: With Insulin pump. Up to 130 units per day.    Class: E-Prescribe    insulin pen needle (ULTICARE MICRO) 32G X 4 MM miscellaneous  100 each 3 10/23/2019    [address]    Sig: Use pen needles daily or as directed.    Class: E-Prescribe    Insulin Syringe-Needle U-100 31G X 1/4\" 1 ML MISC  50 each 1 2020    [facility] #[id number] [place] [id number] [place] [address] AT [facility] OF [facility] [address]    Si Syringe as needed (until new insuline pump arrives)    Class: E-Prescribe    Route: Does not apply    magnesium oxide (MAG-OX) 400 MG tablet  30 tablet 5 10/5/2019    [address]    Sig: Take 1 tablet (400 mg) by mouth daily (with lunch)    Class: E-Prescribe    Route: Oral    mycophenolate (GENERIC EQUIVALENT) 250 MG capsule  180 capsule 11 2022    [facility] [address] [place] [address]    Sig: Take 3 capsules (750 mg) by mouth 2 times daily    Class: E-Prescribe    Notes to Pharmacy: TXP DT 10/1/2019 (Kidney) TXP Dischg DT  DX Kidney replaced by transplant Z94.0 [facility] ([place], [place])    Route: Oral    Semaglutide, 1 MG/DOSE, (OZEMPIC) 4 MG/3ML pen  3 mL 3 2023    [facility] [address] [place] [address]    Sig: Inject 1 mg Subcutaneous every 7 days    Class: E-Prescribe    Route: Subcutaneous    senna-docusate (SENOKOT-S/PERICOLACE) 8.6-50 MG tablet  20 tablet 0 10/5/2019    [address]    Sig: Take 2 tablets by mouth 2 times daily    Class: E-Prescribe    Route: Oral    tacrolimus (GENERIC EQUIVALENT) 0.5 MG capsule  90 " capsule 3 1/13/2023    [facility] [address] [place] [address] [place] [address]    Sig: Take 1 capsule (0.5 mg) by mouth every morning Total dose = 1.5 mg in the AM and 1 mg in the PM    Class: E-Prescribe    Notes to Pharmacy: TXP DT 10/1/2019 (Kidney) TXP Dischg DT  DX Kidney replaced by transplant Z94.0 [facility] ([place], [place])    Route: Oral    tacrolimus (GENERIC EQUIVALENT) 1 MG capsule  180 capsule 3 1/13/2023    [facility] [address] [place] [address] [place] [address]    Sig: Total dose = 1.5 mg in the AM and 1 mg in the PM    Class: E-Prescribe    Notes to Pharmacy: TXP DT 10/1/2019 (Kidney) TXP Dischg DT  DX Kidney replaced by transplant Z94.0 [facility] ([place], [place])    vitamin D3 (CHOLECALCIFEROL) 50 mcg (2000 units) tablet    11/24/2023        Sig: Take 1 tablet (50 mcg) by mouth daily    Class: No Print Out    Notes to Pharmacy: Profile Rx: patient will contact pharmacy when needed    Route: Oral    sulfamethoxazole-trimethoprim (BACTRIM) 400-80 MG tablet  30 tablet 11 4/5/2022    [facility] #[id number] - [facility] [address] [facility] [address] AT [facility] OF [facility] [address]    Sig: Take 1 tablet by mouth daily    Class: E-Prescribe    Route: Oral            Exam:   Temp:  [97.4  F (36.3  C)] 97.4  F (36.3  C)  Pulse:  [76] 76  BP: (151)/(91) 151/91  SpO2:  [100 %] 100 %  Appearance: in no apparent distress.   Skin: normal  Head and Neck: Normal, no rashes or jaundice  Respiratory: easy respirations, no audible wheezing.  Cardiovascular: RRR  Abdomen: rounded, obese, and protuberant, No distention, Surgical scars consistent with history, and LLQ tillman scar. Umbilical hernia.   Extremities: femoral 3+/3+, Edema, none  Neuro: without deficit     Diagnostics:   Recent Results (from the past 672 hour(s))   Tacrolimus by Tandem Mass Spectrometry    Collection Time: 11/21/23  8:48 AM   Result Value  Ref Range    Tacrolimus by Tandem Mass Spectrometry 7.1 5.0 - 15.0 ug/L    Tacrolimus Last Dose Date 11/20/2023     Tacrolimus Last Dose Time  7:15 PM    CBC with platelets    Collection Time: 11/21/23  8:48 AM   Result Value Ref Range    WBC Count 7.9 4.0 - 11.0 10e3/uL    RBC Count 5.34 4.40 - 5.90 10e6/uL    Hemoglobin 13.7 13.3 - 17.7 g/dL    Hematocrit 43.6 40.0 - 53.0 %    MCV 82 78 - 100 fL    MCH 25.7 (L) 26.5 - 33.0 pg    MCHC 31.4 (L) 31.5 - 36.5 g/dL    RDW 13.7 10.0 - 15.0 %    Platelet Count 187 150 - 450 10e3/uL   Basic metabolic panel    Collection Time: 11/21/23  8:48 AM   Result Value Ref Range    Sodium 135 135 - 145 mmol/L    Potassium 4.4 3.4 - 5.3 mmol/L    Chloride 100 98 - 107 mmol/L    Carbon Dioxide (CO2) 28 22 - 29 mmol/L    Anion Gap 7 7 - 15 mmol/L    Urea Nitrogen 18.7 6.0 - 20.0 mg/dL    Creatinine 1.09 0.67 - 1.17 mg/dL    GFR Estimate 86 >60 mL/min/1.73m2    Calcium 9.4 8.6 - 10.0 mg/dL    Glucose 162 (H) 70 - 99 mg/dL   Hemoglobin A1c    Collection Time: 11/21/23  8:48 AM   Result Value Ref Range    Hemoglobin A1C 7.1 (H) <5.7 %   Vitamin D Deficiency    Collection Time: 11/21/23  8:48 AM   Result Value Ref Range    Vitamin D, Total (25-Hydroxy) 14 (L) 20 - 50 ng/mL   HLA Donor Specific Antibody    Collection Time: 11/21/23  8:48 AM   Result Value Ref Range    Donor Identification 10/01/2019     Organ Left Kidney     DSA Present NO     DSA Comments        Flow Single Antigen Beads assays are intended for detection/identification of IgG anti-HLA antibodies. Mfi values may not accurately quantify donor-specific antibody levels in all instances.    DSA Test Method SA EDTA FCS    HLA Jeni Class I, Single Antigen    Collection Time: 11/21/23  8:48 AM   Result Value Ref Range    SA 1 TEST METHOD SA EDTA FCS     SA 1 CELL Class I     SA1 HI RISK JENI None     SA1 MOD RISK JENI None     SA 1  COMMENTS        HLA PRA Test performed by modified testing procedure that may also include pretreatment  of serum. Pretreatment may be the addition of fetal calf serum, EDTA, and/or adsorption.  High-risk, MFI > 3,000.  Mod-risk, -3,000.   HLA Jeni Class II, Single Antigen    Collection Time: 11/21/23  8:48 AM   Result Value Ref Range    SA 2 TEST METHOD SA EDTA FCS     SA 2 CELL Class II     SA2 HI RISK JENI None     SA2 MOD RISK JENI None     SA 2 COMMENTS        HLA PRA Test performed by modified testing procedure that may also include pretreatment of serum. Pretreatment may be the addition of fetal calf serum, EDTA, and/or adsorption.  High-risk, MFI > 3,000.  Mod-risk, -3,000.   HLA Jeni, CPRA    Collection Time: 11/21/23  8:48 AM   Result Value Ref Range    PROTOCOL CUTOFF Plan A, 500 mfi cumulative     UNOS CPRA 0     UNACCEPTABLE ANTIGENS None    UA with Microscopic reflex to Culture    Collection Time: 11/21/23 11:54 AM    Specimen: Urine, Midstream   Result Value Ref Range    Color Urine Straw Colorless, Straw, Light Yellow, Yellow    Appearance Urine Clear Clear    Glucose Urine Negative Negative mg/dL    Bilirubin Urine Negative Negative    Ketones Urine Negative Negative mg/dL    Specific Gravity Urine 1.006 1.003 - 1.035    Blood Urine Negative Negative    pH Urine 6.0 5.0 - 7.0    Protein Albumin Urine Negative Negative mg/dL    Urobilinogen Urine Normal Normal, 2.0 mg/dL    Nitrite Urine Negative Negative    Leukocyte Esterase Urine Negative Negative    Mucus Urine Present (A) None Seen /LPF    RBC Urine <1 <=2 /HPF    WBC Urine 0 <=5 /HPF     UNOS cPRA   Date Value Ref Range Status   05/11/2021 0  Final     OS CPRA   Date Value Ref Range Status   11/21/2023 0  Final          Again, thank you for allowing me to participate in the care of your patient.        Sincerely,        Rocio Rutherford MD, MD

## 2023-12-07 ENCOUNTER — TELEPHONE (OUTPATIENT)
Dept: DERMATOLOGY | Facility: CLINIC | Age: 43
End: 2023-12-07
Payer: COMMERCIAL

## 2023-12-07 DIAGNOSIS — Z01.810 PRE-OPERATIVE CARDIOVASCULAR EXAMINATION: ICD-10-CM

## 2023-12-07 DIAGNOSIS — I15.1 HTN, KIDNEY TRANSPLANT RELATED: ICD-10-CM

## 2023-12-07 DIAGNOSIS — Z76.82 ORGAN TRANSPLANT CANDIDATE: ICD-10-CM

## 2023-12-07 DIAGNOSIS — Z94.0 KIDNEY REPLACED BY TRANSPLANT: Primary | ICD-10-CM

## 2023-12-07 DIAGNOSIS — E11.9 DIABETES MELLITUS, TYPE 2 (H): Primary | ICD-10-CM

## 2023-12-07 DIAGNOSIS — Z94.0 HTN, KIDNEY TRANSPLANT RELATED: ICD-10-CM

## 2023-12-07 NOTE — PROGRESS NOTES
Called pt regarding surgeon visit, placed orders for derm, cards, iliacs, neph, and SW visits. Will follow up once appts are completed. Pt verbalized understanding of information and has no further questions. Encouraged to reach out if questions arise.

## 2023-12-07 NOTE — TELEPHONE ENCOUNTER
This encounter is being sent to inform the clinic that this patient has a referral from Michael Corrigan APRN CNP in  [facility]  for Transplant Kidney or K/P Waitlist and has requested that this patient be seen within Priority: 1-2 Weeks. Based on the availability of our provider(s), we are unable to accommodate this request.    Were all sites offered this patient?  Yes  Updated referral came in as priority, patient is scheduled for skin check from routine referral on 6/25/24    Does scheduling algorithm request to schedule next available?  Patient has been scheduled for the first available opening with Abdullahi Darden MD on 6/25/2024 . We have informed the patient that the clinic will review their referral and reach out if a sooner appointment is medically necessary.

## 2023-12-08 ENCOUNTER — VIRTUAL VISIT (OUTPATIENT)
Dept: PHARMACY | Facility: CLINIC | Age: 43
End: 2023-12-08
Payer: COMMERCIAL

## 2023-12-08 DIAGNOSIS — E10.29 TYPE 1 DIABETES MELLITUS WITH OTHER KIDNEY COMPLICATION (H): Primary | ICD-10-CM

## 2023-12-08 PROCEDURE — 99606 MTMS BY PHARM EST 15 MIN: CPT | Performed by: PHARMACIST

## 2023-12-08 RX ORDER — MYCOPHENOLATE MOFETIL 250 MG/1
750 CAPSULE ORAL 2 TIMES DAILY
Qty: 180 CAPSULE | Refills: 11 | Status: SHIPPED | OUTPATIENT
Start: 2023-12-08

## 2023-12-08 RX ORDER — CARVEDILOL 12.5 MG/1
12.5 TABLET ORAL 2 TIMES DAILY WITH MEALS
Qty: 180 TABLET | Refills: 0 | Status: SHIPPED | OUTPATIENT
Start: 2023-12-08 | End: 2024-03-04

## 2023-12-08 NOTE — PROGRESS NOTES
Medication Therapy Management (MTM) Encounter    ASSESSMENT:                            Medication Adherence/Access: No issues identified    Diabetes Type 1 diabetes  Patient reduced his bolus doses, this has led to a few highs over the past few days, but his insulin pump also has been malfunctioning and not noticing insulin.  He is getting send out a new pump.  Still we have limited the lows that we saw last visit over the past few days, we discussed the plan going forward and he would like to slightly increase his bolus dose to 1 unit per 5.5 g of carbs.  I agree with this change to help bring down the lowest.  We will also adjust his daytime basal rate. There is multiple rates with 5 hundredths of a change between them every few hours during the day.  To simplify this we will just switch the rate to 1.75 units/h.    PLAN:                            Increase your bolus dose from 1 unit for 5.5 grams of carbs from 11:30am to midnight.  Change basal rate from 4am to 4pm to 1.75 units/ hr.     Follow-up: 12/28 at 10 AM.    SUBJECTIVE/OBJECTIVE:                          Rudi Amin is a 43 year old male called for a follow-up visit from 12/4.       Reason for visit: diabetes follow-up.    Allergies/ADRs: Reviewed in chart  Past Medical History: Reviewed in chart  Tobacco: He reports that he has never smoked. He has never used smokeless tobacco.  Alcohol: not currently using    Medication Adherence/Access: no issues reported    Diabetes Type 1 diabetes  Ozempic 1mg weeklys for weight loss and insulin resistant.   Humalog Carb counting has had to enter a lower number of carbs than he has been actually eating to avoid going low.   12-11:30 am 1 unit for 7.5 g carbs  11:30am-4pm 1 unit for 6 g carbs  4-12am 1 unit for 6 g carbs  55- 75 units daily  Basal overnight 4pm-4am: 2.1 units/hr  Basal during the day 4am-9am: 1.8 units/hr   9am-11:30am: 1.75 units / hr  11:30am-4 pm 1.85 units/hr  Has an appointment with Kymberly  Andreia in January. Earliest he can get in  Blood sugar monitoring: Continuous Glucose Monitor  Current diabetes symptoms: Low blood sugars: has a 7up 7.5 oz, follows up with a granola bar. Had 4 low blood sugars over the weekend, dropped 40s, feel sweaty, shaky, angry. Dexcom goes off at 70, which is sometimes too low.   Urine Albumin:   Lab Results   Component Value Date    UMALCR 88.98 (H) 10/22/2019      Lab Results   Component Value Date    A1C 7.1 (H) 11/21/2023               Today's Vitals: There were no vitals taken for this visit.  ----------------    I spent 10 minutes with this patient today. All changes were made via collaborative practice agreement with Kymberly Boswell. A copy of the visit note was provided to the patient's provider(s).    A summary of these recommendations was sent via "[employer].    Liliana CunninghamD  [facility] Pharmacist    Phone: [phone]     Telemedicine Visit Details  Type of service:  Telephone visit  Start Time: 12:45 PM  End Time: 12:54 PM     Medication Therapy Recommendations  Diabetes mellitus type 1 (H)    Current Medication: insulin lispro (HUMALOG VIAL) 100 UNIT/ML vial   Rationale: Dose too low - Dosage too low - Effectiveness   Recommendation: Increase Dose   Status: Accepted per CPA

## 2023-12-08 NOTE — PATIENT INSTRUCTIONS
"Recommendations from today's MTM visit:                                                       Increase your bolus dose from 1 unit for 5.5 grams of carbs from 11:30am to midnight.  Change basal rate from 4am to 4pm to 1.75 units/ hr.     Follow-up: 12/28 at 10 AM.    It was great speaking with you today.  I value your experience and would be very thankful for your time in providing feedback in our clinic survey. In the next few days, you may receive an email or text message from [employer] with a link to a survey related to your  clinical pharmacist.\"     To schedule another MTM appointment, please call the clinic directly or you may call the MTM scheduling line at [phone] or toll-free at [phone].     My Clinical Pharmacist's contact information:                                                      Please feel free to contact me with any questions or concerns you have.      Abdullahi Javier, PharmD  MTM Pharmacist    Phone: [phone]     "

## 2023-12-14 PROBLEM — B02.9 SHINGLES: Status: ACTIVE | Noted: 2023-12-11

## 2023-12-18 ENCOUNTER — MEDICAL CORRESPONDENCE (OUTPATIENT)
Dept: HEALTH INFORMATION MANAGEMENT | Facility: CLINIC | Age: 43
End: 2023-12-18
Payer: COMMERCIAL

## 2023-12-20 ENCOUNTER — TELEPHONE (OUTPATIENT)
Dept: TRANSPLANT | Facility: CLINIC | Age: 43
End: 2023-12-20
Payer: COMMERCIAL

## 2023-12-20 ASSESSMENT — ENCOUNTER SYMPTOMS: NEW SYMPTOMS OF CORONARY ARTERY DISEASE: 0

## 2023-12-20 NOTE — LETTER
PHYSICIAN ORDERS      DATE & TIME ISSUED: 2023  2:08 PM   PATIENT NAME: Michael Amin   : 1980     [facility] MR# [if applicable]: [id number]     DIAGNOSIS:  Kidney Transplant  ICD-10 CODE: Z94.0     Please repeat the following labs in ~2 weeks:  Tacrolimus drug level  CBC  BMP    Any questions please call: [phone]  Please fax lab results to [phone].      Donnie Brandon MD

## 2023-12-28 ENCOUNTER — VIRTUAL VISIT (OUTPATIENT)
Dept: PHARMACY | Facility: CLINIC | Age: 43
End: 2023-12-28
Payer: COMMERCIAL

## 2023-12-28 DIAGNOSIS — E10.29 TYPE 1 DIABETES MELLITUS WITH OTHER KIDNEY COMPLICATION (H): Primary | ICD-10-CM

## 2023-12-28 PROCEDURE — 99606 MTMS BY PHARM EST 15 MIN: CPT | Performed by: PHARMACIST

## 2023-12-28 NOTE — PATIENT INSTRUCTIONS
"Recommendations from today's MTM visit:                                                       Decrease basal insulin:  Basal overnight 4pm-4am: 2.0 units/hr  Basal during the day 4am - 4pm: 1.7 units/hr    Follow-up: Kymberly Boswell on 1/19.    It was great speaking with you today.  I value your experience and would be very thankful for your time in providing feedback in our clinic survey. In the next few days, you may receive an email or text message from [employer] with a link to a survey related to your  clinical pharmacist.\"     To schedule another MTM appointment, please call the clinic directly or you may call the MTM scheduling line at [phone] or toll-free at [phone].     My Clinical Pharmacist's contact information:                                                      Please feel free to contact me with any questions or concerns you have.      Abdullahi Javier, PharmD  MTM Pharmacist    Phone: [phone]     "

## 2023-12-28 NOTE — PROGRESS NOTES
Medication Therapy Management (MTM) Encounter    ASSESSMENT:                            Medication Adherence/Access: No issues identified    Diabetes Type 1 diabetes  Patient believes his lows during the day and early morning are more so related to his basal rates. This is because if he doesn't eat for 4-5 hours he tends to go low. I agree with this assessment. He can slightly reduce his basal rates to offset this. Follow-up with Kymberly Boswell in January.     PLAN:                            Decrease basal insulin:  Basal overnight 4pm-4am: 2.0 units/hr  Basal during the day 4am - 4pm: 1.7 units/hr    Follow-up: Kymberly Boswell on 1/19.    SUBJECTIVE/OBJECTIVE:                          Rudi Amin is a 43 year old male called for a follow-up visit from 12/8.       Reason for visit: diabetes follow-up.  Changes last visit:  Increase your bolus dose from 1 unit for 5.5 grams of carbs from 11:30am to midnight.  Change basal rate from 4am to 4pm to 1.75 units/ hr.     Allergies/ADRs: Reviewed in chart  Past Medical History: Reviewed in chart  Tobacco: He reports that he has never smoked. He has never used smokeless tobacco.  Alcohol:  not currently using      Medication Adherence/Access: no issues reported    Diabetes Type 1 diabetes  Ozempic 1mg weeklys for weight loss and insulin resistant.   Humalog Carb counting has had to enter a lower number of carbs than he has been actually eating to avoid going low.   12-11:30 am 1 unit for 7.5 g carbs  11:30am-12am 1 unit for 5.5 g carbs  55- 75 units daily  Basal overnight 4pm-4am: 2.1 units/hr  Basal during the day 4am - 4pm: 1.75 units/hr  Has an appointment with Kymberly Boswell in January. Earliest he can get in  Blood sugar monitoring: Continuous Glucose Monitor, Dexcom  Current diabetes symptoms: Low blood sugars: has a 7up 7.5 oz, follows up with a granBriteseed bar. Had 4 low blood sugars over the weekend, dropped 40s, feel sweaty, shaky, angry. Dexcom goes off at 70, which is  sometimes too low.     Eye exam in the last 12 months? Yes- Date of last eye exam: 2/2023,  Location: Clinic in [place]  Foot exam: up to date   Lab Results   Component Value Date    A1C 7.1 (H) 11/21/2023      Last visit:       Today's Vitals: There were no vitals taken for this visit.  ----------------    I spent 10 minutes with this patient today. All changes were made via collaborative practice agreement with Kymberly Boswell. A copy of the visit note was provided to the patient's provider(s).    A summary of these recommendations was sent via [employer].    Abdullahi Javier PharmD  [facility] Pharmacist    Phone: [phone]     Telemedicine Visit Details  Type of service:  Telephone visit  Start Time: 9:08 AM  End Time: 9:18 AM     Medication Therapy Recommendations  Diabetes mellitus type 1 (H)    Current Medication: insulin lispro (HUMALOG VIAL) 100 UNIT/ML vial   Rationale: Dose too high - Dosage too high - Safety   Recommendation: Decrease Dose   Status: Accepted per CPA

## 2024-01-08 ENCOUNTER — OFFICE VISIT (OUTPATIENT)
Dept: DERMATOLOGY | Facility: CLINIC | Age: 44
End: 2024-01-08
Attending: INTERNAL MEDICINE
Payer: COMMERCIAL

## 2024-01-08 DIAGNOSIS — L82.1 SEBORRHEIC KERATOSES: ICD-10-CM

## 2024-01-08 DIAGNOSIS — D22.9 MULTIPLE NEVI: ICD-10-CM

## 2024-01-08 DIAGNOSIS — Z12.83 ENCOUNTER FOR SCREENING FOR MALIGNANT NEOPLASM OF SKIN: Primary | ICD-10-CM

## 2024-01-08 DIAGNOSIS — Z76.82 ORGAN TRANSPLANT CANDIDATE: ICD-10-CM

## 2024-01-08 DIAGNOSIS — Z48.298 AFTERCARE FOLLOWING ORGAN TRANSPLANT: ICD-10-CM

## 2024-01-08 DIAGNOSIS — E11.9 DIABETES MELLITUS, TYPE 2 (H): ICD-10-CM

## 2024-01-08 DIAGNOSIS — L81.4 LENTIGINES: ICD-10-CM

## 2024-01-08 DIAGNOSIS — L57.0 ACTINIC KERATOSES: ICD-10-CM

## 2024-01-08 DIAGNOSIS — D23.9 DERMATOFIBROMA: ICD-10-CM

## 2024-01-08 DIAGNOSIS — Z01.810 PRE-OPERATIVE CARDIOVASCULAR EXAMINATION: ICD-10-CM

## 2024-01-08 PROCEDURE — 17000 DESTRUCT PREMALG LESION: CPT

## 2024-01-08 PROCEDURE — 99213 OFFICE O/P EST LOW 20 MIN: CPT | Mod: 25

## 2024-01-08 ASSESSMENT — PAIN SCALES - GENERAL: PAINLEVEL: NO PAIN (0)

## 2024-01-08 NOTE — NURSING NOTE
Chief Complaint   Patient presents with    Skin Check     Patient reports lesions of concern on their left arm and their right leg.      Natalia Poe LPN

## 2024-01-08 NOTE — PROGRESS NOTES
[facility] Dermatology Note  Encounter Date: 2024  Office Visit     Reviewed patients past medical history and pertinent chart review prior to patients visit today.     Dermatology Problem List:  Kidney transplant 10/2019 due to type 1 diabetes.  2. Family history of melanoma (uncle, ).  3. Multiple benign nevi.  - Photos 9/15/2020  - Monitor larger nevi on abdomen, 1.7 cm x 1.5 cm  4. Tinea corporis, left hand/forearm.  - KOH positive 9/15/2020  - Terbinafine cream    Social history: Works as a teacher  ________________    Assessment & Plan:     #Actinic keratosis x1  - We discussed the precancerous nature of the skin lesions.  I recommended liquid nitrogen cryotherapy and the patient was agreeable.      Cryotherapy procedure note, location(s): right temple x1 After verbal consent and discussion of risks and benefits including, but not limited to, dyspigmentation/scar, blister, and pain, the lesion(s) was(were) treated with 1-2 mm freeze border for 1-2 cycles with liquid nitrogen. Post cryotherapy instructions were provided.    # Chronic immunosuppression  # Multiple nevi, trunk and extremities  # Solar lentigines  - Nevi consistent with photos 09/15/2020  - Nevus involving the midline abdomen measures 1.7 x 1.5 cm.   - No concerning features on dermoscopy. We discussed the importance of self exams at home.   - ABCDEs: Counseled ABCDEs of melanoma: Asymmetry, Border (irregularity), Color (not uniform, changes in color), Diameter (greater than 6 mm which is about the size of a pencil eraser), and Evolving (any changes in preexisting moles).  - Sun protection: Counseled SPF 30+ sunscreen, UPF clothing, sun avoidance, tanning bed avoidance.    # Seborrheic keratoses  # Dermatofibroma versus epidermal inclusion cyst, left upper arm  - The patient's lesion of concern involving the left upper arm is consistent with a dermatofibroma versus epidermal inclusion cyst.   - We discussed the  benign nature of the skin lesions. No treatment required. Continued observation recommended. Follow up with any concerns.      Follow-up:  Annual for follow up full body skin exam, as needed for new or changing lesions or new concerns    All risks, benefits and alternatives were discussed with patient.  Patient is in agreement and understands the assessment and plan.  All questions were answered.  Julieta Biswas PA-C  [facility]atology    ____________________________________________    CC: No chief complaint on file.    HPI:  Mr. Michael Amin is a(n) 43 year old male who presents today as a return patient for a full body skin cancer screening. The patient denies a personal history of skin cancer. He has a history of kidney transplant in 2019, chronically immunosuppressed with mycophenolate and tacrolimus. Last skin check was 09/15/2020.  The patient requests evaluation of a lesion on the left upper arm. He first noticed this weeks ago.  No material has drained from the site, he denies manipulating the lesion. No other specific cutaneous concerns today. The patient reports trying to be diligent with photoprotection.      Physical Exam:  Vitals: There were no vitals taken for this visit.  SKIN: Total skin excluding the genitalia areas was performed. The exam included the scalp, face, neck, bilateral arms, chest, back, abdomen, bilateral legs, digits, mons pubis, buttocks, and nails.   - Fajardo II.  - Pink macule with gritty scale involving the right temple, consistent with actinic keratosis.   - The left posterior upper arm demonstrates a 1.0 x 1.0 cm homogenous firm pink macule that dimples with lateral pressure.   - Multiple tan/brown macules and papules scattered throughout exam, consistent with benign nevi. No concerning features on dermoscopy.   - Scattered tan, homogenous macules scattered on sun exposed skin, consistent with solar lentigines.   - Scattered waxy, stuck on appearing papules  "and patches, consistent with seborrheic keratoses.      Medications:  Current Outpatient Medications   Medication    alcohol swab prep pads    aspirin (ASA) 81 MG EC tablet    atorvastatin (LIPITOR) 10 MG tablet    blood glucose (CONTOUR NEXT TEST) test strip    blood glucose monitoring (ACCU-CHEK FASTCLIX) lancets    carvedilol (COREG) 12.5 MG tablet    Continuous Blood Gluc Sensor (DEXCOM G6 SENSOR) MISC    Continuous Blood Gluc Transmit (DEXCOM G6 TRANSMITTER) MISC    Continuous Blood Gluc Transmit (DEXCOM G6 TRANSMITTER) MISC    Continuous Blood Gluc Transmit (DEXCOM G6 TRANSMITTER) MISC    Glucagon (BAQSIMI ONE PACK) 3 MG/DOSE POWD    insulin cartridge (T:SLIM 3ML) misc pump supply    Insulin Infusion Pump Supplies (HemoShear XC INFUSION SET) MISC    insulin lispro (HUMALOG VIAL) 100 UNIT/ML vial    insulin lispro (HUMALOG VIAL) 100 UNIT/ML vial    insulin pen needle (ULTICARE MICRO) 32G X 4 MM miscellaneous    Insulin Syringe-Needle U-100 31G X 1/4\" 1 ML MISC    magnesium oxide (MAG-OX) 400 MG tablet    mycophenolate (GENERIC EQUIVALENT) 250 MG capsule    Semaglutide, 1 MG/DOSE, (OZEMPIC) 4 MG/3ML pen    senna-docusate (SENOKOT-S/PERICOLACE) 8.6-50 MG tablet    sulfamethoxazole-trimethoprim (BACTRIM) 400-80 MG tablet    tacrolimus (GENERIC EQUIVALENT) 0.5 MG capsule    tacrolimus (GENERIC EQUIVALENT) 1 MG capsule    vitamin D3 (CHOLECALCIFEROL) 50 mcg (2000 units) tablet     No current facility-administered medications for this visit.      Past Medical History:   Patient Active Problem List   Diagnosis    HTN, kidney transplant related    Diabetes mellitus type 1 (H)    Dyslipidemia    Anemia in chronic kidney disease    Secondary renal hyperparathyroidism (H24)    Kidney replaced by transplant    Aftercare following organ transplant    Vitamin D deficiency    Hypomagnesemia    Kidney transplant rejection    Class 1 obesity with serious comorbidity and body mass index (BMI) of 33.0 to 33.9 in adult, unspecified " obesity type    Shingles     Past Medical History:   Diagnosis Date    Anemia in chronic kidney disease     Dyslipidemia     ESRD (end stage renal disease) on dialysis (H)     Hypertension     Hypomagnesemia 10/07/2019    Secondary hyperparathyroidism (H24)     Shingles 12/11/2023 12/11/23: Left Axilla    Type 1 diabetes (H)        CC Referred Self, MD  No address on file on close of this encounter.

## 2024-01-08 NOTE — PATIENT INSTRUCTIONS
Patient Education        Proper skin care from [facility]:     -Eliminate harsh soaps as they strip the natural oils from the skin, often resulting in dry itchy skin ( i.e. Dial, Zest, Bulgarian Spring)  -Use mild soaps such as Cetaphil or Dove Sensitive Skin in the shower. You do not need to use soap on arms, legs, and trunk every time you shower unless visibly soiled.   -Avoid hot or cold showers.  -After showering, lightly dry off and apply moisturizing within 2-3 minutes. This will help trap moisture in the skin.   -Aggressive use of a moisturizer at least 1-2 times a day to the entire body (including -Vanicream, Cetaphil, Aquaphor or Cerave) and moisturize hands after every washing.  -We recommend using moisturizers that come in a tub that needs to be scooped out, not a pump. This has more of an oil base. It will hold moisture in your skin much better than a water base moisturizer. The above recommended are non-pore clogging.        Wear a sunscreen with at least SPF 30 on your face, ears, neck and V of the chest daily. Wear sunscreen on other areas of the body if those areas are exposed to the sun throughout the day. Sunscreens can contain physical and/or chemical blockers. Physical blockers are less likely to clog pores, these include zinc oxide and titanium dioxide. Reapply every two hour and after swimming.      Sunscreen examples: https://www.ewg.org/sunscreen/     UV radiation  UVA radiation remains constant throughout the day and throughout the year. It is a longer wavelength than UVB and therefore penetrates deeper into the skin leading to immediate and delayed tanning, photoaging, and skin cancer. 70-80% of UVA and UVB radiation occurs between the hours of 10am-2pm.  UVB radiation  UVB radiation causes the most harmful effects and is more significant during the summer months. However, snow and ice can reflect UVB radiation leading to skin damage during the winter months as well. UVB radiation is  responsible for tanning, burning, inflammation, delayed erythema (pinkness), pigmentation (brown spots), and skin cancer.      I recommend self monthly full body exams and yearly full body exams with a dermatology provider. If you develop a new or changing lesion please follow up for examination. Most skin cancers are pink and scaly or pink and pearly. However, we do see blue/brown/black skin cancers.  Consider the ABCDEs of melanoma when giving yourself your monthly full body exam ( don't forget the groin, buttocks, feet, toes, etc). A-asymmetry, B-borders, C-color, D-diameter, E-elevation or evolving. If you see any of these changes please follow up in clinic. If you cannot see your back I recommend purchasing a hand held mirror to use with a larger wall mirror.       Checking for Skin Cancer  You can find cancer early by checking your skin each month. There are 3 kinds of skin cancer. They are melanoma, basal cell carcinoma, and squamous cell carcinoma. Doing monthly skin checks is the best way to find new marks or skin changes. Follow the instructions below for checking your skin.   The ABCDEs of checking moles for melanoma   Check your moles or growths for signs of melanoma using ABCDE:   Asymmetry: the sides of the mole or growth don t match  Border: the edges are ragged, notched, or blurred  Color: the color within the mole or growth varies  Diameter: the mole or growth is larger than 6 mm (size of a pencil eraser)  Evolving: the size, shape, or color of the mole or growth is changing (evolving is not shown in the images below)    Checking for other types of skin cancer  Basal cell carcinoma or squamous cell carcinoma have symptoms such as:      A spot or mole that looks different from all other marks on your skin  Changes in how an area feels, such as itching, tenderness, or pain  Changes in the skin's surface, such as oozing, bleeding, or scaliness  A sore that does not heal  New swelling or redness beyond  the border of a mole     Who s at risk?  Anyone can get skin cancer. But you are at greater risk if you have:   Fair skin, light-colored hair, or light-colored eyes  Many moles or abnormal moles on your skin  A history of sunburns from sunlight or tanning beds  A family history of skin cancer  A history of exposure to radiation or chemicals  A weakened immune system  If you have had skin cancer in the past, you are at risk for recurring skin cancer.   How to check your skin  Do your monthly skin checkups in front of a full-length mirror. Check all parts of your body, including your:   Head (ears, face, neck, and scalp)  Torso (front, back, and sides)  Arms (tops, undersides, upper, and lower armpits)  Hands (palms, backs, and fingers, including under the nails)  Buttocks and genitals  Legs (front, back, and sides)  Feet (tops, soles, toes, including under the nails, and between toes)  If you have a lot of moles, take digital photos of them each month. Make sure to take photos both up close and from a distance. These can help you see if any moles change over time.   Most skin changes are not cancer. But if you see any changes in your skin, call your doctor right away. Only he or she can diagnose a problem. If you have skin cancer, seeing your doctor can be the first step toward getting the treatment that could save your life.   [employer] last reviewed this educational content on 4/1/2019 2000-2020 [employer]. [address] [place] [address], [place] [address]. All rights reserved. This information is not intended as a substitute for professional medical care. Always follow your healthcare professional's instructions.        When should I call my doctor?  If you are worsening or not improving, please, contact us or seek urgent care as noted below.      Who should I call with questions (adults)?  [facility] (adult and pediatric): [phone]  [facility]  [place] (adult): [phone]  [facility] ([place], [place], [place] and [place]) [phone]  For urgent needs outside of business hours call the [facility] at [phone] and ask for the dermatology resident on call to be paged  If this is a medical emergency and you are unable to reach an ER, Call [phone]        If you need a prescription refill, please contact your pharmacy. Refills are approved or denied by our Physicians during normal business hours, Monday through Fridays  Per office policy, refills will not be granted if you have not been seen within the past year (or sooner depending on your child's condition)

## 2024-01-08 NOTE — LETTER
2024       RE: Michael Amin  [address]  [facility] [address]     Dear Colleague,    Thank you for referring your patient, Michale Amin, to the [facility] DERMATOLOG[facility] [place] at [facility]. Please see a copy of my visit note below.    [facility] Dermatology Note  Encounter Date: 2024  Office Visit     Reviewed patients past medical history and pertinent chart review prior to patients visit today.     Dermatology Problem List:  Kidney transplant 10/2019 due to type 1 diabetes.  2. Family history of melanoma (uncle, ).  3. Multiple benign nevi.  - Photos 9/15/2020  - Monitor larger nevi on abdomen, 1.7 cm x 1.5 cm  4. Tinea corporis, left hand/forearm.  - KOH positive 9/15/2020  - Terbinafine cream    Social history: Works as a teacher  ________________    Assessment & Plan:     #Actinic keratosis x1  - We discussed the precancerous nature of the skin lesions.  I recommended liquid nitrogen cryotherapy and the patient was agreeable.      Cryotherapy procedure note, location(s): right temple x1 After verbal consent and discussion of risks and benefits including, but not limited to, dyspigmentation/scar, blister, and pain, the lesion(s) was(were) treated with 1-2 mm freeze border for 1-2 cycles with liquid nitrogen. Post cryotherapy instructions were provided.    # Chronic immunosuppression  # Multiple nevi, trunk and extremities  # Solar lentigines  - Nevi consistent with photos 09/15/2020  - Nevus involving the midline abdomen measures 1.7 x 1.5 cm.   - No concerning features on dermoscopy. We discussed the importance of self exams at home.   - ABCDEs: Counseled ABCDEs of melanoma: Asymmetry, Border (irregularity), Color (not uniform, changes in color), Diameter (greater than 6 mm which is about the size of a pencil eraser), and Evolving (any changes in preexisting moles).  - Sun protection:  Counseled SPF 30+ sunscreen, UPF clothing, sun avoidance, tanning bed avoidance.    # Seborrheic keratoses  # Dermatofibroma versus epidermal inclusion cyst, left upper arm  - The patient's lesion of concern involving the left upper arm is consistent with a dermatofibroma versus epidermal inclusion cyst.   - We discussed the benign nature of the skin lesions. No treatment required. Continued observation recommended. Follow up with any concerns.      Follow-up:  Annual for follow up full body skin exam, as needed for new or changing lesions or new concerns    All risks, benefits and alternatives were discussed with patient.  Patient is in agreement and understands the assessment and plan.  All questions were answered.  Julieta Biswas PA-C  [facility] Dermatology    ____________________________________________    CC: No chief complaint on file.    HPI:  Mr. Michael Amin is a(n) 43 year old male who presents today as a return patient for a full body skin cancer screening. The patient denies a personal history of skin cancer. He has a history of kidney transplant in 2019, chronically immunosuppressed with mycophenolate and tacrolimus. Last skin check was 09/15/2020.  The patient requests evaluation of a lesion on the left upper arm. He first noticed this weeks ago.  No material has drained from the site, he denies manipulating the lesion. No other specific cutaneous concerns today. The patient reports trying to be diligent with photoprotection.      Physical Exam:  Vitals: There were no vitals taken for this visit.  SKIN: Total skin excluding the genitalia areas was performed. The exam included the scalp, face, neck, bilateral arms, chest, back, abdomen, bilateral legs, digits, mons pubis, buttocks, and nails.   - Fajardo II.  - Pink macule with gritty scale involving the right temple, consistent with actinic keratosis.   - The left posterior upper arm demonstrates a 1.0 x 1.0 cm homogenous firm pink  "macule that dimples with lateral pressure.   - Multiple tan/brown macules and papules scattered throughout exam, consistent with benign nevi. No concerning features on dermoscopy.   - Scattered tan, homogenous macules scattered on sun exposed skin, consistent with solar lentigines.   - Scattered waxy, stuck on appearing papules and patches, consistent with seborrheic keratoses.      Medications:  Current Outpatient Medications   Medication    alcohol swab prep pads    aspirin (ASA) 81 MG EC tablet    atorvastatin (LIPITOR) 10 MG tablet    blood glucose (CONTOUR NEXT TEST) test strip    blood glucose monitoring (ACCU-CHEK FASTCLIX) lancets    carvedilol (COREG) 12.5 MG tablet    Continuous Blood Gluc Sensor (DEXCOM G6 SENSOR) MISC    Continuous Blood Gluc Transmit (DEXCOM G6 TRANSMITTER) MISC    Continuous Blood Gluc Transmit (DEXCOM G6 TRANSMITTER) MISC    Continuous Blood Gluc Transmit (DEXCOM G6 TRANSMITTER) MISC    Glucagon (BAQSIMI ONE PACK) 3 MG/DOSE POWD    insulin cartridge (T:SLIM 3ML) misc pump supply    Insulin Infusion Pump Supplies (AUTOSOFT XC INFUSION SET) MISC    insulin lispro (HUMALOG VIAL) 100 UNIT/ML vial    insulin lispro (HUMALOG VIAL) 100 UNIT/ML vial    insulin pen needle (ULTICARE MICRO) 32G X 4 MM miscellaneous    Insulin Syringe-Needle U-100 31G X 1/4\" 1 ML MISC    magnesium oxide (MAG-OX) 400 MG tablet    mycophenolate (GENERIC EQUIVALENT) 250 MG capsule    Semaglutide, 1 MG/DOSE, (OZEMPIC) 4 MG/3ML pen    senna-docusate (SENOKOT-S/PERICOLACE) 8.6-50 MG tablet    sulfamethoxazole-trimethoprim (BACTRIM) 400-80 MG tablet    tacrolimus (GENERIC EQUIVALENT) 0.5 MG capsule    tacrolimus (GENERIC EQUIVALENT) 1 MG capsule    vitamin D3 (CHOLECALCIFEROL) 50 mcg (2000 units) tablet     No current facility-administered medications for this visit.      Past Medical History:   Patient Active Problem List   Diagnosis    HTN, kidney transplant related    Diabetes mellitus type 1 (H)    Dyslipidemia    " Anemia in chronic kidney disease    Secondary renal hyperparathyroidism (H24)    Kidney replaced by transplant    Aftercare following organ transplant    Vitamin D deficiency    Hypomagnesemia    Kidney transplant rejection    Class 1 obesity with serious comorbidity and body mass index (BMI) of 33.0 to 33.9 in adult, unspecified obesity type    Shingles     Past Medical History:   Diagnosis Date    Anemia in chronic kidney disease     Dyslipidemia     ESRD (end stage renal disease) on dialysis (H)     Hypertension     Hypomagnesemia 10/07/2019    Secondary hyperparathyroidism (H24)     Shingles 12/11/2023 12/11/23: Left Axilla    Type 1 diabetes (H)        CC Referred Self, MD  No address on file on close of this encounter.

## 2024-01-09 DIAGNOSIS — Z99.2 TYPE 1 DIABETES MELLITUS WITH CHRONIC KIDNEY DISEASE ON CHRONIC DIALYSIS (H): ICD-10-CM

## 2024-01-09 DIAGNOSIS — N18.6 TYPE 1 DIABETES MELLITUS WITH CHRONIC KIDNEY DISEASE ON CHRONIC DIALYSIS (H): ICD-10-CM

## 2024-01-09 DIAGNOSIS — E10.22 TYPE 1 DIABETES MELLITUS WITH CHRONIC KIDNEY DISEASE ON CHRONIC DIALYSIS (H): ICD-10-CM

## 2024-01-14 RX ORDER — PROCHLORPERAZINE 25 MG/1
SUPPOSITORY RECTAL
Qty: 9 EACH | Refills: 0 | Status: SHIPPED | OUTPATIENT
Start: 2024-01-14 | End: 2024-04-05

## 2024-01-15 NOTE — TELEPHONE ENCOUNTER
Continuous Blood Gluc Sensor (DEXCOM G6 SENSOR) MISC 9 each 4 12/28/2022     Last Office Visit: 12/28/22  Future Office visit:   1/19/24    Refill protocol passed  Kera Torres RN

## 2024-01-16 NOTE — PROGRESS NOTES
Outcome for 01/16/24 10:17 AM: [employer] message sent  Raquel Prater LPN   Outcome for 01/17/24 1:35 PM: Per patient, will upload device before appointment  Raquel Prater LPN   Outcome for 01/19/24 12:57 PM: Data obtained via [employer] website  Audrey Goel MA

## 2024-01-17 ENCOUNTER — TELEPHONE (OUTPATIENT)
Dept: ENDOCRINOLOGY | Facility: CLINIC | Age: 44
End: 2024-01-17
Payer: COMMERCIAL

## 2024-01-17 NOTE — TELEPHONE ENCOUNTER
Spoke with patient in regards to obtaining tandem data for appointment scheduled on 1/19/24. Patient will leave tandem terri open to transmit data.    Raquel Prater LPN 01/17/24 1:35 PM

## 2024-01-19 ENCOUNTER — VIRTUAL VISIT (OUTPATIENT)
Dept: ENDOCRINOLOGY | Facility: CLINIC | Age: 44
End: 2024-01-19
Payer: COMMERCIAL

## 2024-01-19 DIAGNOSIS — Z94.0 KIDNEY TRANSPLANTED: ICD-10-CM

## 2024-01-19 DIAGNOSIS — N18.6 ESRD (END STAGE RENAL DISEASE) ON DIALYSIS (H): ICD-10-CM

## 2024-01-19 DIAGNOSIS — N18.6 TYPE 1 DIABETES MELLITUS WITH CHRONIC KIDNEY DISEASE ON CHRONIC DIALYSIS (H): Primary | ICD-10-CM

## 2024-01-19 DIAGNOSIS — E10.22 TYPE 1 DIABETES MELLITUS WITH CHRONIC KIDNEY DISEASE ON CHRONIC DIALYSIS (H): Primary | ICD-10-CM

## 2024-01-19 DIAGNOSIS — Z99.2 ESRD (END STAGE RENAL DISEASE) ON DIALYSIS (H): ICD-10-CM

## 2024-01-19 DIAGNOSIS — Z99.2 TYPE 1 DIABETES MELLITUS WITH CHRONIC KIDNEY DISEASE ON CHRONIC DIALYSIS (H): Primary | ICD-10-CM

## 2024-01-19 PROCEDURE — 99215 OFFICE O/P EST HI 40 MIN: CPT | Mod: 95 | Performed by: PHYSICIAN ASSISTANT

## 2024-01-19 RX ORDER — INSULIN LISPRO 100 [IU]/ML
INJECTION, SOLUTION INTRAVENOUS; SUBCUTANEOUS
Qty: 80 ML | Refills: 3 | Status: SHIPPED | OUTPATIENT
Start: 2024-01-19

## 2024-01-19 RX ORDER — TACROLIMUS 0.5 MG/1
0.5 CAPSULE ORAL EVERY MORNING
Qty: 90 CAPSULE | Refills: 3 | Status: SHIPPED | OUTPATIENT
Start: 2024-01-19

## 2024-01-19 RX ORDER — TACROLIMUS 1 MG/1
CAPSULE ORAL
Qty: 180 CAPSULE | Refills: 3 | Status: SHIPPED | OUTPATIENT
Start: 2024-01-19

## 2024-01-19 NOTE — TELEPHONE ENCOUNTER
Ran out of his Tacrolimus 0.5mg and 1.0mg   Needs updated new scripts.    Please call Rudi # [phone] to let him know.     [facility] [address] [place] [address] [id number] [place] [address] Phone: [phone]   Fax: [phone]

## 2024-01-19 NOTE — PROGRESS NOTES
Time of start: 12:55 pm   Time of end: 1:33 pm   Total duration of video visit: 38 minutes.  Providers location: offsite.  Patients location: [facility]  Dustin ( Rudi Rodriguez is a 43 year old male with type 1 diabetes mellitus.  Video visit today for diabetes follow up.  Pt has hx of type 1 diabetes mellitus dx at age 16.  His diabetes is complicated by nephropathy and he underwent a kidney transplant on 10/1/2019.   Oph exam showed possible early retinopathy.   Oph exam reported stable exam in Nov 2022.   He has no peripheral neuropathy.  His hx is also significant for HTN, ED, dyslipidemia and obesity.  Rudi is using a Tandem insulin pump- control IQ and his basal insulin rates are:  Midnight= 2.0 units/hr.  4 am = 1.7 units/hr.  11:30 am = 1.7 units/hr.  8 pm = 2.0 units/hr.  I/C ratio settings:  Midnight= 1:7.5 and 1:5.5 at 11:30 am.  Sensitivity is set at 29.  Pt is also taking Ozempic 1 mg subcutaneous once a week.  Most recent A1C was 7.1 % on 11/21/2023.   Previous A1C was 8.0 % on 6/15/2022.   I reviewed and scanned his insulin pump download data in his note below.  Average glucose is 168 with SD 65 and estimated A1C 7.3 %.  He is concerned about higher blood sugar values overnight and in the am.  Rudi is also having hypoglycemia around 2:30 pm.  On ROS today, Rudi reports feeling well.  He has lost approx 40 lbs since taking Ozempic.  Denies n/v, abd pain, diarrhea or hx of pancreatitis or gastroparesis.   No blurred vision. Floater left eye and he plans to schedule his annual diabetic eye exam.  Pt denies n/v, SOB at rest, cough, fever, chills, chest pain, abd pain, diarrhea, dysuria, hematuria or foot ulcers.  Rudi denies numbness, tingling or pain in his feet or hands.    Diabetes Care  Retinopathy: he was seen by Oph in Feb 2019- possible early retinopathy. He was seen by Oph in Nov 2022 with stable exam per patient.  Nephropathy:hx of ESRD s/p kidney transplant on 10/03837. Urine microalbuminuria  negative in 8/2022.  Neuropathy: none.  Foot Exam:no exam today.  Taking aspirin: yes  Lipids: LDL 69 in Oct 2020. Pt is taking Lipitor.  CAD: no.  Mental health:denies depression.  Insulin: Tandem insulin pump- control IQ and DexcomG6 sensor.  Hypoglycemia tx: Pt has Baqsimi to use in case of severe hypoglycemia.                    ROS  Please see under HPI.    Allergies  Allergies   Allergen Reactions    Anti-Thymocyte Glob (Rabbit)      Had reaction with rigors after steroid-free dose. No reaction with steroids.       Medications  Current Outpatient Medications   Medication Sig Dispense Refill    alcohol swab prep pads Use to swab area of injection/zak as directed. 100 each 3    aspirin (ASA) 81 MG EC tablet Take 81 mg by mouth daily       atorvastatin (LIPITOR) 10 MG tablet Take 1 tablet (10 mg) by mouth daily 90 tablet 0    blood glucose (CONTOUR NEXT TEST) test strip Use to test blood sugar 4 times daily. 400 strip 11    blood glucose monitoring (ACCU-CHEK FASTCLIX) lancets   1    carvedilol (COREG) 12.5 MG tablet Take 1 tablet (12.5 mg) by mouth 2 times daily (with meals) 180 tablet 0    Continuous Blood Gluc Sensor (DEXCOM G6 SENSOR) MISC CHANGE SENSOR EVERY 10 DAYS 9 each 0    Continuous Blood Gluc Transmit (DEXCOM G6 TRANSMITTER) MISC Change every 3 months 1 each 3    Continuous Blood Gluc Transmit (DEXCOM G6 TRANSMITTER) MISC 1 each every 3 months 1 each 3    Continuous Blood Gluc Transmit (DEXCOM G6 TRANSMITTER) MISC 1 each every 3 months 1 each 3    Glucagon (BAQSIMI ONE PACK) 3 MG/DOSE POWD Spray 3 mg in nostril See Admin Instructions USE ONLY FOR SEVERE HYPOGLYCEMIA. 1 each 3    insulin cartridge (T:SLIM 3ML) misc pump supply Insulin cartridge to be used with pump as directed.  Change every 2 days or as directed. 45 each 3    Insulin Infusion Pump Supplies (AUTOSOFT XC INFUSION SET) MISC 1 each every 48 hours Change every 2 days  9 mm cannula, 23 inch tubing 45 each 3    insulin lispro (HUMALOG  "VIAL) 100 UNIT/ML vial Via insulin pump. Approx 130 units daily. Follow up visit needed. Call  to schedule. 130 mL 1    insulin lispro (HUMALOG VIAL) 100 UNIT/ML vial With Insulin pump. Up to 130 units per day. (Patient taking differently: With Insulin pump. Up to 75 units per day.) 30 mL 11    insulin pen needle (ULTICARE MICRO) 32G X 4 MM miscellaneous Use pen needles daily or as directed. 100 each 3    Insulin Syringe-Needle U-100 31G X 1/4\" 1 ML MISC 1 Syringe as needed (until new insuline pump arrives) 50 each 1    magnesium oxide (MAG-OX) 400 MG tablet Take 1 tablet (400 mg) by mouth daily (with lunch) 30 tablet 5    mycophenolate (GENERIC EQUIVALENT) 250 MG capsule Take 3 capsules (750 mg) by mouth 2 times daily 180 capsule 11    Semaglutide, 1 MG/DOSE, (OZEMPIC) 4 MG/3ML pen Inject 1 mg Subcutaneous every 7 days 3 mL 3    senna-docusate (SENOKOT-S/PERICOLACE) 8.6-50 MG tablet Take 2 tablets by mouth 2 times daily 20 tablet 0    sulfamethoxazole-trimethoprim (BACTRIM) 400-80 MG tablet Take 1 tablet by mouth daily 30 tablet 11    tacrolimus (GENERIC EQUIVALENT) 0.5 MG capsule Take 1 capsule (0.5 mg) by mouth every morning Total dose = 1.5 mg in the AM and 1 mg in the PM 90 capsule 3    tacrolimus (GENERIC EQUIVALENT) 1 MG capsule Total dose = 1.5 mg in the AM and 1 mg in the  capsule 3    vitamin D3 (CHOLECALCIFEROL) 50 mcg (2000 units) tablet Take 1 tablet (50 mcg) by mouth daily         Family History  family history includes Coronary Artery Disease in his father; Diabetes in his father; Skin Cancer in his paternal uncle.    Social History   reports that he has never smoked. He has never used smokeless tobacco. He reports that he does not currently use alcohol. He reports that he does not use drugs.   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    Past Medical History  Past Medical History:   Diagnosis Date    Anemia in chronic kidney disease     " Dyslipidemia     ESRD (end stage renal disease) on dialysis (H)     Hypertension     Hypomagnesemia 10/07/2019    Secondary hyperparathyroidism (H24)     Shingles 12/11/2023 12/11/23: Left Axilla    Type 1 diabetes (H)        Past Surgical History:   Procedure Laterality Date    hair implant  2007    INSERT CATHETER PERITONEAL DIALYSIS  08/2018    IR FINE NEEDLE ASPIRATION W ULTRASOUND  11/25/2019    IR RENAL BIOPSY LEFT  11/25/2019    PERCUTANEOUS BIOPSY KIDNEY Left 10/22/2019    Procedure: Left Kidney Biopsy;  Surgeon: Kash Hoffman MD;  Location: [facility] OR       Physical Exam    No exam today.    RESULTS  Creatinine   Date Value Ref Range Status   11/21/2023 1.09 0.67 - 1.17 mg/dL Final   05/11/2021 1.13 0.66 - 1.25 mg/dL Final     GFR Estimate   Date Value Ref Range Status   11/21/2023 86 >60 mL/min/1.73m2 Final   05/11/2021 80 >60 mL/min/[1.73_m2] Final     Comment:     Non  GFR Calc  Starting 12/18/2018, serum creatinine based estimated GFR (eGFR) will be   calculated using the Chronic Kidney Disease Epidemiology Collaboration   (CKD-EPI) equation.       Hemoglobin A1C   Date Value Ref Range Status   11/21/2023 7.1 (H) <5.7 % Final     Comment:     Normal <5.7%   Prediabetes 5.7-6.4%    Diabetes 6.5% or higher     Note: Adopted from ADA consensus guidelines.   07/16/2020 8.1 (H) 0 - 5.6 % Final     Comment:     Normal <5.7% Prediabetes 5.7-6.4%  Diabetes 6.5% or higher - adopted from ADA   consensus guidelines.       Potassium   Date Value Ref Range Status   11/21/2023 4.4 3.4 - 5.3 mmol/L Final   11/16/2021 4.0 3.4 - 5.3 mmol/L Final   05/11/2021 4.1 3.4 - 5.3 mmol/L Final     ALT   Date Value Ref Range Status   09/26/2019 42 0 - 70 U/L Final     AST   Date Value Ref Range Status   09/26/2019 16 0 - 45 U/L Final       ASSESSMENT/PLAN:    1.  TYPE 1 DIABETES MELLITUS: A1C has improved with addition of Ozempic.  Pt is tolerating Ozempic well. Will increase Ozempic 2 mg subcutaneous once a  week.  Only insulin pump setting changes today - increase midnight basal insulin rate 2.1 units/hr ( was 2.0 )  and  change 11:30 am I/C ratio to 1:6.5. ( was 1:5.5 ) .  If he has frequent hypoglycemia, he is to notify me.  Discussed the importance of healthy eating and exercise.  Pt was seen by Oph in Nov 2022.Reminded Rudi to schedule his annual diabetic eye exam.  He denies any sx of neuropathy or foot ulcers or sores at this time.  Pt's TSH was normal on 7/22/2021.    2.  HX OF ESRD: S/P kidney transplant on 10/1/019 doing well.  Most recent creat was 1.21  with GFR 76 mL/min in 12/2023.    3. OBESITY: He has met with our dietitian for weight loss support.  Increase Ozempic 2 mg subcutaneous once a week today.    4.  HTN: No vitals today. Continue current RX.    5.  HYPERLIPIDEMIA: LDL 69 in Oct 2020.  Pt is taking Lipitor daily.    6.  FOLLOW UP : with me and CDE/RD in clinic in 3 - 3.5 months.  A1 C, fasting lipid panel, creat/GFR and TSH ordered today.     Time spent reviewing chart, labs and insulin pump data today = 5 minutes.  Time for video visit today = 38 minutes.  Time for documentation today = 10 minutes.     TOTAL TIME FOR VISIT TODAY = 53   minutes.    Kymberly Boswell PA-C

## 2024-01-19 NOTE — LETTER
1/19/2024       RE: Michael Amin  [address]  [facility] [address]     Dear Colleague,    Thank you for referring your patient, Michael Amin, to the [facility] ENDOCRINOLOGY CLINIC [place] at [facility]. Please see a copy of my visit note below.    Outcome for 01/16/24 10:17 AM: sciencebite message sent  Raquel Prater LPN   Outcome for 01/17/24 1:35 PM: Per patient, will upload device before appointment  Raquel Prater LPN   Outcome for 01/19/24 12:57 PM: Data obtained via [employer] website  Audrey Goel MA                Time of start: 12:55 pm   Time of end: 1:33 pm   Total duration of video visit: 38 minutes.  Providers location: offsite.  Patients location: [place].    ELIZABETH Amin ( Rudi Rodriguez is a 43 year old male with type 1 diabetes mellitus.  Video visit today for diabetes follow up.  Pt has hx of type 1 diabetes mellitus dx at age 16.  His diabetes is complicated by nephropathy and he underwent a kidney transplant on 10/1/2019.   Oph exam showed possible early retinopathy.   Oph exam reported stable exam in Nov 2022.   He has no peripheral neuropathy.  His hx is also significant for HTN, ED, dyslipidemia and obesity.  Rudi is using a Tandem insulin pump- control IQ and his basal insulin rates are:  Midnight= 2.0 units/hr.  4 am = 1.7 units/hr.  11:30 am = 1.7 units/hr.  8 pm = 2.0 units/hr.  I/C ratio settings:  Midnight= 1:7.5 and 1:5.5 at 11:30 am.  Sensitivity is set at 29.  Pt is also taking Ozempic 1 mg subcutaneous once a week.  Most recent A1C was 7.1 % on 11/21/2023.   Previous A1C was 8.0 % on 6/15/2022.   I reviewed and scanned his insulin pump download data in his note below.  Average glucose is 168 with SD 65 and estimated A1C 7.3 %.  He is concerned about higher blood sugar values overnight and in the am.  Rudi is also having hypoglycemia around 2:30 pm.  On ROS today, Rudi reports feeling well.  He has lost approx 40 lbs  since taking Ozempic.  Denies n/v, abd pain, diarrhea or hx of pancreatitis or gastroparesis.   No blurred vision. Floater left eye and he plans to schedule his annual diabetic eye exam.  Pt denies n/v, SOB at rest, cough, fever, chills, chest pain, abd pain, diarrhea, dysuria, hematuria or foot ulcers.  Rudi denies numbness, tingling or pain in his feet or hands.    Diabetes Care  Retinopathy: he was seen by Oph in Feb 2019- possible early retinopathy. He was seen by Oph in Nov 2022 with stable exam per patient.  Nephropathy:hx of ESRD s/p kidney transplant on 10/80630. Urine microalbuminuria negative in 8/2022.  Neuropathy: none.  Foot Exam:no exam today.  Taking aspirin: yes  Lipids: LDL 69 in Oct 2020. Pt is taking Lipitor.  CAD: no.  Mental health:denies depression.  Insulin: Tandem insulin pump- control IQ and DexcomG6 sensor.  Hypoglycemia tx: Pt has Baqsimi to use in case of severe hypoglycemia.                    ROS  Please see under HPI.    Allergies  Allergies   Allergen Reactions    Anti-Thymocyte Glob (Rabbit)      Had reaction with rigors after steroid-free dose. No reaction with steroids.       Medications  Current Outpatient Medications   Medication Sig Dispense Refill    alcohol swab prep pads Use to swab area of injection/zak as directed. 100 each 3    aspirin (ASA) 81 MG EC tablet Take 81 mg by mouth daily       atorvastatin (LIPITOR) 10 MG tablet Take 1 tablet (10 mg) by mouth daily 90 tablet 0    blood glucose (CONTOUR NEXT TEST) test strip Use to test blood sugar 4 times daily. 400 strip 11    blood glucose monitoring (ACCU-CHEK FASTCLIX) lancets   1    carvedilol (COREG) 12.5 MG tablet Take 1 tablet (12.5 mg) by mouth 2 times daily (with meals) 180 tablet 0    Continuous Blood Gluc Sensor (DEXCOM G6 SENSOR) MISC CHANGE SENSOR EVERY 10 DAYS 9 each 0    Continuous Blood Gluc Transmit (DEXCOM G6 TRANSMITTER) MISC Change every 3 months 1 each 3    Continuous Blood Gluc Transmit (DEXCOM G6  "TRANSMITTER) MISC 1 each every 3 months 1 each 3    Continuous Blood Gluc Transmit (DEXCOM G6 TRANSMITTER) MISC 1 each every 3 months 1 each 3    Glucagon (BAQSIMI ONE PACK) 3 MG/DOSE POWD Spray 3 mg in nostril See Admin Instructions USE ONLY FOR SEVERE HYPOGLYCEMIA. 1 each 3    insulin cartridge (T:SLIM 3ML) misc pump supply Insulin cartridge to be used with pump as directed.  Change every 2 days or as directed. 45 each 3    Insulin Infusion Pump Supplies (AUTOSOFT XC INFUSION SET) MISC 1 each every 48 hours Change every 2 days  9 mm cannula, 23 inch tubing 45 each 3    insulin lispro (HUMALOG VIAL) 100 UNIT/ML vial Via insulin pump. Approx 130 units daily. Follow up visit needed. Call  to schedule. 130 mL 1    insulin lispro (HUMALOG VIAL) 100 UNIT/ML vial With Insulin pump. Up to 130 units per day. (Patient taking differently: With Insulin pump. Up to 75 units per day.) 30 mL 11    insulin pen needle (ULTICARE MICRO) 32G X 4 MM miscellaneous Use pen needles daily or as directed. 100 each 3    Insulin Syringe-Needle U-100 31G X 1/4\" 1 ML MISC 1 Syringe as needed (until new insuline pump arrives) 50 each 1    magnesium oxide (MAG-OX) 400 MG tablet Take 1 tablet (400 mg) by mouth daily (with lunch) 30 tablet 5    mycophenolate (GENERIC EQUIVALENT) 250 MG capsule Take 3 capsules (750 mg) by mouth 2 times daily 180 capsule 11    Semaglutide, 1 MG/DOSE, (OZEMPIC) 4 MG/3ML pen Inject 1 mg Subcutaneous every 7 days 3 mL 3    senna-docusate (SENOKOT-S/PERICOLACE) 8.6-50 MG tablet Take 2 tablets by mouth 2 times daily 20 tablet 0    sulfamethoxazole-trimethoprim (BACTRIM) 400-80 MG tablet Take 1 tablet by mouth daily 30 tablet 11    tacrolimus (GENERIC EQUIVALENT) 0.5 MG capsule Take 1 capsule (0.5 mg) by mouth every morning Total dose = 1.5 mg in the AM and 1 mg in the PM 90 capsule 3    tacrolimus (GENERIC EQUIVALENT) 1 MG capsule Total dose = 1.5 mg in the AM and 1 mg in the  capsule 3    vitamin D3 " (CHOLECALCIFEROL) 50 mcg (2000 units) tablet Take 1 tablet (50 mcg) by mouth daily         Family History  family history includes Coronary Artery Disease in his father; Diabetes in his father; Skin Cancer in his paternal uncle.    Social History   reports that he has never smoked. He has never used smokeless tobacco. He reports that he does not currently use alcohol. He reports that he does not use drugs.   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    Past Medical History  Past Medical History:   Diagnosis Date    Anemia in chronic kidney disease     Dyslipidemia     ESRD (end stage renal disease) on dialysis (H)     Hypertension     Hypomagnesemia 10/07/2019    Secondary hyperparathyroidism (H24)     Shingles 12/11/2023 12/11/23: Left Axilla    Type 1 diabetes (H)        Past Surgical History:   Procedure Laterality Date    hair implant  2007    INSERT CATHETER PERITONEAL DIALYSIS  08/2018    IR FINE NEEDLE ASPIRATION W ULTRASOUND  11/25/2019    IR RENAL BIOPSY LEFT  11/25/2019    PERCUTANEOUS BIOPSY KIDNEY Left 10/22/2019    Procedure: Left Kidney Biopsy;  Surgeon: Kash Hoffman MD;  Location: [facility] OR       Physical Exam    No exam today.    RESULTS  Creatinine   Date Value Ref Range Status   11/21/2023 1.09 0.67 - 1.17 mg/dL Final   05/11/2021 1.13 0.66 - 1.25 mg/dL Final     GFR Estimate   Date Value Ref Range Status   11/21/2023 86 >60 mL/min/1.73m2 Final   05/11/2021 80 >60 mL/min/[1.73_m2] Final     Comment:     Non  GFR Calc  Starting 12/18/2018, serum creatinine based estimated GFR (eGFR) will be   calculated using the Chronic Kidney Disease Epidemiology Collaboration   (CKD-EPI) equation.       Hemoglobin A1C   Date Value Ref Range Status   11/21/2023 7.1 (H) <5.7 % Final     Comment:     Normal <5.7%   Prediabetes 5.7-6.4%    Diabetes 6.5% or higher     Note: Adopted from ADA consensus guidelines.   07/16/2020 8.1 (H) 0 - 5.6 % Final      Comment:     Normal <5.7% Prediabetes 5.7-6.4%  Diabetes 6.5% or higher - adopted from ADA   consensus guidelines.       Potassium   Date Value Ref Range Status   11/21/2023 4.4 3.4 - 5.3 mmol/L Final   11/16/2021 4.0 3.4 - 5.3 mmol/L Final   05/11/2021 4.1 3.4 - 5.3 mmol/L Final     ALT   Date Value Ref Range Status   09/26/2019 42 0 - 70 U/L Final     AST   Date Value Ref Range Status   09/26/2019 16 0 - 45 U/L Final       ASSESSMENT/PLAN:    1.  TYPE 1 DIABETES MELLITUS: A1C has improved with addition of Ozempic.  Pt is tolerating Ozempic well. Will increase Ozempic 2 mg subcutaneous once a week.  Only insulin pump setting changes today - increase midnight basal insulin rate 2.1 units/hr ( was 2.0 )  and  change 11:30 am I/C ratio to 1:6.5. ( was 1:5.5 ) .  If he has frequent hypoglycemia, he is to notify me.  Discussed the importance of healthy eating and exercise.  Pt was seen by Oph in Nov 2022.Reminded Rudi to schedule his annual diabetic eye exam.  He denies any sx of neuropathy or foot ulcers or sores at this time.  Pt's TSH was normal on 7/22/2021.    2.  HX OF ESRD: S/P kidney transplant on 10/1/019 doing well.  Most recent creat was 1.21  with GFR 76 mL/min in 12/2023.    3. OBESITY: He has met with our dietitian for weight loss support.  Increase Ozempic 2 mg subcutaneous once a week today.    4.  HTN: No vitals today. Continue current RX.    5.  HYPERLIPIDEMIA: LDL 69 in Oct 2020.  Pt is taking Lipitor daily.    6.  FOLLOW UP : with me and CDE/RD in clinic in 3 - 3.5 months.  A1 C, fasting lipid panel, creat/GFR and TSH ordered today.     Time spent reviewing chart, labs and insulin pump data today = 5 minutes.  Time for video visit today = 38 minutes.  Time for documentation today = 10 minutes.     TOTAL TIME FOR VISIT TODAY = 53   minutes.    Kymberly Boswell PA-C

## 2024-01-19 NOTE — NURSING NOTE
Is the patient currently in the state of [place]? YES    Visit mode:VIDEO    If the visit is dropped, the patient can be reconnected by: VIDEO VISIT: Text to cell phone:   Telephone Information:   Mobile [phone]       Will anyone else be joining the visit? NO  (If patient encounters technical issues they should call [phone]    How would you like to obtain your AVS? MyChart    Are changes needed to the allergy or medication list? Pt stated no changes to allergies and Pt stated no med changes    Reason for visit: CORA Goel CMA VVF

## 2024-01-23 ENCOUNTER — MYC MEDICAL ADVICE (OUTPATIENT)
Dept: ENDOCRINOLOGY | Facility: CLINIC | Age: 44
End: 2024-01-23
Payer: COMMERCIAL

## 2024-01-23 NOTE — TELEPHONE ENCOUNTER
Left Voicemail (1st Attempt) and Sent Mychart (1st Attempt) for the patient to call back and schedule the following:    Appointment type: Return diabetes  Provider: Guillermina Boswell  Return date: 3 months   Specialty phone number: [phone]  Additional appointment(s) needed: Appt with me in clinic and CDE/RD in clinic on a Tuesday in 3- 3.5 months. Labs ordered   Additonal Notes: NA

## 2024-01-29 NOTE — TELEPHONE ENCOUNTER
Spoke with patient and scheduled in clinic follow up with Kymberly Boswell and GAVIN Maldonado for 6/4/24. Per patient's request for June appointment. Patient has scheduled lab appointment at outside facility.

## 2024-02-09 ENCOUNTER — VIRTUAL VISIT (OUTPATIENT)
Dept: ENDOCRINOLOGY | Facility: CLINIC | Age: 44
End: 2024-02-09
Payer: COMMERCIAL

## 2024-02-09 VITALS — WEIGHT: 191 LBS | BODY MASS INDEX: 30.7 KG/M2 | HEIGHT: 66 IN

## 2024-02-09 DIAGNOSIS — E66.811 CLASS 1 OBESITY WITH SERIOUS COMORBIDITY AND BODY MASS INDEX (BMI) OF 33.0 TO 33.9 IN ADULT, UNSPECIFIED OBESITY TYPE: ICD-10-CM

## 2024-02-09 DIAGNOSIS — E78.5 DYSLIPIDEMIA: Primary | ICD-10-CM

## 2024-02-09 PROCEDURE — 99213 OFFICE O/P EST LOW 20 MIN: CPT | Mod: 95 | Performed by: NURSE PRACTITIONER

## 2024-02-09 RX ORDER — ATORVASTATIN CALCIUM 10 MG/1
10 TABLET, FILM COATED ORAL DAILY
Qty: 90 TABLET | Refills: 0 | Status: SHIPPED | OUTPATIENT
Start: 2024-02-09 | End: 2024-05-03

## 2024-02-09 ASSESSMENT — PAIN SCALES - GENERAL: PAINLEVEL: NO PAIN (1)

## 2024-02-09 NOTE — LETTER
2024       RE: Michael Amin  [address]  [facility] [address]     Dear Colleague,    Thank you for referring your patient, Michael Amin, to the [facility] WEIGHT MANAGEMENT CLINIC [place] at [facility]. Please see a copy of my visit note below.      Return Medical Weight Management Note     Michael Amin  MRN:  [id number]  :  1980  SULTANA:  2024    Dear Anurag Byrd,    I had the pleasure of seeing your patient Michael Amin. He is a 43 year old male who I am continuing to see for treatment of obesity related to:         No data to display                Assessment & Plan   Problem List Items Addressed This Visit          Digestive    Class 1 obesity with serious comorbidity and body mass index (BMI) of 33.0 to 33.9 in adult, unspecified obesity type     Doing well overall. Increased ozempic 2mg in the last couple weeks. Maybe having a little bit more reflux/ nausea. Unsure if timeline correlates. Discussed smaller portions - stopping sooner to avoid this. He's not been having concerns with hypoglycemia. Notes this is the first year he hasn't gained weight over the winter.     Stop a couple bites earlier   Continue ozempic 2mg   Get outside when possible   Great work!  Follow up 3 months                INTERVAL HISTORY:  New Transplant consult for weight management with Tasha Zepeda PA-C 3/2023. DMI with hx of kidney transplant in 2019; needing pancreas transplant which will need BMI <30-32. Started on GLP1. Last seen 2023 - was tolerating 0.5mg ozempic weekly well. Plan was to increase to 1mg. New to me 2023 - was working to avoid hypoglycemia     endorcrinologist Kymberly UGARTE  DM educator Verenice Tobar- RD    2023- saw MTM - reduced basal rate to avoid hypoglycemia   2024- increased ozempic to 2mg     Usually gains weight over the winter. This is the first winter he has lost weight.  In spring/ summer tends to be much more active- yard work     Anti-obesity medication history    Current:   ozempic 2mg   -some unsettled stomach feeling   -miller sooner     Recent diet changes:   Has to remember to eat - setting reminders or eats on a schedule - easier at school   Goes to [employer] to get individual packages of things - chips, protein bars, granola bars, almonds,     breakfast - late breakfast   Snacks- jerkey, apples with peanutbutter   Lunch sometimes late too     Sometimes at 1100 calories daily     Recent exercise/activity changes:   Taking advantage of warmer weather to walk dog outside      Vitamins/Labs:   No hypoglycemia  Last Comprehensive Metabolic Panel:  Sodium   Date Value Ref Range Status   11/21/2023 135 135 - 145 mmol/L Final     Comment:     Reference intervals for this test were updated on 09/26/2023 to more accurately reflect our healthy population. There may be differences in the flagging of prior results with similar values performed with this method. Interpretation of those prior results can be made in the context of the updated reference intervals.    05/11/2021 139 133 - 144 mmol/L Final     Potassium   Date Value Ref Range Status   11/21/2023 4.4 3.4 - 5.3 mmol/L Final   11/16/2021 4.0 3.4 - 5.3 mmol/L Final   05/11/2021 4.1 3.4 - 5.3 mmol/L Final     Chloride   Date Value Ref Range Status   05/11/2021 104 94 - 109 mmol/L Final     Chloride (External)   Date Value Ref Range Status   02/01/2024 104 99 - 110 mEq/L Final     Carbon Dioxide   Date Value Ref Range Status   05/11/2021 28 20 - 32 mmol/L Final     Carbon Dioxide (CO2)   Date Value Ref Range Status   11/21/2023 28 22 - 29 mmol/L Final   11/16/2021 25 20 - 32 mmol/L Final     Anion Gap   Date Value Ref Range Status   11/21/2023 7 7 - 15 mmol/L Final   11/16/2021 8 3 - 14 mmol/L Final   05/11/2021 7 3 - 14 mmol/L Final     Glucose   Date Value Ref Range Status   11/21/2023 162 (H) 70 - 99 mg/dL Final   11/16/2021 93  70 - 99 mg/dL Final   05/11/2021 146 (H) 70 - 99 mg/dL Final     Urea Nitrogen   Date Value Ref Range Status   11/21/2023 18.7 6.0 - 20.0 mg/dL Final   11/16/2021 13 7 - 30 mg/dL Final   05/11/2021 18 7 - 30 mg/dL Final     Creatinine   Date Value Ref Range Status   11/21/2023 1.09 0.67 - 1.17 mg/dL Final   05/11/2021 1.13 0.66 - 1.25 mg/dL Final     GFR Estimate   Date Value Ref Range Status   11/21/2023 86 >60 mL/min/1.73m2 Final   05/11/2021 80 >60 mL/min/[1.73_m2] Final     Comment:     Non  GFR Calc  Starting 12/18/2018, serum creatinine based estimated GFR (eGFR) will be   calculated using the Chronic Kidney Disease Epidemiology Collaboration   (CKD-EPI) equation.       Calcium   Date Value Ref Range Status   11/21/2023 9.4 8.6 - 10.0 mg/dL Final   05/11/2021 9.4 8.5 - 10.1 mg/dL Final     Bilirubin Total   Date Value Ref Range Status   09/26/2019 0.2 0.2 - 1.3 mg/dL Final     Alkaline Phosphatase   Date Value Ref Range Status   09/26/2019 100 40 - 150 U/L Final     ALT   Date Value Ref Range Status   09/26/2019 42 0 - 70 U/L Final     AST   Date Value Ref Range Status   09/26/2019 16 0 - 45 U/L Final                 CURRENT WEIGHT:   191 lbs 0 oz    Initial Weight (lbs): 234 lbs  Last Visits Weight: 89.1 kg (196 lb 6.4 oz)  Cumulative weight loss (lbs): 43  Weight Loss Percentage: 18.38%        11/9/2023    12:50 PM   Changes and Difficulties   I have made the following changes to my diet since my last visit: Attempting gluten free         MEDICATIONS:   Current Outpatient Medications   Medication Sig Dispense Refill    alcohol swab prep pads Use to swab area of injection/zak as directed. 100 each 3    aspirin (ASA) 81 MG EC tablet Take 81 mg by mouth daily       atorvastatin (LIPITOR) 10 MG tablet Take 1 tablet (10 mg) by mouth daily 90 tablet 0    blood glucose (CONTOUR NEXT TEST) test strip Use to test blood sugar 4 times daily. 400 strip 11    blood glucose monitoring (ACCU-CHEK  "FASTCLIX) lancets   1    carvedilol (COREG) 12.5 MG tablet Take 1 tablet (12.5 mg) by mouth 2 times daily (with meals) 180 tablet 0    Continuous Blood Gluc Sensor (DEXCOM G6 SENSOR) MISC CHANGE SENSOR EVERY 10 DAYS 9 each 0    Continuous Blood Gluc Transmit (DEXCOM G6 TRANSMITTER) MISC Change every 3 months 1 each 3    Continuous Blood Gluc Transmit (DEXCOM G6 TRANSMITTER) MISC 1 each every 3 months 1 each 3    Continuous Blood Gluc Transmit (DEXCOM G6 TRANSMITTER) MISC 1 each every 3 months 1 each 3    Glucagon (BAQSIMI ONE PACK) 3 MG/DOSE POWD Spray 3 mg in nostril See Admin Instructions USE ONLY FOR SEVERE HYPOGLYCEMIA. 1 each 3    insulin cartridge (T:SLIM 3ML) misc pump supply Insulin cartridge to be used with pump as directed.  Change every 2 days or as directed. 45 each 3    Insulin Infusion Pump Supplies (AUTOSOFT XC INFUSION SET) MISC 1 each every 48 hours Change every 2 days  9 mm cannula, 23 inch tubing 45 each 3    insulin lispro (HUMALOG VIAL) 100 UNIT/ML vial Use in insulin pump. Pt uses approx 75 units daily. 80 mL 3    insulin pen needle (ULTICARE MICRO) 32G X 4 MM miscellaneous Use pen needles daily or as directed. 100 each 3    Insulin Syringe-Needle U-100 31G X 1/4\" 1 ML MISC 1 Syringe as needed (until new insuline pump arrives) 50 each 1    magnesium oxide (MAG-OX) 400 MG tablet Take 1 tablet (400 mg) by mouth daily (with lunch) 30 tablet 5    mycophenolate (GENERIC EQUIVALENT) 250 MG capsule Take 3 capsules (750 mg) by mouth 2 times daily 180 capsule 11    Semaglutide, 2 MG/DOSE, (OZEMPIC) 8 MG/3ML pen Inject 2 mg Subcutaneous every 7 days 9 mL 3    senna-docusate (SENOKOT-S/PERICOLACE) 8.6-50 MG tablet Take 2 tablets by mouth 2 times daily 20 tablet 0    sulfamethoxazole-trimethoprim (BACTRIM) 400-80 MG tablet Take 1 tablet by mouth daily 30 tablet 11    tacrolimus (GENERIC) 0.5 MG capsule Take 1 capsule (0.5 mg) by mouth every morning Total dose = 1.5 mg in the AM and 1 mg in the PM 90 " capsule 3    tacrolimus (GENERIC) 1 MG capsule Total dose = 1.5 mg in the AM and 1 mg in the  capsule 3    vitamin D3 (CHOLECALCIFEROL) 50 mcg (2000 units) tablet Take 1 tablet (50 mcg) by mouth daily             2/9/2024     1:05 PM   Weight Loss Medication History Reviewed With Patient   Which weight loss medications are you currently taking on a regular basis? Ozempic   Are you having any side effects from the weight loss medication that we have prescribed you? No       External Order Results on 02/01/2024   Component Date Value Ref Range Status    Sodium (External) 02/01/2024 138  134 - 143 mEq/L Final    Potassium (External) 02/01/2024 4.4  3.4 - 5.1 mEq/L Final    Chloride (External) 02/01/2024 104  99 - 110 mEq/L Final    CO2 (External) 02/01/2024 26  19 - 29 mEq/L Final    Anion Gap (External) 02/01/2024 8.0  3.0 - 15.0 mEq/L Final    Urea Nitrogen (External) 02/01/2024 16  5 - 24 mg/dL Final    Creatinine (External) 02/01/2024 1.05  0.70 - 1.20 mg/dL Final    GFR Estimated (External) 02/01/2024 90  >60 mls/min Final    Calcium (External) 02/01/2024 9.1  8.4 - 10.5 mg/dL Final    Glucose (External) 02/01/2024 147 (H)  70 - 99 mg/dL Final    WBC Count (External) 02/01/2024 7.0  3.2 - 11.0 10*9/L Corrected    RBC Count (External) 02/01/2024 5.14  4.14 - 5.76 10*12/L Corrected    Hemoglobin (External) 02/01/2024 13.5  12.9 - 16.9 g/dL Corrected    Hematocrit (External) 02/01/2024 42.2  38.4 - 49.7 % Corrected    MCV (External) 02/01/2024 82.1  81.4 - 99.0 fL Corrected    MCH (External) 02/01/2024 26.3 (L)  26.7 - 33.1 pg Corrected    MCHC (External) 02/01/2024 32.0  31.6 - 35.5 g/dL Corrected    RDW (External) 02/01/2024 13.8  11.3 - 14.6 % Corrected    Platelet Count (External) 02/01/2024 163  130 - 375 10*9/L Corrected    Protein Random Urine (External) 02/01/2024 <7.0  mg/dL Final    Creatinine Urine mg/dL (External) 02/01/2024 111  mg/dL Final    Protein Total Ur per Cr (External) 02/01/2024 Not  "calculated   Final    Tacrolimus(FK-506) (External) 02/01/2024 6.7  5.0 - 15.0 ng/mL Final           11/7/2022     4:09 PM   ALIRIO Score (Last Two)   ALIRIO Raw Score 40   Activation Score 100   ALIRIO Level 4         PHYSICAL EXAM:  Objective    Ht 1.676 m (5' 5.98\")   Wt 86.6 kg (191 lb)   BMI 30.84 kg/m           Wt Readings from Last 5 Encounters:   02/09/24 86.6 kg (191 lb)   12/05/23 93.1 kg (205 lb 3.2 oz)   11/21/23 91.2 kg (201 lb)   11/09/23 89.1 kg (196 lb 6.4 oz)   10/18/23 88 kg (194 lb)        GENERAL: alert and no distress  EYES: Eyes grossly normal to inspection.  No discharge or erythema, or obvious scleral/conjunctival abnormalities.  RESP: No audible wheeze, cough, or visible cyanosis.    SKIN: Visible skin clear. No significant rash, abnormal pigmentation or lesions.  NEURO: Cranial nerves grossly intact.  Mentation and speech appropriate for age.  PSYCH: Appropriate affect, tone, and pace of words        Sincerely,    Ene Garner NP      20 minutes spent by me on the date of the encounter doing chart review, history and exam, documentation and further activities per the note    Virtual Visit Details    Type of service:  Video Visit     Originating Location (pt. Location): Home    Distant Location (provider location):  Off-site  Platform used for Video Visit: Caesar    "

## 2024-02-09 NOTE — NURSING NOTE
Is the patient currently in the state of [place]? YES    Visit mode:VIDEO    If the visit is dropped, the patient can be reconnected by: VIDEO VISIT: Text to cell phone:   Telephone Information:   Mobile [phone]       Will anyone else be joining the visit? NO  (If patient encounters technical issues they should call [phone]    How would you like to obtain your AVS? MyChart    Are changes needed to the allergy or medication list? No, Pt stated no changes to allergies, and Pt stated no med changes    Reason for visit: RECHECK (CHARLENE)    Marisela VEGA

## 2024-02-09 NOTE — PROGRESS NOTES
Return Medical Weight Management Note     Michael Amin  MRN:  [id number]  :  1980  SULTANA:  2024    Dear Anurag Byrd,    I had the pleasure of seeing your patient Michael Amin. He is a 43 year old male who I am continuing to see for treatment of obesity related to:         No data to display                Assessment & Plan   Problem List Items Addressed This Visit        Digestive    Class 1 obesity with serious comorbidity and body mass index (BMI) of 33.0 to 33.9 in adult, unspecified obesity type     Doing well overall. Increased ozempic 2mg in the last couple weeks. Maybe having a little bit more reflux/ nausea. Unsure if timeline correlates. Discussed smaller portions - stopping sooner to avoid this. He's not been having concerns with hypoglycemia. Notes this is the first year he hasn't gained weight over the winter.     Stop a couple bites earlier   Continue ozempic 2mg   Get outside when possible   Great work!  Follow up 3 months                INTERVAL HISTORY:  New Transplant consult for weight management with Tasha Zepeda PA-C 3/2023. DMI with hx of kidney transplant in 2019; needing pancreas transplant which will need BMI <30-32. Started on GLP1. Last seen 2023 - was tolerating 0.5mg ozempic weekly well. Plan was to increase to 1mg. New to me 2023 - was working to avoid hypoglycemia     endorcrinologist Kymberly UGARTE  DM educator Verenice Tobar- RD    2023- saw MTM - reduced basal rate to avoid hypoglycemia   2024- increased ozempic to 2mg     Usually gains weight over the winter. This is the first winter he has lost weight. In spring/ summer tends to be much more active- yard work     Anti-obesity medication history    Current:   ozempic 2mg   -some unsettled stomach feeling   -miller sooner     Recent diet changes:   Has to remember to eat - setting reminders or eats on a schedule - easier at school   Goes to [employer] to get individual packages of  things - chips, protein bars, granola bars, almonds,     breakfast - late breakfast   Snacks- jerkey, apples with peanutbutter   Lunch sometimes late too     Sometimes at 1100 calories daily     Recent exercise/activity changes:   Taking advantage of warmer weather to walk dog outside      Vitamins/Labs:   No hypoglycemia  Last Comprehensive Metabolic Panel:  Sodium   Date Value Ref Range Status   11/21/2023 135 135 - 145 mmol/L Final     Comment:     Reference intervals for this test were updated on 09/26/2023 to more accurately reflect our healthy population. There may be differences in the flagging of prior results with similar values performed with this method. Interpretation of those prior results can be made in the context of the updated reference intervals.    05/11/2021 139 133 - 144 mmol/L Final     Potassium   Date Value Ref Range Status   11/21/2023 4.4 3.4 - 5.3 mmol/L Final   11/16/2021 4.0 3.4 - 5.3 mmol/L Final   05/11/2021 4.1 3.4 - 5.3 mmol/L Final     Chloride   Date Value Ref Range Status   05/11/2021 104 94 - 109 mmol/L Final     Chloride (External)   Date Value Ref Range Status   02/01/2024 104 99 - 110 mEq/L Final     Carbon Dioxide   Date Value Ref Range Status   05/11/2021 28 20 - 32 mmol/L Final     Carbon Dioxide (CO2)   Date Value Ref Range Status   11/21/2023 28 22 - 29 mmol/L Final   11/16/2021 25 20 - 32 mmol/L Final     Anion Gap   Date Value Ref Range Status   11/21/2023 7 7 - 15 mmol/L Final   11/16/2021 8 3 - 14 mmol/L Final   05/11/2021 7 3 - 14 mmol/L Final     Glucose   Date Value Ref Range Status   11/21/2023 162 (H) 70 - 99 mg/dL Final   11/16/2021 93 70 - 99 mg/dL Final   05/11/2021 146 (H) 70 - 99 mg/dL Final     Urea Nitrogen   Date Value Ref Range Status   11/21/2023 18.7 6.0 - 20.0 mg/dL Final   11/16/2021 13 7 - 30 mg/dL Final   05/11/2021 18 7 - 30 mg/dL Final     Creatinine   Date Value Ref Range Status   11/21/2023 1.09 0.67 - 1.17 mg/dL Final   05/11/2021 1.13 0.66 -  1.25 mg/dL Final     GFR Estimate   Date Value Ref Range Status   11/21/2023 86 >60 mL/min/1.73m2 Final   05/11/2021 80 >60 mL/min/[1.73_m2] Final     Comment:     Non  GFR Calc  Starting 12/18/2018, serum creatinine based estimated GFR (eGFR) will be   calculated using the Chronic Kidney Disease Epidemiology Collaboration   (CKD-EPI) equation.       Calcium   Date Value Ref Range Status   11/21/2023 9.4 8.6 - 10.0 mg/dL Final   05/11/2021 9.4 8.5 - 10.1 mg/dL Final     Bilirubin Total   Date Value Ref Range Status   09/26/2019 0.2 0.2 - 1.3 mg/dL Final     Alkaline Phosphatase   Date Value Ref Range Status   09/26/2019 100 40 - 150 U/L Final     ALT   Date Value Ref Range Status   09/26/2019 42 0 - 70 U/L Final     AST   Date Value Ref Range Status   09/26/2019 16 0 - 45 U/L Final                 CURRENT WEIGHT:   191 lbs 0 oz    Initial Weight (lbs): 234 lbs  Last Visits Weight: 89.1 kg (196 lb 6.4 oz)  Cumulative weight loss (lbs): 43  Weight Loss Percentage: 18.38%        11/9/2023    12:50 PM   Changes and Difficulties   I have made the following changes to my diet since my last visit: Attempting gluten free         MEDICATIONS:   Current Outpatient Medications   Medication Sig Dispense Refill     alcohol swab prep pads Use to swab area of injection/zak as directed. 100 each 3     aspirin (ASA) 81 MG EC tablet Take 81 mg by mouth daily        atorvastatin (LIPITOR) 10 MG tablet Take 1 tablet (10 mg) by mouth daily 90 tablet 0     blood glucose (CONTOUR NEXT TEST) test strip Use to test blood sugar 4 times daily. 400 strip 11     blood glucose monitoring (ACCU-CHEK FASTCLIX) lancets   1     carvedilol (COREG) 12.5 MG tablet Take 1 tablet (12.5 mg) by mouth 2 times daily (with meals) 180 tablet 0     Continuous Blood Gluc Sensor (DEXCOM G6 SENSOR) MISC CHANGE SENSOR EVERY 10 DAYS 9 each 0     Continuous Blood Gluc Transmit (DEXCOM G6 TRANSMITTER) MISC Change every 3 months 1 each 3      "Continuous Blood Gluc Transmit (DEXCOM G6 TRANSMITTER) MISC 1 each every 3 months 1 each 3     Continuous Blood Gluc Transmit (DEXCOM G6 TRANSMITTER) MISC 1 each every 3 months 1 each 3     Glucagon (BAQSIMI ONE PACK) 3 MG/DOSE POWD Spray 3 mg in nostril See Admin Instructions USE ONLY FOR SEVERE HYPOGLYCEMIA. 1 each 3     insulin cartridge (T:SLIM 3ML) misc pump supply Insulin cartridge to be used with pump as directed.  Change every 2 days or as directed. 45 each 3     Insulin Infusion Pump Supplies (AUTOSOFT XC INFUSION SET) MISC 1 each every 48 hours Change every 2 days  9 mm cannula, 23 inch tubing 45 each 3     insulin lispro (HUMALOG VIAL) 100 UNIT/ML vial Use in insulin pump. Pt uses approx 75 units daily. 80 mL 3     insulin pen needle (ULTICARE MICRO) 32G X 4 MM miscellaneous Use pen needles daily or as directed. 100 each 3     Insulin Syringe-Needle U-100 31G X 1/4\" 1 ML MISC 1 Syringe as needed (until new insuline pump arrives) 50 each 1     magnesium oxide (MAG-OX) 400 MG tablet Take 1 tablet (400 mg) by mouth daily (with lunch) 30 tablet 5     mycophenolate (GENERIC EQUIVALENT) 250 MG capsule Take 3 capsules (750 mg) by mouth 2 times daily 180 capsule 11     Semaglutide, 2 MG/DOSE, (OZEMPIC) 8 MG/3ML pen Inject 2 mg Subcutaneous every 7 days 9 mL 3     senna-docusate (SENOKOT-S/PERICOLACE) 8.6-50 MG tablet Take 2 tablets by mouth 2 times daily 20 tablet 0     sulfamethoxazole-trimethoprim (BACTRIM) 400-80 MG tablet Take 1 tablet by mouth daily 30 tablet 11     tacrolimus (GENERIC) 0.5 MG capsule Take 1 capsule (0.5 mg) by mouth every morning Total dose = 1.5 mg in the AM and 1 mg in the PM 90 capsule 3     tacrolimus (GENERIC) 1 MG capsule Total dose = 1.5 mg in the AM and 1 mg in the  capsule 3     vitamin D3 (CHOLECALCIFEROL) 50 mcg (2000 units) tablet Take 1 tablet (50 mcg) by mouth daily             2/9/2024     1:05 PM   Weight Loss Medication History Reviewed With Patient   Which weight " "loss medications are you currently taking on a regular basis? Ozempic   Are you having any side effects from the weight loss medication that we have prescribed you? No       External Order Results on 02/01/2024   Component Date Value Ref Range Status     Sodium (External) 02/01/2024 138  134 - 143 mEq/L Final     Potassium (External) 02/01/2024 4.4  3.4 - 5.1 mEq/L Final     Chloride (External) 02/01/2024 104  99 - 110 mEq/L Final     CO2 (External) 02/01/2024 26  19 - 29 mEq/L Final     Anion Gap (External) 02/01/2024 8.0  3.0 - 15.0 mEq/L Final     Urea Nitrogen (External) 02/01/2024 16  5 - 24 mg/dL Final     Creatinine (External) 02/01/2024 1.05  0.70 - 1.20 mg/dL Final     GFR Estimated (External) 02/01/2024 90  >60 mls/min Final     Calcium (External) 02/01/2024 9.1  8.4 - 10.5 mg/dL Final     Glucose (External) 02/01/2024 147 (H)  70 - 99 mg/dL Final     WBC Count (External) 02/01/2024 7.0  3.2 - 11.0 10*9/L Corrected     RBC Count (External) 02/01/2024 5.14  4.14 - 5.76 10*12/L Corrected     Hemoglobin (External) 02/01/2024 13.5  12.9 - 16.9 g/dL Corrected     Hematocrit (External) 02/01/2024 42.2  38.4 - 49.7 % Corrected     MCV (External) 02/01/2024 82.1  81.4 - 99.0 fL Corrected     MCH (External) 02/01/2024 26.3 (L)  26.7 - 33.1 pg Corrected     MCHC (External) 02/01/2024 32.0  31.6 - 35.5 g/dL Corrected     RDW (External) 02/01/2024 13.8  11.3 - 14.6 % Corrected     Platelet Count (External) 02/01/2024 163  130 - 375 10*9/L Corrected     Protein Random Urine (External) 02/01/2024 <7.0  mg/dL Final     Creatinine Urine mg/dL (External) 02/01/2024 111  mg/dL Final     Protein Total Ur per Cr (External) 02/01/2024 Not calculated   Final     Tacrolimus(FK-506) (External) 02/01/2024 6.7  5.0 - 15.0 ng/mL Final           11/7/2022     4:09 PM   ALIRIO Score (Last Two)   ALIRIO Raw Score 40   Activation Score 100   ALIRIO Level 4         PHYSICAL EXAM:  Objective    Ht 1.676 m (5' 5.98\")   Wt 86.6 kg (191 lb)   BMI " 30.84 kg/m           Wt Readings from Last 5 Encounters:   02/09/24 86.6 kg (191 lb)   12/05/23 93.1 kg (205 lb 3.2 oz)   11/21/23 91.2 kg (201 lb)   11/09/23 89.1 kg (196 lb 6.4 oz)   10/18/23 88 kg (194 lb)        GENERAL: alert and no distress  EYES: Eyes grossly normal to inspection.  No discharge or erythema, or obvious scleral/conjunctival abnormalities.  RESP: No audible wheeze, cough, or visible cyanosis.    SKIN: Visible skin clear. No significant rash, abnormal pigmentation or lesions.  NEURO: Cranial nerves grossly intact.  Mentation and speech appropriate for age.  PSYCH: Appropriate affect, tone, and pace of words        Sincerely,    Ene Garner NP      20 minutes spent by me on the date of the encounter doing chart review, history and exam, documentation and further activities per the note    Virtual Visit Details    Type of service:  Video Visit     Originating Location (pt. Location): Home    Distant Location (provider location):  Off-site  Platform used for Video Visit: Caesar

## 2024-02-09 NOTE — PATIENT INSTRUCTIONS
Stop a couple bites earlier   Continue ozempic 2mg   Get outside when possible   Great work!  Follow up 3 months

## 2024-02-12 NOTE — TELEPHONE ENCOUNTER
RECORDS RECEIVED FROM:    DATE RECEIVED:    NOTES STATUS DETAILS   OFFICE NOTE from referring provider  Internal SOT   OFFICE NOTE from other cardiologists  N/A    RECORDS from hospital/ED N/A    MEDICATION LIST Internal    GENERAL CARDIO RECORDS   (ALL APPOINTMENT TYPES)     LABS (CBC,BMP,CMP, TSH) Internal    EKG (STRIPS & REPORTS) N/A    MONITORS (STRIPS & REPORTS) N/A    ECHOS (IMAGES AND REPORTS) N/A    STRESS TESTS (IMAGES AND REPORTS) N/A    cMRI (IMAGES AND REPORTS) N/A    Cardiac cath (IMAGES AND REPORTS) N/A    CT/CTA (IMAGES AND REPORTS) N/A

## 2024-02-12 NOTE — ASSESSMENT & PLAN NOTE
Doing well overall. Increased ozempic 2mg in the last couple weeks. Maybe having a little bit more reflux/ nausea. Unsure if timeline correlates. Discussed smaller portions - stopping sooner to avoid this. He's not been having concerns with hypoglycemia. Notes this is the first year he hasn't gained weight over the winter.     Stop a couple bites earlier   Continue ozempic 2mg   Get outside when possible   Great work!  Follow up 3 months

## 2024-02-13 ENCOUNTER — TELEPHONE (OUTPATIENT)
Dept: TRANSPLANT | Facility: CLINIC | Age: 44
End: 2024-02-13
Payer: COMMERCIAL

## 2024-02-13 NOTE — TELEPHONE ENCOUNTER
Patient Call: General  Route to LPN    Reason for call: Rudi wanting to update Coordinator, that he tested positive for Covid, patient stated he has a runny nose , Congestion, and a cough      Call back needed? Yes    Return Call Needed  Same as documented in contacts section  When to return call?: Same day: Route High Priority

## 2024-02-13 NOTE — TELEPHONE ENCOUNTER
Rudi called to inform of his positive COVID test. He reports no fever, but a cough and some congestion. He is feeling much better today and declines remdesivir treatment. Patient has appointments related to his wait list status next week and would like to attend. Guideline is to isolate for 10 days after COVID positive test, but will notify wait list coordinator to decide follow up.

## 2024-02-20 ENCOUNTER — PRE VISIT (OUTPATIENT)
Dept: CARDIOLOGY | Facility: CLINIC | Age: 44
End: 2024-02-20

## 2024-02-25 ENCOUNTER — HEALTH MAINTENANCE LETTER (OUTPATIENT)
Age: 44
End: 2024-02-25

## 2024-03-03 DIAGNOSIS — Z94.0 KIDNEY REPLACED BY TRANSPLANT: ICD-10-CM

## 2024-03-03 DIAGNOSIS — I15.1 HTN, KIDNEY TRANSPLANT RELATED: Primary | ICD-10-CM

## 2024-03-03 DIAGNOSIS — Z94.0 HTN, KIDNEY TRANSPLANT RELATED: Primary | ICD-10-CM

## 2024-03-04 RX ORDER — CARVEDILOL 12.5 MG/1
12.5 TABLET ORAL 2 TIMES DAILY WITH MEALS
Qty: 180 TABLET | Refills: 0 | Status: SHIPPED | OUTPATIENT
Start: 2024-03-04 | End: 2024-06-03

## 2024-03-14 ENCOUNTER — TELEPHONE (OUTPATIENT)
Dept: NEPHROLOGY | Facility: CLINIC | Age: 44
End: 2024-03-14
Payer: COMMERCIAL

## 2024-03-14 NOTE — TELEPHONE ENCOUNTER
LVM & sent mychart for pt to reschedule 8.13.24 appt with Dr. Membreno to be with new provider ([facility]), first attempt 3.14.24 AN

## 2024-03-29 ENCOUNTER — TELEPHONE (OUTPATIENT)
Dept: NEPHROLOGY | Facility: CLINIC | Age: 44
End: 2024-03-29
Payer: COMMERCIAL

## 2024-03-29 NOTE — TELEPHONE ENCOUNTER
Rescheduled appt from 8.13.24 with Dr. Membreno to 8.14.24 with Dr. Berrios// sent message to [facility] about sending labs to [facility] in [place], [place]// 3.29.24 KET

## 2024-03-30 DIAGNOSIS — E10.22 TYPE 1 DIABETES MELLITUS WITH CHRONIC KIDNEY DISEASE ON CHRONIC DIALYSIS (H): ICD-10-CM

## 2024-03-30 DIAGNOSIS — Z99.2 TYPE 1 DIABETES MELLITUS WITH CHRONIC KIDNEY DISEASE ON CHRONIC DIALYSIS (H): ICD-10-CM

## 2024-03-30 DIAGNOSIS — N18.6 TYPE 1 DIABETES MELLITUS WITH CHRONIC KIDNEY DISEASE ON CHRONIC DIALYSIS (H): ICD-10-CM

## 2024-04-05 RX ORDER — PROCHLORPERAZINE 25 MG/1
SUPPOSITORY RECTAL
Qty: 9 EACH | Refills: 3 | Status: SHIPPED | OUTPATIENT
Start: 2024-04-05 | End: 2024-06-04

## 2024-04-05 NOTE — TELEPHONE ENCOUNTER
Continuous Blood Gluc Sensor (DEXCOM G6 SENSOR) MISC 9 each 0 1/14/2024       Last Office Visit: 1/19/24  Future Office visit:   6/4/24      Refill protocol passed    Kera Torres RN  P Red Flag Triage/MRT

## 2024-04-30 NOTE — PROGRESS NOTES
TRANSPLANT NEPHROLOGY WAITLIST VISIT    Assessment and Plan:  # Pancreas Transplant Wait List Evaluation: Patient is a good candidate overall. Patient should be considered for active listing for pancreas after completing cardiac risk assessment.     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  Maintained on MMF and tacrolimus for IS.     # Type 1 diabetes: using about 50 units of insulin daily via pump. A1c 7%. Complicated by triopathy and may benefit from a pancreas transplant.    # Cardiac risk: risk assessment pending today.     # BMI 30: Seen by transplant surgery last in 2023.     # Health maintenance:  dental not UTD. Derm UTD.         Discussed the risks and benefits of a transplant, including the risk of surgery and immunosuppression medications.    KDPI: We discussed approximate remaining wait time and how that is influenced by issues such as blood type and sensitization (PRA) and access to a living donor. I contrasted potential waiting time for living vs  donor kidneys from  normal (0-85%) or higher (%) kidney donor profile index (KDPI) donors and their associated outcomes. I would recommend Mr. Amin to consider kidneys from high KDPI donors. The reason for this decision is best summarized as: decreased dialysis related morbidity/mortality, accepting lower kidney graft survival rates.    Patient presently appears to be enough of an acceptable kidney transplant recipient candidate to have any potential kidney donors start the evaluation process.  Patient s overall re-evaluation may require further discussion in the Transplant Program s multidisciplinary selection committee for a final recommendation on the patient s suitability for transplant.     Reason for Visit:  Michael Amin is a 43 year old male with DM Type 1, who presents for pancreas transplant wait list evaluation.     Date of Initial Transplant Evaluation:  2019         Current Transplant  Phase: Waitlist: Inactive  Official [facility] Listing Date: 9/4/2018  Blood Type: B        cPRA: 0%       Date of cPRA: 11/2023   Transplant Coordinator: Carol Garcia Transplant Office phone number [phone]     Previous Medical Issues:  # BMI     History of Present Illness:   Michael Amin is a 43-year-old male with history of ESKD from presumed type 1 diabetes who was listed for SPK in 2019. He received a LDKT through [facility] in 10/2019 and has continued to be listed inactively for pancreas transplant due to BMI. Here for an updated wait list  visit with weight loss.            Interim Events:        Since his initial transplant evaluation in 1/2019 he received a LDKT in 10/2019 which has been doing well, with good graft function, baseline Cr ~ 1.1-1.3 mg/dL.   He can been listed inactive for pancreas transplant given BMI 30.              Diabetes: using total of 50 units insulin daily via pump       Diabetic Control: Borderline control (HbA1c 7-9%)      Last HbA1c: 7.1       Hypoglycemic Unawareness: No, using a CGM with BG typically ~ 65- low 200s       New Issues: No       Cardiac/Vascular Disease History:       Known CAD: No       Known PAD/Claudication Symptoms: No       Known Heart Failure: No       Arrhythmia:  No       Pulmonary Hypertension: No        Valvular Disease: No       Other: None       New Cardiac/Vascular Events: No         Functional Capacity/Frailty:        Working out at gym a few times a week with weight machines, also does a lot of yard work. Works as a  for middle school. Can go up and down stairs OK, no chest pains or shortness of breath.         Allergy Testing Questions:   Medication that caused a reaction  thymo-rigors   Antibiotics used that didn't give an allergic reaction?  Patient doesn't know    COVID Vaccination Up To Date: Not asked      Other Pertinent Transplant Surgical Issues:  Recent Blood Transfusion: No  Previous Abdominal Transplant: Yes; LDKT  (2019)  Bladder Dysfunction: No  Chronic/Recurrent Infections: No  Chronic Anticoagulation: No  Jehovah s Witness: No       Active Problem List:   Patient Active Problem List   Diagnosis    HTN, kidney transplant related    Diabetes mellitus type 1 (H)    Dyslipidemia    Anemia in chronic kidney disease    Secondary renal hyperparathyroidism (H24)    Kidney replaced by transplant    Aftercare following organ transplant    Vitamin D deficiency    Hypomagnesemia    Kidney transplant rejection    Class 1 obesity with serious comorbidity and body mass index (BMI) of 33.0 to 33.9 in adult, unspecified obesity type    Shingles       Personal History:  Nonsmoker        Allergies:  Allergies   Allergen Reactions    Anti-Thymocyte Glob (Rabbit)      Had reaction with rigors after steroid-free dose. No reaction with steroids.        Medications:  Current Outpatient Medications   Medication Sig Dispense Refill    alcohol swab prep pads Use to swab area of injection/zak as directed. 100 each 3    aspirin (ASA) 81 MG EC tablet Take 81 mg by mouth daily       atorvastatin (LIPITOR) 10 MG tablet Take 1 tablet (10 mg) by mouth daily 90 tablet 0    blood glucose (CONTOUR NEXT TEST) test strip Use to test blood sugar 4 times daily. 400 strip 11    blood glucose monitoring (ACCU-CHEK FASTCLIX) lancets   1    carvedilol (COREG) 12.5 MG tablet Take 1 tablet (12.5 mg) by mouth 2 times daily (with meals) 180 tablet 0    Continuous Blood Gluc Sensor (DEXCOM G6 SENSOR) MISC CHANGE SENSOR EVERY 10 DAYS 9 each 3    Continuous Blood Gluc Transmit (DEXCOM G6 TRANSMITTER) MISC Change every 3 months 1 each 3    Continuous Blood Gluc Transmit (DEXCOM G6 TRANSMITTER) MISC 1 each every 3 months 1 each 3    Continuous Blood Gluc Transmit (DEXCOM G6 TRANSMITTER) MISC 1 each every 3 months 1 each 3    Glucagon (BAQSIMI ONE PACK) 3 MG/DOSE POWD Spray 3 mg in nostril See Admin Instructions USE ONLY FOR SEVERE HYPOGLYCEMIA. 1 each 3    insulin cartridge  "(T:SLIM 3ML) misc pump supply Insulin cartridge to be used with pump as directed.  Change every 2 days or as directed. 45 each 3    Insulin Infusion Pump Supplies (AUTOSOFT XC INFUSION SET) MISC 1 each every 48 hours Change every 2 days  9 mm cannula, 23 inch tubing 45 each 3    insulin lispro (HUMALOG VIAL) 100 UNIT/ML vial Use in insulin pump. Pt uses approx 75 units daily. 80 mL 3    insulin pen needle (ULTICARE MICRO) 32G X 4 MM miscellaneous Use pen needles daily or as directed. 100 each 3    Insulin Syringe-Needle U-100 31G X 1/4\" 1 ML MISC 1 Syringe as needed (until new insuline pump arrives) 50 each 1    magnesium oxide (MAG-OX) 400 MG tablet Take 1 tablet (400 mg) by mouth daily (with lunch) 30 tablet 5    mycophenolate (GENERIC EQUIVALENT) 250 MG capsule Take 3 capsules (750 mg) by mouth 2 times daily 180 capsule 11    Semaglutide, 2 MG/DOSE, (OZEMPIC) 8 MG/3ML pen Inject 2 mg Subcutaneous every 7 days 9 mL 3    senna-docusate (SENOKOT-S/PERICOLACE) 8.6-50 MG tablet Take 2 tablets by mouth 2 times daily 20 tablet 0    sulfamethoxazole-trimethoprim (BACTRIM) 400-80 MG tablet Take 1 tablet by mouth daily 30 tablet 11    tacrolimus (GENERIC) 0.5 MG capsule Take 1 capsule (0.5 mg) by mouth every morning Total dose = 1.5 mg in the AM and 1 mg in the PM 90 capsule 3    tacrolimus (GENERIC) 1 MG capsule Total dose = 1.5 mg in the AM and 1 mg in the  capsule 3    vitamin D3 (CHOLECALCIFEROL) 50 mcg (2000 units) tablet Take 1 tablet (50 mcg) by mouth daily       No current facility-administered medications for this visit.        Vitals:  There were no vitals taken for this visit.     Exam:  GENERAL APPEARANCE: alert and no distress  HENT: mouth without ulcers or lesions  LYMPHATICS: no cervical or supraclavicular nodes  RESP: lungs clear to auscultation - no rales, rhonchi or wheezes  CV: regular rhythm, normal rate, no rub, no murmur  EDEMA: no LE edema bilaterally  ABDOMEN: soft, nondistended, nontender, " bowel sounds normal  MS: extremities normal - no gross deformities noted, no evidence of inflammation in joints, no muscle tenderness  SKIN: no rash

## 2024-05-01 ENCOUNTER — ANCILLARY PROCEDURE (OUTPATIENT)
Dept: ULTRASOUND IMAGING | Facility: CLINIC | Age: 44
End: 2024-05-01
Attending: NURSE PRACTITIONER
Payer: COMMERCIAL

## 2024-05-01 ENCOUNTER — OFFICE VISIT (OUTPATIENT)
Dept: CARDIOLOGY | Facility: CLINIC | Age: 44
End: 2024-05-01
Attending: INTERNAL MEDICINE
Payer: COMMERCIAL

## 2024-05-01 ENCOUNTER — OFFICE VISIT (OUTPATIENT)
Dept: TRANSPLANT | Facility: CLINIC | Age: 44
End: 2024-05-01
Attending: NURSE PRACTITIONER
Payer: COMMERCIAL

## 2024-05-01 VITALS
HEART RATE: 87 BPM | OXYGEN SATURATION: 97 % | BODY MASS INDEX: 31.68 KG/M2 | DIASTOLIC BLOOD PRESSURE: 89 MMHG | WEIGHT: 196.2 LBS | SYSTOLIC BLOOD PRESSURE: 146 MMHG

## 2024-05-01 VITALS
SYSTOLIC BLOOD PRESSURE: 128 MMHG | OXYGEN SATURATION: 98 % | DIASTOLIC BLOOD PRESSURE: 78 MMHG | WEIGHT: 196.5 LBS | HEART RATE: 85 BPM | HEIGHT: 67 IN | BODY MASS INDEX: 30.84 KG/M2

## 2024-05-01 DIAGNOSIS — E10.21 TYPE 1 DIABETES MELLITUS WITH NEPHROPATHY (H): ICD-10-CM

## 2024-05-01 DIAGNOSIS — Z01.810 PRE-OPERATIVE CARDIOVASCULAR EXAMINATION: ICD-10-CM

## 2024-05-01 DIAGNOSIS — E11.9 DIABETES MELLITUS, TYPE 2 (H): ICD-10-CM

## 2024-05-01 DIAGNOSIS — E78.5 DYSLIPIDEMIA: ICD-10-CM

## 2024-05-01 DIAGNOSIS — Z76.82 ORGAN TRANSPLANT CANDIDATE: ICD-10-CM

## 2024-05-01 DIAGNOSIS — Z94.0 KIDNEY REPLACED BY TRANSPLANT: ICD-10-CM

## 2024-05-01 DIAGNOSIS — E66.811 CLASS 1 OBESITY WITH SERIOUS COMORBIDITY AND BODY MASS INDEX (BMI) OF 33.0 TO 33.9 IN ADULT, UNSPECIFIED OBESITY TYPE: ICD-10-CM

## 2024-05-01 DIAGNOSIS — I15.1 HTN, KIDNEY TRANSPLANT RELATED: Primary | ICD-10-CM

## 2024-05-01 DIAGNOSIS — Z94.0 HTN, KIDNEY TRANSPLANT RELATED: Primary | ICD-10-CM

## 2024-05-01 DIAGNOSIS — E10.29 TYPE 1 DIABETES MELLITUS WITH OTHER KIDNEY COMPLICATION (H): ICD-10-CM

## 2024-05-01 PROCEDURE — 99204 OFFICE O/P NEW MOD 45 MIN: CPT | Performed by: INTERNAL MEDICINE

## 2024-05-01 PROCEDURE — 93978 VASCULAR STUDY: CPT | Performed by: RADIOLOGY

## 2024-05-01 PROCEDURE — 99213 OFFICE O/P EST LOW 20 MIN: CPT | Mod: 27 | Performed by: NURSE PRACTITIONER

## 2024-05-01 PROCEDURE — 99214 OFFICE O/P EST MOD 30 MIN: CPT | Performed by: NURSE PRACTITIONER

## 2024-05-01 PROCEDURE — 99213 OFFICE O/P EST LOW 20 MIN: CPT | Performed by: INTERNAL MEDICINE

## 2024-05-01 ASSESSMENT — PAIN SCALES - GENERAL: PAINLEVEL: NO PAIN (0)

## 2024-05-01 NOTE — PROGRESS NOTES
I am delighted to see Michael Amin in consultation.The primary encounter diagnosis was HTN, kidney transplant related. Diagnoses of Organ transplant candidate, Pre-operative cardiovascular examination, Dyslipidemia, Kidney replaced by transplant, Type 1 diabetes mellitus with other kidney complication (H), and Class 1 obesity with serious comorbidity and body mass index (BMI) of 33.0 to 33.9 in adult, unspecified obesity type were also pertinent to this visit.   As you know, the patient is a 43 year old  male. He   has a past medical history of Anemia in chronic kidney disease, Dyslipidemia, ESRD (end stage renal disease) on dialysis (H), Hyperlipidemia, Hypertension, Hypomagnesemia (10/07/2019), Obese, Secondary hyperparathyroidism (H24), Shingles (12/11/2023), and Type 1 diabetes (H)..    On this visit, the patient states that he has good exercise tolerance and is a candidate for pancreas transplant.  The patient denies chest pressure/discomfort, dyspnea, palpitations, near-syncope, syncope, orthopnea, paroxysmal nocturnal dyspnea, and lower extermity edema.    The patient's cardiovascular risk factors include hypertension, high cholesterol, renal disease, positive family history, and diabetes mellitus.    The following portions of the patient's history were reviewed and updated as appropriate: allergies, current medications, past family history, past medical history, past social history, past surgical history, and the problem list.    PMH: The patient's past medical history includes:    Past Medical History:   Diagnosis Date    Anemia in chronic kidney disease     Dyslipidemia     ESRD (end stage renal disease) on dialysis (H)     Hyperlipidemia     Hypertension     Hypomagnesemia 10/07/2019    Obese     Secondary hyperparathyroidism (H24)     Shingles 12/11/2023 12/11/23: Left Axilla    Type 1 diabetes (H)       Past Surgical History:   Procedure Laterality Date    hair implant  2007    INSERT  "CATHETER PERITONEAL DIALYSIS  08/2018    IR FINE NEEDLE ASPIRATION W ULTRASOUND  11/25/2019    IR RENAL BIOPSY LEFT  11/25/2019    PERCUTANEOUS BIOPSY KIDNEY Left 10/22/2019    Procedure: Left Kidney Biopsy;  Surgeon: Kash Hoffman MD;  Location:  OR       The patient's medications as of the current encounter are:     Current Outpatient Medications   Medication Sig Dispense Refill    alcohol swab prep pads Use to swab area of injection/zak as directed. 100 each 3    aspirin (ASA) 81 MG EC tablet Take 81 mg by mouth daily       atorvastatin (LIPITOR) 10 MG tablet Take 1 tablet (10 mg) by mouth daily 90 tablet 0    blood glucose (CONTOUR NEXT TEST) test strip Use to test blood sugar 4 times daily. 400 strip 11    blood glucose monitoring (ACCU-CHEK FASTCLIX) lancets   1    carvedilol (COREG) 12.5 MG tablet Take 1 tablet (12.5 mg) by mouth 2 times daily (with meals) 180 tablet 0    Continuous Blood Gluc Sensor (DEXCOM G6 SENSOR) MISC CHANGE SENSOR EVERY 10 DAYS 9 each 3    Continuous Blood Gluc Transmit (DEXCOM G6 TRANSMITTER) MISC Change every 3 months 1 each 3    Continuous Blood Gluc Transmit (DEXCOM G6 TRANSMITTER) MISC 1 each every 3 months 1 each 3    Continuous Blood Gluc Transmit (DEXCOM G6 TRANSMITTER) MISC 1 each every 3 months 1 each 3    insulin cartridge (T:SLIM 3ML) misc pump supply Insulin cartridge to be used with pump as directed.  Change every 2 days or as directed. 45 each 3    Insulin Infusion Pump Supplies (AUTOSOFT XC INFUSION SET) MISC 1 each every 48 hours Change every 2 days  9 mm cannula, 23 inch tubing 45 each 3    insulin lispro (HUMALOG VIAL) 100 UNIT/ML vial Use in insulin pump. Pt uses approx 75 units daily. 80 mL 3    insulin pen needle (ULTICARE MICRO) 32G X 4 MM miscellaneous Use pen needles daily or as directed. 100 each 3    Insulin Syringe-Needle U-100 31G X 1/4\" 1 ML MISC 1 Syringe as needed (until new insuline pump arrives) 50 each 1    magnesium oxide (MAG-OX) 400 MG " tablet Take 1 tablet (400 mg) by mouth daily (with lunch) 30 tablet 5    mycophenolate (GENERIC EQUIVALENT) 250 MG capsule Take 3 capsules (750 mg) by mouth 2 times daily 180 capsule 11    Semaglutide, 2 MG/DOSE, (OZEMPIC) 8 MG/3ML pen Inject 2 mg Subcutaneous every 7 days 9 mL 3    senna-docusate (SENOKOT-S/PERICOLACE) 8.6-50 MG tablet Take 2 tablets by mouth 2 times daily 20 tablet 0    sulfamethoxazole-trimethoprim (BACTRIM) 400-80 MG tablet Take 1 tablet by mouth daily 30 tablet 11    tacrolimus (GENERIC) 0.5 MG capsule Take 1 capsule (0.5 mg) by mouth every morning Total dose = 1.5 mg in the AM and 1 mg in the PM 90 capsule 3    tacrolimus (GENERIC) 1 MG capsule Total dose = 1.5 mg in the AM and 1 mg in the  capsule 3    vitamin D3 (CHOLECALCIFEROL) 50 mcg (2000 units) tablet Take 1 tablet (50 mcg) by mouth daily         Labs:     External Order Results on 04/24/2024   Component Date Value Ref Range Status    WBC Count (External) 04/24/2024 7.3  3.2 - 11.0 10*9/L Final    RBC Count (External) 04/24/2024 5.06  4.14 - 5.76 10*12/L Final    Hemoglobin (External) 04/24/2024 13.1  12.9 - 16.9 g/dL Final    Hematocrit (External) 04/24/2024 42.4  38.4 - 49.7 % Final    MCV (External) 04/24/2024 83.8  81.4 - 99.0 fL Final    MCH (External) 04/24/2024 25.9 (L)  26.7 - 33.1 pg Final    MCHC (External) 04/24/2024 30.9 (L)  31.6 - 35.5 g/dL Final    RDW (External) 04/24/2024 13.2  11.3 - 14.6 % Final    Platelet Count (External) 04/24/2024 170  130 - 375 10*9/L Final    Sodium (External) 04/24/2024 140  134 - 143 mEq/L Final    Potassium (External) 04/24/2024 4.2  3.4 - 5.1 mEq/L Final    Chloride (External) 04/24/2024 105  99 - 110 mEq/L Final    CO2 (External) 04/24/2024 27  19 - 29 mEq/L Final    Anion Gap (External) 04/24/2024 8.0  3.0 - 15.0 mEq/L Final    Urea Nitrogen (External) 04/24/2024 16  5 - 24 mg/dL Final    Creatinine (External) 04/24/2024 1.05  0.70 - 1.20 mg/dL Final    GFR Estimated (External)  04/24/2024 90  >60 mls/min Final    Calcium (External) 04/24/2024 9.5  8.4 - 10.5 mg/dL Final    Glucose (External) 04/24/2024 124 (H)  70 - 99 mg/dL Final    Tacrolimus(FK-506) (External) 04/24/2024 5.8  5.0 - 15.0 NG/Ml Final       Allergies:    Allergies   Allergen Reactions    Anti-Thymocyte Glob (Rabbit)      Had reaction with rigors after steroid-free dose. No reaction with steroids.       Family History:   Family History   Problem Relation Age of Onset    No Known Problems Mother     Diabetes Father     Coronary Artery Disease Father     No Known Problems Brother     Skin Cancer Paternal Uncle     Melanoma No family hx of        Psychosocial history:  reports that he has never smoked. He has never used smokeless tobacco. He reports that he does not currently use alcohol. He reports that he does not use drugs.    Review of systems: negative for, palpitations, exertional chest pain or pressure, paroxysmal nocturnal dyspnea, dyspnea on exertion, orthopnea, lower extremity edema, syncope or near-syncope, claudication, and exercise intolerance    In addition,   General: No change in weight, sleep or appetite.  Normal energy.  No fever or chills  Eyes: Negative for vision changes or eye problems  ENT: No problems with ears, nose or throat.  No difficulty swallowing.  Resp: No coughing, wheezing or shortness of breath  GI: No nausea, vomiting,  heartburn, abdominal pain, diarrhea, constipation or change in bowel habits  : No urinary frequency or dysuria, bladder or kidney problems; s/p kidney transplant  Musculoskeletal: No significant muscle or joint pains  Neurologic: No headaches, numbness, tingling, weakness, problems with balance or coordination  Psychiatric: No problems with anxiety, depression or mental health  Heme/immune/allergy: No history of bleeding or clotting problems or anemia.    Endocrine: DM type 1  Skin: No rashes,worrisome lesions or skin problems  Vascular:  No claudication, lifestyle limiting  or otherwise; no ischemic rest pain; no non-healing ulcers. No weakness, No loss of sensation        Physical examination  Vitals: BP (!) 146/89 (BP Location: Left arm, Patient Position: Sitting, Cuff Size: Adult Large)   Pulse 87   Wt 89 kg (196 lb 3.2 oz)   SpO2 97%   BMI 31.68 kg/m    BMI= Body mass index is 31.68 kg/m .    In general, the patient is a pleasant male in no apparent distress.    HEENT: Normiocephalic and atraumatic.  PERRLA.  EOMI.  Sclerae white, not injected.  Nares clear.  Pharynx without erythema or exudate.  Dentition intact.    Neck: No adenopathy.  No thyromegaly. Carotids +2/2 bilaterally without bruits.  No jugular venous distension.   Heart:  The PMI is in the 5th ICS in the midclavicular line. There is no heave. Regular rate and rhythm. Normal S1, S2 splits physiologically. No murmur, rub, click, or gallop.    Lungs: Clear to asculation.  No ronchi, wheezes, rales.  No dullness to percussion.   Abdomen: Soft, nontender, nondistended. No organomegaly. No AAA.  No bruits.   Extremities: No clubbing, cyanosis, or edema. The pulses were intact bilaterally.   Neurological: The neurological examination reveal a patient who was oriented to person, place, and time.  The remainder of the examination was nonfocal.    Cardiac tests include:    None recently    Assessment and Plan    Preop - ECG, stress echo  DM - on insulin, ozempic  HTN - on carvedilol  HL - on statin  5. S/p kidney transplant - on immunosuppression    The patient is to return  prn. The patient understood the treatment plan as outlined above.  There were no barriers to learning.      Carlos Casper MD

## 2024-05-01 NOTE — PATIENT INSTRUCTIONS
Complete an EKG    Complete an exercise stress echocardiogram (ultrasound of heart)  As soon as results are compiled and reviewed, you will be notified.  Follow up based on results.     Instructions for exercise echo    1. Nothing to eat or drink 3 hours prior except for water.   2. No caffeine, alcohol, or tobacco 12 hours prior  3. Wear comfortable clothes.   4. Stop beta blocker 24 hours prior and day of test. (CARVEDILOL)

## 2024-05-01 NOTE — LETTER
5/1/2024      RE: Michael Amin  [address]  [facility] [address]       Dear Colleague,    Thank you for the opportunity to participate in the care of your patient, Michael Amin, at the [facility] [place] [facility]. Please see a copy of my visit note below.    I am delighted to see Michael Amin in consultation.The primary encounter diagnosis was HTN, kidney transplant related. Diagnoses of Organ transplant candidate, Pre-operative cardiovascular examination, Dyslipidemia, Kidney replaced by transplant, Type 1 diabetes mellitus with other kidney complication (H), and Class 1 obesity with serious comorbidity and body mass index (BMI) of 33.0 to 33.9 in adult, unspecified obesity type were also pertinent to this visit.   As you know, the patient is a 43 year old  male. He   has a past medical history of Anemia in chronic kidney disease, Dyslipidemia, ESRD (end stage renal disease) on dialysis (H), Hyperlipidemia, Hypertension, Hypomagnesemia (10/07/2019), Obese, Secondary hyperparathyroidism (H24), Shingles (12/11/2023), and Type 1 diabetes (H)..    On this visit, the patient states that he has good exercise tolerance and is a candidate for pancreas transplant.  The patient denies chest pressure/discomfort, dyspnea, palpitations, near-syncope, syncope, orthopnea, paroxysmal nocturnal dyspnea, and lower extermity edema.    The patient's cardiovascular risk factors include hypertension, high cholesterol, renal disease, positive family history, and diabetes mellitus.    The following portions of the patient's history were reviewed and updated as appropriate: allergies, current medications, past family history, past medical history, past social history, past surgical history, and the problem list.    PMH: The patient's past medical history includes:    Past Medical History:   Diagnosis Date     Anemia in chronic kidney  disease      Dyslipidemia      ESRD (end stage renal disease) on dialysis (H)      Hyperlipidemia      Hypertension      Hypomagnesemia 10/07/2019     Obese      Secondary hyperparathyroidism (H24)      Shingles 12/11/2023 12/11/23: Left Axilla     Type 1 diabetes (H)       Past Surgical History:   Procedure Laterality Date     hair implant  2007     INSERT CATHETER PERITONEAL DIALYSIS  08/2018     IR FINE NEEDLE ASPIRATION W ULTRASOUND  11/25/2019     IR RENAL BIOPSY LEFT  11/25/2019     PERCUTANEOUS BIOPSY KIDNEY Left 10/22/2019    Procedure: Left Kidney Biopsy;  Surgeon: Kash Hoffman MD;  Location:  OR       The patient's medications as of the current encounter are:     Current Outpatient Medications   Medication Sig Dispense Refill     alcohol swab prep pads Use to swab area of injection/zak as directed. 100 each 3     aspirin (ASA) 81 MG EC tablet Take 81 mg by mouth daily        atorvastatin (LIPITOR) 10 MG tablet Take 1 tablet (10 mg) by mouth daily 90 tablet 0     blood glucose (CONTOUR NEXT TEST) test strip Use to test blood sugar 4 times daily. 400 strip 11     blood glucose monitoring (ACCU-CHEK FASTCLIX) lancets   1     carvedilol (COREG) 12.5 MG tablet Take 1 tablet (12.5 mg) by mouth 2 times daily (with meals) 180 tablet 0     Continuous Blood Gluc Sensor (DEXCOM G6 SENSOR) MISC CHANGE SENSOR EVERY 10 DAYS 9 each 3     Continuous Blood Gluc Transmit (DEXCOM G6 TRANSMITTER) MISC Change every 3 months 1 each 3     Continuous Blood Gluc Transmit (DEXCOM G6 TRANSMITTER) MISC 1 each every 3 months 1 each 3     Continuous Blood Gluc Transmit (DEXCOM G6 TRANSMITTER) MISC 1 each every 3 months 1 each 3     insulin cartridge (T:SLIM 3ML) misc pump supply Insulin cartridge to be used with pump as directed.  Change every 2 days or as directed. 45 each 3     Insulin Infusion Pump Supplies (AUTOSOFT XC INFUSION SET) MISC 1 each every 48 hours Change every 2 days  9 mm cannula, 23 inch tubing 45 each 3  "    insulin lispro (HUMALOG VIAL) 100 UNIT/ML vial Use in insulin pump. Pt uses approx 75 units daily. 80 mL 3     insulin pen needle (ULTICARE MICRO) 32G X 4 MM miscellaneous Use pen needles daily or as directed. 100 each 3     Insulin Syringe-Needle U-100 31G X 1/4\" 1 ML MISC 1 Syringe as needed (until new insuline pump arrives) 50 each 1     magnesium oxide (MAG-OX) 400 MG tablet Take 1 tablet (400 mg) by mouth daily (with lunch) 30 tablet 5     mycophenolate (GENERIC EQUIVALENT) 250 MG capsule Take 3 capsules (750 mg) by mouth 2 times daily 180 capsule 11     Semaglutide, 2 MG/DOSE, (OZEMPIC) 8 MG/3ML pen Inject 2 mg Subcutaneous every 7 days 9 mL 3     senna-docusate (SENOKOT-S/PERICOLACE) 8.6-50 MG tablet Take 2 tablets by mouth 2 times daily 20 tablet 0     sulfamethoxazole-trimethoprim (BACTRIM) 400-80 MG tablet Take 1 tablet by mouth daily 30 tablet 11     tacrolimus (GENERIC) 0.5 MG capsule Take 1 capsule (0.5 mg) by mouth every morning Total dose = 1.5 mg in the AM and 1 mg in the PM 90 capsule 3     tacrolimus (GENERIC) 1 MG capsule Total dose = 1.5 mg in the AM and 1 mg in the  capsule 3     vitamin D3 (CHOLECALCIFEROL) 50 mcg (2000 units) tablet Take 1 tablet (50 mcg) by mouth daily         Labs:     External Order Results on 04/24/2024   Component Date Value Ref Range Status     WBC Count (External) 04/24/2024 7.3  3.2 - 11.0 10*9/L Final     RBC Count (External) 04/24/2024 5.06  4.14 - 5.76 10*12/L Final     Hemoglobin (External) 04/24/2024 13.1  12.9 - 16.9 g/dL Final     Hematocrit (External) 04/24/2024 42.4  38.4 - 49.7 % Final     MCV (External) 04/24/2024 83.8  81.4 - 99.0 fL Final     MCH (External) 04/24/2024 25.9 (L)  26.7 - 33.1 pg Final     MCHC (External) 04/24/2024 30.9 (L)  31.6 - 35.5 g/dL Final     RDW (External) 04/24/2024 13.2  11.3 - 14.6 % Final     Platelet Count (External) 04/24/2024 170  130 - 375 10*9/L Final     Sodium (External) 04/24/2024 140  134 - 143 mEq/L Final     " Potassium (External) 04/24/2024 4.2  3.4 - 5.1 mEq/L Final     Chloride (External) 04/24/2024 105  99 - 110 mEq/L Final     CO2 (External) 04/24/2024 27  19 - 29 mEq/L Final     Anion Gap (External) 04/24/2024 8.0  3.0 - 15.0 mEq/L Final     Urea Nitrogen (External) 04/24/2024 16  5 - 24 mg/dL Final     Creatinine (External) 04/24/2024 1.05  0.70 - 1.20 mg/dL Final     GFR Estimated (External) 04/24/2024 90  >60 mls/min Final     Calcium (External) 04/24/2024 9.5  8.4 - 10.5 mg/dL Final     Glucose (External) 04/24/2024 124 (H)  70 - 99 mg/dL Final     Tacrolimus(FK-506) (External) 04/24/2024 5.8  5.0 - 15.0 NG/Ml Final       Allergies:    Allergies   Allergen Reactions     Anti-Thymocyte Glob (Rabbit)      Had reaction with rigors after steroid-free dose. No reaction with steroids.       Family History:   Family History   Problem Relation Age of Onset     No Known Problems Mother      Diabetes Father      Coronary Artery Disease Father      No Known Problems Brother      Skin Cancer Paternal Uncle      Melanoma No family hx of        Psychosocial history:  reports that he has never smoked. He has never used smokeless tobacco. He reports that he does not currently use alcohol. He reports that he does not use drugs.    Review of systems: negative for, palpitations, exertional chest pain or pressure, paroxysmal nocturnal dyspnea, dyspnea on exertion, orthopnea, lower extremity edema, syncope or near-syncope, claudication, and exercise intolerance    In addition,   General: No change in weight, sleep or appetite.  Normal energy.  No fever or chills  Eyes: Negative for vision changes or eye problems  ENT: No problems with ears, nose or throat.  No difficulty swallowing.  Resp: No coughing, wheezing or shortness of breath  GI: No nausea, vomiting,  heartburn, abdominal pain, diarrhea, constipation or change in bowel habits  : No urinary frequency or dysuria, bladder or kidney problems; s/p kidney  transplant  Musculoskeletal: No significant muscle or joint pains  Neurologic: No headaches, numbness, tingling, weakness, problems with balance or coordination  Psychiatric: No problems with anxiety, depression or mental health  Heme/immune/allergy: No history of bleeding or clotting problems or anemia.    Endocrine: DM type 1  Skin: No rashes,worrisome lesions or skin problems  Vascular:  No claudication, lifestyle limiting or otherwise; no ischemic rest pain; no non-healing ulcers. No weakness, No loss of sensation        Physical examination  Vitals: BP (!) 146/89 (BP Location: Left arm, Patient Position: Sitting, Cuff Size: Adult Large)   Pulse 87   Wt 89 kg (196 lb 3.2 oz)   SpO2 97%   BMI 31.68 kg/m    BMI= Body mass index is 31.68 kg/m .    In general, the patient is a pleasant male in no apparent distress.    HEENT: Normiocephalic and atraumatic.  PERRLA.  EOMI.  Sclerae white, not injected.  Nares clear.  Pharynx without erythema or exudate.  Dentition intact.    Neck: No adenopathy.  No thyromegaly. Carotids +2/2 bilaterally without bruits.  No jugular venous distension.   Heart:  The PMI is in the 5th ICS in the midclavicular line. There is no heave. Regular rate and rhythm. Normal S1, S2 splits physiologically. No murmur, rub, click, or gallop.    Lungs: Clear to asculation.  No ronchi, wheezes, rales.  No dullness to percussion.   Abdomen: Soft, nontender, nondistended. No organomegaly. No AAA.  No bruits.   Extremities: No clubbing, cyanosis, or edema. The pulses were intact bilaterally.   Neurological: The neurological examination reveal a patient who was oriented to person, place, and time.  The remainder of the examination was nonfocal.    Cardiac tests include:    None recently    Assessment and Plan    Preop - ECG, stress echo  DM - on insulin, ozempic  HTN - on carvedilol  HL - on statin  5. S/p kidney transplant - on immunosuppression    The patient is to return  prn. The patient understood  the treatment plan as outlined above.  There were no barriers to learning.      Carlos Casper MD     Please do not hesitate to contact me if you have any questions/concerns.     Sincerely,     Carlos Casper MD

## 2024-05-01 NOTE — LETTER
2024         RE: Michael Amin  [address]  [facility] [address]        Dear Colleague,    Thank you for referring your patient, Michael Amin, to the [facility]. Please see a copy of my visit note below.    TRANSPLANT NEPHROLOGY WAITLIST VISIT    Assessment and Plan:  # Pancreas Transplant Wait List Evaluation: Patient is a good candidate overall. Patient should be considered for active listing for pancreas after completing cardiac risk assessment.     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  Maintained on MMF and tacrolimus for IS.     # Type 1 diabetes: using about 50 units of insulin daily via pump. A1c 7%. Complicated by triopathy and may benefit from a pancreas transplant.    # Cardiac risk: risk assessment pending today.     # BMI 30: Seen by transplant surgery last in 2023.     # Health maintenance:  dental not UTD. Derm UTD.         Discussed the risks and benefits of a transplant, including the risk of surgery and immunosuppression medications.    KDPI: We discussed approximate remaining wait time and how that is influenced by issues such as blood type and sensitization (PRA) and access to a living donor. I contrasted potential waiting time for living vs  donor kidneys from  normal (0-85%) or higher (%) kidney donor profile index (KDPI) donors and their associated outcomes. I would recommend Mr. Amin to consider kidneys from high KDPI donors. The reason for this decision is best summarized as: decreased dialysis related morbidity/mortality, accepting lower kidney graft survival rates.    Patient presently appears to be enough of an acceptable kidney transplant recipient candidate to have any potential kidney donors start the evaluation process.  Patient s overall re-evaluation may require further discussion in the Transplant Program s multidisciplinary selection committee for a final recommendation  on the patient s suitability for transplant.     Reason for Visit:  Michael Amin is a 43 year old male with DM Type 1, who presents for pancreas transplant wait list evaluation.     Date of Initial Transplant Evaluation:  1/2019         Current Transplant Phase: Waitlist: Inactive  Official [facility] Listing Date: 9/4/2018  Blood Type: B        cPRA: 0%       Date of cPRA: 11/2023   Transplant Coordinator: Carol Garcia Transplant Office phone number [phone]     Previous Medical Issues:  # BMI     History of Present Illness:   Michael Amin is a 43-year-old male with history of ESKD from presumed type 1 diabetes who was listed for SPK in 2019. He received a LDKT through [facility] in 10/2019 and has continued to be listed inactively for pancreas transplant due to BMI. Here for an updated wait list  visit with weight loss.            Interim Events:        Since his initial transplant evaluation in 1/2019 he received a LDKT in 10/2019 which has been doing well, with good graft function, baseline Cr ~ 1.1-1.3 mg/dL.   He can been listed inactive for pancreas transplant given BMI 30.              Diabetes: using total of 50 units insulin daily via pump       Diabetic Control: Borderline control (HbA1c 7-9%)      Last HbA1c: 7.1       Hypoglycemic Unawareness: No, using a CGM with BG typically ~ 65- low 200s       New Issues: No       Cardiac/Vascular Disease History:       Known CAD: No       Known PAD/Claudication Symptoms: No       Known Heart Failure: No       Arrhythmia:  No       Pulmonary Hypertension: No        Valvular Disease: No       Other: None       New Cardiac/Vascular Events: No         Functional Capacity/Frailty:        Working out at gym a few times a week with weight machines, also does a lot of yard work. Works as a  for middle school. Can go up and down stairs OK, no chest pains or shortness of breath.         Allergy Testing Questions:   Medication that caused a reaction   thymo-rigors   Antibiotics used that didn't give an allergic reaction?  Patient doesn't know    COVID Vaccination Up To Date: Not asked      Other Pertinent Transplant Surgical Issues:  Recent Blood Transfusion: No  Previous Abdominal Transplant: Yes; LDKT (2019)  Bladder Dysfunction: No  Chronic/Recurrent Infections: No  Chronic Anticoagulation: No  Jehovah s Witness: No       Active Problem List:   Patient Active Problem List   Diagnosis     HTN, kidney transplant related     Diabetes mellitus type 1 (H)     Dyslipidemia     Anemia in chronic kidney disease     Secondary renal hyperparathyroidism (H24)     Kidney replaced by transplant     Aftercare following organ transplant     Vitamin D deficiency     Hypomagnesemia     Kidney transplant rejection     Class 1 obesity with serious comorbidity and body mass index (BMI) of 33.0 to 33.9 in adult, unspecified obesity type     Shingles       Personal History:  Nonsmoker        Allergies:  Allergies   Allergen Reactions     Anti-Thymocyte Glob (Rabbit)      Had reaction with rigors after steroid-free dose. No reaction with steroids.        Medications:  Current Outpatient Medications   Medication Sig Dispense Refill     alcohol swab prep pads Use to swab area of injection/zak as directed. 100 each 3     aspirin (ASA) 81 MG EC tablet Take 81 mg by mouth daily        atorvastatin (LIPITOR) 10 MG tablet Take 1 tablet (10 mg) by mouth daily 90 tablet 0     blood glucose (CONTOUR NEXT TEST) test strip Use to test blood sugar 4 times daily. 400 strip 11     blood glucose monitoring (ACCU-CHEK FASTCLIX) lancets   1     carvedilol (COREG) 12.5 MG tablet Take 1 tablet (12.5 mg) by mouth 2 times daily (with meals) 180 tablet 0     Continuous Blood Gluc Sensor (DEXCOM G6 SENSOR) MISC CHANGE SENSOR EVERY 10 DAYS 9 each 3     Continuous Blood Gluc Transmit (DEXCOM G6 TRANSMITTER) MISC Change every 3 months 1 each 3     Continuous Blood Gluc Transmit (DEXCOM G6 TRANSMITTER) MISC  "1 each every 3 months 1 each 3     Continuous Blood Gluc Transmit (DEXCOM G6 TRANSMITTER) MISC 1 each every 3 months 1 each 3     Glucagon (BAQSIMI ONE PACK) 3 MG/DOSE POWD Spray 3 mg in nostril See Admin Instructions USE ONLY FOR SEVERE HYPOGLYCEMIA. 1 each 3     insulin cartridge (T:SLIM 3ML) misc pump supply Insulin cartridge to be used with pump as directed.  Change every 2 days or as directed. 45 each 3     Insulin Infusion Pump Supplies (AUTOSOFT XC INFUSION SET) MISC 1 each every 48 hours Change every 2 days  9 mm cannula, 23 inch tubing 45 each 3     insulin lispro (HUMALOG VIAL) 100 UNIT/ML vial Use in insulin pump. Pt uses approx 75 units daily. 80 mL 3     insulin pen needle (ULTICARE MICRO) 32G X 4 MM miscellaneous Use pen needles daily or as directed. 100 each 3     Insulin Syringe-Needle U-100 31G X 1/4\" 1 ML MISC 1 Syringe as needed (until new insuline pump arrives) 50 each 1     magnesium oxide (MAG-OX) 400 MG tablet Take 1 tablet (400 mg) by mouth daily (with lunch) 30 tablet 5     mycophenolate (GENERIC EQUIVALENT) 250 MG capsule Take 3 capsules (750 mg) by mouth 2 times daily 180 capsule 11     Semaglutide, 2 MG/DOSE, (OZEMPIC) 8 MG/3ML pen Inject 2 mg Subcutaneous every 7 days 9 mL 3     senna-docusate (SENOKOT-S/PERICOLACE) 8.6-50 MG tablet Take 2 tablets by mouth 2 times daily 20 tablet 0     sulfamethoxazole-trimethoprim (BACTRIM) 400-80 MG tablet Take 1 tablet by mouth daily 30 tablet 11     tacrolimus (GENERIC) 0.5 MG capsule Take 1 capsule (0.5 mg) by mouth every morning Total dose = 1.5 mg in the AM and 1 mg in the PM 90 capsule 3     tacrolimus (GENERIC) 1 MG capsule Total dose = 1.5 mg in the AM and 1 mg in the  capsule 3     vitamin D3 (CHOLECALCIFEROL) 50 mcg (2000 units) tablet Take 1 tablet (50 mcg) by mouth daily       No current facility-administered medications for this visit.        Vitals:  There were no vitals taken for this visit.     Exam:  GENERAL APPEARANCE: alert and " no distress  HENT: mouth without ulcers or lesions  LYMPHATICS: no cervical or supraclavicular nodes  RESP: lungs clear to auscultation - no rales, rhonchi or wheezes  CV: regular rhythm, normal rate, no rub, no murmur  EDEMA: no LE edema bilaterally  ABDOMEN: soft, nondistended, nontender, bowel sounds normal  MS: extremities normal - no gross deformities noted, no evidence of inflammation in joints, no muscle tenderness  SKIN: no rash           Again, thank you for allowing me to participate in the care of your patient.        Sincerely,        CORINNA Cooley CNP

## 2024-05-01 NOTE — NURSING NOTE
Chief Complaint   Patient presents with    New Patient     new - Transplant Kidney or K/P Waitlist       Vitals were taken, medications reviewed.    Sabiha Mathews, EMT   8:13 AM

## 2024-05-02 NOTE — PROGRESS NOTES
Patient Name: Michael Amin  : 1980  Age: 43 year old  MRN: [id number]  Date of Initial Social Work Evaluation: 2029    Patient on Pancreas transplant wait list inactive.  Writer met with Michael today to update psychosocial assessment.  Rudi had a LUT Kidney 10/1/2019 and has been inactive on the list for a pancreas due to BMI.    Presenting Information   Living Situation: Alone in [place], [place].    If not local, plans for short term stay:  Plans to stay with his mother who lives in [place], [place] or a hotel.  Previous Functional Status: Independent   Cultural/Language/Spiritual Considerations: None indicated at this time.    Support System  Primary Support Person Parents, Annita and Angela Amin ([phone])  Other support:  Brother Fransico Amin ([phone])  Plan for support in immediate post-transplant period: Parents  Caregiver Agreement completed    Health Care Directive  Decision Maker: Self  Alternate Decision Maker: Parents  Health Care Directive: Will bring in copy when completed.    Mental Health/Coping:   History of Mental Health: Patient denies any past or current issues.  History of Chemical Health: Patient denies any past or current issues.  Current status: Appropriate  Coping: Rudi indicated when under stress he will walk his dog, work on stuff in the shop or go for a drive.  Services Needed/Recommended: None indicated at this time.    Financial   Income: Rudi is employed full-time as a .  Impact of transplant on income: Should be able to return to work once medically cleared  Insurance and medication coverage: [employer] through his employer.  Financial concerns: None indicated at this time.  Resources needed: None indicated at this time.    Assessment and recommendations and plan:  Reviewed transplant education (Rehabilitation, community/financial resources, and psych/family adjustment) as well as psychosocial risks of transplant. Provided patient  with a copy of post-transplant informational sheet that includes information on potential costs of medications, post-transplant lodging, etc. Patient seemed to process information well. Appeared well informed, motivated, and able to follow post transplant requirements. Behavior was appropriate during interview. Has adequate income and insurance coverage. Adequate social support. No major contraindications noted for transplant. At this time, patient appears to understand the risks and benefits of transplant.      DELFINO Penaloza, LICSW

## 2024-05-03 DIAGNOSIS — E78.5 DYSLIPIDEMIA: Primary | ICD-10-CM

## 2024-05-03 RX ORDER — ATORVASTATIN CALCIUM 10 MG/1
10 TABLET, FILM COATED ORAL DAILY
Qty: 90 TABLET | Refills: 0 | Status: SHIPPED | OUTPATIENT
Start: 2024-05-03 | End: 2024-06-19

## 2024-05-08 ENCOUNTER — TELEPHONE (OUTPATIENT)
Dept: TRANSPLANT | Facility: CLINIC | Age: 44
End: 2024-05-08
Payer: COMMERCIAL

## 2024-05-08 ENCOUNTER — COMMITTEE REVIEW (OUTPATIENT)
Dept: TRANSPLANT | Facility: CLINIC | Age: 44
End: 2024-05-08
Payer: COMMERCIAL

## 2024-05-08 DIAGNOSIS — I15.1 HTN, KIDNEY TRANSPLANT RELATED: Primary | ICD-10-CM

## 2024-05-08 DIAGNOSIS — E66.811 CLASS 1 OBESITY WITH SERIOUS COMORBIDITY AND BODY MASS INDEX (BMI) OF 33.0 TO 33.9 IN ADULT, UNSPECIFIED OBESITY TYPE: ICD-10-CM

## 2024-05-08 DIAGNOSIS — E78.5 DYSLIPIDEMIA: ICD-10-CM

## 2024-05-08 DIAGNOSIS — Z01.810 PRE-OPERATIVE CARDIOVASCULAR EXAMINATION: ICD-10-CM

## 2024-05-08 DIAGNOSIS — Z76.82 ORGAN TRANSPLANT CANDIDATE: ICD-10-CM

## 2024-05-08 DIAGNOSIS — Z01.810 PRE-OPERATIVE CARDIOVASCULAR EXAMINATION: Primary | ICD-10-CM

## 2024-05-08 DIAGNOSIS — Z94.0 HTN, KIDNEY TRANSPLANT RELATED: Primary | ICD-10-CM

## 2024-05-08 RX ORDER — LIDOCAINE 40 MG/G
CREAM TOPICAL
Status: CANCELLED | OUTPATIENT
Start: 2024-05-08

## 2024-05-08 RX ORDER — SODIUM CHLORIDE 9 MG/ML
INJECTION, SOLUTION INTRAVENOUS CONTINUOUS
Status: CANCELLED | OUTPATIENT
Start: 2024-05-08

## 2024-05-08 RX ORDER — ASPIRIN 81 MG/1
243 TABLET, CHEWABLE ORAL ONCE
Status: CANCELLED | OUTPATIENT
Start: 2024-05-08

## 2024-05-08 RX ORDER — POTASSIUM CHLORIDE 1500 MG/1
20 TABLET, EXTENDED RELEASE ORAL
Status: CANCELLED | OUTPATIENT
Start: 2024-05-08

## 2024-05-08 RX ORDER — POTASSIUM CHLORIDE 1500 MG/1
40 TABLET, EXTENDED RELEASE ORAL
Status: CANCELLED | OUTPATIENT
Start: 2024-05-08

## 2024-05-08 RX ORDER — ASPIRIN 325 MG
325 TABLET ORAL ONCE
Status: CANCELLED | OUTPATIENT
Start: 2024-05-08 | End: 2024-05-08

## 2024-05-08 NOTE — COMMITTEE REVIEW
Abdominal Patient Discussion Note Transplant Coordinator: Carol Garcia  Transplant Surgeon:   Rocio Rutherford    Referring Physician: Meera Fu    Committee Review Members:  Nephrology Donnie Brandon MD, CORINNA Cooley CNP   Nutrition Karissa Knowles, RD   Pharmacist Afia Guzman, [facility]    - Clinical Celine Galvan, [facility], Melissa Rogers, [facility], Airam Cummings, [facility]   Transplant ALEJANDRA MARTINEZ, LILLI, Lyssa August, LILLI, Lea Johnson, LILLI, Janel Muhammad, RN, Shania Wade, RN, Estela Woodard, GUILLAUME, Estela Mcare, RN, Katarina Spence, RN, Yennifer Low, LILLI, Pepper Jean, RN   Transplant Surgery Rocio Rutherford MD, MD       Additional Discussion Notes and Findings: Committee reviewed patient's medical and transplant history.  Patient has never had a coronary angiogram. Patient does meet cardiology protocol guidelines to undergo a coronary angiogram as related to the number of years he has been diabetic.

## 2024-05-08 NOTE — CONFIDENTIAL NOTE
Meets protocol for coronary angiogram prior to transplant. Orders placed. Gregoria Diane, CORINNA CNP on 5/8/2024 at 2:56 PM

## 2024-05-09 ENCOUNTER — TELEPHONE (OUTPATIENT)
Dept: TRANSPLANT | Facility: CLINIC | Age: 44
End: 2024-05-09
Payer: COMMERCIAL

## 2024-05-09 NOTE — TELEPHONE ENCOUNTER
"Reviewed the transplant multi-disciplinary transplant committee's recommendations that before having a pancreas transplant, patient will need to undergo a diagnostic coronary angiogram as patient has had diabetes since age 16.  Explained this is part of transplant's current cardiology protocol guidelines for transplant surgery. Answered patient's questions regarding the test.  Patient does report \"every time you guys seem to raise the bar for testing, but I'll jump through whatever hoops as the pancreas transplant is a priority for me.\"  Offered patient and appointment with a transplant provider to review concerns and patient declines.  Explained patient will be contacted by cardiology scheduling for the coronary angiogram.  Patient verbalizes agreement with this plan.   "

## 2024-05-13 DIAGNOSIS — E10.9 DIABETES MELLITUS TYPE 1 (H): Primary | ICD-10-CM

## 2024-05-13 DIAGNOSIS — Z76.82 ORGAN TRANSPLANT CANDIDATE: ICD-10-CM

## 2024-05-13 DIAGNOSIS — Z94.0 KIDNEY REPLACED BY TRANSPLANT: ICD-10-CM

## 2024-05-29 NOTE — PATIENT INSTRUCTIONS
You were seen in the cardiology clinic by: Gregoria Diane CNP    Plan:     Medication Changes:   - INCREASE Lipitor (atorvastatin) to 20mg at bedtime     Labs/Tests Needed:   - Please schedule your echocardiogram. You can do this closer to home. Contact your coordinator Lea Johnson RN and she can fax the order for you.    Follow Up Visit:   - Follow-up with cardiology in 12-18 months; sooner with any symptoms     Additional Instructions:   - Monitor your blood pressures at home. Please keep a log of the readings and bring to your future nephrology or cardiology appointments. Your bleed pressure goal is < 130/80. It is important to achieve adequate blood pressure control before transplant.   - Focus on a low sodium diet. You should aim to consume <2,000mg of sodium daily.   - I encourage you to achieve 150 minutes/week of moderate intensity exercise if able. If this is not achievable right away, you can gradually work yourself up to this starting with low intensity exercises (i.e. walking or swimming) a couple days a week.      Important Numbers:     Cardiology   Telephone Number     To schedule an appointment or leave a message for your care team:   [phone]      Press #1   To reach the billing department: [phone]      Press # 3     To obtain copies of your medical records: [phone]      Press # 4   To reach the on-call cardiologist for after hours or on weekends: [phone]     Option #4, and ask to speak to the      Cardiovascular Clinic:   [address] [facility]. [place], [place] [id number] [phone]      Thank you for trusting us with your health care needs!

## 2024-05-29 NOTE — PROGRESS NOTES
[facility]   Cardiology Service  History and Physical      Michael Amin MRN# [id number]   YOB: 1980 Age: 43 year old     This visit was conducted virtually. **    HPI:   Michael Amin is a 43 year old year old male with a medical history significant for obesity, DM1, HLD, and HTN. He presents today for cardiovascular risk assessment as part of pre-kidney transplant evaluation and 2 week post angiogram follow up.     He is a never smoker/non-smoker. He does not currently use alcohol or drugs. His activity level is mildly active. He was been exercising a couple times a week. Live son a couple of acres and does a lot of yard work. In the winter he tries to hit the gym 2-3 x weekly. He denies symptoms of chest pain, dizziness, lightheadedness, palpitations, shortness of breath, orthopnea, PND, or edema. He does not have family history of heart disease.     He most recently underwent coronary angiogram on 6/7/24 as part of his pre-transplant evaluation due to his longstanding history of diabetes type 1. The angiogram revealed non-obstructive coronary artery disease; no intervention was performed. The procedure was uncomplicated and his right radial access site is soft, flat, non-tender per patient statement.      Cardiovascular Risk Factor Profile:  coronary artery disease, hypertension, obesity, diabetes, male age >45, and dialysis > 5 years     Current Cardiovascular Symptoms:  - None    ACC HF Stage:  Stage B    NYHA Class:  Class I    Functional Capacity:  Performs 4 METS exercise without symptoms (e.g., light housework, stairs, 4 mph walk, 7 mph bike, slow step dance)        Past Medical History:   Diagnosis Date    Anemia in chronic kidney disease     Dyslipidemia     ESRD (end stage renal disease) on dialysis (H)     Hyperlipidemia     Hypertension     Hypomagnesemia 10/07/2019    Obese     Secondary hyperparathyroidism (H24)     Shingles 12/11/2023 12/11/23:  "Left Axilla    Type 1 diabetes (H)        Past Surgical History:   Procedure Laterality Date    hair implant  2007    INSERT CATHETER PERITONEAL DIALYSIS  08/2018    IR FINE NEEDLE ASPIRATION W ULTRASOUND  11/25/2019    IR RENAL BIOPSY LEFT  11/25/2019    PERCUTANEOUS BIOPSY KIDNEY Left 10/22/2019    Procedure: Left Kidney Biopsy;  Surgeon: Kash Hoffman MD;  Location:  OR       Current Outpatient Medications   Medication Sig Dispense Refill    alcohol swab prep pads Use to swab area of injection/zak as directed. 100 each 3    aspirin (ASA) 81 MG EC tablet Take 81 mg by mouth daily       atorvastatin (LIPITOR) 10 MG tablet Take 1 tablet (10 mg) by mouth daily 90 tablet 0    blood glucose (CONTOUR NEXT TEST) test strip Use to test blood sugar 4 times daily. 400 strip 11    blood glucose monitoring (ACCU-CHEK FASTCLIX) lancets   1    carvedilol (COREG) 12.5 MG tablet Take 1 tablet (12.5 mg) by mouth 2 times daily (with meals) 180 tablet 0    Continuous Blood Gluc Sensor (DEXCOM G6 SENSOR) MISC CHANGE SENSOR EVERY 10 DAYS 9 each 3    Continuous Blood Gluc Transmit (DEXCOM G6 TRANSMITTER) MISC Change every 3 months 1 each 3    Continuous Blood Gluc Transmit (DEXCOM G6 TRANSMITTER) MISC 1 each every 3 months 1 each 3    Continuous Blood Gluc Transmit (DEXCOM G6 TRANSMITTER) MISC 1 each every 3 months 1 each 3    insulin cartridge (T:SLIM 3ML) misc pump supply Insulin cartridge to be used with pump as directed.  Change every 2 days or as directed. 45 each 3    Insulin Infusion Pump Supplies (AUTOSOFT XC INFUSION SET) MISC 1 each every 48 hours Change every 2 days  9 mm cannula, 23 inch tubing 45 each 3    insulin lispro (HUMALOG VIAL) 100 UNIT/ML vial Use in insulin pump. Pt uses approx 75 units daily. 80 mL 3    insulin pen needle (ULTICARE MICRO) 32G X 4 MM miscellaneous Use pen needles daily or as directed. 100 each 3    Insulin Syringe-Needle U-100 31G X 1/4\" 1 ML MISC 1 Syringe as needed (until new insuline " pump arrives) 50 each 1    magnesium oxide (MAG-OX) 400 MG tablet Take 1 tablet (400 mg) by mouth daily (with lunch) 30 tablet 5    mycophenolate (GENERIC EQUIVALENT) 250 MG capsule Take 3 capsules (750 mg) by mouth 2 times daily 180 capsule 11    Semaglutide, 2 MG/DOSE, (OZEMPIC) 8 MG/3ML pen Inject 2 mg Subcutaneous every 7 days 9 mL 3    senna-docusate (SENOKOT-S/PERICOLACE) 8.6-50 MG tablet Take 2 tablets by mouth 2 times daily 20 tablet 0    sulfamethoxazole-trimethoprim (BACTRIM) 400-80 MG tablet Take 1 tablet by mouth daily 30 tablet 11    tacrolimus (GENERIC) 0.5 MG capsule Take 1 capsule (0.5 mg) by mouth every morning Total dose = 1.5 mg in the AM and 1 mg in the PM 90 capsule 3    tacrolimus (GENERIC) 1 MG capsule Total dose = 1.5 mg in the AM and 1 mg in the  capsule 3    vitamin D3 (CHOLECALCIFEROL) 50 mcg (2000 units) tablet Take 1 tablet (50 mcg) by mouth daily       No current facility-administered medications for this visit.       Family History   Problem Relation Age of Onset    No Known Problems Mother     Diabetes Father     Coronary Artery Disease Father     No Known Problems Brother     Skin Cancer Paternal Uncle     Melanoma No family hx of        Social History     Tobacco Use    Smoking status: Never    Smokeless tobacco: Never   Substance Use Topics    Alcohol use: Not Currently     Comment: Rarely       Allergies   Allergen Reactions    Anti-Thymocyte Glob (Rabbit)      Had reaction with rigors after steroid-free dose. No reaction with steroids.       Review Of Systems:   CONSTITUTIONAL: No report of fevers or chills  RESPIRATORY: No cough, wheezing, SOB, or hemoptysis  CARDIOVASCULAR: see HPI  MUSCULO-SKELETAL: No joint pain or swelling, no muscle pain  NEURO: No paresthesias, syncope, pre-syncope, lightheadness, dizziness or vertigo  ENDOCRINE: No temperature intolerance, no skin/hair changes  PSYCHIATRIC: No change in mood, feeling down/anxious, no change in sleep or appetite  GI:  No melena or hematochezia, no change in bowel habits  : No hematuria or dysuria, no hesitancy, dribbling or incontinence.   HEME: No easy bruising or bleeding, no history of anemia, no history of blood clots  SKIN: No rashes or sores, no unusual itching      Physical Examination:  No physical exam performed due to visit being virtual.     Laboratory:  Last Comprehensive Metabolic Panel:  Lab Results   Component Value Date     2023    POTASSIUM 4.4 2023    CHLORIDE 105 2024    CO2 28 2023    ANIONGAP 7 2023     (H) 2023    BUN 18.7 2023    CR 1.09 2023    GFRESTIMATED 86 2023    APRIL 9.4 2023       Last CBC:  CBC RESULTS:   Recent Labs   Lab Test 23  0848   WBC 7.9   RBC 5.34   HGB 13.7   HCT 43.6   MCV 82   MCH 25.7*   MCHC 31.4*   RDW 13.7          24 EK/7/2019 Echocardiogram:  Global and regional left ventricular function is normal with an EF of 60-65%.  The right ventricle is normal in function and size.  No significant valvular abnormalities.  No pericardial effusion is present.     No prior echo available for comparison.    24 Coronary Angiogram:     Ost LAD lesion is 30% stenosed.    3rd Mrg lesion is 20% stenosed.    Lat 2nd Mrg lesion is 45% stenosed.    Mid Cx to Dist Cx lesion is 20% stenosed.    Mid LAD lesion is 20% stenosed.    Prox LAD to Mid LAD lesion is 10% stenosed.     Mild, non obstructive coronary artery disease involving the left anterior descending and 3rd obtuse marginal arteries.   No acute coronary intervention performed.         Assessment and Plan:     # Coronary Artery Disease   # Hyperlipidemia  Underwent coronary angiogram on 24 with Dr. Jauregui which revealed non-obstructive coronary artery disease; no intervention was performed. The procedure was uncomplicated. He denies chest pain, dizziness, lightheadedness, shortness of breath, orthopnea, edema, or PND.  Most recent  lipid panel 6/8/24 with total 136, LDL 73. He is on statin therapy. Echocardiogram was ordered but not yet completed at the time of angiogram.   - Obtain updated echocardiogram; he will do this closer to home  - Continue Aspirin 81mg daily   - Increase Lipitor 20mg daily   - No additional cardiac testing needed prior to transplant if echocardiogram is within normal limits  - Follow-up with cardiology in 12-18 months; sooner with symptoms    # Hypertension  Blood pressures on home average 115-low 120's/70's. He admits to white coat syndrome at the doctors office.   - Continue Coreg 12.5mg BID   - Monitor BP's at home. Keep a log of readings and bring to follow up appointments to alleviate confusion with white coat syndrome  - BP goal < 130/80    # Diabetes Mellitus Type 1  Most recent A1c per chart review 7.1% on 11/21/23; he tells me it is now 7.5%. His diabetes is managed Dexcom sensor and insulin pump. A majority of time time he is 's for blood sugar. He has had some low's in the 40's but he is symptomatic and understands when this happens. He follows with endocrinology and recently seen the diabetic educator.   - Educated on importance of diabetic control in preventing progression of heart and kidney disease  - Follow-up with endocrinology as previously recommended     # Obesity (BMI 30)   Going to the gymn 2-3 x weekly in the winter. Doing yard work in the summer. He notes his weight fluctuates 4-5 pounds down then gains back 1-2. Weight today 186 lb per patient statement.   - Educated patient on the importance of healthy diet and exercise in reducing risk for heart disease   - Encouraged 150 minutes/week of moderate intensity exercise   - Encouraged healthy weight loss; discussed Mediterranean diet     # ESRD on HD   He is currently on dialysis. He admits to being compliant with all medications and not missing dialysis runs .   - Additional follow-up / testing per [facility] team      CAD Risk Level:  High Risk:  > 3 risk factors, OR diabetic kidney disease, OR DM > 10 years, known CAD or PAD, prior PCI or CABG, on dialysis 5 years or more         RCRI Score:   - High risk surgery (>5% cardiac complication risk) = 1 point   - Diabetes Mellitus (on Insulin) = 1 point   - Serum Creatinine >2.0 mg/dl = 1 point        I have reviewed and updated the patient's Past Medical History, Social History, Family History and Medication List.         CORINNA Arguello, CNP  [facility]      Review of prior external note(s) from - [facility], nephrology  Review of the result(s) of each unique test - echocardiogram, EKG, CT  Ordering of each unique test  Prescription drug management- medication reconciliation    30 minutes spent by me on the date of the encounter doing chart review, review of outside records, review of test results, patient visit, and documentation     Start Time--> 8:50  Stop Time --> 9:10      CORINNA Muse CNP on 6/19/2024 at 9:13 AM

## 2024-06-01 DIAGNOSIS — Z94.0 KIDNEY REPLACED BY TRANSPLANT: ICD-10-CM

## 2024-06-01 DIAGNOSIS — Z94.0 HTN, KIDNEY TRANSPLANT RELATED: ICD-10-CM

## 2024-06-01 DIAGNOSIS — I15.1 HTN, KIDNEY TRANSPLANT RELATED: ICD-10-CM

## 2024-06-03 RX ORDER — CARVEDILOL 12.5 MG/1
TABLET ORAL
Qty: 180 TABLET | Refills: 0 | Status: SHIPPED | OUTPATIENT
Start: 2024-06-03 | End: 2024-08-29

## 2024-06-04 ENCOUNTER — OFFICE VISIT (OUTPATIENT)
Dept: ENDOCRINOLOGY | Facility: CLINIC | Age: 44
End: 2024-06-04
Payer: COMMERCIAL

## 2024-06-04 ENCOUNTER — ALLIED HEALTH/NURSE VISIT (OUTPATIENT)
Dept: EDUCATION SERVICES | Facility: CLINIC | Age: 44
End: 2024-06-04
Payer: COMMERCIAL

## 2024-06-04 VITALS
HEART RATE: 80 BPM | BODY MASS INDEX: 30.23 KG/M2 | SYSTOLIC BLOOD PRESSURE: 118 MMHG | WEIGHT: 193 LBS | DIASTOLIC BLOOD PRESSURE: 72 MMHG | OXYGEN SATURATION: 100 %

## 2024-06-04 DIAGNOSIS — E10.22 TYPE 1 DIABETES MELLITUS WITH CHRONIC KIDNEY DISEASE ON CHRONIC DIALYSIS (H): ICD-10-CM

## 2024-06-04 DIAGNOSIS — N18.6 TYPE 1 DIABETES MELLITUS WITH CHRONIC KIDNEY DISEASE ON CHRONIC DIALYSIS (H): ICD-10-CM

## 2024-06-04 DIAGNOSIS — E10.22 TYPE 1 DIABETES MELLITUS WITH CHRONIC KIDNEY DISEASE ON CHRONIC DIALYSIS (H): Primary | ICD-10-CM

## 2024-06-04 DIAGNOSIS — Z99.2 TYPE 1 DIABETES MELLITUS WITH CHRONIC KIDNEY DISEASE ON CHRONIC DIALYSIS (H): Primary | ICD-10-CM

## 2024-06-04 DIAGNOSIS — N18.6 TYPE 1 DIABETES MELLITUS WITH CHRONIC KIDNEY DISEASE ON CHRONIC DIALYSIS (H): Primary | ICD-10-CM

## 2024-06-04 DIAGNOSIS — Z99.2 TYPE 1 DIABETES MELLITUS WITH CHRONIC KIDNEY DISEASE ON CHRONIC DIALYSIS (H): ICD-10-CM

## 2024-06-04 LAB — HBA1C MFR BLD: 7.5 %

## 2024-06-04 PROCEDURE — G0108 DIAB MANAGE TRN  PER INDIV: HCPCS | Performed by: REGISTERED NURSE

## 2024-06-04 PROCEDURE — 83036 HEMOGLOBIN GLYCOSYLATED A1C: CPT | Performed by: PATHOLOGY

## 2024-06-04 PROCEDURE — 99215 OFFICE O/P EST HI 40 MIN: CPT | Performed by: PHYSICIAN ASSISTANT

## 2024-06-04 RX ORDER — PROCHLORPERAZINE 25 MG/1
SUPPOSITORY RECTAL
Qty: 9 EACH | Refills: 3 | Status: SHIPPED | OUTPATIENT
Start: 2024-06-04

## 2024-06-04 RX ORDER — INSULIN INFUSION SET/CARTRIDGE
1 COMBINATION PACKAGE (EA) MISCELLANEOUS
Qty: 45 EACH | Refills: 3 | Status: SHIPPED | OUTPATIENT
Start: 2024-06-04

## 2024-06-04 RX ORDER — PROCHLORPERAZINE 25 MG/1
SUPPOSITORY RECTAL
Qty: 1 EACH | Refills: 3 | Status: SHIPPED | OUTPATIENT
Start: 2024-06-04

## 2024-06-04 RX ORDER — INSULIN GLARGINE 100 [IU]/ML
INJECTION, SOLUTION SUBCUTANEOUS
Qty: 15 ML | Refills: 1 | Status: SHIPPED | OUTPATIENT
Start: 2024-06-04

## 2024-06-04 RX ORDER — INSULIN PUMP CARTRIDGE
1 CARTRIDGE (EA) SUBCUTANEOUS EVERY OTHER DAY
Qty: 45 EACH | Refills: 3 | Status: SHIPPED | OUTPATIENT
Start: 2024-06-04

## 2024-06-04 ASSESSMENT — PAIN SCALES - GENERAL: PAINLEVEL: NO PAIN (0)

## 2024-06-04 NOTE — NURSING NOTE
"Chief Complaint   Patient presents with    Diabetes     Vital signs:      BP: (!) 144/88 Pulse: 80     SpO2: 100 %       Weight: 87.5 kg (193 lb)  Estimated body mass index is 30.23 kg/m  as calculated from the following:    Height as of 5/1/24: 1.702 m (5' 7\").    Weight as of this encounter: 87.5 kg (193 lb).        "

## 2024-06-04 NOTE — PROGRESS NOTES
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    Complications from diabetes: retinopathy, nepthropathy with renal transplant in 2019.  He is wait-listed for a pancreas transplant.        PATIENT CONCERNS/REASON FOR REFERRAL  DSME and insulin pump management.    ASSESSMENT:    Taking Medication:     Current Diabetes Management per Patient:  Taking diabetes medications? yes:     Diabetes Medication(s)       Insulin       insulin glargine (LANTUS SOLOSTAR) 100 UNIT/ML pen Inject 28 units subcutaneous once a day ONLY IF INSULIN PUMP FAILS.     insulin lispro (HUMALOG VIAL) 100 UNIT/ML vial Use in insulin pump. Pt uses approx 75 units daily.       Incretin Mimetic Agents       Semaglutide, 2 MG/DOSE, (OZEMPIC) 8 MG/3ML pen Inject 2 mg Subcutaneous every 7 days          Monitoring    Patient glucose self monitoring as follows: continuously using a continuous glucose monitor (CGM)  CGM: Dexcom G6  BG results: Please see Guillermina Boswell's note from today where his pump report appears.     Patient's most recent   Lab Results   Component Value Date    A1C 7.5 06/04/2024    A1C 7.1 11/21/2023    A1C 8.1 07/16/2020      Patient's A1C goal: <7.0    Activity: no regular exercise program    Healthy Eating:   Patient states that his diet has improved now that he is on the 2mg dose of Ozempic.  Counts carbs accurately.       Problem Solving:      Patient is at risk of hypoglycemia?: Frequency is one to two times per week  Hospitalizations for hyper or hypoglycemia: No    EDUCATION and INSTRUCTION PROVIDED AT THIS VISIT:      Discussed his overall control.  His time in target range  "currently is 60%, but his co-efficient of variation is high at 41%.  He has had several episodes on his recent report in which he's had an infusion set failure.  He states that he often over-treats his hypoglycemia, both of which contribute to the variability seen in his pump report.  He states that he put his pump in exercise mode and forgot to turn it off but felt that it actually kept him in fairly good control.  He sometimes gets frustrated with the pump's slow response to corrections and will enter \"fake carbs\" to get the pump to deliver more insulin when his glucose is high.  We discussed that this can change the way the algorithm in the pump works and can make it difficult for his caregivers to assess what adjustments, if any, need to be done.      I double checked the setting changes that Guillermina made today.  We turned Auto-Off off, as it appears that occasionally the pump shuts down when he doesn't interact with it over time.  We also turned Sleep Schedule on M-Sunday from 10pm to 6am.  Discussed over-treatment of lows and talked about the inaccuracy of the sensor during periods of hypoglycemia.  He is a little curious about using the Dexcom G7--discussed upgrading his insulin pump again to allow use of the G7.  He will check the website to see if the upgrade is available to him.  I gave him a couple of G7 sensors to try.  Discussed the differences between the G6 and the G7.    Discussed overtreatment of lows, which he states he frequently does.  Discussed the un-reliability of the sensor during periods of hypoglycemia and encouraged him to limit his treatment and try not to look at his sensor data, but check a fingerstick if he is unsure as to whether he has recovered.      Sent prescriptions for his pump supplies and CGM supplies to his respective pharmacies.     Patient-stated goal written and given to Michael Amin.  Verbalized and demonstrated understanding of instructions.     FOLLOW-UP:      Time spent " with patient at today's visit was 60 minutes.      Any diabetes medication dose changes were made via the [facility] Protocol and Collaborative Practice Agreement with [facility].  A copy of this encounter was provided to patient's referring provider.

## 2024-06-04 NOTE — PROGRESS NOTES
ELIZABETH Ridleye ( Rudi Rodriguez is a 43 year old male with type 1 diabetes mellitus.  Clinic visit today for diabetes follow up.  Last visit with us with me in January 2024.  Pt has hx of type 1 diabetes mellitus dx at age 16.  His diabetes is complicated by nephropathy and he underwent a kidney transplant on 10/1/2019.   Oph exam showed possible early retinopathy.   Oph exam reported stable exam in Nov 2022.   He has no peripheral neuropathy.  His hx is also significant for HTN, ED, dyslipidemia and obesity.  Rudi is using a Tandem insulin pump- control IQ and his basal insulin rates are:  Midnight= 2.1 units/hr.  4 am = 1.7 units/hr.  11:30 am = 1.7 units/hr.  8 pm = 2.0 units/hr.  I/C ratio settings:  Midnight= 1:7.5, 11:30 am 1:6.5 and 8 pm 1:5.5.     Sensitivity is set at 29.  Pt is also taking Ozempic 2 mg subcutaneous once a week.  Patient's A1C 7.5% today.  Previous A1C was 7.1 % on 11/21/2023.   Patient's A1C was 8.0 % on 6/15/2022.   I reviewed and scanned his insulin pump download data in his note below.  Average glucose is 177 with SD 72.   Using exercise mode day and night. He states he will have hypoglycemia if he doesn't use the exercise mode.  Rudi has lost approx 50 lbs since taking Ozempic and he will need a reduction in his insulin pump settings.  He often overtreat his hypoglycemia.  I have a meeting with diabetes ed today.  On ROS today, Rudi reports feeling well.  He has lost approx 50 lbs since taking Ozempic and is requiring less insulin.  Denies n/v, abd pain, diarrhea or hx of pancreatitis or gastroparesis.   No blurred vision.   Pt denies n/v, SOB at rest, cough, fever, chills, chest pain, abd pain, diarrhea, dysuria, hematuria or foot ulcers.  uRdi denies numbness, tingling or pain in his feet or hands.    Diabetes Care  Retinopathy: he was seen by Oph in Feb 2019- possible early retinopathy. He was seen by Oph in 2023 with stable exam per patient.  Nephropathy:hx of ESRD s/p kidney  transplant on 10/74114. Urine microalbuminuria negative in February 2024.  Neuropathy: none.  Foot Exam:no ulcers.  Taking aspirin: yes  Lipids: LDL 69 in Oct 2020. Pt is taking Lipitor.  Reminded patient he has fasting lipid panel ordered.    CAD: no.  Mental health:denies depression.  He is concerned he may have ADHD which he will discuss with his primary care provider.  Insulin: Tandem insulin pump- control IQ and DexcomG6 sensor.  DM meds: Ozempic.  Hypoglycemia tx: Pt has Baqsimi to use in case of severe hypoglycemia.  Backup insulin: Patient has Lantus to use for backup in case his insulin pump fails.                      ROS  Please see under HPI.    Allergies  Allergies   Allergen Reactions    Anti-Thymocyte Glob (Rabbit)      Had reaction with rigors after steroid-free dose. No reaction with steroids.       Medications  Current Outpatient Medications   Medication Sig Dispense Refill    alcohol swab prep pads Use to swab area of injection/zak as directed. 100 each 3    aspirin (ASA) 81 MG EC tablet Take 81 mg by mouth daily       atorvastatin (LIPITOR) 10 MG tablet Take 1 tablet (10 mg) by mouth daily 90 tablet 0    blood glucose (CONTOUR NEXT TEST) test strip Use to test blood sugar 4 times daily. 400 strip 11    blood glucose monitoring (ACCU-CHEK FASTCLIX) lancets   1    carvedilol (COREG) 12.5 MG tablet TAKE 1 TABLET BY MOUTH TWICE DAILY WITH MEALS PLEASE  REQUEST  FUTURE  REFILLS  FROM  YOUR  PRIMARY  CARE  PROVIDER 180 tablet 0    Continuous Blood Gluc Sensor (DEXCOM G6 SENSOR) MISC CHANGE SENSOR EVERY 10 DAYS 9 each 3    Continuous Blood Gluc Transmit (DEXCOM G6 TRANSMITTER) MISC Change every 3 months 1 each 3    Continuous Blood Gluc Transmit (DEXCOM G6 TRANSMITTER) MISC 1 each every 3 months 1 each 3    Continuous Blood Gluc Transmit (DEXCOM G6 TRANSMITTER) MISC 1 each every 3 months 1 each 3    insulin cartridge (T:SLIM 3ML) misc pump supply Insulin cartridge to be used with pump as directed.   "Change every 2 days or as directed. 45 each 3    Insulin Infusion Pump Supplies (AUTOSOFT XC INFUSION SET) MISC 1 each every 48 hours Change every 2 days  9 mm cannula, 23 inch tubing 45 each 3    insulin lispro (HUMALOG VIAL) 100 UNIT/ML vial Use in insulin pump. Pt uses approx 75 units daily. 80 mL 3    insulin pen needle (ULTICARE MICRO) 32G X 4 MM miscellaneous Use pen needles daily or as directed. 100 each 3    Insulin Syringe-Needle U-100 31G X 1/4\" 1 ML MISC 1 Syringe as needed (until new insuline pump arrives) 50 each 1    magnesium oxide (MAG-OX) 400 MG tablet Take 1 tablet (400 mg) by mouth daily (with lunch) 30 tablet 5    mycophenolate (GENERIC EQUIVALENT) 250 MG capsule Take 3 capsules (750 mg) by mouth 2 times daily 180 capsule 11    Semaglutide, 2 MG/DOSE, (OZEMPIC) 8 MG/3ML pen Inject 2 mg Subcutaneous every 7 days 9 mL 3    senna-docusate (SENOKOT-S/PERICOLACE) 8.6-50 MG tablet Take 2 tablets by mouth 2 times daily 20 tablet 0    sulfamethoxazole-trimethoprim (BACTRIM) 400-80 MG tablet Take 1 tablet by mouth daily 30 tablet 11    tacrolimus (GENERIC) 0.5 MG capsule Take 1 capsule (0.5 mg) by mouth every morning Total dose = 1.5 mg in the AM and 1 mg in the PM 90 capsule 3    tacrolimus (GENERIC) 1 MG capsule Total dose = 1.5 mg in the AM and 1 mg in the  capsule 3    vitamin D3 (CHOLECALCIFEROL) 50 mcg (2000 units) tablet Take 1 tablet (50 mcg) by mouth daily         Family History  family history includes Coronary Artery Disease in his father; Diabetes in his father; No Known Problems in his brother and mother; Skin Cancer in his paternal uncle.    Social History   reports that he has never smoked. He has never used smokeless tobacco. He reports that he does not currently use alcohol. He reports that he does not use drugs.   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    Past Medical History  Past Medical History:   Diagnosis Date    Anemia in chronic " kidney disease     Dyslipidemia     ESRD (end stage renal disease) on dialysis (H)     Hyperlipidemia     Hypertension     Hypomagnesemia 10/07/2019    Obese     Secondary hyperparathyroidism (H24)     Shingles 12/11/2023 12/11/23: Left Axilla    Type 1 diabetes (H)        Past Surgical History:   Procedure Laterality Date    hair implant  2007    INSERT CATHETER PERITONEAL DIALYSIS  08/2018    IR FINE NEEDLE ASPIRATION W ULTRASOUND  11/25/2019    IR RENAL BIOPSY LEFT  11/25/2019    PERCUTANEOUS BIOPSY KIDNEY Left 10/22/2019    Procedure: Left Kidney Biopsy;  Surgeon: Kash Hoffman MD;  Location: [facility]       Physical Exam    No exam today.    RESULTS  Creatinine   Date Value Ref Range Status   11/21/2023 1.09 0.67 - 1.17 mg/dL Final   05/11/2021 1.13 0.66 - 1.25 mg/dL Final     GFR Estimate   Date Value Ref Range Status   11/21/2023 86 >60 mL/min/1.73m2 Final   05/11/2021 80 >60 mL/min/[1.73_m2] Final     Comment:     Non  GFR Calc  Starting 12/18/2018, serum creatinine based estimated GFR (eGFR) will be   calculated using the Chronic Kidney Disease Epidemiology Collaboration   (CKD-EPI) equation.       Hemoglobin A1C   Date Value Ref Range Status   11/21/2023 7.1 (H) <5.7 % Final     Comment:     Normal <5.7%   Prediabetes 5.7-6.4%    Diabetes 6.5% or higher     Note: Adopted from ADA consensus guidelines.   07/16/2020 8.1 (H) 0 - 5.6 % Final     Comment:     Normal <5.7% Prediabetes 5.7-6.4%  Diabetes 6.5% or higher - adopted from ADA   consensus guidelines.       Potassium   Date Value Ref Range Status   11/21/2023 4.4 3.4 - 5.3 mmol/L Final   11/16/2021 4.0 3.4 - 5.3 mmol/L Final   05/11/2021 4.1 3.4 - 5.3 mmol/L Final     ALT   Date Value Ref Range Status   09/26/2019 42 0 - 70 U/L Final     AST   Date Value Ref Range Status   09/26/2019 16 0 - 45 U/L Final       ASSESSMENT/PLAN:    1.  TYPE 1 DIABETES MELLITUS: A1C has improved with addition of Ozempic and Rudi reports a 50 pound  weight loss since taking Ozempic and feeling well and requiring less insulin.  I asked him to use the sleep mode feature on his insulin pump from 10 PM to 6 AM and avoid use of exercise mode unless he plans to exercise or have increased activity.  His insulin pump settings were changed to the following today-  Basal insulin rates:  Midnight= 1.9 units/hr ( was 2.1)  4 AM= 1.6 units/hr ( was 1.7 )  11:30 AM= 1.6 units/hr (was 1.7 )  8 PM= 1.9 units/hr ( was 2.0 )  I/C ratio:  Midnight= 1:7.5  4 AM= 1:7.5  11:30 AM=1:6  8 pm = 1:7.0  CF 34 x 24 hrs.  Continue Ozempic 2 mg subcutaneous  once a week.  Discussed the importance of healthy eating and exercise.  Pt was seen by Oph in 2023.  He denies any sx of neuropathy or foot ulcers or sores at this time.  Pt's TSH was normal on 7/22/2021.    2.  HX OF ESRD: S/P kidney transplant on 10/1/019 doing well.  Most recent creat was 1.05 with GFR 90 mL/min in April 2024.    3. OBESITY: Continue Ozempic 2 mg subcutaneous once a week today.  Rudi has had a 50 pound weight loss with Ozempic and is requiring less insulin.    4.  HTN: /72 today.  Continue current RX.    5.  HYPERLIPIDEMIA: LDL 69 in Oct 2020.  Pt is taking Lipitor daily.  Reminded patient he has a fasting lipid panel ordered.    6.  FOLLOW UP : with me in November 2024 and June 2025 in clinic.  Patient has visit with diabetes educator today.    Time spent reviewing chart, labs and insulin pump data today = 5 minutes.  Time for clinic visit today = 30 minutes.  Time for documentation today = 10 minutes.     TOTAL TIME FOR VISIT TODAY = 45 minutes.    Kymberly Boswell PA-C

## 2024-06-04 NOTE — LETTER
6/4/2024       RE: Michael Amin  [address]  [facility] [address]     Dear Colleague,    Thank you for referring your patient, Michael Amin, to the [facility] [place] [facility]. Please see a copy of my visit note below.    Outcome for 05/23/24 4:01 PM: [employer] message sent  DARLIN Chaconchie ( Rudi ) Michael is a 43 year old male with type 1 diabetes mellitus.  Clinic visit today for diabetes follow up.  Last visit with us with me in January 2024.  Pt has hx of type 1 diabetes mellitus dx at age 16.  His diabetes is complicated by nephropathy and he underwent a kidney transplant on 10/1/2019.   Oph exam showed possible early retinopathy.   Oph exam reported stable exam in Nov 2022.   He has no peripheral neuropathy.  His hx is also significant for HTN, ED, dyslipidemia and obesity.  Rudi is using a Tandem insulin pump- control IQ and his basal insulin rates are:  Midnight= 2.1 units/hr.  4 am = 1.7 units/hr.  11:30 am = 1.7 units/hr.  8 pm = 2.0 units/hr.  I/C ratio settings:  Midnight= 1:7.5, 11:30 am 1:6.5 and 8 pm 1:5.5.     Sensitivity is set at 29.  Pt is also taking Ozempic 2 mg subcutaneous once a week.  Patient's A1C 7.5% today.  Previous A1C was 7.1 % on 11/21/2023.   Patient's A1C was 8.0 % on 6/15/2022.   I reviewed and scanned his insulin pump download data in his note below.  Average glucose is 177 with SD 72.   Using exercise mode day and night. He states he will have hypoglycemia if he doesn't use the exercise mode.  Rudi has lost approx 50 lbs since taking Ozempic and he will need a reduction in his insulin pump settings.  He often overtreat his hypoglycemia.  I have a meeting with diabetes ed today.  On ROS today, Rudi reports feeling well.  He has lost approx 50 lbs since taking Ozempic and is requiring less insulin.  Denies n/v, abd pain, diarrhea or hx of  pancreatitis or gastroparesis.   No blurred vision.   Pt denies n/v, SOB at rest, cough, fever, chills, chest pain, abd pain, diarrhea, dysuria, hematuria or foot ulcers.  Rudi denies numbness, tingling or pain in his feet or hands.    Diabetes Care  Retinopathy: he was seen by Oph in Feb 2019- possible early retinopathy. He was seen by Oph in 2023 with stable exam per patient.  Nephropathy:hx of ESRD s/p kidney transplant on 10/82150. Urine microalbuminuria negative in February 2024.  Neuropathy: none.  Foot Exam:no ulcers.  Taking aspirin: yes  Lipids: LDL 69 in Oct 2020. Pt is taking Lipitor.  Reminded patient he has fasting lipid panel ordered.    CAD: no.  Mental health:denies depression.  He is concerned he may have ADHD which he will discuss with his primary care provider.  Insulin: Tandem insulin pump- control IQ and DexcomG6 sensor.  DM meds: Ozempic.  Hypoglycemia tx: Pt has Baqsimi to use in case of severe hypoglycemia.  Backup insulin: Patient has Lantus to use for backup in case his insulin pump fails.                      ROS  Please see under HPI.    Allergies  Allergies   Allergen Reactions    Anti-Thymocyte Glob (Rabbit)      Had reaction with rigors after steroid-free dose. No reaction with steroids.       Medications  Current Outpatient Medications   Medication Sig Dispense Refill    alcohol swab prep pads Use to swab area of injection/zak as directed. 100 each 3    aspirin (ASA) 81 MG EC tablet Take 81 mg by mouth daily       atorvastatin (LIPITOR) 10 MG tablet Take 1 tablet (10 mg) by mouth daily 90 tablet 0    blood glucose (CONTOUR NEXT TEST) test strip Use to test blood sugar 4 times daily. 400 strip 11    blood glucose monitoring (ACCU-CHEK FASTCLIX) lancets   1    carvedilol (COREG) 12.5 MG tablet TAKE 1 TABLET BY MOUTH TWICE DAILY WITH MEALS PLEASE  REQUEST  FUTURE  REFILLS  FROM  YOUR  PRIMARY  CARE  PROVIDER 180 tablet 0    Continuous Blood Gluc Sensor (DEXCOM G6 SENSOR) MISC CHANGE  "SENSOR EVERY 10 DAYS 9 each 3    Continuous Blood Gluc Transmit (DEXCOM G6 TRANSMITTER) MISC Change every 3 months 1 each 3    Continuous Blood Gluc Transmit (DEXCOM G6 TRANSMITTER) MISC 1 each every 3 months 1 each 3    Continuous Blood Gluc Transmit (DEXCOM G6 TRANSMITTER) MISC 1 each every 3 months 1 each 3    insulin cartridge (T:SLIM 3ML) misc pump supply Insulin cartridge to be used with pump as directed.  Change every 2 days or as directed. 45 each 3    Insulin Infusion Pump Supplies (AUTOSOFT XC INFUSION SET) MISC 1 each every 48 hours Change every 2 days  9 mm cannula, 23 inch tubing 45 each 3    insulin lispro (HUMALOG VIAL) 100 UNIT/ML vial Use in insulin pump. Pt uses approx 75 units daily. 80 mL 3    insulin pen needle (ULTICARE MICRO) 32G X 4 MM miscellaneous Use pen needles daily or as directed. 100 each 3    Insulin Syringe-Needle U-100 31G X 1/4\" 1 ML MISC 1 Syringe as needed (until new insuline pump arrives) 50 each 1    magnesium oxide (MAG-OX) 400 MG tablet Take 1 tablet (400 mg) by mouth daily (with lunch) 30 tablet 5    mycophenolate (GENERIC EQUIVALENT) 250 MG capsule Take 3 capsules (750 mg) by mouth 2 times daily 180 capsule 11    Semaglutide, 2 MG/DOSE, (OZEMPIC) 8 MG/3ML pen Inject 2 mg Subcutaneous every 7 days 9 mL 3    senna-docusate (SENOKOT-S/PERICOLACE) 8.6-50 MG tablet Take 2 tablets by mouth 2 times daily 20 tablet 0    sulfamethoxazole-trimethoprim (BACTRIM) 400-80 MG tablet Take 1 tablet by mouth daily 30 tablet 11    tacrolimus (GENERIC) 0.5 MG capsule Take 1 capsule (0.5 mg) by mouth every morning Total dose = 1.5 mg in the AM and 1 mg in the PM 90 capsule 3    tacrolimus (GENERIC) 1 MG capsule Total dose = 1.5 mg in the AM and 1 mg in the  capsule 3    vitamin D3 (CHOLECALCIFEROL) 50 mcg (2000 units) tablet Take 1 tablet (50 mcg) by mouth daily         Family History  family history includes Coronary Artery Disease in his father; Diabetes in his father; No Known Problems " in his brother and mother; Skin Cancer in his paternal uncle.    Social History   reports that he has never smoked. He has never used smokeless tobacco. He reports that he does not currently use alcohol. He reports that he does not use drugs.   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    Past Medical History  Past Medical History:   Diagnosis Date    Anemia in chronic kidney disease     Dyslipidemia     ESRD (end stage renal disease) on dialysis (H)     Hyperlipidemia     Hypertension     Hypomagnesemia 10/07/2019    Obese     Secondary hyperparathyroidism (H24)     Shingles 12/11/2023 12/11/23: Left Axilla    Type 1 diabetes (H)        Past Surgical History:   Procedure Laterality Date    hair implant  2007    INSERT CATHETER PERITONEAL DIALYSIS  08/2018    IR FINE NEEDLE ASPIRATION W ULTRASOUND  11/25/2019    IR RENAL BIOPSY LEFT  11/25/2019    PERCUTANEOUS BIOPSY KIDNEY Left 10/22/2019    Procedure: Left Kidney Biopsy;  Surgeon: Kash Hoffman MD;  Location: [facility] OR       Physical Exam    No exam today.    RESULTS  Creatinine   Date Value Ref Range Status   11/21/2023 1.09 0.67 - 1.17 mg/dL Final   05/11/2021 1.13 0.66 - 1.25 mg/dL Final     GFR Estimate   Date Value Ref Range Status   11/21/2023 86 >60 mL/min/1.73m2 Final   05/11/2021 80 >60 mL/min/[1.73_m2] Final     Comment:     Non  GFR Calc  Starting 12/18/2018, serum creatinine based estimated GFR (eGFR) will be   calculated using the Chronic Kidney Disease Epidemiology Collaboration   (CKD-EPI) equation.       Hemoglobin A1C   Date Value Ref Range Status   11/21/2023 7.1 (H) <5.7 % Final     Comment:     Normal <5.7%   Prediabetes 5.7-6.4%    Diabetes 6.5% or higher     Note: Adopted from ADA consensus guidelines.   07/16/2020 8.1 (H) 0 - 5.6 % Final     Comment:     Normal <5.7% Prediabetes 5.7-6.4%  Diabetes 6.5% or higher - adopted from ADA   consensus guidelines.       Potassium   Date  Value Ref Range Status   11/21/2023 4.4 3.4 - 5.3 mmol/L Final   11/16/2021 4.0 3.4 - 5.3 mmol/L Final   05/11/2021 4.1 3.4 - 5.3 mmol/L Final     ALT   Date Value Ref Range Status   09/26/2019 42 0 - 70 U/L Final     AST   Date Value Ref Range Status   09/26/2019 16 0 - 45 U/L Final       ASSESSMENT/PLAN:    1.  TYPE 1 DIABETES MELLITUS: A1C has improved with addition of Ozempic and Rudi reports a 50 pound weight loss since taking Ozempic and feeling well and requiring less insulin.  I asked him to use the sleep mode feature on his insulin pump from 10 PM to 6 AM and avoid use of exercise mode unless he plans to exercise or have increased activity.  His insulin pump settings were changed to the following today-  Basal insulin rates:  Midnight= 1.9 units/hr ( was 2.1)  4 AM= 1.6 units/hr ( was 1.7 )  11:30 AM= 1.6 units/hr (was 1.7 )  8 PM= 1.9 units/hr ( was 2.0 )  I/C ratio:  Midnight= 1:7.5  4 AM= 1:7.5  11:30 AM=1:6  8 pm = 1:7.0  CF 34 x 24 hrs.  Continue Ozempic 2 mg subcutaneous  once a week.  Discussed the importance of healthy eating and exercise.  Pt was seen by Oph in 2023.  He denies any sx of neuropathy or foot ulcers or sores at this time.  Pt's TSH was normal on 7/22/2021.    2.  HX OF ESRD: S/P kidney transplant on 10/1/019 doing well.  Most recent creat was 1.05 with GFR 90 mL/min in April 2024.    3. OBESITY: Continue Ozempic 2 mg subcutaneous once a week today.  Rudi has had a 50 pound weight loss with Ozempic and is requiring less insulin.    4.  HTN: /72 today.  Continue current RX.    5.  HYPERLIPIDEMIA: LDL 69 in Oct 2020.  Pt is taking Lipitor daily.  Reminded patient he has a fasting lipid panel ordered.    6.  FOLLOW UP : with me in November 2024 and June 2025 in clinic.  Patient has visit with diabetes educator today.    Time spent reviewing chart, labs and insulin pump data today = 5 minutes.  Time for clinic visit today = 30 minutes.  Time for documentation today = 10 minutes.      TOTAL TIME FOR VISIT TODAY = 45 minutes.    Kymberly Boswell PA-C

## 2024-06-06 ENCOUNTER — TELEPHONE (OUTPATIENT)
Dept: CARDIOLOGY | Facility: CLINIC | Age: 44
End: 2024-06-06
Payer: COMMERCIAL

## 2024-06-06 NOTE — CONFIDENTIAL NOTE
Spoke to Rudi regarding instructions for tomorrow. Pt states his insulin pump is only short acting insulin and he is nervous about holding it the whole day because it will be cayden high. I advised pt to bring all his insulin supplies and continuous monitor. Advised him to reach out to his endocrinologist for specific instructions but that we recommend holding all short acting insulin day of procedure.     Pt stated he had other questions but could not remember at the time. Recommended pt respond to "[employer] message if he remembers.      Pre-procedure instructions - Coronary Angiogram  Patient Education    Your arrival time is 0800 on 06/07  Location is [address] Waiting Room  Please plan on being at the hospital all day.  At any time, emergencies and/or urgent cases may come up which could delay the start of your procedure.    Pre-procedure instructions - Coronary Angiogram  Shower in the evening before or the morning of the procedure  No solid food for 8 hours prior and nothing to drink 2 hours prior to arrival time  You can take your morning medications (except for diabetic and blood thinners) with sips of water.  Take 325 mg of Asprin 24 hours prior to the procedure and the morning of procedure.   You will need to arrange a ride to drop you off and pick you up, as you will be unable to drive home.  Prior to discharge you may be required to lay flat for approximately 2-4 hours in the recovery unit to ensure proper clotting of the artery. You will need a responsible adult to stay with you for 24 hours post-procedure.              Diabetic Medication Instructions  Hold oral diabetic medication in morning of your procedure and for 48 hours after IV contrast is given  Typical instructions for insulin diabetic medication holding are below. However, please reach out to your Primary Care Provider or Endocrinologist for specific  instructions  DO NOT take any oral diabetic medication, short-acting diabetes medications/insulin, humalog or regular insulin the morning of your test  Take   dose of long-acting insulin (Lantus, Levemir) the day of your test  Remember to bring your glucometer and insulin with you to take after your test if needed  GLP-1 Agonists Instructions  DO NOT take injectable GLP-1 agonists semaglutide (Ozempic, Wegovy), dulaglutide (Trulicity), exenatide ER (Bydureon), tirzepatide (Mounjaro), or oral semaglutide (Rybelsus) for 7 days prior your procedure  Hold once daily injectable GLP-1 agonists exenatide (Byetta), liraglutide (Saxenda, Victoza), lixisenatide (Soligua) the day before and day of your procedure                  Anticoagulation Medication Instructions   NA

## 2024-06-07 ENCOUNTER — APPOINTMENT (OUTPATIENT)
Dept: MEDSURG UNIT | Facility: CLINIC | Age: 44
End: 2024-06-07
Attending: INTERNAL MEDICINE
Payer: COMMERCIAL

## 2024-06-07 ENCOUNTER — APPOINTMENT (OUTPATIENT)
Dept: LAB | Facility: CLINIC | Age: 44
End: 2024-06-07
Attending: INTERNAL MEDICINE
Payer: COMMERCIAL

## 2024-06-07 ENCOUNTER — HOSPITAL ENCOUNTER (OUTPATIENT)
Facility: CLINIC | Age: 44
Discharge: HOME | End: 2024-06-07
Attending: INTERNAL MEDICINE | Admitting: INTERNAL MEDICINE
Payer: COMMERCIAL

## 2024-06-07 VITALS
RESPIRATION RATE: 15 BRPM | HEART RATE: 84 BPM | OXYGEN SATURATION: 99 % | HEIGHT: 67 IN | TEMPERATURE: 97.9 F | DIASTOLIC BLOOD PRESSURE: 84 MMHG | SYSTOLIC BLOOD PRESSURE: 127 MMHG | BODY MASS INDEX: 30.02 KG/M2 | WEIGHT: 191.3 LBS

## 2024-06-07 DIAGNOSIS — Z94.0 HTN, KIDNEY TRANSPLANT RELATED: ICD-10-CM

## 2024-06-07 DIAGNOSIS — Z76.82 ORGAN TRANSPLANT CANDIDATE: ICD-10-CM

## 2024-06-07 DIAGNOSIS — Z98.890 S/P CORONARY ANGIOGRAM: Primary | ICD-10-CM

## 2024-06-07 DIAGNOSIS — Z01.810 PRE-OPERATIVE CARDIOVASCULAR EXAMINATION: ICD-10-CM

## 2024-06-07 DIAGNOSIS — E78.5 DYSLIPIDEMIA: ICD-10-CM

## 2024-06-07 DIAGNOSIS — I15.1 HTN, KIDNEY TRANSPLANT RELATED: ICD-10-CM

## 2024-06-07 LAB
ANION GAP SERPL CALCULATED.3IONS-SCNC: 9 MMOL/L (ref 7–15)
BUN SERPL-MCNC: 15.7 MG/DL (ref 6–20)
CALCIUM SERPL-MCNC: 9.7 MG/DL (ref 8.6–10)
CHLORIDE SERPL-SCNC: 103 MMOL/L (ref 98–107)
CHOLEST SERPL-MCNC: 136 MG/DL
CREAT SERPL-MCNC: 1.11 MG/DL (ref 0.67–1.17)
DEPRECATED HCO3 PLAS-SCNC: 27 MMOL/L (ref 22–29)
EGFRCR SERPLBLD CKD-EPI 2021: 84 ML/MIN/1.73M2
ERYTHROCYTE [DISTWIDTH] IN BLOOD BY AUTOMATED COUNT: 13.7 % (ref 10–15)
FASTING STATUS PATIENT QL REPORTED: YES
FASTING STATUS PATIENT QL REPORTED: YES
GLUCOSE BLDC GLUCOMTR-MCNC: 86 MG/DL (ref 70–99)
GLUCOSE SERPL-MCNC: 144 MG/DL (ref 70–99)
HCT VFR BLD AUTO: 44.9 % (ref 40–53)
HDLC SERPL-MCNC: 35 MG/DL
HGB BLD-MCNC: 14.5 G/DL (ref 13.3–17.7)
INR PPP: 1.03 (ref 0.85–1.15)
LDLC SERPL CALC-MCNC: 73 MG/DL
MCH RBC QN AUTO: 27.1 PG (ref 26.5–33)
MCHC RBC AUTO-ENTMCNC: 32.3 G/DL (ref 31.5–36.5)
MCV RBC AUTO: 84 FL (ref 78–100)
NONHDLC SERPL-MCNC: 101 MG/DL
PLATELET # BLD AUTO: 192 10E3/UL (ref 150–450)
POTASSIUM SERPL-SCNC: 4.5 MMOL/L (ref 3.4–5.3)
RBC # BLD AUTO: 5.36 10E6/UL (ref 4.4–5.9)
SODIUM SERPL-SCNC: 139 MMOL/L (ref 135–145)
TRIGL SERPL-MCNC: 140 MG/DL
WBC # BLD AUTO: 8.9 10E3/UL (ref 4–11)

## 2024-06-07 PROCEDURE — 82565 ASSAY OF CREATININE: CPT

## 2024-06-07 PROCEDURE — 82374 ASSAY BLOOD CARBON DIOXIDE: CPT

## 2024-06-07 PROCEDURE — 250N000009 HC RX 250: Performed by: INTERNAL MEDICINE

## 2024-06-07 PROCEDURE — 93454 CORONARY ARTERY ANGIO S&I: CPT | Performed by: INTERNAL MEDICINE

## 2024-06-07 PROCEDURE — 999N000142 HC STATISTIC PROCEDURE PREP ONLY

## 2024-06-07 PROCEDURE — 250N000013 HC RX MED GY IP 250 OP 250 PS 637: Performed by: NURSE PRACTITIONER

## 2024-06-07 PROCEDURE — 82465 ASSAY BLD/SERUM CHOLESTEROL: CPT

## 2024-06-07 PROCEDURE — 99152 MOD SED SAME PHYS/QHP 5/>YRS: CPT | Mod: GC | Performed by: INTERNAL MEDICINE

## 2024-06-07 PROCEDURE — C1887 CATHETER, GUIDING: HCPCS | Performed by: INTERNAL MEDICINE

## 2024-06-07 PROCEDURE — 36415 COLL VENOUS BLD VENIPUNCTURE: CPT

## 2024-06-07 PROCEDURE — 82962 GLUCOSE BLOOD TEST: CPT

## 2024-06-07 PROCEDURE — 83718 ASSAY OF LIPOPROTEIN: CPT

## 2024-06-07 PROCEDURE — 85610 PROTHROMBIN TIME: CPT

## 2024-06-07 PROCEDURE — 93010 ELECTROCARDIOGRAM REPORT: CPT | Mod: 76 | Performed by: INTERNAL MEDICINE

## 2024-06-07 PROCEDURE — 999N000134 HC STATISTIC PP CARE STAGE 3

## 2024-06-07 PROCEDURE — 250N000011 HC RX IP 250 OP 636: Mod: JZ | Performed by: INTERNAL MEDICINE

## 2024-06-07 PROCEDURE — 85027 COMPLETE CBC AUTOMATED: CPT

## 2024-06-07 PROCEDURE — 93454 CORONARY ARTERY ANGIO S&I: CPT | Mod: 26 | Performed by: INTERNAL MEDICINE

## 2024-06-07 PROCEDURE — 93005 ELECTROCARDIOGRAM TRACING: CPT

## 2024-06-07 PROCEDURE — C1894 INTRO/SHEATH, NON-LASER: HCPCS | Performed by: INTERNAL MEDICINE

## 2024-06-07 PROCEDURE — 999N000054 HC STATISTIC EKG NON-CHARGEABLE

## 2024-06-07 PROCEDURE — 258N000003 HC RX IP 258 OP 636: Mod: JZ

## 2024-06-07 PROCEDURE — 99152 MOD SED SAME PHYS/QHP 5/>YRS: CPT | Performed by: INTERNAL MEDICINE

## 2024-06-07 PROCEDURE — 250N000011 HC RX IP 250 OP 636: Performed by: INTERNAL MEDICINE

## 2024-06-07 PROCEDURE — 272N000001 HC OR GENERAL SUPPLY STERILE: Performed by: INTERNAL MEDICINE

## 2024-06-07 RX ORDER — FENTANYL CITRATE 50 UG/ML
INJECTION, SOLUTION INTRAMUSCULAR; INTRAVENOUS
Status: DISCONTINUED | OUTPATIENT
Start: 2024-06-07 | End: 2024-06-07 | Stop reason: HOSPADM

## 2024-06-07 RX ORDER — FENTANYL CITRATE 50 UG/ML
25 INJECTION, SOLUTION INTRAMUSCULAR; INTRAVENOUS
Status: DISCONTINUED | OUTPATIENT
Start: 2024-06-07 | End: 2024-06-07 | Stop reason: HOSPADM

## 2024-06-07 RX ORDER — FLUMAZENIL 0.1 MG/ML
0.2 INJECTION, SOLUTION INTRAVENOUS
Status: DISCONTINUED | OUTPATIENT
Start: 2024-06-07 | End: 2024-06-07 | Stop reason: HOSPADM

## 2024-06-07 RX ORDER — LIDOCAINE 40 MG/G
CREAM TOPICAL
Status: DISCONTINUED | OUTPATIENT
Start: 2024-06-07 | End: 2024-06-07 | Stop reason: HOSPADM

## 2024-06-07 RX ORDER — ATROPINE SULFATE 0.1 MG/ML
0.5 INJECTION INTRAVENOUS
Status: DISCONTINUED | OUTPATIENT
Start: 2024-06-07 | End: 2024-06-07 | Stop reason: HOSPADM

## 2024-06-07 RX ORDER — ASPIRIN 81 MG/1
81 TABLET, CHEWABLE ORAL ONCE
Status: COMPLETED | OUTPATIENT
Start: 2024-06-07 | End: 2024-06-07

## 2024-06-07 RX ORDER — NALOXONE HYDROCHLORIDE 0.4 MG/ML
0.4 INJECTION, SOLUTION INTRAMUSCULAR; INTRAVENOUS; SUBCUTANEOUS
Status: DISCONTINUED | OUTPATIENT
Start: 2024-06-07 | End: 2024-06-07 | Stop reason: HOSPADM

## 2024-06-07 RX ORDER — NALOXONE HYDROCHLORIDE 0.4 MG/ML
0.2 INJECTION, SOLUTION INTRAMUSCULAR; INTRAVENOUS; SUBCUTANEOUS
Status: DISCONTINUED | OUTPATIENT
Start: 2024-06-07 | End: 2024-06-07 | Stop reason: HOSPADM

## 2024-06-07 RX ORDER — OXYCODONE HYDROCHLORIDE 5 MG/1
5 TABLET ORAL EVERY 4 HOURS PRN
Status: DISCONTINUED | OUTPATIENT
Start: 2024-06-07 | End: 2024-06-07 | Stop reason: HOSPADM

## 2024-06-07 RX ORDER — POTASSIUM CHLORIDE 750 MG/1
20 TABLET, EXTENDED RELEASE ORAL
Status: DISCONTINUED | OUTPATIENT
Start: 2024-06-07 | End: 2024-06-07 | Stop reason: HOSPADM

## 2024-06-07 RX ORDER — POTASSIUM CHLORIDE 750 MG/1
40 TABLET, EXTENDED RELEASE ORAL
Status: DISCONTINUED | OUTPATIENT
Start: 2024-06-07 | End: 2024-06-07 | Stop reason: HOSPADM

## 2024-06-07 RX ORDER — OXYCODONE HYDROCHLORIDE 10 MG/1
10 TABLET ORAL EVERY 4 HOURS PRN
Status: DISCONTINUED | OUTPATIENT
Start: 2024-06-07 | End: 2024-06-07 | Stop reason: HOSPADM

## 2024-06-07 RX ORDER — IOPAMIDOL 755 MG/ML
INJECTION, SOLUTION INTRAVASCULAR
Status: DISCONTINUED | OUTPATIENT
Start: 2024-06-07 | End: 2024-06-07 | Stop reason: HOSPADM

## 2024-06-07 RX ORDER — HEPARIN SODIUM 1000 [USP'U]/ML
INJECTION, SOLUTION INTRAVENOUS; SUBCUTANEOUS
Status: DISCONTINUED | OUTPATIENT
Start: 2024-06-07 | End: 2024-06-07 | Stop reason: HOSPADM

## 2024-06-07 RX ORDER — ASPIRIN 81 MG/1
243 TABLET, CHEWABLE ORAL ONCE
Status: COMPLETED | OUTPATIENT
Start: 2024-06-07 | End: 2024-06-07

## 2024-06-07 RX ORDER — SODIUM CHLORIDE 9 MG/ML
INJECTION, SOLUTION INTRAVENOUS CONTINUOUS
Status: DISCONTINUED | OUTPATIENT
Start: 2024-06-07 | End: 2024-06-07 | Stop reason: HOSPADM

## 2024-06-07 RX ORDER — ASPIRIN 325 MG
325 TABLET ORAL ONCE
Status: COMPLETED | OUTPATIENT
Start: 2024-06-07 | End: 2024-06-07

## 2024-06-07 RX ADMIN — SODIUM CHLORIDE: 0.9 INJECTION, SOLUTION INTRAVENOUS at 08:44

## 2024-06-07 RX ADMIN — ASPIRIN 81 MG CHEWABLE TABLET 81 MG: 81 TABLET CHEWABLE at 08:43

## 2024-06-07 ASSESSMENT — ACTIVITIES OF DAILY LIVING (ADL)
ADLS_ACUITY_SCORE: 35

## 2024-06-07 NOTE — PROGRESS NOTES
Post CORS. R radial site with primapore CDI, CMS+. Patient tolerating PO intake, voiding spontaneously. Ambulating independently. PIV removed. Discharge paperwork reviewed with patient and Sienna Miller at bedside, educated patient on activity restrictions and when to seek medical attention, pt stated understanding. Sienna Miller will transport patient home, patient transported to front door with RN.

## 2024-06-07 NOTE — PRE-PROCEDURE
GENERAL PRE-PROCEDURE:   Procedure:  Coronary angiogram, possible PCI    Written consent obtained?: Yes    Risks and benefits: Risks, benefits and alternatives were discussed    Consent given by:  Patient  Patient states understanding of procedure being performed: Yes    Patient's understanding of procedure matches consent: Yes    Procedure consent matches procedure scheduled: Yes    Expected level of sedation:  Moderate  Appropriately NPO:  Yes  ASA Class:  3  Mallampati  :  Grade 1- soft palate, uvula, tonsillar pillars, and posterior pharyngeal wall visible  Lungs:  Lungs clear with good breath sounds bilaterally  Heart:  Normal heart sounds and rate  History & Physical reviewed:  History and physical reviewed and no updates needed  Statement of review:  I have reviewed the lab findings, diagnostic data, medications, and the plan for sedation

## 2024-06-07 NOTE — DISCHARGE INSTRUCTIONS
Going Home after an Coronary Angiogram        After you go home:  Have an adult stay with you for 24 hours.  Drink plenty of fluids.  You may eat your normal diet, unless your doctor tells you otherwise.  For 24 hours:  - Relax and take it easy.  - Do NOT smoke.  - Do NOT make any important or legal decisions.  - Do NOT drive or operate machines at home or at work.  - Do NOT drink alcohol.    Remove the Band-Aid after 24 hours. If there is minor oozing, apply another Band-aid and remove it after 12 hours.  For 2 days, do NOT have sex or do any heavy exercise.  Do NOT take a bath, or use a hot tub or pool for at least 3 days. You may shower.    Care of wrist or arm site  It is normal to have soreness at the puncture site and mild tingling in your hand for up to 3 days.  For 2 days, do not use your hand or arm to support your weight (such as rising from a chair) or bend your wrist (such as lifting a garage door).  For 2 days, do not lift more than 5 pounds or exercise your arm (tennis, golf or bowling).      If you start bleeding from the site in your arm:  Sit down and press firmly on the site with your fingers for 10 minutes. Call your doctor as soon as you can.  If the bleeding stops, sit still and keep your wrist straight for 2 hours.  Medicines  If you have begun Plavix or Effient (with a stent), do not stop taking it until you talk to your heart doctor (cardiologist).  If you are on metformin (Glucophage), do not restart it until you have blood tests (within 2 to 3 days after discharge). When your doctor tells you it is safe, you may restart the metformin.  Call your doctor if:  You have a large or growing hard lump around the site.    The site is red, swollen, hot or tender.  Blood or fluid is draining from the site.  You have chills or a fever greater than 101 F (38 C).  Your arm turns bluish, feels numb or cool.  You have hives, a rash or unusual itching.  Call 911 right away if you have:   Bleeding that does  not stop.   Heavy bleeding.  [facility] [place]:  [phone] (7 days a week)

## 2024-06-07 NOTE — PROGRESS NOTES
Pt arrived to 2A from home for coronary angiogram. VSS. Denies pain. Consent obtained. Lab resulted. Insulin pump infusing at half the rate, Dexcom in place. H&P current. Allergies reviewed with pt. Appropriately NPO. Prep completed. Patient took 243mg Aspirin this AM prior to admission, 81mg Aspirin given at bedside, PIV infusing NS at 150mL/hr, groin prep complete, pedal pulses marked. Sienna Miller at bedside; will be transporting patient home post procedure.

## 2024-06-07 NOTE — PROGRESS NOTES
D/I/A: Pt roomed on 2A in bay 11.  Arrived via litter and accompanied by Dayanara REEDER On/Off: Off monitor.  VSSA.  Rhythm upon arrival Sinus rhythm on monitor.  Denies pain or sob.  Reviewed activity restrictions and when to notify RN, ie-changes to breathing or increased chest pressure or chest pain.  CCL access: R radial wrist with TR band with 13cc's of air, CDI, CMS+.  P: Continue to monitor status.  Discharge to home once meeting criteria.

## 2024-06-10 LAB
ATRIAL RATE - MUSE: 75 BPM
ATRIAL RATE - MUSE: 87 BPM
DIASTOLIC BLOOD PRESSURE - MUSE: NORMAL MMHG
DIASTOLIC BLOOD PRESSURE - MUSE: NORMAL MMHG
INTERPRETATION ECG - MUSE: NORMAL
INTERPRETATION ECG - MUSE: NORMAL
P AXIS - MUSE: 29 DEGREES
P AXIS - MUSE: 48 DEGREES
PR INTERVAL - MUSE: 188 MS
PR INTERVAL - MUSE: 202 MS
QRS DURATION - MUSE: 78 MS
QRS DURATION - MUSE: 82 MS
QT - MUSE: 362 MS
QT - MUSE: 374 MS
QTC - MUSE: 417 MS
QTC - MUSE: 435 MS
R AXIS - MUSE: 14 DEGREES
R AXIS - MUSE: 19 DEGREES
SYSTOLIC BLOOD PRESSURE - MUSE: NORMAL MMHG
SYSTOLIC BLOOD PRESSURE - MUSE: NORMAL MMHG
T AXIS - MUSE: -2 DEGREES
T AXIS - MUSE: 9 DEGREES
VENTRICULAR RATE- MUSE: 75 BPM
VENTRICULAR RATE- MUSE: 87 BPM

## 2024-06-11 ENCOUNTER — TELEPHONE (OUTPATIENT)
Dept: CARDIOLOGY | Facility: CLINIC | Age: 44
End: 2024-06-11
Payer: COMMERCIAL

## 2024-06-11 NOTE — TELEPHONE ENCOUNTER
"Post-Angiogram Discharge Call    How are you feeling since you got home? Do you understand your results?  Feeling good, \"rocking it\".   Yes - Results discussed    How is your groin/wrist/incision site? Can you please describe it?  Flat and dry. Little tender at insertion site but no pain     Do you have any questions regarding your restrictions and when to resume normal activity?  Yes - AVS reviewed    Do you have the anti-platelet medication you were prescribed?  N/A    Any questions about the other medications you were prescribed?  N/A    Has cardiac rehab reached out to you?  N/A - PCTA not performed or not indicated    Do you have any other questions about the discharge instructions?  No    Has a follow up appointment been made within 1-2 weeks?  YES - With Gregoria Diane NP on June / 19 / 2024    "

## 2024-06-17 ENCOUNTER — OFFICE VISIT (OUTPATIENT)
Dept: DERMATOLOGY | Facility: CLINIC | Age: 44
End: 2024-06-17
Payer: COMMERCIAL

## 2024-06-17 DIAGNOSIS — D22.9 MULTIPLE NEVI: ICD-10-CM

## 2024-06-17 DIAGNOSIS — Z12.83 ENCOUNTER FOR SCREENING FOR MALIGNANT NEOPLASM OF SKIN: Primary | ICD-10-CM

## 2024-06-17 DIAGNOSIS — L81.4 LENTIGINES: ICD-10-CM

## 2024-06-17 DIAGNOSIS — D49.2 NEOPLASM OF UNSPECIFIED BEHAVIOR OF BONE, SOFT TISSUE, AND SKIN: ICD-10-CM

## 2024-06-17 DIAGNOSIS — L73.9 FOLLICULITIS: ICD-10-CM

## 2024-06-17 PROCEDURE — 88305 TISSUE EXAM BY PATHOLOGIST: CPT | Mod: 26 | Performed by: DERMATOLOGY

## 2024-06-17 PROCEDURE — 88305 TISSUE EXAM BY PATHOLOGIST: CPT | Mod: TC | Performed by: PHYSICIAN ASSISTANT

## 2024-06-17 PROCEDURE — 11102 TANGNTL BX SKIN SINGLE LES: CPT | Performed by: DERMATOLOGY

## 2024-06-17 PROCEDURE — 99214 OFFICE O/P EST MOD 30 MIN: CPT | Mod: 25 | Performed by: DERMATOLOGY

## 2024-06-17 RX ORDER — CLINDAMYCIN PHOSPHATE 10 UG/ML
LOTION TOPICAL 2 TIMES DAILY
Qty: 60 ML | Refills: 4 | Status: SHIPPED | OUTPATIENT
Start: 2024-06-17

## 2024-06-17 ASSESSMENT — PAIN SCALES - GENERAL: PAINLEVEL: NO PAIN (0)

## 2024-06-17 NOTE — PATIENT INSTRUCTIONS
Patient Education        Proper skin care from [facility]:     -Eliminate harsh soaps as they strip the natural oils from the skin, often resulting in dry itchy skin ( i.e. Dial, Zest, [place])  -Use mild soaps such as Cetaphil or Dove Sensitive Skin in the shower. You do not need to use soap on arms, legs, and trunk every time you shower unless visibly soiled.   -Avoid hot or cold showers.  -After showering, lightly dry off and apply moisturizing within 2-3 minutes. This will help trap moisture in the skin.   -Aggressive use of a moisturizer at least 1-2 times a day to the entire body (including -Vanicream, Cetaphil, Aquaphor or Cerave) and moisturize hands after every washing.  -We recommend using moisturizers that come in a tub that needs to be scooped out, not a pump. This has more of an oil base. It will hold moisture in your skin much better than a water base moisturizer. The above recommended are non-pore clogging.        Wear a sunscreen with at least SPF 30 on your face, ears, neck and V of the chest daily. Wear sunscreen on other areas of the body if those areas are exposed to the sun throughout the day. Sunscreens can contain physical and/or chemical blockers. Physical blockers are less likely to clog pores, these include zinc oxide and titanium dioxide. Reapply every two hour and after swimming.      Sunscreen examples: https://www.ewg.org/sunscreen/     UV radiation  UVA radiation remains constant throughout the day and throughout the year. It is a longer wavelength than UVB and therefore penetrates deeper into the skin leading to immediate and delayed tanning, photoaging, and skin cancer. 70-80% of UVA and UVB radiation occurs between the hours of 10am-2pm.  UVB radiation  UVB radiation causes the most harmful effects and is more significant during the summer months. However, snow and ice can reflect UVB radiation leading to skin damage during the winter months as well. UVB radiation is  responsible for tanning, burning, inflammation, delayed erythema (pinkness), pigmentation (brown spots), and skin cancer.      I recommend self monthly full body exams and yearly full body exams with a dermatology provider. If you develop a new or changing lesion please follow up for examination. Most skin cancers are pink and scaly or pink and pearly. However, we do see blue/brown/black skin cancers.  Consider the ABCDEs of melanoma when giving yourself your monthly full body exam ( don't forget the groin, buttocks, feet, toes, etc). A-asymmetry, B-borders, C-color, D-diameter, E-elevation or evolving. If you see any of these changes please follow up in clinic. If you cannot see your back I recommend purchasing a hand held mirror to use with a larger wall mirror.       Checking for Skin Cancer  You can find cancer early by checking your skin each month. There are 3 kinds of skin cancer. They are melanoma, basal cell carcinoma, and squamous cell carcinoma. Doing monthly skin checks is the best way to find new marks or skin changes. Follow the instructions below for checking your skin.   The ABCDEs of checking moles for melanoma   Check your moles or growths for signs of melanoma using ABCDE:   Asymmetry: the sides of the mole or growth don t match  Border: the edges are ragged, notched, or blurred  Color: the color within the mole or growth varies  Diameter: the mole or growth is larger than 6 mm (size of a pencil eraser)  Evolving: the size, shape, or color of the mole or growth is changing (evolving is not shown in the images below)    Checking for other types of skin cancer  Basal cell carcinoma or squamous cell carcinoma have symptoms such as:      A spot or mole that looks different from all other marks on your skin  Changes in how an area feels, such as itching, tenderness, or pain  Changes in the skin's surface, such as oozing, bleeding, or scaliness  A sore that does not heal  New swelling or redness beyond  the border of a mole     Who s at risk?  Anyone can get skin cancer. But you are at greater risk if you have:   Fair skin, light-colored hair, or light-colored eyes  Many moles or abnormal moles on your skin  A history of sunburns from sunlight or tanning beds  A family history of skin cancer  A history of exposure to radiation or chemicals  A weakened immune system  If you have had skin cancer in the past, you are at risk for recurring skin cancer.   How to check your skin  Do your monthly skin checkups in front of a full-length mirror. Check all parts of your body, including your:   Head (ears, face, neck, and scalp)  Torso (front, back, and sides)  Arms (tops, undersides, upper, and lower armpits)  Hands (palms, backs, and fingers, including under the nails)  Buttocks and genitals  Legs (front, back, and sides)  Feet (tops, soles, toes, including under the nails, and between toes)  If you have a lot of moles, take digital photos of them each month. Make sure to take photos both up close and from a distance. These can help you see if any moles change over time.   Most skin changes are not cancer. But if you see any changes in your skin, call your doctor right away. Only he or she can diagnose a problem. If you have skin cancer, seeing your doctor can be the first step toward getting the treatment that could save your life.   [employer] last reviewed this educational content on 4/1/2019 2000-2020 [employer]. [address] [place] [address], [place] [address]. All rights reserved. This information is not intended as a substitute for professional medical care. Always follow your healthcare professional's instructions.        When should I call my doctor?  If you are worsening or not improving, please, contact us or seek urgent care as noted below.      Who should I call with questions (adults)?  [facility] (adult and pediatric): [phone]  [facility]  [place] (adult): [phone]  [facility] ([place], [place], [place] and [place]) [phone]  For urgent needs outside of business hours call the [facility] at [phone] and ask for the dermatology resident on call to be paged  If this is a medical emergency and you are unable to reach an ER, Call 911        If you need a prescription refill, please contact your pharmacy. Refills are approved or denied by our Physicians during normal business hours, Monday through Fridays  Per office policy, refills will not be granted if you have not been seen within the past year (or sooner depending on your child's condition)     Wound Care After a Biopsy    What is a skin biopsy?  A skin biopsy allows the doctor to examine a very small piece of tissue under the microscope to determine the diagnosis and the best treatment for the skin condition. A local anesthetic (numbing medicine) is injected with a very small needle into the skin area to be tested. A small piece of skin is taken from the area. Sometimes a suture (stitch) is used.     What are the risks of a skin biopsy?  I will experience scar, bleeding, swelling, pain, crusting and redness. I may experience incomplete removal or recurrence. Risks of this procedure are excessive bleeding, bruising, infection, nerve damage, numbness, thick (hypertrophic or keloidal) scar and non-diagnostic biopsy.    How should I care for my wound for the first 24 hours?  Keep the wound dry and covered for 24 hours  If it bleeds, hold direct pressure on the area for 15 minutes. If bleeding does not stop, call us or go to the emergency room  Avoid strenuous exercise the first 1-2 days or as your doctor instructs you    How should I care for the wound after 24 hours?  After 24 hours, remove the bandage  You may bathe or shower as normal  If you had a scalp biopsy, you can shampoo as usual and can use shower water to clean the biopsy site  daily  Clean the wound once a day with gentle soap and water  Do not scrub, be gentle  Apply white petroleum/Vaseline after cleaning the wound with a cotton swab or a clean finger, and keep the site covered with a Bandaid /bandage. Bandages are not necessary with a scalp biopsy  If you are unable to cover the site with a Bandaid /bandage, re-apply ointment 2-3 times a day to keep the site moist. Moisture will help with healing  Avoid strenuous activity for first 1-2 days  Avoid lakes, rivers, pools, and oceans until the stitches are removed or the site is healed    How do I clean my wound?  Wash hands thoroughly with soap or use hand  before all wound care  Clean the wound with gentle soap and water  Apply white petroleum/Vaseline  to wound after it is clean  Replace the Bandaid /bandage to keep the wound covered for the first few days or as instructed by your doctor  If you had a scalp biopsy, warm shower water to the area on a daily basis should suffice    What should I use to clean my wound?   Cotton-tipped applicators (Qtips )  White petroleum jelly (Vaseline ). Use a clean new container and use Q-tips to apply.  Bandaids  as needed  Gentle soap     How should I care for my wound long term?  Do not get your wound dirty  Keep up with wound care for one week or until the area is healed.  If you have stitches, stitches need to be removed in 14 days. You may return to our clinic for this or you may have it done locally at your doctor s office.  A small scab will form and fall off by itself when the area is completely healed. The area will be red and will become pink in color as it heals. Sun protection is very important for how your scar will turn out. Sunscreen with an SPF 30 or greater is recommended once the area is healed.  You should have some soreness but it should be mild and slowly go away over several days. Talk to your doctor about using tylenol for pain,    When should I call my doctor?  If you  have increased:   Pain or swelling  Pus or drainage (clear or slightly yellow drainage is ok)  Temperature over 100F  Spreading redness or warmth around wound    When will I hear about my results?  The biopsy results can take 2 weeks to come back.  Your results will automatically release to [facility] before your provider has even reviewed them.  The clinic will call you with the results, send you a [facility] message, or have you schedule a follow-up clinic or phone time to discuss the results.  Contact our clinics if you do not hear from us in 2 weeks.    Who should I call with questions?  [facility]: [phone]  [facility]: [phone]  For urgent needs outside of business hours call the [facility] at [phone] and ask for the dermatology resident on call

## 2024-06-17 NOTE — PROGRESS NOTES
[facility] Dermatology Note  Encounter Date: 2024  Office Visit     Reviewed patients past medical history and pertinent chart review prior to patients visit today.     Dermatology Problem List:  Kidney transplant 10/2019 due to type 1 diabetes.  2. Family history of melanoma (uncle, ).  3. Multiple benign nevi.  - Photos 9/15/2020  - Monitor larger nevi on abdomen, 1.7 cm x 1.5 cm  4. Tinea corporis, left hand/forearm.  - KOH positive 9/15/2020  - Terbinafine cream     Social history: Works as a teacher  ____________________________________________    Assessment & Plan:     # Neoplasm of uncertain behavior:  right buttock  DDx includes acrochordon vs intradermal nevus. Shave biopsy today.    Procedure Note: Biopsy by shave technique  The risks and benefits of the procedure were described to the patient. These include but are not limited to bleeding, infection, scar, incomplete removal, and non-diagnostic biopsy. Verbal informed consent was obtained. The above site(s) was cleansed with an alcohol pad and injected with 1% lidocaine with epinephrine. Once anesthesia was obtained, a biopsy(ies) was performed with Gilette blade. The tissue(s) was placed in a labeled container(s) with formalin and sent to pathology. Hemostasis was achieved with aluminum chloride. Vaseline and a bandage were applied to the wound(s). The patient tolerated the procedure well and was given post biopsy care instructions.    # Folliculitis  - We reviewed the etiology of this skin condition.   - Start benzoyl peroxide 5% cleanser. Lather in the shower and rinse completely. We discussed that this may bleach towels and clothing if not rinsed completely.   - Start clindamycin 1% lotion twice daily.     # Chronic immunosuppression  # Multiple nevi, trunk and extremities  # Solar lentigines  - No concerning features on dermoscopy. We discussed the importance of self exams at home.   - ABCDEs: Counseled ABCDEs of  melanoma: Asymmetry, Border (irregularity), Color (not uniform, changes in color), Diameter (greater than 6 mm which is about the size of a pencil eraser), and Evolving (any changes in preexisting moles).  - Sun protection: Counseled SPF 30+ sunscreen, UPF clothing, sun avoidance, tanning bed avoidance.    Follow-up:  Annual for follow up full body skin exam, as needed for new or changing lesions or new concerns    I attest that I recorded the interview and Dr Santos personally performed the exam.  All risks, benefits and alternatives were discussed with patient.  Patient is in agreement and understands the assessment and plan.  All questions were answered.  Julieta Biswas PA-C  [facility]    ____________________________________________    CC: Skin Check (Rudi is here today for a skin check and does not have any areas of concern.)    HPI:  Mr. Michael Amin is a(n) 43 year old male who presents today as a return patient for a full body skin cancer screening. The patient has a history of kidney transplant, chronically immunosuppressed. The patient denies a personal history of skin cancer. He reports a bothersome lesion on the buttock. No other specific cutaneous concerns today. The patient reports trying to be diligent with photoprotection.      Physical Exam:  Vitals: There were no vitals taken for this visit.  SKIN: Total skin excluding the genitalia areas was performed. The exam included the scalp, face, neck, bilateral arms, chest, back, abdomen, bilateral legs, digits, mons pubis, buttocks, and nails.   - Fajardo II.  - The right buttock demonstrates a 0.6 x 0.6 cm fleshy pink papule.   - Nevus on abdomen measures 1.7 cm x 1.5 cm. Consistent with photos dated 09/15/2020.  - Multiple tan/brown macules and papules scattered throughout exam, consistent with benign nevi. No concerning features on dermoscopy. Consistent with photos dated 09/15/2020.  - Scattered tan, homogenous macules  "scattered on sun exposed skin, consistent with solar lentigines.     Medications:  Current Outpatient Medications   Medication Sig Dispense Refill    alcohol swab prep pads Use to swab area of injection/zak as directed. 100 each 3    aspirin (ASA) 81 MG EC tablet Take 81 mg by mouth daily       atorvastatin (LIPITOR) 10 MG tablet Take 1 tablet (10 mg) by mouth daily 90 tablet 0    blood glucose (CONTOUR NEXT TEST) test strip Use to test blood sugar 4 times daily. 400 strip 11    blood glucose monitoring (ACCU-CHEK FASTCLIX) lancets   1    carvedilol (COREG) 12.5 MG tablet TAKE 1 TABLET BY MOUTH TWICE DAILY WITH MEALS PLEASE  REQUEST  FUTURE  REFILLS  FROM  YOUR  PRIMARY  CARE  PROVIDER 180 tablet 0    Continuous Glucose Sensor (DEXCOM G6 SENSOR) MISC CHANGE SENSOR EVERY 10 DAYS 9 each 3    Continuous Glucose Transmitter (DEXCOM G6 TRANSMITTER) MISC Change every 3 months 1 each 3    insulin glargine (LANTUS SOLOSTAR) 100 UNIT/ML pen Inject 28 units subcutaneous once a day ONLY IF INSULIN PUMP FAILS. 15 mL 1    Insulin Infusion Pump Supplies (AUTOSOFT XC INFUSION SET) MISC 1 each every 48 hours Change every 2 days  9 mm cannula, 23 inch tubing 45 each 3    Insulin Infusion Pump Supplies (T:SLIM X2 3ML CARTRIDGE) MISC 1 each by Other route every other day Insulin cartridge to be used with pump as directed.  Change every 2 days or as directed. 45 each 3    insulin lispro (HUMALOG VIAL) 100 UNIT/ML vial Use in insulin pump. Pt uses approx 75 units daily. 80 mL 3    insulin pen needle (ULTICARE MICRO) 32G X 4 MM miscellaneous Use pen needles daily or as directed. 100 each 3    Insulin Syringe-Needle U-100 31G X 1/4\" 1 ML MISC 1 Syringe as needed (until new insuline pump arrives) 50 each 1    magnesium oxide (MAG-OX) 400 MG tablet Take 1 tablet (400 mg) by mouth daily (with lunch) 30 tablet 5    mycophenolate (GENERIC EQUIVALENT) 250 MG capsule Take 3 capsules (750 mg) by mouth 2 times daily 180 capsule 11    " Semaglutide, 2 MG/DOSE, (OZEMPIC) 8 MG/3ML pen Inject 2 mg subcutaneous once a week. Please provide 90 day supply. 9 mL 3    senna-docusate (SENOKOT-S/PERICOLACE) 8.6-50 MG tablet Take 2 tablets by mouth 2 times daily 20 tablet 0    sulfamethoxazole-trimethoprim (BACTRIM) 400-80 MG tablet Take 1 tablet by mouth daily 30 tablet 11    tacrolimus (GENERIC) 0.5 MG capsule Take 1 capsule (0.5 mg) by mouth every morning Total dose = 1.5 mg in the AM and 1 mg in the PM 90 capsule 3    tacrolimus (GENERIC) 1 MG capsule Total dose = 1.5 mg in the AM and 1 mg in the  capsule 3    vitamin D3 (CHOLECALCIFEROL) 50 mcg (2000 units) tablet Take 1 tablet (50 mcg) by mouth daily       No current facility-administered medications for this visit.      Past Medical History:   Patient Active Problem List   Diagnosis    HTN, kidney transplant related    Diabetes mellitus type 1 (H)    Dyslipidemia    Anemia in chronic kidney disease    Secondary renal hyperparathyroidism (H24)    Kidney replaced by transplant    Aftercare following organ transplant    Vitamin D deficiency    Hypomagnesemia    Kidney transplant rejection    Class 1 obesity with serious comorbidity and body mass index (BMI) of 33.0 to 33.9 in adult, unspecified obesity type    Shingles    Organ transplant candidate    Pre-operative cardiovascular examination    Status post coronary angiogram     Past Medical History:   Diagnosis Date    Anemia in chronic kidney disease     Dyslipidemia     ESRD (end stage renal disease) on dialysis (H)     Hyperlipidemia     Hypertension     Hypomagnesemia 10/07/2019    Obese     Secondary hyperparathyroidism (H24)     Shingles 12/11/2023 12/11/23: Left Axilla    Status post coronary angiogram 6/7/2024    Type 1 diabetes (H)        CC No referring provider defined for this encounter. on close of this encounter.

## 2024-06-17 NOTE — NURSING NOTE
Dermatology Rooming Note    Michael Amin's goals for this visit include:   Chief Complaint   Patient presents with    Skin Check     Rudi is here today for a skin check and does not have any areas of concern.     Laure HEALY, CMA

## 2024-06-17 NOTE — NURSING NOTE
Lidocaine-epinephrine 1-1:392789 % injection   1.5mL once for one use, starting 6/17/2024 ending 6/17/2024,  2mL disp, R-0, injection  Injected by Laure HEALY CMA

## 2024-06-19 ENCOUNTER — VIRTUAL VISIT (OUTPATIENT)
Dept: TRANSPLANT | Facility: CLINIC | Age: 44
End: 2024-06-19
Payer: COMMERCIAL

## 2024-06-19 ENCOUNTER — TELEPHONE (OUTPATIENT)
Dept: TRANSPLANT | Facility: CLINIC | Age: 44
End: 2024-06-19

## 2024-06-19 VITALS — HEIGHT: 67 IN | BODY MASS INDEX: 29.19 KG/M2 | WEIGHT: 186 LBS

## 2024-06-19 DIAGNOSIS — E78.5 DYSLIPIDEMIA: ICD-10-CM

## 2024-06-19 DIAGNOSIS — Z94.0 KIDNEY REPLACED BY TRANSPLANT: ICD-10-CM

## 2024-06-19 DIAGNOSIS — E10.29 TYPE 1 DIABETES MELLITUS WITH OTHER KIDNEY COMPLICATION (H): Primary | ICD-10-CM

## 2024-06-19 DIAGNOSIS — Z76.82 ORGAN TRANSPLANT CANDIDATE: ICD-10-CM

## 2024-06-19 LAB
PATH REPORT.COMMENTS IMP SPEC: NORMAL
PATH REPORT.COMMENTS IMP SPEC: NORMAL
PATH REPORT.FINAL DX SPEC: NORMAL
PATH REPORT.GROSS SPEC: NORMAL
PATH REPORT.MICROSCOPIC SPEC OTHER STN: NORMAL
PATH REPORT.RELEVANT HX SPEC: NORMAL

## 2024-06-19 PROCEDURE — 99213 OFFICE O/P EST LOW 20 MIN: CPT | Mod: 25

## 2024-06-19 RX ORDER — ATORVASTATIN CALCIUM 10 MG/1
20 TABLET, FILM COATED ORAL DAILY
Qty: 90 TABLET | Refills: 0 | Status: SHIPPED | OUTPATIENT
Start: 2024-06-19

## 2024-06-19 NOTE — LETTER
6/19/2024      Michael Amin  [address] [place] [id number]      Dear Colleague,    Thank you for referring your patient, Michael Amin, to the [facility] TRANSPLANT [facility]. Please see a copy of my visit note below.          [facility]   Cardiology Service  History and Physical      Michael Amin MRN# [id number]   YOB: 1980 Age: 43 year old     This visit was conducted virtually. **    HPI:   Michael Amin is a 43 year old year old male with a medical history significant for obesity, DM1, HLD, and HTN. He presents today for cardiovascular risk assessment as part of pre-kidney transplant evaluation and 2 week post angiogram follow up.     He is a never smoker/non-smoker. He does not currently use alcohol or drugs. His activity level is mildly active. He was been exercising a couple times a week. Live son a couple of acres and does a lot of yard work. In the winter he tries to hit the gym 2-3 x weekly. He denies symptoms of chest pain, dizziness, lightheadedness, palpitations, shortness of breath, orthopnea, PND, or edema. He does not have family history of heart disease.     He most recently underwent coronary angiogram on 6/7/24 as part of his pre-transplant evaluation due to his longstanding history of diabetes type 1. The angiogram revealed non-obstructive coronary artery disease; no intervention was performed. The procedure was uncomplicated and his right radial access site is soft, flat, non-tender per patient statement.      Cardiovascular Risk Factor Profile:  coronary artery disease, hypertension, obesity, diabetes, male age >45, and dialysis > 5 years     Current Cardiovascular Symptoms:  - None    ACC HF Stage:  Stage B    NYHA Class:  Class I    Functional Capacity:  Performs 4 METS exercise without symptoms (e.g., light housework, stairs, 4 mph walk, 7 mph bike, slow step dance)        Past Medical History:   Diagnosis Date      Anemia in chronic kidney disease      Dyslipidemia      ESRD (end stage renal disease) on dialysis (H)      Hyperlipidemia      Hypertension      Hypomagnesemia 10/07/2019     Obese      Secondary hyperparathyroidism (H24)      Shingles 12/11/2023 12/11/23: Left Axilla     Type 1 diabetes (H)        Past Surgical History:   Procedure Laterality Date     hair implant  2007     INSERT CATHETER PERITONEAL DIALYSIS  08/2018     IR FINE NEEDLE ASPIRATION W ULTRASOUND  11/25/2019     IR RENAL BIOPSY LEFT  11/25/2019     PERCUTANEOUS BIOPSY KIDNEY Left 10/22/2019    Procedure: Left Kidney Biopsy;  Surgeon: Kash Hoffman MD;  Location: [facility] OR       Current Outpatient Medications   Medication Sig Dispense Refill     alcohol swab prep pads Use to swab area of injection/zak as directed. 100 each 3     aspirin (ASA) 81 MG EC tablet Take 81 mg by mouth daily        atorvastatin (LIPITOR) 10 MG tablet Take 1 tablet (10 mg) by mouth daily 90 tablet 0     blood glucose (CONTOUR NEXT TEST) test strip Use to test blood sugar 4 times daily. 400 strip 11     blood glucose monitoring (ACCU-CHEK FASTCLIX) lancets   1     carvedilol (COREG) 12.5 MG tablet Take 1 tablet (12.5 mg) by mouth 2 times daily (with meals) 180 tablet 0     Continuous Blood Gluc Sensor (DEXCOM G6 SENSOR) MISC CHANGE SENSOR EVERY 10 DAYS 9 each 3     Continuous Blood Gluc Transmit (DEXCOM G6 TRANSMITTER) MISC Change every 3 months 1 each 3     Continuous Blood Gluc Transmit (DEXCOM G6 TRANSMITTER) MISC 1 each every 3 months 1 each 3     Continuous Blood Gluc Transmit (DEXCOM G6 TRANSMITTER) MISC 1 each every 3 months 1 each 3     insulin cartridge (T:SLIM 3ML) misc pump supply Insulin cartridge to be used with pump as directed.  Change every 2 days or as directed. 45 each 3     Insulin Infusion Pump Supplies (AUTOSOFT XC INFUSION SET) MISC 1 each every 48 hours Change every 2 days  9 mm cannula, 23 inch tubing 45 each 3     insulin lispro (HUMALOG VIAL)  "100 UNIT/ML vial Use in insulin pump. Pt uses approx 75 units daily. 80 mL 3     insulin pen needle (ULTICARE MICRO) 32G X 4 MM miscellaneous Use pen needles daily or as directed. 100 each 3     Insulin Syringe-Needle U-100 31G X 1/4\" 1 ML MISC 1 Syringe as needed (until new insuline pump arrives) 50 each 1     magnesium oxide (MAG-OX) 400 MG tablet Take 1 tablet (400 mg) by mouth daily (with lunch) 30 tablet 5     mycophenolate (GENERIC EQUIVALENT) 250 MG capsule Take 3 capsules (750 mg) by mouth 2 times daily 180 capsule 11     Semaglutide, 2 MG/DOSE, (OZEMPIC) 8 MG/3ML pen Inject 2 mg Subcutaneous every 7 days 9 mL 3     senna-docusate (SENOKOT-S/PERICOLACE) 8.6-50 MG tablet Take 2 tablets by mouth 2 times daily 20 tablet 0     sulfamethoxazole-trimethoprim (BACTRIM) 400-80 MG tablet Take 1 tablet by mouth daily 30 tablet 11     tacrolimus (GENERIC) 0.5 MG capsule Take 1 capsule (0.5 mg) by mouth every morning Total dose = 1.5 mg in the AM and 1 mg in the PM 90 capsule 3     tacrolimus (GENERIC) 1 MG capsule Total dose = 1.5 mg in the AM and 1 mg in the  capsule 3     vitamin D3 (CHOLECALCIFEROL) 50 mcg (2000 units) tablet Take 1 tablet (50 mcg) by mouth daily       No current facility-administered medications for this visit.       Family History   Problem Relation Age of Onset     No Known Problems Mother      Diabetes Father      Coronary Artery Disease Father      No Known Problems Brother      Skin Cancer Paternal Uncle      Melanoma No family hx of        Social History     Tobacco Use     Smoking status: Never     Smokeless tobacco: Never   Substance Use Topics     Alcohol use: Not Currently     Comment: Rarely       Allergies   Allergen Reactions     Anti-Thymocyte Glob (Rabbit)      Had reaction with rigors after steroid-free dose. No reaction with steroids.       Review Of Systems:   CONSTITUTIONAL: No report of fevers or chills  RESPIRATORY: No cough, wheezing, SOB, or hemoptysis  CARDIOVASCULAR: " see HPI  MUSCULO-SKELETAL: No joint pain or swelling, no muscle pain  NEURO: No paresthesias, syncope, pre-syncope, lightheadness, dizziness or vertigo  ENDOCRINE: No temperature intolerance, no skin/hair changes  PSYCHIATRIC: No change in mood, feeling down/anxious, no change in sleep or appetite  GI: No melena or hematochezia, no change in bowel habits  : No hematuria or dysuria, no hesitancy, dribbling or incontinence.   HEME: No easy bruising or bleeding, no history of anemia, no history of blood clots  SKIN: No rashes or sores, no unusual itching      Physical Examination:  No physical exam performed due to visit being virtual.     Laboratory:  Last Comprehensive Metabolic Panel:  Lab Results   Component Value Date     2023    POTASSIUM 4.4 2023    CHLORIDE 105 2024    CO2 28 2023    ANIONGAP 7 2023     (H) 2023    BUN 18.7 2023    CR 1.09 2023    GFRESTIMATED 86 2023    APRIL 9.4 2023       Last CBC:  CBC RESULTS:   Recent Labs   Lab Test 23  0848   WBC 7.9   RBC 5.34   HGB 13.7   HCT 43.6   MCV 82   MCH 25.7*   MCHC 31.4*   RDW 13.7          24 EK/7/2019 Echocardiogram:  Global and regional left ventricular function is normal with an EF of 60-65%.  The right ventricle is normal in function and size.  No significant valvular abnormalities.  No pericardial effusion is present.     No prior echo available for comparison.    24 Coronary Angiogram:      Ost LAD lesion is 30% stenosed.     3rd Mrg lesion is 20% stenosed.     Lat 2nd Mrg lesion is 45% stenosed.     Mid Cx to Dist Cx lesion is 20% stenosed.     Mid LAD lesion is 20% stenosed.     Prox LAD to Mid LAD lesion is 10% stenosed.     Mild, non obstructive coronary artery disease involving the left anterior descending and 3rd obtuse marginal arteries.   No acute coronary intervention performed.         Assessment and Plan:     # Coronary Artery Disease   #  Hyperlipidemia  Underwent coronary angiogram on 6/7/24 with Dr. Jauregui which revealed non-obstructive coronary artery disease; no intervention was performed. The procedure was uncomplicated. He denies chest pain, dizziness, lightheadedness, shortness of breath, orthopnea, edema, or PND.  Most recent lipid panel 6/8/24 with total 136, LDL 73. He is on statin therapy. Echocardiogram was ordered but not yet completed at the time of angiogram.   - Obtain updated echocardiogram; he will do this closer to home  - Continue Aspirin 81mg daily   - Increase Lipitor 20mg daily   - No additional cardiac testing needed prior to transplant if echocardiogram is within normal limits  - Follow-up with cardiology in 12-18 months; sooner with symptoms    # Hypertension  Blood pressures on home average 115-low 120's/70's. He admits to white coat syndrome at the doctors office.   - Continue Coreg 12.5mg BID   - Monitor BP's at home. Keep a log of readings and bring to follow up appointments to alleviate confusion with white coat syndrome  - BP goal < 130/80    # Diabetes Mellitus Type 1  Most recent A1c per chart review 7.1% on 11/21/23; he tells me it is now 7.5%. His diabetes is managed Dexcom sensor and insulin pump. A majority of time time he is 's for blood sugar. He has had some low's in the 40's but he is symptomatic and understands when this happens. He follows with endocrinology and recently seen the diabetic educator.   - Educated on importance of diabetic control in preventing progression of heart and kidney disease  - Follow-up with endocrinology as previously recommended     # Obesity (BMI 30)   Going to the gymn 2-3 x weekly in the winter. Doing yard work in the summer. He notes his weight fluctuates 4-5 pounds down then gains back 1-2. Weight today 186 lb per patient statement.   - Educated patient on the importance of healthy diet and exercise in reducing risk for heart disease   - Encouraged 150 minutes/week  of moderate intensity exercise   - Encouraged healthy weight loss; discussed Mediterranean diet     # ESRD on HD   He is currently on dialysis. He admits to being compliant with all medications and not missing dialysis runs .   - Additional follow-up / testing per SOT team      CAD Risk Level:  High Risk: > 3 risk factors, OR diabetic kidney disease, OR DM > 10 years, known CAD or PAD, prior PCI or CABG, on dialysis 5 years or more         RCRI Score:   - High risk surgery (>5% cardiac complication risk) = 1 point   - Diabetes Mellitus (on Insulin) = 1 point   - Serum Creatinine >2.0 mg/dl = 1 point        I have reviewed and updated the patient's Past Medical History, Social History, Family History and Medication List.         CORINNA Arguello, CNP  [facility] Cardiology      Review of prior external note(s) from - SOT, nephrology  Review of the result(s) of each unique test - echocardiogram, EKG, CT  Ordering of each unique test  Prescription drug management- medication reconciliation    30 minutes spent by me on the date of the encounter doing chart review, review of outside records, review of test results, patient visit, and documentation     Start Time--> 8:50  Stop Time --> 9:10      CORINNA Muse CNP on 6/19/2024 at 9:13 AM      Again, thank you for allowing me to participate in the care of your patient.        Sincerely,        CORINNA Muse CNP

## 2024-06-19 NOTE — TELEPHONE ENCOUNTER
"Patient reports every provider he met with for return waitlist appointments noted, \"I see you here for a kidney transplant.\"  Patient notes \"This is concerning.  When I actually have surgery, will they know what transplant?\"  Attempted to re-assure patient that he is listed for a pancreas only and once approved for active status, he will only be contacted regarding pancreas organ offers.  Patient also reports he was told multiple times that an echocardiogram would be scheduled at time of the coronary angiogram, but none was scheduled.  I apologized for the inconvenience that the echocardiogram did not get scheduled and will ask our scheduling team to reach out to patient to schedule.  Noted I will review patients concerns with leadership and also offered to share patient relations contact information with the patient.  Patient verbalizes he would just like to \"move on,\" and declined patient relations contact information.   "

## 2024-06-20 ENCOUNTER — VIRTUAL VISIT (OUTPATIENT)
Dept: EDUCATION SERVICES | Facility: CLINIC | Age: 44
End: 2024-06-20
Payer: COMMERCIAL

## 2024-06-20 DIAGNOSIS — E10.29 TYPE 1 DIABETES MELLITUS WITH OTHER KIDNEY COMPLICATION (H): Primary | ICD-10-CM

## 2024-06-20 PROCEDURE — 99207 PR NO CHARGE LOS: CPT | Mod: 95 | Performed by: REGISTERED NURSE

## 2024-06-20 NOTE — NURSING NOTE
Is the patient currently in the state of [place]? YES    Visit mode:VIDEO    If the visit is dropped, the patient can be reconnected by: VIDEO VISIT: Text to cell phone:   Telephone Information:   Mobile [phone]       Will anyone else be joining the visit? NO  (If patient encounters technical issues they should call [phone] :[phone])    How would you like to obtain your AVS? MyChart    Are changes needed to the allergy or medication list? Pt stated no med changes- individually reviewed less than 24 hrs ago     Are refills needed on medications prescribed by this physician? NO    Reason for visit: Diabetes Education    Ene VEGA

## 2024-06-20 NOTE — PROGRESS NOTES
Video-Visit Details    Type of service:  Video Visit  Patient consented to video visit  Video Start Time:  0800  Video End Time:   0820  Originating Location (pt. 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    Complications from diabetes: retinopathy, nepthropathy with renal transplant in 2019.  He is wait-listed for a pancreas transplant.        PATIENT CONCERNS/REASON FOR REFERRAL  DSME and insulin pump management.    ASSESSMENT:    Taking Medication:     Current Diabetes Management per Patient:  Taking diabetes medications? yes:     Diabetes Medication(s)       Insulin       insulin glargine (LANTUS SOLOSTAR) 100 UNIT/ML pen Inject 28 units subcutaneous once a day ONLY IF INSULIN PUMP FAILS.     insulin lispro (HUMALOG VIAL) 100 UNIT/ML vial Use in insulin pump. Pt uses approx 75 units daily.       Incretin Mimetic Agents       Semaglutide, 2 MG/DOSE, (OZEMPIC) 8 MG/3ML pen Inject 2 mg subcutaneous once a week. Please provide 90 day supply.          Monitoring    Patient glucose self monitoring as follows: continuously using a continuous glucose monitor (CGM)  CGM: Dexcom G6  Tandem Insulin Pump report appears below:             Patient's most recent   Lab Results   Component Value Date    A1C 7.5 06/04/2024    A1C 7.1 11/21/2023    A1C 8.1 07/16/2020                Patient's A1C goal: <7.0    Activity: no regular exercise program    Healthy Eating:   Patient states that his diet has improved now that he is on the 2mg  dose of Ozempic. Recognizes that some of his carb counting is not always accurate which he thinks accounts for why there is some post prandial excursions.  He states that he is going to start using My Fitness Pal again to get more precision on this.      Problem Solving:      Patient is at risk of hypoglycemia?: Frequency is one to two times per week.  Currently this is happening with regularity around 4pm each day.  It's not apparent on his report, but he states that he is consuming carbs to try to prevent the low from happening.  He's frustrated with this, as he is trying to reduce his weight.      Hospitalizations for hyper or hypoglycemia: No    EDUCATION and INSTRUCTION PROVIDED AT THIS VISIT:      Changes made in his pump settings today.  To prevent the 4pm lows, we created a new time segment in his pump, starting at 3pm, with a reduction in the basal rate of 0.2 units per hour, which goes until 8pm.  We reduced the basal rate at 8pm by 0.1 unit/hour to prevent some of the lows he seems to have before going to bed, for which he is consuming carbs to prevent overnight lows.      Set up a follow up appointment in 2 weeks.     Patient-stated goal written and given to Michael Amin.  Verbalized and demonstrated understanding of instructions.     FOLLOW-UP:      Time spent with patient at today's visit was less than minutes.      Any diabetes medication dose changes were made via the CDE Protocol and Collaborative Practice Agreement with Jania and  Ava.  A copy of this encounter was provided to patient's referring provider.

## 2024-06-25 ENCOUNTER — TELEPHONE (OUTPATIENT)
Dept: TRANSPLANT | Facility: CLINIC | Age: 44
End: 2024-06-25

## 2024-06-25 NOTE — TELEPHONE ENCOUNTER
Patient Call: General  Route to LPN    Reason for call: Pt will cancel his appointment for Echo on Friday 6/28, and is requesting the order instead be sent to    [facility]   Phone: [phone]   Fax: [phone]    Contact: --   : --    [address]        Any questions, call patient back. Otherwise, please send order to [phone]    Call back needed? No

## 2024-06-27 ENCOUNTER — DOCUMENTATION ONLY (OUTPATIENT)
Dept: TRANSPLANT | Facility: CLINIC | Age: 44
End: 2024-06-27
Payer: COMMERCIAL

## 2024-08-02 ENCOUNTER — TELEPHONE (OUTPATIENT)
Dept: NEPHROLOGY | Facility: CLINIC | Age: 44
End: 2024-08-02
Payer: COMMERCIAL

## 2024-08-02 NOTE — TELEPHONE ENCOUNTER
Was scrubbing chart for upcoming appointment with Dr. Berrios. Noted that he was a reschedule with Dr. Membreno. He had seen Dr. Membreno in [facility] in Nov 2023 and was to return in 1 year. 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 Writer cannot see why he was scheduled with general nephrology (no referral noted). Wonder if he was supposed to be scheduled to see Dr. Membreno in SOT rather than general nephrology. Will pass this information onto Michael's SOT RNCC and schedulers to see if they need to reschedule him with SOT instead of general neph.

## 2024-08-05 NOTE — TELEPHONE ENCOUNTER
Spoke with Rudi. He agreed that he was supposed to get scheduled with SOT, not general nephrology. 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  Will forward this again to transplant RNCC to see if they can get him in  soon.

## 2024-08-07 DIAGNOSIS — Z94.0 KIDNEY REPLACED BY TRANSPLANT: Primary | ICD-10-CM

## 2024-08-07 NOTE — CONFIDENTIAL NOTE
Problem: Safety  Goal: Will remain free from falls  Outcome: PROGRESSING AS EXPECTED  Intervention: Implement fall precautions  Flowsheets  Taken 10/28/2019 2146  Bed Alarm: Yes - Alarm On  Taken 10/28/2019 2100  Environmental Precautions: Treaded Slipper Socks on Patient;Personal Belongings, Wastebasket, Call Bell etc. in Easy Reach;Bed in Low Position  Note:   Pt is alert and oriented to self only. Reoriented patient as needed. Bed alarm on for safety. Pt room close to nursing station. Bed locked in lowest position. Treaded socks on. Call light within reach.      Problem: Skin Integrity  Goal: Risk for impaired skin integrity will decrease  Outcome: PROGRESSING AS EXPECTED  Intervention: Assess risk factors for impaired skin integrity and/or pressure ulcers  Note:   Waffle mattress on bed. Pillows in use for positioning and to float heels. Pt turns self from side to side. Skin assessed for any breakdown, none noted. Barrier cream in use.       RKT follow up requested in August due to patient work schedule. Resides in [place]. No available appt with transplant nephrology however, if he would like, he can see Michael Corrigan ROX- CNP as soon as 8/15/24. Order entered for schedulers to call.     Lab requisition letter sent to patient- due now for labs. Last completed in June. Frequency every 2 months d/t time out from kidney transplant.     Abbi Carrasco, RN, BSN, CCTN (covering for Carol Olmstead, RNCC)  Solid Organ Transplant, Post Kidney and Pancreas  Transplant Care Coordinator  [phone]

## 2024-08-07 NOTE — LETTER
PHYSICIAN ORDERS      DATE & TIME ISSUED: 2024 1:39 PM  PATIENT NAME: Michael Amin   : 1980     [facility] MR# [if applicable]: [id number]     DIAGNOSIS:  Kidney Transplanted  ICD-10 CODE: Z94.0     Please check the following labs due now _2024:  - BMP  - CBC w/platelets  - Tacrolimus drug level  - Urine protein creatinine ratio      Any questions please call: [phone] option 5.    Please fax each result same day as resulted/available to [phone]    Critical lab results page [phone]      .  Connie Membreno MD

## 2024-08-08 ENCOUNTER — MYC MEDICAL ADVICE (OUTPATIENT)
Dept: TRANSPLANT | Facility: CLINIC | Age: 44
End: 2024-08-08
Payer: COMMERCIAL

## 2024-08-08 DIAGNOSIS — Z94.0 KIDNEY REPLACED BY TRANSPLANT: ICD-10-CM

## 2024-08-08 DIAGNOSIS — E83.42 HYPOMAGNESEMIA: Primary | ICD-10-CM

## 2024-08-08 NOTE — TELEPHONE ENCOUNTER
Lmtcb, if patient calls back, please assist him in rescheduling sooner with any provider.    Thank you,  Dominique

## 2024-08-08 NOTE — TELEPHONE ENCOUNTER
----- Message from Carol HENRRY sent at 8/8/2024  8:09 AM CDT -----  Regarding: FW: follow up  Hi Team,   See message from Dr. Membreno below. Patient was rescheduled erroneously with gen neph and needs to be rescheduled with SOT within the next 2 weeks prior to returning to work.   Thank you!    Carol Olmstead, RN BSN  Kidney Transplant Coordinator  [phone]  ----- Message -----  From: Connie Membreno MD  Sent: 8/7/2024  10:03 AM CDT  To: Carol Olmstead RN  Subject: follow up                                        [facility]     This pt was seen in November last year and needed year follow up. As he is a  he made appointment in summer time with me, but because of my schedule change, he was scheduled into gen neph instead of SOT.  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

## 2024-08-14 ENCOUNTER — COMMITTEE REVIEW (OUTPATIENT)
Dept: TRANSPLANT | Facility: CLINIC | Age: 44
End: 2024-08-14

## 2024-08-14 ENCOUNTER — TELEPHONE (OUTPATIENT)
Dept: TRANSPLANT | Facility: CLINIC | Age: 44
End: 2024-08-14

## 2024-08-14 NOTE — TELEPHONE ENCOUNTER
Updated patient that he has been approved to have his status changed to Active on the pancreas transplant waitlist; that I have sent a message to the transplant  to confirm whether we need to obtain prior approval from patient's insurance carrier.  Reviewed the transplant committee does recommend patient have and updated BK virus tests drawn.  Since patient reports he will be coming to the [facility] next month and having labs, will ask his post-transplant coordinator to add on the BK test.  Per patient request, will also ask if the coordinator can add on a hemoglobin A1C test.  Reviewed patient should have an updated PRA sample drawn as soon as possible, so I will have kits sent to him and will mail a hard copy of a PRA order and encouraged him to bring the order to his local lab.  Noted once I receive an update from the transplant , will update patient.  Patient verbalizes agreement with this plan.

## 2024-08-14 NOTE — LETTER
PHYSICIAN ORDER   ALA/PRA BLOOD    DATE & TIME ISSUED: 2024 4:38 PM  PATIENT NAME: Michael Amin   : 1980     [facility] MR#  [id number]     DIAGNOSIS/ICD-10 CODE: Awaiting Organ transplant [Z76.82}   EXPIRES: (1 YEAR AFTER DATE ISSUED)  EVERY 12 weeks / 3 months   1. Please draw 20ml of blood in red top (plain) tube for Antileukocyte Antibody (ALA or PRA).   2. Label tubes with the patient s name, date of birth, and complete lab slip.        3. Mailers, lab slips with instructions are sent to patient separately.      4. Call the Outreach Lab at [phone] to reorder mailers.       5. Mail blood to (this address is also on the mailers):    IMMUNOLOGY LABORATORY   [facility]   Room [address]  [address]      Rocio Rutherford MD FACS  Associate Professor of Surgery  Director, Living Kidney Donor Program.

## 2024-08-14 NOTE — COMMITTEE REVIEW
Abdominal Patient Discussion Note Transplant Coordinator: Carol Garcia  Transplant Surgeon:   Rocio Rutherford    Referring Physician: Meera Fu    Committee Review Members:  Cardiology Gregoria Diane, APRN CNP   Dietitian, Registered Ginette Jim, RD   Nephrology Jada Núñez, GUILLAUME, Connie Membreno MD, Michael Corrigan, APRN CNP, Jose L Reyna MD   Pharmacist Afia Guzman, [facility]    - Clinical Celine Galvan, [facility], Altaf Snow, [facility], Airam Cummings, [facility]   Transplant ALEJANDRA MARTINEZ, RN, Lyssa August, RN, Lea Johnson, LILLI, Arlene Diane, CORINNA STERN, Janel Muhammad, LILLI, Shania Wade, RN, Estela Woodard, GUILLAUME, Estela Mcrae, RN, Lisbeth Phan, RN, Yennifer Low, RN, Pepper Jean, RN   Transplant Surgery Prince Mcdaniels MD       Additional Discussion Notes and Findings: Reviewed all current cardiac testing and assessments as well as current BMi of 30, using 50 units of insulin daily, has lost 50 pounds since starting ozempic, has suitable blood vessels for pancreas transplant as review of imaging results by transplant surgeon, 08/08/2024 Cr=1.25, EGFR=72; last BK test was in 2021.  Committee does recommend updating BK testing, but this will not hold up activation on the pancreas transplant list.  Committee approves patient to have status changed to Active on the pancreas transplant list.

## 2024-08-16 ENCOUNTER — TELEPHONE (OUTPATIENT)
Dept: ENDOCRINOLOGY | Facility: CLINIC | Age: 44
End: 2024-08-16
Payer: COMMERCIAL

## 2024-08-16 DIAGNOSIS — Z01.818 ENCOUNTER FOR PRE-TRANSPLANT EVALUATION FOR PANCREAS TRANSPLANT: ICD-10-CM

## 2024-08-16 DIAGNOSIS — Z94.0 KIDNEY REPLACED BY TRANSPLANT: Primary | ICD-10-CM

## 2024-08-16 NOTE — TELEPHONE ENCOUNTER
Orders extended per SOT request. Pt notified to fast for lab draw if possible.   Princess Patel RN on 2024 at 10:42 AM           Orders - extension request  Received: Abbi Loaiza, LILLI  P [facility]  Cc: Carol Olmstead, RN; Kymberly Boswell PA-C  Hello [facility]-    Patient is coming for labs on 24. His orders under Kymberly Boswell PA-C, have  within the 4 month time period allotted. Can you please extend these orders if they are still needed. He will also may need to be notified to fast 10-12 hrs- if to complete lipid panel currently ordered.    Thank you,    Abbi Carrasco RN, BSN, CCTN (covering for Carol Olmstead, RNCC)  Solid Organ Transplant, Post Kidney and Pancreas  Transplant Care Coordinator

## 2024-08-16 NOTE — CONFIDENTIAL NOTE
Carol Zhang has been out this week. I am covering for her this morning. I will add the BK PCR test. I did reach out to his endocrinology team to extend the  orders they had ordered, A1C was one of these tests. I will let them order that one. :)    Abbi Carrasco, RN, BSN, CCTN  Solid Organ Transplant, Post Kidney and Pancreas  Transplant Care Coordinator  [phone]     ===View-only below this line===  ----- Message -----  From: Lea Johnson RN  Sent: 2024   4:34 PM CDT  To: LILLI Dealah,    I just got committee approval to activate Rudi on the pancreas list, but am awaiting insurance clearance.    Committee did recommend he have an updated BK test, but he can still be activated.    I spoke with Rudi and he told me he is coming to the [facility] next month and will have labs drawn.  Could you add on the BK test and he also asked if you could add a hemoglobin A1C?    ThanksLea

## 2024-08-16 NOTE — TELEPHONE ENCOUNTER
Patient chart reviewed. Has been scheduled with Dr. Membreno on 9/13/24 11AM in person with labs prior.

## 2024-08-19 ENCOUNTER — TELEPHONE (OUTPATIENT)
Dept: TRANSPLANT | Facility: CLINIC | Age: 44
End: 2024-08-19
Payer: COMMERCIAL

## 2024-08-19 DIAGNOSIS — N18.6 TYPE 1 DIABETES MELLITUS WITH CHRONIC KIDNEY DISEASE ON CHRONIC DIALYSIS (H): Primary | ICD-10-CM

## 2024-08-19 DIAGNOSIS — Z76.82 ORGAN TRANSPLANT CANDIDATE: Primary | ICD-10-CM

## 2024-08-19 DIAGNOSIS — E10.9 DIABETES MELLITUS TYPE 1 (H): ICD-10-CM

## 2024-08-19 DIAGNOSIS — E10.22 TYPE 1 DIABETES MELLITUS WITH CHRONIC KIDNEY DISEASE ON CHRONIC DIALYSIS (H): Primary | ICD-10-CM

## 2024-08-19 DIAGNOSIS — Z99.2 TYPE 1 DIABETES MELLITUS WITH CHRONIC KIDNEY DISEASE ON CHRONIC DIALYSIS (H): Primary | ICD-10-CM

## 2024-08-19 NOTE — TELEPHONE ENCOUNTER
Updated patient that we have insurance clearance to activate patient on the pancreas transplant waitlist.  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  Noted will wait for patient to contact to me when he's ready to have his status changed to Active on the pancreas list.  Patient verbalizes agreement with this plan.

## 2024-08-29 DIAGNOSIS — Z94.0 KIDNEY REPLACED BY TRANSPLANT: ICD-10-CM

## 2024-08-29 DIAGNOSIS — I15.1 HTN, KIDNEY TRANSPLANT RELATED: ICD-10-CM

## 2024-08-29 DIAGNOSIS — Z94.0 HTN, KIDNEY TRANSPLANT RELATED: ICD-10-CM

## 2024-08-29 RX ORDER — CARVEDILOL 12.5 MG/1
TABLET ORAL
Qty: 180 TABLET | Refills: 0 | Status: SHIPPED | OUTPATIENT
Start: 2024-08-29

## 2024-08-29 NOTE — TELEPHONE ENCOUNTER
Call placed to patient. No answer. Voice message left instructing patient to request refill from PCP.

## 2024-09-02 NOTE — TELEPHONE ENCOUNTER
Covid letter sent to patient via [employer] and mailed copy to patient.   Nurse spoke with pharmacist Ritu per phone to make aware pt's fentanyl patch fell off in shower this am. She states it is okay to remove additional dose to replace pt's patch. New patch pulled from pyxis and placed on pt's right bicep covered by mepilex foam drsg.

## 2024-09-13 ENCOUNTER — LAB (OUTPATIENT)
Dept: LAB | Facility: CLINIC | Age: 44
End: 2024-09-13
Payer: COMMERCIAL

## 2024-09-13 ENCOUNTER — OFFICE VISIT (OUTPATIENT)
Dept: TRANSPLANT | Facility: CLINIC | Age: 44
End: 2024-09-13
Attending: INTERNAL MEDICINE
Payer: COMMERCIAL

## 2024-09-13 VITALS
SYSTOLIC BLOOD PRESSURE: 126 MMHG | DIASTOLIC BLOOD PRESSURE: 82 MMHG | WEIGHT: 196.3 LBS | HEART RATE: 80 BPM | BODY MASS INDEX: 30.74 KG/M2

## 2024-09-13 DIAGNOSIS — E83.42 HYPOMAGNESEMIA: ICD-10-CM

## 2024-09-13 DIAGNOSIS — Z01.818 ENCOUNTER FOR PRE-TRANSPLANT EVALUATION FOR PANCREAS TRANSPLANT: ICD-10-CM

## 2024-09-13 DIAGNOSIS — Z94.0 KIDNEY REPLACED BY TRANSPLANT: ICD-10-CM

## 2024-09-13 DIAGNOSIS — I15.1 HTN, KIDNEY TRANSPLANT RELATED: ICD-10-CM

## 2024-09-13 DIAGNOSIS — E10.22 TYPE 1 DIABETES MELLITUS WITH CHRONIC KIDNEY DISEASE ON CHRONIC DIALYSIS (H): ICD-10-CM

## 2024-09-13 DIAGNOSIS — N25.81 SECONDARY RENAL HYPERPARATHYROIDISM (H): ICD-10-CM

## 2024-09-13 DIAGNOSIS — N18.6 TYPE 1 DIABETES MELLITUS WITH CHRONIC KIDNEY DISEASE ON CHRONIC DIALYSIS (H): ICD-10-CM

## 2024-09-13 DIAGNOSIS — E10.9 DIABETES MELLITUS TYPE 1 (H): ICD-10-CM

## 2024-09-13 DIAGNOSIS — Z94.0 HTN, KIDNEY TRANSPLANT RELATED: ICD-10-CM

## 2024-09-13 DIAGNOSIS — D63.1 ANEMIA IN CHRONIC KIDNEY DISEASE, UNSPECIFIED CKD STAGE: ICD-10-CM

## 2024-09-13 DIAGNOSIS — N18.9 ANEMIA IN CHRONIC KIDNEY DISEASE, UNSPECIFIED CKD STAGE: ICD-10-CM

## 2024-09-13 DIAGNOSIS — Z99.2 TYPE 1 DIABETES MELLITUS WITH CHRONIC KIDNEY DISEASE ON CHRONIC DIALYSIS (H): ICD-10-CM

## 2024-09-13 DIAGNOSIS — E10.29 TYPE 1 DIABETES MELLITUS WITH OTHER KIDNEY COMPLICATION (H): Primary | ICD-10-CM

## 2024-09-13 DIAGNOSIS — Z76.82 ORGAN TRANSPLANT CANDIDATE: ICD-10-CM

## 2024-09-13 DIAGNOSIS — E78.5 DYSLIPIDEMIA: ICD-10-CM

## 2024-09-13 LAB
ALBUMIN MFR UR ELPH: <6 MG/DL
ANION GAP SERPL CALCULATED.3IONS-SCNC: 9 MMOL/L (ref 7–15)
BK VIRUS SPECIMEN TYPE: NORMAL
BKV DNA # SPEC NAA+PROBE: NOT DETECTED IU/ML
BUN SERPL-MCNC: 13 MG/DL (ref 6–20)
CALCIUM SERPL-MCNC: 9.7 MG/DL (ref 8.8–10.4)
CD3 CELLS # BLD: 733 CELLS/UL (ref 603–2990)
CD3 CELLS NFR BLD: 69 % (ref 49–84)
CD3+CD4+ CELLS # BLD: 464 CELLS/UL (ref 441–2156)
CD3+CD4+ CELLS NFR BLD: 44 % (ref 28–63)
CD3+CD4+ CELLS/CD3+CD8+ CLL BLD: 1.93 % (ref 1.4–2.6)
CD3+CD8+ CELLS # BLD: 240 CELLS/UL (ref 125–1312)
CD3+CD8+ CELLS NFR BLD: 23 % (ref 10–40)
CHLORIDE SERPL-SCNC: 101 MMOL/L (ref 98–107)
CHOLEST SERPL-MCNC: 106 MG/DL
CREAT SERPL-MCNC: 1.03 MG/DL (ref 0.67–1.17)
CREAT UR-MCNC: 65.5 MG/DL
EGFRCR SERPLBLD CKD-EPI 2021: >90 ML/MIN/1.73M2
ERYTHROCYTE [DISTWIDTH] IN BLOOD BY AUTOMATED COUNT: 13.3 % (ref 10–15)
FASTING STATUS PATIENT QL REPORTED: ABNORMAL
FASTING STATUS PATIENT QL REPORTED: ABNORMAL
GLUCOSE SERPL-MCNC: 154 MG/DL (ref 70–99)
HBA1C MFR BLD: 8.7 %
HCO3 SERPL-SCNC: 28 MMOL/L (ref 22–29)
HCT VFR BLD AUTO: 43.2 % (ref 40–53)
HDLC SERPL-MCNC: 35 MG/DL
HGB BLD-MCNC: 14.1 G/DL (ref 13.3–17.7)
LDLC SERPL CALC-MCNC: 46 MG/DL
MAGNESIUM SERPL-MCNC: 1.7 MG/DL (ref 1.7–2.3)
MCH RBC QN AUTO: 27 PG (ref 26.5–33)
MCHC RBC AUTO-ENTMCNC: 32.6 G/DL (ref 31.5–36.5)
MCV RBC AUTO: 83 FL (ref 78–100)
NONHDLC SERPL-MCNC: 71 MG/DL
PLATELET # BLD AUTO: 165 10E3/UL (ref 150–450)
POTASSIUM SERPL-SCNC: 4.1 MMOL/L (ref 3.4–5.3)
PROT/CREAT 24H UR: NORMAL MG/G{CREAT}
RBC # BLD AUTO: 5.22 10E6/UL (ref 4.4–5.9)
SODIUM SERPL-SCNC: 138 MMOL/L (ref 135–145)
T CELL COMMENT: NORMAL
TACROLIMUS BLD-MCNC: 6.7 UG/L (ref 5–15)
TME LAST DOSE: NORMAL H
TME LAST DOSE: NORMAL H
TRIGL SERPL-MCNC: 123 MG/DL
TSH SERPL DL<=0.005 MIU/L-ACNC: 1.29 UIU/ML (ref 0.3–4.2)
WBC # BLD AUTO: 6.7 10E3/UL (ref 4–11)

## 2024-09-13 PROCEDURE — 85027 COMPLETE CBC AUTOMATED: CPT | Performed by: PATHOLOGY

## 2024-09-13 PROCEDURE — 36415 COLL VENOUS BLD VENIPUNCTURE: CPT | Performed by: PATHOLOGY

## 2024-09-13 PROCEDURE — 99213 OFFICE O/P EST LOW 20 MIN: CPT | Performed by: INTERNAL MEDICINE

## 2024-09-13 PROCEDURE — 80197 ASSAY OF TACROLIMUS: CPT | Performed by: INTERNAL MEDICINE

## 2024-09-13 PROCEDURE — 99000 SPECIMEN HANDLING OFFICE-LAB: CPT | Performed by: PATHOLOGY

## 2024-09-13 PROCEDURE — 83036 HEMOGLOBIN GLYCOSYLATED A1C: CPT | Performed by: PHYSICIAN ASSISTANT

## 2024-09-13 PROCEDURE — 87799 DETECT AGENT NOS DNA QUANT: CPT | Performed by: INTERNAL MEDICINE

## 2024-09-13 PROCEDURE — 86833 HLA CLASS II HIGH DEFIN QUAL: CPT | Performed by: SURGERY

## 2024-09-13 PROCEDURE — 99214 OFFICE O/P EST MOD 30 MIN: CPT | Performed by: INTERNAL MEDICINE

## 2024-09-13 PROCEDURE — 80048 BASIC METABOLIC PNL TOTAL CA: CPT | Performed by: PATHOLOGY

## 2024-09-13 PROCEDURE — 86360 T CELL ABSOLUTE COUNT/RATIO: CPT | Performed by: INTERNAL MEDICINE

## 2024-09-13 PROCEDURE — 83735 ASSAY OF MAGNESIUM: CPT | Performed by: PATHOLOGY

## 2024-09-13 PROCEDURE — 80061 LIPID PANEL: CPT | Performed by: PATHOLOGY

## 2024-09-13 PROCEDURE — 86832 HLA CLASS I HIGH DEFIN QUAL: CPT | Performed by: SURGERY

## 2024-09-13 PROCEDURE — 84156 ASSAY OF PROTEIN URINE: CPT | Performed by: PATHOLOGY

## 2024-09-13 PROCEDURE — 86359 T CELLS TOTAL COUNT: CPT | Performed by: INTERNAL MEDICINE

## 2024-09-13 PROCEDURE — 84443 ASSAY THYROID STIM HORMONE: CPT | Performed by: PATHOLOGY

## 2024-09-13 ASSESSMENT — PAIN SCALES - GENERAL: PAINLEVEL: NO PAIN (0)

## 2024-09-13 NOTE — LETTER
9/13/2024      Michael Amin  [address]  [place] [address]      Dear Colleague,    Thank you for referring your patient, Michael Amin, to the [facility]. Please see a copy of my visit note below.    TRANSPLANT NEPHROLOGY CLINIC VISIT     Assessment & Plan  # LDKT: CKD Stage 1 - Stable   - Baseline Creatinine: ~ 1.1-1.3   - Proteinuria: Normal (<0.2 grams)   - DSA Hx: No DSA   - Last cPRA: 0%   - BK Viremia: No   - Kidney Tx Biopsy Hx: No biopsy history and Acute cellular-mediated rejection.     Nov 25, 2019; Result: Material insufficient for assessment with no glomeruli.   Oct 22, 2019; Result: Borderline acute cellular-mediated rejection      # Immunosuppression: Tacrolimus immediate release (goal 4-6) and Mycophenolate mofetil (dose 750 mg every 12 hours)   - Induction with Recent Transplant:  Not known due to time from transplant   - Continue with intensive monitoring of immunosuppression for efficacy and toxicity.   - Historical Changes in Immunosuppression: None   - Changes: Not at this time, 1.5/1 of tac    # Infection Prevention:      - PJP: Sulfa/TMP (Bactrim)  - CMV IgG Ab High Risk Discordance (D+/R-): No  - EBV IgG Ab High Risk Discordance (D+/R-): No    # Hypertension: Controlled;  Goal BP: < 130/80   - Changes: Not at this time    # Diabetes: Borderline control (HbA1c 7-9%) Last HbA1c: 8.7%    # Anemia in Chronic Renal Disease: Hgb: Stable      JOSE: No   - Iron studies: Not checked recently    # Mineral Bone Disorder:    - Secondary renal hyperparathyroidism; PTH level: Moderately elevated (301-600 pg/ml)        On treatment: None  - Vitamin D; level: Low        On supplement: Yes  - Calcium; level: Normal        On supplement: No  - Phosphorus; level: Not checked recently, but was normal last check        On supplement: No    # Electrolytes:   - Potassium; level: Normal        On supplement: No  - Magnesium; level: Normal        On supplement: Yes  -  Bicarbonate; level: Normal        On supplement: No  - Sodium; level: Normal    # Other Significant PMH:   - pt is current being evaluated for pancreas transplant.     # Skin Cancer Risk:    - Discussed sun protection and recommend regular follow up with Dermatology.    # Transplant History:  Etiology of Kidney Failure: Diabetes mellitus type 1  Tx: LDKT  Transplant: 10/1/2019 (Kidney)  Significant transplant-related complications: None    Transplant Office Phone Number: [phone]    Assessment and plan was discussed with the patient and he voiced his understanding and agreement.    Return visit: Return in about 1 year (around 9/13/2025).    Connie Membreno MD    The longitudinal plan of care for the diagnosis(es)/condition(s) as documented were addressed during this visit. Due to the added complexity in care, I will continue to support Rudi in the subsequent management and with ongoing continuity of care.      Chief Complaint  Mr. Amin is a 44 year old here for kidney transplant and immunosuppression management.     History of Present Illness    Mr. Amin reports feeling stable overall. He works as a  and wanted this appointment to be in June prior to initiation of school but unfortunately was not able to accommodated.  Since last clinic visit:   Hospitalizations: No   New Medical Issues: No  Chest pain or shortness of breath: No  Lower extremity swelling: No  Weight change: No  Nausea and vomiting: No  Diarrhea: No  Heartburn symptoms: No  Fever, sweats or chills: No  Urinary complaints: No    Home BP:  120's systolic    Problem List  Patient Active Problem List   Diagnosis     HTN, kidney transplant related     Diabetes mellitus type 1 (H)     Dyslipidemia     Anemia in chronic kidney disease     Secondary renal hyperparathyroidism (H24)     Kidney replaced by transplant     Aftercare following organ transplant     Vitamin D deficiency     Hypomagnesemia     Kidney transplant rejection      Class 1 obesity with serious comorbidity and body mass index (BMI) of 33.0 to 33.9 in adult, unspecified obesity type     Shingles     Organ transplant candidate     Pre-operative cardiovascular examination     Status post coronary angiogram       Allergies  Allergies   Allergen Reactions     Anti-Thymocyte Glob (Rabbit)      Had reaction with rigors after steroid-free dose. No reaction with steroids.       Medications  Current Outpatient Medications   Medication Sig Dispense Refill     alcohol swab prep pads Use to swab area of injection/zak as directed. 100 each 3     aspirin (ASA) 81 MG EC tablet Take 81 mg by mouth daily        atorvastatin (LIPITOR) 10 MG tablet Take 2 tablets (20 mg) by mouth daily 90 tablet 0     blood glucose (NO BRAND SPECIFIED) lancets standard Use to test blood sugar 3 times daily or as directed. 100 each 11     blood glucose (NO BRAND SPECIFIED) test strip Use to test blood sugar 3 times daily or as directed. 100 strip 11     blood glucose monitoring (NO BRAND SPECIFIED) meter device kit Use to test blood sugar 3 times daily or as directed. 2 kit 0     carvedilol (COREG) 12.5 MG tablet TAKE 1 TABLET BY MOUTH TWICE DAILY WITH MEALS PLEASE  REQUEST  FUTURE  REFILLS  FROM  YOUR  PRIMARY  CARE  PROVIDER 180 tablet 0     Continuous Glucose Sensor (DEXCOM G6 SENSOR) MISC CHANGE SENSOR EVERY 10 DAYS 9 each 3     Continuous Glucose Transmitter (DEXCOM G6 TRANSMITTER) MISC Change every 3 months 1 each 3     insulin glargine (LANTUS SOLOSTAR) 100 UNIT/ML pen Inject 28 units subcutaneous once a day ONLY IF INSULIN PUMP FAILS. 15 mL 1     Insulin Infusion Pump Supplies (AUTOSOFT XC INFUSION SET) MISC 1 each every 48 hours Change every 2 days  9 mm cannula, 23 inch tubing 45 each 3     Insulin Infusion Pump Supplies (T:SLIM X2 3ML CARTRIDGE) MISC 1 each by Other route every other day Insulin cartridge to be used with pump as directed.  Change every 2 days or as directed. 45 each 3     insulin  "lispro (HUMALOG VIAL) 100 UNIT/ML vial Use in insulin pump. Pt uses approx 75 units daily. 80 mL 3     insulin pen needle (ULTICARE MICRO) 32G X 4 MM miscellaneous Use pen needles daily or as directed. 100 each 3     Insulin Syringe-Needle U-100 31G X 1/4\" 1 ML MISC 1 Syringe as needed (until new insuline pump arrives) 50 each 1     magnesium oxide (MAG-OX) 400 MG tablet Take 1 tablet (400 mg) by mouth daily (with lunch) 30 tablet 5     mycophenolate (GENERIC EQUIVALENT) 250 MG capsule Take 3 capsules (750 mg) by mouth 2 times daily 180 capsule 11     Semaglutide, 2 MG/DOSE, (OZEMPIC) 8 MG/3ML pen Inject 2 mg subcutaneous once a week. Please provide 90 day supply. 9 mL 3     senna-docusate (SENOKOT-S/PERICOLACE) 8.6-50 MG tablet Take 2 tablets by mouth 2 times daily 20 tablet 0     sulfamethoxazole-trimethoprim (BACTRIM) 400-80 MG tablet Take 1 tablet by mouth daily 30 tablet 11     tacrolimus (GENERIC) 0.5 MG capsule Take 1 capsule (0.5 mg) by mouth every morning Total dose = 1.5 mg in the AM and 1 mg in the PM 90 capsule 3     tacrolimus (GENERIC) 1 MG capsule Total dose = 1.5 mg in the AM and 1 mg in the  capsule 3     vitamin D3 (CHOLECALCIFEROL) 50 mcg (2000 units) tablet Take 1 tablet (50 mcg) by mouth daily       benzoyl peroxide 5 % external liquid Lather in shower, rinse completely. (Patient not taking: Reported on 9/13/2024) 148 mL 11     clindamycin (CLEOCIN T) 1 % external lotion Apply topically 2 times daily (Patient not taking: Reported on 9/13/2024) 60 mL 4     No current facility-administered medications for this visit.     There are no discontinued medications.    Physical Exam  Vital Signs: /82   Pulse 80   Wt 89 kg (196 lb 4.8 oz)   BMI 30.74 kg/m      GENERAL APPEARANCE: alert and no distress  EYES: eyes grossly normal to inspection  HENT: normal cephalic/atraumatic  RESP: lungs clear to auscultation   CV: regular rhythm, normal rate  EDEMA: no LE edema bilaterally  ABDOMEN: soft, " non distended  MS: extremities normal - no gross deformities noted  SKIN: no rash  NEURO: mentation intact and speech normal  PSYCH: mentation appears normal and affect normal/bright  TX KIDNEY: normal    Data        Latest Ref Rng & Units 9/13/2024    10:21 AM 8/8/2024     8:02 AM 6/7/2024    11:00 AM   Renal   Sodium 135 - 145 mmol/L 138      Na (external) 134 - 143 mEq/L  140        K 3.4 - 5.3 mmol/L 4.1      K (external) 3.4 - 5.1 mEq/L  4.1        Cl 98 - 107 mmol/L 101  106        Cl (external) 98 - 107 mmol/L 101  106        CO2 22 - 29 mmol/L 28      CO2 (external) 19 - 29 mEq/L  23        Urea Nitrogen 6.0 - 20.0 mg/dL 13.0      BUN (external) 5 - 24 mg/dL  18        Creatinine 0.67 - 1.17 mg/dL 1.03      Cr (external) 0.70 - 1.20 mg/dL  1.25        Glucose 70 - 99 mg/dL 154   86    Glucose (external) 70 - 99 mg/dL  127        Calcium 8.8 - 10.4 mg/dL 9.7      Ca (external) 8.4 - 10.5 mg/dL  9.3        Magnesium 1.7 - 2.3 mg/dL 1.7          This result is from an external source.         Latest Ref Rng & Units 11/21/2023     8:48 AM 3/31/2020    10:39 AM 2/3/2020     4:14 PM   Bone Health   Phos (external) 2.5 - 4.6 mg/dL  3.3     3.5    Vit D Def 20 - 50 ng/mL 14          This result is from an external source.         Latest Ref Rng & Units 9/13/2024    10:21 AM 8/8/2024     8:02 AM 6/7/2024     7:47 AM   Heme   WBC 4.0 - 11.0 10e3/uL 6.7   8.9    WBC (external) 3.2 - 11.0 10*9/L  7.4        Hgb 13.3 - 17.7 g/dL 14.1   14.5    Hgb (external) 12.9 - 16.9 g/dL  13.8        Plt 150 - 450 10e3/uL 165   192    Plt (external) 130 - 375 10*9/L  173            This result is from an external source.         Latest Ref Rng & Units 9/26/2019     9:45 AM 8/6/2019     5:45 PM 1/7/2019     1:38 PM   Liver   AP 40 - 150 U/L 100   157    TBili 0.2 - 1.3 mg/dL 0.2   0.2    ALT 0 - 70 U/L 42   46    AST 0 - 45 U/L 16   15    Tot Protein 6.8 - 8.8 g/dL 7.5   7.9    Tot Protein (external) 5.7 - 8.2 g/dL  6.6        Albumin  3.4 - 5.0 g/dL 3.5   3.5    Albumin (external) 3.2 - 4.8 g/dL  4.4            This result is from an external source.         Latest Ref Rng & Units 6/4/2024    12:28 PM 11/21/2023     8:48 AM 1/12/2023     8:12 AM   Pancreas   A1C <=5.7 % 7.5  7.1     A1C (external) 4.0 - 5.6 %   8.2           This result is from an external source.         Latest Ref Rng & Units 9/26/2019     9:45 AM 8/6/2019     5:45 PM   Iron studies   Iron 35 - 180 ug/dL 70     Iron Saturation Index 15 - 46 % 28     Ferritin 26 - 388 ng/mL 753     Ferritin (external) 22 - 322 ng/mL  578           This result is from an external source.         Latest Ref Rng & Units 8/13/2021     8:39 AM 7/22/2021     8:22 AM 6/18/2021     8:30 AM   [facility] Txp Virology   BK Quant Log Ext log IU/mL Undetected      BK Quant Result Ext Undetected IU/mL Undetected  None Detected     None Detected       BK Quant Spec Ext   Blood     Blood           This result is from an external source.     Failed to redirect to the Timeline version of the locr SmartLink.  Recent Labs   Lab Test 02/07/20  0858 05/11/21  1531 11/21/23  0848   DOSTAC 2/6/20 2105 2,000 11/20/2023   TACROL 9.2 6.3 7.1     Recent Labs   Lab Test 10/07/19  0742 11/21/19  0813   DOSMPA Not Provided 11/20/19 1800   MPACID 0.82* 0.91*   MPAG 14.1* 39.5    Prescription drug management  35 minutes spent by me on the date of the encounter doing chart review, history and exam, documentation and further activities per the note      Again, thank you for allowing me to participate in the care of your patient.        Sincerely,        Connie Membreno MD

## 2024-09-13 NOTE — PROGRESS NOTES
TRANSPLANT NEPHROLOGY CLINIC VISIT     Assessment & Plan   # LDKT: CKD Stage 1 - Stable   - Baseline Creatinine: ~ 1.1-1.3   - Proteinuria: Normal (<0.2 grams)   - DSA Hx: No DSA   - Last cPRA: 0%   - BK Viremia: No   - Kidney Tx Biopsy Hx: No biopsy history and Acute cellular-mediated rejection.     Nov 25, 2019; Result: Material insufficient for assessment with no glomeruli.   Oct 22, 2019; Result: Borderline acute cellular-mediated rejection      # Immunosuppression: Tacrolimus immediate release (goal 4-6) and Mycophenolate mofetil (dose 750 mg every 12 hours)   - Induction with Recent Transplant:  Not known due to time from transplant   - Continue with intensive monitoring of immunosuppression for efficacy and toxicity.   - Historical Changes in Immunosuppression: None   - Changes: Not at this time, 1.5/1 of tac    # Infection Prevention:      - PJP: Sulfa/TMP (Bactrim)  - CMV IgG Ab High Risk Discordance (D+/R-): No  - EBV IgG Ab High Risk Discordance (D+/R-): No    # Hypertension: Controlled;  Goal BP: < 130/80   - Changes: Not at this time    # Diabetes: Borderline control (HbA1c 7-9%) Last HbA1c: 8.7%    # Anemia in Chronic Renal Disease: Hgb: Stable      JOSE: No   - Iron studies: Not checked recently    # Mineral Bone Disorder:    - Secondary renal hyperparathyroidism; PTH level: Moderately elevated (301-600 pg/ml)        On treatment: None  - Vitamin D; level: Low        On supplement: Yes  - Calcium; level: Normal        On supplement: No  - Phosphorus; level: Not checked recently, but was normal last check        On supplement: No    # Electrolytes:   - Potassium; level: Normal        On supplement: No  - Magnesium; level: Normal        On supplement: Yes  - Bicarbonate; level: Normal        On supplement: No  - Sodium; level: Normal    # Other Significant PMH:   - pt is current being evaluated for pancreas transplant.     # Skin Cancer Risk:    - Discussed sun protection and recommend regular follow up  with Dermatology.    # Transplant History:  Etiology of Kidney Failure: Diabetes mellitus type 1  Tx: LDKT  Transplant: 10/1/2019 (Kidney)  Significant transplant-related complications: None    Transplant Office Phone Number: [phone]    Assessment and plan was discussed with the patient and he voiced his understanding and agreement.    Return visit: Return in about 1 year (around 9/13/2025).    Connie Membreno MD    The longitudinal plan of care for the diagnosis(es)/condition(s) as documented were addressed during this visit. Due to the added complexity in care, I will continue to support Rdui in the subsequent management and with ongoing continuity of care.      Chief Complaint   Mr. Amin is a 44 year old here for kidney transplant and immunosuppression management.     History of Present Illness     Mr. Amin reports feeling stable overall. He works as a  and wanted this appointment to be in June prior to initiation of school but unfortunately was not able to accommodated.  Since last clinic visit:   Hospitalizations: No   New Medical Issues: No  Chest pain or shortness of breath: No  Lower extremity swelling: No  Weight change: No  Nausea and vomiting: No  Diarrhea: No  Heartburn symptoms: No  Fever, sweats or chills: No  Urinary complaints: No    Home BP:  120's systolic    Problem List   Patient Active Problem List   Diagnosis    HTN, kidney transplant related    Diabetes mellitus type 1 (H)    Dyslipidemia    Anemia in chronic kidney disease    Secondary renal hyperparathyroidism (H24)    Kidney replaced by transplant    Aftercare following organ transplant    Vitamin D deficiency    Hypomagnesemia    Kidney transplant rejection    Class 1 obesity with serious comorbidity and body mass index (BMI) of 33.0 to 33.9 in adult, unspecified obesity type    Shingles    Organ transplant candidate    Pre-operative cardiovascular examination    Status post coronary angiogram       Allergies  "  Allergies   Allergen Reactions    Anti-Thymocyte Glob (Rabbit)      Had reaction with rigors after steroid-free dose. No reaction with steroids.       Medications   Current Outpatient Medications   Medication Sig Dispense Refill    alcohol swab prep pads Use to swab area of injection/zak as directed. 100 each 3    aspirin (ASA) 81 MG EC tablet Take 81 mg by mouth daily       atorvastatin (LIPITOR) 10 MG tablet Take 2 tablets (20 mg) by mouth daily 90 tablet 0    blood glucose (NO BRAND SPECIFIED) lancets standard Use to test blood sugar 3 times daily or as directed. 100 each 11    blood glucose (NO BRAND SPECIFIED) test strip Use to test blood sugar 3 times daily or as directed. 100 strip 11    blood glucose monitoring (NO BRAND SPECIFIED) meter device kit Use to test blood sugar 3 times daily or as directed. 2 kit 0    carvedilol (COREG) 12.5 MG tablet TAKE 1 TABLET BY MOUTH TWICE DAILY WITH MEALS PLEASE  REQUEST  FUTURE  REFILLS  FROM  YOUR  PRIMARY  CARE  PROVIDER 180 tablet 0    Continuous Glucose Sensor (DEXCOM G6 SENSOR) MISC CHANGE SENSOR EVERY 10 DAYS 9 each 3    Continuous Glucose Transmitter (DEXCOM G6 TRANSMITTER) MISC Change every 3 months 1 each 3    insulin glargine (LANTUS SOLOSTAR) 100 UNIT/ML pen Inject 28 units subcutaneous once a day ONLY IF INSULIN PUMP FAILS. 15 mL 1    Insulin Infusion Pump Supplies (AUTOSOFT XC INFUSION SET) MISC 1 each every 48 hours Change every 2 days  9 mm cannula, 23 inch tubing 45 each 3    Insulin Infusion Pump Supplies (T:SLIM X2 3ML CARTRIDGE) MISC 1 each by Other route every other day Insulin cartridge to be used with pump as directed.  Change every 2 days or as directed. 45 each 3    insulin lispro (HUMALOG VIAL) 100 UNIT/ML vial Use in insulin pump. Pt uses approx 75 units daily. 80 mL 3    insulin pen needle (ULTICARE MICRO) 32G X 4 MM miscellaneous Use pen needles daily or as directed. 100 each 3    Insulin Syringe-Needle U-100 31G X 1/4\" 1 ML MISC 1 Syringe " as needed (until new insuline pump arrives) 50 each 1    magnesium oxide (MAG-OX) 400 MG tablet Take 1 tablet (400 mg) by mouth daily (with lunch) 30 tablet 5    mycophenolate (GENERIC EQUIVALENT) 250 MG capsule Take 3 capsules (750 mg) by mouth 2 times daily 180 capsule 11    Semaglutide, 2 MG/DOSE, (OZEMPIC) 8 MG/3ML pen Inject 2 mg subcutaneous once a week. Please provide 90 day supply. 9 mL 3    senna-docusate (SENOKOT-S/PERICOLACE) 8.6-50 MG tablet Take 2 tablets by mouth 2 times daily 20 tablet 0    sulfamethoxazole-trimethoprim (BACTRIM) 400-80 MG tablet Take 1 tablet by mouth daily 30 tablet 11    tacrolimus (GENERIC) 0.5 MG capsule Take 1 capsule (0.5 mg) by mouth every morning Total dose = 1.5 mg in the AM and 1 mg in the PM 90 capsule 3    tacrolimus (GENERIC) 1 MG capsule Total dose = 1.5 mg in the AM and 1 mg in the  capsule 3    vitamin D3 (CHOLECALCIFEROL) 50 mcg (2000 units) tablet Take 1 tablet (50 mcg) by mouth daily      benzoyl peroxide 5 % external liquid Lather in shower, rinse completely. (Patient not taking: Reported on 9/13/2024) 148 mL 11    clindamycin (CLEOCIN T) 1 % external lotion Apply topically 2 times daily (Patient not taking: Reported on 9/13/2024) 60 mL 4     No current facility-administered medications for this visit.     There are no discontinued medications.    Physical Exam   Vital Signs: /82   Pulse 80   Wt 89 kg (196 lb 4.8 oz)   BMI 30.74 kg/m      GENERAL APPEARANCE: alert and no distress  EYES: eyes grossly normal to inspection  HENT: normal cephalic/atraumatic  RESP: lungs clear to auscultation   CV: regular rhythm, normal rate  EDEMA: no LE edema bilaterally  ABDOMEN: soft, non distended  MS: extremities normal - no gross deformities noted  SKIN: no rash  NEURO: mentation intact and speech normal  PSYCH: mentation appears normal and affect normal/bright  TX KIDNEY: normal    Data         Latest Ref Rng & Units 9/13/2024    10:21 AM 8/8/2024     8:02 AM  6/7/2024    11:00 AM   Renal   Sodium 135 - 145 mmol/L 138      Na (external) 134 - 143 mEq/L  140        K 3.4 - 5.3 mmol/L 4.1      K (external) 3.4 - 5.1 mEq/L  4.1        Cl 98 - 107 mmol/L 101  106        Cl (external) 98 - 107 mmol/L 101  106        CO2 22 - 29 mmol/L 28      CO2 (external) 19 - 29 mEq/L  23        Urea Nitrogen 6.0 - 20.0 mg/dL 13.0      BUN (external) 5 - 24 mg/dL  18        Creatinine 0.67 - 1.17 mg/dL 1.03      Cr (external) 0.70 - 1.20 mg/dL  1.25        Glucose 70 - 99 mg/dL 154   86    Glucose (external) 70 - 99 mg/dL  127        Calcium 8.8 - 10.4 mg/dL 9.7      Ca (external) 8.4 - 10.5 mg/dL  9.3        Magnesium 1.7 - 2.3 mg/dL 1.7          This result is from an external source.         Latest Ref Rng & Units 11/21/2023     8:48 AM 3/31/2020    10:39 AM 2/3/2020     4:14 PM   [facility]   Phos (external) 2.5 - 4.6 mg/dL  3.3     3.5    Vit D Def 20 - 50 ng/mL 14          This result is from an external source.         Latest Ref Rng & Units 9/13/2024    10:21 AM 8/8/2024     8:02 AM 6/7/2024     7:47 AM   Heme   WBC 4.0 - 11.0 10e3/uL 6.7   8.9    WBC (external) 3.2 - 11.0 10*9/L  7.4        Hgb 13.3 - 17.7 g/dL 14.1   14.5    Hgb (external) 12.9 - 16.9 g/dL  13.8        Plt 150 - 450 10e3/uL 165   192    Plt (external) 130 - 375 10*9/L  173            This result is from an external source.         Latest Ref Rng & Units 9/26/2019     9:45 AM 8/6/2019     5:45 PM 1/7/2019     1:38 PM   Liver   AP 40 - 150 U/L 100   157    TBili 0.2 - 1.3 mg/dL 0.2   0.2    ALT 0 - 70 U/L 42   46    AST 0 - 45 U/L 16   15    Tot Protein 6.8 - 8.8 g/dL 7.5   7.9    Tot Protein (external) 5.7 - 8.2 g/dL  6.6        Albumin 3.4 - 5.0 g/dL 3.5   3.5    Albumin (external) 3.2 - 4.8 g/dL  4.4            This result is from an external source.         Latest Ref Rng & Units 6/4/2024    12:28 PM 11/21/2023     8:48 AM 1/12/2023     8:12 AM   Pancreas   A1C <=5.7 % 7.5  7.1     A1C (external) 4.0 - 5.6 %    8.2           This result is from an external source.         Latest Ref Rng & Units 9/26/2019     9:45 AM 8/6/2019     5:45 PM   Iron studies   Iron 35 - 180 ug/dL 70     Iron Saturation Index 15 - 46 % 28     Ferritin 26 - 388 ng/mL 753     Ferritin (external) 22 - 322 ng/mL  578           This result is from an external source.         Latest Ref Rng & Units 8/13/2021     8:39 AM 7/22/2021     8:22 AM 6/18/2021     8:30 AM   [facility] Vi[facility]   BK Quant Log Ext log IU/mL Undetected      BK Quant Result Ext Undetected IU/mL Undetected  None Detected     None Detected       BK Quant Spec Ext   Blood     Blood           This result is from an external source.     Failed to redirect to the Timeline version of the [employer] SmartLink.  Recent Labs   Lab Test 02/07/20  0858 05/11/21  1531 11/21/23  0848   DOSTAC 2/6/20 2105 2,000 11/20/2023   TACROL 9.2 6.3 7.1     Recent Labs   Lab Test 10/07/19  0742 11/21/19  0813   DOSMPA Not Provided 11/20/19 1800   MPACID 0.82* 0.91*   MPAG 14.1* 39.5    Prescription drug management  35 minutes spent by me on the date of the encounter doing chart review, history and exam, documentation and further activities per the note

## 2024-09-13 NOTE — NURSING NOTE
Chief Complaint   Patient presents with    RECHECK     TXP follow up.      Vitals:    09/13/24 1109 09/13/24 1110   BP: 128/84 126/82   BP Location: Left arm Left arm   Patient Position: Sitting Sitting   Cuff Size: Adult Regular Adult Regular   Pulse: 80    Weight: 89 kg (196 lb 4.8 oz)        BP Readings from Last 3 Encounters:   09/13/24 126/82   06/07/24 127/84   06/04/24 118/72       /82 (BP Location: Left arm, Patient Position: Sitting, Cuff Size: Adult Regular)   Pulse 80   Wt 89 kg (196 lb 4.8 oz)   BMI 30.74 kg/m       Apurva Morales

## 2024-09-25 DIAGNOSIS — Z76.82 AWAITING ORGAN TRANSPLANT: Primary | ICD-10-CM

## 2024-09-25 LAB
DONOR IDENTIFICATION: NORMAL
DSA COMMENTS: NORMAL
DSA PRESENT: NO
DSA TEST METHOD: NORMAL
ORGAN: NORMAL
PROTOCOL CUTOFF: NORMAL
SA 1  COMMENTS: NORMAL
SA 1 CELL: NORMAL
SA 1 TEST METHOD: NORMAL
SA 2 CELL: NORMAL
SA 2 COMMENTS: NORMAL
SA 2 TEST METHOD: NORMAL
SA1 HI RISK ABY: NORMAL
SA1 MOD RISK ABY: NORMAL
SA2 HI RISK ABY: NORMAL
SA2 MOD RISK ABY: NORMAL
UNACCEPTABLE ANTIGENS: NORMAL
UNOS CPRA: 0

## 2024-10-24 ENCOUNTER — TELEPHONE (OUTPATIENT)
Dept: TRANSPLANT | Facility: CLINIC | Age: 44
End: 2024-10-24
Payer: COMMERCIAL

## 2024-10-24 DIAGNOSIS — T86.11 KIDNEY TRANSPLANT REJECTION: ICD-10-CM

## 2024-10-24 NOTE — TELEPHONE ENCOUNTER
Patient reports he has been working on a coverage plan for work; has had meetings with leadership.  Patient reports he is ready to be activated on the pancreas waitlist.  Noted will send an updated message  to the transplant patient  to re-check benefits and then contact patient to activate on the waitlist.

## 2024-10-25 ENCOUNTER — DOCUMENTATION ONLY (OUTPATIENT)
Dept: TRANSPLANT | Facility: CLINIC | Age: 44
End: 2024-10-25
Payer: COMMERCIAL

## 2024-10-25 ENCOUNTER — TELEPHONE (OUTPATIENT)
Dept: TRANSPLANT | Facility: CLINIC | Age: 44
End: 2024-10-25
Payer: COMMERCIAL

## 2024-10-25 DIAGNOSIS — T86.11 KIDNEY TRANSPLANT REJECTION: ICD-10-CM

## 2024-10-25 NOTE — TELEPHONE ENCOUNTER
Called patient to inform them of status change to ACTIVE on the Pancreas Alone  list today 10/25/24. Status change letter and PRA orders to be mailed. Patient is in agreement with listing ACTIVE status.      Discussed:   - Pt is eligible for  donor offers and pt can receive calls 24 hours a day, 7 days a week, and on call staff need to be able to reach pt or one of emergency contacts within 30 minutes or they might skip over to next recipient on list.   -Please make sure your phone is charged at all times and your voicemail is not full   -Your transplant on call coordinator will give you directions about when and where you will need to come to the hospital for transplant  -The transplant coordinator will call you to discuss your current health status, the offer, and plans to move forward.  Some organ offer calls will require you to come to the hospital immediately; some may not be as time sensitive.  It may be possible for you to come to the hospital and, for various reasons, the transplant could be cancelled.  This is often called a  dry run .  - Reviewed the right to accept or decline offer, without penalty  - Expected length of surgical procedure is about 6-8 hours, expected inpatient length of stay post transplant is 7 days, and potential to stay locally post transplant. Offered resources for lodging in the area  - Verified contact information as documented in Epic. Confirmed emergency contact information  -Instructed patient about importance of having PRAs drawn every 3 months via: ([facility]). Encouraged them to set reminder in their preferred calendar to stay on top of their quarterly labs  -Reminded pt to stay up on health maintenance and to call with any updates on their health status, insurance or contact information.   -Reminded pt to inform RNCC as soon as possible if they test positive for COVID.  -Donor website was provided     -Last PRA was 2024     -Provided name and contact  information for additional questions or concerns.  Pt expressed excellent understanding of all and was in good agreement with the plan.

## 2024-11-01 ENCOUNTER — LAB (OUTPATIENT)
Dept: LAB | Facility: CLINIC | Age: 44
End: 2024-11-01
Payer: COMMERCIAL

## 2024-11-01 ENCOUNTER — ORGAN (OUTPATIENT)
Dept: TRANSPLANT | Facility: CLINIC | Age: 44
End: 2024-11-01

## 2024-11-01 DIAGNOSIS — Z76.82 AWAITING ORGAN TRANSPLANT: Primary | ICD-10-CM

## 2024-11-01 DIAGNOSIS — Z76.82 AWAITING ORGAN TRANSPLANT: ICD-10-CM

## 2024-11-01 DIAGNOSIS — T86.11 KIDNEY TRANSPLANT REJECTION: ICD-10-CM

## 2024-11-01 LAB — SARS-COV-2 RNA RESP QL NAA+PROBE: NEGATIVE

## 2024-11-01 PROCEDURE — 86825 HLA X-MATH NON-CYTOTOXIC: CPT | Performed by: SURGERY

## 2024-11-01 PROCEDURE — 99000 SPECIMEN HANDLING OFFICE-LAB: CPT | Performed by: PATHOLOGY

## 2024-11-01 PROCEDURE — 86832 HLA CLASS I HIGH DEFIN QUAL: CPT | Performed by: SURGERY

## 2024-11-01 PROCEDURE — 86833 HLA CLASS II HIGH DEFIN QUAL: CPT | Performed by: SURGERY

## 2024-11-01 PROCEDURE — 87635 SARS-COV-2 COVID-19 AMP PRB: CPT | Performed by: SURGERY

## 2024-11-01 PROCEDURE — 36415 COLL VENOUS BLD VENIPUNCTURE: CPT | Performed by: PATHOLOGY

## 2024-11-02 ENCOUNTER — HOSPITAL ENCOUNTER (INPATIENT)
Facility: CLINIC | Age: 44
LOS: 8 days | Discharge: HOME HEALTH | DRG: 010 | End: 2024-11-11
Attending: SURGERY | Admitting: SURGERY
Payer: COMMERCIAL

## 2024-11-02 ENCOUNTER — APPOINTMENT (OUTPATIENT)
Dept: GENERAL RADIOLOGY | Facility: CLINIC | Age: 44
DRG: 010 | End: 2024-11-02
Attending: PHYSICIAN ASSISTANT
Payer: COMMERCIAL

## 2024-11-02 ENCOUNTER — ANESTHESIA EVENT (OUTPATIENT)
Dept: SURGERY | Facility: CLINIC | Age: 44
DRG: 010 | End: 2024-11-02
Payer: COMMERCIAL

## 2024-11-02 ENCOUNTER — ANESTHESIA (OUTPATIENT)
Dept: SURGERY | Facility: CLINIC | Age: 44
DRG: 010 | End: 2024-11-02
Payer: COMMERCIAL

## 2024-11-02 DIAGNOSIS — Z94.83 PANCREAS REPLACED BY TRANSPLANT (H): ICD-10-CM

## 2024-11-02 DIAGNOSIS — T86.11 KIDNEY TRANSPLANT REJECTION: ICD-10-CM

## 2024-11-02 DIAGNOSIS — Z94.0 HTN, KIDNEY TRANSPLANT RELATED: ICD-10-CM

## 2024-11-02 DIAGNOSIS — I15.1 HTN, KIDNEY TRANSPLANT RELATED: ICD-10-CM

## 2024-11-02 DIAGNOSIS — Z94.0 KIDNEY REPLACED BY TRANSPLANT: Primary | ICD-10-CM

## 2024-11-02 LAB
ABO/RH(D): NORMAL
ALBUMIN MFR UR ELPH: 13.3 MG/DL
ALBUMIN SERPL BCG-MCNC: 4.1 G/DL (ref 3.5–5.2)
ALBUMIN UR-MCNC: 10 MG/DL
ALP SERPL-CCNC: 113 U/L (ref 40–150)
ALT SERPL W P-5'-P-CCNC: 13 U/L (ref 0–70)
AMYLASE SERPL-CCNC: 21 U/L (ref 28–100)
ANION GAP SERPL CALCULATED.3IONS-SCNC: 12 MMOL/L (ref 7–15)
ANTIBODY SCREEN: NEGATIVE
APPEARANCE UR: CLEAR
APTT PPP: 34 SECONDS (ref 22–38)
AST SERPL W P-5'-P-CCNC: 15 U/L (ref 0–45)
BASE EXCESS BLDA CALC-SCNC: -0.4 MMOL/L (ref -3–3)
BASE EXCESS BLDA CALC-SCNC: -1.5 MMOL/L (ref -3–3)
BASE EXCESS BLDA CALC-SCNC: -1.5 MMOL/L (ref -3–3)
BASE EXCESS BLDA CALC-SCNC: -2 MMOL/L (ref -3–3)
BASE EXCESS BLDA CALC-SCNC: -2.2 MMOL/L (ref -3–3)
BASE EXCESS BLDA CALC-SCNC: -2.3 MMOL/L (ref -3–3)
BASE EXCESS BLDA CALC-SCNC: -2.3 MMOL/L (ref -3–3)
BASE EXCESS BLDA CALC-SCNC: -2.8 MMOL/L (ref -3–3)
BASE EXCESS BLDA CALC-SCNC: -2.9 MMOL/L (ref -3–3)
BILIRUB SERPL-MCNC: 0.5 MG/DL
BILIRUB UR QL STRIP: NEGATIVE
BLD PROD TYP BPU: NORMAL
BLD PROD TYP BPU: NORMAL
BLOOD COMPONENT TYPE: NORMAL
BLOOD COMPONENT TYPE: NORMAL
BUN SERPL-MCNC: 14.9 MG/DL (ref 6–20)
CA-I BLD-MCNC: 4.2 MG/DL (ref 4.4–5.2)
CA-I BLD-MCNC: 4.5 MG/DL (ref 4.4–5.2)
CA-I BLD-MCNC: 4.6 MG/DL (ref 4.4–5.2)
CA-I BLD-MCNC: 4.9 MG/DL (ref 4.4–5.2)
CA-I BLD-MCNC: 5.1 MG/DL (ref 4.4–5.2)
CALCIUM SERPL-MCNC: 9.2 MG/DL (ref 8.8–10.4)
CHLORIDE SERPL-SCNC: 101 MMOL/L (ref 98–107)
CODING SYSTEM: NORMAL
CODING SYSTEM: NORMAL
COLOR UR AUTO: YELLOW
CREAT SERPL-MCNC: 1.1 MG/DL (ref 0.67–1.17)
CREAT UR-MCNC: 235 MG/DL
CROSSMATCH: NORMAL
CROSSMATCH: NORMAL
EGFRCR SERPLBLD CKD-EPI 2021: 84 ML/MIN/1.73M2
ERYTHROCYTE [DISTWIDTH] IN BLOOD BY AUTOMATED COUNT: 13.1 % (ref 10–15)
EST. AVERAGE GLUCOSE BLD GHB EST-MCNC: 177 MG/DL
GLUCOSE BLD-MCNC: 102 MG/DL (ref 70–99)
GLUCOSE BLD-MCNC: 109 MG/DL (ref 70–99)
GLUCOSE BLD-MCNC: 154 MG/DL (ref 70–99)
GLUCOSE BLD-MCNC: 169 MG/DL (ref 70–99)
GLUCOSE BLD-MCNC: 173 MG/DL (ref 70–99)
GLUCOSE BLD-MCNC: 180 MG/DL (ref 70–99)
GLUCOSE BLD-MCNC: 201 MG/DL (ref 70–99)
GLUCOSE BLD-MCNC: 232 MG/DL (ref 70–99)
GLUCOSE BLD-MCNC: 236 MG/DL (ref 70–99)
GLUCOSE BLDC GLUCOMTR-MCNC: 147 MG/DL (ref 70–99)
GLUCOSE BLDC GLUCOMTR-MCNC: 63 MG/DL (ref 70–99)
GLUCOSE BLDC GLUCOMTR-MCNC: 69 MG/DL (ref 70–99)
GLUCOSE BLDC GLUCOMTR-MCNC: 73 MG/DL (ref 70–99)
GLUCOSE BLDC GLUCOMTR-MCNC: 75 MG/DL (ref 70–99)
GLUCOSE SERPL-MCNC: 157 MG/DL (ref 70–99)
GLUCOSE UR STRIP-MCNC: NEGATIVE MG/DL
HBA1C MFR BLD: 7.8 %
HBV CORE AB SERPL QL IA: NONREACTIVE
HBV SURFACE AB SERPL IA-ACNC: 313 M[IU]/ML
HBV SURFACE AB SERPL IA-ACNC: REACTIVE M[IU]/ML
HBV SURFACE AG SERPL QL IA: NONREACTIVE
HCO3 BLDA-SCNC: 22 MMOL/L (ref 21–28)
HCO3 BLDA-SCNC: 23 MMOL/L (ref 21–28)
HCO3 BLDA-SCNC: 24 MMOL/L (ref 21–28)
HCO3 BLDA-SCNC: 25 MMOL/L (ref 21–28)
HCO3 SERPL-SCNC: 27 MMOL/L (ref 22–29)
HCT VFR BLD AUTO: 45.1 % (ref 40–53)
HCV AB SERPL QL IA: NONREACTIVE
HGB BLD-MCNC: 10 G/DL (ref 13.3–17.7)
HGB BLD-MCNC: 10.1 G/DL (ref 13.3–17.7)
HGB BLD-MCNC: 10.3 G/DL (ref 13.3–17.7)
HGB BLD-MCNC: 10.4 G/DL (ref 13.3–17.7)
HGB BLD-MCNC: 10.6 G/DL (ref 13.3–17.7)
HGB BLD-MCNC: 10.6 G/DL (ref 13.3–17.7)
HGB BLD-MCNC: 12.5 G/DL (ref 13.3–17.7)
HGB BLD-MCNC: 12.8 G/DL (ref 13.3–17.7)
HGB BLD-MCNC: 14.8 G/DL (ref 13.3–17.7)
HGB BLD-MCNC: 9.4 G/DL (ref 13.3–17.7)
HGB UR QL STRIP: NEGATIVE
HIV 1+2 AB+HIV1 P24 AG SERPL QL IA: NONREACTIVE
INR PPP: 1.05 (ref 0.85–1.15)
KETONES UR STRIP-MCNC: NEGATIVE MG/DL
LACTATE BLD-SCNC: 0.4 MMOL/L (ref 0.7–2)
LACTATE BLD-SCNC: 0.6 MMOL/L (ref 0.7–2)
LACTATE BLD-SCNC: 0.6 MMOL/L (ref 0.7–2)
LACTATE BLD-SCNC: 1.1 MMOL/L (ref 0.7–2)
LACTATE BLD-SCNC: 1.4 MMOL/L (ref 0.7–2)
LACTATE BLD-SCNC: 1.8 MMOL/L (ref 0.7–2)
LACTATE BLD-SCNC: 2 MMOL/L (ref 0.7–2)
LACTATE BLD-SCNC: 3.5 MMOL/L (ref 0.7–2)
LACTATE BLD-SCNC: 3.9 MMOL/L (ref 0.7–2)
LEUKOCYTE ESTERASE UR QL STRIP: NEGATIVE
LIPASE SERPL-CCNC: 11 U/L (ref 13–60)
MCH RBC QN AUTO: 27.6 PG (ref 26.5–33)
MCHC RBC AUTO-ENTMCNC: 32.8 G/DL (ref 31.5–36.5)
MCV RBC AUTO: 84 FL (ref 78–100)
MUCOUS THREADS #/AREA URNS LPF: PRESENT /LPF
NITRATE UR QL: NEGATIVE
O2/TOTAL GAS SETTING VFR VENT: 44 %
O2/TOTAL GAS SETTING VFR VENT: 45 %
O2/TOTAL GAS SETTING VFR VENT: 46 %
O2/TOTAL GAS SETTING VFR VENT: 48 %
O2/TOTAL GAS SETTING VFR VENT: 50 %
O2/TOTAL GAS SETTING VFR VENT: 53 %
O2/TOTAL GAS SETTING VFR VENT: 54 %
OXYHGB MFR BLDA: 96 % (ref 92–100)
OXYHGB MFR BLDA: 97 % (ref 92–100)
OXYHGB MFR BLDA: 98 % (ref 92–100)
PCO2 BLDA: 39 MM HG (ref 35–45)
PCO2 BLDA: 40 MM HG (ref 35–45)
PCO2 BLDA: 40 MM HG (ref 35–45)
PCO2 BLDA: 41 MM HG (ref 35–45)
PCO2 BLDA: 42 MM HG (ref 35–45)
PCO2 BLDA: 43 MM HG (ref 35–45)
PCO2 BLDA: 44 MM HG (ref 35–45)
PH BLDA: 7.34 [PH] (ref 7.35–7.45)
PH BLDA: 7.34 [PH] (ref 7.35–7.45)
PH BLDA: 7.35 [PH] (ref 7.35–7.45)
PH BLDA: 7.35 [PH] (ref 7.35–7.45)
PH BLDA: 7.36 [PH] (ref 7.35–7.45)
PH BLDA: 7.36 [PH] (ref 7.35–7.45)
PH BLDA: 7.37 [PH] (ref 7.35–7.45)
PH BLDA: 7.39 [PH] (ref 7.35–7.45)
PH BLDA: 7.39 [PH] (ref 7.35–7.45)
PH UR STRIP: 5.5 [PH] (ref 5–7)
PLATELET # BLD AUTO: 207 10E3/UL (ref 150–450)
PO2 BLDA: 105 MM HG (ref 80–105)
PO2 BLDA: 124 MM HG (ref 80–105)
PO2 BLDA: 129 MM HG (ref 80–105)
PO2 BLDA: 130 MM HG (ref 80–105)
PO2 BLDA: 134 MM HG (ref 80–105)
PO2 BLDA: 140 MM HG (ref 80–105)
PO2 BLDA: 144 MM HG (ref 80–105)
PO2 BLDA: 150 MM HG (ref 80–105)
PO2 BLDA: 180 MM HG (ref 80–105)
POTASSIUM BLD-SCNC: 3.5 MMOL/L (ref 3.4–5.3)
POTASSIUM BLD-SCNC: 3.6 MMOL/L (ref 3.4–5.3)
POTASSIUM BLD-SCNC: 3.6 MMOL/L (ref 3.4–5.3)
POTASSIUM BLD-SCNC: 3.8 MMOL/L (ref 3.4–5.3)
POTASSIUM BLD-SCNC: 3.8 MMOL/L (ref 3.4–5.3)
POTASSIUM BLD-SCNC: 4 MMOL/L (ref 3.4–5.3)
POTASSIUM BLD-SCNC: 4 MMOL/L (ref 3.4–5.3)
POTASSIUM SERPL-SCNC: 4.4 MMOL/L (ref 3.4–5.3)
PROT SERPL-MCNC: 6.9 G/DL (ref 6.4–8.3)
PROT/CREAT 24H UR: 0.06 MG/MG CR (ref 0–0.2)
RBC # BLD AUTO: 5.36 10E6/UL (ref 4.4–5.9)
RBC URINE: <1 /HPF
SAO2 % BLDA: 100 % (ref 96–97)
SAO2 % BLDA: 98 % (ref 96–97)
SAO2 % BLDA: 99 % (ref 96–97)
SODIUM BLD-SCNC: 135 MMOL/L (ref 135–145)
SODIUM BLD-SCNC: 136 MMOL/L (ref 135–145)
SODIUM BLD-SCNC: 137 MMOL/L (ref 135–145)
SODIUM BLD-SCNC: 138 MMOL/L (ref 135–145)
SODIUM SERPL-SCNC: 140 MMOL/L (ref 135–145)
SP GR UR STRIP: 1.03 (ref 1–1.03)
SPECIMEN EXPIRATION DATE: NORMAL
SPERM #/AREA URNS HPF: PRESENT /HPF
UNIT ABO/RH: NORMAL
UNIT ABO/RH: NORMAL
UNIT NUMBER: NORMAL
UNIT NUMBER: NORMAL
UNIT STATUS: NORMAL
UNIT STATUS: NORMAL
UNIT TYPE ISBT: 7300
UNIT TYPE ISBT: 7300
UROBILINOGEN UR STRIP-MCNC: NORMAL MG/DL
WBC # BLD AUTO: 6.7 10E3/UL (ref 4–11)
WBC URINE: <1 /HPF

## 2024-11-02 PROCEDURE — 87516 HEPATITIS B DNA AMP PROBE: CPT | Performed by: PHYSICIAN ASSISTANT

## 2024-11-02 PROCEDURE — 82962 GLUCOSE BLOOD TEST: CPT

## 2024-11-02 PROCEDURE — 36556 INSERT NON-TUNNEL CV CATH: CPT | Mod: 59 | Performed by: ANESTHESIOLOGY

## 2024-11-02 PROCEDURE — 84295 ASSAY OF SERUM SODIUM: CPT

## 2024-11-02 PROCEDURE — 999N000141 HC STATISTIC PRE-PROCEDURE NURSING ASSESSMENT: Performed by: SURGERY

## 2024-11-02 PROCEDURE — 30243S1 TRANSFUSION OF NONAUTOLOGOUS GLOBULIN INTO CENTRAL VEIN, PERCUTANEOUS APPROACH: ICD-10-PCS | Performed by: SURGERY

## 2024-11-02 PROCEDURE — 250N000011 HC RX IP 250 OP 636

## 2024-11-02 PROCEDURE — 0FYG0Z0 TRANSPLANTATION OF PANCREAS, ALLOGENEIC, OPEN APPROACH: ICD-10-PCS | Performed by: SURGERY

## 2024-11-02 PROCEDURE — 87521 HEPATITIS C PROBE&RVRS TRNSC: CPT | Performed by: PHYSICIAN ASSISTANT

## 2024-11-02 PROCEDURE — 71046 X-RAY EXAM CHEST 2 VIEWS: CPT | Mod: 26 | Performed by: RADIOLOGY

## 2024-11-02 PROCEDURE — 999N000128 HC STATISTIC PERIPHERAL IV START W/O US GUIDANCE

## 2024-11-02 PROCEDURE — 86706 HEP B SURFACE ANTIBODY: CPT | Performed by: PHYSICIAN ASSISTANT

## 2024-11-02 PROCEDURE — 258N000003 HC RX IP 258 OP 636: Performed by: PHYSICIAN ASSISTANT

## 2024-11-02 PROCEDURE — 71046 X-RAY EXAM CHEST 2 VIEWS: CPT

## 2024-11-02 PROCEDURE — 87340 HEPATITIS B SURFACE AG IA: CPT | Performed by: PHYSICIAN ASSISTANT

## 2024-11-02 PROCEDURE — 86644 CMV ANTIBODY: CPT | Performed by: PHYSICIAN ASSISTANT

## 2024-11-02 PROCEDURE — 250N000009 HC RX 250: Performed by: SURGERY

## 2024-11-02 PROCEDURE — 82947 ASSAY GLUCOSE BLOOD QUANT: CPT | Performed by: PHYSICIAN ASSISTANT

## 2024-11-02 PROCEDURE — 812N000014

## 2024-11-02 PROCEDURE — C1757 CATH, THROMBECTOMY/EMBOLECT: HCPCS | Performed by: SURGERY

## 2024-11-02 PROCEDURE — 86665 EPSTEIN-BARR CAPSID VCA: CPT | Performed by: PHYSICIAN ASSISTANT

## 2024-11-02 PROCEDURE — 250N000025 HC SEVOFLURANE, PER MIN: Performed by: SURGERY

## 2024-11-02 PROCEDURE — 93005 ELECTROCARDIOGRAM TRACING: CPT

## 2024-11-02 PROCEDURE — 86850 RBC ANTIBODY SCREEN: CPT | Performed by: PHYSICIAN ASSISTANT

## 2024-11-02 PROCEDURE — 88302 TISSUE EXAM BY PATHOLOGIST: CPT | Mod: TC | Performed by: SURGERY

## 2024-11-02 PROCEDURE — 85041 AUTOMATED RBC COUNT: CPT | Performed by: PHYSICIAN ASSISTANT

## 2024-11-02 PROCEDURE — 88302 TISSUE EXAM BY PATHOLOGIST: CPT | Mod: 26 | Performed by: PATHOLOGY

## 2024-11-02 PROCEDURE — 84156 ASSAY OF PROTEIN URINE: CPT | Performed by: PHYSICIAN ASSISTANT

## 2024-11-02 PROCEDURE — 370N000017 HC ANESTHESIA TECHNICAL FEE, PER MIN: Performed by: SURGERY

## 2024-11-02 PROCEDURE — 258N000003 HC RX IP 258 OP 636

## 2024-11-02 PROCEDURE — 82040 ASSAY OF SERUM ALBUMIN: CPT | Performed by: PHYSICIAN ASSISTANT

## 2024-11-02 PROCEDURE — 0DTJ0ZZ RESECTION OF APPENDIX, OPEN APPROACH: ICD-10-PCS | Performed by: SURGERY

## 2024-11-02 PROCEDURE — 85610 PROTHROMBIN TIME: CPT | Performed by: PHYSICIAN ASSISTANT

## 2024-11-02 PROCEDURE — 360N000077 HC SURGERY LEVEL 4, PER MIN: Performed by: SURGERY

## 2024-11-02 PROCEDURE — 36620 INSERTION CATHETER ARTERY: CPT | Mod: 59 | Performed by: ANESTHESIOLOGY

## 2024-11-02 PROCEDURE — 86704 HEP B CORE ANTIBODY TOTAL: CPT | Performed by: PHYSICIAN ASSISTANT

## 2024-11-02 PROCEDURE — 86900 BLOOD TYPING SEROLOGIC ABO: CPT | Performed by: PHYSICIAN ASSISTANT

## 2024-11-02 PROCEDURE — 710N000010 HC RECOVERY PHASE 1, LEVEL 2, PER MIN: Performed by: SURGERY

## 2024-11-02 PROCEDURE — 81001 URINALYSIS AUTO W/SCOPE: CPT | Performed by: PHYSICIAN ASSISTANT

## 2024-11-02 PROCEDURE — 83690 ASSAY OF LIPASE: CPT | Performed by: PHYSICIAN ASSISTANT

## 2024-11-02 PROCEDURE — 85730 THROMBOPLASTIN TIME PARTIAL: CPT | Performed by: PHYSICIAN ASSISTANT

## 2024-11-02 PROCEDURE — 86923 COMPATIBILITY TEST ELECTRIC: CPT

## 2024-11-02 PROCEDURE — 36415 COLL VENOUS BLD VENIPUNCTURE: CPT | Performed by: PHYSICIAN ASSISTANT

## 2024-11-02 PROCEDURE — 86790 VIRUS ANTIBODY NOS: CPT | Performed by: PHYSICIAN ASSISTANT

## 2024-11-02 PROCEDURE — 85014 HEMATOCRIT: CPT | Performed by: PHYSICIAN ASSISTANT

## 2024-11-02 PROCEDURE — 82150 ASSAY OF AMYLASE: CPT | Performed by: PHYSICIAN ASSISTANT

## 2024-11-02 PROCEDURE — 86645 CMV ANTIBODY IGM: CPT | Performed by: PHYSICIAN ASSISTANT

## 2024-11-02 PROCEDURE — 87086 URINE CULTURE/COLONY COUNT: CPT | Performed by: PHYSICIAN ASSISTANT

## 2024-11-02 PROCEDURE — 258N000001 HC RX 258: Performed by: PHYSICIAN ASSISTANT

## 2024-11-02 PROCEDURE — 272N000001 HC OR GENERAL SUPPLY STERILE: Performed by: SURGERY

## 2024-11-02 PROCEDURE — 250N000009 HC RX 250

## 2024-11-02 PROCEDURE — 86803 HEPATITIS C AB TEST: CPT | Performed by: PHYSICIAN ASSISTANT

## 2024-11-02 PROCEDURE — 87389 HIV-1 AG W/HIV-1&-2 AB AG IA: CPT | Performed by: PHYSICIAN ASSISTANT

## 2024-11-02 PROCEDURE — 84132 ASSAY OF SERUM POTASSIUM: CPT

## 2024-11-02 PROCEDURE — 250N000011 HC RX IP 250 OP 636: Performed by: PHYSICIAN ASSISTANT

## 2024-11-02 PROCEDURE — 06UF0KZ SUPPLEMENT RIGHT EXTERNAL ILIAC VEIN WITH NONAUTOLOGOUS TISSUE SUBSTITUTE, OPEN APPROACH: ICD-10-PCS | Performed by: SURGERY

## 2024-11-02 PROCEDURE — 83036 HEMOGLOBIN GLYCOSYLATED A1C: CPT | Performed by: PHYSICIAN ASSISTANT

## 2024-11-02 PROCEDURE — 82330 ASSAY OF CALCIUM: CPT

## 2024-11-02 PROCEDURE — 48554 TRANSPL ALLOGRAFT PANCREAS: CPT | Mod: 22 | Performed by: SURGERY

## 2024-11-02 PROCEDURE — 250N000013 HC RX MED GY IP 250 OP 250 PS 637: Performed by: PHYSICIAN ASSISTANT

## 2024-11-02 RX ORDER — OXYCODONE HYDROCHLORIDE 5 MG/1
5 TABLET ORAL
Status: CANCELLED | OUTPATIENT
Start: 2024-11-02

## 2024-11-02 RX ORDER — SODIUM CHLORIDE, SODIUM GLUCONATE, SODIUM ACETATE, POTASSIUM CHLORIDE AND MAGNESIUM CHLORIDE 526; 502; 368; 37; 30 MG/100ML; MG/100ML; MG/100ML; MG/100ML; MG/100ML
INJECTION, SOLUTION INTRAVENOUS CONTINUOUS PRN
Status: DISCONTINUED | OUTPATIENT
Start: 2024-11-02 | End: 2024-11-03

## 2024-11-02 RX ORDER — SODIUM CHLORIDE, SODIUM LACTATE, POTASSIUM CHLORIDE, CALCIUM CHLORIDE 600; 310; 30; 20 MG/100ML; MG/100ML; MG/100ML; MG/100ML
INJECTION, SOLUTION INTRAVENOUS CONTINUOUS
Status: DISCONTINUED | OUTPATIENT
Start: 2024-11-02 | End: 2024-11-03 | Stop reason: HOSPADM

## 2024-11-02 RX ORDER — ONDANSETRON 4 MG/1
4 TABLET, ORALLY DISINTEGRATING ORAL EVERY 30 MIN PRN
Status: CANCELLED | OUTPATIENT
Start: 2024-11-02

## 2024-11-02 RX ORDER — LIDOCAINE 40 MG/G
CREAM TOPICAL
Status: DISCONTINUED | OUTPATIENT
Start: 2024-11-02 | End: 2024-11-03 | Stop reason: HOSPADM

## 2024-11-02 RX ORDER — CALCIUM CHLORIDE 100 MG/ML
INJECTION INTRAVENOUS; INTRAVENTRICULAR PRN
Status: DISCONTINUED | OUTPATIENT
Start: 2024-11-02 | End: 2024-11-03

## 2024-11-02 RX ORDER — DEXAMETHASONE SODIUM PHOSPHATE 4 MG/ML
4 INJECTION, SOLUTION INTRA-ARTICULAR; INTRALESIONAL; INTRAMUSCULAR; INTRAVENOUS; SOFT TISSUE
Status: CANCELLED | OUTPATIENT
Start: 2024-11-02

## 2024-11-02 RX ORDER — SODIUM CHLORIDE, SODIUM LACTATE, POTASSIUM CHLORIDE, CALCIUM CHLORIDE 600; 310; 30; 20 MG/100ML; MG/100ML; MG/100ML; MG/100ML
INJECTION, SOLUTION INTRAVENOUS CONTINUOUS PRN
Status: DISCONTINUED | OUTPATIENT
Start: 2024-11-02 | End: 2024-11-03

## 2024-11-02 RX ORDER — OXYCODONE HYDROCHLORIDE 10 MG/1
10 TABLET ORAL
Status: CANCELLED | OUTPATIENT
Start: 2024-11-02

## 2024-11-02 RX ORDER — METOPROLOL TARTRATE 1 MG/ML
INJECTION, SOLUTION INTRAVENOUS PRN
Status: DISCONTINUED | OUTPATIENT
Start: 2024-11-02 | End: 2024-11-03

## 2024-11-02 RX ORDER — DOPAMINE HYDROCHLORIDE 160 MG/100ML
INJECTION, SOLUTION INTRAVENOUS CONTINUOUS PRN
Status: DISCONTINUED | OUTPATIENT
Start: 2024-11-02 | End: 2024-11-03

## 2024-11-02 RX ORDER — EPHEDRINE SULFATE 50 MG/ML
INJECTION, SOLUTION INTRAMUSCULAR; INTRAVENOUS; SUBCUTANEOUS PRN
Status: DISCONTINUED | OUTPATIENT
Start: 2024-11-02 | End: 2024-11-03

## 2024-11-02 RX ORDER — NALOXONE HYDROCHLORIDE 0.4 MG/ML
0.1 INJECTION, SOLUTION INTRAMUSCULAR; INTRAVENOUS; SUBCUTANEOUS
Status: CANCELLED | OUTPATIENT
Start: 2024-11-02

## 2024-11-02 RX ORDER — FUROSEMIDE 10 MG/ML
INJECTION INTRAMUSCULAR; INTRAVENOUS PRN
Status: DISCONTINUED | OUTPATIENT
Start: 2024-11-02 | End: 2024-11-03

## 2024-11-02 RX ORDER — DEXTROSE MONOHYDRATE 25 G/50ML
25-50 INJECTION, SOLUTION INTRAVENOUS
Status: DISCONTINUED | OUTPATIENT
Start: 2024-11-02 | End: 2024-11-03 | Stop reason: HOSPADM

## 2024-11-02 RX ORDER — HEPARIN SODIUM 1000 [USP'U]/ML
INJECTION, SOLUTION INTRAVENOUS; SUBCUTANEOUS PRN
Status: DISCONTINUED | OUTPATIENT
Start: 2024-11-02 | End: 2024-11-03

## 2024-11-02 RX ORDER — MANNITOL 20 G/100ML
INJECTION, SOLUTION INTRAVENOUS PRN
Status: DISCONTINUED | OUTPATIENT
Start: 2024-11-02 | End: 2024-11-03

## 2024-11-02 RX ORDER — ACETAMINOPHEN 325 MG/1
975 TABLET ORAL ONCE
Status: COMPLETED | OUTPATIENT
Start: 2024-11-02 | End: 2024-11-02

## 2024-11-02 RX ORDER — LIDOCAINE HYDROCHLORIDE 20 MG/ML
INJECTION, SOLUTION INFILTRATION; PERINEURAL PRN
Status: DISCONTINUED | OUTPATIENT
Start: 2024-11-02 | End: 2024-11-03

## 2024-11-02 RX ORDER — PROPOFOL 10 MG/ML
INJECTION, EMULSION INTRAVENOUS PRN
Status: DISCONTINUED | OUTPATIENT
Start: 2024-11-02 | End: 2024-11-03

## 2024-11-02 RX ORDER — OCTREOTIDE ACETATE 100 UG/ML
100 INJECTION, SOLUTION INTRAVENOUS; SUBCUTANEOUS ONCE
Status: DISCONTINUED | OUTPATIENT
Start: 2024-11-02 | End: 2024-11-02

## 2024-11-02 RX ORDER — ONDANSETRON 2 MG/ML
4 INJECTION INTRAMUSCULAR; INTRAVENOUS EVERY 30 MIN PRN
Status: CANCELLED | OUTPATIENT
Start: 2024-11-02

## 2024-11-02 RX ORDER — NOREPINEPHRINE BITARTRATE 0.06 MG/ML
.01-.6 INJECTION, SOLUTION INTRAVENOUS CONTINUOUS
Status: DISCONTINUED | OUTPATIENT
Start: 2024-11-02 | End: 2024-11-03

## 2024-11-02 RX ORDER — DEXAMETHASONE SODIUM PHOSPHATE 4 MG/ML
INJECTION, SOLUTION INTRA-ARTICULAR; INTRALESIONAL; INTRAMUSCULAR; INTRAVENOUS; SOFT TISSUE PRN
Status: DISCONTINUED | OUTPATIENT
Start: 2024-11-02 | End: 2024-11-03

## 2024-11-02 RX ORDER — ACETAMINOPHEN 650 MG/1
650 SUPPOSITORY RECTAL ONCE
Status: COMPLETED | OUTPATIENT
Start: 2024-11-02 | End: 2024-11-02

## 2024-11-02 RX ORDER — FENTANYL CITRATE 50 UG/ML
INJECTION, SOLUTION INTRAMUSCULAR; INTRAVENOUS PRN
Status: DISCONTINUED | OUTPATIENT
Start: 2024-11-02 | End: 2024-11-03

## 2024-11-02 RX ADMIN — MYCOPHENOLATE MOFETIL 1000 MG: 500 INJECTION, POWDER, LYOPHILIZED, FOR SOLUTION INTRAVENOUS at 17:01

## 2024-11-02 RX ADMIN — Medication 20 MG: at 22:04

## 2024-11-02 RX ADMIN — PHENYLEPHRINE HYDROCHLORIDE 100 MCG: 10 INJECTION INTRAVENOUS at 16:29

## 2024-11-02 RX ADMIN — OCTREOTIDE ACETATE 50 MCG: 100 INJECTION, SOLUTION INTRAVENOUS; SUBCUTANEOUS at 23:35

## 2024-11-02 RX ADMIN — Medication 20 MG: at 19:04

## 2024-11-02 RX ADMIN — DEXTROSE MONOHYDRATE 25 ML: 25 INJECTION, SOLUTION INTRAVENOUS at 13:17

## 2024-11-02 RX ADMIN — HEPARIN SODIUM 1000 UNITS: 1000 INJECTION INTRAVENOUS; SUBCUTANEOUS at 23:14

## 2024-11-02 RX ADMIN — Medication 30 MG: at 16:47

## 2024-11-02 RX ADMIN — MICAFUNGIN SODIUM 100 MG: 50 INJECTION, POWDER, LYOPHILIZED, FOR SOLUTION INTRAVENOUS at 17:04

## 2024-11-02 RX ADMIN — FUROSEMIDE 20 MG: 10 INJECTION, SOLUTION INTRAVENOUS at 22:02

## 2024-11-02 RX ADMIN — DOPAMINE HYDROCHLORIDE 1 MCG/KG/MIN: 160 INJECTION, SOLUTION INTRAVENOUS at 19:27

## 2024-11-02 RX ADMIN — EPHEDRINE SULFATE 5 MG: 5 INJECTION INTRAVENOUS at 20:07

## 2024-11-02 RX ADMIN — EPHEDRINE SULFATE 5 MG: 5 INJECTION INTRAVENOUS at 21:17

## 2024-11-02 RX ADMIN — METOPROLOL TARTRATE 1 MG: 5 INJECTION INTRAVENOUS at 22:09

## 2024-11-02 RX ADMIN — Medication 10 MG: at 20:04

## 2024-11-02 RX ADMIN — CALCIUM CHLORIDE INJECTION 1 G: 100 INJECTION, SOLUTION INTRAVENOUS at 22:48

## 2024-11-02 RX ADMIN — PIPERACILLIN SODIUM AND TAZOBACTAM SODIUM 3.38 G: 3; .375 INJECTION, SOLUTION INTRAVENOUS at 22:34

## 2024-11-02 RX ADMIN — CALCIUM CHLORIDE INJECTION 1 G: 100 INJECTION, SOLUTION INTRAVENOUS at 18:54

## 2024-11-02 RX ADMIN — SODIUM CHLORIDE 500 MG: 9 INJECTION, SOLUTION INTRAVENOUS at 17:04

## 2024-11-02 RX ADMIN — MANNITOL 12.5 G: 20 INJECTION, SOLUTION INTRAVENOUS at 22:02

## 2024-11-02 RX ADMIN — Medication 10 MG: at 19:40

## 2024-11-02 RX ADMIN — Medication 20 MG: at 20:48

## 2024-11-02 RX ADMIN — HYDROMORPHONE HYDROCHLORIDE 0.5 MG: 1 INJECTION, SOLUTION INTRAMUSCULAR; INTRAVENOUS; SUBCUTANEOUS at 17:24

## 2024-11-02 RX ADMIN — Medication 20 MG: at 23:14

## 2024-11-02 RX ADMIN — INSULIN HUMAN 1.5 UNITS/HR: 1 INJECTION, SOLUTION INTRAVENOUS at 17:29

## 2024-11-02 RX ADMIN — ACETAMINOPHEN 975 MG: 325 TABLET, FILM COATED ORAL at 15:35

## 2024-11-02 RX ADMIN — PIPERACILLIN SODIUM AND TAZOBACTAM SODIUM 3.38 G: 3; .375 INJECTION, SOLUTION INTRAVENOUS at 16:09

## 2024-11-02 RX ADMIN — ANTI-THYMOCYTE GLOBULIN (RABBIT) 200 MG: 5 INJECTION, POWDER, LYOPHILIZED, FOR SOLUTION INTRAVENOUS at 17:33

## 2024-11-02 RX ADMIN — SODIUM CHLORIDE, SODIUM GLUCONATE, SODIUM ACETATE, POTASSIUM CHLORIDE AND MAGNESIUM CHLORIDE: 526; 502; 368; 37; 30 INJECTION, SOLUTION INTRAVENOUS at 15:54

## 2024-11-02 RX ADMIN — DEXTROSE MONOHYDRATE 25 ML: 25 INJECTION, SOLUTION INTRAVENOUS at 15:43

## 2024-11-02 RX ADMIN — DEXAMETHASONE SODIUM PHOSPHATE 4 MG: 4 INJECTION, SOLUTION INTRA-ARTICULAR; INTRALESIONAL; INTRAMUSCULAR; INTRAVENOUS; SOFT TISSUE at 16:03

## 2024-11-02 RX ADMIN — PIPERACILLIN SODIUM AND TAZOBACTAM SODIUM 3.38 G: 3; .375 INJECTION, SOLUTION INTRAVENOUS at 20:34

## 2024-11-02 RX ADMIN — Medication 10 MG: at 18:44

## 2024-11-02 RX ADMIN — MIDAZOLAM 2 MG: 1 INJECTION INTRAMUSCULAR; INTRAVENOUS at 15:54

## 2024-11-02 RX ADMIN — EPHEDRINE SULFATE 5 MG: 5 INJECTION INTRAVENOUS at 21:01

## 2024-11-02 RX ADMIN — PIPERACILLIN SODIUM AND TAZOBACTAM SODIUM 3.38 G: 3; .375 INJECTION, SOLUTION INTRAVENOUS at 18:34

## 2024-11-02 RX ADMIN — PROPOFOL 200 MG: 10 INJECTION, EMULSION INTRAVENOUS at 16:02

## 2024-11-02 RX ADMIN — SODIUM CHLORIDE, POTASSIUM CHLORIDE, SODIUM LACTATE AND CALCIUM CHLORIDE: 600; 310; 30; 20 INJECTION, SOLUTION INTRAVENOUS at 15:54

## 2024-11-02 RX ADMIN — Medication 20 MG: at 17:56

## 2024-11-02 RX ADMIN — LIDOCAINE HYDROCHLORIDE 100 MG: 20 INJECTION, SOLUTION INFILTRATION; PERINEURAL at 16:02

## 2024-11-02 RX ADMIN — EPHEDRINE SULFATE 5 MG: 5 INJECTION INTRAVENOUS at 21:56

## 2024-11-02 RX ADMIN — FENTANYL CITRATE 100 MCG: 50 INJECTION INTRAMUSCULAR; INTRAVENOUS at 16:02

## 2024-11-02 RX ADMIN — Medication 10 MG: at 21:39

## 2024-11-02 RX ADMIN — Medication 50 MG: at 16:02

## 2024-11-02 RX ADMIN — EPHEDRINE SULFATE 5 MG: 5 INJECTION INTRAVENOUS at 19:38

## 2024-11-02 ASSESSMENT — ACTIVITIES OF DAILY LIVING (ADL)
ADLS_ACUITY_SCORE: 0

## 2024-11-02 ASSESSMENT — COLUMBIA-SUICIDE SEVERITY RATING SCALE - C-SSRS
1. IN THE PAST MONTH, HAVE YOU WISHED YOU WERE DEAD OR WISHED YOU COULD GO TO SLEEP AND NOT WAKE UP?: NO
6. HAVE YOU EVER DONE ANYTHING, STARTED TO DO ANYTHING, OR PREPARED TO DO ANYTHING TO END YOUR LIFE?: NO
2. HAVE YOU ACTUALLY HAD ANY THOUGHTS OF KILLING YOURSELF IN THE PAST MONTH?: NO

## 2024-11-02 NOTE — ANESTHESIA PROCEDURE NOTES
Central Line/PA Catheter Placement    Pre-Procedure   Staff -        Anesthesiologist:  Americo Bell MD       Resident/Fellow: Cesar Marquez MD       Other Anesthesia Staff: Duke Gaming MD       Performed By: resident and with residents       Procedure performed by resident/fellow/CRNA in presence of a teaching physician.         Pre-Anesthestic Checklist: patient identified, IV checked, site marked, risks and benefits discussed, informed consent, monitors and equipment checked, pre-op evaluation and at physician/surgeon's request  Timeout:       Correct Patient: Yes        Correct Procedure: Yes        Correct Site: Yes        Correct Position: Yes        Correct Laterality: Yes   Line Placement:   This line was placed Post Induction    Procedure   Procedure: central line       Laterality: right       Insertion Site: internal jugular.  Sterile Prep        All elements of maximal sterile barrier technique followed       Patient Prep/Sterile Barriers: draped, hand hygiene, gloves , hat , mask , draped, gown, sterile gel and probe cover  Insertion/Injection        Technique: ultrasound guided and Seldinger Technique        1. Ultrasound was used to evaluate the access site.       2. Vein evaluated via ultrasound for patency/adequacy.       3. Using real-time ultrasound the needle/catheter was observed entering the artery/vein.       Type: CVC       Number of Lumens: triple lumen  Narrative         Secured by: suture       Biopatch and Tegaderm dressing used.       Complications: None apparent,        blood aspirated from all lumens,        All lumens flushed: Yes

## 2024-11-02 NOTE — PLAN OF CARE
Goal Outcome Evaluation:      Plan of Care Reviewed With: patient, sibling    Overall Patient Progress: no change    Patient admitted from  home in [place], [place] for pancreas transplant (accompanied by his brother.)  R PIV placed, NPO. All belongings to remain in the room. Patient is alerted up ad jose manuel. Chest-xray, labs, EKG completed. Glucose 69, 63,146 after 25cc D50 given. Surgical scrub done.    Skin assessment with Ryan DAIGLE RN.   Scattered scabs/rash on  LLQ abdomen, right lower leg, posterior upper back, patient states it is from new sheets. Provider notified to assess.     Plan for OR around 4 pm.

## 2024-11-02 NOTE — ANESTHESIA PROCEDURE NOTES
Airway       Patient location during procedure: OR       Procedure Start/Stop Times: 11/2/2024 4:05 PM  Staff -        Performed By: resident and with residents       Procedure performed by resident/fellow/CRNA in presence of a teaching physician.    Consent for Airway        Urgency: elective  Indications and Patient Condition       Indications for airway management: marjan-procedural       Induction type:intravenous       Mask difficulty assessment: 1 - vent by mask    Final Airway Details       Final airway type: endotracheal airway       Successful airway: ETT - single  Endotracheal Airway Details        ETT size (mm): 8.0       Cuffed: yes       Successful intubation technique: direct laryngoscopy       DL Blade Type: MAC 4       Grade View of Cords: 1       Adjucts: stylet       Position: Left       Measured from: gums/teeth       Secured at (cm): 23       Bite block used: None    Post intubation assessment        Placement verified by: capnometry, equal breath sounds and chest rise        Number of attempts at approach: 1       Number of other approaches attempted: 0       Secured with: tape       Ease of procedure: easy       Dentition: Intact    Medication(s) Administered   Medication Administration Time: 11/2/2024 4:05 PM

## 2024-11-02 NOTE — LETTER
[facility] [address] [employer]  [address] [address]  [phone]    FACSIMILE TRANSMITTAL SHEET    TO  [facility]  Attention Steph    _____URGENT _____REVIEW ONLY _____PLEASE COMMENT____PLEASE REPLY    NOTES/COMMENTS: Attached please find final dishcarge orders for Michael Amin.    Local address ( pt staying with his brother and sister in law)  [address]  [facility] [address]    [place] [facility] [phone]                                       IF YOU DID NOT RECEIVE THE CORRECT NUMBER OF PAGES OR THE FAX DID NOT COME THROUGH CLEARLY, PLEASE CALL THE SENDER     CONFIDENTIALITY STATEMENT: Confidential information that may accompany this transmission contains protected health information under state and federal law and is legally privileged. This information is intended only for the use of the individual or entity named above and may be used only for carrying out treatment, payment or other healthcare operations. The recipient or person responsible for delivering this information is prohibited by law from disclosing this information without proper authorization to any other party, unless required to do so by law or regulation. If you are not the intended recipient, you are hereby notified that any review, dissemination, distribution, or copying of this message is strictly prohibited. If you have received this communication in error, please destroy the materials and contact us immediately by calling the number listed above. No response indicates that the information was received by the appropriate authorized party

## 2024-11-02 NOTE — ANESTHESIA PROCEDURE NOTES
Arterial Line Procedure Note    Pre-Procedure   Staff -        Anesthesiologist:  Americo Bell MD       Performed By: anesthesiologist       Location: OR       Pre-Anesthestic Checklist: patient identified, IV checked, risks and benefits discussed, informed consent, monitors and equipment checked, pre-op evaluation and at physician/surgeon's request  Timeout:       Correct Patient: Yes        Correct Procedure: Yes        Correct Site: Yes        Correct Position: Yes   Line Placement:   This line was placed Post Induction  Procedure   Procedure: arterial line       Laterality: left       Insertion Site: radial.  Sterile Prep        Skin prep: Chloraprep  Insertion/Injection        Technique: Seldinger Technique and ultrasound guided        1. Ultrasound was used to evaluate the access site.       2. Artery evaluated via ultrasound for patency/adequacy.       3. Using real-time ultrasound the needle/catheter was observed entering the artery/vein.       Catheter Type/Size: 20 G, 12 cm  Narrative        Tegaderm dressing used.       Arterial waveform: Yes        IBP within 10% of NIBP: Yes

## 2024-11-02 NOTE — ANESTHESIA PREPROCEDURE EVALUATION
Anesthesia Pre-Procedure Evaluation    Patient: Michael Amin   MRN: [id number] : 1980        Procedure : Procedure(s):  Pancreas transplant          Past Medical History:   Diagnosis Date    Anemia in chronic kidney disease     Dyslipidemia     ESRD (end stage renal disease) on dialysis (H)     Hyperlipidemia     Hypertension     Hypomagnesemia 10/07/2019    Obese     Secondary hyperparathyroidism (H)     Shingles 2023: Left Axilla    Status post coronary angiogram 2024    Type 1 diabetes (H)       Past Surgical History:   Procedure Laterality Date    CV CORONARY ANGIOGRAM N/A 2024    Procedure: Coronary Angiogram;  Surgeon: Derrell Jauregui MD;  [facility]    CV PCI N/A 2024    Procedure: Percutaneous Coronary Intervention;  Surgeon: Derrell Jauregui MD;  Location:  [facility]    hair implant      INSERT CATHETER PERITONEAL DIALYSIS  2018    IR FINE NEEDLE ASPIRATION W ULTRASOUND  2019    IR RENAL BIOPSY LEFT  2019    PERCUTANEOUS BIOPSY KIDNEY Left 10/22/2019    Procedure: Left Kidney Biopsy;  Surgeon: Kash Hoffman MD;  [facility]      Allergies   Allergen Reactions    Anti-Thymocyte Glob (Rabbit)      Had reaction with rigors after steroid-free dose. No reaction with steroids.      Social History     Tobacco Use    Smoking status: Never    Smokeless tobacco: Never   Substance Use Topics    Alcohol use: Not Currently     Comment: Rarely      Wt Readings from Last 1 Encounters:   24 89.1 kg (196 lb 8 oz)        Anesthesia Evaluation       - PONV.      ROS/MED HX  ENT/Pulmonary:       Neurologic:       Cardiovascular:     (+) Dyslipidemia hypertension- -   -  - -                                      METS/Exercise Tolerance:     Hematologic:     (+)      anemia (2/2 CKD),          Musculoskeletal:       GI/Hepatic:       Renal/Genitourinary:     (+)     Pt has history of transplant, date: Kidney -  Dr Dudley Johnson called regarding missing cath lab CD that needed to be sent to [facility]. Message passed on to Cardiology Fellow who reached out to cath lab and said the CD would be received first thing in AM. Writer called [facility] to see if they needed anything else prior to being able to assign the pt a bed. The pt is currently accepted at [facility], but their ICU nurses will not call until the cath lab CD is received. All information passed on to Cadiology Fellow.     Electronically signed by Tho Hatch RN on 11/14/2023 at 12:09 AM 10/2019,        Endo: Comment: Secondary renal hyperparathyroidism    (+) type I DM,  Last HgA1c: 8.7, date: 9/13/2024, Using insulin,          Obesity,       Psychiatric/Substance Use:       Infectious Disease:       Malignancy:       Other:            Physical Exam    Airway        Mallampati: II   TM distance: > 3 FB   Neck ROM: full   Mouth opening: > 3 cm    Respiratory Devices and Support         Dental       (+) Minor Abnormalities - some fillings, tiny chips      Cardiovascular   cardiovascular exam normal       Rhythm and rate: regular and normal     Pulmonary   pulmonary exam normal        breath sounds clear to auscultation           OUTSIDE LABS:  CBC:   Lab Results   Component Value Date    WBC 6.7 09/13/2024    WBC 8.9 06/07/2024    HGB 14.1 09/13/2024    HGB 14.5 06/07/2024    HCT 43.2 09/13/2024    HCT 44.9 06/07/2024     09/13/2024     06/07/2024     BMP:   Lab Results   Component Value Date     09/13/2024     06/07/2024    POTASSIUM 4.1 09/13/2024    POTASSIUM 4.5 06/07/2024    CHLORIDE 101 09/13/2024    CHLORIDE 106 08/08/2024    CO2 28 09/13/2024    CO2 27 06/07/2024    BUN 13.0 09/13/2024    BUN 15.7 06/07/2024    CR 1.03 09/13/2024    CR 1.11 06/07/2024    GLC 63 (L) 11/02/2024    GLC 69 (L) 11/02/2024     COAGS:   Lab Results   Component Value Date    PTT 30 01/07/2019    INR 1.03 06/07/2024     POC:   Lab Results   Component Value Date     (H) 11/25/2019     HEPATIC:   Lab Results   Component Value Date    ALBUMIN 3.5 09/26/2019    PROTTOTAL 7.5 09/26/2019    ALT 42 09/26/2019    AST 16 09/26/2019    ALKPHOS 100 09/26/2019    BILITOTAL 0.2 09/26/2019     OTHER:   Lab Results   Component Value Date    LACT 1.2 10/05/2019    A1C 8.7 (H) 09/13/2024    APRIL 9.7 09/13/2024    PHOS 2.5 11/13/2019    MAG 1.7 09/13/2024    TSH 1.29 09/13/2024       Anesthesia Plan    ASA Status:  3       Anesthesia Type: General.     - Airway: ETT   Induction: Intravenous, Propofol.  exam  HEENT: Normocephalic, atraumatic   Neck: no carotid bruit, no JVD, trachea midline and thyroid not enlarged  Lungs: clear to auscultation bilaterally  Heart: regular rate and rhythm, S1, S2 normal, no murmur  Abdomen: soft, bowel sounds normal  Extremities: no edema or swelling     EKG: Anterior STEMI    ECHO:  Not obtained      CATH: 11/13/2023  Diagnostic  Dominance: Right  Left Main   Has proximal 30% stenosis. Left Anterior Descending   Prox LAD lesion, 100% stenosed. Diagnostic coronary angiography shows negative for . Lesion is the culprit lesion. The lesion is type C. The lesion is severely calcified. The lesion was not previously treated. The stenosis was measured by a visual reading. The plaque feature of this lesion is calcified. Left Circumflex   Is a dominant vessel with mid 75% stenosis. The oM1 has ostial hazy 75% stenosis. The LPDA has 80% stenosi. Right Coronary Artery   Has non-dominant vessel with mid 60% stenosis. Intervention     Prox LAD lesion   Angioplasty   Angioplasty using a standard balloon was performed prior to stent deployment. Lesion complication(s): no complications. Unable to cross lesion   Supplies used: CATH BLLN ANGIO [id number] SC EUPHORA RX   Post-Intervention Lesion Assessment   The intervention was unsuccessful due to an inability to cross the lesion and vessel tortuosity. The guidewire did not cross the lesion. There is no pre-intervention HAN flow. Post-intervention HAN flow is 0. There were no complications. There is a 100% residual stenosis post intervention        Assessment:   Anterior STEMI s/p cardiac cath on 11/13/2023 showing three vessel coronary artery disease. Hypertension  Hx of TVR, MVR via mini thoracotomy  CKD  Plan:   Continue aspirin 81 mg daily, simvastatin 40 mg daily and metoprolol 50 mg twice daily.   Continue IV heparin  Continue IV lidocaine  IABP in situ for coronary perfusion  Follow-up on echocardiogram  Continue IV   Maintenance: Balanced.   Techniques and Equipment:     - Lines/Monitors: BIS, 2nd IV, Arterial Line, Central Line, CVP     - Blood: T&S     - Drips/Meds: Phenylephrine     Consents            Postoperative Care    Pain management: IV analgesics, Oral pain medications, Multi-modal analgesia.   PONV prophylaxis: Ondansetron (or other 5HT-3), Dexamethasone or Solumedrol     Comments:               Cesar OG Ma, MD    I have reviewed the pertinent notes and labs in the chart from the past 30 days and (re)examined the patient.  Any updates or changes from those notes are reflected in this note.             # Drug Induced Platelet Defect: home medication list includes an antiplatelet medication   # Hypertension: Noted on problem list        # DMII: A1C = 8.7 % (Ref range: <5.7 %) within past 6 months

## 2024-11-02 NOTE — TELEPHONE ENCOUNTER
"TRANSPLANT OR REPORT    Organ: Panc  Laterality (if known): N/A  Organ Location: [place]    UNOS ID: [id number]  Donor OR Time: 0730  Expected/Actual Cross Clamp Time: 1019  Expected Organ Arrival Time: 1600    Surgeon: Enedelia  Time in OR: 1600  Time in 3C: 1500    Recipient Details  Admission ETA: 1200  Unit: 7A  Isolation: No  Latex Allergy: No  : No  Diagnosis: DM    Liver Transplants  Bypass/Perfusion: N/A  Cellsaver: N/A  Hemodialysis: N/A  ~ \"RENAL STAFF TEACHING SERVICE MEDICINE\" : Remind LI Fellow to discuss with nephrology on call.  ~ CRRT Resource Nurse: N/A  (Telephone Number for CRRT [phone]. When prompted, the caller should say  CRRT Resource 1\")    Kidney/Panc Transplants  XM Status (Need to wait for XM?): Done    Liver or KP/PA Recipients - Vessel Banking:  Donor has positive serologies for HIV/HCV/HBV: No  Donor has risk criteria for HIV/HCV/HBV: No      Transplant Coordinator Contact Info: [place] [phone]      Vessel Bank Information  Transplant hospitals must not store a donor s extra vessels if the donor has tested positive for any of the following:   - HIV by antibody, antigen, or nucleic acid test (CONNOR)   - Hepatitis B surface antigen (HBsAg)   - Hepatitis B (HBV) by CONNOR   - Hepatitis C (HCV) by antibody or CONNOR     Extra vessels from donors that do not test positive for HIV, HBV, or HCV as above may be stored    " n/a

## 2024-11-02 NOTE — H&P
[facility]    History and Physical - Transplant Surgery Service       Date of Admission:  11/2/2024    Assessment & Plan: Surgery   Michael Amin is a 44 year old male with past medical history of ESRD secondary to type 1 diabetes s/p living donor kidney transplant in October 2019, hypertension, hyperlipidemia admitted on 11/2/2024. He was admitted in anticipation of a pancreas transplant.  At this time, his labs and physical exam provide no evidence to contraindicate his candidacy for transplantation today.    Consent obtained at the bedside  Induction with Thymoglobulin, methylprednisolone, mycophenolate  Preoperative micafungin and Zosyn  Preoperative octreotide  Pain control with Tylenol  Holding all PTA meds  Standard preoperative labs, EKG, chest x-ray  Remainder of plan pending surgery       Diet: NPO per Anesthesia Guidelines for Procedure/Surgery Except for: Meds    DVT Prophylaxis: Pneumatic Compression Devices  Méndez Catheter: Not present  Lines: None     Drains: None     Cardiac Monitoring: None  Code Status: Full Code      Clinically Significant Risk Factors Present on Admission                 # Drug Induced Platelet Defect: home medication list includes an antiplatelet medication   # Hypertension: Noted on problem list        # DMII: A1C = 7.8 % (Ref range: <5.7 %) within past 6 months               Disposition Plan      Expected Discharge Date: 11/08/2024                 The patient's care was discussed with the Chief Resident/Fellow.    Sami Campbell MD  [facility]  Non-urgent messages: Securely message with [employer] (more info)  Text page via [facility] Paging/Directory     ______________________________________________________________________    Chief Complaint   Pretransplantation admission    History obtained from the patient    History of Present Illness   Michael Amin is a 44 year old male who is  being admitted for pancreas transplantation.  In regard to his medical history, he reports that he was diagnosed with diabetes at age 16.  The complications of this disease resulted in his previous kidney transplant in 2019.  Since then, he reports has been doing well.  His diabetes control fluctuates and is currently being managed with an insulin pump.  He reports that his appetite has been good lately.  His level of physical activity has been normal for him.  He has not lost any weight recently.  He has had no recent changes to his medications and reports he is otherwise feeling well.  No fevers, chills, cough, cold, dyspnea, chest pain, nausea, vomiting, headaches, rashes, joint pains.    He states that he continues to make robust urine.  He is on mycophenolate and tacrolimus for his immunosuppressive agents.      Past Medical History    Past Medical History:   Diagnosis Date    Anemia in chronic kidney disease     Dyslipidemia     ESRD (end stage renal disease) on dialysis (H)     Hyperlipidemia     Hypertension     Hypomagnesemia 10/07/2019    Obese     Secondary hyperparathyroidism (H)     Shingles 12/11/2023 12/11/23: Left Axilla    Status post coronary angiogram 6/7/2024    Type 1 diabetes (H)        Past Surgical History   Past Surgical History:   Procedure Laterality Date    CV CORONARY ANGIOGRAM N/A 6/7/2024    Procedure: Coronary Angiogram;  Surgeon: Derrell Jauregui MD;  Location: [facility] CARDIAC CATH LAB    CV PCI N/A 6/7/2024    Procedure: Percutaneous Coronary Intervention;  Surgeon: Derrell Jauregui MD;  Location: [facility] CARDIAC CATH LAB    hair implant  2007    INSERT CATHETER PERITONEAL DIALYSIS  08/2018    IR FINE NEEDLE ASPIRATION W ULTRASOUND  11/25/2019    IR RENAL BIOPSY LEFT  11/25/2019    PERCUTANEOUS BIOPSY KIDNEY Left 10/22/2019    Procedure: Left Kidney Biopsy;  Surgeon: Kash Hoffman MD;  Location:  OR     Medications  Aspirin 81 mg daily  Atorvastatin 20 mg  daily  Carvedilol 12.5 mg twice daily  Insulin glargine 28 units daily  Insulin lispro sliding scale  Magnesium oxide 400 mg daily  Mycophenolate mofetil 750 mg twice daily  Semaglutide 8 mg twice weekly  Tacrolimus 1.5 mg every morning, 1 mg in the evening     Social history  Lives in [place], works as a schoolteacher  No alcohol or tobacco use    Allergies  Thymoglobulin    Family history   Father had a CABG at age 60, grandfather  of a heart attack  Dad has diabetes     Physical Exam   Vital Signs: Temp: 97.8  F (36.6  C) Temp src: Oral BP: (!) 142/94 Pulse: 81   Resp: 18 SpO2: 100 %      Weight: 196 lbs 8 ozNo intake or output data in the 24 hours ending 24 1427    Awake and alert, no acute distress  Normal work of breathing on room air, chest rise and symmetric  Regular rate and rhythm to peripheral palpation, warm well-perfused peripherally, bilateral radial pulses 2+, no significant peripheral edema  Abdomen soft, nontender, nondistended, small umbilical hernia present that is soft and reducible, left sided hockey-stick incision from previous kidney transplantation well-healed  Moves all 4 extremity spontaneously, no focal neurologic deficit on gross examination  No jaundice, rash, erythema of the skin    Data   Sodium 140  Potassium 4.4  Chloride 101  CO2 27  Creatinine 1.1  Albumin 4.1  Total protein 6.9  Alkaline phosphatase 113  ALT 13  AST 15  Total bilirubin 0.5  Amylase 21  Glucose 157  A1c 7.8  Lipase 11  Hemoglobin 14.8  WBC 6.7  Platelet 207  INR 1.05  PTT 34  Chest x-ray without focal airspace opacity, no pneumothorax, no pleural effusion, no mediastinal widening or cardiomegaly  EKG with normal sinus rhythm, normal axis, normal intervals, no hypertrophy, no LVH, no RVH, no ST elevation or T wave inversion

## 2024-11-03 ENCOUNTER — DOCUMENTATION ONLY (OUTPATIENT)
Dept: TRANSPLANT | Facility: CLINIC | Age: 44
End: 2024-11-03
Payer: COMMERCIAL

## 2024-11-03 ENCOUNTER — APPOINTMENT (OUTPATIENT)
Dept: ULTRASOUND IMAGING | Facility: CLINIC | Age: 44
DRG: 010 | End: 2024-11-03
Attending: SURGERY
Payer: COMMERCIAL

## 2024-11-03 ENCOUNTER — APPOINTMENT (OUTPATIENT)
Dept: GENERAL RADIOLOGY | Facility: CLINIC | Age: 44
DRG: 010 | End: 2024-11-03
Attending: SURGERY
Payer: COMMERCIAL

## 2024-11-03 DIAGNOSIS — T86.11 KIDNEY TRANSPLANT REJECTION: ICD-10-CM

## 2024-11-03 LAB
AMYLASE SERPL-CCNC: 37 U/L (ref 28–100)
AMYLASE SERPL-CCNC: 78 U/L (ref 28–100)
ANION GAP SERPL CALCULATED.3IONS-SCNC: 10 MMOL/L (ref 7–15)
ANION GAP SERPL CALCULATED.3IONS-SCNC: 11 MMOL/L (ref 7–15)
APTT PPP: 33 SECONDS (ref 22–38)
BACTERIA UR CULT: NO GROWTH
BASE EXCESS BLDA CALC-SCNC: -1.3 MMOL/L (ref -3–3)
BASE EXCESS BLDA CALC-SCNC: 1.1 MMOL/L (ref -3–3)
BASOPHILS # BLD AUTO: 0 10E3/UL (ref 0–0.2)
BASOPHILS NFR BLD AUTO: 0 %
BUN SERPL-MCNC: 11.2 MG/DL (ref 6–20)
BUN SERPL-MCNC: 15.7 MG/DL (ref 6–20)
CA-I BLD-MCNC: 4.4 MG/DL (ref 4.4–5.2)
CA-I BLD-MCNC: 4.6 MG/DL (ref 4.4–5.2)
CALCIUM SERPL-MCNC: 5.8 MG/DL (ref 8.8–10.4)
CALCIUM SERPL-MCNC: 7.4 MG/DL (ref 8.8–10.4)
CALCIUM SERPL-MCNC: 7.6 MG/DL (ref 8.8–10.4)
CHLORIDE SERPL-SCNC: 105 MMOL/L (ref 98–107)
CHLORIDE SERPL-SCNC: 113 MMOL/L (ref 98–107)
CREAT SERPL-MCNC: 0.91 MG/DL (ref 0.67–1.17)
CREAT SERPL-MCNC: 1.32 MG/DL (ref 0.67–1.17)
EGFRCR SERPLBLD CKD-EPI 2021: 68 ML/MIN/1.73M2
EGFRCR SERPLBLD CKD-EPI 2021: >90 ML/MIN/1.73M2
EOSINOPHIL # BLD AUTO: 0 10E3/UL (ref 0–0.7)
EOSINOPHIL NFR BLD AUTO: 0 %
ERYTHROCYTE [DISTWIDTH] IN BLOOD BY AUTOMATED COUNT: 13.3 % (ref 10–15)
EST. AVERAGE GLUCOSE BLD GHB EST-MCNC: 177 MG/DL
GLUCOSE BLD-MCNC: 79 MG/DL (ref 70–99)
GLUCOSE BLD-MCNC: 86 MG/DL (ref 70–99)
GLUCOSE BLDC GLUCOMTR-MCNC: 105 MG/DL (ref 70–99)
GLUCOSE BLDC GLUCOMTR-MCNC: 107 MG/DL (ref 70–99)
GLUCOSE BLDC GLUCOMTR-MCNC: 111 MG/DL (ref 70–99)
GLUCOSE BLDC GLUCOMTR-MCNC: 115 MG/DL (ref 70–99)
GLUCOSE BLDC GLUCOMTR-MCNC: 116 MG/DL (ref 70–99)
GLUCOSE BLDC GLUCOMTR-MCNC: 116 MG/DL (ref 70–99)
GLUCOSE BLDC GLUCOMTR-MCNC: 118 MG/DL (ref 70–99)
GLUCOSE BLDC GLUCOMTR-MCNC: 119 MG/DL (ref 70–99)
GLUCOSE BLDC GLUCOMTR-MCNC: 122 MG/DL (ref 70–99)
GLUCOSE BLDC GLUCOMTR-MCNC: 127 MG/DL (ref 70–99)
GLUCOSE BLDC GLUCOMTR-MCNC: 131 MG/DL (ref 70–99)
GLUCOSE BLDC GLUCOMTR-MCNC: 133 MG/DL (ref 70–99)
GLUCOSE BLDC GLUCOMTR-MCNC: 135 MG/DL (ref 70–99)
GLUCOSE BLDC GLUCOMTR-MCNC: 138 MG/DL (ref 70–99)
GLUCOSE BLDC GLUCOMTR-MCNC: 146 MG/DL (ref 70–99)
GLUCOSE BLDC GLUCOMTR-MCNC: 146 MG/DL (ref 70–99)
GLUCOSE BLDC GLUCOMTR-MCNC: 147 MG/DL (ref 70–99)
GLUCOSE BLDC GLUCOMTR-MCNC: 147 MG/DL (ref 70–99)
GLUCOSE BLDC GLUCOMTR-MCNC: 155 MG/DL (ref 70–99)
GLUCOSE BLDC GLUCOMTR-MCNC: 90 MG/DL (ref 70–99)
GLUCOSE BLDC GLUCOMTR-MCNC: 99 MG/DL (ref 70–99)
GLUCOSE SERPL-MCNC: 122 MG/DL (ref 70–99)
GLUCOSE SERPL-MCNC: 77 MG/DL (ref 70–99)
HBA1C MFR BLD: 7.8 %
HCO3 BLDA-SCNC: 23 MMOL/L (ref 21–28)
HCO3 BLDA-SCNC: 25 MMOL/L (ref 21–28)
HCO3 SERPL-SCNC: 19 MMOL/L (ref 22–29)
HCO3 SERPL-SCNC: 24 MMOL/L (ref 22–29)
HCT VFR BLD AUTO: 32.4 % (ref 40–53)
HGB BLD-MCNC: 10.2 G/DL (ref 13.3–17.7)
HGB BLD-MCNC: 10.3 G/DL (ref 13.3–17.7)
HGB BLD-MCNC: 8.6 G/DL (ref 13.3–17.7)
HGB BLD-MCNC: 9 G/DL (ref 13.3–17.7)
HGB BLD-MCNC: 9.4 G/DL (ref 13.3–17.7)
IMM GRANULOCYTES # BLD: 0.1 10E3/UL
IMM GRANULOCYTES NFR BLD: 1 %
INR PPP: 1.49 (ref 0.85–1.15)
LACTATE BLD-SCNC: 2.3 MMOL/L (ref 0.7–2)
LACTATE BLD-SCNC: 3.4 MMOL/L (ref 0.7–2)
LACTATE SERPL-SCNC: 0.9 MMOL/L (ref 0.7–2)
LACTATE SERPL-SCNC: 2.2 MMOL/L (ref 0.7–2)
LIPASE SERPL-CCNC: 49 U/L (ref 13–60)
LIPASE SERPL-CCNC: 83 U/L (ref 13–60)
LYMPHOCYTES # BLD AUTO: 0.1 10E3/UL (ref 0.8–5.3)
LYMPHOCYTES NFR BLD AUTO: 1 %
MAGNESIUM SERPL-MCNC: 0.9 MG/DL (ref 1.7–2.3)
MAGNESIUM SERPL-MCNC: 1.3 MG/DL (ref 1.7–2.3)
MCH RBC QN AUTO: 26.6 PG (ref 26.5–33)
MCHC RBC AUTO-ENTMCNC: 31.5 G/DL (ref 31.5–36.5)
MCV RBC AUTO: 85 FL (ref 78–100)
MONOCYTES # BLD AUTO: 0.7 10E3/UL (ref 0–1.3)
MONOCYTES NFR BLD AUTO: 5 %
NEUTROPHILS # BLD AUTO: 13.3 10E3/UL (ref 1.6–8.3)
NEUTROPHILS NFR BLD AUTO: 94 %
NRBC # BLD AUTO: 0 10E3/UL
NRBC BLD AUTO-RTO: 0 /100
O2/TOTAL GAS SETTING VFR VENT: 55 %
O2/TOTAL GAS SETTING VFR VENT: 56 %
OXYHGB MFR BLDA: 96 % (ref 92–100)
OXYHGB MFR BLDA: 97 % (ref 92–100)
PCO2 BLDA: 35 MM HG (ref 35–45)
PCO2 BLDA: 37 MM HG (ref 35–45)
PH BLDA: 7.4 [PH] (ref 7.35–7.45)
PH BLDA: 7.46 [PH] (ref 7.35–7.45)
PHOSPHATE SERPL-MCNC: 0.9 MG/DL (ref 2.5–4.5)
PHOSPHATE SERPL-MCNC: 2.4 MG/DL (ref 2.5–4.5)
PLATELET # BLD AUTO: 140 10E3/UL (ref 150–450)
PO2 BLDA: 109 MM HG (ref 80–105)
PO2 BLDA: 124 MM HG (ref 80–105)
POTASSIUM BLD-SCNC: 3.6 MMOL/L (ref 3.4–5.3)
POTASSIUM BLD-SCNC: 3.7 MMOL/L (ref 3.4–5.3)
POTASSIUM SERPL-SCNC: 2.9 MMOL/L (ref 3.4–5.3)
POTASSIUM SERPL-SCNC: 3.7 MMOL/L (ref 3.4–5.3)
POTASSIUM SERPL-SCNC: 3.8 MMOL/L (ref 3.4–5.3)
POTASSIUM SERPL-SCNC: 4 MMOL/L (ref 3.4–5.3)
POTASSIUM SERPL-SCNC: 4.3 MMOL/L (ref 3.4–5.3)
POTASSIUM SERPL-SCNC: 4.3 MMOL/L (ref 3.4–5.3)
POTASSIUM SERPL-SCNC: 4.5 MMOL/L (ref 3.4–5.3)
RBC # BLD AUTO: 3.83 10E6/UL (ref 4.4–5.9)
SAO2 % BLDA: 99 % (ref 96–97)
SAO2 % BLDA: 99 % (ref 96–97)
SODIUM BLD-SCNC: 135 MMOL/L (ref 135–145)
SODIUM BLD-SCNC: 136 MMOL/L (ref 135–145)
SODIUM SERPL-SCNC: 140 MMOL/L (ref 135–145)
SODIUM SERPL-SCNC: 142 MMOL/L (ref 135–145)
UFH PPP CHRO-ACNC: <0.1 IU/ML
WBC # BLD AUTO: 14.2 10E3/UL (ref 4–11)

## 2024-11-03 PROCEDURE — 82310 ASSAY OF CALCIUM: CPT

## 2024-11-03 PROCEDURE — 82962 GLUCOSE BLOOD TEST: CPT

## 2024-11-03 PROCEDURE — 250N000011 HC RX IP 250 OP 636

## 2024-11-03 PROCEDURE — 84132 ASSAY OF SERUM POTASSIUM: CPT | Performed by: SURGERY

## 2024-11-03 PROCEDURE — 84295 ASSAY OF SERUM SODIUM: CPT | Performed by: SURGERY

## 2024-11-03 PROCEDURE — 82947 ASSAY GLUCOSE BLOOD QUANT: CPT | Performed by: SURGERY

## 2024-11-03 PROCEDURE — 71045 X-RAY EXAM CHEST 1 VIEW: CPT | Mod: 26 | Performed by: RADIOLOGY

## 2024-11-03 PROCEDURE — 250N000009 HC RX 250: Performed by: SURGERY

## 2024-11-03 PROCEDURE — 96372 THER/PROPH/DIAG INJ SC/IM: CPT | Performed by: SURGERY

## 2024-11-03 PROCEDURE — 85025 COMPLETE CBC W/AUTO DIFF WBC: CPT | Performed by: SURGERY

## 2024-11-03 PROCEDURE — 84295 ASSAY OF SERUM SODIUM: CPT

## 2024-11-03 PROCEDURE — 85730 THROMBOPLASTIN TIME PARTIAL: CPT | Performed by: SURGERY

## 2024-11-03 PROCEDURE — 82805 BLOOD GASES W/O2 SATURATION: CPT

## 2024-11-03 PROCEDURE — 258N000003 HC RX IP 258 OP 636

## 2024-11-03 PROCEDURE — 82310 ASSAY OF CALCIUM: CPT | Performed by: SURGERY

## 2024-11-03 PROCEDURE — 83036 HEMOGLOBIN GLYCOSYLATED A1C: CPT | Performed by: SURGERY

## 2024-11-03 PROCEDURE — 250N000009 HC RX 250

## 2024-11-03 PROCEDURE — 999N000128 HC STATISTIC PERIPHERAL IV START W/O US GUIDANCE

## 2024-11-03 PROCEDURE — 85018 HEMOGLOBIN: CPT

## 2024-11-03 PROCEDURE — 250N000013 HC RX MED GY IP 250 OP 250 PS 637: Performed by: SURGERY

## 2024-11-03 PROCEDURE — 85049 AUTOMATED PLATELET COUNT: CPT | Performed by: SURGERY

## 2024-11-03 PROCEDURE — 250N000013 HC RX MED GY IP 250 OP 250 PS 637: Performed by: PHYSICIAN ASSISTANT

## 2024-11-03 PROCEDURE — 258N000003 HC RX IP 258 OP 636: Performed by: SURGERY

## 2024-11-03 PROCEDURE — 83605 ASSAY OF LACTIC ACID: CPT | Performed by: SURGERY

## 2024-11-03 PROCEDURE — 36415 COLL VENOUS BLD VENIPUNCTURE: CPT | Performed by: SURGERY

## 2024-11-03 PROCEDURE — 93975 VASCULAR STUDY: CPT | Mod: 26 | Performed by: RADIOLOGY

## 2024-11-03 PROCEDURE — 82947 ASSAY GLUCOSE BLOOD QUANT: CPT

## 2024-11-03 PROCEDURE — 36592 COLLECT BLOOD FROM PICC: CPT | Performed by: SURGERY

## 2024-11-03 PROCEDURE — 250N000009 HC RX 250: Performed by: PHYSICIAN ASSISTANT

## 2024-11-03 PROCEDURE — 36592 COLLECT BLOOD FROM PICC: CPT

## 2024-11-03 PROCEDURE — 250N000012 HC RX MED GY IP 250 OP 636 PS 637: Performed by: SURGERY

## 2024-11-03 PROCEDURE — 85018 HEMOGLOBIN: CPT | Performed by: SURGERY

## 2024-11-03 PROCEDURE — 85610 PROTHROMBIN TIME: CPT | Performed by: SURGERY

## 2024-11-03 PROCEDURE — 258N000003 HC RX IP 258 OP 636: Performed by: PHYSICIAN ASSISTANT

## 2024-11-03 PROCEDURE — 250N000011 HC RX IP 250 OP 636: Performed by: PHYSICIAN ASSISTANT

## 2024-11-03 PROCEDURE — 250N000011 HC RX IP 250 OP 636: Performed by: SURGERY

## 2024-11-03 PROCEDURE — 120N000011 HC R&B TRANSPLANT UMMC

## 2024-11-03 PROCEDURE — 84100 ASSAY OF PHOSPHORUS: CPT

## 2024-11-03 PROCEDURE — 99223 1ST HOSP IP/OBS HIGH 75: CPT | Mod: FS | Performed by: NURSE PRACTITIONER

## 2024-11-03 PROCEDURE — 83735 ASSAY OF MAGNESIUM: CPT | Performed by: SURGERY

## 2024-11-03 PROCEDURE — 999N000065 XR CHEST PORT 1 VIEW

## 2024-11-03 PROCEDURE — 83690 ASSAY OF LIPASE: CPT | Performed by: SURGERY

## 2024-11-03 PROCEDURE — 82330 ASSAY OF CALCIUM: CPT

## 2024-11-03 PROCEDURE — 84132 ASSAY OF SERUM POTASSIUM: CPT

## 2024-11-03 PROCEDURE — 82150 ASSAY OF AMYLASE: CPT | Performed by: SURGERY

## 2024-11-03 PROCEDURE — 84100 ASSAY OF PHOSPHORUS: CPT | Performed by: SURGERY

## 2024-11-03 PROCEDURE — 83735 ASSAY OF MAGNESIUM: CPT

## 2024-11-03 PROCEDURE — 93975 VASCULAR STUDY: CPT

## 2024-11-03 PROCEDURE — 85520 HEPARIN ASSAY: CPT | Performed by: SURGERY

## 2024-11-03 RX ORDER — MAGNESIUM SULFATE HEPTAHYDRATE 40 MG/ML
4 INJECTION, SOLUTION INTRAVENOUS EVERY 4 HOURS
Status: DISCONTINUED | OUTPATIENT
Start: 2024-11-03 | End: 2024-11-03

## 2024-11-03 RX ORDER — FENTANYL CITRATE 50 UG/ML
25 INJECTION, SOLUTION INTRAMUSCULAR; INTRAVENOUS EVERY 5 MIN PRN
Status: DISCONTINUED | OUTPATIENT
Start: 2024-11-03 | End: 2024-11-03 | Stop reason: HOSPADM

## 2024-11-03 RX ORDER — BISACODYL 10 MG
10 SUPPOSITORY, RECTAL RECTAL DAILY PRN
Status: DISCONTINUED | OUTPATIENT
Start: 2024-11-06 | End: 2024-11-11 | Stop reason: HOSPADM

## 2024-11-03 RX ORDER — OXYCODONE HYDROCHLORIDE 5 MG/1
5 TABLET ORAL EVERY 4 HOURS PRN
Status: DISCONTINUED | OUTPATIENT
Start: 2024-11-03 | End: 2024-11-04

## 2024-11-03 RX ORDER — HYDROMORPHONE HCL IN WATER/PF 6 MG/30 ML
0.2 PATIENT CONTROLLED ANALGESIA SYRINGE INTRAVENOUS
Status: DISCONTINUED | OUTPATIENT
Start: 2024-11-03 | End: 2024-11-05

## 2024-11-03 RX ORDER — ONDANSETRON 2 MG/ML
4 INJECTION INTRAMUSCULAR; INTRAVENOUS EVERY 30 MIN PRN
Status: DISCONTINUED | OUTPATIENT
Start: 2024-11-03 | End: 2024-11-03 | Stop reason: HOSPADM

## 2024-11-03 RX ORDER — NICOTINE POLACRILEX 4 MG
15-30 LOZENGE BUCCAL
Status: DISCONTINUED | OUTPATIENT
Start: 2024-11-03 | End: 2024-11-06

## 2024-11-03 RX ORDER — ONDANSETRON 4 MG/1
4 TABLET, ORALLY DISINTEGRATING ORAL EVERY 30 MIN PRN
Status: DISCONTINUED | OUTPATIENT
Start: 2024-11-03 | End: 2024-11-03 | Stop reason: HOSPADM

## 2024-11-03 RX ORDER — MAGNESIUM OXIDE 400 MG/1
400 TABLET ORAL EVERY 24 HOURS
Status: DISCONTINUED | OUTPATIENT
Start: 2024-11-05 | End: 2024-11-11 | Stop reason: HOSPADM

## 2024-11-03 RX ORDER — DEXAMETHASONE SODIUM PHOSPHATE 4 MG/ML
4 INJECTION, SOLUTION INTRA-ARTICULAR; INTRALESIONAL; INTRAMUSCULAR; INTRAVENOUS; SOFT TISSUE
Status: DISCONTINUED | OUTPATIENT
Start: 2024-11-03 | End: 2024-11-03 | Stop reason: HOSPADM

## 2024-11-03 RX ORDER — HYDROMORPHONE HCL IN WATER/PF 6 MG/30 ML
0.4 PATIENT CONTROLLED ANALGESIA SYRINGE INTRAVENOUS EVERY 5 MIN PRN
Status: DISCONTINUED | OUTPATIENT
Start: 2024-11-03 | End: 2024-11-03 | Stop reason: HOSPADM

## 2024-11-03 RX ORDER — OCTREOTIDE ACETATE 100 UG/ML
100 INJECTION, SOLUTION INTRAVENOUS; SUBCUTANEOUS 3 TIMES DAILY
Status: COMPLETED | OUTPATIENT
Start: 2024-11-03 | End: 2024-11-07

## 2024-11-03 RX ORDER — CLOTRIMAZOLE 10 MG/1
10 LOZENGE ORAL 4 TIMES DAILY
Status: DISCONTINUED | OUTPATIENT
Start: 2024-11-10 | End: 2024-11-07

## 2024-11-03 RX ORDER — HYDROMORPHONE HCL IN WATER/PF 6 MG/30 ML
0.4 PATIENT CONTROLLED ANALGESIA SYRINGE INTRAVENOUS
Status: DISCONTINUED | OUTPATIENT
Start: 2024-11-03 | End: 2024-11-05

## 2024-11-03 RX ORDER — ACETAMINOPHEN 325 MG/1
650 TABLET ORAL EVERY 4 HOURS PRN
Status: DISCONTINUED | OUTPATIENT
Start: 2024-11-06 | End: 2024-11-06

## 2024-11-03 RX ORDER — SODIUM CHLORIDE, SODIUM LACTATE, POTASSIUM CHLORIDE, CALCIUM CHLORIDE 600; 310; 30; 20 MG/100ML; MG/100ML; MG/100ML; MG/100ML
INJECTION, SOLUTION INTRAVENOUS CONTINUOUS
Status: DISCONTINUED | OUTPATIENT
Start: 2024-11-03 | End: 2024-11-03 | Stop reason: HOSPADM

## 2024-11-03 RX ORDER — VALGANCICLOVIR 450 MG/1
450 TABLET, FILM COATED ORAL
Status: DISCONTINUED | OUTPATIENT
Start: 2024-11-04 | End: 2024-11-03

## 2024-11-03 RX ORDER — NALOXONE HYDROCHLORIDE 0.4 MG/ML
0.2 INJECTION, SOLUTION INTRAMUSCULAR; INTRAVENOUS; SUBCUTANEOUS
Status: DISCONTINUED | OUTPATIENT
Start: 2024-11-03 | End: 2024-11-11 | Stop reason: HOSPADM

## 2024-11-03 RX ORDER — FAMOTIDINE 20 MG/1
20 TABLET, FILM COATED ORAL 2 TIMES DAILY
Status: DISCONTINUED | OUTPATIENT
Start: 2024-11-03 | End: 2024-11-11 | Stop reason: HOSPADM

## 2024-11-03 RX ORDER — ONDANSETRON 2 MG/ML
4 INJECTION INTRAMUSCULAR; INTRAVENOUS EVERY 6 HOURS PRN
Status: DISCONTINUED | OUTPATIENT
Start: 2024-11-03 | End: 2024-11-11 | Stop reason: HOSPADM

## 2024-11-03 RX ORDER — OXYCODONE HYDROCHLORIDE 10 MG/1
10 TABLET ORAL EVERY 4 HOURS PRN
Status: DISCONTINUED | OUTPATIENT
Start: 2024-11-03 | End: 2024-11-04

## 2024-11-03 RX ORDER — HEPARIN SODIUM 10000 [USP'U]/100ML
400 INJECTION, SOLUTION INTRAVENOUS CONTINUOUS
Status: DISCONTINUED | OUTPATIENT
Start: 2024-11-03 | End: 2024-11-04

## 2024-11-03 RX ORDER — MAGNESIUM SULFATE HEPTAHYDRATE 40 MG/ML
4 INJECTION, SOLUTION INTRAVENOUS EVERY 4 HOURS PRN
Status: DISCONTINUED | OUTPATIENT
Start: 2024-11-03 | End: 2024-11-03

## 2024-11-03 RX ORDER — NALOXONE HYDROCHLORIDE 0.4 MG/ML
0.4 INJECTION, SOLUTION INTRAMUSCULAR; INTRAVENOUS; SUBCUTANEOUS
Status: DISCONTINUED | OUTPATIENT
Start: 2024-11-03 | End: 2024-11-11 | Stop reason: HOSPADM

## 2024-11-03 RX ORDER — SULFAMETHOXAZOLE AND TRIMETHOPRIM 200; 40 MG/5ML; MG/5ML
10 SUSPENSION ORAL DAILY
Status: DISCONTINUED | OUTPATIENT
Start: 2024-11-03 | End: 2024-11-05

## 2024-11-03 RX ORDER — DIPHENHYDRAMINE HCL 12.5MG/5ML
25-50 LIQUID (ML) ORAL ONCE
Status: COMPLETED | OUTPATIENT
Start: 2024-11-03 | End: 2024-11-03

## 2024-11-03 RX ORDER — DEXTROSE MONOHYDRATE 100 MG/ML
INJECTION, SOLUTION INTRAVENOUS CONTINUOUS PRN
Status: DISCONTINUED | OUTPATIENT
Start: 2024-11-03 | End: 2024-11-04

## 2024-11-03 RX ORDER — ACETAMINOPHEN 325 MG/1
975 TABLET ORAL EVERY 8 HOURS
Status: DISCONTINUED | OUTPATIENT
Start: 2024-11-03 | End: 2024-11-04

## 2024-11-03 RX ORDER — DIPHENHYDRAMINE HCL 25 MG
25-50 CAPSULE ORAL ONCE
Status: COMPLETED | OUTPATIENT
Start: 2024-11-03 | End: 2024-11-03

## 2024-11-03 RX ORDER — FENTANYL CITRATE 50 UG/ML
50 INJECTION, SOLUTION INTRAMUSCULAR; INTRAVENOUS EVERY 5 MIN PRN
Status: DISCONTINUED | OUTPATIENT
Start: 2024-11-03 | End: 2024-11-03 | Stop reason: HOSPADM

## 2024-11-03 RX ORDER — ONDANSETRON 4 MG/1
4 TABLET, ORALLY DISINTEGRATING ORAL EVERY 6 HOURS PRN
Status: DISCONTINUED | OUTPATIENT
Start: 2024-11-03 | End: 2024-11-11 | Stop reason: HOSPADM

## 2024-11-03 RX ORDER — HEPARIN SODIUM 10000 [USP'U]/100ML
200 INJECTION, SOLUTION INTRAVENOUS CONTINUOUS
Status: DISCONTINUED | OUTPATIENT
Start: 2024-11-03 | End: 2024-11-03

## 2024-11-03 RX ORDER — DEXTROSE MONOHYDRATE 25 G/50ML
25-50 INJECTION, SOLUTION INTRAVENOUS
Status: DISCONTINUED | OUTPATIENT
Start: 2024-11-03 | End: 2024-11-06

## 2024-11-03 RX ORDER — VALGANCICLOVIR HYDROCHLORIDE 50 MG/ML
900 POWDER, FOR SOLUTION ORAL DAILY
Status: DISCONTINUED | OUTPATIENT
Start: 2024-11-03 | End: 2024-11-05

## 2024-11-03 RX ORDER — POTASSIUM PHOS IN 0.9 % NACL 15MMOL/250
15 PLASTIC BAG, INJECTION (ML) INTRAVENOUS ONCE
Status: COMPLETED | OUTPATIENT
Start: 2024-11-03 | End: 2024-11-03

## 2024-11-03 RX ORDER — MAGNESIUM SULFATE HEPTAHYDRATE 40 MG/ML
4 INJECTION, SOLUTION INTRAVENOUS ONCE
Status: COMPLETED | OUTPATIENT
Start: 2024-11-03 | End: 2024-11-03

## 2024-11-03 RX ORDER — ASPIRIN 81 MG/1
81 TABLET, CHEWABLE ORAL DAILY
Status: DISCONTINUED | OUTPATIENT
Start: 2024-11-03 | End: 2024-11-07

## 2024-11-03 RX ORDER — METHOCARBAMOL 750 MG/1
750 TABLET, FILM COATED ORAL EVERY 6 HOURS PRN
Status: DISCONTINUED | OUTPATIENT
Start: 2024-11-03 | End: 2024-11-11 | Stop reason: HOSPADM

## 2024-11-03 RX ORDER — ACETAMINOPHEN 325 MG/1
650 TABLET ORAL ONCE
Status: COMPLETED | OUTPATIENT
Start: 2024-11-03 | End: 2024-11-03

## 2024-11-03 RX ORDER — ONDANSETRON 2 MG/ML
INJECTION INTRAMUSCULAR; INTRAVENOUS PRN
Status: DISCONTINUED | OUTPATIENT
Start: 2024-11-03 | End: 2024-11-03

## 2024-11-03 RX ORDER — AMOXICILLIN 250 MG
1 CAPSULE ORAL 2 TIMES DAILY
Status: DISCONTINUED | OUTPATIENT
Start: 2024-11-03 | End: 2024-11-04

## 2024-11-03 RX ORDER — POLYETHYLENE GLYCOL 3350 17 G/17G
17 POWDER, FOR SOLUTION ORAL DAILY
Status: DISCONTINUED | OUTPATIENT
Start: 2024-11-04 | End: 2024-11-07

## 2024-11-03 RX ORDER — HYDROMORPHONE HCL IN WATER/PF 6 MG/30 ML
0.2 PATIENT CONTROLLED ANALGESIA SYRINGE INTRAVENOUS EVERY 5 MIN PRN
Status: DISCONTINUED | OUTPATIENT
Start: 2024-11-03 | End: 2024-11-03 | Stop reason: HOSPADM

## 2024-11-03 RX ORDER — NALOXONE HYDROCHLORIDE 0.4 MG/ML
0.1 INJECTION, SOLUTION INTRAMUSCULAR; INTRAVENOUS; SUBCUTANEOUS
Status: DISCONTINUED | OUTPATIENT
Start: 2024-11-03 | End: 2024-11-03 | Stop reason: HOSPADM

## 2024-11-03 RX ORDER — MYCOPHENOLATE MOFETIL 200 MG/ML
1000 POWDER, FOR SUSPENSION ORAL 2 TIMES DAILY
Status: DISCONTINUED | OUTPATIENT
Start: 2024-11-03 | End: 2024-11-05

## 2024-11-03 RX ORDER — PROCHLORPERAZINE MALEATE 5 MG/1
10 TABLET ORAL EVERY 6 HOURS PRN
Status: DISCONTINUED | OUTPATIENT
Start: 2024-11-03 | End: 2024-11-11 | Stop reason: HOSPADM

## 2024-11-03 RX ADMIN — TACROLIMUS 3 MG: 5 CAPSULE ORAL at 07:47

## 2024-11-03 RX ADMIN — PHENYLEPHRINE HYDROCHLORIDE 100 MCG: 10 INJECTION INTRAVENOUS at 01:05

## 2024-11-03 RX ADMIN — SODIUM CHLORIDE, SODIUM LACTATE, POTASSIUM CHLORIDE, CALCIUM CHLORIDE AND DEXTROSE MONOHYDRATE: 5; 600; 310; 30; 20 INJECTION, SOLUTION INTRAVENOUS at 12:37

## 2024-11-03 RX ADMIN — ONDANSETRON 4 MG: 2 INJECTION INTRAMUSCULAR; INTRAVENOUS at 01:53

## 2024-11-03 RX ADMIN — VALGANCICLOVIR HYDROCHLORIDE 900 MG: 50 POWDER, FOR SOLUTION ORAL at 16:36

## 2024-11-03 RX ADMIN — FENTANYL CITRATE 25 MCG: 50 INJECTION, SOLUTION INTRAMUSCULAR; INTRAVENOUS at 02:05

## 2024-11-03 RX ADMIN — MICAFUNGIN SODIUM 100 MG: 50 INJECTION, POWDER, LYOPHILIZED, FOR SOLUTION INTRAVENOUS at 16:36

## 2024-11-03 RX ADMIN — HYDROMORPHONE HYDROCHLORIDE 0.5 MG: 1 INJECTION, SOLUTION INTRAMUSCULAR; INTRAVENOUS; SUBCUTANEOUS at 01:52

## 2024-11-03 RX ADMIN — MAGNESIUM SULFATE IN WATER 4 G: 40 INJECTION, SOLUTION INTRAVENOUS at 10:43

## 2024-11-03 RX ADMIN — SENNOSIDES AND DOCUSATE SODIUM 1 TABLET: 8.6; 5 TABLET ORAL at 07:47

## 2024-11-03 RX ADMIN — SULFAMETHOXAZOLE AND TRIMETHOPRIM 80 MG: 200; 40 SUSPENSION ORAL at 07:47

## 2024-11-03 RX ADMIN — OCTREOTIDE ACETATE 100 MCG: 100 INJECTION, SOLUTION INTRAVENOUS; SUBCUTANEOUS at 20:17

## 2024-11-03 RX ADMIN — ACETAMINOPHEN 975 MG: 325 TABLET, FILM COATED ORAL at 04:19

## 2024-11-03 RX ADMIN — MYCOPHENOLATE MOFETIL 1000 MG: 200 POWDER, FOR SUSPENSION ORAL at 07:47

## 2024-11-03 RX ADMIN — POTASSIUM PHOSPHATE, MONOBASIC POTASSIUM PHOSPHATE, DIBASIC 15 MMOL: 224; 236 INJECTION, SOLUTION, CONCENTRATE INTRAVENOUS at 07:12

## 2024-11-03 RX ADMIN — HYDROMORPHONE HYDROCHLORIDE 0.2 MG: 0.2 INJECTION, SOLUTION INTRAMUSCULAR; INTRAVENOUS; SUBCUTANEOUS at 16:36

## 2024-11-03 RX ADMIN — BENZOCAINE 1 ML: 220 SPRAY, METERED PERIODONTAL at 18:41

## 2024-11-03 RX ADMIN — HYDROMORPHONE HYDROCHLORIDE 0.2 MG: 0.2 INJECTION, SOLUTION INTRAMUSCULAR; INTRAVENOUS; SUBCUTANEOUS at 19:11

## 2024-11-03 RX ADMIN — METHYLPREDNISOLONE SODIUM SUCCINATE 250 MG: 125 INJECTION, POWDER, LYOPHILIZED, FOR SOLUTION INTRAMUSCULAR; INTRAVENOUS at 18:15

## 2024-11-03 RX ADMIN — ONDANSETRON 4 MG: 2 INJECTION INTRAMUSCULAR; INTRAVENOUS at 16:45

## 2024-11-03 RX ADMIN — Medication 10 MG: at 00:15

## 2024-11-03 RX ADMIN — PROCHLORPERAZINE EDISYLATE 10 MG: 5 INJECTION INTRAMUSCULAR; INTRAVENOUS at 18:31

## 2024-11-03 RX ADMIN — PIPERACILLIN SODIUM AND TAZOBACTAM SODIUM 3.38 G: 3; .375 INJECTION, SOLUTION INTRAVENOUS at 12:05

## 2024-11-03 RX ADMIN — DIPHENHYDRAMINE HYDROCHLORIDE 50 MG: 25 SOLUTION ORAL at 18:15

## 2024-11-03 RX ADMIN — OCTREOTIDE ACETATE 100 MCG: 100 INJECTION, SOLUTION INTRAVENOUS; SUBCUTANEOUS at 14:05

## 2024-11-03 RX ADMIN — SODIUM CHLORIDE, SODIUM LACTATE, POTASSIUM CHLORIDE, CALCIUM CHLORIDE AND DEXTROSE MONOHYDRATE: 5; 600; 310; 30; 20 INJECTION, SOLUTION INTRAVENOUS at 01:58

## 2024-11-03 RX ADMIN — Medication 200 MG: at 01:54

## 2024-11-03 RX ADMIN — OCTREOTIDE ACETATE 100 MCG: 100 INJECTION, SOLUTION INTRAVENOUS; SUBCUTANEOUS at 07:55

## 2024-11-03 RX ADMIN — METHOCARBAMOL TABLETS 750 MG: 750 TABLET, COATED ORAL at 16:36

## 2024-11-03 RX ADMIN — PIPERACILLIN SODIUM AND TAZOBACTAM SODIUM 3.38 G: 3; .375 INJECTION, SOLUTION INTRAVENOUS at 06:08

## 2024-11-03 RX ADMIN — HYDROMORPHONE HYDROCHLORIDE 0.2 MG: 0.2 INJECTION, SOLUTION INTRAMUSCULAR; INTRAVENOUS; SUBCUTANEOUS at 06:45

## 2024-11-03 RX ADMIN — ASPIRIN 81 MG CHEWABLE TABLET 81 MG: 81 TABLET CHEWABLE at 07:47

## 2024-11-03 RX ADMIN — ONDANSETRON 4 MG: 2 INJECTION INTRAMUSCULAR; INTRAVENOUS at 23:53

## 2024-11-03 RX ADMIN — ANTI-THYMOCYTE GLOBULIN (RABBIT) 200 MG: 5 INJECTION, POWDER, LYOPHILIZED, FOR SOLUTION INTRAVENOUS at 19:17

## 2024-11-03 RX ADMIN — HEPARIN SODIUM 400 UNITS/HR: 10000 INJECTION, SOLUTION INTRAVENOUS at 14:44

## 2024-11-03 RX ADMIN — HYDROMORPHONE HYDROCHLORIDE 0.2 MG: 0.2 INJECTION, SOLUTION INTRAMUSCULAR; INTRAVENOUS; SUBCUTANEOUS at 21:50

## 2024-11-03 RX ADMIN — HYDROMORPHONE HYDROCHLORIDE 0.2 MG: 0.2 INJECTION, SOLUTION INTRAMUSCULAR; INTRAVENOUS; SUBCUTANEOUS at 11:54

## 2024-11-03 RX ADMIN — HYDROMORPHONE HYDROCHLORIDE 0.5 MG: 1 INJECTION, SOLUTION INTRAMUSCULAR; INTRAVENOUS; SUBCUTANEOUS at 01:43

## 2024-11-03 RX ADMIN — HEPARIN SODIUM 200 UNITS/HR: 10000 INJECTION, SOLUTION INTRAVENOUS at 04:10

## 2024-11-03 RX ADMIN — MYCOPHENOLATE MOFETIL 1000 MG: 200 POWDER, FOR SUSPENSION ORAL at 18:16

## 2024-11-03 RX ADMIN — ACETAMINOPHEN 975 MG: 325 TABLET, FILM COATED ORAL at 11:54

## 2024-11-03 RX ADMIN — ACETAMINOPHEN 650 MG: 325 TABLET, FILM COATED ORAL at 18:15

## 2024-11-03 RX ADMIN — TACROLIMUS 3 MG: 5 CAPSULE ORAL at 18:16

## 2024-11-03 RX ADMIN — PHENYLEPHRINE HYDROCHLORIDE 100 MCG: 10 INJECTION INTRAVENOUS at 01:15

## 2024-11-03 RX ADMIN — SODIUM CHLORIDE, POTASSIUM CHLORIDE, SODIUM LACTATE AND CALCIUM CHLORIDE 500 ML: 600; 310; 30; 20 INJECTION, SOLUTION INTRAVENOUS at 07:42

## 2024-11-03 RX ADMIN — PIPERACILLIN SODIUM AND TAZOBACTAM SODIUM 3.38 G: 3; .375 INJECTION, SOLUTION INTRAVENOUS at 18:16

## 2024-11-03 RX ADMIN — PIPERACILLIN SODIUM AND TAZOBACTAM SODIUM 3.38 G: 3; .375 INJECTION, SOLUTION INTRAVENOUS at 00:30

## 2024-11-03 RX ADMIN — Medication 100 MG: at 01:00

## 2024-11-03 RX ADMIN — DEXMEDETOMIDINE HYDROCHLORIDE 12 MCG: 100 INJECTION, SOLUTION INTRAVENOUS at 01:14

## 2024-11-03 RX ADMIN — INSULIN HUMAN 1 UNITS/HR: 1 INJECTION, SOLUTION INTRAVENOUS at 14:23

## 2024-11-03 RX ADMIN — Medication 10 MG: at 00:44

## 2024-11-03 RX ADMIN — FAMOTIDINE 20 MG: 10 INJECTION, SOLUTION INTRAVENOUS at 07:47

## 2024-11-03 RX ADMIN — HYDROMORPHONE HYDROCHLORIDE 0.2 MG: 0.2 INJECTION, SOLUTION INTRAMUSCULAR; INTRAVENOUS; SUBCUTANEOUS at 09:36

## 2024-11-03 RX ADMIN — Medication 40 MG: at 07:47

## 2024-11-03 RX ADMIN — Medication 10 MG: at 01:26

## 2024-11-03 RX ADMIN — Medication 1 TABLET: at 07:46

## 2024-11-03 NOTE — ANESTHESIA POSTPROCEDURE EVALUATION
Impression: Combined forms of age-related cataract, left eye: H25.812. Plan: Discussed cataracts with patient. Discussed treatment options. Surgical treatment is recommended. Surgical risks and benefits discussed. Patient elects surgical treatment. Recommend surgery OS. standard lens, due to this is 2nd eye. Aim: plano. Patient will need glasses for all near work, including computer.  Patient not a candidate for Specialty IOL due to 2nd eye surgery Patient: Michael Amin    Procedure: Procedure(s):  Pancreas transplant, benching of donor pancreas, Iliac vein repair and appendectomy.       Anesthesia Type:  General    Note:  Disposition: Inpatient   Postop Pain Control: Uneventful            Sign Out: Well controlled pain   PONV: No   Neuro/Psych: Uneventful            Sign Out: Acceptable/Baseline neuro status   Airway/Respiratory: Uneventful            Sign Out: Acceptable/Baseline resp. status   CV/Hemodynamics: Uneventful            Sign Out: Acceptable CV status; No obvious hypovolemia; No obvious fluid overload   Other NRE: NONE   DID A NON-ROUTINE EVENT OCCUR? No           Last vitals:  Vitals Value Taken Time   BP     Temp     Pulse     Resp     SpO2         Electronically Signed By: Duke Gaming MD  November 3, 2024  1:48 AM

## 2024-11-03 NOTE — PHARMACY-TRANSPLANT NOTE
Pancreas Transplant Post Operative Note    43 yo M s/p IVETTE 11/2/24 for T1DM   + Intra-op:  twist noted in both limbs of Y graft artery after reperfusion, requiring clamping of each limb and revision     PMH: ESRD 2/2 T1DM s/p LDKT 10/2019, HTN, HLD, Shingles 2023, obesity    Planned immunosuppression regimen per pancreas transplant protocol:  INDUCTION: total of 7.5mg/kg  11/2 Thymo 200mg; SM 500mg  11/3 Thymo 200mg; SM 250mg  11/4 Thymo 200mg; SM 100mg  11/5 Thymo 75mg; SM 100mg    MAINTENANCE:   + Mycophenolate mofetil 1000mg BID  + Tacrolimus with goal trough levels of 8-10 mcg/L for first 6 months post-transplant.    Opportunistic pathogen prophylaxis includes: trimethoprim/sulfamethoxazole, clotrimazole and valganciclovir.    Post-op antibiotic/antifungal surgical prophylaxis includes: piperacillin-tazobactam for 3 days post-procedure and micafungin IV for 6 days post-procedure.    Patient is not enrolled in medication study.    Pharmacy will monitor for medication interactions and immunosuppression levels in conjunction with the team.  Medication therapy needs for discharge planning will continue to be addressed throughout the current admission via multidisciplinary rounds and order review .  Pharmacy will make recommendations as appropriate.

## 2024-11-03 NOTE — PROGRESS NOTES
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 Patient is now s/p IVETTE with Dr. Mcdaniels on 11/2/24 complicated by arterial reconstruction.     Graft:  s/p IVETTE: POD 1 (Pancreas), 1860 (Kidney). Lipase 83. No insulin requirement at this time, insulin gtt prn. Post-op US with patent vasculature.   -Heparin gtt @400units/hour per protocol.   -Octreotide  - ASA 81 mg daily   LDKT: 2019. BCr 1.1-1.3. Cr 1.3 today. Good UOP.     Immunosuppression management:  Induction: via high intensity protocol (cPRA 0) with Thymoglobulin goal 7.5mg/kg  Maintenance:    - MMF 1000 mg BID   - Tacrolimus 3 mg BID. Goal level 8-10.     Neuro:  Acute post op pain: Tylenol scheduled and Oxycodone prn.     Hematology:   Anemia of chronic disease: HGB stable at 9-10, 14.8 pre-op.     Cardiorespiratory:   HTN: H/o. No antihypertensives needed post operatively. Monitor.     GI/Nutrition: Regular diet. Bowel regimen: Senokot-S, Miralax     Endocrine:   Steroid induced hyperglycemia/DMI: Insulin gtt as needed. HGBA1C 7.8%.     Fluid/Electrolytes: CVP 9. Received 500cc LR bolus this AM.   MIVF: D5LR@100 ml/hour.  Hypomagnesemia: Mag 4 g IV, Mag ox 400 mg daily to start on POD 2    : Méndez in place until 11/4    Infectious disease: No acute issues. AF.   Leukocytosis: Likely 2/2 steroids. Monitor.   Lactic acidosis: LA 2.2, repeat 0.9.     Prophylaxis: DVT(mechanical), fall, viral (Valcyte x 3 months), PJP (Bactrim indefinitely)    Disposition: 7A    ROX/Fellow/Resident Provider: Aydee Damon PA-C [id number]    Faculty: Prince Mcdaniels MD  _________________________________________________________________  Transplant History: Admitted 11/2/2024 for kidney transplant.  11/2/2024 (Pancreas), 10/1/2019 (Kidney), Postoperative day: 1 (Pancreas), 1860 (Kidney)     Interval History: History  is obtained from the patient  Overnight events: No complaints. Feeling well.     ROS:   A 10-point review of systems was negative except as noted above.    Meds:  Current Facility-Administered Medications   Medication Dose Route Frequency Provider Last Rate Last Admin    acetaminophen (TYLENOL) tablet 650 mg  650 mg Oral Once Aydee Damon PA-C        acetaminophen (TYLENOL) tablet 975 mg  975 mg Oral Q8H Prince Mcdaniels MD   975 mg at 11/03/24 1154    anti-thymocyte globulin (THYMOGLOBULIN - Rabbit) 200 mg in sodium chloride 0.9 % 590 mL intermittent infusion  200 mg Intravenous Central line Once Aydee Damon PA-C        aspirin (ASA) chewable tablet 81 mg  81 mg Per NG tube Daily Prince Mcdaniels MD   81 mg at 11/03/24 0747    calcium carbonate-vitamin D (OSCAL) 500-5 MG-MCG per tablet 1 tablet  1 tablet Oral BID w/meals Prince Mcdaniels MD   1 tablet at 11/03/24 0746    [START ON 11/10/2024] clotrimazole (MYCELEX) lozenge 10 mg  10 mg Buccal 4x Daily Prince Mcdaniels MD        diphenhydrAMINE (BENADRYL) capsule 25-50 mg  25-50 mg Oral Once Aydee Damon PA-C        Or    diphenhydrAMINE (BENADRYL) elixir 25-50 mg  25-50 mg Per NG tube Once Aydee Damon PA-C        famotidine (PEPCID) tablet 20 mg  20 mg Oral BID Prince Mcdaniels MD        [START ON 11/5/2024] magnesium oxide (MAG-OX) tablet 400 mg  400 mg Oral Q24H Pricne Mcdaniels MD        methylPREDNISolone Na Suc (solu-MEDROL) 250 mg in sodium chloride 0.9 % 59 mL intermittent infusion  250 mg Intravenous Once Aydee Damon PA-C        micafungin (MYCAMINE) 100 mg in sodium chloride 0.9 % 100 mL intermittent infusion  100 mg Intravenous Q24H Prince Mcdaneils MD        mycophenolate (CELLCEPT BRAND) suspension 1,000 mg  1,000 mg Per NG tube BID Prince Mcdaniels MD   1,000 mg at 11/03/24  0747    octreotide (sandoSTATIN) injection 100 mcg  100 mcg Subcutaneous TID Prince Mcdaniels MD   100 mcg at 11/03/24 1405    pantoprazole (PROTONIX) 2 mg/mL suspension 40 mg  40 mg Per NG tube Daily Prince Mcdaniels MD   40 mg at 11/03/24 0747    piperacillin-tazobactam (ZOSYN) intermittent infusion 3.375 g  3.375 g Intravenous Q6H Prince Mcdaniels  mL/hr at 11/03/24 1205 3.375 g at 11/03/24 1205    [START ON 11/4/2024] polyethylene glycol (MIRALAX) Packet 17 g  17 g Oral Daily Prince Mcdaniels MD        senna-docusate (SENOKOT-S/PERICOLACE) 8.6-50 MG per tablet 1 tablet  1 tablet Oral BID Prince Mcdaniels MD   1 tablet at 11/03/24 0747    sodium chloride (PF) 0.9% PF flush 10 mL  10 mL Intracatheter Q8H Prince Mcdaniels MD   10 mL at 11/03/24 0423    sodium chloride (PF) 0.9% PF flush 3 mL  3 mL Intravenous Q8H Prince Mcdaniels MD   3 mL at 11/03/24 1044    sulfamethoxazole-trimethoprim (BACTRIM/SEPTRA) suspension 80 mg  10 mL Per NG tube Daily Prince Mcdaniels MD   80 mg at 11/03/24 0747    tacrolimus (GENERIC) suspension 3 mg  3 mg Oral BID IS Prince Mcdaniels MD   3 mg at 11/03/24 0747    valGANciclovir (VALCYTE) solution 900 mg  900 mg Oral or Feeding Tube Daily Prince Mcdaniels MD           Physical Exam:     Admit Weight: 89.1 kg (196 lb 8 oz)    Current vitals:   /65   Pulse 104   Temp 98.5  F (36.9  C) (Oral)   Resp 24   Wt 89.1 kg (196 lb 8 oz)   SpO2 92%   BMI 30.78 kg/m      CVP (mmHg): 12 mmHg    Vital sign ranges:    Temp:  [98  F (36.7  C)-98.9  F (37.2  C)] 98.5  F (36.9  C)  Pulse:  [] 104  Resp:  [15-24] 24  BP: ()/(54-75) 104/65  MAP:  [75 mmHg-79 mmHg] 75 mmHg  Arterial Line BP: (110-114)/(59-63) 110/59  SpO2:  [92 %-99 %] 92 %  Patient Vitals for the past 24 hrs:   BP Temp Temp src Pulse Resp SpO2    11/03/24 1400 104/65 -- -- 104 24 --   11/03/24 1300 117/63 98.5  F (36.9  C) Oral 99 -- --   11/03/24 1200 114/65 -- -- 106 -- 92 %   11/03/24 1100 116/69 -- -- -- -- --   11/03/24 1000 113/67 -- -- 113 -- 96 %   11/03/24 0953 112/66 98  F (36.7  C) Oral 111 -- 94 %   11/03/24 0936 -- -- -- 114 -- 97 %   11/03/24 0900 101/68 -- -- 114 24 --   11/03/24 0800 111/75 98.4  F (36.9  C) Oral 107 -- --   11/03/24 0700 90/64 -- -- 107 20 95 %   11/03/24 0600 98/64 -- -- 93 -- 97 %   11/03/24 0500 106/69 -- -- 104 -- 94 %   11/03/24 0410 106/70 -- -- 101 -- 96 %   11/03/24 0400 (!) 87/55 -- -- 97 -- 96 %   11/03/24 0239 91/54 98.6  F (37  C) -- 100 15 98 %   11/03/24 0230 -- -- -- -- -- 98 %   11/03/24 0215 97/63 -- -- 100 18 99 %   11/03/24 0205 -- -- -- -- -- 96 %   11/03/24 0200 94/59 -- -- 100 18 --   11/03/24 0130 107/63 98.9  F (37.2  C) Oral 102 -- 97 %     General Appearance: in no apparent distress.   Skin: normal  Heart: regular rate and rhythm  Lungs: NLB on 1L via NC  Abdomen: The abdomen is soft and appropriately tender over the pancreas graft. The wound is Healing well, well approximated, without signs of infection, and no drainage.  : paredes is present. Urine is clear yellow.   Extremities: edema: absent   Neurologic: awake, alert, and oriented. Tremor absent.    Data:   CMP  Recent Labs   Lab 11/03/24  1407 11/03/24  1301 11/03/24  1253 11/03/24  1000 11/03/24  0918 11/03/24  0614 11/03/24  0530 11/03/24  0226 11/03/24  0144 11/03/24  0135 11/03/24  0131 11/03/24  0019 11/02/24  1353 11/02/24  1352   NA  --   --   --   --   --   --  140  --  142 135  --  136   < > 140   POTASSIUM  --   --  4.0  --  4.5  --  4.3  4.3  --  2.9* 3.7  --  3.6   < > 4.4   CHLORIDE  --   --   --   --   --   --  105  --  113*  --   --   --   --  101   CO2  --   --   --   --   --   --  24  --  19*  --   --   --   --  27   * 147*  --    < >  --    < > 122*   < > 77 86   < > 79   < > 157*   BUN  --   --   --   --   --   --   15.7  --  11.2  --   --   --   --  14.9   CR  --   --   --   --   --   --  1.32*  --  0.91  --   --   --   --  1.10   GFRESTIMATED  --   --   --   --   --   --  68  --  >90  --   --   --   --  84   APRIL  --   --   --   --   --   --  7.6*  --  5.8*  --   --   --   --  9.2   ICAW  --   --   --   --   --   --   --   --   --  4.4  --  4.6   < >  --    MAG  --   --   --   --   --   --  1.3*  --  0.9*  --   --   --   --   --    PHOS  --   --   --   --   --   --  2.4*  --  0.9*  --   --   --   --   --    AMYLASE  --   --   --   --   --   --  78  --  37  --   --   --   --  21*   LIPASE  --   --   --   --   --   --  83*  --  49  --   --   --   --  11*   ALBUMIN  --   --   --   --   --   --   --   --   --   --   --   --   --  4.1   BILITOTAL  --   --   --   --   --   --   --   --   --   --   --   --   --  0.5   ALKPHOS  --   --   --   --   --   --   --   --   --   --   --   --   --  113   AST  --   --   --   --   --   --   --   --   --   --   --   --   --  15   ALT  --   --   --   --   --   --   --   --   --   --   --   --   --  13    < > = values in this interval not displayed.     CBC  Recent Labs   Lab 11/03/24  1253 11/03/24  0918 11/03/24  0530 11/03/24  0339 11/02/24  1711 11/02/24  1352   HGB 9.4* 10.3* 10.2*  --    < > 14.8   WBC  --   --  14.2*  --   --  6.7   PLT  --   --  140*  --   --  207   A1C  --   --   --  7.8*  --  7.8*    < > = values in this interval not displayed.     COAGS  Recent Labs   Lab 11/03/24  0144 11/02/24  1352   INR 1.49* 1.05   PTT 33 34      Urinalysis  Recent Labs   Lab Test 11/02/24  1503 11/21/23  1154 11/16/21  1502 05/11/21  1532   COLOR Yellow Straw  --   --    APPEARANCE Clear Clear  --   --    URINEGLC Negative Negative  --   --    URINEBILI Negative Negative  --   --    URINEKETONE Negative Negative  --   --    SG 1.026 1.006  --   --    UBLD Negative Negative  --   --    URINEPH 5.5 6.0  --   --    PROTEIN 10* Negative  --   --    NITRITE Negative Negative  --   --    LEUKEST  Negative Negative  --   --    RBCU <1 <1  --   --    WBCU <1 0  --   --    UTPG  --   --  0.11 0.10     Virology:  Hepatitis C Antibody   Date Value Ref Range Status   11/02/2024 Nonreactive Nonreactive Final     Comment:     A nonreactive screening test result does not exclude the possibility of exposure to or infection with HCV. Nonreactive screening test results in individuals with prior exposure to HCV may be due to antibody levels below the limit of detection of this assay or lack of reactivity to the HCV antigens used in this assay. Patients with recent HCV infections (<3 months from time of exposure) may have false-negative HCV antibody results due to the time needed for seroconversion (average of 8 to 9 weeks).   09/26/2019 Nonreactive NR^Nonreactive Final     Comment:     Assay performance characteristics have not been established for newborns,   infants, and children       BK Virus Result   Date Value Ref Range Status   05/11/2021 BK Virus DNA Not Detected BKNEG^BK Virus DNA Not Detected copies/mL Final   07/08/2020 BK Virus DNA Not Detected BKNEG^BK Virus DNA Not Detected copies/mL Final   03/31/2020 BK Virus DNA Not Detected BKNEG^BK Virus DNA Not Detected copies/mL Final   02/03/2020 BK Virus DNA Not Detected BKNEG^BK Virus DNA Not Detected copies/mL Final   02/01/2020 BK Virus DNA Not Detected BKNEG^BK Virus DNA Not Detected copies/mL Final   01/06/2020 BK Virus DNA Not Detected BKNEG^BK Virus DNA Not Detected copies/mL Final   12/23/2019 BK Virus DNA Not Detected BKNEG^BK Virus DNA Not Detected copies/mL Final   11/29/2019 BK Virus DNA Not Detected BKNEG^BK Virus DNA Not Detected copies/mL Final   11/04/2019 BK Virus DNA Not Detected BKNEG^BK Virus DNA Not Detected copies/mL Final   10/22/2019 BK Virus DNA Not Detected BKNEG^BK Virus DNA Not Detected copies/mL Final   10/18/2019 BK Virus DNA Not Detected BKNEG^BK Virus DNA Not Detected copies/mL Final     BK Virus DNA IU/mL   Date Value Ref Range  Status   09/13/2024 Not Detected Not Detected IU/mL Final

## 2024-11-03 NOTE — CONSULTS
[facility]  Transplant Nephrology Consult Note  Date of Admission:  11/2/2024  Today's Date: 11/03/2024  Requesting physician: Prince Mcdaniels*    Reason for Consult:  Follow kidney/pancreas transplant and immunosuppression     Recommendations:   - replace phos per protocol   - replace mag per protocol       Assessment & Plan   # LDKT: Stable   - Baseline Creatinine: ~ 1.1-1.3   - Proteinuria: Normal (<0.2 grams)   - DSA Hx: No DSA   - Last cPRA: 0%   - BK Viremia: No   - Kidney Tx Biopsy Hx: No biopsy history and Acute cellular-mediated rejection.      Nov 25, 2019; Result: Material insufficient for assessment with no glomeruli.   Oct 22, 2019; Result: Borderline acute cellular-mediated rejection      # Pancreas Tx (IVETTE):    - Pancreatic Exocrine Drainage: Enteric drained     - Blood glucose: Elevated blood glucose      On insulin: No   - HbA1c: Stable      Latest HbA1c: 7.8%   - Pancreatic enzymes: Stable   - DSA Hx: No DSA at time of transplant   - Pancreas Tx Biopsy Hx: No biopsy history      # Immunosuppression: Tacrolimus immediate release (goal 8-10) and Mycophenolate mofetil (dose 1000 mg every 12 hours)   - Induction with Recent Transplant:  High Intensity Protocol   - Continue with intensive monitoring of immunosuppression for efficacy and toxicity.   - Historical Changes in Immunosuppression: None   - Changes: No    # Infection Prevention:      - PJP: Sulfa/TMP (Bactrim)  - CMV: Valganciclovir (Valcyte)      - CMV IgG Ab High Risk Discordance (D+/R-): No  CMV Serostatus: Positive  - EBV IgG Ab High Risk Discordance (D+/R-): No  EBV Serostatus: Positive      # Hypertension: Controlled;  Goal BP: < 140/90 (Hospitalization goal)   - Changes: No    # Anemia in Chronic Renal Disease: Hgb: Stable      JOSE: No   - Iron studies: Not checked recently    # Mineral Bone Disorder:    - Secondary renal hyperparathyroidism; PTH level: Moderately elevated (301-600  pg/ml)        On treatment: None  - Vitamin D; level: Low        On supplement: Yes  - Calcium; level: Low        On supplement: No  - Phosphorus; level: Low        On supplement: No, replace per protocol     # Electrolytes:   - Potassium; level: Normal        On supplement: No  - Magnesium; level: Low        On supplement: Yes  - Bicarbonate; level: Normal        On supplement: No      # Transplant History:  Etiology of Kidney Failure: Diabetes mellitus type 1  Tx: LDKT  Transplant: 11/2/2024 (Pancreas), 10/1/2019 (Kidney)  Significant transplant-related complications: None    Recommendations were communicated to the primary team via this note.    Seen and discussed with Dr. Axel Corrigan APRN [facility]  Transplant Nephrology  Contact information via Vocera Web Console or via [employer] Paging/Directory      History of Present Illness  Michael Amin is a 44-year-old with history of ESKD 2/2 Type 1 diabetes S/p LDKT (2019) with baseline creatinine fo 1.1-1.3 who is now s/p IVETTE POD # 0.        Review of Systems   4 point ROS was obtained and negative except as noted in the Interval History.    MEDICATIONS:  Current Facility-Administered Medications   Medication Dose Route Frequency Provider Last Rate Last Admin    acetaminophen (TYLENOL) tablet 975 mg  975 mg Oral Q8H Prince Mcdaniels MD   975 mg at 11/03/24 0419    aspirin (ASA) chewable tablet 81 mg  81 mg Per NG tube Daily Prince Mcdaniels MD   81 mg at 11/03/24 0747    calcium carbonate-vitamin D (OSCAL) 500-5 MG-MCG per tablet 1 tablet  1 tablet Oral BID w/meals Prince Mcdaniels MD   1 tablet at 11/03/24 0746    [START ON 11/10/2024] clotrimazole (MYCELEX) lozenge 10 mg  10 mg Buccal 4x Daily Prince Mcdaniels MD        famotidine (PEPCID) tablet 20 mg  20 mg Oral BID Prince Mcdaniels MD        Or    famotidine (PEPCID) injection 20 mg  20 mg Intravenous BID Enedelia  Prince Rodriguez MD   20 mg at 11/03/24 0747    [START ON 11/5/2024] magnesium oxide (MAG-OX) tablet 400 mg  400 mg Oral Q24H Prince Mcdaniels MD        magnesium sulfate 4 g in 100 mL sterile water intermittent infusion  4 g Intravenous Once Aydee Damon PA-C        micafungin (MYCAMINE) 100 mg in sodium chloride 0.9 % 100 mL intermittent infusion  100 mg Intravenous Q24H Prince Mcdaniels MD        mycophenolate (CELLCEPT BRAND) suspension 1,000 mg  1,000 mg Per NG tube BID Prince Mcdaniels MD   1,000 mg at 11/03/24 0747    octreotide (sandoSTATIN) injection 100 mcg  100 mcg Subcutaneous TID Prince Mcdaniels MD   100 mcg at 11/03/24 0755    pantoprazole (PROTONIX) 2 mg/mL suspension 40 mg  40 mg Per NG tube Daily Prince Mcdaniels MD   40 mg at 11/03/24 0747    piperacillin-tazobactam (ZOSYN) intermittent infusion 3.375 g  3.375 g Intravenous Q6H Prince Mcdaniels  mL/hr at 11/03/24 0608 3.375 g at 11/03/24 0608    [START ON 11/4/2024] polyethylene glycol (MIRALAX) Packet 17 g  17 g Oral Daily Prince Mcdaniels MD        Potassium Phosphate 15 mmol in NS intermittent infusion 15 mmol  15 mmol Intravenous Once Anurag Grossman MD 62.5 mL/hr at 11/03/24 0712 15 mmol at 11/03/24 0712    senna-docusate (SENOKOT-S/PERICOLACE) 8.6-50 MG per tablet 1 tablet  1 tablet Oral BID Prince Mcdaniels MD   1 tablet at 11/03/24 0747    sodium chloride (PF) 0.9% PF flush 10 mL  10 mL Intracatheter Q8H Prince Mcdaniels MD   10 mL at 11/03/24 0423    sodium chloride (PF) 0.9% PF flush 3 mL  3 mL Intravenous Q8H Prince Mcdaniels MD        sulfamethoxazole-trimethoprim (BACTRIM/SEPTRA) suspension 80 mg  10 mL Per NG tube Daily Prince Mcdaniels MD   80 mg at 11/03/24 0747    tacrolimus (GENERIC) suspension 3 mg  3 mg Oral BID IS  Prince Mcdaniels MD   3 mg at 24 0747    [START ON 2024] valGANciclovir (VALCYTE) tablet 450 mg  450 mg Oral Once per day on  Prince Mcdaniels MD         Current Facility-Administered Medications   Medication Dose Route Frequency Provider Last Rate Last Admin    dextrose 10% infusion   Intravenous Continuous PRN Prince Mcdaniels MD        dextrose 5% in lactated ringers 1,000 mL infusion   Intravenous Continuous Prince Mcdaniels  mL/hr at 24 0700 Rate Verify at 24 0700    heparin (porcine) 100 unit/mL in 0.45% Sodium Chloride ANTICOAGULANT infusion  200 Units/hr Intravenous Continuous Prince Mcdaniels MD 2 mL/hr at 24 0700 200 Units/hr at 24 0700    insulin regular (MYXREDLIN) 1 unit/mL infusion  0-24 Units/hr Intravenous Continuous Prince Mcdaniels MD           Physical Exam   Temp  Av.3  F (36.8  C)  Min: 97.8  F (36.6  C)  Max: 98.9  F (37.2  C)  Arterial Line BP  Min: 110/59  Max: 114/63  Arterial Line MAP (mmHg)  Av mmHg  Min: 75 mmHg  Max: 79 mmHg      Pulse  Av.2  Min: 81  Max: 114 Resp  Av.8  Min: 15  Max: 24  SpO2  Av.7 %  Min: 94 %  Max: 100 %    CVP (mmHg): 9 mmHgBP 112/66   Pulse 111   Temp 98  F (36.7  C) (Oral)   Resp 24   Wt 89.1 kg (196 lb 8 oz)   SpO2 94%   BMI 30.78 kg/m     Date 24 0700 - 24 0659   Shift 4030-4549 2337-7502 7854-3511 24 Hour Total   INTAKE   I.V. 250   250   NG/GT 30   30   Shift Total(mL/kg) 280(3.14)   280(3.14)   OUTPUT   Urine 100   100   Shift Total(mL/kg) 100(1.12)   100(1.12)   Weight (kg) 89.13 89.13 89.13 89.13      Admit Weight: 89.1 kg (196 lb 8 oz)     GENERAL APPEARANCE: alert and no distress  HENT: mouth without ulcers or lesions  RESP: lungs clear to auscultation - no rales, rhonchi or wheezes  CV: regular rhythm, normal rate, no rub, no murmur  EDEMA: no LE edema  bilaterally  ABDOMEN: soft, nondistended, nontender, bowel sounds normal  MS: extremities normal - no gross deformities noted, no evidence of inflammation in joints, no muscle tenderness  SKIN: no rash    Data   All labs reviewed by me.  CMP  Recent Labs   Lab 11/03/24  1000 11/03/24  0918 11/03/24  0859 11/03/24  0801 11/03/24  0716 11/03/24  0614 11/03/24  0530 11/03/24  0226 11/03/24  0144 11/03/24  0135 11/03/24  0131 11/03/24  0019 11/02/24  1353 11/02/24  1352   NA  --   --   --   --   --   --  140  --  142 135  --  136   < > 140   POTASSIUM  --  4.5  --   --   --   --  4.3  4.3  --  2.9* 3.7  --  3.6   < > 4.4   CHLORIDE  --   --   --   --   --   --  105  --  113*  --   --   --   --  101   CO2  --   --   --   --   --   --  24  --  19*  --   --   --   --  27   ANIONGAP  --   --   --   --   --   --  11  --  10  --   --   --   --  12   *  --  147* 127* 131*   < > 122*   < > 77 86   < > 79   < > 157*   BUN  --   --   --   --   --   --  15.7  --  11.2  --   --   --   --  14.9   CR  --   --   --   --   --   --  1.32*  --  0.91  --   --   --   --  1.10   GFRESTIMATED  --   --   --   --   --   --  68  --  >90  --   --   --   --  84   APRIL  --   --   --   --   --   --  7.6*  --  5.8*  --   --   --   --  9.2   MAG  --   --   --   --   --   --  1.3*  --  0.9*  --   --   --   --   --    PHOS  --   --   --   --   --   --  2.4*  --  0.9*  --   --   --   --   --    PROTTOTAL  --   --   --   --   --   --   --   --   --   --   --   --   --  6.9   ALBUMIN  --   --   --   --   --   --   --   --   --   --   --   --   --  4.1   BILITOTAL  --   --   --   --   --   --   --   --   --   --   --   --   --  0.5   ALKPHOS  --   --   --   --   --   --   --   --   --   --   --   --   --  113   AST  --   --   --   --   --   --   --   --   --   --   --   --   --  15   ALT  --   --   --   --   --   --   --   --   --   --   --   --   --  13    < > = values in this interval not displayed.     CBC  Recent Labs   Lab 11/03/24  5587  11/03/24  0530 11/03/24  0135 11/03/24  0019 11/02/24  1711 11/02/24  1352   HGB 10.3* 10.2* 9.4* 8.6*   < > 14.8   WBC  --  14.2*  --   --   --  6.7   RBC  --  3.83*  --   --   --  5.36   HCT  --  32.4*  --   --   --  45.1   MCV  --  85  --   --   --  84   MCH  --  26.6  --   --   --  27.6   MCHC  --  31.5  --   --   --  32.8   RDW  --  13.3  --   --   --  13.1   PLT  --  140*  --   --   --  207    < > = values in this interval not displayed.     INR  Recent Labs   Lab 11/03/24  0144 11/02/24  1352   INR 1.49* 1.05   PTT 33 34     ABG  Recent Labs   Lab 11/03/24  0135 11/03/24  0019 11/02/24  2324 11/02/24  2221   PH 7.46* 7.40 7.37 7.36   PCO2 35 37 40 40   PO2 109* 124* 105 129*   HCO3 25 23 23 22   O2PER 55.0 56.0 53.0 46.0      Urine Studies  Recent Labs   Lab Test 11/02/24  1503 11/21/23  1154 11/21/19  0810 10/22/19  0612   COLOR Yellow Straw Yellow Yellow   APPEARANCE Clear Clear Clear Clear   URINEGLC Negative Negative Negative 50*   URINEBILI Negative Negative Negative Negative   URINEKETONE Negative Negative Negative Negative   SG 1.026 1.006 1.016 1.015   UBLD Negative Negative Negative Small*   URINEPH 5.5 6.0 5.0 5.0   PROTEIN 10* Negative Negative Negative   NITRITE Negative Negative Negative Negative   LEUKEST Negative Negative Negative Trace*   RBCU <1 <1 <1 28*   WBCU <1 0 <1 12*     Recent Labs   Lab Test 11/16/21  1502 05/11/21  1532 07/16/20  1417 11/13/19  1053 10/22/19  0612 10/18/19  0630   UTPG 0.11 0.10 Unable to calculate due to low value 0.21* 0.24* 0.22*     PTH  Recent Labs   Lab Test 09/26/19  0945   PTHI 356*     Iron Studies  Recent Labs   Lab Test 09/26/19 0945   IRON 70      IRONSAT 28   EDOUARD 753*       IMAGING:  All imaging studies reviewed by me.    Past Medical History    I have reviewed this patient's medical history and updated it with pertinent information if needed.   Past Medical History:   Diagnosis Date    Anemia in chronic kidney disease     Dyslipidemia     ESRD  (end stage renal disease) on dialysis (H)     Hyperlipidemia     Hypertension     Hypomagnesemia 10/07/2019    Obese     Secondary hyperparathyroidism (H)     Shingles 12/11/2023 12/11/23: Left Axilla    Status post coronary angiogram 6/7/2024    Type 1 diabetes (H)        Past Surgical History   I have reviewed this patient's surgical history and updated it with pertinent information if needed.  Past Surgical History:   Procedure Laterality Date    CV CORONARY ANGIOGRAM N/A 6/7/2024    Procedure: Coronary Angiogram;  Surgeon: Derrell Jauregui MD;  Location: [facility] CARDIAC CATH LAB    CV PCI N/A 6/7/2024    Procedure: Percutaneous Coronary Intervention;  Surgeon: Derrell Jauregui MD;  Location: [facility] CARDIAC CATH LAB    hair implant  2007    INSERT CATHETER PERITONEAL DIALYSIS  08/2018    IR FINE NEEDLE ASPIRATION W ULTRASOUND  11/25/2019    IR RENAL BIOPSY LEFT  11/25/2019    PERCUTANEOUS BIOPSY KIDNEY Left 10/22/2019    Procedure: Left Kidney Biopsy;  Surgeon: Kash Hoffman MD;  Location:  OR       Family History   I have reviewed this patient's family history and updated it with pertinent information if needed.   Family History   Problem Relation Age of Onset    No Known Problems Mother     Diabetes Father     Coronary Artery Disease Father     No Known Problems Brother     Skin Cancer Paternal Uncle     Melanoma No family hx of        Social History   I have reviewed this patient's social history and updated it with pertinent information if needed. Michael ALDANA Gulf  reports that he has never smoked. He has never used smokeless tobacco. He reports that he does not currently use alcohol. He reports that he does not use drugs.    Prior to Admission Medications   Medications Prior to Admission   Medication Sig Dispense Refill Last Dose/Taking    alcohol swab prep pads Use to swab area of injection/zak as directed. 100 each 3     aspirin (ASA) 81 MG EC tablet Take 81 mg by mouth daily         "atorvastatin (LIPITOR) 10 MG tablet Take 2 tablets (20 mg) by mouth daily 90 tablet 0     benzoyl peroxide 5 % external liquid Lather in shower, rinse completely. (Patient not taking: Reported on 9/13/2024) 148 mL 11     blood glucose (NO BRAND SPECIFIED) lancets standard Use to test blood sugar 3 times daily or as directed. 100 each 11     blood glucose (NO BRAND SPECIFIED) test strip Use to test blood sugar 3 times daily or as directed. 100 strip 11     blood glucose monitoring (NO BRAND SPECIFIED) meter device kit Use to test blood sugar 3 times daily or as directed. 2 kit 0     carvedilol (COREG) 12.5 MG tablet TAKE 1 TABLET BY MOUTH TWICE DAILY WITH MEALS PLEASE  REQUEST  FUTURE  REFILLS  FROM  YOUR  PRIMARY  CARE  PROVIDER 180 tablet 0     clindamycin (CLEOCIN T) 1 % external lotion Apply topically 2 times daily (Patient not taking: Reported on 9/13/2024) 60 mL 4     Continuous Glucose Sensor (DEXCOM G6 SENSOR) MISC CHANGE SENSOR EVERY 10 DAYS 9 each 3     Continuous Glucose Transmitter (DEXCOM G6 TRANSMITTER) MISC Change every 3 months 1 each 3     insulin glargine (LANTUS SOLOSTAR) 100 UNIT/ML pen Inject 28 units subcutaneous once a day ONLY IF INSULIN PUMP FAILS. 15 mL 1     Insulin Infusion Pump Supplies (AUTOSOFT XC INFUSION SET) MISC 1 each every 48 hours Change every 2 days  9 mm cannula, 23 inch tubing 45 each 3     Insulin Infusion Pump Supplies (T:SLIM X2 3ML CARTRIDGE) MISC 1 each by Other route every other day Insulin cartridge to be used with pump as directed.  Change every 2 days or as directed. 45 each 3     insulin lispro (HUMALOG VIAL) 100 UNIT/ML vial Use in insulin pump. Pt uses approx 75 units daily. 80 mL 3     insulin pen needle (ULTICARE MICRO) 32G X 4 MM miscellaneous Use pen needles daily or as directed. 100 each 3     Insulin Syringe-Needle U-100 31G X 1/4\" 1 ML MISC 1 Syringe as needed (until new insuline pump arrives) 50 each 1     magnesium oxide (MAG-OX) 400 MG tablet Take 1 " tablet (400 mg) by mouth daily (with lunch) 30 tablet 5     mycophenolate (GENERIC EQUIVALENT) 250 MG capsule Take 3 capsules (750 mg) by mouth 2 times daily 180 capsule 11     Semaglutide, 2 MG/DOSE, (OZEMPIC) 8 MG/3ML pen Inject 2 mg subcutaneous once a week. Please provide 90 day supply. 9 mL 3     senna-docusate (SENOKOT-S/PERICOLACE) 8.6-50 MG tablet Take 2 tablets by mouth 2 times daily 20 tablet 0     tacrolimus (GENERIC) 0.5 MG capsule Take 1 capsule (0.5 mg) by mouth every morning Total dose = 1.5 mg in the AM and 1 mg in the PM 90 capsule 3     tacrolimus (GENERIC) 1 MG capsule Total dose = 1.5 mg in the AM and 1 mg in the  capsule 3     vitamin D3 (CHOLECALCIFEROL) 50 mcg (2000 units) tablet Take 1 tablet (50 mcg) by mouth daily

## 2024-11-03 NOTE — PROVIDER NOTIFICATION
"On-call paged with the following:    \"[id number] ISIDORO Amin - Pt back to 7A from PACU. Ca 5.8, Mag 0.9, Phos 0.9. Do you want a re-draw? If so, please place orders. Thanks! - Pau [phone]\"    0355: Stat labs ordered.    Pau Malloy RN    "

## 2024-11-03 NOTE — TELEPHONE ENCOUNTER
"Organ Offer Encounter Information    Organ Offer Information  Organ offer date & time: 11/1/2024 11:22 AM  Coordinator/Fellow/Attending name: Laura Dill RN   Organ(s):  Organ UNOS ID Match Run ID Comment Organ Laterality   Pancreas [id number]         Recent infections?: No      New medications?: No Recent pregnancy?: No     Angicoagulation medications?: Yes (Comment: ASA 81mg) Recent vaccinations?: No     Recent blood transfusions?: No Recent hospitalizations?: No   Has your insurance changed in the last 6-12 months?: Neg    Discussed organ offer with: Patient  Discussed risk category with Patient/Other: N/A  Understood donor criteria, verbalized understanding  Patient/Other asked to speak to a surgeon?: No  Discussed program-specific outcomes: Did not have questions regarding SRTR  Right to decline organ offer without penalty, Patient/Other: Aware of option to decline without penalty  Organ offer decision status Patient/Other: Accepted Offer  Organ disposition: Transplanted  Additional Comments: 11/1/2024 11:23 AM  KP/Panc: Import, DBD  MD: Enedelia/Jose Luis  OPO Contact: Carol [phone]  VXM Results: Per Dr. La - does not need VXM  XM Plan (FXM must be done with serum no older than 10 days from transplant):  Come to [facility]/admit; donor blood ETA 0930  IF SOLITARY PANC: Local or Import Donor: Import  (For import cases, utilize the \"TRANSPORTSETUP\" smart phrase to arrange transportation)  Plan (Admission, NPO, Donor OR): Called patient with possible PA offer.  Health assessment as above.  Patient is currently at work.  Explained that we are working on confirming logistics prior to having patient come to have blood drawn for XM.  Will call back in 1-2hrs.  - - -   COVID Screening  In the past month, have you:  Or anyone close to you had a positive COVID test or suspected to have COVID:  No  Had any COVID symptoms (Fever, Cough, Short of Breath, Loss of Taste/Smell, Rash):  No  - - -  Call [facility] ([phone]): Done by Roz (via [employer])  Organ or Specimen ([employer] sets up KP/KI):  Organ  Donor Hospital Location: [facility] - [address] [place] [address]  Donor OR Time: 0730 CDT  Team or Organ Only: Organ  Special Equipment (OCS, Pumps): None  [facility] SRC (See Call Schedule): N/A  - - -  [facility] Trip Number: [id number]   Location: [facility] - [address] [place] [address]  Keerthi Dill RN   Transplant Coordinator    November 1, 2024 1:26 PM  Donor OR set for 0730 CDT tomorrow.  Per Carol at [facility], they are willing to split cost of charter.  Spoke to Jose D at [facility] to discuss plan for charter.  Per Jose D, the charter originates in [facility] (no reposition time).  Cancellation fee would be incurred if not cancelled before pilots duty on.  Requested pilots duty on at 1230 CDT (cancellation would need to be before 1030 CDT) per Dr. Mcdaniels.  Called patient to update on plan to have him come to [facility] this afternoon for XM and COVID testing, orders placed, lab appointment made for 1700.  Donor blood is in immunology.  Updated Carmelina on plan for XM.  Patient has a brother that lives in [facility], will plan to stay with him after blood draw, until we get visualization on the pancreas tomorrow.  - - -  Admissions: 1335 - Andrez, ETA 0930  Unit: 1337 - Na 7A notified of need for bed tomorrow AM  Keerthi Dill RN   Transplant Coordinator      November 1, 2024 9:29 PM  Donor OR Time: 0730 CDT  Procuring MD: Dr Pritchett  Contact in the OR: TBD  Organs Being Procured: HR, TONNY, IVONE, PA, KI  Expected Delays: No  Flush Solution: SPS-1  Biopsy: No  Pump: No  Special Requests (Special blood tubes, nodes, waivers-XM and/or anatomical): N/A   MD for Visualization: Enedelia  Transportation Details: Plane on standby with [facility], need to cancel by 1030 to avoid cancellation charge; Tail #[id number] (originating out of [facility] - no reposition time).  Keerthi Dill RN    Transplant Coordinator      November 2, 2024 6:47 AM  Reviewed final XM with Dr. Mcdaniels, ok to proceed.  Updated patient on plan for NPO at this point, follow up around 1000 this AM.  Confirmed plan for transportation with [facility].  Shilpa [phone] taking over at 0800.  Keerthi Dill RN   Transplant Coordinator      November 2, 2024 9:16 AM  Per Shilpa at [facility], estimating XC time around 1030.  Discussed with Dr. Mcdaniels - will push duty on time back to 1330.  Updated [place] at [facility].  Keerthi Dill RN   Transplant Coordinator      November 2, 2024 11:25 AM  Pancreas accepted by Dr. Mcdaniels.  Updated patient & given instructions for admission.  Shilpa 7A updated.    Activated hot standby plane ([place] at [facility]).  Update Provider Entering Orders (XM Plan & COVID Testing):  1126 - Aydee - Does NOT need XM or COVID testing  Immunology: 1135 - FRANKLYN ZHENG [ Msg Id [id number] ]  KIDNEY Research ([phone]): N/A  Book OR: 1137 - [facility], booked for 1600  Vessel Storage Confirmation (PA/KP/IVONE): Ok to bank, confirmed with [facility]  Blood Bank: [facility] notified  TransNet/ABO Verification: 1132 - Done  Add Organ: 1132 - Done  Keerthi Dill RN   Transplant Coordinator  Attestation I have discussed all of the above with the Patient/Legal Guardian/Caregiver regarding this organ offer.: Yes  Coordinator/Fellow/Attending name: Laura Dill RN

## 2024-11-03 NOTE — OP NOTE
Transplant Surgery  Operative Note     Procedure Date:  Case start 11/2, complete 11/03/24    Preoperative Diagnosis:  Type 1 Diabetes, s/p kidney transplant    Postoperative Diagnosis:  Same    Procedure:   1. Donation after Brain Death transplant with Enteric w/maikol-en-y drainage   2. Pancreas allograft preparation on Back Table   3. Open appendectomy  4. Repair iliac vein with patch  Please add 22 modifier- case was difficult due to anatomy, need for iliac vein repair, and need for revision of both arterial limbs of Y graft after reperfusion     Surgeon:  Surgeons and Role:     * Prince Mcdaniels MD - Primary     * Quintin Amaya MD - Fellow - Assisting. Dr. Amaya was the primary assistant for the procedure and participated in all aspects of the case under my supervision. His presence was necessary due to lack of suitable level surgical      * Rah Monge MD - Fellow - Assisting    Fellow/Assistant: As above    Anesthesia:  General    Specimen:  appendix    Drains:  Scar-Barrios drain    Urine Output:  2850 mls    Estimated Blood Loss:  500    Fluids Administered:            Intraoperative Events: Artery Reanastomosis    Complications:  twist noted in both limbs of Y graft artery after reperfusion, requiring clamping of each limb and revision of each separately while perfusion maintained through the other. Iliac vein injury made during dissection repaired with patch of donor vein .    Findings: Recipient artery: Abnormal: mild to moderate atherosclerosis. Donor artery: Abnormal - very mild soft plaque in iliac limb.      None.  Graft pancreas normal quality, normal anatomy. Reconstruction with Y graft to splenic and SMA. Internal iliac veins ligated and divided and common iliac vein fully mobilized. During dissection vein injury made to inferolateral wall requiring patch repair- donor vein used for patch. Placed head down in right iliac fossa- PV end to side to common  iliac vein and Y graft end to side to proximal external iliac artery (atherosclerosis to common iliac artery). After reperfusion noted that splenic artery pulse was positional and weak, but dopplerable and tail appeared perfused. On examination noted a twist in the artery. SMA kept open, splenic clamped and divided. Megan catheter passed without return of clot and heparinized saline used to irrigate artery. Anastomosis revised with significant improvement in pulse. NOted that SMA also twisted- similar process repeated with improved flows. Mark en Y bowel anastomosis performed to reduce any tension on pancreas vasculature given short thick mesentery and vascular issues mentioned above. Appendix removed in standard open fashion. Drain placed in RLQ.       Indication: The patient has Type 1 diabetes. The patient received an organ offer for a Donation after Brain Death pancreatic duodenal allograft. After discussing the risks and benefits of proceeding, the patient agreed  to proceed with surgery and provided informed consent.    Final ABO/Crossmatch verification: After the donor organ arrived to the operating room and prior to anastomosis, I participated in the transplant pre-verification upon organ receipt timeout by visually verifying the donor ID, organ and laterality, donor blood type, recipient unique identifier, recipient blood type, and that the donor and recipient are blood type compatible. The crossmatch was done prospectively; the T cell flow crossmatch result was negative and B cell Flow crossmatch result was negative. The patient received Thymoglobulin, Cellcept, and Solumedrol on induction.       Donor Organ Information:   Donor UNOS ID: [id number]  Donor ABO: O  Donor Arterial Clamp on: 11/2/2024 10:19 AM  Vessels with Organ: Yes    Preservation Fluid: UW    Total Ischemic Time:  711  Cold Ischemic Time: 711  Warm Ischemic Time: 34     Back Table Details:     Procedure:  Bench preparation of the pancreas  allograft for transplantation with arterial reconstruction    Preoperative Diagnosis:  Type 1 Diabetes    Postoperative Diagnosis:  Same    Surgeon:  Surgeons and Role:     * Prince Dozier MD - Primary     * Quintin Amaya MD - Fellow - Assisting     * Rha Monge MD - Fellow - Assisting    Faculty Co-Surgeon:  PRINCE DOZIER    Fellow/Assistant:  As above    Anesthesia:  None    Graft Injury:  No    Donor Arrival to Recipient Room:  11/2/2024  4:25 PM    Portal Venous Extension:  No    Donor Iliac Vessel Quality:  Mild-Moderate atherosclerosis     Findings: as above    Back Table Preparation: We received the donor pancreas and inspected. It had been previously flushed with UW. We removed the spleen by doubly ligating vessels between the tail of the pancreas and the spleen.  The peripancreatic fat was ligated with 3-0 silk ties and removed.  The proximal duodenum staple line was inverted and oversewn using running 4-0 Prolene suture. The bile duct and GDA were re-secured. The previously stapled root of the mesentery was oversewn using running 4-0 Prolene forward and back. The third and fourth portions of the duodenum were freed away from the pancreas by ligating and dividing the intervening tissue with a series of 3-0 and 4-0 silk ties. Ganglionectomy was performed with 3-0 and 4-0 silk ties.     65     Operative Procedure:   Arterial anastomosis start:  11/2/2024  9:36 PM   Recipient arterial unclamp:  11/2/2024 10:10 PM   Extra vessels used:    UNOS ID Type Placement Comments   [id number] Donor Iliac Bifurcation Graft Artery Iliac Artery       Extra vessels banked:  No     The patient was brought to the operating room, placed in a supine position, and a time out was performed. Sequential compression devices were placed on both lower extremities and general endotracheal anesthesia was induced. The patient was given IV antibiotics, and  Thymoglobulin, Cellcept, and  Solumedrol. A Méndez catheter was placed. A central line was placed by Anesthesia service. The abdomen was then shaved, prepped, and draped in the usual sterile fashion.  We performed a lower midline incision, divided the linea alba and opened the peritoneum sharply under direct vision. Retractors were placed.     We mobilized the right colon and the ureter medially and proceeded to circumferentially dissect the right iliac vessels. The internal iliac vein was ligated and divided. A venotomy was made in this process and repaired with vein clamping and a patch of donor vein. We obtained vascular control, performed a venotomy, and performed an anastomosis between the donor portal vein and the recipient's right Common Iliac. We then obtained proximal and distal control of the right external iliac artery. Arteriotomy and irrigation ensued, and the donor Y graft was anastomosed to the external iliac artery  in an end to side fashion. After both anastomoses were completed, we opened the clamps. The pancreas consistency and reperfusion quality was Pink throughout, the graft duodenum was Pink throughout. There was mild pancreatitis. We noted diminished splenic artery pulse and, on examination, noted that the splenic artery had a twist. We clamped and divided the anastomosis while maintaining the SMA open. We passed a omar catheter to assess for and remove any clot (none was present). We revised the anastomosis. We examined the SMA and noted a similar twist- this process was repeated with the SMA as well. Overall the graft was rated [place] grade A. The exocrine secretions of the pancreas were drained via Enteric w/maikol-en-y employing  a side to side hand sewn 2-layered. The pancreas placement was Intra-Peritoneal.    We irrigated the abdomen and final hemostasis obtained. Scar-Barrios drain was placed. The retractor was removed and the abdominal wall fascia reapproximated.  Subcutaneous tissues were irrigated and  hemostasis obtained. The skin was reapproximated with staples and a dry dressing was applied. All needle, sponge and instrument counts were correct x 2. Faculty was present for key portions of the procedure. The patient was awakened, extubated, and transferred to the PACU  for post-op monitoring.

## 2024-11-03 NOTE — ANESTHESIA CARE TRANSFER NOTE
Patient: Michael Amin    Procedure: Procedure(s):  Pancreas transplant, benching of donor pancreas, Iliac vein repair and appendectomy.       Diagnosis: Diabetes (H) [E11.9]  Diagnosis Additional Information: No value filed.    Anesthesia Type:   General     Note:    Oropharynx: oropharynx clear of all foreign objects and spontaneously breathing  Level of Consciousness: drowsy and awake  Oxygen Supplementation: face mask  Level of Supplemental Oxygen (L/min / FiO2): 8  Independent Airway: airway patency satisfactory and stable  Dentition: dentition unchanged  Vital Signs Stable: post-procedure vital signs reviewed and stable  Report to RN Given: handoff report given  Patient transferred to: PACU    Handoff Report: Identifed the Patient, Identified the Reponsible Provider, Reviewed the pertinent medical history, Discussed the surgical course, Reviewed Intra-OP anesthesia mangement and issues during anesthesia, Set expectations for post-procedure period and Allowed opportunity for questions and acknowledgement of understanding    Vitals:  Vitals Value Taken Time   BP     Temp     Pulse     Resp     SpO2         Electronically Signed By: Cesar OG Ma, MD  November 3, 2024  1:44 AM

## 2024-11-03 NOTE — CODE/RAPID RESPONSE
Rapid Response Team Note    Assessment   A rapid response was called on Michael Amin due to SIRS/Sepsis trigger. This presentation is unclear and difficult to interpret in the very recent post operative state. Appears patient's lactic acid suzette near the end of procedure peaking at 3.5 and has continued to down trend. Unclear if related to vascular complications noted on operative report. No signs of acute abdomen on exam. This also could be related to the multiple medications including steroids started in the perioperative period. Low concern for intravascular depletion as patient is +10L up from admission, received multiple fluid boluses intra-op. Lower concern for sepsis, and patient receiving zosyn for perioperative ppx.     Plan   -  Repeat lactic acid in 3 hours   -  The  Transplant surgery  primary team was able to be reached and they are in agreement with the above plan.  -  Disposition: The patient will remain on the current unit. We will continue to monitor this patient closely.  -  Reassessment and plan follow-up will be performed by the primary team    Jada Castrejon PA-C  Rapid Response Team ROX  Securely message with Aligo     Medical Decision Making       25 MINUTES SPENT BY ME on the date of service doing chart review, history, exam, documentation & further activities per the note.        Hospital Course   Brief Summary of events leading to rapid response:   RRT was called for lactic of 2.2, triggered by sepsis BPA.     Patient reports feeling pretty good post operatively. Has some abdominal pain, but well controlled on IV pain medications. Reports shallow breathing with difficulty taking a deep breath due to his abdomen feeling distended. Feeling swollen in his arms and abdomen after surgery. No fevers. No recent illness reported on recent H&P       Physical Exam   Vital Signs: Temp: 98  F (36.7  C) Temp src: Oral BP: 112/66 Pulse: 111   Resp: 24 SpO2: 94 % O2 Device: Nasal cannula Oxygen  Delivery: 1 LPM  Weight: 196 lbs 8 oz      Exam:   Blood pressure 112/66, pulse 111, temperature 98  F (36.7  C), temperature source Oral, resp. rate 24, weight 89.1 kg (196 lb 8 oz), SpO2 94%.  GENERAL: Alert and awake. Answering questions appropriately. NAD. Pleasant and conversational   HEENT: NG tube in place.   Neck: R internal jugular in place.   CARDIOVASCULAR: Tachycardic rate, regular rhythm, S1, S2. No murmurs, rubs, or gallops.   RESPIRATORY: Effort normal on 1L NC. Clear to anterior auscultation bilaterally, no rales, rhonchi or wheezes  GI: Abdomen distended but soft, generalized mild tenderness without rebound or guarding, Hypoactive BS. Vertical midline incision with spotting of blood on dressing but intact. Right sided drain in place. Abdomen covered by abdominal binder.   : Méndez in place with clear yellow urine.   EXTREMITIES: Non-pitting edema in hands bilaterally.       Significant Results and Procedures   See chart for details.     Sepsis Evaluation   The patient is not known to have an infection.    NO EVIDENCE OF SEPSIS at this time.  Vital sign, physical exam, and lab findings are due to see above. .

## 2024-11-03 NOTE — PROGRESS NOTES
CLINICAL NUTRITION SERVICES - ASSESSMENT NOTE     Nutrition Prescription    RECOMMENDATIONS FOR MDs/PROVIDERS TO ORDER:  Diet advancement per team     Malnutrition Status:    Patient does not meet two of the established criteria necessary for diagnosing malnutrition but is at risk for malnutrition    Recommendations already ordered by Registered Dietitian (RD):  None today due to NPO      Future/Additional Recommendations:  Minimize diet restrictions as able d/t high calorie/protein needs post-transplant.  Oral supplements as needed to help meet nutritional needs.     High protein food choices with meals to help meet high needs post-transplant over the next 6-8 weeks.     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 Patient is now s/p IVETTE with Dr. Mcdaniels on 11/2/24 complicated by arterial reconstruction.     POD1 ( no insulin requirement )     NUTRITION HISTORY  Visited with patient today. Patient reports not feeling well and would like RD to come back in another day.     CURRENT NUTRITION ORDERS  Diet: NPO  Intake/Tolerance: NPO since admit 11/2    LABS  Labs reviewed    MEDICATIONS  Mycophenolate  Tacrolimus  Bowel regimen: Senokot-S, Miralax     ANTHROPOMETRICS  Height: 0 cm (Data Unavailable) 170.2 cm   Most Recent Weight: 89.1 kg (196 lb 8 oz) standing wt on admit 11/2   IBW: 67.2 kg ( 132% IbW)  BMI: Obesity Grade I BMI 30-34.9  Weight History:   Wt Readings from Last 10 Encounters:   11/02/24 89.1 kg (196 lb 8 oz)   09/13/24 89 kg (196 lb 4.8 oz)   06/19/24 84.4 kg (186 lb)   06/07/24 86.8 kg (191 lb 4.8 oz)   06/04/24 87.5 kg (193 lb)    05/01/24 89.1 kg (196 lb 8 oz)   05/01/24 89 kg (196 lb 3.2 oz)   02/09/24 86.6 kg (191 lb)   12/05/23 93.1 kg (205 lb 3.2 oz)   11/21/23 91.2 kg (201 lb)       Dosing Weight: 73 kg adjusted wt from admit wt of 89.1 kg and IBW of 67.2 kg     ASSESSED NUTRITION NEEDS:  Estimated Energy Needs: 2190- 2555 kcals (30-35 Kcal/Kg)  Justification: increased needs post-op SOT  Estimated Protein Needs: 88- 146 grams protein (1.2- -2 gm/Kg)  Justification: increased needs post op SOT  Estimated Fluid Needs: per MD pending fluid status and adequate UOP    PHYSICAL FINDINGS  See malnutrition section below.    MALNUTRITION  % Intake: Decreased intake does not meet criteria  % Weight Loss: None noted  Subcutaneous Fat Loss: None observed  Muscle Loss: None observed  Fluid Accumulation/Edema: None noted  Malnutrition Diagnosis: Patient does not meet two of the established criteria necessary for diagnosing malnutrition but is at risk for malnutrition    NUTRITION DIAGNOSIS:  Food and nutrition-related knowledge deficit r/t length of time since previous post-transplant education AEB patient verbal report, review of chart record, and MD consult for nutrition education.     INTERVENTIONS  Implementation  Nutrition Education:not provided due to patient's condition    Goals  Diet advancement >> NPO/CL within the next 1-2 days to avoid nutrition support     Monitoring/Evaluation  Progress toward goals will be monitored and evaluated per protocol.    Megan Gardiner RD/RM   Weekend/Holiday: Vocera -> (Weekend Clinical Dietitian)

## 2024-11-03 NOTE — PLAN OF CARE
Goal Outcome Evaluation:      Plan of Care Reviewed With: patient    Overall Patient Progress: no changeOverall Patient Progress: no change    Outcome Evaluation: Transferred from PACU ~0200, CVPs 8-10    BP 98/64   Pulse 93   Temp 98.6  F (37  C)   Resp 15   Wt 89.1 kg (196 lb 8 oz)   SpO2 97%   BMI 30.78 kg/m      Shift: 0576-7127, pt arrived to unit ~0245  Isolation Status: None  VS: VSS on 2 L O2, afebrile  Neuro: Aox4, able to make needs known  Behaviors: Calm, cooperative  BG: Q1H - , orders to start insulin gtt once BG > 150  Labs/Imaging: K 4.3, Cr 1.32, Ca 7.6, Mg 1.3, Phos 2.4, lipase 83, WBC 14.2, Hgb 10.2, hematocrit 32.4, plt 140, INR 1.49  Respiratory: Satting >95% on 2 L O2 via NC. Continuous SpO2 monitoring + capnography monitoring in place.  Cardiac: BP soft 90s/60s, intermittently tachycardic low 100s. Telemetry monitoring in place, sinus tachycardia.  Pain/Nausea: Denies nausea. Endorses mild abdominal/incisional pain, managed with scheduled Tylenol + PRN IV dilaudid with relief.  PRN: IV Dilaudid (see MAR)  Diet: NPO ice chips, frequently asking for ice  LDA: R TL internal jugular (all lumens infusing), R PIV SL, Méndez catheter, R RUBIA drain  Infusion(s): D5LR MIVF @ 100 mL/hr, heparin SR @ 2 mL/hr, Zosyn q6h  GI/: Méndez catheter w/185 mL total output, yellow in color. LBM 11/2, not yet passing gas.  Skin: Midline abdominal incision w/op dressing in place, drainage marked by PACU, drainage extended. R RUBIA drain w/70 mL total output, bloody/bright red in color, dressing CDI.  Mobility: Assist x 1-2 gait belt/walker. Not OOB overnight.  Events/Education: Transferred to  from PACU ~0245, oriented to room/call light. Family at bedside briefly to greet pt, will return in AM. Med card & lab book started. Rest promoted, cares clustered, lighting decreased. Pt able to sleep for a few minutes between cares.  Plan: Continue with plan of cares and notify team with any changes.

## 2024-11-03 NOTE — PLAN OF CARE
Goal Outcome Evaluation:      Plan of Care Reviewed With: patient    Overall Patient Progress: improvingOverall Patient Progress: improving    Outcome Evaluation: Stable post operative course    Vitals: /65   Pulse 106   Temp 98  F (36.7  C) (Oral)   Resp 24   Wt 89.1 kg (196 lb 8 oz)   SpO2 92%   BMI 30.78 kg/m    Tachycardic up to 110, cardiac monitoring, RR 24-28 shallow breathing but no SOB. O2 1L per NC, good sats.500cc LR bolus for soft BP's.  Endocrine: Stable glucoses, insulin drip started at 1430 for glucose of 155, alg 1.  Labs: Magnesium 1.3, replaced with 4 GM IV. Lactic 2.2, (rapid called, re -check this afternoon 0.9.  Pain: Abdominal discomfort.  PRN's: Dilaudid 0.2 mg IV X2.  Diet: NPO, ice chips.  LDA: R TL internal jugular, R PIV, R RUBIA, paredes, NGT to LIS. D5LR at 100cc/hr, Heparin increased to 400 u/hr.  GI: Not passing gas, 100cc emesis, not nauseated but gag reflex. 250cc out per NG. Patient denies nausea but reports pressure and discomfort if NG is clamped too long.  : Paredes in, oliguric (team aware, ok with 50cc/hr)  Skin: Pale, scattered scabs on right let, abdomen, abdominal incision with surgical dressing on (old drainage marked), generalized edema, facial edema.  Neuro: Oriented X4, resting between cares.  Mobility: Bedrest this shift, will need to get up this afternoon.  Education: Medication card and lab book started.  Plan: Continue with post op cares.

## 2024-11-03 NOTE — PHARMACY-ADMISSION MEDICATION HISTORY
Pharmacy Intern Admission Medication History    Admission medication history is complete. The information provided in this note is only as accurate as the sources available at the time of the update.    Information Source(s): Patient and CareEverywhere/SureScripts via in-person    Pertinent Information:   Patient is able to verify his medication list   insulin lispro (HUMALOG VIAL) 100 UNIT/ML vial: Use in insulin pump --> patient reported that he usually take about 60 units per day   insulin glargine (LANTUS SOLOSTAR) 100 UNIT/ML pen --> patient has never needed to take this medication   tacrolimus (GENERIC) 0.5 MG capsule & tacrolimus (GENERIC) 1 MG capsule: patient isn't able to verify whether it is generic or brand but per SureScripts, they are dispensed as generic at [facility] (P:[phone])    Changes made to PTA medication list:  Added: None  Deleted:   benzoyl peroxide 5 % external liquid: Lather in shower, rinse completely --> patient isn't using this medication and requested to take it off the list   clindamycin (CLEOCIN T) 1 % external lotion: Apply topically 2 times daily --> patient isn't using this medication and requested to take it off the list   Changed:   senna-docusate (SENOKOT-S/PERICOLACE) 8.6-50 MG tablet; Take 2 tablets by mouth 2 times daily --> 2 tablets daily prn     Allergies reviewed with patient and updates made in EHR: yes    Medication History Completed By: Chanel Parr 11/3/2024 1:15 PM    PTA Med List   Medication Sig Last Dose/Taking    aspirin (ASA) 81 MG EC tablet Take 81 mg by mouth daily  11/2/2024 Morning    atorvastatin (LIPITOR) 10 MG tablet Take 2 tablets (20 mg) by mouth daily 11/1/2024 Evening    carvedilol (COREG) 12.5 MG tablet TAKE 1 TABLET BY MOUTH TWICE DAILY WITH MEALS PLEASE  REQUEST  FUTURE  REFILLS  FROM  YOUR  PRIMARY  CARE  PROVIDER 11/2/2024 Morning    insulin lispro (HUMALOG VIAL) 100 UNIT/ML vial Use in insulin pump. Pt uses approx 75 units daily.  Unknown    magnesium oxide (MAG-OX) 400 MG tablet Take 1 tablet (400 mg) by mouth daily (with lunch) 11/1/2024 Noon    mycophenolate (GENERIC EQUIVALENT) 250 MG capsule Take 3 capsules (750 mg) by mouth 2 times daily 11/2/2024 Morning    Semaglutide, 2 MG/DOSE, (OZEMPIC) 8 MG/3ML pen Inject 2 mg subcutaneous once a week. Please provide 90 day supply. (Patient taking differently: Inject 2 mg subcutaneously once a week. Inject 2 mg subcutaneous once a week. Please provide 90 day supply.) 10/26/2024 Morning    senna-docusate (SENOKOT-S/PERICOLACE) 8.6-50 MG tablet Take 2 tablets by mouth 2 times daily (Patient taking differently: Take 2 tablets by mouth daily as needed for constipation.) Unknown    tacrolimus (GENERIC) 0.5 MG capsule Take 1 capsule (0.5 mg) by mouth every morning Total dose = 1.5 mg in the AM and 1 mg in the PM 11/2/2024 Morning    tacrolimus (GENERIC) 1 MG capsule Total dose = 1.5 mg in the AM and 1 mg in the PM 11/2/2024 Morning    vitamin D3 (CHOLECALCIFEROL) 50 mcg (2000 units) tablet Take 1 tablet (50 mcg) by mouth daily 11/2/2024 Morning

## 2024-11-03 NOTE — CONFIDENTIAL NOTE
Patient removed from OS waitlist after  donor Pancreas transplant. OS ID [id number].    Donor Has Risk Criteria for Transmission of HIV/HCV/HBV: No  Recipient Notified of Risk Criteria: N/A

## 2024-11-03 NOTE — PROGRESS NOTES
Brief Surgery Crossover Note    Paged by RN, patient does not have oxycodone ordered. Reviewed Transplant surgery daily progress note, which indicates S tylenol and prn oxycodone for pain control. Reviewed patient's allergy list and ordered prn oxycodone as part of multimodal pain control.      /66   Pulse 104   Temp 98.5  F (36.9  C) (Oral)   Resp 24   Wt 89.1 kg (196 lb 8 oz)   SpO2 92%   BMI 30.78 kg/m      Mike Alejandra MD  General Surgery PGY-1    **For on call schedules and contact information, please refer to [facility]**

## 2024-11-04 ENCOUNTER — APPOINTMENT (OUTPATIENT)
Dept: GENERAL RADIOLOGY | Facility: CLINIC | Age: 44
DRG: 010 | End: 2024-11-04
Attending: STUDENT IN AN ORGANIZED HEALTH CARE EDUCATION/TRAINING PROGRAM
Payer: COMMERCIAL

## 2024-11-04 ENCOUNTER — DOCUMENTATION ONLY (OUTPATIENT)
Dept: TRANSPLANT | Facility: CLINIC | Age: 44
End: 2024-11-04
Payer: COMMERCIAL

## 2024-11-04 ENCOUNTER — LAB REQUISITION (OUTPATIENT)
Dept: LAB | Facility: CLINIC | Age: 44
End: 2024-11-04
Payer: COMMERCIAL

## 2024-11-04 DIAGNOSIS — T86.11 KIDNEY TRANSPLANT REJECTION: ICD-10-CM

## 2024-11-04 LAB
ALBUMIN SERPL BCG-MCNC: 2.4 G/DL (ref 3.5–5.2)
AMYLASE SERPL-CCNC: 66 U/L (ref 28–100)
ANION GAP SERPL CALCULATED.3IONS-SCNC: 7 MMOL/L (ref 7–15)
ATRIAL RATE - MUSE: 79 BPM
BASOPHILS # BLD AUTO: 0 10E3/UL (ref 0–0.2)
BASOPHILS NFR BLD AUTO: 0 %
BUN SERPL-MCNC: 16.3 MG/DL (ref 6–20)
BURR CELLS BLD QL SMEAR: ABNORMAL
CALCIUM SERPL-MCNC: 7.4 MG/DL (ref 8.8–10.4)
CHLORIDE SERPL-SCNC: 105 MMOL/L (ref 98–107)
CMV IGG SERPL IA-ACNC: >10 U/ML
CMV IGG SERPL IA-ACNC: ABNORMAL
CMV IGM SERPL IA-ACNC: 12.3 AU/ML
CMV IGM SERPL IA-ACNC: NEGATIVE
CREAT SERPL-MCNC: 1.19 MG/DL (ref 0.67–1.17)
DACRYOCYTES BLD QL SMEAR: SLIGHT
DIASTOLIC BLOOD PRESSURE - MUSE: NORMAL MMHG
EBV VCA IGG SER IA-ACNC: 159 U/ML
EBV VCA IGG SER IA-ACNC: POSITIVE
EBV VCA IGM SER IA-ACNC: 10.4 U/ML
EBV VCA IGM SER IA-ACNC: NORMAL
EGFRCR SERPLBLD CKD-EPI 2021: 77 ML/MIN/1.73M2
EOSINOPHIL # BLD AUTO: 0 10E3/UL (ref 0–0.7)
EOSINOPHIL NFR BLD AUTO: 0 %
ERYTHROCYTE [DISTWIDTH] IN BLOOD BY AUTOMATED COUNT: 13.3 % (ref 10–15)
ERYTHROCYTE [DISTWIDTH] IN BLOOD BY AUTOMATED COUNT: 13.3 % (ref 10–15)
FRAGMENTS BLD QL SMEAR: SLIGHT
GLUCOSE BLDC GLUCOMTR-MCNC: 103 MG/DL (ref 70–99)
GLUCOSE BLDC GLUCOMTR-MCNC: 116 MG/DL (ref 70–99)
GLUCOSE BLDC GLUCOMTR-MCNC: 118 MG/DL (ref 70–99)
GLUCOSE BLDC GLUCOMTR-MCNC: 122 MG/DL (ref 70–99)
GLUCOSE BLDC GLUCOMTR-MCNC: 123 MG/DL (ref 70–99)
GLUCOSE BLDC GLUCOMTR-MCNC: 130 MG/DL (ref 70–99)
GLUCOSE BLDC GLUCOMTR-MCNC: 132 MG/DL (ref 70–99)
GLUCOSE BLDC GLUCOMTR-MCNC: 133 MG/DL (ref 70–99)
GLUCOSE BLDC GLUCOMTR-MCNC: 134 MG/DL (ref 70–99)
GLUCOSE BLDC GLUCOMTR-MCNC: 137 MG/DL (ref 70–99)
GLUCOSE BLDC GLUCOMTR-MCNC: 143 MG/DL (ref 70–99)
GLUCOSE BLDC GLUCOMTR-MCNC: 143 MG/DL (ref 70–99)
GLUCOSE BLDC GLUCOMTR-MCNC: 144 MG/DL (ref 70–99)
GLUCOSE BLDC GLUCOMTR-MCNC: 145 MG/DL (ref 70–99)
GLUCOSE BLDC GLUCOMTR-MCNC: 154 MG/DL (ref 70–99)
GLUCOSE BLDC GLUCOMTR-MCNC: 159 MG/DL (ref 70–99)
GLUCOSE SERPL-MCNC: 151 MG/DL (ref 70–99)
HCO3 SERPL-SCNC: 25 MMOL/L (ref 22–29)
HCT VFR BLD AUTO: 26.7 % (ref 40–53)
HCT VFR BLD AUTO: 26.8 % (ref 40–53)
HGB BLD-MCNC: 8.4 G/DL (ref 13.3–17.7)
HGB BLD-MCNC: 8.7 G/DL (ref 13.3–17.7)
HGB BLD-MCNC: 8.8 G/DL (ref 13.3–17.7)
IMM GRANULOCYTES # BLD: 0 10E3/UL
IMM GRANULOCYTES NFR BLD: 1 %
INTERPRETATION ECG - MUSE: NORMAL
LIPASE SERPL-CCNC: 57 U/L (ref 13–60)
LYMPHOCYTES # BLD AUTO: 0.2 10E3/UL (ref 0.8–5.3)
LYMPHOCYTES NFR BLD AUTO: 4 %
MAGNESIUM SERPL-MCNC: 1.9 MG/DL (ref 1.7–2.3)
MCH RBC QN AUTO: 26.7 PG (ref 26.5–33)
MCH RBC QN AUTO: 27.4 PG (ref 26.5–33)
MCHC RBC AUTO-ENTMCNC: 31.5 G/DL (ref 31.5–36.5)
MCHC RBC AUTO-ENTMCNC: 32.8 G/DL (ref 31.5–36.5)
MCV RBC AUTO: 84 FL (ref 78–100)
MCV RBC AUTO: 85 FL (ref 78–100)
MONOCYTES # BLD AUTO: 0.2 10E3/UL (ref 0–1.3)
MONOCYTES NFR BLD AUTO: 4 %
NEUTROPHILS # BLD AUTO: 3.8 10E3/UL (ref 1.6–8.3)
NEUTROPHILS NFR BLD AUTO: 91 %
NRBC # BLD AUTO: 0 10E3/UL
NRBC BLD AUTO-RTO: 0 /100
P AXIS - MUSE: 34 DEGREES
PF4 HEPARIN CMPLX AB SER QL: NEGATIVE
PHOSPHATE SERPL-MCNC: 2.6 MG/DL (ref 2.5–4.5)
PLAT MORPH BLD: ABNORMAL
PLATELET # BLD AUTO: 44 10E3/UL (ref 150–450)
PLATELET # BLD AUTO: 45 10E3/UL (ref 150–450)
POTASSIUM SERPL-SCNC: 4 MMOL/L (ref 3.4–5.3)
POTASSIUM SERPL-SCNC: 4 MMOL/L (ref 3.4–5.3)
PR INTERVAL - MUSE: 188 MS
QRS DURATION - MUSE: 84 MS
QT - MUSE: 358 MS
QTC - MUSE: 410 MS
R AXIS - MUSE: 8 DEGREES
RBC # BLD AUTO: 3.15 10E6/UL (ref 4.4–5.9)
RBC # BLD AUTO: 3.21 10E6/UL (ref 4.4–5.9)
RBC MORPH BLD: ABNORMAL
SA 1  COMMENTS: NORMAL
SA 1 CELL: NORMAL
SA 1 TEST METHOD: NORMAL
SA 2 CELL: NORMAL
SA 2 COMMENTS: NORMAL
SA 2 TEST METHOD: NORMAL
SA1 HI RISK ABY: NORMAL
SA1 MOD RISK ABY: NORMAL
SA2 HI RISK ABY: NORMAL
SA2 MOD RISK ABY: NORMAL
SODIUM SERPL-SCNC: 137 MMOL/L (ref 135–145)
SYSTOLIC BLOOD PRESSURE - MUSE: NORMAL MMHG
T AXIS - MUSE: 17 DEGREES
TARGETS BLD QL SMEAR: SLIGHT
UFH PPP CHRO-ACNC: <0.1 IU/ML
UNACCEPTABLE ANTIGENS: NORMAL
UNOS CPRA: 0
VARIANT LYMPHS BLD QL SMEAR: PRESENT
VENTRICULAR RATE- MUSE: 79 BPM
VIT D+METAB SERPL-MCNC: 16 NG/ML (ref 20–50)
WBC # BLD AUTO: 4.2 10E3/UL (ref 4–11)
WBC # BLD AUTO: 4.9 10E3/UL (ref 4–11)

## 2024-11-04 PROCEDURE — 82565 ASSAY OF CREATININE: CPT | Performed by: SURGERY

## 2024-11-04 PROCEDURE — 85018 HEMOGLOBIN: CPT | Performed by: SURGERY

## 2024-11-04 PROCEDURE — 85014 HEMATOCRIT: CPT | Performed by: NURSE PRACTITIONER

## 2024-11-04 PROCEDURE — 82306 VITAMIN D 25 HYDROXY: CPT | Performed by: INTERNAL MEDICINE

## 2024-11-04 PROCEDURE — 82150 ASSAY OF AMYLASE: CPT | Performed by: SURGERY

## 2024-11-04 PROCEDURE — 258N000003 HC RX IP 258 OP 636: Performed by: SURGERY

## 2024-11-04 PROCEDURE — 99233 SBSQ HOSP IP/OBS HIGH 50: CPT | Performed by: INTERNAL MEDICINE

## 2024-11-04 PROCEDURE — 250N000011 HC RX IP 250 OP 636: Performed by: SURGERY

## 2024-11-04 PROCEDURE — 999N000065 XR ABDOMEN PORT 1 VIEW

## 2024-11-04 PROCEDURE — 86022 PLATELET ANTIBODIES: CPT | Performed by: STUDENT IN AN ORGANIZED HEALTH CARE EDUCATION/TRAINING PROGRAM

## 2024-11-04 PROCEDURE — 83690 ASSAY OF LIPASE: CPT | Performed by: SURGERY

## 2024-11-04 PROCEDURE — 84100 ASSAY OF PHOSPHORUS: CPT | Performed by: SURGERY

## 2024-11-04 PROCEDURE — 36592 COLLECT BLOOD FROM PICC: CPT | Performed by: SURGERY

## 2024-11-04 PROCEDURE — 250N000013 HC RX MED GY IP 250 OP 250 PS 637: Performed by: NURSE PRACTITIONER

## 2024-11-04 PROCEDURE — 250N000013 HC RX MED GY IP 250 OP 250 PS 637: Performed by: SURGERY

## 2024-11-04 PROCEDURE — 74018 RADEX ABDOMEN 1 VIEW: CPT | Mod: 26 | Performed by: RADIOLOGY

## 2024-11-04 PROCEDURE — 250N000011 HC RX IP 250 OP 636: Performed by: NURSE PRACTITIONER

## 2024-11-04 PROCEDURE — 82435 ASSAY OF BLOOD CHLORIDE: CPT | Performed by: SURGERY

## 2024-11-04 PROCEDURE — 83735 ASSAY OF MAGNESIUM: CPT | Performed by: SURGERY

## 2024-11-04 PROCEDURE — 36415 COLL VENOUS BLD VENIPUNCTURE: CPT | Performed by: NURSE PRACTITIONER

## 2024-11-04 PROCEDURE — 80048 BASIC METABOLIC PNL TOTAL CA: CPT | Performed by: SURGERY

## 2024-11-04 PROCEDURE — 120N000003 HC R&B IMCU UMMC

## 2024-11-04 PROCEDURE — 85520 HEPARIN ASSAY: CPT | Performed by: SURGERY

## 2024-11-04 PROCEDURE — 85048 AUTOMATED LEUKOCYTE COUNT: CPT | Performed by: NURSE PRACTITIONER

## 2024-11-04 PROCEDURE — 85025 COMPLETE CBC W/AUTO DIFF WBC: CPT | Performed by: SURGERY

## 2024-11-04 PROCEDURE — 250N000012 HC RX MED GY IP 250 OP 636 PS 637: Performed by: SURGERY

## 2024-11-04 PROCEDURE — 82040 ASSAY OF SERUM ALBUMIN: CPT | Performed by: INTERNAL MEDICINE

## 2024-11-04 RX ORDER — ACETAMINOPHEN 325 MG/10.15ML
975 LIQUID ORAL EVERY 8 HOURS
Status: DISCONTINUED | OUTPATIENT
Start: 2024-11-04 | End: 2024-11-05

## 2024-11-04 RX ORDER — OXYCODONE HCL 5 MG/5 ML
10 SOLUTION, ORAL ORAL EVERY 4 HOURS PRN
Status: DISCONTINUED | OUTPATIENT
Start: 2024-11-04 | End: 2024-11-05

## 2024-11-04 RX ORDER — HEPARIN SODIUM 10000 [USP'U]/100ML
400 INJECTION, SOLUTION INTRAVENOUS CONTINUOUS
Status: DISCONTINUED | OUTPATIENT
Start: 2024-11-04 | End: 2024-11-07

## 2024-11-04 RX ORDER — METHYLPREDNISOLONE SODIUM SUCCINATE 125 MG/2ML
100 INJECTION INTRAMUSCULAR; INTRAVENOUS ONCE
Status: COMPLETED | OUTPATIENT
Start: 2024-11-04 | End: 2024-11-04

## 2024-11-04 RX ORDER — OXYCODONE HCL 5 MG/5 ML
5 SOLUTION, ORAL ORAL EVERY 4 HOURS PRN
Status: DISCONTINUED | OUTPATIENT
Start: 2024-11-04 | End: 2024-11-05

## 2024-11-04 RX ORDER — BISACODYL 10 MG
10 SUPPOSITORY, RECTAL RECTAL ONCE
Status: COMPLETED | OUTPATIENT
Start: 2024-11-04 | End: 2024-11-04

## 2024-11-04 RX ADMIN — HYDROMORPHONE HYDROCHLORIDE 0.2 MG: 0.2 INJECTION, SOLUTION INTRAMUSCULAR; INTRAVENOUS; SUBCUTANEOUS at 02:32

## 2024-11-04 RX ADMIN — TACROLIMUS 3 MG: 5 CAPSULE ORAL at 19:51

## 2024-11-04 RX ADMIN — OCTREOTIDE ACETATE 100 MCG: 100 INJECTION, SOLUTION INTRAVENOUS; SUBCUTANEOUS at 15:40

## 2024-11-04 RX ADMIN — SULFAMETHOXAZOLE AND TRIMETHOPRIM 80 MG: 200; 40 SUSPENSION ORAL at 08:56

## 2024-11-04 RX ADMIN — PIPERACILLIN SODIUM AND TAZOBACTAM SODIUM 3.38 G: 3; .375 INJECTION, SOLUTION INTRAVENOUS at 12:17

## 2024-11-04 RX ADMIN — HYDROMORPHONE HYDROCHLORIDE 0.2 MG: 0.2 INJECTION, SOLUTION INTRAMUSCULAR; INTRAVENOUS; SUBCUTANEOUS at 23:33

## 2024-11-04 RX ADMIN — ACETAMINOPHEN 975 MG: 325 TABLET, FILM COATED ORAL at 16:38

## 2024-11-04 RX ADMIN — OCTREOTIDE ACETATE 100 MCG: 100 INJECTION, SOLUTION INTRAVENOUS; SUBCUTANEOUS at 19:58

## 2024-11-04 RX ADMIN — PIPERACILLIN SODIUM AND TAZOBACTAM SODIUM 3.38 G: 3; .375 INJECTION, SOLUTION INTRAVENOUS at 07:09

## 2024-11-04 RX ADMIN — VALGANCICLOVIR HYDROCHLORIDE 900 MG: 50 POWDER, FOR SOLUTION ORAL at 08:56

## 2024-11-04 RX ADMIN — ONDANSETRON 4 MG: 2 INJECTION INTRAMUSCULAR; INTRAVENOUS at 17:11

## 2024-11-04 RX ADMIN — MYCOPHENOLATE MOFETIL 1000 MG: 200 POWDER, FOR SUSPENSION ORAL at 19:51

## 2024-11-04 RX ADMIN — ASPIRIN 81 MG CHEWABLE TABLET 81 MG: 81 TABLET CHEWABLE at 10:09

## 2024-11-04 RX ADMIN — ACETAMINOPHEN 975 MG: 325 SOLUTION ORAL at 23:32

## 2024-11-04 RX ADMIN — FAMOTIDINE 20 MG: 20 TABLET ORAL at 10:09

## 2024-11-04 RX ADMIN — HYDROMORPHONE HYDROCHLORIDE 0.2 MG: 0.2 INJECTION, SOLUTION INTRAMUSCULAR; INTRAVENOUS; SUBCUTANEOUS at 05:55

## 2024-11-04 RX ADMIN — PROCHLORPERAZINE EDISYLATE 10 MG: 5 INJECTION INTRAMUSCULAR; INTRAVENOUS at 12:10

## 2024-11-04 RX ADMIN — HYDROMORPHONE HYDROCHLORIDE 0.2 MG: 0.2 INJECTION, SOLUTION INTRAMUSCULAR; INTRAVENOUS; SUBCUTANEOUS at 21:45

## 2024-11-04 RX ADMIN — METHYLPREDNISOLONE SODIUM SUCCINATE 100 MG: 125 INJECTION, POWDER, FOR SOLUTION INTRAMUSCULAR; INTRAVENOUS at 12:10

## 2024-11-04 RX ADMIN — SENNOSIDES AND DOCUSATE SODIUM 1 TABLET: 8.6; 5 TABLET ORAL at 10:10

## 2024-11-04 RX ADMIN — HYDROMORPHONE HYDROCHLORIDE 0.2 MG: 0.2 INJECTION, SOLUTION INTRAMUSCULAR; INTRAVENOUS; SUBCUTANEOUS at 12:10

## 2024-11-04 RX ADMIN — Medication 1 TABLET: at 10:09

## 2024-11-04 RX ADMIN — BISACODYL 10 MG: 10 SUPPOSITORY RECTAL at 10:29

## 2024-11-04 RX ADMIN — HYDROMORPHONE HYDROCHLORIDE 0.2 MG: 0.2 INJECTION, SOLUTION INTRAMUSCULAR; INTRAVENOUS; SUBCUTANEOUS at 09:16

## 2024-11-04 RX ADMIN — TACROLIMUS 3 MG: 5 CAPSULE ORAL at 08:56

## 2024-11-04 RX ADMIN — PROCHLORPERAZINE EDISYLATE 10 MG: 5 INJECTION INTRAMUSCULAR; INTRAVENOUS at 18:59

## 2024-11-04 RX ADMIN — FAMOTIDINE 20 MG: 20 TABLET ORAL at 19:51

## 2024-11-04 RX ADMIN — PIPERACILLIN SODIUM AND TAZOBACTAM SODIUM 3.38 G: 3; .375 INJECTION, SOLUTION INTRAVENOUS at 19:58

## 2024-11-04 RX ADMIN — SODIUM CHLORIDE, SODIUM LACTATE, POTASSIUM CHLORIDE, CALCIUM CHLORIDE AND DEXTROSE MONOHYDRATE: 5; 600; 310; 30; 20 INJECTION, SOLUTION INTRAVENOUS at 10:10

## 2024-11-04 RX ADMIN — Medication 40 MG: at 08:56

## 2024-11-04 RX ADMIN — MICAFUNGIN SODIUM 100 MG: 50 INJECTION, POWDER, LYOPHILIZED, FOR SOLUTION INTRAVENOUS at 17:27

## 2024-11-04 RX ADMIN — PIPERACILLIN SODIUM AND TAZOBACTAM SODIUM 3.38 G: 3; .375 INJECTION, SOLUTION INTRAVENOUS at 00:21

## 2024-11-04 RX ADMIN — ONDANSETRON 4 MG: 2 INJECTION INTRAMUSCULAR; INTRAVENOUS at 08:17

## 2024-11-04 RX ADMIN — OCTREOTIDE ACETATE 100 MCG: 100 INJECTION, SOLUTION INTRAVENOUS; SUBCUTANEOUS at 09:39

## 2024-11-04 RX ADMIN — MYCOPHENOLATE MOFETIL 1000 MG: 200 POWDER, FOR SUSPENSION ORAL at 08:56

## 2024-11-04 RX ADMIN — HEPARIN SODIUM 200 UNITS/HR: 10000 INJECTION, SOLUTION INTRAVENOUS at 16:12

## 2024-11-04 ASSESSMENT — ENCOUNTER SYMPTOMS: NEW SYMPTOMS OF CORONARY ARTERY DISEASE: 0

## 2024-11-04 NOTE — PROGRESS NOTES
MSW attempted to visit with patient to offer psychosocial support post transplant. Rudi declined visit as he was feeling unwell and requested MSW bring nurse to bedside. As MSW was leaving, beside RN entering room for visit. SOT SW team to continue to follow for psychosocial support and assistance with safe discharge planning.

## 2024-11-04 NOTE — PLAN OF CARE
Goal Outcome Evaluation:      Plan of Care Reviewed With: patient    Overall Patient Progress: improvingOverall Patient Progress: improving    Outcome Evaluation: Méndez catheter removed today, CVP monitoring discontinued.    /81 (BP Location: Left arm, Patient Position: Supine)   Pulse 99   Temp 99  F (37.2  C) (Oral)   Resp 16   Wt 99.8 kg (220 lb)   SpO2 94%   BMI 34.46 kg/m      Shift: 8714-0943  VS: Stable on RA, afebrile.  Neuro: AOx4  BG: Q1, on insulin drip, algorithm 1.  Labs: Platelets 44 today, team aware.  Respiratory: WDL  Pain/Nausea/PRN: Pain managed with IV dilaudid x2. Zofran and Compazine given for nausea.  Diet: NPO, ice chips when NG tube set to suction.  LDA: R internal jugular - triple lumen, R PIV, RUBIA drain.  GI/: Méndez removed today, no BM, suppository given.  Skin: Abdominal incision with dressing.  Mobility: Assist x1 with walker, up to chair x1 today.  Plan: Continue plan of care.     Handoff given to following RN.

## 2024-11-04 NOTE — PROGRESS NOTES
Handoff information     Type of transplant: Pancreas Transplant  Date of transplant: 11/02/2024  Direct/non-direct/PEP- Direct  Transplant history: 10/01/2019 Living donor kidney transplant with stable function.  Outstanding items for patient: None  Pertinent history: Diabetes mellitus type 1 (age 16), using tandem insulin pump, 09/27/2024 A1C=8.7.  Was inactive on the pancreas transplant list as needing to lose weight to meet body mass index guidelines for pancreas transplant.  Lost 50 pounds with exercise and Ozempic.  Cardiac: underwent coronary angiogram 06/07/2024=mild, non-obstructive disease.  Dermatology and health maintenance up to date.   Barriers to post transplant care: None  Patient Status Form Completed: 11/04/2024  A2 to B transplant?: No

## 2024-11-04 NOTE — PLAN OF CARE
"Goal Outcome Evaluation:           Overall Patient Progress: no changeOverall Patient Progress: no change    Outcome Evaluation: insulin gtt started; pt nausea treated with antiemetics and NG tube to LIS    /67 (BP Location: Left arm)   Pulse 104   Temp 98.8  F (37.1  C) (Oral)   Resp 15   Wt 99.8 kg (220 lb)   SpO2 97%   BMI 34.46 kg/m      0323-4012  BPs 100s-120s/60s-80s, tachy 100s-110s on tele; OVSS on 1L via NC. A/Ox4, calm and cooperative with cares. Pt reporting fatigue and intermittent nausea all thru shift; main complaint is \"feeling the NG tube all the time.\" Hurricane spray given x1 for throat irritation but per pt report, \"it didn't help as much as ice.\" Nausea treated with IV zofran x1, IV compazine x1 with adequate relief as well as cool wash cloths and ice chips; NG to LIS with 450ml of bilious output; pt persevered through  but was nearly unable to tolerate 30 minute clamping sessions for med passes d/t nausea. Abdominal/incisional pain treated with robaxin x1 and IV dilaudid x3 with adequate relief of pain. Incision covered with operative dressing; old drainage marked. RUBIA patent with serosang output. Internal jugular patent--blue lumen infusing Thymo now (pt tolerating well); white lumen infusing NS TKO with insulin gtt/algorithm #1, BGs 110s-140s; and brown lumen transduced, CVPs 6-7. PIV patent with MIV@100ml/hr and heparin gtt SR @400units/hr. Up x2 assist with GB; able to sit at side of bed and dangle feet for about half an hour; then stood briefly with assist of 2 but could not tolerate for long (per pt, \"I feel too weak\"). IS teaching reinforced but pt couldn't resume d/t nausea and fatigue. Pt passing gas, no BMs this evening. Paredes cares done; paredes patent with 450ml of yellow UOP (paredes statlock reapplied to promote more patent flow). Pt spent most of shift dozing between cares, while his mother and brother visiting at bedside and very supportive with cares. Pt sleeping now; " resting with eyes closed and chest rise visualized, even and equal. Continue pain and nausea management and postop cares; encourage activity. Medcard and lab book at bedside, started. Call light and belongings within reach. Continue POC/notify team as needed.

## 2024-11-04 NOTE — PROVIDER NOTIFICATION
11/03/24 1000   Call Information   Date of Call 11/03/24   Time of Call 0941   Name of person requesting the team Sendy   Title of person requesting team RN   RRT Arrival time 0950   Time RRT ended 1000   Reason for call   Type of RRT Adult   Primary reason for call Sepsis suspected   Sepsis Suspected Elevated Lactate level;WBC <4 or >12   Was patient transferred from the ED, ICU, or PACU within last 24 hours prior to RRT call? No   SBAR   Situation Patient's lactic 2.2   Background Per Notes: Michael Amin is a 44 year old male with past medical history of ESRD secondary to type 1 diabetes s/p living donor kidney transplant in October 2019, hypertension, hyperlipidemia admitted on 11/2/2024. He was admitted in anticipation of a pancreas transplant.  At this time, his labs and physical exam provide no evidence to contraindicate his candidacy for transplantation today.   Notable History/Conditions End-Stage disease;Hypertension;Organ failure   Assessment Patient AxO, laying in bed, responding appropriately. Vitals stable. No complaints.   Interventions No interventions  (recheck in 3 hours)   RRT Team   Attending/Primary/Covering Physician Transplant   Date Attending Physician notified 11/03/24   Time Attending Physician notified 0940   Physician(s) Jada Mendiola RN Sherlyn, RN   RN Sendy, RN   Post RRT Intervention Assessment   Post RRT Assessment Stable/Improved   Date Follow Up Done 11/03/24   Time Follow Up Done 1400   Comments lactic normalized

## 2024-11-04 NOTE — PROGRESS NOTES
[facility]  Transplant Nephrology Consult Note  Date of Admission:  11/2/2024  Today's Date: 11/04/2024  Requesting physician: Prince Mcdaniels*    Reason for Consult:  Follow kidney/pancreas transplant and immunosuppression     Recommendations:   - replace phos per protocol   - replace mag per protocol   - recommend decreasing D5LR rate to 50 ml/hr  - Added on vitamin D level and ordered PTH level tomorrow     Assessment & Plan   # LDKT: Stable   - Baseline Creatinine: ~ 1.1-1.3   - Proteinuria: Normal (<0.2 grams)   - DSA Hx: No DSA   - Last cPRA: 0%   - BK Viremia: No   - Kidney Tx Biopsy Hx: No biopsy history and Acute cellular-mediated rejection.      Nov 25, 2019; Result: Material insufficient for assessment with no glomeruli.   Oct 22, 2019; Result: Borderline acute cellular-mediated rejection    # Pancreas Tx (IVETTE):    - Pancreatic Exocrine Drainage: Enteric drained     - Blood glucose: Elevated blood glucose      On insulin: No   - HbA1c: Stable      Latest HbA1c: 7.8%   - Pancreatic enzymes: Stable   - DSA Hx: No DSA at time of transplant   - Pancreas Tx Biopsy Hx: No biopsy history    # Immunosuppression: Tacrolimus immediate release (goal 8-10) and Mycophenolate mofetil (dose 1000 mg every 12 hours)   - Induction with Recent Transplant:  High Intensity Protocol   - Continue with intensive monitoring of immunosuppression for efficacy and toxicity.   - Historical Changes in Immunosuppression: None   - Changes: No    # Infection Prevention:      - PJP: Sulfa/TMP (Bactrim)  - CMV: Valganciclovir (Valcyte)      - CMV IgG Ab High Risk Discordance (D+/R-): No  CMV Serostatus: Positive  - EBV IgG Ab High Risk Discordance (D+/R-): No  EBV Serostatus: Positive    # Hypertension: Controlled;  Goal BP: < 140/90 (Hospitalization goal)   - Changes: No    # Anemia in Chronic Renal Disease: Hgb: Stable      JOSE: No   - Iron studies: Not checked recently    # Mineral  Bone Disorder:    - Secondary renal hyperparathyroidism; PTH level: Moderately elevated (301-600 pg/ml)        On treatment: None  - Vitamin D; level: Low        On supplement: Yes  - Calcium; level: Low        On supplement: No  - Phosphorus; level: Low        On supplement: No, replace per protocol     # Electrolytes:   - Potassium; level: Normal        On supplement: No  - Magnesium; level: Low        On supplement: Yes  - Bicarbonate; level: Normal        On supplement: No      # Transplant History:  Etiology of Kidney Failure: Diabetes mellitus type 1  Tx: LDKT  Transplant: 11/2/2024 (Pancreas), 10/1/2019 (Kidney)  Significant transplant-related complications: None    Recommendations were communicated to the primary team via this note.    Levar Zambrano MD  Transplant Nephrology  Contact information via Vocera Web Console or via [facility] Paging/Directory        Physician Attestation   I, Connie Membreno MD, was present with the medical/ROX student who participated in the service and in the documentation of the note.  I have verified the history and personally performed the physical exam and medical decision making.  I agree with the assessment and plan of care as documented in the note.      Key findings:   Pt with h/o LDKT in 2019, now s/p pancreas transplant 11/3/2024. Continue on current immunosuppression. Noted slight increase in his creatinine given surgery and high tac goal. Will continue to monitor.    Please see A&P for additional details of medical decision making.    I have personally reviewed the following data over the past 24 hrs:    4.9  \   8.4 (L)   / 45 (LL)     137 105 16.3 /  116 (H)   4.0 25 1.19 (H) \     ALT: N/A AST: N/A AP: N/A TBILI: N/A   ALB: 2.4 (L) TOT PROTEIN: N/A LIPASE: 57         Connie Membreno MD  Date of Service (when I saw the patient): 11/04/24       Interval events   Lipase is 57  Ca 7.4  Pt endorsed nausea and needing NG tube to suction. Pain is manageable. Méndez is  out and will have trial of voiding and post void bladder scan.      Review of Systems   4 point ROS was obtained and negative except as noted in the Interval History.    MEDICATIONS:  Current Facility-Administered Medications   Medication Dose Route Frequency Provider Last Rate Last Admin    acetaminophen (TYLENOL) tablet 975 mg  975 mg Oral Q8H Prince Mcdaniels MD   975 mg at 11/03/24 1154    aspirin (ASA) chewable tablet 81 mg  81 mg Per NG tube Daily Prince Mcdaniels MD   81 mg at 11/04/24 1009    calcium carbonate-vitamin D (OSCAL) 500-5 MG-MCG per tablet 1 tablet  1 tablet Oral BID w/meals Prince Mcdaniels MD   1 tablet at 11/04/24 1009    [START ON 11/10/2024] clotrimazole (MYCELEX) lozenge 10 mg  10 mg Buccal 4x Daily Prince Mcdaniels MD        famotidine (PEPCID) tablet 20 mg  20 mg Oral BID Prince Mcdaniels MD   20 mg at 11/04/24 1009    [START ON 11/5/2024] magnesium oxide (MAG-OX) tablet 400 mg  400 mg Oral Q24H Prince Mcdaniels MD        micafungin (MYCAMINE) 100 mg in sodium chloride 0.9 % 100 mL intermittent infusion  100 mg Intravenous Q24H Prince Mcdaniels  mL/hr at 11/03/24 1636 100 mg at 11/03/24 1636    mycophenolate (CELLCEPT BRAND) suspension 1,000 mg  1,000 mg Per NG tube BID Prince Mcdaniels MD   1,000 mg at 11/04/24 0856    octreotide (sandoSTATIN) injection 100 mcg  100 mcg Subcutaneous TID Prince Mcdaniels MD   100 mcg at 11/04/24 0939    pantoprazole (PROTONIX) 2 mg/mL suspension 40 mg  40 mg Per NG tube Daily Prince Mcdaniels MD   40 mg at 11/04/24 0856    piperacillin-tazobactam (ZOSYN) intermittent infusion 3.375 g  3.375 g Intravenous Q6H Prince Mcdaniels  mL/hr at 11/04/24 1217 3.375 g at 11/04/24 1217    polyethylene glycol (MIRALAX) Packet 17 g  17 g Oral Daily Prince Mcdaniels  MD Michael        senna-docusate (SENOKOT-S/PERICOLACE) 8.6-50 MG per tablet 1 tablet  1 tablet Oral BID Prince Mcdaniels MD   1 tablet at 24 1010    sodium chloride (PF) 0.9% PF flush 10 mL  10 mL Intracatheter Q8H Prince Mcdaniels MD   10 mL at 24 1017    sodium chloride (PF) 0.9% PF flush 3 mL  3 mL Intravenous Q8H Prince Mcdaniels MD   3 mL at 24 1218    sulfamethoxazole-trimethoprim (BACTRIM/SEPTRA) suspension 80 mg  10 mL Per NG tube Daily Prince Mcdaniels MD   80 mg at 24 0856    tacrolimus (GENERIC) suspension 3 mg  3 mg Oral BID IS Prince Mcdaniels MD   3 mg at 24 0856    valGANciclovir (VALCYTE) solution 900 mg  900 mg Oral or Feeding Tube Daily Prince Mcdaniels MD   900 mg at 24 0856     Current Facility-Administered Medications   Medication Dose Route Frequency Provider Last Rate Last Admin    dextrose 5% in lactated ringers 1,000 mL infusion   Intravenous Continuous Stephany Diaz  mL/hr at 24 1010 New Bag at 24 1010    insulin regular (MYXREDLIN) 1 unit/mL infusion  0-24 Units/hr Intravenous Continuous Prince Mcdaniels MD 0.5 mL/hr at 24 1215 0.5 Units/hr at 24 1215       Physical Exam   Temp  Av.3  F (36.8  C)  Min: 97.8  F (36.6  C)  Max: 98.9  F (37.2  C)  Arterial Line BP  Min: 110/59  Max: 114/63  Arterial Line MAP (mmHg)  Av mmHg  Min: 75 mmHg  Max: 79 mmHg      Pulse  Av.2  Min: 81  Max: 114 Resp  Av.8  Min: 15  Max: 24  SpO2  Av.7 %  Min: 94 %  Max: 100 %    CVP (mmHg): 9 mmHgBP (!) 154/83   Pulse 102   Temp 98.8  F (37.1  C) (Oral)   Resp 16   Wt 99.8 kg (220 lb)   SpO2 95%   BMI 34.46 kg/m     Date 24 0700 - 24 0659   Shift 4032-7295 3916-6790 1158-7341 24 Hour Total   INTAKE   I.V. 250   250   NG/GT 30   30   Shift Total(mL/kg) 280(3.14)   280(3.14)    OUTPUT   Urine 100   100   Shift Total(mL/kg) 100(1.12)   100(1.12)   Weight (kg) 89.13 89.13 89.13 89.13      Admit Weight: 89.1 kg (196 lb 8 oz)     GENERAL APPEARANCE: alert and no distress  HENT: mouth without ulcers or lesions  RESP: lungs clear to auscultation - no rales, rhonchi or wheezes  CV: regular rhythm, normal rate, no rub, no murmur  EDEMA: no LE edema bilaterally  ABDOMEN: soft, nondistended, nontender, bowel sounds normal  MS: extremities normal - no gross deformities noted, no evidence of inflammation in joints, no muscle tenderness  SKIN: no rash    Data   All labs reviewed by me.  CMP  Recent Labs   Lab 11/04/24  1214 11/04/24  1109 11/04/24  1007 11/04/24  0901 11/04/24  0704 11/04/24  0622 11/04/24  0202 11/04/24  0108 11/03/24  2306 11/03/24  2200 11/03/24  1926 11/03/24  1828 11/03/24  0614 11/03/24  0530 11/03/24  0226 11/03/24  0144 11/03/24  0135 11/02/24  1353 11/02/24  1352   NA  --   --   --   --   --  137  --   --   --   --   --   --   --  140  --  142 135   < > 140   POTASSIUM  --   --   --   --   --  4.0  --  4.0  --  3.7  --  3.8   < > 4.3  4.3  --  2.9* 3.7   < > 4.4   CHLORIDE  --   --   --   --   --  105  --   --   --   --   --   --   --  105  --  113*  --   --  101   CO2  --   --   --   --   --  25  --   --   --   --   --   --   --  24  --  19*  --   --  27   ANIONGAP  --   --   --   --   --  7  --   --   --   --   --   --   --  11  --  10  --   --  12   * 154* 143* 132*   < > 151*   < >  --    < >  --    < >  --    < > 122*   < > 77 86   < > 157*   BUN  --   --   --   --   --  16.3  --   --   --   --   --   --   --  15.7  --  11.2  --   --  14.9   CR  --   --   --   --   --  1.19*  --   --   --   --   --   --   --  1.32*  --  0.91  --   --  1.10   GFRESTIMATED  --   --   --   --   --  77  --   --   --   --   --   --   --  68  --  >90  --   --  84   APRIL  --   --   --   --   --  7.4*  --   --   --   --   --  7.4*  --  7.6*  --  5.8*  --   --  9.2   MAG  --   --   --    --   --  1.9  --   --   --   --   --   --   --  1.3*  --  0.9*  --   --   --    PHOS  --   --   --   --   --  2.6  --   --   --   --   --   --   --  2.4*  --  0.9*  --   --   --    PROTTOTAL  --   --   --   --   --   --   --   --   --   --   --   --   --   --   --   --   --   --  6.9   ALBUMIN  --   --   --   --   --  2.4*  --   --   --   --   --   --   --   --   --   --   --   --  4.1   BILITOTAL  --   --   --   --   --   --   --   --   --   --   --   --   --   --   --   --   --   --  0.5   ALKPHOS  --   --   --   --   --   --   --   --   --   --   --   --   --   --   --   --   --   --  113   AST  --   --   --   --   --   --   --   --   --   --   --   --   --   --   --   --   --   --  15   ALT  --   --   --   --   --   --   --   --   --   --   --   --   --   --   --   --   --   --  13    < > = values in this interval not displayed.     CBC  Recent Labs   Lab 11/04/24  0622 11/04/24  0108 11/03/24  2200 11/03/24  1828 11/03/24  0918 11/03/24  0530 11/02/24  1711 11/02/24  1352   HGB 8.8* 8.7* 9.4* 9.0*   < > 10.2*   < > 14.8   WBC 4.2  --   --   --   --  14.2*  --  6.7   RBC 3.21*  --   --   --   --  3.83*  --  5.36   HCT 26.8*  --   --   --   --  32.4*  --  45.1   MCV 84  --   --   --   --  85  --  84   MCH 27.4  --   --   --   --  26.6  --  27.6   MCHC 32.8  --   --   --   --  31.5  --  32.8   RDW 13.3  --   --   --   --  13.3  --  13.1   PLT 44*  --   --   --   --  140*  --  207    < > = values in this interval not displayed.     INR  Recent Labs   Lab 11/03/24  0144 11/02/24  1352   INR 1.49* 1.05   PTT 33 34     ABG  Recent Labs   Lab 11/03/24  0135 11/03/24  0019 11/02/24  2324 11/02/24  2221   PH 7.46* 7.40 7.37 7.36   PCO2 35 37 40 40   PO2 109* 124* 105 129*   HCO3 25 23 23 22   O2PER 55.0 56.0 53.0 46.0      Urine Studies  Recent Labs   Lab Test 11/02/24  1503 11/21/23  1154 11/21/19  0810 10/22/19  0612   COLOR Yellow Straw Yellow Yellow   APPEARANCE Clear Clear Clear Clear   URINEGLC Negative Negative  Negative 50*   URINEBILI Negative Negative Negative Negative   URINEKETONE Negative Negative Negative Negative   SG 1.026 1.006 1.016 1.015   UBLD Negative Negative Negative Small*   URINEPH 5.5 6.0 5.0 5.0   PROTEIN 10* Negative Negative Negative   NITRITE Negative Negative Negative Negative   LEUKEST Negative Negative Negative Trace*   RBCU <1 <1 <1 28*   WBCU <1 0 <1 12*     Recent Labs   Lab Test 11/16/21  1502 05/11/21  1532 07/16/20  1417 11/13/19  1053 10/22/19  0612 10/18/19  0630   UTPG 0.11 0.10 Unable to calculate due to low value 0.21* 0.24* 0.22*     PTH  Recent Labs   Lab Test 09/26/19  0945   PTHI 356*     Iron Studies  Recent Labs   Lab Test 09/26/19  0945   IRON 70      IRONSAT 28   EDOUARD 753*       IMAGING:  All imaging studies reviewed by me.    Past Medical History    I have reviewed this patient's medical history and updated it with pertinent information if needed.   Past Medical History:   Diagnosis Date    Anemia in chronic kidney disease     Dyslipidemia     ESRD (end stage renal disease) on dialysis (H)     Hyperlipidemia     Hypertension     Hypomagnesemia 10/07/2019    Obese     Secondary hyperparathyroidism (H)     Shingles 12/11/2023 12/11/23: Left Axilla    Status post coronary angiogram 6/7/2024    Type 1 diabetes (H)        Past Surgical History   I have reviewed this patient's surgical history and updated it with pertinent information if needed.  Past Surgical History:   Procedure Laterality Date    APPENDECTOMY OPEN N/A 11/2/2024    Procedure: Appendectomy open;  Surgeon: Prince Mcdaniels MD;  Location: [facility] OR    BENCH PANCREAS N/A 11/2/2024    Procedure: Bench pancreas;  Surgeon: Prince Mcdaniels MD;  Location: [facility] OR    CV CORONARY ANGIOGRAM N/A 6/7/2024    Procedure: Coronary Angiogram;  Surgeon: Derrell Jauregui MD;  [facility]    CV PCI N/A 6/7/2024    Procedure: Percutaneous Coronary Intervention;  Surgeon:  Derrell Jauregui MD;  [facility]    hair implant      INSERT CATHETER PERITONEAL DIALYSIS  2018    IR FINE NEEDLE ASPIRATION W ULTRASOUND  2019    IR RENAL BIOPSY LEFT  2019    PERCUTANEOUS BIOPSY KIDNEY Left 10/22/2019    Procedure: Left Kidney Biopsy;  Surgeon: Kash Hoffman MD;  Location:  OR    TRANSPLANT PANCREAS RECIPIENT  DONOR N/A 2024    Procedure: Pancreas transplant, Iliac vein repair;  Surgeon: Prince Mcdaniels MD;  Location:  OR       Family History   I have reviewed this patient's family history and updated it with pertinent information if needed.   Family History   Problem Relation Age of Onset    No Known Problems Mother     Diabetes Father     Coronary Artery Disease Father     No Known Problems Brother     Skin Cancer Paternal Uncle     Melanoma No family hx of        Social History   I have reviewed this patient's social history and updated it with pertinent information if needed. Michael Amin  reports that he has never smoked. He has never used smokeless tobacco. He reports that he does not currently use alcohol. He reports that he does not use drugs.    Prior to Admission Medications   Medications Prior to Admission   Medication Sig Dispense Refill Last Dose/Taking    aspirin (ASA) 81 MG EC tablet Take 81 mg by mouth daily    2024 Morning    atorvastatin (LIPITOR) 10 MG tablet Take 2 tablets (20 mg) by mouth daily 90 tablet 0 2024 Evening    carvedilol (COREG) 12.5 MG tablet TAKE 1 TABLET BY MOUTH TWICE DAILY WITH MEALS PLEASE  REQUEST  FUTURE  REFILLS  FROM  YOUR  PRIMARY  CARE  PROVIDER 180 tablet 0 2024 Morning    insulin lispro (HUMALOG VIAL) 100 UNIT/ML vial Use in insulin pump. Pt uses approx 75 units daily. 80 mL 3 Unknown    magnesium oxide (MAG-OX) 400 MG tablet Take 1 tablet (400 mg) by mouth daily (with lunch) 30 tablet 5 2024 Noon    mycophenolate (GENERIC EQUIVALENT) 250 MG  capsule Take 3 capsules (750 mg) by mouth 2 times daily 180 capsule 11 11/2/2024 Morning    Semaglutide, 2 MG/DOSE, (OZEMPIC) 8 MG/3ML pen Inject 2 mg subcutaneous once a week. Please provide 90 day supply. (Patient taking differently: Inject 2 mg subcutaneously once a week. Inject 2 mg subcutaneous once a week. Please provide 90 day supply.) 9 mL 3 10/26/2024 Morning    senna-docusate (SENOKOT-S/PERICOLACE) 8.6-50 MG tablet Take 2 tablets by mouth 2 times daily (Patient taking differently: Take 2 tablets by mouth daily as needed for constipation.) 20 tablet 0 Unknown    tacrolimus (GENERIC) 0.5 MG capsule Take 1 capsule (0.5 mg) by mouth every morning Total dose = 1.5 mg in the AM and 1 mg in the PM 90 capsule 3 11/2/2024 Morning    tacrolimus (GENERIC) 1 MG capsule Total dose = 1.5 mg in the AM and 1 mg in the  capsule 3 11/2/2024 Morning    vitamin D3 (CHOLECALCIFEROL) 50 mcg (2000 units) tablet Take 1 tablet (50 mcg) by mouth daily   11/2/2024 Morning    alcohol swab prep pads Use to swab area of injection/zak as directed. 100 each 3     blood glucose (NO BRAND SPECIFIED) lancets standard Use to test blood sugar 3 times daily or as directed. 100 each 11     blood glucose (NO BRAND SPECIFIED) test strip Use to test blood sugar 3 times daily or as directed. 100 strip 11     blood glucose monitoring (NO BRAND SPECIFIED) meter device kit Use to test blood sugar 3 times daily or as directed. 2 kit 0     Continuous Glucose Sensor (DEXCOM G6 SENSOR) MISC CHANGE SENSOR EVERY 10 DAYS 9 each 3     Continuous Glucose Transmitter (DEXCOM G6 TRANSMITTER) MISC Change every 3 months 1 each 3     insulin glargine (LANTUS SOLOSTAR) 100 UNIT/ML pen Inject 28 units subcutaneous once a day ONLY IF INSULIN PUMP FAILS. 15 mL 1     Insulin Infusion Pump Supplies (VeroseeSOFT XC INFUSION SET) MISC 1 each every 48 hours Change every 2 days  9 mm cannula, 23 inch tubing 45 each 3     Insulin Infusion Pump Supplies (T:SLIM X2 3ML  "CARTRIDGE) MISC 1 each by Other route every other day Insulin cartridge to be used with pump as directed.  Change every 2 days or as directed. 45 each 3     insulin pen needle (ULTICARE MICRO) 32G X 4 MM miscellaneous Use pen needles daily or as directed. 100 each 3     Insulin Syringe-Needle U-100 31G X 1/4\" 1 ML MISC 1 Syringe as needed (until new insuline pump arrives) 50 each 1       "

## 2024-11-04 NOTE — PROGRESS NOTES
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 Patient is now s/p IVETTE with Dr. Mcdaniels on 11/2/24 complicated by arterial reconstruction.     Changes today:    - Decrease IVF to 50 mL/hr   - Hold Thymo and give  mg today   - Give suppository   - AXR to verify NG placement   - Hold heparin gtt and send HIT screening   - Remove Méndez catheter   __________________________________________    Graft function:  s/p IVETTE 11/2/24: POD#2. Lipase 57 (from 83), trending down. Requiring 0.5-1 unit/hr on insulin gtt. Post-op US with patent vasculature.    - Heparin gtt 400 units/hour on HOLD pending HIT panel   - Octreotide TID   - ASA 81 mg daily     LDKT 2019: Cr at baseline 1.1-1.3. Good UOP.    - Remove Méndez catheter today.     Immunosuppression management:  Induction: via high intensity protocol (cPRA 0) with Thymoglobulin goal 7.5 mg/kg.   - Received Thymo 400 mg thus far   - Hold Thymo today with thrombocytopenia and give  mg.   Maintenance:    - MMF 1000 mg BID   - Tacrolimus 3 mg BID. Goal level 8-10.     Neuro:  Acute post op pain: Continue Tylenol scheduled and Oxycodone and IV Dilaudid PRN.     Hematology:   Anemia of chronic disease/Acute blood loss: Hgb ~9, trending down. Monitor.  Thrombocytopenia: PLT 44 (from 140 < 207).    - Send HIT panel. Hold heparin gtt.     Cardiorespiratory:   HTN: No antihypertensives needed post operatively. Monitor.     GI/Nutrition:   At risk for malnutrition: Regular diet.   N/V; Constipation: Continue bowel regimen of Senokot-S, Miralax.    - Give Suppository x 1 today.    - AXR to verify NG placement    Fluid/Electrolytes: IVF: Decrease D5LR to 50 mL/hr  Hypomagnesemia: Mag-Ox 400 mg daily to start tomorrow.     Infectious disease: No issues.      Prophylaxis: DVT (mechanical), fall, GI (PPI), viral  (Valcyte x 3 months), PJP (Bactrim indefinitely), fungal (Micafungin), marjan-op (Zosyn)    Disposition: 7A    ROX/Fellow/Resident Provider: Stephany Diza NP Pager #[phone]    Faculty: Jean Liu MD  _________________________________________________________________  Transplant History: Admitted 11/2/2024 for kidney transplant.  11/2/2024 (Pancreas), 10/1/2019 (Kidney), Postoperative day: 2 (Pancreas), 1861 (Kidney)     Interval History: History is obtained from the patient  Overnight events: No complaints. Requests NG tube removal but remains intermittently nauseous. Passing gas but no BM.     ROS:   A 10-point review of systems was negative except as noted above.    Meds:  Current Facility-Administered Medications   Medication Dose Route Frequency Provider Last Rate Last Admin    acetaminophen (TYLENOL) tablet 975 mg  975 mg Oral Q8H Prince Mcdaniels MD   975 mg at 11/03/24 1154    aspirin (ASA) chewable tablet 81 mg  81 mg Per NG tube Daily Prince Mcdaniels MD   81 mg at 11/04/24 1009    calcium carbonate-vitamin D (OSCAL) 500-5 MG-MCG per tablet 1 tablet  1 tablet Oral BID w/meals Prince Mcdaniels MD   1 tablet at 11/04/24 1009    [START ON 11/10/2024] clotrimazole (MYCELEX) lozenge 10 mg  10 mg Buccal 4x Daily Prince Mcdaniels MD        famotidine (PEPCID) tablet 20 mg  20 mg Oral BID Prince Mcdaniels MD   20 mg at 11/04/24 1009    [START ON 11/5/2024] magnesium oxide (MAG-OX) tablet 400 mg  400 mg Oral Q24H Prince Mcdaniels MD        micafungin (MYCAMINE) 100 mg in sodium chloride 0.9 % 100 mL intermittent infusion  100 mg Intravenous Q24H Prince Mcdaniels  mL/hr at 11/03/24 1636 100 mg at 11/03/24 1636    mycophenolate (CELLCEPT BRAND) suspension 1,000 mg  1,000 mg Per NG tube BID Prince Mcdaniels MD   1,000 mg at 11/04/24 0856    octreotide (sandoSTATIN) injection 100  mcg  100 mcg Subcutaneous TID Prince Mcdaniels MD   100 mcg at 11/04/24 0939    pantoprazole (PROTONIX) 2 mg/mL suspension 40 mg  40 mg Per NG tube Daily Prince Mcdaniels MD   40 mg at 11/04/24 0856    piperacillin-tazobactam (ZOSYN) intermittent infusion 3.375 g  3.375 g Intravenous Q6H Prince Mcdaniels  mL/hr at 11/04/24 1217 3.375 g at 11/04/24 1217    polyethylene glycol (MIRALAX) Packet 17 g  17 g Oral Daily Prince Mcdaniels MD        senna-docusate (SENOKOT-S/PERICOLACE) 8.6-50 MG per tablet 1 tablet  1 tablet Oral BID Prince Mcdaniels MD   1 tablet at 11/04/24 1010    sodium chloride (PF) 0.9% PF flush 10 mL  10 mL Intracatheter Q8H Prince Mcdaniels MD   10 mL at 11/04/24 1017    sodium chloride (PF) 0.9% PF flush 3 mL  3 mL Intravenous Q8H Prince Mcdaniels MD   3 mL at 11/04/24 1218    sulfamethoxazole-trimethoprim (BACTRIM/SEPTRA) suspension 80 mg  10 mL Per NG tube Daily Prince Mcdaniels MD   80 mg at 11/04/24 0856    tacrolimus (GENERIC) suspension 3 mg  3 mg Oral BID IS Prince Mcdaniels MD   3 mg at 11/04/24 0856    valGANciclovir (VALCYTE) solution 900 mg  900 mg Oral or Feeding Tube Daily Prince Mcdaniels MD   900 mg at 11/04/24 0856       Physical Exam:     Admit Weight: 89.1 kg (196 lb 8 oz)    Current vitals:   BP (!) 154/83   Pulse 102   Temp 98.8  F (37.1  C) (Oral)   Resp 16   Wt 99.8 kg (220 lb)   SpO2 95%   BMI 34.46 kg/m      CVP (mmHg): 12 mmHg    Vital sign ranges:    Temp:  [98.5  F (36.9  C)-99.2  F (37.3  C)] 98.8  F (37.1  C)  Pulse:  [] 102  Resp:  [15-20] 16  BP: (105-158)/(63-86) 154/83  SpO2:  [92 %-98 %] 95 %  Patient Vitals for the past 24 hrs:   BP Temp Temp src Pulse Resp SpO2 Weight   11/04/24 0943 -- -- -- -- -- 95 % --   11/04/24 0900 (!) 154/83 -- -- 102 -- 93 % --   11/04/24 0700 139/84 --  -- 93 -- 97 % --   11/04/24 0600 (!) 145/75 -- -- 101 -- 98 % --   11/04/24 0532 (!) 151/86 98.8  F (37.1  C) Oral 101 16 96 % --   11/04/24 0500 (!) 156/80 -- -- 102 -- 97 % --   11/04/24 0400 (!) 148/82 -- -- 91 -- 97 % --   11/04/24 0300 (!) 151/85 -- -- 96 -- 98 % --   11/04/24 0200 (!) 151/80 -- -- 93 16 97 % --   11/04/24 0139 (!) 146/85 99.2  F (37.3  C) Oral 104 15 98 % --   11/04/24 0100 (!) 149/86 -- -- 98 18 98 % --   11/04/24 0000 (!) 158/86 -- -- 105 18 98 % --   11/03/24 2300 125/75 -- -- 105 20 97 % --   11/03/24 2200 116/67 98.8  F (37.1  C) Oral 104 15 97 % --   11/03/24 2100 125/72 -- -- 106 -- 93 % 99.8 kg (220 lb)   11/03/24 2000 109/64 98.5  F (36.9  C) Oral 101 15 92 % --   11/03/24 1936 -- 98.5  F (36.9  C) Oral -- -- -- --   11/03/24 1923 106/63 99.1  F (37.3  C) Oral 100 17 94 % --   11/03/24 1800 105/64 -- -- 102 -- 93 % --   11/03/24 1600 122/74 -- -- 104 -- 95 % --   11/03/24 1500 121/66 -- -- -- -- -- --     General Appearance: in no apparent distress.   Skin: normal  Heart: regular rate and rhythm  Lungs: NLB on 1L via NC  Abdomen: The abdomen is soft and appropriately tender over the pancreas graft. Incision covered; C/D/I.   : paredes is present. Urine is clear yellow.   Extremities: edema: present, generalized  Neurologic: awake, alert, and oriented. Tremor absent.    Data:   CMP  Recent Labs   Lab 11/04/24  1214 11/04/24  1109 11/04/24  0704 11/04/24  0622 11/04/24  0202 11/04/24  0108 11/03/24  1926 11/03/24  1828 11/03/24  0614 11/03/24  0530 11/03/24  0144 11/03/24  0135 11/03/24  0131 11/03/24  0019 11/02/24  1353 11/02/24  1352   NA  --   --   --  137  --   --   --   --   --  140   < > 135  --  136   < > 140   POTASSIUM  --   --   --  4.0  --  4.0   < > 3.8   < > 4.3  4.3   < > 3.7  --  3.6   < > 4.4   CHLORIDE  --   --   --  105  --   --   --   --   --  105   < >  --   --   --   --  101   CO2  --   --   --  25  --   --   --   --   --  24   < >  --   --   --   --  27   GLC  137* 154*   < > 151*   < >  --    < >  --    < > 122*   < > 86   < > 79   < > 157*   BUN  --   --   --  16.3  --   --   --   --   --  15.7   < >  --   --   --   --  14.9   CR  --   --   --  1.19*  --   --   --   --   --  1.32*   < >  --   --   --   --  1.10   GFRESTIMATED  --   --   --  77  --   --   --   --   --  68   < >  --   --   --   --  84   APRIL  --   --   --  7.4*  --   --   --  7.4*  --  7.6*   < >  --   --   --   --  9.2   ICAW  --   --   --   --   --   --   --   --   --   --   --  4.4  --  4.6   < >  --    MAG  --   --   --  1.9  --   --   --   --   --  1.3*   < >  --   --   --   --   --    PHOS  --   --   --  2.6  --   --   --   --   --  2.4*   < >  --   --   --   --   --    AMYLASE  --   --   --  66  --   --   --   --   --  78   < >  --   --   --   --  21*   LIPASE  --   --   --  57  --   --   --   --   --  83*   < >  --   --   --   --  11*   ALBUMIN  --   --   --  2.4*  --   --   --   --   --   --   --   --   --   --   --  4.1   BILITOTAL  --   --   --   --   --   --   --   --   --   --   --   --   --   --   --  0.5   ALKPHOS  --   --   --   --   --   --   --   --   --   --   --   --   --   --   --  113   AST  --   --   --   --   --   --   --   --   --   --   --   --   --   --   --  15   ALT  --   --   --   --   --   --   --   --   --   --   --   --   --   --   --  13    < > = values in this interval not displayed.     CBC  Recent Labs   Lab 11/04/24  0622 11/04/24  0108 11/03/24  0918 11/03/24  0530 11/03/24  0339   HGB 8.8* 8.7*   < > 10.2*  --    WBC 4.2  --   --  14.2*  --    PLT 44*  --   --  140*  --    A1C  --   --   --   --  7.8*    < > = values in this interval not displayed.     COAGS  Recent Labs   Lab 11/03/24  0144 11/02/24  1352   INR 1.49* 1.05   PTT 33 34      Urinalysis  Recent Labs   Lab Test 11/02/24  1503 11/21/23  1154 11/16/21  1502 05/11/21  1532   COLOR Yellow Straw  --   --    APPEARANCE Clear Clear  --   --    URINEGLC Negative Negative  --   --    URINEBILI Negative Negative   --   --    URINEKETONE Negative Negative  --   --    SG 1.026 1.006  --   --    UBLD Negative Negative  --   --    URINEPH 5.5 6.0  --   --    PROTEIN 10* Negative  --   --    NITRITE Negative Negative  --   --    LEUKEST Negative Negative  --   --    RBCU <1 <1  --   --    WBCU <1 0  --   --    UTPG  --   --  0.11 0.10     Virology:  Hepatitis C Antibody   Date Value Ref Range Status   11/02/2024 Nonreactive Nonreactive Final     Comment:     A nonreactive screening test result does not exclude the possibility of exposure to or infection with HCV. Nonreactive screening test results in individuals with prior exposure to HCV may be due to antibody levels below the limit of detection of this assay or lack of reactivity to the HCV antigens used in this assay. Patients with recent HCV infections (<3 months from time of exposure) may have false-negative HCV antibody results due to the time needed for seroconversion (average of 8 to 9 weeks).   09/26/2019 Nonreactive NR^Nonreactive Final     Comment:     Assay performance characteristics have not been established for newborns,   infants, and children       BK Virus Result   Date Value Ref Range Status   05/11/2021 BK Virus DNA Not Detected BKNEG^BK Virus DNA Not Detected copies/mL Final   07/08/2020 BK Virus DNA Not Detected BKNEG^BK Virus DNA Not Detected copies/mL Final   03/31/2020 BK Virus DNA Not Detected BKNEG^BK Virus DNA Not Detected copies/mL Final   02/03/2020 BK Virus DNA Not Detected BKNEG^BK Virus DNA Not Detected copies/mL Final   02/01/2020 BK Virus DNA Not Detected BKNEG^BK Virus DNA Not Detected copies/mL Final   01/06/2020 BK Virus DNA Not Detected BKNEG^BK Virus DNA Not Detected copies/mL Final   12/23/2019 BK Virus DNA Not Detected BKNEG^BK Virus DNA Not Detected copies/mL Final   11/29/2019 BK Virus DNA Not Detected BKNEG^BK Virus DNA Not Detected copies/mL Final   11/04/2019 BK Virus DNA Not Detected BKNEG^BK Virus DNA Not Detected copies/mL Final    10/22/2019 BK Virus DNA Not Detected BKNEG^BK Virus DNA Not Detected copies/mL Final   10/18/2019 BK Virus DNA Not Detected BKNEG^BK Virus DNA Not Detected copies/mL Final     BK Virus DNA IU/mL   Date Value Ref Range Status   09/13/2024 Not Detected Not Detected IU/mL Final

## 2024-11-04 NOTE — PLAN OF CARE
Goal Outcome Evaluation:      Plan of Care Reviewed With: patient    Overall Patient Progress: improvingOverall Patient Progress: improving    Outcome Evaluation: BG levels stable on Insulin gtt, Nausea still managed with prn antiemetics and LIS.    BP (!) 151/80   Pulse 93   Temp 99.2  F (37.3  C) (Oral)   Resp 16   Wt 99.8 kg (220 lb)   SpO2 97%   BMI 34.46 kg/m      SHIFT: 6076-1623  ISOLATION: NA  VITALS: Hypertension (SVPs > 140 mmHg) and Tachycardia (-110s). AOVSS on RA  BG: Q1-2H on an Insulin gtt. Alg I  Recent Labs   Lab 11/04/24  0202 11/04/24  0106 11/04/24  0023 11/03/24  2306 11/03/24  2156 11/03/24 2026   * 143* 130* 146* 146* 118*   NEURO: A&O x4. Drowsy  Diet: NPO for Medical/Clinical Reasons Except for: Ice Chips    PRN: Nausea managed with Zofran x1 and NG being to LIS  RESPIRATORY: WDL  CARDIAC: WDL  : Méndez cath w/ 750 mLs  GI: LBM: PTOR  TUBES: NG, R TL IJ, R PIV, R RUBIA, Méndez  MIVF/GTT/ABX: D5LR @ 100 mL/hr, Heparin SR @ 400 U/hr, Insulin on Alg I w/ NS carrier  ASSIST: Ao1  SAFETY: Bed alarm  SKIN: Abdominal incision OP dressing.  LABS:   Recent Labs   Lab 11/04/24  0108 11/03/24  2200 11/03/24  1828 11/03/24  0918 11/03/24  0530 11/03/24  0144 11/02/24  1711 11/02/24  1352   POTASSIUM 4.0 3.7 3.8   < > 4.3  4.3 2.9*   < > 4.4   PHOS  --   --   --   --  2.4* 0.9*  --   --    MAG  --   --   --   --  1.3* 0.9*  --   --    HGB 8.7* 9.4* 9.0*   < > 10.2*  --    < > 14.8   AMYLASE  --   --   --   --  78 37  --  21*   LIPASE  --   --   --   --  83* 49  --  11*    < > = values in this interval not displayed.   PLAN: Per

## 2024-11-05 LAB
AMYLASE SERPL-CCNC: 37 U/L (ref 28–100)
ANION GAP SERPL CALCULATED.3IONS-SCNC: 7 MMOL/L (ref 7–15)
BASOPHILS # BLD AUTO: 0 10E3/UL (ref 0–0.2)
BASOPHILS NFR BLD AUTO: 0 %
BUN SERPL-MCNC: 19.5 MG/DL (ref 6–20)
CALCIUM SERPL-MCNC: 7.6 MG/DL (ref 8.8–10.4)
CELL TYPE ALLO: NORMAL
CELL TYPE AUTO: ABNORMAL
CHANNELSHIFTALLOB1: -55
CHANNELSHIFTALLOB2: -69
CHANNELSHIFTALLOT1: 1
CHANNELSHIFTALLOT2: -32
CHANNELSHIFTAUTOB1: -55
CHANNELSHIFTAUTOB2: -85
CHANNELSHIFTAUTOT1: 59
CHANNELSHIFTAUTOT2: -14
CHLORIDE SERPL-SCNC: 108 MMOL/L (ref 98–107)
COMMENT ALLOB2: NORMAL
CREAT SERPL-MCNC: 1.41 MG/DL (ref 0.67–1.17)
CROSSMATCHDATEALLO: NORMAL
CROSSMATCHDATEAUTO: ABNORMAL
DONOR ALLO: NORMAL
DONOR AUTO: ABNORMAL
DONORCELLDATE ALLO: NORMAL
DONORCELLDATE AUTO: ABNORMAL
EGFRCR SERPLBLD CKD-EPI 2021: 63 ML/MIN/1.73M2
EOSINOPHIL # BLD AUTO: 0 10E3/UL (ref 0–0.7)
EOSINOPHIL NFR BLD AUTO: 0 %
ERYTHROCYTE [DISTWIDTH] IN BLOOD BY AUTOMATED COUNT: 13.2 % (ref 10–15)
GLUCOSE BLDC GLUCOMTR-MCNC: 103 MG/DL (ref 70–99)
GLUCOSE BLDC GLUCOMTR-MCNC: 108 MG/DL (ref 70–99)
GLUCOSE BLDC GLUCOMTR-MCNC: 112 MG/DL (ref 70–99)
GLUCOSE BLDC GLUCOMTR-MCNC: 112 MG/DL (ref 70–99)
GLUCOSE BLDC GLUCOMTR-MCNC: 117 MG/DL (ref 70–99)
GLUCOSE BLDC GLUCOMTR-MCNC: 118 MG/DL (ref 70–99)
GLUCOSE BLDC GLUCOMTR-MCNC: 121 MG/DL (ref 70–99)
GLUCOSE BLDC GLUCOMTR-MCNC: 123 MG/DL (ref 70–99)
GLUCOSE BLDC GLUCOMTR-MCNC: 127 MG/DL (ref 70–99)
GLUCOSE BLDC GLUCOMTR-MCNC: 133 MG/DL (ref 70–99)
GLUCOSE BLDC GLUCOMTR-MCNC: 169 MG/DL (ref 70–99)
GLUCOSE BLDC GLUCOMTR-MCNC: 77 MG/DL (ref 70–99)
GLUCOSE BLDC GLUCOMTR-MCNC: 90 MG/DL (ref 70–99)
GLUCOSE SERPL-MCNC: 113 MG/DL (ref 70–99)
HBV DNA SERPL QL NAA+PROBE: NORMAL
HCO3 SERPL-SCNC: 25 MMOL/L (ref 22–29)
HCT VFR BLD AUTO: 25.1 % (ref 40–53)
HCV RNA SERPL QL NAA+PROBE: NORMAL
HGB BLD-MCNC: 7.9 G/DL (ref 13.3–17.7)
HIV1+2 RNA SERPL QL NAA+PROBE: NORMAL
IMM GRANULOCYTES # BLD: 0 10E3/UL
IMM GRANULOCYTES NFR BLD: 1 %
LIPASE SERPL-CCNC: 21 U/L (ref 13–60)
LYMPHOCYTES # BLD AUTO: 0.1 10E3/UL (ref 0.8–5.3)
LYMPHOCYTES NFR BLD AUTO: 3 %
MAGNESIUM SERPL-MCNC: 1.8 MG/DL (ref 1.7–2.3)
MCH RBC QN AUTO: 26.6 PG (ref 26.5–33)
MCHC RBC AUTO-ENTMCNC: 31.5 G/DL (ref 31.5–36.5)
MCV RBC AUTO: 85 FL (ref 78–100)
MONOCYTES # BLD AUTO: 0.4 10E3/UL (ref 0–1.3)
MONOCYTES NFR BLD AUTO: 8 %
NEUTROPHILS # BLD AUTO: 4 10E3/UL (ref 1.6–8.3)
NEUTROPHILS NFR BLD AUTO: 89 %
NRBC # BLD AUTO: 0 10E3/UL
NRBC BLD AUTO-RTO: 0 /100
PHOSPHATE SERPL-MCNC: 2.6 MG/DL (ref 2.5–4.5)
PLATELET # BLD AUTO: 56 10E3/UL (ref 150–450)
POS CUT OFF ALLO B: >69
POS CUT OFF ALLO T: >53
POS CUT OFF AUTO B: >69
POS CUT OFF AUTO T: >53
POTASSIUM SERPL-SCNC: 4 MMOL/L (ref 3.4–5.3)
PTH-INTACT SERPL-MCNC: 216 PG/ML (ref 15–65)
RBC # BLD AUTO: 2.97 10E6/UL (ref 4.4–5.9)
RESULT ALLO B1: NORMAL
RESULT ALLO B2: NORMAL
RESULT ALLO T1: NORMAL
RESULT ALLO T2: NORMAL
RESULT AUTO B1: ABNORMAL
RESULT AUTO B2: ABNORMAL
RESULT AUTO T1: ABNORMAL
RESULT AUTO T2: ABNORMAL
SERUM DATE ALLO B1: NORMAL
SERUM DATE ALLO B2: NORMAL
SERUM DATE ALLO T1: NORMAL
SERUM DATE ALLO T2: NORMAL
SERUM DATE AUTO B1: ABNORMAL
SERUM DATE AUTO B2: ABNORMAL
SERUM DATE AUTO T1: ABNORMAL
SERUM DATE AUTO T2: ABNORMAL
SODIUM SERPL-SCNC: 140 MMOL/L (ref 135–145)
TACROLIMUS BLD-MCNC: 7.5 UG/L (ref 5–15)
TESTMETHODALLO: NORMAL
TESTMETHODAUTO: ABNORMAL
TME LAST DOSE: NORMAL H
TME LAST DOSE: NORMAL H
TREATMENT ALLO B1: NORMAL
TREATMENT ALLO B2: NORMAL
TREATMENT ALLO T1: NORMAL
TREATMENT ALLO T2: NORMAL
TREATMENT AUTO B1: ABNORMAL
TREATMENT AUTO B2: ABNORMAL
TREATMENT AUTO T1: ABNORMAL
TREATMENT AUTO T2: ABNORMAL
UFH PPP CHRO-ACNC: <0.1 IU/ML
WBC # BLD AUTO: 4.6 10E3/UL (ref 4–11)

## 2024-11-05 PROCEDURE — 250N000011 HC RX IP 250 OP 636: Performed by: NURSE PRACTITIONER

## 2024-11-05 PROCEDURE — 99233 SBSQ HOSP IP/OBS HIGH 50: CPT | Mod: 24 | Performed by: INTERNAL MEDICINE

## 2024-11-05 PROCEDURE — 250N000011 HC RX IP 250 OP 636

## 2024-11-05 PROCEDURE — 80197 ASSAY OF TACROLIMUS: CPT | Performed by: STUDENT IN AN ORGANIZED HEALTH CARE EDUCATION/TRAINING PROGRAM

## 2024-11-05 PROCEDURE — 258N000003 HC RX IP 258 OP 636: Performed by: NURSE PRACTITIONER

## 2024-11-05 PROCEDURE — 85004 AUTOMATED DIFF WBC COUNT: CPT | Performed by: SURGERY

## 2024-11-05 PROCEDURE — 84100 ASSAY OF PHOSPHORUS: CPT | Performed by: SURGERY

## 2024-11-05 PROCEDURE — 83690 ASSAY OF LIPASE: CPT | Performed by: SURGERY

## 2024-11-05 PROCEDURE — 250N000013 HC RX MED GY IP 250 OP 250 PS 637: Performed by: SURGERY

## 2024-11-05 PROCEDURE — 85520 HEPARIN ASSAY: CPT | Performed by: SURGERY

## 2024-11-05 PROCEDURE — 83735 ASSAY OF MAGNESIUM: CPT | Performed by: SURGERY

## 2024-11-05 PROCEDURE — 250N000011 HC RX IP 250 OP 636: Mod: JB | Performed by: SURGERY

## 2024-11-05 PROCEDURE — 36592 COLLECT BLOOD FROM PICC: CPT | Performed by: INTERNAL MEDICINE

## 2024-11-05 PROCEDURE — 82310 ASSAY OF CALCIUM: CPT | Performed by: SURGERY

## 2024-11-05 PROCEDURE — 83970 ASSAY OF PARATHORMONE: CPT | Performed by: INTERNAL MEDICINE

## 2024-11-05 PROCEDURE — 80048 BASIC METABOLIC PNL TOTAL CA: CPT | Performed by: SURGERY

## 2024-11-05 PROCEDURE — 258N000003 HC RX IP 258 OP 636: Performed by: SURGERY

## 2024-11-05 PROCEDURE — 250N000012 HC RX MED GY IP 250 OP 636 PS 637: Performed by: SURGERY

## 2024-11-05 PROCEDURE — 82150 ASSAY OF AMYLASE: CPT | Performed by: SURGERY

## 2024-11-05 PROCEDURE — 250N000013 HC RX MED GY IP 250 OP 250 PS 637: Performed by: NURSE PRACTITIONER

## 2024-11-05 PROCEDURE — 120N000003 HC R&B IMCU UMMC

## 2024-11-05 PROCEDURE — 85048 AUTOMATED LEUKOCYTE COUNT: CPT | Performed by: SURGERY

## 2024-11-05 RX ORDER — METHYLPREDNISOLONE SODIUM SUCCINATE 125 MG/2ML
100 INJECTION INTRAMUSCULAR; INTRAVENOUS ONCE
Status: COMPLETED | OUTPATIENT
Start: 2024-11-05 | End: 2024-11-05

## 2024-11-05 RX ORDER — OXYCODONE HYDROCHLORIDE 10 MG/1
10 TABLET ORAL EVERY 4 HOURS PRN
Status: DISCONTINUED | OUTPATIENT
Start: 2024-11-05 | End: 2024-11-06

## 2024-11-05 RX ORDER — AMOXICILLIN 250 MG
1 CAPSULE ORAL 2 TIMES DAILY
Status: DISCONTINUED | OUTPATIENT
Start: 2024-11-05 | End: 2024-11-07

## 2024-11-05 RX ORDER — VALGANCICLOVIR 450 MG/1
900 TABLET, FILM COATED ORAL DAILY
Status: DISCONTINUED | OUTPATIENT
Start: 2024-11-06 | End: 2024-11-11 | Stop reason: HOSPADM

## 2024-11-05 RX ORDER — HYDRALAZINE HYDROCHLORIDE 20 MG/ML
10 INJECTION INTRAMUSCULAR; INTRAVENOUS EVERY 30 MIN PRN
Status: DISCONTINUED | OUTPATIENT
Start: 2024-11-05 | End: 2024-11-11 | Stop reason: HOSPADM

## 2024-11-05 RX ORDER — ACETAMINOPHEN 325 MG/1
975 TABLET ORAL EVERY 8 HOURS
Status: COMPLETED | OUTPATIENT
Start: 2024-11-05 | End: 2024-11-06

## 2024-11-05 RX ORDER — MYCOPHENOLATE MOFETIL 250 MG/1
1000 CAPSULE ORAL
Status: DISCONTINUED | OUTPATIENT
Start: 2024-11-05 | End: 2024-11-11 | Stop reason: HOSPADM

## 2024-11-05 RX ORDER — OXYCODONE HYDROCHLORIDE 5 MG/1
5 TABLET ORAL EVERY 4 HOURS PRN
Status: DISCONTINUED | OUTPATIENT
Start: 2024-11-05 | End: 2024-11-06

## 2024-11-05 RX ORDER — DIPHENHYDRAMINE HCL 12.5MG/5ML
25-50 LIQUID (ML) ORAL ONCE
Status: COMPLETED | OUTPATIENT
Start: 2024-11-05 | End: 2024-11-05

## 2024-11-05 RX ORDER — LABETALOL HYDROCHLORIDE 5 MG/ML
10 INJECTION, SOLUTION INTRAVENOUS EVERY 10 MIN PRN
Status: DISCONTINUED | OUTPATIENT
Start: 2024-11-05 | End: 2024-11-11 | Stop reason: HOSPADM

## 2024-11-05 RX ORDER — HYDROMORPHONE HCL IN WATER/PF 6 MG/30 ML
0.2 PATIENT CONTROLLED ANALGESIA SYRINGE INTRAVENOUS EVERY 4 HOURS PRN
Status: DISCONTINUED | OUTPATIENT
Start: 2024-11-05 | End: 2024-11-06

## 2024-11-05 RX ORDER — CARVEDILOL 6.25 MG/1
6.25 TABLET ORAL 2 TIMES DAILY WITH MEALS
Status: DISCONTINUED | OUTPATIENT
Start: 2024-11-05 | End: 2024-11-11 | Stop reason: HOSPADM

## 2024-11-05 RX ORDER — SULFAMETHOXAZOLE AND TRIMETHOPRIM 400; 80 MG/1; MG/1
1 TABLET ORAL DAILY
Status: DISCONTINUED | OUTPATIENT
Start: 2024-11-06 | End: 2024-11-11 | Stop reason: HOSPADM

## 2024-11-05 RX ORDER — ACETAMINOPHEN 325 MG/1
650 TABLET ORAL ONCE
Status: COMPLETED | OUTPATIENT
Start: 2024-11-05 | End: 2024-11-05

## 2024-11-05 RX ORDER — DIPHENHYDRAMINE HCL 25 MG
25-50 CAPSULE ORAL ONCE
Status: COMPLETED | OUTPATIENT
Start: 2024-11-05 | End: 2024-11-05

## 2024-11-05 RX ORDER — TACROLIMUS 1 MG/1
3 CAPSULE ORAL
Status: DISCONTINUED | OUTPATIENT
Start: 2024-11-05 | End: 2024-11-09

## 2024-11-05 RX ORDER — PANTOPRAZOLE SODIUM 40 MG/1
40 TABLET, DELAYED RELEASE ORAL
Status: DISCONTINUED | OUTPATIENT
Start: 2024-11-06 | End: 2024-11-11 | Stop reason: HOSPADM

## 2024-11-05 RX ADMIN — CARVEDILOL 6.25 MG: 6.25 TABLET, FILM COATED ORAL at 20:10

## 2024-11-05 RX ADMIN — SODIUM CHLORIDE, SODIUM LACTATE, POTASSIUM CHLORIDE, CALCIUM CHLORIDE AND DEXTROSE MONOHYDRATE: 5; 600; 310; 30; 20 INJECTION, SOLUTION INTRAVENOUS at 04:25

## 2024-11-05 RX ADMIN — METHOCARBAMOL TABLETS 750 MG: 750 TABLET, COATED ORAL at 23:13

## 2024-11-05 RX ADMIN — VALGANCICLOVIR HYDROCHLORIDE 900 MG: 50 POWDER, FOR SOLUTION ORAL at 08:40

## 2024-11-05 RX ADMIN — MICAFUNGIN SODIUM 100 MG: 50 INJECTION, POWDER, LYOPHILIZED, FOR SOLUTION INTRAVENOUS at 16:08

## 2024-11-05 RX ADMIN — SENNOSIDES AND DOCUSATE SODIUM 1 TABLET: 8.6; 5 TABLET ORAL at 20:10

## 2024-11-05 RX ADMIN — FAMOTIDINE 20 MG: 20 TABLET ORAL at 20:10

## 2024-11-05 RX ADMIN — BENZOCAINE 1 ML: 220 SPRAY, METERED PERIODONTAL at 08:35

## 2024-11-05 RX ADMIN — MYCOPHENOLATE MOFETIL 1000 MG: 200 POWDER, FOR SUSPENSION ORAL at 08:40

## 2024-11-05 RX ADMIN — SODIUM CHLORIDE, SODIUM LACTATE, POTASSIUM CHLORIDE, CALCIUM CHLORIDE AND DEXTROSE MONOHYDRATE: 5; 600; 310; 30; 20 INJECTION, SOLUTION INTRAVENOUS at 20:03

## 2024-11-05 RX ADMIN — OCTREOTIDE ACETATE 100 MCG: 100 INJECTION, SOLUTION INTRAVENOUS; SUBCUTANEOUS at 08:46

## 2024-11-05 RX ADMIN — ONDANSETRON 4 MG: 2 INJECTION INTRAMUSCULAR; INTRAVENOUS at 08:36

## 2024-11-05 RX ADMIN — MYCOPHENOLATE MOFETIL 1000 MG: 250 CAPSULE ORAL at 18:43

## 2024-11-05 RX ADMIN — SULFAMETHOXAZOLE AND TRIMETHOPRIM 80 MG: 200; 40 SUSPENSION ORAL at 08:40

## 2024-11-05 RX ADMIN — ACETAMINOPHEN 975 MG: 325 TABLET, FILM COATED ORAL at 11:56

## 2024-11-05 RX ADMIN — DIPHENHYDRAMINE HYDROCHLORIDE 50 MG: 25 CAPSULE ORAL at 13:16

## 2024-11-05 RX ADMIN — METHYLPREDNISOLONE SODIUM SUCCINATE 100 MG: 125 INJECTION, POWDER, FOR SOLUTION INTRAMUSCULAR; INTRAVENOUS at 13:16

## 2024-11-05 RX ADMIN — TACROLIMUS 3 MG: 1 CAPSULE ORAL at 18:05

## 2024-11-05 RX ADMIN — Medication 1 TABLET: at 18:07

## 2024-11-05 RX ADMIN — PIPERACILLIN SODIUM AND TAZOBACTAM SODIUM 3.38 G: 3; .375 INJECTION, SOLUTION INTRAVENOUS at 06:28

## 2024-11-05 RX ADMIN — POLYETHYLENE GLYCOL 3350 17 G: 17 POWDER, FOR SOLUTION ORAL at 08:40

## 2024-11-05 RX ADMIN — Medication 40 MG: at 08:39

## 2024-11-05 RX ADMIN — ANTI-THYMOCYTE GLOBULIN (RABBIT) 100 MG: 5 INJECTION, POWDER, LYOPHILIZED, FOR SOLUTION INTRAVENOUS at 14:26

## 2024-11-05 RX ADMIN — ASPIRIN 81 MG CHEWABLE TABLET 81 MG: 81 TABLET CHEWABLE at 08:40

## 2024-11-05 RX ADMIN — SENNOSIDES 5 ML: 8.8 LIQUID ORAL at 08:43

## 2024-11-05 RX ADMIN — OCTREOTIDE ACETATE 100 MCG: 100 INJECTION, SOLUTION INTRAVENOUS; SUBCUTANEOUS at 20:10

## 2024-11-05 RX ADMIN — DOCUSATE SODIUM 50 MG: 50 LIQUID ORAL at 08:41

## 2024-11-05 RX ADMIN — LABETALOL HYDROCHLORIDE 10 MG: 5 INJECTION, SOLUTION INTRAVENOUS at 06:00

## 2024-11-05 RX ADMIN — CARVEDILOL 6.25 MG: 6.25 TABLET, FILM COATED ORAL at 11:52

## 2024-11-05 RX ADMIN — PIPERACILLIN SODIUM AND TAZOBACTAM SODIUM 3.38 G: 3; .375 INJECTION, SOLUTION INTRAVENOUS at 00:31

## 2024-11-05 RX ADMIN — ACETAMINOPHEN 975 MG: 325 SOLUTION ORAL at 06:53

## 2024-11-05 RX ADMIN — PIPERACILLIN SODIUM AND TAZOBACTAM SODIUM 3.38 G: 3; .375 INJECTION, SOLUTION INTRAVENOUS at 12:09

## 2024-11-05 RX ADMIN — FAMOTIDINE 20 MG: 20 TABLET ORAL at 08:40

## 2024-11-05 RX ADMIN — PIPERACILLIN SODIUM AND TAZOBACTAM SODIUM 3.38 G: 3; .375 INJECTION, SOLUTION INTRAVENOUS at 18:07

## 2024-11-05 RX ADMIN — MAGNESIUM OXIDE TAB 400 MG (241.3 MG ELEMENTAL MG) 400 MG: 400 (241.3 MG) TAB at 11:52

## 2024-11-05 RX ADMIN — OCTREOTIDE ACETATE 100 MCG: 100 INJECTION, SOLUTION INTRAVENOUS; SUBCUTANEOUS at 14:51

## 2024-11-05 RX ADMIN — TACROLIMUS 3 MG: 5 CAPSULE ORAL at 08:40

## 2024-11-05 RX ADMIN — ACETAMINOPHEN 975 MG: 325 TABLET, FILM COATED ORAL at 18:43

## 2024-11-05 RX ADMIN — Medication 1 TABLET: at 08:40

## 2024-11-05 ASSESSMENT — ACTIVITIES OF DAILY LIVING (ADL)
ADLS_ACUITY_SCORE: 0
DEPENDENT_IADLS:: INDEPENDENT
ADLS_ACUITY_SCORE: 0

## 2024-11-05 NOTE — PROGRESS NOTES
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 Patient is now s/p IVETTE with Dr. Mcdaniels on 11/2/24 complicated by arterial reconstruction.     Changes today:    - Start Coreg at 6.25 mg daily   - Remove NG tube today   - Advance diet to clear liquids   - Thymo 1/2 dose given thrombocytopenia   - Increase IVF to 75 mL/hr   __________________________________________    Graft function:  s/p IVETTE 11/2/24: POD#3. Lipase 21 (from 57), trending down. Requiring 0.5-1 unit/hr on insulin gtt. Post-op US with patent vasculature.    - Heparin gtt 200 units/hour    - Octreotide TID   - ASA 81 mg daily     LDKT 2019: Cr 1.4; baseline 1.1-1.3. Good UOP.    - Increase IVF today to 75 mL/hr     Immunosuppression management:  Induction: via high intensity protocol (cPRA 0) with Thymoglobulin goal 7.5 mg/kg.   - Received Thymo 400 mg thus far   - Give Thymo 100 mg (1/2 dose today with thrombocytopenia) and  mg d/t previous Thymo reaction.   Maintenance:    - MMF 1000 mg BID   - Tacrolimus 3 mg BID. Goal level 8-10.     Neuro:  Acute post op pain: Continue Tylenol scheduled, oxycodone, Robaxin, and IV Dilaudid PRN.    - Begin weaning IV Dilaudid.     Hematology:   Anemia of chronic disease: Hgb 7.9 (from 8.4), trending down. Monitor.  Thrombocytopenia: PLT 56 (from 140 < 207). Hit panel negative.    - Continue Hep gtt 200 u/hr    Cardiorespiratory:   HTN: Elevated BP overnight, -160s.   - Start Coreg 6.25 mg daily.    GI/Nutrition:   At risk for malnutrition: Advance diet to clears today.   N/V; Constipation: Continue bowel regimen of Senokot-S, Miralax. Denies N/V today, BM yesterday.    - Remove NG tube.    Fluid/Electrolytes: IVF: Increase D5LR to 75 mL/hr  Hypomagnesemia: Continue Mag-Ox 400 mg daily   Generalized edema: Weight up almost 10 Kg. Monitor  with RICHARD.     Infectious disease: No issues.      Prophylaxis: DVT (mechanical), fall, GI (PPI), viral (Valcyte x 3 months), PJP (Bactrim indefinitely), fungal (Micafungin), marjan-op (Zosyn)    Disposition: 7A    ROX/Fellow/Resident Provider: Mathew Garibay NP student, Stephany Diaz NP Pager #[phone]    Faculty: Jean Liu MD  _________________________________________________________________  Transplant History: Admitted 11/2/2024 for kidney transplant.  11/2/2024 (Pancreas), 10/1/2019 (Kidney), Postoperative day: 3 (Pancreas), 1862 (Kidney)     Interval History: History is obtained from the patient  Overnight events: No complaints. Requests NG tube removal, Shares he is beginning to feel hungry.  Denies N/V today, BM last night.  Voiding adequately.    ROS:   A 10-point review of systems was negative except as noted above.    Meds:  Current Facility-Administered Medications   Medication Dose Route Frequency Provider Last Rate Last Admin    acetaminophen (TYLENOL) oral liquid 975 mg  975 mg Oral Q8H Prince Mcdaniels MD   975 mg at 11/05/24 0653    aspirin (ASA) chewable tablet 81 mg  81 mg Per NG tube Daily Prince Mcdaniels MD   81 mg at 11/05/24 0840    calcium carbonate-vitamin D (OSCAL) 500-5 MG-MCG per tablet 1 tablet  1 tablet Oral BID w/meals Prince Mcdaniels MD   1 tablet at 11/05/24 0840    [START ON 11/10/2024] clotrimazole (MYCELEX) lozenge 10 mg  10 mg Buccal 4x Daily Prince Mcdaniels MD        sennosides (SENOKOT) syrup 5 mL  5 mL Oral BID Prince Mcdaniels MD   5 mL at 11/05/24 0843    And    docusate (COLACE) 50 MG/5ML liquid 50 mg  50 mg Oral BID Prince Mcdaniels MD   50 mg at 11/05/24 0841    famotidine (PEPCID) tablet 20 mg  20 mg Oral BID Prince Mcdaniels MD   20 mg at 11/05/24 0840    magnesium oxide (MAG-OX) tablet 400 mg  400 mg Oral Q24H Prince Mcdaniels,  MD        micafungin (MYCAMINE) 100 mg in sodium chloride 0.9 % 100 mL intermittent infusion  100 mg Intravenous Q24H Prince Mcdaniesl  mL/hr at 11/04/24 1727 100 mg at 11/04/24 1727    mycophenolate (CELLCEPT BRAND) suspension 1,000 mg  1,000 mg Per NG tube BID Prince Mcdaniels MD   1,000 mg at 11/05/24 0840    octreotide (sandoSTATIN) injection 100 mcg  100 mcg Subcutaneous TID Prince Mcdaniels MD   100 mcg at 11/05/24 0846    pantoprazole (PROTONIX) 2 mg/mL suspension 40 mg  40 mg Per NG tube Daily Prince Mcdaniels MD   40 mg at 11/05/24 0839    piperacillin-tazobactam (ZOSYN) intermittent infusion 3.375 g  3.375 g Intravenous Q6H Prince Mcdaniels  mL/hr at 11/05/24 0628 3.375 g at 11/05/24 0628    polyethylene glycol (MIRALAX) Packet 17 g  17 g Oral Daily Prince Mcdaniels MD   17 g at 11/05/24 0840    sodium chloride (PF) 0.9% PF flush 10 mL  10 mL Intracatheter Q8H Prince Mcdaniels MD   10 mL at 11/05/24 0425    sodium chloride (PF) 0.9% PF flush 3 mL  3 mL Intravenous Q8H Prince Mcdaniels MD   3 mL at 11/05/24 0425    sulfamethoxazole-trimethoprim (BACTRIM/SEPTRA) suspension 80 mg  10 mL Per NG tube Daily Prince Mcdaniels MD   80 mg at 11/05/24 0840    tacrolimus (GENERIC) suspension 3 mg  3 mg Oral BID IS Prince Mcdaniels MD   3 mg at 11/05/24 0840    valGANciclovir (VALCYTE) solution 900 mg  900 mg Oral or Feeding Tube Daily Prince Mcdaniels MD   900 mg at 11/05/24 0840       Physical Exam:     Admit Weight: 89.1 kg (196 lb 8 oz)    Current vitals:   BP (!) 151/78 (BP Location: Left arm)   Pulse 92   Temp 98.4  F (36.9  C) (Oral)   Resp 16   Wt 99 kg (218 lb 3.2 oz)   SpO2 94%   BMI 34.17 kg/m      Vital sign ranges:    Temp:  [97.6  F (36.4  C)-99  F (37.2  C)] 98.4  F (36.9  C)  Pulse:  [] 92  Resp:   [16] 16  BP: (138-176)/(77-88) 151/78  SpO2:  [93 %-95 %] 94 %  Patient Vitals for the past 24 hrs:   BP Temp Temp src Pulse Resp SpO2 Weight   11/05/24 0723 (!) 151/78 98.4  F (36.9  C) Oral 92 -- -- --   11/05/24 0626 (!) 152/82 -- -- 87 -- -- --   11/05/24 0557 (!) 165/85 98.4  F (36.9  C) Oral 91 16 94 % --   11/05/24 0234 (!) 153/83 -- -- -- -- -- --   11/05/24 0157 (!) 165/88 98.4  F (36.9  C) Oral 89 16 94 % 99 kg (218 lb 3.2 oz)   11/05/24 0123 (!) 150/81 -- -- 93 -- 93 % --   11/04/24 2231 (!) 161/77 -- -- 100 -- -- --   11/04/24 2132 (!) 176/88 97.6  F (36.4  C) Oral 101 16 -- --   11/04/24 1809 (!) 159/86 98.3  F (36.8  C) Oral 96 -- -- --   11/04/24 1423 138/81 99  F (37.2  C) Oral 99 -- 94 % --   11/04/24 0943 -- -- -- -- -- 95 % --   11/04/24 0900 (!) 154/83 -- -- 102 -- 93 % --     General Appearance: in no apparent distress.   Skin: normal  Heart: perfused  Lungs: Stable on RA  Abdomen: The abdomen is soft and appropriately tender over the pancreas graft. Dressing removed, incision closed with staples and open to air. RUBIA with serosanguinous output.   : Méndez removed  Extremities: edema: present, generalized  Neurologic: awake, alert, and oriented. Tremor absent.    Data:   CMP  Recent Labs   Lab 11/05/24  0658 11/05/24  0629 11/04/24  0704 11/04/24  0622 11/03/24  0144 11/03/24  0135 11/03/24  0131 11/03/24  0019 11/02/24  1353 11/02/24  1352   NA  --  140  --  137   < > 135  --  136   < > 140   POTASSIUM  --  4.0  --  4.0   < > 3.7  --  3.6   < > 4.4   CHLORIDE  --  108*  --  105   < >  --   --   --   --  101   CO2  --  25  --  25   < >  --   --   --   --  27   * 113*   < > 151*   < > 86   < > 79   < > 157*   BUN  --  19.5  --  16.3   < >  --   --   --   --  14.9   CR  --  1.41*  --  1.19*   < >  --   --   --   --  1.10   GFRESTIMATED  --  63  --  77   < >  --   --   --   --  84   APRIL  --  7.6*  --  7.4*   < >  --   --   --   --  9.2   ICAW  --   --   --   --   --  4.4  --  4.6   < >  --     MAG  --  1.8  --  1.9   < >  --   --   --   --   --    PHOS  --  2.6  --  2.6   < >  --   --   --   --   --    AMYLASE  --  37  --  66   < >  --   --   --   --  21*   LIPASE  --  21  --  57   < >  --   --   --   --  11*   ALBUMIN  --   --   --  2.4*  --   --   --   --   --  4.1   BILITOTAL  --   --   --   --   --   --   --   --   --  0.5   ALKPHOS  --   --   --   --   --   --   --   --   --  113   AST  --   --   --   --   --   --   --   --   --  15   ALT  --   --   --   --   --   --   --   --   --  13    < > = values in this interval not displayed.     CBC  Recent Labs   Lab 11/05/24  0629 11/04/24  1546 11/03/24  0530 11/03/24  0339   HGB 7.9* 8.4*   < >  --    WBC 4.6 4.9   < >  --    PLT 56* 45*   < >  --    A1C  --   --   --  7.8*    < > = values in this interval not displayed.     COAGS  Recent Labs   Lab 11/03/24  0144 11/02/24  1352   INR 1.49* 1.05   PTT 33 34      Urinalysis  Recent Labs   Lab Test 11/02/24  1503 11/21/23  1154 11/16/21  1502 05/11/21  1532   COLOR Yellow Straw  --   --    APPEARANCE Clear Clear  --   --    URINEGLC Negative Negative  --   --    URINEBILI Negative Negative  --   --    URINEKETONE Negative Negative  --   --    SG 1.026 1.006  --   --    UBLD Negative Negative  --   --    URINEPH 5.5 6.0  --   --    PROTEIN 10* Negative  --   --    NITRITE Negative Negative  --   --    LEUKEST Negative Negative  --   --    RBCU <1 <1  --   --    WBCU <1 0  --   --    UTPG  --   --  0.11 0.10     Virology:  Hepatitis C Antibody   Date Value Ref Range Status   11/02/2024 Nonreactive Nonreactive Final     Comment:     A nonreactive screening test result does not exclude the possibility of exposure to or infection with HCV. Nonreactive screening test results in individuals with prior exposure to HCV may be due to antibody levels below the limit of detection of this assay or lack of reactivity to the HCV antigens used in this assay. Patients with recent HCV infections (<3 months from time of  exposure) may have false-negative HCV antibody results due to the time needed for seroconversion (average of 8 to 9 weeks).   09/26/2019 Nonreactive NR^Nonreactive Final     Comment:     Assay performance characteristics have not been established for newborns,   infants, and children       BK Virus Result   Date Value Ref Range Status   05/11/2021 BK Virus DNA Not Detected BKNEG^BK Virus DNA Not Detected copies/mL Final   07/08/2020 BK Virus DNA Not Detected BKNEG^BK Virus DNA Not Detected copies/mL Final   03/31/2020 BK Virus DNA Not Detected BKNEG^BK Virus DNA Not Detected copies/mL Final   02/03/2020 BK Virus DNA Not Detected BKNEG^BK Virus DNA Not Detected copies/mL Final   02/01/2020 BK Virus DNA Not Detected BKNEG^BK Virus DNA Not Detected copies/mL Final   01/06/2020 BK Virus DNA Not Detected BKNEG^BK Virus DNA Not Detected copies/mL Final   12/23/2019 BK Virus DNA Not Detected BKNEG^BK Virus DNA Not Detected copies/mL Final   11/29/2019 BK Virus DNA Not Detected BKNEG^BK Virus DNA Not Detected copies/mL Final   11/04/2019 BK Virus DNA Not Detected BKNEG^BK Virus DNA Not Detected copies/mL Final   10/22/2019 BK Virus DNA Not Detected BKNEG^BK Virus DNA Not Detected copies/mL Final   10/18/2019 BK Virus DNA Not Detected BKNEG^BK Virus DNA Not Detected copies/mL Final     BK Virus DNA IU/mL   Date Value Ref Range Status   09/13/2024 Not Detected Not Detected IU/mL Final

## 2024-11-05 NOTE — PROGRESS NOTES
Brief Surgery Crossover Note    Prn hydralazine and labetalol ordered for persistent SBP>165. Pain is well controlled on current regimen, no nausea.     BP (!) 165/88 (BP Location: Left arm)   Pulse 89   Temp 98.4  F (36.9  C) (Oral)   Resp 16   Wt 99 kg (218 lb 3.2 oz)   SpO2 94%   BMI 34.17 kg/m      Mike Alejandra MD  General Surgery PGY-1    **For on call schedules and contact information, please refer to [facility]**

## 2024-11-05 NOTE — PROGRESS NOTES
Care Management Follow Up    Length of Stay (days): 2    Expected Discharge Date: 11/08/2024     Concerns to be Addressed: discharge planning     Patient plan of care discussed at interdisciplinary rounds: Yes    Anticipated Discharge Disposition: Home Care              Anticipated Discharge Services: Home Care  Anticipated Discharge DME: None    Patient/family educated on Medicare website which has current facility and service quality ratings: no  Education Provided on the Discharge Plan: Yes  Patient/Family in Agreement with the Plan: yes    Referrals Placed by CM/SW: External Care Coordination, Homecare  Private pay costs discussed: Not applicable    Discussed  Partnership in Safe Discharge Planning  document with patient/family: No     Handoff Completed: No, handoff not indicated or clinically appropriate    Additional Information:  Pt s/p pancreas transplant. Pt will need ATC appointmoents confirmed prior to discharge.    Met with pt.   Introduced RNCC role.  Reviewed anticipated plan for discharge, transplant labs M/TH, ATC appointments and home care RN services.  Pt would like home care RN support.  Per pt he plans to stay with his brother Fransico and JUSTIN Afia    [address]  [place] [address]    Per pt he has a dexcom sensor on but does not have glucometer or extra dexcom sensors with him.  Pt agreed to check with his family to bring his glucometer and extra sensors in anticipate of discharge later this week.  Pt notes no other questions or concerns    Next Steps:   Follow up on home care referral  RNCC following for discharge planning      Skye Daley, RN BSN, PHN, ACM-RN  November 5, 2024  7A Nurse Coordinator    Phone: [phone]  Available on [employer] [place] 7A SOT RNCC  Weekend/Holiday 7A SOT RNCC    11/5/2024

## 2024-11-05 NOTE — PLAN OF CARE
Goal Outcome Evaluation:      Plan of Care Reviewed With: patient, sibling    Overall Patient Progress: no change    Outcome Evaluation: Pt actively accepted and engaged in post transplant support and safe d/c planning

## 2024-11-05 NOTE — PROGRESS NOTES
[facility]  Transplant Nephrology Consult Note  Date of Admission:  11/2/2024  Today's Date: 11/05/2024  Requesting physician: Prince Mcdaniels*    Reason for Consult:  Follow kidney/pancreas transplant and immunosuppression     Recommendations:   - replace phos per protocol   - replace mag per protocol   - recommend increasing D5LR rate to 75 ml/hr  - repeat bladder scan today   - start vitamin d 2000 units daily  - tac level pending.    Assessment & Plan   # LDKT: Trend up likely pre renal given net negative fluid balance but given peripheral edema, will be cautious given extra fluids. Likely need diuretics in next several days.   - Baseline Creatinine: ~ 1.1-1.3   - Proteinuria: Normal (<0.2 grams)   - DSA Hx: No DSA   - Last cPRA: 0%   - BK Viremia: No   - Kidney Tx Biopsy Hx: No biopsy history and Acute cellular-mediated rejection.  - recommend increasing D5LR rate to 75 ml/hr      Nov 25, 2019; Result: Material insufficient for assessment with no glomeruli.   Oct 22, 2019; Result: Borderline acute cellular-mediated rejection    # Pancreas Tx (IVETTE):    - Pancreatic Exocrine Drainage: Enteric drained     - Blood glucose: Elevated blood glucose      On insulin: No   - HbA1c: Stable      Latest HbA1c: 7.8%   - Pancreatic enzymes: Stable   - DSA Hx: No DSA at time of transplant   - Pancreas Tx Biopsy Hx: No biopsy history    # Immunosuppression: Tacrolimus immediate release (goal 8-10) and Mycophenolate mofetil (dose 1000 mg every 12 hours)   - Induction with Recent Transplant:  High Intensity Protocol   - Continue with intensive monitoring of immunosuppression for efficacy and toxicity.   - Historical Changes in Immunosuppression: None   - Changes: No    # Infection Prevention:      - PJP: Sulfa/TMP (Bactrim)  - CMV: Valganciclovir (Valcyte)      - CMV IgG Ab High Risk Discordance (D+/R-): No  CMV Serostatus: Positive  - EBV IgG Ab High Risk Discordance  (D+/R-): No  EBV Serostatus: Positive    # Hypertension: Controlled;  Goal BP: < 140/90 (Hospitalization goal)   - Changes: No    # Anemia in Chronic Renal Disease: Hgb: Stable      JOSE: No   - Iron studies: Not checked recently    # Mineral Bone Disorder:    - Secondary renal hyperparathyroidism; PTH level: Moderately elevated (301-600 pg/ml)        On treatment: None  - Vitamin D; level: Low        On supplement: Yes  - Calcium; level: Low        On supplement: No  - Phosphorus; level: Low        On supplement: No, replace per protocol     # Electrolytes:   - Potassium; level: Normal        On supplement: No  - Magnesium; level: Low        On supplement: Yes  - Bicarbonate; level: Normal        On supplement: No    # Transplant History:  Etiology of Kidney Failure: Diabetes mellitus type 1  Tx: LDKT  Transplant: 11/2/2024 (Pancreas), 10/1/2019 (Kidney)  Significant transplant-related complications: None    Recommendations were communicated to the primary team via this note.    Levar Zambrano MD        Physician Attestation   I, Connie Membreno MD, was present with the medical/ROX student who participated in the service and in the documentation of the note.  I have verified the history and personally performed the physical exam and medical decision making.  I agree with the assessment and plan of care as documented in the note.      Key findings:   Pt with LDKT in 2019 now s/p pancreas transplant. Pancreas graft function been stable. But noted to have up trending creatinine but weights been significantly up from his baseline. Does have peripheral volume but concerned about intravascular hypovolemia given NPO status. Will continue LR today and likely need some diuretics for peripheral volume once his creatinine stabilizes.    Please see A&P for additional details of medical decision making.    I have personally reviewed the following data over the past 24 hrs:    4.6  \   7.9 (L)   / 56 (L)     140 108 (H) 19.5  /  108 (H)   4.0 25 1.41 (H) \     ALT: N/A AST: N/A AP: N/A TBILI: N/A   ALB: N/A TOT PROTEIN: N/A LIPASE: 21         Connie Membreno MD  Date of Service (when I saw the patient): 11/05/24     Interval events   Cr up to 1.4  Lipase is 57 to 21  Ca 7.6  Pt endorsed nausea and needing NG tube to suction. Pain is manageable. Passed TOV       Review of Systems   4 point ROS was obtained and negative except as noted in the Interval History.    MEDICATIONS:  Current Facility-Administered Medications   Medication Dose Route Frequency Provider Last Rate Last Admin    acetaminophen (TYLENOL) tablet 650 mg  650 mg Oral Once Stephany Diaz NP        acetaminophen (TYLENOL) tablet 975 mg  975 mg Oral Q8H Prince Mcdaniels MD   975 mg at 11/05/24 1156    anti-thymocyte globulin (THYMOGLOBULIN - Rabbit) 100 mg in sodium chloride 0.9 % 295 mL intermittent infusion  100 mg Intravenous Central line Once Stephany Diaz NP        aspirin (ASA) chewable tablet 81 mg  81 mg Per NG tube Daily Prince Mcdaniels MD   81 mg at 11/05/24 0840    calcium carbonate-vitamin D (OSCAL) 500-5 MG-MCG per tablet 1 tablet  1 tablet Oral BID w/meals Prince Mcdaniels MD   1 tablet at 11/05/24 0840    carvedilol (COREG) tablet 6.25 mg  6.25 mg Oral BID w/meals Prince Mcdaniels MD   6.25 mg at 11/05/24 1152    [START ON 11/10/2024] clotrimazole (MYCELEX) lozenge 10 mg  10 mg Buccal 4x Daily Prince Mcdaniels MD        diphenhydrAMINE (BENADRYL) capsule 25-50 mg  25-50 mg Oral Once Stephany Diaz NP        Or    diphenhydrAMINE (BENADRYL) elixir 25-50 mg  25-50 mg Per NG tube Once Stephany Diaz NP        famotidine (PEPCID) tablet 20 mg  20 mg Oral BID Prince Mcdaniels MD   20 mg at 11/05/24 0840    magnesium oxide (MAG-OX) tablet 400 mg  400 mg Oral Q24H Prince Mcdaniels MD   400 mg at 11/05/24 1152     methylPREDNISolone Na Suc (solu-MEDROL) injection 100 mg  100 mg Intravenous Once Stephany Diaz, NP        micafungin (MYCAMINE) 100 mg in sodium chloride 0.9 % 100 mL intermittent infusion  100 mg Intravenous Q24H Prince Mcdaniels  mL/hr at 11/04/24 1727 100 mg at 11/04/24 1727    mycophenolate (CELLCEPT BRAND) capsule 1,000 mg  1,000 mg Oral BID IS Prince Mcdaniels MD        octreotide (sandoSTATIN) injection 100 mcg  100 mcg Subcutaneous TID Prince Mcdaniels MD   100 mcg at 11/05/24 0846    [START ON 11/6/2024] pantoprazole (PROTONIX) EC tablet 40 mg  40 mg Oral QAM AC Prince Mcdaniels MD        piperacillin-tazobactam (ZOSYN) intermittent infusion 3.375 g  3.375 g Intravenous Q6H Prince Mcdaniels  mL/hr at 11/05/24 1209 3.375 g at 11/05/24 1209    polyethylene glycol (MIRALAX) Packet 17 g  17 g Oral Daily Prince Mcdaniels MD   17 g at 11/05/24 0840    senna-docusate (SENOKOT-S/PERICOLACE) 8.6-50 MG per tablet 1 tablet  1 tablet Oral BID Prince Mcdaniels MD        sodium chloride (PF) 0.9% PF flush 10 mL  10 mL Intracatheter Q8H Prince Mcdaniels MD   10 mL at 11/05/24 1153    sodium chloride (PF) 0.9% PF flush 3 mL  3 mL Intravenous Q8H Prince Mcdaniels MD   3 mL at 11/05/24 0425    [START ON 11/6/2024] sulfamethoxazole-trimethoprim (BACTRIM) 400-80 MG per tablet 1 tablet  1 tablet Oral Daily Prince Mcdaniels MD        tacrolimus (GENERIC EQUIVALENT) capsule 3 mg  3 mg Oral BID IS Prince Mcdaniels MD        [START ON 11/6/2024] valGANciclovir (VALCYTE) tablet 900 mg  900 mg Oral Daily Prince Mcdaniels MD         Current Facility-Administered Medications   Medication Dose Route Frequency Provider Last Rate Last Admin    dextrose 5% in lactated ringers 1,000 mL infusion   Intravenous Continuous  Stephany Diaz NP 75 mL/hr at 24 1047 Rate Change at 24 1047    heparin infusion 25,000 units in 0.45% NaCl 250 mL ANTICOAGULANT  200 Units/hr Intravenous Continuous Stephany Diaz NP 2 mL/hr at 24 0800 200 Units/hr at 24 0800    insulin regular (MYXREDLIN) 1 unit/mL infusion  0-24 Units/hr Intravenous Continuous Prince Mcdaniels MD 0.5 mL/hr at 24 1045 0.5 Units/hr at 24 1045       Physical Exam   Temp  Av.3  F (36.8  C)  Min: 97.8  F (36.6  C)  Max: 98.9  F (37.2  C)  Arterial Line BP  Min: 110/59  Max: 114/63  Arterial Line MAP (mmHg)  Av mmHg  Min: 75 mmHg  Max: 79 mmHg      Pulse  Av.2  Min: 81  Max: 114 Resp  Av.8  Min: 15  Max: 24  SpO2  Av.7 %  Min: 94 %  Max: 100 %    CVP (mmHg): 9 mmHgBP (!) 149/81   Pulse 90   Temp 98.2  F (36.8  C) (Oral)   Resp 18   Wt 98.5 kg (217 lb 3.2 oz)   SpO2 94%   BMI 34.02 kg/m     Date 24 0700 - 24 0659   Shift 1130-6565 9225-3007 1463-6617 24 Hour Total   INTAKE   I.V. 250   250   NG/GT 30   30   Shift Total(mL/kg) 280(3.14)   280(3.14)   OUTPUT   Urine 100   100   Shift Total(mL/kg) 100(1.12)   100(1.12)   Weight (kg) 89.13 89.13 89.13 89.13      Admit Weight: 89.1 kg (196 lb 8 oz)     GENERAL APPEARANCE: alert and no distress  HENT: mouth without ulcers or lesions  RESP: lungs clear to auscultation - no rales, rhonchi or wheezes  CV: regular rhythm, normal rate, no rub, no murmur  EDEMA: no LE edema bilaterally  ABDOMEN: soft, nondistended, nontender, bowel sounds normal  MS: extremities normal - no gross deformities noted, no evidence of inflammation in joints, no muscle tenderness  SKIN: no rash    Data   All labs reviewed by me.  CMP  Recent Labs   Lab 24  1043 24  0900 24  0658 24  0629 24  0704 24  0622 24  0202 24  0108 24  2306 24  2200 24  1926 24  1828 24  0614 24  0530  11/03/24 0226 11/03/24  0144 11/02/24  1353 11/02/24  1352   NA  --   --   --  140  --  137  --   --   --   --   --   --   --  140  --  142   < > 140   POTASSIUM  --   --   --  4.0  --  4.0  --  4.0  --  3.7  --  3.8   < > 4.3  4.3  --  2.9*   < > 4.4   CHLORIDE  --   --   --  108*  --  105  --   --   --   --   --   --   --  105  --  113*  --  101   CO2  --   --   --  25  --  25  --   --   --   --   --   --   --  24  --  19*  --  27   ANIONGAP  --   --   --  7  --  7  --   --   --   --   --   --   --  11  --  10  --  12   * 112* 108* 113*   < > 151*   < >  --    < >  --    < >  --    < > 122*   < > 77   < > 157*   BUN  --   --   --  19.5  --  16.3  --   --   --   --   --   --   --  15.7  --  11.2  --  14.9   CR  --   --   --  1.41*  --  1.19*  --   --   --   --   --   --   --  1.32*  --  0.91  --  1.10   GFRESTIMATED  --   --   --  63  --  77  --   --   --   --   --   --   --  68  --  >90  --  84   APRIL  --   --   --  7.6*  --  7.4*  --   --   --   --   --  7.4*  --  7.6*  --  5.8*  --  9.2   MAG  --   --   --  1.8  --  1.9  --   --   --   --   --   --   --  1.3*  --  0.9*  --   --    PHOS  --   --   --  2.6  --  2.6  --   --   --   --   --   --   --  2.4*  --  0.9*  --   --    PROTTOTAL  --   --   --   --   --   --   --   --   --   --   --   --   --   --   --   --   --  6.9   ALBUMIN  --   --   --   --   --  2.4*  --   --   --   --   --   --   --   --   --   --   --  4.1   BILITOTAL  --   --   --   --   --   --   --   --   --   --   --   --   --   --   --   --   --  0.5   ALKPHOS  --   --   --   --   --   --   --   --   --   --   --   --   --   --   --   --   --  113   AST  --   --   --   --   --   --   --   --   --   --   --   --   --   --   --   --   --  15   ALT  --   --   --   --   --   --   --   --   --   --   --   --   --   --   --   --   --  13    < > = values in this interval not displayed.     CBC  Recent Labs   Lab 11/05/24 0629 11/04/24  1546 11/04/24  0622 11/04/24  0108 11/03/24  0918  11/03/24  0530   HGB 7.9* 8.4* 8.8* 8.7*   < > 10.2*   WBC 4.6 4.9 4.2  --   --  14.2*   RBC 2.97* 3.15* 3.21*  --   --  3.83*   HCT 25.1* 26.7* 26.8*  --   --  32.4*   MCV 85 85 84  --   --  85   MCH 26.6 26.7 27.4  --   --  26.6   MCHC 31.5 31.5 32.8  --   --  31.5   RDW 13.2 13.3 13.3  --   --  13.3   PLT 56* 45* 44*  --   --  140*    < > = values in this interval not displayed.     INR  Recent Labs   Lab 11/03/24  0144 11/02/24  1352   INR 1.49* 1.05   PTT 33 34     ABG  Recent Labs   Lab 11/03/24  0135 11/03/24  0019 11/02/24  2324 11/02/24  2221   PH 7.46* 7.40 7.37 7.36   PCO2 35 37 40 40   PO2 109* 124* 105 129*   HCO3 25 23 23 22   O2PER 55.0 56.0 53.0 46.0      Urine Studies  Recent Labs   Lab Test 11/02/24  1503 11/21/23  1154 11/21/19  0810 10/22/19  0612   COLOR Yellow Straw Yellow Yellow   APPEARANCE Clear Clear Clear Clear   URINEGLC Negative Negative Negative 50*   URINEBILI Negative Negative Negative Negative   URINEKETONE Negative Negative Negative Negative   SG 1.026 1.006 1.016 1.015   UBLD Negative Negative Negative Small*   URINEPH 5.5 6.0 5.0 5.0   PROTEIN 10* Negative Negative Negative   NITRITE Negative Negative Negative Negative   LEUKEST Negative Negative Negative Trace*   RBCU <1 <1 <1 28*   WBCU <1 0 <1 12*     Recent Labs   Lab Test 11/16/21  1502 05/11/21  1532 07/16/20  1417 11/13/19  1053 10/22/19  0612 10/18/19  0630   UTPG 0.11 0.10 Unable to calculate due to low value 0.21* 0.24* 0.22*     PTH  Recent Labs   Lab Test 11/05/24  0629 09/26/19  0945   PTHI 216* 356*     Iron Studies  Recent Labs   Lab Test 09/26/19  0945   IRON 70      IRONSAT 28   EDOUARD 753*       IMAGING:  All imaging studies reviewed by me.    Past Medical History    I have reviewed this patient's medical history and updated it with pertinent information if needed.   Past Medical History:   Diagnosis Date    Anemia in chronic kidney disease     Dyslipidemia     ESRD (end stage renal disease) on dialysis (H)      Hyperlipidemia     Hypertension     Hypomagnesemia 10/07/2019    Obese     Secondary hyperparathyroidism (H)     Shingles 2023: Left Axilla    Status post coronary angiogram 2024    Type 1 diabetes (H)        Past Surgical History   I have reviewed this patient's surgical history and updated it with pertinent information if needed.  Past Surgical History:   Procedure Laterality Date    APPENDECTOMY OPEN N/A 2024    Procedure: Appendectomy open;  Surgeon: Prince Mcdaniels MD;  Location: [facility] OR    BENCH PANCREAS N/A 2024    Procedure: Bench pancreas;  Surgeon: Prince Mcdaniels MD;  Location: [facility] OR    CV CORONARY ANGIOGRAM N/A 2024    Procedure: Coronary Angiogram;  Surgeon: Derrell Jauregui MD;  [facility]    CV PCI N/A 2024    Procedure: Percutaneous Coronary Intervention;  Surgeon: Derrell Jauregui MD;  Location:  [facility]    hair implant      INSERT CATHETER PERITONEAL DIALYSIS  2018    IR FINE NEEDLE ASPIRATION W ULTRASOUND  2019    IR RENAL BIOPSY LEFT  2019    PERCUTANEOUS BIOPSY KIDNEY Left 10/22/2019    Procedure: Left Kidney Biopsy;  Surgeon: Kash Hoffman MD;  Location: [facility] OR    TRANSPLANT PANCREAS RECIPIENT  DONOR N/A 2024    Procedure: Pancreas transplant, Iliac vein repair;  Surgeon: Prince Mcdaniels MD;  Location: [facility] OR       Family History   I have reviewed this patient's family history and updated it with pertinent information if needed.   Family History   Problem Relation Age of Onset    No Known Problems Mother     Diabetes Father     Coronary Artery Disease Father     No Known Problems Brother     Skin Cancer Paternal Uncle     Melanoma No family hx of        Social History   I have reviewed this patient's social history and updated it with pertinent information if needed. Michael Amin  reports that he has never smoked.  He has never used smokeless tobacco. He reports that he does not currently use alcohol. He reports that he does not use drugs.    Prior to Admission Medications   Medications Prior to Admission   Medication Sig Dispense Refill Last Dose/Taking    aspirin (ASA) 81 MG EC tablet Take 81 mg by mouth daily    11/2/2024 Morning    atorvastatin (LIPITOR) 10 MG tablet Take 2 tablets (20 mg) by mouth daily 90 tablet 0 11/1/2024 Evening    carvedilol (COREG) 12.5 MG tablet TAKE 1 TABLET BY MOUTH TWICE DAILY WITH MEALS PLEASE  REQUEST  FUTURE  REFILLS  FROM  YOUR  PRIMARY  CARE  PROVIDER 180 tablet 0 11/2/2024 Morning    insulin lispro (HUMALOG VIAL) 100 UNIT/ML vial Use in insulin pump. Pt uses approx 75 units daily. 80 mL 3 Unknown    magnesium oxide (MAG-OX) 400 MG tablet Take 1 tablet (400 mg) by mouth daily (with lunch) 30 tablet 5 11/1/2024 Noon    mycophenolate (GENERIC EQUIVALENT) 250 MG capsule Take 3 capsules (750 mg) by mouth 2 times daily 180 capsule 11 11/2/2024 Morning    Semaglutide, 2 MG/DOSE, (OZEMPIC) 8 MG/3ML pen Inject 2 mg subcutaneous once a week. Please provide 90 day supply. (Patient taking differently: Inject 2 mg subcutaneously once a week. Inject 2 mg subcutaneous once a week. Please provide 90 day supply.) 9 mL 3 10/26/2024 Morning    senna-docusate (SENOKOT-S/PERICOLACE) 8.6-50 MG tablet Take 2 tablets by mouth 2 times daily (Patient taking differently: Take 2 tablets by mouth daily as needed for constipation.) 20 tablet 0 Unknown    tacrolimus (GENERIC) 0.5 MG capsule Take 1 capsule (0.5 mg) by mouth every morning Total dose = 1.5 mg in the AM and 1 mg in the PM 90 capsule 3 11/2/2024 Morning    tacrolimus (GENERIC) 1 MG capsule Total dose = 1.5 mg in the AM and 1 mg in the  capsule 3 11/2/2024 Morning    vitamin D3 (CHOLECALCIFEROL) 50 mcg (2000 units) tablet Take 1 tablet (50 mcg) by mouth daily   11/2/2024 Morning    alcohol swab prep pads Use to swab area of injection/zak as directed.  "100 each 3     blood glucose (NO BRAND SPECIFIED) lancets standard Use to test blood sugar 3 times daily or as directed. 100 each 11     blood glucose (NO BRAND SPECIFIED) test strip Use to test blood sugar 3 times daily or as directed. 100 strip 11     blood glucose monitoring (NO BRAND SPECIFIED) meter device kit Use to test blood sugar 3 times daily or as directed. 2 kit 0     Continuous Glucose Sensor (DEXCOM G6 SENSOR) MISC CHANGE SENSOR EVERY 10 DAYS 9 each 3     Continuous Glucose Transmitter (DEXCOM G6 TRANSMITTER) MISC Change every 3 months 1 each 3     insulin glargine (LANTUS SOLOSTAR) 100 UNIT/ML pen Inject 28 units subcutaneous once a day ONLY IF INSULIN PUMP FAILS. 15 mL 1     Insulin Infusion Pump Supplies (Bizimply XC INFUSION SET) MISC 1 each every 48 hours Change every 2 days  9 mm cannula, 23 inch tubing 45 each 3     Insulin Infusion Pump Supplies (T:SLIM X2 3ML CARTRIDGE) MISC 1 each by Other route every other day Insulin cartridge to be used with pump as directed.  Change every 2 days or as directed. 45 each 3     insulin pen needle (ULTICARE MICRO) 32G X 4 MM miscellaneous Use pen needles daily or as directed. 100 each 3     Insulin Syringe-Needle U-100 31G X 1/4\" 1 ML MISC 1 Syringe as needed (until new insuline pump arrives) 50 each 1       "

## 2024-11-05 NOTE — PLAN OF CARE
BP (!) 159/86 (BP Location: Left arm)   Pulse 96   Temp 98.3  F (36.8  C) (Oral)   Resp 16   Wt 99.8 kg (220 lb)   SpO2 94%   BMI 34.46 kg/m      Shift: 0659-1107  Isolation Status: none   VS: systolic in the 160's, 170;'s team is aware  on RA, afebrile  Neuro: Aox4  Behaviors: none   BG: insulin gtt on algorithm #1 with every 2 hrs check  110's   Labs: Hgb 8.4, Plt 45  Respiratory: RA   Cardiac: WNL   Pain/Nausea: has some abdominal discomfort and was given scheduled Tylenol, has nausea when meds are given thru NG, was given Compazine prior to evening meds and Zofran X1.   PRN: IV Dilaudid   Diet: NPO with Ice chips   IV Access: internal jugular , Piv   Infusion(s): has hep gtt at 2 ml SR.   Lines/Drains: RUBIA with serosanguinous   GI/: had X1 BM and has void with urinal and PVR was negative   Skin: has incision that is stapled with op dressing, some bruising on BLE   Mobility: up with assist 1 with a walker, Stood up X1   Plan: monitor nausea and will discharge when stable           Goal Outcome Evaluation:      Plan of Care Reviewed With: patient    Overall Patient Progress: improvingOverall Patient Progress: improving    Outcome Evaluation: restarted on Heparin Gtt SR. monitor nausea

## 2024-11-05 NOTE — PLAN OF CARE
Goal Outcome Evaluation:      Plan of Care Reviewed With: patient    Overall Patient Progress: improvingOverall Patient Progress: improving  [id number]    Neuro:  Pt. alert & Ox4  Behavior:  Pt. calm & cooperative with cares.   Activity: Pt. up with 1 and walker.  Vital: Pt. hypertensive, MD notified of BPs and ordered prn Labetalol and Hydralazine. Labetalol x1, BP now:  152/82.  AOVSS on RA.  BG: q 2 hours. Range:  117-122. Insulin gtt per algorithm 1.  LDAs:  Right internal jugular with MIV at 50cc/hour, Heparin gtt at 200u/hour, st.rate, NS at TKO for abx. Insulin gtt. RUBIA with serosang. Drainage.  NGT to LIS with 150cc clear brown output.    Cardiac: WNL  Respiratory: LS clear bilat.  GI/: Pt. voiding via urinal, no stools this shift. Last BM 11/4  Skin: Incision stapled and NEHA.  Pain/Nausea:  Abdominal pain managed with sched. Tylenol, prn IV Dilaudid x1.  Pt. denies nausea.  Diet: NPO except ice chips.    Labs/Imaging:  See chart for results.  Plan: Continue to follow POC and notify team with change in status.

## 2024-11-05 NOTE — CONSULTS
Care Management Initial Consult    General Information  Assessment completed with: Patient, Family,  (Barak)  Type of CM/SW Visit: Initial Assessment    Primary Care Provider verified and updated as needed: Yes   Readmission within the last 30 days: no previous admission in last 30 days      Reason for Consult: discharge planning, other (see comments) (Post DDPT support)  Advance Care Planning: Advance Care Planning Reviewed: other (see comments) (Provided blank copy)          Communication Assessment  Patient's communication style: spoken language (English or Bilingual)    Hearing Difficulty or Deaf: no        Cognitive  Cognitive/Neuro/Behavioral: WDL        Orientation: oriented x 4  Mood/Behavior: calm, cooperative, flat affect  Best Language: 0 - No aphasia  Speech: clear, logical    Living Environment:   People in home: alone     Current living Arrangements: house      Able to return to prior arrangements: yes  Living Arrangement Comments:  (Patient will be staying with brother and sister-in-law prior to returning home to [place], [place])    Family/Social Support:  Care provided by: self  Provides care for: no one  Marital Status: Single  Support system: Sibling(s), Parent(s)          Description of Support System: Supportive, Involved    Support Assessment: Adequate family and caregiver support    Current Resources:   Patient receiving home care services: No        Community Resources: None  Equipment currently used at home: none  Supplies currently used at home: None    Employment/Financial:  Employment Status: employed full-time        Financial Concerns: none   Referral to Financial Worker: No    SOT*LIAISON (BRUCE) IS A (PANCREAS) TRANSPLANT PATIENT  (DATE OF TRANSPLANT: (DATE: 11/2/2024) )         HEALTH BENEFIT: ([employer])    ID#[id number] [facility]#[id number] (EFFECTIVE (DATE: 7/1/2024) )         PHARMACY BENEFIT: ([employer])    PROCESSING INFO: ID#[id number] PCN#[id number] BIN#[id number] (EFFECTIVE (DATE:  7/1/2024) ).    DEDUCTIBLE ($3000) & MAX OUT-OF-POCKET ($5500)    COPAY STRUCTURE:    Generic Formulary Drugs on the [facility] Preventive Drug List are covered at 100% of the Charges incurred, subject to a Copayment of $12. Deductible does not apply.    Brand Name Formulary Drugs on the [facility] Preventive Drug List are covered at 100% of the Charges incurred, subject to a Copayment of $45. Deductible does not apply.    In no event will your cost for a Formulary insulin drug exceed $25. Deductible does not apply.    All other drugs are covered at 90% of the Charges incurred. Deductible applies.       TEST CLAIM SPECIALTY #28    MYCOPHENOLATE 250mg (#240/30DS) =$4.54    PROGRAF 1mg (#120/30DS)= REFILL TOO SOON UNTIL ON OR AFTER 12/15/2024    TACROLIMUS 1mg (#120/30DS)= REFILL TOO SOON UNTIL ON OR AFTER 12/15/2024    CYCLOSPORINE 100mg (#60/30DS)=$11.39    VALGANCICLOVIR 450mg (#60/30DS)=$14.37    VALACYCLOVIR 1gm (#90/30DS)=$5.18         TEST CLAIM DISCHARGE #27 (21 YEARS AND OLDER):    MYCOPHENOLATE 250mg (#240/30DS) =$4.54    PROGRAF 1mg (#120/30DS)= REFILL TOO SOON UNTIL ON OR AFTER 12/15/2024    TACROLIMUS 1mg (#120/30DS)= REFILL TOO SOON UNTIL ON OR AFTER 12/15/2024    CYCLOSPORINE 100mg (#60/30DS)=$11.39    VALGANCICLOVIR 450mg (#60/30DS)=$14.37    VALACYCLOVIR 1gm (#90/30DS)=$5.18      **CAN PATIENT FILL AT [facility] FOR MEDICATIONS LISTED? YES       Does the patient's insurance plan have a 3 day qualifying hospital stay waiver?  No    Lifestyle & Psychosocial Needs:  Social Drivers of Health     Food Insecurity: Not on file   Depression: Not at risk (6/20/2024)    PHQ-2     PHQ-2 Score: 0   Housing Stability: Not on file   Tobacco Use: Low Risk  (9/13/2024)    Patient History     Smoking Tobacco Use: Never     Smokeless Tobacco Use: Never     Passive Exposure: Not on file   Financial Resource Strain: Not on file   Alcohol Use: Not on file   Transportation Needs: Not on  file   Physical Activity: Not on file   Interpersonal Safety: Not on file   Stress: Not on file   Social Connections: Not on file   Health Literacy: Not on file       Functional Status:  Prior to admission patient needed assistance:   Dependent ADLs:: Independent  Dependent IADLs:: Independent  Assesssment of Functional Status: Not at baseline with ADL Functioning    Mental Health Status:  Mental Health Status: No Current Concerns       Chemical Dependency Status:  Chemical Dependency Status: No Current Concerns             Values/Beliefs:  Spiritual, Cultural Beliefs, Rastafari Practices, Values that affect care: no               Discussed  Partnership in Safe Discharge Planning  document with patient/family: No    Additional Information:  Patient underwent Pancreas transplant on 11/02/2024.  Met with patient to update psychosocial assessment and provide brief education about SW role while inpatient, as well as expectations/requirements and follow up needs post-transplant. SW also provided education about need for compliance with transplant medications, and monitored insurance questions/needs.     MSW engaged Rudi in conversation regarding his psychosocial wellbeing following transplant. Rudi and his brother Fransico actively engaged in conversation and identified no psychosocial concerns. Rudi plans to discharge to [facility]'s house in [facility], where Fransico and Rudi's sister-in-law will provide caregiver role. Rudi denied any concerns for his mental health, chemical health, support system, finances, or living situation. Blank HCD provided to Rudi and extra copy of post-transplant recommendations to Fransico per their request.     Next Steps: SOT SW team to continue to follow for post transplant psychosocial needs.     DELFINO Marquez, LGSW  Clinical       [facility]  Kidney, Pancreas, Auto-Islet   [address]  [place]  [address]  [email or web address]  Office: [phone]  Fax: [phone]  Gender pronouns: she/her  Employed by [employer]

## 2024-11-05 NOTE — PLAN OF CARE
Goal Outcome Evaluation:      Plan of Care Reviewed With: patient    Overall Patient Progress: improvingOverall Patient Progress: improving    Outcome Evaluation: NGT removed, on insulin drip    Vitals: BP (!) 151/78 (BP Location: Left arm)   Pulse 92   Temp 98.4  F (36.9  C) (Oral)   Resp 16   Wt 98.5 kg (217 lb 3.2 oz)   SpO2 94%   BMI 34.02 kg/m    Started on Coreg  Endocrine: Insulin drip, stable blood sugars on algorithm 1.  Labs: Stable and improving labs.  Pain: Minimal abdominal discomfort.  PRN's: Zofran 4 mg (pre morning meds)  Diet: Diet advanced to clear liquids.  LDA: R TL internal jugular, D5LR increased to 75cc/hr, R RUBIA, NGT DC'd.  GI:  Given laxatives this morning, had BM yesterday.  : Voids spontaneously per urinal, low volumes.  Skin: Pale, abdominal incision with staples, scattered healing scabs, pale, moderate generalized edema, weight is up 20 pounds.  Neuro: Alert and oriented, in good spirits, brother is here visiting.  Mobility: Up walking in the halls with minimal stand by assist.  Education: Medication card, lab book started (previous transplant)  Plan: Continue with plan of care.

## 2024-11-06 ENCOUNTER — APPOINTMENT (OUTPATIENT)
Dept: ULTRASOUND IMAGING | Facility: CLINIC | Age: 44
DRG: 010 | End: 2024-11-06
Attending: PHYSICIAN ASSISTANT
Payer: COMMERCIAL

## 2024-11-06 DIAGNOSIS — T86.11 KIDNEY TRANSPLANT REJECTION: ICD-10-CM

## 2024-11-06 DIAGNOSIS — Z94.83 PANCREAS REPLACED BY TRANSPLANT (H): Primary | ICD-10-CM

## 2024-11-06 DIAGNOSIS — Z48.298 AFTERCARE FOLLOWING ORGAN TRANSPLANT: ICD-10-CM

## 2024-11-06 DIAGNOSIS — Z94.0 KIDNEY REPLACED BY TRANSPLANT: ICD-10-CM

## 2024-11-06 PROBLEM — D84.9 IMMUNOSUPPRESSED STATUS (H): Status: ACTIVE | Noted: 2024-11-06

## 2024-11-06 PROBLEM — N17.9 ACUTE KIDNEY INJURY (H): Status: ACTIVE | Noted: 2024-11-06

## 2024-11-06 PROBLEM — E87.70 HYPERVOLEMIA: Status: ACTIVE | Noted: 2024-11-06

## 2024-11-06 LAB
ALBUMIN MFR UR ELPH: <6 MG/DL
ALBUMIN UR-MCNC: NEGATIVE MG/DL
ANION GAP SERPL CALCULATED.3IONS-SCNC: 7 MMOL/L (ref 7–15)
APPEARANCE UR: CLEAR
BASOPHILS # BLD AUTO: 0 10E3/UL (ref 0–0.2)
BASOPHILS NFR BLD AUTO: 0 %
BILIRUB UR QL STRIP: NEGATIVE
BUN SERPL-MCNC: 27.3 MG/DL (ref 6–20)
CALCIUM SERPL-MCNC: 7.8 MG/DL (ref 8.8–10.4)
CHLORIDE SERPL-SCNC: 108 MMOL/L (ref 98–107)
COLOR UR AUTO: NORMAL
CREAT SERPL-MCNC: 1.64 MG/DL (ref 0.67–1.17)
CREAT UR-MCNC: 28.2 MG/DL
DONOR IDENTIFICATION: NORMAL
DONOR IDENTIFICATION: NORMAL
DSA COMMENTS: NORMAL
DSA COMMENTS: NORMAL
DSA PRESENT: NO
DSA PRESENT: NO
DSA TEST METHOD: NORMAL
DSA TEST METHOD: NORMAL
EGFRCR SERPLBLD CKD-EPI 2021: 52 ML/MIN/1.73M2
EOSINOPHIL # BLD AUTO: 0 10E3/UL (ref 0–0.7)
EOSINOPHIL NFR BLD AUTO: 0 %
ERYTHROCYTE [DISTWIDTH] IN BLOOD BY AUTOMATED COUNT: 13.3 % (ref 10–15)
GLUCOSE BLDC GLUCOMTR-MCNC: 115 MG/DL (ref 70–99)
GLUCOSE BLDC GLUCOMTR-MCNC: 115 MG/DL (ref 70–99)
GLUCOSE BLDC GLUCOMTR-MCNC: 120 MG/DL (ref 70–99)
GLUCOSE BLDC GLUCOMTR-MCNC: 121 MG/DL (ref 70–99)
GLUCOSE BLDC GLUCOMTR-MCNC: 128 MG/DL (ref 70–99)
GLUCOSE BLDC GLUCOMTR-MCNC: 132 MG/DL (ref 70–99)
GLUCOSE BLDC GLUCOMTR-MCNC: 156 MG/DL (ref 70–99)
GLUCOSE BLDC GLUCOMTR-MCNC: 86 MG/DL (ref 70–99)
GLUCOSE BLDC GLUCOMTR-MCNC: 90 MG/DL (ref 70–99)
GLUCOSE BLDC GLUCOMTR-MCNC: 95 MG/DL (ref 70–99)
GLUCOSE SERPL-MCNC: 101 MG/DL (ref 70–99)
GLUCOSE UR STRIP-MCNC: NEGATIVE MG/DL
HCO3 SERPL-SCNC: 26 MMOL/L (ref 22–29)
HCT VFR BLD AUTO: 24 % (ref 40–53)
HGB BLD-MCNC: 7.5 G/DL (ref 13.3–17.7)
HGB UR QL STRIP: NEGATIVE
IMM GRANULOCYTES # BLD: 0 10E3/UL
IMM GRANULOCYTES NFR BLD: 1 %
KETONES UR STRIP-MCNC: NEGATIVE MG/DL
LEUKOCYTE ESTERASE UR QL STRIP: NEGATIVE
LIPASE SERPL-CCNC: 22 U/L (ref 13–60)
LYMPHOCYTES # BLD AUTO: 0.2 10E3/UL (ref 0.8–5.3)
LYMPHOCYTES NFR BLD AUTO: 8 %
MAGNESIUM SERPL-MCNC: 1.8 MG/DL (ref 1.7–2.3)
MCH RBC QN AUTO: 26.6 PG (ref 26.5–33)
MCHC RBC AUTO-ENTMCNC: 31.3 G/DL (ref 31.5–36.5)
MCV RBC AUTO: 85 FL (ref 78–100)
MONOCYTES # BLD AUTO: 0.2 10E3/UL (ref 0–1.3)
MONOCYTES NFR BLD AUTO: 9 %
NEUTROPHILS # BLD AUTO: 2.2 10E3/UL (ref 1.6–8.3)
NEUTROPHILS NFR BLD AUTO: 83 %
NITRATE UR QL: NEGATIVE
NRBC # BLD AUTO: 0 10E3/UL
NRBC BLD AUTO-RTO: 0 /100
ORGAN: NORMAL
ORGAN: NORMAL
PATH REPORT.COMMENTS IMP SPEC: NORMAL
PATH REPORT.COMMENTS IMP SPEC: NORMAL
PATH REPORT.FINAL DX SPEC: NORMAL
PATH REPORT.GROSS SPEC: NORMAL
PATH REPORT.MICROSCOPIC SPEC OTHER STN: NORMAL
PATH REPORT.RELEVANT HX SPEC: NORMAL
PH UR STRIP: 6.5 [PH] (ref 5–7)
PHOSPHATE SERPL-MCNC: 3.1 MG/DL (ref 2.5–4.5)
PHOTO IMAGE: NORMAL
PLATELET # BLD AUTO: 50 10E3/UL (ref 150–450)
POTASSIUM SERPL-SCNC: 4.1 MMOL/L (ref 3.4–5.3)
PROT/CREAT 24H UR: NORMAL MG/G{CREAT}
RBC # BLD AUTO: 2.82 10E6/UL (ref 4.4–5.9)
RBC URINE: 0 /HPF
SODIUM SERPL-SCNC: 141 MMOL/L (ref 135–145)
SP GR UR STRIP: 1.01 (ref 1–1.03)
TACROLIMUS BLD-MCNC: 9.4 UG/L (ref 5–15)
TME LAST DOSE: NORMAL H
TME LAST DOSE: NORMAL H
UFH PPP CHRO-ACNC: <0.1 IU/ML
UROBILINOGEN UR STRIP-MCNC: NORMAL MG/DL
WBC # BLD AUTO: 2.7 10E3/UL (ref 4–11)
WBC URINE: 2 /HPF

## 2024-11-06 PROCEDURE — 85025 COMPLETE CBC W/AUTO DIFF WBC: CPT | Performed by: SURGERY

## 2024-11-06 PROCEDURE — 80197 ASSAY OF TACROLIMUS: CPT | Performed by: NURSE PRACTITIONER

## 2024-11-06 PROCEDURE — 76776 US EXAM K TRANSPL W/DOPPLER: CPT | Mod: 26 | Performed by: RADIOLOGY

## 2024-11-06 PROCEDURE — 250N000013 HC RX MED GY IP 250 OP 250 PS 637: Performed by: PHYSICIAN ASSISTANT

## 2024-11-06 PROCEDURE — 83690 ASSAY OF LIPASE: CPT | Performed by: SURGERY

## 2024-11-06 PROCEDURE — 99233 SBSQ HOSP IP/OBS HIGH 50: CPT | Mod: 24 | Performed by: INTERNAL MEDICINE

## 2024-11-06 PROCEDURE — 258N000003 HC RX IP 258 OP 636: Performed by: SURGERY

## 2024-11-06 PROCEDURE — 36592 COLLECT BLOOD FROM PICC: CPT | Performed by: NURSE PRACTITIONER

## 2024-11-06 PROCEDURE — 81001 URINALYSIS AUTO W/SCOPE: CPT | Performed by: PHYSICIAN ASSISTANT

## 2024-11-06 PROCEDURE — 250N000011 HC RX IP 250 OP 636: Performed by: PHYSICIAN ASSISTANT

## 2024-11-06 PROCEDURE — 84156 ASSAY OF PROTEIN URINE: CPT | Performed by: PHYSICIAN ASSISTANT

## 2024-11-06 PROCEDURE — 83735 ASSAY OF MAGNESIUM: CPT | Performed by: SURGERY

## 2024-11-06 PROCEDURE — 250N000013 HC RX MED GY IP 250 OP 250 PS 637: Performed by: SURGERY

## 2024-11-06 PROCEDURE — 80048 BASIC METABOLIC PNL TOTAL CA: CPT | Performed by: SURGERY

## 2024-11-06 PROCEDURE — 120N000011 HC R&B TRANSPLANT UMMC

## 2024-11-06 PROCEDURE — 258N000003 HC RX IP 258 OP 636: Performed by: PHYSICIAN ASSISTANT

## 2024-11-06 PROCEDURE — 84100 ASSAY OF PHOSPHORUS: CPT | Performed by: SURGERY

## 2024-11-06 PROCEDURE — 250N000012 HC RX MED GY IP 250 OP 636 PS 637: Performed by: SURGERY

## 2024-11-06 PROCEDURE — 76776 US EXAM K TRANSPL W/DOPPLER: CPT

## 2024-11-06 PROCEDURE — 85520 HEPARIN ASSAY: CPT | Performed by: SURGERY

## 2024-11-06 PROCEDURE — 250N000011 HC RX IP 250 OP 636: Mod: JB | Performed by: SURGERY

## 2024-11-06 RX ORDER — ATORVASTATIN CALCIUM 20 MG/1
20 TABLET, FILM COATED ORAL DAILY
Status: DISCONTINUED | OUTPATIENT
Start: 2024-11-06 | End: 2024-11-11 | Stop reason: HOSPADM

## 2024-11-06 RX ORDER — ACETAMINOPHEN 325 MG/1
650 TABLET ORAL
Status: CANCELLED
Start: 2024-11-08

## 2024-11-06 RX ORDER — NICOTINE POLACRILEX 4 MG
15-30 LOZENGE BUCCAL
Status: DISCONTINUED | OUTPATIENT
Start: 2024-11-06 | End: 2024-11-11 | Stop reason: HOSPADM

## 2024-11-06 RX ORDER — METHYLPREDNISOLONE SODIUM SUCCINATE 125 MG/2ML
100 INJECTION INTRAMUSCULAR; INTRAVENOUS ONCE
Status: COMPLETED | OUTPATIENT
Start: 2024-11-06 | End: 2024-11-06

## 2024-11-06 RX ORDER — HEPARIN SODIUM,PORCINE 10 UNIT/ML
5-20 VIAL (ML) INTRAVENOUS DAILY PRN
Status: CANCELLED | OUTPATIENT
Start: 2024-11-08

## 2024-11-06 RX ORDER — FUROSEMIDE 10 MG/ML
20 INJECTION INTRAMUSCULAR; INTRAVENOUS ONCE
Status: COMPLETED | OUTPATIENT
Start: 2024-11-06 | End: 2024-11-06

## 2024-11-06 RX ORDER — OXYCODONE HYDROCHLORIDE 10 MG/1
10 TABLET ORAL EVERY 4 HOURS PRN
Status: DISCONTINUED | OUTPATIENT
Start: 2024-11-06 | End: 2024-11-06

## 2024-11-06 RX ORDER — DIPHENHYDRAMINE HCL 25 MG
50 CAPSULE ORAL ONCE
Status: CANCELLED
Start: 2024-11-08

## 2024-11-06 RX ORDER — ACETAMINOPHEN 325 MG/1
650 TABLET ORAL ONCE
Status: COMPLETED | OUTPATIENT
Start: 2024-11-06 | End: 2024-11-06

## 2024-11-06 RX ORDER — DIPHENHYDRAMINE HCL 25 MG
25-50 CAPSULE ORAL ONCE
Status: COMPLETED | OUTPATIENT
Start: 2024-11-06 | End: 2024-11-06

## 2024-11-06 RX ORDER — EPINEPHRINE 1 MG/ML
0.3 INJECTION, SOLUTION, CONCENTRATE INTRAVENOUS EVERY 5 MIN PRN
Status: CANCELLED | OUTPATIENT
Start: 2024-11-08

## 2024-11-06 RX ORDER — DIPHENHYDRAMINE HYDROCHLORIDE 50 MG/ML
50 INJECTION INTRAMUSCULAR; INTRAVENOUS
Status: CANCELLED
Start: 2024-11-08

## 2024-11-06 RX ORDER — ACETAMINOPHEN 325 MG/1
650 TABLET ORAL EVERY 6 HOURS PRN
Status: DISCONTINUED | OUTPATIENT
Start: 2024-11-06 | End: 2024-11-11 | Stop reason: HOSPADM

## 2024-11-06 RX ORDER — ALBUTEROL SULFATE 0.83 MG/ML
2.5 SOLUTION RESPIRATORY (INHALATION)
Status: CANCELLED | OUTPATIENT
Start: 2024-11-08

## 2024-11-06 RX ORDER — HEPARIN SODIUM (PORCINE) LOCK FLUSH IV SOLN 100 UNIT/ML 100 UNIT/ML
5 SOLUTION INTRAVENOUS
Status: CANCELLED | OUTPATIENT
Start: 2024-11-08

## 2024-11-06 RX ORDER — DIPHENHYDRAMINE HYDROCHLORIDE 50 MG/ML
25 INJECTION INTRAMUSCULAR; INTRAVENOUS
Status: CANCELLED
Start: 2024-11-08

## 2024-11-06 RX ORDER — OXYCODONE HYDROCHLORIDE 5 MG/1
5 TABLET ORAL EVERY 4 HOURS PRN
Status: DISCONTINUED | OUTPATIENT
Start: 2024-11-06 | End: 2024-11-07

## 2024-11-06 RX ORDER — DIPHENHYDRAMINE HCL 12.5MG/5ML
25-50 LIQUID (ML) ORAL ONCE
Status: COMPLETED | OUTPATIENT
Start: 2024-11-06 | End: 2024-11-06

## 2024-11-06 RX ORDER — METHYLPREDNISOLONE SODIUM SUCCINATE 40 MG/ML
40 INJECTION INTRAMUSCULAR; INTRAVENOUS
Status: CANCELLED
Start: 2024-11-08

## 2024-11-06 RX ORDER — ALBUTEROL SULFATE 90 UG/1
1-2 INHALANT RESPIRATORY (INHALATION)
Status: CANCELLED
Start: 2024-11-08

## 2024-11-06 RX ORDER — ACETAMINOPHEN 325 MG/1
650 TABLET ORAL ONCE
Status: CANCELLED
Start: 2024-11-08

## 2024-11-06 RX ORDER — DEXTROSE MONOHYDRATE 25 G/50ML
25-50 INJECTION, SOLUTION INTRAVENOUS
Status: DISCONTINUED | OUTPATIENT
Start: 2024-11-06 | End: 2024-11-11 | Stop reason: HOSPADM

## 2024-11-06 RX ADMIN — PIPERACILLIN SODIUM AND TAZOBACTAM SODIUM 3.38 G: 3; .375 INJECTION, SOLUTION INTRAVENOUS at 01:13

## 2024-11-06 RX ADMIN — ACETAMINOPHEN 975 MG: 325 TABLET, FILM COATED ORAL at 04:04

## 2024-11-06 RX ADMIN — FUROSEMIDE 20 MG: 10 INJECTION, SOLUTION INTRAMUSCULAR; INTRAVENOUS at 11:45

## 2024-11-06 RX ADMIN — OCTREOTIDE ACETATE 100 MCG: 100 INJECTION, SOLUTION INTRAVENOUS; SUBCUTANEOUS at 08:57

## 2024-11-06 RX ADMIN — Medication 1 TABLET: at 18:19

## 2024-11-06 RX ADMIN — PANTOPRAZOLE SODIUM 40 MG: 40 TABLET, DELAYED RELEASE ORAL at 08:46

## 2024-11-06 RX ADMIN — MICAFUNGIN SODIUM 100 MG: 50 INJECTION, POWDER, LYOPHILIZED, FOR SOLUTION INTRAVENOUS at 17:25

## 2024-11-06 RX ADMIN — SULFAMETHOXAZOLE AND TRIMETHOPRIM 1 TABLET: 400; 80 TABLET ORAL at 08:46

## 2024-11-06 RX ADMIN — FAMOTIDINE 20 MG: 20 TABLET ORAL at 19:54

## 2024-11-06 RX ADMIN — DIPHENHYDRAMINE HYDROCHLORIDE 50 MG: 25 CAPSULE ORAL at 13:16

## 2024-11-06 RX ADMIN — ATORVASTATIN CALCIUM 20 MG: 20 TABLET, FILM COATED ORAL at 15:46

## 2024-11-06 RX ADMIN — VALGANCICLOVIR 900 MG: 450 TABLET, FILM COATED ORAL at 08:46

## 2024-11-06 RX ADMIN — ACETAMINOPHEN 650 MG: 325 TABLET, FILM COATED ORAL at 13:16

## 2024-11-06 RX ADMIN — FUROSEMIDE 20 MG: 10 INJECTION, SOLUTION INTRAMUSCULAR; INTRAVENOUS at 15:46

## 2024-11-06 RX ADMIN — CARVEDILOL 6.25 MG: 6.25 TABLET, FILM COATED ORAL at 19:54

## 2024-11-06 RX ADMIN — SENNOSIDES AND DOCUSATE SODIUM 1 TABLET: 8.6; 5 TABLET ORAL at 08:46

## 2024-11-06 RX ADMIN — METHYLPREDNISOLONE SODIUM SUCCINATE 100 MG: 125 INJECTION, POWDER, FOR SOLUTION INTRAMUSCULAR; INTRAVENOUS at 13:16

## 2024-11-06 RX ADMIN — OCTREOTIDE ACETATE 100 MCG: 100 INJECTION, SOLUTION INTRAVENOUS; SUBCUTANEOUS at 20:50

## 2024-11-06 RX ADMIN — TACROLIMUS 3 MG: 1 CAPSULE ORAL at 08:46

## 2024-11-06 RX ADMIN — FAMOTIDINE 20 MG: 20 TABLET ORAL at 08:46

## 2024-11-06 RX ADMIN — MYCOPHENOLATE MOFETIL 1000 MG: 250 CAPSULE ORAL at 08:57

## 2024-11-06 RX ADMIN — Medication 1 TABLET: at 08:46

## 2024-11-06 RX ADMIN — OCTREOTIDE ACETATE 100 MCG: 100 INJECTION, SOLUTION INTRAVENOUS; SUBCUTANEOUS at 14:13

## 2024-11-06 RX ADMIN — ASPIRIN 81 MG CHEWABLE TABLET 81 MG: 81 TABLET CHEWABLE at 08:46

## 2024-11-06 RX ADMIN — MYCOPHENOLATE MOFETIL 1000 MG: 250 CAPSULE ORAL at 18:19

## 2024-11-06 RX ADMIN — TACROLIMUS 3 MG: 1 CAPSULE ORAL at 18:19

## 2024-11-06 RX ADMIN — ANTI-THYMOCYTE GLOBULIN (RABBIT) 75 MG: 5 INJECTION, POWDER, LYOPHILIZED, FOR SOLUTION INTRAVENOUS at 14:13

## 2024-11-06 RX ADMIN — MAGNESIUM OXIDE TAB 400 MG (241.3 MG ELEMENTAL MG) 400 MG: 400 (241.3 MG) TAB at 11:45

## 2024-11-06 RX ADMIN — ACETAMINOPHEN 650 MG: 325 TABLET, FILM COATED ORAL at 08:45

## 2024-11-06 RX ADMIN — CARVEDILOL 6.25 MG: 6.25 TABLET, FILM COATED ORAL at 08:46

## 2024-11-06 RX ADMIN — ACETAMINOPHEN 650 MG: 325 TABLET, FILM COATED ORAL at 19:53

## 2024-11-06 NOTE — PROGRESS NOTES
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 Patient is now s/p IVETTE with Dr. Mcdaniels on 11/2/24 complicated by arterial reconstruction.     Changes today:    - Thymo 75 mg dose, reduced dose d/t leukopenia and thrombocytopenia. Pre med with solumedrol 100 mg   - Increase heparin gtt 400 unit(s)/hr   - Tx kidney US with doppler, UA, UPC   - Stop MIV, encourage PO fluid intake   - Lasix 20 mg IV x 1 d/t hypervolemia   - Stop insulin gtt and switch to sliding scale insulin ACHS.    __________________________________________    Graft function:  s/p IVETTE 11/2/24: POD 4. Lipase WNL. Requiring 0.5 unit/hr on insulin gtt. Post-op US with patent vasculature.    - Heparin gtt 400 units/hour. Stop tomorrow   - Octreotide TID   - ASA 81 mg daily    - Switch to sliding scale insulin medium insulin resistance correction scale ACHS    LDKT 2019: baseline 1.1-1.3.   RICHARD, post op: Cr 1.6, adequate UO. Possible due to hypotension POD 1. Tx US kidney with doppler, UA, and UPC. Tac level in process. Avoid nephrotoxins. Nephrology recommending lasix 20 mg IV x 1 dose today.     Immunosuppression management:  Induction: via high intensity protocol (cPRA 0) with Thymoglobulin goal 7.5 mg/kg (650 mg total).   - Received Thymo 500 mg thus far. Needs 150 mg to complete induction.   - Give Thymo 75 mg (1/2 dose today with leukopenia and thrombocytopenia) and  mg d/t previous Thymo reaction.  Maintenance:    - MMF 1000 mg BID   - Tacrolimus 3 mg BID. Goal level 8-10.     Neuro:  Acute post op pain: Denies pain  -Change Tylenol  650 mg every 4 hours PRN mild/moderate pain  -Continue Robaxin PRN  -Stop IV dilaudid  -Oxycodone, decrease to 2.5-5 mg every 4 hours.     Hematology:   Anemia of chronic disease: Hgb 7.5 (from 7.9). Monitor. Drain serosanguinous    Thrombocytopenia: PLT 50. Likely 2/2 to thymo.  Hit panel negative.    - Increase Hep gtt 400 u/hr    Cardiorespiratory:   HTN: Elevated BP overnight, -150s.   - Continue Coreg 6.25 mg daily.   - Lasix 20 mg IV x 2 doses today  HLD: Restart atorvastatin    GI/Nutrition:   At risk for malnutrition: Advance diet to regular.   Bowel regimen: Continue bowel regimen of Senokot-S BID. Change Miralax PRN.     Fluid/Electrolytes: Stop MIV. Encourage PO fluids  Hypomagnesemia: Continue Mag-Ox 400 mg daily   Hypervolemia: Weight up ~11 Kg. Lasix 20 mg IV x 1 today.     Infectious disease: No issues.      Prophylaxis: DVT (mechanical), fall, GI (PPI), viral (Valcyte x 3 months), PJP (Bactrim indefinitely), fungal (Micafungin), marjan-op (Zosyn)    Disposition: 7A    ROX/Fellow/Resident Provider: JEANETH Lim [id number]    Faculty: Jean Liu MD  _________________________________________________________________  Transplant History: Admitted 11/2/2024 for kidney transplant.  11/2/2024 (Pancreas), 10/1/2019 (Kidney), Postoperative day: 4 (Pancreas), 1863 (Kidney)     Interval History: History is obtained from the patient  Overnight events: No complaints. Denies nausea. +BMs and flatus. Would like to have regular diet. Denies incisional pain. Ambulating frequently.     ROS:   A 10-point review of systems was negative except as noted above.    Meds:  Current Facility-Administered Medications   Medication Dose Route Frequency Provider Last Rate Last Admin    aspirin (ASA) chewable tablet 81 mg  81 mg Per NG tube Daily Prince Mcdaniels MD   81 mg at 11/05/24 0840    calcium carbonate-vitamin D (OSCAL) 500-5 MG-MCG per tablet 1 tablet  1 tablet Oral BID w/meals Prince Mcdaniels MD   1 tablet at 11/05/24 1807    carvedilol (COREG) tablet 6.25 mg  6.25 mg Oral BID w/meals Prince Mcdaniels MD   6.25 mg at 11/05/24 2010    [START ON 11/10/2024] clotrimazole (MYCELEX) lozenge 10  mg  10 mg Buccal 4x Daily Prince Mcdaniels MD        famotidine (PEPCID) tablet 20 mg  20 mg Oral BID Prince Mcdaniels MD   20 mg at 11/05/24 2010    magnesium oxide (MAG-OX) tablet 400 mg  400 mg Oral Q24H Prince Mcdaniels MD   400 mg at 11/05/24 1152    micafungin (MYCAMINE) 100 mg in sodium chloride 0.9 % 100 mL intermittent infusion  100 mg Intravenous Q24H Prince Mcdaniels  mL/hr at 11/05/24 1608 100 mg at 11/05/24 1608    mycophenolate (CELLCEPT BRAND) capsule 1,000 mg  1,000 mg Oral BID IS Prince Mcdaniels MD   1,000 mg at 11/05/24 1843    octreotide (sandoSTATIN) injection 100 mcg  100 mcg Subcutaneous TID Prince Mcdaniels MD   100 mcg at 11/05/24 2010    pantoprazole (PROTONIX) EC tablet 40 mg  40 mg Oral QAM AC Prince Mcdaniels MD        polyethylene glycol (MIRALAX) Packet 17 g  17 g Oral Daily Prince Mcdaniels MD   17 g at 11/05/24 0840    senna-docusate (SENOKOT-S/PERICOLACE) 8.6-50 MG per tablet 1 tablet  1 tablet Oral BID Prince Mcdaniels MD   1 tablet at 11/05/24 2010    sodium chloride (PF) 0.9% PF flush 10 mL  10 mL Intracatheter Q8H Prince Mcdaniels MD   10 mL at 11/05/24 1153    sodium chloride (PF) 0.9% PF flush 3 mL  3 mL Intravenous Q8H Prince Mcdaniels MD   3 mL at 11/05/24 0425    sulfamethoxazole-trimethoprim (BACTRIM) 400-80 MG per tablet 1 tablet  1 tablet Oral Daily Prince Mcdaniels MD        tacrolimus (GENERIC EQUIVALENT) capsule 3 mg  3 mg Oral BID IS Prince Mcdaniels MD   3 mg at 11/05/24 1805    valGANciclovir (VALCYTE) tablet 900 mg  900 mg Oral Daily Prince Mcdaniels MD           Physical Exam:     Admit Weight: 89.1 kg (196 lb 8 oz)    Current vitals:   BP (!) 153/80 (BP Location: Left arm)   Pulse 73   Temp 97.6  F (36.4  C) (Oral)    Resp 18   Wt 99.9 kg (220 lb 3.2 oz)   SpO2 99%   BMI 34.49 kg/m      Vital sign ranges:    Temp:  [97.6  F (36.4  C)-98.4  F (36.9  C)] 97.6  F (36.4  C)  Pulse:  [73-92] 73  Resp:  [18] 18  BP: (145-157)/(78-86) 153/80  SpO2:  [94 %-100 %] 99 %  Patient Vitals for the past 24 hrs:   BP Temp Temp src Pulse Resp SpO2 Weight   11/06/24 0607 (!) 153/80 97.6  F (36.4  C) Oral 73 18 99 % --   11/06/24 0210 (!) 148/79 97.8  F (36.6  C) Oral 76 18 100 % --   11/06/24 0004 -- -- -- -- -- -- 99.9 kg (220 lb 3.2 oz)   11/05/24 2157 (!) 157/86 97.9  F (36.6  C) Oral 76 18 96 % --   11/05/24 2004 (!) 150/83 -- -- -- -- -- --   11/05/24 1955 (!) 145/83 -- -- 79 -- -- --   11/05/24 1423 (!) 157/78 98.3  F (36.8  C) Oral 85 18 94 % --   11/05/24 1420 -- 98.3  F (36.8  C) Oral -- -- -- --   11/05/24 1215 (!) 149/81 98.2  F (36.8  C) Oral 90 18 94 % --   11/05/24 1000 -- -- -- -- -- -- 98.5 kg (217 lb 3.2 oz)   11/05/24 0723 (!) 151/78 98.4  F (36.9  C) Oral 92 -- -- --     General Appearance: in no apparent distress.   Skin: normal  Heart: perfused  Lungs: Stable on RA  Abdomen: The abdomen is soft, non distended, and appropriately tender over the pancreas graft. Incision closed with staples, dry, intact. RUBIA with serosanguinous output.   : No paredes, PVRs negative for retention  Extremities: edema: present, generalized  Neurologic: awake, alert, and oriented. Tremor absent.    Data:   CMP  Recent Labs   Lab 11/06/24  0609 11/06/24  0408 11/05/24  0658 11/05/24  0629 11/04/24  0704 11/04/24  0622 11/03/24  0144 11/03/24  0135 11/03/24  0131 11/03/24  0019 11/02/24  1353 11/02/24  1352   NA  --   --   --  140  --  137   < > 135  --  136   < > 140   POTASSIUM  --   --   --  4.0  --  4.0   < > 3.7  --  3.6   < > 4.4   CHLORIDE  --   --   --  108*  --  105   < >  --   --   --   --  101   CO2  --   --   --  25  --  25   < >  --   --   --   --  27   GLC 95 132*   < > 113*   < > 151*   < > 86   < > 79   < > 157*   BUN  --   --   --   19.5  --  16.3   < >  --   --   --   --  14.9   CR  --   --   --  1.41*  --  1.19*   < >  --   --   --   --  1.10   GFRESTIMATED  --   --   --  63  --  77   < >  --   --   --   --  84   APRIL  --   --   --  7.6*  --  7.4*   < >  --   --   --   --  9.2   ICAW  --   --   --   --   --   --   --  4.4  --  4.6   < >  --    MAG  --   --   --  1.8  --  1.9   < >  --   --   --   --   --    PHOS  --   --   --  2.6  --  2.6   < >  --   --   --   --   --    AMYLASE  --   --   --  37  --  66   < >  --   --   --   --  21*   LIPASE  --   --   --  21  --  57   < >  --   --   --   --  11*   ALBUMIN  --   --   --   --   --  2.4*  --   --   --   --   --  4.1   BILITOTAL  --   --   --   --   --   --   --   --   --   --   --  0.5   ALKPHOS  --   --   --   --   --   --   --   --   --   --   --  113   AST  --   --   --   --   --   --   --   --   --   --   --  15   ALT  --   --   --   --   --   --   --   --   --   --   --  13    < > = values in this interval not displayed.     CBC  Recent Labs   Lab 11/06/24  0600 11/05/24  0629 11/03/24  0530 11/03/24  0339   HGB 7.5* 7.9*   < >  --    WBC 2.7* 4.6   < >  --    PLT 50* 56*   < >  --    A1C  --   --   --  7.8*    < > = values in this interval not displayed.     COAGS  Recent Labs   Lab 11/03/24  0144 11/02/24  1352   INR 1.49* 1.05   PTT 33 34      Urinalysis  Recent Labs   Lab Test 11/02/24  1503 11/21/23  1154 11/16/21  1502 05/11/21  1532   COLOR Yellow Straw  --   --    APPEARANCE Clear Clear  --   --    URINEGLC Negative Negative  --   --    URINEBILI Negative Negative  --   --    URINEKETONE Negative Negative  --   --    SG 1.026 1.006  --   --    UBLD Negative Negative  --   --    URINEPH 5.5 6.0  --   --    PROTEIN 10* Negative  --   --    NITRITE Negative Negative  --   --    LEUKEST Negative Negative  --   --    RBCU <1 <1  --   --    WBCU <1 0  --   --    UTPG  --   --  0.11 0.10     Virology:  Hepatitis C Antibody   Date Value Ref Range Status   11/02/2024 Nonreactive  Nonreactive Final     Comment:     A nonreactive screening test result does not exclude the possibility of exposure to or infection with HCV. Nonreactive screening test results in individuals with prior exposure to HCV may be due to antibody levels below the limit of detection of this assay or lack of reactivity to the HCV antigens used in this assay. Patients with recent HCV infections (<3 months from time of exposure) may have false-negative HCV antibody results due to the time needed for seroconversion (average of 8 to 9 weeks).   09/26/2019 Nonreactive NR^Nonreactive Final     Comment:     Assay performance characteristics have not been established for newborns,   infants, and children       BK Virus Result   Date Value Ref Range Status   05/11/2021 BK Virus DNA Not Detected BKNEG^BK Virus DNA Not Detected copies/mL Final   07/08/2020 BK Virus DNA Not Detected BKNEG^BK Virus DNA Not Detected copies/mL Final   03/31/2020 BK Virus DNA Not Detected BKNEG^BK Virus DNA Not Detected copies/mL Final   02/03/2020 BK Virus DNA Not Detected BKNEG^BK Virus DNA Not Detected copies/mL Final   02/01/2020 BK Virus DNA Not Detected BKNEG^BK Virus DNA Not Detected copies/mL Final   01/06/2020 BK Virus DNA Not Detected BKNEG^BK Virus DNA Not Detected copies/mL Final   12/23/2019 BK Virus DNA Not Detected BKNEG^BK Virus DNA Not Detected copies/mL Final   11/29/2019 BK Virus DNA Not Detected BKNEG^BK Virus DNA Not Detected copies/mL Final   11/04/2019 BK Virus DNA Not Detected BKNEG^BK Virus DNA Not Detected copies/mL Final   10/22/2019 BK Virus DNA Not Detected BKNEG^BK Virus DNA Not Detected copies/mL Final   10/18/2019 BK Virus DNA Not Detected BKNEG^BK Virus DNA Not Detected copies/mL Final     BK Virus DNA IU/mL   Date Value Ref Range Status   09/13/2024 Not Detected Not Detected IU/mL Final

## 2024-11-06 NOTE — PROGRESS NOTES
Care Management Follow Up    Length of Stay (days): 3    Expected Discharge Date: 11/08/2024     Concerns to be Addressed: discharge planning     Patient plan of care discussed at interdisciplinary rounds: Yes    Anticipated Discharge Disposition: Home Care              Anticipated Discharge Services: Home Care  Anticipated Discharge DME: None    Patient/family educated on Medicare website which has current facility and service quality ratings: no  Education Provided on the Discharge Plan: Yes  Patient/Family in Agreement with the Plan: yes    Referrals Placed by CM/SW: External Care Coordination, Homecare  Private pay costs discussed: Not applicable    Discussed  Partnership in Safe Discharge Planning  document with patient/family: No     Handoff Completed: No, handoff not indicated or clinically appropriate    Additional Information:  Pt s/p pancreas transplant. Pt will need ATC appointments confirmed prior to discharge.      RN[facility] notified that [facility] has accepted for home RN services.    1440: Notified by [facility] Transplant Team anticipates pt will discharge tomorrow.  Pt will need thymoglobulin infusion on Friday 11/8.  ATC appointments requested for 11/8 and 11/9.      Accepting home care  [facility]   [phone]  Fax [phone]    Local address ( pt staying with his brother and sister in law)  [address] [place] [address]    Next Steps:   Home care order  Follow up with [facility] ensure orders faxed/agency able to pull from [facility].    Skye Daley, RN BSN, PHN, ACM-RN  November 6, 2024  7A Nurse Coordinator    Phone: [phone]  Available on [employer] 7A SOT RN[facility]  Weekend/Holiday 7A SOT RN[facility]    11/6/2024

## 2024-11-06 NOTE — PROGRESS NOTES
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 Likely need diuretics in next several days.    - Baseline Creatinine: ~ 1.1-1.3   - Proteinuria: Normal (<0.2 grams)   - DSA Hx: No DSA   - Last cPRA: 0%   - BK Viremia: No   - Kidney Tx Biopsy Hx: No biopsy history and Acute cellular-mediated rejection.  - Recommend US of renal graft with doppler (r/o renal graft clot)   -I held his bactrim in setting of RICHARD  -please obtain UA, UPCR  -daily weights  -will recheck his UO later in the day to decide on lasix redosing       Nov 25, 2019; Result: Material insufficient for assessment with no glomeruli.   Oct 22, 2019; Result: Borderline acute cellular-mediated rejection    # Pancreas Tx (IVETTE):    - Pancreatic Exocrine Drainage: Enteric drained     - Blood glucose: Elevated blood glucose      On insulin: No   - HbA1c: Stable      Latest HbA1c: 7.8%   - Pancreatic enzymes: Stable   - DSA Hx: No DSA at time of transplant   - Pancreas Tx Biopsy Hx: No biopsy history    # Immunosuppression: Tacrolimus immediate release (goal 8-10) and Mycophenolate mofetil (dose 1000 mg every 12 hours)   - Induction with Recent Transplant:  High Intensity Protocol   - Continue with intensive monitoring of immunosuppression for efficacy and toxicity.   - Historical Changes in Immunosuppression: None   - Changes: No    # Infection  Prevention:      - PJP: Sulfa/TMP (Bactrim) Held for RICHARD  - CMV: Valganciclovir (Valcyte)      - CMV IgG Ab High Risk Discordance (D+/R-): No  CMV Serostatus: Positive  - EBV IgG Ab High Risk Discordance (D+/R-): No  EBV Serostatus: Positive    # Hypertension: Controlled;  Goal BP: < 140/90 (Hospitalization goal)   - Changes: No    # Anemia in Chronic Renal Disease: Hgb: Stable      JOSE: No   - Iron studies: Not checked recently    # Mineral Bone Disorder:    - Secondary renal hyperparathyroidism; PTH level: Moderately elevated (301-600 pg/ml)        On treatment: None  - Vitamin D; level: Low        On supplement: Yes  - Calcium; level: Low        On supplement: No  - Phosphorus; level: Low        On supplement: No, replace per protocol     # Electrolytes:   - Potassium; level: Normal        On supplement: No  - Magnesium; level: Low        On supplement: Yes  - Bicarbonate; level: Normal        On supplement: No    # Transplant History:  Etiology of Kidney Failure: Diabetes mellitus type 1  Tx: LDKT  Transplant: 11/2/2024 (Pancreas), 10/1/2019 (Kidney)  Significant transplant-related complications: None    Recommendations were communicated to the primary team via this note.  Levar Zambrano MD        Physician Attestation   I, Connie Membreno MD, was present with the medical/ROX student who participated in the service and in the documentation of the note.  I have verified the history and personally performed the physical exam and medical decision making.  I agree with the assessment and plan of care as documented in the note.      Key findings:   Pt with pancreas after kidney transplant. Panc function been good, no insulin required, but noted to have RICHARD with up rending creatinine. Will hold off on any fluids and give him lasix IV twice today to improve his peripheral edema.    Please see A&P for additional details of medical decision making.    I have personally reviewed the following data over the past 24  hrs:    3.5 (L)  \   7.3 (L)   / 51 (L)     137 105 33.8 (H) /  151 (H)   3.9 25 1.62 (H) \     ALT: N/A AST: N/A AP: N/A TBILI: N/A   ALB: N/A TOT PROTEIN: N/A LIPASE: 48         Connie Membreno MD  Date of Service (when I saw the patient): 11/06/24     Interval events   Cr up to 1.4 to 1.6   Lipase 22  Ca 7.8  Passed TOV   Advanced diet from CLD to regular   NGT is out   Off IVF    Review of Systems   4 point ROS was obtained and negative except as noted in the Interval History.    MEDICATIONS:  Current Facility-Administered Medications   Medication Dose Route Frequency Provider Last Rate Last Admin    acetaminophen (TYLENOL) tablet 650 mg  650 mg Oral Once Aissatou Jarquin PA-C        anti-thymocyte globulin (THYMOGLOBULIN - Rabbit) 75 mg in sodium chloride 0.9 % 165 mL intermittent infusion  75 mg Intravenous Central line Once Aissatou Jarquin PA-C        aspirin (ASA) chewable tablet 81 mg  81 mg Per NG tube Daily Prince Mcdaniels MD   81 mg at 11/06/24 0846    calcium carbonate-vitamin D (OSCAL) 500-5 MG-MCG per tablet 1 tablet  1 tablet Oral BID w/meals Prince Mcdaniels MD   1 tablet at 11/06/24 0846    carvedilol (COREG) tablet 6.25 mg  6.25 mg Oral BID w/meals Prince Mcdaniels MD   6.25 mg at 11/06/24 0846    [START ON 11/10/2024] clotrimazole (MYCELEX) lozenge 10 mg  10 mg Buccal 4x Daily Prince Mcdaniels MD        diphenhydrAMINE (BENADRYL) capsule 25-50 mg  25-50 mg Oral Once Aissatou Jarquin PA-C        Or    diphenhydrAMINE (BENADRYL) elixir 25-50 mg  25-50 mg Per NG tube Once Aissatou Jarquin PA-C        famotidine (PEPCID) tablet 20 mg  20 mg Oral BID Prince Mcdaniels MD   20 mg at 11/06/24 0846    insulin aspart (NovoLOG) injection (RAPID ACTING)  1-7 Units Subcutaneous TID AC Aissatou Jarquin PA-C        insulin aspart (NovoLOG) injection (RAPID ACTING)  1-5 Units Subcutaneous At Bedtime Aissatou Jarquin  STEFF Aguilar        magnesium oxide (MAG-OX) tablet 400 mg  400 mg Oral Q24H Prince Mcdaniels MD   400 mg at 11/06/24 1145    methylPREDNISolone Na Suc (solu-MEDROL) injection 100 mg  100 mg Intravenous Once Aissatou Jarquin PA-C        micafungin (MYCAMINE) 100 mg in sodium chloride 0.9 % 100 mL intermittent infusion  100 mg Intravenous Q24H Prince Mcdaniels  mL/hr at 11/05/24 1608 100 mg at 11/05/24 1608    mycophenolate (CELLCEPT BRAND) capsule 1,000 mg  1,000 mg Oral BID IS Prince Mcdaniels MD   1,000 mg at 11/06/24 0857    octreotide (sandoSTATIN) injection 100 mcg  100 mcg Subcutaneous TID Prince Mcdaniels MD   100 mcg at 11/06/24 0857    pantoprazole (PROTONIX) EC tablet 40 mg  40 mg Oral QAM AC Prince Mcdaniels MD   40 mg at 11/06/24 0846    polyethylene glycol (MIRALAX) Packet 17 g  17 g Oral Daily Prince Mcdaniels MD   17 g at 11/05/24 0840    senna-docusate (SENOKOT-S/PERICOLACE) 8.6-50 MG per tablet 1 tablet  1 tablet Oral BID Prince Mcdaniels MD   1 tablet at 11/06/24 0846    sodium chloride (PF) 0.9% PF flush 10 mL  10 mL Intracatheter Q8H Prince Mcdaniels MD   10 mL at 11/06/24 1148    sodium chloride (PF) 0.9% PF flush 3 mL  3 mL Intravenous Q8H Prince Mcdaniels MD   3 mL at 11/05/24 0425    sulfamethoxazole-trimethoprim (BACTRIM) 400-80 MG per tablet 1 tablet  1 tablet Oral Daily Prince Mcdaniels MD   1 tablet at 11/06/24 0846    tacrolimus (GENERIC EQUIVALENT) capsule 3 mg  3 mg Oral BID IS Prince Mcdaniels MD   3 mg at 11/06/24 0846    valGANciclovir (VALCYTE) tablet 900 mg  900 mg Oral Daily Prince Mcdaniels MD   900 mg at 11/06/24 0846     Current Facility-Administered Medications   Medication Dose Route Frequency Provider Last Rate Last Admin    heparin infusion 25,000 units in 0.45% NaCl  250 mL ANTICOAGULANT  400 Units/hr Intravenous Continuous Aissatou Jarquin PA-C 4 mL/hr at 24 0854 400 Units/hr at 24 0854       Physical Exam   Temp  Av.3  F (36.8  C)  Min: 97.8  F (36.6  C)  Max: 98.9  F (37.2  C)  Arterial Line BP  Min: 110/59  Max: 114/63  Arterial Line MAP (mmHg)  Av mmHg  Min: 75 mmHg  Max: 79 mmHg      Pulse  Av.2  Min: 81  Max: 114 Resp  Av.8  Min: 15  Max: 24  SpO2  Av.7 %  Min: 94 %  Max: 100 %    CVP (mmHg): 9 mmHgBP (!) 149/75 (BP Location: Left arm)   Pulse 80   Temp 97.9  F (36.6  C) (Oral)   Resp 16   Wt 99.9 kg (220 lb 3.2 oz)   SpO2 95%   BMI 34.49 kg/m     Date 24 07 - 24 0659   Shift 9341-5362 9980-5294 4007-0379 24 Hour Total   INTAKE   I.V. 250   250   NG/GT 30   30   Shift Total(mL/kg) 280(3.14)   280(3.14)   OUTPUT   Urine 100   100   Shift Total(mL/kg) 100(1.12)   100(1.12)   Weight (kg) 89.13 89.13 89.13 89.13      Admit Weight: 89.1 kg (196 lb 8 oz)     GENERAL APPEARANCE: alert and no distress  HENT: mouth without ulcers or lesions  RESP: lungs clear to auscultation - no rales, rhonchi or wheezes  CV: regular rhythm, normal rate, no rub, no murmur  EDEMA: no LE edema bilaterally  ABDOMEN: soft, nondistended, nontender, bowel sounds normal  MS: extremities normal - no gross deformities noted, no evidence of inflammation in joints, no muscle tenderness  SKIN: no rash    Data   All labs reviewed by me.  CMP  Recent Labs   Lab 24  1202 24  0855 24  0702 24  0609 24  0600 24  0658 24  0629 24  0704 24  0622 24  0202 24  0108 24  1926 24  1828 24  0614 24  0530 24  1353 24  1352   NA  --   --   --   --  141  --  140  --  137  --   --   --   --   --  140   < > 140   POTASSIUM  --   --   --   --  4.1  --  4.0  --  4.0  --  4.0   < > 3.8   < > 4.3  4.3   < > 4.4   CHLORIDE  --   --   --   --  108*  --  108*  --  105  --    --   --   --   --  105   < > 101   CO2  --   --   --   --  26  --  25  --  25  --   --   --   --   --  24   < > 27   ANIONGAP  --   --   --   --  7  --  7  --  7  --   --   --   --   --  11   < > 12   * 90 86 95 101*   < > 113*   < > 151*   < >  --    < >  --    < > 122*   < > 157*   BUN  --   --   --   --  27.3*  --  19.5  --  16.3  --   --   --   --   --  15.7   < > 14.9   CR  --   --   --   --  1.64*  --  1.41*  --  1.19*  --   --   --   --   --  1.32*   < > 1.10   GFRESTIMATED  --   --   --   --  52*  --  63  --  77  --   --   --   --   --  68   < > 84   APRIL  --   --   --   --  7.8*  --  7.6*  --  7.4*  --   --   --  7.4*  --  7.6*   < > 9.2   MAG  --   --   --   --  1.8  --  1.8  --  1.9  --   --   --   --   --  1.3*   < >  --    PHOS  --   --   --   --  3.1  --  2.6  --  2.6  --   --   --   --   --  2.4*   < >  --    PROTTOTAL  --   --   --   --   --   --   --   --   --   --   --   --   --   --   --   --  6.9   ALBUMIN  --   --   --   --   --   --   --   --  2.4*  --   --   --   --   --   --   --  4.1   BILITOTAL  --   --   --   --   --   --   --   --   --   --   --   --   --   --   --   --  0.5   ALKPHOS  --   --   --   --   --   --   --   --   --   --   --   --   --   --   --   --  113   AST  --   --   --   --   --   --   --   --   --   --   --   --   --   --   --   --  15   ALT  --   --   --   --   --   --   --   --   --   --   --   --   --   --   --   --  13    < > = values in this interval not displayed.     CBC  Recent Labs   Lab 11/06/24  0600 11/05/24  0629 11/04/24  1546 11/04/24  0622   HGB 7.5* 7.9* 8.4* 8.8*   WBC 2.7* 4.6 4.9 4.2   RBC 2.82* 2.97* 3.15* 3.21*   HCT 24.0* 25.1* 26.7* 26.8*   MCV 85 85 85 84   MCH 26.6 26.6 26.7 27.4   MCHC 31.3* 31.5 31.5 32.8   RDW 13.3 13.2 13.3 13.3   PLT 50* 56* 45* 44*     INR  Recent Labs   Lab 11/03/24  0144 11/02/24  1352   INR 1.49* 1.05   PTT 33 34     ABG  Recent Labs   Lab 11/03/24  0135 11/03/24  0019 11/02/24  2324 11/02/24  2221   PH 7.46*  7.40 7.37 7.36   PCO2 35 37 40 40   PO2 109* 124* 105 129*   HCO3 25 23 23 22   O2PER 55.0 56.0 53.0 46.0      Urine Studies  Recent Labs   Lab Test 11/02/24  1503 11/21/23  1154 11/21/19  0810 10/22/19  0612   COLOR Yellow Straw Yellow Yellow   APPEARANCE Clear Clear Clear Clear   URINEGLC Negative Negative Negative 50*   URINEBILI Negative Negative Negative Negative   URINEKETONE Negative Negative Negative Negative   SG 1.026 1.006 1.016 1.015   UBLD Negative Negative Negative Small*   URINEPH 5.5 6.0 5.0 5.0   PROTEIN 10* Negative Negative Negative   NITRITE Negative Negative Negative Negative   LEUKEST Negative Negative Negative Trace*   RBCU <1 <1 <1 28*   WBCU <1 0 <1 12*     Recent Labs   Lab Test 11/16/21  1502 05/11/21  1532 07/16/20  1417 11/13/19  1053 10/22/19  0612 10/18/19  0630   UTPG 0.11 0.10 Unable to calculate due to low value 0.21* 0.24* 0.22*     PTH  Recent Labs   Lab Test 11/05/24  0629 09/26/19  0945   PTHI 216* 356*     Iron Studies  Recent Labs   Lab Test 09/26/19  0945   IRON 70      IRONSAT 28   EDOUARD 753*       IMAGING:  All imaging studies reviewed by me.    Past Medical History    I have reviewed this patient's medical history and updated it with pertinent information if needed.   Past Medical History:   Diagnosis Date    Anemia in chronic kidney disease     Dyslipidemia     ESRD (end stage renal disease) on dialysis (H)     Hyperlipidemia     Hypertension     Hypomagnesemia 10/07/2019    Obese     Secondary hyperparathyroidism (H)     Shingles 12/11/2023 12/11/23: Left Axilla    Status post coronary angiogram 6/7/2024    Type 1 diabetes (H)        Past Surgical History   I have reviewed this patient's surgical history and updated it with pertinent information if needed.  Past Surgical History:   Procedure Laterality Date    APPENDECTOMY OPEN N/A 11/2/2024    Procedure: Appendectomy open;  Surgeon: Prince Mcdaniels MD;  Location: [facility] OR    BENCH PANCREAS N/A  2024    Procedure: Bench pancreas;  Surgeon: Prince Mcdaniels MD;  Location: [facility] OR    CV CORONARY ANGIOGRAM N/A 2024    Procedure: Coronary Angiogram;  Surgeon: Derrell Jauregui MD;  [facility]    CV PCI N/A 2024    Procedure: Percutaneous Coronary Intervention;  Surgeon: Derrell Jauregui MD;  [facility]    hair implant      INSERT CATHETER PERITONEAL DIALYSIS  2018    IR FINE NEEDLE ASPIRATION W ULTRASOUND  2019    IR RENAL BIOPSY LEFT  2019    PERCUTANEOUS BIOPSY KIDNEY Left 10/22/2019    Procedure: Left Kidney Biopsy;  Surgeon: Kash Hoffman MD;  Location: [facility]    TRANSPLANT PANCREAS RECIPIENT  DONOR N/A 2024    Procedure: Pancreas transplant, Iliac vein repair;  Surgeon: Prince Mcdaniels MD;  Location: [facility] OR       Family History   I have reviewed this patient's family history and updated it with pertinent information if needed.   Family History   Problem Relation Age of Onset    No Known Problems Mother     Diabetes Father     Coronary Artery Disease Father     No Known Problems Brother     Skin Cancer Paternal Uncle     Melanoma No family hx of        Social History   I have reviewed this patient's social history and updated it with pertinent information if needed. Michael Amin  reports that he has never smoked. He has never used smokeless tobacco. He reports that he does not currently use alcohol. He reports that he does not use drugs.    Prior to Admission Medications   Medications Prior to Admission   Medication Sig Dispense Refill Last Dose/Taking    aspirin (ASA) 81 MG EC tablet Take 81 mg by mouth daily    2024 Morning    atorvastatin (LIPITOR) 10 MG tablet Take 2 tablets (20 mg) by mouth daily 90 tablet 0 2024 Evening    carvedilol (COREG) 12.5 MG tablet TAKE 1 TABLET BY MOUTH TWICE DAILY WITH MEALS PLEASE  REQUEST  FUTURE  REFILLS  FROM  YOUR  PRIMARY   CARE  PROVIDER 180 tablet 0 11/2/2024 Morning    insulin lispro (HUMALOG VIAL) 100 UNIT/ML vial Use in insulin pump. Pt uses approx 75 units daily. 80 mL 3 Unknown    magnesium oxide (MAG-OX) 400 MG tablet Take 1 tablet (400 mg) by mouth daily (with lunch) 30 tablet 5 11/1/2024 Noon    mycophenolate (GENERIC EQUIVALENT) 250 MG capsule Take 3 capsules (750 mg) by mouth 2 times daily 180 capsule 11 11/2/2024 Morning    Semaglutide, 2 MG/DOSE, (OZEMPIC) 8 MG/3ML pen Inject 2 mg subcutaneous once a week. Please provide 90 day supply. (Patient taking differently: Inject 2 mg subcutaneously once a week. Inject 2 mg subcutaneous once a week. Please provide 90 day supply.) 9 mL 3 10/26/2024 Morning    senna-docusate (SENOKOT-S/PERICOLACE) 8.6-50 MG tablet Take 2 tablets by mouth 2 times daily (Patient taking differently: Take 2 tablets by mouth daily as needed for constipation.) 20 tablet 0 Unknown    tacrolimus (GENERIC) 0.5 MG capsule Take 1 capsule (0.5 mg) by mouth every morning Total dose = 1.5 mg in the AM and 1 mg in the PM 90 capsule 3 11/2/2024 Morning    tacrolimus (GENERIC) 1 MG capsule Total dose = 1.5 mg in the AM and 1 mg in the  capsule 3 11/2/2024 Morning    vitamin D3 (CHOLECALCIFEROL) 50 mcg (2000 units) tablet Take 1 tablet (50 mcg) by mouth daily   11/2/2024 Morning    alcohol swab prep pads Use to swab area of injection/zak as directed. 100 each 3     blood glucose (NO BRAND SPECIFIED) lancets standard Use to test blood sugar 3 times daily or as directed. 100 each 11     blood glucose (NO BRAND SPECIFIED) test strip Use to test blood sugar 3 times daily or as directed. 100 strip 11     blood glucose monitoring (NO BRAND SPECIFIED) meter device kit Use to test blood sugar 3 times daily or as directed. 2 kit 0     Continuous Glucose Sensor (DEXCOM G6 SENSOR) MISC CHANGE SENSOR EVERY 10 DAYS 9 each 3     Continuous Glucose Transmitter (DEXCOM G6 TRANSMITTER) MISC Change every 3 months 1 each 3      "insulin glargine (LANTUS SOLOSTAR) 100 UNIT/ML pen Inject 28 units subcutaneous once a day ONLY IF INSULIN PUMP FAILS. 15 mL 1     Insulin Infusion Pump Supplies (AUTOSOFT XC INFUSION SET) MISC 1 each every 48 hours Change every 2 days  9 mm cannula, 23 inch tubing 45 each 3     Insulin Infusion Pump Supplies (T:SLIM X2 3ML CARTRIDGE) MISC 1 each by Other route every other day Insulin cartridge to be used with pump as directed.  Change every 2 days or as directed. 45 each 3     insulin pen needle (ULTICARE MICRO) 32G X 4 MM miscellaneous Use pen needles daily or as directed. 100 each 3     Insulin Syringe-Needle U-100 31G X 1/4\" 1 ML MISC 1 Syringe as needed (until new insuline pump arrives) 50 each 1       "

## 2024-11-06 NOTE — PLAN OF CARE
Goal Outcome Evaluation:      Plan of Care Reviewed With: patient    Overall Patient Progress: improving    Outcome Evaluation: denies pain or nausea. stable BG's on Alg 1 of insulin gtt. pt remains edematous.    VS: BP (!) 153/80 (BP Location: Left arm)   Pulse 73   Temp 97.6  F (36.4  C) (Oral)   Resp 18   Wt 99.9 kg (220 lb 3.2 oz)   SpO2 99%   BMI 34.49 kg/m      Cares: 1900 - 0730     Neuro: Aox4  VS: hypertensive 140-150/70s, afebrile, on RA. Denies SOB.   GI/: voiding adequately w/out issues. PVR: negative. LBM 11/5   Skin: midline abdominal incision stapled NEHA  Diet: Clears   Labs: awaiting AM lab results   BG: insulin gtt - Alg 1 (77 - 133)   LDA: Right PIV SL. Right internal jugular. Right RUBIA.   Mobility: UAL   Pain/Nausea: minimal abdominal pain, denies nausea   PRN medications: robaxin x1   Events/Education: n/a   Plan of Care: continue with current POC and update MD with any changes.

## 2024-11-06 NOTE — PLAN OF CARE
Goal Outcome Evaluation:      Plan of Care Reviewed With: patient    Overall Patient Progress: improvingOverall Patient Progress: improving    Outcome Evaluation: Insulin drip DC'd, diet advanced to regular    Vitals: BP (!) 149/75 (BP Location: Left arm)   Pulse 80   Temp 97.9  F (36.6  C) (Oral)   Resp 16   Wt 99.9 kg (220 lb 3.2 oz)   SpO2 95%   BMI 34.49 kg/m      Endocrine: Insulin drip DC'd, BG achs, 115.   Labs: Stable labs except for increase in creatinine to 1.6.  Pain: Mild abdominal discomfort.  PRN's: Tylenol 650 mg.  Diet: Diet advanced to regular, eating well, no nausea.  LDA: R TL internal jugular, Heparin at 400U/hr, R PIV, RUBIA.  GI: BM 11/6.  : Voids spontaneously, Lasix 20 mg IV X1, UA sent.  Skin: Pale, scattered scabs, abdominal incision with staples, RUBIA, moderate edema.   Neuro: Alert and oriented.  Mobility: Up ad jose manuel, sitting in the chair most of the day, showered.   Education: Medication card and lab book updated. Specialty Pharmacy to set up time for tomorrow.  Plan: Renal ultrasound completed, discharge locally in 1-2 days.

## 2024-11-07 ENCOUNTER — TELEPHONE (OUTPATIENT)
Dept: PHARMACY | Facility: CLINIC | Age: 44
End: 2024-11-07
Payer: COMMERCIAL

## 2024-11-07 PROBLEM — G89.18 ACUTE POST-OPERATIVE PAIN: Status: ACTIVE | Noted: 2024-11-07

## 2024-11-07 PROBLEM — D69.6 THROMBOCYTOPENIA (H): Status: ACTIVE | Noted: 2024-11-07

## 2024-11-07 PROBLEM — Z91.89 AT RISK FOR MALNUTRITION: Status: ACTIVE | Noted: 2024-11-07

## 2024-11-07 LAB
ABO/RH(D): NORMAL
ANION GAP SERPL CALCULATED.3IONS-SCNC: 7 MMOL/L (ref 7–15)
ANTIBODY SCREEN: NEGATIVE
BASOPHILS # BLD AUTO: 0 10E3/UL (ref 0–0.2)
BASOPHILS NFR BLD AUTO: 0 %
BUN SERPL-MCNC: 33.8 MG/DL (ref 6–20)
CALCIUM SERPL-MCNC: 7.8 MG/DL (ref 8.8–10.4)
CHLORIDE SERPL-SCNC: 105 MMOL/L (ref 98–107)
CREAT SERPL-MCNC: 1.62 MG/DL (ref 0.67–1.17)
EGFRCR SERPLBLD CKD-EPI 2021: 53 ML/MIN/1.73M2
EOSINOPHIL # BLD AUTO: 0 10E3/UL (ref 0–0.7)
EOSINOPHIL NFR BLD AUTO: 0 %
ERYTHROCYTE [DISTWIDTH] IN BLOOD BY AUTOMATED COUNT: 13.2 % (ref 10–15)
GLUCOSE BLDC GLUCOMTR-MCNC: 100 MG/DL (ref 70–99)
GLUCOSE BLDC GLUCOMTR-MCNC: 112 MG/DL (ref 70–99)
GLUCOSE BLDC GLUCOMTR-MCNC: 112 MG/DL (ref 70–99)
GLUCOSE BLDC GLUCOMTR-MCNC: 151 MG/DL (ref 70–99)
GLUCOSE BLDC GLUCOMTR-MCNC: 161 MG/DL (ref 70–99)
GLUCOSE BLDC GLUCOMTR-MCNC: 89 MG/DL (ref 70–99)
GLUCOSE BLDC GLUCOMTR-MCNC: 90 MG/DL (ref 70–99)
GLUCOSE SERPL-MCNC: 96 MG/DL (ref 70–99)
HCO3 SERPL-SCNC: 25 MMOL/L (ref 22–29)
HCT VFR BLD AUTO: 20.8 % (ref 40–53)
HGB BLD-MCNC: 6.7 G/DL (ref 13.3–17.7)
HGB BLD-MCNC: 7.3 G/DL (ref 13.3–17.7)
IMM GRANULOCYTES # BLD: 0.1 10E3/UL
IMM GRANULOCYTES NFR BLD: 2 %
LIPASE SERPL-CCNC: 48 U/L (ref 13–60)
LYMPHOCYTES # BLD AUTO: 0.3 10E3/UL (ref 0.8–5.3)
LYMPHOCYTES NFR BLD AUTO: 8 %
MAGNESIUM SERPL-MCNC: 1.6 MG/DL (ref 1.7–2.3)
MCH RBC QN AUTO: 27.1 PG (ref 26.5–33)
MCHC RBC AUTO-ENTMCNC: 32.2 G/DL (ref 31.5–36.5)
MCV RBC AUTO: 84 FL (ref 78–100)
MONOCYTES # BLD AUTO: 0.3 10E3/UL (ref 0–1.3)
MONOCYTES NFR BLD AUTO: 9 %
NEUTROPHILS # BLD AUTO: 2.9 10E3/UL (ref 1.6–8.3)
NEUTROPHILS NFR BLD AUTO: 81 %
NRBC # BLD AUTO: 0 10E3/UL
NRBC BLD AUTO-RTO: 1 /100
PHOSPHATE SERPL-MCNC: 3.5 MG/DL (ref 2.5–4.5)
PLATELET # BLD AUTO: 51 10E3/UL (ref 150–450)
POTASSIUM SERPL-SCNC: 3.9 MMOL/L (ref 3.4–5.3)
RBC # BLD AUTO: 2.47 10E6/UL (ref 4.4–5.9)
SODIUM SERPL-SCNC: 137 MMOL/L (ref 135–145)
SPECIMEN EXPIRATION DATE: NORMAL
TACROLIMUS BLD-MCNC: 7.7 UG/L (ref 5–15)
TME LAST DOSE: NORMAL H
TME LAST DOSE: NORMAL H
UFH PPP CHRO-ACNC: <0.1 IU/ML
WBC # BLD AUTO: 3.5 10E3/UL (ref 4–11)

## 2024-11-07 PROCEDURE — 83735 ASSAY OF MAGNESIUM: CPT | Performed by: SURGERY

## 2024-11-07 PROCEDURE — 84100 ASSAY OF PHOSPHORUS: CPT | Performed by: SURGERY

## 2024-11-07 PROCEDURE — 36592 COLLECT BLOOD FROM PICC: CPT | Performed by: NURSE PRACTITIONER

## 2024-11-07 PROCEDURE — 36592 COLLECT BLOOD FROM PICC: CPT | Performed by: PHYSICIAN ASSISTANT

## 2024-11-07 PROCEDURE — 85025 COMPLETE CBC W/AUTO DIFF WBC: CPT | Performed by: SURGERY

## 2024-11-07 PROCEDURE — 250N000012 HC RX MED GY IP 250 OP 636 PS 637: Performed by: SURGERY

## 2024-11-07 PROCEDURE — 82565 ASSAY OF CREATININE: CPT | Performed by: SURGERY

## 2024-11-07 PROCEDURE — 250N000011 HC RX IP 250 OP 636: Performed by: NURSE PRACTITIONER

## 2024-11-07 PROCEDURE — 85520 HEPARIN ASSAY: CPT | Performed by: SURGERY

## 2024-11-07 PROCEDURE — 85018 HEMOGLOBIN: CPT | Performed by: NURSE PRACTITIONER

## 2024-11-07 PROCEDURE — 250N000013 HC RX MED GY IP 250 OP 250 PS 637: Performed by: SURGERY

## 2024-11-07 PROCEDURE — 258N000003 HC RX IP 258 OP 636: Performed by: NURSE PRACTITIONER

## 2024-11-07 PROCEDURE — 250N000013 HC RX MED GY IP 250 OP 250 PS 637: Performed by: NURSE PRACTITIONER

## 2024-11-07 PROCEDURE — 99233 SBSQ HOSP IP/OBS HIGH 50: CPT | Mod: 24 | Performed by: INTERNAL MEDICINE

## 2024-11-07 PROCEDURE — 80197 ASSAY OF TACROLIMUS: CPT | Performed by: PHYSICIAN ASSISTANT

## 2024-11-07 PROCEDURE — 258N000003 HC RX IP 258 OP 636: Performed by: SURGERY

## 2024-11-07 PROCEDURE — 83690 ASSAY OF LIPASE: CPT | Performed by: SURGERY

## 2024-11-07 PROCEDURE — 250N000013 HC RX MED GY IP 250 OP 250 PS 637: Performed by: PHYSICIAN ASSISTANT

## 2024-11-07 PROCEDURE — 86900 BLOOD TYPING SEROLOGIC ABO: CPT | Performed by: NURSE PRACTITIONER

## 2024-11-07 PROCEDURE — 80048 BASIC METABOLIC PNL TOTAL CA: CPT | Performed by: SURGERY

## 2024-11-07 PROCEDURE — 86923 COMPATIBILITY TEST ELECTRIC: CPT | Performed by: NURSE PRACTITIONER

## 2024-11-07 PROCEDURE — 86923 COMPATIBILITY TEST ELECTRIC: CPT | Performed by: PHYSICIAN ASSISTANT

## 2024-11-07 PROCEDURE — 250N000011 HC RX IP 250 OP 636: Performed by: SURGERY

## 2024-11-07 PROCEDURE — 120N000011 HC R&B TRANSPLANT UMMC

## 2024-11-07 RX ORDER — AMOXICILLIN 250 MG
1-2 CAPSULE ORAL 2 TIMES DAILY PRN
Status: DISCONTINUED | OUTPATIENT
Start: 2024-11-07 | End: 2024-11-09

## 2024-11-07 RX ORDER — METHYLPREDNISOLONE SODIUM SUCCINATE 125 MG/2ML
100 INJECTION INTRAMUSCULAR; INTRAVENOUS ONCE
Status: COMPLETED | OUTPATIENT
Start: 2024-11-07 | End: 2024-11-07

## 2024-11-07 RX ORDER — MAGNESIUM SULFATE HEPTAHYDRATE 40 MG/ML
2 INJECTION, SOLUTION INTRAVENOUS ONCE
Status: COMPLETED | OUTPATIENT
Start: 2024-11-07 | End: 2024-11-07

## 2024-11-07 RX ORDER — FUROSEMIDE 10 MG/ML
20 INJECTION INTRAMUSCULAR; INTRAVENOUS ONCE
Status: COMPLETED | OUTPATIENT
Start: 2024-11-07 | End: 2024-11-07

## 2024-11-07 RX ORDER — DIPHENHYDRAMINE HCL 25 MG
25-50 CAPSULE ORAL ONCE
Status: COMPLETED | OUTPATIENT
Start: 2024-11-07 | End: 2024-11-07

## 2024-11-07 RX ORDER — ACETAMINOPHEN 325 MG/1
650 TABLET ORAL ONCE
Status: COMPLETED | OUTPATIENT
Start: 2024-11-07 | End: 2024-11-07

## 2024-11-07 RX ORDER — NYSTATIN 100000 [USP'U]/ML
500000 SUSPENSION ORAL 4 TIMES DAILY
Status: DISCONTINUED | OUTPATIENT
Start: 2024-11-10 | End: 2024-11-11 | Stop reason: HOSPADM

## 2024-11-07 RX ORDER — ASPIRIN 81 MG/1
81 TABLET, CHEWABLE ORAL DAILY
Status: DISCONTINUED | OUTPATIENT
Start: 2024-11-08 | End: 2024-11-11 | Stop reason: HOSPADM

## 2024-11-07 RX ORDER — POLYETHYLENE GLYCOL 3350 17 G/17G
17 POWDER, FOR SOLUTION ORAL DAILY PRN
Status: DISCONTINUED | OUTPATIENT
Start: 2024-11-07 | End: 2024-11-11 | Stop reason: HOSPADM

## 2024-11-07 RX ORDER — FUROSEMIDE 20 MG/1
40 TABLET ORAL DAILY
Status: DISCONTINUED | OUTPATIENT
Start: 2024-11-08 | End: 2024-11-09 | Stop reason: ALTCHOICE

## 2024-11-07 RX ORDER — DIPHENHYDRAMINE HCL 12.5MG/5ML
25-50 LIQUID (ML) ORAL ONCE
Status: COMPLETED | OUTPATIENT
Start: 2024-11-07 | End: 2024-11-07

## 2024-11-07 RX ORDER — OXYCODONE HYDROCHLORIDE 5 MG/1
5 TABLET ORAL EVERY 6 HOURS PRN
Status: DISCONTINUED | OUTPATIENT
Start: 2024-11-07 | End: 2024-11-11 | Stop reason: HOSPADM

## 2024-11-07 RX ADMIN — ACETAMINOPHEN 650 MG: 325 TABLET, FILM COATED ORAL at 09:26

## 2024-11-07 RX ADMIN — ANTI-THYMOCYTE GLOBULIN (RABBIT) 100 MG: 5 INJECTION, POWDER, LYOPHILIZED, FOR SOLUTION INTRAVENOUS at 17:06

## 2024-11-07 RX ADMIN — MAGNESIUM SULFATE HEPTAHYDRATE 2 G: 2 INJECTION, SOLUTION INTRAVENOUS at 09:07

## 2024-11-07 RX ADMIN — TACROLIMUS 3 MG: 1 CAPSULE ORAL at 08:36

## 2024-11-07 RX ADMIN — FAMOTIDINE 20 MG: 20 TABLET ORAL at 19:55

## 2024-11-07 RX ADMIN — Medication 1 TABLET: at 08:36

## 2024-11-07 RX ADMIN — OCTREOTIDE ACETATE 100 MCG: 100 INJECTION, SOLUTION INTRAVENOUS; SUBCUTANEOUS at 08:35

## 2024-11-07 RX ADMIN — OCTREOTIDE ACETATE 100 MCG: 100 INJECTION, SOLUTION INTRAVENOUS; SUBCUTANEOUS at 14:42

## 2024-11-07 RX ADMIN — MAGNESIUM OXIDE TAB 400 MG (241.3 MG ELEMENTAL MG) 400 MG: 400 (241.3 MG) TAB at 11:32

## 2024-11-07 RX ADMIN — OCTREOTIDE ACETATE 100 MCG: 100 INJECTION, SOLUTION INTRAVENOUS; SUBCUTANEOUS at 20:48

## 2024-11-07 RX ADMIN — MICAFUNGIN SODIUM 100 MG: 50 INJECTION, POWDER, LYOPHILIZED, FOR SOLUTION INTRAVENOUS at 18:23

## 2024-11-07 RX ADMIN — VALGANCICLOVIR 900 MG: 450 TABLET, FILM COATED ORAL at 08:36

## 2024-11-07 RX ADMIN — FAMOTIDINE 20 MG: 20 TABLET ORAL at 08:36

## 2024-11-07 RX ADMIN — TACROLIMUS 3 MG: 1 CAPSULE ORAL at 18:22

## 2024-11-07 RX ADMIN — CARVEDILOL 6.25 MG: 6.25 TABLET, FILM COATED ORAL at 08:36

## 2024-11-07 RX ADMIN — FUROSEMIDE 20 MG: 10 INJECTION, SOLUTION INTRAMUSCULAR; INTRAVENOUS at 11:32

## 2024-11-07 RX ADMIN — ACETAMINOPHEN 650 MG: 325 TABLET, FILM COATED ORAL at 16:11

## 2024-11-07 RX ADMIN — CARVEDILOL 6.25 MG: 6.25 TABLET, FILM COATED ORAL at 19:55

## 2024-11-07 RX ADMIN — ATORVASTATIN CALCIUM 20 MG: 20 TABLET, FILM COATED ORAL at 08:37

## 2024-11-07 RX ADMIN — MYCOPHENOLATE MOFETIL 1000 MG: 250 CAPSULE ORAL at 18:22

## 2024-11-07 RX ADMIN — PANTOPRAZOLE SODIUM 40 MG: 40 TABLET, DELAYED RELEASE ORAL at 09:06

## 2024-11-07 RX ADMIN — DIPHENHYDRAMINE HYDROCHLORIDE 50 MG: 25 CAPSULE ORAL at 16:11

## 2024-11-07 RX ADMIN — MYCOPHENOLATE MOFETIL 1000 MG: 250 CAPSULE ORAL at 09:10

## 2024-11-07 RX ADMIN — FUROSEMIDE 20 MG: 10 INJECTION, SOLUTION INTRAMUSCULAR; INTRAVENOUS at 16:11

## 2024-11-07 RX ADMIN — METHYLPREDNISOLONE SODIUM SUCCINATE 100 MG: 125 INJECTION, POWDER, FOR SOLUTION INTRAMUSCULAR; INTRAVENOUS at 16:10

## 2024-11-07 RX ADMIN — Medication 1 TABLET: at 18:22

## 2024-11-07 NOTE — PROGRESS NOTES
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 Likely need diuretics in next several days.    - Baseline Creatinine: ~ 1.1-1.3   - Proteinuria: Normal (<0.2 grams)   - DSA Hx: No DSA   - Last cPRA: 0%   - BK Viremia: No   - Kidney Tx Biopsy Hx: No biopsy history and Acute cellular-mediated rejection.   Nov 25, 2019; Result: Material insufficient for assessment with no glomeruli.   Oct 22, 2019; Result: Borderline acute cellular-mediated rejection  -give 1 dose of lasix 40 mg IV today  -discharge him on PO lasix 40 mg QDAY (prescribe the 20mg pills to be able to go down to 20 mg qday if needed)      # Pancreas Tx (IVETTE):    - Pancreatic Exocrine Drainage: Enteric drained     - Blood glucose: Elevated blood glucose      On insulin: No   - HbA1c: Stable      Latest HbA1c: 7.8%   - Pancreatic enzymes: Stable   - DSA Hx: No DSA at time of transplant   - Pancreas Tx Biopsy Hx: No biopsy history    # Immunosuppression: Tacrolimus immediate release (goal 8-10) and Mycophenolate mofetil (dose 1000 mg every 12 hours)   - Induction with Recent Transplant:  High Intensity Protocol   - Continue with intensive monitoring of immunosuppression for efficacy and toxicity.   - Historical Changes in Immunosuppression: None   - Changes: No    # Infection Prevention:      - PJP: Sulfa/TMP (Bactrim) Held for  RICHARD  - CMV: Valganciclovir (Valcyte)      - CMV IgG Ab High Risk Discordance (D+/R-): No  CMV Serostatus: Positive  - EBV IgG Ab High Risk Discordance (D+/R-): No  EBV Serostatus: Positive    # Hypertension: Controlled;  Goal BP: < 140/90 (Hospitalization goal)   - Changes: No    # Anemia in Chronic Renal Disease: Hgb: Stable      JOES: No   - Iron studies: Not checked recently    # Mineral Bone Disorder:    - Secondary renal hyperparathyroidism; PTH level: Moderately elevated (301-600 pg/ml)        On treatment: None  - Vitamin D; level: Low        On supplement: Yes  - Calcium; level: Low        On supplement: No  - Phosphorus; level: Low        On supplement: No, replace per protocol     # Electrolytes:   - Potassium; level: Normal        On supplement: No  - Magnesium; level: Low        On supplement: Yes  - Bicarbonate; level: Normal        On supplement: No    # Transplant History:  Etiology of Kidney Failure: Diabetes mellitus type 1  Tx: LDKT  Transplant: 11/2/2024 (Pancreas), 10/1/2019 (Kidney)  Significant transplant-related complications: None    Recommendations were communicated to the primary team via this note.  Levar Zambrano MD        Physician Attestation   I, Connie Membreno MD, was present with the medical/ROX student who participated in the service and in the documentation of the note.  I have verified the history and personally performed the physical exam and medical decision making.  I agree with the assessment and plan of care as documented in the note.      Key findings:   Pt with RICHARD in transplanted kidney after pancreas transplant. Now with stable creatinine since yesterday. Made good amount of urine after lasix. Will transition him to oral lasix as he is nearing to his discharge with anticipation to discontinue in few days once his weight to back to baseline. Discussed with ozempic use, for now will hold given that it can cause pancreatitis. Also discussed his baseline creatinine can be  different now that he will be on higher goal of tac after surgery.     Please see A&P for additional details of medical decision making.    I have personally reviewed the following data over the past 24 hrs:    3.5 (L)  \   7.3 (L)   / 51 (L)     137 105 33.8 (H) /  151 (H)   3.9 25 1.62 (H) \     ALT: N/A AST: N/A AP: N/A TBILI: N/A   ALB: N/A TOT PROTEIN: N/A LIPASE: 48         Connie Membreno MD  Date of Service (when I saw the patient): 11/07/24   Interval events   Cr up to 1.4 to 1.6 to 1.6  Lipase 22  Ca 7.8  Negative RBUS, normal lipase and amylase     Review of Systems   4 point ROS was obtained and negative except as noted in the Interval History.    MEDICATIONS:  Current Facility-Administered Medications   Medication Dose Route Frequency Provider Last Rate Last Admin    [START ON 11/8/2024] aspirin (ASA) chewable tablet 81 mg  81 mg Oral Daily Prince Mcdaniels MD        atorvastatin (LIPITOR) tablet 20 mg  20 mg Oral Daily Aissatou Jarquin PA-C   20 mg at 11/07/24 0837    calcium carbonate-vitamin D (OSCAL) 500-5 MG-MCG per tablet 1 tablet  1 tablet Oral BID w/meals Prince Mcdaniels MD   1 tablet at 11/07/24 0836    carvedilol (COREG) tablet 6.25 mg  6.25 mg Oral BID w/meals Prince Mcdaniels MD   6.25 mg at 11/07/24 0836    [START ON 11/10/2024] clotrimazole (MYCELEX) lozenge 10 mg  10 mg Buccal 4x Daily Prince Mcdaniels MD        famotidine (PEPCID) tablet 20 mg  20 mg Oral BID Prince Mcdaniels MD   20 mg at 11/07/24 0836    [START ON 11/8/2024] furosemide (LASIX) tablet 40 mg  40 mg Oral Daily Stephany Diaz, NP        insulin aspart (NovoLOG) injection (RAPID ACTING)  1-7 Units Subcutaneous TID AC Aissatou Jarquin PA-C        insulin aspart (NovoLOG) injection (RAPID ACTING)  1-5 Units Subcutaneous At Bedtime Aissatou Jarquin PA-C        magnesium oxide (MAG-OX) tablet 400 mg  400 mg Oral Q24H Enedelia  Prince Rodriguez MD   400 mg at 24 1132    micafungin (MYCAMINE) 100 mg in sodium chloride 0.9 % 100 mL intermittent infusion  100 mg Intravenous Q24H Prince Mcdaniels  mL/hr at 24 1725 100 mg at 24 1725    mycophenolate (CELLCEPT BRAND) capsule 1,000 mg  1,000 mg Oral BID IS Prince Mcdaniels MD   1,000 mg at 24 0910    octreotide (sandoSTATIN) injection 100 mcg  100 mcg Subcutaneous TID Prince Mcdaniels MD   100 mcg at 24 0835    pantoprazole (PROTONIX) EC tablet 40 mg  40 mg Oral QAM AC Prince Mcdaniels MD   40 mg at 24 0906    sodium chloride (PF) 0.9% PF flush 10 mL  10 mL Intracatheter Q8H Prince Mcdaniels MD   10 mL at 24 1132    sodium chloride (PF) 0.9% PF flush 3 mL  3 mL Intravenous Q8H Prince Mcdaniels MD   3 mL at 24 1132    [Held by provider] sulfamethoxazole-trimethoprim (BACTRIM) 400-80 MG per tablet 1 tablet  1 tablet Oral Daily Prince Mcdaniels MD   1 tablet at 24 0846    tacrolimus (GENERIC EQUIVALENT) capsule 3 mg  3 mg Oral BID IS Prince Mcdaniels MD   3 mg at 24 0836    valGANciclovir (VALCYTE) tablet 900 mg  900 mg Oral Daily Prince Mcdaniels MD   900 mg at 24 0836     Current Facility-Administered Medications   Medication Dose Route Frequency Provider Last Rate Last Admin       Physical Exam   Temp  Av.3  F (36.8  C)  Min: 97.8  F (36.6  C)  Max: 98.9  F (37.2  C)  Arterial Line BP  Min: 110/59  Max: 114/63  Arterial Line MAP (mmHg)  Av mmHg  Min: 75 mmHg  Max: 79 mmHg      Pulse  Av.2  Min: 81  Max: 114 Resp  Av.8  Min: 15  Max: 24  SpO2  Av.7 %  Min: 94 %  Max: 100 %    CVP (mmHg): 9 mmHgBP 132/69 (BP Location: Left arm)   Pulse 78   Temp 98.4  F (36.9  C) (Oral)   Resp 16   Wt 99.7 kg (219 lb 11.2 oz)   SpO2 96%   BMI 34.41 kg/m      Date 11/03/24 0700 - 11/04/24 0659   Shift [id number] 2754-3297 4370-0659 24 Hour Total   INTAKE   I.V. 250   250   NG/GT 30   30   Shift Total(mL/kg) 280(3.14)   280(3.14)   OUTPUT   Urine 100   100   Shift Total(mL/kg) 100(1.12)   100(1.12)   Weight (kg) 89.13 89.13 89.13 89.13      Admit Weight: 89.1 kg (196 lb 8 oz)     GENERAL APPEARANCE: alert and no distress  HENT: mouth without ulcers or lesions  RESP: lungs clear to auscultation - no rales, rhonchi or wheezes  CV: regular rhythm, normal rate, no rub, no murmur  EDEMA: no LE edema bilaterally  ABDOMEN: soft, nondistended, nontender, bowel sounds normal  MS: extremities normal - no gross deformities noted, no evidence of inflammation in joints, no muscle tenderness  SKIN: no rash    Data   All labs reviewed by me.  CMP  Recent Labs   Lab 11/07/24  1129 11/07/24  0909 11/07/24  0704 11/07/24  0544 11/06/24  0609 11/06/24  0600 11/05/24  0658 11/05/24  0629 11/04/24  0704 11/04/24  0622 11/02/24  1353 11/02/24  1352   NA  --   --  137  --   --  141  --  140  --  137   < > 140   POTASSIUM  --   --  3.9  --   --  4.1  --  4.0  --  4.0   < > 4.4   CHLORIDE  --   --  105  --   --  108*  --  108*  --  105   < > 101   CO2  --   --  25  --   --  26  --  25  --  25   < > 27   ANIONGAP  --   --  7  --   --  7  --  7  --  7   < > 12   * 89 96 90   < > 101*   < > 113*   < > 151*   < > 157*   BUN  --   --  33.8*  --   --  27.3*  --  19.5  --  16.3   < > 14.9   CR  --   --  1.62*  --   --  1.64*  --  1.41*  --  1.19*   < > 1.10   GFRESTIMATED  --   --  53*  --   --  52*  --  63  --  77   < > 84   APRIL  --   --  7.8*  --   --  7.8*  --  7.6*  --  7.4*   < > 9.2   MAG  --   --  1.6*  --   --  1.8  --  1.8  --  1.9   < >  --    PHOS  --   --  3.5  --   --  3.1  --  2.6  --  2.6   < >  --    PROTTOTAL  --   --   --   --   --   --   --   --   --   --   --  6.9   ALBUMIN  --   --   --   --   --   --   --   --   --  2.4*  --  4.1   BILITOTAL  --   --   --   --   --   --    --   --   --   --   --  0.5   ALKPHOS  --   --   --   --   --   --   --   --   --   --   --  113   AST  --   --   --   --   --   --   --   --   --   --   --  15   ALT  --   --   --   --   --   --   --   --   --   --   --  13    < > = values in this interval not displayed.     CBC  Recent Labs   Lab 11/07/24  0704 11/06/24  0600 11/05/24  0629 11/04/24  1546   HGB 6.7* 7.5* 7.9* 8.4*   WBC 3.5* 2.7* 4.6 4.9   RBC 2.47* 2.82* 2.97* 3.15*   HCT 20.8* 24.0* 25.1* 26.7*   MCV 84 85 85 85   MCH 27.1 26.6 26.6 26.7   MCHC 32.2 31.3* 31.5 31.5   RDW 13.2 13.3 13.2 13.3   PLT 51* 50* 56* 45*     INR  Recent Labs   Lab 11/03/24  0144 11/02/24  1352   INR 1.49* 1.05   PTT 33 34     ABG  Recent Labs   Lab 11/03/24  0135 11/03/24  0019 11/02/24  2324 11/02/24  2221   PH 7.46* 7.40 7.37 7.36   PCO2 35 37 40 40   PO2 109* 124* 105 129*   HCO3 25 23 23 22   O2PER 55.0 56.0 53.0 46.0      Urine Studies  Recent Labs   Lab Test 11/06/24  1227 11/02/24  1503 11/21/23  1154 11/21/19  0810   COLOR Straw Yellow Straw Yellow   APPEARANCE Clear Clear Clear Clear   URINEGLC Negative Negative Negative Negative   URINEBILI Negative Negative Negative Negative   URINEKETONE Negative Negative Negative Negative   SG 1.010 1.026 1.006 1.016   UBLD Negative Negative Negative Negative   URINEPH 6.5 5.5 6.0 5.0   PROTEIN Negative 10* Negative Negative   NITRITE Negative Negative Negative Negative   LEUKEST Negative Negative Negative Negative   RBCU 0 <1 <1 <1   WBCU 2 <1 0 <1     Recent Labs   Lab Test 11/16/21  1502 05/11/21  1532 07/16/20  1417 11/13/19  1053 10/22/19  0612 10/18/19  0630   UTPG 0.11 0.10 Unable to calculate due to low value 0.21* 0.24* 0.22*     PTH  Recent Labs   Lab Test 11/05/24  0629 09/26/19  0945   PTHI 216* 356*     Iron Studies  Recent Labs   Lab Test 09/26/19  0945   IRON 70      IRONSAT 28   EDOUARD 753*       IMAGING:  All imaging studies reviewed by me.    Past Medical History    I have reviewed this patient's  medical history and updated it with pertinent information if needed.   Past Medical History:   Diagnosis Date    Anemia in chronic kidney disease     Dyslipidemia     ESRD (end stage renal disease) on dialysis (H)     Hyperlipidemia     Hypertension     Hypomagnesemia 10/07/2019    Obese     Secondary hyperparathyroidism (H)     Shingles 2023: Left Axilla    Status post coronary angiogram 2024    Type 1 diabetes (H)        Past Surgical History   I have reviewed this patient's surgical history and updated it with pertinent information if needed.  Past Surgical History:   Procedure Laterality Date    APPENDECTOMY OPEN N/A 2024    Procedure: Appendectomy open;  Surgeon: Prince Mcdaniels MD;  Location: [facility] OR    BENCH PANCREAS N/A 2024    Procedure: Bench pancreas;  Surgeon: Prince Mcdaniels MD;  Location: [facility] OR    CV CORONARY ANGIOGRAM N/A 2024    Procedure: Coronary Angiogram;  Surgeon: Derrell Jauregui MD;  [facility]    CV PCI N/A 2024    Procedure: Percutaneous Coronary Intervention;  Surgeon: Derrell Jauregui MD;  Location:  [facility]    hair implant      INSERT CATHETER PERITONEAL DIALYSIS  2018    IR FINE NEEDLE ASPIRATION W ULTRASOUND  2019    IR RENAL BIOPSY LEFT  2019    PERCUTANEOUS BIOPSY KIDNEY Left 10/22/2019    Procedure: Left Kidney Biopsy;  Surgeon: Kash Hoffman MD;  Location: [facility] OR    TRANSPLANT PANCREAS RECIPIENT  DONOR N/A 2024    Procedure: Pancreas transplant, Iliac vein repair;  Surgeon: Prince Mcdaniels MD;  Location: [facility] OR       Family History   I have reviewed this patient's family history and updated it with pertinent information if needed.   Family History   Problem Relation Age of Onset    No Known Problems Mother     Diabetes Father     Coronary Artery Disease Father     No Known Problems Brother     Skin Cancer  Paternal Uncle     Melanoma No family hx of        Social History   I have reviewed this patient's social history and updated it with pertinent information if needed. Michael Amin  reports that he has never smoked. He has never used smokeless tobacco. He reports that he does not currently use alcohol. He reports that he does not use drugs.    Prior to Admission Medications   Medications Prior to Admission   Medication Sig Dispense Refill Last Dose/Taking    aspirin (ASA) 81 MG EC tablet Take 81 mg by mouth daily    11/2/2024 Morning    atorvastatin (LIPITOR) 10 MG tablet Take 2 tablets (20 mg) by mouth daily 90 tablet 0 11/1/2024 Evening    carvedilol (COREG) 12.5 MG tablet TAKE 1 TABLET BY MOUTH TWICE DAILY WITH MEALS PLEASE  REQUEST  FUTURE  REFILLS  FROM  YOUR  PRIMARY  CARE  PROVIDER 180 tablet 0 11/2/2024 Morning    insulin lispro (HUMALOG VIAL) 100 UNIT/ML vial Use in insulin pump. Pt uses approx 75 units daily. 80 mL 3 Unknown    magnesium oxide (MAG-OX) 400 MG tablet Take 1 tablet (400 mg) by mouth daily (with lunch) 30 tablet 5 11/1/2024 Noon    mycophenolate (GENERIC EQUIVALENT) 250 MG capsule Take 3 capsules (750 mg) by mouth 2 times daily 180 capsule 11 11/2/2024 Morning    Semaglutide, 2 MG/DOSE, (OZEMPIC) 8 MG/3ML pen Inject 2 mg subcutaneous once a week. Please provide 90 day supply. (Patient taking differently: Inject 2 mg subcutaneously once a week. Inject 2 mg subcutaneous once a week. Please provide 90 day supply.) 9 mL 3 10/26/2024 Morning    senna-docusate (SENOKOT-S/PERICOLACE) 8.6-50 MG tablet Take 2 tablets by mouth 2 times daily (Patient taking differently: Take 2 tablets by mouth daily as needed for constipation.) 20 tablet 0 Unknown    tacrolimus (GENERIC) 0.5 MG capsule Take 1 capsule (0.5 mg) by mouth every morning Total dose = 1.5 mg in the AM and 1 mg in the PM 90 capsule 3 11/2/2024 Morning    tacrolimus (GENERIC) 1 MG capsule Total dose = 1.5 mg in the AM and 1 mg in the   "capsule 3 11/2/2024 Morning    vitamin D3 (CHOLECALCIFEROL) 50 mcg (2000 units) tablet Take 1 tablet (50 mcg) by mouth daily   11/2/2024 Morning    alcohol swab prep pads Use to swab area of injection/zak as directed. 100 each 3     blood glucose (NO BRAND SPECIFIED) lancets standard Use to test blood sugar 3 times daily or as directed. 100 each 11     blood glucose (NO BRAND SPECIFIED) test strip Use to test blood sugar 3 times daily or as directed. 100 strip 11     blood glucose monitoring (NO BRAND SPECIFIED) meter device kit Use to test blood sugar 3 times daily or as directed. 2 kit 0     Continuous Glucose Sensor (DEXCOM G6 SENSOR) MISC CHANGE SENSOR EVERY 10 DAYS 9 each 3     Continuous Glucose Transmitter (DEXCOM G6 TRANSMITTER) MISC Change every 3 months 1 each 3     insulin glargine (LANTUS SOLOSTAR) 100 UNIT/ML pen Inject 28 units subcutaneous once a day ONLY IF INSULIN PUMP FAILS. 15 mL 1     Insulin Infusion Pump Supplies (AUTOSOFT XC INFUSION SET) MISC 1 each every 48 hours Change every 2 days  9 mm cannula, 23 inch tubing 45 each 3     Insulin Infusion Pump Supplies (T:SLIM X2 3ML CARTRIDGE) MISC 1 each by Other route every other day Insulin cartridge to be used with pump as directed.  Change every 2 days or as directed. 45 each 3     insulin pen needle (ULTICARE MICRO) 32G X 4 MM miscellaneous Use pen needles daily or as directed. 100 each 3     Insulin Syringe-Needle U-100 31G X 1/4\" 1 ML MISC 1 Syringe as needed (until new insuline pump arrives) 50 each 1       "

## 2024-11-07 NOTE — DISCHARGE INSTRUCTIONS
Nutrition Services - Post-Transplant Diet Guidelines   Follow recommendations on the Guide to Your Diet after Transplant handout (summary below).    You have increased energy and protein needs for six to eight weeks after transplant. Your goal is to consume at least 90 grams of protein per day during this time frame.   Eat a heart-healthy diet (low sodium, low saturated and trans-fat) once you are outside of the 6-8 week post-transplant window, and once your appetite improves to normal  In some cases (but not in all cases), adjust potassium intake   Take calcium and vitamin D supplement if your doctor or team orders  Take measures to prevent food poisoning: store and prepare foods to the proper temperature, practice good handwashing, heat all deli meat (to 165 degrees Fahrenheit), avoid raw fish and meats (including uncooked eggs, non-pasteurized or raw milk, and mold-ripened, non-pasteurized, and raw cheeses [including Brie, Camembert, Roquefort, Stilton, Gorgonzola and Kaz or other soft, unpasteurized cheeses such as Mexican queso fresco]), and throw out leftovers older than two days.   Do not consume grapefruit or pomegranate-containing products. These can interact with your medications.   Avoid the following post txp d/t risk for rejection, unknown effects on the organs, and/or potential interactions with immunosuppressants:       - Herbal, Chinese, holistic, chiropractic, natural, alternative medicines and supplements       - Detoxes and cleanses       - Weight loss pills       - Protein powders or other products with extracts or herbs (ie green tea extract)  If you have any nutrition-related questions or concerns after discharge, please contact our outpatient transplant dietitian, Karissa Knowles at [phone]      Local address ( pt staying with his brother and sister in law)  [address]  [facility] [place] [address]

## 2024-11-07 NOTE — TELEPHONE ENCOUNTER
A pharmacist spent 40 minutes providing medication teaching with Michael Amin for discharge with a focus on new medications/dose changes. Also present during the education was his brother, whom Rudi will be staying with locally for the next few days. The discharge medication list was reviewed with the patient/family and the following points were discussed, as applicable: Name, description, purpose, dose/strength, duration of medications, common side effects, food/medications to avoid, action to be taken if dose is missed, when to call MD, safe disposal of unused medications, and how to obtain refills.  The patient will be responsible for managing medications. Additionally, the following transplant related education was covered: Purpose of medication card, Medication videos, Timing of medications and day of lab draw considerations , Prescription Insurance , and Discharge process for receiving meds   Patient will  transplant supplies including 7 day pill organizer, thermometer, and BP monitor at the discharge pharmacy along with medications.  Patient chooses to receive medications from [facility] specialty pharmacy.   Clinical Pharmacy Consult:                                                      Transplant Specific:   Date of Transplant: 11/2/24  Type of Transplant: pancreas  First Transplant: no  History of rejection: no    Immunosuppression Regimen   TAC 3mg qAM & 3mg qPM and MMF 1000mg qAM & 1000mg qPM  Patient specific goal: 8-10  Most recent level: no level so far  Immunosuppressant Levels:  n/a  Pt adherent to lab draws: n/a  Scr:   Lab Results   Component Value Date    CR 1.62 11/07/2024    CR 1.13 05/11/2021     Side effects: no side effects    Prophylactic Medications  PJP Prophylactic: Bactrim SS daily is held for now due concern for RICHARD   Scheduled Discontinue Date: Lifelong    Antifungal: Clotrimazole   Scheduled Discontinue Date: 3 months Anticipated date 2/7/25    CMV Prophylactic: CrCl >/=60  mL/minute: Valcyte 900mg daily   Scheduled Discontinue Date: 3 months Anticipated date 02/27/25    Acid Reducer: Protonix (pantoprazole) and Pepcid (famotidine)  Scheduled Reviewed Date: N/A    Vascular Prophylactic: Aspirin 81 mg PO daily  Scheduled Discontinue Date: N/A        Reminders:  Bring to first clinic appt: med box, med card, bp monitor, all medications being taken, and lab book.  2.   MTM pharmacist visit on first clinic appt and if ok, again in 3 to 4 months during follow up appt.  3.   Avoid Grapefruit and Grapefruit juice.   4.   Avoid herbal supplements. If wish to take other medications or supplements, call your coordinator.   5.   Keep lab appts.   6.   Can use apps on phone like [employer] to help manage medication lists and reminders.   7.   Make sure you are protecting your skin by wearing long sleeves and applying sunscreen to exposed skin, for any significant time in the sun.     Transplant Coordinator is Anneliese Manrique [facility]

## 2024-11-07 NOTE — DISCHARGE SUMMARY
[facility]    Discharge Summary  Transplant Surgery    Date of Admission:  11/2/2024  Date of Discharge:  11/8/2024  Discharging Provider: Stephany Diaz NP / Jean Liu MD    Discharge Diagnoses   Principal Problem:    Pancreas replaced by transplant (H)  Active Problems:    HTN, kidney transplant related    Diabetes mellitus type 1 (H)    Dyslipidemia    Anemia in chronic kidney disease    Kidney replaced by transplant    Hypomagnesemia    Immunosuppressed status (H)    Acute kidney injury (H)    Hypervolemia    Acute post-operative pain    Thrombocytopenia (H)    At risk for malnutrition    Procedure/Surgery Information   Procedure Date:  Case start 11/2, complete 11/03/24    Preoperative Diagnosis:  Type 1 Diabetes, s/p kidney transplant    Postoperative Diagnosis:  Same    Procedure:   1. Donation after Brain Death transplant with Enteric w/maikol-en-y drainage   2. Pancreas allograft preparation on Back Table   3. Open appendectomy  4. Repair iliac vein with patch  Please add 22 modifier- case was difficult due to anatomy, need for iliac vein repair, and need for revision of both arterial limbs of Y graft after reperfusion     Surgeon:  Surgeons and Role:     * Prince Mcdaniels MD - Primary     * Quintin Amaya MD - Fellow - Assisting. Dr. Amaya was the primary assistant for the procedure and participated in all aspects of the case under my supervision. 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 Patient is now s/p DBD IVETTE transplant and open  appendectomy with Dr. Mcdaniels on 11/2/24 complicated by arterial reconstruction.     Hospital Course   Michael Amin was admitted on 11/2/2024.  The following problems were addressed during his hospitalization:    Graft function:  s/p IVETTE 11/2/24: POD#6. Lipase WNL. Euglycemic, not requiring insulin. Post-op US with patent vasculature.    - Continue ASA 81 mg daily      Hx LDKT 2019 c/b RICHARD: Cr 1.5 from peak 1.6. Baseline Cr 1.1-1.3. Likely prerenal due to hypotension POD#1. US normal, patent. UA and UPC unremarkable.    - Continue PO Lasix 40 mg daily and follow up in clinic for ongoing diuresis.     Immunosuppressed status 2/2 to medications:  Induction: via high intensity protocol (cPRA 0) with Thymoglobulin goal 7.5 mg/kg (675 mg total).   Maintenance:   - MMF 1000 mg BID   - Tacrolimus 3 mg BID. Goal level 8-10.   Infection prophylaxis: viral (Valcyte x 12 weeks), PJP (Bactrim indefinitely), fungal (Nystatin x 12 weeks)    Transplant coordinator: Anneliese Qiu [phone]  Pancreas donor type:  DBD  DSA at time of transplant:  No  CMV:  Donor + / Recipient +  EBV:  Donor + / Recipient +  Thymoglobulin:  675 mg (7.5 mg/kg)    Neuro:  Acute post op pain: Pain well controlled with PRN Tylenol, Robaxin, and oxycodone (minimal use).     Hematology:   Anemia of chronic disease/Acute blood loss: Hgb ~7. May be an element of hemodilution as his weight is up 7.5 kg from admission. Declines blood transfusion. Continue to monitor outpatient.   Thrombocytopenia: PLT 50's. Likely 2/2 to thymo.  Hit panel negative.      Cardiorespiratory:   HTN: Continue Coreg 6.25 mg daily.  HLD: Continue PTA atorvastatin.      GI/Nutrition:   At risk for malnutrition: Nutrition consulted. Continue regular diet.      Fluid/Electrolytes:   Hypomagnesemia: Continue Mag-Ox 400 mg daily.  Generalized edema: Weight up 7.5 Kg.   - Discharge on PO Lasix 40 mg daily with close follow up outpatient. Continue to monitor weight daily.          Discharge Disposition   Discharged locally to brother's home   Condition at discharge: Stable    Pending Results   These results will be followed up by Transplant team  Unresulted Labs Ordered in the Past 30 Days of this Admission       Date and Time Order Name Status Description    11/8/2024  7:12 AM Prepare red blood cells (unit) Preliminary     11/2/2024  6:41 PM Prepare red blood cells (unit) Preliminary     11/2/2024  6:41 PM Prepare red blood cells (unit) Preliminary     11/2/2024 12:14 PM BK Virus IgG Antibody In process           Final pathology results:   Case Report   Surgical Pathology Report                         Case: [id number]                                   Authorizing Provider:  Prince Mcdaniels        Collected:           11/02/2024 05:36 PM                                  MD Michael                                                             Ordering Location:     [facility]                 Received:            11/04/2024 08:28 AM           Pathologist:           Nathalie Campos MD                                                           Specimen:    Appendix, appendix                                                                        Final Diagnosis   APPENDIX; INCIDENTAL APPENDECTOMY AT PANCREAS TRANSPLANTATION:  No morphologic abnormality     Primary Care Physician   Anurag Byrd    Physical Exam   Temp: 98.3  F (36.8  C) Temp src: Oral BP: (!) 153/79 Pulse: 56   Resp: 16 SpO2: 96 % O2 Device: None (Room air)    Vitals:    11/06/24 0004 11/07/24 0342 11/08/24 0128   Weight: 99.9 kg (220 lb 3.2 oz) 99.7 kg (219 lb 11.2 oz) 96.6 kg (213 lb)     Vital Signs with Ranges  Temp:  [97.5  F (36.4  C)-99.7  F (37.6  C)] 98.3  F (36.8  C)  Pulse:  [56-81] 56  Resp:  [16] 16  BP: (130-157)/(72-80) 153/79  SpO2:  [95 %-98 %] 96 %  I/O last 3 completed shifts:  In: 1415 [P.O.:1385; I.V.:30]  Out: 3785 [Urine:3580; Drains:205]    Constitutional: resting comfortably in  chair  Eyes: EOMI  ENT: internal jugular line right   Respiratory: unlabored on RA  Cardiovascular: perfused  GI: abdomen soft, non-distended. Midline incision closed with staples and open to air. RUBIA with serosanguinous output.   Genitourinary: no Méndez present  Skin: warm, dry  Musculoskeletal: generalized edema  Neurologic: A&Ox4  Neuropsychiatric: calm, pleasant    Consultations This Hospital Stay   NEPHROLOGY KIDNEY/PANCREAS TRANSPLANT ADULT IP CONSULT  NURSING TO CONSULT FOR VASCULAR ACCESS CARE IP CONSULT  CARE MANAGEMENT / SOCIAL WORK IP CONSULT  PHARMACY IP CONSULT  SOT MEDICATION HISTORY IP PHARMACY CONSULT  NUTRITION SERVICES ADULT IP CONSULT  NEPHROLOGY KIDNEY/PANCREAS TRANSPLANT ADULT IP CONSULT  PHARMACY IP CONSULT  NURSING TO CONSULT FOR VASCULAR ACCESS CARE IP CONSULT    Time Spent on this Encounter   I have spent greater than 30 minutes on this discharge.    Discharge Orders   Discharge Medications   Current Discharge Medication List        START taking these medications    Details   acetaminophen (TYLENOL) 325 MG tablet Take 2 tablets (650 mg) by mouth every 6 hours as needed (For optimal non-opioid multimodal pain management to improve pain control.).  Qty: 60 tablet, Refills: 0    Associated Diagnoses: Pancreas replaced by transplant (H)      calcium carbonate-vitamin D (OSCAL) 500-5 MG-MCG tablet Take 1 tablet by mouth 2 times daily (with meals).  Qty: 60 tablet, Refills: 2    Associated Diagnoses: Pancreas replaced by transplant (H)      CELLCEPT (BRAND) 250 MG capsule Take 4 capsules (1,000 mg) by mouth 2 times daily.  Qty: 240 capsule, Refills: 11    Associated Diagnoses: Pancreas replaced by transplant (H)      famotidine (PEPCID) 20 MG tablet Take 1 tablet (20 mg) by mouth 2 times daily.  Qty: 60 tablet, Refills: 0    Associated Diagnoses: Pancreas replaced by transplant (H)      furosemide (LASIX) 20 MG tablet Take 2 tablets (40 mg) by mouth daily for 14 days. Take 40 mg daily. Weigh  yourself daily. Please reduce or hold dose per Transplant team.  Qty: 28 tablet, Refills: 0    Associated Diagnoses: Pancreas replaced by transplant (H)      methocarbamol (ROBAXIN) 750 MG tablet Take 1 tablet (750 mg) by mouth every 6 hours as needed for muscle spasms.  Qty: 20 tablet, Refills: 0    Associated Diagnoses: Pancreas replaced by transplant (H)      nystatin (MYCOSTATIN) 193149 UNIT/ML suspension Swish and swallow 5 mLs (500,000 Units) in mouth 4 times daily.  Qty: 473 mL, Refills: 2    Associated Diagnoses: Pancreas replaced by transplant (H)      oxyCODONE (ROXICODONE) 5 MG tablet Take 0.5-1 tablets (2.5-5 mg) by mouth every 6 hours as needed for moderate to severe pain.  Qty: 5 tablet, Refills: 0    Associated Diagnoses: Pancreas replaced by transplant (H)      pantoprazole (PROTONIX) 40 MG EC tablet Take 1 tablet (40 mg) by mouth every morning (before breakfast).  Qty: 30 tablet, Refills: 0    Associated Diagnoses: Pancreas replaced by transplant (H)      polyethylene glycol (MIRALAX) 17 GM/Dose powder Take 17 g by mouth daily.  Qty: 510 g, Refills: 0    Associated Diagnoses: Pancreas replaced by transplant (H)      sulfamethoxazole-trimethoprim (BACTRIM) 400-80 MG tablet Take 1 tablet by mouth daily.  Qty: 30 tablet, Refills: 11    Associated Diagnoses: Pancreas replaced by transplant (H)      valGANciclovir (VALCYTE) 450 MG tablet Take 2 tablets (900 mg) by mouth daily.  Qty: 60 tablet, Refills: 2    Associated Diagnoses: Pancreas replaced by transplant (H)           CONTINUE these medications which have CHANGED    Details   carvedilol (COREG) 6.25 MG tablet Take 1 tablet (6.25 mg) by mouth 2 times daily (with meals).  Qty: 60 tablet, Refills: 2    Associated Diagnoses: HTN, kidney transplant related      senna-docusate (SENOKOT-S/PERICOLACE) 8.6-50 MG tablet Take 1-2 tablets by mouth 2 times daily as needed for constipation.  Qty: 60 tablet, Refills: 0    Associated Diagnoses: Pancreas replaced  by transplant (H)      !! tacrolimus (GENERIC EQUIVALENT) 0.5 MG capsule Take 1 capsule (0.5 mg) by mouth 2 times daily. To have available for dose adjustments per transplant team. Discharge dose = 3 mg twice daily.  Qty: 60 capsule, Refills: 11    Associated Diagnoses: Pancreas replaced by transplant (H)      !! tacrolimus (GENERIC EQUIVALENT) 1 MG capsule Take 3 capsules (3 mg) by mouth 2 times daily.  Qty: 180 capsule, Refills: 11    Associated Diagnoses: Pancreas replaced by transplant (H)       !! - Potential duplicate medications found. Please discuss with provider.        CONTINUE these medications which have NOT CHANGED    Details   aspirin (ASA) 81 MG EC tablet Take 81 mg by mouth daily       atorvastatin (LIPITOR) 10 MG tablet Take 2 tablets (20 mg) by mouth daily  Qty: 90 tablet, Refills: 0    Associated Diagnoses: Dyslipidemia      magnesium oxide (MAG-OX) 400 MG tablet Take 1 tablet (400 mg) by mouth daily (with lunch)  Qty: 30 tablet, Refills: 5    Associated Diagnoses: ESRD (end stage renal disease) on dialysis (H)      vitamin D3 (CHOLECALCIFEROL) 50 mcg (2000 units) tablet Take 1 tablet (50 mcg) by mouth daily    Comments: Profile Rx: patient will contact pharmacy when needed  Associated Diagnoses: Aftercare following organ transplant; Vitamin D deficiency      alcohol swab prep pads Use to swab area of injection/zak as directed.  Qty: 100 each, Refills: 3    Associated Diagnoses: Type 1 diabetes mellitus with chronic kidney disease on chronic dialysis (H)      blood glucose (NO BRAND SPECIFIED) lancets standard Use to test blood sugar 3 times daily or as directed.  Qty: 100 each, Refills: 11    Comments: Please provide whatever is covered by patient's insurance  Associated Diagnoses: Type 1 diabetes mellitus with other kidney complication (H)      blood glucose (NO BRAND SPECIFIED) test strip Use to test blood sugar 3 times daily or as directed.  Qty: 100 strip, Refills: 11    Comments: Please  "provide whatever is covered by patient's insurance  Associated Diagnoses: Type 1 diabetes mellitus with other kidney complication (H)      blood glucose monitoring (NO BRAND SPECIFIED) meter device kit Use to test blood sugar 3 times daily or as directed.  Qty: 2 kit, Refills: 0    Comments: Patient requests 2 meters, one for home and one for work.  Please provide whatever is covered by patient's insurance.  Associated Diagnoses: Type 1 diabetes mellitus with other kidney complication (H)      Continuous Glucose Sensor (DEXCOM G6 SENSOR) MISC CHANGE SENSOR EVERY 10 DAYS  Qty: 9 each, Refills: 3    Associated Diagnoses: Type 1 diabetes mellitus with chronic kidney disease on chronic dialysis (H)      Continuous Glucose Transmitter (DEXCOM G6 TRANSMITTER) MISC Change every 3 months  Qty: 1 each, Refills: 3    Associated Diagnoses: Type 1 diabetes mellitus with chronic kidney disease on chronic dialysis (H)      Insulin Infusion Pump Supplies (AUTOSOFT XC INFUSION SET) MISC 1 each every 48 hours Change every 2 days  9 mm cannula, 23 inch tubing  Qty: 45 each, Refills: 3    Associated Diagnoses: Type 1 diabetes mellitus with chronic kidney disease on chronic dialysis (H)      Insulin Infusion Pump Supplies (T:SLIM X2 3ML CARTRIDGE) MISC 1 each by Other route every other day Insulin cartridge to be used with pump as directed.  Change every 2 days or as directed.  Qty: 45 each, Refills: 3    Associated Diagnoses: Type 1 diabetes mellitus with chronic kidney disease on chronic dialysis (H)      insulin pen needle (ULTICARE MICRO) 32G X 4 MM miscellaneous Use pen needles daily or as directed.  Qty: 100 each, Refills: 3    Associated Diagnoses: Type 1 diabetes mellitus with chronic kidney disease on chronic dialysis (H)      Insulin Syringe-Needle U-100 31G X 1/4\" 1 ML MISC 1 Syringe as needed (until new insuline pump arrives)  Qty: 50 each, Refills: 1    Associated Diagnoses: Type 1 diabetes mellitus with hyperglycemia (H) "           STOP taking these medications       insulin glargine (LANTUS SOLOSTAR) 100 UNIT/ML pen Comments:   Reason for Stopping:         insulin lispro (HUMALOG VIAL) 100 UNIT/ML vial Comments:   Reason for Stopping:         mycophenolate (GENERIC EQUIVALENT) 250 MG capsule Comments:   Reason for Stopping:         Semaglutide, 2 MG/DOSE, (OZEMPIC) 8 MG/3ML pen Comments:   Reason for Stopping:                  Home Care Referral      Reason for your hospital stay    You were admitted for a pancreas transplant. You are discharging home in stable condition with close follow up.     Activity    Your activity upon discharge: Walk at least four times a day, lift no greater than 10 pounds for 8 weeks from the time of surgery.  No driving while taking narcotics or 3 weeks after surgery.     [facility]ult [facility] Follow-up and recommended labs and tests    [facility] FOLLOW UP:     1. [facility]: Over the next 2 days you will be seen in the [facility] (ph. [phone], option 3). Your labs will be drawn at the beginning of your appointment. DO NOT take your medications prior to having labs drawn. Please bring all your medications with you from home to take after labs are drawn.     2. Follow up with Dr. Mcdaniels in Transplant Clinic in 1-2 weeks.     3. Follow up with Transplant Nephrologist as scheduled.     Remember to always bring an updated medication list to all appointments.        Call your Transplant Coordinator ([phone]) with questions about Transplant Center appointment scheduling.     LABS:     CBC, BMP, magnesium, phosphorus, lipase, tacrolimus level to be drawn daily while in ATC, then every Monday and Thursday by home health care nurse if arranged, or at an outpatient lab.     Monitor and record    Monitor blood pressure and weight daily.     Monitor glucose via CGM.     When to contact your care team    WHEN TO CONTACT YOUR  COORDINATOR:     Transplant Coordinator  [phone]     Notify your coordinator if you have pain over your kidney or pancreas, increased redness or drainage from your incision, fever greater than 100F, decreased urine output or new or increased amount of blood in urine.     Notify your coordinator immediately if you are ever unable to take your immunosuppressive medications for any reason.     If you have URGENT concerns after office hours, please call the hospital switchboard at [phone] and ask to have the organ transplant nurse on-call paged. If you have a life-threatening emergency, go to the nearest emergency room.     Wound care and dressings    Instructions to care for your wound at home: If you have staples in place, they will be removed about 3 weeks after surgery. Wash incision daily with soap and water. Do not soak or scrub.     Tubes and drains    You are going home with the following tubes or drains: RUBIA.  Tube cares per hospital or home care instructions.     Diet    Diet recommendations post-transplant: Heart healthy dietary habits long term (low saturated/trans fat, low sodium). High protein diet x 8 weeks. Practice food safety precautions.         Data   Most Recent 3 CBC's:  Recent Labs   Lab Test 11/08/24  0937 11/08/24  0645 11/07/24  1351 11/07/24  0704 11/06/24  0600   WBC  --  3.7*  --  3.5* 2.7*   HGB 7.3* 6.6* 7.3* 6.7* 7.5*   MCV  --  82  --  84 85   PLT  --  46*  --  51* 50*      Most Recent 3 BMP's:  Recent Labs   Lab Test 11/08/24  0918 11/08/24  0645 11/08/24  0131 11/07/24  0909 11/07/24  0704 11/06/24  0609 11/06/24  0600   NA  --  137  --   --  137  --  141   POTASSIUM  --  3.9  --   --  3.9  --  4.1   CHLORIDE  --  103  --   --  105  --  108*   CO2  --  25  --   --  25  --  26   BUN  --  32.7*  --   --  33.8*  --  27.3*   CR  --  1.49*  --   --  1.62*  --  1.64*   ANIONGAP  --  9  --   --  7  --  7   APRIL  --  7.9*  --   --  7.8*  --  7.8*   * 104* 127*   < > 96   < > 101*    < > = values in this interval not  displayed.     Most Recent 2 LFT's:  Recent Labs   Lab Test 11/02/24  1352 09/26/19  0945   AST 15 16   ALT 13 42   ALKPHOS 113 100   BILITOTAL 0.5 0.2     Most Recent INR's and Anticoagulation Dosing History:  Anticoagulation Dose History  More data exists         Latest Ref Rng & Units 9/26/2019 10/18/2019 10/22/2019 11/25/2019 6/7/2024 11/2/2024 11/3/2024   Recent Dosing and Labs   INR 0.85 - 1.15 1.00  1.03  1.02  - 1.03  1.05  1.49    ISTAT INR 0.86 - 1.14 - - - 1.1        This test is intended for monitoring Coumadin therapy.  Results are not   accurate in patients with prolonged INR due to factor deficiency.   - - -      Details                 Most Recent 3 Troponin's:No lab results found.  Most Recent Cholesterol Panel:  Recent Labs   Lab Test 09/13/24  1021   CHOL 106   LDL 46   HDL 35*   TRIG 123     Most Recent 6 Bacteria Isolates From Any Culture (See EPIC Reports for Culture Details):  Recent Labs   Lab Test 11/21/19  0810 10/18/19  0630 09/26/19  0950   CULT No growth No growth No growth     Most Recent TSH, T4 and A1c Labs:  Recent Labs   Lab Test 11/03/24  0339 11/02/24  1352 09/13/24  1021   TSH  --   --  1.29   A1C 7.8*   < > 8.7*    < > = values in this interval not displayed.     Results for orders placed or performed during the hospital encounter of 11/02/24   XR Chest 2 Views    Narrative    Exam: XR CHEST 2 VIEWS, 11/2/2024 1:26 PM    Indication: pre-transplant screening    Comparison: 10/1/2019    Findings:   The cardiomediastinal silhouette and pulmonary vasculature are within  normal limits. No pleural effusion or pneumothorax. No focal airspace  opacity.      Impression    Impression: No acute airspace disease.    BRANDON HOLLINGSWORTH DO         SYSTEM ID:  [id number]   XR Chest Port 1 View    Narrative    Exam: XR CHEST PORT 1 VIEW, 11/3/2024 1:53 AM    Comparison: Chest radiograph 11/2/2024    History: Post-op Kidney Transplant    Technique: Portable AP view of the chest.     Findings:  Right  internal jugular central venous catheter tip terminates in the  mid right atrium. Enteric tube tip and sidehole project over the  stomach lumen.    Low lung volumes with perihilar and bibasilar airspace opacities. No  pneumothorax or pleural effusion. Stable cardiac silhouette.      Impression    Impression:  1.  Right internal jugular central venous catheter tip positioned in  mid right atrium. Consider 2 to 3 cm retraction.  2.  Enteric tube tip and sidehole positioned within the stomach lumen.  3.  Low lung volumes with perihilar and bibasilar airspace opacities  favoring atelectasis.    I have personally reviewed the examination and initial interpretation  and I agree with the findings.    BRANDON HOLLINGSWORTH DO         SYSTEM ID:  [id number]   US Pancreas Transplant    Narrative    EXAMINATION:  PANCREAS TRANSPLANT, 11/3/2024 2:23 AM     COMPARISON: None.    HISTORY: s/p pancreas transplant- please assess flows    TECHNIQUE:  Grey-scale, color Doppler and spectral flow analysis.    FINDINGS:     The transplanted pancreas is located in the right lower quadrant and  demonstrates normal echogenicity.   There is no free fluid adjacent to the transplant pancreas.     Transplant pancreas arterial flow:   Within pancreas: 22/6 cm/sec.   Outside pancreas: 47/7 cm/sec.   Anastomosis: 65/9 cm/sec.    Transplant pancreas venous flow:  Within pancreas: 7 cm/sec.   Outside pancreas: 9 cm/sec.   Anastomosis: 23 cm/sec.    Iliac artery:  Above the anastomosis:   90 cm/sec.  Below the anastomosis:   95 cm/sec.    Iliac vein  Above the transplant:   39 cm/sec.  Below the transplant:   20 cm/sec.    At the anastomosis:  Peak-systolic velocity is 65 cm/sec.  End-diastolic velocity is 9 cm/sec.  Resistive index: 0.87        Impression    Impression:   1.  No evidence of arterial or venous stenosis.   2.  Pancreatic transplant vascular velocities and waveforms are within  normal limits on color/spectral Doppler.      I have personally  reviewed the examination and initial interpretation  and I agree with the findings.    BRANDON HOLLINGSWORTH DO         SYSTEM ID:  [id number]   XR Abdomen Port 1 View    Narrative    Exam: XR ABDOMEN PORT 1 VIEW, 11/4/2024 9:21 AM    Indication: ng placement    Comparison: Abdominal ultrasound 11/3/2024    Findings:   Frontal view of the abdomen with patient upright at 60 degrees. A  gastric tube is present with tube tip and sidehole projecting over the  stomach. Surgical clips and staples project over the inferior abdomen.  No abnormally distended loops of large or small bowel. No definite  pneumatosis or portal venous gas.      Impression    Impression: Gastric tube tip and sidehole project over the stomach.    I have personally reviewed the examination and initial interpretation  and I agree with the findings.    FREDRICK EDWARD MD         SYSTEM ID:  [id number]   US Renal Transplant with Doppler    Narrative    EXAMINATION: US RENAL TRANSPLANT,  11/6/2024 11:04 AM     COMPARISON: Ultrasound 12/9/2019    HISTORY: Elevated Cr. Evaluate blood flow, evaluate for hydronephrosis    TECHNIQUE:  Grey-scale, color Doppler and spectral flow analysis.    FINDINGS:  The transplant kidney is located left lower quadrant, and measures  11.5 cm. Parenchyma is of normal thickness and echogenicity. No focal  lesions. No hydronephrosis. No perinephric fluid collection.    Renal artery flow:   92 cm/s peak systolic at hilum.  231 cm/s peak systolic at anastomosis.    Segmental artery resistive indices (upper to lower): 0.72, 0.77, 0.77    Renal Vein Flow:  44 cm/s at hilum.   46 cm/s at anastomosis.    Iliac artery flow:  132 cm/s peak systolic above anastomosis.  149 cm/s peak systolic below anastomosis.    Iliac vein flow:  Patent above and below the anastomosis.    Bladder: Distended and unremarkable.      Impression    IMPRESSION:   1. Normal grayscale evaluation of the left lower quadrant transplant  kidney.   2. Patent renal  transplant Doppler evaluation.  3. Elevated although improved velocities at the renal artery  anastomosis measuring 231 cm/s, previously 442 cm/s. Not significant  in the setting of normal renal artery to iliac artery velocity ratio  and normal waveforms distally.    I have personally reviewed the examination and initial interpretation  and I agree with the findings.    CAESAR DUENAS MD         SYSTEM ID:  [id number]

## 2024-11-07 NOTE — PROGRESS NOTES
Care Management Follow Up    Length of Stay (days): 4    Expected Discharge Date: 11/08/2024     Concerns to be Addressed: discharge planning     Patient plan of care discussed at interdisciplinary rounds: Yes    Anticipated Discharge Disposition: Home Care      Anticipated Discharge Services: Home Care  Anticipated Discharge DME: None    Patient/family educated on Medicare website which has current facility and service quality ratings: no  Education Provided on the Discharge Plan: Yes  Patient/Family in Agreement with the Plan: yes    Referrals Placed by CM/SW: External Care Coordination, Homecare  Private pay costs discussed: Not applicable    Discussed  Partnership in Safe Discharge Planning  document with patient/family:      Handoff Completed: No, handoff not indicated or clinically appropriate    Additional Information:      Steph @ [phone] from [facility] called stating they don't do lab draw but if patient can go the clinic they can follow with other needs.   Notified provider nerissa. She said patient will need labs twice a week. Writer state slabs will need to be done outpatient since his secured HC agency [facility] snot cover lab. Provider is ok with that.   Steph  Nurse notified.   Discharge resources:  Outpatient labs 2x weekly     [facility]   Steph @ [phone]   Fax: [phone]    Next Steps:   Fax discharge order to  home care   RNCC following for discharge planning    Irene Diaz RN, PHN, BSN   Float Nurse Care Coordinator  Covering for [address]  Phone [phone]

## 2024-11-07 NOTE — PROGRESS NOTES
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 Patient is now s/p IVETTE with Dr. Mcdaniels on 11/2/24 complicated by arterial reconstruction.     Changes today:    - Stop heparin gtt   - Recheck Hgb at 14:00   - Mg 2 grams replaced   - IV Lasix 20 mg   - Give final dose of Thymo 100 mg today   __________________________________________    Graft function:  s/p IVETTE 11/2/24: POD#5. Lipase WNL. Euglycemic, not requiring insulin. Post-op US with patent vasculature.    - Stop heparin gtt 400 units/hour   - Octreotide TID   - ASA 81 mg daily     LDKT 2019 c/b RICHARD: Cr 1.6 (up from baseline 1.1-1.3). Likely prerenal due to hypotension POD#1. US normal, patent. UA and UPC unremarkable.    - Give Lasix 20 mg IV today     Immunosuppression management:  Induction: via high intensity protocol (cPRA 0) with Thymoglobulin goal 7.5 mg/kg (675 mg total).   - Received Thymo 575 mg thus far. Needs 100 mg to complete induction.  Maintenance:    - MMF 1000 mg BID   - Tacrolimus 3 mg BID. Goal level 8-10.     Neuro:  Acute post op pain: Pain well controlled with PRN Tylenol, Robaxin, and oxycodone (not using; dose reduced).     Hematology:   Anemia of chronic disease: Hgb 6.7 (from 7.5), continues to trend down.   - Stop heparin gtt.    - Give IV Lasix 20 mg x1.    - Prepare 1 unit pRBCs, type & screen ordered, and recheck Hgb at 14:00.   Thrombocytopenia: PLT 51. Likely 2/2 to thymo.  Hit panel negative.    - Stop heparin gtt today.    Cardiorespiratory:   HTN: -140's.    - Continue Coreg 6.25 mg daily.   - Lasix 20 mg IV x 1 doses today  HLD: Continue PTA atorvastatin.     GI/Nutrition:   At risk for malnutrition: Nutrition consulted. Continue regular diet.     Fluid/Electrolytes:   Hypomagnesemia: Continue Mag-Ox 400 mg daily. Give additional 2 grams IV.  "  Hypervolemia: Weight up ~10 Kg. Give Lasix 20 mg IV x 1 today.    Infectious disease: No issues.      Prophylaxis: DVT (mechanical), fall, GI (PPI), viral (Valcyte x 3 months), PJP (Bactrim indefinitely), fungal (Micafungin)    Disposition: 7A    ROX/Fellow/Resident Provider: Stephany Diaz NP Pager #[phone]    Faculty: Jean Liu MD  _________________________________________________________________  Transplant History: Admitted 11/2/2024 for kidney transplant.  11/2/2024 (Pancreas), 10/1/2019 (Kidney), Postoperative day: 5 (Pancreas), 1864 (Kidney)     Interval History: History is obtained from the patient  Overnight events: No acute events overnight. Patient reports feeling \"great.\" Denies dizziness/lightheadedness, pain, nausea, vomiting. Tolerating regular diet. Still feels \"puffy.\"    ROS:   A 10-point review of systems was negative except as noted above.    Meds:  Current Facility-Administered Medications   Medication Dose Route Frequency Provider Last Rate Last Admin    [START ON 11/8/2024] aspirin (ASA) chewable tablet 81 mg  81 mg Oral Daily Prince Mcdaniels MD        atorvastatin (LIPITOR) tablet 20 mg  20 mg Oral Daily Aissatou Jarquin PA-C   20 mg at 11/07/24 0837    calcium carbonate-vitamin D (OSCAL) 500-5 MG-MCG per tablet 1 tablet  1 tablet Oral BID w/meals Prince Mcdaniels MD   1 tablet at 11/07/24 0836    carvedilol (COREG) tablet 6.25 mg  6.25 mg Oral BID w/meals Prince Mcdaniels MD   6.25 mg at 11/07/24 0836    [START ON 11/10/2024] clotrimazole (MYCELEX) lozenge 10 mg  10 mg Buccal 4x Daily Prince Mcdaniels MD        famotidine (PEPCID) tablet 20 mg  20 mg Oral BID Prince Mcdaniels MD   20 mg at 11/07/24 0836    furosemide (LASIX) injection 20 mg  20 mg Intravenous Once Stephany Diaz, NP        insulin aspart (NovoLOG) injection (RAPID ACTING)  1-7 Units Subcutaneous TID Aissatou Sheehan PA-C  "       insulin aspart (NovoLOG) injection (RAPID ACTING)  1-5 Units Subcutaneous At Bedtime Aissatou Jarquin PA-C        magnesium oxide (MAG-OX) tablet 400 mg  400 mg Oral Q24H Prince Mcdaniels MD   400 mg at 11/06/24 1145    micafungin (MYCAMINE) 100 mg in sodium chloride 0.9 % 100 mL intermittent infusion  100 mg Intravenous Q24H Prince Mcdaniels  mL/hr at 11/06/24 1725 100 mg at 11/06/24 1725    mycophenolate (CELLCEPT BRAND) capsule 1,000 mg  1,000 mg Oral BID IS Prince Mcdaniels MD   1,000 mg at 11/07/24 0910    octreotide (sandoSTATIN) injection 100 mcg  100 mcg Subcutaneous TID Prince Mcdaniels MD   100 mcg at 11/07/24 0835    pantoprazole (PROTONIX) EC tablet 40 mg  40 mg Oral QAM AC Prince Mcdaniels MD   40 mg at 11/07/24 0906    polyethylene glycol (MIRALAX) Packet 17 g  17 g Oral Daily Prince Mcdaniels MD   17 g at 11/05/24 0840    senna-docusate (SENOKOT-S/PERICOLACE) 8.6-50 MG per tablet 1 tablet  1 tablet Oral BID Prince Mcdaniels MD   1 tablet at 11/06/24 0846    sodium chloride (PF) 0.9% PF flush 10 mL  10 mL Intracatheter Q8H Prince Mcdaniels MD   10 mL at 11/07/24 0256    sodium chloride (PF) 0.9% PF flush 3 mL  3 mL Intravenous Q8H Prince Mcdaniels MD   3 mL at 11/05/24 0425    [Held by provider] sulfamethoxazole-trimethoprim (BACTRIM) 400-80 MG per tablet 1 tablet  1 tablet Oral Daily Prince Mcdaniels MD   1 tablet at 11/06/24 0846    tacrolimus (GENERIC EQUIVALENT) capsule 3 mg  3 mg Oral BID IS Prince Mcdaniels MD   3 mg at 11/07/24 0836    valGANciclovir (VALCYTE) tablet 900 mg  900 mg Oral Daily Prince Mcdaniels MD   900 mg at 11/07/24 0836       Physical Exam:     Admit Weight: 89.1 kg (196 lb 8 oz)    Current vitals:   /69 (BP Location: Left arm)   Pulse 78   Temp 98.4  F  (36.9  C) (Oral)   Resp 16   Wt 99.7 kg (219 lb 11.2 oz)   SpO2 96%   BMI 34.41 kg/m      Vital sign ranges:    Temp:  [97.6  F (36.4  C)-98.4  F (36.9  C)] 98.4  F (36.9  C)  Pulse:  [73-82] 78  Resp:  [16-18] 16  BP: (132-147)/(67-79) 132/69  SpO2:  [95 %-100 %] 96 %  Patient Vitals for the past 24 hrs:   BP Temp Temp src Pulse Resp SpO2 Weight   11/07/24 0541 132/69 98.4  F (36.9  C) Oral 78 16 96 % --   11/07/24 0342 -- -- -- -- -- -- 99.7 kg (219 lb 11.2 oz)   11/07/24 0213 132/69 98  F (36.7  C) Oral 73 18 97 % --   11/06/24 2207 (!) 141/74 98.2  F (36.8  C) Oral 76 16 95 % --   11/06/24 1954 (!) 141/67 -- -- 82 -- -- --   11/06/24 1537 (!) 147/79 98.1  F (36.7  C) Oral -- 17 100 % --   11/06/24 1320 -- -- -- 80 18 100 % --   11/06/24 1317 133/76 97.6  F (36.4  C) Oral -- 18 100 % --     General Appearance: appears well  Skin: warm, dry  Heart: perfused  Lungs: Stable on RA  Abdomen: The abdomen is soft, non distended, and appropriately tender over the pancreas graft. Incision closed with staples, dry, intact. RUBIA with serosanguinous output.   : No paredes  Extremities: edema: present, generalized  Neurologic: awake, alert, and oriented. Tremor absent.    Data:   CMP  Recent Labs   Lab 11/07/24  0909 11/07/24  0704 11/06/24  0609 11/06/24  0600 11/05/24  0658 11/05/24  0629 11/04/24  0704 11/04/24  0622 11/03/24  0144 11/03/24  0135 11/03/24  0131 11/03/24  0019 11/02/24  1353 11/02/24  1352   NA  --  137  --  141  --  140  --  137   < > 135  --  136   < > 140   POTASSIUM  --  3.9  --  4.1  --  4.0  --  4.0   < > 3.7  --  3.6   < > 4.4   CHLORIDE  --  105  --  108*  --  108*  --  105   < >  --   --   --   --  101   CO2  --  25  --  26  --  25  --  25   < >  --   --   --   --  27   GLC 89 96   < > 101*   < > 113*   < > 151*   < > 86   < > 79   < > 157*   BUN  --  33.8*  --  27.3*  --  19.5  --  16.3   < >  --   --   --   --  14.9   CR  --  1.62*  --  1.64*  --  1.41*  --  1.19*   < >  --   --   --   --  1.10    GFRESTIMATED  --  53*  --  52*  --  63  --  77   < >  --   --   --   --  84   APRIL  --  7.8*  --  7.8*  --  7.6*  --  7.4*   < >  --   --   --   --  9.2   ICAW  --   --   --   --   --   --   --   --   --  4.4  --  4.6   < >  --    MAG  --  1.6*  --  1.8  --  1.8  --  1.9   < >  --   --   --   --   --    PHOS  --  3.5  --  3.1  --  2.6  --  2.6   < >  --   --   --   --   --    AMYLASE  --   --   --   --   --  37  --  66   < >  --   --   --   --  21*   LIPASE  --  48  --  22  --  21  --  57   < >  --   --   --   --  11*   ALBUMIN  --   --   --   --   --   --   --  2.4*  --   --   --   --   --  4.1   BILITOTAL  --   --   --   --   --   --   --   --   --   --   --   --   --  0.5   ALKPHOS  --   --   --   --   --   --   --   --   --   --   --   --   --  113   AST  --   --   --   --   --   --   --   --   --   --   --   --   --  15   ALT  --   --   --   --   --   --   --   --   --   --   --   --   --  13    < > = values in this interval not displayed.     CBC  Recent Labs   Lab 11/07/24  0704 11/06/24  0600 11/03/24  0530 11/03/24  0339   HGB 6.7* 7.5*   < >  --    WBC 3.5* 2.7*   < >  --    PLT 51* 50*   < >  --    A1C  --   --   --  7.8*    < > = values in this interval not displayed.     COAGS  Recent Labs   Lab 11/03/24  0144 11/02/24  1352   INR 1.49* 1.05   PTT 33 34      Urinalysis  Recent Labs   Lab Test 11/06/24  1227 11/02/24  1503 11/21/23  1154 11/16/21  1502 05/11/21  1532   COLOR Straw Yellow   < >  --   --    APPEARANCE Clear Clear   < >  --   --    URINEGLC Negative Negative   < >  --   --    URINEBILI Negative Negative   < >  --   --    URINEKETONE Negative Negative   < >  --   --    SG 1.010 1.026   < >  --   --    UBLD Negative Negative   < >  --   --    URINEPH 6.5 5.5   < >  --   --    PROTEIN Negative 10*   < >  --   --    NITRITE Negative Negative   < >  --   --    LEUKEST Negative Negative   < >  --   --    RBCU 0 <1   < >  --   --    WBCU 2 <1   < >  --   --    UTPG  --   --   --  0.11 0.10    < >  = values in this interval not displayed.     Virology:  Hepatitis C Antibody   Date Value Ref Range Status   11/02/2024 Nonreactive Nonreactive Final     Comment:     A nonreactive screening test result does not exclude the possibility of exposure to or infection with HCV. Nonreactive screening test results in individuals with prior exposure to HCV may be due to antibody levels below the limit of detection of this assay or lack of reactivity to the HCV antigens used in this assay. Patients with recent HCV infections (<3 months from time of exposure) may have false-negative HCV antibody results due to the time needed for seroconversion (average of 8 to 9 weeks).   09/26/2019 Nonreactive NR^Nonreactive Final     Comment:     Assay performance characteristics have not been established for newborns,   infants, and children       BK Virus Result   Date Value Ref Range Status   05/11/2021 BK Virus DNA Not Detected BKNEG^BK Virus DNA Not Detected copies/mL Final   07/08/2020 BK Virus DNA Not Detected BKNEG^BK Virus DNA Not Detected copies/mL Final   03/31/2020 BK Virus DNA Not Detected BKNEG^BK Virus DNA Not Detected copies/mL Final   02/03/2020 BK Virus DNA Not Detected BKNEG^BK Virus DNA Not Detected copies/mL Final   02/01/2020 BK Virus DNA Not Detected BKNEG^BK Virus DNA Not Detected copies/mL Final   01/06/2020 BK Virus DNA Not Detected BKNEG^BK Virus DNA Not Detected copies/mL Final   12/23/2019 BK Virus DNA Not Detected BKNEG^BK Virus DNA Not Detected copies/mL Final   11/29/2019 BK Virus DNA Not Detected BKNEG^BK Virus DNA Not Detected copies/mL Final   11/04/2019 BK Virus DNA Not Detected BKNEG^BK Virus DNA Not Detected copies/mL Final   10/22/2019 BK Virus DNA Not Detected BKNEG^BK Virus DNA Not Detected copies/mL Final   10/18/2019 BK Virus DNA Not Detected BKNEG^BK Virus DNA Not Detected copies/mL Final     BK Virus DNA IU/mL   Date Value Ref Range Status   09/13/2024 Not Detected Not Detected IU/mL Final

## 2024-11-07 NOTE — PROGRESS NOTES
BP (!) 141/67   Pulse 82   Temp 98.1  F (36.7  C) (Oral)   Resp 17   Wt 99.9 kg (220 lb 3.2 oz)   SpO2 100%   BMI 34.49 kg/m      Denies pain or nausea. Tolerating regular diet. One medium BM. Voiding lots of urine. Ambulating in unit hallways independently. Possible discharge  tomorrow.

## 2024-11-07 NOTE — PROGRESS NOTES
CLINICAL NUTRITION SERVICES - REASSESSMENT NOTE     Nutrition Prescription    Malnutrition Status:    Patient does not meet two of the established criteria necessary for diagnosing malnutrition but is at risk for malnutrition    Recommendations already ordered by Registered Dietitian (RD):  Glucerna once daily (strawberry)    Future/Additional Recommendations:  -- monitor oral intake of meals and supplements     EVALUATION OF THE PROGRESS TOWARD GOALS   Diet: Regular  Intake:   Rudi reports his appetite is decreased due to early satiety. He is trying to et 6 smaller meals daily. He takes Glucerna at home prn (strawberry)       NEW FINDINGS   Labs (11/7): BUN 33.8 mg/dL (H), Cr 1.62 mg/dL (H), Mg++ 1.6 mg/dL (L)    Meds:   Lipitor  Oscal  Insulin aspart  Mag-ox    GI: multiple stools daily     Weight: weight is elevated since admission weight. Continue current dosing weight.     MALNUTRITION  % Intake: </= 50% for >/= 5 days (severe)  % Weight Loss: None noted  Subcutaneous Fat Loss: None observed  Muscle Loss: None observed  Fluid Accumulation/Edema: None noted  Malnutrition Diagnosis: Patient does not meet two of the established criteria necessary for diagnosing malnutrition but is at risk for malnutrition    Previous Goals   Diet advancement >> NPO/CL within the next 1-2 days to avoid nutrition support   Evaluation: Not met    Previous Nutrition Diagnosis  Food and nutrition-related knowledge deficit   Evaluation: No change    CURRENT NUTRITION DIAGNOSIS  Inadequate oral intake related to early satiety as evidenced by patient self report and bedside nursing documentation < 50% of meals       INTERVENTIONS  Implementation  Nutrition Education:  1. Provided instruction on post-transplant diet with discussion regarding protein sources and high protein needs in acute post-tx phase.  Reviewed recommendations to follow low fat/low sodium diet long term and discussed heart healthy diet tips.  Discussed monitoring of  K+/Phos lab values with possible need for adjustment of these in the diet as necessary. Reviewed need for food safety precautions to prevent food borne illness.    2. Provided & reviewed handout: Post-transplant diet guidelines. Patient receptive to information provided. Expected diet compliance is good.     Medical food supplement therapy    Goals  Patient to consume % of nutritionally adequate meal trays TID, or the equivalent with supplements/snacks.    Monitoring/Evaluation  Progress toward goals will be monitored and evaluated per protocol.    Ginette Jim MS/RD/LD/CNSC  Available on [employer]   M-F (7am-3:30pm) - 7A/7B Clinical Dietitian  Weekend/Holiday Dietitian (7am-3:30pm)    ** Clinical Dietitians no longer available on pager

## 2024-11-07 NOTE — PLAN OF CARE
Goal Outcome Evaluation:      Plan of Care Reviewed With: patient    Overall Patient Progress: improving    Outcome Evaluation: BG stable overnight (128 & 112). tylenol x1 for minimal abd discomfort. denies nausea.    VS: /69 (BP Location: Left arm)   Pulse 78   Temp 98.4  F (36.9  C) (Oral)   Resp 16   Wt 99.7 kg (219 lb 11.2 oz)   SpO2 96%   BMI 34.41 kg/m      Cares: 1900 - 0730     Neuro: AOX4. Calm and cooperative w/ cares   VS: VSS, afebrile, on RA. Denies SOB.   GI/: voiding adequately w/out issues. X2 BM overnight   Skin: midline abdominal incision stapled NEHA.   Diet: Regular   Labs: awaiting AM lab results  BG: ACHS (128, 112)   LDA: Right CVC - heparin SR @ 400u/hr. Right PIV. Right RUBIA - serosang OP.   Mobility: UAL   Pain/Nausea: minimal abdominal discomfort. Denies nausea   PRN medications: tylenol x1   Events/Education: meet with speciality pharmacy today @ 9:15am  Plan of Care: possible discharge today or tomorrow. Continue with current POC and update MD with any changes.

## 2024-11-07 NOTE — PLAN OF CARE
Goal Outcome Evaluation:    Shift: 8009-7343  No acute changes this shift. Gave PRN Tylenol for incisional pain once. 4 loose stools. Denies N/V.   Final Thymo given this shift - infuse over 4 hours  IV laxis 40 mg given and voided ~ 2880 mL   RUBIA drain with 160 mL serousang output            Plan of Care Reviewed With: patient    Overall Patient Progress: improvingOverall Patient Progress: improving

## 2024-11-08 ENCOUNTER — APPOINTMENT (OUTPATIENT)
Dept: ULTRASOUND IMAGING | Facility: CLINIC | Age: 44
DRG: 010 | End: 2024-11-08
Attending: NURSE PRACTITIONER
Payer: COMMERCIAL

## 2024-11-08 ENCOUNTER — APPOINTMENT (OUTPATIENT)
Dept: CARDIOLOGY | Facility: CLINIC | Age: 44
DRG: 010 | End: 2024-11-08
Attending: NURSE PRACTITIONER
Payer: COMMERCIAL

## 2024-11-08 ENCOUNTER — APPOINTMENT (OUTPATIENT)
Dept: GENERAL RADIOLOGY | Facility: CLINIC | Age: 44
DRG: 010 | End: 2024-11-08
Attending: SURGERY
Payer: COMMERCIAL

## 2024-11-08 ENCOUNTER — APPOINTMENT (OUTPATIENT)
Dept: CT IMAGING | Facility: CLINIC | Age: 44
DRG: 010 | End: 2024-11-08
Attending: NURSE PRACTITIONER
Payer: COMMERCIAL

## 2024-11-08 DIAGNOSIS — Z20.828 CONTACT WITH AND (SUSPECTED) EXPOSURE TO OTHER VIRAL COMMUNICABLE DISEASES: Primary | ICD-10-CM

## 2024-11-08 DIAGNOSIS — Z94.83 PANCREAS REPLACED BY TRANSPLANT (H): Primary | ICD-10-CM

## 2024-11-08 DIAGNOSIS — Z48.298 AFTERCARE FOLLOWING ORGAN TRANSPLANT: ICD-10-CM

## 2024-11-08 DIAGNOSIS — Z94.0 KIDNEY REPLACED BY TRANSPLANT: ICD-10-CM

## 2024-11-08 DIAGNOSIS — T86.11 KIDNEY TRANSPLANT REJECTION: ICD-10-CM

## 2024-11-08 DIAGNOSIS — Z79.899 ENCOUNTER FOR LONG-TERM CURRENT USE OF MEDICATION: ICD-10-CM

## 2024-11-08 DIAGNOSIS — Z98.890 OTHER SPECIFIED POSTPROCEDURAL STATES: ICD-10-CM

## 2024-11-08 PROBLEM — R55 SYNCOPE: Status: ACTIVE | Noted: 2024-11-08

## 2024-11-08 LAB
ANION GAP SERPL CALCULATED.3IONS-SCNC: 8 MMOL/L (ref 7–15)
ANION GAP SERPL CALCULATED.3IONS-SCNC: 9 MMOL/L (ref 7–15)
BASOPHILS # BLD MANUAL: 0 10E3/UL (ref 0–0.2)
BASOPHILS NFR BLD MANUAL: 0 %
BK VIRUS IGG ANTIBODY: ABNORMAL TITER
BLD PROD TYP BPU: NORMAL
BLD PROD TYP BPU: NORMAL
BLOOD COMPONENT TYPE: NORMAL
BLOOD COMPONENT TYPE: NORMAL
BUN SERPL-MCNC: 31.4 MG/DL (ref 6–20)
BUN SERPL-MCNC: 32.7 MG/DL (ref 6–20)
CALCIUM SERPL-MCNC: 7.9 MG/DL (ref 8.8–10.4)
CALCIUM SERPL-MCNC: 7.9 MG/DL (ref 8.8–10.4)
CHLORIDE SERPL-SCNC: 103 MMOL/L (ref 98–107)
CHLORIDE SERPL-SCNC: 105 MMOL/L (ref 98–107)
CODING SYSTEM: NORMAL
CODING SYSTEM: NORMAL
CREAT SERPL-MCNC: 1.44 MG/DL (ref 0.67–1.17)
CREAT SERPL-MCNC: 1.49 MG/DL (ref 0.67–1.17)
CROSSMATCH: NORMAL
CROSSMATCH: NORMAL
EGFRCR SERPLBLD CKD-EPI 2021: 59 ML/MIN/1.73M2
EGFRCR SERPLBLD CKD-EPI 2021: 61 ML/MIN/1.73M2
EOSINOPHIL # BLD MANUAL: 0 10E3/UL (ref 0–0.7)
EOSINOPHIL NFR BLD MANUAL: 0 %
ERYTHROCYTE [DISTWIDTH] IN BLOOD BY AUTOMATED COUNT: 13 % (ref 10–15)
ERYTHROCYTE [DISTWIDTH] IN BLOOD BY AUTOMATED COUNT: 13.1 % (ref 10–15)
GLUCOSE BLDC GLUCOMTR-MCNC: 103 MG/DL (ref 70–99)
GLUCOSE BLDC GLUCOMTR-MCNC: 109 MG/DL (ref 70–99)
GLUCOSE BLDC GLUCOMTR-MCNC: 122 MG/DL (ref 70–99)
GLUCOSE BLDC GLUCOMTR-MCNC: 127 MG/DL (ref 70–99)
GLUCOSE BLDC GLUCOMTR-MCNC: 130 MG/DL (ref 70–99)
GLUCOSE BLDC GLUCOMTR-MCNC: 138 MG/DL (ref 70–99)
GLUCOSE SERPL-MCNC: 104 MG/DL (ref 70–99)
GLUCOSE SERPL-MCNC: 136 MG/DL (ref 70–99)
HCO3 SERPL-SCNC: 25 MMOL/L (ref 22–29)
HCO3 SERPL-SCNC: 25 MMOL/L (ref 22–29)
HCT VFR BLD AUTO: 20.5 % (ref 40–53)
HCT VFR BLD AUTO: 21.7 % (ref 40–53)
HGB BLD-MCNC: 6.6 G/DL (ref 13.3–17.7)
HGB BLD-MCNC: 7 G/DL (ref 13.3–17.7)
HGB BLD-MCNC: 7.3 G/DL (ref 13.3–17.7)
ISSUE DATE AND TIME: NORMAL
LACTATE SERPL-SCNC: 1.4 MMOL/L (ref 0.7–2)
LIPASE SERPL-CCNC: 55 U/L (ref 13–60)
LVEF ECHO: NORMAL
LYMPHOCYTES # BLD MANUAL: 0.3 10E3/UL (ref 0.8–5.3)
LYMPHOCYTES NFR BLD MANUAL: 7 %
MAGNESIUM SERPL-MCNC: 1.7 MG/DL (ref 1.7–2.3)
MCH RBC QN AUTO: 26.4 PG (ref 26.5–33)
MCH RBC QN AUTO: 26.8 PG (ref 26.5–33)
MCHC RBC AUTO-ENTMCNC: 32.2 G/DL (ref 31.5–36.5)
MCHC RBC AUTO-ENTMCNC: 32.3 G/DL (ref 31.5–36.5)
MCV RBC AUTO: 82 FL (ref 78–100)
MCV RBC AUTO: 83 FL (ref 78–100)
MONOCYTES # BLD MANUAL: 0.3 10E3/UL (ref 0–1.3)
MONOCYTES NFR BLD MANUAL: 8 %
NEUTROPHILS # BLD MANUAL: 3.1 10E3/UL (ref 1.6–8.3)
NEUTROPHILS NFR BLD MANUAL: 85 %
PHOSPHATE SERPL-MCNC: 3.7 MG/DL (ref 2.5–4.5)
PLAT MORPH BLD: ABNORMAL
PLATELET # BLD AUTO: 46 10E3/UL (ref 150–450)
PLATELET # BLD AUTO: 51 10E3/UL (ref 150–450)
POTASSIUM SERPL-SCNC: 3.3 MMOL/L (ref 3.4–5.3)
POTASSIUM SERPL-SCNC: 3.9 MMOL/L (ref 3.4–5.3)
RBC # BLD AUTO: 2.5 10E6/UL (ref 4.4–5.9)
RBC # BLD AUTO: 2.61 10E6/UL (ref 4.4–5.9)
RBC MORPH BLD: ABNORMAL
SODIUM SERPL-SCNC: 137 MMOL/L (ref 135–145)
SODIUM SERPL-SCNC: 138 MMOL/L (ref 135–145)
UNIT ABO/RH: NORMAL
UNIT ABO/RH: NORMAL
UNIT NUMBER: NORMAL
UNIT NUMBER: NORMAL
UNIT STATUS: NORMAL
UNIT STATUS: NORMAL
UNIT TYPE ISBT: 7300
UNIT TYPE ISBT: 7300
WBC # BLD AUTO: 3.7 10E3/UL (ref 4–11)
WBC # BLD AUTO: 5.5 10E3/UL (ref 4–11)

## 2024-11-08 PROCEDURE — 120N000011 HC R&B TRANSPLANT UMMC

## 2024-11-08 PROCEDURE — 83690 ASSAY OF LIPASE: CPT | Performed by: SURGERY

## 2024-11-08 PROCEDURE — P9016 RBC LEUKOCYTES REDUCED: HCPCS | Performed by: PHYSICIAN ASSISTANT

## 2024-11-08 PROCEDURE — 80048 BASIC METABOLIC PNL TOTAL CA: CPT | Performed by: NURSE PRACTITIONER

## 2024-11-08 PROCEDURE — 93306 TTE W/DOPPLER COMPLETE: CPT | Mod: 26 | Performed by: INTERNAL MEDICINE

## 2024-11-08 PROCEDURE — 72125 CT NECK SPINE W/O DYE: CPT | Mod: 26 | Performed by: RADIOLOGY

## 2024-11-08 PROCEDURE — 93010 ELECTROCARDIOGRAM REPORT: CPT | Performed by: INTERNAL MEDICINE

## 2024-11-08 PROCEDURE — 74018 RADEX ABDOMEN 1 VIEW: CPT

## 2024-11-08 PROCEDURE — 83605 ASSAY OF LACTIC ACID: CPT | Performed by: NURSE PRACTITIONER

## 2024-11-08 PROCEDURE — 36415 COLL VENOUS BLD VENIPUNCTURE: CPT | Performed by: SURGERY

## 2024-11-08 PROCEDURE — 80048 BASIC METABOLIC PNL TOTAL CA: CPT | Performed by: SURGERY

## 2024-11-08 PROCEDURE — 250N000013 HC RX MED GY IP 250 OP 250 PS 637: Performed by: PHYSICIAN ASSISTANT

## 2024-11-08 PROCEDURE — 70450 CT HEAD/BRAIN W/O DYE: CPT | Mod: 26 | Performed by: RADIOLOGY

## 2024-11-08 PROCEDURE — 999N000034 HC STATISTIC CODE BLUE ACCESS REQUIRED

## 2024-11-08 PROCEDURE — 85018 HEMOGLOBIN: CPT | Performed by: SURGERY

## 2024-11-08 PROCEDURE — 36415 COLL VENOUS BLD VENIPUNCTURE: CPT | Performed by: NURSE PRACTITIONER

## 2024-11-08 PROCEDURE — 250N000013 HC RX MED GY IP 250 OP 250 PS 637: Performed by: NURSE PRACTITIONER

## 2024-11-08 PROCEDURE — 82565 ASSAY OF CREATININE: CPT | Performed by: SURGERY

## 2024-11-08 PROCEDURE — 70450 CT HEAD/BRAIN W/O DYE: CPT

## 2024-11-08 PROCEDURE — 99233 SBSQ HOSP IP/OBS HIGH 50: CPT | Mod: 24 | Performed by: INTERNAL MEDICINE

## 2024-11-08 PROCEDURE — 250N000013 HC RX MED GY IP 250 OP 250 PS 637: Performed by: SURGERY

## 2024-11-08 PROCEDURE — 83735 ASSAY OF MAGNESIUM: CPT | Performed by: SURGERY

## 2024-11-08 PROCEDURE — 255N000002 HC RX 255 OP 636: Performed by: INTERNAL MEDICINE

## 2024-11-08 PROCEDURE — 85027 COMPLETE CBC AUTOMATED: CPT | Performed by: NURSE PRACTITIONER

## 2024-11-08 PROCEDURE — 250N000012 HC RX MED GY IP 250 OP 636 PS 637: Performed by: SURGERY

## 2024-11-08 PROCEDURE — 36592 COLLECT BLOOD FROM PICC: CPT | Performed by: SURGERY

## 2024-11-08 PROCEDURE — 250N000011 HC RX IP 250 OP 636: Performed by: NURSE PRACTITIONER

## 2024-11-08 PROCEDURE — 93975 VASCULAR STUDY: CPT | Mod: 26 | Performed by: RADIOLOGY

## 2024-11-08 PROCEDURE — 74018 RADEX ABDOMEN 1 VIEW: CPT | Mod: 26 | Performed by: STUDENT IN AN ORGANIZED HEALTH CARE EDUCATION/TRAINING PROGRAM

## 2024-11-08 PROCEDURE — 72125 CT NECK SPINE W/O DYE: CPT

## 2024-11-08 PROCEDURE — 84100 ASSAY OF PHOSPHORUS: CPT | Performed by: SURGERY

## 2024-11-08 PROCEDURE — 250N000011 HC RX IP 250 OP 636: Performed by: SURGERY

## 2024-11-08 PROCEDURE — 93005 ELECTROCARDIOGRAM TRACING: CPT

## 2024-11-08 PROCEDURE — 85007 BL SMEAR W/DIFF WBC COUNT: CPT | Performed by: SURGERY

## 2024-11-08 PROCEDURE — 258N000003 HC RX IP 258 OP 636: Performed by: SURGERY

## 2024-11-08 PROCEDURE — 93975 VASCULAR STUDY: CPT

## 2024-11-08 RX ORDER — OXYCODONE HYDROCHLORIDE 5 MG/1
2.5-5 TABLET ORAL EVERY 6 HOURS PRN
Qty: 5 TABLET | Refills: 0 | Status: SHIPPED | OUTPATIENT
Start: 2024-11-08 | End: 2024-11-19

## 2024-11-08 RX ORDER — AMOXICILLIN 250 MG
1-2 CAPSULE ORAL 2 TIMES DAILY PRN
Qty: 60 TABLET | Refills: 0 | Status: SHIPPED | OUTPATIENT
Start: 2024-11-08

## 2024-11-08 RX ORDER — POLYETHYLENE GLYCOL 3350 17 G/17G
17 POWDER, FOR SOLUTION ORAL DAILY
Qty: 510 G | Refills: 0 | Status: SHIPPED | OUTPATIENT
Start: 2024-11-08 | End: 2024-11-11

## 2024-11-08 RX ORDER — FAMOTIDINE 20 MG/1
20 TABLET, FILM COATED ORAL 2 TIMES DAILY
Qty: 60 TABLET | Refills: 0 | Status: SHIPPED | OUTPATIENT
Start: 2024-11-08

## 2024-11-08 RX ORDER — SULFAMETHOXAZOLE AND TRIMETHOPRIM 400; 80 MG/1; MG/1
1 TABLET ORAL DAILY
Qty: 30 TABLET | Refills: 11 | Status: ACTIVE | OUTPATIENT
Start: 2024-11-08 | End: 2024-11-19

## 2024-11-08 RX ORDER — TACROLIMUS 1 MG/1
3 CAPSULE ORAL 2 TIMES DAILY
Qty: 180 CAPSULE | Refills: 11 | Status: ACTIVE | OUTPATIENT
Start: 2024-11-08 | End: 2024-11-10

## 2024-11-08 RX ORDER — MYCOPHENOLATE MOFETIL 250 MG/1
1000 CAPSULE ORAL 2 TIMES DAILY
Qty: 240 CAPSULE | Refills: 11 | Status: ACTIVE | OUTPATIENT
Start: 2024-11-08 | End: 2024-11-19

## 2024-11-08 RX ORDER — PANTOPRAZOLE SODIUM 40 MG/1
40 TABLET, DELAYED RELEASE ORAL
Qty: 30 TABLET | Refills: 0 | Status: SHIPPED | OUTPATIENT
Start: 2024-11-09

## 2024-11-08 RX ORDER — FUROSEMIDE 10 MG/ML
20 INJECTION INTRAMUSCULAR; INTRAVENOUS ONCE
Status: COMPLETED | OUTPATIENT
Start: 2024-11-08 | End: 2024-11-08

## 2024-11-08 RX ORDER — POTASSIUM CHLORIDE 750 MG/1
40 TABLET, EXTENDED RELEASE ORAL ONCE
Status: COMPLETED | OUTPATIENT
Start: 2024-11-08 | End: 2024-11-08

## 2024-11-08 RX ORDER — ACETAMINOPHEN 325 MG/1
650 TABLET ORAL EVERY 6 HOURS PRN
Qty: 60 TABLET | Refills: 0 | Status: SHIPPED | OUTPATIENT
Start: 2024-11-08

## 2024-11-08 RX ORDER — VALGANCICLOVIR 450 MG/1
900 TABLET, FILM COATED ORAL DAILY
Qty: 60 TABLET | Refills: 2 | Status: ACTIVE | OUTPATIENT
Start: 2024-11-09 | End: 2024-11-19

## 2024-11-08 RX ORDER — CARVEDILOL 6.25 MG/1
6.25 TABLET ORAL 2 TIMES DAILY WITH MEALS
Qty: 60 TABLET | Refills: 2 | Status: SHIPPED | OUTPATIENT
Start: 2024-11-08 | End: 2024-11-11

## 2024-11-08 RX ORDER — NYSTATIN 100000 [USP'U]/ML
500000 SUSPENSION ORAL 4 TIMES DAILY
Qty: 473 ML | Refills: 2 | Status: SHIPPED | OUTPATIENT
Start: 2024-11-10

## 2024-11-08 RX ORDER — TACROLIMUS 0.5 MG/1
0.5 CAPSULE ORAL 2 TIMES DAILY
Qty: 60 CAPSULE | Refills: 11 | Status: ACTIVE | OUTPATIENT
Start: 2024-11-08 | End: 2024-11-11

## 2024-11-08 RX ORDER — FUROSEMIDE 20 MG/1
40 TABLET ORAL DAILY
Qty: 28 TABLET | Refills: 0 | Status: SHIPPED | OUTPATIENT
Start: 2024-11-08 | End: 2024-11-10

## 2024-11-08 RX ORDER — METHOCARBAMOL 750 MG/1
750 TABLET, FILM COATED ORAL EVERY 6 HOURS PRN
Qty: 20 TABLET | Refills: 0 | Status: SHIPPED | OUTPATIENT
Start: 2024-11-08

## 2024-11-08 RX ADMIN — MYCOPHENOLATE MOFETIL 1000 MG: 250 CAPSULE ORAL at 18:20

## 2024-11-08 RX ADMIN — FUROSEMIDE 20 MG: 10 INJECTION, SOLUTION INTRAMUSCULAR; INTRAVENOUS at 20:10

## 2024-11-08 RX ADMIN — MYCOPHENOLATE MOFETIL 1000 MG: 250 CAPSULE ORAL at 09:05

## 2024-11-08 RX ADMIN — ACETAMINOPHEN 650 MG: 325 TABLET, FILM COATED ORAL at 01:29

## 2024-11-08 RX ADMIN — OXYCODONE HYDROCHLORIDE 2.5 MG: 5 TABLET ORAL at 09:04

## 2024-11-08 RX ADMIN — FAMOTIDINE 20 MG: 20 TABLET ORAL at 20:07

## 2024-11-08 RX ADMIN — ACETAMINOPHEN 650 MG: 325 TABLET, FILM COATED ORAL at 15:43

## 2024-11-08 RX ADMIN — ASPIRIN 81 MG CHEWABLE TABLET 81 MG: 81 TABLET CHEWABLE at 09:04

## 2024-11-08 RX ADMIN — CARVEDILOL 6.25 MG: 6.25 TABLET, FILM COATED ORAL at 20:07

## 2024-11-08 RX ADMIN — FAMOTIDINE 20 MG: 20 TABLET ORAL at 09:05

## 2024-11-08 RX ADMIN — ONDANSETRON 4 MG: 2 INJECTION INTRAMUSCULAR; INTRAVENOUS at 17:47

## 2024-11-08 RX ADMIN — FUROSEMIDE 40 MG: 20 TABLET ORAL at 09:05

## 2024-11-08 RX ADMIN — PANTOPRAZOLE SODIUM 40 MG: 40 TABLET, DELAYED RELEASE ORAL at 09:05

## 2024-11-08 RX ADMIN — ATORVASTATIN CALCIUM 20 MG: 20 TABLET, FILM COATED ORAL at 09:05

## 2024-11-08 RX ADMIN — CARVEDILOL 6.25 MG: 6.25 TABLET, FILM COATED ORAL at 09:05

## 2024-11-08 RX ADMIN — VALGANCICLOVIR 900 MG: 450 TABLET, FILM COATED ORAL at 09:05

## 2024-11-08 RX ADMIN — MICAFUNGIN SODIUM 100 MG: 50 INJECTION, POWDER, LYOPHILIZED, FOR SOLUTION INTRAVENOUS at 22:05

## 2024-11-08 RX ADMIN — TACROLIMUS 3 MG: 1 CAPSULE ORAL at 09:05

## 2024-11-08 RX ADMIN — POTASSIUM CHLORIDE 40 MEQ: 750 TABLET, EXTENDED RELEASE ORAL at 22:06

## 2024-11-08 RX ADMIN — ACETAMINOPHEN 650 MG: 325 TABLET, FILM COATED ORAL at 12:29

## 2024-11-08 RX ADMIN — PERFLUTREN 6 ML: 6.52 INJECTION, SUSPENSION INTRAVENOUS at 14:09

## 2024-11-08 RX ADMIN — MAGNESIUM OXIDE TAB 400 MG (241.3 MG ELEMENTAL MG) 400 MG: 400 (241.3 MG) TAB at 12:29

## 2024-11-08 RX ADMIN — TACROLIMUS 3 MG: 1 CAPSULE ORAL at 18:20

## 2024-11-08 RX ADMIN — Medication 1 TABLET: at 09:05

## 2024-11-08 NOTE — CARE PLAN
11/08/24 1359   Fall Event   Patient Assessed By rapid response team   Name of Provider Notified Yuli Diaz NP   Family/Designated Caregiver Notified of Fall Yes   Fall Prevention Plan Updated Yes   Name of Family/Designated Caregiver Notified brother Bateman

## 2024-11-08 NOTE — PHARMACY-TRANSPLANT NOTE
ADDENDUM: As of 11/11 pt is on tacrolimus 4mg BID    Solid Organ Transplant Recipient Prior to Discharge Note    44 year old male s/p pancreas after kidney transplant on 11/2/2024.    INDUCTION Immunosuppression:  Thymoglobulin total course of 7.5 mg/kg.  Methylprednisolone taper as pre-med to thymoglobulin.    MAINTENANCE Immunosuppression:  Mycophenolate 1000 mg by mouth twice daily.  Tacrolimus titrated to goal trough level of 8-10 mcg/L for first 6 months post-transplant. Level on 11/7 was 7.7 mcg/L (12.75 hour post dose level) while on tacrolimus 3 mg by mouth twice daily. Team plans to recheck level as level on 11/6/24 was in goal range of 9.4 mcg/L (12 hour post dose level).    Opportunistic Infection Prophylaxis:  PJP prophy with trimethoprim/sulfamethoxazle.  CMV prophy with valganciclovir.  Oral candidiasis prophy with nystatin suspension.    Pharmacy has monitored for medication interactions and immunosuppression levels in conjunction with the multidisciplinary team. In anticipation for discharge, medication therapy needs have been addressed daily throughout the current admission via multidisciplinary rounds and/or discussions, order verification, daily clinical pharmacy review, and communication with prescribers.    Rohan Richards, PharmD

## 2024-11-08 NOTE — PATIENT INSTRUCTIONS
Carson Grijalva,     Keep up the great work!    Follow-up with RD on October 18.     Thank you,    Stephany Tboar, KYLE, LD  If you would like to schedule or reschedule an appointment with the RD, please call [phone]    Nutrition Goals  Continue to incorporate plate method for balanced meal aiming for at least 20-30 grams at each meal.  2. Try to incorporate some exercise a couple of days a week on home treadmill.   3. Meal plan ahead especially for lunch meals when going back to work.   4. Avoid snacking as able. If snack is needed use lean protein and/or fruit/vegetable.   Examples:   - 2 cup popcorn   - 1 cup mixed berries   - 15 almonds, walnuts, cashews   - carrot/celery sticks and 2 tbsp low-fat ranch   - 1 hard boiled egg   - Part-skim mozzarella cheese stick   - Low-fat, low-sugar greek yogurt with 1/2 cup berries   - Med apple or pear   - sliced bell peppers with 1/2 cup salsa   - 1/2 cup roasted chickpeas   - sliced cucumbers with vinegar    Protein options for on the go  Cottage Cheese  Hard Boiled Eggs  PB3 Portable Protein Snacks   Mixed Nuts Pack  Mozzarella stick wrapped in deli meat.   Tuna/salmon packets  Greek yogurt parfait  Jerky  Protein bars/Protein shakes   Apple with peanut butter  No bake energy bites    Free YouTube Workout ideas:   Fitness   Nourish Move Love   Tone it up  [employer] fitness   Livestrong women     Free Fitness Apps:  Fit on  [employer]  J&J 7 Minute Workout     Fitness Apps with monthly fee  Peloton   Stronger by the day  Websense     Protein Sources   [email or web address]       COMPREHENSIVE WEIGHT MANAGEMENT PROGRAM  VIRTUAL SUPPORT GROUPS    For Support Group Information:      We offer support groups for patients who are working on weight loss and considering, preparing for or have had weight loss surgery.   There is no cost for this opportunity.  You are invited to attend the?Virtual Support Groups?provided by any of the following  "locations:    [facility] via [facility] Teams with Yudith Alfred RN  2.   [place] via [facility] Teams with Kade Mckenna, PhD, LP  3.   [place] via [facility] Teams with Mera Ricks RN  4.   [facility] via [facility] Teams with Mera Adamson [facility]    The following Support Group information can also be found on our website:  [email or web address]    [email or web address]    [facility] Weight Loss Surgery Support Group    [facility] Weight Loss Surgery Support Group  The support group is a patient-lead forum that meets monthly to share experiences, encouragement and education. It is open to those who have had weight loss surgery, are scheduled for surgery, or are considering surgery.   WHEN: This group meets on the 3rd Wednesday of each month from 5:00PM - 6:00PM virtually using Microsoft Teams.   FACILITATOR: Led by Yudith Elder RD, RM, RN, the program's Clinical Coordinator.   TO REGISTER: Please contact the clinic via [employer] or call the nurse line directly at [phone] to inform our staff that you would like an invite sent to you and to let us know the email you would like the invite sent to. Prior to the meeting, a link with directions on how to join the meeting will be sent to you.    2023 Meetings   April 19: Guest Speaker, Jone Esqueda RD, LD, \"Maintaining Weight Loss after Bariatric Surgery\".  May 17: \"Let's Talk\" a time for the group to share.  June 21: \"Let's Talk\" a time for the group to share.  July 19  August 16  September 20  October 18  November 15  December 20    [facility] Support Groups    [facility] Bariatric Care Support Group?  This is open to all pre- and post- operative bariatric surgery patients as well as their support system.   WHEN: This group meets the 2nd Tuesday of " "each month from 6:30 PM - 8:00 PM virtually using Microsoft Teams.   FACILITATOR: Led by Kade Mckenna, Ph.D who is a Licensed Psychologist with the [facility] Comprehensive Weight Management Program.   TO REGISTER: Please send an email to Kade Mckenna, Ph.D.,  at?[email or web address]?if you would like an invitation to the group and to learn about using Microsoft Teams.    2023 Meetings  April 11: Guest speaker, Annalee Bauer MD, Bariatrician, [facility], \"Injectable Weight Loss Medications\".  May 9  Jenni 13  July 11  August 8  September 12  October 10  November 14  December 12    [facility] Post-Operative B[facility]ric Surgery Support Group  This is a support group for [facility] bariatric patients (and those external to [facility]) who have had bariatric surgery and are at least 3 months post-surgery.  WHEN: This support group meets the 4th Wednesday of the month from 11:00 AM - 12:00 PM virtually using Microsoft Teams.   FACILITATOR: Led by Certified Bariatric Nurse, Mera Ricks RN.   TO REGISTER: Please send an email to Mera at [email or web address] if you would like an invitation to the group and to learn about using Microsoft Teams.    2023 Meetings  April 26  May 24  Jenni 28  July 26  August 23  September 27  October 25  November 22  December 27    [facility]   Healthy Lifestyle Virtual Support Group    Healthy Lifestyle Virtual Support Group?  This is 60 minutes of small group guided discussion, support and resources. All are welcome who want a healthy lifestyle.  WHEN: This group meets monthly on a Friday from 12:30 PM - 1:30 PM virtually using Microsoft Teams.  This group will meet the first Friday of the month beginning In July  FACILITATOR: Led by National Board Certified Health and , Mera Adamson [facility].   TO REGISTER: Please send an email to Mera [email or web address] " to receive monthly invites to the group or if you have any questions about having a health .  Prior to the meeting, a link with directions on how to join the meeting will be sent to you.    2023 Meetings  May 19: Let's Talk  June 9: Create Your Coaching Toolkit: Learn How to  Yourself  July 7: Let's Talk  August 4: Benefits of Fiber with KYLE Galindo  September 1: Show and Tell (share your aps, podcasts, recipes, hacks, books)  October 6 :Let's Talk  November 3: Introduction to Mindfulness   December 1: Let's Talk            Statement Selected

## 2024-11-08 NOTE — PROGRESS NOTES
CLINICAL NUTRITION SERVICES - DISCHARGE NOTE    Patient s discharge needs assessed and discharge planning has been conducted with the multidisciplinary transplant care team including physicians, pharmacy, social work and transplant coordinator.    Follow up/Monitoring:  Once discharged, place outpatient nutrition consult via the transplant team if nutrition concerns arise.    Stephany Cates, MS, RD, LD, CCTD, [facility]  7A + 7B (beds [id number])  Available on Vocera [7A or 7B Clinical Dietitian]   Weekend/Holiday: Vocera [Weekend Clinical Dietitian]

## 2024-11-08 NOTE — PROGRESS NOTES
Patient's Name: Michael Amin    Procedure Date: 11/08/24  Procedure Time 10:53    Procedure Performed: Central line removal     The Central Venous Catheter (CVC) was removed per provider order.     The central venous catheter was located: Right Internal Jugular vein, with a total of 3 lumens.     The patient was placed in Supine position with Insertion site lower than heart.     Valsalva response achieved by: Patient instructed to take a deep breath and bear down while the CVC was removed with a constant steady motion.     Firm pressure was applied at the access site for 3 minutes until bleeding stopped.     Post removal site assessment; Clean and dry without bleeding. Occlusive dressing applied: Yes    CVC tip was inspected and intact: Yes    Tip was sent to lab for culture per provider order: NA    Patient tolerated the procedure: well    2nd RN present during removal (if applicable) Carol Aden RN   11/8/2024   10:53 AM

## 2024-11-08 NOTE — PLAN OF CARE
Goal Outcome Evaluation:      Plan of Care Reviewed With: patient    Overall Patient Progress: improving    Outcome Evaluation: Ambulated in [place] twice this shift, minimal pain    BP (!) 153/79 (BP Location: Left arm)   Pulse 56   Temp 98.3  F (36.8  C) (Oral)   Resp 16   Wt 96.6 kg (213 lb)   SpO2 96%   BMI 33.36 kg/m      Shift: 6526-4525  Isolation Status: N/A  VS: Hypertensive 140s-150s otherwise stable on RA, afebrile  Neuro: Aox4  Behaviors: calm and cooperative  BG: ACHS. Bgs 161 and 127  Labs: Hgb 7.3, Mag 1.6  Respiratory: WDL  Cardiac: WDL  Pain/Nausea: Minimal pain, no complaints of nausea  PRN: Tylenol x1  Diet: Regular diet  IV Access: R internal jugular SL  Infusion(s): N/A  Lines/Drains: R RUBIA draining serosanguinous output.  GI/: Voids adequately. 2 loose BMs this shift.  Skin: Abd incision stapled, NEHA  Mobility: Up ad jose manuel  Events/Education: Med card and lab book updated.  Plan: Plans to discharge today.

## 2024-11-08 NOTE — DISCHARGE SUMMARY
[facility]    Discharge Summary  Transplant Surgery    Date of Admission:  11/2/2024  Date of Discharge:  11/10/2024  Discharging Provider: Aissatou Jarquin PA-C / Jean Liu MD    Discharge Diagnoses   Principal Problem:    Pancreas replaced by transplant (H)  Active Problems:    HTN, kidney transplant related    Diabetes mellitus type 1 (H)    Dyslipidemia    Anemia in chronic kidney disease    Kidney replaced by transplant    Hypomagnesemia    Immunosuppressed status (H)    Acute kidney injury (H)    Hypervolemia    Acute post-operative pain    Thrombocytopenia (H)    At risk for malnutrition    Syncope    Procedure/Surgery Information   Procedure Date:  Case start 11/2, complete 11/03/24    Preoperative Diagnosis:  Type 1 Diabetes, s/p kidney transplant    Postoperative Diagnosis:  Same    Procedure:   1. Donation after Brain Death transplant with Enteric w/maikol-en-y drainage   2. Pancreas allograft preparation on Back Table   3. Open appendectomy  4. Repair iliac vein with patch  Please add 22 modifier- case was difficult due to anatomy, need for iliac vein repair, and need for revision of both arterial limbs of Y graft after reperfusion     Surgeon:  Surgeons and Role:     * Prince Mcdaniels MD - Primary     * Quintin Amaya MD - Fellow - Assisting. Dr. Amaya was the primary assistant for the procedure and participated in all aspects of the case under my supervision. 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 Patient is now s/p DBD IVETTE  transplant and open appendectomy with Dr. Mcdaniels on 11/2/24 complicated by arterial reconstruction.     Hospital Course   Michael Amin was admitted on 11/2/2024.  The following problems were addressed during his hospitalization:    Graft function:  s/p IVETTE 11/2/24: POD#7. Lipase 88 (83). Continue bowel regimen to avoid constipation. Euglycemic, not requiring insulin. Last US 11/8 with patent vasculature.    - Continue ASA 81 mg daily.     Hx LDKT 2019 c/b RICHARD: Cr 1.7. Increased likely due to prerenal due to hypotension POD#1 and then due to diuresis and tacrolimus titration. Will hold further lasix.  ml bolus today. Repeat Cr on 11/11 in [facility]. Baseline Cr 1.1-1.3. US normal, patent. UA and UPC unremarkable.      Immunosuppressed status 2/2 to medications:  Induction: via high intensity protocol (cPRA 0) with Thymoglobulin goal 7.5 mg/kg (675 mg total).   Maintenance:   - MMF 1000 mg BID   - Tacrolimus 4 mg BID. Goal level 8-10.   Infection prophylaxis: viral (Valcyte x 12 weeks), PJP (Bactrim indefinitely), fungal (Nystatin x 12 weeks)    Transplant coordinator: Anneliese Qiu [phone]  Pancreas donor type:  DBD  DSA at time of transplant:  No  CMV:  Donor + / Recipient +  EBV:  Donor + / Recipient +  Thymoglobulin:  675 mg (7.5 mg/kg)    Neuro:  Acute post op pain: Pain well controlled with PRN Tylenol, Robaxin, and oxycodone (minimal use).     Hematology:   Anemia of chronic disease/Acute blood loss: Transfused 1 unit pRBCs 11/8 for Hgb ~7; responded appropriately. Hgb 8.4 on discharge.   Thrombocytopenia: Likely 2/2 to thymo.  Hit panel negative. PLT now increasing.  today.     Cardiorespiratory:   HTN: Continue Coreg 6.25 mg daily.  HLD: Continue PTA atorvastatin.   Syncopal event: 11/8/24 when in the bathroom likely r/t pain; trauma to head and vomited. CT Head/C spine negative. EKG NSR. Echo, BMP, CBC, LA, BG WNL. Resolved.      GI/Nutrition:   At risk for malnutrition: Nutrition  consulted. Continue regular diet.   Bowel regimen: Senna/colace and Miralax     Fluid/Electrolytes:   Hypomagnesemia: Continue Mag-Ox 400 mg daily.  Generalized edema: Resolved. Hold further lasix         Discharge Disposition   Discharged locally to brother's home   Condition at discharge: Stable    Pending Results   These results will be followed up by Transplant team  Unresulted Labs Ordered in the Past 30 Days of this Admission       Date and Time Order Name Status Description    11/8/2024  2:34 PM Prepare red blood cells (unit) Preliminary     11/2/2024  6:41 PM Prepare red blood cells (unit) Preliminary     11/2/2024  6:41 PM Prepare red blood cells (unit) Preliminary           Final pathology results:   Case Report   Surgical Pathology Report                         Case: [id number]                                   Authorizing Provider:  Prince Mcdaniels        Collected:           11/02/2024 05:36 PM                                  MD Michael                                                             Ordering Location:     [facility]                 Received:            11/04/2024 08:28 AM           Pathologist:           Nathalie Campos MD                                                           Specimen:    Appendix, appendix                                                                        Final Diagnosis   APPENDIX; INCIDENTAL APPENDECTOMY AT PANCREAS TRANSPLANTATION:  No morphologic abnormality     Primary Care Physician   Anurag Byrd    Physical Exam   Temp: 98.3  F (36.8  C) Temp src: Oral BP: 110/60 Pulse: 77   Resp: 18 SpO2: 95 % O2 Device: None (Room air)    Vitals:    11/08/24 0128 11/09/24 0528 11/10/24 0020   Weight: 96.6 kg (213 lb) 93.3 kg (205 lb 11.2 oz) 90.5 kg (199 lb 9.6 oz)     Vital Signs with Ranges  Temp:  [97.4  F (36.3  C)-98.5  F (36.9  C)] 98.3  F (36.8  C)  Pulse:  [77-87] 77  Resp:  [16-18] 18  BP: (110-131)/(60-71) 110/60  SpO2:  [95 %-99 %] 95 %  I/O  last 3 completed shifts:  In: -   Out: 2845 [Urine:2800; Drains:45]    Constitutional: resting comfortably in chair  Eyes: EOMI  ENT: internal jugular line right   Respiratory: unlabored on RA  Cardiovascular: perfused  GI: abdomen soft, non-distended. Midline incision closed with staples and open to air. RUBIA with serosanguinous output.   Genitourinary: no Méndez present  Skin: warm, dry  Musculoskeletal: generalized edema  Neurologic: A&Ox4  Neuropsychiatric: calm, pleasant    Consultations This Hospital Stay   NEPHROLOGY KIDNEY/PANCREAS TRANSPLANT ADULT IP CONSULT  NURSING TO CONSULT FOR VASCULAR ACCESS CARE IP CONSULT  CARE MANAGEMENT / SOCIAL WORK IP CONSULT  PHARMACY IP CONSULT  SOT MEDICATION HISTORY IP PHARMACY CONSULT  NUTRITION SERVICES ADULT IP CONSULT  NEPHROLOGY KIDNEY/PANCREAS TRANSPLANT ADULT IP CONSULT  PHARMACY IP CONSULT  NURSING TO CONSULT FOR VASCULAR ACCESS CARE IP CONSULT    Time Spent on this Encounter   I have spent greater than 30 minutes on this discharge.    Discharge Orders   Discharge Medications   Current Discharge Medication List        START taking these medications    Details   acetaminophen (TYLENOL) 325 MG tablet Take 2 tablets (650 mg) by mouth every 6 hours as needed (For optimal non-opioid multimodal pain management to improve pain control.).  Qty: 60 tablet, Refills: 0    Associated Diagnoses: Pancreas replaced by transplant (H)      calcium carbonate-vitamin D (OSCAL) 500-5 MG-MCG tablet Take 1 tablet by mouth 2 times daily (with meals).  Qty: 60 tablet, Refills: 2    Associated Diagnoses: Pancreas replaced by transplant (H)      CELLCEPT (BRAND) 250 MG capsule Take 4 capsules (1,000 mg) by mouth 2 times daily.  Qty: 240 capsule, Refills: 11    Associated Diagnoses: Pancreas replaced by transplant (H)      famotidine (PEPCID) 20 MG tablet Take 1 tablet (20 mg) by mouth 2 times daily.  Qty: 60 tablet, Refills: 0    Associated Diagnoses: Pancreas replaced by transplant (H)       methocarbamol (ROBAXIN) 750 MG tablet Take 1 tablet (750 mg) by mouth every 6 hours as needed for muscle spasms.  Qty: 20 tablet, Refills: 0    Associated Diagnoses: Pancreas replaced by transplant (H)      nystatin (MYCOSTATIN) 049829 UNIT/ML suspension Swish and swallow 5 mLs (500,000 Units) in mouth 4 times daily.  Qty: 473 mL, Refills: 2    Associated Diagnoses: Pancreas replaced by transplant (H)      oxyCODONE (ROXICODONE) 5 MG tablet Take 0.5-1 tablets (2.5-5 mg) by mouth every 6 hours as needed for moderate to severe pain.  Qty: 5 tablet, Refills: 0    Associated Diagnoses: Pancreas replaced by transplant (H)      pantoprazole (PROTONIX) 40 MG EC tablet Take 1 tablet (40 mg) by mouth every morning (before breakfast).  Qty: 30 tablet, Refills: 0    Associated Diagnoses: Pancreas replaced by transplant (H)      polyethylene glycol (MIRALAX) 17 GM/Dose powder Take 17 g by mouth daily.  Qty: 510 g, Refills: 0    Associated Diagnoses: Pancreas replaced by transplant (H)      sulfamethoxazole-trimethoprim (BACTRIM) 400-80 MG tablet Take 1 tablet by mouth daily.  Qty: 30 tablet, Refills: 11    Associated Diagnoses: Pancreas replaced by transplant (H)      valGANciclovir (VALCYTE) 450 MG tablet Take 2 tablets (900 mg) by mouth daily.  Qty: 60 tablet, Refills: 2    Associated Diagnoses: Pancreas replaced by transplant (H)           CONTINUE these medications which have CHANGED    Details   carvedilol (COREG) 6.25 MG tablet Take 1 tablet (6.25 mg) by mouth 2 times daily (with meals).  Qty: 60 tablet, Refills: 2    Associated Diagnoses: HTN, kidney transplant related      !! senna-docusate (SENOKOT-S/PERICOLACE) 8.6-50 MG tablet Take 1-2 tablets by mouth 2 times daily as needed for constipation.  Qty: 60 tablet, Refills: 1    Associated Diagnoses: Kidney replaced by transplant      !! senna-docusate (SENOKOT-S/PERICOLACE) 8.6-50 MG tablet Take 1-2 tablets by mouth 2 times daily as needed for constipation.  Qty: 60  tablet, Refills: 0    Associated Diagnoses: Pancreas replaced by transplant (H)      !! tacrolimus (GENERIC EQUIVALENT) 0.5 MG capsule Take 1 capsule (0.5 mg) by mouth 2 times daily. To have available for dose adjustments per transplant team. Discharge dose = 3 mg twice daily.  Qty: 60 capsule, Refills: 11    Associated Diagnoses: Pancreas replaced by transplant (H)      !! tacrolimus (GENERIC EQUIVALENT) 1 MG capsule Take 4 capsules (4 mg) by mouth 2 times daily.  Qty: 240 capsule, Refills: 11    Associated Diagnoses: Kidney replaced by transplant       !! - Potential duplicate medications found. Please discuss with provider.        CONTINUE these medications which have NOT CHANGED    Details   aspirin (ASA) 81 MG EC tablet Take 81 mg by mouth daily       atorvastatin (LIPITOR) 10 MG tablet Take 2 tablets (20 mg) by mouth daily  Qty: 90 tablet, Refills: 0    Associated Diagnoses: Dyslipidemia      magnesium oxide (MAG-OX) 400 MG tablet Take 1 tablet (400 mg) by mouth daily (with lunch)  Qty: 30 tablet, Refills: 5    Associated Diagnoses: ESRD (end stage renal disease) on dialysis (H)      vitamin D3 (CHOLECALCIFEROL) 50 mcg (2000 units) tablet Take 1 tablet (50 mcg) by mouth daily    Comments: Profile Rx: patient will contact pharmacy when needed  Associated Diagnoses: Aftercare following organ transplant; Vitamin D deficiency      alcohol swab prep pads Use to swab area of injection/zak as directed.  Qty: 100 each, Refills: 3    Associated Diagnoses: Type 1 diabetes mellitus with chronic kidney disease on chronic dialysis (H)      blood glucose (NO BRAND SPECIFIED) lancets standard Use to test blood sugar 3 times daily or as directed.  Qty: 100 each, Refills: 11    Comments: Please provide whatever is covered by patient's insurance  Associated Diagnoses: Type 1 diabetes mellitus with other kidney complication (H)      blood glucose (NO BRAND SPECIFIED) test strip Use to test blood sugar 3 times daily or as  "directed.  Qty: 100 strip, Refills: 11    Comments: Please provide whatever is covered by patient's insurance  Associated Diagnoses: Type 1 diabetes mellitus with other kidney complication (H)      blood glucose monitoring (NO BRAND SPECIFIED) meter device kit Use to test blood sugar 3 times daily or as directed.  Qty: 2 kit, Refills: 0    Comments: Patient requests 2 meters, one for home and one for work.  Please provide whatever is covered by patient's insurance.  Associated Diagnoses: Type 1 diabetes mellitus with other kidney complication (H)      Continuous Glucose Sensor (DEXCOM G6 SENSOR) MISC CHANGE SENSOR EVERY 10 DAYS  Qty: 9 each, Refills: 3    Associated Diagnoses: Type 1 diabetes mellitus with chronic kidney disease on chronic dialysis (H)      Continuous Glucose Transmitter (DEXCOM G6 TRANSMITTER) MISC Change every 3 months  Qty: 1 each, Refills: 3    Associated Diagnoses: Type 1 diabetes mellitus with chronic kidney disease on chronic dialysis (H)      Insulin Infusion Pump Supplies (AUTOSOFT XC INFUSION SET) MISC 1 each every 48 hours Change every 2 days  9 mm cannula, 23 inch tubing  Qty: 45 each, Refills: 3    Associated Diagnoses: Type 1 diabetes mellitus with chronic kidney disease on chronic dialysis (H)      Insulin Infusion Pump Supplies (T:SLIM X2 3ML CARTRIDGE) MISC 1 each by Other route every other day Insulin cartridge to be used with pump as directed.  Change every 2 days or as directed.  Qty: 45 each, Refills: 3    Associated Diagnoses: Type 1 diabetes mellitus with chronic kidney disease on chronic dialysis (H)      insulin pen needle (ULTICARE MICRO) 32G X 4 MM miscellaneous Use pen needles daily or as directed.  Qty: 100 each, Refills: 3    Associated Diagnoses: Type 1 diabetes mellitus with chronic kidney disease on chronic dialysis (H)      Insulin Syringe-Needle U-100 31G X 1/4\" 1 ML MISC 1 Syringe as needed (until new insuline pump arrives)  Qty: 50 each, Refills: 1    Associated " Diagnoses: Type 1 diabetes mellitus with hyperglycemia (H)           STOP taking these medications       insulin glargine (LANTUS SOLOSTAR) 100 UNIT/ML pen Comments:   Reason for Stopping:         insulin lispro (HUMALOG VIAL) 100 UNIT/ML vial Comments:   Reason for Stopping:         mycophenolate (GENERIC EQUIVALENT) 250 MG capsule Comments:   Reason for Stopping:         Semaglutide, 2 MG/DOSE, (OZEMPIC) 8 MG/3ML pen Comments:   Reason for Stopping:                  Home Care Referral      Reason for your hospital stay    You were admitted for a pancreas transplant. You are discharging home in stable condition with close follow up.     Activity    Your activity upon discharge: Walk at least four times a day, lift no greater than 10 pounds for 8 weeks from the time of surgery.  No driving while taking narcotics or 3 weeks after surgery.     Monitor and record    Monitor blood pressure and weight daily.     Monitor glucose via CGM.     When to contact your care team    WHEN TO CONTACT YOUR  COORDINATOR:     Transplant Coordinator [phone]     Notify your coordinator if you have pain over your kidney or pancreas, increased redness or drainage from your incision, fever greater than 100F, decreased urine output or new or increased amount of blood in urine.     Notify your coordinator immediately if you are ever unable to take your immunosuppressive medications for any reason.     If you have URGENT concerns after office hours, please call the hospital switchboard at [phone] and ask to have the organ transplant nurse on-call paged. If you have a life-threatening emergency, go to the nearest emergency room.     Wound care and dressings    Instructions to care for your wound at home: If you have staples in place, they will be removed about 3 weeks after surgery. Wash incision daily with soap and water. Do not soak or scrub.     Tubes and drains    You are going home with the following tubes or drains: RUBIA.  Tube  cares per hospital or home care instructions.     Adult [facility] Follow-up and recommended labs and tests    [facility] FOLLOW UP:     1. [facility]: Over the next 2 days you will be seen in the [facility] (ph. [phone], option 3). Your labs will be drawn at the beginning of your appointment. DO NOT take your medications prior to having labs drawn. Please bring all your medications with you from home to take after labs are drawn.     2. Follow up with Dr. Mcdaniels in Transplant Clinic in 1-2 weeks.     3. Follow up with Transplant Nephrologist as scheduled.     Remember to always bring an updated medication list to all appointments.        Call your Transplant Coordinator ([phone]) with questions about Transplant Center appointment scheduling.     LABS:     CBC, BMP, magnesium, phosphorus, lipase, tacrolimus level to be drawn daily while in ATC, then every Monday and Thursday by home health care nurse if arranged, or at an outpatient lab.     Diet    Diet recommendations post-transplant: Heart healthy dietary habits long term (low saturated/trans fat, low sodium). High protein diet x 8 weeks. Practice food safety precautions.         Data   Most Recent 3 CBC's:  Recent Labs   Lab Test 11/10/24  0609 11/09/24  0624 11/08/24  1330   WBC 5.8 4.9 5.5   HGB 8.4* 8.2* 7.0*   MCV 83 82 83   * 53* 51*      Most Recent 3 BMP's:  Recent Labs   Lab Test 11/10/24  1213 11/10/24  0843 11/10/24  0722 11/10/24  0609 11/09/24  0807 11/09/24  0624 11/08/24  1754 11/08/24  1330   NA  --   --   --  138  --  138  --  138   POTASSIUM  --   --   --  4.2  --  3.7  --  3.3*   CHLORIDE  --   --   --  101  --  103  --  105   CO2  --   --   --  27  --  24  --  25   BUN  --   --   --  22.9*  --  24.7*  --  31.4*   CR  --   --   --  1.68*  --  1.37*  --  1.44*   ANIONGAP  --   --   --  10  --  11  --  8   APRIL  --   --   --  8.3*  --  8.1*  --  7.9*   * 132* 95 101*   < > 99   <  > 136*    < > = values in this interval not displayed.     Most Recent 2 LFT's:  Recent Labs   Lab Test 11/02/24  1352 09/26/19  0945   AST 15 16   ALT 13 42   ALKPHOS 113 100   BILITOTAL 0.5 0.2     Most Recent INR's and Anticoagulation Dosing History:  Anticoagulation Dose History  More data exists         Latest Ref Rng & Units 9/26/2019 10/18/2019 10/22/2019 11/25/2019 6/7/2024 11/2/2024 11/3/2024   Recent Dosing and Labs   INR 0.85 - 1.15 1.00  1.03  1.02  - 1.03  1.05  1.49    ISTAT INR 0.86 - 1.14 - - - 1.1        This test is intended for monitoring Coumadin therapy.  Results are not   accurate in patients with prolonged INR due to factor deficiency.   - - -      Details                 Most Recent 3 Troponin's:No lab results found.  Most Recent Cholesterol Panel:  Recent Labs   Lab Test 09/13/24  1021   CHOL 106   LDL 46   HDL 35*   TRIG 123     Most Recent 6 Bacteria Isolates From Any Culture (See EPIC Reports for Culture Details):  Recent Labs   Lab Test 11/21/19  0810 10/18/19  0630 09/26/19  0950   CULT No growth No growth No growth     Most Recent TSH, T4 and A1c Labs:  Recent Labs   Lab Test 11/03/24  0339 11/02/24  1352 09/13/24  1021   TSH  --   --  1.29   A1C 7.8*   < > 8.7*    < > = values in this interval not displayed.     Results for orders placed or performed during the hospital encounter of 11/02/24   XR Chest 2 Views    Narrative    Exam: XR CHEST 2 VIEWS, 11/2/2024 1:26 PM    Indication: pre-transplant screening    Comparison: 10/1/2019    Findings:   The cardiomediastinal silhouette and pulmonary vasculature are within  normal limits. No pleural effusion or pneumothorax. No focal airspace  opacity.      Impression    Impression: No acute airspace disease.    BRANDON HOLLINGSWORTH DO         SYSTEM ID:  [id number]   XR Chest Port 1 View    Narrative    Exam: XR CHEST PORT 1 VIEW, 11/3/2024 1:53 AM    Comparison: Chest radiograph 11/2/2024    History: Post-op Kidney Transplant    Technique: Portable  AP view of the chest.     Findings:  Right internal jugular central venous catheter tip terminates in the  mid right atrium. Enteric tube tip and sidehole project over the  stomach lumen.    Low lung volumes with perihilar and bibasilar airspace opacities. No  pneumothorax or pleural effusion. Stable cardiac silhouette.      Impression    Impression:  1.  Right internal jugular central venous catheter tip positioned in  mid right atrium. Consider 2 to 3 cm retraction.  2.  Enteric tube tip and sidehole positioned within the stomach lumen.  3.  Low lung volumes with perihilar and bibasilar airspace opacities  favoring atelectasis.    I have personally reviewed the examination and initial interpretation  and I agree with the findings.    BRANDON HOLLINGSWORTH DO         SYSTEM ID:  [id number]   US Pancreas Transplant    Narrative    EXAMINATION:  PANCREAS TRANSPLANT, 11/3/2024 2:23 AM     COMPARISON: None.    HISTORY: s/p pancreas transplant- please assess flows    TECHNIQUE:  Grey-scale, color Doppler and spectral flow analysis.    FINDINGS:     The transplanted pancreas is located in the right lower quadrant and  demonstrates normal echogenicity.   There is no free fluid adjacent to the transplant pancreas.     Transplant pancreas arterial flow:   Within pancreas: 22/6 cm/sec.   Outside pancreas: 47/7 cm/sec.   Anastomosis: 65/9 cm/sec.    Transplant pancreas venous flow:  Within pancreas: 7 cm/sec.   Outside pancreas: 9 cm/sec.   Anastomosis: 23 cm/sec.    Iliac artery:  Above the anastomosis:   90 cm/sec.  Below the anastomosis:   95 cm/sec.    Iliac vein  Above the transplant:   39 cm/sec.  Below the transplant:   20 cm/sec.    At the anastomosis:  Peak-systolic velocity is 65 cm/sec.  End-diastolic velocity is 9 cm/sec.  Resistive index: 0.87        Impression    Impression:   1.  No evidence of arterial or venous stenosis.   2.  Pancreatic transplant vascular velocities and waveforms are within  normal limits on  color/spectral Doppler.      I have personally reviewed the examination and initial interpretation  and I agree with the findings.    BRANDON HOLLINGSWORTH DO         SYSTEM ID:  [id number]   XR Abdomen Port 1 View    Narrative    Exam: XR ABDOMEN PORT 1 VIEW, 11/4/2024 9:21 AM    Indication: ng placement    Comparison: Abdominal ultrasound 11/3/2024    Findings:   Frontal view of the abdomen with patient upright at 60 degrees. A  gastric tube is present with tube tip and sidehole projecting over the  stomach. Surgical clips and staples project over the inferior abdomen.  No abnormally distended loops of large or small bowel. No definite  pneumatosis or portal venous gas.      Impression    Impression: Gastric tube tip and sidehole project over the stomach.    I have personally reviewed the examination and initial interpretation  and I agree with the findings.    FREDRICK EDWARD MD         SYSTEM ID:  [id number]   US Renal Transplant with Doppler    Narrative    EXAMINATION: US RENAL TRANSPLANT,  11/6/2024 11:04 AM     COMPARISON: Ultrasound 12/9/2019    HISTORY: Elevated Cr. Evaluate blood flow, evaluate for hydronephrosis    TECHNIQUE:  Grey-scale, color Doppler and spectral flow analysis.    FINDINGS:  The transplant kidney is located left lower quadrant, and measures  11.5 cm. Parenchyma is of normal thickness and echogenicity. No focal  lesions. No hydronephrosis. No perinephric fluid collection.    Renal artery flow:   92 cm/s peak systolic at hilum.  231 cm/s peak systolic at anastomosis.    Segmental artery resistive indices (upper to lower): 0.72, 0.77, 0.77    Renal Vein Flow:  44 cm/s at hilum.   46 cm/s at anastomosis.    Iliac artery flow:  132 cm/s peak systolic above anastomosis.  149 cm/s peak systolic below anastomosis.    Iliac vein flow:  Patent above and below the anastomosis.    Bladder: Distended and unremarkable.      Impression    IMPRESSION:   1. Normal grayscale evaluation of the left lower quadrant  transplant  kidney.   2. Patent renal transplant Doppler evaluation.  3. Elevated although improved velocities at the renal artery  anastomosis measuring 231 cm/s, previously 442 cm/s. Not significant  in the setting of normal renal artery to iliac artery velocity ratio  and normal waveforms distally.    I have personally reviewed the examination and initial interpretation  and I agree with the findings.    CAESAR DUENAS MD         SYSTEM ID:  [id number]

## 2024-11-08 NOTE — PROGRESS NOTES
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 Patient is now s/p IVETTE with Dr. Mcdaniels on 11/2/24 complicated by arterial reconstruction.     Changes today:    - Restart Bactrim   - Remove internal jugular line    - Lasix BID   - After syncope: BMP, CBC, LA, BG, CT head/C spine, TTE, EKG, Transfuse 1 unit pRBCs   __________________________________________    Graft function:  s/p IVETTE 11/2/24: POD#6. Lipase WNL. Euglycemic, not requiring insulin. Post-op US with patent vasculature.    - Octreotide TID   - ASA 81 mg daily     LDKT 2019 c/b RICHARD: Cr 1.5 from 1.6 (up from baseline 1.1-1.3). Likely prerenal due to hypotension POD#1. US normal, patent. UA and UPC unremarkable.    - Give Lasix BID today     Immunosuppression management:  Induction: via high intensity protocol (cPRA 0) with Thymoglobulin goal 7.5 mg/kg (675 mg total).  Maintenance:    - MMF 1000 mg BID   - Tacrolimus 3 mg BID. Goal level 8-10.     Neuro:  Acute post op pain: Pain well controlled with PRN Tylenol, Robaxin, and oxycodone (minimal use).     Hematology:   Anemia of chronic disease: Hgb ~7.   - Transfuse 1 unit pRBCs today; recheck Hgb tomorrow  Thrombocytopenia: PLT 46. Likely 2/2 to thymo.  Hit panel negative.     Cardiorespiratory:   HTN: -150's.    - Continue Coreg 6.25 mg daily.  HLD: Continue PTA atorvastatin.   Syncopal event: when in the bathroom today likely r/t pain; trauma to head and vomited. CT Head/C spine negative. EKG NSR. BMP, CBC, LA, BG WNL.    - TTE pending    GI/Nutrition:   At risk for malnutrition: Nutrition consulted. Continue regular diet.     Fluid/Electrolytes:   Hypomagnesemia: Continue Mag-Ox 400 mg daily.  Hypervolemia: Weight up ~7.5 Kg.    - Give Lasix BID today    Infectious disease: No issues.      Prophylaxis: DVT (mechanical), fall, GI  (PPI), viral (Valcyte x 3 months), PJP (Bactrim indefinitely), fungal (Micafungin)    Disposition: 7A    ROX/Fellow/Resident Provider: Stephany Diaz NP Pager #[phone]    Faculty: Jean Liu MD  _________________________________________________________________  Transplant History: Admitted 11/2/2024 for kidney transplant.  11/2/2024 (Pancreas), 10/1/2019 (Kidney), Postoperative day: 6 (Pancreas), 1865 (Kidney)     Interval History: History is obtained from the patient  Overnight events: No acute events overnight. Feels well. Would like to discharge today. Orders signed.     At ~13:00 RUBIA drain was stripped and patient experienced significant pain in scrotum. He got up to use the bathroom. While in the bathroom, he became lightheaded, clammy, diaphoretic, and called for help. He then got up and fell, hit his head, and vomited. A code blue was called but patient quickly regained consciousness. His BG was 138. A CT Head/C spine, labs, and EKG and Echo were ordered.     ROS:   A 10-point review of systems was negative except as noted above.    Meds:  Current Facility-Administered Medications   Medication Dose Route Frequency Provider Last Rate Last Admin    aspirin (ASA) chewable tablet 81 mg  81 mg Oral Daily Prince Mcdaniels MD   81 mg at 11/08/24 0904    atorvastatin (LIPITOR) tablet 20 mg  20 mg Oral Daily Aissatou Jarquin PA-C   20 mg at 11/08/24 0905    calcium carbonate-vitamin D (OSCAL) 500-5 MG-MCG per tablet 1 tablet  1 tablet Oral BID w/meals Prince Mcdaniels MD   1 tablet at 11/08/24 0905    carvedilol (COREG) tablet 6.25 mg  6.25 mg Oral BID w/meals Prince Mcdaniels MD   6.25 mg at 11/08/24 0905    famotidine (PEPCID) tablet 20 mg  20 mg Oral BID Prince Mcdaniels MD   20 mg at 11/08/24 0905    furosemide (LASIX) injection 20 mg  20 mg Intravenous Once Stephany Diaz NP        furosemide (LASIX) tablet 40 mg  40 mg Oral Daily  Stephany Diaz NP   40 mg at 11/08/24 0905    insulin aspart (NovoLOG) injection (RAPID ACTING)  1-7 Units Subcutaneous TID AC Aissatou Jarquin PA-C        insulin aspart (NovoLOG) injection (RAPID ACTING)  1-5 Units Subcutaneous At Bedtime Aissatou Jarquin PA-C        magnesium oxide (MAG-OX) tablet 400 mg  400 mg Oral Q24H Prince Mcdaniels MD   400 mg at 11/08/24 1229    micafungin (MYCAMINE) 100 mg in sodium chloride 0.9 % 100 mL intermittent infusion  100 mg Intravenous Q24H Prince Mcdaniels  mL/hr at 11/07/24 1823 100 mg at 11/07/24 1823    mycophenolate (CELLCEPT BRAND) capsule 1,000 mg  1,000 mg Oral BID IS Prince Mcdaniels MD   1,000 mg at 11/08/24 0905    [START ON 11/10/2024] nystatin (MYCOSTATIN) suspension 500,000 Units  500,000 Units Swish & Swallow 4x Daily Stephany Diaz NP        pantoprazole (PROTONIX) EC tablet 40 mg  40 mg Oral QAM AC Prince Mcdaniels MD   40 mg at 11/08/24 0905    potassium chloride judah ER (KLOR-CON M10) CR tablet 40 mEq  40 mEq Oral Once Stephany Diaz NP        sodium chloride (PF) 0.9% PF flush 10 mL  10 mL Intracatheter Q8H Prince Mcdaniels MD   10 mL at 11/07/24 1823    sodium chloride (PF) 0.9% PF flush 3 mL  3 mL Intravenous Q8H Prince Mcdaniels MD   3 mL at 11/07/24 1823    sulfamethoxazole-trimethoprim (BACTRIM) 400-80 MG per tablet 1 tablet  1 tablet Oral Daily Levar Zambrano MD   1 tablet at 11/06/24 0846    tacrolimus (GENERIC EQUIVALENT) capsule 3 mg  3 mg Oral BID IS Prince Mcdaniels MD   3 mg at 11/08/24 0905    valGANciclovir (VALCYTE) tablet 900 mg  900 mg Oral Daily Prince Mcdaniels MD   900 mg at 11/08/24 0905       Physical Exam:     Admit Weight: 89.1 kg (196 lb 8 oz)    Current vitals:   BP (!) 153/79 (BP Location: Left arm)   Pulse 56   Temp 98.3  F (36.8  C) (Oral)   Resp 16   Wt 96.6  kg (213 lb)   SpO2 96%   BMI 33.36 kg/m      Vital sign ranges:    Temp:  [97.5  F (36.4  C)-99.7  F (37.6  C)] 98.3  F (36.8  C)  Pulse:  [56-81] 56  Resp:  [16] 16  BP: (130-157)/(72-80) 153/79  SpO2:  [95 %-98 %] 96 %  Patient Vitals for the past 24 hrs:   BP Temp Temp src Pulse Resp SpO2 Weight   11/08/24 0506 (!) 153/79 98.3  F (36.8  C) Oral 56 16 96 % --   11/08/24 0134 (!) 157/77 98.4  F (36.9  C) Oral 70 16 98 % --   11/08/24 0128 -- -- -- -- -- -- 96.6 kg (213 lb)   11/07/24 2238 (!) 140/72 98.4  F (36.9  C) Oral 74 16 96 % --   11/07/24 2102 (!) 151/78 99.7  F (37.6  C) Oral 77 16 95 % --   11/07/24 1955 (!) 145/80 -- -- 81 -- -- --   11/07/24 1835 -- 98.4  F (36.9  C) Oral -- 16 -- --   11/07/24 1646 130/76 97.5  F (36.4  C) Oral 81 16 98 % --     General Appearance: appears well  Skin: warm, dry  Heart: perfused  Lungs: Stable on RA  Abdomen: The abdomen is soft, non distended, and appropriately tender over the pancreas graft. Incision closed with staples, dry, intact. RUBIA with serosanguinous output.   : No paredes  Extremities: edema: present, generalized  Neurologic: awake, alert, and oriented. Tremor absent.    Data:   CMP  Recent Labs   Lab 11/08/24  1330 11/08/24  1327 11/08/24  0918 11/08/24  0645 11/07/24  0909 11/07/24  0704 11/05/24  0658 11/05/24  0629 11/04/24  0704 11/04/24  0622 11/03/24  0144 11/03/24  0135 11/03/24  0131 11/03/24  0019 11/02/24  1353 11/02/24  1352     --   --  137  --  137   < > 140  --  137   < > 135  --  136   < > 140   POTASSIUM 3.3*  --   --  3.9  --  3.9   < > 4.0  --  4.0   < > 3.7  --  3.6   < > 4.4   CHLORIDE 105  --   --  103  --  105   < > 108*  --  105   < >  --   --   --   --  101   CO2 25  --   --  25  --  25   < > 25  --  25   < >  --   --   --   --  27   * 138*   < > 104*   < > 96   < > 113*   < > 151*   < > 86   < > 79   < > 157*   BUN 31.4*  --   --  32.7*  --  33.8*   < > 19.5  --  16.3   < >  --   --   --   --  14.9   CR 1.44*  --   --   1.49*  --  1.62*   < > 1.41*  --  1.19*   < >  --   --   --   --  1.10   GFRESTIMATED 61  --   --  59*  --  53*   < > 63  --  77   < >  --   --   --   --  84   APRIL 7.9*  --   --  7.9*  --  7.8*   < > 7.6*  --  7.4*   < >  --   --   --   --  9.2   ICAW  --   --   --   --   --   --   --   --   --   --   --  4.4  --  4.6   < >  --    MAG  --   --   --  1.7  --  1.6*   < > 1.8  --  1.9   < >  --   --   --   --   --    PHOS  --   --   --  3.7  --  3.5   < > 2.6  --  2.6   < >  --   --   --   --   --    AMYLASE  --   --   --   --   --   --   --  37  --  66   < >  --   --   --   --  21*   LIPASE  --   --   --  55  --  48   < > 21  --  57   < >  --   --   --   --  11*   ALBUMIN  --   --   --   --   --   --   --   --   --  2.4*  --   --   --   --   --  4.1   BILITOTAL  --   --   --   --   --   --   --   --   --   --   --   --   --   --   --  0.5   ALKPHOS  --   --   --   --   --   --   --   --   --   --   --   --   --   --   --  113   AST  --   --   --   --   --   --   --   --   --   --   --   --   --   --   --  15   ALT  --   --   --   --   --   --   --   --   --   --   --   --   --   --   --  13    < > = values in this interval not displayed.     CBC  Recent Labs   Lab 11/08/24  1330 11/08/24  0937 11/08/24  0645 11/03/24  0530 11/03/24  8599   HGB 7.0* 7.3* 6.6*   < >  --    WBC 5.5  --  3.7*   < >  --    PLT 51*  --  46*   < >  --    A1C  --   --   --   --  7.8*    < > = values in this interval not displayed.     COAGS  Recent Labs   Lab 11/03/24  0144 11/02/24  1352   INR 1.49* 1.05   PTT 33 34      Urinalysis  Recent Labs   Lab Test 11/06/24  1227 11/02/24  1503 11/21/23  1154 11/16/21  1502 05/11/21  1532   COLOR Straw Yellow   < >  --   --    APPEARANCE Clear Clear   < >  --   --    URINEGLC Negative Negative   < >  --   --    URINEBILI Negative Negative   < >  --   --    URINEKETONE Negative Negative   < >  --   --    SG 1.010 1.026   < >  --   --    UBLD Negative Negative   < >  --   --    URINEPH 6.5 5.5   < >  --    --    PROTEIN Negative 10*   < >  --   --    NITRITE Negative Negative   < >  --   --    LEUKEST Negative Negative   < >  --   --    RBCU 0 <1   < >  --   --    WBCU 2 <1   < >  --   --    UTPG  --   --   --  0.11 0.10    < > = values in this interval not displayed.     Virology:  Hepatitis C Antibody   Date Value Ref Range Status   11/02/2024 Nonreactive Nonreactive Final     Comment:     A nonreactive screening test result does not exclude the possibility of exposure to or infection with HCV. Nonreactive screening test results in individuals with prior exposure to HCV may be due to antibody levels below the limit of detection of this assay or lack of reactivity to the HCV antigens used in this assay. Patients with recent HCV infections (<3 months from time of exposure) may have false-negative HCV antibody results due to the time needed for seroconversion (average of 8 to 9 weeks).   09/26/2019 Nonreactive NR^Nonreactive Final     Comment:     Assay performance characteristics have not been established for newborns,   infants, and children       BK Virus Result   Date Value Ref Range Status   05/11/2021 BK Virus DNA Not Detected BKNEG^BK Virus DNA Not Detected copies/mL Final   07/08/2020 BK Virus DNA Not Detected BKNEG^BK Virus DNA Not Detected copies/mL Final   03/31/2020 BK Virus DNA Not Detected BKNEG^BK Virus DNA Not Detected copies/mL Final   02/03/2020 BK Virus DNA Not Detected BKNEG^BK Virus DNA Not Detected copies/mL Final   02/01/2020 BK Virus DNA Not Detected BKNEG^BK Virus DNA Not Detected copies/mL Final   01/06/2020 BK Virus DNA Not Detected BKNEG^BK Virus DNA Not Detected copies/mL Final   12/23/2019 BK Virus DNA Not Detected BKNEG^BK Virus DNA Not Detected copies/mL Final   11/29/2019 BK Virus DNA Not Detected BKNEG^BK Virus DNA Not Detected copies/mL Final   11/04/2019 BK Virus DNA Not Detected BKNEG^BK Virus DNA Not Detected copies/mL Final   10/22/2019 BK Virus DNA Not Detected BKNEG^BK Virus  DNA Not Detected copies/mL Final   10/18/2019 BK Virus DNA Not Detected BKNEG^BK Virus DNA Not Detected copies/mL Final     BK Virus DNA IU/mL   Date Value Ref Range Status   09/13/2024 Not Detected Not Detected IU/mL Final

## 2024-11-08 NOTE — PROGRESS NOTES
[facility]  Transplant Nephrology Consult Note  Date of Admission:  11/2/2024  Today's Date: 11/08/2024  Requesting physician: Prince Mcdaniels*    Reason for Consult:  Follow kidney/pancreas transplant and immunosuppression     Recommendations:   -give 1 dose of lasix 40 mg today  -discharge him on PO lasix 40 mg QDAY (prescribe the 20mg pills to be able to go down to 20 mg qday if needed)    -I resumed his bactrim today     Assessment & Plan   # LDKT: Trend down likely pre renal given net negative fluid balance but given peripheral edema, will be cautious given extra fluids. Likely need diuretics in next several days.    - Baseline Creatinine: ~ 1.1-1.3   - Proteinuria: Normal (<0.2 grams)   - DSA Hx: No DSA   - Last cPRA: 0%   - BK Viremia: No   - Kidney Tx Biopsy Hx: No biopsy history and Acute cellular-mediated rejection.   Nov 25, 2019; Result: Material insufficient for assessment with no glomeruli.   Oct 22, 2019; Result: Borderline acute cellular-mediated rejection  -give 1 dose of lasix 40 mg IV today  -discharge him on PO lasix 40 mg QDAY (prescribe the 20mg pills to be able to go down to 20 mg qday if needed)      # Pancreas Tx (IVETTE):    - Pancreatic Exocrine Drainage: Enteric drained     - Blood glucose: Elevated blood glucose      On insulin: No   - HbA1c: Stable      Latest HbA1c: 7.8%   - Pancreatic enzymes: Stable   - DSA Hx: No DSA at time of transplant   - Pancreas Tx Biopsy Hx: No biopsy history    # Immunosuppression: Tacrolimus immediate release (goal 8-10) and Mycophenolate mofetil (dose 1000 mg every 12 hours)   - Induction with Recent Transplant:  High Intensity Protocol   - Continue with intensive monitoring of immunosuppression for efficacy and toxicity.   - Historical Changes in Immunosuppression: None   - Changes: No    # Infection Prevention:      - PJP: Sulfa/TMP (Bactrim)   - CMV: Valganciclovir (Valcyte)      - CMV IgG Ab High Risk  Discordance (D+/R-): No  CMV Serostatus: Positive  - EBV IgG Ab High Risk Discordance (D+/R-): No  EBV Serostatus: Positive    # Hypertension: Controlled;  Goal BP: < 140/90 (Hospitalization goal)   - Changes: No, c/w coreg 6.25 bid    # Anemia in Chronic Renal Disease: Hgb: Stable      JOSE: No   - Iron studies: Not checked recently    # Mineral Bone Disorder:    - Secondary renal hyperparathyroidism; PTH level: Moderately elevated (301-600 pg/ml)        On treatment: None  - Vitamin D; level: Low        On supplement: Yes  - Calcium; level: Low        On supplement: No  - Phosphorus; level: Low        On supplement: No, replace per protocol     # Electrolytes:   - Potassium; level: Normal        On supplement: No  - Magnesium; level: Low        On supplement: Yes  - Bicarbonate; level: Normal        On supplement: No    # Transplant History:  Etiology of Kidney Failure: Diabetes mellitus type 1  Tx: LDKT  Transplant: 11/2/2024 (Pancreas), 10/1/2019 (Kidney)  Significant transplant-related complications: None    Recommendations were communicated to the primary team via this note.  Levar Zambrano MD        Physician Attestation   I, Connie Membreno MD, was present with the medical/ROX student who participated in the service and in the documentation of the note.  I have verified the history and personally performed the physical exam and medical decision making.  I agree with the assessment and plan of care as documented in the note.      Key findings:   Pt with RICHARD in transplanted kidney after pancreas transplant. Now with improving creatinine since yesterday. Transitioned him to oral lasix as he is nearing to his discharge with anticipation to discontinue in few days once his weight to back to baseline. Discussed with ozempic use, for now will hold given that it can cause pancreatitis.   Pt had near syncopal/ syncopal even this afternoon, so he will be inpt overnight.     Please see A&P for additional details of  medical decision making.    I have personally reviewed the following data over the past 24 hrs:    Connie Membreno MD  Date of Service (when I saw the patient): 11/08/24     Interval events   Cr up to 1.4 to 1.6 to 1.6 to 1.4  Lipase 22  Ca 7.8  Negative RBUS, normal lipase and amylase   Weight down from 99.7 to 96.6 kg     Review of Systems   4 point ROS was obtained and negative except as noted in the Interval History.    MEDICATIONS:  Current Facility-Administered Medications   Medication Dose Route Frequency Provider Last Rate Last Admin    aspirin (ASA) chewable tablet 81 mg  81 mg Oral Daily Prince Mcdaniels MD   81 mg at 11/08/24 0904    atorvastatin (LIPITOR) tablet 20 mg  20 mg Oral Daily Aissatou Jarquin PA-C   20 mg at 11/08/24 0905    calcium carbonate-vitamin D (OSCAL) 500-5 MG-MCG per tablet 1 tablet  1 tablet Oral BID w/meals Prince Mcdaniels MD   1 tablet at 11/08/24 0905    carvedilol (COREG) tablet 6.25 mg  6.25 mg Oral BID w/meals Prince Mcdaniels MD   6.25 mg at 11/08/24 0905    famotidine (PEPCID) tablet 20 mg  20 mg Oral BID Prince Mcdaniels MD   20 mg at 11/08/24 0905    furosemide (LASIX) tablet 40 mg  40 mg Oral Daily Stephany Diaz NP   40 mg at 11/08/24 0905    insulin aspart (NovoLOG) injection (RAPID ACTING)  1-7 Units Subcutaneous TID AC Aissatou Jarquin PA-C        insulin aspart (NovoLOG) injection (RAPID ACTING)  1-5 Units Subcutaneous At Bedtime Aissatou Jarquin PA-C        magnesium oxide (MAG-OX) tablet 400 mg  400 mg Oral Q24H Prince Mcdaniels MD   400 mg at 11/08/24 1229    micafungin (MYCAMINE) 100 mg in sodium chloride 0.9 % 100 mL intermittent infusion  100 mg Intravenous Q24H Prince Mcdaniels  mL/hr at 11/07/24 1823 100 mg at 11/07/24 1823    mycophenolate (CELLCEPT BRAND) capsule 1,000 mg  1,000 mg Oral BID IS Prince Mcdaniels MD    1,000 mg at 24 0905    [START ON 11/10/2024] nystatin (MYCOSTATIN) suspension 500,000 Units  500,000 Units Swish & Swallow 4x Daily Stephany Diaz, GUILLAUME        pantoprazole (PROTONIX) EC tablet 40 mg  40 mg Oral QAM AC Prince Mcdaniels MD   40 mg at 24 0905    sodium chloride (PF) 0.9% PF flush 10 mL  10 mL Intracatheter Q8H Prince Mcdaniels MD   10 mL at 24 1823    sodium chloride (PF) 0.9% PF flush 3 mL  3 mL Intravenous Q8H Prince Mcdaniels MD   3 mL at 24 1823    sulfamethoxazole-trimethoprim (BACTRIM) 400-80 MG per tablet 1 tablet  1 tablet Oral Daily Levar Zambrano MD   1 tablet at 24 0846    tacrolimus (GENERIC EQUIVALENT) capsule 3 mg  3 mg Oral BID IS Prince Mcdaniels MD   3 mg at 24 0905    valGANciclovir (VALCYTE) tablet 900 mg  900 mg Oral Daily Prince Mcdaniels MD   900 mg at 24 0905     Current Facility-Administered Medications   Medication Dose Route Frequency Provider Last Rate Last Admin       Physical Exam   Temp  Av.3  F (36.8  C)  Min: 97.8  F (36.6  C)  Max: 98.9  F (37.2  C)  Arterial Line BP  Min: 110/59  Max: 114/63  Arterial Line MAP (mmHg)  Av mmHg  Min: 75 mmHg  Max: 79 mmHg      Pulse  Av.2  Min: 81  Max: 114 Resp  Av.8  Min: 15  Max: 24  SpO2  Av.7 %  Min: 94 %  Max: 100 %    CVP (mmHg): 9 mmHgBP (!) 153/79 (BP Location: Left arm)   Pulse 56   Temp 98.3  F (36.8  C) (Oral)   Resp 16   Wt 96.6 kg (213 lb)   SpO2 96%   BMI 33.36 kg/m     Date 24 07 - 24 0659   Shift 3310-7293 7428-7800 4184-5021 24 Hour Total   INTAKE   I.V. 250   250   NG/GT 30   30   Shift Total(mL/kg) 280(3.14)   280(3.14)   OUTPUT   Urine 100   100   Shift Total(mL/kg) 100(1.12)   100(1.12)   Weight (kg) 89.13 89.13 89.13 89.13      Admit Weight: 89.1 kg (196 lb 8 oz)     GENERAL APPEARANCE: alert and no distress  HENT: mouth without ulcers  or lesions  RESP: lungs clear to auscultation - no rales, rhonchi or wheezes  CV: regular rhythm, normal rate, no rub, no murmur  EDEMA: no LE edema bilaterally  ABDOMEN: soft, nondistended, nontender, bowel sounds normal  MS: extremities normal - no gross deformities noted, no evidence of inflammation in joints, no muscle tenderness  SKIN: no rash    Data   All labs reviewed by me.  CMP  Recent Labs   Lab 11/08/24  0918 11/08/24  0645 11/08/24  0131 11/07/24  2236 11/07/24  0909 11/07/24  0704 11/06/24  0609 11/06/24  0600 11/05/24  0658 11/05/24  0629 11/04/24  0704 11/04/24  0622 11/02/24  1353 11/02/24  1352   NA  --  137  --   --   --  137  --  141  --  140  --  137   < > 140   POTASSIUM  --  3.9  --   --   --  3.9  --  4.1  --  4.0  --  4.0   < > 4.4   CHLORIDE  --  103  --   --   --  105  --  108*  --  108*  --  105   < > 101   CO2  --  25  --   --   --  25  --  26  --  25  --  25   < > 27   ANIONGAP  --  9  --   --   --  7  --  7  --  7  --  7   < > 12   * 104* 127* 161*   < > 96   < > 101*   < > 113*   < > 151*   < > 157*   BUN  --  32.7*  --   --   --  33.8*  --  27.3*  --  19.5  --  16.3   < > 14.9   CR  --  1.49*  --   --   --  1.62*  --  1.64*  --  1.41*  --  1.19*   < > 1.10   GFRESTIMATED  --  59*  --   --   --  53*  --  52*  --  63  --  77   < > 84   APRIL  --  7.9*  --   --   --  7.8*  --  7.8*  --  7.6*  --  7.4*   < > 9.2   MAG  --  1.7  --   --   --  1.6*  --  1.8  --  1.8  --  1.9   < >  --    PHOS  --  3.7  --   --   --  3.5  --  3.1  --  2.6  --  2.6   < >  --    PROTTOTAL  --   --   --   --   --   --   --   --   --   --   --   --   --  6.9   ALBUMIN  --   --   --   --   --   --   --   --   --   --   --  2.4*  --  4.1   BILITOTAL  --   --   --   --   --   --   --   --   --   --   --   --   --  0.5   ALKPHOS  --   --   --   --   --   --   --   --   --   --   --   --   --  113   AST  --   --   --   --   --   --   --   --   --   --   --   --   --  15   ALT  --   --   --   --   --   --   --    --   --   --   --   --   --  13    < > = values in this interval not displayed.     CBC  Recent Labs   Lab 11/08/24  0937 11/08/24  0645 11/07/24  1351 11/07/24  0704 11/06/24  0600 11/05/24  0629   HGB 7.3* 6.6* 7.3* 6.7* 7.5* 7.9*   WBC  --  3.7*  --  3.5* 2.7* 4.6   RBC  --  2.50*  --  2.47* 2.82* 2.97*   HCT  --  20.5*  --  20.8* 24.0* 25.1*   MCV  --  82  --  84 85 85   MCH  --  26.4*  --  27.1 26.6 26.6   MCHC  --  32.2  --  32.2 31.3* 31.5   RDW  --  13.0  --  13.2 13.3 13.2   PLT  --  46*  --  51* 50* 56*     INR  Recent Labs   Lab 11/03/24  0144 11/02/24  1352   INR 1.49* 1.05   PTT 33 34     ABG  Recent Labs   Lab 11/03/24  0135 11/03/24  0019 11/02/24  2324 11/02/24  2221   PH 7.46* 7.40 7.37 7.36   PCO2 35 37 40 40   PO2 109* 124* 105 129*   HCO3 25 23 23 22   O2PER 55.0 56.0 53.0 46.0      Urine Studies  Recent Labs   Lab Test 11/06/24  1227 11/02/24  1503 11/21/23  1154 11/21/19  0810   COLOR Straw Yellow Straw Yellow   APPEARANCE Clear Clear Clear Clear   URINEGLC Negative Negative Negative Negative   URINEBILI Negative Negative Negative Negative   URINEKETONE Negative Negative Negative Negative   SG 1.010 1.026 1.006 1.016   UBLD Negative Negative Negative Negative   URINEPH 6.5 5.5 6.0 5.0   PROTEIN Negative 10* Negative Negative   NITRITE Negative Negative Negative Negative   LEUKEST Negative Negative Negative Negative   RBCU 0 <1 <1 <1   WBCU 2 <1 0 <1     Recent Labs   Lab Test 11/16/21  1502 05/11/21  1532 07/16/20  1417 11/13/19  1053 10/22/19  0612 10/18/19  0630   UTPG 0.11 0.10 Unable to calculate due to low value 0.21* 0.24* 0.22*     PTH  Recent Labs   Lab Test 11/05/24  0629 09/26/19  0945   PTHI 216* 356*     Iron Studies  Recent Labs   Lab Test 09/26/19  0945   IRON 70      IRONSAT 28   EDOUARD 753*       IMAGING:  All imaging studies reviewed by me.    Past Medical History    I have reviewed this patient's medical history and updated it with pertinent information if needed.   Past  Medical History:   Diagnosis Date    Anemia in chronic kidney disease     Dyslipidemia     ESRD (end stage renal disease) on dialysis (H)     Hyperlipidemia     Hypertension     Hypomagnesemia 10/07/2019    Obese     Secondary hyperparathyroidism (H)     Shingles 2023: Left Axilla    Status post coronary angiogram 2024    Type 1 diabetes (H)        Past Surgical History   I have reviewed this patient's surgical history and updated it with pertinent information if needed.  Past Surgical History:   Procedure Laterality Date    APPENDECTOMY OPEN N/A 2024    Procedure: Appendectomy open;  Surgeon: Prince Mcdaniels MD;  [facility]    BENCH PANCREAS N/A 2024    Procedure: Bench pancreas;  Surgeon: Prince Mcdaniels MD;  [facility]    CV CORONARY ANGIOGRAM N/A 2024    Procedure: Coronary Angiogram;  Surgeon: Derrell Jauregui MD;  [facility]    CV PCI N/A 2024    Procedure: Percutaneous Coronary Intervention;  Surgeon: Derrell Jauregui MD;  Location:  [facility]    hair implant      INSERT CATHETER PERITONEAL DIALYSIS  2018    IR FINE NEEDLE ASPIRATION W ULTRASOUND  2019    IR RENAL BIOPSY LEFT  2019    PERCUTANEOUS BIOPSY KIDNEY Left 10/22/2019    Procedure: Left Kidney Biopsy;  Surgeon: Kash Hoffman MD;  Location: [facility] OR    TRANSPLANT PANCREAS RECIPIENT  DONOR N/A 2024    Procedure: Pancreas transplant, Iliac vein repair;  Surgeon: Prince Mcdaniels MD;  Location:  OR       Family History   I have reviewed this patient's family history and updated it with pertinent information if needed.   Family History   Problem Relation Age of Onset    No Known Problems Mother     Diabetes Father     Coronary Artery Disease Father     No Known Problems Brother     Skin Cancer Paternal Uncle     Melanoma No family hx of        Social History   I have  reviewed this patient's social history and updated it with pertinent information if needed. Michael Amin  reports that he has never smoked. He has never used smokeless tobacco. He reports that he does not currently use alcohol. He reports that he does not use drugs.    Prior to Admission Medications   Medications Prior to Admission   Medication Sig Dispense Refill Last Dose/Taking    aspirin (ASA) 81 MG EC tablet Take 81 mg by mouth daily    11/2/2024 Morning    atorvastatin (LIPITOR) 10 MG tablet Take 2 tablets (20 mg) by mouth daily 90 tablet 0 11/1/2024 Evening    magnesium oxide (MAG-OX) 400 MG tablet Take 1 tablet (400 mg) by mouth daily (with lunch) 30 tablet 5 11/1/2024 Noon    vitamin D3 (CHOLECALCIFEROL) 50 mcg (2000 units) tablet Take 1 tablet (50 mcg) by mouth daily   11/2/2024 Morning    alcohol swab prep pads Use to swab area of injection/zak as directed. 100 each 3     blood glucose (NO BRAND SPECIFIED) lancets standard Use to test blood sugar 3 times daily or as directed. 100 each 11     blood glucose (NO BRAND SPECIFIED) test strip Use to test blood sugar 3 times daily or as directed. 100 strip 11     blood glucose monitoring (NO BRAND SPECIFIED) meter device kit Use to test blood sugar 3 times daily or as directed. 2 kit 0     Continuous Glucose Sensor (DEXCOM G6 SENSOR) MISC CHANGE SENSOR EVERY 10 DAYS 9 each 3     Continuous Glucose Transmitter (DEXCOM G6 TRANSMITTER) MISC Change every 3 months 1 each 3     Insulin Infusion Pump Supplies (AUTOSOFT XC INFUSION SET) MISC 1 each every 48 hours Change every 2 days  9 mm cannula, 23 inch tubing 45 each 3     Insulin Infusion Pump Supplies (T:SLIM X2 3ML CARTRIDGE) MISC 1 each by Other route every other day Insulin cartridge to be used with pump as directed.  Change every 2 days or as directed. 45 each 3     insulin pen needle (ULTICARE MICRO) 32G X 4 MM miscellaneous Use pen needles daily or as directed. 100 each 3     Insulin Syringe-Needle U-100  "31G X 1/4\" 1 ML MISC 1 Syringe as needed (until new insuline pump arrives) 50 each 1     [DISCONTINUED] carvedilol (COREG) 12.5 MG tablet TAKE 1 TABLET BY MOUTH TWICE DAILY WITH MEALS PLEASE  REQUEST  FUTURE  REFILLS  FROM  YOUR  PRIMARY  CARE  PROVIDER 180 tablet 0 11/2/2024 Morning    [DISCONTINUED] insulin glargine (LANTUS SOLOSTAR) 100 UNIT/ML pen Inject 28 units subcutaneous once a day ONLY IF INSULIN PUMP FAILS. 15 mL 1     [DISCONTINUED] insulin lispro (HUMALOG VIAL) 100 UNIT/ML vial Use in insulin pump. Pt uses approx 75 units daily. 80 mL 3 Unknown    [DISCONTINUED] mycophenolate (GENERIC EQUIVALENT) 250 MG capsule Take 3 capsules (750 mg) by mouth 2 times daily 180 capsule 11 11/2/2024 Morning    [DISCONTINUED] Semaglutide, 2 MG/DOSE, (OZEMPIC) 8 MG/3ML pen Inject 2 mg subcutaneous once a week. Please provide 90 day supply. (Patient taking differently: Inject 2 mg subcutaneously once a week. Inject 2 mg subcutaneous once a week. Please provide 90 day supply.) 9 mL 3 10/26/2024 Morning    [DISCONTINUED] senna-docusate (SENOKOT-S/PERICOLACE) 8.6-50 MG tablet Take 2 tablets by mouth 2 times daily (Patient taking differently: Take 2 tablets by mouth daily as needed for constipation.) 20 tablet 0 Unknown    [DISCONTINUED] tacrolimus (GENERIC) 0.5 MG capsule Take 1 capsule (0.5 mg) by mouth every morning Total dose = 1.5 mg in the AM and 1 mg in the PM 90 capsule 3 11/2/2024 Morning    [DISCONTINUED] tacrolimus (GENERIC) 1 MG capsule Total dose = 1.5 mg in the AM and 1 mg in the  capsule 3 11/2/2024 Morning      "

## 2024-11-08 NOTE — PROGRESS NOTES
Care Management Follow Up    Length of Stay (days): 5    Expected Discharge Date: 11/08/2024     Concerns to be Addressed: discharge planning     Patient plan of care discussed at interdisciplinary rounds: Yes    Anticipated Discharge Disposition: Home Care, Home, Other (Comments) (outpatient labs)      Anticipated Discharge Services: Home Care  Anticipated Discharge DME: None    Patient/family educated on Medicare website which has current facility and service quality ratings: no  Education Provided on the Discharge Plan: Yes  Patient/Family in Agreement with the Plan: yes    Referrals Placed by CM/SW: Homecare, External Care Coordination, Scheduled Follow-up appointments  Private pay costs discussed: Not applicable    Discussed  Partnership in Safe Discharge Planning  document with patient/family: Yes: patient     Handoff Completed: No, handoff not indicated or clinically appropriate    Additional Information:       Met with patient at bedside for discharge education. Updated patient that he has home care but will still need to go outpatient for labs. Patient is in agreement with the plan.   Patient was reporting feeling dizzy to bedside Nurse after his bathroom trip. Nurse was going to recheck his BP.   Patient denied any other need. Brother will provide ride at discharge.    Home care order already placed.       Patient will no longer discharge today due to his new episode of dizziness and syncope.   Home care updated.     Discharge resources:  Outpatient labs 2x weekly      [facility]   Steph @ [phone]   Fax: [phone]  FYI: [facility] is not open on weekends and they don't do lab draw.     Next Steps:   Fax discharge order to  home care   RNCC following for discharge planning when patient is med ready.  Confirm ATC appointment.    CC will continue to follow up.  Irene Diaz RN, PHN, BSN   Float Nurse Care Coordinator  Covering for [address]  Phone [phone]

## 2024-11-09 LAB
ALBUMIN UR-MCNC: NEGATIVE MG/DL
ANION GAP SERPL CALCULATED.3IONS-SCNC: 11 MMOL/L (ref 7–15)
APPEARANCE UR: CLEAR
BASOPHILS # BLD MANUAL: 0 10E3/UL (ref 0–0.2)
BASOPHILS NFR BLD MANUAL: 0 %
BILIRUB UR QL STRIP: NEGATIVE
BUN SERPL-MCNC: 24.7 MG/DL (ref 6–20)
BURR CELLS BLD QL SMEAR: SLIGHT
CALCIUM SERPL-MCNC: 8.1 MG/DL (ref 8.8–10.4)
CHLORIDE SERPL-SCNC: 103 MMOL/L (ref 98–107)
COLOR UR AUTO: NORMAL
CREAT SERPL-MCNC: 1.37 MG/DL (ref 0.67–1.17)
DACRYOCYTES BLD QL SMEAR: SLIGHT
EGFRCR SERPLBLD CKD-EPI 2021: 65 ML/MIN/1.73M2
EOSINOPHIL # BLD MANUAL: 0 10E3/UL (ref 0–0.7)
EOSINOPHIL NFR BLD MANUAL: 1 %
ERYTHROCYTE [DISTWIDTH] IN BLOOD BY AUTOMATED COUNT: 13.5 % (ref 10–15)
GLUCOSE BLDC GLUCOMTR-MCNC: 110 MG/DL (ref 70–99)
GLUCOSE BLDC GLUCOMTR-MCNC: 112 MG/DL (ref 70–99)
GLUCOSE BLDC GLUCOMTR-MCNC: 88 MG/DL (ref 70–99)
GLUCOSE BLDC GLUCOMTR-MCNC: 88 MG/DL (ref 70–99)
GLUCOSE BLDC GLUCOMTR-MCNC: 91 MG/DL (ref 70–99)
GLUCOSE BLDC GLUCOMTR-MCNC: 91 MG/DL (ref 70–99)
GLUCOSE BLDC GLUCOMTR-MCNC: 94 MG/DL (ref 70–99)
GLUCOSE BLDC GLUCOMTR-MCNC: 99 MG/DL (ref 70–99)
GLUCOSE SERPL-MCNC: 99 MG/DL (ref 70–99)
GLUCOSE UR STRIP-MCNC: NEGATIVE MG/DL
HCO3 SERPL-SCNC: 24 MMOL/L (ref 22–29)
HCT VFR BLD AUTO: 24.7 % (ref 40–53)
HGB BLD-MCNC: 8.2 G/DL (ref 13.3–17.7)
HGB UR QL STRIP: NEGATIVE
KETONES UR STRIP-MCNC: NEGATIVE MG/DL
LEUKOCYTE ESTERASE UR QL STRIP: NEGATIVE
LIPASE SERPL-CCNC: 83 U/L (ref 13–60)
LYMPHOCYTES # BLD MANUAL: 0 10E3/UL (ref 0.8–5.3)
LYMPHOCYTES NFR BLD MANUAL: 1 %
MAGNESIUM SERPL-MCNC: 1.7 MG/DL (ref 1.7–2.3)
MCH RBC QN AUTO: 27.3 PG (ref 26.5–33)
MCHC RBC AUTO-ENTMCNC: 33.2 G/DL (ref 31.5–36.5)
MCV RBC AUTO: 82 FL (ref 78–100)
METAMYELOCYTES # BLD MANUAL: 0.1 10E3/UL
METAMYELOCYTES NFR BLD MANUAL: 3 %
MONOCYTES # BLD MANUAL: 0.2 10E3/UL (ref 0–1.3)
MONOCYTES NFR BLD MANUAL: 3 %
MYELOCYTES # BLD MANUAL: 0.1 10E3/UL
MYELOCYTES NFR BLD MANUAL: 2 %
NEUTROPHILS # BLD MANUAL: 4.5 10E3/UL (ref 1.6–8.3)
NEUTROPHILS NFR BLD MANUAL: 91 %
NITRATE UR QL: NEGATIVE
NRBC # BLD AUTO: 0.1 10E3/UL
NRBC BLD MANUAL-RTO: 2 %
PH UR STRIP: 7 [PH] (ref 5–7)
PHOSPHATE SERPL-MCNC: 3.5 MG/DL (ref 2.5–4.5)
PLAT MORPH BLD: ABNORMAL
PLATELET # BLD AUTO: 53 10E3/UL (ref 150–450)
POTASSIUM SERPL-SCNC: 3.7 MMOL/L (ref 3.4–5.3)
RBC # BLD AUTO: 3 10E6/UL (ref 4.4–5.9)
RBC MORPH BLD: ABNORMAL
RBC URINE: 0 /HPF
SODIUM SERPL-SCNC: 138 MMOL/L (ref 135–145)
SP GR UR STRIP: 1.01 (ref 1–1.03)
TACROLIMUS BLD-MCNC: 7.8 UG/L (ref 5–15)
TME LAST DOSE: NORMAL H
TME LAST DOSE: NORMAL H
TRANSITIONAL EPI: <1 /HPF
UROBILINOGEN UR STRIP-MCNC: NORMAL MG/DL
WBC # BLD AUTO: 4.9 10E3/UL (ref 4–11)
WBC URINE: 2 /HPF

## 2024-11-09 PROCEDURE — 250N000012 HC RX MED GY IP 250 OP 636 PS 637: Performed by: PHYSICIAN ASSISTANT

## 2024-11-09 PROCEDURE — 36415 COLL VENOUS BLD VENIPUNCTURE: CPT | Performed by: PHYSICIAN ASSISTANT

## 2024-11-09 PROCEDURE — 85027 COMPLETE CBC AUTOMATED: CPT | Performed by: SURGERY

## 2024-11-09 PROCEDURE — 99233 SBSQ HOSP IP/OBS HIGH 50: CPT | Mod: 24 | Performed by: INTERNAL MEDICINE

## 2024-11-09 PROCEDURE — 85007 BL SMEAR W/DIFF WBC COUNT: CPT | Performed by: SURGERY

## 2024-11-09 PROCEDURE — 87086 URINE CULTURE/COLONY COUNT: CPT | Performed by: PHYSICIAN ASSISTANT

## 2024-11-09 PROCEDURE — 80048 BASIC METABOLIC PNL TOTAL CA: CPT | Performed by: SURGERY

## 2024-11-09 PROCEDURE — 81001 URINALYSIS AUTO W/SCOPE: CPT | Performed by: PHYSICIAN ASSISTANT

## 2024-11-09 PROCEDURE — 250N000013 HC RX MED GY IP 250 OP 250 PS 637: Performed by: PHYSICIAN ASSISTANT

## 2024-11-09 PROCEDURE — 120N000011 HC R&B TRANSPLANT UMMC

## 2024-11-09 PROCEDURE — 250N000013 HC RX MED GY IP 250 OP 250 PS 637: Performed by: NURSE PRACTITIONER

## 2024-11-09 PROCEDURE — 250N000012 HC RX MED GY IP 250 OP 636 PS 637: Performed by: SURGERY

## 2024-11-09 PROCEDURE — 250N000013 HC RX MED GY IP 250 OP 250 PS 637: Performed by: SURGERY

## 2024-11-09 PROCEDURE — 80197 ASSAY OF TACROLIMUS: CPT | Performed by: PHYSICIAN ASSISTANT

## 2024-11-09 PROCEDURE — 83690 ASSAY OF LIPASE: CPT | Performed by: SURGERY

## 2024-11-09 PROCEDURE — 83735 ASSAY OF MAGNESIUM: CPT | Performed by: SURGERY

## 2024-11-09 PROCEDURE — 250N000011 HC RX IP 250 OP 636: Performed by: PHYSICIAN ASSISTANT

## 2024-11-09 PROCEDURE — 250N000013 HC RX MED GY IP 250 OP 250 PS 637: Performed by: STUDENT IN AN ORGANIZED HEALTH CARE EDUCATION/TRAINING PROGRAM

## 2024-11-09 PROCEDURE — 84100 ASSAY OF PHOSPHORUS: CPT | Performed by: SURGERY

## 2024-11-09 RX ORDER — AMOXICILLIN 250 MG
1-2 CAPSULE ORAL 2 TIMES DAILY
Status: DISCONTINUED | OUTPATIENT
Start: 2024-11-09 | End: 2024-11-11 | Stop reason: HOSPADM

## 2024-11-09 RX ORDER — TACROLIMUS 1 MG/1
1 CAPSULE ORAL ONCE
Status: COMPLETED | OUTPATIENT
Start: 2024-11-09 | End: 2024-11-09

## 2024-11-09 RX ORDER — TACROLIMUS 1 MG/1
4 CAPSULE ORAL
Status: DISCONTINUED | OUTPATIENT
Start: 2024-11-10 | End: 2024-11-11 | Stop reason: HOSPADM

## 2024-11-09 RX ORDER — FUROSEMIDE 10 MG/ML
60 INJECTION INTRAMUSCULAR; INTRAVENOUS 2 TIMES DAILY
Status: DISCONTINUED | OUTPATIENT
Start: 2024-11-09 | End: 2024-11-10

## 2024-11-09 RX ADMIN — CARVEDILOL 6.25 MG: 6.25 TABLET, FILM COATED ORAL at 08:18

## 2024-11-09 RX ADMIN — ATORVASTATIN CALCIUM 20 MG: 20 TABLET, FILM COATED ORAL at 08:18

## 2024-11-09 RX ADMIN — Medication 1 TABLET: at 17:00

## 2024-11-09 RX ADMIN — CARVEDILOL 6.25 MG: 6.25 TABLET, FILM COATED ORAL at 19:52

## 2024-11-09 RX ADMIN — FAMOTIDINE 20 MG: 20 TABLET ORAL at 19:52

## 2024-11-09 RX ADMIN — ACETAMINOPHEN 650 MG: 325 TABLET, FILM COATED ORAL at 05:37

## 2024-11-09 RX ADMIN — TACROLIMUS 3 MG: 1 CAPSULE ORAL at 08:18

## 2024-11-09 RX ADMIN — SULFAMETHOXAZOLE AND TRIMETHOPRIM 1 TABLET: 400; 80 TABLET ORAL at 08:18

## 2024-11-09 RX ADMIN — PANTOPRAZOLE SODIUM 40 MG: 40 TABLET, DELAYED RELEASE ORAL at 08:18

## 2024-11-09 RX ADMIN — MYCOPHENOLATE MOFETIL 1000 MG: 250 CAPSULE ORAL at 08:18

## 2024-11-09 RX ADMIN — ASPIRIN 81 MG CHEWABLE TABLET 81 MG: 81 TABLET CHEWABLE at 08:18

## 2024-11-09 RX ADMIN — ACETAMINOPHEN 650 MG: 325 TABLET, FILM COATED ORAL at 18:33

## 2024-11-09 RX ADMIN — MAGNESIUM OXIDE TAB 400 MG (241.3 MG ELEMENTAL MG) 400 MG: 400 (241.3 MG) TAB at 11:46

## 2024-11-09 RX ADMIN — VALGANCICLOVIR 900 MG: 450 TABLET, FILM COATED ORAL at 08:18

## 2024-11-09 RX ADMIN — ACETAMINOPHEN 650 MG: 325 TABLET, FILM COATED ORAL at 23:52

## 2024-11-09 RX ADMIN — TACROLIMUS 1 MG: 1 CAPSULE ORAL at 18:33

## 2024-11-09 RX ADMIN — FUROSEMIDE 40 MG: 20 TABLET ORAL at 08:18

## 2024-11-09 RX ADMIN — FUROSEMIDE 60 MG: 10 INJECTION, SOLUTION INTRAMUSCULAR; INTRAVENOUS at 16:46

## 2024-11-09 RX ADMIN — Medication 1 TABLET: at 08:18

## 2024-11-09 RX ADMIN — SENNOSIDES AND DOCUSATE SODIUM 2 TABLET: 8.6; 5 TABLET ORAL at 11:46

## 2024-11-09 RX ADMIN — FAMOTIDINE 20 MG: 20 TABLET ORAL at 08:18

## 2024-11-09 RX ADMIN — MYCOPHENOLATE MOFETIL 1000 MG: 250 CAPSULE ORAL at 17:00

## 2024-11-09 RX ADMIN — TACROLIMUS 3 MG: 1 CAPSULE ORAL at 17:00

## 2024-11-09 RX ADMIN — SENNOSIDES AND DOCUSATE SODIUM 1 TABLET: 8.6; 5 TABLET ORAL at 19:52

## 2024-11-09 ASSESSMENT — ACTIVITIES OF DAILY LIVING (ADL)
ADLS_ACUITY_SCORE: 0
DRESSING/BATHING_DIFFICULTY: NO
ADLS_ACUITY_SCORE: 0
ADLS_ACUITY_SCORE: 0
FALL_HISTORY_WITHIN_LAST_SIX_MONTHS: YES
ADLS_ACUITY_SCORE: 0
HEARING_DIFFICULTY_OR_DEAF: NO
ADLS_ACUITY_SCORE: 0
ADLS_ACUITY_SCORE: 0
TOILETING_ISSUES: NO
CHANGE_IN_FUNCTIONAL_STATUS_SINCE_ONSET_OF_CURRENT_ILLNESS/INJURY: NO
ADLS_ACUITY_SCORE: 0
ADLS_ACUITY_SCORE: 0
DOING_ERRANDS_INDEPENDENTLY_DIFFICULTY: NO
ADLS_ACUITY_SCORE: 0
DIFFICULTY_EATING/SWALLOWING: NO
WALKING_OR_CLIMBING_STAIRS_DIFFICULTY: NO
ADLS_ACUITY_SCORE: 0
NUMBER_OF_TIMES_PATIENT_HAS_FALLEN_WITHIN_LAST_SIX_MONTHS: 1
ADLS_ACUITY_SCORE: 0
CONCENTRATING,_REMEMBERING_OR_MAKING_DECISIONS_DIFFICULTY: NO
WEAR_GLASSES_OR_BLIND: NO
ADLS_ACUITY_SCORE: 0
ADLS_ACUITY_SCORE: 0
DIFFICULTY_COMMUNICATING: NO
ADLS_ACUITY_SCORE: 0

## 2024-11-09 NOTE — PLAN OF CARE
Goal Outcome Evaluation:    Plan of Care Reviewed With: patient  Overall Patient Progress: declining  Outcome Evaluation: Patient unable to discharge today due to fall, low hgb, and emesis    BP (!) 148/75 (BP Location: Left arm)   Pulse 70   Temp 97.8  F (36.6  C) (Oral)   Resp 15   Wt 96.6 kg (213 lb)   SpO2 95%   BMI 33.36 kg/m      [id number]. VSS on RA, afebrile. Hgb 7.0 this shift, K+ 3.3. ACHS BG. Patient endorsed some abdominal pain, PRN tylenol & dilaudid given. Patient had two episodes of large emesis, brown/green. PRN IV dilaudid given x1. PIV infusing blood. RUBIA with serosang drainage. Midline incision stapled, NEHA.     Patient was supposed to discharge earlier this shift. After discussing discharge paperwork with him, patient suddenly felt dizzy. He attempted to get up and after having a fall had one episode of large emesis & episode of incontinence. Team at bedside, imaging/tests etc done. Discharge delayed. Waiting on taking patient to xray due to emesis. Continue to monitor.

## 2024-11-09 NOTE — PROVIDER NOTIFICATION
1800: Provider notified regarding patient's emesis of 500 mL. Vitals are stable, but nausea is new. Patient was unable to tolerate oral medications. On call will address at the bedside.

## 2024-11-09 NOTE — PLAN OF CARE
Goal Outcome Evaluation:      Plan of Care Reviewed With: patient    Overall Patient Progress: no changeOverall Patient Progress: no change    Outcome Evaluation: Abdominal X-ray done, Diet chaged to NPO, Denies nausea.  Shift: [id number]  BP (!) 150/75 (BP Location: Left arm)   Pulse 78   Temp 98.3  F (36.8  C) (Oral)   Resp 16   Wt 93.3 kg (205 lb 11.2 oz)   SpO2 99%   BMI 32.22 kg/m       VS- Stable  on RA.   BG- ACHS  109, 112  Labs- Pending AM collection  Pain/Nausea/PRN'S- denies pain/Nausea.  Diet- NPO  LDA- PIV X1 SL, Right RUBIA drain serosanguinous output  Gtt/IVF- none   GI/- voiding on lasix, One BM overnight  Skin- abdominal incision with staples  Activity- SBA, ambulated in halls   Plan- Abdominal X-ray completed. continue with POC

## 2024-11-09 NOTE — PROGRESS NOTES
[facility]  Transplant Nephrology Progress Note  Date of Admission:  11/2/2024  Today's Date: 11/09/2024    Recommendations:   - No acute indications for dialysis.  - Would make no changes in immunosuppression.  - Recommend good bowel regimen.  - Would give furosemide 40 mg IV bid.    Assessment & Plan   # LDKT: Trend down in creatinine, now back to baseline.  Good urine output.  No acute indications for dialysis.   - Baseline Creatinine: ~ 1.1-1.3   - Proteinuria: Normal (<0.2 grams)   - DSA Hx: No DSA   - Last cPRA: 0%   - BK Viremia: No   - Kidney Tx Biopsy Hx: Acute cellular-mediated rejection.    # Pancreas Tx (IVETTE): Increase in lipase, possibly due to constipation.   - Pancreatic Exocrine Drainage: Enteric drained     - Blood glucose: Elevated blood glucose      On insulin: No   - HbA1c: Last checked at time of transplant      Latest HbA1c: 7.8%   - Pancreatic enzymes: Trend up   - DSA Hx: No DSA at time of transplant   - Pancreas Tx Biopsy Hx: No biopsy history    # Immunosuppression: Tacrolimus immediate release (goal 8-10) and Mycophenolate mofetil (dose 1000 mg every 12 hours)   - Induction with Recent Transplant:  High Intensity Protocol   - Continue with intensive monitoring of immunosuppression for efficacy and toxicity.   - Historical Changes in Immunosuppression: None   - Changes: No    # Infection Prevention:      - PJP: Sulfa/TMP (Bactrim)   - CMV: Valganciclovir (Valcyte)      - CMV IgG Ab High Risk Discordance (D+/R-): No  CMV Serostatus: Positive  - EBV IgG Ab High Risk Discordance (D+/R-): No  EBV Serostatus: Positive    # Hypertension: Controlled;  Goal BP: < 140/90 (Hospitalization goal)   - Changes: Not at this time    # Anemia in Chronic Renal Disease: Hgb: Trend up, low      JOSE: No   - Iron studies: Not checked recently    # Mineral Bone Disorder:    - Secondary renal hyperparathyroidism; PTH level: Mildly elevated (151-300 pg/ml)        On  treatment: None  - Vitamin D; level: Low        On supplement: Yes  - Calcium; level: Low        On supplement: Yes  - Phosphorus; level: Normal        On supplement: No    # Electrolytes:   - Potassium; level: Normal        On supplement: No  - Magnesium; level: Normal        On supplement: Yes  - Bicarbonate; level: Normal        On supplement: No    # Obesity, Class I (BMI = 32): Trend down in weight with diuresis.   - Once patient recovers from surgery and incision heals, would recommend working on weight loss for overall health by increasing exercise and watching caloric intake.   - Patient was previously on semaglutide prior to IVETTE.  Would be okay to restart it ~ 2-3 months post transplant.    # Other Significant PMH:   - None    # Transplant History:  Etiology of Kidney Failure: Diabetes mellitus type 1  Tx: LDKT  Transplant: 11/2/2024 (Pancreas), 10/1/2019 (Kidney)  Significant transplant-related complications: None    Recommendations were communicated to the primary team via this note.    Jabier Hernández MD  Transplant Nephrology  Contact information via Vocera Web Console or via [facility] Paging/Directory    Interval Events  Mr. Amin's creatinine is 1.37 (11/09 0624); Trend down.  Good urine output.  Trend up lipase level.  Other significant labs/tests/vitals: Stable electrolytes.  Trend up hemoglobin.  Stable, low platelet level.  No new events overnight.  Patient had syncopal episode yesterday following significant pain from drain and ambulating to the bathroom.  No chest pain or shortness of breath.  Some leg swelling.  No nausea and vomiting.  Bowel movements are soft.  No fever, sweats or chills.    Review of Systems   4 point ROS was obtained and negative except as noted in the Interval History.    MEDICATIONS:  Current Facility-Administered Medications   Medication Dose Route Frequency Provider Last Rate Last Admin    aspirin (ASA) chewable tablet 81 mg  81 mg Oral Daily Prince Mcdaniels  MD Michael   81 mg at 11/09/24 0818    atorvastatin (LIPITOR) tablet 20 mg  20 mg Oral Daily Aissatou Jarquin PA-C   20 mg at 11/09/24 0818    calcium carbonate-vitamin D (OSCAL) 500-5 MG-MCG per tablet 1 tablet  1 tablet Oral BID w/meals Prince Mcdaniels MD   1 tablet at 11/09/24 0818    carvedilol (COREG) tablet 6.25 mg  6.25 mg Oral BID w/meals Prince Mcdaniels MD   6.25 mg at 11/09/24 0818    famotidine (PEPCID) tablet 20 mg  20 mg Oral BID Prince Mcdaniels MD   20 mg at 11/09/24 0818    furosemide (LASIX) tablet 40 mg  40 mg Oral Daily Stephany Diaz NP   40 mg at 11/09/24 0818    insulin aspart (NovoLOG) injection (RAPID ACTING)  1-7 Units Subcutaneous TID AC Aissatou Jarquin PA-C        insulin aspart (NovoLOG) injection (RAPID ACTING)  1-5 Units Subcutaneous At Bedtime Aissatou Jarquin PA-C        magnesium oxide (MAG-OX) tablet 400 mg  400 mg Oral Q24H Prince Mcdaniels MD   400 mg at 11/09/24 1146    mycophenolate (CELLCEPT BRAND) capsule 1,000 mg  1,000 mg Oral BID IS Prince Mcdaniels MD   1,000 mg at 11/09/24 0818    [START ON 11/10/2024] nystatin (MYCOSTATIN) suspension 500,000 Units  500,000 Units Swish & Swallow 4x Daily Stephany Diaz, GUILLAUME        pantoprazole (PROTONIX) EC tablet 40 mg  40 mg Oral QAM AC Prince Mcdaniels MD   40 mg at 11/09/24 0818    senna-docusate (SENOKOT-S/PERICOLACE) 8.6-50 MG per tablet 1-2 tablet  1-2 tablet Oral BID Aissatou Jarquin PA-C   2 tablet at 11/09/24 1146    sodium chloride (PF) 0.9% PF flush 10 mL  10 mL Intracatheter Q8H Prince Mcdaniels MD   10 mL at 11/08/24 2013    sodium chloride (PF) 0.9% PF flush 3 mL  3 mL Intravenous Q8H Prince Mcdaniels MD   3 mL at 11/09/24 1147    sulfamethoxazole-trimethoprim (BACTRIM) 400-80 MG per tablet 1 tablet  1 tablet Oral Daily Levar Zambrano MD   1 tablet at  24 08    tacrolimus (GENERIC EQUIVALENT) capsule 3 mg  3 mg Oral BID IS Prince Mcdaniels MD   3 mg at 24    valGANciclovir (VALCYTE) tablet 900 mg  900 mg Oral Daily Prince Mcdaniels MD   900 mg at 24 0818     Current Facility-Administered Medications   Medication Dose Route Frequency Provider Last Rate Last Admin       Physical Exam   Temp  Av.3  F (36.8  C)  Min: 97.8  F (36.6  C)  Max: 98.9  F (37.2  C)  Arterial Line BP  Min: 110/59  Max: 114/63  Arterial Line MAP (mmHg)  Av mmHg  Min: 75 mmHg  Max: 79 mmHg      Pulse  Av.2  Min: 81  Max: 114 Resp  Av.8  Min: 15  Max: 24  SpO2  Av.7 %  Min: 94 %  Max: 100 %    CVP (mmHg): 9 mmHgBP 123/74 (BP Location: Left arm)   Pulse 82   Temp 98.3  F (36.8  C) (Oral)   Resp 16   Wt 93.3 kg (205 lb 11.2 oz)   SpO2 97%   BMI 32.22 kg/m     Date 24 07 - 24 0659   Shift 4741-4032 3156-7084 8103-5179 24 Hour Total   INTAKE   I.V. 250   250   NG/GT 30   30   Shift Total(mL/kg) 280(3.14)   280(3.14)   OUTPUT   Urine 100   100   Shift Total(mL/kg) 100(1.12)   100(1.12)   Weight (kg) 89.13 89.13 89.13 89.13      Admit Weight: 89.1 kg (196 lb 8 oz)     GENERAL APPEARANCE: alert and no distress  HENT: mouth without ulcers or lesions  RESP: lungs clear to auscultation - no rales, rhonchi or wheezes  CV: regular rhythm, normal rate, no rub, no murmur  EDEMA: trace to 1+ LE edema bilaterally  ABDOMEN: soft, nondistended, minimal TTP, bowel sounds normal  MS: extremities normal - no gross deformities noted, no evidence of inflammation in joints, no muscle tenderness  SKIN: no rash  TX KIDNEY: normal  DIALYSIS ACCESS: none    Data   All labs reviewed by me.  CMP  Recent Labs   Lab 24  1223 24  0807 24  0624 24  0151 24  1754 24  1330 24  0918 24  0645 24  0909 24  0704 24  0609 24  0600 24  0704 24  0622    NA  --   --  138  --   --  138  --  137  --  137  --  141   < > 137   POTASSIUM  --   --  3.7  --   --  3.3*  --  3.9  --  3.9  --  4.1   < > 4.0   CHLORIDE  --   --  103  --   --  105  --  103  --  105  --  108*   < > 105   CO2  --   --  24  --   --  25  --  25  --  25  --  26   < > 25   ANIONGAP  --   --  11  --   --  8  --  9  --  7  --  7   < > 7   GLC 88 88 99 112*   < > 136*   < > 104*   < > 96   < > 101*   < > 151*   BUN  --   --  24.7*  --   --  31.4*  --  32.7*  --  33.8*  --  27.3*   < > 16.3   CR  --   --  1.37*  --   --  1.44*  --  1.49*  --  1.62*  --  1.64*   < > 1.19*   GFRESTIMATED  --   --  65  --   --  61  --  59*  --  53*  --  52*   < > 77   APRIL  --   --  8.1*  --   --  7.9*  --  7.9*  --  7.8*  --  7.8*   < > 7.4*   MAG  --   --  1.7  --   --   --   --  1.7  --  1.6*  --  1.8   < > 1.9   PHOS  --   --  3.5  --   --   --   --  3.7  --  3.5  --  3.1   < > 2.6   ALBUMIN  --   --   --   --   --   --   --   --   --   --   --   --   --  2.4*    < > = values in this interval not displayed.     CBC  Recent Labs   Lab 11/09/24  0624 11/08/24  1330 11/08/24  0937 11/08/24  0645 11/07/24  1351 11/07/24  0704   HGB 8.2* 7.0* 7.3* 6.6*   < > 6.7*   WBC 4.9 5.5  --  3.7*  --  3.5*   RBC 3.00* 2.61*  --  2.50*  --  2.47*   HCT 24.7* 21.7*  --  20.5*  --  20.8*   MCV 82 83  --  82  --  84   MCH 27.3 26.8  --  26.4*  --  27.1   MCHC 33.2 32.3  --  32.2  --  32.2   RDW 13.5 13.1  --  13.0  --  13.2   PLT 53* 51*  --  46*  --  51*    < > = values in this interval not displayed.     INR  Recent Labs   Lab 11/03/24  0144   INR 1.49*   PTT 33     ABG  Recent Labs   Lab 11/03/24  0135 11/03/24  0019 11/02/24  2324 11/02/24  2221   PH 7.46* 7.40 7.37 7.36   PCO2 35 37 40 40   PO2 109* 124* 105 129*   HCO3 25 23 23 22   O2PER 55.0 56.0 53.0 46.0      Urine Studies  Recent Labs   Lab Test 11/09/24  1148 11/06/24  1227 11/02/24  1503 11/21/23  1154   COLOR Light Yellow Straw Yellow Straw   APPEARANCE Clear Clear Clear  Clear   URINEGLC Negative Negative Negative Negative   URINEBILI Negative Negative Negative Negative   URINEKETONE Negative Negative Negative Negative   SG 1.014 1.010 1.026 1.006   UBLD Negative Negative Negative Negative   URINEPH 7.0 6.5 5.5 6.0   PROTEIN Negative Negative 10* Negative   NITRITE Negative Negative Negative Negative   LEUKEST Negative Negative Negative Negative   RBCU 0 0 <1 <1   WBCU 2 2 <1 0     Recent Labs   Lab Test 11/16/21  1502 05/11/21  1532 07/16/20  1417 11/13/19  1053 10/22/19  0612 10/18/19  0630   UTPG 0.11 0.10 Unable to calculate due to low value 0.21* 0.24* 0.22*     PTH  Recent Labs   Lab Test 11/05/24  0629 09/26/19  0945   PTHI 216* 356*     Iron Studies  Recent Labs   Lab Test 09/26/19  0945   IRON 70      IRONSAT 28   EDOUARD 753*       IMAGING:  All imaging studies reviewed by me.

## 2024-11-09 NOTE — PROGRESS NOTES
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 Patient is now s/p IVETTE with Dr. Mcdaniels on 11/2/24 complicated by arterial reconstruction.     Changes today:    - ANITHA   - Lasix IV BID   - UA/UC d/t dysuria   __________________________________________    Graft function:  s/p IVETTE 11/2/24: POD#7. Lipase WNL. Euglycemic, not requiring insulin. Post-op US with patent vasculature.    - Octreotide TID   - ASA 81 mg daily     LDKT 2019 c/b RICHARD: Cr 1.5 from 1.6 (up from baseline 1.1-1.3). Likely prerenal due to hypotension POD#1. US normal, patent. UA and UPC unremarkable.    - Give Lasix BID today     Immunosuppression management:  Induction: via high intensity protocol (cPRA 0) with Thymoglobulin goal 7.5 mg/kg (675 mg total).  Maintenance:    - MMF 1000 mg BID   - Tacrolimus 3 mg BID. Goal level 8-10.     Neuro:  Acute post op pain: Pain well controlled with PRN Tylenol, Robaxin, and oxycodone (minimal use).     Hematology:   Anemia of chronic disease: Hgb ~7.   - Transfuse 1 unit pRBCs today; recheck Hgb tomorrow  Thrombocytopenia: PLT 46. Likely 2/2 to thymo.  Hit panel negative.     Cardiorespiratory:   HTN: -150's.    - Continue Coreg 6.25 mg daily.  HLD: Continue PTA atorvastatin.   Syncopal event: when in the bathroom today likely r/t pain; trauma to head and vomited. CT Head/C spine negative. EKG NSR. BMP, CBC, LA, BG WNL.    - TTE pending    GI/Nutrition:   At risk for malnutrition: Nutrition consulted. Continue regular diet.     Fluid/Electrolytes:   Hypomagnesemia: Continue Mag-Ox 400 mg daily.  Hypervolemia: Weight up ~7.5 Kg.    - Give Lasix BID today    Infectious disease: No issues.      Prophylaxis: DVT (mechanical), fall, GI (PPI), viral (Valcyte x 3 months), PJP (Bactrim indefinitely), fungal (Micafungin)    Disposition:  7A    ROX/Fellow/Resident Provider: Stephany Diaz NP Pager #[phone]    Faculty: Jean Liu MD  _________________________________________________________________  Transplant History: Admitted 11/2/2024 for kidney transplant.  11/2/2024 (Pancreas), 10/1/2019 (Kidney), Postoperative day: 7 (Pancreas), 1866 (Kidney)     Interval History: History is obtained from the patient  Overnight events: No acute events overnight. Feels well. Would like to discharge today. Orders signed.     At ~13:00 RUBIA drain was stripped and patient experienced significant pain in scrotum. He got up to use the bathroom. While in the bathroom, he became lightheaded, clammy, diaphoretic, and called for help. He then got up and fell, hit his head, and vomited. A code blue was called but patient quickly regained consciousness. His BG was 138. A CT Head/C spine, labs, and EKG and Echo were ordered.     ROS:   A 10-point review of systems was negative except as noted above.    Meds:  Current Facility-Administered Medications   Medication Dose Route Frequency Provider Last Rate Last Admin    aspirin (ASA) chewable tablet 81 mg  81 mg Oral Daily Prince Mcdaniels MD   81 mg at 11/09/24 0818    atorvastatin (LIPITOR) tablet 20 mg  20 mg Oral Daily Aissatou Jarquin PA-C   20 mg at 11/09/24 0818    calcium carbonate-vitamin D (OSCAL) 500-5 MG-MCG per tablet 1 tablet  1 tablet Oral BID w/meals Prince Mcdaniels MD   1 tablet at 11/09/24 1700    carvedilol (COREG) tablet 6.25 mg  6.25 mg Oral BID w/meals Prince Mcdaniels MD   6.25 mg at 11/09/24 0818    famotidine (PEPCID) tablet 20 mg  20 mg Oral BID Prince Mcdaniels MD   20 mg at 11/09/24 0818    furosemide (LASIX) injection 60 mg  60 mg Intravenous BID Aissatou Jarquin PA-C   60 mg at 11/09/24 1646    insulin aspart (NovoLOG) injection (RAPID ACTING)  1-7 Units Subcutaneous TID AC Aissatou Jarquin PA-C        insulin aspart  (NovoLOG) injection (RAPID ACTING)  1-5 Units Subcutaneous At Bedtime Aissatou Jarquin PA-C        magnesium oxide (MAG-OX) tablet 400 mg  400 mg Oral Q24H Prince Mcdaniels MD   400 mg at 11/09/24 1146    mycophenolate (CELLCEPT BRAND) capsule 1,000 mg  1,000 mg Oral BID IS Prince Mcdaniels MD   1,000 mg at 11/09/24 1700    [START ON 11/10/2024] nystatin (MYCOSTATIN) suspension 500,000 Units  500,000 Units Swish & Swallow 4x Daily Stephany Diaz, NP        pantoprazole (PROTONIX) EC tablet 40 mg  40 mg Oral QAM AC Prince Mcdaniels MD   40 mg at 11/09/24 0818    senna-docusate (SENOKOT-S/PERICOLACE) 8.6-50 MG per tablet 1-2 tablet  1-2 tablet Oral BID Aissatou Jarquin PA-C   2 tablet at 11/09/24 1146    sodium chloride (PF) 0.9% PF flush 10 mL  10 mL Intracatheter Q8H Prince Mcdaniels MD   10 mL at 11/08/24 2013    sodium chloride (PF) 0.9% PF flush 3 mL  3 mL Intravenous Q8H Prince Mcdaniels MD   3 mL at 11/09/24 1147    sulfamethoxazole-trimethoprim (BACTRIM) 400-80 MG per tablet 1 tablet  1 tablet Oral Daily Levar Zambrano MD   1 tablet at 11/09/24 0818    tacrolimus (GENERIC EQUIVALENT) capsule 3 mg  3 mg Oral BID IS Prince Mcdaniels MD   3 mg at 11/09/24 1700    valGANciclovir (VALCYTE) tablet 900 mg  900 mg Oral Daily Prince Mcdaniels MD   900 mg at 11/09/24 0818       Physical Exam:     Admit Weight: 89.1 kg (196 lb 8 oz)    Current vitals:   /71 (BP Location: Left arm)   Pulse 86   Temp 97.4  F (36.3  C) (Oral)   Resp 16   Wt 93.3 kg (205 lb 11.2 oz)   SpO2 97%   BMI 32.22 kg/m      Vital sign ranges:    Temp:  [97.4  F (36.3  C)-98.4  F (36.9  C)] 97.4  F (36.3  C)  Pulse:  [78-86] 86  Resp:  [16] 16  BP: (111-150)/(71-77) 111/71  SpO2:  [95 %-100 %] 97 %  Patient Vitals for the past 24 hrs:   BP Temp Temp src Pulse Resp SpO2 Weight   11/09/24 1341 111/71 97.4  F  (36.3  C) Oral 86 16 97 % --   11/09/24 0932 123/74 98.3  F (36.8  C) Oral 82 16 97 % --   11/09/24 0528 (!) 150/75 98.3  F (36.8  C) Oral 78 16 99 % 93.3 kg (205 lb 11.2 oz)   11/09/24 0153 (!) 149/77 98.4  F (36.9  C) Oral 79 16 100 % --   11/08/24 2214 125/72 98.3  F (36.8  C) Oral -- 16 97 % --   11/08/24 1757 (!) 148/75 -- -- -- -- 95 % --     General Appearance: appears well  Skin: warm, dry  Heart: perfused  Lungs: Stable on RA  Abdomen: The abdomen is soft, non distended, and appropriately tender over the pancreas graft. Incision closed with staples, dry, intact. RUBIA with serosanguinous output.   : No paredes  Extremities: edema: present, generalized  Neurologic: awake, alert, and oriented. Tremor absent.    Data:   CMP  Recent Labs   Lab 11/09/24  1437 11/09/24  1223 11/09/24  0807 11/09/24  0624 11/08/24  1754 11/08/24  1330 11/08/24  0918 11/08/24  0645 11/05/24  0658 11/05/24  0629 11/04/24  0704 11/04/24  0622 11/03/24  0144 11/03/24  0135 11/03/24  0131 11/03/24  0019   NA  --   --   --  138  --  138  --  137   < > 140  --  137   < > 135  --  136   POTASSIUM  --   --   --  3.7  --  3.3*  --  3.9   < > 4.0  --  4.0   < > 3.7  --  3.6   CHLORIDE  --   --   --  103  --  105  --  103   < > 108*  --  105   < >  --   --   --    CO2  --   --   --  24  --  25  --  25   < > 25  --  25   < >  --   --   --    * 88   < > 99   < > 136*   < > 104*   < > 113*   < > 151*   < > 86   < > 79   BUN  --   --   --  24.7*  --  31.4*  --  32.7*   < > 19.5  --  16.3   < >  --   --   --    CR  --   --   --  1.37*  --  1.44*  --  1.49*   < > 1.41*  --  1.19*   < >  --   --   --    GFRESTIMATED  --   --   --  65  --  61  --  59*   < > 63  --  77   < >  --   --   --    APRIL  --   --   --  8.1*  --  7.9*  --  7.9*   < > 7.6*  --  7.4*   < >  --   --   --    ICAW  --   --   --   --   --   --   --   --   --   --   --   --   --  4.4  --  4.6   MAG  --   --   --  1.7  --   --   --  1.7   < > 1.8  --  1.9   < >  --   --   --     PHOS  --   --   --  3.5  --   --   --  3.7   < > 2.6  --  2.6   < >  --   --   --    AMYLASE  --   --   --   --   --   --   --   --   --  37  --  66   < >  --   --   --    LIPASE  --   --   --  83*  --   --   --  55   < > 21  --  57   < >  --   --   --    ALBUMIN  --   --   --   --   --   --   --   --   --   --   --  2.4*  --   --   --   --     < > = values in this interval not displayed.     CBC  Recent Labs   Lab 11/09/24  0624 11/08/24  1330 11/03/24  0530 11/03/24  0339   HGB 8.2* 7.0*   < >  --    WBC 4.9 5.5   < >  --    PLT 53* 51*   < >  --    A1C  --   --   --  7.8*    < > = values in this interval not displayed.     COAGS  Recent Labs   Lab 11/03/24  0144   INR 1.49*   PTT 33      Urinalysis  Recent Labs   Lab Test 11/09/24  1148 11/06/24  1227 11/21/23  1154 11/16/21  1502 05/11/21  1532   COLOR Light Yellow Straw   < >  --   --    APPEARANCE Clear Clear   < >  --   --    URINEGLC Negative Negative   < >  --   --    URINEBILI Negative Negative   < >  --   --    URINEKETONE Negative Negative   < >  --   --    SG 1.014 1.010   < >  --   --    UBLD Negative Negative   < >  --   --    URINEPH 7.0 6.5   < >  --   --    PROTEIN Negative Negative   < >  --   --    NITRITE Negative Negative   < >  --   --    LEUKEST Negative Negative   < >  --   --    RBCU 0 0   < >  --   --    WBCU 2 2   < >  --   --    UTPG  --   --   --  0.11 0.10    < > = values in this interval not displayed.     Virology:  Hepatitis C Antibody   Date Value Ref Range Status   11/02/2024 Nonreactive Nonreactive Final     Comment:     A nonreactive screening test result does not exclude the possibility of exposure to or infection with HCV. Nonreactive screening test results in individuals with prior exposure to HCV may be due to antibody levels below the limit of detection of this assay or lack of reactivity to the HCV antigens used in this assay. Patients with recent HCV infections (<3 months from time of exposure) may have false-negative  HCV antibody results due to the time needed for seroconversion (average of 8 to 9 weeks).   09/26/2019 Nonreactive NR^Nonreactive Final     Comment:     Assay performance characteristics have not been established for newborns,   infants, and children       BK Virus Result   Date Value Ref Range Status   05/11/2021 BK Virus DNA Not Detected BKNEG^BK Virus DNA Not Detected copies/mL Final   07/08/2020 BK Virus DNA Not Detected BKNEG^BK Virus DNA Not Detected copies/mL Final   03/31/2020 BK Virus DNA Not Detected BKNEG^BK Virus DNA Not Detected copies/mL Final   02/03/2020 BK Virus DNA Not Detected BKNEG^BK Virus DNA Not Detected copies/mL Final   02/01/2020 BK Virus DNA Not Detected BKNEG^BK Virus DNA Not Detected copies/mL Final   01/06/2020 BK Virus DNA Not Detected BKNEG^BK Virus DNA Not Detected copies/mL Final   12/23/2019 BK Virus DNA Not Detected BKNEG^BK Virus DNA Not Detected copies/mL Final   11/29/2019 BK Virus DNA Not Detected BKNEG^BK Virus DNA Not Detected copies/mL Final   11/04/2019 BK Virus DNA Not Detected BKNEG^BK Virus DNA Not Detected copies/mL Final   10/22/2019 BK Virus DNA Not Detected BKNEG^BK Virus DNA Not Detected copies/mL Final   10/18/2019 BK Virus DNA Not Detected BKNEG^BK Virus DNA Not Detected copies/mL Final     BK Virus DNA IU/mL   Date Value Ref Range Status   09/13/2024 Not Detected Not Detected IU/mL Final

## 2024-11-10 LAB
ANION GAP SERPL CALCULATED.3IONS-SCNC: 10 MMOL/L (ref 7–15)
BACTERIA UR CULT: NO GROWTH
BASOPHILS # BLD MANUAL: 0 10E3/UL (ref 0–0.2)
BASOPHILS NFR BLD MANUAL: 0 %
BUN SERPL-MCNC: 22.9 MG/DL (ref 6–20)
CALCIUM SERPL-MCNC: 8.3 MG/DL (ref 8.8–10.4)
CHLORIDE SERPL-SCNC: 101 MMOL/L (ref 98–107)
CREAT SERPL-MCNC: 1.68 MG/DL (ref 0.67–1.17)
DACRYOCYTES BLD QL SMEAR: SLIGHT
EGFRCR SERPLBLD CKD-EPI 2021: 51 ML/MIN/1.73M2
ELLIPTOCYTES BLD QL SMEAR: SLIGHT
EOSINOPHIL # BLD MANUAL: 0 10E3/UL (ref 0–0.7)
EOSINOPHIL NFR BLD MANUAL: 0 %
ERYTHROCYTE [DISTWIDTH] IN BLOOD BY AUTOMATED COUNT: 13.8 % (ref 10–15)
GLUCOSE BLDC GLUCOMTR-MCNC: 101 MG/DL (ref 70–99)
GLUCOSE BLDC GLUCOMTR-MCNC: 104 MG/DL (ref 70–99)
GLUCOSE BLDC GLUCOMTR-MCNC: 112 MG/DL (ref 70–99)
GLUCOSE BLDC GLUCOMTR-MCNC: 119 MG/DL (ref 70–99)
GLUCOSE BLDC GLUCOMTR-MCNC: 130 MG/DL (ref 70–99)
GLUCOSE BLDC GLUCOMTR-MCNC: 132 MG/DL (ref 70–99)
GLUCOSE BLDC GLUCOMTR-MCNC: 139 MG/DL (ref 70–99)
GLUCOSE BLDC GLUCOMTR-MCNC: 95 MG/DL (ref 70–99)
GLUCOSE SERPL-MCNC: 101 MG/DL (ref 70–99)
HCO3 SERPL-SCNC: 27 MMOL/L (ref 22–29)
HCT VFR BLD AUTO: 25.9 % (ref 40–53)
HGB BLD-MCNC: 8.4 G/DL (ref 13.3–17.7)
LIPASE SERPL-CCNC: 88 U/L (ref 13–60)
LYMPHOCYTES # BLD MANUAL: 0 10E3/UL (ref 0.8–5.3)
LYMPHOCYTES NFR BLD MANUAL: 0 %
MAGNESIUM SERPL-MCNC: 1.5 MG/DL (ref 1.7–2.3)
MCH RBC QN AUTO: 27 PG (ref 26.5–33)
MCHC RBC AUTO-ENTMCNC: 32.4 G/DL (ref 31.5–36.5)
MCV RBC AUTO: 83 FL (ref 78–100)
METAMYELOCYTES # BLD MANUAL: 0.1 10E3/UL
METAMYELOCYTES NFR BLD MANUAL: 2 %
MONOCYTES # BLD MANUAL: 0.2 10E3/UL (ref 0–1.3)
MONOCYTES NFR BLD MANUAL: 3 %
NEUTROPHILS # BLD MANUAL: 5.6 10E3/UL (ref 1.6–8.3)
NEUTROPHILS NFR BLD MANUAL: 96 %
NRBC # BLD AUTO: 0.1 10E3/UL
NRBC BLD MANUAL-RTO: 1 %
PHOSPHATE SERPL-MCNC: 3.7 MG/DL (ref 2.5–4.5)
PLAT MORPH BLD: ABNORMAL
PLATELET # BLD AUTO: 100 10E3/UL (ref 150–450)
POLYCHROMASIA BLD QL SMEAR: SLIGHT
POTASSIUM SERPL-SCNC: 4.2 MMOL/L (ref 3.4–5.3)
RBC # BLD AUTO: 3.11 10E6/UL (ref 4.4–5.9)
RBC MORPH BLD: ABNORMAL
SODIUM SERPL-SCNC: 138 MMOL/L (ref 135–145)
WBC # BLD AUTO: 5.8 10E3/UL (ref 4–11)

## 2024-11-10 PROCEDURE — 120N000011 HC R&B TRANSPLANT UMMC

## 2024-11-10 PROCEDURE — 250N000012 HC RX MED GY IP 250 OP 636 PS 637: Performed by: SURGERY

## 2024-11-10 PROCEDURE — 258N000003 HC RX IP 258 OP 636: Performed by: PHYSICIAN ASSISTANT

## 2024-11-10 PROCEDURE — 83690 ASSAY OF LIPASE: CPT | Performed by: SURGERY

## 2024-11-10 PROCEDURE — 84100 ASSAY OF PHOSPHORUS: CPT | Performed by: SURGERY

## 2024-11-10 PROCEDURE — 250N000012 HC RX MED GY IP 250 OP 636 PS 637: Performed by: PHYSICIAN ASSISTANT

## 2024-11-10 PROCEDURE — 99233 SBSQ HOSP IP/OBS HIGH 50: CPT | Mod: 24 | Performed by: INTERNAL MEDICINE

## 2024-11-10 PROCEDURE — 36415 COLL VENOUS BLD VENIPUNCTURE: CPT | Performed by: SURGERY

## 2024-11-10 PROCEDURE — 80048 BASIC METABOLIC PNL TOTAL CA: CPT | Performed by: SURGERY

## 2024-11-10 PROCEDURE — 250N000011 HC RX IP 250 OP 636: Performed by: PHYSICIAN ASSISTANT

## 2024-11-10 PROCEDURE — 250N000013 HC RX MED GY IP 250 OP 250 PS 637: Performed by: STUDENT IN AN ORGANIZED HEALTH CARE EDUCATION/TRAINING PROGRAM

## 2024-11-10 PROCEDURE — 83735 ASSAY OF MAGNESIUM: CPT | Performed by: SURGERY

## 2024-11-10 PROCEDURE — 85027 COMPLETE CBC AUTOMATED: CPT | Performed by: SURGERY

## 2024-11-10 PROCEDURE — 250N000013 HC RX MED GY IP 250 OP 250 PS 637: Performed by: SURGERY

## 2024-11-10 PROCEDURE — 250N000013 HC RX MED GY IP 250 OP 250 PS 637: Performed by: PHYSICIAN ASSISTANT

## 2024-11-10 PROCEDURE — 85007 BL SMEAR W/DIFF WBC COUNT: CPT | Performed by: SURGERY

## 2024-11-10 PROCEDURE — 250N000013 HC RX MED GY IP 250 OP 250 PS 637: Performed by: NURSE PRACTITIONER

## 2024-11-10 RX ORDER — TACROLIMUS 1 MG/1
4 CAPSULE ORAL 2 TIMES DAILY
Qty: 240 CAPSULE | Refills: 11 | Status: ACTIVE | OUTPATIENT
Start: 2024-11-10 | End: 2024-11-12

## 2024-11-10 RX ORDER — MAGNESIUM SULFATE HEPTAHYDRATE 40 MG/ML
4 INJECTION, SOLUTION INTRAVENOUS ONCE
Status: COMPLETED | OUTPATIENT
Start: 2024-11-10 | End: 2024-11-10

## 2024-11-10 RX ORDER — AMOXICILLIN 250 MG
1-2 CAPSULE ORAL 2 TIMES DAILY PRN
Qty: 60 TABLET | Refills: 1 | Status: SHIPPED | OUTPATIENT
Start: 2024-11-10 | End: 2024-11-11

## 2024-11-10 RX ORDER — BISACODYL 10 MG
10 SUPPOSITORY, RECTAL RECTAL ONCE
Status: COMPLETED | OUTPATIENT
Start: 2024-11-10 | End: 2024-11-10

## 2024-11-10 RX ADMIN — ACETAMINOPHEN 650 MG: 325 TABLET, FILM COATED ORAL at 06:04

## 2024-11-10 RX ADMIN — CARVEDILOL 6.25 MG: 6.25 TABLET, FILM COATED ORAL at 08:06

## 2024-11-10 RX ADMIN — TACROLIMUS 4 MG: 1 CAPSULE ORAL at 07:48

## 2024-11-10 RX ADMIN — FAMOTIDINE 20 MG: 20 TABLET ORAL at 20:35

## 2024-11-10 RX ADMIN — NYSTATIN 500000 UNITS: 100000 SUSPENSION ORAL at 12:15

## 2024-11-10 RX ADMIN — ACETAMINOPHEN 650 MG: 325 TABLET, FILM COATED ORAL at 21:56

## 2024-11-10 RX ADMIN — TACROLIMUS 4 MG: 1 CAPSULE ORAL at 17:53

## 2024-11-10 RX ADMIN — SENNOSIDES AND DOCUSATE SODIUM 2 TABLET: 8.6; 5 TABLET ORAL at 20:35

## 2024-11-10 RX ADMIN — ATORVASTATIN CALCIUM 20 MG: 20 TABLET, FILM COATED ORAL at 07:51

## 2024-11-10 RX ADMIN — BISACODYL 10 MG: 10 SUPPOSITORY RECTAL at 17:53

## 2024-11-10 RX ADMIN — FAMOTIDINE 20 MG: 20 TABLET ORAL at 08:09

## 2024-11-10 RX ADMIN — NYSTATIN 500000 UNITS: 100000 SUSPENSION ORAL at 07:51

## 2024-11-10 RX ADMIN — SODIUM CHLORIDE 500 ML: 9 INJECTION, SOLUTION INTRAVENOUS at 13:41

## 2024-11-10 RX ADMIN — MYCOPHENOLATE MOFETIL 1000 MG: 250 CAPSULE ORAL at 17:53

## 2024-11-10 RX ADMIN — VALGANCICLOVIR 900 MG: 450 TABLET, FILM COATED ORAL at 08:08

## 2024-11-10 RX ADMIN — NYSTATIN 500000 UNITS: 100000 SUSPENSION ORAL at 17:00

## 2024-11-10 RX ADMIN — Medication 1 TABLET: at 08:06

## 2024-11-10 RX ADMIN — ASPIRIN 81 MG CHEWABLE TABLET 81 MG: 81 TABLET CHEWABLE at 07:47

## 2024-11-10 RX ADMIN — NYSTATIN 500000 UNITS: 100000 SUSPENSION ORAL at 20:35

## 2024-11-10 RX ADMIN — SULFAMETHOXAZOLE AND TRIMETHOPRIM 1 TABLET: 400; 80 TABLET ORAL at 08:11

## 2024-11-10 RX ADMIN — FUROSEMIDE 60 MG: 10 INJECTION, SOLUTION INTRAMUSCULAR; INTRAVENOUS at 08:06

## 2024-11-10 RX ADMIN — BISACODYL 10 MG: 10 SUPPOSITORY RECTAL at 09:51

## 2024-11-10 RX ADMIN — MAGNESIUM SULFATE IN WATER 4 G: 40 INJECTION, SOLUTION INTRAVENOUS at 08:40

## 2024-11-10 RX ADMIN — PANTOPRAZOLE SODIUM 40 MG: 40 TABLET, DELAYED RELEASE ORAL at 08:06

## 2024-11-10 RX ADMIN — MYCOPHENOLATE MOFETIL 1000 MG: 250 CAPSULE ORAL at 07:48

## 2024-11-10 RX ADMIN — Medication 1 TABLET: at 17:53

## 2024-11-10 RX ADMIN — MAGNESIUM HYDROXIDE 30 ML: 400 SUSPENSION ORAL at 17:53

## 2024-11-10 RX ADMIN — MAGNESIUM OXIDE TAB 400 MG (241.3 MG ELEMENTAL MG) 400 MG: 400 (241.3 MG) TAB at 12:15

## 2024-11-10 RX ADMIN — SENNOSIDES AND DOCUSATE SODIUM 1 TABLET: 8.6; 5 TABLET ORAL at 08:10

## 2024-11-10 RX ADMIN — SODIUM CHLORIDE 500 ML: 9 INJECTION, SOLUTION INTRAVENOUS at 11:10

## 2024-11-10 NOTE — PROGRESS NOTES
Care Management Discharge Note    Discharge Date: 11/10/2024       Discharge Disposition: Home Care, Home, Other (Comments) (outpatient labs)    Discharge Services: Home Care    Discharge DME: None    Discharge Transportation: family or friend will provide    Private pay costs discussed: Not applicable    Does the patient's insurance plan have a 3 day qualifying hospital stay waiver?  No    PAS Confirmation Code:    Patient/family educated on Medicare website which has current facility and service quality ratings: no    Education Provided on the Discharge Plan: Yes  Persons Notified of Discharge Plans: Yes  Patient/Family in Agreement with the Plan: yes    Handoff Referral Completed: Yes, non-[facility] PCP: External handoff communication completed    Additional Information:  -CM informed that patient may be medically ready for discharge today.   -Per chart review patient discharging to brother's house in [place]:     [address] [place] [id number]    -CM confirmed [facility] in place and per last CM note can perform labs, so patient to complete M/TH labs at a [facility] OP lab. Patient aware labs are M/Th and confirmed he already knows [facility] clinic he will go to.   -Patient has ATC appointments in place for 11/11 and 11/12.   -Patient confirmed brother will pick him up.     CM updated note 1600: CM informed that patient will stay another night.     CM Next Steps:   -Updated HC agency Monday when they open that patient didn't discharge [place] 11/10  -Follow up with [facility] to adjust appointments in place as no one is answering the phone     [facility]   Steph @ [phone]   Fax: [phone]  FYI: HC is not open on weekends and they don't do lab draw.    Kat Patel RN BSN  [place] RN Care Coordinator   Covering Unit 7A   Phone [phone]      SEARCHABLE in [employer] - search CARE COORDINATOR       [place] [facility] [place] ([phone]) Saturday & Sunday; ([phone]) [facility] Recognized Holidays     Units: 5A Onc  5201 - 5219 RNCC,  5A Onc 5220 thru 5240 RNCC, 5C OFFSERVICE [id number] RNCC & 5C OFF SERVICE [id number] RNCC     Units: 6B Vocera, 6C Card 6401 thru 6420 RNCC, 6C Card 6502 thru 6514 RNCC & 6C Card 6515 thru 6519 RNCC     Units: 7A SOT RNCC Vocera, 7B Med Surg Vocera, 7C Med Surg 7401 thru 7418 RNCC & 7C Med Surg 7502 thru 7521 RNCC     Units: 6A Vocera & 4A CVICU Vocera, 4C MICU Vocera, and 4E SICU Vocera       Units: 5 Ortho Vocera & 5 Med Surg Vocera      Units: 6 Med Surg Vocera & 8 Med Surg Vocera

## 2024-11-10 NOTE — PROGRESS NOTES
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 Patient is now s/p IVETTE with Dr. Mcdaniels on 11/2/24 complicated by arterial reconstruction.     Changes today:    - 1L NS   - Hold Lasix   - discharge postponed due to orthostasis   __________________________________________    Graft function:  s/p IVETTE 11/2/24: POD#8. Lipase 88 (83). Continue bowel regimen to avoid constipation. Euglycemic, not requiring insulin. Last US 11/8 with patent vasculature.    - Continue ASA 81 mg daily.    LDKT 2019 c/b RICHARD: Cr 1.7. Increased likely due to prerenal due to hypotension POD#1 and then due to diuresis and tacrolimus titration. Baseline Cr 1.1-1.3. US normal, patent. UA and UPC unremarkable.   Will hold further lasix.   NS 1000 ml bolus today.      Immunosuppression management:  Induction: via high intensity protocol (cPRA 0) with Thymoglobulin goal 7.5 mg/kg (675 mg total).  Maintenance:    - MMF 1000 mg BID   - Tacrolimus 3 mg BID. Goal level 8-10.     Neuro:  Acute post op pain: Pain well controlled with PRN Tylenol, Robaxin, and oxycodone (minimal use).     Hematology:   Anemia of chronic disease: Hgb ~7.   - Transfuse 1 unit pRBCs today; recheck Hgb tomorrow  Thrombocytopenia: PLT 46. Likely 2/2 to thymo.  Hit panel negative.     Cardiorespiratory:   HTN: -150's.    - Continue Coreg 6.25 mg daily.  HLD: Continue PTA atorvastatin.   Syncopal event: when in the bathroom today likely r/t pain; trauma to head and vomited. CT Head/C spine negative. EKG NSR. BMP, CBC, LA, BG WNL.    - TTE pending    GI/Nutrition:   At risk for malnutrition: Nutrition consulted. Continue regular diet.     Fluid/Electrolytes:   Hypomagnesemia: Continue Mag-Ox 400 mg daily.  Hypervolemia: Weight up ~7.5 Kg.    - Give Lasix BID today    Infectious disease: No issues.       Prophylaxis: DVT (mechanical), fall, GI (PPI), viral (Valcyte x 3 months), PJP (Bactrim indefinitely), fungal (Micafungin)    Disposition: 7A    ROX/Fellow/Resident Provider: JEANETH Lim #[id number]    Faculty: Jean Liu MD  _________________________________________________________________  Transplant History: Admitted 11/2/2024 for kidney transplant.  11/2/2024 (Pancreas), 10/1/2019 (Kidney), Postoperative day: 8 (Pancreas), 1867 (Kidney)     Interval History: History is obtained from the patient  Overnight events: Orthostatic symptoms.     ROS:   A 10-point review of systems was negative except as noted above.    Meds:  Current Facility-Administered Medications   Medication Dose Route Frequency Provider Last Rate Last Admin    aspirin (ASA) chewable tablet 81 mg  81 mg Oral Daily Prince Mcdaniels MD   81 mg at 11/10/24 0747    atorvastatin (LIPITOR) tablet 20 mg  20 mg Oral Daily Aissatou Jarquin PA-C   20 mg at 11/10/24 0751    calcium carbonate-vitamin D (OSCAL) 500-5 MG-MCG per tablet 1 tablet  1 tablet Oral BID w/meals Prince Mcdaniels MD   1 tablet at 11/10/24 0806    carvedilol (COREG) tablet 6.25 mg  6.25 mg Oral BID w/meals Prince Mcdaniels MD   6.25 mg at 11/10/24 0806    famotidine (PEPCID) tablet 20 mg  20 mg Oral BID Prince Mcdaniels MD   20 mg at 11/10/24 0809    insulin aspart (NovoLOG) injection (RAPID ACTING)  1-7 Units Subcutaneous TID AC Aissatou Jarquin PA-C        insulin aspart (NovoLOG) injection (RAPID ACTING)  1-5 Units Subcutaneous At Bedtime Aissatou Jarquin PA-C        magnesium oxide (MAG-OX) tablet 400 mg  400 mg Oral Q24H Prince Mcdaniels MD   400 mg at 11/10/24 1215    mycophenolate (CELLCEPT BRAND) capsule 1,000 mg  1,000 mg Oral BID IS Prince Mcdaniels MD   1,000 mg at 11/10/24 0748    nystatin (MYCOSTATIN) suspension 500,000 Units  500,000 Units Swish & Swallow 4x  Daily Stephany Diaz, NP   500,000 Units at 11/10/24 1215    pantoprazole (PROTONIX) EC tablet 40 mg  40 mg Oral QAM AC Prince Mcdaniels MD   40 mg at 11/10/24 0806    senna-docusate (SENOKOT-S/PERICOLACE) 8.6-50 MG per tablet 1-2 tablet  1-2 tablet Oral BID Aissatou Jarquin PA-C   1 tablet at 11/10/24 0810    sodium chloride (PF) 0.9% PF flush 10 mL  10 mL Intracatheter Q8H Prince Mcdaniels MD   10 mL at 11/10/24 1110    sodium chloride (PF) 0.9% PF flush 3 mL  3 mL Intravenous Q8H Prince Mcdaniels MD   3 mL at 11/10/24 1110    sodium chloride 0.9% BOLUS 500 mL  500 mL Intravenous Once Aissatou Jarquin PA-C 500 mL/hr at 11/10/24 1341 500 mL at 11/10/24 1341    sulfamethoxazole-trimethoprim (BACTRIM) 400-80 MG per tablet 1 tablet  1 tablet Oral Daily Levar Zambrano MD   1 tablet at 11/10/24 0811    tacrolimus (GENERIC EQUIVALENT) capsule 4 mg  4 mg Oral BID IS Aissatou Jarquin PA-C   4 mg at 11/10/24 0748    valGANciclovir (VALCYTE) tablet 900 mg  900 mg Oral Daily Prince Mcdaniels MD   900 mg at 11/10/24 0808       Physical Exam:     Admit Weight: 89.1 kg (196 lb 8 oz)    Current vitals:   /56 (BP Location: Right arm)   Pulse 84   Temp 98.3  F (36.8  C) (Oral)   Resp 18   Wt 90.5 kg (199 lb 9.6 oz)   SpO2 98%   BMI 31.26 kg/m      Vital sign ranges:    Temp:  [97.5  F (36.4  C)-98.5  F (36.9  C)] 98.3  F (36.8  C)  Pulse:  [77-87] 84  Resp:  [16-18] 18  BP: ()/(50-70) 108/56  SpO2:  [95 %-99 %] 98 %  Patient Vitals for the past 24 hrs:   BP Temp Temp src Pulse Resp SpO2 Weight   11/10/24 1328 108/56 98.3  F (36.8  C) Oral 84 18 98 % --   11/10/24 1252 92/50 -- -- -- -- -- --   11/10/24 1251 107/63 -- -- -- -- -- --   11/10/24 1250 115/63 -- -- -- -- -- --   11/10/24 0958 110/60 98.3  F (36.8  C) Oral 77 18 95 % --   11/10/24 0539 131/70 98.1  F (36.7  C) Oral 83 16 -- --   11/10/24 0148 115/67 98.5  F (36.9  C) Oral  "82 16 -- --   11/10/24 0020 -- -- -- -- -- -- 90.5 kg (199 lb 9.6 oz)   11/09/24 2137 119/69 98.1  F (36.7  C) Oral 87 16 -- --   11/09/24 1756 131/68 97.5  F (36.4  C) Oral 80 16 99 % --     General Appearance: appears well  Skin: warm, dry  Heart: perfused  Lungs: Stable on RA  Abdomen: The abdomen is soft, non distended, and appropriately tender over the pancreas graft. Incision closed with staples, dry, intact. RUBIA with serosanguinous output.   : No paredes  Extremities: edema: present, generalized  Neurologic: awake, alert, and oriented. Tremor absent.    Data:   CMP  Recent Labs   Lab 11/10/24  1331 11/10/24  1213 11/10/24  0722 11/10/24  0609 11/09/24  0807 11/09/24  0624 11/05/24  0658 11/05/24  0629 11/04/24  0704 11/04/24  0622   NA  --   --   --  138  --  138   < > 140  --  137   POTASSIUM  --   --   --  4.2  --  3.7   < > 4.0  --  4.0   CHLORIDE  --   --   --  101  --  103   < > 108*  --  105   CO2  --   --   --  27  --  24   < > 25  --  25   * 112*   < > 101*   < > 99   < > 113*   < > 151*   BUN  --   --   --  22.9*  --  24.7*   < > 19.5  --  16.3   CR  --   --   --  1.68*  --  1.37*   < > 1.41*  --  1.19*   GFRESTIMATED  --   --   --  51*  --  65   < > 63  --  77   APRIL  --   --   --  8.3*  --  8.1*   < > 7.6*  --  7.4*   MAG  --   --   --  1.5*  --  1.7   < > 1.8  --  1.9   PHOS  --   --   --  3.7  --  3.5   < > 2.6  --  2.6   AMYLASE  --   --   --   --   --   --   --  37  --  66   LIPASE  --   --   --  88*  --  83*   < > 21  --  57   ALBUMIN  --   --   --   --   --   --   --   --   --  2.4*    < > = values in this interval not displayed.     CBC  Recent Labs   Lab 11/10/24  0609 11/09/24  0624   HGB 8.4* 8.2*   WBC 5.8 4.9   * 53*     COAGS  No lab results found in last 7 days.    Invalid input(s): \"XA\"     Urinalysis  Recent Labs   Lab Test 11/09/24  1148 11/06/24  1227 11/21/23  1154 11/16/21  1502 05/11/21  1532   COLOR Light Yellow Straw   < >  --   --    APPEARANCE Clear Clear   < >  " --   --    URINEGLC Negative Negative   < >  --   --    URINEBILI Negative Negative   < >  --   --    URINEKETONE Negative Negative   < >  --   --    SG 1.014 1.010   < >  --   --    UBLD Negative Negative   < >  --   --    URINEPH 7.0 6.5   < >  --   --    PROTEIN Negative Negative   < >  --   --    NITRITE Negative Negative   < >  --   --    LEUKEST Negative Negative   < >  --   --    RBCU 0 0   < >  --   --    WBCU 2 2   < >  --   --    UTPG  --   --   --  0.11 0.10    < > = values in this interval not displayed.     Virology:  Hepatitis C Antibody   Date Value Ref Range Status   11/02/2024 Nonreactive Nonreactive Final     Comment:     A nonreactive screening test result does not exclude the possibility of exposure to or infection with HCV. Nonreactive screening test results in individuals with prior exposure to HCV may be due to antibody levels below the limit of detection of this assay or lack of reactivity to the HCV antigens used in this assay. Patients with recent HCV infections (<3 months from time of exposure) may have false-negative HCV antibody results due to the time needed for seroconversion (average of 8 to 9 weeks).   09/26/2019 Nonreactive NR^Nonreactive Final     Comment:     Assay performance characteristics have not been established for newborns,   infants, and children       BK Virus Result   Date Value Ref Range Status   05/11/2021 BK Virus DNA Not Detected BKNEG^BK Virus DNA Not Detected copies/mL Final   07/08/2020 BK Virus DNA Not Detected BKNEG^BK Virus DNA Not Detected copies/mL Final   03/31/2020 BK Virus DNA Not Detected BKNEG^BK Virus DNA Not Detected copies/mL Final   02/03/2020 BK Virus DNA Not Detected BKNEG^BK Virus DNA Not Detected copies/mL Final   02/01/2020 BK Virus DNA Not Detected BKNEG^BK Virus DNA Not Detected copies/mL Final   01/06/2020 BK Virus DNA Not Detected BKNEG^BK Virus DNA Not Detected copies/mL Final   12/23/2019 BK Virus DNA Not Detected BKNEG^BK Virus DNA Not  Detected copies/mL Final   11/29/2019 BK Virus DNA Not Detected BKNEG^BK Virus DNA Not Detected copies/mL Final   11/04/2019 BK Virus DNA Not Detected BKNEG^BK Virus DNA Not Detected copies/mL Final   10/22/2019 BK Virus DNA Not Detected BKNEG^BK Virus DNA Not Detected copies/mL Final   10/18/2019 BK Virus DNA Not Detected BKNEG^BK Virus DNA Not Detected copies/mL Final     BK Virus DNA IU/mL   Date Value Ref Range Status   09/13/2024 Not Detected Not Detected IU/mL Final

## 2024-11-10 NOTE — PLAN OF CARE
Goal Outcome Evaluation:      Plan of Care Reviewed With: patient    Overall Patient Progress: improvingOverall Patient Progress: improving    Outcome Evaluation: dehydrated, hydration.    Vitals: orthostatic hypotensive. 1 liter NS bolus. BP soft. Room air.   Neuro : a x o x 4.   Blood glucose: achs. 95. 132. 139.   Pain/nausea: denies.   Diet: regular. Good appetite  Lines: PIV LUE saline locked.   : good OP. Hat.   GI: x 2.   Drains: RUBIA removed today. Dressing cdi.   Skin: incision abdomen staples jose alferdo.   Mobility: up sba x fall risk due to syncope episode yesterday (pt knows and calls appropriately) and gets dizzy while walking.   Education : med and lab book updated. MTP started. Handbook given.   Labs : magnesium low 1.5. IV replaced.   Plan : possible discharge tomorrow if symptoms of dizziness is improved, IV lasix was d/cd.

## 2024-11-10 NOTE — PROGRESS NOTES
[facility]  Transplant Nephrology Progress Note  Date of Admission:  11/2/2024  Today's Date: 11/10/2024    Recommendations:   - No acute indications for dialysis.  - Would make no changes in immunosuppression.  - Recommend good bowel regimen.    Assessment & Plan   # LDKT: Trend up in creatinine, slightly above baseline.  Creatinine may run a bit higher due to higher tacrolimus goal now with new pancreas transplant.  Good urine output.  No acute indications for dialysis.   - Baseline Creatinine: ~ 1.1-1.3   - Proteinuria: Normal (<0.2 grams)   - DSA Hx: No DSA   - Last cPRA: 0%   - BK Viremia: No   - Kidney Tx Biopsy Hx: Acute cellular-mediated rejection.    # Pancreas Tx (IVETTE): Stable lipase, possibly due to constipation.   - Pancreatic Exocrine Drainage: Enteric drained     - Blood glucose: Elevated blood glucose      On insulin: No   - HbA1c: Last checked at time of transplant      Latest HbA1c: 7.8%   - Pancreatic enzymes: Trend up   - DSA Hx: No DSA at time of transplant   - Pancreas Tx Biopsy Hx: No biopsy history    # Immunosuppression: Tacrolimus immediate release (goal 8-10) and Mycophenolate mofetil (dose 1000 mg every 12 hours)   - Induction with Recent Transplant:  High Intensity Protocol   - Continue with intensive monitoring of immunosuppression for efficacy and toxicity.   - Historical Changes in Immunosuppression: None   - Changes: No    # Infection Prevention:      - PJP: Sulfa/TMP (Bactrim)   - CMV: Valganciclovir (Valcyte)      - CMV IgG Ab High Risk Discordance (D+/R-): No  CMV Serostatus: Positive  - EBV IgG Ab High Risk Discordance (D+/R-): No  EBV Serostatus: Positive    # Hypertension: Controlled;  Goal BP: < 140/90 (Hospitalization goal)   - Changes: Not at this time    # Anemia in Chronic Renal Disease: Hgb: Trend up, low      JOSE: No   - Iron studies: Not checked recently    # Mineral Bone Disorder:    - Secondary renal hyperparathyroidism;  PTH level: Mildly elevated (151-300 pg/ml)        On treatment: None  - Vitamin D; level: Low        On supplement: Yes  - Calcium; level: Low        On supplement: Yes  - Phosphorus; level: Normal        On supplement: No    # Electrolytes:   - Potassium; level: Normal        On supplement: No  - Magnesium; level: Low        On supplement: Yes  - Bicarbonate; level: Normal        On supplement: No    # Obesity, Class I (BMI = 32): Trend down in weight with diuresis.   - Once patient recovers from surgery and incision heals, would recommend working on weight loss for overall health by increasing exercise and watching caloric intake.   - Patient was previously on semaglutide prior to IVETTE.  Would be okay to restart it ~ 2-3 months post transplant.    # Other Significant PMH:   - None    # Transplant History:  Etiology of Kidney Failure: Diabetes mellitus type 1  Tx: LDKT  Transplant: 11/2/2024 (Pancreas), 10/1/2019 (Kidney)  Significant transplant-related complications: None    Recommendations were communicated to the primary team via this note.    Jabier Hernández MD  Transplant Nephrology  Contact information via Vocera Web Console or via [facility] Paging/Directory    Interval Events  Mr. Amin's creatinine is 1.68 (11/10 0609); Trend up.  Good urine output.  Other significant labs/tests/vitals: Stable electrolytes.  Stable hemoglobin.  No new events overnight.  Felt a little lightheaded this morning, but better now.  No chest pain or shortness of breath.  Better leg swelling.  No nausea and vomiting.  Bowel movements are loose.  No fever, sweats or chills.    Review of Systems   4 point ROS was obtained and negative except as noted in the Interval History.    MEDICATIONS:  Current Facility-Administered Medications   Medication Dose Route Frequency Provider Last Rate Last Admin    aspirin (ASA) chewable tablet 81 mg  81 mg Oral Daily Prince Mcdaniels MD   81 mg at 11/10/24 0747    atorvastatin  (LIPITOR) tablet 20 mg  20 mg Oral Daily Aissatou Jarquin PA-C   20 mg at 11/10/24 0751    calcium carbonate-vitamin D (OSCAL) 500-5 MG-MCG per tablet 1 tablet  1 tablet Oral BID w/meals Prince Mcdaniels MD   1 tablet at 11/10/24 0806    carvedilol (COREG) tablet 6.25 mg  6.25 mg Oral BID w/meals Prince Mcdaniels MD   6.25 mg at 11/10/24 0806    famotidine (PEPCID) tablet 20 mg  20 mg Oral BID Prince Mcdaniels MD   20 mg at 11/10/24 0809    insulin aspart (NovoLOG) injection (RAPID ACTING)  1-7 Units Subcutaneous TID  Aissatou Jarquin PA-C        insulin aspart (NovoLOG) injection (RAPID ACTING)  1-5 Units Subcutaneous At Bedtime Aissatou Jarquin PA-C        magnesium oxide (MAG-OX) tablet 400 mg  400 mg Oral Q24H Prince Mcdaniels MD   400 mg at 11/09/24 1146    mycophenolate (CELLCEPT BRAND) capsule 1,000 mg  1,000 mg Oral BID IS Prince Mcdaniels MD   1,000 mg at 11/10/24 0748    nystatin (MYCOSTATIN) suspension 500,000 Units  500,000 Units Swish & Swallow 4x Daily Stephany Diaz, NP   500,000 Units at 11/10/24 0751    pantoprazole (PROTONIX) EC tablet 40 mg  40 mg Oral QAM AC Prince Mcdaniels MD   40 mg at 11/10/24 0806    senna-docusate (SENOKOT-S/PERICOLACE) 8.6-50 MG per tablet 1-2 tablet  1-2 tablet Oral BID Aissatou Jarquin PA-C   1 tablet at 11/10/24 0810    sodium chloride (PF) 0.9% PF flush 10 mL  10 mL Intracatheter Q8H Prince Mcdaniels MD   10 mL at 11/10/24 1110    sodium chloride (PF) 0.9% PF flush 3 mL  3 mL Intravenous Q8H Prince Mcdaniels MD   3 mL at 11/10/24 1110    sodium chloride 0.9% BOLUS 500 mL  500 mL Intravenous Once Aissatou Jarquin PA-C 500 mL/hr at 11/10/24 1110 500 mL at 11/10/24 1110    sulfamethoxazole-trimethoprim (BACTRIM) 400-80 MG per tablet 1 tablet  1 tablet Oral Daily Levar Zambrano MD   1 tablet at 11/10/24 0811     tacrolimus (GENERIC EQUIVALENT) capsule 4 mg  4 mg Oral BID IS Aissatou Jarquin PA-C   4 mg at 11/10/24 0748    valGANciclovir (VALCYTE) tablet 900 mg  900 mg Oral Daily Prince Mcdaniels MD   900 mg at 11/10/24 0808     Current Facility-Administered Medications   Medication Dose Route Frequency Provider Last Rate Last Admin       Physical Exam   Temp  Av.3  F (36.8  C)  Min: 97.8  F (36.6  C)  Max: 98.9  F (37.2  C)  Arterial Line BP  Min: 110/59  Max: 114/63  Arterial Line MAP (mmHg)  Av mmHg  Min: 75 mmHg  Max: 79 mmHg      Pulse  Av.2  Min: 81  Max: 114 Resp  Av.8  Min: 15  Max: 24  SpO2  Av.7 %  Min: 94 %  Max: 100 %    CVP (mmHg): 9 mmHgBP 110/60 (BP Location: Left arm)   Pulse 77   Temp 98.3  F (36.8  C) (Oral)   Resp 18   Wt 90.5 kg (199 lb 9.6 oz)   SpO2 95%   BMI 31.26 kg/m     Date 24 07 - 24 0659   Shift [id number] [id number] [id number] 24 Hour Total   INTAKE   I.V. 250   250   NG/GT 30   30   Shift Total(mL/kg) 280(3.14)   280(3.14)   OUTPUT   Urine 100   100   Shift Total(mL/kg) 100(1.12)   100(1.12)   Weight (kg) 89.13 89.13 89.13 89.13      Admit Weight: 89.1 kg (196 lb 8 oz)     GENERAL APPEARANCE: alert and no distress  HENT: mouth without ulcers or lesions  RESP: lungs clear to auscultation - no rales, rhonchi or wheezes  CV: regular rhythm, normal rate, no rub, no murmur  EDEMA: trace LE edema bilaterally  ABDOMEN: soft, nondistended, minimal TTP, bowel sounds normal  MS: extremities normal - no gross deformities noted, no evidence of inflammation in joints, no muscle tenderness  SKIN: no rash  TX KIDNEY: normal  DIALYSIS ACCESS: none    Data   All labs reviewed by me.  CMP  Recent Labs   Lab 11/10/24  0843 11/10/24  0722 11/10/24  0609 11/10/24  0152 24  0807 24  0624 24  1754 24  1330 24  0918 24  0645 24  0909 24  0704 24  0704 24  0622   NA  --   --  138  --   --  138  --   138  --  137  --  137   < > 137   POTASSIUM  --   --  4.2  --   --  3.7  --  3.3*  --  3.9  --  3.9   < > 4.0   CHLORIDE  --   --  101  --   --  103  --  105  --  103  --  105   < > 105   CO2  --   --  27  --   --  24  --  25  --  25  --  25   < > 25   ANIONGAP  --   --  10  --   --  11  --  8  --  9  --  7   < > 7   * 95 101* 101*   < > 99   < > 136*   < > 104*   < > 96   < > 151*   BUN  --   --  22.9*  --   --  24.7*  --  31.4*  --  32.7*  --  33.8*   < > 16.3   CR  --   --  1.68*  --   --  1.37*  --  1.44*  --  1.49*  --  1.62*   < > 1.19*   GFRESTIMATED  --   --  51*  --   --  65  --  61  --  59*  --  53*   < > 77   APRIL  --   --  8.3*  --   --  8.1*  --  7.9*  --  7.9*  --  7.8*   < > 7.4*   MAG  --   --  1.5*  --   --  1.7  --   --   --  1.7  --  1.6*   < > 1.9   PHOS  --   --  3.7  --   --  3.5  --   --   --  3.7  --  3.5   < > 2.6   ALBUMIN  --   --   --   --   --   --   --   --   --   --   --   --   --  2.4*    < > = values in this interval not displayed.     CBC  Recent Labs   Lab 11/10/24  0609 11/09/24  0624 11/08/24  1330 11/08/24  0937 11/08/24  0645   HGB 8.4* 8.2* 7.0* 7.3* 6.6*   WBC 5.8 4.9 5.5  --  3.7*   RBC 3.11* 3.00* 2.61*  --  2.50*   HCT 25.9* 24.7* 21.7*  --  20.5*   MCV 83 82 83  --  82   MCH 27.0 27.3 26.8  --  26.4*   MCHC 32.4 33.2 32.3  --  32.2   RDW 13.8 13.5 13.1  --  13.0   * 53* 51*  --  46*     INR  No lab results found in last 7 days.    ABG  No lab results found in last 7 days.     Urine Studies  Recent Labs   Lab Test 11/09/24  1148 11/06/24  1227 11/02/24  1503 11/21/23  1154   COLOR Light Yellow Straw Yellow Straw   APPEARANCE Clear Clear Clear Clear   URINEGLC Negative Negative Negative Negative   URINEBILI Negative Negative Negative Negative   URINEKETONE Negative Negative Negative Negative   SG 1.014 1.010 1.026 1.006   UBLD Negative Negative Negative Negative   URINEPH 7.0 6.5 5.5 6.0   PROTEIN Negative Negative 10* Negative   NITRITE Negative Negative  Negative Negative   LEUKEST Negative Negative Negative Negative   RBCU 0 0 <1 <1   WBCU 2 2 <1 0     Recent Labs   Lab Test 11/16/21  1502 05/11/21  1532 07/16/20  1417 11/13/19  1053 10/22/19  0612 10/18/19  0630   UTPG 0.11 0.10 Unable to calculate due to low value 0.21* 0.24* 0.22*     PTH  Recent Labs   Lab Test 11/05/24  0629 09/26/19  0945   PTHI 216* 356*     Iron Studies  Recent Labs   Lab Test 09/26/19  0945   IRON 70      IRONSAT 28   EDOUARD 753*       IMAGING:  All imaging studies reviewed by me.

## 2024-11-10 NOTE — DISCHARGE SUMMARY
[facility]    Discharge Summary  Transplant Surgery    Date of Admission:  11/2/2024  Date of Discharge:  11/10/2024  Discharging Provider: Aissatou Jarquin PA-C / Jean Liu MD    Discharge Diagnoses   Principal Problem:    Pancreas replaced by transplant (H)  Active Problems:    HTN, kidney transplant related    Diabetes mellitus type 1 (H)    Dyslipidemia    Anemia in chronic kidney disease    Kidney replaced by transplant    Hypomagnesemia    Immunosuppressed status (H)    Acute kidney injury (H)    Hypervolemia    Acute post-operative pain    Thrombocytopenia (H)    At risk for malnutrition    Syncope    Procedure/Surgery Information   Procedure Date:  Case start 11/2, complete 11/03/24    Preoperative Diagnosis:  Type 1 Diabetes, s/p kidney transplant    Postoperative Diagnosis:  Same    Procedure:   1. Donation after Brain Death transplant with Enteric w/maikol-en-y drainage   2. Pancreas allograft preparation on Back Table   3. Open appendectomy  4. Repair iliac vein with patch  Please add 22 modifier- case was difficult due to anatomy, need for iliac vein repair, and need for revision of both arterial limbs of Y graft after reperfusion     Surgeon:  Surgeons and Role:     * Prince Mcdaniels MD - Primary     * Quintin Amaya MD - Fellow - Assisting. Dr. Amaya was the primary assistant for the procedure and participated in all aspects of the case under my supervision. 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 Patient is now s/p DBD IVETTE  transplant and open appendectomy with Dr. Mcdaniels on 11/2/24 complicated by arterial reconstruction.     Hospital Course   Michael Amin was admitted on 11/2/2024.  The following problems were addressed during his hospitalization:    Graft function:  s/p IVETTE 11/2/24: POD#7. Lipase 88 (83). Continue bowel regimen to avoid constipation. Euglycemic, not requiring insulin. Last US 11/8 with patent vasculature.    - Continue ASA 81 mg daily.     Hx LDKT 2019 c/b RICHARD: Cr 1.7. Increased likely due to prerenal due to hypotension POD#1 and then due to diuresis and tacrolimus titration. Will hold further lasix.  ml bolus today. Repeat Cr on 11/11 in [facility]. Baseline Cr 1.1-1.3. US normal, patent. UA and UPC unremarkable.      Immunosuppressed status 2/2 to medications:  Induction: via high intensity protocol (cPRA 0) with Thymoglobulin goal 7.5 mg/kg (675 mg total).   Maintenance:   - MMF 1000 mg BID   - Tacrolimus 4 mg BID. Goal level 8-10.   Infection prophylaxis: viral (Valcyte x 12 weeks), PJP (Bactrim indefinitely), fungal (Nystatin x 12 weeks)    Transplant coordinator: Anneliese Qiu [phone]  Pancreas donor type:  DBD  DSA at time of transplant:  No  CMV:  Donor + / Recipient +  EBV:  Donor + / Recipient +  Thymoglobulin:  675 mg (7.5 mg/kg)    Neuro:  Acute post op pain: Pain well controlled with PRN Tylenol, Robaxin, and oxycodone (minimal use).     Hematology:   Anemia of chronic disease/Acute blood loss: Transfused 1 unit pRBCs 11/8 for Hgb ~7; responded appropriately. Hgb 8.4 on discharge.   Thrombocytopenia: Likely 2/2 to thymo.  Hit panel negative. PLT now increasing.  today.     Cardiorespiratory:   HTN: Continue Coreg 6.25 mg daily.  HLD: Continue PTA atorvastatin.   Syncopal event: 11/8/24 when in the bathroom likely r/t pain; trauma to head and vomited. CT Head/C spine negative. EKG NSR. Echo, BMP, CBC, LA, BG WNL. Resolved.      GI/Nutrition:   At risk for malnutrition: Nutrition  consulted. Continue regular diet.   Bowel regimen: Senna/colace and Miralax     Fluid/Electrolytes:   Hypomagnesemia: Continue Mag-Ox 400 mg daily.  Generalized edema: Resolved. Hold further lasix         Discharge Disposition   Discharged locally to brother's home   Condition at discharge: Stable    Pending Results   These results will be followed up by Transplant team  Unresulted Labs Ordered in the Past 30 Days of this Admission       Date and Time Order Name Status Description    11/8/2024  2:34 PM Prepare red blood cells (unit) Preliminary     11/2/2024  6:41 PM Prepare red blood cells (unit) Preliminary     11/2/2024  6:41 PM Prepare red blood cells (unit) Preliminary           Final pathology results:   Case Report   Surgical Pathology Report                         Case: [id number]                                   Authorizing Provider:  Prince Mcdaniels        Collected:           11/02/2024 05:36 PM                                  MD Michael                                                             Ordering Location:     [facility]                 Received:            11/04/2024 08:28 AM           Pathologist:           Nathalie Campos MD                                                           Specimen:    Appendix, appendix                                                                        Final Diagnosis   APPENDIX; INCIDENTAL APPENDECTOMY AT PANCREAS TRANSPLANTATION:  No morphologic abnormality     Primary Care Physician   Anurag Byrd    Physical Exam   Temp: 98.3  F (36.8  C) Temp src: Oral BP: 108/56 Pulse: 84   Resp: 18 SpO2: 98 % O2 Device: None (Room air)    Vitals:    11/08/24 0128 11/09/24 0528 11/10/24 0020   Weight: 96.6 kg (213 lb) 93.3 kg (205 lb 11.2 oz) 90.5 kg (199 lb 9.6 oz)     Vital Signs with Ranges  Temp:  [97.5  F (36.4  C)-98.5  F (36.9  C)] 98.3  F (36.8  C)  Pulse:  [77-87] 84  Resp:  [16-18] 18  BP: ()/(50-70) 108/56  SpO2:  [95 %-99 %] 98 %  I/O  last 3 completed shifts:  In: -   Out: 2845 [Urine:2800; Drains:45]    Constitutional: resting comfortably in chair  Eyes: EOMI  ENT: internal jugular line right   Respiratory: unlabored on RA  Cardiovascular: perfused  GI: abdomen soft, non-distended. Midline incision closed with staples and open to air. RUBIA with serosanguinous output.   Genitourinary: no Méndez present  Skin: warm, dry  Musculoskeletal: generalized edema  Neurologic: A&Ox4  Neuropsychiatric: calm, pleasant    Consultations This Hospital Stay   NEPHROLOGY KIDNEY/PANCREAS TRANSPLANT ADULT IP CONSULT  NURSING TO CONSULT FOR VASCULAR ACCESS CARE IP CONSULT  CARE MANAGEMENT / SOCIAL WORK IP CONSULT  PHARMACY IP CONSULT  SOT MEDICATION HISTORY IP PHARMACY CONSULT  NUTRITION SERVICES ADULT IP CONSULT  NEPHROLOGY KIDNEY/PANCREAS TRANSPLANT ADULT IP CONSULT  PHARMACY IP CONSULT  NURSING TO CONSULT FOR VASCULAR ACCESS CARE IP CONSULT    Time Spent on this Encounter   I have spent greater than 30 minutes on this discharge.    Discharge Orders   Discharge Medications   Current Discharge Medication List        START taking these medications    Details   acetaminophen (TYLENOL) 325 MG tablet Take 2 tablets (650 mg) by mouth every 6 hours as needed (For optimal non-opioid multimodal pain management to improve pain control.).  Qty: 60 tablet, Refills: 0    Associated Diagnoses: Pancreas replaced by transplant (H)      calcium carbonate-vitamin D (OSCAL) 500-5 MG-MCG tablet Take 1 tablet by mouth 2 times daily (with meals).  Qty: 60 tablet, Refills: 2    Associated Diagnoses: Pancreas replaced by transplant (H)      CELLCEPT (BRAND) 250 MG capsule Take 4 capsules (1,000 mg) by mouth 2 times daily.  Qty: 240 capsule, Refills: 11    Associated Diagnoses: Pancreas replaced by transplant (H)      famotidine (PEPCID) 20 MG tablet Take 1 tablet (20 mg) by mouth 2 times daily.  Qty: 60 tablet, Refills: 0    Associated Diagnoses: Pancreas replaced by transplant (H)       methocarbamol (ROBAXIN) 750 MG tablet Take 1 tablet (750 mg) by mouth every 6 hours as needed for muscle spasms.  Qty: 20 tablet, Refills: 0    Associated Diagnoses: Pancreas replaced by transplant (H)      nystatin (MYCOSTATIN) 658444 UNIT/ML suspension Swish and swallow 5 mLs (500,000 Units) in mouth 4 times daily.  Qty: 473 mL, Refills: 2    Associated Diagnoses: Pancreas replaced by transplant (H)      oxyCODONE (ROXICODONE) 5 MG tablet Take 0.5-1 tablets (2.5-5 mg) by mouth every 6 hours as needed for moderate to severe pain.  Qty: 5 tablet, Refills: 0    Associated Diagnoses: Pancreas replaced by transplant (H)      pantoprazole (PROTONIX) 40 MG EC tablet Take 1 tablet (40 mg) by mouth every morning (before breakfast).  Qty: 30 tablet, Refills: 0    Associated Diagnoses: Pancreas replaced by transplant (H)      polyethylene glycol (MIRALAX) 17 GM/Dose powder Take 17 g by mouth daily.  Qty: 510 g, Refills: 0    Associated Diagnoses: Pancreas replaced by transplant (H)      sulfamethoxazole-trimethoprim (BACTRIM) 400-80 MG tablet Take 1 tablet by mouth daily.  Qty: 30 tablet, Refills: 11    Associated Diagnoses: Pancreas replaced by transplant (H)      valGANciclovir (VALCYTE) 450 MG tablet Take 2 tablets (900 mg) by mouth daily.  Qty: 60 tablet, Refills: 2    Associated Diagnoses: Pancreas replaced by transplant (H)           CONTINUE these medications which have CHANGED    Details   carvedilol (COREG) 6.25 MG tablet Take 1 tablet (6.25 mg) by mouth 2 times daily (with meals).  Qty: 60 tablet, Refills: 2    Associated Diagnoses: HTN, kidney transplant related      !! senna-docusate (SENOKOT-S/PERICOLACE) 8.6-50 MG tablet Take 1-2 tablets by mouth 2 times daily as needed for constipation.  Qty: 60 tablet, Refills: 1    Associated Diagnoses: Kidney replaced by transplant      !! senna-docusate (SENOKOT-S/PERICOLACE) 8.6-50 MG tablet Take 1-2 tablets by mouth 2 times daily as needed for constipation.  Qty: 60  tablet, Refills: 0    Associated Diagnoses: Pancreas replaced by transplant (H)      !! tacrolimus (GENERIC EQUIVALENT) 0.5 MG capsule Take 1 capsule (0.5 mg) by mouth 2 times daily. To have available for dose adjustments per transplant team. Discharge dose = 3 mg twice daily.  Qty: 60 capsule, Refills: 11    Associated Diagnoses: Pancreas replaced by transplant (H)      !! tacrolimus (GENERIC EQUIVALENT) 1 MG capsule Take 4 capsules (4 mg) by mouth 2 times daily.  Qty: 240 capsule, Refills: 11    Associated Diagnoses: Kidney replaced by transplant       !! - Potential duplicate medications found. Please discuss with provider.        CONTINUE these medications which have NOT CHANGED    Details   aspirin (ASA) 81 MG EC tablet Take 81 mg by mouth daily       atorvastatin (LIPITOR) 10 MG tablet Take 2 tablets (20 mg) by mouth daily  Qty: 90 tablet, Refills: 0    Associated Diagnoses: Dyslipidemia      magnesium oxide (MAG-OX) 400 MG tablet Take 1 tablet (400 mg) by mouth daily (with lunch)  Qty: 30 tablet, Refills: 5    Associated Diagnoses: ESRD (end stage renal disease) on dialysis (H)      vitamin D3 (CHOLECALCIFEROL) 50 mcg (2000 units) tablet Take 1 tablet (50 mcg) by mouth daily    Comments: Profile Rx: patient will contact pharmacy when needed  Associated Diagnoses: Aftercare following organ transplant; Vitamin D deficiency      alcohol swab prep pads Use to swab area of injection/zak as directed.  Qty: 100 each, Refills: 3    Associated Diagnoses: Type 1 diabetes mellitus with chronic kidney disease on chronic dialysis (H)      blood glucose (NO BRAND SPECIFIED) lancets standard Use to test blood sugar 3 times daily or as directed.  Qty: 100 each, Refills: 11    Comments: Please provide whatever is covered by patient's insurance  Associated Diagnoses: Type 1 diabetes mellitus with other kidney complication (H)      blood glucose (NO BRAND SPECIFIED) test strip Use to test blood sugar 3 times daily or as  "directed.  Qty: 100 strip, Refills: 11    Comments: Please provide whatever is covered by patient's insurance  Associated Diagnoses: Type 1 diabetes mellitus with other kidney complication (H)      blood glucose monitoring (NO BRAND SPECIFIED) meter device kit Use to test blood sugar 3 times daily or as directed.  Qty: 2 kit, Refills: 0    Comments: Patient requests 2 meters, one for home and one for work.  Please provide whatever is covered by patient's insurance.  Associated Diagnoses: Type 1 diabetes mellitus with other kidney complication (H)      Continuous Glucose Sensor (DEXCOM G6 SENSOR) MISC CHANGE SENSOR EVERY 10 DAYS  Qty: 9 each, Refills: 3    Associated Diagnoses: Type 1 diabetes mellitus with chronic kidney disease on chronic dialysis (H)      Continuous Glucose Transmitter (DEXCOM G6 TRANSMITTER) MISC Change every 3 months  Qty: 1 each, Refills: 3    Associated Diagnoses: Type 1 diabetes mellitus with chronic kidney disease on chronic dialysis (H)      Insulin Infusion Pump Supplies (AUTOSOFT XC INFUSION SET) MISC 1 each every 48 hours Change every 2 days  9 mm cannula, 23 inch tubing  Qty: 45 each, Refills: 3    Associated Diagnoses: Type 1 diabetes mellitus with chronic kidney disease on chronic dialysis (H)      Insulin Infusion Pump Supplies (T:SLIM X2 3ML CARTRIDGE) MISC 1 each by Other route every other day Insulin cartridge to be used with pump as directed.  Change every 2 days or as directed.  Qty: 45 each, Refills: 3    Associated Diagnoses: Type 1 diabetes mellitus with chronic kidney disease on chronic dialysis (H)      insulin pen needle (ULTICARE MICRO) 32G X 4 MM miscellaneous Use pen needles daily or as directed.  Qty: 100 each, Refills: 3    Associated Diagnoses: Type 1 diabetes mellitus with chronic kidney disease on chronic dialysis (H)      Insulin Syringe-Needle U-100 31G X 1/4\" 1 ML MISC 1 Syringe as needed (until new insuline pump arrives)  Qty: 50 each, Refills: 1    Associated " Diagnoses: Type 1 diabetes mellitus with hyperglycemia (H)           STOP taking these medications       insulin glargine (LANTUS SOLOSTAR) 100 UNIT/ML pen Comments:   Reason for Stopping:         insulin lispro (HUMALOG VIAL) 100 UNIT/ML vial Comments:   Reason for Stopping:         mycophenolate (GENERIC EQUIVALENT) 250 MG capsule Comments:   Reason for Stopping:         Semaglutide, 2 MG/DOSE, (OZEMPIC) 8 MG/3ML pen Comments:   Reason for Stopping:                  Home Care Referral      Reason for your hospital stay    You were admitted for a pancreas transplant. You are discharging home in stable condition with close follow up.     Activity    Your activity upon discharge: Walk at least four times a day, lift no greater than 10 pounds for 8 weeks from the time of surgery.  No driving while taking narcotics or 3 weeks after surgery.     Monitor and record    Monitor blood pressure and weight daily.     Monitor glucose via CGM.     When to contact your care team    WHEN TO CONTACT YOUR  COORDINATOR:     Transplant Coordinator [phone]     Notify your coordinator if you have pain over your kidney or pancreas, increased redness or drainage from your incision, fever greater than 100F, decreased urine output or new or increased amount of blood in urine.     Notify your coordinator immediately if you are ever unable to take your immunosuppressive medications for any reason.     If you have URGENT concerns after office hours, please call the hospital switchboard at [phone] and ask to have the organ transplant nurse on-call paged. If you have a life-threatening emergency, go to the nearest emergency room.     Wound care and dressings    Instructions to care for your wound at home: If you have staples in place, they will be removed about 3 weeks after surgery. Wash incision daily with soap and water. Do not soak or scrub.     Tubes and drains    You are going home with the following tubes or drains: RUBIA.  Tube  cares per hospital or home care instructions.     Adult [facility] [place] Follow-up and recommended labs and tests    [facility] FOLLOW UP:     1. [facility]: Over the next 2 days you will be seen in the [facility] (ph. [phone], option 3). Your labs will be drawn at the beginning of your appointment. DO NOT take your medications prior to having labs drawn. Please bring all your medications with you from home to take after labs are drawn.     2. Follow up with Dr. Mcdaniels in Transplant Clinic in 1-2 weeks.     3. Follow up with Transplant Nephrologist as scheduled.     Remember to always bring an updated medication list to all appointments.        Call your Transplant Coordinator ([phone]) with questions about Transplant Center appointment scheduling.     LABS:     CBC, BMP, magnesium, phosphorus, lipase, tacrolimus level to be drawn daily while in AT[facility], then every Monday and Thursday by home health care nurse if arranged, or at an outpatient lab.     Diet    Diet recommendations post-transplant: Heart healthy dietary habits long term (low saturated/trans fat, low sodium). High protein diet x 8 weeks. Practice food safety precautions.         Data   Most Recent 3 CBC's:  Recent Labs   Lab Test 11/10/24  0609 11/09/24  0624 11/08/24  1330   WBC 5.8 4.9 5.5   HGB 8.4* 8.2* 7.0*   MCV 83 82 83   * 53* 51*      Most Recent 3 BMP's:  Recent Labs   Lab Test 11/10/24  1331 11/10/24  1213 11/10/24  0843 11/10/24  0722 11/10/24  0609 11/09/24  0807 11/09/24  0624 11/08/24  1754 11/08/24  1330   NA  --   --   --   --  138  --  138  --  138   POTASSIUM  --   --   --   --  4.2  --  3.7  --  3.3*   CHLORIDE  --   --   --   --  101  --  103  --  105   CO2  --   --   --   --  27  --  24  --  25   BUN  --   --   --   --  22.9*  --  24.7*  --  31.4*   CR  --   --   --   --  1.68*  --  1.37*  --  1.44*   ANIONGAP  --   --   --   --  10  --  11  --  8   APRIL  --   --   --   --  8.3*   --  8.1*  --  7.9*   * 112* 132*   < > 101*   < > 99   < > 136*    < > = values in this interval not displayed.     Most Recent 2 LFT's:  Recent Labs   Lab Test 11/02/24  1352 09/26/19  0945   AST 15 16   ALT 13 42   ALKPHOS 113 100   BILITOTAL 0.5 0.2     Most Recent INR's and Anticoagulation Dosing History:  Anticoagulation Dose History  More data exists         Latest Ref Rng & Units 9/26/2019 10/18/2019 10/22/2019 11/25/2019 6/7/2024 11/2/2024 11/3/2024   Recent Dosing and Labs   INR 0.85 - 1.15 1.00  1.03  1.02  - 1.03  1.05  1.49    ISTAT INR 0.86 - 1.14 - - - 1.1        This test is intended for monitoring Coumadin therapy.  Results are not   accurate in patients with prolonged INR due to factor deficiency.   - - -      Details                 Most Recent 3 Troponin's:No lab results found.  Most Recent Cholesterol Panel:  Recent Labs   Lab Test 09/13/24  1021   CHOL 106   LDL 46   HDL 35*   TRIG 123     Most Recent 6 Bacteria Isolates From Any Culture (See EPIC Reports for Culture Details):  Recent Labs   Lab Test 11/21/19  0810 10/18/19  0630 09/26/19  0950   CULT No growth No growth No growth     Most Recent TSH, T4 and A1c Labs:  Recent Labs   Lab Test 11/03/24  0339 11/02/24  1352 09/13/24  1021   TSH  --   --  1.29   A1C 7.8*   < > 8.7*    < > = values in this interval not displayed.     Results for orders placed or performed during the hospital encounter of 11/02/24   XR Chest 2 Views    Narrative    Exam: XR CHEST 2 VIEWS, 11/2/2024 1:26 PM    Indication: pre-transplant screening    Comparison: 10/1/2019    Findings:   The cardiomediastinal silhouette and pulmonary vasculature are within  normal limits. No pleural effusion or pneumothorax. No focal airspace  opacity.      Impression    Impression: No acute airspace disease.    BRANDON HOLLINGSWORTH DO         SYSTEM ID:  [id number]   XR Chest Port 1 View    Narrative    Exam: XR CHEST PORT 1 VIEW, 11/3/2024 1:53 AM    Comparison: Chest radiograph  11/2/2024    History: Post-op Kidney Transplant    Technique: Portable AP view of the chest.     Findings:  Right internal jugular central venous catheter tip terminates in the  mid right atrium. Enteric tube tip and sidehole project over the  stomach lumen.    Low lung volumes with perihilar and bibasilar airspace opacities. No  pneumothorax or pleural effusion. Stable cardiac silhouette.      Impression    Impression:  1.  Right internal jugular central venous catheter tip positioned in  mid right atrium. Consider 2 to 3 cm retraction.  2.  Enteric tube tip and sidehole positioned within the stomach lumen.  3.  Low lung volumes with perihilar and bibasilar airspace opacities  favoring atelectasis.    I have personally reviewed the examination and initial interpretation  and I agree with the findings.    BRANDON HOLLINGSWORTH DO         SYSTEM ID:  [id number]   US Pancreas Transplant    Narrative    EXAMINATION:  PANCREAS TRANSPLANT, 11/3/2024 2:23 AM     COMPARISON: None.    HISTORY: s/p pancreas transplant- please assess flows    TECHNIQUE:  Grey-scale, color Doppler and spectral flow analysis.    FINDINGS:     The transplanted pancreas is located in the right lower quadrant and  demonstrates normal echogenicity.   There is no free fluid adjacent to the transplant pancreas.     Transplant pancreas arterial flow:   Within pancreas: 22/6 cm/sec.   Outside pancreas: 47/7 cm/sec.   Anastomosis: 65/9 cm/sec.    Transplant pancreas venous flow:  Within pancreas: 7 cm/sec.   Outside pancreas: 9 cm/sec.   Anastomosis: 23 cm/sec.    Iliac artery:  Above the anastomosis:   90 cm/sec.  Below the anastomosis:   95 cm/sec.    Iliac vein  Above the transplant:   39 cm/sec.  Below the transplant:   20 cm/sec.    At the anastomosis:  Peak-systolic velocity is 65 cm/sec.  End-diastolic velocity is 9 cm/sec.  Resistive index: 0.87        Impression    Impression:   1.  No evidence of arterial or venous stenosis.   2.  Pancreatic  transplant vascular velocities and waveforms are within  normal limits on color/spectral Doppler.      I have personally reviewed the examination and initial interpretation  and I agree with the findings.    BRANDON HOLLINGSWORTH DO         SYSTEM ID:  [id number]   XR Abdomen Port 1 View    Narrative    Exam: XR ABDOMEN PORT 1 VIEW, 11/4/2024 9:21 AM    Indication: ng placement    Comparison: Abdominal ultrasound 11/3/2024    Findings:   Frontal view of the abdomen with patient upright at 60 degrees. A  gastric tube is present with tube tip and sidehole projecting over the  stomach. Surgical clips and staples project over the inferior abdomen.  No abnormally distended loops of large or small bowel. No definite  pneumatosis or portal venous gas.      Impression    Impression: Gastric tube tip and sidehole project over the stomach.    I have personally reviewed the examination and initial interpretation  and I agree with the findings.    FREDRICK EDWARD MD         SYSTEM ID:  [id number]   US Renal Transplant with Doppler    Narrative    EXAMINATION: US RENAL TRANSPLANT,  11/6/2024 11:04 AM     COMPARISON: Ultrasound 12/9/2019    HISTORY: Elevated Cr. Evaluate blood flow, evaluate for hydronephrosis    TECHNIQUE:  Grey-scale, color Doppler and spectral flow analysis.    FINDINGS:  The transplant kidney is located left lower quadrant, and measures  11.5 cm. Parenchyma is of normal thickness and echogenicity. No focal  lesions. No hydronephrosis. No perinephric fluid collection.    Renal artery flow:   92 cm/s peak systolic at hilum.  231 cm/s peak systolic at anastomosis.    Segmental artery resistive indices (upper to lower): 0.72, 0.77, 0.77    Renal Vein Flow:  44 cm/s at hilum.   46 cm/s at anastomosis.    Iliac artery flow:  132 cm/s peak systolic above anastomosis.  149 cm/s peak systolic below anastomosis.    Iliac vein flow:  Patent above and below the anastomosis.    Bladder: Distended and unremarkable.      Impression     IMPRESSION:   1. Normal grayscale evaluation of the left lower quadrant transplant  kidney.   2. Patent renal transplant Doppler evaluation.  3. Elevated although improved velocities at the renal artery  anastomosis measuring 231 cm/s, previously 442 cm/s. Not significant  in the setting of normal renal artery to iliac artery velocity ratio  and normal waveforms distally.    I have personally reviewed the examination and initial interpretation  and I agree with the findings.    CAESAR DUENAS MD         SYSTEM ID:  [id number]

## 2024-11-11 ENCOUNTER — TELEPHONE (OUTPATIENT)
Dept: TRANSPLANT | Facility: CLINIC | Age: 44
End: 2024-11-11

## 2024-11-11 ENCOUNTER — TELEPHONE (OUTPATIENT)
Dept: PHARMACY | Facility: CLINIC | Age: 44
End: 2024-11-11

## 2024-11-11 VITALS
RESPIRATION RATE: 18 BRPM | WEIGHT: 199.3 LBS | OXYGEN SATURATION: 99 % | TEMPERATURE: 98.3 F | HEART RATE: 88 BPM | DIASTOLIC BLOOD PRESSURE: 70 MMHG | SYSTOLIC BLOOD PRESSURE: 127 MMHG | BODY MASS INDEX: 31.21 KG/M2

## 2024-11-11 DIAGNOSIS — T86.11 KIDNEY TRANSPLANT REJECTION: ICD-10-CM

## 2024-11-11 DIAGNOSIS — Z94.0 KIDNEY REPLACED BY TRANSPLANT: Primary | ICD-10-CM

## 2024-11-11 LAB
ANION GAP SERPL CALCULATED.3IONS-SCNC: 8 MMOL/L (ref 7–15)
ATRIAL RATE - MUSE: 68 BPM
BASOPHILS # BLD AUTO: 0 10E3/UL (ref 0–0.2)
BASOPHILS NFR BLD AUTO: 0 %
BUN SERPL-MCNC: 18.2 MG/DL (ref 6–20)
CALCIUM SERPL-MCNC: 8 MG/DL (ref 8.8–10.4)
CHLORIDE SERPL-SCNC: 103 MMOL/L (ref 98–107)
CREAT SERPL-MCNC: 1.55 MG/DL (ref 0.67–1.17)
DIASTOLIC BLOOD PRESSURE - MUSE: NORMAL MMHG
EGFRCR SERPLBLD CKD-EPI 2021: 56 ML/MIN/1.73M2
EOSINOPHIL # BLD AUTO: 0 10E3/UL (ref 0–0.7)
EOSINOPHIL NFR BLD AUTO: 0 %
ERYTHROCYTE [DISTWIDTH] IN BLOOD BY AUTOMATED COUNT: 14 % (ref 10–15)
GLUCOSE BLDC GLUCOMTR-MCNC: 113 MG/DL (ref 70–99)
GLUCOSE BLDC GLUCOMTR-MCNC: 126 MG/DL (ref 70–99)
GLUCOSE SERPL-MCNC: 103 MG/DL (ref 70–99)
HCO3 SERPL-SCNC: 24 MMOL/L (ref 22–29)
HCT VFR BLD AUTO: 22.8 % (ref 40–53)
HGB BLD-MCNC: 7.4 G/DL (ref 13.3–17.7)
IMM GRANULOCYTES # BLD: 0.2 10E3/UL
IMM GRANULOCYTES NFR BLD: 3 %
INTERPRETATION ECG - MUSE: NORMAL
LIPASE SERPL-CCNC: 71 U/L (ref 13–60)
LYMPHOCYTES # BLD AUTO: 0.2 10E3/UL (ref 0.8–5.3)
LYMPHOCYTES NFR BLD AUTO: 3 %
MAGNESIUM SERPL-MCNC: 1.9 MG/DL (ref 1.7–2.3)
MCH RBC QN AUTO: 27.1 PG (ref 26.5–33)
MCHC RBC AUTO-ENTMCNC: 32.5 G/DL (ref 31.5–36.5)
MCV RBC AUTO: 84 FL (ref 78–100)
MONOCYTES # BLD AUTO: 0.3 10E3/UL (ref 0–1.3)
MONOCYTES NFR BLD AUTO: 4 %
NEUTROPHILS # BLD AUTO: 7 10E3/UL (ref 1.6–8.3)
NEUTROPHILS NFR BLD AUTO: 90 %
NRBC # BLD AUTO: 0 10E3/UL
NRBC BLD AUTO-RTO: 0 /100
P AXIS - MUSE: 44 DEGREES
PHOSPHATE SERPL-MCNC: 2.8 MG/DL (ref 2.5–4.5)
PLATELET # BLD AUTO: 113 10E3/UL (ref 150–450)
POTASSIUM SERPL-SCNC: 4.2 MMOL/L (ref 3.4–5.3)
PR INTERVAL - MUSE: 156 MS
QRS DURATION - MUSE: 84 MS
QT - MUSE: 410 MS
QTC - MUSE: 435 MS
R AXIS - MUSE: 17 DEGREES
RBC # BLD AUTO: 2.73 10E6/UL (ref 4.4–5.9)
SODIUM SERPL-SCNC: 135 MMOL/L (ref 135–145)
SYSTOLIC BLOOD PRESSURE - MUSE: NORMAL MMHG
T AXIS - MUSE: 26 DEGREES
TACROLIMUS BLD-MCNC: 9.5 UG/L (ref 5–15)
TME LAST DOSE: NORMAL H
TME LAST DOSE: NORMAL H
VENTRICULAR RATE- MUSE: 68 BPM
WBC # BLD AUTO: 7.8 10E3/UL (ref 4–11)

## 2024-11-11 PROCEDURE — 36415 COLL VENOUS BLD VENIPUNCTURE: CPT | Performed by: PHYSICIAN ASSISTANT

## 2024-11-11 PROCEDURE — 250N000013 HC RX MED GY IP 250 OP 250 PS 637: Performed by: SURGERY

## 2024-11-11 PROCEDURE — 80197 ASSAY OF TACROLIMUS: CPT | Performed by: PHYSICIAN ASSISTANT

## 2024-11-11 PROCEDURE — 250N000013 HC RX MED GY IP 250 OP 250 PS 637: Performed by: STUDENT IN AN ORGANIZED HEALTH CARE EDUCATION/TRAINING PROGRAM

## 2024-11-11 PROCEDURE — 82374 ASSAY BLOOD CARBON DIOXIDE: CPT | Performed by: SURGERY

## 2024-11-11 PROCEDURE — 250N000013 HC RX MED GY IP 250 OP 250 PS 637: Performed by: NURSE PRACTITIONER

## 2024-11-11 PROCEDURE — 84100 ASSAY OF PHOSPHORUS: CPT | Performed by: SURGERY

## 2024-11-11 PROCEDURE — 83690 ASSAY OF LIPASE: CPT | Performed by: SURGERY

## 2024-11-11 PROCEDURE — 250N000012 HC RX MED GY IP 250 OP 636 PS 637: Performed by: SURGERY

## 2024-11-11 PROCEDURE — 80048 BASIC METABOLIC PNL TOTAL CA: CPT | Performed by: SURGERY

## 2024-11-11 PROCEDURE — 250N000012 HC RX MED GY IP 250 OP 636 PS 637: Performed by: PHYSICIAN ASSISTANT

## 2024-11-11 PROCEDURE — 85025 COMPLETE CBC W/AUTO DIFF WBC: CPT | Performed by: SURGERY

## 2024-11-11 PROCEDURE — 250N000013 HC RX MED GY IP 250 OP 250 PS 637: Performed by: PHYSICIAN ASSISTANT

## 2024-11-11 PROCEDURE — 85018 HEMOGLOBIN: CPT | Performed by: SURGERY

## 2024-11-11 PROCEDURE — 83735 ASSAY OF MAGNESIUM: CPT | Performed by: SURGERY

## 2024-11-11 RX ORDER — POLYETHYLENE GLYCOL 3350 17 G/17G
17 POWDER, FOR SOLUTION ORAL DAILY
COMMUNITY
Start: 2024-11-11

## 2024-11-11 RX ORDER — TACROLIMUS 0.5 MG/1
0.5 CAPSULE ORAL 2 TIMES DAILY
Qty: 60 CAPSULE | Refills: 11 | Status: ACTIVE | OUTPATIENT
Start: 2024-11-11 | End: 2024-11-19

## 2024-11-11 RX ORDER — AMOXICILLIN 250 MG
1-2 CAPSULE ORAL 2 TIMES DAILY PRN
COMMUNITY
Start: 2024-11-11 | End: 2024-11-19

## 2024-11-11 RX ADMIN — Medication 1 TABLET: at 08:36

## 2024-11-11 RX ADMIN — ASPIRIN 81 MG CHEWABLE TABLET 81 MG: 81 TABLET CHEWABLE at 08:36

## 2024-11-11 RX ADMIN — POLYETHYLENE GLYCOL 3350 17 G: 17 POWDER, FOR SOLUTION ORAL at 08:45

## 2024-11-11 RX ADMIN — SENNOSIDES AND DOCUSATE SODIUM 2 TABLET: 8.6; 5 TABLET ORAL at 08:36

## 2024-11-11 RX ADMIN — PANTOPRAZOLE SODIUM 40 MG: 40 TABLET, DELAYED RELEASE ORAL at 08:36

## 2024-11-11 RX ADMIN — SULFAMETHOXAZOLE AND TRIMETHOPRIM 1 TABLET: 400; 80 TABLET ORAL at 08:37

## 2024-11-11 RX ADMIN — MYCOPHENOLATE MOFETIL 1000 MG: 250 CAPSULE ORAL at 08:37

## 2024-11-11 RX ADMIN — FAMOTIDINE 20 MG: 20 TABLET ORAL at 08:56

## 2024-11-11 RX ADMIN — ACETAMINOPHEN 650 MG: 325 TABLET, FILM COATED ORAL at 06:25

## 2024-11-11 RX ADMIN — NYSTATIN 500000 UNITS: 100000 SUSPENSION ORAL at 08:37

## 2024-11-11 RX ADMIN — VALGANCICLOVIR 900 MG: 450 TABLET, FILM COATED ORAL at 08:37

## 2024-11-11 RX ADMIN — TACROLIMUS 4 MG: 1 CAPSULE ORAL at 08:37

## 2024-11-11 RX ADMIN — ATORVASTATIN CALCIUM 20 MG: 20 TABLET, FILM COATED ORAL at 08:36

## 2024-11-11 ASSESSMENT — ACTIVITIES OF DAILY LIVING (ADL)
ADLS_ACUITY_SCORE: 0

## 2024-11-11 NOTE — TELEPHONE ENCOUNTER
Rudi Amin is a post-pancreas transplant patient who discharged on 11/11/24. Patient chooses to receive medications from [facility]. The patient will be responsible for managing medications.    SOT*KALEN GUERRA) IS A (PANCREAS) TRANSPLANT PATIENT  (DATE OF TRANSPLANT: (DATE: 11/2/2024) )      HEALTH BENEFIT: ([employer])  ID#[id number] [facility]#[id number] (EFFECTIVE (DATE: 7/1/2024) )      PHARMACY BENEFIT: ([employer])  PROCESSING INFO: ID#[id number] PCN#[id number] BIN#[id number] (EFFECTIVE (DATE: 7/1/2024) ).   DEDUCTIBLE ($3000) & MAX OUT-OF-POCKET ($5500)  COPAY STRUCTURE:  GENERIC FORMULARY DRUGS ON THE COMMERCIAL [facility]HANC[facility] PREVENTIVE DRUG LIST ARE COVERED % OF THE CHARGES INCURRED, SUBJECT TO A COPAYMENT OF $12. DEDUCTIBLE DOES NOT APPLY.   BRAND NAME FORMULARY DRUGS ON THE [facility] PREVENTIVE DRUG LIST ARE COVERED % OF THE CHARGES INCURRED, SUBJECT TO A COPAYMENT OF $45. DEDUCTIBLE DOES NOT APPLY.   IN NO EVENT WILL YOUR COST FOR A FORMULARY INSULIN DRUG EXCEED $25. DEDUCTIBLE DOES NOT APPLY.   ALL OTHER DRUGS ARE COVERED AT 90% OF THE CHARGES INCURRED. DEDUCTIBLE APPLIES.     TEST CLAIM SPECIALTY #28   MYCOPHENOLATE 250MG (#240/30DS) =$4.54  PROGRAF 1MG (#120/30DS)= REFILL TOO SOON UNTIL ON OR AFTER 12/15/2024  TACROLIMUS 1MG (#120/30DS)= REFILL TOO SOON UNTIL ON OR AFTER 12/15/2024  CYCLOSPORINE 100MG (#60/30DS)=$11.39  VALGANCICLOVIR 450MG (#60/30DS)=$14.37  VALACYCLOVIR 1GM (#90/30DS)=$5.18    TEST CLAIM DISCHARGE #27 (21 YEARS AND OLDER):  MYCOPHENOLATE 250MG (#240/30DS) =$4.54  PROGRAF 1MG (#120/30DS)= REFILL TOO SOON UNTIL ON OR AFTER 12/15/2024  TACROLIMUS 1MG (#120/30DS)= REFILL TOO SOON UNTIL ON OR AFTER 12/15/2024  CYCLOSPORINE 100MG (#60/30DS)=$11.39  VALGANCICLOVIR 450MG (#60/30DS)=$14.37  VALACYCLOVIR 1GM (#90/30DS)=$5.18      **CAN PATIENT FILL AT [facility] FOR MEDICATIONS LISTED? YES    Thank You,  Afia Guzman, PharmD  [facility] Discharge  Pharmacy  [phone]

## 2024-11-11 NOTE — PLAN OF CARE
Goal Outcome Evaluation:    Plan of Care Reviewed With: patient  Overall Patient Progress: improving  Shift: 7644-1398  /70 (BP Location: Right arm)   Pulse 88   Temp 98.3  F (36.8  C) (Oral)   Resp 18   Wt 90.4 kg (199 lb 4.8 oz)   SpO2 99%   BMI 31.21 kg/m       VS- stable, evening coreg held on RA. Afebrile    BG- ACHS 130,113  Labs- Pending AM collection   Pain/Nausea/PRN'S- denies nausea. PRN tylenol x2  Diet- regular, good appetite   LDA- PIV X1 SL   Gtt/IVF- none  GI/- Voiding and Saving, 1 BM overnight.  Skin- midline incision stapled KINGA SOLOMON JP site CDI  Activity- SBA, steady gait   Plan- continue with POC. Possible Discharge today.

## 2024-11-11 NOTE — LETTER
OUTPATIENT LABORATORY TEST ORDER     Patient Name: Michael Amin   YOB: 1980     [facility] MR# [if applicable]: [id number]   Date & Time: November 8, 2024  3:04 PM  Expiration Date: 1 year after date issued      Diagnosis: Pancreas Transplant (ICD-10 Z94.83)    Aftercare following organ transplant (ICD-10 Z48.288)    Long term use of medications (ICD-10 Z79.899)    Contact with and (suspected) exposure to other viral communicable   diseases (Z20.828)    Other specified postprocedural states (Z98.890)     We ask your assistance in obtaining the following laboratory tests, which are part of our routine surveillance program for Solid Organ Transplant patients.     Please fax each result to [phone], same day as resulted/available    Critical lab results page [phone]    First 12 weeks post-transplant (11/2/2024 - 1/25/2025)  Labs 2x/week (Monday and Thursday)  CBC with platelets  Basic Metabolic Panel (Sodium, Potassium, Chloride, Creatinine, CO2, Urea Nitrogen, glucose, Calcium)  Tacrolimus/Prograf/ drug level  Amylase  Lipase    Labs weekly (Mondays)  Phosphorus  Magnesium    Labs every 2 weeks for 2 months, then monthly until 6 months  Mycophenolic Acid Level     Months 4-6 post-transplant (1/26/2025 - 5/2/2025)  Labs 1x/week (Monday or Tuesday)  CBC with platelets  Basic Metabolic Panel (Sodium, Potassium, Chloride, Creatinine, CO2, Urea Nitrogen, glucose, Calcium)  Tacrolimus/Prograf/ drug level  Amylase  Lipase    Labs Monthly  Phosphorus  Magnesium  Mycophenolic Acid Level    Months 7-12 post-transplant (5/3/2025 - 11/2/2025)  Labs every other week (Monday or Tuesday)  CBC with platelets  Basic Metabolic Panel (Sodium, Potassium, Chloride, Creatinine, CO2, Urea Nitrogen, glucose, Calcium)  Tacrolimus/Prograf/ drug level  Amylase  Lipase    Labs Monthly  Phosphorus  Magnesium  BK (Polyoma Virus) PCR Quantitative/Plasma    At 1-month post-transplant  (12/2/2024)  DSA PRA (patient to supply )   BK Virus PCR Quantitative/Plasma  Urine protein/creatinine  Iron Panel  Vitamin D Deficiency Screening  PTH Intact  HBV, HCV, HIV by CONNOR testing  If unable to conduct CONNOR testing, please substitute the following:   Hepatitis B DNA Quantitative, Real-Time PCR   Hepatitis C RNA, Quantitation   HIV 1 RNA, Quantitation   HIV 2 RNA, Quantitation   Fasting C peptide  Insulin auto antibodies    At 2 months post-transplant (1/2/2025)   DSA PRA (patient to supply )   BK Virus PCR Quantitative/Plasma  Urine protein / creatinine    At 3 months post-transplant (2/2/2025)  BK (Polyoma virus) PCR Quantitative/Plasma  If DM Type I: Fasting C Peptide, auto antibodies    At 4 months post-transplant (3/2/2025)  DSA PRA (patient to supply )  BK Virus by PCR, Quantitative  PTH  Urine protein/creatinine    At 5 months post-transplant (4/2/2025)  BK Virus by PCR, Quantitative  Fasting C Peptide  Auto antibodies    At 6 months post-transplant (Fasting Labs) (5/2/2025)  BK Virus by PCR, Quantitative  Hepatic panel  Hemoglobin A1c  Uric Acid  Lipid panel  Urine protein/creatinine     At 7 months post-transplant (6/2/2025)  PRA/DSA (patient to supply )    At 9 months post-transplant (8/2/2025)   Urine protein/creatinine on ALL recipients    At 12 months post-transplant (Fasting labs) (11/1/2025)   Hepatic panel  Hemoglobin A1c  Uric Acid  Lipid panel  Urine protein/creatinine  PTH  Vitamin D  T cell subset (CD4)  Fasting c peptide  Insulin auto antibodies    If you have any questions please call the Transplant Center at [phone]. All lab results should be faxed to [phone]      Jose L Reyna M.D   of Medicine  Nephrology and Hypertension  Department of Medicine

## 2024-11-11 NOTE — PROGRESS NOTES
Care Management Discharge Note    Discharge Date: 11/11/2024       Discharge Disposition: Home Care, Home, Other (Comments) (outpatient labs)    Discharge Services: Home Care    Discharge DME: None    Discharge Transportation: family or friend will provide    Private pay costs discussed: Not applicable    Does the patient's insurance plan have a 3 day qualifying hospital stay waiver?  No    PAS Confirmation Code:    Patient/family educated on Medicare website which has current facility and service quality ratings: no    Education Provided on the Discharge Plan: Yes  Persons Notified of Discharge Plans: patient  Patient/Family in Agreement with the Plan: yes    Handoff Referral Completed: Yes, non-[facility] PCP: External handoff communication completed    Additional Information:  Pt medically cleared for discharge. Today.  ATC appointments confirmed for Tuesday 11/12.  Jazmine VAZQUEZW sending request for Wednesday 11/13.   Weekend RNCC met with pt and reviewed that [facility] had accepted for home RN but unable to draw transplant labs.  Pt aware that he will need to come into [facility] clinic for lab draws on M/TH.  Updated Steph  [facility].  Orders faxed via [employer]    [facility]   Steph @ [phone]   Fax: [phone]  RN - agency is not able to draw transplant labs.    Local address ( pt staying with his brother and sister in law)  [address] [address]    Skye Daley, RN BSN, PHN, ACM-RN  November 11, 2024  7A Nurse Coordinator    Phone: [phone]  Available on [employer] 7A SOT RNCC  Weekend/Holiday 7A SOT RNCC    11/11/2024

## 2024-11-11 NOTE — PROGRESS NOTES
CARE MANAGEMENT FOLLOW UP    Additional Information:   11/11:CHW delegated by RNCC has scheduled ATC appts for this Pt for tomorrow Tuesday 11/12/2024 and Wednesday 11/13/2024.    Jason Plunkett   Community Health Worker, 7A&7B [facility].  [place], [place]   P [phone]   Fax: [phone]  Email: [email or web address]

## 2024-11-11 NOTE — TELEPHONE ENCOUNTER
Kidney and Pancreas Transplant Coordinator Transition to Outpatient Discharge Note    Surgeon (updated in Transplant Information tab): clif  Nephrologist (updated in Transplant Information tab): papa  Transplant Coordinator: Anneliese Qiu RN    Lines / drains in place at time of discharge:  Stent: Yes.  Other: RUBIA  If yes, review of parameters to track and when to contact RNCC: Yes..    Immunosuppression:   CNI: tac  Antimetabolite: mmf  Prednisone taper: No.    Induction: high    Prophylaxis:   CMV: D+/R+ : Valcyte 3 months; renal dosing  EBV: D+/R+  Antibiotic: bactrim    High Risk DSA: No.    Pharmacy after discharge:   Lab after discharge:   Lab letter faxed to outside lab, if needed: Yes.    Education:  Writer spoke with Michael Amin.   We discussed immunosuppression medications, indications, side effects, dose adjustments, and lab monitoring.   Lab draw schedule was provided via MyChart/mail to the patient and fully explained.    DSA  kits needed:  Yes.    If so, request submitted to centralized team for send-out.  Living Donor:  No.    If yes, discussed with patient data collection through [facility] for [facility] in effort  to improve eplet matching. This will include additional, non-clinical lab   draw organized by an external organization, [facility].   Consent to Communicate on File: Yes.    If no, discussed inability for team to communicate with any person other than the patient regarding PHI, including scheduling.   If no, consent to communicate was offered: No.     Further education was provided on typical post transplant course, labs examined with thresholds, biopsy, infection prevention, office contact numbers, and when to call.     Discharge Transition Plan:   Pt will transition home, with assistance from family. Pt will not have home care.   Pt states having working BP monitor, scale, and thermometer at home.    Follow Up:  Orders for post-transplant follow-up appointments placed:  Yes.  Patient Status Checklist Task updated: Yes.    Special/Additional needs: No.  Pt questions for follow up:     Anneliese Qiu RN  Post-Kidney Transplant Coordinator  (ph) [phone]

## 2024-11-11 NOTE — DISCHARGE SUMMARY
[facility]    Discharge Summary  Transplant Surgery    Date of Admission:  11/2/2024  Date of Discharge:  11/11/2024  Discharging Provider: Aissatou Jarquin PA-C / Dr. Mac Wolfe    Discharge Diagnoses   Principal Problem:    Pancreas replaced by transplant (H)  Active Problems:    HTN, kidney transplant related    Diabetes mellitus type 1 (H)    Dyslipidemia    Anemia in chronic kidney disease    Kidney replaced by transplant    Hypomagnesemia    Immunosuppressed status (H)    Acute kidney injury (H)    Hypervolemia    Acute post-operative pain    Thrombocytopenia (H)    At risk for malnutrition    Syncope    Procedure/Surgery Information   Procedure Date:  Case start 11/2, complete 11/03/24    Preoperative Diagnosis:  Type 1 Diabetes, s/p kidney transplant    Postoperative Diagnosis:  Same    Procedure:   1. Donation after Brain Death transplant with Enteric w/maikol-en-y drainage   2. Pancreas allograft preparation on Back Table   3. Open appendectomy  4. Repair iliac vein with patch  Please add 22 modifier- case was difficult due to anatomy, need for iliac vein repair, and need for revision of both arterial limbs of Y graft after reperfusion     Surgeon:  Surgeons and Role:     * Prince Mcdaniels MD - Primary     * Quintin Amaya MD - Fellow - Assisting. Dr. Amaya was the primary assistant for the procedure and participated in all aspects of the case under my supervision. 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 Patient is now s/p DBD IVETTE  transplant and open appendectomy with Dr. Mcdaniels on 11/2/24 complicated by arterial reconstruction.     Hospital Course   Michael Amin was admitted on 11/2/2024.  The following problems were addressed during his hospitalization:    Graft function:  s/p IVETTE 11/2/24: POD#7. Lipase 88 (83). Continue bowel regimen to avoid constipation. Euglycemic, not requiring insulin. Last US 11/8 with patent vasculature.    - Continue ASA 81 mg daily.     Hx LDKT 2019 c/b RICHARD: Baseline Cr 1.1-1.3. US normal, patent. UA and UPC unremarkable. Increased likely due to prerenal due to hypotension POD#1 and then due to diuresis and tacrolimus titration. Held lasix starting 11/10. NS 1000 ml bolus 11/10. Cr 1.6, decreased today. Repeat Cr on 11/12 in [facility].     Immunosuppressed status secondary to medications:  Induction: via high intensity protocol (cPRA 0) with Thymoglobulin goal 7.5 mg/kg (675 mg total).   Maintenance:   - MMF 1000 mg BID   - Tacrolimus 4 mg BID. Goal level 8-10.   Infection prophylaxis: viral (Valcyte x 12 weeks), PJP (Bactrim indefinitely), fungal (Nystatin x 12 weeks)    Transplant coordinator: Anneliese Qiu [phone]  Pancreas donor type:  DBD  DSA at time of transplant:  No  CMV:  Donor + / Recipient +  EBV:  Donor + / Recipient +  Thymoglobulin:  675 mg (7.5 mg/kg)    Neuro:  Acute post op pain: Pain well controlled with PRN Tylenol, Robaxin, and oxycodone (minimal use).     Hematology:   Anemia of chronic disease/Acute blood loss: Transfused 1 unit pRBCs 11/8 for Hgb ~7; responded appropriately. Hgb 7.4 on discharge.   Thrombocytopenia: Likely secondary to thymo.  Hit panel negative. PLT now increasing. PLT > 100.     Cardiorespiratory:   HTN: Carvedilol discontinued due to lower BP and orthostasis (post diuresis).  HLD: Continue PTA atorvastatin.   Syncopal event: 11/8/24 when in the bathroom likely r/t pain; trauma to head and vomited. CT Head/C spine negative. EKG NSR. Echo, BMP, CBC, LA, BG WNL.  Resolved.      GI/Nutrition:   At risk for malnutrition: Nutrition consulted. Continue regular diet.   Bowel regimen: Senna/colace and Miralax     Fluid/Electrolytes:   Hypomagnesemia: Continue Mag-Ox 400 mg daily.  Generalized edema: Resolved. Hold further lasix         Discharge Disposition   Discharged locally to brother[facility] home   Condition at discharge: Stable    Pending Results   These results will be followed up by Transplant team  Unresulted Labs Ordered in the Past 30 Days of this Admission       Date and Time Order Name Status Description    11/10/2024 11:30 PM Tacrolimus by Tandem Mass Spectrometry In process     11/8/2024  2:34 PM Prepare red blood cells (unit) Preliminary     11/2/2024  6:41 PM Prepare red blood cells (unit) Preliminary     11/2/2024  6:41 PM Prepare red blood cells (unit) Preliminary           Final pathology results:   Case Report   Surgical Pathology Report                         Case: [id number]                                   Authorizing Provider:  Prince Mcdaniels        Collected:           11/02/2024 05:36 PM                                  MD Michael                                                             Ordering Location:     [facility]                 Received:            11/04/2024 08:28 AM           Pathologist:           Nathalie Campos MD                                                           Specimen:    Appendix, appendix                                                                        Final Diagnosis   APPENDIX; INCIDENTAL APPENDECTOMY AT PANCREAS TRANSPLANTATION:  No morphologic abnormality     Primary Care Physician   Anurag Byrd    Physical Exam   Temp: 98.3  F (36.8  C) Temp src: Oral BP: 127/70 Pulse: 88   Resp: 18 SpO2: 99 % O2 Device: None (Room air)    Vitals:    11/09/24 0528 11/10/24 0020 11/11/24 0206   Weight: 93.3 kg (205 lb 11.2 oz) 90.5 kg (199 lb 9.6 oz) 90.4 kg (199 lb 4.8 oz)     Vital Signs with Ranges  Temp:  [98.3  F  (36.8  C)-98.7  F (37.1  C)] 98.3  F (36.8  C)  Pulse:  [77-88] 88  Resp:  [16-18] 18  BP: ()/(50-74) 127/70  SpO2:  [95 %-99 %] 99 %  I/O last 3 completed shifts:  In: -   Out: 3290 [Urine:3290]    Constitutional: resting comfortably in chair  Respiratory: unlabored on RA  Cardiovascular: perfused  GI: abdomen soft, non-distended. Midline incision closed with staples and open to air. RUBIA removed yesterday, site dry.   Genitourinary: no paredes present  Skin: warm, dry  Musculoskeletal: NT x 4. Trace edema  Neurologic: A&Ox4    Consultations This Hospital Stay   NEPHROLOGY KIDNEY/PANCREAS TRANSPLANT ADULT IP CONSULT  NURSING TO CONSULT FOR VASCULAR ACCESS CARE IP CONSULT  CARE MANAGEMENT / SOCIAL WORK IP CONSULT  PHARMACY IP CONSULT  SOT MEDICATION HISTORY IP PHARMACY CONSULT  NUTRITION SERVICES ADULT IP CONSULT  NEPHROLOGY KIDNEY/PANCREAS TRANSPLANT ADULT IP CONSULT  PHARMACY IP CONSULT  NURSING TO CONSULT FOR VASCULAR ACCESS CARE IP CONSULT    Time Spent on this Encounter   I have spent greater than 30 minutes on this discharge.    Discharge Orders   Discharge Medications   Current Discharge Medication List        START taking these medications    Details   acetaminophen (TYLENOL) 325 MG tablet Take 2 tablets (650 mg) by mouth every 6 hours as needed (For optimal non-opioid multimodal pain management to improve pain control.).  Qty: 60 tablet, Refills: 0    Associated Diagnoses: Pancreas replaced by transplant (H)      calcium carbonate-vitamin D (OSCAL) 500-5 MG-MCG tablet Take 1 tablet by mouth 2 times daily (with meals).  Qty: 60 tablet, Refills: 2    Associated Diagnoses: Pancreas replaced by transplant (H)      CELLCEPT (BRAND) 250 MG capsule Take 4 capsules (1,000 mg) by mouth 2 times daily.  Qty: 240 capsule, Refills: 11    Associated Diagnoses: Pancreas replaced by transplant (H)      famotidine (PEPCID) 20 MG tablet Take 1 tablet (20 mg) by mouth 2 times daily.  Qty: 60 tablet, Refills: 0    Associated  Diagnoses: Pancreas replaced by transplant (H)      methocarbamol (ROBAXIN) 750 MG tablet Take 1 tablet (750 mg) by mouth every 6 hours as needed for muscle spasms.  Qty: 20 tablet, Refills: 0    Associated Diagnoses: Pancreas replaced by transplant (H)      nystatin (MYCOSTATIN) 348754 UNIT/ML suspension Swish and swallow 5 mLs (500,000 Units) in mouth 4 times daily.  Qty: 473 mL, Refills: 2    Associated Diagnoses: Pancreas replaced by transplant (H)      oxyCODONE (ROXICODONE) 5 MG tablet Take 0.5-1 tablets (2.5-5 mg) by mouth every 6 hours as needed for moderate to severe pain.  Qty: 5 tablet, Refills: 0    Associated Diagnoses: Pancreas replaced by transplant (H)      pantoprazole (PROTONIX) 40 MG EC tablet Take 1 tablet (40 mg) by mouth every morning (before breakfast).  Qty: 30 tablet, Refills: 0    Associated Diagnoses: Pancreas replaced by transplant (H)      polyethylene glycol (MIRALAX) 17 GM/Dose powder Take 17 g by mouth daily.  Qty: 510 g, Refills: 0    Associated Diagnoses: Pancreas replaced by transplant (H)      sulfamethoxazole-trimethoprim (BACTRIM) 400-80 MG tablet Take 1 tablet by mouth daily.  Qty: 30 tablet, Refills: 11    Associated Diagnoses: Pancreas replaced by transplant (H)      valGANciclovir (VALCYTE) 450 MG tablet Take 2 tablets (900 mg) by mouth daily.  Qty: 60 tablet, Refills: 2    Associated Diagnoses: Pancreas replaced by transplant (H)           CONTINUE these medications which have CHANGED    Details   !! senna-docusate (SENOKOT-S/PERICOLACE) 8.6-50 MG tablet Take 1-2 tablets by mouth 2 times daily as needed for constipation.  Qty: 60 tablet, Refills: 1    Associated Diagnoses: Kidney replaced by transplant      !! senna-docusate (SENOKOT-S/PERICOLACE) 8.6-50 MG tablet Take 1-2 tablets by mouth 2 times daily as needed for constipation.  Qty: 60 tablet, Refills: 0    Associated Diagnoses: Pancreas replaced by transplant (H)      !! tacrolimus (GENERIC EQUIVALENT) 0.5 MG capsule  Take 1 capsule (0.5 mg) by mouth 2 times daily. To have available for dose adjustments per transplant team. Discharge dose = 3 mg twice daily.  Qty: 60 capsule, Refills: 11    Associated Diagnoses: Pancreas replaced by transplant (H)      !! tacrolimus (GENERIC EQUIVALENT) 1 MG capsule Take 4 capsules (4 mg) by mouth 2 times daily.  Qty: 240 capsule, Refills: 11    Associated Diagnoses: Kidney replaced by transplant       !! - Potential duplicate medications found. Please discuss with provider.        CONTINUE these medications which have NOT CHANGED    Details   aspirin (ASA) 81 MG EC tablet Take 81 mg by mouth daily       atorvastatin (LIPITOR) 10 MG tablet Take 2 tablets (20 mg) by mouth daily  Qty: 90 tablet, Refills: 0    Associated Diagnoses: Dyslipidemia      magnesium oxide (MAG-OX) 400 MG tablet Take 1 tablet (400 mg) by mouth daily (with lunch)  Qty: 30 tablet, Refills: 5    Associated Diagnoses: ESRD (end stage renal disease) on dialysis (H)      vitamin D3 (CHOLECALCIFEROL) 50 mcg (2000 units) tablet Take 1 tablet (50 mcg) by mouth daily    Comments: Profile Rx: patient will contact pharmacy when needed  Associated Diagnoses: Aftercare following organ transplant; Vitamin D deficiency      alcohol swab prep pads Use to swab area of injection/zak as directed.  Qty: 100 each, Refills: 3    Associated Diagnoses: Type 1 diabetes mellitus with chronic kidney disease on chronic dialysis (H)      blood glucose (NO BRAND SPECIFIED) lancets standard Use to test blood sugar 3 times daily or as directed.  Qty: 100 each, Refills: 11    Comments: Please provide whatever is covered by patient's insurance  Associated Diagnoses: Type 1 diabetes mellitus with other kidney complication (H)      blood glucose (NO BRAND SPECIFIED) test strip Use to test blood sugar 3 times daily or as directed.  Qty: 100 strip, Refills: 11    Comments: Please provide whatever is covered by patient's insurance  Associated Diagnoses: Type 1  "diabetes mellitus with other kidney complication (H)      blood glucose monitoring (NO BRAND SPECIFIED) meter device kit Use to test blood sugar 3 times daily or as directed.  Qty: 2 kit, Refills: 0    Comments: Patient requests 2 meters, one for home and one for work.  Please provide whatever is covered by patient's insurance.  Associated Diagnoses: Type 1 diabetes mellitus with other kidney complication (H)      Continuous Glucose Sensor (DEXCOM G6 SENSOR) MISC CHANGE SENSOR EVERY 10 DAYS  Qty: 9 each, Refills: 3    Associated Diagnoses: Type 1 diabetes mellitus with chronic kidney disease on chronic dialysis (H)      Continuous Glucose Transmitter (DEXCOM G6 TRANSMITTER) MISC Change every 3 months  Qty: 1 each, Refills: 3    Associated Diagnoses: Type 1 diabetes mellitus with chronic kidney disease on chronic dialysis (H)      Insulin Infusion Pump Supplies (AUTOSOFT XC INFUSION SET) MISC 1 each every 48 hours Change every 2 days  9 mm cannula, 23 inch tubing  Qty: 45 each, Refills: 3    Associated Diagnoses: Type 1 diabetes mellitus with chronic kidney disease on chronic dialysis (H)      Insulin Infusion Pump Supplies (T:SLIM X2 3ML CARTRIDGE) MISC 1 each by Other route every other day Insulin cartridge to be used with pump as directed.  Change every 2 days or as directed.  Qty: 45 each, Refills: 3    Associated Diagnoses: Type 1 diabetes mellitus with chronic kidney disease on chronic dialysis (H)      insulin pen needle (ULTICARE MICRO) 32G X 4 MM miscellaneous Use pen needles daily or as directed.  Qty: 100 each, Refills: 3    Associated Diagnoses: Type 1 diabetes mellitus with chronic kidney disease on chronic dialysis (H)      Insulin Syringe-Needle U-100 31G X 1/4\" 1 ML MISC 1 Syringe as needed (until new insuline pump arrives)  Qty: 50 each, Refills: 1    Associated Diagnoses: Type 1 diabetes mellitus with hyperglycemia (H)           STOP taking these medications       carvedilol (COREG) 12.5 MG tablet " Comments:   Reason for Stopping:         insulin glargine (LANTUS SOLOSTAR) 100 UNIT/ML pen Comments:   Reason for Stopping:         insulin lispro (HUMALOG VIAL) 100 UNIT/ML vial Comments:   Reason for Stopping:         mycophenolate (GENERIC EQUIVALENT) 250 MG capsule Comments:   Reason for Stopping:         Semaglutide, 2 MG/DOSE, (OZEMPIC) 8 MG/3ML pen Comments:   Reason for Stopping:                  Home Care Referral      Reason for your hospital stay    You were admitted for a pancreas transplant. You are discharging home in stable condition with close follow up.     Activity    Your activity upon discharge: Walk at least four times a day, lift no greater than 10 pounds for 8 weeks from the time of surgery.  No driving while taking narcotics or 3 weeks after surgery.     Monitor and record    Monitor blood pressure and weight daily.     Monitor glucose via CGM.     When to contact your care team    WHEN TO CONTACT YOUR  COORDINATOR:     Transplant Coordinator [phone]     Notify your coordinator if you have pain over your kidney or pancreas, increased redness or drainage from your incision, fever greater than 100F, decreased urine output or new or increased amount of blood in urine.     Notify your coordinator immediately if you are ever unable to take your immunosuppressive medications for any reason.     If you have URGENT concerns after office hours, please call the hospital switchboard at [phone] and ask to have the organ transplant nurse on-call paged. If you have a life-threatening emergency, go to the nearest emergency room.     Wound care and dressings    Instructions to care for your wound at home: If you have staples in place, they will be removed about 3 weeks after surgery. Wash incision daily with soap and water. Do not soak or scrub.     Tubes and drains    You are going home with the following tubes or drains: RUBIA.  Tube cares per hospital or home care instructions.     [facility]  Follow-up and recommended labs and tests    [facility] FOLLOW UP:     1. [facility]: Over the next 2 days you will be seen in the [facility] (ph. [phone], option 3). Your labs will be drawn at the beginning of your appointment. DO NOT take your medications prior to having labs drawn. Please bring all your medications with you from home to take after labs are drawn.     2. Follow up with Dr. Mcdaniels in Transplant Clinic in 1-2 weeks.     3. Follow up with Transplant Nephrologist as scheduled.     Remember to always bring an updated medication list to all appointments.        Call your Transplant Coordinator ([phone]) with questions about Transplant Center appointment scheduling.     LABS:     CBC, BMP, magnesium, phosphorus, lipase, tacrolimus level to be drawn daily while in [facility], then every Monday and Thursday by home health care nurse if arranged, or at an outpatient lab.     Diet    Diet recommendations post-transplant: Heart healthy dietary habits long term (low saturated/trans fat, low sodium). High protein diet x 8 weeks. Practice food safety precautions.         Data   Most Recent 3 CBC's:  Recent Labs   Lab Test 11/11/24  0553 11/10/24  0609 11/09/24  0624   WBC 7.8 5.8 4.9   HGB 7.4* 8.4* 8.2*   MCV 84 83 82   * 100* 53*      Most Recent 3 BMP's:  Recent Labs   Lab Test 11/11/24  0840 11/11/24  0553 11/11/24  0214 11/10/24  0722 11/10/24  0609 11/09/24  0807 11/09/24  0624   NA  --  135  --   --  138  --  138   POTASSIUM  --  4.2  --   --  4.2  --  3.7   CHLORIDE  --  103  --   --  101  --  103   CO2  --  24  --   --  27  --  24   BUN  --  18.2  --   --  22.9*  --  24.7*   CR  --  1.55*  --   --  1.68*  --  1.37*   ANIONGAP  --  8  --   --  10  --  11   APRIL  --  8.0*  --   --  8.3*  --  8.1*   * 103* 113*   < > 101*   < > 99    < > = values in this interval not displayed.     Most Recent 2 LFT's:  Recent Labs   Lab Test 11/02/24  9262  09/26/19  0945   AST 15 16   ALT 13 42   ALKPHOS 113 100   BILITOTAL 0.5 0.2     Most Recent INR's and Anticoagulation Dosing History:  Anticoagulation Dose History  More data exists         Latest Ref Rng & Units 9/26/2019 10/18/2019 10/22/2019 11/25/2019 6/7/2024 11/2/2024 11/3/2024   Recent Dosing and Labs   INR 0.85 - 1.15 1.00  1.03  1.02  - 1.03  1.05  1.49    ISTAT INR 0.86 - 1.14 - - - 1.1        This test is intended for monitoring Coumadin therapy.  Results are not   accurate in patients with prolonged INR due to factor deficiency.   - - -      Details                 Most Recent 3 Troponin's:No lab results found.  Most Recent Cholesterol Panel:  Recent Labs   Lab Test 09/13/24  1021   CHOL 106   LDL 46   HDL 35*   TRIG 123     Most Recent 6 Bacteria Isolates From Any Culture (See EPIC Reports for Culture Details):  Recent Labs   Lab Test 11/21/19  0810 10/18/19  0630 09/26/19  0950   CULT No growth No growth No growth     Most Recent TSH, T4 and A1c Labs:  Recent Labs   Lab Test 11/03/24  0339 11/02/24  1352 09/13/24  1021   TSH  --   --  1.29   A1C 7.8*   < > 8.7*    < > = values in this interval not displayed.     Results for orders placed or performed during the hospital encounter of 11/02/24   XR Chest 2 Views    Narrative    Exam: XR CHEST 2 VIEWS, 11/2/2024 1:26 PM    Indication: pre-transplant screening    Comparison: 10/1/2019    Findings:   The cardiomediastinal silhouette and pulmonary vasculature are within  normal limits. No pleural effusion or pneumothorax. No focal airspace  opacity.      Impression    Impression: No acute airspace disease.    BRANDON HOLLINGSWORTH DO         SYSTEM ID:  [id number]   XR Chest Port 1 View    Narrative    Exam: XR CHEST PORT 1 VIEW, 11/3/2024 1:53 AM    Comparison: Chest radiograph 11/2/2024    History: Post-op Kidney Transplant    Technique: Portable AP view of the chest.     Findings:  Right internal jugular central venous catheter tip terminates in the  mid right  atrium. Enteric tube tip and sidehole project over the  stomach lumen.    Low lung volumes with perihilar and bibasilar airspace opacities. No  pneumothorax or pleural effusion. Stable cardiac silhouette.      Impression    Impression:  1.  Right internal jugular central venous catheter tip positioned in  mid right atrium. Consider 2 to 3 cm retraction.  2.  Enteric tube tip and sidehole positioned within the stomach lumen.  3.  Low lung volumes with perihilar and bibasilar airspace opacities  favoring atelectasis.    I have personally reviewed the examination and initial interpretation  and I agree with the findings.    BRANDON HOLLINGSWORTH DO         SYSTEM ID:  [id number]   US Pancreas Transplant    Narrative    EXAMINATION:  PANCREAS TRANSPLANT, 11/3/2024 2:23 AM     COMPARISON: None.    HISTORY: s/p pancreas transplant- please assess flows    TECHNIQUE:  Grey-scale, color Doppler and spectral flow analysis.    FINDINGS:     The transplanted pancreas is located in the right lower quadrant and  demonstrates normal echogenicity.   There is no free fluid adjacent to the transplant pancreas.     Transplant pancreas arterial flow:   Within pancreas: 22/6 cm/sec.   Outside pancreas: 47/7 cm/sec.   Anastomosis: 65/9 cm/sec.    Transplant pancreas venous flow:  Within pancreas: 7 cm/sec.   Outside pancreas: 9 cm/sec.   Anastomosis: 23 cm/sec.    Iliac artery:  Above the anastomosis:   90 cm/sec.  Below the anastomosis:   95 cm/sec.    Iliac vein  Above the transplant:   39 cm/sec.  Below the transplant:   20 cm/sec.    At the anastomosis:  Peak-systolic velocity is 65 cm/sec.  End-diastolic velocity is 9 cm/sec.  Resistive index: 0.87        Impression    Impression:   1.  No evidence of arterial or venous stenosis.   2.  Pancreatic transplant vascular velocities and waveforms are within  normal limits on color/spectral Doppler.      I have personally reviewed the examination and initial interpretation  and I agree with the  findings.    BRANDON HOLLINGSWORTH DO         SYSTEM ID:  [id number]   XR Abdomen Port 1 View    Narrative    Exam: XR ABDOMEN PORT 1 VIEW, 11/4/2024 9:21 AM    Indication: ng placement    Comparison: Abdominal ultrasound 11/3/2024    Findings:   Frontal view of the abdomen with patient upright at 60 degrees. A  gastric tube is present with tube tip and sidehole projecting over the  stomach. Surgical clips and staples project over the inferior abdomen.  No abnormally distended loops of large or small bowel. No definite  pneumatosis or portal venous gas.      Impression    Impression: Gastric tube tip and sidehole project over the stomach.    I have personally reviewed the examination and initial interpretation  and I agree with the findings.    FREDRICK EDWARD MD         SYSTEM ID:  [id number]   US Renal Transplant with Doppler    Narrative    EXAMINATION: US RENAL TRANSPLANT,  11/6/2024 11:04 AM     COMPARISON: Ultrasound 12/9/2019    HISTORY: Elevated Cr. Evaluate blood flow, evaluate for hydronephrosis    TECHNIQUE:  Grey-scale, color Doppler and spectral flow analysis.    FINDINGS:  The transplant kidney is located left lower quadrant, and measures  11.5 cm. Parenchyma is of normal thickness and echogenicity. No focal  lesions. No hydronephrosis. No perinephric fluid collection.    Renal artery flow:   92 cm/s peak systolic at hilum.  231 cm/s peak systolic at anastomosis.    Segmental artery resistive indices (upper to lower): 0.72, 0.77, 0.77    Renal Vein Flow:  44 cm/s at hilum.   46 cm/s at anastomosis.    Iliac artery flow:  132 cm/s peak systolic above anastomosis.  149 cm/s peak systolic below anastomosis.    Iliac vein flow:  Patent above and below the anastomosis.    Bladder: Distended and unremarkable.      Impression    IMPRESSION:   1. Normal grayscale evaluation of the left lower quadrant transplant  kidney.   2. Patent renal transplant Doppler evaluation.  3. Elevated although improved velocities at the  renal artery  anastomosis measuring 231 cm/s, previously 442 cm/s. Not significant  in the setting of normal renal artery to iliac artery velocity ratio  and normal waveforms distally.    I have personally reviewed the examination and initial interpretation  and I agree with the findings.    CAESAR DUENAS MD         SYSTEM ID:  [id number]

## 2024-11-11 NOTE — PROGRESS NOTES
DISCHARGE:  Patient with orders to discharge to  patient.     Education Provided:   Med Card up to date and given to patient  Lab Book up to date and given to patient  Handouts AVS printed and discussed with patient  Specialty Pharmacy: RX filled and to be picked up by patient      Discharge instructions, medications & follow ups reviewed with patient. Copy of discharge summary given to patient. PIV removed. [facility] notified, report given.    Patient in stable condition. AVSS. Patient had no further questions regarding discharge instructions and medications. Patient transferred out by self & left with family.  Plan for follow-up care as directed in AVS.

## 2024-11-12 ENCOUNTER — OFFICE VISIT (OUTPATIENT)
Dept: INFUSION THERAPY | Facility: CLINIC | Age: 44
End: 2024-11-12
Attending: NURSE PRACTITIONER
Payer: COMMERCIAL

## 2024-11-12 ENCOUNTER — LAB (OUTPATIENT)
Dept: LAB | Facility: CLINIC | Age: 44
End: 2024-11-12
Payer: COMMERCIAL

## 2024-11-12 VITALS
TEMPERATURE: 97.7 F | HEART RATE: 89 BPM | SYSTOLIC BLOOD PRESSURE: 106 MMHG | WEIGHT: 201 LBS | RESPIRATION RATE: 16 BRPM | BODY MASS INDEX: 31.48 KG/M2 | OXYGEN SATURATION: 99 % | DIASTOLIC BLOOD PRESSURE: 67 MMHG

## 2024-11-12 DIAGNOSIS — Z20.828 CONTACT WITH AND (SUSPECTED) EXPOSURE TO OTHER VIRAL COMMUNICABLE DISEASES: ICD-10-CM

## 2024-11-12 DIAGNOSIS — E61.1 IRON DEFICIENCY: Primary | ICD-10-CM

## 2024-11-12 DIAGNOSIS — Z94.83 PANCREAS REPLACED BY TRANSPLANT (H): ICD-10-CM

## 2024-11-12 DIAGNOSIS — Z98.890 OTHER SPECIFIED POSTPROCEDURAL STATES: ICD-10-CM

## 2024-11-12 DIAGNOSIS — E78.5 DYSLIPIDEMIA: ICD-10-CM

## 2024-11-12 DIAGNOSIS — Z79.899 ENCOUNTER FOR LONG-TERM CURRENT USE OF MEDICATION: ICD-10-CM

## 2024-11-12 DIAGNOSIS — Z94.0 KIDNEY REPLACED BY TRANSPLANT: ICD-10-CM

## 2024-11-12 DIAGNOSIS — Z48.298 AFTERCARE FOLLOWING ORGAN TRANSPLANT: ICD-10-CM

## 2024-11-12 DIAGNOSIS — Z94.0 KIDNEY REPLACED BY TRANSPLANT: Primary | ICD-10-CM

## 2024-11-12 LAB
AMYLASE SERPL-CCNC: 57 U/L (ref 28–100)
ANION GAP SERPL CALCULATED.3IONS-SCNC: 9 MMOL/L (ref 7–15)
BASOPHILS # BLD AUTO: 0 10E3/UL (ref 0–0.2)
BASOPHILS NFR BLD AUTO: 0 %
BUN SERPL-MCNC: 19 MG/DL (ref 6–20)
CALCIUM SERPL-MCNC: 8.6 MG/DL (ref 8.8–10.4)
CHLORIDE SERPL-SCNC: 104 MMOL/L (ref 98–107)
CREAT SERPL-MCNC: 1.64 MG/DL (ref 0.67–1.17)
EGFRCR SERPLBLD CKD-EPI 2021: 52 ML/MIN/1.73M2
EOSINOPHIL # BLD AUTO: 0 10E3/UL (ref 0–0.7)
EOSINOPHIL NFR BLD AUTO: 0 %
ERYTHROCYTE [DISTWIDTH] IN BLOOD BY AUTOMATED COUNT: 13.9 % (ref 10–15)
FERRITIN SERPL-MCNC: 238 NG/ML (ref 31–409)
GLUCOSE SERPL-MCNC: 112 MG/DL (ref 70–99)
HCO3 SERPL-SCNC: 24 MMOL/L (ref 22–29)
HCT VFR BLD AUTO: 25.4 % (ref 40–53)
HGB BLD-MCNC: 8.2 G/DL (ref 13.3–17.7)
IMM GRANULOCYTES # BLD: 0.3 10E3/UL
IMM GRANULOCYTES NFR BLD: 3 %
IRON BINDING CAPACITY (ROCHE): 210 UG/DL (ref 240–430)
IRON SATN MFR SERPL: 9 % (ref 15–46)
IRON SERPL-MCNC: 19 UG/DL (ref 61–157)
LIPASE SERPL-CCNC: 63 U/L (ref 13–60)
LYMPHOCYTES # BLD AUTO: 0.1 10E3/UL (ref 0.8–5.3)
LYMPHOCYTES NFR BLD AUTO: 1 %
MAGNESIUM SERPL-MCNC: 1.6 MG/DL (ref 1.7–2.3)
MCH RBC QN AUTO: 27.2 PG (ref 26.5–33)
MCHC RBC AUTO-ENTMCNC: 32.3 G/DL (ref 31.5–36.5)
MCV RBC AUTO: 84 FL (ref 78–100)
MONOCYTES # BLD AUTO: 0.3 10E3/UL (ref 0–1.3)
MONOCYTES NFR BLD AUTO: 3 %
NEUTROPHILS # BLD AUTO: 9.8 10E3/UL (ref 1.6–8.3)
NEUTROPHILS NFR BLD AUTO: 93 %
NRBC # BLD AUTO: 0 10E3/UL
NRBC BLD AUTO-RTO: 0 /100
PHOSPHATE SERPL-MCNC: 2.5 MG/DL (ref 2.5–4.5)
PLATELET # BLD AUTO: 181 10E3/UL (ref 150–450)
POTASSIUM SERPL-SCNC: 4.2 MMOL/L (ref 3.4–5.3)
PTH-INTACT SERPL-MCNC: 55 PG/ML (ref 15–65)
RBC # BLD AUTO: 3.01 10E6/UL (ref 4.4–5.9)
SODIUM SERPL-SCNC: 137 MMOL/L (ref 135–145)
TACROLIMUS BLD-MCNC: 13.2 UG/L (ref 5–15)
TME LAST DOSE: NORMAL H
TME LAST DOSE: NORMAL H
VIT D+METAB SERPL-MCNC: 17 NG/ML (ref 20–50)
WBC # BLD AUTO: 10.5 10E3/UL (ref 4–11)

## 2024-11-12 PROCEDURE — 82728 ASSAY OF FERRITIN: CPT | Performed by: PATHOLOGY

## 2024-11-12 PROCEDURE — 82150 ASSAY OF AMYLASE: CPT | Performed by: PATHOLOGY

## 2024-11-12 PROCEDURE — 36415 COLL VENOUS BLD VENIPUNCTURE: CPT | Performed by: PATHOLOGY

## 2024-11-12 PROCEDURE — 85025 COMPLETE CBC W/AUTO DIFF WBC: CPT | Performed by: PATHOLOGY

## 2024-11-12 PROCEDURE — 83690 ASSAY OF LIPASE: CPT | Performed by: PATHOLOGY

## 2024-11-12 PROCEDURE — 82306 VITAMIN D 25 HYDROXY: CPT | Performed by: STUDENT IN AN ORGANIZED HEALTH CARE EDUCATION/TRAINING PROGRAM

## 2024-11-12 PROCEDURE — 80197 ASSAY OF TACROLIMUS: CPT | Performed by: NURSE PRACTITIONER

## 2024-11-12 PROCEDURE — 99213 OFFICE O/P EST LOW 20 MIN: CPT | Mod: 24 | Performed by: NURSE PRACTITIONER

## 2024-11-12 PROCEDURE — 83970 ASSAY OF PARATHORMONE: CPT | Performed by: PATHOLOGY

## 2024-11-12 PROCEDURE — 83735 ASSAY OF MAGNESIUM: CPT | Performed by: PATHOLOGY

## 2024-11-12 PROCEDURE — 80048 BASIC METABOLIC PNL TOTAL CA: CPT | Performed by: PATHOLOGY

## 2024-11-12 PROCEDURE — 83550 IRON BINDING TEST: CPT | Performed by: PATHOLOGY

## 2024-11-12 PROCEDURE — 99000 SPECIMEN HANDLING OFFICE-LAB: CPT | Performed by: PATHOLOGY

## 2024-11-12 PROCEDURE — 84100 ASSAY OF PHOSPHORUS: CPT | Performed by: PATHOLOGY

## 2024-11-12 PROCEDURE — 83540 ASSAY OF IRON: CPT | Performed by: PATHOLOGY

## 2024-11-12 RX ORDER — TACROLIMUS 1 MG/1
3 CAPSULE ORAL 2 TIMES DAILY
Qty: 240 CAPSULE | Refills: 11 | Status: SHIPPED | OUTPATIENT
Start: 2024-11-12

## 2024-11-12 RX ORDER — ATORVASTATIN CALCIUM 10 MG/1
20 TABLET, FILM COATED ORAL DAILY
Qty: 90 TABLET | Refills: 0 | Status: SHIPPED | OUTPATIENT
Start: 2024-11-12

## 2024-11-12 RX ORDER — FERROUS SULFATE 325(65) MG
325 TABLET ORAL
Qty: 60 TABLET | Refills: 0 | Status: SHIPPED | OUTPATIENT
Start: 2024-11-12 | End: 2024-11-14 | Stop reason: ALTCHOICE

## 2024-11-12 ASSESSMENT — PAIN SCALES - GENERAL: PAINLEVEL_OUTOF10: NO PAIN (0)

## 2024-11-12 NOTE — PROGRESS NOTES
ACUTE TRANSPLANT NEPHROLOGY VISIT    Assessment & Plan   # : Hx LDKT 2019 c/b RICHARD: Cr up slightly. Will push oral fluids and reassess Thursday.    - Baseline Cr ~ 1.1 - 1.3    # Pancreas Tx (IVETTE): Doing well. Push oral fluids. Labs and clinic Thursday.    - Pancreatic Exocrine Drainage: Enteric drained  -BM this AM    - Blood glucose: Euglycemia      On insulin: No   - Pancreatic enzymes: Trend down   - DSA Hx: No DSA   - Pancreas Tx Biopsy Hx: No biopsy history    # Immunosuppression: Tacrolimus immediate release (goal 8-10) and Mycophenolate mofetil (dose 1000 mg every 12 hours)   - Changes: Not at this time    # Infection Prophylaxis:   - PJP: Sulfa/TMP (Bactrim)  - CMV: Valganciclovir (Valcyte)  # Hypertension: Controlled;  Goal BP: < 150/90   - Changes: Not at this time    # Anemia in Chronic Renal Disease: Hgb: Trend up      JOSE: No   - Iron studies: Low iron saturation - start ferrous sulfate.     # Mineral Bone Disorder:   - Secondary renal hyperparathyroidism; PTH level: Normal (15-65 pg/ml)        On treatment: None  - Vitamin D; level: Low        On supplement: Yes  - Calcium; level:  low but trending up         On supplement: Yes  - Phosphorus; level: Normal        On supplement: No    # Electrolytes:   - Potassium; level: Normal        On supplement: No  - Magnesium; level: Low normal        On supplement: Yes  - Bicarbonate; level: Normal        On supplement: No  - Sodium; level: Normal      # Medical Compliance: Yes    # Transplant History:  Etiology of Kidney Failure: Diabetes mellitus type 1  Tx: LDKT and IVETTE  Transplant: 11/2/2024 (Pancreas), 10/1/2019 (Kidney)  Donor Type: Living Donor Class:    Crossmatch at time of Tx: negative  DSA at time of Tx: No  Significant changes in immunosuppression: None  CMV IgG Ab High Risk Discordance (D+/R-): No  EBV IgG Ab High Risk Discordance (D+/R-): No  Significant transplant-related complications: None    Transplant Office Phone Number:  [phone]    Assessment and plan was discussed with the patient and he voiced his understanding and agreement.    Return visit: labs and clinic Thursday.     CORINNA Bellamy CNP    Chief Complaint   Mr. Amin is a 44 year old here for hospital follow up after pancreas transplant.     History of Present Illness      Doing well. Pain minimal.   Appetite is good, drinking adequate fluids.   No N/V/D.   BM this AM.   No drain.       Recent Hospitalizations:  [] No [x] Yes    New Medical Issues: [x] No [] Yes    Decreased energy: [x] No [] Yes    Chest pain or SOB with exertion:  [x] No [] Yes    Appetite change or weight change: [x] No [] Yes    Nausea, vomiting or diarrhea:  [x] No [] Yes    Fever, sweats or chills: [x] No [] Yes    Leg swelling: [] No [x] Yes      Home BP: Not checked    Review of Systems   A comprehensive review of systems was obtained and negative, except as noted in the HPI or PMH.    Problem List   Patient Active Problem List   Diagnosis    HTN, kidney transplant related    Diabetes mellitus type 1 (H)    Dyslipidemia    Anemia in chronic kidney disease    Secondary renal hyperparathyroidism (H)    Kidney replaced by transplant    Aftercare following organ transplant    Vitamin D deficiency    Hypomagnesemia    Kidney transplant rejection    Class 1 obesity with serious comorbidity and body mass index (BMI) of 33.0 to 33.9 in adult, unspecified obesity type    Shingles    Organ transplant candidate    Pre-operative cardiovascular examination    Status post coronary angiogram    Pancreas replaced by transplant (H)    Immunosuppressed status (H)    Acute kidney injury (H)    Hypervolemia    Acute post-operative pain    Thrombocytopenia (H)    At risk for malnutrition    Syncope       Social History   Social History     Tobacco Use    Smoking status: Never    Smokeless tobacco: Never   Vaping Use    Vaping status: Never Used   Substance Use Topics    Alcohol use: Not Currently     Comment:  Rarely    Drug use: No       Allergies   Allergies   Allergen Reactions    Anti-Thymocyte Glob (Rabbit)      Had reaction with rigors after steroid-free dose. No reaction with steroids.       Medications   Current Outpatient Medications   Medication Sig Dispense Refill    acetaminophen (TYLENOL) 325 MG tablet Take 2 tablets (650 mg) by mouth every 6 hours as needed (For optimal non-opioid multimodal pain management to improve pain control.). 60 tablet 0    alcohol swab prep pads Use to swab area of injection/zak as directed. 100 each 3    aspirin (ASA) 81 MG EC tablet Take 81 mg by mouth daily       atorvastatin (LIPITOR) 10 MG tablet Take 2 tablets (20 mg) by mouth daily 90 tablet 0    blood glucose (NO BRAND SPECIFIED) lancets standard Use to test blood sugar 3 times daily or as directed. 100 each 11    blood glucose (NO BRAND SPECIFIED) test strip Use to test blood sugar 3 times daily or as directed. 100 strip 11    blood glucose monitoring (NO BRAND SPECIFIED) meter device kit Use to test blood sugar 3 times daily or as directed. 2 kit 0    calcium carbonate-vitamin D (OSCAL) 500-5 MG-MCG tablet Take 1 tablet by mouth 2 times daily (with meals). 60 tablet 2    CELLCEPT (BRAND) 250 MG capsule Take 4 capsules (1,000 mg) by mouth 2 times daily. 240 capsule 11    Continuous Glucose Sensor (DEXCOM G6 SENSOR) MISC CHANGE SENSOR EVERY 10 DAYS 9 each 3    Continuous Glucose Transmitter (DEXCOM G6 TRANSMITTER) MISC Change every 3 months 1 each 3    famotidine (PEPCID) 20 MG tablet Take 1 tablet (20 mg) by mouth 2 times daily. 60 tablet 0    Insulin Infusion Pump Supplies (AUTOSOFT XC INFUSION SET) MISC 1 each every 48 hours Change every 2 days  9 mm cannula, 23 inch tubing 45 each 3    Insulin Infusion Pump Supplies (T:SLIM X2 3ML CARTRIDGE) MISC 1 each by Other route every other day Insulin cartridge to be used with pump as directed.  Change every 2 days or as directed. 45 each 3    insulin pen needle (ULTICARE MICRO)  "32G X 4 MM miscellaneous Use pen needles daily or as directed. 100 each 3    Insulin Syringe-Needle U-100 31G X 1/4\" 1 ML MISC 1 Syringe as needed (until new insuline pump arrives) 50 each 1    magnesium oxide (MAG-OX) 400 MG tablet Take 1 tablet (400 mg) by mouth daily (with lunch) 30 tablet 5    methocarbamol (ROBAXIN) 750 MG tablet Take 1 tablet (750 mg) by mouth every 6 hours as needed for muscle spasms. 20 tablet 0    nystatin (MYCOSTATIN) 700286 UNIT/ML suspension Swish and swallow 5 mLs (500,000 Units) in mouth 4 times daily. 473 mL 2    oxyCODONE (ROXICODONE) 5 MG tablet Take 0.5-1 tablets (2.5-5 mg) by mouth every 6 hours as needed for moderate to severe pain. 5 tablet 0    pantoprazole (PROTONIX) 40 MG EC tablet Take 1 tablet (40 mg) by mouth every morning (before breakfast). 30 tablet 0    polyethylene glycol (MIRALAX) 17 GM/Dose powder Take 17 g by mouth daily.      senna-docusate (SENOKOT-S/PERICOLACE) 8.6-50 MG tablet Take 1-2 tablets by mouth 2 times daily as needed for constipation.      senna-docusate (SENOKOT-S/PERICOLACE) 8.6-50 MG tablet Take 1-2 tablets by mouth 2 times daily as needed for constipation. 60 tablet 0    sulfamethoxazole-trimethoprim (BACTRIM) 400-80 MG tablet Take 1 tablet by mouth daily. 30 tablet 11    tacrolimus (GENERIC EQUIVALENT) 0.5 MG capsule Take 1 capsule (0.5 mg) by mouth 2 times daily. To have available for dose adjustments per transplant team. 60 capsule 11    tacrolimus (GENERIC EQUIVALENT) 1 MG capsule Take 4 capsules (4 mg) by mouth 2 times daily. 240 capsule 11    valGANciclovir (VALCYTE) 450 MG tablet Take 2 tablets (900 mg) by mouth daily. 60 tablet 2    vitamin D3 (CHOLECALCIFEROL) 50 mcg (2000 units) tablet Take 1 tablet (50 mcg) by mouth daily       No current facility-administered medications for this visit.     There are no discontinued medications.    Physical Exam   Vital Signs: There were no vitals taken for this visit.    GENERAL APPEARANCE: alert and " no distress  HENT: mouth without ulcers or lesions  RESP: lungs clear to auscultation - no rales, rhonchi or wheezes  CV: regular rhythm, normal rate, no rub, no murmur  EDEMA: no LE edema bilaterally  ABDOMEN: soft, nondistended, nontender, bowel sounds normal  MS: extremities normal - no gross deformities noted, no evidence of inflammation in joints, no muscle tenderness  SKIN: no rash  Incision intact with staples - CDI, no drainage or erythema.     Data         Latest Ref Rng & Units 11/12/2024     7:16 AM 11/11/2024     8:40 AM 11/11/2024     5:53 AM   Renal   Sodium 135 - 145 mmol/L 137   135    K 3.4 - 5.3 mmol/L 4.2   4.2    Cl 98 - 107 mmol/L 104   103    Cl (external) 98 - 107 mmol/L 104   103    CO2 22 - 29 mmol/L 24   24    Urea Nitrogen 6.0 - 20.0 mg/dL 19.0   18.2    Creatinine 0.67 - 1.17 mg/dL 1.64   1.55    Glucose 70 - 99 mg/dL 112  126  103    Calcium 8.8 - 10.4 mg/dL 8.6   8.0    Magnesium 1.7 - 2.3 mg/dL 1.6   1.9          Latest Ref Rng & Units 11/12/2024     7:16 AM 11/11/2024     5:53 AM 11/10/2024     6:09 AM   Bone Health   Phosphorus 2.5 - 4.5 mg/dL 2.5  2.8  3.7    Parathyroid Hormone Intact 15 - 65 pg/mL 55            Latest Ref Rng & Units 11/12/2024     7:16 AM 11/11/2024     5:53 AM 11/10/2024     6:09 AM   Heme   WBC 4.0 - 11.0 10e3/uL 10.5  7.8  5.8    Hgb 13.3 - 17.7 g/dL 8.2  7.4  8.4    Plt 150 - 450 10e3/uL 181  113  100    ABSOLUTE NEUTROPHIL 1.6 - 8.3 10e3/uL   5.6    ABSOLUTE LYMPHOCYTES 0.8 - 5.3 10e3/uL   0.0    ABSOLUTE MONOCYTES 0.0 - 1.3 10e3/uL   0.2    ABSOLUTE EOSINOPHILS 0.0 - 0.7 10e3/uL   0.0          Latest Ref Rng & Units 11/4/2024     6:22 AM 11/2/2024     1:52 PM 9/26/2019     9:45 AM   Liver   AP 40 - 150 U/L  113  100    TBili <=1.2 mg/dL  0.5  0.2    ALT 0 - 70 U/L  13  42    AST 0 - 45 U/L  15  16    Tot Protein 6.4 - 8.3 g/dL  6.9  7.5    Albumin 3.4 - 5.0 g/dL   3.5    Albumin 3.5 - 5.2 g/dL 2.4  4.1           Latest Ref Rng & Units 11/12/2024     7:16 AM  11/11/2024     5:53 AM 11/10/2024     6:09 AM   Pancreas   Amylase 28 - 100 U/L 57      Lipase (Roche) 13 - 60 U/L 63  71  88          Latest Ref Rng & Units 11/12/2024     7:16 AM 9/26/2019     9:45 AM 8/6/2019     5:45 PM   Iron studies   Iron 61 - 157 ug/dL 19  70     Iron Saturation Index 15 - 46 %  28     Iron Sat Index 15 - 46 % 9      Ferritin 31 - 409 ng/mL 238  753     Ferritin (external) 22 - 322 ng/mL   578           This result is from an external source.         Latest Ref Rng & Units 11/2/2024     2:29 PM 8/13/2021     8:39 AM 7/22/2021     8:22 AM   [facility]   BK Quant Log Ext log IU/mL  Undetected     BK Quant Result Ext Undetected IU/mL  Undetected  None Detected       BK Quant Spec Ext    Blood       EBV CAPSID ANTIBODY IGG No detectable antibody. Positive          This result is from an external source.     Failed to redirect to the Timeline version of the [employer] SmartLink.  Recent Labs   Lab Test 02/07/20  0858 05/11/21  1531 11/21/23  0848 09/13/24  1021 11/07/24  0704 11/09/24  0624 11/11/24  0553   DOSTAC 2/6/20 2105 2,000 11/20/2023  --   --   --   --    TACROL 9.2 6.3 7.1   < > 7.7 7.8 9.5    < > = values in this interval not displayed.     Recent Labs   Lab Test 10/07/19  0742 11/21/19  0813   DOSMPA Not Provided 11/20/19 1800   MPACID 0.82* 0.91*   MPAG 14.1* 39.5

## 2024-11-12 NOTE — PROGRESS NOTES
"Michael Amin came to [facility] today for a lab and assess following a Pancreas transplant on 11/2/24.      Discharge date: 11/11/24  Transplant coordinator: Anneliese HINTON  Phone number patient can be reached at: [phone]      Physical Assessment:  See physical assessment located under \"Document Flowsheets\".  Incision site: Midline abdominal incision clean, dry, intact with staples, no drainage noted.  Lines: N/A  Méndez: N/A  Urine clarity: Darker yellow urine, slightly foamy per pt. Voiding frequently, denies pain with urination.  Hydration: Pt had difficulty drinking fluids yesterday, estimates about 1.5 liters over 24 hours.   Nutrition: Taco salad for lunch yesterday. Chicken and broccoli skillet meal, and ravioli for dinner. Denies nausea or vomiting, but felt bloated/full. Had cereal, Oreos, and milk for breakfast today. Hasn't felt like eating too much yet, but the feeling of fullness has improved.   Last BM: This morning, loose, soft, brown stools, a good amt per pt. Using Senna twice daily, had Miralax yesterday.   Pain: Denies pain this morning, moving well. Took two Tylenol at 6am.    My transplant place videos watched: No  Blood glucose: Pt monitoring with Dexcom. Blood glucoses have been in the 110's at home, but pt was very concerned when it went up to 160 after eating breakfast. Approx 15 mins later, BG was 145. Pt dicussed with Arlene, no concerns or changes at the time. Pt has many questions and concerns regarding eating, monitoring BG, and what is expected.   Labs drawn by [facility] staff: No, labs were drawn in 1st floor lab.  Vascular access: N/A    Plan of care for today: Medication review, transplant education, visit with Transplant provider.    Medication changes: None    Medications administered: Pt self-administered home medications after labs while in [facility] waiting room. He already set up his home medication in pill box. He is missing several medications, which he will  this morning and add " to his medication box (ASA, Atorvastatin, Magnesium, Vit D).      Patient education:    The following teaching topics were addressed: Importance of drinking 2L of non-caffeinated fluids daily, Incisional care, Signs/symptoms of infection, Good handwashing, Medications (purposes, doses and times of administration), Phone numbers to call with concenrs (Transplant coordinator, [facility] and [facility]), and Plan of care   Patient verbalized understanding and all questions answered.    Drug level:  Patient took 4mg of Tacrolimus last evening at 710pm.  Care coordinator to follow up with the result.    Face to face time: 1.5 hours  Discharge Plan    Pt will follow up with: Labs and office visit with Arlene GALAVIZ on 11/14/24  Discharge instructions reviewed with patient: YES  Patient/Representative verbalized understanding, all questions answered: YES    Discharged from unit at 1035 with whom: self to pharmacy and then home.    Audrey Whitehead RN

## 2024-11-12 NOTE — PATIENT INSTRUCTIONS
Dear Michael Amin    Thank you for choosing [facility] ([facility]) for your transplant cares.  The following information is a summary of our appointment as well as important reminders.      Please make sure you are available to answer your phone today because your transplant coordinator will call you IF you need to change the amount of anti-rejection medication that you are taking.    Transplant Coordinator: Anneliese HINTON  Transplant Office:  [phone]  [facility]:  [phone]  Ask for physician on call for kidney transplant.  Unit [address] (Transplant Unit):  [phone]  [facility]:  [phone]    Vital sign parameters:  BP - call if top number (systolic) above 160 or less than 90 or diastolic (bottom) above 90  Heart rate - less than 50 or greater than 100-120  Temp - greater than 100.4  Weight - more than 4-5 lbs up or down in 3 days or more than 10 lb up or down in 1 week.      Please  Atorvastatin in 1st floor pharmacy.  Add Aspirin, Atorvastatin, Magnesium, and Vit D to medication box.  Push oral fluids today.  Add high Phosphorus foods to meals today.    If you are a transplant patient and require transplant education, please click on this link: [email or web address].    If you have any questions on your upcoming Specialty Infusion appointments, please call scheduling at [phone].  It was a pleasure taking care of you today.    Sincerely,    [facility]  [address] [place]  [address]  Phone:  [phone]

## 2024-11-12 NOTE — LETTER
11/12/2024      Michael Amin  [address]  [facility] [id number]      Dear Colleague,    Thank you for referring your patient, Michael Amin, to the [facility]. Please see a copy of my visit note below.    ACUTE TRANSPLANT NEPHROLOGY VISIT    Assessment & Plan  # : Hx LDKT 2019 c/b RICHARD: Cr up slightly. Will push oral fluids and reassess Thursday.    - Baseline Cr ~ 1.1 - 1.3    # Pancreas Tx (IVETTE): Doing well. Push oral fluids. Labs and clinic Thursday.    - Pancreatic Exocrine Drainage: Enteric drained  -BM this AM    - Blood glucose: Euglycemia      On insulin: No   - Pancreatic enzymes: Trend down   - DSA Hx: No DSA   - Pancreas Tx Biopsy Hx: No biopsy history    # Immunosuppression: Tacrolimus immediate release (goal 8-10) and Mycophenolate mofetil (dose 1000 mg every 12 hours)   - Changes: Not at this time    # Infection Prophylaxis:   - PJP: Sulfa/TMP (Bactrim)  - CMV: Valganciclovir (Valcyte)  # Hypertension: Controlled;  Goal BP: < 150/90   - Changes: Not at this time    # Anemia in Chronic Renal Disease: Hgb: Trend up      JOSE: No   - Iron studies: Low iron saturation - start ferrous sulfate.     # Mineral Bone Disorder:   - Secondary renal hyperparathyroidism; PTH level: Normal (15-65 pg/ml)        On treatment: None  - Vitamin D; level: Low        On supplement: Yes  - Calcium; level:  low but trending up         On supplement: Yes  - Phosphorus; level: Normal        On supplement: No    # Electrolytes:   - Potassium; level: Normal        On supplement: No  - Magnesium; level: Low normal        On supplement: Yes  - Bicarbonate; level: Normal        On supplement: No  - Sodium; level: Normal      # Medical Compliance: Yes    # Transplant History:  Etiology of Kidney Failure: Diabetes mellitus type 1  Tx: LDKT and IVETTE  Transplant: 11/2/2024 (Pancreas), 10/1/2019 (Kidney)  Donor Type: Living Donor Class:    Crossmatch at time of Tx:  negative  DSA at time of Tx: No  Significant changes in immunosuppression: None  CMV IgG Ab High Risk Discordance (D+/R-): No  EBV IgG Ab High Risk Discordance (D+/R-): No  Significant transplant-related complications: None    Transplant Office Phone Number: [phone]    Assessment and plan was discussed with the patient and he voiced his understanding and agreement.    Return visit: labs and clinic Thursday.     CORINNA Bellamy CNP    Chief Complaint  Mr. Amin is a 44 year old here for hospital follow up after pancreas transplant.     History of Present Illness     Doing well. Pain minimal.   Appetite is good, drinking adequate fluids.   No N/V/D.   BM this AM.   No drain.       Recent Hospitalizations:  [] No [x] Yes    New Medical Issues: [x] No [] Yes    Decreased energy: [x] No [] Yes    Chest pain or SOB with exertion:  [x] No [] Yes    Appetite change or weight change: [x] No [] Yes    Nausea, vomiting or diarrhea:  [x] No [] Yes    Fever, sweats or chills: [x] No [] Yes    Leg swelling: [] No [x] Yes      Home BP: Not checked    Review of Systems  A comprehensive review of systems was obtained and negative, except as noted in the HPI or PMH.    Problem List  Patient Active Problem List   Diagnosis     HTN, kidney transplant related     Diabetes mellitus type 1 (H)     Dyslipidemia     Anemia in chronic kidney disease     Secondary renal hyperparathyroidism (H)     Kidney replaced by transplant     Aftercare following organ transplant     Vitamin D deficiency     Hypomagnesemia     Kidney transplant rejection     Class 1 obesity with serious comorbidity and body mass index (BMI) of 33.0 to 33.9 in adult, unspecified obesity type     Shingles     Organ transplant candidate     Pre-operative cardiovascular examination     Status post coronary angiogram     Pancreas replaced by transplant (H)     Immunosuppressed status (H)     Acute kidney injury (H)     Hypervolemia     Acute post-operative pain      Thrombocytopenia (H)     At risk for malnutrition     Syncope       Social History  Social History     Tobacco Use     Smoking status: Never     Smokeless tobacco: Never   Vaping Use     Vaping status: Never Used   Substance Use Topics     Alcohol use: Not Currently     Comment: Rarely     Drug use: No       Allergies  Allergies   Allergen Reactions     Anti-Thymocyte Glob (Rabbit)      Had reaction with rigors after steroid-free dose. No reaction with steroids.       Medications  Current Outpatient Medications   Medication Sig Dispense Refill     acetaminophen (TYLENOL) 325 MG tablet Take 2 tablets (650 mg) by mouth every 6 hours as needed (For optimal non-opioid multimodal pain management to improve pain control.). 60 tablet 0     alcohol swab prep pads Use to swab area of injection/zak as directed. 100 each 3     aspirin (ASA) 81 MG EC tablet Take 81 mg by mouth daily        atorvastatin (LIPITOR) 10 MG tablet Take 2 tablets (20 mg) by mouth daily 90 tablet 0     blood glucose (NO BRAND SPECIFIED) lancets standard Use to test blood sugar 3 times daily or as directed. 100 each 11     blood glucose (NO BRAND SPECIFIED) test strip Use to test blood sugar 3 times daily or as directed. 100 strip 11     blood glucose monitoring (NO BRAND SPECIFIED) meter device kit Use to test blood sugar 3 times daily or as directed. 2 kit 0     calcium carbonate-vitamin D (OSCAL) 500-5 MG-MCG tablet Take 1 tablet by mouth 2 times daily (with meals). 60 tablet 2     CELLCEPT (BRAND) 250 MG capsule Take 4 capsules (1,000 mg) by mouth 2 times daily. 240 capsule 11     Continuous Glucose Sensor (DEXCOM G6 SENSOR) MISC CHANGE SENSOR EVERY 10 DAYS 9 each 3     Continuous Glucose Transmitter (DEXCOM G6 TRANSMITTER) MISC Change every 3 months 1 each 3     famotidine (PEPCID) 20 MG tablet Take 1 tablet (20 mg) by mouth 2 times daily. 60 tablet 0     Insulin Infusion Pump Supplies (Vistaar XC INFUSION SET) MISC 1 each every 48 hours Change  "every 2 days  9 mm cannula, 23 inch tubing 45 each 3     Insulin Infusion Pump Supplies (T:SLIM X2 3ML CARTRIDGE) MISC 1 each by Other route every other day Insulin cartridge to be used with pump as directed.  Change every 2 days or as directed. 45 each 3     insulin pen needle (ULTICARE MICRO) 32G X 4 MM miscellaneous Use pen needles daily or as directed. 100 each 3     Insulin Syringe-Needle U-100 31G X 1/4\" 1 ML MISC 1 Syringe as needed (until new insuline pump arrives) 50 each 1     magnesium oxide (MAG-OX) 400 MG tablet Take 1 tablet (400 mg) by mouth daily (with lunch) 30 tablet 5     methocarbamol (ROBAXIN) 750 MG tablet Take 1 tablet (750 mg) by mouth every 6 hours as needed for muscle spasms. 20 tablet 0     nystatin (MYCOSTATIN) 295965 UNIT/ML suspension Swish and swallow 5 mLs (500,000 Units) in mouth 4 times daily. 473 mL 2     oxyCODONE (ROXICODONE) 5 MG tablet Take 0.5-1 tablets (2.5-5 mg) by mouth every 6 hours as needed for moderate to severe pain. 5 tablet 0     pantoprazole (PROTONIX) 40 MG EC tablet Take 1 tablet (40 mg) by mouth every morning (before breakfast). 30 tablet 0     polyethylene glycol (MIRALAX) 17 GM/Dose powder Take 17 g by mouth daily.       senna-docusate (SENOKOT-S/PERICOLACE) 8.6-50 MG tablet Take 1-2 tablets by mouth 2 times daily as needed for constipation.       senna-docusate (SENOKOT-S/PERICOLACE) 8.6-50 MG tablet Take 1-2 tablets by mouth 2 times daily as needed for constipation. 60 tablet 0     sulfamethoxazole-trimethoprim (BACTRIM) 400-80 MG tablet Take 1 tablet by mouth daily. 30 tablet 11     tacrolimus (GENERIC EQUIVALENT) 0.5 MG capsule Take 1 capsule (0.5 mg) by mouth 2 times daily. To have available for dose adjustments per transplant team. 60 capsule 11     tacrolimus (GENERIC EQUIVALENT) 1 MG capsule Take 4 capsules (4 mg) by mouth 2 times daily. 240 capsule 11     valGANciclovir (VALCYTE) 450 MG tablet Take 2 tablets (900 mg) by mouth daily. 60 tablet 2     " vitamin D3 (CHOLECALCIFEROL) 50 mcg (2000 units) tablet Take 1 tablet (50 mcg) by mouth daily       No current facility-administered medications for this visit.     There are no discontinued medications.    Physical Exam  Vital Signs: There were no vitals taken for this visit.    GENERAL APPEARANCE: alert and no distress  HENT: mouth without ulcers or lesions  RESP: lungs clear to auscultation - no rales, rhonchi or wheezes  CV: regular rhythm, normal rate, no rub, no murmur  EDEMA: no LE edema bilaterally  ABDOMEN: soft, nondistended, nontender, bowel sounds normal  MS: extremities normal - no gross deformities noted, no evidence of inflammation in joints, no muscle tenderness  SKIN: no rash  Incision intact with staples - CDI, no drainage or erythema.     Data        Latest Ref Rng & Units 11/12/2024     7:16 AM 11/11/2024     8:40 AM 11/11/2024     5:53 AM   Renal   Sodium 135 - 145 mmol/L 137   135    K 3.4 - 5.3 mmol/L 4.2   4.2    Cl 98 - 107 mmol/L 104   103    Cl (external) 98 - 107 mmol/L 104   103    CO2 22 - 29 mmol/L 24   24    Urea Nitrogen 6.0 - 20.0 mg/dL 19.0   18.2    Creatinine 0.67 - 1.17 mg/dL 1.64   1.55    Glucose 70 - 99 mg/dL 112  126  103    Calcium 8.8 - 10.4 mg/dL 8.6   8.0    Magnesium 1.7 - 2.3 mg/dL 1.6   1.9          Latest Ref Rng & Units 11/12/2024     7:16 AM 11/11/2024     5:53 AM 11/10/2024     6:09 AM   Bone Health   Phosphorus 2.5 - 4.5 mg/dL 2.5  2.8  3.7    Parathyroid Hormone Intact 15 - 65 pg/mL 55            Latest Ref Rng & Units 11/12/2024     7:16 AM 11/11/2024     5:53 AM 11/10/2024     6:09 AM   Heme   WBC 4.0 - 11.0 10e3/uL 10.5  7.8  5.8    Hgb 13.3 - 17.7 g/dL 8.2  7.4  8.4    Plt 150 - 450 10e3/uL 181  113  100    ABSOLUTE NEUTROPHIL 1.6 - 8.3 10e3/uL   5.6    ABSOLUTE LYMPHOCYTES 0.8 - 5.3 10e3/uL   0.0    ABSOLUTE MONOCYTES 0.0 - 1.3 10e3/uL   0.2    ABSOLUTE EOSINOPHILS 0.0 - 0.7 10e3/uL   0.0          Latest Ref Rng & Units 11/4/2024     6:22 AM 11/2/2024      1:52 PM 9/26/2019     9:45 AM   Liver   AP 40 - 150 U/L  113  100    TBili <=1.2 mg/dL  0.5  0.2    ALT 0 - 70 U/L  13  42    AST 0 - 45 U/L  15  16    Tot Protein 6.4 - 8.3 g/dL  6.9  7.5    Albumin 3.4 - 5.0 g/dL   3.5    Albumin 3.5 - 5.2 g/dL 2.4  4.1           Latest Ref Rng & Units 11/12/2024     7:16 AM 11/11/2024     5:53 AM 11/10/2024     6:09 AM   Pancreas   Amylase 28 - 100 U/L 57      Lipase (Roche) 13 - 60 U/L 63  71  88          Latest Ref Rng & Units 11/12/2024     7:16 AM 9/26/2019     9:45 AM 8/6/2019     5:45 PM   Iron studies   Iron 61 - 157 ug/dL 19  70     Iron Saturation Index 15 - 46 %  28     Iron Sat Index 15 - 46 % 9      Ferritin 31 - 409 ng/mL 238  753     Ferritin (external) 22 - 322 ng/mL   578           This result is from an external source.         Latest Ref Rng & Units 11/2/2024     2:29 PM 8/13/2021     8:39 AM 7/22/2021     8:22 AM   [facility]   BK Quant Log Ext log IU/mL  Undetected     BK Quant Result Ext Undetected IU/mL  Undetected  None Detected       BK Quant Spec Ext    Blood       EBV CAPSID ANTIBODY IGG No detectable antibody. Positive          This result is from an external source.     Failed to redirect to the Timeline version of the [facility].  Recent Labs   Lab Test 02/07/20  0858 05/11/21  1531 11/21/23  0848 09/13/24  1021 11/07/24  0704 11/09/24  0624 11/11/24  0553   DOSTAC 2/6/20 2105 2,000 11/20/2023  --   --   --   --    TACROL 9.2 6.3 7.1   < > 7.7 7.8 9.5    < > = values in this interval not displayed.     Recent Labs   Lab Test 10/07/19  0742 11/21/19  0813   DOSMPA Not Provided 11/20/19 1800   MPACID 0.82* 0.91*   MPAG 14.1* 39.5          Again, thank you for allowing me to participate in the care of your patient.        Sincerely,        CORINNA Bellamy CNP

## 2024-11-13 ENCOUNTER — TELEPHONE (OUTPATIENT)
Dept: TRANSPLANT | Facility: CLINIC | Age: 44
End: 2024-11-13

## 2024-11-13 DIAGNOSIS — T86.11 KIDNEY TRANSPLANT REJECTION: ICD-10-CM

## 2024-11-14 ENCOUNTER — LAB (OUTPATIENT)
Dept: LAB | Facility: CLINIC | Age: 44
End: 2024-11-14
Payer: COMMERCIAL

## 2024-11-14 ENCOUNTER — OFFICE VISIT (OUTPATIENT)
Dept: TRANSPLANT | Facility: CLINIC | Age: 44
End: 2024-11-14
Attending: NURSE PRACTITIONER
Payer: COMMERCIAL

## 2024-11-14 ENCOUNTER — PATIENT OUTREACH (OUTPATIENT)
Dept: CARE COORDINATION | Facility: CLINIC | Age: 44
End: 2024-11-14

## 2024-11-14 VITALS
OXYGEN SATURATION: 100 % | TEMPERATURE: 98.2 F | HEART RATE: 80 BPM | SYSTOLIC BLOOD PRESSURE: 121 MMHG | WEIGHT: 201.7 LBS | DIASTOLIC BLOOD PRESSURE: 72 MMHG | BODY MASS INDEX: 31.59 KG/M2

## 2024-11-14 DIAGNOSIS — Z94.0 KIDNEY REPLACED BY TRANSPLANT: ICD-10-CM

## 2024-11-14 DIAGNOSIS — Z99.2 ESRD (END STAGE RENAL DISEASE) ON DIALYSIS (H): ICD-10-CM

## 2024-11-14 DIAGNOSIS — D63.1 ANEMIA OF CHRONIC RENAL FAILURE, STAGE 3A (H): ICD-10-CM

## 2024-11-14 DIAGNOSIS — D63.1 ANEMIA IN STAGE 3A CHRONIC KIDNEY DISEASE (H): Primary | ICD-10-CM

## 2024-11-14 DIAGNOSIS — Z98.890 OTHER SPECIFIED POSTPROCEDURAL STATES: ICD-10-CM

## 2024-11-14 DIAGNOSIS — N18.6 ESRD (END STAGE RENAL DISEASE) ON DIALYSIS (H): ICD-10-CM

## 2024-11-14 DIAGNOSIS — Z48.298 AFTERCARE FOLLOWING ORGAN TRANSPLANT: ICD-10-CM

## 2024-11-14 DIAGNOSIS — Z94.83 PANCREAS REPLACED BY TRANSPLANT (H): ICD-10-CM

## 2024-11-14 DIAGNOSIS — N18.31 ANEMIA OF CHRONIC RENAL FAILURE, STAGE 3A (H): ICD-10-CM

## 2024-11-14 DIAGNOSIS — N18.31 ANEMIA IN STAGE 3A CHRONIC KIDNEY DISEASE (H): Primary | ICD-10-CM

## 2024-11-14 DIAGNOSIS — T86.11 KIDNEY TRANSPLANT REJECTION: ICD-10-CM

## 2024-11-14 DIAGNOSIS — Z20.828 CONTACT WITH AND (SUSPECTED) EXPOSURE TO OTHER VIRAL COMMUNICABLE DISEASES: ICD-10-CM

## 2024-11-14 DIAGNOSIS — Z79.899 ENCOUNTER FOR LONG-TERM CURRENT USE OF MEDICATION: ICD-10-CM

## 2024-11-14 PROBLEM — N18.9 ANEMIA OF CHRONIC RENAL FAILURE: Status: ACTIVE | Noted: 2024-11-14

## 2024-11-14 LAB
AMYLASE SERPL-CCNC: 51 U/L (ref 28–100)
ANION GAP SERPL CALCULATED.3IONS-SCNC: 8 MMOL/L (ref 7–15)
BUN SERPL-MCNC: 13.9 MG/DL (ref 6–20)
CALCIUM SERPL-MCNC: 9 MG/DL (ref 8.8–10.4)
CHLORIDE SERPL-SCNC: 105 MMOL/L (ref 98–107)
CREAT SERPL-MCNC: 1.67 MG/DL (ref 0.67–1.17)
EGFRCR SERPLBLD CKD-EPI 2021: 51 ML/MIN/1.73M2
ERYTHROCYTE [DISTWIDTH] IN BLOOD BY AUTOMATED COUNT: 13.8 % (ref 10–15)
GLUCOSE SERPL-MCNC: 103 MG/DL (ref 70–99)
HCO3 SERPL-SCNC: 24 MMOL/L (ref 22–29)
HCT VFR BLD AUTO: 24.4 % (ref 40–53)
HGB BLD-MCNC: 7.7 G/DL (ref 13.3–17.7)
LIPASE SERPL-CCNC: 55 U/L (ref 13–60)
MAGNESIUM SERPL-MCNC: 1.5 MG/DL (ref 1.7–2.3)
MCH RBC QN AUTO: 26.8 PG (ref 26.5–33)
MCHC RBC AUTO-ENTMCNC: 31.6 G/DL (ref 31.5–36.5)
MCV RBC AUTO: 85 FL (ref 78–100)
MYCOPHENOLATE SERPL LC/MS/MS-MCNC: 1.59 MG/L (ref 1–3.5)
MYCOPHENOLATE-G SERPL LC/MS/MS-MCNC: 57.5 MG/L (ref 30–95)
PHOSPHATE SERPL-MCNC: 3.1 MG/DL (ref 2.5–4.5)
PLATELET # BLD AUTO: 291 10E3/UL (ref 150–450)
POTASSIUM SERPL-SCNC: 5.2 MMOL/L (ref 3.4–5.3)
RBC # BLD AUTO: 2.87 10E6/UL (ref 4.4–5.9)
SODIUM SERPL-SCNC: 137 MMOL/L (ref 135–145)
TACROLIMUS BLD-MCNC: 11.6 UG/L (ref 5–15)
TME LAST DOSE: NORMAL H
WBC # BLD AUTO: 9.2 10E3/UL (ref 4–11)

## 2024-11-14 PROCEDURE — 36415 COLL VENOUS BLD VENIPUNCTURE: CPT | Performed by: PATHOLOGY

## 2024-11-14 PROCEDURE — 80197 ASSAY OF TACROLIMUS: CPT | Performed by: STUDENT IN AN ORGANIZED HEALTH CARE EDUCATION/TRAINING PROGRAM

## 2024-11-14 PROCEDURE — 80180 DRUG SCRN QUAN MYCOPHENOLATE: CPT | Performed by: STUDENT IN AN ORGANIZED HEALTH CARE EDUCATION/TRAINING PROGRAM

## 2024-11-14 PROCEDURE — 82150 ASSAY OF AMYLASE: CPT | Performed by: PATHOLOGY

## 2024-11-14 PROCEDURE — 85027 COMPLETE CBC AUTOMATED: CPT | Performed by: PATHOLOGY

## 2024-11-14 PROCEDURE — 80048 BASIC METABOLIC PNL TOTAL CA: CPT | Performed by: PATHOLOGY

## 2024-11-14 PROCEDURE — 84100 ASSAY OF PHOSPHORUS: CPT | Performed by: PATHOLOGY

## 2024-11-14 PROCEDURE — 83690 ASSAY OF LIPASE: CPT | Performed by: PATHOLOGY

## 2024-11-14 PROCEDURE — 99213 OFFICE O/P EST LOW 20 MIN: CPT | Performed by: NURSE PRACTITIONER

## 2024-11-14 PROCEDURE — 83735 ASSAY OF MAGNESIUM: CPT | Performed by: PATHOLOGY

## 2024-11-14 PROCEDURE — 99000 SPECIMEN HANDLING OFFICE-LAB: CPT | Performed by: PATHOLOGY

## 2024-11-14 RX ORDER — MEPERIDINE HYDROCHLORIDE 25 MG/ML
25 INJECTION INTRAMUSCULAR; INTRAVENOUS; SUBCUTANEOUS
OUTPATIENT
Start: 2024-11-14

## 2024-11-14 RX ORDER — HEPARIN SODIUM (PORCINE) LOCK FLUSH IV SOLN 100 UNIT/ML 100 UNIT/ML
5 SOLUTION INTRAVENOUS
OUTPATIENT
Start: 2024-11-14

## 2024-11-14 RX ORDER — DIPHENHYDRAMINE HYDROCHLORIDE 50 MG/ML
25 INJECTION INTRAMUSCULAR; INTRAVENOUS
Start: 2024-11-14

## 2024-11-14 RX ORDER — EPINEPHRINE 1 MG/ML
0.3 INJECTION, SOLUTION, CONCENTRATE INTRAVENOUS EVERY 5 MIN PRN
OUTPATIENT
Start: 2024-11-14

## 2024-11-14 RX ORDER — METHYLPREDNISOLONE SODIUM SUCCINATE 40 MG/ML
40 INJECTION INTRAMUSCULAR; INTRAVENOUS
Start: 2024-11-14

## 2024-11-14 RX ORDER — ALBUTEROL SULFATE 0.83 MG/ML
2.5 SOLUTION RESPIRATORY (INHALATION)
OUTPATIENT
Start: 2024-11-14

## 2024-11-14 RX ORDER — DIPHENHYDRAMINE HYDROCHLORIDE 50 MG/ML
50 INJECTION INTRAMUSCULAR; INTRAVENOUS
Start: 2024-11-14

## 2024-11-14 RX ORDER — HEPARIN SODIUM,PORCINE 10 UNIT/ML
5-20 VIAL (ML) INTRAVENOUS DAILY PRN
OUTPATIENT
Start: 2024-11-14

## 2024-11-14 RX ORDER — ALBUTEROL SULFATE 90 UG/1
1-2 INHALANT RESPIRATORY (INHALATION)
Start: 2024-11-14

## 2024-11-14 RX ORDER — MAGNESIUM OXIDE 400 MG/1
800 TABLET ORAL
COMMUNITY
Start: 2024-11-14

## 2024-11-14 ASSESSMENT — PAIN SCALES - GENERAL: PAINLEVEL_OUTOF10: MILD PAIN (2)

## 2024-11-14 NOTE — LETTER
11/14/2024      Michael Amin  [address]  [facility] [address]      Dear Colleague,    Thank you for referring your patient, Michael Amin, to the [facility] TR[facility]. Please see a copy of my visit note below.    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 Patient is now s/p DBD IVETTE transplant and open appendectomy with Dr. Mcdaniels on 11/2/24 complicated by arterial reconstruction.       No concerns.   Appetite is good. Drinking 2L daily.   -140s systolic at home.   Has dexcom - glucose readings stable. FBS   Pain controlled.   No N/V/D. Having regular stools.   No CP or SOB.   Wants to go home to [place].     Transplant History:    Transplant Type:  IVETTE  Donor Type: Living   Transplant Date:  11/2/2024 (Pancreas), 10/1/2019 (Kidney)   Crossmatch:  negative   DSA at Tx:  No  Baseline Cr: ~1.1 - 1.3     Acute Rejection Hx:  No    Present Maintenance Immunosuppression:  Tacrolimus and Mycophenolate mofetil    CMV IgG Ab Discordance:  No  EBV IgG Ab Discordance:  No    BK Viremia:  No  EBV Viremia:  No    Transplant Coordinator: Anneliese Qiu     Transplant Office Phone Number: [phone]     Immunosuppressant Medications       Immunosuppressive Agents Disp Start End     CELLCEPT (BRAND) 250 MG capsule 240 capsule 11/8/2024 --    Sig - Route: Take 4 capsules (1,000 mg) by mouth 2 times daily. - Oral    Class: E-Prescribe     tacrolimus (GENERIC EQUIVALENT) 0.5 MG capsule 60 capsule 11/11/2024 --    Sig - Route: Take 1 capsule (0.5 mg) by mouth 2 times daily. To have available for dose adjustments per transplant team. - Oral    Class: E-Prescribe     tacrolimus (GENERIC EQUIVALENT) 1 MG capsule 240 capsule 11/12/2024 --    Sig - Route: Take  3 capsules (3 mg) by mouth 2 times daily. - Oral    Class: E-Prescribe    Notes to Pharmacy: TXP DT 11/2/2024 (Pancreas), 10/1/2019 (Kidney) TXP Dischg DT 11/11/2024 DX Kidney replaced by transplant Z94.0 TX [facility]            Possible Immunosuppression-related side effects:   []             headache  []             vivid dreams  []             irritability  []             cognitive difficuties  []             fine tremor  []             nausea  []             diarrhea  []             neuropathy      []             edema  []             renal calcineurin toxicity  []             hyperkalemia  []             post-transplant diabetes  []             decreased appetite  []             increased appetite  []             other:  []             none    Prescription Medications as of 11/14/2024         Rx Number Disp Refills Start End Last Dispensed Date Next Fill Date Owning Pharmacy    acetaminophen (TYLENOL) 325 MG tablet  60 tablet 0 11/8/2024 --   [facility] [place], [place] - [address] [place] [address]    Sig: Take 2 tablets (650 mg) by mouth every 6 hours as needed (For optimal non-opioid multimodal pain management to improve pain control.).    Class: E-Prescribe    Route: Oral    Renewals       Renewal requests to authorizing provider (Stephany Diaz NP) <b>prohibited</b>            alcohol swab prep pads  100 each 3 10/23/2019 --   [address] [phone]    Sig: Use to swab area of injection/zak as directed.    Class: E-Prescribe    aspirin (ASA) 81 MG EC tablet  -- --  --       Sig: Take 81 mg by mouth daily     Class: Historical    Route: Oral    atorvastatin (LIPITOR) 10 MG tablet  90 tablet 0 11/12/2024 --   [address] 2[address]150    Sig: Take 2 tablets (20 mg) by mouth daily.    Class: E-Prescribe    Route: Oral    blood glucose  (NO BRAND SPECIFIED) lancets standard  100 each 11 6/20/2024 --   [place] [address]    Sig: Use to test blood sugar 3 times daily or as directed.    Class: E-Prescribe    Notes to Pharmacy: Please provide whatever is covered by patient's insurance    blood glucose (NO BRAND SPECIFIED) test strip  100 strip 11 6/20/2024 --   [place] [address]    Sig: Use to test blood sugar 3 times daily or as directed.    Class: E-Prescribe    Notes to Pharmacy: Please provide whatever is covered by patient's insurance    blood glucose monitoring (NO BRAND SPECIFIED) meter device kit  2 kit 0 6/20/2024 --   [place] [address]    Sig: Use to test blood sugar 3 times daily or as directed.    Class: E-Prescribe    Notes to Pharmacy: Patient requests 2 meters, one for home and one for work.  Please provide whatever is covered by patient's insurance.    calcium carbonate-vitamin D (OSCAL) 500-5 MG-MCG tablet  60 tablet 2 11/8/2024 --   [address]    Sig: Take 1 tablet by mouth 2 times daily (with meals).    Class: E-Prescribe    Route: Oral    Renewals       Renewal requests to authorizing provider (Stephany Diaz, NP) <b>prohibited</b>            CELLCEPT (BRAND) 250 MG capsule  240 capsule 11 11/8/2024 --   [address]    Sig: Take 4 capsules (1,000 mg) by mouth 2 times daily.    Class: E-Prescribe    Route: Oral    Continuous Glucose Sensor (DEXCOM G6 SENSOR) MISC  9 each 3 6/4/2024 --   [place] [address]    Sig: CHANGE SENSOR EVERY 10 DAYS    Class: E-Prescribe    Earliest Fill Date: 6/4/2024    Continuous Glucose Transmitter (DEXCOM G6 TRANSMITTER) MISC  1 each 3 6/4/2024 --   [place] [address]     "Sig: Change every 3 months    Class: E-Prescribe    Earliest Fill Date: 2024    famotidine (PEPCID) 20 MG tablet  60 tablet 0 2024 --   [address]    Sig: Take 1 tablet (20 mg) by mouth 2 times daily.    Class: E-Prescribe    Route: Oral    Renewals       Renewal requests to authorizing provider (Stephany Diaz, NP) <b>prohibited</b>            ferrous sulfate (FEROSUL) 325 (65 Fe) MG tablet  60 tablet 0 2024   [address]    Sig: Take 1 tablet (325 mg) by mouth daily (with lunch).    Class: E-Prescribe    Route: Oral    Insulin Infusion Pump Supplies (AUTOSOFT XC INFUSION SET) MISC  45 each 3 2024 --   [facility] [address]    Si each every 48 hours Change every 2 days  9 mm cannula, 23 inch tubing    Class: E-Prescribe    Route: Does not apply    Insulin Infusion Pump Supplies (T:SLIM X2 3ML CARTRIDGE) MISC  45 each 3 2024 --   [facility] [place], [place] - [address]    Si each by Other route every other day Insulin cartridge to be used with pump as directed.  Change every 2 days or as directed.    Class: E-Prescribe    Route: Other    insulin pen needle (ULTICARE MICRO) 32G X 4 MM miscellaneous  100 each 3 10/23/2019 --   [address] 1[address]    Sig: Use pen needles daily or as directed.    Class: E-Prescribe    Insulin Syringe-Needle U-100 31G X 1/4\" 1 ML MISC  50 each 1 2020 --   [id number] [place], [place] - [address] [facility] [address]    Si Syringe as needed (until new insuline pump arrives)    Class: E-Prescribe    Route: Does not apply    magnesium oxide (MAG-OX) 400 MG tablet  30 tablet 5 10/5/2019 --   [place] [address] " [facility]    Sig: Take 1 tablet (400 mg) by mouth daily (with lunch)    Class: E-Prescribe    Route: Oral    methocarbamol (ROBAXIN) 750 MG tablet  20 tablet 0 11/8/2024 --   [address]    Sig: Take 1 tablet (750 mg) by mouth every 6 hours as needed for muscle spasms.    Class: E-Prescribe    Route: Oral    nystatin (MYCOSTATIN) 727793 UNIT/ML suspension  473 mL 2 11/10/2024 --   [address]    Sig: Swish and swallow 5 mLs (500,000 Units) in mouth 4 times daily.    Class: E-Prescribe    Route: Swish & Swallow    Renewals       Renewal requests to authorizing provider (Stephany Diaz NP) <b>prohibited</b>            oxyCODONE (ROXICODONE) 5 MG tablet  5 tablet 0 11/8/2024 --   [address]    Sig: Take 0.5-1 tablets (2.5-5 mg) by mouth every 6 hours as needed for moderate to severe pain.    Class: E-Prescribe    Earliest Fill Date: 11/8/2024    Route: Oral    pantoprazole (PROTONIX) 40 MG EC tablet  30 tablet 0 11/9/2024 --   [address]    Sig: Take 1 tablet (40 mg) by mouth every morning (before breakfast).    Class: E-Prescribe    Route: Oral    Renewals       Renewal requests to authorizing provider (Stephany Diaz NP) <b>prohibited</b>            polyethylene glycol (MIRALAX) 17 GM/Dose powder  -- -- 11/11/2024 --       Sig: Take 17 g by mouth daily.    Class: OTC    Route: Oral    senna-docusate (SENOKOT-S/PERICOLACE) 8.6-50 MG tablet  -- -- 11/11/2024 --       Sig: Take 1-2 tablets by mouth 2 times daily as needed for constipation.    Class: OTC    Route: Oral    senna-docusate (SENOKOT-S/PERICOLACE) 8.6-50 MG tablet  60 tablet 0 11/8/2024 --   [place] [address] [facility]    Sig: Take 1-2 tablets by mouth 2 times daily  as needed for constipation.    Class: E-Prescribe    Route: Oral    Renewals       Renewal requests to authorizing provider (Stephany Diaz NP) <b>prohibited</b>            sulfamethoxazole-trimethoprim (BACTRIM) 400-80 MG tablet  30 tablet 11 11/8/2024 --   [address]    Sig: Take 1 tablet by mouth daily.    Class: E-Prescribe    Route: Oral    tacrolimus (GENERIC EQUIVALENT) 0.5 MG capsule  60 capsule 11 11/11/2024 --   [address]    Sig: Take 1 capsule (0.5 mg) by mouth 2 times daily. To have available for dose adjustments per transplant team.    Class: E-Prescribe    Route: Oral    tacrolimus (GENERIC EQUIVALENT) 1 MG capsule  240 capsule 11 11/12/2024 --   [address]    Sig: Take 3 capsules (3 mg) by mouth 2 times daily.    Class: E-Prescribe    Notes to Pharmacy: TXP DT 11/2/2024 (Pancreas), 10/1/2019 (Kidney) TXP Dischg DT 11/11/2024 DX Kidney replaced by transplant Z94.0 TX [facility]    Route: Oral    valGANciclovir (VALCYTE) 450 MG tablet  60 tablet 2 11/9/2024 --   [address]    Sig: Take 2 tablets (900 mg) by mouth daily.    Class: E-Prescribe    Route: Oral    vitamin D3 (CHOLECALCIFEROL) 50 mcg (2000 units) tablet  -- -- 11/24/2023 --       Sig: Take 1 tablet (50 mcg) by mouth daily    Class: No Print Out    Notes to Pharmacy: Profile Rx: patient will contact pharmacy when needed    Route: Oral            O:   Temp:  [98.2  F (36.8  C)] 98.2  F (36.8  C)  Pulse:  [80] 80  BP: (121)/(72) 121/72  SpO2:  [100 %] 100 %  General Appearance: in no apparent distress.   Skin: Normal, no rashes or jaundice  Heart: regular rate and rhythm  Lungs: easy respirations, no audible wheezing.  Abdomen: rounded, The wound is dry and intact,  without hernia. The abdomen is non-tender. The pancreas graft is not tender.  There is no ascites.  Incision CDI with staples - no erythema or drainage.   Extremities: Tremor absent.   Edema: absent.         Latest Ref Rng & Units 11/14/2024     7:59 AM 11/12/2024     7:16 AM 11/11/2024     5:53 AM 11/10/2024     6:09 AM 11/9/2024     6:24 AM   Transplant Immunosuppression Labs   Creat 0.67 - 1.17 mg/dL 1.67  1.64  1.55  1.68  1.37    Urea Nitrogen 6.0 - 20.0 mg/dL 13.9  19.0  18.2  22.9  24.7    WBC 4.0 - 11.0 10e3/uL 9.2  10.5  7.8  5.8  4.9    Neutrophil %  93  90  96  91    ANEU 1.6 - 8.3 10e3/uL    5.6  4.5        Chemistries:   Recent Labs   Lab Test 11/14/24  0759   BUN 13.9   CR 1.67*   GFRESTIMATED 51*   *     Lab Results   Component Value Date    A1C 7.8 11/03/2024    A1C 8.1 07/16/2020    CPEPT <0.1 01/07/2019     Recent Labs   Lab Test 11/04/24  0622 11/02/24  1352   ALBUMIN 2.4* 4.1   BILITOTAL  --  0.5   ALKPHOS  --  113   AST  --  15   ALT  --  13     Urine Studies:  Recent Labs   Lab Test 11/09/24  1148   COLOR Light Yellow   APPEARANCE Clear   URINEGLC Negative   URINEBILI Negative   URINEKETONE Negative   SG 1.014   UBLD Negative   URINEPH 7.0   PROTEIN Negative   NITRITE Negative   LEUKEST Negative   RBCU 0   WBCU 2     Recent Labs   Lab Test 11/16/21  1502 05/11/21  1532   UTPG 0.11 0.10     Hematology:   Recent Labs   Lab Test 11/14/24  0759 11/12/24  0716 11/11/24  0553   HGB 7.7* 8.2* 7.4*    181 113*   WBC 9.2 10.5 7.8     Coags:   Recent Labs   Lab Test 11/03/24  0144 11/02/24  1352   INR 1.49* 1.05     HLA antibodies:   SA1 Hi Risk Jeni   Date Value Ref Range Status   05/11/2021 None  Final     SA1 HI RISK JENI   Date Value Ref Range Status   11/01/2024 None  Final     SA1 Mod Risk Jeni   Date Value Ref Range Status   05/11/2021 None  Final     SA1 MOD RISK JENI   Date Value Ref Range Status   11/01/2024 None  Final     SA2 Hi Risk Jeni   Date Value Ref Range Status   05/11/2021  None  Final     SA2 HI RISK JENI   Date Value Ref Range Status   11/01/2024 None  Final     SA2 Mod Risk Jeni   Date Value Ref Range Status   05/11/2021 None  Final     SA2 MOD RISK JENI   Date Value Ref Range Status   11/01/2024 None  Final       Assessment: Michael Amin is doing well s/p IVETTE:  Issues we addressed during his visit include:    Plan:    1. Graft function: lipase 55  Cr 1.6 - above baseline but tac on higher side. Ensure good fluid intake and trend for now.    2. Immunosuppression Management: No change    .  Complexity of management:Medium.  Contributing factors:  s/p pancreas tx  3. : Increase Mag Ox to 800mg at lunch     OK to go home to [place] and follow up in clinic 2 weeks for staple removal and follow up. Labs Mon/Thus.     Total Time: 20 min,   Counselling Time: 10 min.    Arlene Diane NP  Reviewed case with Dr. Mcdaniels.        Again, thank you for allowing me to participate in the care of your patient.        Sincerely,        CORINNA Bellamy CNP

## 2024-11-14 NOTE — PROGRESS NOTES
Anemia Management Note - Enrollment    SUBJECTIVE/OBJECTIVE:    Referred by Dr. Jabier Hernández on 2024  Primary Diagnosis: Anemia of Other Chronic Illness (D63.8)     Secondary Diagnosis: Organ or tissue replaced by transplant, kidney (Z94.0)  Date of Kidney Transplant:   Date of Pancreas Transplant: 2024  Hgb goal range: 9-10    Epo/Darbo: TBD;  TSAT must be 20% or > before insurance will approve JOSE.   Iron regimen: Treat with IV Iron.   *Does not tolerate Oral Iron.     Labs : 2025  RX/TX plans : TBD    Recent JOSE use, transfusion, IV iron: NA  No history of stroke, MI, and blood clots or cancers    Contact: Consent to Communicate scanned on 2019.         Latest Ref Rng & Units 2024 2024 2024 11/10/2024 2024 2024 2024   Anemia   Hemoglobin 13.3 - 17.7 g/dL 7.3     6.7  7.0     7.3     6.6  8.2  8.4  7.4  8.2  7.7    TSAT 15 - 46 %      9     Ferritin 31 - 409 ng/mL      238         Multiple values from one day are sorted in reverse-chronological order     BP Readings from Last 3 Encounters:   24 121/72   24 106/67   24 127/70     Wt Readings from Last 2 Encounters:   24 91.5 kg (201 lb 11.2 oz)   24 91.2 kg (201 lb)     Current Outpatient Medications   Medication Sig Dispense Refill    acetaminophen (TYLENOL) 325 MG tablet Take 2 tablets (650 mg) by mouth every 6 hours as needed (For optimal non-opioid multimodal pain management to improve pain control.). 60 tablet 0    alcohol swab prep pads Use to swab area of injection/zak as directed. 100 each 3    aspirin (ASA) 81 MG EC tablet Take 81 mg by mouth daily       atorvastatin (LIPITOR) 10 MG tablet Take 2 tablets (20 mg) by mouth daily. 90 tablet 0    blood glucose (NO BRAND SPECIFIED) lancets standard Use to test blood sugar 3 times daily or as directed. 100 each 11    blood glucose (NO BRAND SPECIFIED) test strip Use to test blood sugar 3 times daily or as  "directed. 100 strip 11    blood glucose monitoring (NO BRAND SPECIFIED) meter device kit Use to test blood sugar 3 times daily or as directed. 2 kit 0    calcium carbonate-vitamin D (OSCAL) 500-5 MG-MCG tablet Take 1 tablet by mouth 2 times daily (with meals). 60 tablet 2    CELLCEPT (BRAND) 250 MG capsule Take 4 capsules (1,000 mg) by mouth 2 times daily. 240 capsule 11    Continuous Glucose Sensor (DEXCOM G6 SENSOR) MISC CHANGE SENSOR EVERY 10 DAYS 9 each 3    Continuous Glucose Transmitter (DEXCOM G6 TRANSMITTER) MISC Change every 3 months 1 each 3    famotidine (PEPCID) 20 MG tablet Take 1 tablet (20 mg) by mouth 2 times daily. 60 tablet 0    Insulin Infusion Pump Supplies (Snoobe XC INFUSION SET) MISC 1 each every 48 hours Change every 2 days  9 mm cannula, 23 inch tubing 45 each 3    Insulin Infusion Pump Supplies (T:SLIM X2 3ML CARTRIDGE) MISC 1 each by Other route every other day Insulin cartridge to be used with pump as directed.  Change every 2 days or as directed. 45 each 3    insulin pen needle (ULTICARE MICRO) 32G X 4 MM miscellaneous Use pen needles daily or as directed. 100 each 3    Insulin Syringe-Needle U-100 31G X 1/4\" 1 ML MISC 1 Syringe as needed (until new insuline pump arrives) 50 each 1    magnesium oxide (MAG-OX) 400 MG tablet Take 2 tablets (800 mg) by mouth daily (with lunch).      methocarbamol (ROBAXIN) 750 MG tablet Take 1 tablet (750 mg) by mouth every 6 hours as needed for muscle spasms. 20 tablet 0    nystatin (MYCOSTATIN) 778678 UNIT/ML suspension Swish and swallow 5 mLs (500,000 Units) in mouth 4 times daily. 473 mL 2    oxyCODONE (ROXICODONE) 5 MG tablet Take 0.5-1 tablets (2.5-5 mg) by mouth every 6 hours as needed for moderate to severe pain. 5 tablet 0    pantoprazole (PROTONIX) 40 MG EC tablet Take 1 tablet (40 mg) by mouth every morning (before breakfast). 30 tablet 0    polyethylene glycol (MIRALAX) 17 GM/Dose powder Take 17 g by mouth daily.      senna-docusate " (SENOKOT-S/PERICOLACE) 8.6-50 MG tablet Take 1-2 tablets by mouth 2 times daily as needed for constipation.      senna-docusate (SENOKOT-S/PERICOLACE) 8.6-50 MG tablet Take 1-2 tablets by mouth 2 times daily as needed for constipation. 60 tablet 0    sulfamethoxazole-trimethoprim (BACTRIM) 400-80 MG tablet Take 1 tablet by mouth daily. 30 tablet 11    tacrolimus (GENERIC EQUIVALENT) 0.5 MG capsule Take 1 capsule (0.5 mg) by mouth 2 times daily. To have available for dose adjustments per transplant team. 60 capsule 11    tacrolimus (GENERIC EQUIVALENT) 1 MG capsule Take 3 capsules (3 mg) by mouth 2 times daily. 240 capsule 11    valGANciclovir (VALCYTE) 450 MG tablet Take 2 tablets (900 mg) by mouth daily. 60 tablet 2    vitamin D3 (CHOLECALCIFEROL) 50 mcg (2000 units) tablet Take 1 tablet (50 mcg) by mouth daily         ASSESSMENT:  Hgb Not at goal/Initiation of therapy   Ferritin: At goal (>100ng/mL)  TSat: not at goal of >30%  Iron regimen recommended: Treat with IV Iron  Recommended JOSE regimen: TBD:  TSAT must be 20% or >   Blood Pressure: Stable.         PLAN:  1. Patient called today for enrollment in Anemia Management Service.  2. Discussed: anemia overview, monitoring service, and goal hemoglobin range and rationale and risks of JOSE blood clots, stroke, and increase in blood pressure  3. Dose location: in clinic [facility]  4. Labs: [place]  5. Pharmacy: [facility]      Spoke with Rudi. He is going home this weekend.  He will come down to the [place] for the IV Iron. Prefers Infed to cut down on appts.  Orders placed for Infed 1000mg and message sent to [facility] scheduling.     Next call date:  11/21/24    Skye Davis RN   Anemia Services  [facility]  [email or web address]   Office : [phone]  Fax: [phone]

## 2024-11-14 NOTE — PROGRESS NOTES
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 Patient is now s/p DBD IVETTE transplant and open appendectomy with Dr. Mcdaniels on 11/2/24 complicated by arterial reconstruction.       No concerns.   Appetite is good. Drinking 2L daily.   -140s systolic at home.   Has dexcom - glucose readings stable. FBS   Pain controlled.   No N/V/D. Having regular stools.   No CP or SOB.   Wants to go home to [place].     Transplant History:    Transplant Type:  IVETTE  Donor Type: Living   Transplant Date:  11/2/2024 (Pancreas), 10/1/2019 (Kidney)   Crossmatch:  negative   DSA at Tx:  No  Baseline Cr: ~1.1 - 1.3     Acute Rejection Hx:  No    Present Maintenance Immunosuppression:  Tacrolimus and Mycophenolate mofetil    CMV IgG Ab Discordance:  No  EBV IgG Ab Discordance:  No    BK Viremia:  No  EBV Viremia:  No    Transplant Coordinator: Anneliese Qiu     Transplant Office Phone Number: [phone]     Immunosuppressant Medications       Immunosuppressive Agents Disp Start End     CELLCEPT (BRAND) 250 MG capsule 240 capsule 11/8/2024 --    Sig - Route: Take 4 capsules (1,000 mg) by mouth 2 times daily. - Oral    Class: E-Prescribe     tacrolimus (GENERIC EQUIVALENT) 0.5 MG capsule 60 capsule 11/11/2024 --    Sig - Route: Take 1 capsule (0.5 mg) by mouth 2 times daily. To have available for dose adjustments per transplant team. - Oral    Class: E-Prescribe     tacrolimus (GENERIC EQUIVALENT) 1 MG capsule 240 capsule 11/12/2024 --    Sig - Route: Take 3 capsules (3 mg) by mouth 2 times daily. - Oral    Class: E-Prescribe    Notes to Pharmacy: TXP DT 11/2/2024 (Pancreas), 10/1/2019 (Kidney) TXP Dischg DT 11/11/2024 DX Kidney replaced by transplant Z94.0 TX [facility]            Possible Immunosuppression-related side effects:   []             headache  []             vivid dreams  []             irritability  []             cognitive difficuties  []             fine tremor  []             nausea  []             diarrhea  []             neuropathy      []             edema  []             renal calcineurin toxicity  []             hyperkalemia  []             post-transplant diabetes  []             decreased appetite  []             increased appetite  []             other:  []             none    Prescription Medications as of 11/14/2024         Rx Number Disp Refills Start End Last Dispensed Date Next Fill Date Owning Pharmacy    acetaminophen (TYLENOL) 325 MG tablet  60 tablet 0 11/8/2024 --   [place], [place] - [address] [facility]    Sig: Take 2 tablets (650 mg) by mouth every 6 hours as needed (For optimal non-opioid multimodal pain management to improve pain control.).    Class: E-Prescribe    Route: Oral    Renewals       Renewal requests to authorizing provider (Stephany Diaz, NP) <b>prohibited</b>            alcohol swab prep pads  100 each 3 10/23/2019 --   [phone]    Sig: Use to swab area of injection/zak as directed.    Class: E-Prescribe    aspirin (ASA) 81 MG EC tablet  -- --  --       Sig: Take 81 mg by mouth daily     Class: Historical    Route: Oral    atorvastatin (LIPITOR) 10 MG tablet  90 tablet 0 11/12/2024 --   [address] [phone]    Sig: Take 2 tablets (20 mg) by mouth daily.    Class: E-Prescribe    Route: Oral    blood glucose (NO BRAND SPECIFIED) lancets standard  100 each 11 6/20/2024 --   [facility] [address] [place] [id number] [address] [place] [address]    Sig: Use to test blood sugar 3 times daily or as directed.    Class: E-Prescribe    Notes to Pharmacy: Please provide  whatever is covered by patient's insurance    blood glucose (NO BRAND SPECIFIED) test strip  100 strip 11 6/20/2024 --   [place] [address]    Sig: Use to test blood sugar 3 times daily or as directed.    Class: E-Prescribe    Notes to Pharmacy: Please provide whatever is covered by patient's insurance    blood glucose monitoring (NO BRAND SPECIFIED) meter device kit  2 kit 0 6/20/2024 --   [place] [address]    Sig: Use to test blood sugar 3 times daily or as directed.    Class: E-Prescribe    Notes to Pharmacy: Patient requests 2 meters, one for home and one for work.  Please provide whatever is covered by patient's insurance.    calcium carbonate-vitamin D (OSCAL) 500-5 MG-MCG tablet  60 tablet 2 11/8/2024 --   [address]    Sig: Take 1 tablet by mouth 2 times daily (with meals).    Class: E-Prescribe    Route: Oral    Renewals       Renewal requests to authorizing provider (Stephany Diaz, NP) <b>prohibited</b>            CELLCEPT (BRAND) 250 MG capsule  240 capsule 11 11/8/2024 --   [address]    Sig: Take 4 capsules (1,000 mg) by mouth 2 times daily.    Class: E-Prescribe    Route: Oral    Continuous Glucose Sensor (DEXCOM G6 SENSOR) MISC  9 each 3 6/4/2024 --   [place] [address]    Sig: CHANGE SENSOR EVERY 10 DAYS    Class: E-Prescribe    Earliest Fill Date: 6/4/2024    Continuous Glucose Transmitter (DEXCOM G6 TRANSMITTER) MISC  1 each 3 6/4/2024 --   [place] [address]    Sig: Change every 3 months    Class: E-Prescribe    Earliest Fill Date: 6/4/2024    famotidine (PEPCID) 20 MG tablet  60 tablet 0 11/8/2024 --   [address]    Sig: Take 1 tablet (20 mg) by  "mouth 2 times daily.    Class: E-Prescribe    Route: Oral    Renewals       Renewal requests to authorizing provider (Stephany Diaz NP) <b>prohibited</b>            ferrous sulfate (FEROSUL) 325 (65 Fe) MG tablet  60 tablet 0 2024   [phone]    Sig: Take 1 tablet (325 mg) by mouth daily (with lunch).    Class: E-Prescribe    Route: Oral    Insulin Infusion Pump Supplies (AUTOSOFT XC INFUSION SET) MISC  45 each 3 2024 --   [place], [place] - [address]    Si each every 48 hours Change every 2 days  9 mm cannula, 23 inch tubing    Class: E-Prescribe    Route: Does not apply    Insulin Infusion Pump Supplies (T:SLIM X2 3ML CARTRIDGE) MISC  45 each 3 2024 --   [email or web address] [place], [place] - [email or web address]    Si each by Other route every other day Insulin cartridge to be used with pump as directed.  Change every 2 days or as directed.    Class: E-Prescribe    Route: Other    insulin pen needle (ULTICARE MICRO) 32G X 4 MM miscellaneous  100 each 3 10/23/2019 --   [phone]    Sig: Use pen needles daily or as directed.    Class: E-Prescribe    Insulin Syringe-Needle U-100 31G X 1/4\" 1 ML MISC  50 each 1 2020 --   [facility][id number] [address] [facility] [address]    Si Syringe as needed (until new insuline pump arrives)    Class: E-Prescribe    Route: Does not apply    magnesium oxide (MAG-OX) 400 MG tablet  30 tablet 5 10/5/2019 --   [address]    Sig: Take 1 tablet (400 mg) by mouth daily (with lunch)    Class: E-Prescribe    Route: Oral    methocarbamol (ROBAXIN) 750 MG tablet  20 tablet 0 2024 --   [address]    " Sig: Take 1 tablet (750 mg) by mouth every 6 hours as needed for muscle spasms.    Class: E-Prescribe    Route: Oral    nystatin (MYCOSTATIN) 470263 UNIT/ML suspension  473 mL 2 11/10/2024 --   [address]    Sig: Swish and swallow 5 mLs (500,000 Units) in mouth 4 times daily.    Class: E-Prescribe    Route: Swish & Swallow    Renewals       Renewal requests to authorizing provider (Stephany Diaz NP) <b>prohibited</b>            oxyCODONE (ROXICODONE) 5 MG tablet  5 tablet 0 11/8/2024 --   [address]    Sig: Take 0.5-1 tablets (2.5-5 mg) by mouth every 6 hours as needed for moderate to severe pain.    Class: E-Prescribe    Earliest Fill Date: 11/8/2024    Route: Oral    pantoprazole (PROTONIX) 40 MG EC tablet  30 tablet 0 11/9/2024 --   [address]    Sig: Take 1 tablet (40 mg) by mouth every morning (before breakfast).    Class: E-Prescribe    Route: Oral    Renewals       Renewal requests to authorizing provider (Stephany Diaz NP) <b>prohibited</b>            polyethylene glycol (MIRALAX) 17 GM/Dose powder  -- -- 11/11/2024 --       Sig: Take 17 g by mouth daily.    Class: OTC    Route: Oral    senna-docusate (SENOKOT-S/PERICOLACE) 8.6-50 MG tablet  -- -- 11/11/2024 --       Sig: Take 1-2 tablets by mouth 2 times daily as needed for constipation.    Class: OTC    Route: Oral    senna-docusate (SENOKOT-S/PERICOLACE) 8.6-50 MG tablet  60 tablet 0 11/8/2024 --   [address]    Sig: Take 1-2 tablets by mouth 2 times daily as needed for constipation.    Class: E-Prescribe    Route: Oral    Renewals       Renewal requests to authorizing provider (Stephany Diaz NP) <b>prohibited</b>            sulfamethoxazole-trimethoprim (BACTRIM) 400-80 MG tablet  30 tablet 11  11/8/2024 --   [address]    Sig: Take 1 tablet by mouth daily.    Class: E-Prescribe    Route: Oral    tacrolimus (GENERIC EQUIVALENT) 0.5 MG capsule  60 capsule 11 11/11/2024 --   [address]    Sig: Take 1 capsule (0.5 mg) by mouth 2 times daily. To have available for dose adjustments per transplant team.    Class: E-Prescribe    Route: Oral    tacrolimus (GENERIC EQUIVALENT) 1 MG capsule  240 capsule 11 11/12/2024 --   [facility] - [address]    Sig: Take 3 capsules (3 mg) by mouth 2 times daily.    Class: E-Prescribe    Notes to Pharmacy: TXP DT 11/2/2024 (Pancreas), 10/1/2019 (Kidney) TXP Dischg DT 11/11/2024 DX Kidney replaced by transplant Z94.0 [facility]    Route: Oral    valGANciclovir (VALCYTE) 450 MG tablet  60 tablet 2 11/9/2024 --   [facility] - [address]    Sig: Take 2 tablets (900 mg) by mouth daily.    Class: E-Prescribe    Route: Oral    vitamin D3 (CHOLECALCIFEROL) 50 mcg (2000 units) tablet  -- -- 11/24/2023 --       Sig: Take 1 tablet (50 mcg) by mouth daily    Class: No Print Out    Notes to Pharmacy: Profile Rx: patient will contact pharmacy when needed    Route: Oral            O:   Temp:  [98.2  F (36.8  C)] 98.2  F (36.8  C)  Pulse:  [80] 80  BP: (121)/(72) 121/72  SpO2:  [100 %] 100 %  General Appearance: in no apparent distress.   Skin: Normal, no rashes or jaundice  Heart: regular rate and rhythm  Lungs: easy respirations, no audible wheezing.  Abdomen: rounded, The wound is dry and intact, without hernia. The abdomen is non-tender. The pancreas graft is not tender.  There is no ascites.  Incision CDI with staples - no erythema or drainage.   Extremities: Tremor absent.   Edema: absent.         Latest Ref Rng & Units 11/14/2024     7:59 AM  11/12/2024     7:16 AM 11/11/2024     5:53 AM 11/10/2024     6:09 AM 11/9/2024     6:24 AM   Transplant Immunosuppression Labs   Creat 0.67 - 1.17 mg/dL 1.67  1.64  1.55  1.68  1.37    Urea Nitrogen 6.0 - 20.0 mg/dL 13.9  19.0  18.2  22.9  24.7    WBC 4.0 - 11.0 10e3/uL 9.2  10.5  7.8  5.8  4.9    Neutrophil %  93  90  96  91    ANEU 1.6 - 8.3 10e3/uL    5.6  4.5        Chemistries:   Recent Labs   Lab Test 11/14/24  0759   BUN 13.9   CR 1.67*   GFRESTIMATED 51*   *     Lab Results   Component Value Date    A1C 7.8 11/03/2024    A1C 8.1 07/16/2020    CPEPT <0.1 01/07/2019     Recent Labs   Lab Test 11/04/24  0622 11/02/24  1352   ALBUMIN 2.4* 4.1   BILITOTAL  --  0.5   ALKPHOS  --  113   AST  --  15   ALT  --  13     Urine Studies:  Recent Labs   Lab Test 11/09/24  1148   COLOR Light Yellow   APPEARANCE Clear   URINEGLC Negative   URINEBILI Negative   URINEKETONE Negative   SG 1.014   UBLD Negative   URINEPH 7.0   PROTEIN Negative   NITRITE Negative   LEUKEST Negative   RBCU 0   WBCU 2     Recent Labs   Lab Test 11/16/21  1502 05/11/21  1532   UTPG 0.11 0.10     Hematology:   Recent Labs   Lab Test 11/14/24  0759 11/12/24  0716 11/11/24  0553   HGB 7.7* 8.2* 7.4*    181 113*   WBC 9.2 10.5 7.8     Coags:   Recent Labs   Lab Test 11/03/24  0144 11/02/24  1352   INR 1.49* 1.05     HLA antibodies:   SA1 Hi Risk Jeni   Date Value Ref Range Status   05/11/2021 None  Final     SA1 HI RISK JENI   Date Value Ref Range Status   11/01/2024 None  Final     SA1 Mod Risk Jeni   Date Value Ref Range Status   05/11/2021 None  Final     SA1 MOD RISK JENI   Date Value Ref Range Status   11/01/2024 None  Final     SA2 Hi Risk Jeni   Date Value Ref Range Status   05/11/2021 None  Final     SA2 HI RISK JENI   Date Value Ref Range Status   11/01/2024 None  Final     SA2 Mod Risk Jeni   Date Value Ref Range Status   05/11/2021 None  Final     SA2 MOD RISK JENI   Date Value Ref Range Status   11/01/2024 None  Final       Assessment:  Michael Amin is doing well s/p IVETTE:  Issues we addressed during his visit include:    Plan:    1. Graft function: lipase 55  Cr 1.6 - above baseline but tac on higher side. Ensure good fluid intake and trend for now.    2. Immunosuppression Management: No change    .  Complexity of management:Medium.  Contributing factors:  s/p pancreas tx  3. : Increase Mag Ox to 800mg at lunch     OK to go home to [place] and follow up in clinic 2 weeks for staple removal and follow up. Labs Mon/Thus.     Total Time: 20 min,   Counselling Time: 10 min.    Arlene Diane NP  Reviewed case with Dr. Mcdaniels.

## 2024-11-15 DIAGNOSIS — T86.11 KIDNEY TRANSPLANT REJECTION: ICD-10-CM

## 2024-11-15 DIAGNOSIS — Z48.298 AFTERCARE FOLLOWING ORGAN TRANSPLANT: Primary | ICD-10-CM

## 2024-11-17 ENCOUNTER — TELEPHONE (OUTPATIENT)
Dept: TRANSPLANT | Facility: CLINIC | Age: 44
End: 2024-11-17
Payer: COMMERCIAL

## 2024-11-17 DIAGNOSIS — Z48.298 AFTERCARE FOLLOWING ORGAN TRANSPLANT: ICD-10-CM

## 2024-11-17 DIAGNOSIS — Z94.83 PANCREAS REPLACED BY TRANSPLANT (H): Primary | ICD-10-CM

## 2024-11-17 DIAGNOSIS — Z99.2 ESRD (END STAGE RENAL DISEASE) ON DIALYSIS (H): ICD-10-CM

## 2024-11-17 DIAGNOSIS — E55.9 VITAMIN D DEFICIENCY: ICD-10-CM

## 2024-11-17 DIAGNOSIS — Z94.0 KIDNEY REPLACED BY TRANSPLANT: ICD-10-CM

## 2024-11-17 DIAGNOSIS — T86.11 KIDNEY TRANSPLANT REJECTION: ICD-10-CM

## 2024-11-17 DIAGNOSIS — N18.6 ESRD (END STAGE RENAL DISEASE) ON DIALYSIS (H): ICD-10-CM

## 2024-11-17 DIAGNOSIS — E78.5 DYSLIPIDEMIA: ICD-10-CM

## 2024-11-18 NOTE — TELEPHONE ENCOUNTER
"Rudi, patient, called to report that he has pain in both arms. Started in right arm and then moved to left.    Friday night arm was sore in shoulder and into bicep.  Pain into elbows.  Pain was so bad Saturday. Pt took muscle relaxer and then tylenol and was alternating every 3 hours.   This morning woke up feeling good. \"Forgot\" to take tylenol.   The pain reminds him of after he fell in the hospital.    Pt has tingling in fingers and hands. More tingling in the left hand and fingers and has been on going since fall in the hospital.     No chest pain. No shortness of breath. Able to move legs without issues.      Pain rating currently 3/10.   4pm had robaxin  630pm had tylenol  Before taking pain medication it was 8/10.     Pt states pain is going into his neck      Pt drinking 2 liters of fluid a day.   Blood sugar 159. Has been within his normals. 115-160.   142/82 HR 90    Pt closest ER Is [facility] and will go be assessed.    "

## 2024-11-19 ENCOUNTER — VIRTUAL VISIT (OUTPATIENT)
Dept: PHARMACY | Facility: CLINIC | Age: 44
End: 2024-11-19
Payer: COMMERCIAL

## 2024-11-19 DIAGNOSIS — Z94.0 KIDNEY REPLACED BY TRANSPLANT: ICD-10-CM

## 2024-11-19 DIAGNOSIS — Z94.83 PANCREAS REPLACED BY TRANSPLANT (H): Primary | ICD-10-CM

## 2024-11-19 DIAGNOSIS — Z78.9 TAKES DIETARY SUPPLEMENTS: ICD-10-CM

## 2024-11-19 DIAGNOSIS — R52 PAIN: ICD-10-CM

## 2024-11-19 DIAGNOSIS — K59.00 CONSTIPATION, UNSPECIFIED CONSTIPATION TYPE: ICD-10-CM

## 2024-11-19 DIAGNOSIS — E78.5 DYSLIPIDEMIA: ICD-10-CM

## 2024-11-19 PROCEDURE — 99605 MTMS BY PHARM NP 15 MIN: CPT | Mod: 93 | Performed by: PHARMACIST

## 2024-11-19 RX ORDER — TACROLIMUS 0.5 MG/1
0.5 CAPSULE ORAL 2 TIMES DAILY
Qty: 60 CAPSULE | Refills: 11 | Status: SHIPPED | OUTPATIENT
Start: 2024-11-19

## 2024-11-19 RX ORDER — ATORVASTATIN CALCIUM 10 MG/1
20 TABLET, FILM COATED ORAL DAILY
Qty: 60 TABLET | Refills: 11 | Status: SHIPPED | OUTPATIENT
Start: 2024-11-19

## 2024-11-19 RX ORDER — CHOLECALCIFEROL (VITAMIN D3) 50 MCG
1 TABLET ORAL DAILY
Qty: 30 TABLET | Refills: 3 | Status: SHIPPED | OUTPATIENT
Start: 2024-11-19

## 2024-11-19 RX ORDER — MAGNESIUM OXIDE 400 MG/1
800 TABLET ORAL
Qty: 60 TABLET | Refills: 4 | Status: SHIPPED | OUTPATIENT
Start: 2024-11-19

## 2024-11-19 RX ORDER — VALGANCICLOVIR 450 MG/1
900 TABLET, FILM COATED ORAL DAILY
Qty: 60 TABLET | Refills: 1 | Status: SHIPPED | OUTPATIENT
Start: 2024-11-19

## 2024-11-19 RX ORDER — MYCOPHENOLATE MOFETIL 250 MG/1
1000 CAPSULE ORAL 2 TIMES DAILY
Qty: 240 CAPSULE | Refills: 11 | Status: SHIPPED | OUTPATIENT
Start: 2024-11-19

## 2024-11-19 RX ORDER — SULFAMETHOXAZOLE AND TRIMETHOPRIM 400; 80 MG/1; MG/1
1 TABLET ORAL DAILY
Qty: 30 TABLET | Refills: 11 | Status: SHIPPED | OUTPATIENT
Start: 2024-11-19

## 2024-11-19 RX ORDER — TACROLIMUS 1 MG/1
2 CAPSULE ORAL 2 TIMES DAILY
Qty: 120 CAPSULE | Refills: 11 | Status: SHIPPED | OUTPATIENT
Start: 2024-11-19

## 2024-11-19 NOTE — TELEPHONE ENCOUNTER
"Patient went to ER and they took xrays of shoulders and said he had \"arthritis\". Reports numbness and tingling has improved greatly with stretching. Patient states he fell in the hospital and has some concerns around this. Patient states he had once incident where his RUBIA drain was stripped he felt hot and sweaty, and had a vasovagal episode and this is how he fell. Has some residual pain from this he believes to arm/shoulders and back.    Currently, with muscle relaxer's patient can move arms and legs effectively. Patient reports very mild testicular pain since the hospital. RNCC encouaged patient to monitor symptoms and report to sugrical ROX visit on 11/29 if not improved.  "

## 2024-11-19 NOTE — PROGRESS NOTES
Medication Therapy Management (MTM) Encounter    ASSESSMENT:                            Medication Adherence/Access: No issues identified.    S/P Pancreas, HX of kidney txp 2019 Transplant:    Stable.    Supplements:   Start vitamin D3 due to low levels.     Hyperlipidemia   Stable.    Pain:   Stable.    Constipation:   Stable.     PLAN:                            Start taking Vitamin D3 50mcg daily.     Valganciclovir (Valcyte) birth defect information:  This medicine may cause birth defects if either partner is using it during conception or pregnancy. Women must use an effective form of birth control to prevent pregnancy while they take this medicine and for at least 1 month after treatment ends. Men should use condoms during treatment and for at least 90 days after treatment ends.  For this reason, If someone other than patient is handling Valcyte, gloves should be worn to avoid skin contact.    Follow-up: 3/11    SUBJECTIVE/OBJECTIVE:                          Rudi Amin is a 44 year old male seen for a transitions of care visit. He was discharged from [facility] on 11/11 for pancreas txp. Pt states he is in [place].     Reason for visit: initial post transplant.    Allergies/ADRs: Reviewed in chart  Past Medical History: Reviewed in chart  Tobacco: He reports that he has never smoked. He has never used smokeless tobacco.  Alcohol: not currently using  THC/CBD: none    Medication Adherence/Access: Patient uses pill box(es).  Patient takes medications 2 time(s) per day. 9am and 9pm  Per patient, misses medication 0 time(s) per week.   The patient fills medications at [facility]: NO, fills medications at [facility].    S/P Pancreas, HX of kidney txp 2019 Transplant:    Tacrolimus 2.5 mg twice daily  Mycophenolate Mofetil 1000 mg twice daily.   Pt reports Tremors mild- may be due to pain.   Transplant date: 11/2/24 Pancreas, 2019 kidney  Vascular Prophylaxis: Aspirin 81mg daily. Denies gastrointestinal bleed sx. .  CMV  prophylaxis: CrCl >/=60 mL/minute: Valcyte 900mg daily Treat 3 months post tx   Valcyte birth defects discussed: Yes   PJP prophylaxis: Bactrim SS daily  Antifungal Prophylaxis: Nystatin 4 times daily x 3 months  PPI use: Pantoprazole 40mg daily, Famotidine 20mg twice daily - denies GERD sx.   Tx Coordinator: Chaka Youssef MD: Dr. Ricks, Using Med Card: Yes  Immunizations: annual flu shot unknown; Pneumovax 23:  2007; Prevnar 13: 2018; TDaP:  2017; Shingrix: unknown, HBV: immunity per last titers, COVID: Primary x 3    Estimated Creatinine Clearance: 60.3 mL/min (A) (based on SCr of 1.67 mg/dL (H)).    Supplements:   Calcium/D twice daily .  Magnesium 800mg daily.   Has not been Vitamin D3 50 mcg daily  Lab Results   Component Value Date    MAG 1.5 (L) 11/14/2024   Vitamin D Deficiency Screening Results:  Lab Results   Component Value Date    VITDT 17 (L) 11/12/2024    VITDT 16 (L) 11/04/2024    VITDT 14 (L) 11/21/2023    VITDT 22 09/26/2019     Hyperlipidemia   Atorvastatin 20mg daily  Aspirin 81mg daily. Denies gastrointestinal bleed sx.   Patient reports no significant myalgias or other side effects.     Pain:   Methocarbamol 750mg 4 times daily  Acetaminophen 650mg 4 times daily   Pt had a fall in the hospital, still sore from this. Pain in inner leg, close to testicle. General soreness down both arms and neck. 3/10, can get up to 9/10 without meds.     Constipation:   Senna-S 2 tablets at bedtime  Miralax 17g daily.   loose stools once  to twice daily.     Today's Vitals: There were no vitals taken for this visit.  ----------------  Post Discharge Medication Reconciliation Status: discharge medications reconciled and changed, per note/orders.    I spent 15 minutes with this patient today. All changes were made via collaborative practice agreement with Dr. Ricks. A copy of the visit note was provided to the patient's provider(s).    A summary of these recommendations was sent via [employer].    Peter Yaya,  PharmD  [facility] Pharmacist    Phone: [phone]     Telemedicine Visit Details  The patient's medications can be safely assessed via a telemedicine encounter.  Type of service:  Telephone visit  Originating Location (pt. Location): Home    Distant Location (provider location):  On-site  Start Time: 11:01 AM  End Time: 11:15 AM     Medication Therapy Recommendations  Takes dietary supplements   1 Current Medication: vitamin D3 (CHOLECALCIFEROL) 50 mcg (2000 units) tablet   Current Medication Sig: Take 1 tablet (50 mcg) by mouth daily.   Rationale: Untreated condition - Needs additional medication therapy - Indication   Recommendation: Start Medication   Status: Accepted - no CPA Needed   Identified Date: 11/19/2024 Completed Date: 11/19/2024

## 2024-11-19 NOTE — PATIENT INSTRUCTIONS
"Recommendations from today's MTM visit:                                                      Start taking Vitamin D3 50mcg daily.     Valganciclovir (Valcyte) birth defect information:  This medicine may cause birth defects if either partner is using it during conception or pregnancy. Women must use an effective form of birth control to prevent pregnancy while they take this medicine and for at least 1 month after treatment ends. Men should use condoms during treatment and for at least 90 days after treatment ends.  For this reason, If someone other than patient is handling Valcyte, gloves should be worn to avoid skin contact.    Follow-up: 3/11     It was great speaking with you today.  I value your experience and would be very thankful for your time in providing feedback in our clinic survey. In the next few days, you may receive an email or text message from [employer] with a link to a survey related to your  clinical pharmacist.\"     To schedule another MTM appointment, please call the clinic directly or you may call the MTM scheduling line at [phone] or toll-free at [phone].     My Clinical Pharmacist's contact information:                                                      Please feel free to contact me with any questions or concerns you have.      Abdullahi Javier, PharmD  MTM Pharmacist    Phone: [phone]     "

## 2024-11-26 ENCOUNTER — TELEPHONE (OUTPATIENT)
Dept: TRANSPLANT | Facility: CLINIC | Age: 44
End: 2024-11-26

## 2024-11-26 DIAGNOSIS — T86.11 KIDNEY TRANSPLANT REJECTION: ICD-10-CM

## 2024-11-26 NOTE — TELEPHONE ENCOUNTER
Discussed with patient his updated  Creatinine: 1.5  Lipase: 83  Hemoglobin 7.6  Tac 8.2     All labs are looking better. Patient is pushing water and eating clean. Patient will repeat labs Friday when he sees Arlene

## 2024-11-26 NOTE — TELEPHONE ENCOUNTER
Patient Call: General  Route to LPN    Reason for call: Patient called to touch base about labs.     Call back needed? Yes    Return Call Needed  Same as documented in contacts section  When to return call?: Same day: Route High Priority

## 2024-11-26 NOTE — TELEPHONE ENCOUNTER
Patient Call: General  Route to LPN    Reason for call: Rudi returning coordinator's call    Call back needed? Yes    Return Call Needed  Same as documented in contacts section  When to return call?: Same day: Route High Priority

## 2024-11-29 ENCOUNTER — LAB (OUTPATIENT)
Dept: LAB | Facility: CLINIC | Age: 44
End: 2024-11-29
Payer: COMMERCIAL

## 2024-11-29 DIAGNOSIS — Z94.0 KIDNEY REPLACED BY TRANSPLANT: ICD-10-CM

## 2024-11-29 DIAGNOSIS — Z20.828 CONTACT WITH AND (SUSPECTED) EXPOSURE TO OTHER VIRAL COMMUNICABLE DISEASES: ICD-10-CM

## 2024-11-29 DIAGNOSIS — Z79.899 ENCOUNTER FOR LONG-TERM CURRENT USE OF MEDICATION: ICD-10-CM

## 2024-11-29 DIAGNOSIS — Z98.890 OTHER SPECIFIED POSTPROCEDURAL STATES: ICD-10-CM

## 2024-11-29 DIAGNOSIS — Z48.298 AFTERCARE FOLLOWING ORGAN TRANSPLANT: ICD-10-CM

## 2024-11-29 LAB
AMYLASE SERPL-CCNC: 62 U/L (ref 28–100)
ANION GAP SERPL CALCULATED.3IONS-SCNC: 9 MMOL/L (ref 7–15)
BUN SERPL-MCNC: 22.9 MG/DL (ref 6–20)
CALCIUM SERPL-MCNC: 9.5 MG/DL (ref 8.8–10.4)
CHLORIDE SERPL-SCNC: 103 MMOL/L (ref 98–107)
CREAT SERPL-MCNC: 1.71 MG/DL (ref 0.67–1.17)
EGFRCR SERPLBLD CKD-EPI 2021: 50 ML/MIN/1.73M2
ERYTHROCYTE [DISTWIDTH] IN BLOOD BY AUTOMATED COUNT: 14.6 % (ref 10–15)
GLUCOSE SERPL-MCNC: 110 MG/DL (ref 70–99)
HCO3 SERPL-SCNC: 25 MMOL/L (ref 22–29)
HCT VFR BLD AUTO: 25.8 % (ref 40–53)
HGB BLD-MCNC: 8 G/DL (ref 13.3–17.7)
LIPASE SERPL-CCNC: 49 U/L (ref 13–60)
MAGNESIUM SERPL-MCNC: 1.6 MG/DL (ref 1.7–2.3)
MCH RBC QN AUTO: 26.1 PG (ref 26.5–33)
MCHC RBC AUTO-ENTMCNC: 31 G/DL (ref 31.5–36.5)
MCV RBC AUTO: 84 FL (ref 78–100)
PHOSPHATE SERPL-MCNC: 3.8 MG/DL (ref 2.5–4.5)
PLATELET # BLD AUTO: 388 10E3/UL (ref 150–450)
POTASSIUM SERPL-SCNC: 4.9 MMOL/L (ref 3.4–5.3)
RBC # BLD AUTO: 3.07 10E6/UL (ref 4.4–5.9)
SODIUM SERPL-SCNC: 137 MMOL/L (ref 135–145)
TACROLIMUS BLD-MCNC: 8.5 UG/L (ref 5–15)
TME LAST DOSE: NORMAL H
TME LAST DOSE: NORMAL H
WBC # BLD AUTO: 6.3 10E3/UL (ref 4–11)

## 2024-11-29 PROCEDURE — 82150 ASSAY OF AMYLASE: CPT | Performed by: PATHOLOGY

## 2024-11-29 PROCEDURE — 85027 COMPLETE CBC AUTOMATED: CPT | Performed by: PATHOLOGY

## 2024-11-29 PROCEDURE — 80048 BASIC METABOLIC PNL TOTAL CA: CPT | Performed by: PATHOLOGY

## 2024-11-29 PROCEDURE — 80197 ASSAY OF TACROLIMUS: CPT | Performed by: STUDENT IN AN ORGANIZED HEALTH CARE EDUCATION/TRAINING PROGRAM

## 2024-11-29 PROCEDURE — 84100 ASSAY OF PHOSPHORUS: CPT | Performed by: PATHOLOGY

## 2024-11-29 PROCEDURE — 83690 ASSAY OF LIPASE: CPT | Performed by: PATHOLOGY

## 2024-11-29 PROCEDURE — 36415 COLL VENOUS BLD VENIPUNCTURE: CPT | Performed by: PATHOLOGY

## 2024-11-29 PROCEDURE — 99000 SPECIMEN HANDLING OFFICE-LAB: CPT | Performed by: PATHOLOGY

## 2024-11-29 PROCEDURE — 83735 ASSAY OF MAGNESIUM: CPT | Performed by: PATHOLOGY

## 2024-12-05 ENCOUNTER — TELEPHONE (OUTPATIENT)
Dept: TRANSPLANT | Facility: CLINIC | Age: 44
End: 2024-12-05
Payer: COMMERCIAL

## 2024-12-05 DIAGNOSIS — T86.11 KIDNEY TRANSPLANT REJECTION: ICD-10-CM

## 2024-12-05 NOTE — TELEPHONE ENCOUNTER
Creatinine 2.2 on today's check, tac level pending however on Monday it was 10.7.    He is a pancreas alone and his kidney is from 5 years ago. Is this due to higher tac levels? He says he is drinking enough water and feels well otherwise?   Yes...

## 2024-12-06 NOTE — TELEPHONE ENCOUNTER
Connie Membreno MD Colianni, Lauren, RN  Caller: Unspecified (Yesterday,  9:40 AM)  Carson Coy    I suppose as he recently received thymo as well, less likely rejection  Ask him about his BP, he had syncopal episodes while inpt and we can wait for his tac as well  Can arrange for some IV fluids if his BP are < 110 systolic, either symptomatic or not    Thank you  Connie

## 2024-12-09 NOTE — TELEPHONE ENCOUNTER
Spoke to patient about his creatinine. Instructed patient to push fluids and let RNCC know what his creatinine is this week. Patient did not want to get IV fluids and told RNCC he will add an extra bottle of water or two for increased hydration,

## 2024-12-10 DIAGNOSIS — Z94.0 KIDNEY REPLACED BY TRANSPLANT: Primary | ICD-10-CM

## 2024-12-10 DIAGNOSIS — T86.11 KIDNEY TRANSPLANT REJECTION: ICD-10-CM

## 2024-12-12 ENCOUNTER — TELEPHONE (OUTPATIENT)
Dept: TRANSPLANT | Facility: CLINIC | Age: 44
End: 2024-12-12

## 2024-12-12 ENCOUNTER — INFUSION THERAPY VISIT (OUTPATIENT)
Dept: INFUSION THERAPY | Facility: CLINIC | Age: 44
End: 2024-12-12
Attending: INTERNAL MEDICINE
Payer: COMMERCIAL

## 2024-12-12 ENCOUNTER — PATIENT OUTREACH (OUTPATIENT)
Dept: CARE COORDINATION | Facility: CLINIC | Age: 44
End: 2024-12-12

## 2024-12-12 ENCOUNTER — OFFICE VISIT (OUTPATIENT)
Dept: TRANSPLANT | Facility: CLINIC | Age: 44
End: 2024-12-12
Attending: STUDENT IN AN ORGANIZED HEALTH CARE EDUCATION/TRAINING PROGRAM
Payer: COMMERCIAL

## 2024-12-12 VITALS
HEART RATE: 79 BPM | SYSTOLIC BLOOD PRESSURE: 129 MMHG | WEIGHT: 191.2 LBS | HEIGHT: 67 IN | DIASTOLIC BLOOD PRESSURE: 74 MMHG | BODY MASS INDEX: 30.01 KG/M2 | TEMPERATURE: 97.9 F | OXYGEN SATURATION: 100 %

## 2024-12-12 VITALS
HEART RATE: 76 BPM | TEMPERATURE: 97.6 F | RESPIRATION RATE: 16 BRPM | OXYGEN SATURATION: 100 % | SYSTOLIC BLOOD PRESSURE: 142 MMHG | DIASTOLIC BLOOD PRESSURE: 95 MMHG

## 2024-12-12 DIAGNOSIS — R55 SYNCOPE, UNSPECIFIED SYNCOPE TYPE: Primary | ICD-10-CM

## 2024-12-12 DIAGNOSIS — T86.11 KIDNEY TRANSPLANT REJECTION: ICD-10-CM

## 2024-12-12 DIAGNOSIS — D84.9 IMMUNOSUPPRESSED STATUS (H): ICD-10-CM

## 2024-12-12 DIAGNOSIS — Z94.0 KIDNEY REPLACED BY TRANSPLANT: ICD-10-CM

## 2024-12-12 DIAGNOSIS — Z48.298 AFTERCARE FOLLOWING ORGAN TRANSPLANT: ICD-10-CM

## 2024-12-12 DIAGNOSIS — N25.81 SECONDARY RENAL HYPERPARATHYROIDISM (H): ICD-10-CM

## 2024-12-12 DIAGNOSIS — Z94.83 PANCREAS REPLACED BY TRANSPLANT (H): ICD-10-CM

## 2024-12-12 DIAGNOSIS — E10.29 TYPE 1 DIABETES MELLITUS WITH OTHER KIDNEY COMPLICATION (H): ICD-10-CM

## 2024-12-12 DIAGNOSIS — Z20.828 CONTACT WITH AND (SUSPECTED) EXPOSURE TO OTHER VIRAL COMMUNICABLE DISEASES: ICD-10-CM

## 2024-12-12 DIAGNOSIS — Z79.899 ENCOUNTER FOR LONG-TERM CURRENT USE OF MEDICATION: ICD-10-CM

## 2024-12-12 DIAGNOSIS — D63.1 ANEMIA IN CHRONIC KIDNEY DISEASE, UNSPECIFIED CKD STAGE: ICD-10-CM

## 2024-12-12 DIAGNOSIS — D69.6 THROMBOCYTOPENIA (H): ICD-10-CM

## 2024-12-12 DIAGNOSIS — N17.9 ACUTE KIDNEY INJURY (H): ICD-10-CM

## 2024-12-12 DIAGNOSIS — Z98.890 OTHER SPECIFIED POSTPROCEDURAL STATES: ICD-10-CM

## 2024-12-12 DIAGNOSIS — G90.3 NEUROGENIC ORTHOSTATIC HYPOTENSION (H): ICD-10-CM

## 2024-12-12 DIAGNOSIS — D63.1 ANEMIA OF CHRONIC RENAL FAILURE, STAGE 3A (H): Primary | ICD-10-CM

## 2024-12-12 DIAGNOSIS — N18.31 ANEMIA OF CHRONIC RENAL FAILURE, STAGE 3A (H): Primary | ICD-10-CM

## 2024-12-12 DIAGNOSIS — E83.42 HYPOMAGNESEMIA: ICD-10-CM

## 2024-12-12 DIAGNOSIS — N18.9 ANEMIA IN CHRONIC KIDNEY DISEASE, UNSPECIFIED CKD STAGE: ICD-10-CM

## 2024-12-12 LAB
AMYLASE SERPL-CCNC: 46 U/L (ref 28–100)
ANION GAP SERPL CALCULATED.3IONS-SCNC: 10 MMOL/L (ref 7–15)
BASOPHILS # BLD AUTO: 0.1 10E3/UL (ref 0–0.2)
BASOPHILS NFR BLD AUTO: 1 %
BUN SERPL-MCNC: 28.3 MG/DL (ref 6–20)
CALCIUM SERPL-MCNC: 8.7 MG/DL (ref 8.8–10.4)
CHLORIDE SERPL-SCNC: 103 MMOL/L (ref 98–107)
CREAT SERPL-MCNC: 2.37 MG/DL (ref 0.67–1.17)
EGFRCR SERPLBLD CKD-EPI 2021: 34 ML/MIN/1.73M2
EOSINOPHIL # BLD AUTO: 0.1 10E3/UL (ref 0–0.7)
EOSINOPHIL NFR BLD AUTO: 2 %
ERYTHROCYTE [DISTWIDTH] IN BLOOD BY AUTOMATED COUNT: 14.7 % (ref 10–15)
GLUCOSE BLDC GLUCOMTR-MCNC: 101 MG/DL (ref 70–99)
GLUCOSE SERPL-MCNC: 115 MG/DL (ref 70–99)
HCO3 SERPL-SCNC: 22 MMOL/L (ref 22–29)
HCT VFR BLD AUTO: 23.2 % (ref 40–53)
HGB BLD-MCNC: 7 G/DL (ref 13.3–17.7)
IMM GRANULOCYTES # BLD: 0.1 10E3/UL
IMM GRANULOCYTES NFR BLD: 1 %
LIPASE SERPL-CCNC: 48 U/L (ref 13–60)
LYMPHOCYTES # BLD AUTO: 0.2 10E3/UL (ref 0.8–5.3)
LYMPHOCYTES NFR BLD AUTO: 4 %
MCH RBC QN AUTO: 25.4 PG (ref 26.5–33)
MCHC RBC AUTO-ENTMCNC: 30.2 G/DL (ref 31.5–36.5)
MCV RBC AUTO: 84 FL (ref 78–100)
MONOCYTES # BLD AUTO: 0.1 10E3/UL (ref 0–1.3)
MONOCYTES NFR BLD AUTO: 3 %
NEUTROPHILS # BLD AUTO: 3.2 10E3/UL (ref 1.6–8.3)
NEUTROPHILS NFR BLD AUTO: 88 %
NRBC # BLD AUTO: 0 10E3/UL
NRBC BLD AUTO-RTO: 0 /100
PLATELET # BLD AUTO: 248 10E3/UL (ref 150–450)
POTASSIUM SERPL-SCNC: 4.4 MMOL/L (ref 3.4–5.3)
RBC # BLD AUTO: 2.76 10E6/UL (ref 4.4–5.9)
SODIUM SERPL-SCNC: 135 MMOL/L (ref 135–145)
TACROLIMUS BLD-MCNC: 10.5 UG/L (ref 5–15)
TME LAST DOSE: NORMAL H
TME LAST DOSE: NORMAL H
WBC # BLD AUTO: 3.6 10E3/UL (ref 4–11)

## 2024-12-12 PROCEDURE — 36415 COLL VENOUS BLD VENIPUNCTURE: CPT

## 2024-12-12 PROCEDURE — 82150 ASSAY OF AMYLASE: CPT | Performed by: NURSE PRACTITIONER

## 2024-12-12 PROCEDURE — 250N000011 HC RX IP 250 OP 636: Performed by: INTERNAL MEDICINE

## 2024-12-12 PROCEDURE — 99213 OFFICE O/P EST LOW 20 MIN: CPT | Performed by: INTERNAL MEDICINE

## 2024-12-12 PROCEDURE — 80048 BASIC METABOLIC PNL TOTAL CA: CPT | Performed by: NURSE PRACTITIONER

## 2024-12-12 PROCEDURE — 82962 GLUCOSE BLOOD TEST: CPT

## 2024-12-12 PROCEDURE — 80197 ASSAY OF TACROLIMUS: CPT

## 2024-12-12 PROCEDURE — 258N000003 HC RX IP 258 OP 636: Performed by: INTERNAL MEDICINE

## 2024-12-12 PROCEDURE — 83690 ASSAY OF LIPASE: CPT | Performed by: NURSE PRACTITIONER

## 2024-12-12 RX ORDER — HEPARIN SODIUM,PORCINE 10 UNIT/ML
5-20 VIAL (ML) INTRAVENOUS DAILY PRN
Status: CANCELLED | OUTPATIENT
Start: 2024-12-12

## 2024-12-12 RX ORDER — DIPHENHYDRAMINE HYDROCHLORIDE 50 MG/ML
50 INJECTION INTRAMUSCULAR; INTRAVENOUS
Status: CANCELLED
Start: 2024-12-12

## 2024-12-12 RX ORDER — METHYLPREDNISOLONE SODIUM SUCCINATE 40 MG/ML
40 INJECTION INTRAMUSCULAR; INTRAVENOUS
Status: CANCELLED
Start: 2024-12-12

## 2024-12-12 RX ORDER — DIPHENHYDRAMINE HYDROCHLORIDE 50 MG/ML
25 INJECTION INTRAMUSCULAR; INTRAVENOUS
Status: CANCELLED
Start: 2024-12-12

## 2024-12-12 RX ORDER — HEPARIN SODIUM (PORCINE) LOCK FLUSH IV SOLN 100 UNIT/ML 100 UNIT/ML
5 SOLUTION INTRAVENOUS
Status: CANCELLED | OUTPATIENT
Start: 2024-12-12

## 2024-12-12 RX ORDER — TACROLIMUS 1 MG/1
3 CAPSULE ORAL 2 TIMES DAILY
Qty: 240 CAPSULE | Refills: 11 | Status: SHIPPED | OUTPATIENT
Start: 2024-12-12

## 2024-12-12 RX ORDER — MEPERIDINE HYDROCHLORIDE 25 MG/ML
25 INJECTION INTRAMUSCULAR; INTRAVENOUS; SUBCUTANEOUS
Status: CANCELLED | OUTPATIENT
Start: 2024-12-12

## 2024-12-12 RX ORDER — FLUDROCORTISONE ACETATE 0.1 MG/1
0.1 TABLET ORAL EVERY OTHER DAY
Qty: 45 TABLET | Refills: 1 | Status: SHIPPED | OUTPATIENT
Start: 2024-12-12 | End: 2025-06-10

## 2024-12-12 RX ORDER — ALBUTEROL SULFATE 90 UG/1
1-2 INHALANT RESPIRATORY (INHALATION)
Status: CANCELLED
Start: 2024-12-12

## 2024-12-12 RX ORDER — ALBUTEROL SULFATE 0.83 MG/ML
2.5 SOLUTION RESPIRATORY (INHALATION)
Status: CANCELLED | OUTPATIENT
Start: 2024-12-12

## 2024-12-12 RX ORDER — EPINEPHRINE 1 MG/ML
0.3 INJECTION, SOLUTION INTRAMUSCULAR; SUBCUTANEOUS EVERY 5 MIN PRN
Status: CANCELLED | OUTPATIENT
Start: 2024-12-12

## 2024-12-12 RX ADMIN — SODIUM CHLORIDE 975 MG: 9 INJECTION, SOLUTION INTRAVENOUS at 10:14

## 2024-12-12 RX ADMIN — SODIUM CHLORIDE 25 MG: 9 INJECTION, SOLUTION INTRAVENOUS at 08:56

## 2024-12-12 ASSESSMENT — PAIN SCALES - GENERAL: PAINLEVEL_OUTOF10: NO PAIN (0)

## 2024-12-12 NOTE — LETTER
12/12/2024      Michael Amin  [address]  [facility] [address]      Dear Colleague,    Thank you for referring your patient, Michael Amin, to the [facility]. Please see a copy of my visit note below.    TRANSPLANT NEPHROLOGY CLINIC VISIT     Assessment & Plan  # LDKT: CKD Stage 3a/3b - Trend up. Multifactorial including pre-renal physiology and or higher tac goal given pancreas tx. Will up date his UA, started him on florinef 0.1 mg every other day to prevent hypotension/hypovolemia. Labs on Monday, if creatine trending up, will give IV fluids and plan for biopsy next week.BK negative on 12/2/2024   - Baseline Creatinine: ~ 1.1-1.3, but higher baseline in setting of higher tac goal in setting of recent pancreas transplant. New baseline could be 1.5-1.9   - Proteinuria: Normal (<0.2 grams)   - DSA Hx: No DSA   - Last cPRA: 0%   - BK Viremia: No   - Kidney Tx Biopsy Hx: Acute cellular-mediated rejection.    # Pancreas Tx (IVETTE):    - Pancreatic Exocrine Drainage: Enteric drained     - Blood glucose: Euglycemia      On insulin: No   - HbA1c: Last checked at time of transplant      Latest HbA1c: 7.8%   - Pancreatic enzymes: Stable   - DSA Hx: No DSA   - Pancreas Tx Biopsy Hx: No biopsy history    # Immunosuppression: Tacrolimus immediate release (goal 8-10) and Mycophenolate mofetil (dose 1000 mg every 12 hours)   - Induction with Recent Transplant:  High Intensity Protocol   - Continue with intensive monitoring of immunosuppression for efficacy and toxicity.   - Historical Changes in Immunosuppression: None   - Changes: Yes - decreased tac from 4 mg BID to 3.5 mg BID to maintain tac close to 8 than 10 given higher creatinine levels    # Infection Prevention:      - PJP: Sulfa/TMP (Bactrim)  - CMV: Valganciclovir (Valcyte)      - CMV IgG Ab High Risk Discordance (D+/R-): No  CMV Serostatus: Positive  - EBV IgG Ab High Risk Discordance (D+/R-): No  EBV Serostatus:  Positive    # Hypertension:  controlled, low at times ;  Goal BP: > 100, but < 130 systolic   - Changes: Yes - started on florinef 0.1 mg every other day    # Anemia in Chronic Renal Disease: Hgb: Trend down      JOSE: No   - Iron studies: Low iron saturation, followed by anemia services and on IV iron currently.    # Mineral Bone Disorder:    - Secondary renal hyperparathyroidism; PTH level: Normal (15-65 pg/ml)        On treatment: None  - Vitamin D; level: Normal        On supplement: Yes  - Calcium; level: Low normal        On supplement: Yes  - Phosphorus; level: Normal        On supplement: No    # Electrolytes:   - Potassium; level: Normal        On supplement: No  - Magnesium; level: Low        On supplement: Yes  - Bicarbonate; level: Normal        On supplement: No  - Sodium; level: Normal    # Other Significant PMH:   - # Obesity, Class I (BMI = 32): improved to 29.95. continue to monitor.    - GERD : on PPI with symptom control. Continue     # Skin Cancer Risk:    - Discussed sun protection and recommend regular follow up with Dermatology.    # Transplant History:  Etiology of Kidney Failure: Diabetic nephropathy  Tx: LDKT on 10/1/2019 and IVETTE  Transplant: 11/2/2024 (Pancreas), 10/1/2019 (Kidney)  Significant transplant-related complications: None    Transplant Office Phone Number: [phone]    Assessment and plan was discussed with the patient and he voiced his understanding and agreement.    Return visit: Follow up as previously scheduled    Connie Membreno MD    The longitudinal plan of care for the diagnosis(es)/condition(s) as documented were addressed during this visit. Due to the added complexity in care, I will continue to support Rudi in the subsequent management and with ongoing continuity of care.      Chief Complaint  Mr. Amin is a 44 year old here for kidney transplant, pancreas transplant, immunosuppression management, and RICHARD.     History of Present Illness    Mr. Amin reports  feeling stable overall.  Since last clinic visit:   Hospitalizations: No   New Medical Issues: Yes  Chest pain or shortness of breath: No  Lower extremity swelling: No  Weight change: No  Nausea and vomiting: No  Diarrhea: No  Heartburn symptoms: No  Fever, sweats or chills: yes  Urinary complaints: No    Home BP:  100's to 110's systolic    Problem List  Patient Active Problem List   Diagnosis     HTN, kidney transplant related     Diabetes mellitus type 1 (H)     Dyslipidemia     Anemia in chronic kidney disease     Secondary renal hyperparathyroidism (H)     Kidney replaced by transplant     Aftercare following organ transplant     Vitamin D deficiency     Hypomagnesemia     Kidney transplant rejection     Class 1 obesity with serious comorbidity and body mass index (BMI) of 33.0 to 33.9 in adult, unspecified obesity type     Shingles     Organ transplant candidate     Pre-operative cardiovascular examination     Status post coronary angiogram     Pancreas replaced by transplant (H)     Immunosuppressed status (H)     Acute kidney injury (H)     Hypervolemia     Acute post-operative pain     Thrombocytopenia (H)     At risk for malnutrition     Syncope     Anemia of chronic renal failure       Allergies  Allergies   Allergen Reactions     Cats Itching     Itchy eyes     Anti-Thymocyte Glob (Rabbit)      Had reaction with rigors after steroid-free dose. No reaction with steroids.       Medications  Current Outpatient Medications   Medication Sig Dispense Refill     acetaminophen (TYLENOL) 325 MG tablet Take 2 tablets (650 mg) by mouth every 6 hours as needed (For optimal non-opioid multimodal pain management to improve pain control.). 60 tablet 0     alcohol swab prep pads Use to swab area of injection/zak as directed. 100 each 3     aspirin (ASA) 81 MG EC tablet Take 81 mg by mouth daily        atorvastatin (LIPITOR) 10 MG tablet Take 2 tablets (20 mg) by mouth daily. 60 tablet 11     blood glucose (NO BRAND  "SPECIFIED) lancets standard Use to test blood sugar 3 times daily or as directed. 100 each 11     blood glucose (NO BRAND SPECIFIED) test strip Use to test blood sugar 3 times daily or as directed. 100 strip 11     blood glucose monitoring (NO BRAND SPECIFIED) meter device kit Use to test blood sugar 3 times daily or as directed. 2 kit 0     calcium carbonate-vitamin D (OSCAL) 500-5 MG-MCG tablet Take 1 tablet by mouth 2 times daily (with meals). 60 tablet 2     CELLCEPT (BRAND) 250 MG capsule Take 4 capsules (1,000 mg) by mouth 2 times daily. 240 capsule 11     Continuous Glucose Sensor (DEXCOM G6 SENSOR) MISC CHANGE SENSOR EVERY 10 DAYS 9 each 3     Continuous Glucose Transmitter (DEXCOM G6 TRANSMITTER) MISC Change every 3 months 1 each 3     famotidine (PEPCID) 20 MG tablet Take 1 tablet (20 mg) by mouth 2 times daily. 60 tablet 0     Insulin Infusion Pump Supplies ([employer] XC INFUSION SET) MISC 1 each every 48 hours Change every 2 days  9 mm cannula, 23 inch tubing (Patient not taking: Reported on 11/29/2024) 45 each 3     Insulin Infusion Pump Supplies (T:SLIM X2 3ML CARTRIDGE) MISC 1 each by Other route every other day Insulin cartridge to be used with pump as directed.  Change every 2 days or as directed. (Patient not taking: Reported on 11/29/2024) 45 each 3     insulin pen needle ([employer]IC[employer] MICRO) 32G X 4 MM miscellaneous Use pen needles daily or as directed. (Patient not taking: Reported on 11/29/2024) 100 each 3     Insulin Syringe-Needle U-100 31G X 1/4\" 1 ML MISC 1 Syringe as needed (until new insuline pump arrives) (Patient not taking: Reported on 11/29/2024) 50 each 1     magnesium oxide (MAG-OX) 400 MG tablet Take 2 tablets (800 mg) by mouth daily (with lunch). 60 tablet 4     methocarbamol (ROBAXIN) 750 MG tablet Take 1 tablet (750 mg) by mouth every 6 hours as needed for muscle spasms. (Patient not taking: Reported on 11/29/2024) 20 tablet 0     nystatin (MYCOSTATIN) 604391 UNIT/ML suspension Swish " "and swallow 5 mLs (500,000 Units) in mouth 4 times daily. (Patient taking differently: Swish and swallow 500,000 Units in mouth 4 times daily. Frequently forgets to take) 473 mL 2     pantoprazole (PROTONIX) 40 MG EC tablet Take 1 tablet (40 mg) by mouth every morning (before breakfast). 30 tablet 0     polyethylene glycol (MIRALAX) 17 GM/Dose powder Take 17 g by mouth daily.       senna-docusate (SENOKOT-S/PERICOLACE) 8.6-50 MG tablet Take 1-2 tablets by mouth 2 times daily as needed for constipation. 60 tablet 0     sulfamethoxazole-trimethoprim (BACTRIM) 400-80 MG tablet Take 1 tablet by mouth daily. 30 tablet 11     tacrolimus (GENERIC EQUIVALENT) 1 MG capsule Take 4 capsules (4 mg) by mouth 2 times daily. 240 capsule 11     valGANciclovir (VALCYTE) 450 MG tablet Take 2 tablets (900 mg) by mouth daily. 60 tablet 1     vitamin D3 (CHOLECALCIFEROL) 50 mcg (2000 units) tablet Take 1 tablet (50 mcg) by mouth daily. 30 tablet 3     No current facility-administered medications for this visit.     There are no discontinued medications.    Physical Exam  Vital Signs: /74 (BP Location: Left arm, Patient Position: Sitting, Cuff Size: Adult Regular)   Pulse 79   Temp 97.9  F (36.6  C) (Oral)   Ht 1.702 m (5' 7\")   Wt 86.7 kg (191 lb 3.2 oz)   SpO2 100%   BMI 29.95 kg/m      GENERAL APPEARANCE: alert and no distress  EYES: eyes grossly normal to inspection  HENT: normal cephalic/atraumatic  RESP:   CV: S1, S2 present  EDEMA: no LE edema bilaterally  ABDOMEN: soft, non distended  MS: extremities normal - no gross deformities noted  SKIN: no rash  NEURO: mentation intact and speech normal  PSYCH: mentation appears normal and affect normal/bright  TX KIDNEY: normal    Data        Latest Ref Rng & Units 12/12/2024    10:09 AM 12/12/2024     9:09 AM 12/9/2024     9:22 AM   Renal   Sodium 135 - 145 mmol/L 135      Na (external) 135 - 145 mmol/L   138       K 3.4 - 5.3 mmol/L 4.4      K (external) 3.6 - 5.2 mmol/L   " 4.9       Cl 98 - 107 mmol/L 103   103       Cl (external) 98 - 107 mmol/L 103   103       CO2 22 - 29 mmol/L 22      CO2 (external) 22 - 29 mmol/L   22       Urea Nitrogen 6.0 - 20.0 mg/dL 28.3      BUN (external) 8 - 24 mg/dL   23       Creatinine 0.67 - 1.17 mg/dL 2.37      Cr (external) 0.74 - 1.35 mg/dL   2.05       Glucose 70 - 99 mg/dL 115  101     Glucose (external) 70 - 140 mg/dL   117       Calcium 8.8 - 10.4 mg/dL 8.7      Ca (external) 8.6 - 10.0 mg/dL   9.3       Mg (external) 1.7 - 2.3 mg/dL   1.5           This result is from an external source.         Latest Ref Rng & Units 12/9/2024     9:22 AM 12/2/2024     8:48 AM 11/29/2024    10:23 AM   Bone Health   Phosphorus 2.5 - 4.5 mg/dL   3.8    Phos (external) 2.5 - 4.5 mg/dL 4.1     3.4     PTHi (external) 15 - 65 pg/mL  42     Vit D Def (external) 20 - 80 ng/mL  23         This result is from an external source.         Latest Ref Rng & Units 12/12/2024    10:09 AM 12/9/2024     9:22 AM 12/5/2024     8:46 AM   Heme   WBC 4.0 - 11.0 10e3/uL 3.6      WBC (external) 3.4 - 9.6 x10(9)/L  3.7     5.1       Hgb 13.3 - 17.7 g/dL 7.0      Hgb (external) 13.2 - 16.6 g/dL  8.2     8.3       Plt 150 - 450 10e3/uL 248      Plt (external) 135 - 317 x10(9)/L  275     337       ABSOLUTE LYMPHOCYTES (EXTERNAL) 0.95 - 3.07 x10(9)/L   0.15       ABSOLUTE MONOCYTES (EXTERNAL) 0.26 - 0.81 x10(9)/L   0.18       ABSOLUTE EOSINOPHILS (EXTERNAL) 0.03 - 0.48 x10(9)/L   0.08       ABSOLUTE BASOPHILS (EXTERNAL) 0.01 - 0.08 x10(9)/L   0.04           This result is from an external source.         Latest Ref Rng & Units 11/4/2024     6:22 AM 11/2/2024     1:52 PM 9/26/2019     9:45 AM   Liver   AP 40 - 150 U/L  113  100    TBili <=1.2 mg/dL  0.5  0.2    ALT 0 - 70 U/L  13  42    AST 0 - 45 U/L  15  16    Tot Protein 6.4 - 8.3 g/dL  6.9  7.5    Albumin 3.4 - 5.0 g/dL   3.5    Albumin 3.5 - 5.2 g/dL 2.4  4.1           Latest Ref Rng & Units 12/9/2024     9:22 AM 12/5/2024     8:46  AM 12/2/2024     8:48 AM   Pancreas   Amylase (external) 28 - 100 U/L 51     54  52    Lipase (external) 28 - 100 U/L 51     54  52        This result is from an external source.         Latest Ref Rng & Units 11/12/2024     7:16 AM 9/26/2019     9:45 AM 8/6/2019     5:45 PM   Iron studies   Iron 61 - 157 ug/dL 19  70     Iron Saturation Index 15 - 46 %  28     Iron Sat Index 15 - 46 % 9      Ferritin 31 - 409 ng/mL 238  753     Ferritin (external) 22 - 322 ng/mL   578           This result is from an external source.         Latest Ref Rng & Units 12/2/2024     8:48 AM 11/2/2024     2:29 PM 8/13/2021     8:39 AM   [facility] Virology   BK Quant Log Ext log IU/mL Undetected   Undetected    BK Quant Result Ext Undetected IU/mL Undetected   Undetected    BK Quant Spec Ext  Plasma      EBV CAPSID ANTIBODY IGG No detectable antibody.  Positive     HIV 1&2 Ext Undetected Copies/mL Undetected             This result is from an external source.     Failed to redirect to the Timeline version of the REVFS SmartLink.  Recent Labs   Lab Test 11/12/24  0716 11/14/24  0759 11/29/24  1023   DOSTAC 11/11/2024 11/13/2024 11/28/2024   TACROL 13.2 11.6 8.5     Recent Labs   Lab Test 10/07/19  0742 11/21/19  0813 11/14/24  0759   DOSMPA Not Provided 11/20/19 1800 11/13/2024   7:00 PM   MPACID 0.82* 0.91* 1.59   MPAG 14.1* 39.5 57.5    Prescription drug management  40 minutes spent by me on the date of the encounter doing chart review, history and exam, documentation and further activities per the note      Again, thank you for allowing me to participate in the care of your patient.        Sincerely,        Connie Membreno MD

## 2024-12-12 NOTE — PROGRESS NOTES
Anemia Management Note - Follow Up      SUBJECTIVE/OBJECTIVE:    Referred by Dr. Jabier Hernández on 2024  Primary Diagnosis: Anemia of Other Chronic Illness (D63.8)     Secondary Diagnosis: Organ or tissue replaced by transplant, kidney (Z94.0)  Date of Kidney Transplant:   Date of Pancreas Transplant: 2024  Hgb goal range: 9-10     Epo/Darbo: TBD;  TSAT must be 20% or > before insurance will approve JOSE.   Iron regimen: Treat with IV Iron.   *Does not tolerate Oral Iron.   *completed Infed 1000mg on 24.     Labs : 2025  RX/TX plans : TBD     Recent JOSE use, transfusion, IV iron: NA  No history of stroke, MI, and blood clots or cancers     Contact: Consent to Communicate scanned on 2019.         Latest Ref Rng & Units 2024 11/10/2024 2024 2024 2024 2024 2024   Anemia   Hemoglobin 13.3 - 17.7 g/dL 8.2  8.4  7.4  8.2  7.7  8.0  7.0    IV Iron Dose        1000mg   TSAT 15 - 46 %    9       Ferritin 31 - 409 ng/mL    238            BP Readings from Last 3 Encounters:   24 (!) 142/95   24 117/74   24 121/72     Wt Readings from Last 2 Encounters:   24 86.4 kg (190 lb 8 oz)   24 91.5 kg (201 lb 11.2 oz)           ASSESSMENT:    Hgb:Not at goal/Known  TSat: Due for iron studies 4 weeks after last IV iron infusion Ferritin: Due for iron studies 4 weeks after last IV iron infusion        PLAN:  Check iron studies in 4 week(s).  Labs are scheduled for 1/15/25    Orders needed to be renewed (for next follow-up date) in EPIC: None    Iron labs due:  Early.Mid 2025    Plan discussed with:  No call, chart review       NEXT FOLLOW-UP DATE:  1/15/25    Skye Davis RN   Anemia Services  [facility]  [email or web address]   Office : [phone]  Fax: [phone]

## 2024-12-12 NOTE — PROGRESS NOTES
Infusion Nursing Note:  Michael Amin presents today for IV infusion; Infed.    Patient seen by provider today: No   present during visit today: Not Applicable.    Note:   Administrations This Visit       iron dextran complex (INFED) 25 mg in sodium chloride 0.9 % 55.5 mL intermittent infusion (test dose)       Admin Date  12/12/2024 Action  $New Bag Dose  25 mg Rate  666 mL/hr Route  Intravenous Documented By  Vikas Walker RN              iron dextran complex (INFED) 975 mg in sodium chloride 0.9 % 569.5 mL intermittent infusion       Admin Date  12/12/2024 Action  $New Bag Dose  975 mg Rate  569.5 mL/hr Route  Intravenous Documented By  Vikas Walker RN              sodium chloride (PF) 0.9% PF flush 3-20 mL       Admin Date  12/12/2024 Action  $Given Dose  20 mL Route  Intracatheter Documented By  Vikas Walker, LILLI                  Patient tolerated test dose of Infed. Full dose administered approximately ~1 hour after test dose finished infusing.  Therapy plan complete + discontinued.    Temp: 97.6  F (36.4  C) Temp src: Oral BP: 133/82 Pulse: 69   Resp: 16 SpO2: 100 % O2 Device: None (Room air)      Intravenous Access:  Labs drawn without difficulty; CBC, BMP, and Tacrolimus.  Peripheral IV placed.    Treatment Conditions:  Not Applicable.    Post Infusion Assessment:  Patient tolerated infusion without incident.  Blood return noted pre and post infusion.  Site patent and intact, free from redness, edema or discomfort.  No evidence of extravasations.  Access discontinued per protocol.     Discharge Plan:   Patient and/or family verbalized understanding of discharge instructions and all questions answered.  AVS to patient via [employer].  Patient will return as needed for next appointment.   Patient discharged in stable condition accompanied by: mother.  Departure Mode: Ambulatory.    Vikas Walker RN

## 2024-12-12 NOTE — PATIENT INSTRUCTIONS
Start florinef 0.1 mg every other day   Decrease tacrolimus from 4 mg twice a day to 3.5 mg twice a day   Check labs on Monday including urine studies and can consider IV fluids if needed.

## 2024-12-12 NOTE — NURSING NOTE
"Chief Complaint   Patient presents with    RECHECK     TXP follow up.      Vitals:    12/12/24 1517   BP: 129/74   BP Location: Left arm   Patient Position: Sitting   Cuff Size: Adult Regular   Pulse: 79   Temp: 97.9  F (36.6  C)   TempSrc: Oral   SpO2: 100%   Weight: 86.7 kg (191 lb 3.2 oz)   Height: 1.702 m (5' 7\")       BP Readings from Last 3 Encounters:   12/12/24 129/74   12/12/24 (!) 142/95   11/29/24 117/74       /74 (BP Location: Left arm, Patient Position: Sitting, Cuff Size: Adult Regular)   Pulse 79   Temp 97.9  F (36.6  C) (Oral)   Ht 1.702 m (5' 7\")   Wt 86.7 kg (191 lb 3.2 oz)   SpO2 100%   BMI 29.95 kg/m       Apurva Morales    "

## 2024-12-12 NOTE — PATIENT INSTRUCTIONS
Dear Michael Amin    Thank you for choosing [facility] for your infusion.  The following information is a summary of our appointment as well as important reminders.    If you have any questions on your upcoming Specialty Infusion appointments, please call scheduling at [phone].  It was a pleasure taking care of you today.    Sincerely,    [facility]  [address] [place] [address]  [address]  Phone:  [phone]

## 2024-12-12 NOTE — TELEPHONE ENCOUNTER
ISSUE:  Cr 2.37, Hgb 7.0, hematocrit 23.2. Managed by anemia services. Infed 1000 mg given today.     PLAN:   Dizziness? Intermittent dizziness if very active, occasionally on standing   Fevers? no  Call and assess hydration status.  How much water is he drinking per day? 2 L/day  Any recent illness, diarrhea, s/s of a UTI, or medication changes? Denies all, did report he feels like he is urinating less  Any pain over kidney allograft? no  Recent BP?  SBP <130 ? 115/70  Any increased intake of alcohol or caffeine? Decreased caffeine intake, now minimal    OUTCOME: Patient has appointment with Dr. Membreno today. Update sent to Dr. Haseeb Hernandez RN

## 2024-12-12 NOTE — PROGRESS NOTES
TRANSPLANT NEPHROLOGY CLINIC VISIT     Assessment & Plan   # LDKT: CKD Stage 3a/3b - Trend up. Multifactorial including pre-renal physiology and or higher tac goal given pancreas tx. Will up date his UA, started him on florinef 0.1 mg every other day to prevent hypotension/hypovolemia. Labs on Monday, if creatine trending up, will give IV fluids and plan for biopsy next week.BK negative on 12/2/2024   - Baseline Creatinine: ~ 1.1-1.3, but higher baseline in setting of higher tac goal in setting of recent pancreas transplant. New baseline could be 1.5-1.9   - Proteinuria: Normal (<0.2 grams)   - DSA Hx: No DSA   - Last cPRA: 0%   - BK Viremia: No   - Kidney Tx Biopsy Hx: Acute cellular-mediated rejection.    # Pancreas Tx (IVETTE):    - Pancreatic Exocrine Drainage: Enteric drained     - Blood glucose: Euglycemia      On insulin: No   - HbA1c: Last checked at time of transplant      Latest HbA1c: 7.8%   - Pancreatic enzymes: Stable   - DSA Hx: No DSA   - Pancreas Tx Biopsy Hx: No biopsy history    # Immunosuppression: Tacrolimus immediate release (goal 8-10) and Mycophenolate mofetil (dose 1000 mg every 12 hours)   - Induction with Recent Transplant:  High Intensity Protocol   - Continue with intensive monitoring of immunosuppression for efficacy and toxicity.   - Historical Changes in Immunosuppression: None   - Changes: Yes - decreased tac from 4 mg BID to 3.5 mg BID to maintain tac close to 8 than 10 given higher creatinine levels    # Infection Prevention:      - PJP: Sulfa/TMP (Bactrim)  - CMV: Valganciclovir (Valcyte)      - CMV IgG Ab High Risk Discordance (D+/R-): No  CMV Serostatus: Positive  - EBV IgG Ab High Risk Discordance (D+/R-): No  EBV Serostatus: Positive    # Hypertension:  controlled, low at times ;  Goal BP: > 100, but < 130 systolic   - Changes: Yes - started on florinef 0.1 mg every other day    # Anemia in Chronic Renal Disease: Hgb: Trend down      JOSE: No   - Iron studies: Low iron saturation,  followed by anemia services and on IV iron currently.    # Mineral Bone Disorder:    - Secondary renal hyperparathyroidism; PTH level: Normal (15-65 pg/ml)        On treatment: None  - Vitamin D; level: Normal        On supplement: Yes  - Calcium; level: Low normal        On supplement: Yes  - Phosphorus; level: Normal        On supplement: No    # Electrolytes:   - Potassium; level: Normal        On supplement: No  - Magnesium; level: Low        On supplement: Yes  - Bicarbonate; level: Normal        On supplement: No  - Sodium; level: Normal    # Other Significant PMH:   - # Obesity, Class I (BMI = 32): improved to 29.95. continue to monitor.    - GERD : on PPI with symptom control. Continue     # Skin Cancer Risk:    - Discussed sun protection and recommend regular follow up with Dermatology.    # Transplant History:  Etiology of Kidney Failure: Diabetic nephropathy  Tx: LDKT on 10/1/2019 and IVETTE  Transplant: 11/2/2024 (Pancreas), 10/1/2019 (Kidney)  Significant transplant-related complications: None    Transplant Office Phone Number: [phone]    Assessment and plan was discussed with the patient and he voiced his understanding and agreement.    Return visit: Follow up as previously scheduled    Connie Membreno MD    The longitudinal plan of care for the diagnosis(es)/condition(s) as documented were addressed during this visit. Due to the added complexity in care, I will continue to support Rudi in the subsequent management and with ongoing continuity of care.      Chief Complaint   Mr. Amin is a 44 year old here for kidney transplant, pancreas transplant, immunosuppression management, and RICHARD.     History of Present Illness     Mr. Amin reports feeling stable overall.  Since last clinic visit:   Hospitalizations: No   New Medical Issues: Yes  Chest pain or shortness of breath: No  Lower extremity swelling: No  Weight change: No  Nausea and vomiting: No  Diarrhea: No  Heartburn symptoms: No  Fever,  sweats or chills: yes  Urinary complaints: No    Home BP:  100's to 110's systolic    Problem List   Patient Active Problem List   Diagnosis    HTN, kidney transplant related    Diabetes mellitus type 1 (H)    Dyslipidemia    Anemia in chronic kidney disease    Secondary renal hyperparathyroidism (H)    Kidney replaced by transplant    Aftercare following organ transplant    Vitamin D deficiency    Hypomagnesemia    Kidney transplant rejection    Class 1 obesity with serious comorbidity and body mass index (BMI) of 33.0 to 33.9 in adult, unspecified obesity type    Shingles    Organ transplant candidate    Pre-operative cardiovascular examination    Status post coronary angiogram    Pancreas replaced by transplant (H)    Immunosuppressed status (H)    Acute kidney injury (H)    Hypervolemia    Acute post-operative pain    Thrombocytopenia (H)    At risk for malnutrition    Syncope    Anemia of chronic renal failure       Allergies   Allergies   Allergen Reactions    Cats Itching     Itchy eyes    Anti-Thymocyte Glob (Rabbit)      Had reaction with rigors after steroid-free dose. No reaction with steroids.       Medications   Current Outpatient Medications   Medication Sig Dispense Refill    acetaminophen (TYLENOL) 325 MG tablet Take 2 tablets (650 mg) by mouth every 6 hours as needed (For optimal non-opioid multimodal pain management to improve pain control.). 60 tablet 0    alcohol swab prep pads Use to swab area of injection/zak as directed. 100 each 3    aspirin (ASA) 81 MG EC tablet Take 81 mg by mouth daily       atorvastatin (LIPITOR) 10 MG tablet Take 2 tablets (20 mg) by mouth daily. 60 tablet 11    blood glucose (NO BRAND SPECIFIED) lancets standard Use to test blood sugar 3 times daily or as directed. 100 each 11    blood glucose (NO BRAND SPECIFIED) test strip Use to test blood sugar 3 times daily or as directed. 100 strip 11    blood glucose monitoring (NO BRAND SPECIFIED) meter device kit Use to test  "blood sugar 3 times daily or as directed. 2 kit 0    calcium carbonate-vitamin D (OSCAL) 500-5 MG-MCG tablet Take 1 tablet by mouth 2 times daily (with meals). 60 tablet 2    CELLCEPT (BRAND) 250 MG capsule Take 4 capsules (1,000 mg) by mouth 2 times daily. 240 capsule 11    Continuous Glucose Sensor (DEXCOM G6 SENSOR) MISC CHANGE SENSOR EVERY 10 DAYS 9 each 3    Continuous Glucose Transmitter (DEXCOM G6 TRANSMITTER) MISC Change every 3 months 1 each 3    famotidine (PEPCID) 20 MG tablet Take 1 tablet (20 mg) by mouth 2 times daily. 60 tablet 0    Insulin Infusion Pump Supplies (TalentBin XC INFUSION SET) MISC 1 each every 48 hours Change every 2 days  9 mm cannula, 23 inch tubing (Patient not taking: Reported on 11/29/2024) 45 each 3    Insulin Infusion Pump Supplies (T:SLIM X2 3ML CARTRIDGE) MISC 1 each by Other route every other day Insulin cartridge to be used with pump as directed.  Change every 2 days or as directed. (Patient not taking: Reported on 11/29/2024) 45 each 3    insulin pen needle (ULTICARE MICRO) 32G X 4 MM miscellaneous Use pen needles daily or as directed. (Patient not taking: Reported on 11/29/2024) 100 each 3    Insulin Syringe-Needle U-100 31G X 1/4\" 1 ML MISC 1 Syringe as needed (until new insuline pump arrives) (Patient not taking: Reported on 11/29/2024) 50 each 1    magnesium oxide (MAG-OX) 400 MG tablet Take 2 tablets (800 mg) by mouth daily (with lunch). 60 tablet 4    methocarbamol (ROBAXIN) 750 MG tablet Take 1 tablet (750 mg) by mouth every 6 hours as needed for muscle spasms. (Patient not taking: Reported on 11/29/2024) 20 tablet 0    nystatin (MYCOSTATIN) 539443 UNIT/ML suspension Swish and swallow 5 mLs (500,000 Units) in mouth 4 times daily. (Patient taking differently: Swish and swallow 500,000 Units in mouth 4 times daily. Frequently forgets to take) 473 mL 2    pantoprazole (PROTONIX) 40 MG EC tablet Take 1 tablet (40 mg) by mouth every morning (before breakfast). 30 tablet 0 " "   polyethylene glycol (MIRALAX) 17 GM/Dose powder Take 17 g by mouth daily.      senna-docusate (SENOKOT-S/PERICOLACE) 8.6-50 MG tablet Take 1-2 tablets by mouth 2 times daily as needed for constipation. 60 tablet 0    sulfamethoxazole-trimethoprim (BACTRIM) 400-80 MG tablet Take 1 tablet by mouth daily. 30 tablet 11    tacrolimus (GENERIC EQUIVALENT) 1 MG capsule Take 4 capsules (4 mg) by mouth 2 times daily. 240 capsule 11    valGANciclovir (VALCYTE) 450 MG tablet Take 2 tablets (900 mg) by mouth daily. 60 tablet 1    vitamin D3 (CHOLECALCIFEROL) 50 mcg (2000 units) tablet Take 1 tablet (50 mcg) by mouth daily. 30 tablet 3     No current facility-administered medications for this visit.     There are no discontinued medications.    Physical Exam   Vital Signs: /74 (BP Location: Left arm, Patient Position: Sitting, Cuff Size: Adult Regular)   Pulse 79   Temp 97.9  F (36.6  C) (Oral)   Ht 1.702 m (5' 7\")   Wt 86.7 kg (191 lb 3.2 oz)   SpO2 100%   BMI 29.95 kg/m      GENERAL APPEARANCE: alert and no distress  EYES: eyes grossly normal to inspection  HENT: normal cephalic/atraumatic  RESP:   CV: S1, S2 present  EDEMA: no LE edema bilaterally  ABDOMEN: soft, non distended  MS: extremities normal - no gross deformities noted  SKIN: no rash  NEURO: mentation intact and speech normal  PSYCH: mentation appears normal and affect normal/bright  TX KIDNEY: normal    Data         Latest Ref Rng & Units 12/12/2024    10:09 AM 12/12/2024     9:09 AM 12/9/2024     9:22 AM   Renal   Sodium 135 - 145 mmol/L 135      Na (external) 135 - 145 mmol/L   138       K 3.4 - 5.3 mmol/L 4.4      K (external) 3.6 - 5.2 mmol/L   4.9       Cl 98 - 107 mmol/L 103   103       Cl (external) 98 - 107 mmol/L 103   103       CO2 22 - 29 mmol/L 22      CO2 (external) 22 - 29 mmol/L   22       Urea Nitrogen 6.0 - 20.0 mg/dL 28.3      BUN (external) 8 - 24 mg/dL   23       Creatinine 0.67 - 1.17 mg/dL 2.37      Cr (external) 0.74 - 1.35 " mg/dL   2.05       Glucose 70 - 99 mg/dL 115  101     Glucose (external) 70 - 140 mg/dL   117       Calcium 8.8 - 10.4 mg/dL 8.7      Ca (external) 8.6 - 10.0 mg/dL   9.3       Mg (external) 1.7 - 2.3 mg/dL   1.5           This result is from an external source.         Latest Ref Rng & Units 12/9/2024     9:22 AM 12/2/2024     8:48 AM 11/29/2024    10:23 AM   Bone Health   Phosphorus 2.5 - 4.5 mg/dL   3.8    Phos (external) 2.5 - 4.5 mg/dL 4.1     3.4     PTHi (external) 15 - 65 pg/mL  42     Vit D Def (external) 20 - 80 ng/mL  23         This result is from an external source.         Latest Ref Rng & Units 12/12/2024    10:09 AM 12/9/2024     9:22 AM 12/5/2024     8:46 AM   Heme   WBC 4.0 - 11.0 10e3/uL 3.6      WBC (external) 3.4 - 9.6 x10(9)/L  3.7     5.1       Hgb 13.3 - 17.7 g/dL 7.0      Hgb (external) 13.2 - 16.6 g/dL  8.2     8.3       Plt 150 - 450 10e3/uL 248      Plt (external) 135 - 317 x10(9)/L  275     337       ABSOLUTE LYMPHOCYTES (EXTERNAL) 0.95 - 3.07 x10(9)/L   0.15       ABSOLUTE MONOCYTES (EXTERNAL) 0.26 - 0.81 x10(9)/L   0.18       ABSOLUTE EOSINOPHILS (EXTERNAL) 0.03 - 0.48 x10(9)/L   0.08       ABSOLUTE BASOPHILS (EXTERNAL) 0.01 - 0.08 x10(9)/L   0.04           This result is from an external source.         Latest Ref Rng & Units 11/4/2024     6:22 AM 11/2/2024     1:52 PM 9/26/2019     9:45 AM   Liver   AP 40 - 150 U/L  113  100    TBili <=1.2 mg/dL  0.5  0.2    ALT 0 - 70 U/L  13  42    AST 0 - 45 U/L  15  16    Tot Protein 6.4 - 8.3 g/dL  6.9  7.5    Albumin 3.4 - 5.0 g/dL   3.5    Albumin 3.5 - 5.2 g/dL 2.4  4.1           Latest Ref Rng & Units 12/9/2024     9:22 AM 12/5/2024     8:46 AM 12/2/2024     8:48 AM   Pancreas   Amylase (external) 28 - 100 U/L 51     54  52    Lipase (external) 28 - 100 U/L 51     54  52        This result is from an external source.         Latest Ref Rng & Units 11/12/2024     7:16 AM 9/26/2019     9:45 AM 8/6/2019     5:45 PM   Iron studies   Iron 61 -  157 ug/dL 19  70     Iron Saturation Index 15 - 46 %  28     Iron Sat Index 15 - 46 % 9      Ferritin 31 - 409 ng/mL 238  753     Ferritin (external) 22 - 322 ng/mL   578           This result is from an external source.         Latest Ref Rng & Units 12/2/2024     8:48 AM 11/2/2024     2:29 PM 8/13/2021     8:39 AM   [facility] Virology   BK Quant Log Ext log IU/mL Undetected   Undetected    BK Quant Result Ext Undetected IU/mL Undetected   Undetected    BK Quant Spec Ext  Plasma      EBV CAPSID ANTIBODY IGG No detectable antibody.  Positive     HIV 1&2 Ext Undetected Copies/mL Undetected             This result is from an external source.     Failed to redirect to the Timeline version of the [employer] SmartLink.  Recent Labs   Lab Test 11/12/24  0716 11/14/24  0759 11/29/24  1023   DOSTAC 11/11/2024 11/13/2024 11/28/2024   TACROL 13.2 11.6 8.5     Recent Labs   Lab Test 10/07/19  0742 11/21/19  0813 11/14/24  0759   DOSMPA Not Provided 11/20/19 1800 11/13/2024   7:00 PM   MPACID 0.82* 0.91* 1.59   MPAG 14.1* 39.5 57.5    Prescription drug management  40 minutes spent by me on the date of the encounter doing chart review, history and exam, documentation and further activities per the note

## 2024-12-26 ENCOUNTER — TELEPHONE (OUTPATIENT)
Dept: TRANSPLANT | Facility: CLINIC | Age: 44
End: 2024-12-26
Payer: COMMERCIAL

## 2024-12-26 DIAGNOSIS — Z94.83 PANCREAS REPLACED BY TRANSPLANT (H): ICD-10-CM

## 2024-12-26 DIAGNOSIS — Z94.0 KIDNEY REPLACED BY TRANSPLANT: Primary | ICD-10-CM

## 2024-12-26 DIAGNOSIS — T86.11 KIDNEY TRANSPLANT REJECTION: ICD-10-CM

## 2024-12-26 NOTE — TELEPHONE ENCOUNTER
My chart message that the tacrolimus  8.2  54 days post transplant    Sent a my chart message to increase tacrolimus  3.5  mg twice  per day to 4.0 mg twice per day

## 2024-12-27 RX ORDER — FAMOTIDINE 20 MG/1
20 TABLET, FILM COATED ORAL 2 TIMES DAILY
Qty: 60 TABLET | Refills: 3 | Status: SHIPPED | OUTPATIENT
Start: 2024-12-27

## 2024-12-27 RX ORDER — TACROLIMUS 0.5 MG/1
0.5 CAPSULE ORAL 2 TIMES DAILY
Qty: 180 CAPSULE | Refills: 1 | Status: SHIPPED | OUTPATIENT
Start: 2024-12-27 | End: 2024-12-29

## 2024-12-27 RX ORDER — AMOXICILLIN 250 MG
1-2 CAPSULE ORAL 2 TIMES DAILY PRN
Qty: 60 TABLET | Refills: 3 | Status: SHIPPED | OUTPATIENT
Start: 2024-12-27

## 2024-12-27 NOTE — TELEPHONE ENCOUNTER
- Historical Changes in Immunosuppression: None              - Changes: Yes - decreased tac from 4 mg BID to 3.5 mg BID to maintain tac close to 8 than 10 given higher creatinine levels

## 2024-12-27 NOTE — TELEPHONE ENCOUNTER
Patient Call: Medication Refill  Route to LPN  Instruct the patient to first contact their pharmacy. If they have called their pharmacy and require further assistance, route to LPN.    Pharmacy Name: [id number]  Pharmacy Location: [address] [place] [address]  Name of Medication: famotidine  Dose: 20 MG tablet                                     pantoprazole       40 MG EC tablet                                     senna-docusate      8.6-50 MG tablet     When will the patient be out of this medication?: Less than 3 days (Route high priority)

## 2024-12-29 RX ORDER — TACROLIMUS 0.5 MG/1
CAPSULE ORAL
Qty: 60 CAPSULE | Refills: 1 | Status: SHIPPED | OUTPATIENT
Start: 2024-12-29

## 2024-12-29 RX ORDER — TACROLIMUS 1 MG/1
CAPSULE ORAL
Qty: 240 CAPSULE | Refills: 11 | Status: SHIPPED | OUTPATIENT
Start: 2024-12-29

## 2024-12-29 NOTE — TELEPHONE ENCOUNTER
Called reviewed Rudi regarding  refills     Reviewed if stomach acid  discontinue pantoprazole  - continue the famotidine                                       famotidine  Dose: 20 MG tablet                                     pantoprazole       40 MG EC tablet                                     senna-docusate      8.6-50 MG tablet

## 2024-12-29 NOTE — TELEPHONE ENCOUNTER
Issue tacrolimus  level 7.6  slightly below goal level 8   Reviewed and discussed with Rudi     Confirmed 12 hour trough level    Confirmed current dose     Plan to increase to 4.0 mg in am and 3.5 mg in pm     Repeating labs on Dec 30 ,2024 along with Dr Prince Ballesteros  appointment

## 2024-12-30 ENCOUNTER — TELEPHONE (OUTPATIENT)
Dept: TRANSPLANT | Facility: CLINIC | Age: 44
End: 2024-12-30

## 2024-12-30 ENCOUNTER — OFFICE VISIT (OUTPATIENT)
Dept: TRANSPLANT | Facility: CLINIC | Age: 44
End: 2024-12-30
Attending: STUDENT IN AN ORGANIZED HEALTH CARE EDUCATION/TRAINING PROGRAM
Payer: COMMERCIAL

## 2024-12-30 ENCOUNTER — LAB (OUTPATIENT)
Dept: LAB | Facility: CLINIC | Age: 44
End: 2024-12-30
Payer: COMMERCIAL

## 2024-12-30 VITALS
HEIGHT: 67 IN | BODY MASS INDEX: 29.7 KG/M2 | WEIGHT: 189.2 LBS | TEMPERATURE: 98.1 F | HEART RATE: 79 BPM | RESPIRATION RATE: 18 BRPM | SYSTOLIC BLOOD PRESSURE: 125 MMHG | DIASTOLIC BLOOD PRESSURE: 79 MMHG | OXYGEN SATURATION: 100 %

## 2024-12-30 DIAGNOSIS — T86.11 KIDNEY TRANSPLANT REJECTION: ICD-10-CM

## 2024-12-30 DIAGNOSIS — D63.1 ANEMIA IN STAGE 3A CHRONIC KIDNEY DISEASE (H): ICD-10-CM

## 2024-12-30 DIAGNOSIS — Z94.83 PANCREAS REPLACED BY TRANSPLANT (H): ICD-10-CM

## 2024-12-30 DIAGNOSIS — Z94.83 PANCREAS REPLACED BY TRANSPLANT (H): Primary | ICD-10-CM

## 2024-12-30 DIAGNOSIS — N18.31 ANEMIA IN STAGE 3A CHRONIC KIDNEY DISEASE (H): ICD-10-CM

## 2024-12-30 LAB
ALBUMIN MFR UR ELPH: 7.4 MG/DL
ALBUMIN UR-MCNC: NEGATIVE MG/DL
AMYLASE SERPL-CCNC: 41 U/L (ref 28–100)
APPEARANCE UR: CLEAR
BILIRUB UR QL STRIP: NEGATIVE
COLOR UR AUTO: ABNORMAL
CREAT UR-MCNC: 129 MG/DL
FERRITIN SERPL-MCNC: 468 NG/ML (ref 31–409)
GLUCOSE UR STRIP-MCNC: NEGATIVE MG/DL
HCT VFR BLD AUTO: 29.3 % (ref 40–53)
HGB BLD-MCNC: 8.8 G/DL (ref 13.3–17.7)
HGB UR QL STRIP: NEGATIVE
IRON BINDING CAPACITY (ROCHE): 244 UG/DL (ref 240–430)
IRON SATN MFR SERPL: 10 % (ref 15–46)
IRON SERPL-MCNC: 24 UG/DL (ref 61–157)
KETONES UR STRIP-MCNC: NEGATIVE MG/DL
LEUKOCYTE ESTERASE UR QL STRIP: NEGATIVE
LIPASE SERPL-CCNC: 27 U/L (ref 13–60)
MAGNESIUM SERPL-MCNC: 1.6 MG/DL (ref 1.7–2.3)
NITRATE UR QL: NEGATIVE
PH UR STRIP: 5.5 [PH] (ref 5–7)
PHOSPHATE SERPL-MCNC: 3.2 MG/DL (ref 2.5–4.5)
PROT/CREAT 24H UR: 0.06 MG/MG CR (ref 0–0.2)
RBC URINE: <1 /HPF
SP GR UR STRIP: 1.02 (ref 1–1.03)
SPERM #/AREA URNS HPF: PRESENT /HPF
TACROLIMUS BLD-MCNC: 9.9 UG/L (ref 5–15)
TME LAST DOSE: NORMAL H
TME LAST DOSE: NORMAL H
UROBILINOGEN UR STRIP-MCNC: NORMAL MG/DL
WBC URINE: 1 /HPF

## 2024-12-30 PROCEDURE — 85018 HEMOGLOBIN: CPT | Performed by: PATHOLOGY

## 2024-12-30 PROCEDURE — 81001 URINALYSIS AUTO W/SCOPE: CPT | Performed by: PATHOLOGY

## 2024-12-30 PROCEDURE — 80197 ASSAY OF TACROLIMUS: CPT | Performed by: NURSE PRACTITIONER

## 2024-12-30 PROCEDURE — 82728 ASSAY OF FERRITIN: CPT | Performed by: PATHOLOGY

## 2024-12-30 PROCEDURE — 85014 HEMATOCRIT: CPT | Performed by: PATHOLOGY

## 2024-12-30 PROCEDURE — 83540 ASSAY OF IRON: CPT | Performed by: PATHOLOGY

## 2024-12-30 PROCEDURE — 84156 ASSAY OF PROTEIN URINE: CPT | Performed by: PATHOLOGY

## 2024-12-30 PROCEDURE — 83550 IRON BINDING TEST: CPT | Performed by: PATHOLOGY

## 2024-12-30 ASSESSMENT — PAIN SCALES - GENERAL: PAINLEVEL_OUTOF10: NO PAIN (0)

## 2024-12-30 NOTE — Clinical Note
12/30/2024      Michael Amin  [id number] [place] [id number]      Dear Colleague,    Thank you for referring your patient, Michael Amin, to the [facility]. Please see a copy of my visit note below.    No notes on file    Again, thank you for allowing me to participate in the care of your patient.        Sincerely,        Prince Mcdaniels MD    Electronically signed

## 2024-12-30 NOTE — LETTER
OUTPATIENT LABORATORY TEST ORDER     Patient Name: Michael Amin   YOB: 1980     [facility] MR# [if applicable]: [id number]   Date & Time: November 8, 2024  3:04 PM  Expiration Date: 1 year after date issued      Diagnosis: Pancreas Transplant (ICD-10 Z94.83)    Aftercare following organ transplant (ICD-10 Z48.288)    Long term use of medications (ICD-10 Z79.899)    Contact with and (suspected) exposure to other viral communicable   diseases (Z20.828)    Other specified postprocedural states (Z98.890)     We ask your assistance in obtaining the following laboratory tests, which are part of our routine surveillance program for Solid Organ Transplant patients.     Please fax each result to [phone], same day as resulted/available    Critical lab results page [phone]    First 12 weeks post-transplant (11/2/2024 - 1/25/2025)  Labs 2x/week (Monday and Thursday)  CBC with platelets  Basic Metabolic Panel (Sodium, Potassium, Chloride, Creatinine, CO2, Urea Nitrogen, glucose, Calcium)  Tacrolimus/Prograf/ drug level  Amylase  Lipase    Labs weekly (Mondays)  Phosphorus  Magnesium    Labs every 2 weeks for 2 months, then monthly until 6 months  Mycophenolic Acid Level     Months 4-6 post-transplant (1/26/2025 - 5/2/2025)  Labs 1x/week (Monday or Tuesday)  CBC with platelets  Basic Metabolic Panel (Sodium, Potassium, Chloride, Creatinine, CO2, Urea Nitrogen, glucose, Calcium)  Tacrolimus/Prograf/ drug level  Amylase  Lipase    Labs Monthly  Phosphorus  Magnesium  Mycophenolic Acid Level    Months 7-12 post-transplant (5/3/2025 - 11/2/2025)  Labs every other week (Monday or Tuesday)  CBC with platelets  Basic Metabolic Panel (Sodium, Potassium, Chloride, Creatinine, CO2, Urea Nitrogen, glucose, Calcium)  Tacrolimus/Prograf/ drug level  Amylase  Lipase    Labs Monthly  Phosphorus  Magnesium  BK (Polyoma Virus) PCR Quantitative/Plasma    At 1-month post-transplant  (12/2/2024)  DSA PRA (patient to supply )   BK Virus PCR Quantitative/Plasma  Urine protein/creatinine  Iron Panel  Vitamin D Deficiency Screening  PTH Intact  HBV, HCV, HIV by CONNOR testing  If unable to conduct CONNOR testing, please substitute the following:   Hepatitis B DNA Quantitative, Real-Time PCR   Hepatitis C RNA, Quantitation   HIV 1 RNA, Quantitation   HIV 2 RNA, Quantitation   Fasting C peptide  Insulin auto antibodies    At 2 months post-transplant (1/2/2025)   DSA PRA (patient to supply )   BK Virus PCR Quantitative/Plasma  Urine protein / creatinine    At 3 months post-transplant (2/2/2025)  BK (Polyoma virus) PCR Quantitative/Plasma  If DM Type I: Fasting C Peptide, auto antibodies    At 4 months post-transplant (3/2/2025)  DSA PRA (patient to supply )  BK Virus by PCR, Quantitative  PTH  Urine protein/creatinine    At 5 months post-transplant (4/2/2025)  BK Virus by PCR, Quantitative  Fasting C Peptide  Auto antibodies    At 6 months post-transplant (Fasting Labs) (5/2/2025)  BK Virus by PCR, Quantitative  Hepatic panel  Hemoglobin A1c  Uric Acid  Lipid panel  Urine protein/creatinine     At 7 months post-transplant (6/2/2025)  PRA/DSA (patient to supply )    At 9 months post-transplant (8/2/2025)   Urine protein/creatinine on ALL recipients    At 12 months post-transplant (Fasting labs) (11/1/2025)   Hepatic panel  Hemoglobin A1c  Uric Acid  Lipid panel  Urine protein/creatinine  PTH  Vitamin D  T cell subset (CD4)  Fasting c peptide  Insulin auto antibodies    If you have any questions please call the Transplant Center at [phone]. All lab results should be faxed to [phone]      Jose L Reyna M.D   of Medicine  Nephrology and Hypertension  Department of Medicine

## 2024-12-30 NOTE — TELEPHONE ENCOUNTER
Spoke with patient and advised to stay on Tacrolimus 4 mg bid and repeat labs in 1 week. Patient verbalized understanding of plan of care.    Patient would like lab orders sent to [facility].    Patient's labs from today were done at [facility] and he is wondering why the CBC and BMP weren't done and could they be added on?    Lab orders faxed.

## 2024-12-30 NOTE — TELEPHONE ENCOUNTER
ISSUE:   Tacrolimus IR level 7.3 on 12/30, goal 8-10, dose 3.5 mg BID.    PLAN:   Call Patient and confirm this was an accurate 12-hour trough.   Verify Tacrolimus IR dose 3.5 mg BID.   Confirm no new medications or or missed doses.   Confirm no new illness / infection / diarrhea.   If accurate trough and accurate dose, increase Tacrolimus IR dose to 4 mg BID     Is this more than a 50% increase or decrease in current IS dose: No  If YES, justification: na    Repeat labs in 1 weeks.  *If > 50% change in immunosuppression dose, repeat labs in 1 week.     OUTCOME:   Sent to LPN to complete

## 2024-12-30 NOTE — NURSING NOTE
"Chief Complaint   Patient presents with    Surgical Followup     58 day post pancreas transplant      Vital signs:  Temp: 98.1  F (36.7  C) Temp src: Oral BP: 125/79 Pulse: 79   Resp: 18 SpO2: 100 %     Height: 170.2 cm (5' 7.01\") Weight: 85.8 kg (189 lb 3.2 oz)  Estimated body mass index is 29.63 kg/m  as calculated from the following:    Height as of this encounter: 1.702 m (5' 7.01\").    Weight as of this encounter: 85.8 kg (189 lb 3.2 oz).      Michelle Gilliam, [facility]  12/30/2024 12:46 PM    "

## 2024-12-30 NOTE — LETTER
PHYSICIAN ORDERS      DATE & TIME ISSUED: 2024 3:02 PM  PATIENT NAME: Michael Amin   : 1980     [facility] MR# [if applicable]: [id number]     DIAGNOSIS:  Pancreas Transplant    ICD-10 CODE: Z94.83    Please draw in 1 week:  Tacrolimus level  CBC  BMP    Any questions please call: [phone]    Please fax each result same day as resulted/available    Critical lab results page [phone].      Jose L Reyna M.D   of Medicine  Nephrology and Hypertension  Department of Medicine

## 2024-12-30 NOTE — TELEPHONE ENCOUNTER
Spoke with patient and advised of lab results. Patient states he was increased to 4 mg bid by Sabine on 12/26/24. An accurate 12-hour trough was done (within 30 min).    No new medications or missed doses.    No new illness, infection or diarrhea.    Please advise on dose change.

## 2025-01-08 ENCOUNTER — VIRTUAL VISIT (OUTPATIENT)
Dept: TRANSPLANT | Facility: CLINIC | Age: 45
End: 2025-01-08
Attending: STUDENT IN AN ORGANIZED HEALTH CARE EDUCATION/TRAINING PROGRAM
Payer: COMMERCIAL

## 2025-01-08 DIAGNOSIS — T86.11 KIDNEY TRANSPLANT REJECTION: ICD-10-CM

## 2025-01-08 DIAGNOSIS — Z94.83 PANCREAS REPLACED BY TRANSPLANT (H): ICD-10-CM

## 2025-01-09 ENCOUNTER — TELEPHONE (OUTPATIENT)
Dept: TRANSPLANT | Facility: CLINIC | Age: 45
End: 2025-01-09
Payer: COMMERCIAL

## 2025-01-09 DIAGNOSIS — T86.11 KIDNEY TRANSPLANT REJECTION: ICD-10-CM

## 2025-01-09 NOTE — LETTER
OUTPATIENT LABORATORY TEST ORDER     Patient Name: Michael Amin   YOB: 1980     [facility] MR# [if applicable]: [id number]   Date & Time: January 9, 2025  12:48 PM  Expiration Date: 1 year after date issued    Updated lab orders.    Diagnosis: Pancreas Transplant (ICD-10 Z94.83)    Aftercare following organ transplant (ICD-10 Z48.288)    Long term use of medications (ICD-10 Z79.899)    Contact with and (suspected) exposure to other viral communicable    diseases (Z20.828)    Other specified postprocedural states (Z98.890)     We ask your assistance in obtaining the following laboratory tests, which are part of our routine surveillance program for Solid Organ Transplant patients.     Please fax each result to [phone], same day as resulted/available    Critical lab results page [phone]    First 12 weeks post-transplant (11/2/24-2/2/25)  Labs 2x/week (Monday and Thursday)  CBC with platelets  Basic Metabolic Panel (Sodium, Potassium, Chloride, Creatinine, CO2, Urea Nitrogen, glucose, Calcium)  Tacrolimus/Prograf/ drug level  Amylase  Lipase    Labs weekly (Monday)  Phosphorus  Magnesium  If bladder drained Microalbumin    Labs every 2 weeks for 2 months, then monthly until 6 months  Mycophenolic Acid Level     Months 4-6 post-transplant (3/2/25-5/2/25)  Labs 1x/week (Monday or Tuesday)  CBC with platelets  Basic Metabolic Panel (Sodium, Potassium, Chloride, Creatinine, CO2, Urea Nitrogen, glucose, Calcium)  Tacrolimus/Prograf/ drug level  Amylase  Lipase    Labs Monthly  Phosphorus  Magnesium  Mycophenolic Acid Level      Months 7-12 post-transplant (6/2/25-11/2/25)  Labs every other week (Monday or Tuesday)  CBC with platelets  Basic Metabolic Panel (Sodium, Potassium, Chloride, Creatinine, CO2, Urea Nitrogen, glucose, Calcium)  Tacrolimus/Prograf/ drug level  Amylase  Lipase    Labs Monthly  Phosphorus  Magnesium  BK (Polyoma Virus) PCR Quantitative/Plasma    At  1-month post-transplant (12/2/24)  DSA PRA (patient to supply )   BK Virus PCR Quantitative/Plasma  Urine protein/creatinine  Iron Panel  Vitamin D Deficiency Screening  PTH Intact  HBV, HCV, HIV by CONNOR testing  If unable to conduct CONNOR testing, please substitute the following:   Hepatitis B DNA Quantitative, Real-Time PCR   Hepatitis C RNA, Quantitation   HIV 1 RNA, Quantitation   HIV 2 RNA, Quantitation   DM Type 1:  Fasting C peptide  Insulin auto antibodies    At 2 months post-transplant (1/2/25)   DSA PRA (patient to supply )   BK Virus PCR Quantitative/Plasma  Urine protein / creatinine  If IgA: Urinalysis       At 3 months post-transplant (2/2/25)  BK (Polyoma virus) PCR Quantitative/Plasma  DM Type I: Fasting C Peptide, auto antibodies    At 4 months post-transplant (3/2/25)  DSA PRA (patient to supply )  BK Virus by PCR, Quantitative  PTH  Urine protein/creatinine on ALL recipients    At 5 months post-transplant (4/2/25)  BK Virus by PCR, Quantitative  DM-1:  Fasting C Peptide  Auto antibodies    At 6 months post-transplant (Fasting Labs) (5/2/25)  BK Virus by PCR, Quantitative  Hepatic panel  Hemoglobin A1c  Uric Acid  Lipid panel  Urine protein/creatinine on ALL recipients    At 7 months post-transplant (6/2/25)  PRA/DSA (patient to supply )    At 9 months post-transplant (8/2/25)   Urine protein/creatinine on ALL recipients  T cell subset (CD4)     At 12 months post-transplant (Fasting labs) (11/2/25)   Hepatic panel  Hemoglobin A1c  Uric Acid  Lipid panel  Urine protein/creatinine on ALL recipients  PTH  Vitamin D  T cell subset (CD4)  DM Type I: Fasting c peptide, auto antibodies    If you have any questions please call the Transplant Center at [phone]. All lab results should be faxed to [phone]      Jose L Reyna M.D   of Medicine  Nephrology and Hypertension  Department of Medicine

## 2025-01-09 NOTE — TELEPHONE ENCOUNTER
Spoke with  at [facility]. Patient brought in his lab letter because he was changing labs. She was confused because some of the dates didn't match up. Patient did not bring in a DSA kit either.    Looks like the dates were wrong on the lab letter from 11/8/24. It was re-sent on 12/30/24 because of the change in lab.    Today she ivana CBC with diff, Tac level, amylase, lipase and BMP.     I will update the lab order and have a kit sent to the patient. I believe he needs a phosphorus and magnesium added on to today's labs as well. Okay to call back and add those on?     [phone]  Fax [phone]

## 2025-01-14 ENCOUNTER — TELEPHONE (OUTPATIENT)
Dept: TRANSPLANT | Facility: CLINIC | Age: 45
End: 2025-01-14
Payer: COMMERCIAL

## 2025-01-14 DIAGNOSIS — T86.11 KIDNEY TRANSPLANT REJECTION: ICD-10-CM

## 2025-01-14 NOTE — TELEPHONE ENCOUNTER
Spoke to patient about bicarb level and tac. He states he was having diarrhea at the time of those labs that has since resolved. He is hoping to not start supplement if he doesn't have to. He had labs redrawn this AM, so we will see what they show before making changes to tac or bicarb

## 2025-01-15 ENCOUNTER — LAB (OUTPATIENT)
Dept: LAB | Facility: CLINIC | Age: 45
End: 2025-01-15
Attending: STUDENT IN AN ORGANIZED HEALTH CARE EDUCATION/TRAINING PROGRAM
Payer: COMMERCIAL

## 2025-01-15 ENCOUNTER — OFFICE VISIT (OUTPATIENT)
Dept: TRANSPLANT | Facility: CLINIC | Age: 45
End: 2025-01-15
Attending: STUDENT IN AN ORGANIZED HEALTH CARE EDUCATION/TRAINING PROGRAM
Payer: COMMERCIAL

## 2025-01-15 VITALS
HEART RATE: 87 BPM | TEMPERATURE: 98.1 F | WEIGHT: 192.7 LBS | OXYGEN SATURATION: 100 % | BODY MASS INDEX: 30.17 KG/M2 | SYSTOLIC BLOOD PRESSURE: 156 MMHG | DIASTOLIC BLOOD PRESSURE: 82 MMHG

## 2025-01-15 DIAGNOSIS — N18.31 ANEMIA IN STAGE 3A CHRONIC KIDNEY DISEASE (H): ICD-10-CM

## 2025-01-15 DIAGNOSIS — E87.20 METABOLIC ACIDOSIS: ICD-10-CM

## 2025-01-15 DIAGNOSIS — E55.9 VITAMIN D DEFICIENCY: ICD-10-CM

## 2025-01-15 DIAGNOSIS — Z94.0 KIDNEY REPLACED BY TRANSPLANT: ICD-10-CM

## 2025-01-15 DIAGNOSIS — G90.3 NEUROGENIC ORTHOSTATIC HYPOTENSION (H): Primary | ICD-10-CM

## 2025-01-15 DIAGNOSIS — D63.1 ANEMIA IN STAGE 3A CHRONIC KIDNEY DISEASE (H): ICD-10-CM

## 2025-01-15 DIAGNOSIS — Z20.828 CONTACT WITH AND (SUSPECTED) EXPOSURE TO OTHER VIRAL COMMUNICABLE DISEASES: ICD-10-CM

## 2025-01-15 DIAGNOSIS — D84.9 IMMUNOSUPPRESSED STATUS: ICD-10-CM

## 2025-01-15 DIAGNOSIS — Z48.298 AFTERCARE FOLLOWING ORGAN TRANSPLANT: ICD-10-CM

## 2025-01-15 DIAGNOSIS — Z94.83 PANCREAS REPLACED BY TRANSPLANT (H): ICD-10-CM

## 2025-01-15 DIAGNOSIS — I15.1 HTN, KIDNEY TRANSPLANT RELATED: ICD-10-CM

## 2025-01-15 DIAGNOSIS — E66.811 CLASS 1 OBESITY WITH SERIOUS COMORBIDITY AND BODY MASS INDEX (BMI) OF 33.0 TO 33.9 IN ADULT, UNSPECIFIED OBESITY TYPE: ICD-10-CM

## 2025-01-15 DIAGNOSIS — E83.42 HYPOMAGNESEMIA: ICD-10-CM

## 2025-01-15 DIAGNOSIS — Z98.890 OTHER SPECIFIED POSTPROCEDURAL STATES: ICD-10-CM

## 2025-01-15 DIAGNOSIS — Z94.0 HTN, KIDNEY TRANSPLANT RELATED: ICD-10-CM

## 2025-01-15 DIAGNOSIS — Z79.899 ENCOUNTER FOR LONG-TERM CURRENT USE OF MEDICATION: ICD-10-CM

## 2025-01-15 DIAGNOSIS — N25.81 SECONDARY RENAL HYPERPARATHYROIDISM: ICD-10-CM

## 2025-01-15 LAB
AMYLASE SERPL-CCNC: 40 U/L (ref 28–100)
ANION GAP SERPL CALCULATED.3IONS-SCNC: 10 MMOL/L (ref 7–15)
BUN SERPL-MCNC: 19 MG/DL (ref 6–20)
CALCIUM SERPL-MCNC: 9.3 MG/DL (ref 8.8–10.4)
CHLORIDE SERPL-SCNC: 109 MMOL/L (ref 98–107)
CREAT SERPL-MCNC: 1.42 MG/DL (ref 0.67–1.17)
EGFRCR SERPLBLD CKD-EPI 2021: 62 ML/MIN/1.73M2
FERRITIN SERPL-MCNC: 176 NG/ML (ref 31–409)
GLUCOSE SERPL-MCNC: 105 MG/DL (ref 70–99)
HCO3 SERPL-SCNC: 23 MMOL/L (ref 22–29)
HCT VFR BLD AUTO: 33.4 % (ref 40–53)
HGB BLD-MCNC: 10 G/DL (ref 13.3–17.7)
IRON BINDING CAPACITY (ROCHE): 260 UG/DL (ref 240–430)
IRON SATN MFR SERPL: 12 % (ref 15–46)
IRON SERPL-MCNC: 31 UG/DL (ref 61–157)
LIPASE SERPL-CCNC: 27 U/L (ref 13–60)
MAGNESIUM SERPL-MCNC: 1.4 MG/DL (ref 1.7–2.3)
PHOSPHATE SERPL-MCNC: 3.4 MG/DL (ref 2.5–4.5)
POTASSIUM SERPL-SCNC: 4.8 MMOL/L (ref 3.4–5.3)
SODIUM SERPL-SCNC: 142 MMOL/L (ref 135–145)

## 2025-01-15 PROCEDURE — 83735 ASSAY OF MAGNESIUM: CPT | Performed by: PATHOLOGY

## 2025-01-15 PROCEDURE — 99214 OFFICE O/P EST MOD 30 MIN: CPT | Performed by: STUDENT IN AN ORGANIZED HEALTH CARE EDUCATION/TRAINING PROGRAM

## 2025-01-15 PROCEDURE — 82150 ASSAY OF AMYLASE: CPT | Performed by: PATHOLOGY

## 2025-01-15 PROCEDURE — 82728 ASSAY OF FERRITIN: CPT | Performed by: PATHOLOGY

## 2025-01-15 PROCEDURE — 83550 IRON BINDING TEST: CPT | Performed by: PATHOLOGY

## 2025-01-15 PROCEDURE — G2211 COMPLEX E/M VISIT ADD ON: HCPCS | Performed by: STUDENT IN AN ORGANIZED HEALTH CARE EDUCATION/TRAINING PROGRAM

## 2025-01-15 PROCEDURE — 83540 ASSAY OF IRON: CPT | Performed by: PATHOLOGY

## 2025-01-15 PROCEDURE — 80048 BASIC METABOLIC PNL TOTAL CA: CPT | Performed by: PATHOLOGY

## 2025-01-15 PROCEDURE — 84100 ASSAY OF PHOSPHORUS: CPT | Performed by: PATHOLOGY

## 2025-01-15 PROCEDURE — 83690 ASSAY OF LIPASE: CPT | Performed by: PATHOLOGY

## 2025-01-15 PROCEDURE — 99215 OFFICE O/P EST HI 40 MIN: CPT | Mod: 24 | Performed by: STUDENT IN AN ORGANIZED HEALTH CARE EDUCATION/TRAINING PROGRAM

## 2025-01-15 PROCEDURE — 85018 HEMOGLOBIN: CPT | Performed by: PATHOLOGY

## 2025-01-15 PROCEDURE — 36415 COLL VENOUS BLD VENIPUNCTURE: CPT | Performed by: PATHOLOGY

## 2025-01-15 PROCEDURE — 85014 HEMATOCRIT: CPT | Performed by: PATHOLOGY

## 2025-01-15 ASSESSMENT — PAIN SCALES - GENERAL: PAINLEVEL_OUTOF10: NO PAIN (0)

## 2025-01-15 NOTE — PATIENT INSTRUCTIONS
Patient Recommendations:  - Continue fludrocortisone every other day.  - Okay to hold sodium bicarbonate for now as you may need it later on.  - Okay to hold oscal, but continue to take vitamin d.  - Labs once a week.    Transplant Patient Information  Your Post Transplant Coordinator is: Anneliese Qiu  For non urgent items, we encourage you to contact your coordinator/care team online via mobli  You and your care team can also contact your transplant coordinator Monday - Friday, 8am - 5pm at [phone] (Option 2 to reach the coordinator or Option 4 to schedule an appointment).  After hours for urgent matters, please call [facility] at [phone].

## 2025-01-15 NOTE — PROGRESS NOTES
TRANSPLANT NEPHROLOGY CLINIC VISIT     Assessment & Plan   # LDKT: CKD Stage 3a/3b - Stable. Had increased up to 2.3 on 12/12/24 with associated low BP and lightheadedness. Now on Florinef. The past month the creatinine is getting closer to 1.3-1.6   - Baseline Creatinine: ~ 1.1-1.3. Higher since pancreas transplant on higher tacrolimus levels.    - Proteinuria: Normal (<0.2 grams)   - DSA Hx: No DSA   - Last cPRA: 0%   - BK Viremia: No   - Kidney Tx Biopsy Hx: Acute cellular-mediated rejection.    # Pancreas Tx (IVETTE):    - Pancreatic Exocrine Drainage: Enteric drained     - Blood glucose: Euglycemia      On insulin: No   - HbA1c: Last checked at time of transplant      Latest HbA1c: 7.8%   - Pancreatic enzymes: Stable   - DSA Hx: No DSA   - Pancreas Tx Biopsy Hx: No biopsy history    # Immunosuppression: Tacrolimus immediate release (goal 8-10) and Mycophenolate mofetil (dose 1000 mg every 12 hours)   - Induction with Recent Transplant:  High Intensity Protocol   - Continue with intensive monitoring of immunosuppression for efficacy and toxicity.   - Historical Changes in Immunosuppression: None   - Changes: Not at this time as we await yesterday's tacrolimus level.    # Infection Prevention:      - PJP: Sulfa/TMP (Bactrim) 400-80 mg every day.   - CMV: Valganciclovir (Valcyte) 900 mg every day to be stopped on 02/02/2025      - CMV IgG Ab High Risk Discordance (D+/R-): No  CMV Serostatus: Positive  - EBV IgG Ab High Risk Discordance (D+/R-): No  EBV Serostatus: Positive    # Hypertension: Controlled;  Goal BP: > 100, but < 130 systolic   - Florinef 0.1 mg every other day.   - Changes: No    # Anemia in Chronic Renal Disease: Hgb: Trend down      JOSE: No   - Iron studies: Low iron saturation, followed by anemia services. Completed ca course IV iron, though still low. He may need every other day iron pills to avoid constipation but treat iron deficiency.    # Mineral Bone Disorder:    - Secondary renal  hyperparathyroidism; PTH level: Normal (15-65 pg/ml)        On treatment: None  - Vitamin D; level: Normal        On supplement: Yes cholecalciferol 50 mcg every day.  - Calcium; level: Low normal        On supplement: No  - Phosphorus; level: Normal        On supplement: No    # Electrolytes:   - Potassium; level: Normal        On supplement: No  - Magnesium; level: Low        On supplement: Yes magnesium oxide 800 mg every day.  - Bicarbonate; level: Normal        On supplement: No prescription was sent but bicarbonate normal perhaps due to florinef. Okay to hold sodium bicarbonate for now.     # Other Significant PMH:   - Obesity, Class I (BMI = 32): Stable.   - GERD: on PPI with symptom control.      # Skin Cancer Risk: Discussed sun protection and recommend regular follow up with Dermatology.    # Transplant History:  Etiology of Kidney Failure: Diabetic nephropathy  Tx: LDKT on 10/1/2019 and IVETTE  Transplant: 11/2/2024 (Pancreas), 10/1/2019 (Kidney)  Significant transplant-related complications: None    Transplant Office Phone Number: [phone]    Assessment and plan was discussed with the patient and he voiced his understanding and agreement.    Return visit: Follow up as previously scheduled    Jose L Reyna MD    The longitudinal plan of care for the diagnosis(es)/condition(s) as documented were addressed during this visit. Due to the added complexity in care, I will continue to support Rudi in the subsequent management and with ongoing continuity of care.      Chief Complaint   Mr. Amin is a 44 year old here for kidney transplant, pancreas transplant, immunosuppression management, and RICHARD.     History of Present Illness    Mr. Amin reports feeling great overall.    Since last clinic visit:   Hospitalizations: No   New Medical Issues: No    Activity: back at the gym.  Chest pain or shortness of breath: No  Lower extremity swelling: No  Weight change: No  Nausea and vomiting: No  Diarrhea:  No  Heartburn symptoms: No  Fever, sweats or chills: No  Night sweats: No  Urinary complaints: No    Home BP:  120-125/80  without lightheadedness.       Problem List   Patient Active Problem List   Diagnosis    HTN, kidney transplant related    Diabetes mellitus type 1 (H)    Dyslipidemia    Anemia in chronic kidney disease    Secondary renal hyperparathyroidism    Kidney replaced by transplant    Aftercare following organ transplant    Vitamin D deficiency    Hypomagnesemia    Kidney transplant rejection    Class 1 obesity with serious comorbidity and body mass index (BMI) of 33.0 to 33.9 in adult, unspecified obesity type    Shingles    Organ transplant candidate    Pre-operative cardiovascular examination    Status post coronary angiogram    Pancreas replaced by transplant (H)    Immunosuppressed status    Acute kidney injury    Hypervolemia    Acute post-operative pain    Thrombocytopenia    At risk for malnutrition    Syncope    Anemia of chronic renal failure    Neurogenic orthostatic hypotension (H)       Allergies   Allergies   Allergen Reactions    Cats Itching     Itchy eyes    Anti-Thymocyte Glob (Rabbit)      Had reaction with rigors after steroid-free dose. No reaction with steroids.       Medications   Current Outpatient Medications   Medication Sig Dispense Refill    acetaminophen (TYLENOL) 325 MG tablet Take 2 tablets (650 mg) by mouth every 6 hours as needed (For optimal non-opioid multimodal pain management to improve pain control.). 60 tablet 0    alcohol swab prep pads Use to swab area of injection/zak as directed. 100 each 3    aspirin (ASA) 81 MG EC tablet Take 81 mg by mouth daily       atorvastatin (LIPITOR) 10 MG tablet Take 2 tablets (20 mg) by mouth daily. 60 tablet 11    blood glucose (NO BRAND SPECIFIED) lancets standard Use to test blood sugar 3 times daily or as directed. 100 each 11    blood glucose (NO BRAND SPECIFIED) test strip Use to test blood sugar 3 times daily or as  directed. 100 strip 11    blood glucose monitoring (NO BRAND SPECIFIED) meter device kit Use to test blood sugar 3 times daily or as directed. 2 kit 0    CELLCEPT (BRAND) 250 MG capsule Take 4 capsules (1,000 mg) by mouth 2 times daily. 240 capsule 11    Continuous Glucose Sensor (DEXCOM G6 SENSOR) MISC CHANGE SENSOR EVERY 10 DAYS 9 each 3    Continuous Glucose Transmitter (DEXCOM G6 TRANSMITTER) MISC Change every 3 months 1 each 3    famotidine (PEPCID) 20 MG tablet Take 1 tablet (20 mg) by mouth 2 times daily. 60 tablet 3    fludrocortisone (FLORINEF) 0.1 MG tablet Take 1 tablet (0.1 mg) by mouth every other day. 45 tablet 1    magnesium oxide (MAG-OX) 400 MG tablet Take 2 tablets (800 mg) by mouth daily (with lunch). 60 tablet 4    senna-docusate (SENOKOT-S/PERICOLACE) 8.6-50 MG tablet Take 1-2 tablets by mouth 2 times daily as needed for constipation. 60 tablet 3    sulfamethoxazole-trimethoprim (BACTRIM) 400-80 MG tablet Take 1 tablet by mouth daily. 30 tablet 11    tacrolimus (GENERIC EQUIVALENT) 1 MG capsule Take  4 mg in am and 3.5 mg in pm  Profile  Rx (Patient taking differently: Take  4 mg in am and 4 mg in pm  Profile  Rx) 240 capsule 11    valGANciclovir (VALCYTE) 450 MG tablet Take 2 tablets (900 mg) by mouth daily. 60 tablet 1    vitamin D3 (CHOLECALCIFEROL) 50 mcg (2000 units) tablet Take 1 tablet (50 mcg) by mouth daily. 30 tablet 3    calcium carbonate-vitamin D (OSCAL) 500-5 MG-MCG tablet Take 1 tablet by mouth 2 times daily (with meals). (Patient not taking: Reported on 1/15/2025) 60 tablet 0    nystatin (MYCOSTATIN) 278779 UNIT/ML suspension Swish and swallow 5 mLs (500,000 Units) in mouth 4 times daily. (Patient not taking: Reported on 1/15/2025) 473 mL 2    polyethylene glycol (MIRALAX) 17 GM/Dose powder Take 17 g by mouth daily. (Patient not taking: Reported on 1/15/2025)      sodium bicarbonate 650 MG tablet Take 2 tablets (1,300 mg) by mouth 2 times daily. (Patient not taking: Reported on  1/15/2025) 120 tablet 2    tacrolimus (GENERIC EQUIVALENT) 0.5 MG capsule Take  4.0 mg in am and 3.5 mg in pm  profile RX (Patient not taking: Reported on 1/15/2025) 60 capsule 1     No current facility-administered medications for this visit.     There are no discontinued medications.    Physical Exam   Vital Signs: BP (!) 156/82 (BP Location: Left arm, Patient Position: Sitting, Cuff Size: Adult Large)   Pulse 87   Temp 98.1  F (36.7  C) (Oral)   Wt 87.4 kg (192 lb 11.2 oz)   SpO2 100%   BMI 30.17 kg/m      GENERAL APPEARANCE: alert and no distress  EYES: eyes grossly normal to inspection  HENT: normal cephalic/atraumatic  RESP:   CV: S1, S2 present  EDEMA: no LE edema bilaterally  ABDOMEN: soft, non distended  MS: extremities normal - no gross deformities noted  SKIN: no rash  NEURO: mentation intact and speech normal  PSYCH: mentation appears normal and affect normal/bright  TX KIDNEY: normal    Data         Latest Ref Rng & Units 1/15/2025    10:14 AM 1/14/2025     7:43 AM 1/9/2025     8:57 AM   Renal   Sodium 135 - 145 mmol/L 142      Na (external) 137 - 145 mmol/L  137 - 145 mmol/L  138        138  139    K 3.4 - 5.3 mmol/L 4.8      K (external) 3.5 - 5.1 mmol/L  3.5 - 5.1 mmol/L  4.6        4.6  4.8    Cl 98 - 107 mmol/L 109  111        111  111    Cl (external) 98 - 107 mmol/L 109  111        111  111    CO2 22 - 29 mmol/L 23      CO2 (external) 22 - 30 mmol/L  22 - 30 mmol/L  21        21  18    Urea Nitrogen 6.0 - 20.0 mg/dL 19.0      BUN (external) 9 - 20 mg/dL  9 - 20 mg/dL  25        25  28    Creatinine 0.67 - 1.17 mg/dL 1.42      Cr (external) 0.66 - 1.25 mg/dL  0.66 - 1.25 mg/dL  1.50        1.50  1.38    Glucose 70 - 99 mg/dL 105      Glucose (external) 70 - 100 mg/dL  70 - 100 mg/dL  103        103  104    Calcium 8.8 - 10.4 mg/dL 9.3      Ca (external) 8.4 - 10.2 mg/dL  8.4 - 10.2 mg/dL  9.2        9.2  9.4    Magnesium 1.7 - 2.3 mg/dL 1.4      Mg (external) 1.6 - 2.3 mg/dL  1.6 - 2.3 mg/dL   1.2        1.2         This result is from an external source.         Latest Ref Rng & Units 1/15/2025    10:14 AM 1/14/2025     7:43 AM 12/30/2024    10:49 AM   Bone Health   Phosphorus 2.5 - 4.5 mg/dL 3.4   3.2    Phos (external) 2.5 - 4.5 mg/dL  2.5 - 4.5 mg/dL  4.5        4.5         This result is from an external source.         Latest Ref Rng & Units 1/15/2025    10:14 AM 1/14/2025     7:43 AM 1/9/2025     8:57 AM   Heme   WBC (external) 4.2 - 9.1 10*9/L  4.2 - 9.1 10*9/L  4.4        4.4  6.5    Hgb 13.3 - 17.7 g/dL 10.0      Hgb (external) 13.7 - 17.5 g/dL  13.7 - 17.5 gm/dL  9.9        9.9  10.3    Plt (external) 150 - 440 10*9/L  150 - 440 10*9/L  194        194  238    ABSOLUTE NEUTROPHILS (EXTERNAL) 1.8 - 5.4 10*9/L  1.8 - 5.4 10*9/L  3.8        3.8     5.9       ABSOLUTE LYMPHOCYTES (EXTERNAL) 1.3 - 3.6 10*9/L  1.3 - 3.6 10*9/L  0.3        0.3     0.2       ABSOLUTE MONOCYTES (EXTERNAL) 0.3 - 0.8 10*9/L  0.3 - 0.8 10*9/L  0.2        0.2     0.2       ABSOLUTE EOSINOPHILS (EXTERNAL) 0.0 - 0.5 10*9/L  0.0 - 0.51 10*9/L  0.1        0.1     0.2       ABSOLUTE BASOPHILS (EXTERNAL) 0.0 - 0.1 10*9/L   0.0           This result is from an external source.         Latest Ref Rng & Units 11/4/2024     6:22 AM 11/2/2024     1:52 PM 9/26/2019     9:45 AM   Liver   AP 40 - 150 U/L  113  100    TBili <=1.2 mg/dL  0.5  0.2    ALT 0 - 70 U/L  13  42    AST 0 - 45 U/L  15  16    Tot Protein 6.4 - 8.3 g/dL  6.9  7.5    Albumin 3.4 - 5.0 g/dL   3.5    Albumin 3.5 - 5.2 g/dL 2.4  4.1           Latest Ref Rng & Units 1/15/2025    10:14 AM 1/14/2025     7:43 AM 1/9/2025     8:57 AM   Pancreas   Amylase 28 - 100 U/L 40      Amylase (external) 30 - 110 U/L  30 - 110 U/L  62        62  48    Lipase (Roche) 13 - 60 U/L 27      Lipase (external) 30 - 110 U/L  30 - 110 U/L  62        62  48        This result is from an external source.         Latest Ref Rng & Units 1/15/2025    10:14 AM 1/14/2025     7:43 AM 12/30/2024    10:49  AM   Iron studies   Iron 61 - 157 ug/dL 31   24    Iron (external) 23 - 300 U/L  23 - 300 U/L  65        65     Iron Sat Index 15 - 46 % 12   10    Ferritin 31 - 409 ng/mL   468        This result is from an external source.         Latest Ref Rng & Units 12/2/2024     8:48 AM 11/2/2024     2:29 PM 8/13/2021     8:39 AM   [facility] Txp Virology   BK Quant Log Ext log IU/mL Undetected   Undetected    BK Quant Result Ext Undetected IU/mL Undetected   Undetected    BK Quant Spec Ext  Plasma      EBV CAPSID ANTIBODY IGG No detectable antibody.  Positive     HIV 1&2 Ext Undetected Copies/mL Undetected             This result is from an external source.     Failed to redirect to the Timeline version of the REVFS SmartLink.  Recent Labs   Lab Test 11/29/24  1023 12/12/24  0855 12/30/24  1048   DOSTAC 11/28/2024 12/11/2024 12/29/2024   TACROL 8.5 10.5 9.9     Recent Labs   Lab Test 10/07/19  0742 11/21/19  0813 11/14/24  0759   DOSMPA Not Provided 11/20/19 1800 11/13/2024   7:00 PM   MPACID 0.82* 0.91* 1.59   MPAG 14.1* 39.5 57.5

## 2025-01-15 NOTE — NURSING NOTE
Chief Complaint   Patient presents with    RECHECK     K/P POST ACUTE TXP NEPH - kidney follow up ANDER Jauregui       Vitals:    01/15/25 1022 01/15/25 1031 01/15/25 1032   BP: (!) 155/89 (!) 158/88 (!) 156/82   BP Location: Left arm Left arm Left arm   Patient Position: Sitting Sitting Sitting   Cuff Size: Adult Regular Adult Regular Adult Large   Pulse: 87     Temp: 98.1  F (36.7  C)     TempSrc: Oral     SpO2: 100%     Weight: 87.4 kg (192 lb 11.2 oz)         BP Readings from Last 3 Encounters:   01/15/25 (!) 156/82   12/30/24 125/79   12/12/24 129/74       BP (!) 156/82 (BP Location: Left arm, Patient Position: Sitting, Cuff Size: Adult Large)   Pulse 87   Temp 98.1  F (36.7  C) (Oral)   Wt 87.4 kg (192 lb 11.2 oz)   SpO2 100%   BMI 30.17 kg/m       Mg Conway CMA

## 2025-01-16 ENCOUNTER — PATIENT OUTREACH (OUTPATIENT)
Dept: CARE COORDINATION | Facility: CLINIC | Age: 45
End: 2025-01-16
Payer: COMMERCIAL

## 2025-01-16 NOTE — PROGRESS NOTES
Anemia Management Note - Follow Up      SUBJECTIVE/OBJECTIVE:    Referred by Dr. Jabier Hernández on 2024  Primary Diagnosis: Anemia of Other Chronic Illness (D63.8)     Secondary Diagnosis: Organ or tissue replaced by transplant, kidney (Z94.0)  Date of Kidney Transplant:   Date of Pancreas Transplant: 2024  Hgb goal range: 9-10     Epo/Darbo: TBD;  TSAT must be 20% or > before insurance will approve JOSE.   Iron regimen: Treat with IV Iron.   *Does not tolerate Oral Iron.   *completed Infed 1000mg on 24.      Labs : 2025  RX/TX plans : TBD     Recent JOSE use, transfusion, IV iron: NA  No history of stroke, MI, and blood clots or cancers     Contact: Consent to Communicate scanned on 2019.         Latest Ref Rng & Units 2024 2024 2024 2024 2024 2024 1/15/2025   Anemia   Hemoglobin 13.3 - 17.7 g/dL 7.4  8.2  7.7  8.0  7.0  8.8  10.0    IV Iron Dose      1000mg     TSAT 15 - 46 %  9     10  12    Ferritin 31 - 409 ng/mL  238     468  176      BP Readings from Last 3 Encounters:   01/15/25 (!) 156/82   24 125/79   24 129/74     Wt Readings from Last 2 Encounters:   01/15/25 87.4 kg (192 lb 11.2 oz)   24 85.8 kg (189 lb 3.2 oz)           ASSESSMENT:    Hgb:at goal - continue to monitor  TSat: not at goal of >30% Ferritin: At goal (>100ng/mL)  *Completed Infed on 24. TSAT is only at 12%.  Per note in chart from 1/15/25 office visit with Dr. Rambo Reyna; He may need every other day iron pills to avoid constipation but treat iron deficiency.   Per previous conversation with Rudi, he does not tolerate oral Iron. 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  Sent [employer] message.       PLAN:  Sent Pt a [employer] message to check in re the need for Oral Iron.  Is he willing to try Oral Iron 3x/week?      Orders needed to be renewed (for next follow-up date) in EPIC: None    Iron labs due:  Mid 2025    Plan discussed with:  Rudi cornejo  Mychart      NEXT FOLLOW-UP DATE:  1/23/25    Skye Davis RN   [facility]  [email or web address]   Office : [phone]  Fax: [phone]

## 2025-02-03 ENCOUNTER — TELEPHONE (OUTPATIENT)
Dept: TRANSPLANT | Facility: CLINIC | Age: 45
End: 2025-02-03
Payer: COMMERCIAL

## 2025-02-03 ENCOUNTER — LAB REQUISITION (OUTPATIENT)
Dept: LAB | Facility: CLINIC | Age: 45
End: 2025-02-03
Payer: COMMERCIAL

## 2025-02-03 DIAGNOSIS — T86.11 KIDNEY TRANSPLANT REJECTION: ICD-10-CM

## 2025-02-05 LAB
DONOR IDENTIFICATION: NORMAL
DONOR IDENTIFICATION: NORMAL
DSA COMMENTS: NORMAL
DSA COMMENTS: NORMAL
DSA PRESENT: NO
DSA PRESENT: NO
DSA TEST METHOD: NORMAL
DSA TEST METHOD: NORMAL
ORGAN: NORMAL
ORGAN: NORMAL
SA 1  COMMENTS: NORMAL
SA 1 CELL: NORMAL
SA 1 TEST METHOD: NORMAL
SA 2 CELL: NORMAL
SA 2 COMMENTS: NORMAL
SA 2 TEST METHOD: NORMAL
SA1 HI RISK ABY: NORMAL
SA1 MOD RISK ABY: NORMAL
SA2 HI RISK ABY: NORMAL
SA2 MOD RISK ABY: NORMAL
UNACCEPTABLE ANTIGENS: NORMAL
UNOS CPRA: 0

## 2025-02-19 ENCOUNTER — PATIENT OUTREACH (OUTPATIENT)
Dept: CARE COORDINATION | Facility: CLINIC | Age: 45
End: 2025-02-19
Payer: COMMERCIAL

## 2025-02-19 NOTE — PROGRESS NOTES
Anemia Management Note - Follow Up      SUBJECTIVE/OBJECTIVE:      Referred by Dr. Jabier Hernández on 2024  Primary Diagnosis: Anemia of Other Chronic Illness (D63.8)     Secondary Diagnosis: Organ or tissue replaced by transplant, kidney (Z94.0)  Date of Kidney Transplant:   Date of Pancreas Transplant: 2024  Hgb goal range: 9-10     Epo/Darbo: TBD;  TSAT must be 20% or > before insurance will approve JOSE.   Iron regimen: Treat with IV Iron.   *Does not tolerate Oral Iron.   *completed Infed 1000mg on 24.      Labs : 2025  RX/TX plans : TBD     Recent JOSE use, transfusion, IV iron: NA  No history of stroke, MI, and blood clots or cancers     Contact: Consent to Communicate scanned on 2019.         Latest Ref Rng & Units 2024 2024 2024 2024 2024 2024 1/15/2025   Anemia   Hemoglobin 13.3 - 17.7 g/dL 7.4  8.2  7.7  8.0  7.0  8.8  10.0    IV Iron Dose      1000mg     TSAT 15 - 46 %  9     10  12    Ferritin 31 - 409 ng/mL  238     468  176      BP Readings from Last 3 Encounters:   01/15/25 (!) 156/82   24 125/79   24 129/74     Wt Readings from Last 2 Encounters:   01/15/25 87.4 kg (192 lb 11.2 oz)   24 85.8 kg (189 lb 3.2 oz)       Labs;   25;  Hgb 11.0  1/15/25;  TSAT 12% Ferritin 176    ASSESSMENT:    Hgb:at goal - continue to monitor  TSat: Due for labs Ferritin: Due for labs        PLAN:  Rudi has not replied to [employer] message 25 re Oral Iron.  Hgb is stable. He is due to have his Iron labs drawn.      Orders needed to be renewed (for next follow-up date) in LETTERS - for external labs: None    Iron labs due:  DUE    Plan discussed with:  No call, chart review       NEXT FOLLOW-UP DATE:  3/6/25    Skye Davis RN   Anemia Services  [facility]  [email or web address]   Office : [phone]  Fax: [phone]

## 2025-03-06 ENCOUNTER — PATIENT OUTREACH (OUTPATIENT)
Dept: CARE COORDINATION | Facility: CLINIC | Age: 45
End: 2025-03-06
Payer: COMMERCIAL

## 2025-03-06 NOTE — PROGRESS NOTES
Anemia Management Note - Follow Up      SUBJECTIVE/OBJECTIVE:    Referred by Dr. Jabier Hernández on 2024  Primary Diagnosis: Anemia of Other Chronic Illness (D63.8)     Secondary Diagnosis: Organ or tissue replaced by transplant, kidney (Z94.0)  Date of Kidney Transplant:   Date of Pancreas Transplant: 2024  Hgb goal range: 9-10     Epo/Darbo: TBD;  TSAT must be 20% or > before insurance will approve JOSE.   Iron regimen: Treat with IV Iron.   *Does not tolerate Oral Iron.   *completed Infed 1000mg on 24.      Labs : 2025  RX/TX plans : TBD     Recent JOSE use, transfusion, IV iron: NA  No history of stroke, MI, and blood clots or cancers     Contact: Consent to Communicate scanned on 2019.         Latest Ref Rng & Units 2024 2024 2024 2024 2024 2024 1/15/2025   Anemia   Hemoglobin 13.3 - 17.7 g/dL 7.4  8.2  7.7  8.0  7.0  8.8  10.0    IV Iron Dose      1000mg     TSAT 15 - 46 %  9     10  12    Ferritin 31 - 409 ng/mL  238     468  176      BP Readings from Last 3 Encounters:   01/15/25 (!) 156/82   24 125/79   24 129/74     Wt Readings from Last 2 Encounters:   01/15/25 87.4 kg (192 lb 11.2 oz)   24 85.8 kg (189 lb 3.2 oz)           ASSESSMENT:    Hgb:at goal - continue to monitor. Hgb on 3/4/25 was 11.4,   TSat: Due for labs Ferritin: Due for labs        PLAN:  Due to have Iron Studies drawn.  Hgb has improved with Dec 2024 Iron Infusion. Labs are scheduled for 3/11/25.     Orders needed to be renewed (for next follow-up date) in EPIC: None    Iron labs due:  DUE    Plan discussed with:  No call, chart review       NEXT FOLLOW-UP DATE:  3/12/25    Skye Davis RN   Anemia Services  [facility]  [email or web address]   Office : [phone]  Fax: [phone]

## 2025-03-11 ENCOUNTER — LAB (OUTPATIENT)
Dept: LAB | Facility: CLINIC | Age: 45
End: 2025-03-11
Attending: STUDENT IN AN ORGANIZED HEALTH CARE EDUCATION/TRAINING PROGRAM
Payer: COMMERCIAL

## 2025-03-11 ENCOUNTER — OFFICE VISIT (OUTPATIENT)
Dept: TRANSPLANT | Facility: CLINIC | Age: 45
End: 2025-03-11
Attending: STUDENT IN AN ORGANIZED HEALTH CARE EDUCATION/TRAINING PROGRAM
Payer: COMMERCIAL

## 2025-03-11 VITALS
HEART RATE: 71 BPM | DIASTOLIC BLOOD PRESSURE: 84 MMHG | TEMPERATURE: 97.8 F | OXYGEN SATURATION: 100 % | HEIGHT: 67 IN | SYSTOLIC BLOOD PRESSURE: 121 MMHG | WEIGHT: 204.3 LBS | BODY MASS INDEX: 32.07 KG/M2

## 2025-03-11 DIAGNOSIS — Z48.298 AFTERCARE FOLLOWING ORGAN TRANSPLANT: ICD-10-CM

## 2025-03-11 DIAGNOSIS — Z98.890 OTHER SPECIFIED POSTPROCEDURAL STATES: ICD-10-CM

## 2025-03-11 DIAGNOSIS — Z94.0 KIDNEY REPLACED BY TRANSPLANT: ICD-10-CM

## 2025-03-11 DIAGNOSIS — Z94.83 PANCREAS REPLACED BY TRANSPLANT (H): ICD-10-CM

## 2025-03-11 DIAGNOSIS — G90.3 NEUROGENIC ORTHOSTATIC HYPOTENSION (H): ICD-10-CM

## 2025-03-11 DIAGNOSIS — Z29.89 NEED FOR PNEUMOCYSTIS PROPHYLAXIS: ICD-10-CM

## 2025-03-11 DIAGNOSIS — D63.1 ANEMIA IN STAGE 2 CHRONIC KIDNEY DISEASE: ICD-10-CM

## 2025-03-11 DIAGNOSIS — D84.9 IMMUNOSUPPRESSED STATUS: ICD-10-CM

## 2025-03-11 DIAGNOSIS — E83.42 HYPOMAGNESEMIA: ICD-10-CM

## 2025-03-11 DIAGNOSIS — N18.2 CKD (CHRONIC KIDNEY DISEASE) STAGE 2, GFR 60-89 ML/MIN: ICD-10-CM

## 2025-03-11 DIAGNOSIS — N18.31 ANEMIA IN STAGE 3A CHRONIC KIDNEY DISEASE (H): ICD-10-CM

## 2025-03-11 DIAGNOSIS — N18.2 ANEMIA IN STAGE 2 CHRONIC KIDNEY DISEASE: ICD-10-CM

## 2025-03-11 DIAGNOSIS — D63.1 ANEMIA IN STAGE 3A CHRONIC KIDNEY DISEASE (H): ICD-10-CM

## 2025-03-11 DIAGNOSIS — Z94.0 KIDNEY REPLACED BY TRANSPLANT: Primary | ICD-10-CM

## 2025-03-11 DIAGNOSIS — Z79.899 ENCOUNTER FOR LONG-TERM CURRENT USE OF MEDICATION: ICD-10-CM

## 2025-03-11 DIAGNOSIS — E55.9 VITAMIN D DEFICIENCY: ICD-10-CM

## 2025-03-11 DIAGNOSIS — Z20.828 CONTACT WITH AND (SUSPECTED) EXPOSURE TO OTHER VIRAL COMMUNICABLE DISEASES: ICD-10-CM

## 2025-03-11 DIAGNOSIS — T86.11 KIDNEY TRANSPLANT REJECTION: ICD-10-CM

## 2025-03-11 LAB
ALBUMIN MFR UR ELPH: <6 MG/DL
AMYLASE SERPL-CCNC: 34 U/L (ref 28–100)
ANION GAP SERPL CALCULATED.3IONS-SCNC: 10 MMOL/L (ref 7–15)
BUN SERPL-MCNC: 20.2 MG/DL (ref 6–20)
CALCIUM SERPL-MCNC: 9.3 MG/DL (ref 8.8–10.4)
CHLORIDE SERPL-SCNC: 104 MMOL/L (ref 98–107)
CREAT SERPL-MCNC: 1.34 MG/DL (ref 0.67–1.17)
CREAT UR-MCNC: 46.7 MG/DL
EGFRCR SERPLBLD CKD-EPI 2021: 67 ML/MIN/1.73M2
ERYTHROCYTE [DISTWIDTH] IN BLOOD BY AUTOMATED COUNT: 15.2 % (ref 10–15)
FERRITIN SERPL-MCNC: 116 NG/ML (ref 31–409)
GLUCOSE SERPL-MCNC: 103 MG/DL (ref 70–99)
HCO3 SERPL-SCNC: 25 MMOL/L (ref 22–29)
HCT VFR BLD AUTO: 35.8 % (ref 40–53)
HGB BLD-MCNC: 11.3 G/DL (ref 13.3–17.7)
IRON BINDING CAPACITY (ROCHE): 280 UG/DL (ref 240–430)
IRON SATN MFR SERPL: 12 % (ref 15–46)
IRON SERPL-MCNC: 33 UG/DL (ref 61–157)
LIPASE SERPL-CCNC: 16 U/L (ref 13–60)
MCH RBC QN AUTO: 25 PG (ref 26.5–33)
MCHC RBC AUTO-ENTMCNC: 31.6 G/DL (ref 31.5–36.5)
MCV RBC AUTO: 79 FL (ref 78–100)
MYCOPHENOLATE SERPL LC/MS/MS-MCNC: 0.76 MG/L (ref 1–3.5)
MYCOPHENOLATE-G SERPL LC/MS/MS-MCNC: 42.1 MG/L (ref 30–95)
PLATELET # BLD AUTO: 144 10E3/UL (ref 150–450)
POTASSIUM SERPL-SCNC: 4.1 MMOL/L (ref 3.4–5.3)
PROT/CREAT 24H UR: NORMAL MG/G{CREAT}
PTH-INTACT SERPL-MCNC: 77 PG/ML (ref 15–65)
RBC # BLD AUTO: 4.52 10E6/UL (ref 4.4–5.9)
SODIUM SERPL-SCNC: 139 MMOL/L (ref 135–145)
TACROLIMUS BLD-MCNC: 11 UG/L (ref 5–15)
TME LAST DOSE: ABNORMAL H
TME LAST DOSE: ABNORMAL H
TME LAST DOSE: NORMAL H
TME LAST DOSE: NORMAL H
WBC # BLD AUTO: 4.5 10E3/UL (ref 4–11)

## 2025-03-11 PROCEDURE — 80048 BASIC METABOLIC PNL TOTAL CA: CPT | Performed by: PATHOLOGY

## 2025-03-11 PROCEDURE — 99214 OFFICE O/P EST MOD 30 MIN: CPT | Performed by: INTERNAL MEDICINE

## 2025-03-11 PROCEDURE — 36415 COLL VENOUS BLD VENIPUNCTURE: CPT | Performed by: PATHOLOGY

## 2025-03-11 PROCEDURE — 1126F AMNT PAIN NOTED NONE PRSNT: CPT | Performed by: INTERNAL MEDICINE

## 2025-03-11 PROCEDURE — 82150 ASSAY OF AMYLASE: CPT | Performed by: PATHOLOGY

## 2025-03-11 PROCEDURE — 83550 IRON BINDING TEST: CPT | Performed by: PATHOLOGY

## 2025-03-11 PROCEDURE — 85027 COMPLETE CBC AUTOMATED: CPT | Performed by: PATHOLOGY

## 2025-03-11 PROCEDURE — 80180 DRUG SCRN QUAN MYCOPHENOLATE: CPT | Performed by: STUDENT IN AN ORGANIZED HEALTH CARE EDUCATION/TRAINING PROGRAM

## 2025-03-11 PROCEDURE — 99213 OFFICE O/P EST LOW 20 MIN: CPT | Performed by: INTERNAL MEDICINE

## 2025-03-11 PROCEDURE — 82728 ASSAY OF FERRITIN: CPT | Performed by: PATHOLOGY

## 2025-03-11 PROCEDURE — 3074F SYST BP LT 130 MM HG: CPT | Performed by: INTERNAL MEDICINE

## 2025-03-11 PROCEDURE — 83970 ASSAY OF PARATHORMONE: CPT | Performed by: PATHOLOGY

## 2025-03-11 PROCEDURE — 83690 ASSAY OF LIPASE: CPT | Performed by: PATHOLOGY

## 2025-03-11 PROCEDURE — 99000 SPECIMEN HANDLING OFFICE-LAB: CPT | Performed by: PATHOLOGY

## 2025-03-11 PROCEDURE — 83540 ASSAY OF IRON: CPT | Performed by: PATHOLOGY

## 2025-03-11 PROCEDURE — G2211 COMPLEX E/M VISIT ADD ON: HCPCS | Performed by: INTERNAL MEDICINE

## 2025-03-11 PROCEDURE — 84156 ASSAY OF PROTEIN URINE: CPT | Performed by: PATHOLOGY

## 2025-03-11 PROCEDURE — 80197 ASSAY OF TACROLIMUS: CPT | Performed by: STUDENT IN AN ORGANIZED HEALTH CARE EDUCATION/TRAINING PROGRAM

## 2025-03-11 PROCEDURE — 3079F DIAST BP 80-89 MM HG: CPT | Performed by: INTERNAL MEDICINE

## 2025-03-11 ASSESSMENT — PAIN SCALES - GENERAL: PAINLEVEL_OUTOF10: NO PAIN (0)

## 2025-03-11 NOTE — LETTER
3/11/2025      RE: Michael Amin  [address]  [facility] [address]       TRANSPLANT NEPHROLOGY CLINIC VISIT     Assessment & Plan  # LDKT: CKD Stage 2 - Stable creatinine at baseline ~ 1.3-1.6, which is slightly higher than previous baseline closer to ~ 1.1-1.3 at time of pancreas transplant, likely due to higher tacrolimus levels.   - Baseline Creatinine: ~ 1.3-1.6   - Proteinuria: Normal (<0.2 grams)   - DSA Hx: No DSA   - Last cPRA: 0%   - BK Viremia: No   - Kidney Tx Biopsy Hx: No biopsy history.    # Pancreas Tx (IVETTE):    - Pancreatic Exocrine Drainage: Enteric drained     - Blood glucose: Near euglycemia      On insulin: No   - HbA1c: Last checked at time of transplant      Latest HbA1c: 7.8%   - Pancreatic enzymes: Stable   - DSA Hx: No DSA   - Pancreas Tx Biopsy Hx: No biopsy history    # Immunosuppression: Tacrolimus immediate release (goal 8-10) and Mycophenolate mofetil (dose 1000 mg every 12 hours)   - Induction with Recent Transplant:  High Intensity Protocol   - Continue with intensive monitoring of immunosuppression for efficacy and toxicity.   - Historical Changes in Immunosuppression: None   - Changes: Not at this time    # Infection Prevention:   Last CD4 Level: Not checked  - PJP: Sulfa/TMP (Bactrim)  - CMV: None, prophylaxis completed      - CMV IgG Ab High Risk Discordance (D+/R-) at time of transplant: No  Present CMV Serostatus: Positive  - EBV IgG Ab High Risk Discordance (D+/R-) at time of transplant: No  Present EBV Serostatus: Positive    # Neurogenic Orthostatic Hypotension: Controlled;  Goal BP: > 100, but < 130 systolic   - Changes: No    # Anemia in Chronic Renal Disease: Hgb: Trend up      JOSE: No   - Iron studies: Low iron saturation; Patient received IV iron 12/2025, but still iron deficient and may benefit from second course of IV iron.    # Mineral Bone Disorder:    - Secondary renal hyperparathyroidism; PTH level: Normal (15-65 pg/ml)        On treatment:  None  - Vitamin D; level: Low        On supplement: Yes  - Calcium; level: Normal        On supplement: No    # Electrolytes:   - Potassium; level: Normal        On supplement: No  - Magnesium; level: Normal        On supplement: Yes  - Bicarbonate; level: Normal        On supplement: Yes    # Obesity, Class I (BMI = 32.0): Weight is up ~ 12 lbs.  Patient was previously on semaglutide, but off since pancreas transplant 4 months ago.   - Recommend weight loss for overall health by increasing exercise and watching caloric intake.  - Patient may benefit from SGLT2 inhibitors.  However, because of their pancreas transplant, would avoid GLP1 agonists due to increased risk of pancreatitis.  - Recommend patient reconnect with Weight Management Program.    # Other Significant PMH:   - GERD: Controlled on H2 blocker.     # Skin Cancer Risk:    - Discussed sun protection and recommend regular follow up with Dermatology.    # Transplant History:  Etiology of Kidney Failure: Diabetes mellitus type 1  Tx: LDKT and IVETTE  Transplant: 11/2/2024 (Pancreas), 10/1/2019 (Kidney)  Significant transplant-related complications: None    Transplant Office Phone Number: [phone]    Assessment and plan was discussed with the patient and he voiced his understanding and agreement.    Return visit: Return for previously scheduled visit.    Jabier Hernández MD    The longitudinal plan of care for the diagnosis(es)/condition(s) as documented were addressed during this visit. Due to the added complexity in care, I will continue to support Rudi in the subsequent management and with ongoing continuity of care.      Chief Complaint  Mr. Amin is a 44 year old here for kidney transplant, pancreas transplant, and immunosuppression management.     History of Present Illness   Mr. Amin reports feeling good overall.  Since last clinic visit:   Hospitalizations: No   New Medical Issues: No  Active/Exercise: Yes, especially over the last couple  of weeks.  Chest pain or shortness of breath: No  Lower extremity swelling: No  Weight change: Yes; Weight is up ~ 12 lbs.  He has been off semaglutide since pancreas transplant.  Appetite change: Yes; Too good since stopping semaglutide.  Nausea and vomiting: No  Diarrhea: Yes; Intermittent episodes of diarrhea and constipation, which is not new.  Heartburn symptoms: No; Controlled on famotidine.  Fever, sweats or chills: No  Night sweats: No  Urinary complaints: No    Home BP:  110/80s    Problem List  Patient Active Problem List   Diagnosis     Diabetes mellitus type 1 (H)     Dyslipidemia     Anemia in chronic kidney disease     Secondary renal hyperparathyroidism     Kidney replaced by transplant     Aftercare following organ transplant     Vitamin D deficiency     Hypomagnesemia     Kidney transplant rejection     Class 1 obesity with serious comorbidity and body mass index (BMI) of 33.0 to 33.9 in adult, unspecified obesity type     Shingles     Pancreas replaced by transplant (H)     Immunosuppressed status     Thrombocytopenia     Neurogenic orthostatic hypotension (H)     Need for pneumocystis prophylaxis     CKD (chronic kidney disease) stage 2, GFR 60-89 ml/min     GERD (gastroesophageal reflux disease)       Allergies  Allergies   Allergen Reactions     Cats Itching     Itchy eyes     Anti-Thymocyte Glob (Rabbit)      Had reaction with rigors after steroid-free dose. No reaction with steroids.       Medications  Current Outpatient Medications   Medication Sig Dispense Refill     acetaminophen (TYLENOL) 325 MG tablet Take 2 tablets (650 mg) by mouth every 6 hours as needed (For optimal non-opioid multimodal pain management to improve pain control.). 60 tablet 0     alcohol swab prep pads Use to swab area of injection/zak as directed. 100 each 3     aspirin (ASA) 81 MG EC tablet Take 81 mg by mouth daily        atorvastatin (LIPITOR) 10 MG tablet Take 2 tablets (20 mg) by mouth daily. 60 tablet 11      "blood glucose (NO BRAND SPECIFIED) lancets standard Use to test blood sugar 3 times daily or as directed. 100 each 11     blood glucose (NO BRAND SPECIFIED) test strip Use to test blood sugar 3 times daily or as directed. 100 strip 11     blood glucose monitoring (NO BRAND SPECIFIED) meter device kit Use to test blood sugar 3 times daily or as directed. 2 kit 0     CELLCEPT (BRAND) 250 MG capsule Take 4 capsules (1,000 mg) by mouth 2 times daily. 240 capsule 11     Continuous Glucose Sensor (DEXCOM G6 SENSOR) MISC CHANGE SENSOR EVERY 10 DAYS 9 each 3     Continuous Glucose Transmitter (DEXCOM G6 TRANSMITTER) MISC Change every 3 months 1 each 3     famotidine (PEPCID) 20 MG tablet Take 1 tablet (20 mg) by mouth 2 times daily. 60 tablet 3     fludrocortisone (FLORINEF) 0.1 MG tablet Take 1 tablet (0.1 mg) by mouth every other day. 45 tablet 1     magnesium oxide (MAG-OX) 400 MG tablet Take 2 tablets (800 mg) by mouth daily (with lunch). 60 tablet 4     senna-docusate (SENOKOT-S/PERICOLACE) 8.6-50 MG tablet Take 1-2 tablets by mouth 2 times daily as needed for constipation. 60 tablet 3     sulfamethoxazole-trimethoprim (BACTRIM) 400-80 MG tablet Take 1 tablet by mouth daily. 30 tablet 11     tacrolimus (GENERIC EQUIVALENT) 1 MG capsule Take 3 capsules (3 mg) by mouth 2 times daily. 180 capsule 11     vitamin D3 (CHOLECALCIFEROL) 50 mcg (2000 units) tablet Take 1 tablet (50 mcg) by mouth daily. 30 tablet 3     No current facility-administered medications for this visit.     There are no discontinued medications.    Physical Exam  Vital Signs: /84 (BP Location: Left arm, Patient Position: Sitting, Cuff Size: Adult Regular)   Pulse 71   Temp 97.8  F (36.6  C) (Oral)   Ht 1.702 m (5' 7\")   Wt 92.7 kg (204 lb 4.8 oz)   SpO2 100%   BMI 32.00 kg/m      GENERAL APPEARANCE: alert and no distress  HENT: mouth without ulcers or lesions  RESP: lungs clear to auscultation - no rales, rhonchi or wheezes  CV: regular " rhythm, normal rate, no rub, no murmur  EDEMA: trace LE edema bilaterally  ABDOMEN: soft, nondistended, nontender, bowel sounds normal; Obese  MS: extremities normal - no gross deformities noted, no evidence of inflammation in joints, no muscle tenderness  SKIN: no rash  TX KIDNEY: normal  DIALYSIS ACCESS: none    Data        Latest Ref Rng & Units 3/11/2025     9:46 AM 3/4/2025     7:45 AM 2/25/2025     8:02 AM   Renal   Sodium 135 - 145 mmol/L 139      Na (external) 137 - 145 mmol/L  140  140    K 3.4 - 5.3 mmol/L 4.1      K (external) 3.5 - 5.1 mmol/L  4.4  4.2    Cl 98 - 107 mmol/L 104  106  106    Cl (external) 98 - 107 mmol/L 104  106  106    CO2 22 - 29 mmol/L 25      CO2 (external) 22 - 30 mmol/L  24  26    Urea Nitrogen 6.0 - 20.0 mg/dL 20.2      BUN (external) 9 - 20 mg/dL  26  24    Creatinine 0.67 - 1.17 mg/dL 1.34      Cr (external) 0.66 - 1.25 mg/dL  1.40  1.20    Glucose 70 - 99 mg/dL 103      Glucose (external) 70 - 100 mg/dL  105  105    Calcium 8.8 - 10.4 mg/dL 9.3      Ca (external) 8.4 - 10.2 mg/dL  9.3  9.1          Latest Ref Rng & Units 3/11/2025     9:46 AM 2/11/2025     7:51 AM 2/4/2025     8:02 AM   Bone Health   Phos (external) 2.5 - 4.5 mg/dL  3.6  3.8    Parathyroid Hormone Intact 15 - 65 pg/mL 77            Latest Ref Rng & Units 3/11/2025     9:46 AM 3/4/2025     7:45 AM 2/25/2025     8:02 AM   Heme   WBC 4.0 - 11.0 10e3/uL 4.5      WBC (external) 4.2 - 9.1 10*9/L  5.1  6.1    Hgb 13.3 - 17.7 g/dL 11.3      Hgb (external) 13.7 - 17.5 gm/dL  11.4  11.5    Plt 150 - 450 10e3/uL 144      Plt (external) 150 - 440 10*9/L  158  179          Latest Ref Rng & Units 11/4/2024     6:22 AM 11/2/2024     1:52 PM 9/26/2019     9:45 AM   Liver   AP 40 - 150 U/L  113  100    TBili <=1.2 mg/dL  0.5  0.2    ALT 0 - 70 U/L  13  42    AST 0 - 45 U/L  15  16    Tot Protein 6.4 - 8.3 g/dL  6.9  7.5    Albumin 3.4 - 5.0 g/dL   3.5    Albumin 3.5 - 5.2 g/dL 2.4  4.1           Latest Ref Rng & Units 3/11/2025      9:46 AM 3/4/2025     7:45 AM 2/25/2025     8:02 AM   Pancreas   Amylase 28 - 100 U/L 34      Amylase (external) 30 - 110 U/L  61  53    Lipase (Roche) 13 - 60 U/L 16      Lipase (external) 30 - 110 U/L  61  53          Latest Ref Rng & Units 3/11/2025     9:46 AM 3/4/2025     7:45 AM 2/25/2025     8:02 AM   Iron studies   Iron 61 - 157 ug/dL 33      Iron (external) 23 - 300 U/L  49  47    Iron Sat Index 15 - 46 % 12      Ferritin 31 - 409 ng/mL 116            Latest Ref Rng & Units 2/25/2025     8:02 AM 1/29/2025     7:43 AM 12/2/2024     8:48 AM   [facility] Txp Virology   BK Quant Log Ext log IU/mL Undetected  Undetected     Undetected    BK Quant Result Ext Undetected IU/mL Undetected  Undetected     Undetected    BK Quant Spec Ext  Plasma  Plasma     Plasma    HIV 1&2 Ext Undetected Copies/mL   Undetected           This result is from an external source.     Failed to redirect to the Timeline version of the REVFS SmartLink.  Recent Labs   Lab Test 12/12/24  0855 12/30/24  1048 03/11/25  0945   DOSTAC 12/11/2024 12/29/2024 3/10/2025   TACROL 10.5 9.9 11.0     Recent Labs   Lab Test 11/21/19  0813 11/14/24  0759 03/11/25  0946   DOSMPA 11/20/19 1800 11/13/2024   7:00 PM 3/10/2025   9:45 PM   MPACID 0.91* 1.59 0.76*   MPAG 39.5 57.5 42.1        Jabier Hernández MD

## 2025-03-11 NOTE — PROGRESS NOTES
TRANSPLANT NEPHROLOGY CLINIC VISIT     Assessment & Plan   # LDKT: CKD Stage 2 - Stable creatinine at baseline ~ 1.3-1.6, which is slightly higher than previous baseline closer to ~ 1.1-1.3 at time of pancreas transplant, likely due to higher tacrolimus levels.   - Baseline Creatinine: ~ 1.3-1.6   - Proteinuria: Normal (<0.2 grams)   - DSA Hx: No DSA   - Last cPRA: 0%   - BK Viremia: No   - Kidney Tx Biopsy Hx: No biopsy history.    # Pancreas Tx (IVETTE):    - Pancreatic Exocrine Drainage: Enteric drained     - Blood glucose: Near euglycemia      On insulin: No   - HbA1c: Last checked at time of transplant      Latest HbA1c: 7.8%   - Pancreatic enzymes: Stable   - DSA Hx: No DSA   - Pancreas Tx Biopsy Hx: No biopsy history    # Immunosuppression: Tacrolimus immediate release (goal 8-10) and Mycophenolate mofetil (dose 1000 mg every 12 hours)   - Induction with Recent Transplant:  High Intensity Protocol   - Continue with intensive monitoring of immunosuppression for efficacy and toxicity.   - Historical Changes in Immunosuppression: None   - Changes: Not at this time    # Infection Prevention:   Last CD4 Level: Not checked  - PJP: Sulfa/TMP (Bactrim)  - CMV: None, prophylaxis completed      - CMV IgG Ab High Risk Discordance (D+/R-) at time of transplant: No  Present CMV Serostatus: Positive  - EBV IgG Ab High Risk Discordance (D+/R-) at time of transplant: No  Present EBV Serostatus: Positive    # Neurogenic Orthostatic Hypotension: {BP Control:413844750};  Goal BP: > 100, but < 130 systolic   - Changes: { :711176}    # Anemia in Chronic Renal Disease: Hgb: Trend up      JOSE: No   - Iron studies: Low iron saturation; Patient received IV iron 12/2025.    # Mineral Bone Disorder:    - Secondary renal hyperparathyroidism; PTH level: Normal (15-65 pg/ml)        On treatment: None  - Vitamin D; level: Low        On supplement: Yes  - Calcium; level: Normal        On supplement: No    # Electrolytes:   - Potassium; level:  Normal        On supplement: No  - Magnesium; level: Normal        On supplement: Yes  - Bicarbonate; level: Normal        On supplement: Yes    # Obesity, Class *** (BMI = ***): ***   - Recommend weight loss for overall health by increasing exercise and watching caloric intake.  - Patient may benefit from SGLT2 inhibitors.  However, because of their pancreas transplant, would avoid GLP1 agonists due to increased risk of pancreatitis.     # Other Significant PMH:   - GERD: ***     # Skin Cancer Risk:    - Discussed sun protection and recommend regular follow up with Dermatology.    # Transplant History:  Etiology of Kidney Failure: Diabetes mellitus type 1  Tx: LDKT and IVETTE  Transplant: 11/2/2024 (Pancreas), 10/1/2019 (Kidney)  Significant transplant-related complications: None    Transplant Office Phone Number: [phone]    Assessment and plan was discussed with the patient and he voiced his understanding and agreement.    Return visit: No follow-ups on file.    Jabier Hernández MD    The longitudinal plan of care for the diagnosis(es)/condition(s) as documented were addressed during this visit. Due to the added complexity in care, I will continue to support Rudi in the subsequent management and with ongoing continuity of care.      Chief Complaint   Mr. Amin is a 44 year old here for kidney transplant, pancreas transplant, and immunosuppression management.     History of Present Illness    Mr. Amin reports feeling *** overall.  Since last clinic visit:   Hospitalizations: { :80917808}   New Medical Issues: { :73090915}  Chest pain or shortness of breath: { :73928963}  Lower extremity swelling: { :71121219}  Weight change: { :33658460}  Nausea and vomiting: { :84049059}  Diarrhea: { :56299234}  Heartburn symptoms: { :21042353}  Fever, sweats or chills: { :45472464}  Night sweats: { :61182226}  Urinary complaints: { :32668153}    Home BP: { :78541411}    Problem List   Patient Active Problem List    Diagnosis    HTN, kidney transplant related    Diabetes mellitus type 1 (H)    Dyslipidemia    Anemia in chronic kidney disease    Secondary renal hyperparathyroidism    Kidney replaced by transplant    Aftercare following organ transplant    Vitamin D deficiency    Hypomagnesemia    Kidney transplant rejection    Class 1 obesity with serious comorbidity and body mass index (BMI) of 33.0 to 33.9 in adult, unspecified obesity type    Shingles    Organ transplant candidate    Pre-operative cardiovascular examination    Status post coronary angiogram    Pancreas replaced by transplant (H)    Immunosuppressed status    Acute kidney injury    Hypervolemia    Acute post-operative pain    Thrombocytopenia    At risk for malnutrition    Syncope    Anemia of chronic renal failure    Neurogenic orthostatic hypotension (H)       Allergies   Allergies   Allergen Reactions    Cats Itching     Itchy eyes    Anti-Thymocyte Glob (Rabbit)      Had reaction with rigors after steroid-free dose. No reaction with steroids.       Medications   Current Outpatient Medications   Medication Sig Dispense Refill    acetaminophen (TYLENOL) 325 MG tablet Take 2 tablets (650 mg) by mouth every 6 hours as needed (For optimal non-opioid multimodal pain management to improve pain control.). 60 tablet 0    alcohol swab prep pads Use to swab area of injection/zak as directed. 100 each 3    aspirin (ASA) 81 MG EC tablet Take 81 mg by mouth daily       atorvastatin (LIPITOR) 10 MG tablet Take 2 tablets (20 mg) by mouth daily. 60 tablet 11    blood glucose (NO BRAND SPECIFIED) lancets standard Use to test blood sugar 3 times daily or as directed. 100 each 11    blood glucose (NO BRAND SPECIFIED) test strip Use to test blood sugar 3 times daily or as directed. 100 strip 11    blood glucose monitoring (NO BRAND SPECIFIED) meter device kit Use to test blood sugar 3 times daily or as directed. 2 kit 0    CELLCEPT (BRAND) 250 MG capsule Take 4 capsules  (1,000 mg) by mouth 2 times daily. 240 capsule 11    Continuous Glucose Sensor (DEXCOM G6 SENSOR) MISC CHANGE SENSOR EVERY 10 DAYS 9 each 3    Continuous Glucose Transmitter (DEXCOM G6 TRANSMITTER) MISC Change every 3 months 1 each 3    famotidine (PEPCID) 20 MG tablet Take 1 tablet (20 mg) by mouth 2 times daily. 60 tablet 3    fludrocortisone (FLORINEF) 0.1 MG tablet Take 1 tablet (0.1 mg) by mouth every other day. 45 tablet 1    magnesium oxide (MAG-OX) 400 MG tablet Take 2 tablets (800 mg) by mouth daily (with lunch). 60 tablet 4    senna-docusate (SENOKOT-S/PERICOLACE) 8.6-50 MG tablet Take 1-2 tablets by mouth 2 times daily as needed for constipation. 60 tablet 3    sulfamethoxazole-trimethoprim (BACTRIM) 400-80 MG tablet Take 1 tablet by mouth daily. 30 tablet 11    tacrolimus (GENERIC EQUIVALENT) 1 MG capsule Take 3 capsules (3 mg) by mouth 2 times daily. 180 capsule 11    vitamin D3 (CHOLECALCIFEROL) 50 mcg (2000 units) tablet Take 1 tablet (50 mcg) by mouth daily. 30 tablet 3     No current facility-administered medications for this visit.     There are no discontinued medications.    Physical Exam   Vital Signs: There were no vitals taken for this visit.    {EXAM    :[id number]}    Data         Latest Ref Rng & Units 3/4/2025     7:45 AM 2/25/2025     8:02 AM 2/11/2025     7:51 AM   Renal   Na (external) 137 - 145 mmol/L 140  140  139    K (external) 3.5 - 5.1 mmol/L 4.4  4.2  4.6    Cl 98 - 107 mmol/L 106  106  105    Cl (external) 98 - 107 mmol/L 106  106  105    CO2 (external) 22 - 30 mmol/L 24  26  25    BUN (external) 9 - 20 mg/dL 26  24  26    Cr (external) 0.66 - 1.25 mg/dL 1.40  1.20  1.60    Glucose (external) 70 - 100 mg/dL 105  105  104    Ca (external) 8.4 - 10.2 mg/dL 9.3  9.1  9.0    Mg (external) 1.6 - 2.3 mg/dL   1.7          Latest Ref Rng & Units 2/11/2025     7:51 AM 2/4/2025     8:02 AM 1/15/2025    10:14 AM   Bone Health   Phosphorus 2.5 - 4.5 mg/dL   3.4    Phos (external) 2.5 - 4.5  mg/dL 3.6  3.8           Latest Ref Rng & Units 3/4/2025     7:45 AM 2/25/2025     8:02 AM 2/11/2025     7:51 AM   Heme   WBC (external) 4.2 - 9.1 10*9/L 5.1  6.1  4.7    Hgb (external) 13.7 - 17.5 gm/dL 11.4  11.5  11.0    Plt (external) 150 - 440 10*9/L 158  179  181          Latest Ref Rng & Units 11/4/2024     6:22 AM 11/2/2024     1:52 PM 9/26/2019     9:45 AM   Liver   AP 40 - 150 U/L  113  100    TBili <=1.2 mg/dL  0.5  0.2    ALT 0 - 70 U/L  13  42    AST 0 - 45 U/L  15  16    Tot Protein 6.4 - 8.3 g/dL  6.9  7.5    Albumin 3.4 - 5.0 g/dL   3.5    Albumin 3.5 - 5.2 g/dL 2.4  4.1           Latest Ref Rng & Units 3/4/2025     7:45 AM 2/25/2025     8:02 AM 2/11/2025     7:51 AM   Pancreas   Amylase (external) 30 - 110 U/L 61  53  57    Lipase (external) 30 - 110 U/L 61  53  57          Latest Ref Rng & Units 3/4/2025     7:45 AM 2/25/2025     8:02 AM 2/11/2025     7:51 AM   Iron studies   Iron (external) 23 - 300 U/L 49  47  69          Latest Ref Rng & Units 2/25/2025     8:02 AM 1/29/2025     7:43 AM 12/2/2024     8:48 AM   [facility] Virology   BK Quant Log Ext log IU/mL Undetected  Undetected     Undetected    BK Quant Result Ext Undetected IU/mL Undetected  Undetected     Undetected    BK Quant Spec Ext  Plasma  Plasma     Plasma    HIV 1&2 Ext Undetected Copies/mL   Undetected           This result is from an external source.     Failed to redirect to the Timeline version of the [employer]a.[email or web address] SmartLink.  Recent Labs   Lab Test 11/29/24  1023 12/12/24  0855 12/30/24  1048   DOSTAC 11/28/2024 12/11/2024 12/29/2024   TACROL 8.5 10.5 9.9     Recent Labs   Lab Test 10/07/19  0742 11/21/19  0813 11/14/24  0759   DOSMPA Not Provided 11/20/19 1800 11/13/2024   7:00 PM   MPACID 0.82* 0.91* 1.59   MPAG 14.1* 39.5 57.5       - 110 U/L  61  53    Lipase (Roche) 13 - 60 U/L 16      Lipase (external) 30 - 110 U/L  61  53          Latest Ref Rng & Units 3/11/2025     9:46 AM 3/4/2025     7:45 AM 2/25/2025     8:02 AM   Iron studies   Iron 61 - 157 ug/dL 33      Iron (external) 23 - 300 U/L  49  47    Iron Sat Index 15 - 46 % 12      Ferritin 31 - 409 ng/mL 116            Latest Ref Rng & Units 2/25/2025     8:02 AM 1/29/2025     7:43 AM 12/2/2024     8:48 AM   [facility] T[facility] Virology   BK Quant Log Ext log IU/mL Undetected  Undetected     Undetected    BK Quant Result Ext Undetected IU/mL Undetected  Undetected     Undetected    BK Quant Spec Ext  Plasma  Plasma     Plasma    HIV 1&2 Ext Undetected Copies/mL   Undetected           This result is from an external source.     Failed to redirect to the Timeline version of the REVFS SmartLink.  Recent Labs   Lab Test 12/12/24  0855 12/30/24  1048 03/11/25  0945   DOSTAC 12/11/2024 12/29/2024 3/10/2025   TACROL 10.5 9.9 11.0     Recent Labs   Lab Test 11/21/19  0813 11/14/24  0759 03/11/25  0946   DOSMPA 11/20/19 1800 11/13/2024   7:00 PM 3/10/2025   9:45 PM   MPACID 0.91* 1.59 0.76*   MPAG 39.5 57.5 42.1

## 2025-03-11 NOTE — PATIENT INSTRUCTIONS
Patient Recommendations:  - Recommend weight loss for overall health by increasing exercise and watching caloric intake.  - Would reconnect with Weight Management Program.    Transplant Patient Information  Your Post Transplant Coordinator is: Anneliese Qiu  For non urgent items, we encourage you to contact your coordinator/care team online via [employer]  You and your care team can also contact your transplant coordinator Monday - Friday, 8am - 5pm at [phone] (Option 2 to reach the coordinator or Option 4 to schedule an appointment).  After hours for urgent matters, please call [facility] at [phone].

## 2025-03-11 NOTE — LETTER
3/11/2025      Michael Amin  [address]  [facility] [address]      Dear Colleague,    Thank you for referring your patient, Michael Amin, to the [facility]. Please see a copy of my visit note below.    TRANSPLANT NEPHROLOGY CLINIC VISIT     Assessment & Plan  # LDKT: CKD Stage 2 - Stable creatinine at baseline ~ 1.3-1.6, which is slightly higher than previous baseline closer to ~ 1.1-1.3 at time of pancreas transplant, likely due to higher tacrolimus levels.   - Baseline Creatinine: ~ 1.3-1.6   - Proteinuria: Normal (<0.2 grams)   - DSA Hx: No DSA   - Last cPRA: 0%   - BK Viremia: No   - Kidney Tx Biopsy Hx: No biopsy history.    # Pancreas Tx (IVETTE):    - Pancreatic Exocrine Drainage: Enteric drained     - Blood glucose: Near euglycemia      On insulin: No   - HbA1c: Last checked at time of transplant      Latest HbA1c: 7.8%   - Pancreatic enzymes: Stable   - DSA Hx: No DSA   - Pancreas Tx Biopsy Hx: No biopsy history    # Immunosuppression: Tacrolimus immediate release (goal 8-10) and Mycophenolate mofetil (dose 1000 mg every 12 hours)   - Induction with Recent Transplant:  High Intensity Protocol   - Continue with intensive monitoring of immunosuppression for efficacy and toxicity.   - Historical Changes in Immunosuppression: None   - Changes: Not at this time    # Infection Prevention:   Last CD4 Level: Not checked  - PJP: Sulfa/TMP (Bactrim)  - CMV: None, prophylaxis completed      - CMV IgG Ab High Risk Discordance (D+/R-) at time of transplant: No  Present CMV Serostatus: Positive  - EBV IgG Ab High Risk Discordance (D+/R-) at time of transplant: No  Present EBV Serostatus: Positive    # Neurogenic Orthostatic Hypotension: {BP Control:428983756};  Goal BP: > 100, but < 130 systolic   - Changes: { :578333}    # Anemia in Chronic Renal Disease: Hgb: Trend up      JOSE: No   - Iron studies: Low iron saturation; Patient received IV iron 12/2025.    # Mineral Bone  Disorder:    - Secondary renal hyperparathyroidism; PTH level: Normal (15-65 pg/ml)        On treatment: None  - Vitamin D; level: Low        On supplement: Yes  - Calcium; level: Normal        On supplement: No    # Electrolytes:   - Potassium; level: Normal        On supplement: No  - Magnesium; level: Normal        On supplement: Yes  - Bicarbonate; level: Normal        On supplement: Yes    # Obesity, Class *** (BMI = ***): ***   - Recommend weight loss for overall health by increasing exercise and watching caloric intake.  - Patient may benefit from SGLT2 inhibitors.  However, because of their pancreas transplant, would avoid GLP1 agonists due to increased risk of pancreatitis.     # Other Significant PMH:   - GERD: ***     # Skin Cancer Risk:    - Discussed sun protection and recommend regular follow up with Dermatology.    # Transplant History:  Etiology of Kidney Failure: Diabetes mellitus type 1  Tx: LDKT and IVETTE  Transplant: 11/2/2024 (Pancreas), 10/1/2019 (Kidney)  Significant transplant-related complications: None    Transplant Office Phone Number: [phone]    Assessment and plan was discussed with the patient and he voiced his understanding and agreement.    Return visit: No follow-ups on file.    Jabier Hernández MD    The longitudinal plan of care for the diagnosis(es)/condition(s) as documented were addressed during this visit. Due to the added complexity in care, I will continue to support Rudi in the subsequent management and with ongoing continuity of care.      Chief Complaint  Mr. Amin is a 44 year old here for kidney transplant, pancreas transplant, and immunosuppression management.     History of Present Illness   Mr. Amin reports feeling *** overall.  Since last clinic visit:   Hospitalizations: { :91113533}   New Medical Issues: { :06228939}  Chest pain or shortness of breath: { :40789697}  Lower extremity swelling: { :61920300}  Weight change: { :49130768}  Nausea and  vomiting: { :14003227}  Diarrhea: { :76681463}  Heartburn symptoms: { :66482705}  Fever, sweats or chills: { :09162383}  Night sweats: { :35900595}  Urinary complaints: { :32987244}    Home BP: { :10121415}    Problem List  Patient Active Problem List   Diagnosis     HTN, kidney transplant related     Diabetes mellitus type 1 (H)     Dyslipidemia     Anemia in chronic kidney disease     Secondary renal hyperparathyroidism     Kidney replaced by transplant     Aftercare following organ transplant     Vitamin D deficiency     Hypomagnesemia     Kidney transplant rejection     Class 1 obesity with serious comorbidity and body mass index (BMI) of 33.0 to 33.9 in adult, unspecified obesity type     Shingles     Organ transplant candidate     Pre-operative cardiovascular examination     Status post coronary angiogram     Pancreas replaced by transplant (H)     Immunosuppressed status     Acute kidney injury     Hypervolemia     Acute post-operative pain     Thrombocytopenia     At risk for malnutrition     Syncope     Anemia of chronic renal failure     Neurogenic orthostatic hypotension (H)       Allergies  Allergies   Allergen Reactions     Cats Itching     Itchy eyes     Anti-Thymocyte Glob (Rabbit)      Had reaction with rigors after steroid-free dose. No reaction with steroids.       Medications  Current Outpatient Medications   Medication Sig Dispense Refill     acetaminophen (TYLENOL) 325 MG tablet Take 2 tablets (650 mg) by mouth every 6 hours as needed (For optimal non-opioid multimodal pain management to improve pain control.). 60 tablet 0     alcohol swab prep pads Use to swab area of injection/zak as directed. 100 each 3     aspirin (ASA) 81 MG EC tablet Take 81 mg by mouth daily        atorvastatin (LIPITOR) 10 MG tablet Take 2 tablets (20 mg) by mouth daily. 60 tablet 11     blood glucose (NO BRAND SPECIFIED) lancets standard Use to test blood sugar 3 times daily or as directed. 100 each 11     blood  glucose (NO BRAND SPECIFIED) test strip Use to test blood sugar 3 times daily or as directed. 100 strip 11     blood glucose monitoring (NO BRAND SPECIFIED) meter device kit Use to test blood sugar 3 times daily or as directed. 2 kit 0     CELLCEPT (BRAND) 250 MG capsule Take 4 capsules (1,000 mg) by mouth 2 times daily. 240 capsule 11     Continuous Glucose Sensor (DEXCOM G6 SENSOR) MISC CHANGE SENSOR EVERY 10 DAYS 9 each 3     Continuous Glucose Transmitter (DEXCOM G6 TRANSMITTER) MISC Change every 3 months 1 each 3     famotidine (PEPCID) 20 MG tablet Take 1 tablet (20 mg) by mouth 2 times daily. 60 tablet 3     fludrocortisone (FLORINEF) 0.1 MG tablet Take 1 tablet (0.1 mg) by mouth every other day. 45 tablet 1     magnesium oxide (MAG-OX) 400 MG tablet Take 2 tablets (800 mg) by mouth daily (with lunch). 60 tablet 4     senna-docusate (SENOKOT-S/PERICOLACE) 8.6-50 MG tablet Take 1-2 tablets by mouth 2 times daily as needed for constipation. 60 tablet 3     sulfamethoxazole-trimethoprim (BACTRIM) 400-80 MG tablet Take 1 tablet by mouth daily. 30 tablet 11     tacrolimus (GENERIC EQUIVALENT) 1 MG capsule Take 3 capsules (3 mg) by mouth 2 times daily. 180 capsule 11     vitamin D3 (CHOLECALCIFEROL) 50 mcg (2000 units) tablet Take 1 tablet (50 mcg) by mouth daily. 30 tablet 3     No current facility-administered medications for this visit.     There are no discontinued medications.    Physical Exam  Vital Signs: There were no vitals taken for this visit.    {EXAM    :[id number]}    Data        Latest Ref Rng & Units 3/4/2025     7:45 AM 2/25/2025     8:02 AM 2/11/2025     7:51 AM   Renal   Na (external) 137 - 145 mmol/L 140  140  139    K (external) 3.5 - 5.1 mmol/L 4.4  4.2  4.6    Cl 98 - 107 mmol/L 106  106  105    Cl (external) 98 - 107 mmol/L 106  106  105    CO2 (external) 22 - 30 mmol/L 24  26  25    BUN (external) 9 - 20 mg/dL 26  24  26    Cr (external) 0.66 - 1.25 mg/dL 1.40  1.20  1.60    Glucose  (external) 70 - 100 mg/dL 105  105  104    Ca (external) 8.4 - 10.2 mg/dL 9.3  9.1  9.0    Mg (external) 1.6 - 2.3 mg/dL   1.7          Latest Ref Rng & Units 2/11/2025     7:51 AM 2/4/2025     8:02 AM 1/15/2025    10:14 AM   Bone Health   Phosphorus 2.5 - 4.5 mg/dL   3.4    Phos (external) 2.5 - 4.5 mg/dL 3.6  3.8           Latest Ref Rng & Units 3/4/2025     7:45 AM 2/25/2025     8:02 AM 2/11/2025     7:51 AM   Heme   WBC (external) 4.2 - 9.1 10*9/L 5.1  6.1  4.7    Hgb (external) 13.7 - 17.5 gm/dL 11.4  11.5  11.0    Plt (external) 150 - 440 10*9/L 158  179  181          Latest Ref Rng & Units 11/4/2024     6:22 AM 11/2/2024     1:52 PM 9/26/2019     9:45 AM   Liver   AP 40 - 150 U/L  113  100    TBili <=1.2 mg/dL  0.5  0.2    ALT 0 - 70 U/L  13  42    AST 0 - 45 U/L  15  16    Tot Protein 6.4 - 8.3 g/dL  6.9  7.5    Albumin 3.4 - 5.0 g/dL   3.5    Albumin 3.5 - 5.2 g/dL 2.4  4.1           Latest Ref Rng & Units 3/4/2025     7:45 AM 2/25/2025     8:02 AM 2/11/2025     7:51 AM   Pancreas   Amylase (external) 30 - 110 U/L 61  53  57    Lipase (external) 30 - 110 U/L 61  53  57          Latest Ref Rng & Units 3/4/2025     7:45 AM 2/25/2025     8:02 AM 2/11/2025     7:51 AM   Iron studies   Iron (external) 23 - 300 U/L 49  47  69          Latest Ref Rng & Units 2/25/2025     8:02 AM 1/29/2025     7:43 AM 12/2/2024     8:48 AM   [facility] Virology   BK Quant Log Ext log IU/mL Undetected  Undetected     Undetected    BK Quant Result Ext Undetected IU/mL Undetected  Undetected     Undetected    BK Quant Spec Ext  Plasma  Plasma     Plasma    HIV 1&2 Ext Undetected Copies/mL   Undetected           This result is from an external source.     Failed to redirect to the Timeline version of the [email or web address] SmartLink.  Recent Labs   Lab Test 11/29/24  1023 12/12/24  0855 12/30/24  1048   DOSTAC 11/28/2024 12/11/2024 12/29/2024   TACROL 8.5 10.5 9.9     Recent Labs   Lab Test 10/07/19  0742 11/21/19  0813 11/14/24  0759   DOSMPA Not  Provided 11/20/19 1800 11/13/2024   7:00 PM   MPACID 0.82* 0.91* 1.59   MPAG 14.1* 39.5 57.5          Again, thank you for allowing me to participate in the care of your patient.        Sincerely,        Jabier Hernández MD    Electronically signed

## 2025-03-11 NOTE — NURSING NOTE
"Chief Complaint   Patient presents with    RECHECK     Follow up.     Vitals:    03/11/25 1016 03/11/25 1019 03/11/25 1022   BP: (!) 173/99 (!) 132/92 121/84   BP Location: Left arm Left arm Left arm   Patient Position: Sitting Sitting Sitting   Cuff Size: Adult Regular Adult Regular Adult Regular   Pulse: 71     Temp: 97.8  F (36.6  C)     TempSrc: Oral     SpO2: 100%     Weight: 92.7 kg (204 lb 4.8 oz)     Height: 1.702 m (5' 7\")         BP Readings from Last 3 Encounters:   03/11/25 121/84   01/15/25 (!) 156/82   12/30/24 125/79       /84 (BP Location: Left arm, Patient Position: Sitting, Cuff Size: Adult Regular)   Pulse 71   Temp 97.8  F (36.6  C) (Oral)   Ht 1.702 m (5' 7\")   Wt 92.7 kg (204 lb 4.8 oz)   SpO2 100%   BMI 32.00 kg/m       Apurva Morales    "

## 2025-03-12 ENCOUNTER — PATIENT OUTREACH (OUTPATIENT)
Dept: CARE COORDINATION | Facility: CLINIC | Age: 45
End: 2025-03-12
Payer: COMMERCIAL

## 2025-03-12 NOTE — PROGRESS NOTES
Anemia Management Note - Follow Up      SUBJECTIVE/OBJECTIVE:    Referred by Dr. Jaiber Hernández on 2024  Primary Diagnosis: Anemia of Other Chronic Illness (D63.8)     Secondary Diagnosis: Organ or tissue replaced by transplant, kidney (Z94.0)  Date of Kidney Transplant:   Date of Pancreas Transplant: 2024  Hgb goal range: 9-10     Epo/Darbo: TBD;  Hgb >9.0  Iron regimen: Treat with IV Iron.   *Does not tolerate Oral Iron.   *completed Infed 1000mg on 24.      Labs : 2025  RX/TX plans : TBD     Recent JOSE use, transfusion, IV iron: NA  No history of stroke, MI, and blood clots or cancers     Contact: Consent to Communicate scanned on 2019.         Latest Ref Rng & Units 2024 2024 2024 2024 2024 1/15/2025 3/11/2025   Anemia   Hemoglobin 13.3 - 17.7 g/dL 8.2  7.7  8.0  7.0  8.8  10.0  11.3    IV Iron Dose     1000mg      TSAT 15 - 46 % 9     10  12  12    Ferritin 31 - 409 ng/mL 238     468  176  116      BP Readings from Last 3 Encounters:   25 121/84   01/15/25 (!) 156/82   24 125/79     Wt Readings from Last 2 Encounters:   25 92.7 kg (204 lb 4.8 oz)   01/15/25 87.4 kg (192 lb 11.2 oz)           ASSESSMENT:    Hgb:at goal - continue to monitor  TSat: not at goal of >30% Ferritin: At goal (>100ng/mL)        PLAN:  Iron stores remain low.  Did receive Infed on 24. Hgb has been slowly improving since he received Infed.  Recheck labs in one month.     Orders needed to be renewed (for next follow-up date) in EPIC: None    Iron labs due:  Mid 2025    Plan discussed with:  No call, chart review       NEXT FOLLOW-UP DATE:  25    Skye Davis RN   Anemia Services  [facility]  [email or web address]   Office : [phone]  Fax: [phone]

## 2025-03-15 ENCOUNTER — HEALTH MAINTENANCE LETTER (OUTPATIENT)
Age: 45
End: 2025-03-15

## 2025-03-16 PROBLEM — Z91.89 AT RISK FOR MALNUTRITION: Status: RESOLVED | Noted: 2024-11-07 | Resolved: 2025-03-16

## 2025-03-16 PROBLEM — G89.18 ACUTE POST-OPERATIVE PAIN: Status: RESOLVED | Noted: 2024-11-07 | Resolved: 2025-03-16

## 2025-03-16 PROBLEM — Z29.89 NEED FOR PNEUMOCYSTIS PROPHYLAXIS: Status: ACTIVE | Noted: 2025-03-16

## 2025-03-16 PROBLEM — Z01.810 PRE-OPERATIVE CARDIOVASCULAR EXAMINATION: Status: RESOLVED | Noted: 2024-05-01 | Resolved: 2025-03-16

## 2025-03-16 PROBLEM — E87.70 HYPERVOLEMIA: Status: RESOLVED | Noted: 2024-11-06 | Resolved: 2025-03-16

## 2025-03-16 PROBLEM — R55 SYNCOPE: Status: RESOLVED | Noted: 2024-11-08 | Resolved: 2025-03-16

## 2025-03-16 PROBLEM — Z98.890 STATUS POST CORONARY ANGIOGRAM: Status: RESOLVED | Noted: 2024-06-07 | Resolved: 2025-03-16

## 2025-03-16 PROBLEM — Z76.82 ORGAN TRANSPLANT CANDIDATE: Status: RESOLVED | Noted: 2024-05-01 | Resolved: 2025-03-16

## 2025-03-16 PROBLEM — N18.2 CKD (CHRONIC KIDNEY DISEASE) STAGE 2, GFR 60-89 ML/MIN: Status: ACTIVE | Noted: 2025-03-16

## 2025-03-16 PROBLEM — N18.9 ANEMIA OF CHRONIC RENAL FAILURE: Status: RESOLVED | Noted: 2024-11-14 | Resolved: 2025-03-16

## 2025-03-16 PROBLEM — K21.9 GERD (GASTROESOPHAGEAL REFLUX DISEASE): Status: ACTIVE | Noted: 2025-03-16

## 2025-03-16 PROBLEM — D63.1 ANEMIA OF CHRONIC RENAL FAILURE: Status: RESOLVED | Noted: 2024-11-14 | Resolved: 2025-03-16

## 2025-03-16 PROBLEM — N17.9 ACUTE KIDNEY INJURY: Status: RESOLVED | Noted: 2024-11-06 | Resolved: 2025-03-16

## 2025-03-29 ENCOUNTER — LAB REQUISITION (OUTPATIENT)
Dept: LAB | Facility: CLINIC | Age: 45
End: 2025-03-29
Payer: COMMERCIAL

## 2025-04-09 ENCOUNTER — PATIENT OUTREACH (OUTPATIENT)
Dept: CARE COORDINATION | Facility: CLINIC | Age: 45
End: 2025-04-09
Payer: COMMERCIAL

## 2025-04-09 NOTE — PROGRESS NOTES
Anemia Management Note - Follow Up      SUBJECTIVE/OBJECTIVE:    Referred by Dr. Jabier Hernández on 2024  Primary Diagnosis: Anemia of Other Chronic Illness (D63.8)     Secondary Diagnosis: Organ or tissue replaced by transplant, kidney (Z94.0)  Date of Kidney Transplant:   Date of Pancreas Transplant: 2024  Hgb goal range: 9-10     Epo/Darbo: TBD;  Hgb >9.0  Iron regimen: Treat with IV Iron.   *Does not tolerate Oral Iron.   *completed Infed 1000mg on 24.      Labs : 2025  RX/TX plans : TBD     Recent JOSE use, transfusion, IV iron: NA  No history of stroke, MI, and blood clots or cancers     Contact: Consent to Communicate scanned on 2019.         Latest Ref Rng & Units 2024 2024 2024 2024 2024 1/15/2025 3/11/2025   Anemia   Hemoglobin 13.3 - 17.7 g/dL 8.2  7.7  8.0  7.0  8.8  10.0  11.3    IV Iron Dose     1000mg      TSAT 15 - 46 % 9     10  12  12    Ferritin 31 - 409 ng/mL 238     468  176  116      BP Readings from Last 3 Encounters:   25 121/84   01/15/25 (!) 156/82   24 125/79     Wt Readings from Last 2 Encounters:   25 92.7 kg (204 lb 4.8 oz)   01/15/25 87.4 kg (192 lb 11.2 oz)           ASSESSMENT:    Hgb:Above goal - recommend hold dose. Hgb 11.4 on 25.   TSat: Due for labs Ferritin: Due for labs        PLAN:  Lab and Clinic appt scheduled for 25.  If Anemia labs are stable, ok to close Anemia Services.     Orders needed to be renewed (for next follow-up date) in EPIC and letters: None    Iron labs due:  DUE    Plan discussed with:  No call, chart review       NEXT FOLLOW-UP DATE:  25    Skye Davis RN   Anemia Services  [facility]  [email or web address]   Office : [phone]  Fax: [phone]

## 2025-04-14 ENCOUNTER — TELEPHONE (OUTPATIENT)
Dept: INFUSION THERAPY | Facility: CLINIC | Age: 45
End: 2025-04-14
Payer: COMMERCIAL

## 2025-04-14 DIAGNOSIS — T86.11 KIDNEY TRANSPLANT REJECTION: ICD-10-CM

## 2025-04-16 NOTE — TELEPHONE ENCOUNTER
PA Initiation    Medication: DEXCOM G6 SENSOR MISC  Insurance Company: [employer] - Phone [phone] Fax [phone]  Pharmacy Filling the Rx: [facility] [address] [place] [address]  Filling Pharmacy Phone: [phone]  Filling Pharmacy Fax: [phone]  Start Date: 4/16/2025

## 2025-04-16 NOTE — TELEPHONE ENCOUNTER
Prior Authorization Approval    Medication: DEXCOM G6 SENSOR MISC  Authorization Effective Date: 3/17/2025  Authorization Expiration Date: 4/15/2028  Approved Dose/Quantity:   Reference #:     Insurance Company: "[employer] - Phone [phone] Fax [phone]  Expected CoPay: $    CoPay Card Available:      Financial Assistance Needed:   Which Pharmacy is filling the prescription: [facility] [address] [place] [address] [id number] [place] [address]  Pharmacy Notified: YES  Patient Notified: **Instructed pharmacy to notify patient when script is ready to /ship.**

## 2025-04-30 ENCOUNTER — TELEPHONE (OUTPATIENT)
Dept: ENDOCRINOLOGY | Facility: CLINIC | Age: 45
End: 2025-04-30
Payer: COMMERCIAL

## 2025-04-30 NOTE — TELEPHONE ENCOUNTER
Patient confirmed he wanted to cancel this scheduled appointment:  Date: 6/10/25  Time: 8:00 am  Visit type: RETURN DIABETES  Provider: Kymberly Boswell PA-C  Location: [facility] DIABETES  Testing/imaging: N/A  Additional notes:  Spoke to patient to reschedule appt on 6/10 due to changes in the providers schedule.  Patient stated he doesn't need this appointment he had transplant surgery and his numbers are doing great.  Patient confirmed to cancel this appointment.    Arcenio Reyna on 4/30/2025 at 9:11 AM

## 2025-05-13 NOTE — PROGRESS NOTES
TRANSPLANT NEPHROLOGY CLINIC VISIT     Assessment & Plan   Magnesium glycinate 200 mg twice a day.  Following with anemia services. If repeat iron remains low, consider repeat Infed (iron dextrax) 1000 mg IV infusion once.  Stop florinef, but continue checking blood pressure and watch for lightheadedness.    # LDKT: CKD Stage 2 - Stable creatinine at baseline ~ 1.3-1.6, which is slightly higher than previous baseline closer to ~ 1.1-1.3 at time of pancreas transplant, likely due to higher tacrolimus levels.   - Baseline Creatinine: ~ 1.3-1.6   - Proteinuria: Normal (<0.2 grams)   - DSA Hx: No DSA   - Last cPRA: 0%   - BK Viremia: No   - Kidney Tx Biopsy Hx: No biopsy history.    # Pancreas Tx (IVETTE):    - Pancreatic Exocrine Drainage: Enteric drained     - Blood glucose: Near euglycemia      On insulin: No   - HbA1c: Last checked at time of transplant      Latest HbA1c: 7.8%   - Pancreatic enzymes: Stable   - DSA Hx: No DSA   - Pancreas Tx Biopsy Hx: No biopsy history    # Immunosuppression: Tacrolimus immediate release (goal 8-10) and Mycophenolate mofetil (dose 1000 mg every 12 hours)   - Induction with Recent Transplant:  High Intensity Protocol   - Continue with intensive monitoring of immunosuppression for efficacy and toxicity.   - Historical Changes in Immunosuppression: None   - Changes: Not at this time    # Infection Prevention:   Last CD4 Level: Not checked  - PJP: Sulfa/TMP (Bactrim) 400-80 mg every day.  - CMV: None, prophylaxis completed      - CMV IgG Ab High Risk Discordance (D+/R-) at time of transplant: No  Present CMV Serostatus: Positive  - EBV IgG Ab High Risk Discordance (D+/R-) at time of transplant: No  Present EBV Serostatus: Positive    # Neurogenic Orthostatic Hypotension: Controlled;  Goal BP: > 100, but < 130 systolic   - Fludrocortisone 0.1 mg every other day.   - Changes: Yes - stop fludrocortisone.    # Anemia in Chronic Renal Disease: Hgb: Trend up      JOSE: No   - Iron studies: Low  iron saturation; does not tolerate PO due to severe constipation, will plan for IV iron infusion    # Mineral Bone Disorder:    - Secondary renal hyperparathyroidism; PTH level: Normal (15-65 pg/ml)        On treatment: None  - Vitamin D; level: Low        On supplement: Yes  - Calcium; level: Normal        On supplement: No    # Electrolytes:   - Potassium; level: Normal        On supplement: No  - Magnesium; level: Normal        On supplement: Yes, adjust to mag gluconate 200mg BID due to borderline levels  - Bicarbonate; level: Normal        On supplement: Yes    # Obesity, Class I (BMI = 32.0): Weight is up ~ 12 lbs.  Patient was previously on semaglutide, but off since pancreas transplant 4 months ago.   - Recommend weight loss for overall health by increasing exercise and watching caloric intake.  - Patient may benefit from SGLT2 inhibitors.  However, because of their pancreas transplant, would avoid GLP1 agonists due to increased risk of pancreatitis.  - Recommend patient reconnect with Weight Management Program.    # Other Significant PMH:   - GERD: Controlled on H2 blocker.     # Skin Cancer Risk: Discussed sun protection and recommend regular follow up with Dermatology.    # Transplant History:  Etiology of Kidney Failure: Diabetes mellitus type 1  Tx: LDKT and IVETTE  Transplant: 11/2/2024 (Pancreas), 10/1/2019 (Kidney)  Significant transplant-related complications: None    Transplant Office Phone Number: [phone]    Assessment and plan was discussed with the patient and he voiced his understanding and agreement.    Return visit: Return for appointment scheduled for 08/08/2025.    Jose L Reyna MD    The longitudinal plan of care for the diagnosis(es)/condition(s) as documented were addressed during this visit. Due to the added complexity in care, I will continue to support Rudi in the subsequent management and with ongoing continuity of care.      Chief Complaint   Mr. Amin is a 44 year old  here for kidney transplant, pancreas transplant, and immunosuppression management.     History of Present Illness   Mr. Amin reports feeling good overall. He has no complaints, has been more active with the better weather, chain sawing down trees and splitting wood. He feels he doesn't eat enough during the day, causing overeating at night. Since last clinic visit, he's had no ER visits or hospitalization.    Chest pain or shortness of breath: No  Lower extremity swelling: No  Weight change: Continues to gain weight since transplant from 189 to 211 pounds.   Appetite change: Yes; Too good since stopping semaglutide.  Nausea and vomiting: No  Diarrhea: No.  Heartburn symptoms: No; Controlled on famotidine.  Fever, sweats or chills: No  Night sweats: No  Urinary complaints: No    Home BP: 120/80s without lightheadedness on standing.    Problem List   Patient Active Problem List   Diagnosis    Diabetes mellitus type 1 (H)    Dyslipidemia    Anemia in chronic kidney disease    Secondary renal hyperparathyroidism    Kidney replaced by transplant    Aftercare following organ transplant    Vitamin D deficiency    Hypomagnesemia    Kidney transplant rejection    Class 1 obesity with serious comorbidity and body mass index (BMI) of 33.0 to 33.9 in adult, unspecified obesity type    Shingles    Pancreas replaced by transplant (H)    Immunosuppressed status    Thrombocytopenia    Neurogenic orthostatic hypotension (H)    Need for pneumocystis prophylaxis    CKD (chronic kidney disease) stage 2, GFR 60-89 ml/min    GERD (gastroesophageal reflux disease)       Allergies   Allergies   Allergen Reactions    Cats Itching     Itchy eyes    Anti-Thymocyte Glob (Rabbit)      Had reaction with rigors after steroid-free dose. No reaction with steroids.       Medications   Current Outpatient Medications   Medication Sig Dispense Refill    acetaminophen (TYLENOL) 325 MG tablet Take 2 tablets (650 mg) by mouth every 6 hours as needed  (For optimal non-opioid multimodal pain management to improve pain control.). 60 tablet 0    alcohol swab prep pads Use to swab area of injection/zak as directed. 100 each 3    aspirin (ASA) 81 MG EC tablet Take 81 mg by mouth daily       atorvastatin (LIPITOR) 10 MG tablet Take 2 tablets (20 mg) by mouth daily. 60 tablet 11    blood glucose (NO BRAND SPECIFIED) lancets standard Use to test blood sugar 3 times daily or as directed. 100 each 11    blood glucose (NO BRAND SPECIFIED) test strip Use to test blood sugar 3 times daily or as directed. 100 strip 11    blood glucose monitoring (NO BRAND SPECIFIED) meter device kit Use to test blood sugar 3 times daily or as directed. 2 kit 0    CELLCEPT (BRAND) 250 MG capsule Take 4 capsules (1,000 mg) by mouth 2 times daily. 240 capsule 11    Continuous Glucose Sensor (DEXCOM G6 SENSOR) MISC CHANGE SENSOR EVERY 10 DAYS 9 each 3    Continuous Glucose Transmitter (DEXCOM G6 TRANSMITTER) MISC Change every 3 months 1 each 3    famotidine (PEPCID) 20 MG tablet Take 1 tablet (20 mg) by mouth 2 times daily. 60 tablet 3    fludrocortisone (FLORINEF) 0.1 MG tablet Take 1 tablet (0.1 mg) by mouth every other day. 45 tablet 1    magnesium oxide (MAG-OX) 400 MG tablet Take 2 tablets (800 mg) by mouth daily (with lunch). 60 tablet 4    senna-docusate (SENOKOT-S/PERICOLACE) 8.6-50 MG tablet Take 1-2 tablets by mouth 2 times daily as needed for constipation. 60 tablet 3    sulfamethoxazole-trimethoprim (BACTRIM) 400-80 MG tablet Take 1 tablet by mouth daily. 30 tablet 11    tacrolimus (GENERIC EQUIVALENT) 1 MG capsule Take 3 capsules (3 mg) by mouth 2 times daily. 180 capsule 11    vitamin D3 (CHOLECALCIFEROL) 50 mcg (2000 units) tablet Take 1 tablet (50 mcg) by mouth daily. 30 tablet 3     No current facility-administered medications for this visit.     There are no discontinued medications.    Physical Exam   Vital Signs: Wt 95.7 kg (211 lb)   BMI 33.05 kg/m      GENERAL APPEARANCE:  alert and no distress  HENT: mouth without ulcers or lesions  RESP: lungs clear to auscultation - no rales, rhonchi or wheezes  CV: regular rhythm, normal rate, no rub, no murmur  EDEMA: trace LE edema bilaterally  ABDOMEN: soft, nondistended, nontender, bowel sounds normal; Obese  MS: extremities normal - no gross deformities noted, no evidence of inflammation in joints, no muscle tenderness  SKIN: no rash  TX KIDNEY: normal  DIALYSIS ACCESS: none    Data         Latest Ref Rng & Units 5/14/2025     8:20 AM 4/22/2025     7:52 AM 4/15/2025     7:42 AM   Renal   Sodium 135 - 145 mmol/L 139      Na (external) 137 - 145 mmol/L  139  138    K 3.4 - 5.3 mmol/L 4.2      K (external) 3.5 - 5.1 mmol/L  4.4  4.5    Cl 98 - 107 mmol/L 103  107  106    Cl (external) 98 - 107 mmol/L 103  107  106    CO2 22 - 29 mmol/L 25      CO2 (external) 22 - 30 mmol/L  25  25    Urea Nitrogen 6.0 - 20.0 mg/dL 24.1      BUN (external) 9 - 20 mg/dL  29  26    Creatinine 0.67 - 1.17 mg/dL 1.46      Cr (external) 0.66 - 1.25 mg/dL  1.60  1.40    Glucose 70 - 99 mg/dL 99      Glucose (external) 70 - 100 mg/dL  95  103    Calcium 8.8 - 10.4 mg/dL 10.0      Ca (external) 8.4 - 10.2 mg/dL  9.3  9.1    Magnesium 1.7 - 2.3 mg/dL 1.5      Mg (external) 1.6 - 2.3 mg/dL  1.6           Latest Ref Rng & Units 5/14/2025     8:20 AM 4/22/2025     7:52 AM 3/11/2025     9:46 AM   Bone Health   Phosphorus 2.5 - 4.5 mg/dL 3.3      Phos (external) 2.5 - 4.5 mg/dL  3.6     Parathyroid Hormone Intact 15 - 65 pg/mL   77          Latest Ref Rng & Units 5/14/2025     8:20 AM 4/22/2025     7:52 AM 4/15/2025     7:42 AM   Heme   WBC 4.0 - 11.0 10e3/uL 5.3      WBC (external) 4.2 - 9.1 10*9/L  4.1  3.8    Hgb 13.3 - 17.7 g/dL 11.7      Hgb (external) 13.7 - 17.5 g/dL  12.0  11.9    Plt 150 - 450 10e3/uL 143      Plt (external) 150 - 440 10*9/L  157  161          Latest Ref Rng & Units 11/4/2024     6:22 AM 11/2/2024     1:52 PM 9/26/2019     9:45 AM   Liver   AP 40 -  150 U/L  113  100    TBili <=1.2 mg/dL  0.5  0.2    ALT 0 - 70 U/L  13  42    AST 0 - 45 U/L  15  16    Tot Protein 6.4 - 8.3 g/dL  6.9  7.5    Albumin 3.4 - 5.0 g/dL   3.5    Albumin 3.5 - 5.2 g/dL 2.4  4.1           Latest Ref Rng & Units 5/14/2025     8:20 AM 4/22/2025     7:52 AM 4/15/2025     7:42 AM   Pancreas   Amylase 28 - 100 U/L 38      Amylase (external) 30 - 110 U/L  61  55    Lipase (Roche) 13 - 60 U/L 22      Lipase (external) 30 - 110 U/L  61  55          Latest Ref Rng & Units 5/14/2025     8:20 AM 4/22/2025     7:52 AM 4/15/2025     7:42 AM   Iron studies   Iron 61 - 157 ug/dL 41      Iron (external) 23 - 300 U/L  42  39    Iron Sat Index 15 - 46 % 14      Ferritin 31 - 409 ng/mL 104            Latest Ref Rng & Units 4/22/2025     7:52 AM 2/25/2025     8:02 AM 1/29/2025     7:43 AM   [facility] Txp Virology   BK Quant Log Ext log IU/mL Undetected  Undetected  Undetected    BK Quant Result Ext Undetected IU/mL Undetected  Undetected  Undetected    BK Quant Spec Ext   Plasma  Plasma      Failed to redirect to the Timeline version of the REVFS SmartLink.  Recent Labs   Lab Test 12/12/24  0855 12/30/24  1048 03/11/25  0945   DOSTAC 12/11/2024 12/29/2024 3/10/2025   TACROL 10.5 9.9 11.0     Recent Labs   Lab Test 11/21/19  0813 11/14/24  0759 03/11/25  0946   DOSMPA 11/20/19 1800 11/13/2024   7:00 PM 3/10/2025   9:45 PM   MPACID 0.91* 1.59 0.76*   MPAG 39.5 57.5 42.1

## 2025-05-14 ENCOUNTER — PATIENT OUTREACH (OUTPATIENT)
Dept: CARE COORDINATION | Facility: CLINIC | Age: 45
End: 2025-05-14

## 2025-05-14 ENCOUNTER — LAB (OUTPATIENT)
Dept: LAB | Facility: CLINIC | Age: 45
End: 2025-05-14
Attending: STUDENT IN AN ORGANIZED HEALTH CARE EDUCATION/TRAINING PROGRAM
Payer: COMMERCIAL

## 2025-05-14 ENCOUNTER — OFFICE VISIT (OUTPATIENT)
Dept: TRANSPLANT | Facility: CLINIC | Age: 45
End: 2025-05-14
Attending: STUDENT IN AN ORGANIZED HEALTH CARE EDUCATION/TRAINING PROGRAM
Payer: COMMERCIAL

## 2025-05-14 ENCOUNTER — RESULTS FOLLOW-UP (OUTPATIENT)
Dept: TRANSPLANT | Facility: CLINIC | Age: 45
End: 2025-05-14

## 2025-05-14 VITALS — WEIGHT: 211 LBS | BODY MASS INDEX: 33.05 KG/M2

## 2025-05-14 DIAGNOSIS — N18.31 ANEMIA OF CHRONIC RENAL FAILURE, STAGE 3A (H): ICD-10-CM

## 2025-05-14 DIAGNOSIS — N25.81 SECONDARY RENAL HYPERPARATHYROIDISM: ICD-10-CM

## 2025-05-14 DIAGNOSIS — Z94.0 KIDNEY REPLACED BY TRANSPLANT: ICD-10-CM

## 2025-05-14 DIAGNOSIS — T86.11 KIDNEY TRANSPLANT REJECTION: ICD-10-CM

## 2025-05-14 DIAGNOSIS — N18.31 ANEMIA OF CHRONIC RENAL FAILURE, STAGE 3A (H): Primary | ICD-10-CM

## 2025-05-14 DIAGNOSIS — D63.1 ANEMIA OF CHRONIC RENAL FAILURE, STAGE 3A (H): Primary | ICD-10-CM

## 2025-05-14 DIAGNOSIS — Z29.89 NEED FOR PNEUMOCYSTIS PROPHYLAXIS: Primary | ICD-10-CM

## 2025-05-14 DIAGNOSIS — G90.3 NEUROGENIC ORTHOSTATIC HYPOTENSION (H): ICD-10-CM

## 2025-05-14 DIAGNOSIS — Z20.828 CONTACT WITH AND (SUSPECTED) EXPOSURE TO OTHER VIRAL COMMUNICABLE DISEASES: ICD-10-CM

## 2025-05-14 DIAGNOSIS — E83.42 HYPOMAGNESEMIA: ICD-10-CM

## 2025-05-14 DIAGNOSIS — N18.2 CKD (CHRONIC KIDNEY DISEASE) STAGE 2, GFR 60-89 ML/MIN: ICD-10-CM

## 2025-05-14 DIAGNOSIS — Z48.298 AFTERCARE FOLLOWING ORGAN TRANSPLANT: ICD-10-CM

## 2025-05-14 DIAGNOSIS — Z79.899 ENCOUNTER FOR LONG-TERM CURRENT USE OF MEDICATION: ICD-10-CM

## 2025-05-14 DIAGNOSIS — Z94.83 PANCREAS REPLACED BY TRANSPLANT (H): ICD-10-CM

## 2025-05-14 DIAGNOSIS — D84.9 IMMUNOSUPPRESSED STATUS: ICD-10-CM

## 2025-05-14 DIAGNOSIS — D63.1 ANEMIA OF CHRONIC RENAL FAILURE, STAGE 3A (H): ICD-10-CM

## 2025-05-14 DIAGNOSIS — Z98.890 OTHER SPECIFIED POSTPROCEDURAL STATES: ICD-10-CM

## 2025-05-14 LAB
AMYLASE SERPL-CCNC: 38 U/L (ref 28–100)
ANION GAP SERPL CALCULATED.3IONS-SCNC: 11 MMOL/L (ref 7–15)
BUN SERPL-MCNC: 24.1 MG/DL (ref 6–20)
CALCIUM SERPL-MCNC: 10 MG/DL (ref 8.8–10.4)
CHLORIDE SERPL-SCNC: 103 MMOL/L (ref 98–107)
CREAT SERPL-MCNC: 1.46 MG/DL (ref 0.67–1.17)
EGFRCR SERPLBLD CKD-EPI 2021: 60 ML/MIN/1.73M2
ERYTHROCYTE [DISTWIDTH] IN BLOOD BY AUTOMATED COUNT: 14.6 % (ref 10–15)
FERRITIN SERPL-MCNC: 104 NG/ML (ref 31–409)
GLUCOSE SERPL-MCNC: 99 MG/DL (ref 70–99)
HCO3 SERPL-SCNC: 25 MMOL/L (ref 22–29)
HCT VFR BLD AUTO: 37.4 % (ref 40–53)
HGB BLD-MCNC: 11.7 G/DL (ref 13.3–17.7)
IRON BINDING CAPACITY (ROCHE): 287 UG/DL (ref 240–430)
IRON SATN MFR SERPL: 14 % (ref 15–46)
IRON SERPL-MCNC: 41 UG/DL (ref 61–157)
LIPASE SERPL-CCNC: 22 U/L (ref 13–60)
MAGNESIUM SERPL-MCNC: 1.5 MG/DL (ref 1.7–2.3)
MCH RBC QN AUTO: 25.1 PG (ref 26.5–33)
MCHC RBC AUTO-ENTMCNC: 31.3 G/DL (ref 31.5–36.5)
MCV RBC AUTO: 80 FL (ref 78–100)
MYCOPHENOLATE SERPL LC/MS/MS-MCNC: 2.04 MG/L (ref 1–3.5)
MYCOPHENOLATE-G SERPL LC/MS/MS-MCNC: 50.5 MG/L (ref 30–95)
PHOSPHATE SERPL-MCNC: 3.3 MG/DL (ref 2.5–4.5)
PLATELET # BLD AUTO: 143 10E3/UL (ref 150–450)
POTASSIUM SERPL-SCNC: 4.2 MMOL/L (ref 3.4–5.3)
RBC # BLD AUTO: 4.66 10E6/UL (ref 4.4–5.9)
SODIUM SERPL-SCNC: 139 MMOL/L (ref 135–145)
TME LAST DOSE: NORMAL H
TME LAST DOSE: NORMAL H
WBC # BLD AUTO: 5.3 10E3/UL (ref 4–11)

## 2025-05-14 PROCEDURE — 83540 ASSAY OF IRON: CPT | Performed by: PATHOLOGY

## 2025-05-14 PROCEDURE — 82150 ASSAY OF AMYLASE: CPT | Performed by: PATHOLOGY

## 2025-05-14 PROCEDURE — 87799 DETECT AGENT NOS DNA QUANT: CPT | Performed by: STUDENT IN AN ORGANIZED HEALTH CARE EDUCATION/TRAINING PROGRAM

## 2025-05-14 PROCEDURE — 99213 OFFICE O/P EST LOW 20 MIN: CPT | Performed by: STUDENT IN AN ORGANIZED HEALTH CARE EDUCATION/TRAINING PROGRAM

## 2025-05-14 PROCEDURE — 83735 ASSAY OF MAGNESIUM: CPT | Performed by: PATHOLOGY

## 2025-05-14 PROCEDURE — 80180 DRUG SCRN QUAN MYCOPHENOLATE: CPT | Performed by: STUDENT IN AN ORGANIZED HEALTH CARE EDUCATION/TRAINING PROGRAM

## 2025-05-14 PROCEDURE — 85027 COMPLETE CBC AUTOMATED: CPT | Performed by: PATHOLOGY

## 2025-05-14 PROCEDURE — 36415 COLL VENOUS BLD VENIPUNCTURE: CPT | Performed by: PATHOLOGY

## 2025-05-14 PROCEDURE — 80048 BASIC METABOLIC PNL TOTAL CA: CPT | Performed by: PATHOLOGY

## 2025-05-14 PROCEDURE — 82728 ASSAY OF FERRITIN: CPT | Performed by: PATHOLOGY

## 2025-05-14 PROCEDURE — 83550 IRON BINDING TEST: CPT | Performed by: PATHOLOGY

## 2025-05-14 PROCEDURE — 99000 SPECIMEN HANDLING OFFICE-LAB: CPT | Performed by: PATHOLOGY

## 2025-05-14 PROCEDURE — 84100 ASSAY OF PHOSPHORUS: CPT | Performed by: PATHOLOGY

## 2025-05-14 PROCEDURE — 83690 ASSAY OF LIPASE: CPT | Performed by: PATHOLOGY

## 2025-05-14 NOTE — TELEPHONE ENCOUNTER
Acute Transplant Nephrology Clinic Visit with Care Coordinator: 4 month follow up    Overdue for labs: DSA, due now   Patient informed of need for draw: Yes  Current lab schedule: every other week until 7 months post transplant   Patient voiced understanding: Yes    Medication compliant: Yes  Medication refill questions / concerns: Rudi denies    PJP prophylaxis up to date: Yes, bactrim daily        Sofiya Bryson RN       Anesthesia Post-op Note    Patient: Johnnie Dhillon  Procedure(s) Performed: DAVINCI RADICAL CYSTOPROSTATECTOMY ILEAL CONDUIT BILATERAL PELVIC LYMPHADENECTOMY (Abdomen)  Anesthesia type: General    Vitals Value Taken Time   Temp 36.5 05/14/25 1339   Pulse 73 05/14/25 1339   Resp 0 05/14/25 1339   SpO2 94 % 05/14/25 1339   /66 05/14/25 1330   Vitals shown include unfiled device data.      Patient Location: PACU Phase 1  Post-op Vital Signs:stable  Level of Consciousness: awake, alert and participates in exam  Respiratory Status: spontaneous ventilation and nasal cannula  Cardiovascular stable  Hydration: euvolemic  Pain Management: adequately controlled  Handoff: Handoff to receiving clinician was performed and questions were answered  Vomiting: none  Nausea: None  Airway Patency:patent  Post-op Assessment: awake, alert, appropriately conversant, or baseline, no complications, patient tolerated procedure well, no evidence of recall, regional anesthetic in place - able to participate, dentition, mouth, tongue, and oropharynx unchanged , moving all extremities and No Corneal Abrasion      No notable events documented.

## 2025-05-14 NOTE — PROGRESS NOTES
Anemia Management Note - Follow Up      SUBJECTIVE/OBJECTIVE:      Referred by Dr. Jabier Hernández on 2024  Primary Diagnosis: Anemia of Other Chronic Illness (D63.8)     Secondary Diagnosis: Organ or tissue replaced by transplant, kidney (Z94.0)  Date of Kidney Transplant:   Date of Pancreas Transplant: 2024  Hgb goal range: 9-10     Epo/Darbo: TBD;  Hgb >9.0  Iron regimen: Treat with IV Iron.   *Does not tolerate Oral Iron.   *completed Infed 1000mg on 24.      Labs : 2025  RX/TX plans : TBD     Recent JOSE use, transfusion, IV iron: NA  No history of stroke, MI, and blood clots or cancers     Contact: Consent to Communicate scanned on 2019.         Latest Ref Rng & Units 2024 2024 2024 2024 1/15/2025 3/11/2025 2025   Anemia   Hemoglobin 13.3 - 17.7 g/dL 7.7  8.0  7.0  8.8  10.0  11.3  11.7    IV Iron Dose    1000mg       TSAT 15 - 46 %    10  12  12     Ferritin 31 - 409 ng/mL    468  176  116       BP Readings from Last 3 Encounters:   25 121/84   01/15/25 (!) 156/82   24 125/79     Wt Readings from Last 2 Encounters:   25 95.7 kg (211 lb)   25 92.7 kg (204 lb 4.8 oz)           ASSESSMENT:    Hgb:at goal - continue to monitor  TSat: pending Ferritin: Pending   Goals Addressed    None         PLAN:  Hgb is stable. Due to have Iron Studies done. Was able to add-on to lab draw. Results are pending. If levels are low, Dr. Ricks would like Infed ordered again.     Orders needed to be renewed (for next follow-up date) in EPIC: None    Iron labs due:  Pending    Plan discussed with:  Elaina BARNES RN      NEXT FOLLOW-UP DATE:  5/15/25    Skye Davis RN   Anemia Services  [facility]  [email or web address]   Office : [phone]  Fax: [phone]

## 2025-05-14 NOTE — PATIENT INSTRUCTIONS
Patient Recommendations:  - Magnesium glycinate 200 mg twice a day.  - We will order IV iron close to home.  - Stop florinef, but continue checking blood pressure and watch for lightheadedness.    Transplant Patient Information  Your Post Transplant Coordinator is: Anneliese Qiu  For non urgent items, we encourage you to contact your coordinator/care team online via [email or web address]  You and your care team can also contact your transplant coordinator Monday - Friday, 8am - 5pm at [phone] (Option 2 to reach the coordinator or Option 4 to schedule an appointment).  After hours for urgent matters, please call [facility] at [phone].    Pancreas Transplant Lab Frequency Protocol  0 to 2 Months:  Twice weekly  2 to 4 Months:  Once weekly  4 to 7 Months:  Every other week  7 Months to 5 Years: Monthly

## 2025-05-14 NOTE — LETTER
5/14/2025      Michael Amin  [address]  [facility] [address]      Dear Colleague,    Thank you for referring your patient, Michael Amin, to the [facility] TRANSPLANT CLINIC. Please see a copy of my visit note below.    TRANSPLANT NEPHROLOGY CLINIC VISIT     Assessment & Plan  Magnesium glycinate 200 mg twice a day.  Following with anemia services. If repeat iron remains low, consider repeat Infed (iron dextrax) 1000 mg IV infusion once.  Stop florinef, but continue checking blood pressure and watch for lightheadedness.    # LDKT: CKD Stage 2 - Stable creatinine at baseline ~ 1.3-1.6, which is slightly higher than previous baseline closer to ~ 1.1-1.3 at time of pancreas transplant, likely due to higher tacrolimus levels.   - Baseline Creatinine: ~ 1.3-1.6   - Proteinuria: Normal (<0.2 grams)   - DSA Hx: No DSA   - Last cPRA: 0%   - BK Viremia: No   - Kidney Tx Biopsy Hx: No biopsy history.    # Pancreas Tx (IVETTE):    - Pancreatic Exocrine Drainage: Enteric drained     - Blood glucose: Near euglycemia      On insulin: No   - HbA1c: Last checked at time of transplant      Latest HbA1c: 7.8%   - Pancreatic enzymes: Stable   - DSA Hx: No DSA   - Pancreas Tx Biopsy Hx: No biopsy history    # Immunosuppression: Tacrolimus immediate release (goal 8-10) and Mycophenolate mofetil (dose 1000 mg every 12 hours)   - Induction with Recent Transplant:  High Intensity Protocol   - Continue with intensive monitoring of immunosuppression for efficacy and toxicity.   - Historical Changes in Immunosuppression: None   - Changes: Not at this time    # Infection Prevention:   Last CD4 Level: Not checked  - PJP: Sulfa/TMP (Bactrim) 400-80 mg every day.  - CMV: None, prophylaxis completed      - CMV IgG Ab High Risk Discordance (D+/R-) at time of transplant: No  Present CMV Serostatus: Positive  - EBV IgG Ab High Risk Discordance (D+/R-) at time of transplant: No  Present EBV Serostatus: Positive    #  Neurogenic Orthostatic Hypotension: Controlled;  Goal BP: > 100, but < 130 systolic   - Fludrocortisone 0.1 mg every other day.   - Changes: Yes - stop fludrocortisone.    # Anemia in Chronic Renal Disease: Hgb: Trend up      JOSE: No   - Iron studies: Low iron saturation; does not tolerate PO due to severe constipation, will plan for IV iron infusion    # Mineral Bone Disorder:    - Secondary renal hyperparathyroidism; PTH level: Normal (15-65 pg/ml)        On treatment: None  - Vitamin D; level: Low        On supplement: Yes  - Calcium; level: Normal        On supplement: No    # Electrolytes:   - Potassium; level: Normal        On supplement: No  - Magnesium; level: Normal        On supplement: Yes, adjust to mag gluconate 200mg BID due to borderline levels  - Bicarbonate; level: Normal        On supplement: Yes    # Obesity, Class I (BMI = 32.0): Weight is up ~ 12 lbs.  Patient was previously on semaglutide, but off since pancreas transplant 4 months ago.   - Recommend weight loss for overall health by increasing exercise and watching caloric intake.  - Patient may benefit from SGLT2 inhibitors.  However, because of their pancreas transplant, would avoid GLP1 agonists due to increased risk of pancreatitis.  - Recommend patient reconnect with Weight Management Program.    # Other Significant PMH:   - GERD: Controlled on H2 blocker.     # Skin Cancer Risk: Discussed sun protection and recommend regular follow up with Dermatology.    # Transplant History:  Etiology of Kidney Failure: Diabetes mellitus type 1  Tx: LDKT and IVETTE  Transplant: 11/2/2024 (Pancreas), 10/1/2019 (Kidney)  Significant transplant-related complications: None    Transplant Office Phone Number: [phone]    Assessment and plan was discussed with the patient and he voiced his understanding and agreement.    Return visit: Return for appointment scheduled for 08/08/2025.    Jose L Reyna MD    The longitudinal plan of care for the  diagnosis(es)/condition(s) as documented were addressed during this visit. Due to the added complexity in care, I will continue to support Rudi in the subsequent management and with ongoing continuity of care.      Chief Complaint  Mr. Amin is a 44 year old here for kidney transplant, pancreas transplant, and immunosuppression management.     History of Present Illness  Mr. Amin reports feeling good overall. He has no complaints, has been more active with the better weather, chain sawing down trees and splitting wood. He feels he doesn't eat enough during the day, causing overeating at night. Since last clinic visit, he's had no ER visits or hospitalization.    Chest pain or shortness of breath: No  Lower extremity swelling: No  Weight change: Continues to gain weight since transplant from 189 to 211 pounds.   Appetite change: Yes; Too good since stopping semaglutide.  Nausea and vomiting: No  Diarrhea: No.  Heartburn symptoms: No; Controlled on famotidine.  Fever, sweats or chills: No  Night sweats: No  Urinary complaints: No    Home BP: 120/80s without lightheadedness on standing.    Problem List  Patient Active Problem List   Diagnosis     Diabetes mellitus type 1 (H)     Dyslipidemia     Anemia in chronic kidney disease     Secondary renal hyperparathyroidism     Kidney replaced by transplant     Aftercare following organ transplant     Vitamin D deficiency     Hypomagnesemia     Kidney transplant rejection     Class 1 obesity with serious comorbidity and body mass index (BMI) of 33.0 to 33.9 in adult, unspecified obesity type     Shingles     Pancreas replaced by transplant (H)     Immunosuppressed status     Thrombocytopenia     Neurogenic orthostatic hypotension (H)     Need for pneumocystis prophylaxis     CKD (chronic kidney disease) stage 2, GFR 60-89 ml/min     GERD (gastroesophageal reflux disease)       Allergies  Allergies   Allergen Reactions     Cats Itching     Itchy eyes     Anti-Thymocyte  Glob (Rabbit)      Had reaction with rigors after steroid-free dose. No reaction with steroids.       Medications  Current Outpatient Medications   Medication Sig Dispense Refill     acetaminophen (TYLENOL) 325 MG tablet Take 2 tablets (650 mg) by mouth every 6 hours as needed (For optimal non-opioid multimodal pain management to improve pain control.). 60 tablet 0     alcohol swab prep pads Use to swab area of injection/zak as directed. 100 each 3     aspirin (ASA) 81 MG EC tablet Take 81 mg by mouth daily        atorvastatin (LIPITOR) 10 MG tablet Take 2 tablets (20 mg) by mouth daily. 60 tablet 11     blood glucose (NO BRAND SPECIFIED) lancets standard Use to test blood sugar 3 times daily or as directed. 100 each 11     blood glucose (NO BRAND SPECIFIED) test strip Use to test blood sugar 3 times daily or as directed. 100 strip 11     blood glucose monitoring (NO BRAND SPECIFIED) meter device kit Use to test blood sugar 3 times daily or as directed. 2 kit 0     CELLCEPT (BRAND) 250 MG capsule Take 4 capsules (1,000 mg) by mouth 2 times daily. 240 capsule 11     Continuous Glucose Sensor (DEXCOM G6 SENSOR) MISC CHANGE SENSOR EVERY 10 DAYS 9 each 3     Continuous Glucose Transmitter (DEXCOM G6 TRANSMITTER) MISC Change every 3 months 1 each 3     famotidine (PEPCID) 20 MG tablet Take 1 tablet (20 mg) by mouth 2 times daily. 60 tablet 3     fludrocortisone (FLORINEF) 0.1 MG tablet Take 1 tablet (0.1 mg) by mouth every other day. 45 tablet 1     magnesium oxide (MAG-OX) 400 MG tablet Take 2 tablets (800 mg) by mouth daily (with lunch). 60 tablet 4     senna-docusate (SENOKOT-S/PERICOLACE) 8.6-50 MG tablet Take 1-2 tablets by mouth 2 times daily as needed for constipation. 60 tablet 3     sulfamethoxazole-trimethoprim (BACTRIM) 400-80 MG tablet Take 1 tablet by mouth daily. 30 tablet 11     tacrolimus (GENERIC EQUIVALENT) 1 MG capsule Take 3 capsules (3 mg) by mouth 2 times daily. 180 capsule 11     vitamin D3  (CHOLECALCIFEROL) 50 mcg (2000 units) tablet Take 1 tablet (50 mcg) by mouth daily. 30 tablet 3     No current facility-administered medications for this visit.     There are no discontinued medications.    Physical Exam  Vital Signs: Wt 95.7 kg (211 lb)   BMI 33.05 kg/m      GENERAL APPEARANCE: alert and no distress  HENT: mouth without ulcers or lesions  RESP: lungs clear to auscultation - no rales, rhonchi or wheezes  CV: regular rhythm, normal rate, no rub, no murmur  EDEMA: trace LE edema bilaterally  ABDOMEN: soft, nondistended, nontender, bowel sounds normal; Obese  MS: extremities normal - no gross deformities noted, no evidence of inflammation in joints, no muscle tenderness  SKIN: no rash  TX KIDNEY: normal  DIALYSIS ACCESS: none    Data        Latest Ref Rng & Units 5/14/2025     8:20 AM 4/22/2025     7:52 AM 4/15/2025     7:42 AM   Renal   Sodium 135 - 145 mmol/L 139      Na (external) 137 - 145 mmol/L  139  138    K 3.4 - 5.3 mmol/L 4.2      K (external) 3.5 - 5.1 mmol/L  4.4  4.5    Cl 98 - 107 mmol/L 103  107  106    Cl (external) 98 - 107 mmol/L 103  107  106    CO2 22 - 29 mmol/L 25      CO2 (external) 22 - 30 mmol/L  25  25    Urea Nitrogen 6.0 - 20.0 mg/dL 24.1      BUN (external) 9 - 20 mg/dL  29  26    Creatinine 0.67 - 1.17 mg/dL 1.46      Cr (external) 0.66 - 1.25 mg/dL  1.60  1.40    Glucose 70 - 99 mg/dL 99      Glucose (external) 70 - 100 mg/dL  95  103    Calcium 8.8 - 10.4 mg/dL 10.0      Ca (external) 8.4 - 10.2 mg/dL  9.3  9.1    Magnesium 1.7 - 2.3 mg/dL 1.5      Mg (external) 1.6 - 2.3 mg/dL  1.6           Latest Ref Rng & Units 5/14/2025     8:20 AM 4/22/2025     7:52 AM 3/11/2025     9:46 AM   Bone Health   Phosphorus 2.5 - 4.5 mg/dL 3.3      Phos (external) 2.5 - 4.5 mg/dL  3.6     Parathyroid Hormone Intact 15 - 65 pg/mL   77          Latest Ref Rng & Units 5/14/2025     8:20 AM 4/22/2025     7:52 AM 4/15/2025     7:42 AM   Heme   WBC 4.0 - 11.0 10e3/uL 5.3      WBC (external)  4.2 - 9.1 10*9/L  4.1  3.8    Hgb 13.3 - 17.7 g/dL 11.7      Hgb (external) 13.7 - 17.5 g/dL  12.0  11.9    Plt 150 - 450 10e3/uL 143      Plt (external) 150 - 440 10*9/L  157  161          Latest Ref Rng & Units 11/4/2024     6:22 AM 11/2/2024     1:52 PM 9/26/2019     9:45 AM   Liver   AP 40 - 150 U/L  113  100    TBili <=1.2 mg/dL  0.5  0.2    ALT 0 - 70 U/L  13  42    AST 0 - 45 U/L  15  16    Tot Protein 6.4 - 8.3 g/dL  6.9  7.5    Albumin 3.4 - 5.0 g/dL   3.5    Albumin 3.5 - 5.2 g/dL 2.4  4.1           Latest Ref Rng & Units 5/14/2025     8:20 AM 4/22/2025     7:52 AM 4/15/2025     7:42 AM   Pancreas   Amylase 28 - 100 U/L 38      Amylase (external) 30 - 110 U/L  61  55    Lipase (Roche) 13 - 60 U/L 22      Lipase (external) 30 - 110 U/L  61  55          Latest Ref Rng & Units 5/14/2025     8:20 AM 4/22/2025     7:52 AM 4/15/2025     7:42 AM   Iron studies   Iron 61 - 157 ug/dL 41      Iron (external) 23 - 300 U/L  42  39    Iron Sat Index 15 - 46 % 14      Ferritin 31 - 409 ng/mL 104            Latest Ref Rng & Units 4/22/2025     7:52 AM 2/25/2025     8:02 AM 1/29/2025     7:43 AM   [facility] Txp Virology   BK Quant Log Ext log IU/mL Undetected  Undetected  Undetected    BK Quant Result Ext Undetected IU/mL Undetected  Undetected  Undetected    BK Quant Spec Ext   Plasma  Plasma      Failed to redirect to the Timeline version of the REVFS SmartLink.  Recent Labs   Lab Test 12/12/24  0855 12/30/24  1048 03/11/25  0945   DOSTAC 12/11/2024 12/29/2024 3/10/2025   TACROL 10.5 9.9 11.0     Recent Labs   Lab Test 11/21/19  0813 11/14/24  0759 03/11/25  0946   DOSMPA 11/20/19 1800 11/13/2024   7:00 PM 3/10/2025   9:45 PM   MPACID 0.91* 1.59 0.76*   MPAG 39.5 57.5 42.1        Again, thank you for allowing me to participate in the care of your patient.        Sincerely,        Jose L Reyna MD    Electronically signed

## 2025-05-15 ENCOUNTER — MYC MEDICAL ADVICE (OUTPATIENT)
Dept: CARE COORDINATION | Facility: CLINIC | Age: 45
End: 2025-05-15
Payer: COMMERCIAL

## 2025-05-15 PROBLEM — D63.1 ANEMIA OF CHRONIC RENAL FAILURE, STAGE 3A (H): Status: ACTIVE | Noted: 2025-05-15

## 2025-05-15 PROBLEM — N18.31 ANEMIA OF CHRONIC RENAL FAILURE, STAGE 3A (H): Status: ACTIVE | Noted: 2025-05-15

## 2025-05-15 LAB
BK VIRUS SPECIMEN TYPE: NORMAL
BKV DNA # SPEC NAA+PROBE: NOT DETECTED IU/ML

## 2025-05-15 NOTE — PROGRESS NOTES
Iron levels remain low.  Message sent to Rudi.  Would he like to get Infed at [facility] or closer to home.     Skye Davis RN   Anemia Services  [facility]  [email or web address]   Office : [phone]  Fax: [phone]

## 2025-05-17 ENCOUNTER — HEALTH MAINTENANCE LETTER (OUTPATIENT)
Age: 45
End: 2025-05-17

## 2025-05-27 ENCOUNTER — PATIENT OUTREACH (OUTPATIENT)
Dept: CARE COORDINATION | Facility: CLINIC | Age: 45
End: 2025-05-27
Payer: COMMERCIAL

## 2025-05-27 DIAGNOSIS — N18.31 ANEMIA OF CHRONIC RENAL FAILURE, STAGE 3A (H): Primary | ICD-10-CM

## 2025-05-27 DIAGNOSIS — D63.1 ANEMIA OF CHRONIC RENAL FAILURE, STAGE 3A (H): Primary | ICD-10-CM

## 2025-05-27 DIAGNOSIS — Z94.0 KIDNEY REPLACED BY TRANSPLANT: ICD-10-CM

## 2025-05-27 RX ORDER — HEPARIN SODIUM (PORCINE) LOCK FLUSH IV SOLN 100 UNIT/ML 100 UNIT/ML
5 SOLUTION INTRAVENOUS
OUTPATIENT
Start: 2025-05-27

## 2025-05-27 RX ORDER — DIPHENHYDRAMINE HYDROCHLORIDE 50 MG/ML
50 INJECTION, SOLUTION INTRAMUSCULAR; INTRAVENOUS
Start: 2025-05-27

## 2025-05-27 RX ORDER — ALBUTEROL SULFATE 0.83 MG/ML
2.5 SOLUTION RESPIRATORY (INHALATION)
OUTPATIENT
Start: 2025-05-27

## 2025-05-27 RX ORDER — HEPARIN SODIUM,PORCINE 10 UNIT/ML
5-20 VIAL (ML) INTRAVENOUS DAILY PRN
OUTPATIENT
Start: 2025-05-27

## 2025-05-27 RX ORDER — DIPHENHYDRAMINE HYDROCHLORIDE 50 MG/ML
25 INJECTION, SOLUTION INTRAMUSCULAR; INTRAVENOUS
Start: 2025-05-27

## 2025-05-27 RX ORDER — MEPERIDINE HYDROCHLORIDE 25 MG/ML
25 INJECTION INTRAMUSCULAR; INTRAVENOUS; SUBCUTANEOUS
OUTPATIENT
Start: 2025-05-27

## 2025-05-27 RX ORDER — METHYLPREDNISOLONE SODIUM SUCCINATE 40 MG/ML
40 INJECTION INTRAMUSCULAR; INTRAVENOUS
Start: 2025-05-27

## 2025-05-27 RX ORDER — ALBUTEROL SULFATE 90 UG/1
1-2 INHALANT RESPIRATORY (INHALATION)
Start: 2025-05-27

## 2025-05-27 RX ORDER — EPINEPHRINE 1 MG/ML
0.3 INJECTION, SOLUTION, CONCENTRATE INTRAVENOUS EVERY 5 MIN PRN
OUTPATIENT
Start: 2025-05-27

## 2025-05-27 NOTE — PROGRESS NOTES
"Anemia Management Note - Reminder     Follow-up with anemia management service:    Rudi returned call.  He would like to try and get Infed on 6/10 with his \"other\" appts. Orders placed and message sent to [facility] scheduling pool.          Latest Ref Rng & Units 11/14/2024 11/29/2024 12/12/2024 12/30/2024 1/15/2025 3/11/2025 5/14/2025   Anemia   Hemoglobin 13.3 - 17.7 g/dL 7.7  8.0  7.0  8.8  10.0  11.3  11.7    IV Iron Dose    1000mg       TSAT 15 - 46 %    10  12  12  14    Ferritin 31 - 409 ng/mL    468  176  116  104            Follow-up call date: 6/4/25    Skye Davis RN   Anemia Services  [facility]  [email or web address]   Office : [phone]  Fax: [phone]      "

## 2025-05-27 NOTE — PROGRESS NOTES
Anemia Management Note - Reminder     Follow-up with anemia management service:    2nd Digital [employer]andelion[employer] message sent to Rudi adkins the need for IV Iron.         Latest Ref Rng & Units 11/14/2024 11/29/2024 12/12/2024 12/30/2024 1/15/2025 3/11/2025 5/14/2025   Anemia   Hemoglobin 13.3 - 17.7 g/dL 7.7  8.0  7.0  8.8  10.0  11.3  11.7    IV Iron Dose    1000mg       TSAT 15 - 46 %    10  12  12  14    Ferritin 31 - 409 ng/mL    468  176  116  104          Follow-up call date: 6/10/25    Skye Davis RN   Anemia Services  [facility]  [email or web address]   Office : [phone]  Fax: [phone]

## 2025-05-27 NOTE — TELEPHONE ENCOUNTER
Opened in Error.     Skye Davis RN   Anemia Services  [facility]  [email or web address]   Office : [phone]  Fax: [phone]

## 2025-06-10 ENCOUNTER — OFFICE VISIT (OUTPATIENT)
Dept: DERMATOLOGY | Facility: CLINIC | Age: 45
End: 2025-06-10
Attending: PHYSICIAN ASSISTANT
Payer: COMMERCIAL

## 2025-06-10 ENCOUNTER — LAB (OUTPATIENT)
Dept: LAB | Facility: CLINIC | Age: 45
End: 2025-06-10
Payer: COMMERCIAL

## 2025-06-10 ENCOUNTER — RESULTS FOLLOW-UP (OUTPATIENT)
Dept: TRANSPLANT | Facility: CLINIC | Age: 45
End: 2025-06-10

## 2025-06-10 ENCOUNTER — INFUSION THERAPY VISIT (OUTPATIENT)
Dept: INFUSION THERAPY | Facility: CLINIC | Age: 45
End: 2025-06-10
Attending: STUDENT IN AN ORGANIZED HEALTH CARE EDUCATION/TRAINING PROGRAM
Payer: COMMERCIAL

## 2025-06-10 VITALS
DIASTOLIC BLOOD PRESSURE: 82 MMHG | TEMPERATURE: 98.2 F | SYSTOLIC BLOOD PRESSURE: 158 MMHG | HEART RATE: 72 BPM | RESPIRATION RATE: 16 BRPM | OXYGEN SATURATION: 98 %

## 2025-06-10 DIAGNOSIS — L30.4 INTERTRIGO: Primary | ICD-10-CM

## 2025-06-10 DIAGNOSIS — Z94.0 KIDNEY REPLACED BY TRANSPLANT: Primary | ICD-10-CM

## 2025-06-10 DIAGNOSIS — N18.31 ANEMIA OF CHRONIC RENAL FAILURE, STAGE 3A (H): ICD-10-CM

## 2025-06-10 DIAGNOSIS — T86.11 KIDNEY TRANSPLANT REJECTION: ICD-10-CM

## 2025-06-10 DIAGNOSIS — D22.9 MULTIPLE NEVI: ICD-10-CM

## 2025-06-10 DIAGNOSIS — N18.31 ANEMIA IN STAGE 3A CHRONIC KIDNEY DISEASE (H): ICD-10-CM

## 2025-06-10 DIAGNOSIS — D63.1 ANEMIA OF CHRONIC RENAL FAILURE, STAGE 3A (H): ICD-10-CM

## 2025-06-10 DIAGNOSIS — L82.1 SEBORRHEIC KERATOSES: ICD-10-CM

## 2025-06-10 DIAGNOSIS — Z20.828 CONTACT WITH AND (SUSPECTED) EXPOSURE TO OTHER VIRAL COMMUNICABLE DISEASES: ICD-10-CM

## 2025-06-10 DIAGNOSIS — Z98.890 OTHER SPECIFIED POSTPROCEDURAL STATES: ICD-10-CM

## 2025-06-10 DIAGNOSIS — D63.1 ANEMIA IN STAGE 3A CHRONIC KIDNEY DISEASE (H): ICD-10-CM

## 2025-06-10 DIAGNOSIS — Z94.0 KIDNEY REPLACED BY TRANSPLANT: ICD-10-CM

## 2025-06-10 DIAGNOSIS — L81.4 LENTIGINES: ICD-10-CM

## 2025-06-10 DIAGNOSIS — Z48.298 AFTERCARE FOLLOWING ORGAN TRANSPLANT: Primary | ICD-10-CM

## 2025-06-10 DIAGNOSIS — D23.9 DERMATOFIBROMA: ICD-10-CM

## 2025-06-10 DIAGNOSIS — L73.9 FOLLICULITIS: ICD-10-CM

## 2025-06-10 DIAGNOSIS — Z48.298 AFTERCARE FOLLOWING ORGAN TRANSPLANT: ICD-10-CM

## 2025-06-10 DIAGNOSIS — Z79.899 ENCOUNTER FOR LONG-TERM CURRENT USE OF MEDICATION: ICD-10-CM

## 2025-06-10 LAB
AMYLASE SERPL-CCNC: 45 U/L (ref 28–100)
ANION GAP SERPL CALCULATED.3IONS-SCNC: 11 MMOL/L (ref 7–15)
BUN SERPL-MCNC: 19.3 MG/DL (ref 6–20)
CALCIUM SERPL-MCNC: 9.3 MG/DL (ref 8.8–10.4)
CHLORIDE SERPL-SCNC: 107 MMOL/L (ref 98–107)
CREAT SERPL-MCNC: 1.37 MG/DL (ref 0.67–1.17)
EGFRCR SERPLBLD CKD-EPI 2021: 65 ML/MIN/1.73M2
ERYTHROCYTE [DISTWIDTH] IN BLOOD BY AUTOMATED COUNT: 14.7 % (ref 10–15)
FERRITIN SERPL-MCNC: 69 NG/ML (ref 31–409)
GLUCOSE SERPL-MCNC: 109 MG/DL (ref 70–99)
HCO3 SERPL-SCNC: 21 MMOL/L (ref 22–29)
HCT VFR BLD AUTO: 38.6 % (ref 40–53)
HGB BLD-MCNC: 11.8 G/DL (ref 13.3–17.7)
HOLD SPECIMEN: NORMAL
IRON BINDING CAPACITY (ROCHE): 277 UG/DL (ref 240–430)
IRON SATN MFR SERPL: 15 % (ref 15–46)
IRON SERPL-MCNC: 42 UG/DL (ref 61–157)
LIPASE SERPL-CCNC: 30 U/L (ref 13–60)
MAGNESIUM SERPL-MCNC: 1.5 MG/DL (ref 1.7–2.3)
MCH RBC QN AUTO: 24.8 PG (ref 26.5–33)
MCHC RBC AUTO-ENTMCNC: 30.6 G/DL (ref 31.5–36.5)
MCV RBC AUTO: 81 FL (ref 78–100)
PHOSPHATE SERPL-MCNC: 2.7 MG/DL (ref 2.5–4.5)
PLATELET # BLD AUTO: 165 10E3/UL (ref 150–450)
POTASSIUM SERPL-SCNC: 4.7 MMOL/L (ref 3.4–5.3)
RBC # BLD AUTO: 4.76 10E6/UL (ref 4.4–5.9)
SODIUM SERPL-SCNC: 139 MMOL/L (ref 135–145)
TACROLIMUS BLD-MCNC: 8.5 UG/L (ref 5–15)
TME LAST DOSE: NORMAL H
TME LAST DOSE: NORMAL H
WBC # BLD AUTO: 3.9 10E3/UL (ref 4–11)

## 2025-06-10 PROCEDURE — 85027 COMPLETE CBC AUTOMATED: CPT | Performed by: PATHOLOGY

## 2025-06-10 PROCEDURE — 83540 ASSAY OF IRON: CPT | Performed by: PATHOLOGY

## 2025-06-10 PROCEDURE — 83550 IRON BINDING TEST: CPT | Performed by: PATHOLOGY

## 2025-06-10 PROCEDURE — 250N000011 HC RX IP 250 OP 636: Performed by: STUDENT IN AN ORGANIZED HEALTH CARE EDUCATION/TRAINING PROGRAM

## 2025-06-10 PROCEDURE — 99000 SPECIMEN HANDLING OFFICE-LAB: CPT | Performed by: PATHOLOGY

## 2025-06-10 PROCEDURE — 80197 ASSAY OF TACROLIMUS: CPT | Performed by: STUDENT IN AN ORGANIZED HEALTH CARE EDUCATION/TRAINING PROGRAM

## 2025-06-10 PROCEDURE — 84100 ASSAY OF PHOSPHORUS: CPT | Performed by: PATHOLOGY

## 2025-06-10 PROCEDURE — 83690 ASSAY OF LIPASE: CPT | Performed by: PATHOLOGY

## 2025-06-10 PROCEDURE — 80048 BASIC METABOLIC PNL TOTAL CA: CPT | Performed by: PATHOLOGY

## 2025-06-10 PROCEDURE — 82728 ASSAY OF FERRITIN: CPT | Performed by: PATHOLOGY

## 2025-06-10 PROCEDURE — 96365 THER/PROPH/DIAG IV INF INIT: CPT

## 2025-06-10 PROCEDURE — 82150 ASSAY OF AMYLASE: CPT | Performed by: PATHOLOGY

## 2025-06-10 PROCEDURE — 258N000003 HC RX IP 258 OP 636: Performed by: STUDENT IN AN ORGANIZED HEALTH CARE EDUCATION/TRAINING PROGRAM

## 2025-06-10 PROCEDURE — 83735 ASSAY OF MAGNESIUM: CPT | Performed by: PATHOLOGY

## 2025-06-10 PROCEDURE — 36415 COLL VENOUS BLD VENIPUNCTURE: CPT | Performed by: PATHOLOGY

## 2025-06-10 RX ORDER — DIPHENHYDRAMINE HYDROCHLORIDE 50 MG/ML
50 INJECTION, SOLUTION INTRAMUSCULAR; INTRAVENOUS
Status: CANCELLED
Start: 2025-06-10

## 2025-06-10 RX ORDER — EPINEPHRINE 1 MG/ML
0.3 INJECTION, SOLUTION INTRAMUSCULAR; SUBCUTANEOUS EVERY 5 MIN PRN
Status: CANCELLED | OUTPATIENT
Start: 2025-06-10

## 2025-06-10 RX ORDER — METHYLPREDNISOLONE SODIUM SUCCINATE 40 MG/ML
40 INJECTION INTRAMUSCULAR; INTRAVENOUS
Status: CANCELLED
Start: 2025-06-10

## 2025-06-10 RX ORDER — HEPARIN SODIUM,PORCINE 10 UNIT/ML
5-20 VIAL (ML) INTRAVENOUS DAILY PRN
Status: CANCELLED | OUTPATIENT
Start: 2025-06-10

## 2025-06-10 RX ORDER — DIPHENHYDRAMINE HYDROCHLORIDE 50 MG/ML
25 INJECTION, SOLUTION INTRAMUSCULAR; INTRAVENOUS
Status: CANCELLED
Start: 2025-06-10

## 2025-06-10 RX ORDER — ALBUTEROL SULFATE 0.83 MG/ML
2.5 SOLUTION RESPIRATORY (INHALATION)
Status: CANCELLED | OUTPATIENT
Start: 2025-06-10

## 2025-06-10 RX ORDER — MEPERIDINE HYDROCHLORIDE 25 MG/ML
25 INJECTION INTRAMUSCULAR; INTRAVENOUS; SUBCUTANEOUS
Status: CANCELLED | OUTPATIENT
Start: 2025-06-10

## 2025-06-10 RX ORDER — ALBUTEROL SULFATE 90 UG/1
1-2 INHALANT RESPIRATORY (INHALATION)
Status: CANCELLED
Start: 2025-06-10

## 2025-06-10 RX ORDER — HEPARIN SODIUM (PORCINE) LOCK FLUSH IV SOLN 100 UNIT/ML 100 UNIT/ML
5 SOLUTION INTRAVENOUS
Status: CANCELLED | OUTPATIENT
Start: 2025-06-10

## 2025-06-10 RX ADMIN — SODIUM CHLORIDE 1000 MG: 0.9 INJECTION, SOLUTION INTRAVENOUS at 11:47

## 2025-06-10 ASSESSMENT — PAIN SCALES - GENERAL: PAINLEVEL_OUTOF10: NO PAIN (0)

## 2025-06-10 NOTE — LETTER
6/10/2025       RE: Michael Amin  [address]  [facility] [place] [facility] [address]     Dear Colleague,    Thank you for referring your patient, Michael Amin, to the [facility] DERMATOLOGY CLINIC [place] [facility]. Please see a copy of my visit note below.    [facility] Dermatology Note  Encounter Date: Manuel 10, 2025  Office Visit     Dermatology Problem List:  1. Kidney transplant 10/2019 due to type 1 diabetes.  2. Family history of melanoma (uncle, ).  3. Multiple benign nevi.  - Photos 9/15/2020  - Monitor larger nevi on abdomen, 1.7 cm x 1.5 cm  4. Tinea corporis, left hand/forearm.  - KOH positive 9/15/2020  - Terbinafine cream  5. Intradermal melanocytic nevus, right buttock, s/p bx 24  6. Folliculitis  - current tx: benzoyl peroxide 5% wash, clindamycin 1% lotion  7. Intertrigo   - Zeasorb-AF powder     Social history: Works as a teacher    ____________________________________________    Assessment & Plan:    # Chronic immunosuppression    # Folliculitis   - recommend OTC 4 or 5% benzoyl peroxide wash   - Start clindamycin 1% lotion twice daily.   - dicussed the option for oral antibiotics if no improvement     # Polymorphous light eruption   - discussed etiology   - Sun protection: Counseled SPF 30+ sunscreen, UPF clothing, sun avoidance, tanning bed avoidance.  - recommended Coppertone Sport SPF50 sunscreen for the body    # Intertrigo  - recommend OTC Zeasorb-AF powder    # LTM  - larger nevus on abdomen, 1.7 cm x 1.5 cm, consistent with photos dated 09/15/2020.   - right distal dorsal foot- 2 mm brown macule with  hypopigmented center and even pigmented network at the periphery     # Reassurance provided for benign lesions not treated today including , solar lentigines, dermatofibroma, seborrheic keratoses, and banal-appearing melanocytic nevi.    Procedures Performed:   None    Follow-up: 1 year(s)  in-person, or earlier for new or changing lesions    Staff and Scribe:   I, BETHANY OHARA, am serving as a scribe; to document services personally performed by Abdullahi Darden MD -based on data collection and the provider's statements to me.    Staff attestation:  The documentation recorded by the scribe accurately reflects the services I personally performed and the decisions I personally made. I have made edits where needed.    Abdullahi Darden MD  Staff Dermatologist and Dermatopathologist  , Department of Dermatology   ____________________________________________    CC: Skin Check (FBSE: /Areas of concern = back of neck, one spot left inguinal fold)    HPI:  Mr. Michael Amin is a(n) 44 year old male who presents today as a return patient for FBSE. He has a spot of concern on his feet, not sure since when that spot has been there. Another area of concern on the back of the neck that is itchy which he noticed since summer started.  Another spot on the left inguinal fold region which he noticed couple of weeks ago, not sure if it was a bug bite that was scratched.     Patient is otherwise feeling well, without additional skin concerns.      Labs Reviewed:  Collection date: 6/17/24  Case: [id number]  Final Diagnosis   Right buttock:  - Intradermal melanocytic nevus        Physical Exam:  Vitals: There were no vitals taken for this visit.  SKIN: Full skin, which includes the head/face, both arms, chest, back, abdomen,both legs, genitalia and/or groin buttocks, digits and/or nails, was examined.  - Right distal dorsal foot- 2 mm brown macule with  hypopigmented center and even pigmented network at the periphery   - Nevus on abdomen measures 1.7 cm x 1.5 cm. Consistent with photos dated 09/15/2020.   - Multiple regular brown pigmented macules and papules are identified on the head/neck, trunk, extremities.   - Scattered brown macules on sun exposed areas.  - There are waxy stuck on tan to brown  papules on the head/neck, trunk, extremities.  - No other lesions of concern on areas examined.     Medications:  Current Outpatient Medications   Medication Sig Dispense Refill     acetaminophen (TYLENOL) 325 MG tablet Take 2 tablets (650 mg) by mouth every 6 hours as needed (For optimal non-opioid multimodal pain management to improve pain control.). 60 tablet 0     alcohol swab prep pads Use to swab area of injection/zak as directed. 100 each 3     aspirin (ASA) 81 MG EC tablet Take 81 mg by mouth daily        atorvastatin (LIPITOR) 10 MG tablet Take 2 tablets (20 mg) by mouth daily. 60 tablet 11     blood glucose (NO BRAND SPECIFIED) lancets standard Use to test blood sugar 3 times daily or as directed. 100 each 11     blood glucose (NO BRAND SPECIFIED) test strip Use to test blood sugar 3 times daily or as directed. 100 strip 11     blood glucose monitoring (NO BRAND SPECIFIED) meter device kit Use to test blood sugar 3 times daily or as directed. 2 kit 0     CELLCEPT (BRAND) 250 MG capsule Take 4 capsules (1,000 mg) by mouth 2 times daily. 240 capsule 11     Continuous Glucose Sensor (DEXCOM G6 SENSOR) MISC CHANGE SENSOR EVERY 10 DAYS 9 each 3     Continuous Glucose Transmitter (DEXCOM G6 TRANSMITTER) MISC Change every 3 months 1 each 3     famotidine (PEPCID) 20 MG tablet Take 1 tablet (20 mg) by mouth 2 times daily. 60 tablet 3     fludrocortisone (FLORINEF) 0.1 MG tablet Take 1 tablet (0.1 mg) by mouth every other day. 45 tablet 1     magnesium oxide (MAG-OX) 400 MG tablet Take 2 tablets (800 mg) by mouth daily (with lunch). 60 tablet 4     senna-docusate (SENOKOT-S/PERICOLACE) 8.6-50 MG tablet Take 1-2 tablets by mouth 2 times daily as needed for constipation. 60 tablet 3     sulfamethoxazole-trimethoprim (BACTRIM) 400-80 MG tablet Take 1 tablet by mouth daily. 30 tablet 11     tacrolimus (GENERIC EQUIVALENT) 1 MG capsule Take 3 capsules (3 mg) by mouth 2 times daily. 180 capsule 11     vitamin D3  (CHOLECALCIFEROL) 50 mcg (2000 units) tablet Take 1 tablet (50 mcg) by mouth daily. 30 tablet 3     No current facility-administered medications for this visit.      Past Medical History:   Patient Active Problem List   Diagnosis     Diabetes mellitus type 1 (H)     Dyslipidemia     Anemia in chronic kidney disease     Secondary renal hyperparathyroidism     Kidney replaced by transplant     Aftercare following organ transplant     Vitamin D deficiency     Hypomagnesemia     Kidney transplant rejection     Class 1 obesity with serious comorbidity and body mass index (BMI) of 33.0 to 33.9 in adult, unspecified obesity type     Shingles     Pancreas replaced by transplant (H)     Immunosuppressed status     Thrombocytopenia     Neurogenic orthostatic hypotension (H)     Need for pneumocystis prophylaxis     CKD (chronic kidney disease) stage 2, GFR 60-89 ml/min     GERD (gastroesophageal reflux disease)     Anemia of chronic renal failure, stage 3a (H)     Past Medical History:   Diagnosis Date     Anemia in chronic kidney disease      Dyslipidemia      ESRD (end stage renal disease) on dialysis (H)      Hyperlipidemia      Hypertension      Hypomagnesemia 10/07/2019     Obese      Secondary hyperparathyroidism      Shingles 12/11/2023 12/11/23: Left Axilla     Status post coronary angiogram 6/7/2024     Syncope 11/8/2024     Type 1 diabetes (H)         CC Julieta Biswas PA-C  [facility]  [address] [place], [place] [id number] on close of this encounter.      Again, thank you for allowing me to participate in the care of your patient.      Sincerely,    Abdullahi Darden MD

## 2025-06-10 NOTE — PATIENT INSTRUCTIONS
- 4 or 5% benzoyl peroxide wash     # Intertrigo  - Zeasorb-AF powder    Sunscreen:   Coppertone Sport SPF 50

## 2025-06-10 NOTE — PROGRESS NOTES
[facility] Dermatology Note  Encounter Date: Manuel 10, 2025  Office Visit     Dermatology Problem List:  1. Kidney transplant 10/2019 due to type 1 diabetes.  2. Family history of melanoma (uncle, ).  3. Multiple benign nevi.  - Photos 9/15/2020  - Monitor larger nevi on abdomen, 1.7 cm x 1.5 cm  4. Tinea corporis, left hand/forearm.  - KOH positive 9/15/2020  - Terbinafine cream  5. Intradermal melanocytic nevus, right buttock, s/p bx 24  6. Folliculitis  - current tx: benzoyl peroxide 5% wash, clindamycin 1% lotion  7. Intertrigo   - Zeasorb-AF powder     Social history: Works as a teacher    ____________________________________________    Assessment & Plan:    # Chronic immunosuppression    # Folliculitis   - recommend OTC 4 or 5% benzoyl peroxide wash   - Start clindamycin 1% lotion twice daily.   - dicussed the option for oral antibiotics if no improvement     # Polymorphous light eruption   - discussed etiology   - Sun protection: Counseled SPF 30+ sunscreen, UPF clothing, sun avoidance, tanning bed avoidance.  - recommended Coppertone Sport SPF50 sunscreen for the body    # Intertrigo  - recommend OTC Zeasorb-AF powder    # LTM  - larger nevi on abdomen, 1.7 cm x 1.5 cm, consistent with photos dated 09/15/2020.   - right distal dorsal foot- 2 mm brown macule with  hypopigmented center and even pigmented network at the periphery     # Reassurance provided for benign lesions not treated today including , solar lentigines, dermatofibroma, seborrheic keratoses, and banal-appearing melanocytic nevi.      Procedures Performed:   None    Follow-up: 1 year(s) in-person, or earlier for new or changing lesions    Staff and Scribe:   BETHANY ALMANZAR, am serving as a scribe; to document services personally performed by Abdullahi Darden MD -based on data collection and the provider's statements to me.    ***  ____________________________________________    CC: Skin Check (FBSE: /Areas of  concern = back of neck, one spot left inguinal fold)    HPI:  Mr. Michael Amin is a(n) 44 year old male who presents today as a return patient for FBSE. He has a spot of concern on his feet, not sure since when that spot has been there. Another area of concern on the back of the neck that is itchy which he noticed since summer started.  Another spot on the left inguinal fold region which he noticed couple of weeks ago, not sure if it was a bug bite that was scratched.     Patient is otherwise feeling well, without additional skin concerns.      Labs Reviewed:  Collection date: 6/17/24  Case: [id number]  Final Diagnosis   Right buttock:  - Intradermal melanocytic nevus        Physical Exam:  Vitals: There were no vitals taken for this visit.  SKIN: Full skin, which includes the head/face, both arms, chest, back, abdomen,both legs, genitalia and/or groin buttocks, digits and/or nails, was examined.  - Right distal dorsal foot- 2 mm brown macule with  hypopigmented center and even pigmented network at the periphery   - Nevus on abdomen measures 1.7 cm x 1.5 cm. Consistent with photos dated 09/15/2020.   - Multiple regular brown pigmented macules and papules are identified on the head/neck, trunk, extremities.   - Scattered brown macules on sun exposed areas.  - There are waxy stuck on tan to brown papules on the head/neck, trunk, extremities.  - No other lesions of concern on areas examined.     Medications:  Current Outpatient Medications   Medication Sig Dispense Refill    acetaminophen (TYLENOL) 325 MG tablet Take 2 tablets (650 mg) by mouth every 6 hours as needed (For optimal non-opioid multimodal pain management to improve pain control.). 60 tablet 0    alcohol swab prep pads Use to swab area of injection/zak as directed. 100 each 3    aspirin (ASA) 81 MG EC tablet Take 81 mg by mouth daily       atorvastatin (LIPITOR) 10 MG tablet Take 2 tablets (20 mg) by mouth daily. 60 tablet 11    blood glucose (NO  BRAND SPECIFIED) lancets standard Use to test blood sugar 3 times daily or as directed. 100 each 11    blood glucose (NO BRAND SPECIFIED) test strip Use to test blood sugar 3 times daily or as directed. 100 strip 11    blood glucose monitoring (NO BRAND SPECIFIED) meter device kit Use to test blood sugar 3 times daily or as directed. 2 kit 0    CELLCEPT (BRAND) 250 MG capsule Take 4 capsules (1,000 mg) by mouth 2 times daily. 240 capsule 11    Continuous Glucose Sensor (DEXCOM G6 SENSOR) MISC CHANGE SENSOR EVERY 10 DAYS 9 each 3    Continuous Glucose Transmitter (DEXCOM G6 TRANSMITTER) MISC Change every 3 months 1 each 3    famotidine (PEPCID) 20 MG tablet Take 1 tablet (20 mg) by mouth 2 times daily. 60 tablet 3    fludrocortisone (FLORINEF) 0.1 MG tablet Take 1 tablet (0.1 mg) by mouth every other day. 45 tablet 1    magnesium oxide (MAG-OX) 400 MG tablet Take 2 tablets (800 mg) by mouth daily (with lunch). 60 tablet 4    senna-docusate (SENOKOT-S/PERICOLACE) 8.6-50 MG tablet Take 1-2 tablets by mouth 2 times daily as needed for constipation. 60 tablet 3    sulfamethoxazole-trimethoprim (BACTRIM) 400-80 MG tablet Take 1 tablet by mouth daily. 30 tablet 11    tacrolimus (GENERIC EQUIVALENT) 1 MG capsule Take 3 capsules (3 mg) by mouth 2 times daily. 180 capsule 11    vitamin D3 (CHOLECALCIFEROL) 50 mcg (2000 units) tablet Take 1 tablet (50 mcg) by mouth daily. 30 tablet 3     No current facility-administered medications for this visit.      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)    Immunosuppressed status    Thrombocytopenia    Neurogenic orthostatic hypotension (H)    Need for pneumocystis prophylaxis    CKD (chronic kidney disease) stage 2, GFR 60-89 ml/min    GERD (gastroesophageal reflux disease)    Anemia of chronic renal failure, stage 3a (H)     Past Medical History:   Diagnosis Date    Anemia in chronic kidney disease     Dyslipidemia     ESRD (end stage renal disease) on dialysis (H)     Hyperlipidemia     Hypertension     Hypomagnesemia 10/07/2019    Obese     Secondary hyperparathyroidism     Shingles 12/11/2023 12/11/23: Left Axilla    Status post coronary angiogram 6/7/2024    Syncope 11/8/2024    Type 1 diabetes (H)         CC Julieta Biswas PA-C  [facility]  [address] [place] [id number] on close of this encounter.

## 2025-06-10 NOTE — NURSING NOTE
Dermatology Rooming Note    Michael Amin's goals for this visit include:   Chief Complaint   Patient presents with    Skin Check     FBSE:   Areas of concern = back of neck, one spot left inguinal fold     Rosy Foley LPN

## 2025-06-10 NOTE — PATIENT INSTRUCTIONS
Dear Michael Amin    Thank you for choosing [facility] ([facility] for your infusion.  The following information is a summary of our appointment as well as important reminders.          If you are a transplant patient and require transplant education, please click on this link: [email or web address].    If you have any questions on your upcoming Specialty Infusion appointments, please call scheduling at [phone].  It was a pleasure taking care of you today.    Sincerely,    [facility]  [address] [place] [address]  [id number]  Phone:  [phone]

## 2025-06-10 NOTE — PROGRESS NOTES
Infusion Nursing Note:  Michael Amin presents today for Infed.    Patient seen by provider today: No   present during visit today: Not Applicable.    Note: INFED infused as ordered.    Administrations This Visit       iron dextran complex (INFED) 1,000 mg in sodium chloride 0.9 % 570 mL intermittent infusion       Admin Date  06/10/2025 Action  $New Bag Dose  1,000 mg Rate  570 mL/hr Route  Intravenous Documented By  Chloe Katz, RN                     Intravenous Access:  Peripheral IV placed.    Treatment Conditions:  None.      Post Infusion Assessment:  Patient tolerated infusion without incident.  Blood return noted pre and post infusion.  Site patent and intact, free from redness, edema or discomfort.  No evidence of extravasations.  Access discontinued per protocol.       Discharge Plan:   Discharge instructions reviewed with: Patient.  Patient and/or family verbalized understanding of discharge instructions and all questions answered.  AVS to patient via [facility].  Patient will return to his provider as needed for next appointment.   Patient discharged in stable condition accompanied by: self.  Departure Mode: Ambulatory.    BP (!) 158/82 (BP Location: Left arm, Patient Position: Semi-Amezquita's, Cuff Size: Adult Large)   Pulse 72   Temp 98.2  F (36.8  C) (Oral)   Resp 16   SpO2 98%      Chloe Katz, LILLI

## 2025-06-11 ENCOUNTER — PATIENT OUTREACH (OUTPATIENT)
Dept: CARE COORDINATION | Facility: CLINIC | Age: 45
End: 2025-06-11
Payer: COMMERCIAL

## 2025-06-11 DIAGNOSIS — Z99.2 TYPE 1 DIABETES MELLITUS WITH CHRONIC KIDNEY DISEASE ON CHRONIC DIALYSIS (H): ICD-10-CM

## 2025-06-11 DIAGNOSIS — N18.6 TYPE 1 DIABETES MELLITUS WITH CHRONIC KIDNEY DISEASE ON CHRONIC DIALYSIS (H): ICD-10-CM

## 2025-06-11 DIAGNOSIS — E10.22 TYPE 1 DIABETES MELLITUS WITH CHRONIC KIDNEY DISEASE ON CHRONIC DIALYSIS (H): ICD-10-CM

## 2025-06-11 NOTE — PROGRESS NOTES
Anemia Management Note - Follow Up      SUBJECTIVE/OBJECTIVE:    Referred by Dr. Jabier Hernández on 2024  Primary Diagnosis: Anemia of Other Chronic Illness (D63.8)     Secondary Diagnosis: Organ or tissue replaced by transplant, kidney (Z94.0)  Date of Kidney Transplant:   Date of Pancreas Transplant: 2024  Hgb goal range: 9-10     Epo/Darbo: TBD;  Hgb >9.0  Iron regimen: Treat with IV Iron.   *Does not tolerate Oral Iron.   *completed Infed 1000mg on 6/10/25.  *completed Infed 1000mg on 24.      Labs : 2025  RX/TX plans : TBD     Recent JOSE use, transfusion, IV iron: NA  No history of stroke, MI, and blood clots or cancers     Contact: Consent to Communicate scanned on 2019.         Latest Ref Rng & Units 2024 2024 2024 1/15/2025 3/11/2025 2025 6/10/2025   Anemia   HGB Goal        9 - 10   Hemoglobin 13.3 - 17.7 g/dL 8.0  7.0  8.8  10.0  11.3  11.7  11.8    IV Iron Dose   1000mg     1000mg   TSAT 15 - 46 %   10  12  12  14  15    Ferritin 31 - 409 ng/mL   468  176  116  104  69      BP Readings from Last 3 Encounters:   06/10/25 (!) 158/82   25 121/84   01/15/25 (!) 156/82     Wt Readings from Last 2 Encounters:   25 95.7 kg (211 lb)   25 92.7 kg (204 lb 4.8 oz)           ASSESSMENT:    Hgb:at goal - continue to monitor  TSat: Due for iron studies 4 weeks after last IV iron infusion Ferritin: Due for iron studies 4 weeks after last IV iron infusion        PLAN:  Check iron studies in 4 week(s).    Orders needed to be renewed (for next follow-up date) in EPIC: None    Iron labs due:  4 weeks.     Plan discussed with:  No call, chart review       NEXT FOLLOW-UP DATE:  25    Skye Davis RN   Anemia Services  [facility]  [email or web address]   Office : [phone]  Fax: [phone]

## 2025-06-12 RX ORDER — PROCHLORPERAZINE 25 MG/1
SUPPOSITORY RECTAL
Qty: 1 EACH | Refills: 0 | Status: SHIPPED | OUTPATIENT
Start: 2025-06-12

## 2025-06-12 NOTE — TELEPHONE ENCOUNTER
Last Written Prescription:  Continuous Glucose Transmitter (DEXCOM G6 TRANSMITTER) MISC 1 each 3 6/4/2024     ----------------------  Last Visit Date: 6/4/24  Future Visit Date: none  ----------------------      Refill decision: Medication refilled per  Medication Refill in Ambulatory Care  policy.   [x]  If no future appointment scheduled: Route to Clinic Coordinators to contact the pt for appointment.  A one-time only 90-day franko refill given, pt overdue for apptIla OCHOA sent to care team.       Request from pharmacy:  Requested Prescriptions   Pending Prescriptions Disp Refills    Continuous Glucose Transmitter (DEXCOM G6 TRANSMITTER) MISC [Pharmacy Med Name: DEXCOM G6 TRANSMIT  MIS]  0     Sig: CHANGE EVERY 3 MONTHS       Diabetic Supplies Protocol Failed - 6/12/2025 11:09 AM        Failed - Recent (12 month) or future (90 days) visit with authorizing provider's specialty (provided they have been seen in the past 15 months)     The patient must have completed an in-person or virtual visit within the past 12 months or has a future visit scheduled within the next 90 days with the authorizing provider s specialty.  Urgent care and e-visits do not qualify as an office visit for this protocol.          Passed - Medication is active on med list and the sig matches. RN to manually verify dose and sig if red X/fail.     If the protocol passes (green check), you do not need to verify med dose and sig.    A prescription matches if they are the same clinical intention.    For Example: once daily and every morning are the same.    The protocol can not identify upper and lower case letters as matching and will fail.     For Example: Take 1 tablet (50 mg) by mouth daily     TAKE 1 TABLET (50 MG) BY MOUTH DAILY    For all fails (red x), verify dose and sig.    If the refill does match what is on file, the RN can still proceed to approve the refill request.       If they do not match, route to the appropriate provider.              Passed - Medication indicated for associated diagnosis        Passed - Patient is 18 years of age or older

## 2025-06-16 ENCOUNTER — LAB REQUISITION (OUTPATIENT)
Dept: LAB | Facility: CLINIC | Age: 45
End: 2025-06-16
Payer: COMMERCIAL

## 2025-06-17 ENCOUNTER — RESULTS FOLLOW-UP (OUTPATIENT)
Dept: TRANSPLANT | Facility: CLINIC | Age: 45
End: 2025-06-17

## 2025-06-17 DIAGNOSIS — T86.11 KIDNEY TRANSPLANT REJECTION: ICD-10-CM

## 2025-06-18 RX ORDER — CLINDAMYCIN PHOSPHATE 10 UG/ML
LOTION TOPICAL 2 TIMES DAILY
Qty: 60 ML | Refills: 11 | Status: SHIPPED | OUTPATIENT
Start: 2025-06-18

## 2025-06-24 DIAGNOSIS — Z94.83 PANCREAS REPLACED BY TRANSPLANT (H): ICD-10-CM

## 2025-06-24 DIAGNOSIS — E78.5 DYSLIPIDEMIA: ICD-10-CM

## 2025-06-24 DIAGNOSIS — Z94.0 KIDNEY REPLACED BY TRANSPLANT: ICD-10-CM

## 2025-06-24 DIAGNOSIS — T86.11 KIDNEY TRANSPLANT REJECTION: ICD-10-CM

## 2025-06-24 RX ORDER — SULFAMETHOXAZOLE AND TRIMETHOPRIM 400; 80 MG/1; MG/1
1 TABLET ORAL DAILY
Qty: 30 TABLET | Refills: 11 | Status: SHIPPED | OUTPATIENT
Start: 2025-06-24

## 2025-06-24 RX ORDER — ATORVASTATIN CALCIUM 10 MG/1
20 TABLET, FILM COATED ORAL DAILY
Qty: 60 TABLET | Refills: 11 | Status: SHIPPED | OUTPATIENT
Start: 2025-06-24

## 2025-06-24 RX ORDER — MYCOPHENOLATE MOFETIL 250 MG/1
1000 CAPSULE ORAL 2 TIMES DAILY
Qty: 240 CAPSULE | Refills: 11 | Status: SHIPPED | OUTPATIENT
Start: 2025-06-24

## 2025-06-24 RX ORDER — TACROLIMUS 1 MG/1
3 CAPSULE ORAL 2 TIMES DAILY
Qty: 180 CAPSULE | Refills: 11 | Status: SHIPPED | OUTPATIENT
Start: 2025-06-24

## 2025-06-24 NOTE — TELEPHONE ENCOUNTER
Per patient;  Would like to have these medications refilled for 90 day.    Atorvastatin 10MG    Cellcept 250MG    Sulfamethoxazole - Trimethoprim 400-80MG    Tacrolimus 1MG    [facility] [place] [place]

## 2025-07-01 ENCOUNTER — RESULTS FOLLOW-UP (OUTPATIENT)
Dept: TRANSPLANT | Facility: CLINIC | Age: 45
End: 2025-07-01

## 2025-07-01 DIAGNOSIS — T86.11 KIDNEY TRANSPLANT REJECTION: ICD-10-CM

## 2025-07-09 ENCOUNTER — PATIENT OUTREACH (OUTPATIENT)
Dept: CARE COORDINATION | Facility: CLINIC | Age: 45
End: 2025-07-09
Payer: COMMERCIAL

## 2025-07-09 NOTE — PROGRESS NOTES
Anemia Management Note - Reminder     Follow-up with anemia management service:    Rudi is due to have his Anemia Labs drawn, 4 weeks post Iron Infusion.  Hgb is stable. Labs are scheduled for 8/8/25.        Latest Ref Rng & Units 11/29/2024 12/12/2024 12/30/2024 1/15/2025 3/11/2025 5/14/2025 6/10/2025   Anemia   HGB Goal        9 - 10   Hemoglobin 13.3 - 17.7 g/dL 8.0  7.0  8.8  10.0  11.3  11.7  11.8    IV Iron Dose   1000mg     1000mg   TSAT 15 - 46 %   10  12  12  14  15    Ferritin 31 - 409 ng/mL   468  176  116  104  69          Follow-up call date: 8/11/25    Skye Davis RN   Anemia Services  [facility]  [email or web address]   Office : [phone]  Fax: [phone]

## 2025-08-08 PROCEDURE — 99000 SPECIMEN HANDLING OFFICE-LAB: CPT | Performed by: PATHOLOGY

## 2025-08-08 PROCEDURE — 80180 DRUG SCRN QUAN MYCOPHENOLATE: CPT | Performed by: STUDENT IN AN ORGANIZED HEALTH CARE EDUCATION/TRAINING PROGRAM

## 2025-08-08 PROCEDURE — 83036 HEMOGLOBIN GLYCOSYLATED A1C: CPT | Performed by: STUDENT IN AN ORGANIZED HEALTH CARE EDUCATION/TRAINING PROGRAM

## 2025-08-08 PROCEDURE — 87799 DETECT AGENT NOS DNA QUANT: CPT | Performed by: STUDENT IN AN ORGANIZED HEALTH CARE EDUCATION/TRAINING PROGRAM

## 2025-08-11 ENCOUNTER — PATIENT OUTREACH (OUTPATIENT)
Dept: CARE COORDINATION | Facility: CLINIC | Age: 45
End: 2025-08-11
Payer: COMMERCIAL

## 2025-08-21 ENCOUNTER — TELEPHONE (OUTPATIENT)
Dept: TRANSPLANT | Facility: CLINIC | Age: 45
End: 2025-08-21
Payer: COMMERCIAL

## 2025-08-21 ENCOUNTER — TRANSFERRED RECORDS (OUTPATIENT)
Dept: HEALTH INFORMATION MANAGEMENT | Facility: CLINIC | Age: 45
End: 2025-08-21
Payer: COMMERCIAL

## 2025-08-21 DIAGNOSIS — T86.11 KIDNEY TRANSPLANT REJECTION: ICD-10-CM

## 2025-08-21 LAB — RETINOPATHY: POSITIVE

## 2025-08-27 ENCOUNTER — OFFICE VISIT (OUTPATIENT)
Dept: OPHTHALMOLOGY | Facility: CLINIC | Age: 45
End: 2025-08-27
Attending: OPHTHALMOLOGY
Payer: COMMERCIAL

## 2025-08-27 DIAGNOSIS — E10.3599: ICD-10-CM

## 2025-08-27 DIAGNOSIS — H43.11 VITREOUS HEMORRHAGE, RIGHT (H): Primary | ICD-10-CM

## 2025-08-27 PROCEDURE — 92134 CPTRZ OPH DX IMG PST SGM RTA: CPT | Performed by: OPHTHALMOLOGY

## 2025-08-27 PROCEDURE — 92250 FUNDUS PHOTOGRAPHY W/I&R: CPT | Mod: 26 | Performed by: OPHTHALMOLOGY

## 2025-08-27 PROCEDURE — 67028 INJECTION EYE DRUG: CPT | Mod: 50 | Performed by: OPHTHALMOLOGY

## 2025-08-27 PROCEDURE — 2022F DILAT RTA XM EVC RTNOPTHY: CPT | Performed by: OPHTHALMOLOGY

## 2025-08-27 PROCEDURE — 99207 OCT RETINA SPECTRALIS OU (BOTH EYE): CPT | Mod: 26 | Performed by: OPHTHALMOLOGY

## 2025-08-27 PROCEDURE — 250N000011 HC RX IP 250 OP 636: Performed by: OPHTHALMOLOGY

## 2025-08-27 PROCEDURE — 92235 FLUORESCEIN ANGRPH MLTIFRAME: CPT | Performed by: OPHTHALMOLOGY

## 2025-08-27 PROCEDURE — 67028 INJECTION EYE DRUG: CPT | Mod: RT | Performed by: OPHTHALMOLOGY

## 2025-08-27 PROCEDURE — 99213 OFFICE O/P EST LOW 20 MIN: CPT | Performed by: OPHTHALMOLOGY

## 2025-08-27 PROCEDURE — 92235 FLUORESCEIN ANGRPH MLTIFRAME: CPT | Mod: 26 | Performed by: OPHTHALMOLOGY

## 2025-08-27 PROCEDURE — 92250 FUNDUS PHOTOGRAPHY W/I&R: CPT | Performed by: OPHTHALMOLOGY

## 2025-08-27 PROCEDURE — 67028 INJECTION EYE DRUG: CPT | Mod: LT | Performed by: OPHTHALMOLOGY

## 2025-08-27 RX ADMIN — Medication 1.25 MG: at 09:59

## 2025-08-27 RX ADMIN — Medication 1.25 MG: at 09:58

## 2025-08-27 ASSESSMENT — CUP TO DISC RATIO
OS_RATIO: 0.3
OD_RATIO: 0.3

## 2025-08-27 ASSESSMENT — CONF VISUAL FIELD
OS_INFERIOR_TEMPORAL_RESTRICTION: 0
OD_SUPERIOR_TEMPORAL_RESTRICTION: 3
OS_SUPERIOR_NASAL_RESTRICTION: 0
OS_INFERIOR_NASAL_RESTRICTION: 0
OD_INFERIOR_NASAL_RESTRICTION: 3
OS_NORMAL: 1
OS_SUPERIOR_TEMPORAL_RESTRICTION: 0
OD_INFERIOR_TEMPORAL_RESTRICTION: 3
OD_SUPERIOR_NASAL_RESTRICTION: 1

## 2025-08-27 ASSESSMENT — TONOMETRY
IOP_METHOD: TONOPEN
OS_IOP_MMHG: 16
OD_IOP_MMHG: 18

## 2025-08-27 ASSESSMENT — VISUAL ACUITY
OS_SC+: -2
OD_SC+: -1
METHOD: SNELLEN - LINEAR
OD_SC: 20/40
OS_SC: 20/20

## 2025-08-27 ASSESSMENT — EXTERNAL EXAM - RIGHT EYE: OD_EXAM: NORMAL

## 2025-08-27 ASSESSMENT — SLIT LAMP EXAM - LIDS
COMMENTS: NORMAL
COMMENTS: NORMAL

## 2025-08-27 ASSESSMENT — EXTERNAL EXAM - LEFT EYE: OS_EXAM: NORMAL

## (undated) DEVICE — SOL NACL 0.9% INJ 1000ML BAG 2B1324X

## (undated) DEVICE — INSERT FOGARTY 33MM TRACTION HYDRAJAW HYDRA33

## (undated) DEVICE — GOWN REINFORCED XXLG 9071

## (undated) DEVICE — NDL COUNTER 40CT  31142311

## (undated) DEVICE — NDL BX MONOPTY 18GAX16CM 121816

## (undated) DEVICE — SU SILK 0 TIE 6X30" A306H

## (undated) DEVICE — CLIP APPLIER 09 3/8" SM LIGACLIP MCS20

## (undated) DEVICE — DRAPE ISOLATION BAG 1003

## (undated) DEVICE — STPL LINEAR CUT 55MM VASC TVC55

## (undated) DEVICE — SYR 01ML 27GA 0.5" NDL TBC 309623

## (undated) DEVICE — SU PDS II 5-0 RB-1 27" Z303H

## (undated) DEVICE — NDL COUNTER 20CT 31142493

## (undated) DEVICE — TUBING IRRIG CYSTO/BLADDER SET 81" LF 2C4040

## (undated) DEVICE — SU PROLENE 5-0 RB-1DA 36"  8556H

## (undated) DEVICE — SU SILK 3-0 TIE 12X30" A304H

## (undated) DEVICE — SU PROLENE 6-0 RB-2DA 30" 8711H

## (undated) DEVICE — SYR 30ML LL W/O NDL 302832

## (undated) DEVICE — SU PROLENE 6-0 C-1DA 30" 8706H

## (undated) DEVICE — SU PDS II 4-0 RB-1 27" Z304H

## (undated) DEVICE — SYR 03ML LL W/O NDL 309657

## (undated) DEVICE — LINEN TOWEL PACK X30 5481

## (undated) DEVICE — STPL RELOAD LINEAR CUT 55MM TCR55

## (undated) DEVICE — STPL SKIN 35W ROTATING HEAD PRW35

## (undated) DEVICE — GLOVE BIOGEL PI MICRO INDICATOR UNDERGLOVE SZ 8.0 48980

## (undated) DEVICE — SU PROLENE 7-0 BV-1DA 4X30" M8703

## (undated) DEVICE — WIPES FOLEY CARE SURESTEP PROVON DFC100

## (undated) DEVICE — SU PDS II 6-0 RB-2DA 30" Z149H

## (undated) DEVICE — ESU ELEC BLADE 2.75" COATED/INSULATED E1455

## (undated) DEVICE — CATH FOGARTY EMBOLECTOMY 4FR 40CM LATEX 120404F

## (undated) DEVICE — DRAIN JACKSON PRATT ROUND SIL 19FR W/TROCAR LF JP-2232

## (undated) DEVICE — CATH FOLEY 3WAY 16FR 30ML SIL 73016SI

## (undated) DEVICE — DRAPE IOBAN INCISE 23X17" 6650EZ

## (undated) DEVICE — NDL BLUNT 18GA 1" W/O FILTER 305181

## (undated) DEVICE — DRAPE FLUID WARMING 52 X 60" ORS-321

## (undated) DEVICE — SUCTION TIP YANKAUER VIAGUARD BULBOUS HANDLE SMK200

## (undated) DEVICE — SURGICEL HEMOSTAT 4X8" 1952

## (undated) DEVICE — DRSG TELFA ISLAND 4X14" 7544

## (undated) DEVICE — CATH TRAY FOLEY SURESTEP 16FR W/TMP PRB STLK LATEX A319416AM

## (undated) DEVICE — BASIN SET SINGLE STERILE 13752-624

## (undated) DEVICE — SU PROLENE 1 CTX 30" 8455H

## (undated) DEVICE — SU PDS II 0 TP-1 60" Z991G

## (undated) DEVICE — ESU LIGASURE IMPACT OPEN SEALER/DVDR CVD LG JAW LF4418

## (undated) DEVICE — Device

## (undated) DEVICE — LINEN TOWEL PACK X6 WHITE 5487

## (undated) DEVICE — TUBING SUCTION 10'X3/16" N510

## (undated) DEVICE — INSERT FOGARTY 61MM TRACTION HYDRAJAW HYDRA61

## (undated) DEVICE — SU PDS 6-0 BV-1DA 30" Z117H

## (undated) DEVICE — SU VICRYL+ 3-0 27IN SH UND VCP416H

## (undated) DEVICE — SUCTION MANIFOLD NEPTUNE 2 SYS 4 PORT 0702-020-000

## (undated) DEVICE — KIT HAND CONTROL ACIST 016795

## (undated) DEVICE — ESU GROUND PAD ADULT W/CORD E7507

## (undated) DEVICE — CATH PLUG W/CAP 000076

## (undated) DEVICE — LABEL MEDICATION SYSTEM 3303-P

## (undated) DEVICE — GLOVE BIOGEL PI MICRO INDICATOR UNDERGLOVE SZ 7.5 48975

## (undated) DEVICE — SLEEVE TR BAND RADIAL COMPRESSION DEVICE 24CM TRB24-REG

## (undated) DEVICE — GLOVE PROTEXIS BLUE W/NEU-THERA 6.5  2D73EB65

## (undated) DEVICE — SPONGE LAP 18X18" X8435

## (undated) DEVICE — SU SILK 0 SH 30" K834H

## (undated) DEVICE — STPL LINEAR CUT 75MM TLC75

## (undated) DEVICE — PUNCH AORTIC 5MM SINGLE USE 1001-626

## (undated) DEVICE — SUTURE BOOTS 051003PBX

## (undated) DEVICE — PREP CHLORAPREP 26ML TINTED ORANGE  260815

## (undated) DEVICE — STPL RELOAD LINEAR CUT 55MM VASC TVR55

## (undated) DEVICE — CLIP APPLIER 11" MED LIGACLIP MCM30

## (undated) DEVICE — SHTH INTRO 0.021IN ID 6FR DIA

## (undated) DEVICE — TUBING PRESSURE 30"

## (undated) DEVICE — ESU ELEC BLADE 6" COATED/INSULATED E1455-6

## (undated) DEVICE — GOWN IMPERVIOUS BREATHABLE SMART LG 89015

## (undated) DEVICE — MANIFOLD KIT ANGIO AUTOMATED 014613

## (undated) DEVICE — SUCTION TIP YANKAUER STR K87

## (undated) DEVICE — SU VICRYL 0 CT-1 27" UND J260H

## (undated) DEVICE — SU SILK 1 TIE 6X30" A307H

## (undated) DEVICE — SU SILK 4-0 TIE 12X30" A303H

## (undated) DEVICE — BLADE CLIPPER DISP 4406

## (undated) DEVICE — SU SILK 3-0 SH CR 8X18" C013D

## (undated) DEVICE — PREP CHLORAPREP 26ML TINTED HI-LITE ORANGE 930815

## (undated) DEVICE — CANNULA VESSEL DLP  30001

## (undated) DEVICE — SU PDS II 4-0 SH 27" Z315H

## (undated) DEVICE — SU SILK 2-0 TIE 12X30" A305H

## (undated) DEVICE — SURGICEL ABSORBABLE HEMOSTAT SNOW 4"X4" 2083

## (undated) DEVICE — SOL NACL 0.9% IRRIG 1000ML BOTTLE 2F7124

## (undated) DEVICE — DEVICE CATH STABILIZATION STATLOCK FOLEY 3-WAY FOL0105

## (undated) DEVICE — BONE WAX 2.5GM W31G

## (undated) DEVICE — SU VICRYL 3-0 SH 27" UND J416H

## (undated) DEVICE — DRSG MEDIPORE 3 1/2X13 3/4" 3573

## (undated) DEVICE — SYR 10ML LL W/O NDL 302995

## (undated) DEVICE — ESU HANDPIECE ABC BEND-A-BEAM 6" 134006

## (undated) DEVICE — PACK HEART LEFT CUSTOM

## (undated) DEVICE — DRAPE SLUSH/WARMER 66X44" ORS-320

## (undated) DEVICE — DRSG PRIMAPORE 02X3" 7133

## (undated) DEVICE — DRAIN JACKSON PRATT RESERVOIR 100ML SU130-1305

## (undated) DEVICE — FILTER HEPA FLUID TRAP NEPTUNE 0703-040-001

## (undated) DEVICE — BLADE CLIPPER SGL USE 9680

## (undated) DEVICE — DRAIN PENROSE 3/8X18" LATEX 0918020

## (undated) DEVICE — SU ETHILON 3-0 PS-1 18" 1663H

## (undated) DEVICE — NDL ANGIOCATH 14GA 1.25" 4048

## (undated) DEVICE — SU PROLENE 4-0 SHDA 36" 8521H

## (undated) DEVICE — SU PDS II 3-0 SH 27" Z316H

## (undated) DEVICE — CLIP APPLIER 13" LG LIGACLIP MCL20

## (undated) RX ORDER — HEPARIN SODIUM 1000 [USP'U]/ML
INJECTION, SOLUTION INTRAVENOUS; SUBCUTANEOUS
Status: DISPENSED
Start: 2019-10-01

## (undated) RX ORDER — FENTANYL CITRATE 50 UG/ML
INJECTION, SOLUTION INTRAMUSCULAR; INTRAVENOUS
Status: DISPENSED
Start: 2024-11-03

## (undated) RX ORDER — HYDROMORPHONE HYDROCHLORIDE 1 MG/ML
INJECTION, SOLUTION INTRAMUSCULAR; INTRAVENOUS; SUBCUTANEOUS
Status: DISPENSED
Start: 2024-11-03

## (undated) RX ORDER — HEPARIN SODIUM 1000 [USP'U]/ML
INJECTION, SOLUTION INTRAVENOUS; SUBCUTANEOUS
Status: DISPENSED
Start: 2024-11-02

## (undated) RX ORDER — SODIUM CHLORIDE 9 MG/ML
INJECTION, SOLUTION INTRAVENOUS
Status: DISPENSED
Start: 2019-10-01

## (undated) RX ORDER — ONDANSETRON 2 MG/ML
INJECTION INTRAMUSCULAR; INTRAVENOUS
Status: DISPENSED
Start: 2019-10-01

## (undated) RX ORDER — HYDROMORPHONE HYDROCHLORIDE 1 MG/ML
INJECTION, SOLUTION INTRAMUSCULAR; INTRAVENOUS; SUBCUTANEOUS
Status: DISPENSED
Start: 2019-10-01

## (undated) RX ORDER — ASPIRIN 81 MG/1
TABLET, CHEWABLE ORAL
Status: DISPENSED
Start: 2024-06-07

## (undated) RX ORDER — SODIUM CHLORIDE 9 MG/ML
INJECTION, SOLUTION INTRAVENOUS
Status: DISPENSED
Start: 2019-11-25

## (undated) RX ORDER — PROPOFOL 10 MG/ML
INJECTION, EMULSION INTRAVENOUS
Status: DISPENSED
Start: 2024-11-02

## (undated) RX ORDER — DEXTROSE MONOHYDRATE 25 G/50ML
INJECTION, SOLUTION INTRAVENOUS
Status: DISPENSED
Start: 2024-11-02

## (undated) RX ORDER — FENTANYL CITRATE 50 UG/ML
INJECTION, SOLUTION INTRAMUSCULAR; INTRAVENOUS
Status: DISPENSED
Start: 2019-10-01

## (undated) RX ORDER — PROTAMINE SULFATE 10 MG/ML
INJECTION, SOLUTION INTRAVENOUS
Status: DISPENSED
Start: 2019-10-01

## (undated) RX ORDER — FUROSEMIDE 10 MG/ML
INJECTION INTRAMUSCULAR; INTRAVENOUS
Status: DISPENSED
Start: 2019-10-01

## (undated) RX ORDER — HYDROMORPHONE HYDROCHLORIDE 1 MG/ML
INJECTION, SOLUTION INTRAMUSCULAR; INTRAVENOUS; SUBCUTANEOUS
Status: DISPENSED
Start: 2024-11-02

## (undated) RX ORDER — PAPAVERINE HYDROCHLORIDE 30 MG/ML
INJECTION INTRAMUSCULAR; INTRAVENOUS
Status: DISPENSED
Start: 2019-10-01

## (undated) RX ORDER — VERAPAMIL HYDROCHLORIDE 2.5 MG/ML
INJECTION, SOLUTION INTRAVENOUS
Status: DISPENSED
Start: 2019-10-01

## (undated) RX ORDER — LIDOCAINE HYDROCHLORIDE 10 MG/ML
INJECTION, SOLUTION EPIDURAL; INFILTRATION; INTRACAUDAL; PERINEURAL
Status: DISPENSED
Start: 2019-11-25

## (undated) RX ORDER — PAPAVERINE HYDROCHLORIDE 30 MG/ML
INJECTION INTRAMUSCULAR; INTRAVENOUS
Status: DISPENSED
Start: 2024-11-02

## (undated) RX ORDER — PROPOFOL 10 MG/ML
INJECTION, EMULSION INTRAVENOUS
Status: DISPENSED
Start: 2019-10-01

## (undated) RX ORDER — ONDANSETRON 2 MG/ML
INJECTION INTRAMUSCULAR; INTRAVENOUS
Status: DISPENSED
Start: 2024-11-02

## (undated) RX ORDER — EPHEDRINE SULFATE 50 MG/ML
INJECTION, SOLUTION INTRAMUSCULAR; INTRAVENOUS; SUBCUTANEOUS
Status: DISPENSED
Start: 2019-10-01

## (undated) RX ORDER — FENTANYL CITRATE 50 UG/ML
INJECTION, SOLUTION INTRAMUSCULAR; INTRAVENOUS
Status: DISPENSED
Start: 2024-06-07

## (undated) RX ORDER — CEFAZOLIN SODIUM 1 G/3ML
INJECTION, POWDER, FOR SOLUTION INTRAMUSCULAR; INTRAVENOUS
Status: DISPENSED
Start: 2019-10-01

## (undated) RX ORDER — LIDOCAINE HYDROCHLORIDE 10 MG/ML
INJECTION, SOLUTION EPIDURAL; INFILTRATION; INTRACAUDAL; PERINEURAL
Status: DISPENSED
Start: 2024-06-07

## (undated) RX ORDER — HEPARIN SODIUM 1000 [USP'U]/ML
INJECTION, SOLUTION INTRAVENOUS; SUBCUTANEOUS
Status: DISPENSED
Start: 2024-06-07

## (undated) RX ORDER — ASPIRIN 325 MG
TABLET ORAL
Status: DISPENSED
Start: 2024-06-07

## (undated) RX ORDER — FENTANYL CITRATE 50 UG/ML
INJECTION, SOLUTION INTRAMUSCULAR; INTRAVENOUS
Status: DISPENSED
Start: 2024-11-02

## (undated) RX ORDER — EPHEDRINE SULFATE 50 MG/ML
INJECTION, SOLUTION INTRAMUSCULAR; INTRAVENOUS; SUBCUTANEOUS
Status: DISPENSED
Start: 2024-11-02

## (undated) RX ORDER — DEXTROSE MONOHYDRATE 25 G/50ML
INJECTION, SOLUTION INTRAVENOUS
Status: DISPENSED
Start: 2019-10-01

## (undated) RX ORDER — CEFUROXIME SODIUM 1.5 G/16ML
INJECTION, POWDER, FOR SOLUTION INTRAVENOUS
Status: DISPENSED
Start: 2019-10-01

## (undated) RX ORDER — SODIUM CHLORIDE 9 MG/ML
INJECTION, SOLUTION INTRAVENOUS
Status: DISPENSED
Start: 2024-06-07

## (undated) RX ORDER — DEXAMETHASONE SODIUM PHOSPHATE 4 MG/ML
INJECTION, SOLUTION INTRA-ARTICULAR; INTRALESIONAL; INTRAMUSCULAR; INTRAVENOUS; SOFT TISSUE
Status: DISPENSED
Start: 2024-11-02

## (undated) RX ORDER — SODIUM CHLORIDE, SODIUM LACTATE, POTASSIUM CHLORIDE, CALCIUM CHLORIDE 600; 310; 30; 20 MG/100ML; MG/100ML; MG/100ML; MG/100ML
INJECTION, SOLUTION INTRAVENOUS
Status: DISPENSED
Start: 2019-10-01

## (undated) RX ORDER — CARDIOPLEG/ORGAN PRESERV NO.1 9-198-2-1
BOTTLE PERFUSION
Status: DISPENSED
Start: 2024-11-02

## (undated) RX ORDER — ACETAMINOPHEN 325 MG/1
TABLET ORAL
Status: DISPENSED
Start: 2024-11-02

## (undated) RX ORDER — SODIUM CHLORIDE 450 MG/100ML
INJECTION, SOLUTION INTRAVENOUS
Status: DISPENSED
Start: 2019-10-01

## (undated) RX ORDER — ACETAMINOPHEN 325 MG/1
TABLET ORAL
Status: DISPENSED
Start: 2019-10-22

## (undated) RX ORDER — FENTANYL CITRATE-0.9 % NACL/PF 10 MCG/ML
PLASTIC BAG, INJECTION (ML) INTRAVENOUS
Status: DISPENSED
Start: 2024-11-02